# Patient Record
Sex: FEMALE | Race: WHITE | NOT HISPANIC OR LATINO
[De-identification: names, ages, dates, MRNs, and addresses within clinical notes are randomized per-mention and may not be internally consistent; named-entity substitution may affect disease eponyms.]

---

## 2017-01-12 ENCOUNTER — APPOINTMENT (OUTPATIENT)
Dept: ELECTROPHYSIOLOGY | Facility: CLINIC | Age: 77
End: 2017-01-12

## 2017-01-12 ENCOUNTER — APPOINTMENT (OUTPATIENT)
Dept: CARDIOLOGY | Facility: CLINIC | Age: 77
End: 2017-01-12

## 2017-01-12 ENCOUNTER — NON-APPOINTMENT (OUTPATIENT)
Age: 77
End: 2017-01-12

## 2017-01-12 VITALS
HEART RATE: 66 BPM | DIASTOLIC BLOOD PRESSURE: 84 MMHG | BODY MASS INDEX: 53.92 KG/M2 | WEIGHT: 293 LBS | SYSTOLIC BLOOD PRESSURE: 161 MMHG | HEIGHT: 62 IN

## 2017-05-04 ENCOUNTER — APPOINTMENT (OUTPATIENT)
Dept: ELECTROPHYSIOLOGY | Facility: CLINIC | Age: 77
End: 2017-05-04

## 2017-05-04 ENCOUNTER — NON-APPOINTMENT (OUTPATIENT)
Age: 77
End: 2017-05-04

## 2017-05-04 ENCOUNTER — APPOINTMENT (OUTPATIENT)
Dept: CARDIOLOGY | Facility: CLINIC | Age: 77
End: 2017-05-04

## 2017-05-04 VITALS
HEART RATE: 65 BPM | DIASTOLIC BLOOD PRESSURE: 76 MMHG | OXYGEN SATURATION: 96 % | WEIGHT: 293 LBS | HEIGHT: 62 IN | SYSTOLIC BLOOD PRESSURE: 136 MMHG | BODY MASS INDEX: 53.92 KG/M2

## 2017-05-11 ENCOUNTER — APPOINTMENT (OUTPATIENT)
Dept: PULMONOLOGY | Facility: CLINIC | Age: 77
End: 2017-05-11

## 2017-05-11 VITALS
TEMPERATURE: 98.5 F | BODY MASS INDEX: 53.73 KG/M2 | RESPIRATION RATE: 18 BRPM | SYSTOLIC BLOOD PRESSURE: 140 MMHG | WEIGHT: 292 LBS | OXYGEN SATURATION: 98 % | DIASTOLIC BLOOD PRESSURE: 70 MMHG | HEART RATE: 71 BPM | HEIGHT: 62 IN

## 2017-05-11 DIAGNOSIS — G47.33 OBSTRUCTIVE SLEEP APNEA (ADULT) (PEDIATRIC): ICD-10-CM

## 2017-09-06 ENCOUNTER — APPOINTMENT (OUTPATIENT)
Dept: ELECTROPHYSIOLOGY | Facility: CLINIC | Age: 77
End: 2017-09-06

## 2017-09-06 ENCOUNTER — APPOINTMENT (OUTPATIENT)
Dept: CARDIOLOGY | Facility: CLINIC | Age: 77
End: 2017-09-06

## 2017-09-27 ENCOUNTER — APPOINTMENT (OUTPATIENT)
Dept: CARDIOLOGY | Facility: CLINIC | Age: 77
End: 2017-09-27
Payer: MEDICARE

## 2017-09-27 ENCOUNTER — NON-APPOINTMENT (OUTPATIENT)
Age: 77
End: 2017-09-27

## 2017-09-27 VITALS
HEIGHT: 62 IN | BODY MASS INDEX: 51.53 KG/M2 | OXYGEN SATURATION: 96 % | RESPIRATION RATE: 18 BRPM | SYSTOLIC BLOOD PRESSURE: 142 MMHG | DIASTOLIC BLOOD PRESSURE: 70 MMHG | WEIGHT: 280 LBS | HEART RATE: 68 BPM

## 2017-09-27 PROCEDURE — 99214 OFFICE O/P EST MOD 30 MIN: CPT

## 2017-09-27 PROCEDURE — 93000 ELECTROCARDIOGRAM COMPLETE: CPT

## 2017-10-10 ENCOUNTER — OUTPATIENT (OUTPATIENT)
Dept: OUTPATIENT SERVICES | Facility: HOSPITAL | Age: 77
LOS: 1 days | End: 2017-10-10
Payer: MEDICARE

## 2017-10-10 VITALS
RESPIRATION RATE: 18 BRPM | DIASTOLIC BLOOD PRESSURE: 88 MMHG | HEART RATE: 64 BPM | SYSTOLIC BLOOD PRESSURE: 200 MMHG | WEIGHT: 279.99 LBS | TEMPERATURE: 97 F | HEIGHT: 60 IN | OXYGEN SATURATION: 95 %

## 2017-10-10 DIAGNOSIS — I50.20 UNSPECIFIED SYSTOLIC (CONGESTIVE) HEART FAILURE: ICD-10-CM

## 2017-10-10 DIAGNOSIS — Z98.89 OTHER SPECIFIED POSTPROCEDURAL STATES: Chronic | ICD-10-CM

## 2017-10-10 LAB
ALBUMIN SERPL ELPH-MCNC: 4.3 G/DL — SIGNIFICANT CHANGE UP (ref 3.3–5)
ALP SERPL-CCNC: 51 U/L — SIGNIFICANT CHANGE UP (ref 40–120)
ALT FLD-CCNC: 19 U/L RC — SIGNIFICANT CHANGE UP (ref 10–45)
ANION GAP SERPL CALC-SCNC: 10 MMOL/L — SIGNIFICANT CHANGE UP (ref 5–17)
AST SERPL-CCNC: 23 U/L — SIGNIFICANT CHANGE UP (ref 10–40)
BILIRUB SERPL-MCNC: 0.4 MG/DL — SIGNIFICANT CHANGE UP (ref 0.2–1.2)
BUN SERPL-MCNC: 24 MG/DL — HIGH (ref 7–23)
CALCIUM SERPL-MCNC: 9.5 MG/DL — SIGNIFICANT CHANGE UP (ref 8.4–10.5)
CHLORIDE SERPL-SCNC: 102 MMOL/L — SIGNIFICANT CHANGE UP (ref 96–108)
CO2 SERPL-SCNC: 28 MMOL/L — SIGNIFICANT CHANGE UP (ref 22–31)
CREAT SERPL-MCNC: 1.18 MG/DL — SIGNIFICANT CHANGE UP (ref 0.5–1.3)
GLUCOSE SERPL-MCNC: 169 MG/DL — HIGH (ref 70–99)
HCT VFR BLD CALC: 35.8 % — SIGNIFICANT CHANGE UP (ref 34.5–45)
HGB BLD-MCNC: 12.5 G/DL — SIGNIFICANT CHANGE UP (ref 11.5–15.5)
INR BLD: 1.06 RATIO — SIGNIFICANT CHANGE UP (ref 0.88–1.16)
MCHC RBC-ENTMCNC: 32 PG — SIGNIFICANT CHANGE UP (ref 27–34)
MCHC RBC-ENTMCNC: 35 GM/DL — SIGNIFICANT CHANGE UP (ref 32–36)
MCV RBC AUTO: 91.4 FL — SIGNIFICANT CHANGE UP (ref 80–100)
PLATELET # BLD AUTO: 245 K/UL — SIGNIFICANT CHANGE UP (ref 150–400)
POTASSIUM SERPL-MCNC: 4.2 MMOL/L — SIGNIFICANT CHANGE UP (ref 3.5–5.3)
POTASSIUM SERPL-SCNC: 4.2 MMOL/L — SIGNIFICANT CHANGE UP (ref 3.5–5.3)
PROT SERPL-MCNC: 7.2 G/DL — SIGNIFICANT CHANGE UP (ref 6–8.3)
PROTHROM AB SERPL-ACNC: 11.6 SEC — SIGNIFICANT CHANGE UP (ref 9.8–12.7)
RBC # BLD: 3.92 M/UL — SIGNIFICANT CHANGE UP (ref 3.8–5.2)
RBC # FLD: 12.5 % — SIGNIFICANT CHANGE UP (ref 10.3–14.5)
SODIUM SERPL-SCNC: 140 MMOL/L — SIGNIFICANT CHANGE UP (ref 135–145)
WBC # BLD: 7.1 K/UL — SIGNIFICANT CHANGE UP (ref 3.8–10.5)
WBC # FLD AUTO: 7.1 K/UL — SIGNIFICANT CHANGE UP (ref 3.8–10.5)

## 2017-10-10 PROCEDURE — 93010 ELECTROCARDIOGRAM REPORT: CPT

## 2017-10-10 PROCEDURE — 93005 ELECTROCARDIOGRAM TRACING: CPT

## 2017-10-10 PROCEDURE — C1769: CPT

## 2017-10-10 PROCEDURE — 80053 COMPREHEN METABOLIC PANEL: CPT

## 2017-10-10 PROCEDURE — 93451 RIGHT HEART CATH: CPT | Mod: 26

## 2017-10-10 PROCEDURE — 93451 RIGHT HEART CATH: CPT

## 2017-10-10 PROCEDURE — 85027 COMPLETE CBC AUTOMATED: CPT

## 2017-10-10 PROCEDURE — C1894: CPT

## 2017-10-10 PROCEDURE — 85610 PROTHROMBIN TIME: CPT

## 2017-10-10 RX ORDER — ALBUTEROL 90 UG/1
2.5 AEROSOL, METERED ORAL ONCE
Qty: 0 | Refills: 0 | Status: DISCONTINUED | OUTPATIENT
Start: 2017-10-10 | End: 2017-10-25

## 2017-10-10 RX ORDER — LABETALOL HCL 100 MG
20 TABLET ORAL ONCE
Qty: 0 | Refills: 0 | Status: DISCONTINUED | OUTPATIENT
Start: 2017-10-10 | End: 2017-10-25

## 2017-10-10 RX ORDER — SODIUM CHLORIDE 9 MG/ML
3 INJECTION INTRAMUSCULAR; INTRAVENOUS; SUBCUTANEOUS EVERY 8 HOURS
Qty: 0 | Refills: 0 | Status: DISCONTINUED | OUTPATIENT
Start: 2017-10-10 | End: 2017-10-25

## 2017-10-10 NOTE — H&P CARDIOLOGY - HISTORY OF PRESENT ILLNESS
76 yo Malagasy-speaking woman(requesting to translated her daughter Vaishali) with CAD, HTN, HLD, chronic diastolic HF, DM2, afib on Xarelto s/p PPM November 2013, intermittent asthma, hypothyroidism, venous insufficiency with LE edema, urinary incontinence, IBS presented for cardia cath. Pt reports she has been experiencing worsening CHAMBERLAIN, evaluated by Dr. Velazquez and referred for cath. 78 yo Macedonian-speaking woman(requesting to translated her daughter Vaishali) with CAD, HTN, HLD, morbid obesity, chronic diastolic HF, DM2, afib on Xarelto s/p PPM November 2013, intermittent asthma, hypothyroidism, venous insufficiency with LE edema, urinary incontinence, IBS presented for cardia cath. Pt reports she has been experiencing worsening CHAMBERLAIN, evaluated by Dr. Velazquez and referred for cath.

## 2017-10-10 NOTE — H&P CARDIOLOGY - PMH
Asthma    Atrial fibrillation    CAD (Coronary Atherosclerotic Disease)    CHF (congestive heart failure), NYHA class I    Diabetes    HLD (Hyperlipidemia)    HTN (Hypertension)    Hypothyroidism    IBS (irritable bowel syndrome)    Myocardial Infarction    Obesity    Pacemaker    Venous stasis

## 2018-01-01 ENCOUNTER — OUTPATIENT (OUTPATIENT)
Dept: OUTPATIENT SERVICES | Facility: HOSPITAL | Age: 78
LOS: 1 days | End: 2018-01-01
Payer: MEDICAID

## 2018-01-01 DIAGNOSIS — Z98.89 OTHER SPECIFIED POSTPROCEDURAL STATES: Chronic | ICD-10-CM

## 2018-01-01 PROCEDURE — G9001: CPT

## 2018-01-28 ENCOUNTER — INPATIENT (INPATIENT)
Facility: HOSPITAL | Age: 78
LOS: 1 days | Discharge: HOME | DRG: 190 | End: 2018-01-30
Attending: INTERNAL MEDICINE | Admitting: INTERNAL MEDICINE
Payer: MEDICARE

## 2018-01-28 VITALS
RESPIRATION RATE: 18 BRPM | WEIGHT: 279.99 LBS | TEMPERATURE: 99 F | DIASTOLIC BLOOD PRESSURE: 80 MMHG | OXYGEN SATURATION: 100 % | SYSTOLIC BLOOD PRESSURE: 200 MMHG | HEART RATE: 80 BPM

## 2018-01-28 DIAGNOSIS — E11.9 TYPE 2 DIABETES MELLITUS WITHOUT COMPLICATIONS: ICD-10-CM

## 2018-01-28 DIAGNOSIS — Z98.89 OTHER SPECIFIED POSTPROCEDURAL STATES: Chronic | ICD-10-CM

## 2018-01-28 DIAGNOSIS — J44.1 CHRONIC OBSTRUCTIVE PULMONARY DISEASE WITH (ACUTE) EXACERBATION: ICD-10-CM

## 2018-01-28 LAB
ALBUMIN SERPL ELPH-MCNC: 3.4 G/DL — LOW (ref 3.5–5)
ALP SERPL-CCNC: 58 U/L — SIGNIFICANT CHANGE UP (ref 40–120)
ALT FLD-CCNC: 38 U/L DA — SIGNIFICANT CHANGE UP (ref 10–60)
ANION GAP SERPL CALC-SCNC: 9 MMOL/L — SIGNIFICANT CHANGE UP (ref 5–17)
AST SERPL-CCNC: 30 U/L — SIGNIFICANT CHANGE UP (ref 10–40)
BASE EXCESS BLDA CALC-SCNC: 0.8 MMOL/L — SIGNIFICANT CHANGE UP (ref -2–2)
BASE EXCESS BLDV CALC-SCNC: -0.2 MMOL/L — SIGNIFICANT CHANGE UP (ref -2–2)
BASOPHILS # BLD AUTO: 0.1 K/UL — SIGNIFICANT CHANGE UP (ref 0–0.2)
BASOPHILS NFR BLD AUTO: 1.6 % — SIGNIFICANT CHANGE UP (ref 0–2)
BILIRUB SERPL-MCNC: 0.4 MG/DL — SIGNIFICANT CHANGE UP (ref 0.2–1.2)
BLOOD GAS COMMENTS ARTERIAL: SIGNIFICANT CHANGE UP
BLOOD GAS COMMENTS, VENOUS: SIGNIFICANT CHANGE UP
BUN SERPL-MCNC: 15 MG/DL — SIGNIFICANT CHANGE UP (ref 7–18)
CALCIUM SERPL-MCNC: 8.4 MG/DL — SIGNIFICANT CHANGE UP (ref 8.4–10.5)
CHLORIDE SERPL-SCNC: 102 MMOL/L — SIGNIFICANT CHANGE UP (ref 96–108)
CK MB BLD-MCNC: 1.1 % — SIGNIFICANT CHANGE UP (ref 0–3.5)
CK MB BLD-MCNC: 1.2 % — SIGNIFICANT CHANGE UP (ref 0–3.5)
CK MB CFR SERPL CALC: 1.5 NG/ML — SIGNIFICANT CHANGE UP (ref 0–3.6)
CK MB CFR SERPL CALC: 1.6 NG/ML — SIGNIFICANT CHANGE UP (ref 0–3.6)
CK SERPL-CCNC: 129 U/L — SIGNIFICANT CHANGE UP (ref 21–215)
CK SERPL-CCNC: 139 U/L — SIGNIFICANT CHANGE UP (ref 21–215)
CO2 SERPL-SCNC: 24 MMOL/L — SIGNIFICANT CHANGE UP (ref 22–31)
CREAT SERPL-MCNC: 1.03 MG/DL — SIGNIFICANT CHANGE UP (ref 0.5–1.3)
EOSINOPHIL # BLD AUTO: 0.1 K/UL — SIGNIFICANT CHANGE UP (ref 0–0.5)
EOSINOPHIL NFR BLD AUTO: 0.9 % — SIGNIFICANT CHANGE UP (ref 0–6)
GLUCOSE BLDC GLUCOMTR-MCNC: 205 MG/DL — HIGH (ref 70–99)
GLUCOSE SERPL-MCNC: 173 MG/DL — HIGH (ref 70–99)
HCO3 BLDA-SCNC: 25 MMOL/L — SIGNIFICANT CHANGE UP (ref 23–27)
HCO3 BLDV-SCNC: 26 MMOL/L — SIGNIFICANT CHANGE UP (ref 21–29)
HCT VFR BLD CALC: 35.5 % — SIGNIFICANT CHANGE UP (ref 34.5–45)
HGB BLD-MCNC: 11.5 G/DL — SIGNIFICANT CHANGE UP (ref 11.5–15.5)
HOROWITZ INDEX BLDA+IHG-RTO: 32 — SIGNIFICANT CHANGE UP
HOROWITZ INDEX BLDV+IHG-RTO: 21 — SIGNIFICANT CHANGE UP
LYMPHOCYTES # BLD AUTO: 1 K/UL — SIGNIFICANT CHANGE UP (ref 1–3.3)
LYMPHOCYTES # BLD AUTO: 17.1 % — SIGNIFICANT CHANGE UP (ref 13–44)
MCHC RBC-ENTMCNC: 29.4 PG — SIGNIFICANT CHANGE UP (ref 27–34)
MCHC RBC-ENTMCNC: 32.5 GM/DL — SIGNIFICANT CHANGE UP (ref 32–36)
MCV RBC AUTO: 90.6 FL — SIGNIFICANT CHANGE UP (ref 80–100)
MONOCYTES # BLD AUTO: 0.6 K/UL — SIGNIFICANT CHANGE UP (ref 0–0.9)
MONOCYTES NFR BLD AUTO: 10.6 % — SIGNIFICANT CHANGE UP (ref 2–14)
NEUTROPHILS # BLD AUTO: 4.1 K/UL — SIGNIFICANT CHANGE UP (ref 1.8–7.4)
NEUTROPHILS NFR BLD AUTO: 69.8 % — SIGNIFICANT CHANGE UP (ref 43–77)
NT-PROBNP SERPL-SCNC: 706 PG/ML — HIGH (ref 0–450)
PCO2 BLDA: 41 MMHG — SIGNIFICANT CHANGE UP (ref 32–46)
PCO2 BLDV: 49 MMHG — SIGNIFICANT CHANGE UP (ref 35–50)
PH BLDA: 7.4 — SIGNIFICANT CHANGE UP (ref 7.35–7.45)
PH BLDV: 7.34 — LOW (ref 7.35–7.45)
PLATELET # BLD AUTO: 195 K/UL — SIGNIFICANT CHANGE UP (ref 150–400)
PO2 BLDA: 111 MMHG — HIGH (ref 74–108)
PO2 BLDV: 162 MMHG — HIGH (ref 25–45)
POTASSIUM SERPL-MCNC: 4.4 MMOL/L — SIGNIFICANT CHANGE UP (ref 3.5–5.3)
POTASSIUM SERPL-SCNC: 4.4 MMOL/L — SIGNIFICANT CHANGE UP (ref 3.5–5.3)
PROT SERPL-MCNC: 7.1 G/DL — SIGNIFICANT CHANGE UP (ref 6–8.3)
RAPID RVP RESULT: SIGNIFICANT CHANGE UP
RBC # BLD: 3.92 M/UL — SIGNIFICANT CHANGE UP (ref 3.8–5.2)
RBC # FLD: 12.8 % — SIGNIFICANT CHANGE UP (ref 10.3–14.5)
SAO2 % BLDA: 98 % — HIGH (ref 92–96)
SAO2 % BLDV: 99 % — HIGH (ref 67–88)
SODIUM SERPL-SCNC: 135 MMOL/L — SIGNIFICANT CHANGE UP (ref 135–145)
TROPONIN I SERPL-MCNC: 0.02 NG/ML — SIGNIFICANT CHANGE UP (ref 0–0.04)
TROPONIN I SERPL-MCNC: 0.02 NG/ML — SIGNIFICANT CHANGE UP (ref 0–0.04)
TROPONIN I SERPL-MCNC: <0.015 NG/ML — SIGNIFICANT CHANGE UP (ref 0–0.04)
TSH SERPL-MCNC: 1.62 UU/ML — SIGNIFICANT CHANGE UP (ref 0.34–4.82)
WBC # BLD: 5.9 K/UL — SIGNIFICANT CHANGE UP (ref 3.8–10.5)
WBC # FLD AUTO: 5.9 K/UL — SIGNIFICANT CHANGE UP (ref 3.8–10.5)

## 2018-01-28 PROCEDURE — 99285 EMERGENCY DEPT VISIT HI MDM: CPT | Mod: 25

## 2018-01-28 PROCEDURE — 71045 X-RAY EXAM CHEST 1 VIEW: CPT | Mod: 26

## 2018-01-28 PROCEDURE — 93010 ELECTROCARDIOGRAM REPORT: CPT

## 2018-01-28 RX ORDER — FLUTICASONE PROPIONATE AND SALMETEROL 50; 250 UG/1; UG/1
0 POWDER ORAL; RESPIRATORY (INHALATION)
Qty: 60 | Refills: 0 | COMMUNITY

## 2018-01-28 RX ORDER — PANTOPRAZOLE SODIUM 20 MG/1
40 TABLET, DELAYED RELEASE ORAL
Qty: 0 | Refills: 0 | Status: DISCONTINUED | OUTPATIENT
Start: 2018-01-28 | End: 2018-01-30

## 2018-01-28 RX ORDER — LIPASE/PROTEASE/AMYLASE 16-48-48K
1 CAPSULE,DELAYED RELEASE (ENTERIC COATED) ORAL
Qty: 0 | Refills: 0 | Status: DISCONTINUED | OUTPATIENT
Start: 2018-01-28 | End: 2018-01-28

## 2018-01-28 RX ORDER — LOSARTAN POTASSIUM 100 MG/1
100 TABLET, FILM COATED ORAL DAILY
Qty: 0 | Refills: 0 | Status: DISCONTINUED | OUTPATIENT
Start: 2018-01-28 | End: 2018-01-30

## 2018-01-28 RX ORDER — AMPICILLIN SODIUM AND SULBACTAM SODIUM 250; 125 MG/ML; MG/ML
3 INJECTION, POWDER, FOR SUSPENSION INTRAMUSCULAR; INTRAVENOUS EVERY 8 HOURS
Qty: 0 | Refills: 0 | Status: DISCONTINUED | OUTPATIENT
Start: 2018-01-28 | End: 2018-01-30

## 2018-01-28 RX ORDER — ISOSORBIDE MONONITRATE 60 MG/1
30 TABLET, EXTENDED RELEASE ORAL DAILY
Qty: 0 | Refills: 0 | Status: DISCONTINUED | OUTPATIENT
Start: 2018-01-28 | End: 2018-01-30

## 2018-01-28 RX ORDER — FUROSEMIDE 40 MG
20 TABLET ORAL DAILY
Qty: 0 | Refills: 0 | Status: DISCONTINUED | OUTPATIENT
Start: 2018-01-28 | End: 2018-01-30

## 2018-01-28 RX ORDER — LABETALOL HCL 100 MG
100 TABLET ORAL
Qty: 0 | Refills: 0 | Status: DISCONTINUED | OUTPATIENT
Start: 2018-01-28 | End: 2018-01-29

## 2018-01-28 RX ORDER — FUROSEMIDE 40 MG
40 TABLET ORAL ONCE
Qty: 0 | Refills: 0 | Status: COMPLETED | OUTPATIENT
Start: 2018-01-28 | End: 2018-01-28

## 2018-01-28 RX ORDER — MOMETASONE FUROATE 50 UG/1
0 SPRAY NASAL
Qty: 17 | Refills: 0 | COMMUNITY

## 2018-01-28 RX ORDER — METFORMIN HYDROCHLORIDE 850 MG/1
500 TABLET ORAL
Qty: 0 | Refills: 0 | Status: DISCONTINUED | OUTPATIENT
Start: 2018-01-28 | End: 2018-01-30

## 2018-01-28 RX ORDER — AMPICILLIN SODIUM AND SULBACTAM SODIUM 250; 125 MG/ML; MG/ML
3 INJECTION, POWDER, FOR SUSPENSION INTRAMUSCULAR; INTRAVENOUS ONCE
Qty: 0 | Refills: 0 | Status: COMPLETED | OUTPATIENT
Start: 2018-01-28 | End: 2018-01-28

## 2018-01-28 RX ORDER — OXYBUTYNIN CHLORIDE 5 MG
5 TABLET ORAL
Qty: 0 | Refills: 0 | Status: DISCONTINUED | OUTPATIENT
Start: 2018-01-28 | End: 2018-01-30

## 2018-01-28 RX ORDER — FLUTICASONE PROPIONATE 50 MCG
1 SPRAY, SUSPENSION NASAL DAILY
Qty: 0 | Refills: 0 | Status: DISCONTINUED | OUTPATIENT
Start: 2018-01-28 | End: 2018-01-30

## 2018-01-28 RX ORDER — AMLODIPINE BESYLATE 2.5 MG/1
5 TABLET ORAL DAILY
Qty: 0 | Refills: 0 | Status: DISCONTINUED | OUTPATIENT
Start: 2018-01-28 | End: 2018-01-30

## 2018-01-28 RX ORDER — AMPICILLIN SODIUM AND SULBACTAM SODIUM 250; 125 MG/ML; MG/ML
INJECTION, POWDER, FOR SUSPENSION INTRAMUSCULAR; INTRAVENOUS
Qty: 0 | Refills: 0 | Status: DISCONTINUED | OUTPATIENT
Start: 2018-01-28 | End: 2018-01-30

## 2018-01-28 RX ORDER — MONTELUKAST 4 MG/1
10 TABLET, CHEWABLE ORAL DAILY
Qty: 0 | Refills: 0 | Status: DISCONTINUED | OUTPATIENT
Start: 2018-01-28 | End: 2018-01-30

## 2018-01-28 RX ORDER — DRONEDARONE 400 MG/1
400 TABLET, FILM COATED ORAL
Qty: 0 | Refills: 0 | Status: DISCONTINUED | OUTPATIENT
Start: 2018-01-28 | End: 2018-01-30

## 2018-01-28 RX ORDER — LABETALOL HCL 100 MG
0 TABLET ORAL
Qty: 60 | Refills: 0 | COMMUNITY

## 2018-01-28 RX ORDER — ACETAMINOPHEN 500 MG
650 TABLET ORAL EVERY 6 HOURS
Qty: 0 | Refills: 0 | Status: DISCONTINUED | OUTPATIENT
Start: 2018-01-28 | End: 2018-01-30

## 2018-01-28 RX ORDER — HYDROCHLOROTHIAZIDE 25 MG
25 TABLET ORAL DAILY
Qty: 0 | Refills: 0 | Status: DISCONTINUED | OUTPATIENT
Start: 2018-01-28 | End: 2018-01-28

## 2018-01-28 RX ORDER — RIVAROXABAN 15 MG-20MG
20 KIT ORAL EVERY 24 HOURS
Qty: 0 | Refills: 0 | Status: DISCONTINUED | OUTPATIENT
Start: 2018-01-28 | End: 2018-01-30

## 2018-01-28 RX ORDER — IPRATROPIUM/ALBUTEROL SULFATE 18-103MCG
3 AEROSOL WITH ADAPTER (GRAM) INHALATION
Qty: 0 | Refills: 0 | Status: COMPLETED | OUTPATIENT
Start: 2018-01-28 | End: 2018-01-28

## 2018-01-28 RX ORDER — INSULIN LISPRO 100/ML
VIAL (ML) SUBCUTANEOUS
Qty: 0 | Refills: 0 | Status: DISCONTINUED | OUTPATIENT
Start: 2018-01-28 | End: 2018-01-30

## 2018-01-28 RX ORDER — TIOTROPIUM BROMIDE 18 UG/1
1 CAPSULE ORAL; RESPIRATORY (INHALATION) DAILY
Qty: 0 | Refills: 0 | Status: DISCONTINUED | OUTPATIENT
Start: 2018-01-28 | End: 2018-01-30

## 2018-01-28 RX ORDER — IPRATROPIUM/ALBUTEROL SULFATE 18-103MCG
3 AEROSOL WITH ADAPTER (GRAM) INHALATION EVERY 6 HOURS
Qty: 0 | Refills: 0 | Status: DISCONTINUED | OUTPATIENT
Start: 2018-01-28 | End: 2018-01-30

## 2018-01-28 RX ORDER — FUROSEMIDE 40 MG
40 TABLET ORAL
Qty: 0 | Refills: 0 | Status: DISCONTINUED | OUTPATIENT
Start: 2018-01-28 | End: 2018-01-28

## 2018-01-28 RX ORDER — ATORVASTATIN CALCIUM 80 MG/1
40 TABLET, FILM COATED ORAL AT BEDTIME
Qty: 0 | Refills: 0 | Status: DISCONTINUED | OUTPATIENT
Start: 2018-01-28 | End: 2018-01-30

## 2018-01-28 RX ORDER — LIPASE/PROTEASE/AMYLASE 16-48-48K
2 CAPSULE,DELAYED RELEASE (ENTERIC COATED) ORAL
Qty: 0 | Refills: 0 | Status: DISCONTINUED | OUTPATIENT
Start: 2018-01-28 | End: 2018-01-30

## 2018-01-28 RX ORDER — LEVOTHYROXINE SODIUM 125 MCG
175 TABLET ORAL DAILY
Qty: 0 | Refills: 0 | Status: DISCONTINUED | OUTPATIENT
Start: 2018-01-28 | End: 2018-01-30

## 2018-01-28 RX ORDER — AZITHROMYCIN 500 MG/1
500 TABLET, FILM COATED ORAL DAILY
Qty: 0 | Refills: 0 | Status: DISCONTINUED | OUTPATIENT
Start: 2018-01-28 | End: 2018-01-28

## 2018-01-28 RX ADMIN — Medication 5 MILLIGRAM(S): at 17:33

## 2018-01-28 RX ADMIN — TIOTROPIUM BROMIDE 1 CAPSULE(S): 18 CAPSULE ORAL; RESPIRATORY (INHALATION) at 17:30

## 2018-01-28 RX ADMIN — Medication 3 MILLILITER(S): at 05:41

## 2018-01-28 RX ADMIN — Medication 40 MILLIGRAM(S): at 14:00

## 2018-01-28 RX ADMIN — Medication 2 CAPSULE(S): at 17:35

## 2018-01-28 RX ADMIN — METFORMIN HYDROCHLORIDE 500 MILLIGRAM(S): 850 TABLET ORAL at 17:33

## 2018-01-28 RX ADMIN — Medication 3 MILLILITER(S): at 06:19

## 2018-01-28 RX ADMIN — Medication 125 MILLIGRAM(S): at 05:41

## 2018-01-28 RX ADMIN — AMPICILLIN SODIUM AND SULBACTAM SODIUM 500 GRAM(S): 250; 125 INJECTION, POWDER, FOR SUSPENSION INTRAMUSCULAR; INTRAVENOUS at 21:15

## 2018-01-28 RX ADMIN — Medication 40 MILLIGRAM(S): at 21:14

## 2018-01-28 RX ADMIN — ATORVASTATIN CALCIUM 40 MILLIGRAM(S): 80 TABLET, FILM COATED ORAL at 21:14

## 2018-01-28 RX ADMIN — Medication 40 MILLIGRAM(S): at 06:12

## 2018-01-28 RX ADMIN — Medication 100 MILLIGRAM(S): at 17:34

## 2018-01-28 RX ADMIN — RIVAROXABAN 20 MILLIGRAM(S): KIT at 17:33

## 2018-01-28 RX ADMIN — Medication 3 MILLILITER(S): at 14:44

## 2018-01-28 RX ADMIN — Medication 4: at 17:31

## 2018-01-28 RX ADMIN — AMPICILLIN SODIUM AND SULBACTAM SODIUM 500 GRAM(S): 250; 125 INJECTION, POWDER, FOR SUSPENSION INTRAMUSCULAR; INTRAVENOUS at 12:52

## 2018-01-28 RX ADMIN — Medication 2 CAPSULE(S): at 12:52

## 2018-01-28 RX ADMIN — Medication 2: at 21:39

## 2018-01-28 RX ADMIN — Medication 3 MILLILITER(S): at 21:14

## 2018-01-28 RX ADMIN — Medication 75 MILLIGRAM(S): at 17:35

## 2018-01-28 RX ADMIN — Medication 3 MILLILITER(S): at 06:12

## 2018-01-28 RX ADMIN — DRONEDARONE 400 MILLIGRAM(S): 400 TABLET, FILM COATED ORAL at 17:34

## 2018-01-28 RX ADMIN — Medication 3 MILLILITER(S): at 09:05

## 2018-01-28 RX ADMIN — MONTELUKAST 10 MILLIGRAM(S): 4 TABLET, CHEWABLE ORAL at 12:54

## 2018-01-28 RX ADMIN — Medication 650 MILLIGRAM(S): at 12:54

## 2018-01-28 RX ADMIN — Medication 75 MILLIGRAM(S): at 12:52

## 2018-01-28 RX ADMIN — METFORMIN HYDROCHLORIDE 500 MILLIGRAM(S): 850 TABLET ORAL at 09:05

## 2018-01-28 RX ADMIN — ISOSORBIDE MONONITRATE 30 MILLIGRAM(S): 60 TABLET, EXTENDED RELEASE ORAL at 12:29

## 2018-01-28 RX ADMIN — Medication 5: at 12:28

## 2018-01-28 NOTE — ED PROVIDER NOTE - PHYSICAL EXAMINATION
Afebrile, hemodynamically stable, HTN  NAD, no increased WOB  Head NCAT  EOMI grossly  MMM  No JVD  RRR, nml S1/S2, no m/r/g  Diffuse wheezing without focality, no rales noted  Abd soft, NT, obese, nml BS, no rebound or guarding  AAO, CN's 3-12 grossly intact  EVANS spontaneously, b/l LE edema  Skin warm, dry, chronic venous stasis changes b/l

## 2018-01-28 NOTE — H&P ADULT - PROBLEM SELECTOR PLAN 1
-pro-, bilateral opacities on CXR, orthopnea, noncompliance to diuretics; suspecting diastolic HF  -Lasix IV 40mg once daily, strict I/O, daily weight, fluid restriction 1.2L daily.  -Trend cardiac enzymes, 1st troponin negative. Monitor telemetry  -Repeat TTE  -Check Hba1c and lipid profile. -pro-, bilateral opacities on CXR, orthopnea, noncompliance to diuretics; suspecting diastolic HF  -Lasix IV 40mg once daily, strict I/O, daily weight, fluid restriction 1.2L daily.  -Trend cardiac enzymes, 1st troponin negative. Monitor telemetry  -Repeat TTE  -Check Hba1c and lipid profile.  -Cardio Dr. Sandoval.

## 2018-01-28 NOTE — H&P ADULT - ATTENDING COMMENTS
Above  noted    77 yo lady with  CHF exacerbation  COPD  CAD s/p Angioplasty ,Chronic  atrial fibrillation  PPM  Chronic  cellulitis  lower extremities  ?PVD Venous insufficiency   DM with vascular complications ,Obesity Hypothyroidism    Admit to telemetry as per protocol  Cardiology, Vascular  evaluation ID  Continue  current  antibiotic coverage Steroids and  nebulizer  treatment  Resume  home  meds  Nutritional support

## 2018-01-28 NOTE — ED PROVIDER NOTE - OBJECTIVE STATEMENT
77 y/o female with PMHx of A-fib, hypothyroidism, IBS, venous stasis, CHF, pacemaker, HLD, HTN, DM, MI, CAD, asthma  presents to the ED c/o SOB x yesterday. Pt's daughter notes pt contacted daughter last night saying she was unable to breath during the night, had used nebulizer to no relief. Pt also had a fever of 38 Celsius yesterday. Pt notes sitting makes her Sx worse. Pt denies CP, nausea, vomiting, or any other complaints. Pt notes she has not been taking her water pills (last dose x 3-4 days ago); this is because she chooses not to take them when she goes to . Pt currently on Xarelto, Mucinex, Furosemide 40mg, Restasis, Montelukast Sodium, Dexilant, Proair HFA AER, Advair Diskus, Alcohol Pads, Atorvastatin, Labetalol, Mometasone Nasal Spray, Lancets, Levothyroxine, Creon DR, Tolterodine Tartrate, Spiriva HandiHaler, Albuterol Sulfate, Olmes/AMLOD/HCTZ tabs, One Touch Ultra Glenna, Janumet, Linzess, Multaq, Lidocaine, Isosorbide Mononitra. Pt currently feeling better in ED. NKDA. 77 y/o female with PMHx of CHF, asthma, A-fib, hypothyroidism, pacemaker, HLD, HTN, DM, MI, presents to the ED c/o SOB x yesterday. Pt's daughter notes pt contacted daughter last night saying she was unable to breath during the night, had used nebulizer to no relief. Pt also had a fever of 38 Celsius yesterday. Pt notes sitting makes her Sx worse. Pt denies CP, nausea, vomiting, or any other complaints. 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 Pt currently on Xarelto, Mucinex, Furosemide 40mg, Restasis, Montelukast Sodium, Dexilant, Proair HFA AER, Advair Diskus, Alcohol Pads, Atorvastatin, Labetalol, Mometasone Nasal Spray, Lancets, Levothyroxine, Creon DR, Tolterodine Tartrate, Spiriva HandiHaler, Albuterol Sulfate, Olmes/AMLOD/HCTZ tabs, One Touch Ultra Glenna, Janumet, Linzess, Multaq, Lidocaine, Isosorbide Mononitra. Pt currently feeling better in ED. NKDA.

## 2018-01-28 NOTE — H&P ADULT - PROBLEM SELECTOR PLAN 4
-Check Hba1c and monitor accucheck  -Consistent carb hydrate diet  -continue home med metformin, hold Januvia, and starting humalog s/s  -Blood glucose may increase with solumedrol, please monitor closely.

## 2018-01-28 NOTE — H&P ADULT - PROBLEM SELECTOR PLAN 3
-Initial /80, now improved to 's  -Continue home med amlodipine, HCTZ, losartan, and labetalol with parameters  -Lasix IV 40mg daily  -Monitor BP, avoid sudden drop

## 2018-01-28 NOTE — CONSULT NOTE ADULT - SUBJECTIVE AND OBJECTIVE BOX
CHIEF COMPLAINT:Patient is a 78y old  Female who presents with a chief complaint of SOB and fever of 38celcius yesterday .      HPI:  78 years old Nigerien-speaking female from home, lives alone, has HHA 5times/week, 7hours/day, with PMH of CAD(s/p ?stents as per daughter), HTN, HLD, chronic diastolic HF, DM2(on Janumet), Afib on xarelto s/p PPM in November 2013, asthma(using nebulizer, ?home O2 intermittently pt unsure if it is oxygen), hypothyroidism, chronic venous insufficiency, urinary incontinence, and IBS came to ED c/o SOB and fever of 38celcius yesterday at around 6-7PM. She also reports orthopnea, CHAMBERLAIN, and intermittent chills, but denies chest pain, nausea, vomiting, diarrhea, or any other symptoms. Denies sick contact or recent travel outside [place], however 1 week ago she took antibiotics from her PMD due to URI symptoms. Last night she tried nebulizer multiple times due to worsening SOB without relief, so decided to come to the hospital. Daughter notes that pt is supposed to take lasix at least twice a week but pt skipped doses during whole last week.    In ED, pt was initially hypertensive to 200/80 with sat 100% on room air. She was wheezing diffusely initially but symptoms resolved after receiving nebulizer, lasix 40mg IV, and solumedrol 125mg IV. ABG 7.4/41/111/25 on 3 liters, Pro-, normal cardiac enzymes. EKG NSR at 77BPM, RBBB/LAFB LVH not significantly changed from the last EKG. CXR; diffuse bilateral opacities. Scattered expiratory wheezing noted on bilateral lung field. Pt is admitted to the telemetry floor for acute CHF exacerbation and asthma exacerbation likely due to viral infection. (28 Jan 2018 08:32)      PAST MEDICAL & SURGICAL HISTORY:  Obesity  Pacemaker  Atrial fibrillation  Hypothyroidism  Venous stasis  IBS (irritable bowel syndrome)  CHF (congestive heart failure), NYHA class I  HLD (Hyperlipidemia)  HTN (Hypertension)  Diabetes  Myocardial Infarction  CAD (Coronary Atherosclerotic Disease)  Asthma        MEDICATIONS  (STANDING):  ALBUTerol/ipratropium for Nebulization 3 milliLiter(s) Nebulizer every 6 hours  amLODIPine   Tablet 5 milliGRAM(s) Oral daily  amylase/lipase/protease  (CREON  6,000 Units) 2 Capsule(s) Oral three times a day with meals  atorvastatin 40 milliGRAM(s) Oral at bedtime  azithromycin   Tablet 500 milliGRAM(s) Oral daily  dronedarone 400 milliGRAM(s) Oral two times a day  insulin lispro (HumaLOG) corrective regimen sliding scale   SubCutaneous Before meals and at bedtime  isosorbide   mononitrate ER Tablet (IMDUR) 30 milliGRAM(s) Oral daily  labetalol 100 milliGRAM(s) Oral two times a day  levothyroxine 175 MICROGram(s) Oral daily  losartan 100 milliGRAM(s) Oral daily  metFORMIN 500 milliGRAM(s) Oral two times a day with meals  methylPREDNISolone sodium succinate Injectable 40 milliGRAM(s) IV Push every 8 hours  montelukast 10 milliGRAM(s) Oral daily  oxybutynin 5 milliGRAM(s) Oral two times a day  pantoprazole    Tablet 40 milliGRAM(s) Oral before breakfast  rivaroxaban 20 milliGRAM(s) Oral every 24 hours  tiotropium 18 MICROgram(s) Capsule 1 Capsule(s) Inhalation daily    MEDICATIONS  (PRN):  fluticasone propionate 50 MICROgram(s)/spray Nasal Spray 1 Spray(s) Both Nostrils daily PRN nasal congestion      FAMILY HISTORY:  Family history of heart disease (Mother)      SOCIAL HISTORY:    [X ] Non-smoker    [X ] Alcohol-social    Allergies    No Known Allergies    Intolerances    	    REVIEW OF SYSTEMS:  CONSTITUTIONAL: + fever, No weight loss, or fatigue  EYES: No eye pain, visual disturbances, or discharge  ENT:  No difficulty hearing, tinnitus, vertigo; No sinus or throat pain  NECK: No pain or stiffness  RESPIRATORY: No cough, wheezing, chills or hemoptysis; + Shortness of Breath  CARDIOVASCULAR: No chest pain, palpitations, passing out, dizziness, or leg swelling  GASTROINTESTINAL: No abdominal or epigastric pain. No nausea, vomiting, or hematemesis; No diarrhea or constipation. No melena or hematochezia.  GENITOURINARY: No dysuria, frequency, hematuria, or incontinence  NEUROLOGICAL: No headaches, memory loss, loss of strength, numbness, or tremors  SKIN: No itching, burning, rashes, or lesions   LYMPH Nodes: No enlarged glands  ENDOCRINE: No heat or cold intolerance; No hair loss  MUSCULOSKELETAL: No joint pain or swelling; No muscle, back, or extremity pain  PSYCHIATRIC: No depression, anxiety, mood swings, or difficulty sleeping  HEME/LYMPH: No easy bruising, or bleeding gums  ALLERGY AND IMMUNOLOGIC: No hives or eczema	      PHYSICAL EXAM:  T(C): 36.9 (01-28-18 @ 11:46), Max: 37.4 (01-28-18 @ 03:28)  HR: 79 (01-28-18 @ 11:46) (76 - 84)  BP: 139/77 (01-28-18 @ 11:46) (139/77 - 200/80)  RR: 18 (01-28-18 @ 11:46) (18 - 18)  SpO2: 99% (01-28-18 @ 11:46) (98% - 100%)      Appearance: Normal	  HEENT:   Normal oral mucosa, PERRL, EOMI	  Lymphatic: No lymphadenopathy  Cardiovascular: Normal S1 S2, No JVD,   Respiratory: B/L wheezing  Psychiatry: A & O x 3, Mood & affect appropriate  Gastrointestinal:  Soft, Non-tender, + BS	  Skin: No rashes, No ecchymoses, No cyanosis	  Neurologic: Non-focal  Extremities: Normal range of motion, No clubbing, cyanosis,B/L redness  Vascular: Peripheral pulses palpable 2+ bilaterally        ECG:  	NSR,RBBB,LAFB  	  LABS:	 	    CARDIAC MARKERS:  CARDIAC MARKERS ( 28 Jan 2018 04:32 )  <0.015 ng/mL / x     / x     / x     / x                            11.5   5.9   )-----------( 195      ( 28 Jan 2018 04:32 )             35.5     01-28    135  |  102  |  15  ----------------------------<  173<H>  4.4   |  24  |  1.03    Ca    8.4      28 Jan 2018 04:32    TPro  7.1  /  Alb  3.4<L>  /  TBili  0.4  /  DBili  x   /  AST  30  /  ALT  38  /  AlkPhos  58  01-28    proBNP: Serum Pro-Brain Natriuretic Peptide: 706 pg/mL (01-28 @ 04:32)      Observations:  Mitral Valve: Mitral annular calcification, otherwise  normal mitral valve. Minimal mitral regurgitation.  Aortic Valve/Aorta: Calcified aortic valve. Peak  transaortic valve gradient equals 17 mm Hg, mean  transaortic valve gradient equals 10 mm Hg, consistent with  mild aortic stenosis. Mild left ventricular outflow tract  gradient: 9 mm Hg, without evidence of obstruction due to  hyperdynamic LV function.  Aortic Root: 2.8 cm.  Left Atrium: Mildly dilated left atrium.  LA volume index =  30 cc/m2.  Left Ventricle: Hyperdynamic left ventricle. Basal septal  hypertrophy.  Right Heart: Normal right atrium. Normal right ventricular  size and function. Normal tricuspid valve.  Mild tricuspid  regurgitation. Normal pulmonic valve.  Pericardium/Pleura: Normal pericardium with no pericardial  effusion.  Hemodynamic: Estimated right atrial pressure is 8 mm Hg.  Estimated right ventricular systolic pressure equals 40 mm  Hg, assuming right atrial pressure equals 8 mm Hg,  consistent with mild pulmonary hypertension.    INDICATIONS: Chronic diastolic congestive heart failure.  HISTORY: The patient has a history ofcoronary artery disease. There was a  prior diagnosis of congestive heart failure. The patient has hypertension  and morbid obesity.  PROCEDURE:  --  Right heart catheterization.  --  [facility] - Diagnostic.  TECHNIQUE: The risks and alternatives of the procedures and conscious  sedation were explained to the patient and informed consent was obtained.  Cardiac catheterization performed electively.  Local anesthetic given. Right internal jugular vein access. A 7FR SHEATH  PINNACLE was inserted in the vessel. Right heart catheterization. The  procedure was performed utilizing a 7FR SWAN ELIANA catheter under  fluoroscopic guidance. Measurements of pressures were obtained. [facility] -  Diagnostic. RADIATION EXPOSURE: 1.3 min.  MEDICATIONS GIVEN: Fentanyl, 25 mcg, IV.  COMPLICATIONS: There were no complications.  DIAGNOSTIC IMPRESSIONS: Mildly elevated PCWP and PA pressure  DIAGNOSTIC RECOMMENDATIONS: Further diuresis may help only marginally - CHAMBERLAIN  likely also pulmonary in etiology as well.

## 2018-01-28 NOTE — H&P ADULT - NSHPPHYSICALEXAM_GEN_ALL_CORE
PHYSICAL EXAM:  GENERAL: NAD, well-groomed, well-developed, morbidly obese  HEAD:  Atraumatic, Normocephalic  EYES: EOMI, PERRLA, conjunctiva and sclera clear  ENMT: No tonsillar erythema, exudates, or enlargement; Moist mucous membranes, Good dentition, No lesions  NECK: Supple, No JVD, Normal thyroid  NERVOUS SYSTEM:  Alert & Oriented X3, Good concentration; Motor Strength 5/5 B/L upper and lower extremities  CHEST/LUNG: No rales, rhonchi, (+)scattered expiratory wheezing on bilateral lungs, no rubs  HEART: Regular rate and rhythm; systolic murmurs, no rubs, or gallops  ABDOMEN: Soft, Nontender, Nondistended; Bowel sounds present  EXTREMITIES:  2+ Peripheral Pulses bilaterally, No clubbing, cyanosis, bilateral 2+ pitting edema on the legs  LYMPH: No lymphadenopathy noted  SKIN: bilateral leg venostasis    T(C): 37.2 (01-28-18 @ 08:30), Max: 37.4 (01-28-18 @ 03:28)  HR: 84 (01-28-18 @ 08:30) (76 - 84)  BP: 154/81 (01-28-18 @ 08:30) (154/81 - 200/80)  RR: 18 (01-28-18 @ 08:30) (18 - 18)  SpO2: 99% (01-28-18 @ 08:30) (99% - 100%)  Wt(kg): --  I&O's Summary

## 2018-01-28 NOTE — CONSULT NOTE ADULT - ASSESSMENT
78 years old Sammarinese-speaking female from home, lives alone, has HHA 5times/week, 7hours/day, with PMH of CAD(s/p ?stents as per daughter), HTN, HLD, chronic diastolic HF, DM2(on Janumet), Afib on xarelto s/p PPM in November 2013, asthma(using nebulizer, ?home O2 intermittently pt unsure if it is oxygen), hypothyroidism, chronic venous insufficiency, urinary incontinence, and IBS came to ED c/o SOB and fever of 38celcius yesterday at around 6-7PM,probable flu,b/l cellulitis.  1.RVP-p, start tamiflu.  2.ID eval called, start unasyn.  3.CHF-D/C hctz, lasix for gentle diuresis.  4.PAF-Xarelto.  5.DM-Insulin.  6.ASTHMA-Nebs and steroids.  7.Hypothyroidism-synthroid.  8.PPI.

## 2018-01-28 NOTE — H&P ADULT - ASSESSMENT
Patient is a 78y old  Female who presents with a chief complaint of SOB and fever of 38celcius yesterday (28 Jan 2018 08:32). Pt is admitted to the telemetry floor for acute CHF exacerbation and asthma exacerbation likely due to viral infection.

## 2018-01-28 NOTE — ED ADULT NURSE NOTE - OBJECTIVE STATEMENT
pt biba with complaint of shortness of breath,as per family had fever yesterday,been short of breath for 2 days,with o2 nasal cannula at home

## 2018-01-28 NOTE — ED PROVIDER NOTE - MEDICAL DECISION MAKING DETAILS
Other diagnoses: noncompliance with medication regimen SOB without orthopnea, h/o asthma, noted wheezes, c/w COPD exacerbation. Also not taking her Lasix. These are more consistent with these as opposed to PE. No evidence of PNA on CXR and afebrile here. Transitioned from EMS's NRB to 2L NC, well appearing. Given Duonebs, solu medrol, and her PO dose of Lasix via IV. Admitted to internal medicine for further monitoring, w/u, and care.    Other diagnoses: noncompliance with medication regimen

## 2018-01-28 NOTE — H&P ADULT - PROBLEM SELECTOR PLAN 2
-Scattered wheezing bilaterally, subjective fever and chills; concern for viral infection, RVP sent  -ABG shows no CO2 retention; c/w supplemental oxygen PRN  -Duoneb q 6hrs, solumedrol 40mg IV daily  -c/w spiriva and sinculair  -Zithromax for 5 days

## 2018-01-28 NOTE — H&P ADULT - NSHPLABSRESULTS_GEN_ALL_CORE
LABS:                        11.5   5.9   )-----------( 195      ( 28 Jan 2018 04:32 )             35.5     01-28    135  |  102  |  15  ----------------------------<  173<H>  4.4   |  24  |  1.03    Ca    8.4      28 Jan 2018 04:32    TPro  7.1  /  Alb  3.4<L>  /  TBili  0.4  /  DBili  x   /  AST  30  /  ALT  38  /  AlkPhos  58  01-28        CAPILLARY BLOOD GLUCOSE        LIVER FUNCTIONS - ( 28 Jan 2018 04:32 )  Alb: 3.4 g/dL / Pro: 7.1 g/dL / ALK PHOS: 58 U/L / ALT: 38 U/L DA / AST: 30 U/L / GGT: x           ABG - ( 28 Jan 2018 07:38 )  pH: 7.40  /  pCO2: 41    /  pO2: 111   / HCO3: 25    / Base Excess: 0.8   /  SaO2: 98                CARDIAC MARKERS ( 28 Jan 2018 04:32 )  <0.015 ng/mL / x     / x     / x     / x

## 2018-01-28 NOTE — H&P ADULT - HISTORY OF PRESENT ILLNESS
78 years old British-speaking female from home, lives alone, has HHA 5times/week, 7hours/day, with PMH of CAD(s/p ?stents as per daughter), HTN, HLD, chronic diastolic HF, DM2(on Janumet), Afib on xarelto s/p PPM November 2013, asthma(using nebulizer, ?home O2 intermittently pt unsure if it is oxygen), hypothyroidism, chronic venous insufficiency, urinary incontinence, and IBS came to ED c/o SOB and fever of 38celcius yesterday at around 6-7PM. She also reports orthopnea, CHAMBERLAIN, and intermittent chills, but denies chest pain, nausea, vomiting, diarrhea, or any other symptoms. Denies sick contact or recent travel outside [place], however 1 week ago she took antibiotics from her PMD due to URI symptoms. Last night she tried nebulizer multiple times due to worsening SOB without relief, so decided to come to the hospital. Daughter notes that pt is supposed to take lasix at least twice a week but pt skipped doses during whole last week.    In ED, pt was initially hypertensive to 200/80 with sat 100% on room air. She was wheezing diffusely initially but symptoms resolved after receiving nebulizer, lasix 40mg IV, and solumedrol 125mg IV. ABG 7.4/41/111/25 on 3 liters, Pro-, normal cardiac enzymes. EKG NSR at 77BPM, RBBB/LAFB LVH not significantly changed from the last EKG. CXR; diffuse bilateral opacities. Scattered expiratory wheezing noted on bilateral lung field. Pt is admitted to the telemetry floor for acute CHF exacerbation and asthma exacerbation likely due to viral infection. 78 years old Hong Konger-speaking female from home, lives alone, has HHA 5times/week, 7hours/day, with PMH of CAD(s/p ?stents as per daughter), HTN, HLD, chronic diastolic HF, DM2(on Janumet), Afib on xarelto s/p PPM in November 2013, asthma(using nebulizer, ?home O2 intermittently pt unsure if it is oxygen), hypothyroidism, chronic venous insufficiency, urinary incontinence, and IBS came to ED c/o SOB and fever of 38celcius yesterday at around 6-7PM. She also reports orthopnea, CHAMBERLAIN, and intermittent chills, but denies chest pain, nausea, vomiting, diarrhea, or any other symptoms. Denies sick contact or recent travel outside [place], however 1 week ago she took antibiotics from her PMD due to URI symptoms. Last night she tried nebulizer multiple times due to worsening SOB without relief, so decided to come to the hospital. Daughter notes that pt is supposed to take lasix at least twice a week but pt skipped doses during whole last week.    In ED, pt was initially hypertensive to 200/80 with sat 100% on room air. She was wheezing diffusely initially but symptoms resolved after receiving nebulizer, lasix 40mg IV, and solumedrol 125mg IV. ABG 7.4/41/111/25 on 3 liters, Pro-, normal cardiac enzymes. EKG NSR at 77BPM, RBBB/LAFB LVH not significantly changed from the last EKG. CXR; diffuse bilateral opacities. Scattered expiratory wheezing noted on bilateral lung field. Pt is admitted to the telemetry floor for acute CHF exacerbation and asthma exacerbation likely due to viral infection.

## 2018-01-28 NOTE — ED PROVIDER NOTE - CARE PLAN
Principal Discharge DX:	COPD exacerbation  Secondary Diagnosis:	CHF (congestive heart failure)  Secondary Diagnosis:	HTN (hypertension)

## 2018-01-29 ENCOUNTER — TRANSCRIPTION ENCOUNTER (OUTPATIENT)
Age: 78
End: 2018-01-29

## 2018-01-29 DIAGNOSIS — I87.2 VENOUS INSUFFICIENCY (CHRONIC) (PERIPHERAL): ICD-10-CM

## 2018-01-29 LAB
24R-OH-CALCIDIOL SERPL-MCNC: 26.1 NG/ML — LOW (ref 30–80)
ANION GAP SERPL CALC-SCNC: 10 MMOL/L — SIGNIFICANT CHANGE UP (ref 5–17)
APTT BLD: 39 SEC — HIGH (ref 27.5–37.4)
BASOPHILS # BLD AUTO: 0 K/UL — SIGNIFICANT CHANGE UP (ref 0–0.2)
BASOPHILS NFR BLD AUTO: 0.8 % — SIGNIFICANT CHANGE UP (ref 0–2)
BUN SERPL-MCNC: 23 MG/DL — HIGH (ref 7–18)
CALCIUM SERPL-MCNC: 8.5 MG/DL — SIGNIFICANT CHANGE UP (ref 8.4–10.5)
CHLORIDE SERPL-SCNC: 98 MMOL/L — SIGNIFICANT CHANGE UP (ref 96–108)
CHOLEST SERPL-MCNC: 133 MG/DL — SIGNIFICANT CHANGE UP (ref 10–199)
CO2 SERPL-SCNC: 26 MMOL/L — SIGNIFICANT CHANGE UP (ref 22–31)
CREAT SERPL-MCNC: 1.31 MG/DL — HIGH (ref 0.5–1.3)
EOSINOPHIL # BLD AUTO: 0 K/UL — SIGNIFICANT CHANGE UP (ref 0–0.5)
EOSINOPHIL NFR BLD AUTO: 0 % — SIGNIFICANT CHANGE UP (ref 0–6)
FOLATE SERPL-MCNC: 9.1 NG/ML — SIGNIFICANT CHANGE UP (ref 4.8–24.2)
GLUCOSE BLDC GLUCOMTR-MCNC: 250 MG/DL — HIGH (ref 70–99)
GLUCOSE BLDC GLUCOMTR-MCNC: 277 MG/DL — HIGH (ref 70–99)
GLUCOSE BLDC GLUCOMTR-MCNC: 303 MG/DL — HIGH (ref 70–99)
GLUCOSE BLDC GLUCOMTR-MCNC: 309 MG/DL — HIGH (ref 70–99)
GLUCOSE BLDC GLUCOMTR-MCNC: 452 MG/DL — CRITICAL HIGH (ref 70–99)
GLUCOSE SERPL-MCNC: 287 MG/DL — HIGH (ref 70–99)
HBA1C BLD-MCNC: 7 % — HIGH (ref 4–5.6)
HCT VFR BLD CALC: 37.9 % — SIGNIFICANT CHANGE UP (ref 34.5–45)
HDLC SERPL-MCNC: 77 MG/DL — SIGNIFICANT CHANGE UP (ref 40–125)
HGB BLD-MCNC: 11.9 G/DL — SIGNIFICANT CHANGE UP (ref 11.5–15.5)
INR BLD: 1.47 RATIO — HIGH (ref 0.88–1.16)
LIPID PNL WITH DIRECT LDL SERPL: 41 MG/DL — SIGNIFICANT CHANGE UP
LYMPHOCYTES # BLD AUTO: 1.1 K/UL — SIGNIFICANT CHANGE UP (ref 1–3.3)
LYMPHOCYTES # BLD AUTO: 21.3 % — SIGNIFICANT CHANGE UP (ref 13–44)
MAGNESIUM SERPL-MCNC: 2.1 MG/DL — SIGNIFICANT CHANGE UP (ref 1.6–2.6)
MCHC RBC-ENTMCNC: 27.8 PG — SIGNIFICANT CHANGE UP (ref 27–34)
MCHC RBC-ENTMCNC: 31.5 GM/DL — LOW (ref 32–36)
MCV RBC AUTO: 88.2 FL — SIGNIFICANT CHANGE UP (ref 80–100)
MONOCYTES # BLD AUTO: 0.3 K/UL — SIGNIFICANT CHANGE UP (ref 0–0.9)
MONOCYTES NFR BLD AUTO: 5.4 % — SIGNIFICANT CHANGE UP (ref 2–14)
NEUTROPHILS # BLD AUTO: 3.7 K/UL — SIGNIFICANT CHANGE UP (ref 1.8–7.4)
NEUTROPHILS NFR BLD AUTO: 72.6 % — SIGNIFICANT CHANGE UP (ref 43–77)
PHOSPHATE SERPL-MCNC: 4.8 MG/DL — HIGH (ref 2.5–4.5)
PLATELET # BLD AUTO: 243 K/UL — SIGNIFICANT CHANGE UP (ref 150–400)
POTASSIUM SERPL-MCNC: 4.4 MMOL/L — SIGNIFICANT CHANGE UP (ref 3.5–5.3)
POTASSIUM SERPL-SCNC: 4.4 MMOL/L — SIGNIFICANT CHANGE UP (ref 3.5–5.3)
PROTHROM AB SERPL-ACNC: 16.1 SEC — HIGH (ref 9.8–12.7)
RBC # BLD: 4.3 M/UL — SIGNIFICANT CHANGE UP (ref 3.8–5.2)
RBC # FLD: 12.6 % — SIGNIFICANT CHANGE UP (ref 10.3–14.5)
SODIUM SERPL-SCNC: 134 MMOL/L — LOW (ref 135–145)
TOTAL CHOLESTEROL/HDL RATIO MEASUREMENT: 1.7 RATIO — LOW (ref 3.3–7.1)
TRIGL SERPL-MCNC: 73 MG/DL — SIGNIFICANT CHANGE UP (ref 10–149)
VIT B12 SERPL-MCNC: 507 PG/ML — SIGNIFICANT CHANGE UP (ref 232–1245)
WBC # BLD: 5.1 K/UL — SIGNIFICANT CHANGE UP (ref 3.8–10.5)
WBC # FLD AUTO: 5.1 K/UL — SIGNIFICANT CHANGE UP (ref 3.8–10.5)

## 2018-01-29 PROCEDURE — 99221 1ST HOSP IP/OBS SF/LOW 40: CPT

## 2018-01-29 PROCEDURE — 93306 TTE W/DOPPLER COMPLETE: CPT | Mod: 26

## 2018-01-29 PROCEDURE — 99223 1ST HOSP IP/OBS HIGH 75: CPT

## 2018-01-29 RX ORDER — METOPROLOL TARTRATE 50 MG
25 TABLET ORAL THREE TIMES A DAY
Qty: 0 | Refills: 0 | Status: DISCONTINUED | OUTPATIENT
Start: 2018-01-29 | End: 2018-01-30

## 2018-01-29 RX ORDER — LIDOCAINE 4 G/100G
0 CREAM TOPICAL
Qty: 60 | Refills: 0 | COMMUNITY

## 2018-01-29 RX ADMIN — RIVAROXABAN 20 MILLIGRAM(S): KIT at 17:46

## 2018-01-29 RX ADMIN — Medication 4: at 21:48

## 2018-01-29 RX ADMIN — AMPICILLIN SODIUM AND SULBACTAM SODIUM 500 GRAM(S): 250; 125 INJECTION, POWDER, FOR SUSPENSION INTRAMUSCULAR; INTRAVENOUS at 05:50

## 2018-01-29 RX ADMIN — PANTOPRAZOLE SODIUM 40 MILLIGRAM(S): 20 TABLET, DELAYED RELEASE ORAL at 09:06

## 2018-01-29 RX ADMIN — Medication 40 MILLIGRAM(S): at 13:11

## 2018-01-29 RX ADMIN — Medication 40 MILLIGRAM(S): at 21:49

## 2018-01-29 RX ADMIN — ISOSORBIDE MONONITRATE 30 MILLIGRAM(S): 60 TABLET, EXTENDED RELEASE ORAL at 11:48

## 2018-01-29 RX ADMIN — Medication 2 CAPSULE(S): at 09:07

## 2018-01-29 RX ADMIN — Medication 5 MILLIGRAM(S): at 17:48

## 2018-01-29 RX ADMIN — METFORMIN HYDROCHLORIDE 500 MILLIGRAM(S): 850 TABLET ORAL at 17:47

## 2018-01-29 RX ADMIN — DRONEDARONE 400 MILLIGRAM(S): 400 TABLET, FILM COATED ORAL at 17:47

## 2018-01-29 RX ADMIN — AMPICILLIN SODIUM AND SULBACTAM SODIUM 500 GRAM(S): 250; 125 INJECTION, POWDER, FOR SUSPENSION INTRAMUSCULAR; INTRAVENOUS at 21:55

## 2018-01-29 RX ADMIN — Medication 100 MILLIGRAM(S): at 17:48

## 2018-01-29 RX ADMIN — DRONEDARONE 400 MILLIGRAM(S): 400 TABLET, FILM COATED ORAL at 05:47

## 2018-01-29 RX ADMIN — LOSARTAN POTASSIUM 100 MILLIGRAM(S): 100 TABLET, FILM COATED ORAL at 05:47

## 2018-01-29 RX ADMIN — AMPICILLIN SODIUM AND SULBACTAM SODIUM 500 GRAM(S): 250; 125 INJECTION, POWDER, FOR SUSPENSION INTRAMUSCULAR; INTRAVENOUS at 13:11

## 2018-01-29 RX ADMIN — AMLODIPINE BESYLATE 5 MILLIGRAM(S): 2.5 TABLET ORAL at 05:48

## 2018-01-29 RX ADMIN — MONTELUKAST 10 MILLIGRAM(S): 4 TABLET, CHEWABLE ORAL at 11:48

## 2018-01-29 RX ADMIN — Medication 6: at 11:48

## 2018-01-29 RX ADMIN — Medication 3 MILLILITER(S): at 20:38

## 2018-01-29 RX ADMIN — Medication 650 MILLIGRAM(S): at 18:44

## 2018-01-29 RX ADMIN — Medication 5 MILLIGRAM(S): at 05:47

## 2018-01-29 RX ADMIN — ATORVASTATIN CALCIUM 40 MILLIGRAM(S): 80 TABLET, FILM COATED ORAL at 21:48

## 2018-01-29 RX ADMIN — Medication 2 CAPSULE(S): at 11:48

## 2018-01-29 RX ADMIN — Medication 175 MICROGRAM(S): at 05:47

## 2018-01-29 RX ADMIN — Medication 3: at 18:02

## 2018-01-29 RX ADMIN — Medication 100 MILLIGRAM(S): at 05:48

## 2018-01-29 RX ADMIN — Medication 3 MILLILITER(S): at 02:40

## 2018-01-29 RX ADMIN — Medication 2 CAPSULE(S): at 17:47

## 2018-01-29 RX ADMIN — Medication 25 MILLIGRAM(S): at 21:48

## 2018-01-29 RX ADMIN — TIOTROPIUM BROMIDE 1 CAPSULE(S): 18 CAPSULE ORAL; RESPIRATORY (INHALATION) at 11:48

## 2018-01-29 RX ADMIN — Medication 40 MILLIGRAM(S): at 05:50

## 2018-01-29 RX ADMIN — Medication 650 MILLIGRAM(S): at 18:03

## 2018-01-29 RX ADMIN — METFORMIN HYDROCHLORIDE 500 MILLIGRAM(S): 850 TABLET ORAL at 09:06

## 2018-01-29 RX ADMIN — Medication 3 MILLILITER(S): at 16:00

## 2018-01-29 RX ADMIN — Medication 20 MILLIGRAM(S): at 05:49

## 2018-01-29 RX ADMIN — Medication 4: at 09:06

## 2018-01-29 RX ADMIN — Medication 3 MILLILITER(S): at 09:12

## 2018-01-29 NOTE — DISCHARGE NOTE ADULT - MEDICATION SUMMARY - MEDICATIONS TO TAKE
I will START or STAY ON the medications listed below when I get home from the hospital:    CREON        CAP [id number]  -- Indication: For GI care    DEXILANT 60 MG CPDR  -- Indication: For GERD    EASY COMFORT MIS 30G  -- Indication: For GI care    FUROSEMIDE 40 MG TABS  -- Indication: For CHF (congestive heart failure)    JANUMET      TAB 50-500MG  -- Indication: For Diabetes    LINZESS 145 MCG CAPS  -- Indication: For Gastrointestinal discomfort    MULTAQ 400 MG TABS  -- Indication: For Atrial fibrillation    PROAIR  MCG/ACT AERS  -- Indication: For COPD exacerbation    SPIRIVA HANDIHALER 18 MCG CAPS  -- Indication: For COPD exacerbation    ATORVASTATIN CALCIUM 40 MG TABS  -- Indication: For Hyperlipidemia    LABETALOL  MG TABS  -- 2 times a day  -- Indication: For Hypertension    predniSONE 10 mg oral tablet  -- Take 4 tabs x 2 days  Take 3 tabs x 3 days  Take 2 tabs x 3 days  Take 1 tab x 3 days  -- It is very important that you take or use this exactly as directed.  Do not skip doses or discontinue unless directed by your doctor.  Obtain medical advice before taking any non-prescription drugs as some may affect the action of this medication.  Take with food or milk.    -- Indication: For COPD exacerbation    losartan 100 mg oral tablet  -- 1 tab(s) by mouth once a day  -- Indication: For Hypertension    isosorbide mononitrate 30 mg oral tablet, extended release  -- orally once a day  -- Indication: For Hypertension    Multaq 400 mg oral tablet  -- 1 tab(s) by mouth 2 times a day  -- Indication: For A fib    Xarelto 20 mg oral tablet  -- 1 tab(s) by mouth once a day (in the evening)  -- Indication: For A fib    Janumet 50 mg-500 mg oral tablet  -- 1 tab(s) by mouth 2 times a day  -- Indication: For Diabetes    atorvastatin 40 mg oral tablet  -- 1 tab(s) by mouth once a day  -- Indication: For Hyperlipidemia    Tribenzor 5 mg-25 mg-40 mg oral tablet  -- 1 tab(s) by mouth once a day  -- Indication: For Hypertension    metoprolol tartrate 50 mg oral tablet  -- 1 tab(s) by mouth 2 times a day  -- Indication: For Hypertension    ProAir HFA 90 mcg/inh inhalation aerosol  -- 2 puff(s) inhaled 4 times a day, As Needed  -- Indication: For COPD exacerbation    Advair Diskus 500 mcg-50 mcg inhalation powder  -- 1 puff(s) inhaled 2 times a day  -- Indication: For COPD exacerbation    Spiriva 18 mcg inhalation capsule  -- 1 cap(s) inhaled once a day  -- Indication: For COPD exacerbation    amLODIPine 5 mg oral tablet  -- 1 tab(s) by mouth once a day  -- Indication: For Hypertension    pancrelipase 6000 units-19,000 units-30,000 units oral delayed release capsule  -- 2 cap(s) by mouth 3 times a day (with meals)  -- Indication: For GI care    Lasix 40 mg oral tablet  -- 1 tab(s) by mouth once a day, As Needed  -- Indication: For CHF (congestive heart failure)    Singulair 10 mg oral tablet  -- 1 tab(s) by mouth once a day  -- Indication: For COPD exacerbation    Nasonex 50 mcg/inh nasal spray  -- 2 spray(s) into nose once a day  -- Indication: For Allergies    Restasis 0.05% ophthalmic emulsion  -- 1 drop(s) to each affected eye every 12 hours, As Needed  -- Indication: For Dry eye    levothyroxine 175 mcg (0.175 mg) oral tablet  -- 1 tab(s) by mouth once a day  -- Indication: For Hypothyroidism    tolterodine 4 mg oral capsule, extended release  -- 1 cap(s) by mouth once a day  -- Indication: For Urinary incontinence I will START or STAY ON the medications listed below when I get home from the hospital:    CREON        CAP [id number]  -- Indication: For GI care    DEXILANT 60 MG CPDR  -- Indication: For GERD    EASY COMFORT MIS 30G  -- Indication: For GI care    FUROSEMIDE 40 MG TABS  -- Indication: For CHF (congestive heart failure)    JANUMET      TAB 50-500MG  -- Indication: For Diabetes    LINZESS 145 MCG CAPS  -- Indication: For Gastrointestinal discomfort    MULTAQ 400 MG TABS  -- Indication: For Atrial fibrillation    PROAIR  MCG/ACT AERS  -- Indication: For COPD exacerbation    SPIRIVA HANDIHALER 18 MCG CAPS  -- Indication: For COPD exacerbation    ATORVASTATIN CALCIUM 40 MG TABS  -- Indication: For Hyperlipidemia    LABETALOL  MG TABS  -- 2 times a day  -- Indication: For Hypertension    predniSONE 10 mg oral tablet  -- Take 4 tabs x 2 days  Take 3 tabs x 3 days  Take 2 tabs x 3 days  Take 1 tab x 3 days  -- It is very important that you take or use this exactly as directed.  Do not skip doses or discontinue unless directed by your doctor.  Obtain medical advice before taking any non-prescription drugs as some may affect the action of this medication.  Take with food or milk.    -- Indication: For COPD exacerbation    losartan 100 mg oral tablet  -- 1 tab(s) by mouth once a day  -- Indication: For Hypertension    isosorbide mononitrate 30 mg oral tablet, extended release  -- 1 tab(s) by mouth once a day   -- Indication: For Hypertension    Multaq 400 mg oral tablet  -- 1 tab(s) by mouth 2 times a day  -- Indication: For Atrial fibrillation    Xarelto 20 mg oral tablet  -- 1 tab(s) by mouth once a day (in the evening)  -- Indication: For Atrial fibrillation    Janumet 50 mg-500 mg oral tablet  -- 1 tab(s) by mouth 2 times a day  -- Indication: For Diabetes    atorvastatin 40 mg oral tablet  -- 1 tab(s) by mouth once a day  -- Indication: For Hyperlipidemia    Tribenzor 5 mg-25 mg-40 mg oral tablet  -- 1 tab(s) by mouth once a day  -- Indication: For Hypertension    metoprolol tartrate 50 mg oral tablet  -- 1 tab(s) by mouth 2 times a day  -- Indication: For Hypertension    Advair Diskus 500 mcg-50 mcg inhalation powder  -- 1 puff(s) inhaled 2 times a day  -- Indication: For COPD exacerbation    Spiriva 18 mcg inhalation capsule  -- 1 cap(s) inhaled once a day  -- Indication: For COPD exacerbation    ProAir HFA 90 mcg/inh inhalation aerosol  -- 2 puff(s) inhaled 4 times a day, As Needed  -- Indication: For COPD exacerbation    amLODIPine 5 mg oral tablet  -- 1 tab(s) by mouth once a day  -- Indication: For Hypertension    pancrelipase 6000 units-19,000 units-30,000 units oral delayed release capsule  -- 2 cap(s) by mouth 3 times a day (with meals)  -- Indication: For GI care    Lasix 40 mg oral tablet  -- 1 tab(s) by mouth once a day, As Needed  -- Indication: For CHF (congestive heart failure)    Singulair 10 mg oral tablet  -- 1 tab(s) by mouth once a day  -- Indication: For COPD exacerbation    Nasonex 50 mcg/inh nasal spray  -- 2 spray(s) into nose once a day  -- Indication: For Allergies    Restasis 0.05% ophthalmic emulsion  -- 1 drop(s) to each affected eye every 12 hours, As Needed  -- Indication: For Dry eye    levothyroxine 175 mcg (0.175 mg) oral tablet  -- 1 tab(s) by mouth once a day  -- Indication: For Hypothyroidism    tolterodine 4 mg oral capsule, extended release  -- 1 cap(s) by mouth once a day  -- Indication: For Urinary incontinence

## 2018-01-29 NOTE — PROGRESS NOTE ADULT - ATTENDING COMMENTS
Elderly lady with Exacerbation of  COPD  improving taper steroids  continue  nebulizers    CHF compensated  Cardiac arrhythmia  A.fib PPM stable  CPM   Chronic stasis dermatitis  supportive  care  vascular evaluation as  outpatient    Cellulitis  ruled out    DM continue  management  with Insulin  diet  weight control   Morbid  Obesity diet discussed  patient    Discharged  planning

## 2018-01-29 NOTE — DISCHARGE NOTE ADULT - PLAN OF CARE
Prevent future exacerbations You were admitted for an exacerbation of your COPD. We placed you on a regimen of intravenous steroids in addition to antibiotics, which improved your clinical condition.  Continue with your steroid taper as described in your discharge summary and follow up with your outpatient primary care provider within 1 week of discharge. You had an echocardiogram during your hospitalization which revealed (ECHO). Continue with your current medication regimen as described in your discharge summary and follow up with your outpatient primary care provider within 2 weeks of discharge for additional recommendations with regards to medications and consistent monitoring. Your blood pressure was well-controlled during your admission. Continue with your current regimen of anti-hypertensive medications as mentioned in your discharge summary and ensure that you follow up with your primary care provider within 1 month of discharge for further recommendations and monitoring Your Hemoglobin A1c was found to be [] during your admission. This means that [you are diabetic] [your diabetes is not well controlled] [your diabetes is well controlled]. Continue with your current medications as described in your discharge note and try to adhere to a diet without excessive intake of carbohydrates and sugar and perform exercise appropriate to your physical status. Follow up with your primary care provider within 1 month of discharge for further recommendation and monitoring. Consider repeating your Hemoglobin A1c within 3 months after discharge to monitor your average blood glucose control You were evaluated by our vascular surgeon during your admission. Please follow up with Dr. Bacon as an outpatient for further evaluation and management. You had an echocardiogram during your hospitalization which revealed  trace mitral regurgitation, mild aortic stenosis, mild LVOT, and Grade I Diastolic Dysfunction and Moderately-increased RV systolic pressures. Continue with your current medication regimen as described in your discharge summary and follow up with your outpatient primary care provider within 2 weeks of discharge for additional recommendations with regards to medications and consistent monitoring. Your Hemoglobin A1c was found to be [7.0] during your admission. This means that your diabetes is well controlled]. Continue with your current medications as described in your discharge note and try to adhere to a diet without excessive intake of carbohydrates and sugar and perform exercise appropriate to your physical status. Follow up with your primary care provider within 1 month of discharge for further recommendation and monitoring. Consider repeating your Hemoglobin A1c within 3 months after discharge to monitor your average blood glucose control

## 2018-01-29 NOTE — DISCHARGE NOTE ADULT - SECONDARY DIAGNOSIS.
CHF (congestive heart failure) HTN (hypertension) Diabetes Venous stasis dermatitis of both lower extremities

## 2018-01-29 NOTE — DISCHARGE NOTE ADULT - HOSPITAL COURSE
78F admitted for CHF and COPD exacerbation    PMHx CAD (s/p stents), HTN, HLD, CHFpEF, DM, A Fib (Xarelto, s/p PPM), Asthma, Hypothyroidism, Chronic venous insufficiency, Urinary incontinence    Background - came to ED c/o fever and dyspnea that began evening before admission. Patient's dyspnea was not responding to multiple nebulized treatment, so she came to hospital for further evaluation. She also admitted to recently receiving PO ABx prescribed by PMD    ED - vitals significant for /80  CBC/CMP within normal limits   CXR showed mild interstitial prominence;   EKG RBBB without ischemic changes; Troponin negative   S/p duoneb, 40mg IV lasix, 125mg IV solumedrol    On the floors, patient was started on a regimen of IV lasix and unasyn and evaluated by cardiology, vascular surgery, in addition to infectious disease.  Patient underwent echocardiographic testing which revealed (TTE)  Vascular surgery recommended for patient to follow up as an outpatient with Dr. Leiva for fitting of compression stockings.  Infectious disease recommended (ID)    Given patient's improved clinical status and current hemodynamic stability, decision was made to discharge.  Please refer to patient's complete medical chart with documents for a full hospital course, for this is only a brief summary. 78F admitted for CHF and COPD exacerbation    PMHx CAD (s/p stents), HTN, HLD, CHFpEF, DM, A Fib (Xarelto, s/p PPM), Asthma, Hypothyroidism, Chronic venous insufficiency, Urinary incontinence    Background - came to ED c/o fever and dyspnea that began evening before admission. Patient's dyspnea was not responding to multiple nebulized treatment, so she came to hospital for further evaluation. She also admitted to recently receiving PO ABx prescribed by PMD    ED - vitals significant for /80  CBC/CMP within normal limits   CXR showed mild interstitial prominence;   EKG RBBB without ischemic changes; Troponin negative   S/p duoneb, 40mg IV lasix, 125mg IV solumedrol    On the floors, patient was started on a regimen of IV lasix and unasyn and evaluated by cardiology, vascular surgery, in addition to infectious disease.  Patient underwent echocardiographic testing which revealed trace mitral regurgitation, mild aortic stenosis, mild LVOT, and Grade I Diastolic Dysfunction and Moderately-increased RV systolic pressures  Vascular surgery recommended for patient to follow up as an outpatient with Dr. Leiva for fitting of compression stockings.  Infectious disease recommended discontinuing patient's antibiotics because diagnosis of cellulitis was likely incorrect and more consistent with venous stasis.  Patient also underwent pacemaker interrogation during her hospitalization which revealed an episode of atrial fibrillation with rapid ventricular response; however, no other salient events. Patient will be discharged with change in her BP/CHF medications in addition to a prednisone taper with instructions to follow up with outpatient primary care provider within 1 week of discharge     Given patient's improved clinical status and current hemodynamic stability, decision was made to discharge.  Please refer to patient's complete medical chart with documents for a full hospital course, for this is only a brief summary.

## 2018-01-29 NOTE — DISCHARGE NOTE ADULT - CARE PLAN
Principal Discharge DX:	COPD exacerbation  Goal:	Prevent future exacerbations  Assessment and plan of treatment:	You were admitted for an exacerbation of your COPD. We placed you on a regimen of intravenous steroids in addition to antibiotics, which improved your clinical condition.  Continue with your steroid taper as described in your discharge summary and follow up with your outpatient primary care provider within 1 week of discharge.  Secondary Diagnosis:	CHF (congestive heart failure)  Assessment and plan of treatment:	You had an echocardiogram during your hospitalization which revealed (ECHO). Continue with your current medication regimen as described in your discharge summary and follow up with your outpatient primary care provider within 2 weeks of discharge for additional recommendations with regards to medications and consistent monitoring.  Secondary Diagnosis:	HTN (hypertension)  Assessment and plan of treatment:	Your blood pressure was well-controlled during your admission. Continue with your current regimen of anti-hypertensive medications as mentioned in your discharge summary and ensure that you follow up with your primary care provider within 1 month of discharge for further recommendations and monitoring  Secondary Diagnosis:	Diabetes  Assessment and plan of treatment:	Your Hemoglobin A1c was found to be [] during your admission. This means that [you are diabetic] [your diabetes is not well controlled] [your diabetes is well controlled]. Continue with your current medications as described in your discharge note and try to adhere to a diet without excessive intake of carbohydrates and sugar and perform exercise appropriate to your physical status. Follow up with your primary care provider within 1 month of discharge for further recommendation and monitoring. Consider repeating your Hemoglobin A1c within 3 months after discharge to monitor your average blood glucose control  Secondary Diagnosis:	Venous stasis dermatitis of both lower extremities  Assessment and plan of treatment:	You were evaluated by our vascular surgeon during your admission. Please follow up with Dr. Bacon as an outpatient for further evaluation and management. Principal Discharge DX:	COPD exacerbation  Goal:	Prevent future exacerbations  Assessment and plan of treatment:	You were admitted for an exacerbation of your COPD. We placed you on a regimen of intravenous steroids in addition to antibiotics, which improved your clinical condition.  Continue with your steroid taper as described in your discharge summary and follow up with your outpatient primary care provider within 1 week of discharge.  Secondary Diagnosis:	CHF (congestive heart failure)  Assessment and plan of treatment:	You had an echocardiogram during your hospitalization which revealed  trace mitral regurgitation, mild aortic stenosis, mild LVOT, and Grade I Diastolic Dysfunction and Moderately-increased RV systolic pressures. Continue with your current medication regimen as described in your discharge summary and follow up with your outpatient primary care provider within 2 weeks of discharge for additional recommendations with regards to medications and consistent monitoring.  Secondary Diagnosis:	HTN (hypertension)  Assessment and plan of treatment:	Your blood pressure was well-controlled during your admission. Continue with your current regimen of anti-hypertensive medications as mentioned in your discharge summary and ensure that you follow up with your primary care provider within 1 month of discharge for further recommendations and monitoring  Secondary Diagnosis:	Diabetes  Assessment and plan of treatment:	Your Hemoglobin A1c was found to be [7.0] during your admission. This means that your diabetes is well controlled]. Continue with your current medications as described in your discharge note and try to adhere to a diet without excessive intake of carbohydrates and sugar and perform exercise appropriate to your physical status. Follow up with your primary care provider within 1 month of discharge for further recommendation and monitoring. Consider repeating your Hemoglobin A1c within 3 months after discharge to monitor your average blood glucose control  Secondary Diagnosis:	Venous stasis dermatitis of both lower extremities  Assessment and plan of treatment:	You were evaluated by our vascular surgeon during your admission. Please follow up with Dr. Bacon as an outpatient for further evaluation and management.

## 2018-01-29 NOTE — CONSULT NOTE ADULT - SUBJECTIVE AND OBJECTIVE BOX
Patient is a 78y old  Female who presents with a chief complaint of SOB and fever of 38celcius yesterday (28 Jan 2018 08:32)      HPI  Called see and eval 78y.o. Female w/PMH significant for CHF, DM, venous stais for b/l LE cellulitis. Pt admitted yesterday for CHF exacerbation. As per granddaughter, pt states her condition of her legs are due to her banging her legs on objects while ambulating via can/walker. Denies bleeding, drainage, pruritus, numbness/tingling of LE b/l. Currently have mild pain at rest while feet are dangled off edge of bed.     PAST MEDICAL & SURGICAL HISTORY:  Obesity  Pacemaker  Atrial fibrillation  Hypothyroidism  Venous stasis  IBS (irritable bowel syndrome)  CHF (congestive heart failure), NYHA class I  HLD (Hyperlipidemia)  HTN (Hypertension)  Diabetes  Myocardial Infarction  CAD (Coronary Atherosclerotic Disease)  Asthma  S/P coronary angiogram      MEDICATIONS  (STANDING):  ALBUTerol/ipratropium for Nebulization 3 milliLiter(s) Nebulizer every 6 hours  amLODIPine   Tablet 5 milliGRAM(s) Oral daily  ampicillin/sulbactam  IVPB      ampicillin/sulbactam  IVPB 3 Gram(s) IV Intermittent every 8 hours  amylase/lipase/protease  (CREON  6,000 Units) 2 Capsule(s) Oral three times a day with meals  atorvastatin 40 milliGRAM(s) Oral at bedtime  dronedarone 400 milliGRAM(s) Oral two times a day  furosemide   Injectable 20 milliGRAM(s) IV Push daily  insulin lispro (HumaLOG) corrective regimen sliding scale   SubCutaneous Before meals and at bedtime  isosorbide   mononitrate ER Tablet (IMDUR) 30 milliGRAM(s) Oral daily  labetalol 100 milliGRAM(s) Oral two times a day  levothyroxine 175 MICROGram(s) Oral daily  losartan 100 milliGRAM(s) Oral daily  metFORMIN 500 milliGRAM(s) Oral two times a day with meals  methylPREDNISolone sodium succinate Injectable 40 milliGRAM(s) IV Push every 8 hours  montelukast 10 milliGRAM(s) Oral daily  oxybutynin 5 milliGRAM(s) Oral two times a day  pantoprazole    Tablet 40 milliGRAM(s) Oral before breakfast  rivaroxaban 20 milliGRAM(s) Oral every 24 hours  tiotropium 18 MICROgram(s) Capsule 1 Capsule(s) Inhalation daily    MEDICATIONS  (PRN):  acetaminophen   Tablet. 650 milliGRAM(s) Oral every 6 hours PRN Moderate Pain (4 - 6)  fluticasone propionate 50 MICROgram(s)/spray Nasal Spray 1 Spray(s) Both Nostrils daily PRN nasal congestion      Allergies    No Known Allergies    Intolerances        Vital Signs Last 24 Hrs  T(C): 36.7 (29 Jan 2018 14:49), Max: 37.1 (29 Jan 2018 08:21)  T(F): 98.1 (29 Jan 2018 14:49), Max: 98.8 (29 Jan 2018 08:21)  HR: 64 (29 Jan 2018 14:49) (64 - 72)  BP: 131/67 (29 Jan 2018 14:49) (126/56 - 147/67)  BP(mean): --  RR: 17 (29 Jan 2018 14:49) (17 - 18)  SpO2: 99% (29 Jan 2018 14:49) (96% - 99%)    Physical:  Gen: A&Ox3. NAD  LE: Warm, NVI. erythema from distal leg to mid leg circumferentially b/l, anterior > posterior. Underlying hyperpigmentation. No skin break b/l. Mildly increased warmth over erythematous areas. No active bleeding or drainge noted. Mildly tender. Calfs soft bl/. DP/PT pulses 2+ b/l.    LABS:                        11.9   5.1   )-----------( 243      ( 29 Jan 2018 06:43 )             37.9              01-29    134<L>  |  98  |  23<H>  ----------------------------<  287<H>  4.4   |  26  |  1.31<H>    Ca    8.5      29 Jan 2018 06:43  Phos  4.8     01-29  Mg     2.1     01-29    TPro  7.1  /  Alb  3.4<L>  /  TBili  0.4  /  DBili  x   /  AST  30  /  ALT  38  /  AlkPhos  58  01-28            PT/INR - ( 29 Jan 2018 06:43 )   PT: 16.1 sec;   INR: 1.47 ratio         PTT - ( 29 Jan 2018 06:43 )  PTT:39.0 sec

## 2018-01-29 NOTE — PROGRESS NOTE ADULT - SUBJECTIVE AND OBJECTIVE BOX
Patient is a 78y old  Female who presents with a chief complaint of SOB and fever of 38celcius yesterday (29 Jan 2018 19:26)      INTERVAL HPI/OVERNIGHT EVENTS: Patient  is  comfortable  stabilized respiratory status  no evidence  of  Cellulitis  lower extremities  Vascular  evaluation reviewed  no changes in regiment  Cardiology evaluation noted  Blood pressure is better  controlled    BS fluctuate  T(C): 36.5 (01-29-18 @ 20:56), Max: 37.1 (01-29-18 @ 08:21)  HR: 64 (01-29-18 @ 20:56) (64 - 72)  BP: 159/64 (01-29-18 @ 20:56) (126/56 - 159/64)  RR: 18 (01-29-18 @ 20:56) (17 - 18)  SpO2: 100% (01-29-18 @ 20:56) (96% - 100%)  Wt(kg): --Vital Signs Last 24 Hrs  T(C): 36.5 (29 Jan 2018 20:56), Max: 37.1 (29 Jan 2018 08:21)  T(F): 97.7 (29 Jan 2018 20:56), Max: 98.8 (29 Jan 2018 08:21)  HR: 64 (29 Jan 2018 20:56) (64 - 72)  BP: 159/64 (29 Jan 2018 20:56) (126/56 - 159/64)  BP(mean): --  RR: 18 (29 Jan 2018 20:56) (17 - 18)  SpO2: 100% (29 Jan 2018 20:56) (96% - 100%)  I&O's Summary      LABS:                        11.9   5.1   )-----------( 243      ( 29 Jan 2018 06:43 )             37.9     01-29    134<L>  |  98  |  23<H>  ----------------------------<  287<H>  4.4   |  26  |  1.31<H>    Ca    8.5      29 Jan 2018 06:43  Phos  4.8     01-29  Mg     2.1     01-29    TPro  7.1  /  Alb  3.4<L>  /  TBili  0.4  /  DBili  x   /  AST  30  /  ALT  38  /  AlkPhos  58  01-28    PT/INR - ( 29 Jan 2018 06:43 )   PT: 16.1 sec;   INR: 1.47 ratio         PTT - ( 29 Jan 2018 06:43 )  PTT:39.0 sec    CAPILLARY BLOOD GLUCOSE      POCT Blood Glucose.: 309 mg/dL (29 Jan 2018 21:16)  POCT Blood Glucose.: 277 mg/dL (29 Jan 2018 17:59)  POCT Blood Glucose.: 250 mg/dL (29 Jan 2018 16:24)  POCT Blood Glucose.: 452 mg/dL (29 Jan 2018 11:34)  POCT Blood Glucose.: 303 mg/dL (29 Jan 2018 08:44)      ABG - ( 28 Jan 2018 07:38 )  pH: 7.40  /  pCO2: 41    /  pO2: 111   / HCO3: 25    / Base Excess: 0.8   /  SaO2: 98        MEDICATIONS  (STANDING):  ALBUTerol/ipratropium for Nebulization 3 milliLiter(s) Nebulizer every 6 hours  amLODIPine   Tablet 5 milliGRAM(s) Oral daily  ampicillin/sulbactam  IVPB      ampicillin/sulbactam  IVPB 3 Gram(s) IV Intermittent every 8 hours  amylase/lipase/protease  (CREON  6,000 Units) 2 Capsule(s) Oral three times a day with meals  atorvastatin 40 milliGRAM(s) Oral at bedtime  dronedarone 400 milliGRAM(s) Oral two times a day  furosemide   Injectable 20 milliGRAM(s) IV Push daily  insulin lispro (HumaLOG) corrective regimen sliding scale   SubCutaneous Before meals and at bedtime  isosorbide   mononitrate ER Tablet (IMDUR) 30 milliGRAM(s) Oral daily  levothyroxine 175 MICROGram(s) Oral daily  losartan 100 milliGRAM(s) Oral daily  metFORMIN 500 milliGRAM(s) Oral two times a day with meals  methylPREDNISolone sodium succinate Injectable 40 milliGRAM(s) IV Push every 8 hours  metoprolol     tartrate 25 milliGRAM(s) Oral three times a day  montelukast 10 milliGRAM(s) Oral daily  oxybutynin 5 milliGRAM(s) Oral two times a day  pantoprazole    Tablet 40 milliGRAM(s) Oral before breakfast  rivaroxaban 20 milliGRAM(s) Oral every 24 hours  tiotropium 18 MICROgram(s) Capsule 1 Capsule(s) Inhalation daily    MEDICATIONS  (PRN):  acetaminophen   Tablet. 650 milliGRAM(s) Oral every 6 hours PRN Moderate Pain (4 - 6)  fluticasone propionate 50 MICROgram(s)/spray Nasal Spray 1 Spray(s) Both Nostrils daily PRN nasal congestion            REVIEW OF SYSTEMS:  CONSTITUTIONAL: + fever,  no weight loss, or fatigue  EYES: No eye pain, visual disturbances, or discharge  ENMT:  No difficulty hearing, tinnitus, vertigo; No sinus or throat pain  NECK: No pain or stiffness  BREASTS: No pain, masses, or nipple discharge  RESPIRATORY: + cough, wheezing, chills or hemoptysis; + shortness of breath  CARDIOVASCULAR: No chest pain, palpitations, dizziness, + leg swelling  GASTROINTESTINAL: No abdominal or epigastric pain. No nausea, vomiting, or hematemesis; No diarrhea or constipation. No melena or hematochezia.  GENITOURINARY: No dysuria, frequency, hematuria, or incontinence  NEUROLOGICAL: No headaches, memory loss, loss of strength, numbness, or tremors  SKIN: No itching, burning,+ rashes, or lesions   LYMPH NODES: No enlarged glands  ENDOCRINE: No heat or cold intolerance; No hair loss  MUSCULOSKELETAL: No joint pain or swelling; No muscle, back, or extremity pain  PSYCHIATRIC: No depression, anxiety, mood swings, or difficulty sleeping  HEME/LYMPH: No easy bruising, or bleeding gums  ALLERGY AND IMMUNOLOGIC: No hives or eczema    RADIOLOGY & ADDITIONAL TESTS:    Imaging Personally Reviewed:  [ ] YES  [ ] NO    Consultant(s) Notes Reviewed:  [x ] YES  [ ] NO    PHYSICAL EXAM:  GENERAL: NAD, well-groomed, well-developed  HEAD:  Atraumatic, Normocephalic  EYES: EOMI, PERRLA, conjunctiva and sclera clear  ENMT: No tonsillar erythema, exudates, or enlargement; Moist mucous membranes, Good dentition, No lesions  NECK: Supple, No JVD, Normal thyroid  NERVOUS SYSTEM:  Alert & Oriented X3, Good concentration; Motor Strength 5/5 B/L upper and lower extremities; DTRs 2+ intact and symmetric  CHEST/LUNG: Clear to percussion bilaterally; No rales, rhonchi, wheezing, or rubs  HEART: Regular rate and rhythm; No murmurs, rubs, or gallops PPM  ABDOMEN: Soft, Nontender, Obese  distended; Bowel sounds present  EXTREMITIES:  2+ Peripheral Pulses, No clubbing, cyanosis, + edema  LYMPH: No lymphadenopathy noted  SKIN: stasis changes  lower extremities      Care Discussed with Consultants/Other Providers [ x] YES  [ ] NO

## 2018-01-29 NOTE — CONSULT NOTE ADULT - PROBLEM SELECTOR RECOMMENDATION 9
LE elevation on rest  Ambulation encouraged  CHF management  DM control  Outpatient f/u with Dr. Bacon for compression therapy

## 2018-01-29 NOTE — DISCHARGE NOTE ADULT - PATIENT PORTAL LINK FT
“You can access the [employer] Patient Portal, offered by [facility], by registering with the following website: [email or web address]”

## 2018-01-29 NOTE — DISCHARGE NOTE ADULT - CARE PROVIDER_API CALL
Alfredo Bacon), Surgery; Vascular Surgery  [address]  [address]  [place] [address]  Phone: [phone]  Fax: [phone]    Kenny Rodriguez), Medicine  [address] [place] [address]  Phone: [phone]  Fax: [phone]

## 2018-01-29 NOTE — PROGRESS NOTE ADULT - SUBJECTIVE AND OBJECTIVE BOX
CARDIOLOGY     PROGRESS  NOTE   ________________________________________________    CHIEF COMPLAINT:Patient is a 78y old  Female who presents with a chief complaint of SOB and fever of 38celcius yesterday (28 Jan 2018 08:32)    	  REVIEW OF SYSTEMS:  CONSTITUTIONAL: No fever, weight loss, or fatigue  EYES: No eye pain, visual disturbances, or discharge  ENT:  No difficulty hearing, tinnitus, vertigo; No sinus or throat pain  NECK: No pain or stiffness  RESPIRATORY: No cough, wheezing, chills or hemoptysis; No Shortness of Breath  CARDIOVASCULAR: No chest pain, palpitations, passing out, dizziness, or leg swelling  GASTROINTESTINAL: No abdominal or epigastric pain. No nausea, vomiting, or hematemesis; No diarrhea or constipation. No melena or hematochezia.  GENITOURINARY: No dysuria, frequency, hematuria, or incontinence  NEUROLOGICAL: No headaches, memory loss, loss of strength, numbness, or tremors  SKIN: No itching, burning, rashes, or lesions   LYMPH Nodes: No enlarged glands  ENDOCRINE: No heat or cold intolerance; No hair loss  MUSCULOSKELETAL: No joint pain or swelling; No muscle, back, or extremity pain  PSYCHIATRIC: No depression, anxiety, mood swings, or difficulty sleeping  HEME/LYMPH: No easy bruising, or bleeding gums  ALLERGY AND IMMUNOLOGIC: No hives or eczema	    [ ] All others negative	  [ ] Unable to obtain    PHYSICAL EXAM:  T(C): 36.6 (01-29-18 @ 17:07), Max: 37.1 (01-29-18 @ 08:21)  HR: 67 (01-29-18 @ 17:07) (64 - 72)  BP: 151/69 (01-29-18 @ 17:07) (126/56 - 151/69)  RR: 17 (01-29-18 @ 17:07) (17 - 18)  SpO2: 97% (01-29-18 @ 17:07) (96% - 99%)  Wt(kg): --  I&O's Summary      Appearance: Normal	  HEENT:   Normal oral mucosa, PERRL, EOMI	  Lymphatic: No lymphadenopathy  Cardiovascular: Normal S1 S2, No JVD, + murmurs, No edema  Respiratory: Lungs clear to auscultation	  Psychiatry: A & O x 3, Mood & affect appropriate  Gastrointestinal:  Soft, Non-tender, + BS	  Skin: No rashes, No ecchymoses, No cyanosis	  Neurologic: Non-focal  Extremities: Normal range of motion, No clubbing, cyanosis or edema  Vascular: Peripheral pulses palpable 2+ bilaterally    MEDICATIONS  (STANDING):  ALBUTerol/ipratropium for Nebulization 3 milliLiter(s) Nebulizer every 6 hours  amLODIPine   Tablet 5 milliGRAM(s) Oral daily  ampicillin/sulbactam  IVPB      ampicillin/sulbactam  IVPB 3 Gram(s) IV Intermittent every 8 hours  amylase/lipase/protease  (CREON  6,000 Units) 2 Capsule(s) Oral three times a day with meals  atorvastatin 40 milliGRAM(s) Oral at bedtime  dronedarone 400 milliGRAM(s) Oral two times a day  furosemide   Injectable 20 milliGRAM(s) IV Push daily  insulin lispro (HumaLOG) corrective regimen sliding scale   SubCutaneous Before meals and at bedtime  isosorbide   mononitrate ER Tablet (IMDUR) 30 milliGRAM(s) Oral daily  labetalol 100 milliGRAM(s) Oral two times a day  levothyroxine 175 MICROGram(s) Oral daily  losartan 100 milliGRAM(s) Oral daily  metFORMIN 500 milliGRAM(s) Oral two times a day with meals  methylPREDNISolone sodium succinate Injectable 40 milliGRAM(s) IV Push every 8 hours  montelukast 10 milliGRAM(s) Oral daily  oxybutynin 5 milliGRAM(s) Oral two times a day  pantoprazole    Tablet 40 milliGRAM(s) Oral before breakfast  rivaroxaban 20 milliGRAM(s) Oral every 24 hours  tiotropium 18 MICROgram(s) Capsule 1 Capsule(s) Inhalation daily      TELEMETRY: nsr	    ECG:  	  RADIOLOGY:  OTHER: 	  	  LABS:	 	    CARDIAC MARKERS:  CARDIAC MARKERS ( 28 Jan 2018 19:13 )  0.016 ng/mL / x     / 139 U/L / x     / 1.5 ng/mL  CARDIAC MARKERS ( 28 Jan 2018 12:09 )  0.022 ng/mL / x     / 129 U/L / x     / 1.6 ng/mL  CARDIAC MARKERS ( 28 Jan 2018 04:32 )  <0.015 ng/mL / x     / x     / x     / x                                    11.9   5.1   )-----------( 243      ( 29 Jan 2018 06:43 )             37.9     01-29    134<L>  |  98  |  23<H>  ----------------------------<  287<H>  4.4   |  26  |  1.31<H>    Ca    8.5      29 Jan 2018 06:43  Phos  4.8     01-29  Mg     2.1     01-29    TPro  7.1  /  Alb  3.4<L>  /  TBili  0.4  /  DBili  x   /  AST  30  /  ALT  38  /  AlkPhos  58  01-28    proBNP: Serum Pro-Brain Natriuretic Peptide: 706 pg/mL (01-28 @ 04:32)    Lipid Profile: Cholesterol 133  LDL 41  HDL 77  TG 73    HgA1c: Hemoglobin A1C, Whole Blood: 7.0 % (01-29 @ 10:08)    TSH: Thyroid Stimulating Hormone, Serum: 1.62 uU/mL (01-28 @ 12:09)    PT/INR - ( 29 Jan 2018 06:43 )   PT: 16.1 sec;   INR: 1.47 ratio         PTT - ( 29 Jan 2018 06:43 )  PTT:39.0 sec    < from: Transthoracic Echocardiogram (10.15.15 @ 11:05) >  1. Peak transaortic valve gradient equals 17 mm Hg, mean  transaortic valve gradient equals 10 mm Hg, consistent with  mild aortic stenosis.  2. Mildly dilated left atrium.  LA volume index = 30 cc/m2.  3. Basal septal hypertrophy.  4. Hyperdynamic left ventricle. Mild left ventricular  outflow tract gradient: 9 mm Hg, without evidence of  obstruction due to hyperdynamic LV function.  --------------------------------    < end of copied text >    Assessment and plan  ---------------------------  78 years old Burundian-speaking female from home, lives alone, has HHA 5times/week, 7hours/day, with PMH of CAD(s/p ?stents as per daughter), HTN, HLD, chronic diastolic HF, DM2(on Janumet), Afib on xarelto s/p PPM in November 2013, asthma(using nebulizer, ?home O2 intermittently pt unsure if it is oxygen), hypothyroidism, chronic venous insufficiency, urinary incontinence, and IBS came to ED c/o SOB and fever of 38celcius yesterday at around 6-7PM,probable flu,b/l cellulitis.  1.RVP-p, start tamiflu.  2.ID eval called, start unasyn.  3.CHF-D/C hctz, lasix for gentle diuresis.  4.PAF-Xarelto.  5.DM-Insulin.  6.ASTHMA-Nebs and steroids.  7.Hypothyroidism-synthroid.  8.PPI.  9. repeat echo  10. increase beta blocker as tolerated

## 2018-01-30 VITALS
SYSTOLIC BLOOD PRESSURE: 103 MMHG | DIASTOLIC BLOOD PRESSURE: 42 MMHG | RESPIRATION RATE: 18 BRPM | OXYGEN SATURATION: 96 % | HEART RATE: 62 BPM | TEMPERATURE: 97 F

## 2018-01-30 DIAGNOSIS — E03.9 HYPOTHYROIDISM, UNSPECIFIED: ICD-10-CM

## 2018-01-30 DIAGNOSIS — Z29.9 ENCOUNTER FOR PROPHYLACTIC MEASURES, UNSPECIFIED: ICD-10-CM

## 2018-01-30 DIAGNOSIS — J44.1 CHRONIC OBSTRUCTIVE PULMONARY DISEASE WITH (ACUTE) EXACERBATION: ICD-10-CM

## 2018-01-30 DIAGNOSIS — R69 ILLNESS, UNSPECIFIED: ICD-10-CM

## 2018-01-30 DIAGNOSIS — I10 ESSENTIAL (PRIMARY) HYPERTENSION: ICD-10-CM

## 2018-01-30 DIAGNOSIS — I50.9 HEART FAILURE, UNSPECIFIED: ICD-10-CM

## 2018-01-30 DIAGNOSIS — I48.91 UNSPECIFIED ATRIAL FIBRILLATION: ICD-10-CM

## 2018-01-30 LAB
ANION GAP SERPL CALC-SCNC: 11 MMOL/L — SIGNIFICANT CHANGE UP (ref 5–17)
BUN SERPL-MCNC: 31 MG/DL — HIGH (ref 7–18)
CALCIUM SERPL-MCNC: 9.1 MG/DL — SIGNIFICANT CHANGE UP (ref 8.4–10.5)
CHLORIDE SERPL-SCNC: 94 MMOL/L — LOW (ref 96–108)
CO2 SERPL-SCNC: 27 MMOL/L — SIGNIFICANT CHANGE UP (ref 22–31)
CREAT SERPL-MCNC: 1.43 MG/DL — HIGH (ref 0.5–1.3)
GLUCOSE BLDC GLUCOMTR-MCNC: 297 MG/DL — HIGH (ref 70–99)
GLUCOSE BLDC GLUCOMTR-MCNC: 330 MG/DL — HIGH (ref 70–99)
GLUCOSE BLDC GLUCOMTR-MCNC: 339 MG/DL — HIGH (ref 70–99)
GLUCOSE BLDC GLUCOMTR-MCNC: 347 MG/DL — HIGH (ref 70–99)
GLUCOSE SERPL-MCNC: 350 MG/DL — HIGH (ref 70–99)
HCT VFR BLD CALC: 34.2 % — LOW (ref 34.5–45)
HGB BLD-MCNC: 11.2 G/DL — LOW (ref 11.5–15.5)
MCHC RBC-ENTMCNC: 29.2 PG — SIGNIFICANT CHANGE UP (ref 27–34)
MCHC RBC-ENTMCNC: 32.7 GM/DL — SIGNIFICANT CHANGE UP (ref 32–36)
MCV RBC AUTO: 89.3 FL — SIGNIFICANT CHANGE UP (ref 80–100)
PHOSPHATE SERPL-MCNC: 4.2 MG/DL — SIGNIFICANT CHANGE UP (ref 2.5–4.5)
PLATELET # BLD AUTO: 199 K/UL — SIGNIFICANT CHANGE UP (ref 150–400)
POTASSIUM SERPL-MCNC: 4.3 MMOL/L — SIGNIFICANT CHANGE UP (ref 3.5–5.3)
POTASSIUM SERPL-SCNC: 4.3 MMOL/L — SIGNIFICANT CHANGE UP (ref 3.5–5.3)
RBC # BLD: 3.84 M/UL — SIGNIFICANT CHANGE UP (ref 3.8–5.2)
RBC # FLD: 12.4 % — SIGNIFICANT CHANGE UP (ref 10.3–14.5)
SODIUM SERPL-SCNC: 132 MMOL/L — LOW (ref 135–145)
WBC # BLD: 3.8 K/UL — SIGNIFICANT CHANGE UP (ref 3.8–10.5)
WBC # FLD AUTO: 3.8 K/UL — SIGNIFICANT CHANGE UP (ref 3.8–10.5)

## 2018-01-30 PROCEDURE — 80048 BASIC METABOLIC PNL TOTAL CA: CPT

## 2018-01-30 PROCEDURE — 99285 EMERGENCY DEPT VISIT HI MDM: CPT | Mod: 25

## 2018-01-30 PROCEDURE — 87581 M.PNEUMON DNA AMP PROBE: CPT

## 2018-01-30 PROCEDURE — 87486 CHLMYD PNEUM DNA AMP PROBE: CPT

## 2018-01-30 PROCEDURE — 80061 LIPID PANEL: CPT

## 2018-01-30 PROCEDURE — 80053 COMPREHEN METABOLIC PANEL: CPT

## 2018-01-30 PROCEDURE — 93306 TTE W/DOPPLER COMPLETE: CPT

## 2018-01-30 PROCEDURE — 82746 ASSAY OF FOLIC ACID SERUM: CPT

## 2018-01-30 PROCEDURE — 84443 ASSAY THYROID STIM HORMONE: CPT

## 2018-01-30 PROCEDURE — 85027 COMPLETE CBC AUTOMATED: CPT

## 2018-01-30 PROCEDURE — 82803 BLOOD GASES ANY COMBINATION: CPT

## 2018-01-30 PROCEDURE — 82607 VITAMIN B-12: CPT

## 2018-01-30 PROCEDURE — 71045 X-RAY EXAM CHEST 1 VIEW: CPT

## 2018-01-30 PROCEDURE — 85610 PROTHROMBIN TIME: CPT

## 2018-01-30 PROCEDURE — 93005 ELECTROCARDIOGRAM TRACING: CPT

## 2018-01-30 PROCEDURE — 85730 THROMBOPLASTIN TIME PARTIAL: CPT

## 2018-01-30 PROCEDURE — 84100 ASSAY OF PHOSPHORUS: CPT

## 2018-01-30 PROCEDURE — 82306 VITAMIN D 25 HYDROXY: CPT

## 2018-01-30 PROCEDURE — 82962 GLUCOSE BLOOD TEST: CPT

## 2018-01-30 PROCEDURE — 82553 CREATINE MB FRACTION: CPT

## 2018-01-30 PROCEDURE — 87798 DETECT AGENT NOS DNA AMP: CPT

## 2018-01-30 PROCEDURE — 87633 RESP VIRUS 12-25 TARGETS: CPT

## 2018-01-30 PROCEDURE — 84484 ASSAY OF TROPONIN QUANT: CPT

## 2018-01-30 PROCEDURE — 83880 ASSAY OF NATRIURETIC PEPTIDE: CPT

## 2018-01-30 PROCEDURE — 94640 AIRWAY INHALATION TREATMENT: CPT

## 2018-01-30 PROCEDURE — 82550 ASSAY OF CK (CPK): CPT

## 2018-01-30 PROCEDURE — 83735 ASSAY OF MAGNESIUM: CPT

## 2018-01-30 PROCEDURE — 83036 HEMOGLOBIN GLYCOSYLATED A1C: CPT

## 2018-01-30 RX ORDER — FUROSEMIDE 40 MG
1 TABLET ORAL
Qty: 0 | Refills: 0 | DISCHARGE
Start: 2018-01-30

## 2018-01-30 RX ORDER — CYCLOSPORINE 0.5 MG/ML
1 EMULSION OPHTHALMIC
Qty: 0 | Refills: 0 | COMMUNITY

## 2018-01-30 RX ORDER — TIOTROPIUM BROMIDE 18 UG/1
1 CAPSULE ORAL; RESPIRATORY (INHALATION)
Qty: 0 | Refills: 0 | COMMUNITY

## 2018-01-30 RX ORDER — LIPASE/PROTEASE/AMYLASE 16-48-48K
2 CAPSULE,DELAYED RELEASE (ENTERIC COATED) ORAL
Qty: 42 | Refills: 0
Start: 2018-01-30 | End: 2018-02-05

## 2018-01-30 RX ORDER — SITAGLIPTIN AND METFORMIN HYDROCHLORIDE 500; 50 MG/1; MG/1
1 TABLET, FILM COATED ORAL
Qty: 20 | Refills: 0
Start: 2018-01-30

## 2018-01-30 RX ORDER — TOLTERODINE TARTRATE 1 MG/1
1 TABLET, FILM COATED ORAL
Qty: 10 | Refills: 0 | OUTPATIENT
Start: 2018-01-30 | End: 2018-02-08

## 2018-01-30 RX ORDER — METOPROLOL TARTRATE 50 MG
1 TABLET ORAL
Qty: 60 | Refills: 0 | OUTPATIENT
Start: 2018-01-30 | End: 2018-02-28

## 2018-01-30 RX ORDER — FUROSEMIDE 40 MG
1 TABLET ORAL
Qty: 10 | Refills: 0 | OUTPATIENT
Start: 2018-01-30 | End: 2018-02-08

## 2018-01-30 RX ORDER — LOSARTAN POTASSIUM 100 MG/1
1 TABLET, FILM COATED ORAL
Qty: 30 | Refills: 0 | OUTPATIENT
Start: 2018-01-30 | End: 2018-02-28

## 2018-01-30 RX ORDER — AMLODIPINE BESYLATE 2.5 MG/1
1 TABLET ORAL
Qty: 30 | Refills: 0 | OUTPATIENT
Start: 2018-01-30 | End: 2018-02-28

## 2018-01-30 RX ORDER — RIVAROXABAN 15 MG-20MG
1 KIT ORAL
Qty: 10 | Refills: 0
Start: 2018-01-30

## 2018-01-30 RX ORDER — INSULIN LISPRO 100/ML
VIAL (ML) SUBCUTANEOUS
Qty: 0 | Refills: 0 | Status: DISCONTINUED | OUTPATIENT
Start: 2018-01-30 | End: 2018-01-30

## 2018-01-30 RX ORDER — ISOSORBIDE MONONITRATE 60 MG/1
1 TABLET, EXTENDED RELEASE ORAL
Qty: 10 | Refills: 0 | OUTPATIENT
Start: 2018-01-30 | End: 2018-02-08

## 2018-01-30 RX ORDER — MONTELUKAST 4 MG/1
1 TABLET, CHEWABLE ORAL
Qty: 0 | Refills: 0 | COMMUNITY

## 2018-01-30 RX ORDER — TIOTROPIUM BROMIDE 18 UG/1
1 CAPSULE ORAL; RESPIRATORY (INHALATION)
Qty: 1 | Refills: 0
Start: 2018-01-30 | End: 2018-02-08

## 2018-01-30 RX ORDER — FLUTICASONE PROPIONATE AND SALMETEROL 50; 250 UG/1; UG/1
1 POWDER ORAL; RESPIRATORY (INHALATION)
Qty: 1 | Refills: 0
Start: 2018-01-30 | End: 2018-02-08

## 2018-01-30 RX ORDER — INSULIN LISPRO 100/ML
6 VIAL (ML) SUBCUTANEOUS ONCE
Qty: 0 | Refills: 0 | Status: COMPLETED | OUTPATIENT
Start: 2018-01-30 | End: 2018-01-30

## 2018-01-30 RX ORDER — FLUTICASONE PROPIONATE AND SALMETEROL 50; 250 UG/1; UG/1
1 POWDER ORAL; RESPIRATORY (INHALATION)
Qty: 0 | Refills: 0 | COMMUNITY

## 2018-01-30 RX ORDER — CYCLOSPORINE 0.5 MG/ML
1 EMULSION OPHTHALMIC
Qty: 1 | Refills: 0 | OUTPATIENT
Start: 2018-01-30 | End: 2018-02-08

## 2018-01-30 RX ORDER — METOPROLOL TARTRATE 50 MG
50 TABLET ORAL
Qty: 0 | Refills: 0 | Status: DISCONTINUED | OUTPATIENT
Start: 2018-01-30 | End: 2018-01-30

## 2018-01-30 RX ORDER — FUROSEMIDE 40 MG
1 TABLET ORAL
Qty: 0 | Refills: 0 | DISCHARGE
Start: 2018-01-30 | End: 2018-02-08

## 2018-01-30 RX ORDER — MONTELUKAST 4 MG/1
1 TABLET, CHEWABLE ORAL
Qty: 10 | Refills: 0
Start: 2018-01-30

## 2018-01-30 RX ORDER — TOLTERODINE TARTRATE 1 MG/1
1 TABLET, FILM COATED ORAL
Qty: 0 | Refills: 0 | COMMUNITY

## 2018-01-30 RX ORDER — SITAGLIPTIN AND METFORMIN HYDROCHLORIDE 500; 50 MG/1; MG/1
1 TABLET, FILM COATED ORAL
Qty: 0 | Refills: 0 | COMMUNITY

## 2018-01-30 RX ORDER — DRONEDARONE 400 MG/1
1 TABLET, FILM COATED ORAL
Qty: 20 | Refills: 0
Start: 2018-01-30

## 2018-01-30 RX ORDER — ATORVASTATIN CALCIUM 80 MG/1
1 TABLET, FILM COATED ORAL
Qty: 0 | Refills: 0 | COMMUNITY

## 2018-01-30 RX ORDER — ISOSORBIDE MONONITRATE 60 MG/1
0 TABLET, EXTENDED RELEASE ORAL
Qty: 0 | Refills: 0 | COMMUNITY

## 2018-01-30 RX ORDER — LOSARTAN POTASSIUM 100 MG/1
1 TABLET, FILM COATED ORAL
Qty: 10 | Refills: 0 | OUTPATIENT
Start: 2018-01-30 | End: 2018-02-08

## 2018-01-30 RX ORDER — OLMESARTAN MEDOXOMIL / AMLODIPINE BESYLATE / HYDROCHLOROTHIAZIDE 40; 10; 25 MG/1; MG/1; MG/1
1 TABLET, FILM COATED ORAL
Qty: 10 | Refills: 0 | OUTPATIENT
Start: 2018-01-30 | End: 2018-02-08

## 2018-01-30 RX ADMIN — DRONEDARONE 400 MILLIGRAM(S): 400 TABLET, FILM COATED ORAL at 17:48

## 2018-01-30 RX ADMIN — Medication 2 CAPSULE(S): at 12:44

## 2018-01-30 RX ADMIN — LOSARTAN POTASSIUM 100 MILLIGRAM(S): 100 TABLET, FILM COATED ORAL at 05:44

## 2018-01-30 RX ADMIN — METFORMIN HYDROCHLORIDE 500 MILLIGRAM(S): 850 TABLET ORAL at 17:49

## 2018-01-30 RX ADMIN — Medication 2 CAPSULE(S): at 08:45

## 2018-01-30 RX ADMIN — DRONEDARONE 400 MILLIGRAM(S): 400 TABLET, FILM COATED ORAL at 05:44

## 2018-01-30 RX ADMIN — Medication 2 CAPSULE(S): at 17:48

## 2018-01-30 RX ADMIN — RIVAROXABAN 20 MILLIGRAM(S): KIT at 17:49

## 2018-01-30 RX ADMIN — METFORMIN HYDROCHLORIDE 500 MILLIGRAM(S): 850 TABLET ORAL at 08:44

## 2018-01-30 RX ADMIN — Medication 6 UNIT(S): at 09:21

## 2018-01-30 RX ADMIN — Medication 5 MILLIGRAM(S): at 17:49

## 2018-01-30 RX ADMIN — TIOTROPIUM BROMIDE 1 CAPSULE(S): 18 CAPSULE ORAL; RESPIRATORY (INHALATION) at 12:44

## 2018-01-30 RX ADMIN — Medication 3 MILLILITER(S): at 14:21

## 2018-01-30 RX ADMIN — Medication 40 MILLIGRAM(S): at 05:44

## 2018-01-30 RX ADMIN — Medication 175 MICROGRAM(S): at 05:44

## 2018-01-30 RX ADMIN — AMPICILLIN SODIUM AND SULBACTAM SODIUM 500 GRAM(S): 250; 125 INJECTION, POWDER, FOR SUSPENSION INTRAMUSCULAR; INTRAVENOUS at 05:44

## 2018-01-30 RX ADMIN — Medication 5 MILLIGRAM(S): at 05:44

## 2018-01-30 RX ADMIN — AMLODIPINE BESYLATE 5 MILLIGRAM(S): 2.5 TABLET ORAL at 05:44

## 2018-01-30 RX ADMIN — PANTOPRAZOLE SODIUM 40 MILLIGRAM(S): 20 TABLET, DELAYED RELEASE ORAL at 05:44

## 2018-01-30 RX ADMIN — MONTELUKAST 10 MILLIGRAM(S): 4 TABLET, CHEWABLE ORAL at 12:45

## 2018-01-30 RX ADMIN — ISOSORBIDE MONONITRATE 30 MILLIGRAM(S): 60 TABLET, EXTENDED RELEASE ORAL at 12:44

## 2018-01-30 RX ADMIN — Medication 200 MILLIGRAM(S): at 09:21

## 2018-01-30 RX ADMIN — Medication 25 MILLIGRAM(S): at 05:44

## 2018-01-30 RX ADMIN — Medication 3 MILLILITER(S): at 08:22

## 2018-01-30 RX ADMIN — Medication 50 MILLIGRAM(S): at 17:49

## 2018-01-30 RX ADMIN — Medication 20 MILLIGRAM(S): at 05:44

## 2018-01-30 NOTE — DIETITIAN INITIAL EVALUATION ADULT. - OTHER INFO
Nutrition consult requested for BMI >40. Patient from home lives alone & has HHA 7hrs 5x/wk. Visited pt. alert but Mozambican speaking, spoke with daughter via pt. phone, stated pt. appetite fair 4 days with wheezing Nutrition consult requested for BMI >40. Patient from home lives alone & has HHA 7hrs 5x/wk. Visited pt. alert but Kyrgyz speaking, spoke with daughter via pt. phone, stated pt. appetite fair 4 days with wheezing, eating 2-3 kosher meals, denies nausea/vomiting or diarrhea. Per daughter pt. was always "Big" & never attempted wt. loss & does not believe in it & unsure of UBW since pt. gained fluids & skipping her diuretics/meds. In house pt. consuming 40% of meals & tolerating, declined die/nutrition education, leg edema 3+ noted, D/W RN/MD.

## 2018-01-30 NOTE — DIETITIAN INITIAL EVALUATION ADULT. - FACTORS AFF FOOD INTAKE
difficulty with food procurement/preparation/Sikh/ethnic/cultural/personal food preferences/other (specify)/pain

## 2018-01-30 NOTE — DIETITIAN INITIAL EVALUATION ADULT. - MD RECOMMEND
Consistency carbohydrate w/evening snack, DASH/TLC & MD to restrict fluids as needed., as medically feasible

## 2018-01-30 NOTE — CHART NOTE - NSCHARTNOTEFT_GEN_A_CORE
Upon Nutritional Assessment by the Registered Dietitian your patient was determined to meet criteria / has evidence of the following diagnosis/diagnoses:          [ ]  Mild Protein Calorie Malnutrition        [ ]  Moderate Protein Calorie Malnutrition        [ ] Severe Protein Calorie Malnutrition        [ ] Unspecified Protein Calorie Malnutrition        [ ] Underweight / BMI <19        [ X] Morbid Obesity / BMI > 40      Findings as based on:  •  Comprehensive nutrition assessment and consultation  •  Calorie counts (nutrient intake analysis)  •  Food acceptance and intake status from observations by staff  •  Follow up  •  Patient education  •  Intervention secondary to interdisciplinary rounds  •   concerns      Treatment:    The following diet has been recommended:      PROVIDER Section:     By signing this assessment you are acknowledging and agree with the diagnosis/diagnoses assigned by the Registered Dietitian    Comments:  See RD's comment, as appropriate.

## 2018-01-30 NOTE — DIETITIAN INITIAL EVALUATION ADULT. - NUTRITION INTERVENTION
Vitamin/Meals and Snack/Nutrition Education/Collaboration and Referral of Nutrition Care Collaboration and Referral of Nutrition Care/Meals and Snack

## 2018-01-30 NOTE — CONSULT NOTE ADULT - SUBJECTIVE AND OBJECTIVE BOX
HPI:  78 years old Azerbaijani-speaking female from home, lives alone, has HHA 5times/week, 7hours/day, with PMH of CAD(s/p ?stents as per daughter), HTN, HLD, chronic diastolic HF, DM2(on Janumet), Afib on xarelto s/p PPM in November 2013, asthma(using nebulizer, ?home O2 intermittently pt unsure if it is oxygen), hypothyroidism, chronic venous insufficiency, urinary incontinence, and IBS came to ED c/o SOB and fever of 38C yesterday at around 6-7PM. She also reports orthopnea, CHAMBERLAIN, and intermittent chills, but denies chest pain, nausea, vomiting, diarrhea, or any other symptoms. Denies sick contact or recent travel outside [place], however 1 week ago she took antibiotics from her PMD due to URI symptoms. Last night she tried nebulizer multiple times due to worsening SOB without relief, so decided to come to the hospital. Daughter notes that pt is supposed to take lasix at least twice a week but pt skipped doses during whole last week.    In ED, pt was initially hypertensive to 200/80 with sat 100% on room air. She was wheezing diffusely initially but symptoms resolved after receiving nebulizer, lasix 40mg IV, and solumedrol 125mg IV. ABG 7.4/41/111/25 on 3 liters, Pro-, normal cardiac enzymes. EKG NSR at 77BPM, RBBB/LAFB LVH not significantly changed from the last EKG. CXR; diffuse bilateral opacities. Scattered expiratory wheezing noted on bilateral lung field. Pt is admitted to the telemetry floor for acute CHF exacerbation and asthma exacerbation likely due to viral infection. (28 Jan 2018 08:32). ID called for eval of cellulitis of legs. as per pt hers have been this way for few months , no intense pain, but does feel it is a little redder than usual . no nausea, vomiting or diarrhea . no hx of trauma       PAST MEDICAL & SURGICAL HISTORY:  Obesity  Pacemaker  Atrial fibrillation  Hypothyroidism  Venous stasis  IBS (irritable bowel syndrome)  CHF (congestive heart failure), NYHA class I  HLD (Hyperlipidemia)  HTN (Hypertension)  Diabetes  Myocardial Infarction  CAD (Coronary Atherosclerotic Disease)  Asthma  S/P coronary angiogram    allergy   No Known Allergies    meds   acetaminophen   Tablet. 650 milliGRAM(s) Oral every 6 hours PRN  ALBUTerol/ipratropium for Nebulization 3 milliLiter(s) Nebulizer every 6 hours  amLODIPine   Tablet 5 milliGRAM(s) Oral daily  amylase/lipase/protease  (CREON  6,000 Units) 2 Capsule(s) Oral three times a day with meals  atorvastatin 40 milliGRAM(s) Oral at bedtime  dronedarone 400 milliGRAM(s) Oral two times a day  fluticasone propionate 50 MICROgram(s)/spray Nasal Spray 1 Spray(s) Both Nostrils daily PRN  furosemide   Injectable 20 milliGRAM(s) IV Push daily  guaiFENesin   Syrup  (Sugar-Free) 200 milliGRAM(s) Oral every 6 hours PRN  insulin lispro (HumaLOG) corrective regimen sliding scale   SubCutaneous Before meals and at bedtime  isosorbide   mononitrate ER Tablet (IMDUR) 30 milliGRAM(s) Oral daily  levothyroxine 175 MICROGram(s) Oral daily  losartan 100 milliGRAM(s) Oral daily  metFORMIN 500 milliGRAM(s) Oral two times a day with meals  metoprolol     tartrate 50 milliGRAM(s) Oral two times a day  montelukast 10 milliGRAM(s) Oral daily  oxybutynin 5 milliGRAM(s) Oral two times a day  pantoprazole    Tablet 40 milliGRAM(s) Oral before breakfast  predniSONE   Tablet 40 milliGRAM(s) Oral daily  rivaroxaban 20 milliGRAM(s) Oral every 24 hours  tiotropium 18 MICROgram(s) Capsule 1 Capsule(s) Inhalation daily      Social Hx: no smoking no etoh     FAMILY HISTORY:  Family history of heart disease (Mother)        ROS  [  ] UNABLE TO ELICIT    General:  [ x ] Fever   [ x ] Malaise    Skin: chronic redness of legs     HEENT:  [x  ] Sore Throat  [-  ] Photophobia	    Chest: [x  ] SOB  [x  ] Cough dry with no phlegm     Cardiovascular: [ - ] CP	no pnd but did have orthopnea     Gastrointestinal: [  ] Abd pain   [  ] N    [  ] V   [  ] Diarrhea	    Genitourinary: [x  ] Polyuria   [ - ] Urgency   [ - ] Dysuria    [  -]  Hematuria	    Musculoskeletal:  [-  ] Back Pain	    Neurological: [ - ]Dizziness  [ - ]Visual Disturbance  [ - ]Headaches      PHYSICAL EXAM:    Vital Signs Last 24 Hrs  T(C): 36.3 (30 Jan 2018 15:21), Max: 37 (30 Jan 2018 04:48)  T(F): 97.4 (30 Jan 2018 15:21), Max: 98.6 (30 Jan 2018 04:48)  HR: 62 (30 Jan 2018 15:21) (59 - 64)  BP: 103/42 (30 Jan 2018 15:21) (103/42 - 172/72)  BP(mean): --  RR: 18 (30 Jan 2018 15:21) (18 - 18)  SpO2: 96% (30 Jan 2018 15:21) (94% - 100%)    Constitutional: awake alert oriented times 3 , morbidly obese   JULIAN SCLERA anicteric EOMI   LUNGS bilateral scattered rhonchi   CVS s1 s2 aud no murmurs left ppm in place   ABDOMEN soft non tender obese   NEUROLOGY  no defecits   SKIN bilateral legs with patches of erythema with some warmth , mild tenderness    EXTREMITIES 1 +  edema no cyanosis         LABS/DIAGNOSTIC TESTS                          11.2   3.8   )-----------( 199      ( 30 Jan 2018 07:58 )             34.2     WBC Count: 3.8 K/uL (01-30 @ 07:58)  WBC Count: 5.1 K/uL (01-29 @ 06:43)  WBC Count: 5.9 K/uL (01-28 @ 04:32)      01-30    132<L>  |  94<L>  |  31<H>  ----------------------------<  350<H>  4.3   |  27  |  1.43<H>    Ca    9.1      30 Jan 2018 07:58  Phos  4.2     01-30  Mg     2.1     01-29                PT/INR - ( 29 Jan 2018 06:43 )   PT: 16.1 sec;   INR: 1.47 ratio         PTT - ( 29 Jan 2018 06:43 )  PTT:39.0 sec    LACTATE: Blood Gas Arterial, Lactate (05.18.14 @ 18:12)    Blood Gas Arterial, Lactate: 1.8      Rapid Respiratory Viral Panel (01.28.18 @ 08:16)    Rapid RVP Result: NotDetec: The [employer] RVP Rapid uses polymerase chain reaction (PCR) and melt  curve analysis to screen for adenovirus; coronavirus HKU1, NL63, 229E,  OC43; human metapneumovirus (hMPV); human enterovirus/rhinovirus  (Entero/RV); influenza A; influenza A/H1;influenza A/H3; influenza  A/H1-2009; influenza B; parainfluenza viruses 1, 2, 3, 4; respiratory  syncytial virus; Bordetella pertussis; Mycoplasma pneumoniae; and  Chlamydophila pneumoniae.              RADIOLOGY  < from: Xray Chest 1 View AP-PORTABLE IMMEDIATE (01.28.18 @ 06:28) >    EXAM:  XR CHEST PORTABLE IMMED 1V                            PROCEDURE DATE:  01/28/2018          INTERPRETATION:  PORTABLE CHEST X-RAY    HISTORY: SOB. COPD.    COMPARISON: None.    FINDINGS:  There is mild diffuse interstitial prominence.  No focal lung consolidation.  No pneumothorax or pleural effusion.   The cardiac silhouette is not accurately assessed by AP technique, but   appears enlarged. Left-sided dual-lead pacemaker is identified.    IMPRESSION:  Mild diffuse interstitial prominence.    < end of copied text >

## 2018-01-30 NOTE — DIETITIAN INITIAL EVALUATION ADULT. - PROBLEM SELECTOR PLAN 1
-pro-, bilateral opacities on CXR, orthopnea, noncompliance to diuretics; suspecting diastolic HF  -Lasix IV 40mg once daily, strict I/O, daily weight, fluid restriction 1.2L daily.  -Trend cardiac enzymes, 1st troponin negative. Monitor telemetry  -Repeat TTE  -Check Hba1c and lipid profile.  -Cardio Dr. Sandoval.

## 2018-01-30 NOTE — PROGRESS NOTE ADULT - PROBLEM SELECTOR PLAN 4
Continue with home dose of amlodipine, losartan, and labetalol within parameters  Continue to monitor BP

## 2018-01-30 NOTE — PROGRESS NOTE ADULT - ASSESSMENT
78F PMHx CAD (s/p stents), HTN, HLD, CHFpEF, DM, A Fib (Xarelto, s/p PPM), Asthma, Hypothyroidism, Chronic venous insufficiency, Urinary incontinence admitted for CHF and COPD exacerbation in addition to lower extremity cellulitis

## 2018-01-30 NOTE — PROGRESS NOTE ADULT - PROBLEM SELECTOR PLAN 1
Potentially secondary to non-compliance with diuretics  CXR showed mild interstitial prominence;   EKG without ischemic changes; Troponin negative  Continue with lasix 20mg daily  Strict I/Os with daily weights and fluid restrictions  F/U TTE  Cardiology Dr. Moreland following Potentially secondary to non-compliance with diuretics  CXR showed mild interstitial prominence;   EKG without ischemic changes; Troponin negative  Continue with lasix 20mg daily  Strict I/Os with daily weights and fluid restrictions  TTE showed trace mitral regurgitation, mild aortic stenosis, mild LVOT, and Grade I Diastolic Dysfunction and Moderately-increased RV systolic pressures; systolic function was not well appreciated  Cardiology Dr. Moreland following

## 2018-01-30 NOTE — PROGRESS NOTE ADULT - PROBLEM SELECTOR PLAN 3
Patient has bilateral erythema of lower extremities, likely secondary to bilateral venous stasis  Continue with unasyn  Infectious Disease Dr. Alonso following  Vascular Surgery Dr. Bacon following – patient will need outpatient follow up Patient has bilateral erythema of lower extremities, likely secondary to bilateral venous stasis  Will discontinue unasyn   Infectious Disease Dr. Alonso following  Vascular Surgery Dr. Bacon following – patient will need outpatient follow up

## 2018-01-30 NOTE — DIETITIAN INITIAL EVALUATION ADULT. - DIET TYPE
DASH/TLC (sodium and cholesterol restricted diet)/1200ml/Kosher/consistent carbohydrate (evening snack)

## 2018-01-30 NOTE — DIETITIAN INITIAL EVALUATION ADULT. - ETIOLOGY
COPD w/acute exacerbation with multiple comorbidities & advance age Caloric intake much greater than expenditure

## 2018-01-30 NOTE — PROGRESS NOTE ADULT - SUBJECTIVE AND OBJECTIVE BOX
PGY 1 Note discussed with supervising resident and primary attending    Patient is a 78y old  Female who presents with a chief complaint of SOB and fever of 38celcius yesterday (29 Jan 2018 19:26)      INTERVAL HPI/OVERNIGHT EVENTS: patient examined at bedside. He/She has no complaints and current symptoms are resolving    Overnight events on telemetry monitoring []    MEDICATIONS  (STANDING):  ALBUTerol/ipratropium for Nebulization 3 milliLiter(s) Nebulizer every 6 hours  amLODIPine   Tablet 5 milliGRAM(s) Oral daily  ampicillin/sulbactam  IVPB      ampicillin/sulbactam  IVPB 3 Gram(s) IV Intermittent every 8 hours  amylase/lipase/protease  (CREON  6,000 Units) 2 Capsule(s) Oral three times a day with meals  atorvastatin 40 milliGRAM(s) Oral at bedtime  dronedarone 400 milliGRAM(s) Oral two times a day  furosemide   Injectable 20 milliGRAM(s) IV Push daily  insulin lispro (HumaLOG) corrective regimen sliding scale   SubCutaneous Before meals and at bedtime  isosorbide   mononitrate ER Tablet (IMDUR) 30 milliGRAM(s) Oral daily  levothyroxine 175 MICROGram(s) Oral daily  losartan 100 milliGRAM(s) Oral daily  metFORMIN 500 milliGRAM(s) Oral two times a day with meals  methylPREDNISolone sodium succinate Injectable 40 milliGRAM(s) IV Push every 8 hours  metoprolol     tartrate 25 milliGRAM(s) Oral three times a day  montelukast 10 milliGRAM(s) Oral daily  oxybutynin 5 milliGRAM(s) Oral two times a day  pantoprazole    Tablet 40 milliGRAM(s) Oral before breakfast  rivaroxaban 20 milliGRAM(s) Oral every 24 hours  tiotropium 18 MICROgram(s) Capsule 1 Capsule(s) Inhalation daily    MEDICATIONS  (PRN):  acetaminophen   Tablet. 650 milliGRAM(s) Oral every 6 hours PRN Moderate Pain (4 - 6)  fluticasone propionate 50 MICROgram(s)/spray Nasal Spray 1 Spray(s) Both Nostrils daily PRN nasal congestion    _______________________________________________  REVIEW OF SYSTEMS:  CONSTITUTIONAL: No fever  NECK: No pain or stiffness  RESPIRATORY: No cough; No shortness of breath  CARDIOVASCULAR: No chest pain, no palpitations  GASTROINTESTINAL: No pain. No constipation, nausea or vomiting; No diarrhea   NEUROLOGICAL: No headache or numbness, no tremors  MUSCULOSKELETAL: No joint pain, no muscle pain  GENITOURINARY: no dysuria, no frequency, no hesitancy  PSYCHIATRY: no depression , no anxiety    Vital Signs Last 24 Hrs  T(C): 37 (30 Jan 2018 04:48), Max: 37.1 (29 Jan 2018 08:21)  T(F): 98.6 (30 Jan 2018 04:48), Max: 98.8 (29 Jan 2018 08:21)  HR: 64 (30 Jan 2018 04:48) (62 - 72)  BP: 157/53 (30 Jan 2018 04:48) (129/65 - 159/64)  BP(mean): --  RR: 18 (30 Jan 2018 04:48) (17 - 18)  SpO2: 98% (30 Jan 2018 04:48) (97% - 100%)  ________________________________________________  PHYSICAL EXAM:  GENERAL: NAD, lying in bed  HEENT: Normocephalic;  conjunctivae and sclerae clear; moist mucous membranes  NECK : supple, trachea midline, no JVD  CHEST/LUNG: Clear to auscultation bilaterally with good air entry   HEART: S1 S2,  regular rate and rhythm,; no murmurs  ABDOMEN: Soft, Nontender, Nondistended; Bowel sounds present  EXTREMITIES: no cyanosis; no edema; no calf tenderness  SKIN: no rash  NERVOUS SYSTEM:  AAOx3; no new focal deficits  _________________________________________________  LABS:                        11.9   5.1   )-----------( 243      ( 29 Jan 2018 06:43 )             37.9     01-29    134<L>  |  98  |  23<H>  ----------------------------<  287<H>  4.4   |  26  |  1.31<H>    Ca    8.5      29 Jan 2018 06:43  Phos  4.8     01-29  Mg     2.1     01-29      PT/INR - ( 29 Jan 2018 06:43 )   PT: 16.1 sec;   INR: 1.47 ratio         PTT - ( 29 Jan 2018 06:43 )  PTT:39.0 sec    CAPILLARY BLOOD GLUCOSE      POCT Blood Glucose.: 309 mg/dL (29 Jan 2018 21:16)  POCT Blood Glucose.: 277 mg/dL (29 Jan 2018 17:59)  POCT Blood Glucose.: 250 mg/dL (29 Jan 2018 16:24)  POCT Blood Glucose.: 452 mg/dL (29 Jan 2018 11:34)  POCT Blood Glucose.: 303 mg/dL (29 Jan 2018 08:44)    RADIOLOGY & ADDITIONAL TESTS:    Consultant(s) Notes Reviewed:   YES    Care Discussed with Consultants:   Infectious Disease [x] Endocrinology [] Neurology [] ENT [] Cardiology [x] Electrophysiology [] Pulmonology [] Gastroenterology [] Nephrology [] Urology [] Orthopaedics [] Vascular Surgery [] Thoracic Surgery [] Plastic Surgery [] General Surgery [] Podiatry [] Psychiatry [] Hematology/Oncology [] Pain [] Palliative Care []    Plan of care was discussed with patient and/or primary care giver; all questions and concerns were addressed and care was aligned with patient's wishes. PGY 1 Note discussed with supervising resident and primary attending    Patient is a 78y old  Female who presents with a chief complaint of SOB and fever of 38celcius yesterday (29 Jan 2018 19:26)      INTERVAL HPI/OVERNIGHT EVENTS: patient examined at bedside. Patient reports that she is still having cough, although nursing staff reports no significant issues overnight.    Overnight events on telemetry monitoring []    MEDICATIONS  (STANDING):  ALBUTerol/ipratropium for Nebulization 3 milliLiter(s) Nebulizer every 6 hours  amLODIPine   Tablet 5 milliGRAM(s) Oral daily  ampicillin/sulbactam  IVPB      ampicillin/sulbactam  IVPB 3 Gram(s) IV Intermittent every 8 hours  amylase/lipase/protease  (CREON  6,000 Units) 2 Capsule(s) Oral three times a day with meals  atorvastatin 40 milliGRAM(s) Oral at bedtime  dronedarone 400 milliGRAM(s) Oral two times a day  furosemide   Injectable 20 milliGRAM(s) IV Push daily  insulin lispro (HumaLOG) corrective regimen sliding scale   SubCutaneous Before meals and at bedtime  isosorbide   mononitrate ER Tablet (IMDUR) 30 milliGRAM(s) Oral daily  levothyroxine 175 MICROGram(s) Oral daily  losartan 100 milliGRAM(s) Oral daily  metFORMIN 500 milliGRAM(s) Oral two times a day with meals  methylPREDNISolone sodium succinate Injectable 40 milliGRAM(s) IV Push every 8 hours  metoprolol     tartrate 25 milliGRAM(s) Oral three times a day  montelukast 10 milliGRAM(s) Oral daily  oxybutynin 5 milliGRAM(s) Oral two times a day  pantoprazole    Tablet 40 milliGRAM(s) Oral before breakfast  rivaroxaban 20 milliGRAM(s) Oral every 24 hours  tiotropium 18 MICROgram(s) Capsule 1 Capsule(s) Inhalation daily    MEDICATIONS  (PRN):  acetaminophen   Tablet. 650 milliGRAM(s) Oral every 6 hours PRN Moderate Pain (4 - 6)  fluticasone propionate 50 MICROgram(s)/spray Nasal Spray 1 Spray(s) Both Nostrils daily PRN nasal congestion    _______________________________________________  REVIEW OF SYSTEMS:  CONSTITUTIONAL: No fever  RESPIRATORY: Reports cough; No shortness of breath  CARDIOVASCULAR: No chest pain, no palpitations  GASTROINTESTINAL: No pain. No constipation, nausea or vomiting; No diarrhea   NEUROLOGICAL: No headache or numbness, no tremors  MUSCULOSKELETAL: No joint pain, no muscle pain    Vital Signs Last 24 Hrs  T(C): 37 (30 Jan 2018 04:48), Max: 37.1 (29 Jan 2018 08:21)  T(F): 98.6 (30 Jan 2018 04:48), Max: 98.8 (29 Jan 2018 08:21)  HR: 64 (30 Jan 2018 04:48) (62 - 72)  BP: 157/53 (30 Jan 2018 04:48) (129/65 - 159/64)  BP(mean): --  RR: 18 (30 Jan 2018 04:48) (17 - 18)  SpO2: 98% (30 Jan 2018 04:48) (97% - 100%)  ________________________________________________  PHYSICAL EXAM:  GENERAL: NAD, lying in bed  CHEST/LUNG: Clear to auscultation bilaterally with good air entry   HEART: S1 S2,  regular rate and rhythm,; no murmurs  ABDOMEN: Soft, Nontender, Nondistended; Bowel sounds present  EXTREMITIES: bilateral erythema noted along calfs with non-pitting edema   NERVOUS SYSTEM:  AAOx3; no new focal deficits  _________________________________________________  LABS:                        11.9   5.1   )-----------( 243      ( 29 Jan 2018 06:43 )             37.9     01-29    134<L>  |  98  |  23<H>  ----------------------------<  287<H>  4.4   |  26  |  1.31<H>    Ca    8.5      29 Jan 2018 06:43  Phos  4.8     01-29  Mg     2.1     01-29      PT/INR - ( 29 Jan 2018 06:43 )   PT: 16.1 sec;   INR: 1.47 ratio         PTT - ( 29 Jan 2018 06:43 )  PTT:39.0 sec    CAPILLARY BLOOD GLUCOSE      POCT Blood Glucose.: 309 mg/dL (29 Jan 2018 21:16)  POCT Blood Glucose.: 277 mg/dL (29 Jan 2018 17:59)  POCT Blood Glucose.: 250 mg/dL (29 Jan 2018 16:24)  POCT Blood Glucose.: 452 mg/dL (29 Jan 2018 11:34)  POCT Blood Glucose.: 303 mg/dL (29 Jan 2018 08:44)    RADIOLOGY & ADDITIONAL TESTS:    Consultant(s) Notes Reviewed:   YES    Care Discussed with Consultants:   Infectious Disease [x] Endocrinology [] Neurology [] ENT [] Cardiology [x] Electrophysiology [] Pulmonology [] Gastroenterology [] Nephrology [] Urology [] Orthopaedics [] Vascular Surgery [] Thoracic Surgery [] Plastic Surgery [] General Surgery [] Podiatry [] Psychiatry [] Hematology/Oncology [] Pain [] Palliative Care []    Plan of care was discussed with patient and/or primary care giver; all questions and concerns were addressed and care was aligned with patient's wishes.

## 2018-01-30 NOTE — CONSULT NOTE ADULT - ASSESSMENT
78 years old Citizen of [place]-speaking female from home, lives alone, has HHA 5times/week, 7hours/day, with PMH of CAD(s/p ?stents as per daughter), HTN, HLD, chronic diastolic HF, DM2(on Janumet), Afib on xarelto s/p PPM in November 2013, asthma(using nebulizer, ?home O2 intermittently pt unsure if it is oxygen), hypothyroidism, chronic venous insufficiency, urinary incontinence, and IBS came to ED c/o SOB and fever of 38C yesterday at around 6-7PM. She also reports orthopnea, CHAMBERLAIN, and intermittent chills, but denies chest pain, nausea, vomiting, diarrhea, or any other symptoms. Denies sick contact or recent travel outside [place], however 1 week ago she took antibiotics from her PMD due to URI symptoms. Last night she tried nebulizer multiple times due to worsening SOB without relief, so decided to come to the hospital    # chf and asthma exacerbation sec to viral infection improving. rvp screen neg     # may have acute on chronic cellulitis of legs     plan  doxycycline 100mg po bid for 7-10 days   blood cx times 2 for temps more than 101       stable for d/c from id pt of view   thnx for the consult

## 2018-01-30 NOTE — PROGRESS NOTE ADULT - SUBJECTIVE AND OBJECTIVE BOX
CARDIOLOGY     PROGRESS  NOTE   ________________________________________________    CHIEF COMPLAINT:Patient is a 78y old  Female who presents with a chief complaint of SOB and fever of 38celcius yesterday (29 Jan 2018 19:26)  doing better, no complain.  	  REVIEW OF SYSTEMS:  CONSTITUTIONAL: No fever, weight loss, or fatigue  EYES: No eye pain, visual disturbances, or discharge  ENT:  No difficulty hearing, tinnitus, vertigo; No sinus or throat pain  NECK: No pain or stiffness  RESPIRATORY: No cough, wheezing, chills or hemoptysis; No Shortness of Breath  CARDIOVASCULAR: No chest pain, palpitations, passing out, dizziness, or leg swelling  GASTROINTESTINAL: No abdominal or epigastric pain. No nausea, vomiting, or hematemesis; No diarrhea or constipation. No melena or hematochezia.  GENITOURINARY: No dysuria, frequency, hematuria, or incontinence  NEUROLOGICAL: No headaches, memory loss, loss of strength, numbness, or tremors  SKIN: No itching, burning, rashes, or lesions   LYMPH Nodes: No enlarged glands  ENDOCRINE: No heat or cold intolerance; No hair loss  MUSCULOSKELETAL: No joint pain or swelling; No muscle, back, or extremity pain  PSYCHIATRIC: No depression, anxiety, mood swings, or difficulty sleeping  HEME/LYMPH: No easy bruising, or bleeding gums  ALLERGY AND IMMUNOLOGIC: No hives or eczema	    [ ] All others negative	  [ ] Unable to obtain    PHYSICAL EXAM:  T(C): 36.7 (01-30-18 @ 07:16), Max: 37 (01-30-18 @ 04:48)  HR: 62 (01-30-18 @ 07:16) (62 - 72)  BP: 163/51 (01-30-18 @ 07:16) (129/65 - 163/51)  RR: 18 (01-30-18 @ 07:16) (17 - 18)  SpO2: 98% (01-30-18 @ 07:16) (97% - 100%)  Wt(kg): --  I&O's Summary    29 Jan 2018 07:01  -  30 Jan 2018 07:00  --------------------------------------------------------  IN: 800 mL / OUT: 0 mL / NET: 800 mL        Appearance: Normal	  HEENT:   Normal oral mucosa, PERRL, EOMI	  Lymphatic: No lymphadenopathy  Cardiovascular: Normal S1 S2, No JVD, + murmurs, No edema  Respiratory: Lungs clear to auscultation	  Psychiatry: A & O x 3, Mood & affect appropriate  Gastrointestinal:  Soft, Non-tender, + BS	  Skin: No rashes, No ecchymoses, No cyanosis	  Neurologic: Non-focal  Extremities: Normal range of motion, No clubbing, cyanosis or edema  Vascular: Peripheral pulses palpable 2+ bilaterally    MEDICATIONS  (STANDING):  ALBUTerol/ipratropium for Nebulization 3 milliLiter(s) Nebulizer every 6 hours  amLODIPine   Tablet 5 milliGRAM(s) Oral daily  amylase/lipase/protease  (CREON  6,000 Units) 2 Capsule(s) Oral three times a day with meals  atorvastatin 40 milliGRAM(s) Oral at bedtime  dronedarone 400 milliGRAM(s) Oral two times a day  furosemide   Injectable 20 milliGRAM(s) IV Push daily  insulin lispro (HumaLOG) corrective regimen sliding scale   SubCutaneous Before meals and at bedtime  isosorbide   mononitrate ER Tablet (IMDUR) 30 milliGRAM(s) Oral daily  levothyroxine 175 MICROGram(s) Oral daily  losartan 100 milliGRAM(s) Oral daily  metFORMIN 500 milliGRAM(s) Oral two times a day with meals  metoprolol     tartrate 25 milliGRAM(s) Oral three times a day  montelukast 10 milliGRAM(s) Oral daily  oxybutynin 5 milliGRAM(s) Oral two times a day  pantoprazole    Tablet 40 milliGRAM(s) Oral before breakfast  predniSONE   Tablet 40 milliGRAM(s) Oral daily  rivaroxaban 20 milliGRAM(s) Oral every 24 hours  tiotropium 18 MICROgram(s) Capsule 1 Capsule(s) Inhalation daily      TELEMETRY: a paced	    ECG:  	  RADIOLOGY:  OTHER: 	  	  LABS:	 	    CARDIAC MARKERS:  CARDIAC MARKERS ( 28 Jan 2018 19:13 )  0.016 ng/mL / x     / 139 U/L / x     / 1.5 ng/mL  CARDIAC MARKERS ( 28 Jan 2018 12:09 )  0.022 ng/mL / x     / 129 U/L / x     / 1.6 ng/mL                                11.2   3.8   )-----------( 199      ( 30 Jan 2018 07:58 )             34.2     01-30    132<L>  |  94<L>  |  31<H>  ----------------------------<  350<H>  4.3   |  27  |  1.43<H>    Ca    9.1      30 Jan 2018 07:58  Phos  4.2     01-30  Mg     2.1     01-29      proBNP: Serum Pro-Brain Natriuretic Peptide: 706 pg/mL (01-28 @ 04:32)    Lipid Profile: Cholesterol 133  LDL 41  HDL 77  TG 73    HgA1c: Hemoglobin A1C, Whole Blood: 7.0 % (01-29 @ 10:08)    TSH: Thyroid Stimulating Hormone, Serum: 1.62 uU/mL (01-28 @ 12:09)    PT/INR - ( 29 Jan 2018 06:43 )   PT: 16.1 sec;   INR: 1.47 ratio         PTT - ( 29 Jan 2018 06:43 )  PTT:39.0 sec  < from: Transthoracic Echocardiogram (01.29.18 @ 07:38) >  1. Mild posterior mitral annular calcification. Trace  mitral regurgitation.  2. Aortic valve not well visualized. Mild aortic stenosis.  Trace aortic regurgitation.  3. Normal aortic root.  4. Mildly dilated left atrium.  5. Normal left ventricular internal dimensions and wall  thicknesses.  6. Endocardium not well visualized; grossly normal left  ventricular systolic function. Peak left ventricular  outflow tract gradient equals 18.3 mm Hg, consistent with  mild LVOT obstruction.  7. Grade I diastolic dysfunction (Impaired relaxation).  8. Normal right atrium.  9. Normal right ventricular size and function (TAPSE 2.15  cm). A device lead is visualized in the right heart.  10. RA Pressure is 8 mm Hg.  11. RV systolic pressure is moderately increased at  49 mm  Hg.  12. Normal tricuspid valve. Trace tricuspid regurgitation.  13. Pulmonic valve not well seen.  14. No pericardial effusion.    < end of copied text >      Assessment and plan  ---------------------------  78 years old [place]-speaking female from home, lives alone, has HHA 5times/week, 7hours/day, with PMH of CAD(s/p ?stents as per daughter), HTN, HLD, chronic diastolic HF, DM2(on Janumet), Afib on xarelto s/p PPM in November 2013, asthma(using nebulizer, ?home O2 intermittently pt unsure if it is oxygen), hypothyroidism, chronic venous insufficiency, urinary incontinence, and IBS came to ED c/o SOB and fever of 38celcius yesterday at around 6-7PM,probable flu,b/l cellulitis.  1.RVP-p, start tamiflu.  2.ID eval called, start unasyn.  3.CHF-D/C hctz, lasix for gentle diuresis.  4.PAF-Xarelto.  5.DM-Insulin.  6.ASTHMA-Nebs and steroids.  7.Hypothyroidism-synthroid.  8.PPI.  9. repeat echo  10. increase beta blocker as tolerated

## 2018-01-30 NOTE — PROGRESS NOTE ADULT - PROBLEM SELECTOR PLAN 2
Continue with prednisone followed by taper, in additition to unasyn  Spiriva and singulair  Oxygen supplementation PRN Continue with prednisone followed by taper  Spiriva and singulair  Oxygen supplementation PRN Continue with prednisone 40mg followed by taper  Spiriva and singulair  Oxygen supplementation PRN

## 2018-01-30 NOTE — PROGRESS NOTE ADULT - PROBLEM SELECTOR PLAN 6
S/p PPM  Continue with dronderone for rhythm control and xarelto for anti-coagulation S/p [place] PPM  Continue with dronderone for rhythm control and xarelto for anti-coagulation  Pacemaker interrogation revealed episode of Atrial fibrillation with Rapid ventricular response

## 2018-03-15 ENCOUNTER — EMERGENCY (EMERGENCY)
Facility: HOSPITAL | Age: 78
LOS: 1 days | Discharge: HOME | End: 2018-03-15
Attending: EMERGENCY MEDICINE
Payer: MEDICARE

## 2018-03-15 VITALS
WEIGHT: 279.99 LBS | TEMPERATURE: 98 F | HEART RATE: 72 BPM | HEIGHT: 62 IN | OXYGEN SATURATION: 98 % | RESPIRATION RATE: 17 BRPM | DIASTOLIC BLOOD PRESSURE: 77 MMHG | SYSTOLIC BLOOD PRESSURE: 146 MMHG

## 2018-03-15 DIAGNOSIS — Z98.89 OTHER SPECIFIED POSTPROCEDURAL STATES: Chronic | ICD-10-CM

## 2018-03-15 LAB
ACETONE SERPL-MCNC: NEGATIVE — SIGNIFICANT CHANGE UP
ALBUMIN SERPL ELPH-MCNC: 3.4 G/DL — LOW (ref 3.5–5)
ALP SERPL-CCNC: 60 U/L — SIGNIFICANT CHANGE UP (ref 40–120)
ALT FLD-CCNC: 20 U/L DA — SIGNIFICANT CHANGE UP (ref 10–60)
ANION GAP SERPL CALC-SCNC: 8 MMOL/L — SIGNIFICANT CHANGE UP (ref 5–17)
APPEARANCE UR: CLEAR — SIGNIFICANT CHANGE UP
AST SERPL-CCNC: 16 U/L — SIGNIFICANT CHANGE UP (ref 10–40)
BACTERIA # UR AUTO: ABNORMAL /HPF
BASOPHILS # BLD AUTO: 0.1 K/UL — SIGNIFICANT CHANGE UP (ref 0–0.2)
BASOPHILS NFR BLD AUTO: 1.1 % — SIGNIFICANT CHANGE UP (ref 0–2)
BILIRUB SERPL-MCNC: 0.3 MG/DL — SIGNIFICANT CHANGE UP (ref 0.2–1.2)
BILIRUB UR-MCNC: NEGATIVE — SIGNIFICANT CHANGE UP
BUN SERPL-MCNC: 16 MG/DL — SIGNIFICANT CHANGE UP (ref 7–18)
CALCIUM SERPL-MCNC: 8.9 MG/DL — SIGNIFICANT CHANGE UP (ref 8.4–10.5)
CHLORIDE SERPL-SCNC: 101 MMOL/L — SIGNIFICANT CHANGE UP (ref 96–108)
CK MB BLD-MCNC: <1.6 % — SIGNIFICANT CHANGE UP (ref 0–3.5)
CK MB CFR SERPL CALC: <1 NG/ML — SIGNIFICANT CHANGE UP (ref 0–3.6)
CK SERPL-CCNC: 62 U/L — SIGNIFICANT CHANGE UP (ref 21–215)
CO2 SERPL-SCNC: 28 MMOL/L — SIGNIFICANT CHANGE UP (ref 22–31)
COLOR SPEC: YELLOW — SIGNIFICANT CHANGE UP
COMMENT - URINE: SIGNIFICANT CHANGE UP
CREAT SERPL-MCNC: 1.25 MG/DL — SIGNIFICANT CHANGE UP (ref 0.5–1.3)
DIFF PNL FLD: NEGATIVE — SIGNIFICANT CHANGE UP
EOSINOPHIL # BLD AUTO: 0.1 K/UL — SIGNIFICANT CHANGE UP (ref 0–0.5)
EOSINOPHIL NFR BLD AUTO: 1 % — SIGNIFICANT CHANGE UP (ref 0–6)
EPI CELLS # UR: SIGNIFICANT CHANGE UP /HPF
GLUCOSE SERPL-MCNC: 198 MG/DL — HIGH (ref 70–99)
GLUCOSE UR QL: NEGATIVE — SIGNIFICANT CHANGE UP
HCT VFR BLD CALC: 37 % — SIGNIFICANT CHANGE UP (ref 34.5–45)
HGB BLD-MCNC: 11.9 G/DL — SIGNIFICANT CHANGE UP (ref 11.5–15.5)
KETONES UR-MCNC: NEGATIVE — SIGNIFICANT CHANGE UP
LACTATE SERPL-SCNC: 2.1 MMOL/L — HIGH (ref 0.7–2)
LEUKOCYTE ESTERASE UR-ACNC: ABNORMAL
LIDOCAIN IGE QN: 115 U/L — SIGNIFICANT CHANGE UP (ref 73–393)
LYMPHOCYTES # BLD AUTO: 2.9 K/UL — SIGNIFICANT CHANGE UP (ref 1–3.3)
LYMPHOCYTES # BLD AUTO: 34.7 % — SIGNIFICANT CHANGE UP (ref 13–44)
MAGNESIUM SERPL-MCNC: 1.9 MG/DL — SIGNIFICANT CHANGE UP (ref 1.6–2.6)
MCHC RBC-ENTMCNC: 28.4 PG — SIGNIFICANT CHANGE UP (ref 27–34)
MCHC RBC-ENTMCNC: 32.3 GM/DL — SIGNIFICANT CHANGE UP (ref 32–36)
MCV RBC AUTO: 87.9 FL — SIGNIFICANT CHANGE UP (ref 80–100)
MONOCYTES # BLD AUTO: 0.6 K/UL — SIGNIFICANT CHANGE UP (ref 0–0.9)
MONOCYTES NFR BLD AUTO: 7.3 % — SIGNIFICANT CHANGE UP (ref 2–14)
NEUTROPHILS # BLD AUTO: 4.6 K/UL — SIGNIFICANT CHANGE UP (ref 1.8–7.4)
NEUTROPHILS NFR BLD AUTO: 55.8 % — SIGNIFICANT CHANGE UP (ref 43–77)
NITRITE UR-MCNC: NEGATIVE — SIGNIFICANT CHANGE UP
PH UR: 5 — SIGNIFICANT CHANGE UP (ref 5–8)
PLATELET # BLD AUTO: 221 K/UL — SIGNIFICANT CHANGE UP (ref 150–400)
POTASSIUM SERPL-MCNC: 4.1 MMOL/L — SIGNIFICANT CHANGE UP (ref 3.5–5.3)
POTASSIUM SERPL-SCNC: 4.1 MMOL/L — SIGNIFICANT CHANGE UP (ref 3.5–5.3)
PROT SERPL-MCNC: 7.3 G/DL — SIGNIFICANT CHANGE UP (ref 6–8.3)
PROT UR-MCNC: NEGATIVE — SIGNIFICANT CHANGE UP
RBC # BLD: 4.2 M/UL — SIGNIFICANT CHANGE UP (ref 3.8–5.2)
RBC # FLD: 13.2 % — SIGNIFICANT CHANGE UP (ref 10.3–14.5)
RBC CASTS # UR COMP ASSIST: SIGNIFICANT CHANGE UP /HPF (ref 0–2)
SODIUM SERPL-SCNC: 137 MMOL/L — SIGNIFICANT CHANGE UP (ref 135–145)
SP GR SPEC: 1.01 — SIGNIFICANT CHANGE UP (ref 1.01–1.02)
TROPONIN I SERPL-MCNC: <0.015 NG/ML — SIGNIFICANT CHANGE UP (ref 0–0.04)
UROBILINOGEN FLD QL: NEGATIVE — SIGNIFICANT CHANGE UP
WBC # BLD: 8.3 K/UL — SIGNIFICANT CHANGE UP (ref 3.8–10.5)
WBC # FLD AUTO: 8.3 K/UL — SIGNIFICANT CHANGE UP (ref 3.8–10.5)
WBC UR QL: ABNORMAL /HPF (ref 0–5)

## 2018-03-15 PROCEDURE — 99285 EMERGENCY DEPT VISIT HI MDM: CPT

## 2018-03-15 PROCEDURE — 71045 X-RAY EXAM CHEST 1 VIEW: CPT | Mod: 26

## 2018-03-15 RX ORDER — SODIUM CHLORIDE 9 MG/ML
3 INJECTION INTRAMUSCULAR; INTRAVENOUS; SUBCUTANEOUS ONCE
Qty: 0 | Refills: 0 | Status: COMPLETED | OUTPATIENT
Start: 2018-03-15 | End: 2018-03-15

## 2018-03-15 RX ORDER — SODIUM CHLORIDE 9 MG/ML
1000 INJECTION INTRAMUSCULAR; INTRAVENOUS; SUBCUTANEOUS
Qty: 0 | Refills: 0 | Status: DISCONTINUED | OUTPATIENT
Start: 2018-03-15 | End: 2018-03-19

## 2018-03-15 RX ORDER — SODIUM CHLORIDE 9 MG/ML
1000 INJECTION INTRAMUSCULAR; INTRAVENOUS; SUBCUTANEOUS ONCE
Qty: 0 | Refills: 0 | Status: COMPLETED | OUTPATIENT
Start: 2018-03-15 | End: 2018-03-15

## 2018-03-15 RX ADMIN — SODIUM CHLORIDE 3 MILLILITER(S): 9 INJECTION INTRAMUSCULAR; INTRAVENOUS; SUBCUTANEOUS at 22:57

## 2018-03-15 RX ADMIN — SODIUM CHLORIDE 1000 MILLILITER(S): 9 INJECTION INTRAMUSCULAR; INTRAVENOUS; SUBCUTANEOUS at 22:57

## 2018-03-15 NOTE — ED PROVIDER NOTE - PROGRESS NOTE DETAILS
pt feeling much better, no abd pain for few hrs., will repeat lactate, last lactate 2.1, if better, will d/c home

## 2018-03-15 NOTE — ED PROVIDER NOTE - CONSTITUTIONAL, MLM
normal... Well appearing, obese, awake, alert, oriented to person, place, time/situation and in mild apparent distress.

## 2018-03-15 NOTE — ED PROVIDER NOTE - OBJECTIVE STATEMENT
77 y/o F w/ PMHx of CHF , HTN, HLD, NIDDM, MI presents c/o abd pain diffusely x today, 17:30. Pain is described as burning, constant,  lasted for 30 min since presentation to the ED, rated 9/10. Last bowel movement was today this morning. No hx of similar sx. Denies nausea, emesis, dysuria, dizziness and any other complaints. NKDA.

## 2018-03-15 NOTE — ED ADULT NURSE NOTE - CHPI ED SYMPTOMS NEG
no fever/no blood in stool/no diarrhea/no vomiting/no burning urination/no chills/no abdominal distension/no dysuria/no hematuria/no nausea

## 2018-03-15 NOTE — ED ADULT NURSE NOTE - OBJECTIVE STATEMENT
Pt AOx3, ambulatory, c/o abdominal pain, SOB, and weakness since this morning. Pt denies n/v/constipation/diarrhea. Pt denies chest pain, palpitations. No acute distress noted.

## 2018-03-16 VITALS
DIASTOLIC BLOOD PRESSURE: 59 MMHG | RESPIRATION RATE: 16 BRPM | HEART RATE: 61 BPM | SYSTOLIC BLOOD PRESSURE: 114 MMHG | OXYGEN SATURATION: 98 % | TEMPERATURE: 98 F

## 2018-03-16 LAB — LACTATE SERPL-SCNC: 1.7 MMOL/L — SIGNIFICANT CHANGE UP (ref 0.7–2)

## 2018-03-16 PROCEDURE — 81001 URINALYSIS AUTO W/SCOPE: CPT

## 2018-03-16 PROCEDURE — 83735 ASSAY OF MAGNESIUM: CPT

## 2018-03-16 PROCEDURE — 82009 KETONE BODYS QUAL: CPT

## 2018-03-16 PROCEDURE — 80053 COMPREHEN METABOLIC PANEL: CPT

## 2018-03-16 PROCEDURE — 71045 X-RAY EXAM CHEST 1 VIEW: CPT

## 2018-03-16 PROCEDURE — 83690 ASSAY OF LIPASE: CPT

## 2018-03-16 PROCEDURE — 82553 CREATINE MB FRACTION: CPT

## 2018-03-16 PROCEDURE — 85027 COMPLETE CBC AUTOMATED: CPT

## 2018-03-16 PROCEDURE — 82550 ASSAY OF CK (CPK): CPT

## 2018-03-16 PROCEDURE — 83605 ASSAY OF LACTIC ACID: CPT

## 2018-03-16 PROCEDURE — 99283 EMERGENCY DEPT VISIT LOW MDM: CPT | Mod: 25

## 2018-03-16 PROCEDURE — 84484 ASSAY OF TROPONIN QUANT: CPT

## 2018-05-01 ENCOUNTER — OUTPATIENT (OUTPATIENT)
Dept: OUTPATIENT SERVICES | Facility: HOSPITAL | Age: 78
LOS: 1 days | End: 2018-05-01
Payer: MEDICAID

## 2018-05-01 DIAGNOSIS — Z98.89 OTHER SPECIFIED POSTPROCEDURAL STATES: Chronic | ICD-10-CM

## 2018-05-01 PROCEDURE — G9001: CPT

## 2018-05-03 DIAGNOSIS — R69 ILLNESS, UNSPECIFIED: ICD-10-CM

## 2018-06-28 ENCOUNTER — APPOINTMENT (OUTPATIENT)
Dept: CARDIOLOGY | Facility: CLINIC | Age: 78
End: 2018-06-28
Payer: MEDICARE

## 2018-06-28 ENCOUNTER — APPOINTMENT (OUTPATIENT)
Dept: ELECTROPHYSIOLOGY | Facility: CLINIC | Age: 78
End: 2018-06-28
Payer: MEDICARE

## 2018-06-28 VITALS
BODY MASS INDEX: 51.53 KG/M2 | WEIGHT: 280 LBS | OXYGEN SATURATION: 96 % | HEIGHT: 62 IN | DIASTOLIC BLOOD PRESSURE: 70 MMHG | HEART RATE: 67 BPM | SYSTOLIC BLOOD PRESSURE: 140 MMHG

## 2018-06-28 PROCEDURE — 93280 PM DEVICE PROGR EVAL DUAL: CPT

## 2018-06-28 PROCEDURE — 99214 OFFICE O/P EST MOD 30 MIN: CPT

## 2018-09-21 ENCOUNTER — EMERGENCY (EMERGENCY)
Facility: HOSPITAL | Age: 78
LOS: 1 days | Discharge: HOME | End: 2018-09-21
Attending: EMERGENCY MEDICINE
Payer: MEDICARE

## 2018-09-21 VITALS
TEMPERATURE: 99 F | OXYGEN SATURATION: 99 % | WEIGHT: 279.99 LBS | HEART RATE: 69 BPM | SYSTOLIC BLOOD PRESSURE: 167 MMHG | HEIGHT: 61 IN | DIASTOLIC BLOOD PRESSURE: 73 MMHG | RESPIRATION RATE: 16 BRPM

## 2018-09-21 DIAGNOSIS — Z98.89 OTHER SPECIFIED POSTPROCEDURAL STATES: Chronic | ICD-10-CM

## 2018-09-21 LAB
ALBUMIN SERPL ELPH-MCNC: 3.6 G/DL — SIGNIFICANT CHANGE UP (ref 3.5–5)
ALP SERPL-CCNC: 65 U/L — SIGNIFICANT CHANGE UP (ref 40–120)
ALT FLD-CCNC: 26 U/L DA — SIGNIFICANT CHANGE UP (ref 10–60)
ANION GAP SERPL CALC-SCNC: 9 MMOL/L — SIGNIFICANT CHANGE UP (ref 5–17)
APTT BLD: 39.6 SEC — HIGH (ref 27.5–37.4)
AST SERPL-CCNC: 20 U/L — SIGNIFICANT CHANGE UP (ref 10–40)
BASOPHILS # BLD AUTO: 0.1 K/UL — SIGNIFICANT CHANGE UP (ref 0–0.2)
BASOPHILS NFR BLD AUTO: 1.5 % — SIGNIFICANT CHANGE UP (ref 0–2)
BILIRUB SERPL-MCNC: 0.3 MG/DL — SIGNIFICANT CHANGE UP (ref 0.2–1.2)
BUN SERPL-MCNC: 38 MG/DL — HIGH (ref 7–18)
CALCIUM SERPL-MCNC: 8.9 MG/DL — SIGNIFICANT CHANGE UP (ref 8.4–10.5)
CHLORIDE SERPL-SCNC: 99 MMOL/L — SIGNIFICANT CHANGE UP (ref 96–108)
CO2 SERPL-SCNC: 26 MMOL/L — SIGNIFICANT CHANGE UP (ref 22–31)
CREAT SERPL-MCNC: 2.11 MG/DL — HIGH (ref 0.5–1.3)
EOSINOPHIL # BLD AUTO: 0.1 K/UL — SIGNIFICANT CHANGE UP (ref 0–0.5)
EOSINOPHIL NFR BLD AUTO: 1.7 % — SIGNIFICANT CHANGE UP (ref 0–6)
GLUCOSE SERPL-MCNC: 151 MG/DL — HIGH (ref 70–99)
HCT VFR BLD CALC: 36.9 % — SIGNIFICANT CHANGE UP (ref 34.5–45)
HGB BLD-MCNC: 11.9 G/DL — SIGNIFICANT CHANGE UP (ref 11.5–15.5)
INR BLD: 1.69 RATIO — HIGH (ref 0.88–1.16)
LYMPHOCYTES # BLD AUTO: 2.7 K/UL — SIGNIFICANT CHANGE UP (ref 1–3.3)
LYMPHOCYTES # BLD AUTO: 34.5 % — SIGNIFICANT CHANGE UP (ref 13–44)
MCHC RBC-ENTMCNC: 28.8 PG — SIGNIFICANT CHANGE UP (ref 27–34)
MCHC RBC-ENTMCNC: 32.2 GM/DL — SIGNIFICANT CHANGE UP (ref 32–36)
MCV RBC AUTO: 89.2 FL — SIGNIFICANT CHANGE UP (ref 80–100)
MONOCYTES # BLD AUTO: 0.6 K/UL — SIGNIFICANT CHANGE UP (ref 0–0.9)
MONOCYTES NFR BLD AUTO: 7.8 % — SIGNIFICANT CHANGE UP (ref 2–14)
NEUTROPHILS # BLD AUTO: 4.3 K/UL — SIGNIFICANT CHANGE UP (ref 1.8–7.4)
NEUTROPHILS NFR BLD AUTO: 54.5 % — SIGNIFICANT CHANGE UP (ref 43–77)
NT-PROBNP SERPL-SCNC: 247 PG/ML — SIGNIFICANT CHANGE UP (ref 0–450)
PLATELET # BLD AUTO: 248 K/UL — SIGNIFICANT CHANGE UP (ref 150–400)
POTASSIUM SERPL-MCNC: 4.5 MMOL/L — SIGNIFICANT CHANGE UP (ref 3.5–5.3)
POTASSIUM SERPL-SCNC: 4.5 MMOL/L — SIGNIFICANT CHANGE UP (ref 3.5–5.3)
PROT SERPL-MCNC: 7.6 G/DL — SIGNIFICANT CHANGE UP (ref 6–8.3)
PROTHROM AB SERPL-ACNC: 18.6 SEC — HIGH (ref 9.8–12.7)
RBC # BLD: 4.14 M/UL — SIGNIFICANT CHANGE UP (ref 3.8–5.2)
RBC # FLD: 12.9 % — SIGNIFICANT CHANGE UP (ref 10.3–14.5)
SODIUM SERPL-SCNC: 134 MMOL/L — LOW (ref 135–145)
TROPONIN I SERPL-MCNC: <0.015 NG/ML — SIGNIFICANT CHANGE UP (ref 0–0.04)
WBC # BLD: 7.9 K/UL — SIGNIFICANT CHANGE UP (ref 3.8–10.5)
WBC # FLD AUTO: 7.9 K/UL — SIGNIFICANT CHANGE UP (ref 3.8–10.5)

## 2018-09-21 PROCEDURE — 99285 EMERGENCY DEPT VISIT HI MDM: CPT | Mod: 25

## 2018-09-21 RX ORDER — IPRATROPIUM/ALBUTEROL SULFATE 18-103MCG
3 AEROSOL WITH ADAPTER (GRAM) INHALATION ONCE
Qty: 0 | Refills: 0 | Status: COMPLETED | OUTPATIENT
Start: 2018-09-21 | End: 2018-09-21

## 2018-09-21 RX ORDER — FUROSEMIDE 40 MG
40 TABLET ORAL ONCE
Qty: 0 | Refills: 0 | Status: COMPLETED | OUTPATIENT
Start: 2018-09-21 | End: 2018-09-21

## 2018-09-21 RX ADMIN — Medication 3 MILLILITER(S): at 23:30

## 2018-09-21 RX ADMIN — Medication 3 MILLILITER(S): at 00:11

## 2018-09-21 NOTE — ED PROVIDER NOTE - NS ED ROS FT
CONSTITUTIONAL: no fever, no chills   EYES: no visual changes, no eye pain   ENMT: no nasal congestion, no throat pain  CARDIOVASCULAR: no chest pain, no edema, no palpitations   RESPIRATORY: +shortness of breath, no cough   GASTROINTESTINAL: no abdominal pain, no nausea, no vomiting, no diarrhea, no constipation   GENITOURINARY: no dysuria, no frequency  MUSCULOSKELETAL: no joint pains, no myalgias, no back pain   SKIN: no rashes  NEUROLOGICAL: +weakness, no headache, no dizziness, no slurred speech, no syncope   PSYCHIATRIC: no known mental health illness   HEME/LYMPH: no lymphadenopathy      All other ROS negative except as per HPI

## 2018-09-21 NOTE — ED PROVIDER NOTE - PROGRESS NOTE DETAILS
EKG - paced, LAFB, RBBB (seen on previous EKG)   labs - SRIDHAR on CKD  CXR - no vascular congestion, no consolidations, no effusions  Wheezing cleared after duonebs. Respiratory status improved. Pt ambulatory around ED without SOB or increased work of breathing. Will d/c with rx for prednisone and PCP f/u. Discussed indications for patient return to ED. Patient understood. EKG - paced, LAFB, RBBB (seen on previous EKG)   labs - SRIDHAR on CKD; probnp wnl   CXR - no vascular congestion, no consolidations, no effusions  Wheezing cleared after duonebs. Respiratory status improved. Pt ambulatory around ED without SOB or increased work of breathing. Symptoms like 2/2 to asthma; no evidence of CHF exacerbation. Will d/c with rx for prednisone and PCP f/u. Discussed indications for patient return to ED. Patient understood.

## 2018-09-21 NOTE — ED PROVIDER NOTE - OBJECTIVE STATEMENT
79 yo F pmh of CAD, CHF, afib (on xarelto), pacemaker, HTN, HLD, and DM presents from home with generalized weakness x 1 day. Associated with mild SOB while at rest. Denies fever, cough, chest pain, palpitations, abd pain, n/v, diaphoresis, diarrhea, other acute complaints. 79 yo F pmh of CAD, CHF, afib (on xarelto), pacemaker, asthma, HTN, HLD, and DM presents from home with generalized weakness x 1 day. Associated with mild SOB while at rest. Denies fever, cough, chest pain, palpitations, abd pain, n/v, diaphoresis, diarrhea, other acute complaints.

## 2018-09-21 NOTE — ED PROVIDER NOTE - PHYSICAL EXAMINATION
GENERAL: wells appearing, no acute distress  HEAD: atraumatic   EYES: EOMI, pink conjunctiva   ENT: moist oral mucosa   CARDIAC: RRR, bilateral LE edema, distal pulses present   RESPIRATORY: scant expiratory wheezing bilaterally, mild increased work of breathing   GASTROINTESTINAL: no abdominal tenderness, no rebound or guarding, bowel sounds presents  GENITOURINARY: no CVA tenderness   MUSCULOSKELETAL: no deformity   NEUROLOGICAL: AAOx3, spontaneous movement of extremities   SKIN: intact   PSYCHIATRIC: cooperative  HEME LYMPH: no lymphadenopathy GENERAL: wells appearing, no acute distress  HEAD: atraumatic   EYES: EOMI, pink conjunctiva   ENT: moist oral mucosa   CARDIAC: RRR, trace bilateral LE edema, distal pulses present   RESPIRATORY: scant expiratory wheezing bilaterally, mild increased work of breathing   GASTROINTESTINAL: no abdominal tenderness, no rebound or guarding, bowel sounds presents  GENITOURINARY: no CVA tenderness   MUSCULOSKELETAL: no deformity   NEUROLOGICAL: AAOx3, spontaneous movement of extremities   SKIN: intact   PSYCHIATRIC: cooperative  HEME LYMPH: no lymphadenopathy

## 2018-09-22 VITALS
HEART RATE: 65 BPM | TEMPERATURE: 98 F | RESPIRATION RATE: 18 BRPM | OXYGEN SATURATION: 99 % | SYSTOLIC BLOOD PRESSURE: 150 MMHG | DIASTOLIC BLOOD PRESSURE: 92 MMHG

## 2018-09-22 PROCEDURE — 86900 BLOOD TYPING SEROLOGIC ABO: CPT

## 2018-09-22 PROCEDURE — 71045 X-RAY EXAM CHEST 1 VIEW: CPT | Mod: 26

## 2018-09-22 PROCEDURE — 84484 ASSAY OF TROPONIN QUANT: CPT

## 2018-09-22 PROCEDURE — 85027 COMPLETE CBC AUTOMATED: CPT

## 2018-09-22 PROCEDURE — 93005 ELECTROCARDIOGRAM TRACING: CPT

## 2018-09-22 PROCEDURE — 94640 AIRWAY INHALATION TREATMENT: CPT

## 2018-09-22 PROCEDURE — 86850 RBC ANTIBODY SCREEN: CPT

## 2018-09-22 PROCEDURE — 96374 THER/PROPH/DIAG INJ IV PUSH: CPT

## 2018-09-22 PROCEDURE — 86901 BLOOD TYPING SEROLOGIC RH(D): CPT

## 2018-09-22 PROCEDURE — 96361 HYDRATE IV INFUSION ADD-ON: CPT

## 2018-09-22 PROCEDURE — 85730 THROMBOPLASTIN TIME PARTIAL: CPT

## 2018-09-22 PROCEDURE — 83880 ASSAY OF NATRIURETIC PEPTIDE: CPT

## 2018-09-22 PROCEDURE — 80053 COMPREHEN METABOLIC PANEL: CPT

## 2018-09-22 PROCEDURE — 85610 PROTHROMBIN TIME: CPT

## 2018-09-22 PROCEDURE — 99284 EMERGENCY DEPT VISIT MOD MDM: CPT | Mod: 25

## 2018-09-22 PROCEDURE — 71045 X-RAY EXAM CHEST 1 VIEW: CPT

## 2018-09-22 RX ORDER — SODIUM CHLORIDE 9 MG/ML
500 INJECTION INTRAMUSCULAR; INTRAVENOUS; SUBCUTANEOUS ONCE
Qty: 0 | Refills: 0 | Status: COMPLETED | OUTPATIENT
Start: 2018-09-22 | End: 2018-09-22

## 2018-09-22 RX ORDER — IPRATROPIUM/ALBUTEROL SULFATE 18-103MCG
3 AEROSOL WITH ADAPTER (GRAM) INHALATION ONCE
Qty: 0 | Refills: 0 | Status: COMPLETED | OUTPATIENT
Start: 2018-09-22 | End: 2018-09-21

## 2018-09-22 RX ADMIN — Medication 50 MILLIGRAM(S): at 01:10

## 2018-09-22 RX ADMIN — SODIUM CHLORIDE 500 MILLILITER(S): 9 INJECTION INTRAMUSCULAR; INTRAVENOUS; SUBCUTANEOUS at 02:40

## 2018-09-22 RX ADMIN — Medication 40 MILLIGRAM(S): at 01:11

## 2018-09-22 RX ADMIN — SODIUM CHLORIDE 500 MILLILITER(S): 9 INJECTION INTRAMUSCULAR; INTRAVENOUS; SUBCUTANEOUS at 01:21

## 2018-10-25 ENCOUNTER — APPOINTMENT (OUTPATIENT)
Dept: ELECTROPHYSIOLOGY | Facility: CLINIC | Age: 78
End: 2018-10-25
Payer: MEDICARE

## 2018-10-25 PROCEDURE — XXXXX: CPT

## 2018-11-21 ENCOUNTER — APPOINTMENT (OUTPATIENT)
Dept: ELECTROPHYSIOLOGY | Facility: CLINIC | Age: 78
End: 2018-11-21

## 2018-11-21 ENCOUNTER — APPOINTMENT (OUTPATIENT)
Dept: CARDIOLOGY | Facility: CLINIC | Age: 78
End: 2018-11-21

## 2018-12-22 ENCOUNTER — EMERGENCY (EMERGENCY)
Facility: HOSPITAL | Age: 78
LOS: 1 days | Discharge: HOME | End: 2018-12-22
Attending: STUDENT IN AN ORGANIZED HEALTH CARE EDUCATION/TRAINING PROGRAM
Payer: MEDICARE

## 2018-12-22 VITALS
RESPIRATION RATE: 17 BRPM | DIASTOLIC BLOOD PRESSURE: 98 MMHG | HEART RATE: 77 BPM | TEMPERATURE: 98 F | SYSTOLIC BLOOD PRESSURE: 160 MMHG | OXYGEN SATURATION: 100 %

## 2018-12-22 VITALS
TEMPERATURE: 98 F | RESPIRATION RATE: 18 BRPM | HEIGHT: 61 IN | SYSTOLIC BLOOD PRESSURE: 107 MMHG | OXYGEN SATURATION: 99 % | HEART RATE: 72 BPM | WEIGHT: 285.06 LBS | DIASTOLIC BLOOD PRESSURE: 70 MMHG

## 2018-12-22 DIAGNOSIS — Z98.89 OTHER SPECIFIED POSTPROCEDURAL STATES: Chronic | ICD-10-CM

## 2018-12-22 PROCEDURE — 73030 X-RAY EXAM OF SHOULDER: CPT | Mod: 26,RT,77

## 2018-12-22 PROCEDURE — 82962 GLUCOSE BLOOD TEST: CPT

## 2018-12-22 PROCEDURE — 99285 EMERGENCY DEPT VISIT HI MDM: CPT

## 2018-12-22 PROCEDURE — 73030 X-RAY EXAM OF SHOULDER: CPT

## 2018-12-22 PROCEDURE — 73030 X-RAY EXAM OF SHOULDER: CPT | Mod: 26,RT,76

## 2018-12-22 PROCEDURE — 73562 X-RAY EXAM OF KNEE 3: CPT | Mod: 26,RT

## 2018-12-22 PROCEDURE — 96374 THER/PROPH/DIAG INJ IV PUSH: CPT

## 2018-12-22 PROCEDURE — 96375 TX/PRO/DX INJ NEW DRUG ADDON: CPT

## 2018-12-22 PROCEDURE — 72170 X-RAY EXAM OF PELVIS: CPT

## 2018-12-22 PROCEDURE — 71045 X-RAY EXAM CHEST 1 VIEW: CPT

## 2018-12-22 PROCEDURE — 72170 X-RAY EXAM OF PELVIS: CPT | Mod: 26

## 2018-12-22 PROCEDURE — 73562 X-RAY EXAM OF KNEE 3: CPT

## 2018-12-22 PROCEDURE — 72125 CT NECK SPINE W/O DYE: CPT

## 2018-12-22 PROCEDURE — 72125 CT NECK SPINE W/O DYE: CPT | Mod: 26

## 2018-12-22 PROCEDURE — 71045 X-RAY EXAM CHEST 1 VIEW: CPT | Mod: 26

## 2018-12-22 PROCEDURE — 70450 CT HEAD/BRAIN W/O DYE: CPT

## 2018-12-22 PROCEDURE — 70450 CT HEAD/BRAIN W/O DYE: CPT | Mod: 26

## 2018-12-22 PROCEDURE — 99284 EMERGENCY DEPT VISIT MOD MDM: CPT | Mod: 25

## 2018-12-22 RX ORDER — MORPHINE SULFATE 50 MG/1
4 CAPSULE, EXTENDED RELEASE ORAL ONCE
Qty: 0 | Refills: 0 | Status: DISCONTINUED | OUTPATIENT
Start: 2018-12-22 | End: 2018-12-22

## 2018-12-22 RX ORDER — OXYCODONE AND ACETAMINOPHEN 5; 325 MG/1; MG/1
1 TABLET ORAL ONCE
Qty: 0 | Refills: 0 | Status: DISCONTINUED | OUTPATIENT
Start: 2018-12-22 | End: 2018-12-22

## 2018-12-22 RX ORDER — MORPHINE SULFATE 50 MG/1
6 CAPSULE, EXTENDED RELEASE ORAL ONCE
Qty: 0 | Refills: 0 | Status: DISCONTINUED | OUTPATIENT
Start: 2018-12-22 | End: 2018-12-22

## 2018-12-22 RX ORDER — KETOROLAC TROMETHAMINE 30 MG/ML
30 SYRINGE (ML) INJECTION ONCE
Qty: 0 | Refills: 0 | Status: DISCONTINUED | OUTPATIENT
Start: 2018-12-22 | End: 2018-12-22

## 2018-12-22 RX ADMIN — OXYCODONE AND ACETAMINOPHEN 1 TABLET(S): 5; 325 TABLET ORAL at 17:03

## 2018-12-22 RX ADMIN — OXYCODONE AND ACETAMINOPHEN 1 TABLET(S): 5; 325 TABLET ORAL at 19:20

## 2018-12-22 RX ADMIN — MORPHINE SULFATE 6 MILLIGRAM(S): 50 CAPSULE, EXTENDED RELEASE ORAL at 20:04

## 2018-12-22 RX ADMIN — Medication 30 MILLIGRAM(S): at 22:52

## 2018-12-22 RX ADMIN — MORPHINE SULFATE 6 MILLIGRAM(S): 50 CAPSULE, EXTENDED RELEASE ORAL at 21:41

## 2018-12-22 NOTE — ED PROVIDER NOTE - PHYSICAL EXAMINATION
right shoulder + defomirty  5/5 str distally  sensation intact in all finger and deltoid  radial pulse intact

## 2018-12-22 NOTE — ED PROVIDER NOTE - MEDICAL DECISION MAKING DETAILS
patient presenting s/p mechanical fall. concern for ich, dislocation, fracture. will obtain ct, imaging. dispo pending imaging

## 2018-12-22 NOTE — ED PROVIDER NOTE - OBJECTIVE STATEMENT
78 y.o presenting s/p mechanical fall. denies cp, sob, loc leading to fall. endorses she think she hit her head. denies loc, vomiting. endorses pain to right shoulder. difficulty ranging shoulder

## 2018-12-22 NOTE — ED ADULT NURSE NOTE - NSIMPLEMENTINTERV_GEN_ALL_ED
Implemented All Universal Safety Interventions:  [place] to call system. Call bell, personal items and telephone within reach. Instruct patient to call for assistance. Room bathroom lighting operational. Non-slip footwear when patient is off stretcher. Physically safe environment: no spills, clutter or unnecessary equipment. Stretcher in lowest position, wheels locked, appropriate side rails in place.

## 2018-12-23 NOTE — ED PROCEDURE NOTE - PROCEDURE ADDITIONAL DETAILS
Post reduction sensation intact distally, shoulder sensation intact. pain improved. 5/5  str and ok sign

## 2018-12-23 NOTE — ED PROCEDURE NOTE - CPROC ED POST PROC CARE GUIDE1
Impaired Functional Mobility    • Achieve highest/safest level of mobility/gait Progressing        PAIN - ADULT    • Verbalizes/displays adequate comfort level or patient's stated pain goal Progressing        Patient/Family Goals    • Patient/Family Long T Keep the cast/splint/dressing clean and dry./Verbal/written post procedure instructions were given to patient/caregiver./Instructed patient/caregiver to follow-up with primary care physician./Elevate the injured extremity as instructed.

## 2018-12-28 ENCOUNTER — APPOINTMENT (OUTPATIENT)
Dept: ORTHOPEDIC SURGERY | Facility: CLINIC | Age: 78
End: 2018-12-28
Payer: MEDICARE

## 2018-12-28 ENCOUNTER — EMERGENCY (EMERGENCY)
Facility: HOSPITAL | Age: 78
LOS: 1 days | Discharge: HOME | End: 2018-12-28
Attending: EMERGENCY MEDICINE
Payer: MEDICARE

## 2018-12-28 VITALS
DIASTOLIC BLOOD PRESSURE: 80 MMHG | RESPIRATION RATE: 16 BRPM | HEART RATE: 77 BPM | OXYGEN SATURATION: 100 % | WEIGHT: 285.06 LBS | SYSTOLIC BLOOD PRESSURE: 184 MMHG | TEMPERATURE: 98 F | HEIGHT: 61 IN

## 2018-12-28 VITALS — HEART RATE: 64 BPM | DIASTOLIC BLOOD PRESSURE: 76 MMHG | SYSTOLIC BLOOD PRESSURE: 135 MMHG

## 2018-12-28 VITALS
TEMPERATURE: 98 F | HEART RATE: 78 BPM | OXYGEN SATURATION: 98 % | RESPIRATION RATE: 18 BRPM | DIASTOLIC BLOOD PRESSURE: 78 MMHG | SYSTOLIC BLOOD PRESSURE: 169 MMHG

## 2018-12-28 VITALS — HEIGHT: 62 IN | WEIGHT: 285 LBS | BODY MASS INDEX: 52.44 KG/M2

## 2018-12-28 DIAGNOSIS — Z86.79 PERSONAL HISTORY OF OTHER DISEASES OF THE CIRCULATORY SYSTEM: ICD-10-CM

## 2018-12-28 DIAGNOSIS — M17.11 UNILATERAL PRIMARY OSTEOARTHRITIS, RIGHT KNEE: ICD-10-CM

## 2018-12-28 DIAGNOSIS — Z86.39 PERSONAL HISTORY OF OTHER ENDOCRINE, NUTRITIONAL AND METABOLIC DISEASE: ICD-10-CM

## 2018-12-28 DIAGNOSIS — Z98.89 OTHER SPECIFIED POSTPROCEDURAL STATES: Chronic | ICD-10-CM

## 2018-12-28 LAB
ALBUMIN SERPL ELPH-MCNC: 3.5 G/DL — SIGNIFICANT CHANGE UP (ref 3.5–5)
ALP SERPL-CCNC: 58 U/L — SIGNIFICANT CHANGE UP (ref 40–120)
ALT FLD-CCNC: 26 U/L DA — SIGNIFICANT CHANGE UP (ref 10–60)
ANION GAP SERPL CALC-SCNC: 9 MMOL/L — SIGNIFICANT CHANGE UP (ref 5–17)
APTT BLD: 40.8 SEC — HIGH (ref 27.5–36.3)
AST SERPL-CCNC: 23 U/L — SIGNIFICANT CHANGE UP (ref 10–40)
BASOPHILS # BLD AUTO: 0.1 K/UL — SIGNIFICANT CHANGE UP (ref 0–0.2)
BASOPHILS NFR BLD AUTO: 0.8 % — SIGNIFICANT CHANGE UP (ref 0–2)
BILIRUB SERPL-MCNC: 0.6 MG/DL — SIGNIFICANT CHANGE UP (ref 0.2–1.2)
BUN SERPL-MCNC: 42 MG/DL — HIGH (ref 7–18)
CALCIUM SERPL-MCNC: 8.7 MG/DL — SIGNIFICANT CHANGE UP (ref 8.4–10.5)
CHLORIDE SERPL-SCNC: 103 MMOL/L — SIGNIFICANT CHANGE UP (ref 96–108)
CO2 SERPL-SCNC: 24 MMOL/L — SIGNIFICANT CHANGE UP (ref 22–31)
CREAT SERPL-MCNC: 1.42 MG/DL — HIGH (ref 0.5–1.3)
EOSINOPHIL # BLD AUTO: 0.1 K/UL — SIGNIFICANT CHANGE UP (ref 0–0.5)
EOSINOPHIL NFR BLD AUTO: 1.3 % — SIGNIFICANT CHANGE UP (ref 0–6)
GLUCOSE SERPL-MCNC: 158 MG/DL — HIGH (ref 70–99)
HCT VFR BLD CALC: 29.5 % — LOW (ref 34.5–45)
HGB BLD-MCNC: 9.5 G/DL — LOW (ref 11.5–15.5)
INR BLD: 2.19 RATIO — HIGH (ref 0.88–1.16)
LYMPHOCYTES # BLD AUTO: 2.2 K/UL — SIGNIFICANT CHANGE UP (ref 1–3.3)
LYMPHOCYTES # BLD AUTO: 25.7 % — SIGNIFICANT CHANGE UP (ref 13–44)
MCHC RBC-ENTMCNC: 28.4 PG — SIGNIFICANT CHANGE UP (ref 27–34)
MCHC RBC-ENTMCNC: 32.3 GM/DL — SIGNIFICANT CHANGE UP (ref 32–36)
MCV RBC AUTO: 87.8 FL — SIGNIFICANT CHANGE UP (ref 80–100)
MONOCYTES # BLD AUTO: 0.7 K/UL — SIGNIFICANT CHANGE UP (ref 0–0.9)
MONOCYTES NFR BLD AUTO: 8.5 % — SIGNIFICANT CHANGE UP (ref 2–14)
NEUTROPHILS # BLD AUTO: 5.5 K/UL — SIGNIFICANT CHANGE UP (ref 1.8–7.4)
NEUTROPHILS NFR BLD AUTO: 63.7 % — SIGNIFICANT CHANGE UP (ref 43–77)
NT-PROBNP SERPL-SCNC: 220 PG/ML — SIGNIFICANT CHANGE UP (ref 0–450)
PLATELET # BLD AUTO: 294 K/UL — SIGNIFICANT CHANGE UP (ref 150–400)
POTASSIUM SERPL-MCNC: 4.6 MMOL/L — SIGNIFICANT CHANGE UP (ref 3.5–5.3)
POTASSIUM SERPL-SCNC: 4.6 MMOL/L — SIGNIFICANT CHANGE UP (ref 3.5–5.3)
PROT SERPL-MCNC: 7.1 G/DL — SIGNIFICANT CHANGE UP (ref 6–8.3)
PROTHROM AB SERPL-ACNC: 24.9 SEC — HIGH (ref 10–12.9)
RBC # BLD: 3.36 M/UL — LOW (ref 3.8–5.2)
RBC # FLD: 12.7 % — SIGNIFICANT CHANGE UP (ref 10.3–14.5)
SODIUM SERPL-SCNC: 136 MMOL/L — SIGNIFICANT CHANGE UP (ref 135–145)
TROPONIN I SERPL-MCNC: <0.015 NG/ML — SIGNIFICANT CHANGE UP (ref 0–0.04)
WBC # BLD: 8.6 K/UL — SIGNIFICANT CHANGE UP (ref 3.8–10.5)
WBC # FLD AUTO: 8.6 K/UL — SIGNIFICANT CHANGE UP (ref 3.8–10.5)

## 2018-12-28 PROCEDURE — 93005 ELECTROCARDIOGRAM TRACING: CPT

## 2018-12-28 PROCEDURE — 80053 COMPREHEN METABOLIC PANEL: CPT

## 2018-12-28 PROCEDURE — 85379 FIBRIN DEGRADATION QUANT: CPT

## 2018-12-28 PROCEDURE — 83880 ASSAY OF NATRIURETIC PEPTIDE: CPT

## 2018-12-28 PROCEDURE — 71045 X-RAY EXAM CHEST 1 VIEW: CPT

## 2018-12-28 PROCEDURE — 99203 OFFICE O/P NEW LOW 30 MIN: CPT

## 2018-12-28 PROCEDURE — 85610 PROTHROMBIN TIME: CPT

## 2018-12-28 PROCEDURE — 85730 THROMBOPLASTIN TIME PARTIAL: CPT

## 2018-12-28 PROCEDURE — 85027 COMPLETE CBC AUTOMATED: CPT

## 2018-12-28 PROCEDURE — 84484 ASSAY OF TROPONIN QUANT: CPT

## 2018-12-28 PROCEDURE — 99283 EMERGENCY DEPT VISIT LOW MDM: CPT | Mod: 25

## 2018-12-28 PROCEDURE — 71045 X-RAY EXAM CHEST 1 VIEW: CPT | Mod: 26

## 2018-12-28 PROCEDURE — 36415 COLL VENOUS BLD VENIPUNCTURE: CPT

## 2018-12-28 PROCEDURE — 73030 X-RAY EXAM OF SHOULDER: CPT | Mod: RT

## 2018-12-28 RX ORDER — CEPHALEXIN 500 MG
500 CAPSULE ORAL ONCE
Qty: 0 | Refills: 0 | Status: COMPLETED | OUTPATIENT
Start: 2018-12-28 | End: 2018-12-28

## 2018-12-28 RX ORDER — OXYCODONE AND ACETAMINOPHEN 5; 325 MG/1; MG/1
1 TABLET ORAL ONCE
Qty: 0 | Refills: 0 | Status: DISCONTINUED | OUTPATIENT
Start: 2018-12-28 | End: 2018-12-28

## 2018-12-28 RX ORDER — CEPHALEXIN 500 MG
1 CAPSULE ORAL
Qty: 40 | Refills: 0 | OUTPATIENT
Start: 2018-12-28 | End: 2019-01-06

## 2018-12-28 RX ORDER — AZTREONAM 2 G
1 VIAL (EA) INJECTION
Qty: 20 | Refills: 0 | OUTPATIENT
Start: 2018-12-28 | End: 2019-01-06

## 2018-12-28 RX ADMIN — OXYCODONE AND ACETAMINOPHEN 1 TABLET(S): 5; 325 TABLET ORAL at 22:58

## 2018-12-28 RX ADMIN — Medication 500 MILLIGRAM(S): at 21:16

## 2018-12-28 RX ADMIN — OXYCODONE AND ACETAMINOPHEN 1 TABLET(S): 5; 325 TABLET ORAL at 22:51

## 2018-12-28 RX ADMIN — Medication 1 TABLET(S): at 21:17

## 2018-12-28 RX ADMIN — OXYCODONE AND ACETAMINOPHEN 1 TABLET(S): 5; 325 TABLET ORAL at 21:16

## 2018-12-28 RX ADMIN — PIPERACILLIN AND TAZOBACTAM 200 GRAM(S): 4; .5 INJECTION, POWDER, LYOPHILIZED, FOR SOLUTION INTRAVENOUS at 17:14

## 2018-12-28 NOTE — ED PROVIDER NOTE - CONDUCTED A DETAILED DISCUSSION WITH PATIENT AND/OR GUARDIAN REGARDING, MDM
return to ED in AM for sono/return to ED if symptoms worsen, persist or questions arise/lab results/need for outpatient follow-up

## 2018-12-28 NOTE — ED PROVIDER NOTE - MUSCULOSKELETAL MINIMAL EXAM
large ecchymosis over right proximal arm with tenderness at biceps insertion, full ROM, no C, T, L, or S-spine tenderness. Bilateral lower extremities with localized edema over distal, right, lower extremity with area of approximately 12 inches by 6 inches of edema, erythema, and warmth; no purulence, no fluctuance.

## 2018-12-28 NOTE — ED ADULT NURSE NOTE - NSIMPLEMENTINTERV_GEN_ALL_ED
Implemented All Fall with Harm Risk Interventions:  [place] to call system. Call bell, personal items and telephone within reach. Instruct patient to call for assistance. Room bathroom lighting operational. Non-slip footwear when patient is off stretcher. Physically safe environment: no spills, clutter or unnecessary equipment. Stretcher in lowest position, wheels locked, appropriate side rails in place. Provide visual cue, wrist band, yellow gown, etc. Monitor gait and stability. Monitor for mental status changes and reorient to person, place, and time. Review medications for side effects contributing to fall risk. Reinforce activity limits and safety measures with patient and family. Provide visual clues: red socks.

## 2018-12-28 NOTE — ED PROVIDER NOTE - PROGRESS NOTE DETAILS
pt feels improved after percocet, concerned that she wouldn't be able to fill prescription tonight and hasn't been able to get any sleep 2/2 pain so ordered 2nd percocet to take prior to discharge. marked cellulitis with marker and told her to have it checked tomorrow in ED when she comes back for lower ext sono, pt ready for dc, gave pt and family extensive return precautions which family demonstrated understanding of.

## 2018-12-28 NOTE — ED ADULT TRIAGE NOTE - BSA (M2)
SUBJECTIVE:  Chief Complaint   Patient presents with     Trauma     Pt injured his left foot and ankle on 5/31/17 while training with the .     Urgent Care   .georgie presents with a chief complaint of left foot.  The injury occurred 1 day ago.   The injury happened while while walking.   How: trauma:  immediate pain  The patient complained of moderate pain and has had decreased ROM.    Pain exacerbated by weight-bearing and movement    He treated it initially with advil.   This is the first time this type of injury has occurred to this patient.     Past Medical History:   Diagnosis Date     Reduced vision      Scoliosis      No Known Allergies  Social History   Substance Use Topics     Smoking status: Never Smoker     Smokeless tobacco: Not on file     Alcohol use Yes       ROSINTEGUMENTARY/SKIN: NEGATIVE for open wound/bleeding    EXAM: /80  Pulse 79  Temp 98.2  F (36.8  C)  Wt 230 lb (104.3 kg)  SpO2 99%  BMI 28 kg/m2   Gen: healthy,alert,no distress  Extremity: foot has pain with palpation and rom.   There is not compromise to the distal circulation.  Pulses are +2 and CRT is brisk.GENERAL APPEARANCE: healthy, alert and no distress  EXTREMITIES: peripheral pulses normal  SKIN: no suspicious lesions or rashes  NEURO: Normal strength and tone, sensory exam grossly normal, mentation intact and speech normal    Xray without acute findings, read by Holden Chamorro D.O.      ICD-10-CM    1. Foot injury, left, initial encounter S99.922A XR Foot Left G/E 3 Views          RICE     2.2

## 2018-12-28 NOTE — ED PROVIDER NOTE - GASTROINTESTINAL, MLM
Patient c/o leakage from urinary catheter. Leakage noted down leg bag. No orellana securement device noted to distal orellana. Patient noted to have velcro leg bag securement in place with large safety pin attached to upper velcro band. Spouse stated that another nurse had provided safety pin and attached upper securement strap to patient's t-shirt. Distal end of orellana noted to be loosely attached to leg bag present on admission to ER. Pre-irrigation bladder scan revealed 14 ml fluid in urinary bladder. Existing leg bag removed. Irrigation performed using 1 liter NS. Approximately 20 small blood clots noted in return irrigant of 1 liter urinary drainage. Urine flowing clear with completion of irrigation. No drainage noted around urinary meatus. New orellana cath to leg bag placed. Patient and spouse educated on appropriate use of leg bag. Orellana secured to right upper leg with orellana securement attachment. Patient voiced comfort with procedure. Post-irrigation bladder scan revealed 24 ml fluid in urinary bladder. Provider made aware. Abdomen soft, non-tender, no guarding.

## 2018-12-28 NOTE — ED ADULT NURSE NOTE - OBJECTIVE STATEMENT
pt is here for swelling of lower extremities. pt stated that fell down on last Saturday, right shoulder and right knee, right leg swelling with redness, warm to touch, pmd sent for DVT, c/o pan 10/10, denied N/V/D or sob, denied headache, pt calm at this time with family member.

## 2018-12-28 NOTE — ED PROVIDER NOTE - MEDICAL DECISION MAKING DETAILS
Low suspicion for DVT given no PE Sx. Pt already on anticoagulation. Exam consistent with nonpurulent cellulitis without systemic Sx. Likely soft tissue/ tendon injury in arm. Neurovascularly intact. Given Pt preoccupied with possible DVT ordered D-dimer which was positive likely secondary to infection. Given no PE Sx, Pt appropriate for return tomorrow for lower extremity US. Will give Abx, pain control, and reassess. Will likely discharge with Abx and return precautions. Low suspicion for DVT and no PE Sx. Pt already on anticoagulation. Exam consistent with nonpurulent cellulitis without systemic Sx. Likely soft tissue/ tendon injury in arm. Neurovascularly intact. Given Pt preoccupied with possible DVT ordered D-dimer which was positive likely secondary to infection. Given no PE Sx, Pt appropriate for return tomorrow for lower extremity US. Will give Abx, pain control, and reassess. Will likely discharge with Abx and return precautions.

## 2018-12-28 NOTE — ED PROVIDER NOTE - CHPI ED SYMPTOMS NEG
no fever/no chest pain, no shortness of breath, no dizziness, no syncope, no fever, no nausea, no vomiting

## 2018-12-29 ENCOUNTER — INPATIENT (INPATIENT)
Facility: HOSPITAL | Age: 78
LOS: 10 days | Discharge: HOME | DRG: 603 | End: 2019-01-09
Attending: INTERNAL MEDICINE | Admitting: INTERNAL MEDICINE
Payer: MEDICARE

## 2018-12-29 VITALS — WEIGHT: 279.99 LBS | HEIGHT: 62 IN

## 2018-12-29 DIAGNOSIS — L03.115 CELLULITIS OF RIGHT LOWER LIMB: ICD-10-CM

## 2018-12-29 DIAGNOSIS — I50.9 HEART FAILURE, UNSPECIFIED: ICD-10-CM

## 2018-12-29 DIAGNOSIS — I10 ESSENTIAL (PRIMARY) HYPERTENSION: ICD-10-CM

## 2018-12-29 DIAGNOSIS — J45.909 UNSPECIFIED ASTHMA, UNCOMPLICATED: ICD-10-CM

## 2018-12-29 DIAGNOSIS — Z29.9 ENCOUNTER FOR PROPHYLACTIC MEASURES, UNSPECIFIED: ICD-10-CM

## 2018-12-29 DIAGNOSIS — Z98.89 OTHER SPECIFIED POSTPROCEDURAL STATES: Chronic | ICD-10-CM

## 2018-12-29 DIAGNOSIS — E03.9 HYPOTHYROIDISM, UNSPECIFIED: ICD-10-CM

## 2018-12-29 DIAGNOSIS — N17.9 ACUTE KIDNEY FAILURE, UNSPECIFIED: ICD-10-CM

## 2018-12-29 DIAGNOSIS — I48.91 UNSPECIFIED ATRIAL FIBRILLATION: ICD-10-CM

## 2018-12-29 LAB
ALBUMIN SERPL ELPH-MCNC: 3.6 G/DL — SIGNIFICANT CHANGE UP (ref 3.5–5)
ALP SERPL-CCNC: 57 U/L — SIGNIFICANT CHANGE UP (ref 40–120)
ALT FLD-CCNC: 28 U/L DA — SIGNIFICANT CHANGE UP (ref 10–60)
ANION GAP SERPL CALC-SCNC: 11 MMOL/L — SIGNIFICANT CHANGE UP (ref 5–17)
APPEARANCE UR: CLEAR — SIGNIFICANT CHANGE UP
AST SERPL-CCNC: 17 U/L — SIGNIFICANT CHANGE UP (ref 10–40)
BASOPHILS # BLD AUTO: 0.1 K/UL — SIGNIFICANT CHANGE UP (ref 0–0.2)
BASOPHILS NFR BLD AUTO: 1 % — SIGNIFICANT CHANGE UP (ref 0–2)
BILIRUB SERPL-MCNC: 0.5 MG/DL — SIGNIFICANT CHANGE UP (ref 0.2–1.2)
BILIRUB UR-MCNC: NEGATIVE — SIGNIFICANT CHANGE UP
BUN SERPL-MCNC: 46 MG/DL — HIGH (ref 7–18)
CALCIUM SERPL-MCNC: 8.5 MG/DL — SIGNIFICANT CHANGE UP (ref 8.4–10.5)
CHLORIDE SERPL-SCNC: 99 MMOL/L — SIGNIFICANT CHANGE UP (ref 96–108)
CK SERPL-CCNC: 117 U/L — SIGNIFICANT CHANGE UP (ref 21–215)
CO2 SERPL-SCNC: 24 MMOL/L — SIGNIFICANT CHANGE UP (ref 22–31)
COLOR SPEC: YELLOW — SIGNIFICANT CHANGE UP
CREAT ?TM UR-MCNC: 98 MG/DL — SIGNIFICANT CHANGE UP
CREAT SERPL-MCNC: 1.74 MG/DL — HIGH (ref 0.5–1.3)
DIFF PNL FLD: NEGATIVE — SIGNIFICANT CHANGE UP
EOSINOPHIL # BLD AUTO: 0.1 K/UL — SIGNIFICANT CHANGE UP (ref 0–0.5)
EOSINOPHIL NFR BLD AUTO: 1.7 % — SIGNIFICANT CHANGE UP (ref 0–6)
ERYTHROCYTE [SEDIMENTATION RATE] IN BLOOD: 69 MM/HR — HIGH (ref 0–20)
GLUCOSE BLDC GLUCOMTR-MCNC: 177 MG/DL — HIGH (ref 70–99)
GLUCOSE SERPL-MCNC: 214 MG/DL — HIGH (ref 70–99)
GLUCOSE UR QL: NEGATIVE — SIGNIFICANT CHANGE UP
HCT VFR BLD CALC: 29.7 % — LOW (ref 34.5–45)
HGB BLD-MCNC: 9.6 G/DL — LOW (ref 11.5–15.5)
KETONES UR-MCNC: NEGATIVE — SIGNIFICANT CHANGE UP
LACTATE SERPL-SCNC: 2.3 MMOL/L — HIGH (ref 0.7–2)
LEUKOCYTE ESTERASE UR-ACNC: NEGATIVE — SIGNIFICANT CHANGE UP
LYMPHOCYTES # BLD AUTO: 1.7 K/UL — SIGNIFICANT CHANGE UP (ref 1–3.3)
LYMPHOCYTES # BLD AUTO: 20 % — SIGNIFICANT CHANGE UP (ref 13–44)
MCHC RBC-ENTMCNC: 28 PG — SIGNIFICANT CHANGE UP (ref 27–34)
MCHC RBC-ENTMCNC: 32.2 GM/DL — SIGNIFICANT CHANGE UP (ref 32–36)
MCV RBC AUTO: 87 FL — SIGNIFICANT CHANGE UP (ref 80–100)
MONOCYTES # BLD AUTO: 0.6 K/UL — SIGNIFICANT CHANGE UP (ref 0–0.9)
MONOCYTES NFR BLD AUTO: 7.6 % — SIGNIFICANT CHANGE UP (ref 2–14)
NEUTROPHILS # BLD AUTO: 5.8 K/UL — SIGNIFICANT CHANGE UP (ref 1.8–7.4)
NEUTROPHILS NFR BLD AUTO: 69.7 % — SIGNIFICANT CHANGE UP (ref 43–77)
NITRITE UR-MCNC: NEGATIVE — SIGNIFICANT CHANGE UP
OSMOLALITY UR: 517 MOS/KG — SIGNIFICANT CHANGE UP (ref 50–1200)
PH UR: 5 — SIGNIFICANT CHANGE UP (ref 5–8)
PLATELET # BLD AUTO: 291 K/UL — SIGNIFICANT CHANGE UP (ref 150–400)
POTASSIUM SERPL-MCNC: 4.6 MMOL/L — SIGNIFICANT CHANGE UP (ref 3.5–5.3)
POTASSIUM SERPL-SCNC: 4.6 MMOL/L — SIGNIFICANT CHANGE UP (ref 3.5–5.3)
PROT SERPL-MCNC: 7.2 G/DL — SIGNIFICANT CHANGE UP (ref 6–8.3)
PROT UR-MCNC: NEGATIVE — SIGNIFICANT CHANGE UP
RBC # BLD: 3.42 M/UL — LOW (ref 3.8–5.2)
RBC # FLD: 12.9 % — SIGNIFICANT CHANGE UP (ref 10.3–14.5)
SODIUM SERPL-SCNC: 134 MMOL/L — LOW (ref 135–145)
SODIUM UR-SCNC: 32 MMOL/L — LOW (ref 40–220)
SP GR SPEC: 1.01 — SIGNIFICANT CHANGE UP (ref 1.01–1.02)
UROBILINOGEN FLD QL: NEGATIVE — SIGNIFICANT CHANGE UP
WBC # BLD: 8.3 K/UL — SIGNIFICANT CHANGE UP (ref 3.8–10.5)
WBC # FLD AUTO: 8.3 K/UL — SIGNIFICANT CHANGE UP (ref 3.8–10.5)

## 2018-12-29 PROCEDURE — 99285 EMERGENCY DEPT VISIT HI MDM: CPT

## 2018-12-29 PROCEDURE — 93971 EXTREMITY STUDY: CPT | Mod: 26,RT

## 2018-12-29 RX ORDER — INSULIN LISPRO 100/ML
VIAL (ML) SUBCUTANEOUS
Qty: 0 | Refills: 0 | Status: DISCONTINUED | OUTPATIENT
Start: 2018-12-29 | End: 2019-01-09

## 2018-12-29 RX ORDER — DRONEDARONE 400 MG/1
0 TABLET, FILM COATED ORAL
Qty: 60 | Refills: 0 | COMMUNITY

## 2018-12-29 RX ORDER — ATORVASTATIN CALCIUM 80 MG/1
40 TABLET, FILM COATED ORAL AT BEDTIME
Qty: 0 | Refills: 0 | Status: DISCONTINUED | OUTPATIENT
Start: 2018-12-29 | End: 2019-01-09

## 2018-12-29 RX ORDER — DRONEDARONE 400 MG/1
400 TABLET, FILM COATED ORAL
Qty: 0 | Refills: 0 | Status: DISCONTINUED | OUTPATIENT
Start: 2018-12-29 | End: 2019-01-09

## 2018-12-29 RX ORDER — LEVOTHYROXINE SODIUM 125 MCG
175 TABLET ORAL DAILY
Qty: 0 | Refills: 0 | Status: DISCONTINUED | OUTPATIENT
Start: 2018-12-29 | End: 2019-01-09

## 2018-12-29 RX ORDER — HEPARIN SODIUM 5000 [USP'U]/ML
5000 INJECTION INTRAVENOUS; SUBCUTANEOUS EVERY 8 HOURS
Qty: 0 | Refills: 0 | Status: DISCONTINUED | OUTPATIENT
Start: 2018-12-29 | End: 2018-12-29

## 2018-12-29 RX ORDER — BUDESONIDE AND FORMOTEROL FUMARATE DIHYDRATE 160; 4.5 UG/1; UG/1
2 AEROSOL RESPIRATORY (INHALATION)
Qty: 0 | Refills: 0 | Status: DISCONTINUED | OUTPATIENT
Start: 2018-12-29 | End: 2019-01-09

## 2018-12-29 RX ORDER — LIPASE/PROTEASE/AMYLASE 16-48-48K
2 CAPSULE,DELAYED RELEASE (ENTERIC COATED) ORAL
Qty: 0 | Refills: 0 | Status: DISCONTINUED | OUTPATIENT
Start: 2018-12-29 | End: 2019-01-09

## 2018-12-29 RX ORDER — ALBUTEROL 90 UG/1
0 AEROSOL, METERED ORAL
Qty: 85 | Refills: 0 | COMMUNITY

## 2018-12-29 RX ORDER — ACETAMINOPHEN 500 MG
650 TABLET ORAL EVERY 6 HOURS
Qty: 0 | Refills: 0 | Status: DISCONTINUED | OUTPATIENT
Start: 2018-12-29 | End: 2019-01-09

## 2018-12-29 RX ORDER — ATORVASTATIN CALCIUM 80 MG/1
0 TABLET, FILM COATED ORAL
Qty: 30 | Refills: 0 | COMMUNITY

## 2018-12-29 RX ORDER — AMPICILLIN SODIUM AND SULBACTAM SODIUM 250; 125 MG/ML; MG/ML
INJECTION, POWDER, FOR SUSPENSION INTRAMUSCULAR; INTRAVENOUS
Qty: 0 | Refills: 0 | Status: DISCONTINUED | OUTPATIENT
Start: 2018-12-29 | End: 2019-01-04

## 2018-12-29 RX ORDER — FUROSEMIDE 40 MG
0 TABLET ORAL
Qty: 30 | Refills: 0 | COMMUNITY

## 2018-12-29 RX ORDER — DEXLANSOPRAZOLE 30 MG/1
0 CAPSULE, DELAYED RELEASE ORAL
Qty: 30 | Refills: 0 | COMMUNITY

## 2018-12-29 RX ORDER — PIPERACILLIN AND TAZOBACTAM 4; .5 G/20ML; G/20ML
3.38 INJECTION, POWDER, LYOPHILIZED, FOR SOLUTION INTRAVENOUS ONCE
Qty: 0 | Refills: 0 | Status: COMPLETED | OUTPATIENT
Start: 2018-12-29 | End: 2018-12-28

## 2018-12-29 RX ORDER — SODIUM CHLORIDE 9 MG/ML
1000 INJECTION INTRAMUSCULAR; INTRAVENOUS; SUBCUTANEOUS
Qty: 0 | Refills: 0 | Status: DISCONTINUED | OUTPATIENT
Start: 2018-12-29 | End: 2019-01-05

## 2018-12-29 RX ORDER — AMPICILLIN SODIUM AND SULBACTAM SODIUM 250; 125 MG/ML; MG/ML
1.5 INJECTION, POWDER, FOR SUSPENSION INTRAMUSCULAR; INTRAVENOUS EVERY 6 HOURS
Qty: 0 | Refills: 0 | Status: DISCONTINUED | OUTPATIENT
Start: 2018-12-30 | End: 2019-01-04

## 2018-12-29 RX ORDER — ALBUTEROL 90 UG/1
2 AEROSOL, METERED ORAL EVERY 6 HOURS
Qty: 0 | Refills: 0 | Status: DISCONTINUED | OUTPATIENT
Start: 2018-12-29 | End: 2019-01-09

## 2018-12-29 RX ORDER — RIVAROXABAN 15 MG-20MG
15 KIT ORAL EVERY 24 HOURS
Qty: 0 | Refills: 0 | Status: DISCONTINUED | OUTPATIENT
Start: 2018-12-29 | End: 2018-12-31

## 2018-12-29 RX ORDER — TIOTROPIUM BROMIDE 18 UG/1
0 CAPSULE ORAL; RESPIRATORY (INHALATION)
Qty: 30 | Refills: 0 | COMMUNITY

## 2018-12-29 RX ORDER — MONTELUKAST 4 MG/1
10 TABLET, CHEWABLE ORAL DAILY
Qty: 0 | Refills: 0 | Status: DISCONTINUED | OUTPATIENT
Start: 2018-12-29 | End: 2019-01-09

## 2018-12-29 RX ORDER — LIPASE/PROTEASE/AMYLASE 16-48-48K
0 CAPSULE,DELAYED RELEASE (ENTERIC COATED) ORAL
Qty: 90 | Refills: 0 | COMMUNITY

## 2018-12-29 RX ORDER — TIOTROPIUM BROMIDE 18 UG/1
1 CAPSULE ORAL; RESPIRATORY (INHALATION) DAILY
Qty: 0 | Refills: 0 | Status: DISCONTINUED | OUTPATIENT
Start: 2018-12-29 | End: 2019-01-09

## 2018-12-29 RX ORDER — LINACLOTIDE 145 UG/1
0 CAPSULE, GELATIN COATED ORAL
Qty: 30 | Refills: 0 | COMMUNITY

## 2018-12-29 RX ORDER — LABETALOL HCL 100 MG
0 TABLET ORAL
Qty: 0 | Refills: 0 | COMMUNITY

## 2018-12-29 RX ORDER — AMPICILLIN SODIUM AND SULBACTAM SODIUM 250; 125 MG/ML; MG/ML
1.5 INJECTION, POWDER, FOR SUSPENSION INTRAMUSCULAR; INTRAVENOUS ONCE
Qty: 0 | Refills: 0 | Status: COMPLETED | OUTPATIENT
Start: 2018-12-29 | End: 2018-12-29

## 2018-12-29 RX ADMIN — SODIUM CHLORIDE 60 MILLILITER(S): 9 INJECTION INTRAMUSCULAR; INTRAVENOUS; SUBCUTANEOUS at 21:49

## 2018-12-29 RX ADMIN — MONTELUKAST 10 MILLIGRAM(S): 4 TABLET, CHEWABLE ORAL at 21:47

## 2018-12-29 RX ADMIN — AMPICILLIN SODIUM AND SULBACTAM SODIUM 100 GRAM(S): 250; 125 INJECTION, POWDER, FOR SUSPENSION INTRAMUSCULAR; INTRAVENOUS at 18:58

## 2018-12-29 RX ADMIN — ATORVASTATIN CALCIUM 40 MILLIGRAM(S): 80 TABLET, FILM COATED ORAL at 22:59

## 2018-12-29 RX ADMIN — BUDESONIDE AND FORMOTEROL FUMARATE DIHYDRATE 2 PUFF(S): 160; 4.5 AEROSOL RESPIRATORY (INHALATION) at 21:47

## 2018-12-29 RX ADMIN — RIVAROXABAN 15 MILLIGRAM(S): KIT at 18:58

## 2018-12-29 RX ADMIN — Medication 1: at 21:48

## 2018-12-29 RX ADMIN — Medication 2 CAPSULE(S): at 21:48

## 2018-12-29 RX ADMIN — TIOTROPIUM BROMIDE 1 CAPSULE(S): 18 CAPSULE ORAL; RESPIRATORY (INHALATION) at 21:50

## 2018-12-29 RX ADMIN — Medication 650 MILLIGRAM(S): at 22:18

## 2018-12-29 RX ADMIN — Medication 650 MILLIGRAM(S): at 21:48

## 2018-12-29 NOTE — H&P ADULT - PROBLEM SELECTOR PLAN 5
BP WNLs; holding renal acting agents ARB + HCTZ 2/2 SRIDHAR and Amlodipine 2/2 LE edema   - Monitor and consider Labetalol or Hydralazine/Imdur

## 2018-12-29 NOTE — H&P ADULT - NSHPPHYSICALEXAM_GEN_ALL_CORE
T(C): 36.2 (29 Dec 2018 14:16), Max: 36.7 (28 Dec 2018 23:00)  T(F): 97.2 (29 Dec 2018 14:16), Max: 98.1 (28 Dec 2018 23:00)  HR: 72 (29 Dec 2018 14:16) (72 - 78)  BP: 118/70 (29 Dec 2018 14:16) (118/70 - 169/78)  RR: 20 (29 Dec 2018 14:16) (18 - 20)  SpO2: 97% (29 Dec 2018 14:16) (97% - 98%)

## 2018-12-29 NOTE — H&P ADULT - ATTENDING COMMENTS
77 yo lady with morbid  obesity, ASHD Afib PPM DM HTN HLD Asthma  recent fall complicated with  right  shoulder  dislocation repositioning  admitted with swelling redness and  pain  right  lower extremity x 1 week  venous  doppler  is  negative    Difficulty walking dyspnea on minimal exertion  pain right  shoulder  decrease  ROM    Meds  reviewed All NKA   Morbidly obese  elderly lady NAD  Skin Bluish discoloration right  upper  arm and  shoulder  region  redness  right  lower extremity  No JVD Chest clear  cor  S1S2 muffled  Lungs clear  PPM  Abdomen soft  BS + non tender    Extremities  2+edema  R>L NS No acute focal deficit    Labs  CXR Doppler  report  reviewed    Impression  Cellulitis  right  lower extremity Elevated lactic  acid    HTN with CKD Noted with worsening  renal function    DM with  circulation complications    HLD   Br Asthma    Right  shoulder  pain h/o falls    Morbid  Obesity    Admit  to regular  floor  septic work up  Hold  [place]  and  Furosemide  for  now  Agree with Unasyn  FS monitoring contine  Xarelto

## 2018-12-29 NOTE — ED PROVIDER NOTE - OBJECTIVE STATEMENT
77 y/o F patient with a significant PMHx of A-fib, CAD (on Xarelto)  CHF, DM, HLD, HTN, hypothyroidism and pacemaker and significant PSHx of coronary angiogram presents to the ED with c/o R lower leg swelling. Patient was here yesterday at the ED for right lower extremity and right shoulder pain for a fall since 1 week ago.  Pt was seen at this ED at the time of the fall and had imaging done which showed no fractures. Pt was sent for ortho follow-up. Ortho obtained additional X-rays which revealed no fractures and gave Pt Tylenol. Pain was not relieved so Pt came to ED today. PCP wanted to rule out DVT and cellulitis of right lower extremity. Pt denies any chest pain, shortness of breath, dizziness, syncope, fever, nausea, vomiting, or other complaints. Patient is coming back because she needs to rule out DVT today. NKDA.

## 2018-12-29 NOTE — H&P ADULT - PROBLEM SELECTOR PLAN 4
Likely diastolic HF w/ bilateral pitting edema  - TTE 1/2018 - G1DD, grossly EF WNLs  - Holding Lasix 2/2 SRIDHAR vs CKD

## 2018-12-29 NOTE — H&P ADULT - PROBLEM SELECTOR PLAN 1
Community onset; no RFs for MRSA including hospitalizations or traumatic injury  - Afebrile, no WBC elevation but mild elevated ESR  - S/p Zosyn; c/w Unasyn x 12/29 and leg elevation  ***F/u PT consult

## 2018-12-29 NOTE — H&P ADULT - PROBLEM SELECTOR PLAN 2
Urinating freely; Likely 2/2 nephrotoxic agents + prerenal from Lasix  - Trial of IVFs x 24 hours  ***F/u BMP, UA, Ulytes, and US renal

## 2018-12-29 NOTE — H&P ADULT - HISTORY OF PRESENT ILLNESS
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 As per patient and family at bedside, patient brought in for concern of DVT and cellulitis of right lower extremity. Otherwise patient denied fever, chills, chest pain, shortness of breath, dizziness, syncope, fever, nausea, vomiting, or other any other complaints. 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 Patient seen in ED day prior - told to return for US the next day. Patient had previous ED visit 1 week prior for R sided pain s/p mechanical fall 2/2 mechanical trip on rug 1 week ago - imaging at the time negative except for dislocated shoulder reduced in the ED As per patient and family at bedside, patient brought in for concern of DVT and cellulitis of right lower extremity. Otherwise patient denied fever, chills, chest pain, shortness of breath, dizziness, syncope, fever, nausea, vomiting, or other any other complaints. Patient admits to chronic DOVE unchanged from baseline.

## 2018-12-29 NOTE — H&P ADULT - ASSESSMENT
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

## 2018-12-29 NOTE — H&P ADULT - EXTREMITIES COMMENTS
ROM of RUE limited; in sling, purpura noted, and pulses intact  RLE erythema and warmth - no changes

## 2018-12-29 NOTE — ED ADULT NURSE NOTE - NSIMPLEMENTINTERV_GEN_ALL_ED
Implemented All Universal Safety Interventions:  [place] to call system. Call bell, personal items and telephone within reach. Instruct patient to call for assistance. Room bathroom lighting operational. Non-slip footwear when patient is off stretcher. Physically safe environment: no spills, clutter or unnecessary equipment. Stretcher in lowest position, wheels locked, appropriate side rails in place.

## 2018-12-29 NOTE — ED PROVIDER NOTE - MEDICAL DECISION MAKING DETAILS
79 y/o F patient with right lower leg swelling. Likely cellulitis and not DVT with a chronic venous stasis. Will do labs, Sonos, antibiotics, and reassess.

## 2018-12-30 LAB
ANION GAP SERPL CALC-SCNC: 8 MMOL/L — SIGNIFICANT CHANGE UP (ref 5–17)
BASOPHILS # BLD AUTO: 0.1 K/UL — SIGNIFICANT CHANGE UP (ref 0–0.2)
BASOPHILS NFR BLD AUTO: 0.8 % — SIGNIFICANT CHANGE UP (ref 0–2)
BUN SERPL-MCNC: 37 MG/DL — HIGH (ref 7–18)
CALCIUM SERPL-MCNC: 9 MG/DL — SIGNIFICANT CHANGE UP (ref 8.4–10.5)
CHLORIDE SERPL-SCNC: 102 MMOL/L — SIGNIFICANT CHANGE UP (ref 96–108)
CHOLEST SERPL-MCNC: 93 MG/DL — SIGNIFICANT CHANGE UP (ref 10–199)
CO2 SERPL-SCNC: 25 MMOL/L — SIGNIFICANT CHANGE UP (ref 22–31)
CREAT SERPL-MCNC: 1.55 MG/DL — HIGH (ref 0.5–1.3)
EOSINOPHIL # BLD AUTO: 0.1 K/UL — SIGNIFICANT CHANGE UP (ref 0–0.5)
EOSINOPHIL NFR BLD AUTO: 1.9 % — SIGNIFICANT CHANGE UP (ref 0–6)
FERRITIN SERPL-MCNC: 139 NG/ML — SIGNIFICANT CHANGE UP (ref 15–150)
FOLATE SERPL-MCNC: 10.1 NG/ML — SIGNIFICANT CHANGE UP
GLUCOSE BLDC GLUCOMTR-MCNC: 154 MG/DL — HIGH (ref 70–99)
GLUCOSE BLDC GLUCOMTR-MCNC: 168 MG/DL — HIGH (ref 70–99)
GLUCOSE BLDC GLUCOMTR-MCNC: 211 MG/DL — HIGH (ref 70–99)
GLUCOSE BLDC GLUCOMTR-MCNC: 226 MG/DL — HIGH (ref 70–99)
GLUCOSE SERPL-MCNC: 147 MG/DL — HIGH (ref 70–99)
HBA1C BLD-MCNC: 7 % — HIGH (ref 4–5.6)
HCT VFR BLD CALC: 29.8 % — LOW (ref 34.5–45)
HDLC SERPL-MCNC: 56 MG/DL — SIGNIFICANT CHANGE UP
HGB BLD-MCNC: 9.7 G/DL — LOW (ref 11.5–15.5)
IRON SATN MFR SERPL: 16 % — SIGNIFICANT CHANGE UP (ref 15–50)
IRON SATN MFR SERPL: 64 UG/DL — SIGNIFICANT CHANGE UP (ref 40–150)
LACTATE SERPL-SCNC: 1.1 MMOL/L — SIGNIFICANT CHANGE UP (ref 0.7–2)
LIPID PNL WITH DIRECT LDL SERPL: 24 MG/DL — SIGNIFICANT CHANGE UP
LYMPHOCYTES # BLD AUTO: 1.8 K/UL — SIGNIFICANT CHANGE UP (ref 1–3.3)
LYMPHOCYTES # BLD AUTO: 24.3 % — SIGNIFICANT CHANGE UP (ref 13–44)
MAGNESIUM SERPL-MCNC: 2.1 MG/DL — SIGNIFICANT CHANGE UP (ref 1.6–2.6)
MCHC RBC-ENTMCNC: 28.7 PG — SIGNIFICANT CHANGE UP (ref 27–34)
MCHC RBC-ENTMCNC: 32.4 GM/DL — SIGNIFICANT CHANGE UP (ref 32–36)
MCV RBC AUTO: 88.7 FL — SIGNIFICANT CHANGE UP (ref 80–100)
MONOCYTES # BLD AUTO: 0.6 K/UL — SIGNIFICANT CHANGE UP (ref 0–0.9)
MONOCYTES NFR BLD AUTO: 8 % — SIGNIFICANT CHANGE UP (ref 2–14)
NEUTROPHILS # BLD AUTO: 5 K/UL — SIGNIFICANT CHANGE UP (ref 1.8–7.4)
NEUTROPHILS NFR BLD AUTO: 65.2 % — SIGNIFICANT CHANGE UP (ref 43–77)
PHOSPHATE SERPL-MCNC: 3.8 MG/DL — SIGNIFICANT CHANGE UP (ref 2.5–4.5)
PLATELET # BLD AUTO: 276 K/UL — SIGNIFICANT CHANGE UP (ref 150–400)
POTASSIUM SERPL-MCNC: 4.5 MMOL/L — SIGNIFICANT CHANGE UP (ref 3.5–5.3)
POTASSIUM SERPL-SCNC: 4.5 MMOL/L — SIGNIFICANT CHANGE UP (ref 3.5–5.3)
RBC # BLD: 3.37 M/UL — LOW (ref 3.8–5.2)
RBC # FLD: 12.4 % — SIGNIFICANT CHANGE UP (ref 10.3–14.5)
SODIUM SERPL-SCNC: 135 MMOL/L — SIGNIFICANT CHANGE UP (ref 135–145)
TIBC SERPL-MCNC: 400 UG/DL — SIGNIFICANT CHANGE UP (ref 250–450)
TOTAL CHOLESTEROL/HDL RATIO MEASUREMENT: 1.7 RATIO — LOW (ref 3.3–7.1)
TRIGL SERPL-MCNC: 66 MG/DL — SIGNIFICANT CHANGE UP (ref 10–149)
TSH SERPL-MCNC: 4.15 UU/ML — SIGNIFICANT CHANGE UP (ref 0.34–4.82)
UIBC SERPL-MCNC: 336 UG/DL — SIGNIFICANT CHANGE UP (ref 110–370)
UUN UR-MCNC: 875 MG/DL — SIGNIFICANT CHANGE UP
VIT B12 SERPL-MCNC: 383 PG/ML — SIGNIFICANT CHANGE UP (ref 232–1245)
WBC # BLD: 7.6 K/UL — SIGNIFICANT CHANGE UP (ref 3.8–10.5)
WBC # FLD AUTO: 7.6 K/UL — SIGNIFICANT CHANGE UP (ref 3.8–10.5)

## 2018-12-30 RX ORDER — MUPIROCIN 20 MG/G
1 OINTMENT TOPICAL
Qty: 0 | Refills: 0 | Status: DISCONTINUED | OUTPATIENT
Start: 2018-12-30 | End: 2018-12-31

## 2018-12-30 RX ORDER — OXYCODONE AND ACETAMINOPHEN 5; 325 MG/1; MG/1
1 TABLET ORAL ONCE
Qty: 0 | Refills: 0 | Status: DISCONTINUED | OUTPATIENT
Start: 2018-12-30 | End: 2018-12-30

## 2018-12-30 RX ADMIN — SODIUM CHLORIDE 60 MILLILITER(S): 9 INJECTION INTRAMUSCULAR; INTRAVENOUS; SUBCUTANEOUS at 11:09

## 2018-12-30 RX ADMIN — AMPICILLIN SODIUM AND SULBACTAM SODIUM 100 GRAM(S): 250; 125 INJECTION, POWDER, FOR SUSPENSION INTRAMUSCULAR; INTRAVENOUS at 18:17

## 2018-12-30 RX ADMIN — Medication 175 MICROGRAM(S): at 05:35

## 2018-12-30 RX ADMIN — MUPIROCIN 1 APPLICATION(S): 20 OINTMENT TOPICAL at 18:20

## 2018-12-30 RX ADMIN — Medication 2 CAPSULE(S): at 08:15

## 2018-12-30 RX ADMIN — AMPICILLIN SODIUM AND SULBACTAM SODIUM 100 GRAM(S): 250; 125 INJECTION, POWDER, FOR SUSPENSION INTRAMUSCULAR; INTRAVENOUS at 00:52

## 2018-12-30 RX ADMIN — Medication 2 CAPSULE(S): at 12:12

## 2018-12-30 RX ADMIN — BUDESONIDE AND FORMOTEROL FUMARATE DIHYDRATE 2 PUFF(S): 160; 4.5 AEROSOL RESPIRATORY (INHALATION) at 22:43

## 2018-12-30 RX ADMIN — OXYCODONE AND ACETAMINOPHEN 1 TABLET(S): 5; 325 TABLET ORAL at 23:42

## 2018-12-30 RX ADMIN — Medication 1: at 08:06

## 2018-12-30 RX ADMIN — Medication 1: at 12:11

## 2018-12-30 RX ADMIN — Medication 650 MILLIGRAM(S): at 23:45

## 2018-12-30 RX ADMIN — ATORVASTATIN CALCIUM 40 MILLIGRAM(S): 80 TABLET, FILM COATED ORAL at 21:42

## 2018-12-30 RX ADMIN — DRONEDARONE 400 MILLIGRAM(S): 400 TABLET, FILM COATED ORAL at 06:16

## 2018-12-30 RX ADMIN — Medication 650 MILLIGRAM(S): at 22:43

## 2018-12-30 RX ADMIN — Medication 2: at 21:43

## 2018-12-30 RX ADMIN — AMPICILLIN SODIUM AND SULBACTAM SODIUM 100 GRAM(S): 250; 125 INJECTION, POWDER, FOR SUSPENSION INTRAMUSCULAR; INTRAVENOUS at 23:42

## 2018-12-30 RX ADMIN — RIVAROXABAN 15 MILLIGRAM(S): KIT at 17:04

## 2018-12-30 RX ADMIN — DRONEDARONE 400 MILLIGRAM(S): 400 TABLET, FILM COATED ORAL at 17:04

## 2018-12-30 RX ADMIN — MONTELUKAST 10 MILLIGRAM(S): 4 TABLET, CHEWABLE ORAL at 11:09

## 2018-12-30 RX ADMIN — Medication 2: at 16:25

## 2018-12-30 RX ADMIN — Medication 2 CAPSULE(S): at 17:04

## 2018-12-30 RX ADMIN — BUDESONIDE AND FORMOTEROL FUMARATE DIHYDRATE 2 PUFF(S): 160; 4.5 AEROSOL RESPIRATORY (INHALATION) at 11:03

## 2018-12-30 RX ADMIN — AMPICILLIN SODIUM AND SULBACTAM SODIUM 100 GRAM(S): 250; 125 INJECTION, POWDER, FOR SUSPENSION INTRAMUSCULAR; INTRAVENOUS at 05:36

## 2018-12-30 RX ADMIN — TIOTROPIUM BROMIDE 1 CAPSULE(S): 18 CAPSULE ORAL; RESPIRATORY (INHALATION) at 11:04

## 2018-12-30 RX ADMIN — AMPICILLIN SODIUM AND SULBACTAM SODIUM 100 GRAM(S): 250; 125 INJECTION, POWDER, FOR SUSPENSION INTRAMUSCULAR; INTRAVENOUS at 12:10

## 2018-12-31 ENCOUNTER — TRANSCRIPTION ENCOUNTER (OUTPATIENT)
Age: 78
End: 2018-12-31

## 2018-12-31 LAB
GLUCOSE BLDC GLUCOMTR-MCNC: 181 MG/DL — HIGH (ref 70–99)
GLUCOSE BLDC GLUCOMTR-MCNC: 183 MG/DL — HIGH (ref 70–99)
GLUCOSE BLDC GLUCOMTR-MCNC: 255 MG/DL — HIGH (ref 70–99)
GLUCOSE BLDC GLUCOMTR-MCNC: 260 MG/DL — HIGH (ref 70–99)
GLUCOSE BLDC GLUCOMTR-MCNC: 271 MG/DL — HIGH (ref 70–99)

## 2018-12-31 PROCEDURE — 76775 US EXAM ABDO BACK WALL LIM: CPT | Mod: 26

## 2018-12-31 RX ORDER — VANCOMYCIN HCL 1 G
500 VIAL (EA) INTRAVENOUS ONCE
Qty: 0 | Refills: 0 | Status: COMPLETED | OUTPATIENT
Start: 2018-12-31 | End: 2018-12-31

## 2018-12-31 RX ORDER — MAGNESIUM OXIDE 400 MG ORAL TABLET 241.3 MG
400 TABLET ORAL
Qty: 0 | Refills: 0 | Status: DISCONTINUED | OUTPATIENT
Start: 2018-12-31 | End: 2019-01-09

## 2018-12-31 RX ORDER — OXYCODONE AND ACETAMINOPHEN 5; 325 MG/1; MG/1
1 TABLET ORAL EVERY 4 HOURS
Qty: 0 | Refills: 0 | Status: DISCONTINUED | OUTPATIENT
Start: 2018-12-31 | End: 2019-01-07

## 2018-12-31 RX ORDER — MUPIROCIN 20 MG/G
1 OINTMENT TOPICAL
Qty: 0 | Refills: 0 | Status: DISCONTINUED | OUTPATIENT
Start: 2018-12-31 | End: 2019-01-09

## 2018-12-31 RX ORDER — FUROSEMIDE 40 MG
40 TABLET ORAL ONCE
Qty: 0 | Refills: 0 | Status: COMPLETED | OUTPATIENT
Start: 2018-12-31 | End: 2018-12-31

## 2018-12-31 RX ORDER — VANCOMYCIN HCL 1 G
1500 VIAL (EA) INTRAVENOUS EVERY 24 HOURS
Qty: 0 | Refills: 0 | Status: DISCONTINUED | OUTPATIENT
Start: 2018-12-31 | End: 2019-01-02

## 2018-12-31 RX ORDER — RIVAROXABAN 15 MG-20MG
1 KIT ORAL
Qty: 30 | Refills: 0 | OUTPATIENT
Start: 2018-12-31

## 2018-12-31 RX ORDER — RIVAROXABAN 15 MG-20MG
20 KIT ORAL EVERY 24 HOURS
Qty: 0 | Refills: 0 | Status: DISCONTINUED | OUTPATIENT
Start: 2018-12-31 | End: 2019-01-09

## 2018-12-31 RX ORDER — VANCOMYCIN HCL 1 G
1000 VIAL (EA) INTRAVENOUS ONCE
Qty: 0 | Refills: 0 | Status: COMPLETED | OUTPATIENT
Start: 2018-12-31 | End: 2018-12-31

## 2018-12-31 RX ADMIN — MONTELUKAST 10 MILLIGRAM(S): 4 TABLET, CHEWABLE ORAL at 11:38

## 2018-12-31 RX ADMIN — DRONEDARONE 400 MILLIGRAM(S): 400 TABLET, FILM COATED ORAL at 05:19

## 2018-12-31 RX ADMIN — Medication 1: at 08:08

## 2018-12-31 RX ADMIN — RIVAROXABAN 20 MILLIGRAM(S): KIT at 17:00

## 2018-12-31 RX ADMIN — OXYCODONE AND ACETAMINOPHEN 1 TABLET(S): 5; 325 TABLET ORAL at 21:32

## 2018-12-31 RX ADMIN — BUDESONIDE AND FORMOTEROL FUMARATE DIHYDRATE 2 PUFF(S): 160; 4.5 AEROSOL RESPIRATORY (INHALATION) at 11:38

## 2018-12-31 RX ADMIN — OXYCODONE AND ACETAMINOPHEN 1 TABLET(S): 5; 325 TABLET ORAL at 11:45

## 2018-12-31 RX ADMIN — Medication 100 MILLIGRAM(S): at 15:40

## 2018-12-31 RX ADMIN — Medication 2 CAPSULE(S): at 08:10

## 2018-12-31 RX ADMIN — AMPICILLIN SODIUM AND SULBACTAM SODIUM 100 GRAM(S): 250; 125 INJECTION, POWDER, FOR SUSPENSION INTRAMUSCULAR; INTRAVENOUS at 05:19

## 2018-12-31 RX ADMIN — MUPIROCIN 1 APPLICATION(S): 20 OINTMENT TOPICAL at 17:01

## 2018-12-31 RX ADMIN — AMPICILLIN SODIUM AND SULBACTAM SODIUM 100 GRAM(S): 250; 125 INJECTION, POWDER, FOR SUSPENSION INTRAMUSCULAR; INTRAVENOUS at 18:01

## 2018-12-31 RX ADMIN — ATORVASTATIN CALCIUM 40 MILLIGRAM(S): 80 TABLET, FILM COATED ORAL at 21:12

## 2018-12-31 RX ADMIN — Medication 40 MILLIGRAM(S): at 16:49

## 2018-12-31 RX ADMIN — TIOTROPIUM BROMIDE 1 CAPSULE(S): 18 CAPSULE ORAL; RESPIRATORY (INHALATION) at 11:38

## 2018-12-31 RX ADMIN — OXYCODONE AND ACETAMINOPHEN 1 TABLET(S): 5; 325 TABLET ORAL at 00:12

## 2018-12-31 RX ADMIN — Medication 3: at 11:37

## 2018-12-31 RX ADMIN — DRONEDARONE 400 MILLIGRAM(S): 400 TABLET, FILM COATED ORAL at 17:01

## 2018-12-31 RX ADMIN — Medication 2 CAPSULE(S): at 17:00

## 2018-12-31 RX ADMIN — MAGNESIUM OXIDE 400 MG ORAL TABLET 400 MILLIGRAM(S): 241.3 TABLET ORAL at 21:11

## 2018-12-31 RX ADMIN — BUDESONIDE AND FORMOTEROL FUMARATE DIHYDRATE 2 PUFF(S): 160; 4.5 AEROSOL RESPIRATORY (INHALATION) at 22:20

## 2018-12-31 RX ADMIN — OXYCODONE AND ACETAMINOPHEN 1 TABLET(S): 5; 325 TABLET ORAL at 12:44

## 2018-12-31 RX ADMIN — MUPIROCIN 1 APPLICATION(S): 20 OINTMENT TOPICAL at 05:19

## 2018-12-31 RX ADMIN — Medication 175 MICROGRAM(S): at 05:19

## 2018-12-31 RX ADMIN — AMPICILLIN SODIUM AND SULBACTAM SODIUM 100 GRAM(S): 250; 125 INJECTION, POWDER, FOR SUSPENSION INTRAMUSCULAR; INTRAVENOUS at 12:00

## 2018-12-31 RX ADMIN — Medication 2 CAPSULE(S): at 11:40

## 2018-12-31 RX ADMIN — Medication 250 MILLIGRAM(S): at 11:45

## 2018-12-31 RX ADMIN — Medication 1: at 22:12

## 2018-12-31 RX ADMIN — OXYCODONE AND ACETAMINOPHEN 1 TABLET(S): 5; 325 TABLET ORAL at 22:10

## 2018-12-31 RX ADMIN — Medication 650 MILLIGRAM(S): at 09:15

## 2018-12-31 RX ADMIN — Medication 3: at 16:55

## 2018-12-31 RX ADMIN — Medication 650 MILLIGRAM(S): at 08:24

## 2018-12-31 NOTE — DISCHARGE NOTE ADULT - CARE PLAN
Principal Discharge DX:	Cellulitis of right lower extremity  Goal:	Complete antibiotics as prescribed and follow up with your primary care physician within a week from discharge  Assessment and plan of treatment:	You presented with cellulitis of right lower extremity requiring intravenous antibiotics - the infectious disease doctor was consulted and recommended additional work up showing ______.  Secondary Diagnosis:	HTN (hypertension)  Assessment and plan of treatment:	Your home medication Tribenzor acts on the kidney and work up showed you have mild chronic kidney disease - your blood pressure was monitored off these renally acting agents and was acceptable. If you notice any dizziness or lightheadedness upon standing, please follow up with your primary care provider. If you notice any sudden severe headaches, palpitations, or weakness, please proceed to your nearest emergency department or call 911.  Secondary Diagnosis:	Atrial fibrillation  Assessment and plan of treatment:	You have atrial fibrillation,  an irregular heart rhythm, which can increase your risk of stroke. Please continue to take your anticoagulant medication as prescribed and follow up with your primary care provider. If you notice any signs of major bleeding, including a change in mental status, asymmetric weakness, or slurred speech, please return to the emergency department  Secondary Diagnosis:	Asthma Principal Discharge DX:	Cellulitis of right lower extremity  Goal:	Complete antibiotics as prescribed and follow up with your primary care physician within a week from discharge - including your vascular surgeon.  Assessment and plan of treatment:	You presented with cellulitis of right lower extremity requiring intravenous antibiotics - the infectious disease doctor was consulted and recommended additional work up showing no deep venous thrombosis - please take Doxycycline 100 mg twice daily for 7 days.  Secondary Diagnosis:	HTN (hypertension)  Assessment and plan of treatment:	Your home medication Tribenzor acts on the kidney and work up showed you have mild chronic kidney disease - your blood pressure was monitored off these renally acting agents and was acceptable. If you notice any dizziness or lightheadedness upon standing, please follow up with your primary care provider. If you notice any sudden severe headaches, palpitations, or weakness, please proceed to your nearest emergency department or call 911.  Secondary Diagnosis:	Atrial fibrillation  Assessment and plan of treatment:	You have atrial fibrillation, an irregular heart rhythm, which can increase your risk of stroke. Please continue to take your anticoagulant medication as prescribed and follow up with your primary care provider. If you notice any signs of major bleeding, including a change in mental status, asymmetric weakness, or slurred speech, please return to the emergency department  Secondary Diagnosis:	Asthma  Assessment and plan of treatment:	You have a history of asthma, and should continue to take your medications as prescribed. Please follow up with your primary care provider to prevent exacerbations. Principal Discharge DX:	Cellulitis of right lower extremity  Goal:	Complete antibiotics as prescribed and follow up with your primary care physician within a week from discharge - including your vascular surgeon.  Assessment and plan of treatment:	You presented with cellulitis of right lower extremity requiring intravenous antibiotics - the infectious disease doctor was consulted and recommended additional work up showing no deep venous thrombosis - please take Augmentin 500 x 5 days.  Secondary Diagnosis:	HTN (hypertension)  Assessment and plan of treatment:	Your home medication Tribenzor acts on the kidney and work up showed you have mild chronic kidney disease - your blood pressure was monitored off these renally acting agents and was acceptable. If you notice any dizziness or lightheadedness upon standing, please follow up with your primary care provider. If you notice any sudden severe headaches, palpitations, or weakness, please proceed to your nearest emergency department or call 911.  Secondary Diagnosis:	Atrial fibrillation  Assessment and plan of treatment:	You have atrial fibrillation, an irregular heart rhythm, which can increase your risk of stroke. Please continue to take your anticoagulant medication as prescribed and follow up with your primary care provider. If you notice any signs of major bleeding, including a change in mental status, asymmetric weakness, or slurred speech, please return to the emergency department  Secondary Diagnosis:	Asthma  Assessment and plan of treatment:	You have a history of asthma, and should continue to take your medications as prescribed. Please follow up with your primary care provider to prevent exacerbations.

## 2018-12-31 NOTE — DISCHARGE NOTE ADULT - PATIENT PORTAL LINK FT
You can access the [facility] Patient Portal, offered by [facility], by registering with the following website: [email or web address]

## 2018-12-31 NOTE — PROGRESS NOTE ADULT - PROBLEM SELECTOR PLAN 1
Community onset; no RFs for MRSA including hospitalizations or traumatic injury  - Afebrile, no WBC elevation but mild elevated ESR  - S/p Zosyn; c/w Unasyn x 12/29 and leg elevation  ID Dr Lorenzana  ***F/u PT consult

## 2018-12-31 NOTE — PHYSICAL THERAPY INITIAL EVALUATION ADULT - ACTIVE RANGE OF MOTION EXAMINATION, REHAB EVAL
bilateral upper extremity Active ROM was WFL (within functional limits)/Left UE Active ROM was WFL (within functional limits)

## 2018-12-31 NOTE — DISCHARGE NOTE ADULT - PLAN OF CARE
Complete antibiotics as prescribed and follow up with your primary care physician within a week from discharge You presented with cellulitis of right lower extremity requiring intravenous antibiotics - the infectious disease doctor was consulted and recommended additional work up showing ______. Your home medication Tribenzor acts on the kidney and work up showed you have mild chronic kidney disease - your blood pressure was monitored off these renally acting agents and was acceptable. If you notice any dizziness or lightheadedness upon standing, please follow up with your primary care provider. If you notice any sudden severe headaches, palpitations, or weakness, please proceed to your nearest emergency department or call 911. You have atrial fibrillation,  an irregular heart rhythm, which can increase your risk of stroke. Please continue to take your anticoagulant medication as prescribed and follow up with your primary care provider. If you notice any signs of major bleeding, including a change in mental status, asymmetric weakness, or slurred speech, please return to the emergency department Complete antibiotics as prescribed and follow up with your primary care physician within a week from discharge - including your vascular surgeon. You presented with cellulitis of right lower extremity requiring intravenous antibiotics - the infectious disease doctor was consulted and recommended additional work up showing no deep venous thrombosis - please take Doxycycline 100 mg twice daily for 7 days. You have atrial fibrillation, an irregular heart rhythm, which can increase your risk of stroke. Please continue to take your anticoagulant medication as prescribed and follow up with your primary care provider. If you notice any signs of major bleeding, including a change in mental status, asymmetric weakness, or slurred speech, please return to the emergency department You have a history of asthma, and should continue to take your medications as prescribed. Please follow up with your primary care provider to prevent exacerbations. You presented with cellulitis of right lower extremity requiring intravenous antibiotics - the infectious disease doctor was consulted and recommended additional work up showing no deep venous thrombosis - please take Augmentin 500 x 5 days.

## 2018-12-31 NOTE — PROGRESS NOTE ADULT - PROBLEM SELECTOR PLAN 2
Urinating freely; Likely 2/2 nephrotoxic agents + prerenal from Lasix  - Trial of IVFs x 24 hours w/ mild improvement of Cr; FeUa 37.4% suggests intrinsic renal disease and US WNLs

## 2018-12-31 NOTE — CONSULT NOTE ADULT - ASSESSMENT
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 Patient seen in ED day prior - told to return for US the next day. Patient had previous ED visit 1 week prior for R sided pain s/p mechanical fall 2/2 mechanical trip on rug 1 week ago - imaging at the time negative except for dislocated shoulder reduced in the ED As per patient and family at bedside, patient brought in for concern of DVT and cellulitis of right lower extremity. Otherwise patient denied fever, chills, chest pain, shortness of breath, dizziness, syncope, fever, nausea, vomiting, or other any other complaints. Patient admits to chronic hyperpigmentation of legs  unchanged from baseline. (29 Dec 2018 17:29).    # s/p trauma with dislocation of right shoulder     # stasis dermatitis with cellulitis of legs right worse than left r/o underlying pvd     plan  blood cx times 2   vanco and unasyn for cellulitis   vanco levels and adjust   nasal swab for MRSA   arterial dopplers/pver/josie   ortho f/u of right shoulder     thnx will f/u   d/w resident

## 2018-12-31 NOTE — CONSULT NOTE ADULT - SUBJECTIVE AND OBJECTIVE BOX
HPI:  77 y/o [place] female from home, lives alone w/ HHA 5/week w/ PMH of CAD s/p stents, HTN, HLD, Diastolic CHF, T2DM, Afib on Xarelto s/p PPM 2013, Asthma, Hypothyroidism, Chronic Venous Insufficiency, Urinary Incontinence, and IBS p/w c/o R lower leg swelling x 1 week w/out improvement on PO ABx x 1 day. Patient seen in ED day prior - told to return for US the next day. Patient had previous ED visit 1 week prior for R sided pain s/p mechanical fall 2/2 mechanical trip on rug 1 week ago - imaging at the time negative except for dislocated shoulder reduced in the ED As per patient and family at bedside, patient brought in for concern of DVT and cellulitis of right lower extremity. Otherwise patient denied fever, chills, chest pain, shortness of breath, dizziness, syncope, fever, nausea, vomiting, or other any other complaints. Patient admits to chronic hyperpigmentation of legs  unchanged from baseline. (29 Dec 2018 17:29). ID called for eval of cellulitis of legs .       PAST MEDICAL & SURGICAL HISTORY:  Obesity  Pacemaker  Atrial fibrillation  Hypothyroidism  Venous stasis  IBS (irritable bowel syndrome)  CHF (congestive heart failure), NYHA class I  HLD (Hyperlipidemia)  HTN (Hypertension)  Diabetes  Myocardial Infarction  CAD (Coronary Atherosclerotic Disease)  Asthma  S/P coronary angiogram    allergy   No Known Allergies    meds   acetaminophen   Tablet .. 650 milliGRAM(s) Oral every 6 hours PRN  ALBUTerol    90 MICROgram(s) HFA Inhaler 2 Puff(s) Inhalation every 6 hours PRN  ampicillin/sulbactam  IVPB      ampicillin/sulbactam  IVPB 1.5 Gram(s) IV Intermittent every 6 hours  amylase/lipase/protease  (CREON  6,000 Units) 2 Capsule(s) Oral three times a day with meals  atorvastatin 40 milliGRAM(s) Oral at bedtime  buDESOnide 160 MICROgram(s)/formoterol 4.5 MICROgram(s) Inhaler 2 Puff(s) Inhalation two times a day  dronedarone 400 milliGRAM(s) Oral two times a day  insulin lispro (HumaLOG) corrective regimen sliding scale   SubCutaneous Before meals and at bedtime  levothyroxine 175 MICROGram(s) Oral daily  montelukast 10 milliGRAM(s) Oral daily  mupirocin 2% Ointment 1 Application(s) Topical two times a day  oxyCODONE    5 mG/acetaminophen 325 mG 1 Tablet(s) Oral every 4 hours PRN  rivaroxaban 15 milliGRAM(s) Oral every 24 hours  sodium chloride 0.9%. 1000 milliLiter(s) IV Continuous <Continuous>  tiotropium 18 MICROgram(s) Capsule 1 Capsule(s) Inhalation daily  vancomycin  IVPB 1500 milliGRAM(s) IV Intermittent every 24 hours      Social Hx: no smoking no etoh     FAMILY HISTORY:  Family history of heart disease        ROS  [  ] UNABLE TO ELICIT    General:  [ - ] Fever   [ x ] Malaise    Skin: redness of legs with pain s/p trauma 1 week ago as per daughter with dislocation of right shoulder     HEENT:  [ - ] Sore Throat  [-  ] Photophobia	    Chest: [ - ] SOB  [-  ] Cough     Cardiovascular: [ - ] CP	no   pnd  no orthopnea     Gastrointestinal: [ - ] Abd pain   [ - ] N    [ - ] V   [ - ] Diarrhea	    Genitourinary: [ - ] Polyuria   [ - ] Urgency   [ - ] Dysuria    [ - ]  Hematuria	    Musculoskeletal:  [-  ] Back Pain	    Neurological: [-  ]Dizziness  [ - ]Visual Disturbance  [ - ]Headaches      PHYSICAL EXAM:    Vital Signs Last 24 Hrs  T(C): 36.7 (31 Dec 2018 14:06), Max: 36.8 (30 Dec 2018 14:38)  T(F): 98 (31 Dec 2018 14:06), Max: 98.3 (30 Dec 2018 14:38)  HR: 73 (31 Dec 2018 14:06) (67 - 80)  BP: 113/77 (31 Dec 2018 14:06) (113/77 - 160/53)  BP(mean): --  RR: 17 (31 Dec 2018 14:06) (17 - 18)  SpO2: 96% (31 Dec 2018 14:06) (96% - 100%)    Constitutional: awake alert oriented times 3   JULIAN SCLERA anicteric EOMI   LUNGS clear   CVS s1 s2 aud systolic murmur 2/6 LSB /ppm in place   ABDOMEN soft non tender no HSM /obese   NEUROLOGY  no defecits   SKIN redness of legs right worse than left /right knee with bruising too /warmth/tender   EXTREMITIES 2-3 +  edema /no cyanosis         LABS/DIAGNOSTIC TESTS                          9.7    7.6   )-----------( 276      ( 30 Dec 2018 06:35 )             29.8     WBC Count: 7.6 K/uL ( @ 06:35)  WBC Count: 8.3 K/uL ( @ 16:30)  WBC Count: 8.6 K/uL ( @ 19:12)          135  |  102  |  37<H>  ----------------------------<  147<H>  4.5   |  25  |  1.55<H>    Ca    9.0      30 Dec 2018 06:35  Phos  3.8       Mg     2.1         TPro  7.2  /  Alb  3.6  /  TBili  0.5  /  DBili  x   /  AST  17  /  ALT  28  /  AlkPhos  57        Urinalysis Basic - ( 29 Dec 2018 19:25 )    Color: Yellow / Appearance: Clear / S.015 / pH: x  Gluc: x / Ketone: Negative  / Bili: Negative / Urobili: Negative   Blood: x / Protein: Negative / Nitrite: Negative   Leuk Esterase: Negative / RBC: x / WBC x   Sq Epi: x / Non Sq Epi: x / Bacteria: x        LIVER FUNCTIONS - ( 29 Dec 2018 16:30 )  Alb: 3.6 g/dL / Pro: 7.2 g/dL / ALK PHOS: 57 U/L / ALT: 28 U/L DA / AST: 17 U/L / GGT: x                 LACTATE:Lactate, Blood (18 @ 06:35)    Lactate, Blood: 1.1 mmol/L                RADIOLOGY  < from: US Renal (18 @ 09:47) >  EXAM:  US KIDNEY(S)                            PROCEDURE DATE:  2018          INTERPRETATION:  EXAM: US KIDNEY(S)   INDICATION: SRIDHAR.  COMPARISON: None.    TECHNIQUE: Grayscale ultrasound of the kidneys was performed.    FINDINGS:    Right kidney: Normal size, measures 11.7 x 4.3 x 4.6 cm. No   hydronephrosis, shadowing calculus, or perinephric fluid collection.    Left kidney: Normal size, measures 11.5 x 4.7 x 4.6 cm. No   hydronephrosis, shadowing calculus, or perinephric fluid collection.    Visualized proximal abdominal aorta and IVC are grossly unremarkable.    IMPRESSION:   No hydronephrosis.    < end of copied text >      < from: US Duplex Venous Lower Ext Ltd, Right (18 @ 15:55) >  EXAM:  US DPLX LWR EXT VEINS LTD RT                            PROCEDURE DATE:  2018          INTERPRETATION:  CLINICAL INFORMATION: Right leg pain.    COMPARISON: None available.    TECHNIQUE: Duplex sonography of the RIGHT LOWER extremity with color and   spectral Doppler, with and without compression.      FINDINGS:    There is normal compressibility of the right common femoral, femoral and   popliteal veins. No calf vein thrombosis is detected.    There is edema within the soft tissues of the right calf.    The contralateral common femoral vein is patent.    Doppler examination shows normal spontaneous and phasic flow.    IMPRESSION:     No evidence of right lower extremity deep venous thrombosis.      < end of copied text >      < from: Xray Chest 1 View-PORTABLE IMMEDIATE (18 @ 18:47) >    EXAM:  XR CHEST PORTABLE IMMED 1V                            PROCEDURE DATE:  2018          INTERPRETATION:  Short of breath.    AP chest. Prior 2018.    Left cardiac pacer reidentified in position. Heart not grossly enlarged.   Calcified aorta. No consolidation or effusion.    Impression: No active disease.                MATILDE HUGGINS M.D., ATTENDING RADIOLOGIST  This document has been electronically signed. Dec 28 2018  7:52PM    < end of copied text >        < from: Xray Shoulder 2 Views, Right (18 @ 22:06) >    EXAM:  SHOULDER RIGHT (MINIMUM 2V)                            PROCEDURE DATE:  2018          INTERPRETATION:  Clinical information: Right shoulder post reduction.    2 views of the right shoulder were obtained and compared to 2018 at   0830 hours.    There is normal anatomic alignment at the glenohumeral joint. No acute   fracture is seen. There is mild widening of the acromioclavicular joint   which appears unchanged.    Impression:    Findings as above.                YOMI CAMPOS M.D., ATTENDING RADIOLOGIST  This document has been electronically signed. Dec 23 2018  9:12AM        < end of copied text >

## 2018-12-31 NOTE — DISCHARGE NOTE ADULT - HOSPITAL COURSE
79 y/o Nepalese female from home, lives alone w/ HHA 5/week w/ PMH of CAD s/p stents, HTN, HLD, Diastolic CHF, T2DM, Afib on Xarelto s/p PPM 11/2013, Asthma, Hypothyroidism, Chronic Venous Insufficiency, Urinary Incontinence, and IBS p/w c/o R lower leg swelling x 1 week w/out improvement on PO ABx x 1 day - admitting for cellulitis likely community onset; no RFs for MRSA including hospitalizations or traumatic injury but covered for MRSA 2/2 illness severity. During hospital; course patient afebrile w/ no WBC elevation but mild elevated ESR. As per ID patient treated w/ IV Vancomycin and Unasyn. Noted elevated Cr w/ no prior baseline - patient urinating freely; differentials included likely 2/2 nephrotoxic agents + prerenal from Lasix. Patient given trial of IVFs x 24 hours w/ mild improvement of Cr; FeUa 37.4% suggests intrinsic renal disease. Otherwise Afib, CHF, HTN and other medical problems managed w/out complication - all renal acting agents were held 2/2 CKD. Patient seen and examined at bedside with no acute complaints - the ID consultant Dr Lorenzana recommended ______. The medical plan and results were discussed with the patient throughout the hospital course. Patient is medically clear for discharge and is advised to follow up with his primary care physician within a week for continued medical care. Please see above and the medical records for additional information. 77 y/o Indonesian female from home, lives alone w/ HHA 5/week w/ PMH of CAD s/p stents, HTN, HLD, Diastolic CHF, T2DM, Afib on Xarelto s/p PPM 11/2013, Asthma, Hypothyroidism, Chronic Venous Insufficiency, Urinary Incontinence, and IBS p/w c/o R lower leg swelling x 1 week w/out improvement on PO ABx x 1 day - admitting for cellulitis likely community onset; no RFs for MRSA including hospitalizations or traumatic injury but covered for MRSA 2/2 illness severity. During hospital; course patient afebrile w/ no WBC elevation but mild elevated ESR. As per ID patient treated w/ IV Vancomycin and Unasyn. Noted elevated Cr w/ no prior baseline - patient urinating freely; differentials included likely 2/2 nephrotoxic agents + prerenal from Lasix. Patient given trial of IVFs x 24 hours w/ mild improvement of Cr; FeUa 37.4% suggests intrinsic renal disease. Otherwise Afib, CHF, HTN and other medical problems managed w/out complication - all renal acting agents were held 2/2 CKD. Patient seen and examined at bedside with no acute complaints - the ID consultant Dr Lorenzana recommended 7 more days of PO ABx. The medical plan and results were discussed with the patient throughout the hospital course. Patient is medically clear for discharge and is advised to follow up with his primary care physician within a week for continued medical care. Please see above and the medical records for additional information. 79 y/o Kitt[place] female from home, lives alone w/ HHA 5/week w/ PMH of CAD s/p stents, HTN, HLD, Diastolic CHF, T2DM, Afib on Xarelto s/p PPM 11/2013, Asthma, Hypothyroidism, Chronic Venous Insufficiency, Urinary Incontinence, and IBS p/w c/o R lower leg swelling x 1 week w/out improvement on PO ABx x 1 day - admitting for cellulitis likely community onset; no RFs for MRSA including hospitalizations or traumatic injury but covered for MRSA 2/2 illness severity. During hospital; course patient afebrile w/ no WBC elevation but mild elevated ESR. As per ID patient treated w/ IV Vancomycin and Unasyn. Noted elevated Cr w/ no prior baseline - patient urinating freely; differentials included likely 2/2 nephrotoxic agents + prerenal from Lasix. Patient given trial of IVFs x 24 hours w/ mild improvement of Cr; FeUa 37.4% suggests intrinsic renal disease. Otherwise Afib, CHF, HTN and other medical problems managed w/out complication - all renal acting agents were held 2/2 CKD. Patient seen and examined at bedside with no acute complaints - the ID consultant Dr Lorenzana recommended 5 more days of PO ABx. The medical plan and results were discussed with the patient throughout the hospital course. Patient is medically clear for discharge and is advised to follow up with his primary care physician within a week for continued medical care. Please see above and the medical records for additional information.

## 2018-12-31 NOTE — DISCHARGE NOTE ADULT - MEDICATION SUMMARY - MEDICATIONS TO TAKE
I will START or STAY ON the medications listed below when I get home from the hospital:    Multaq 400 mg oral tablet  -- 1 tab(s) by mouth 2 times a day  -- Indication: For Atrial fibrillation    Xarelto 20 mg oral tablet  -- 1 tab(s) by mouth once a day (in the evening)  -- Indication: For Atrial fibrillation    Janumet 50 mg-500 mg oral tablet  -- 1 tab(s) by mouth 2 times a day  -- Indication: For Diabetes    atorvastatin 40 mg oral tablet  -- 1 tab(s) by mouth once a day  -- Indication: For HLD    doxycycline hyclate 100 mg oral capsule  -- 1 cap(s) by mouth 2 times a day   -- Avoid prolonged or excessive exposure to direct and/or artificial sunlight while taking this medication.  Do not take this drug if you are pregnant.  Finish all this medication unless otherwise directed by prescriber.  Medication should be taken with plenty of water.    -- Indication: For Cellulitis of right lower extremity    ProAir HFA 90 mcg/inh inhalation aerosol  -- 2 puff(s) inhaled 4 times a day, As Needed  -- Indication: For Asthma    Advair Diskus 500 mcg-50 mcg inhalation powder  -- 1 puff(s) inhaled 2 times a day  -- Indication: For Asthma    Spiriva 18 mcg inhalation capsule  -- 1 cap(s) inhaled once a day  -- Indication: For Asthma    pancrelipase 6000 units-19,000 units-30,000 units oral delayed release capsule  -- 2 cap(s) by mouth 3 times a day (with meals)  -- Indication: For Digestive issues    Lasix 40 mg oral tablet  -- 1 tab(s) by mouth 2 times a week, As Needed  -- Indication: For CHF (congestive heart failure), NYHA class I    Singulair 10 mg oral tablet  -- 1 tab(s) by mouth once a day  -- Indication: For Asthma    levothyroxine 175 mcg (0.175 mg) oral tablet  -- 1 tab(s) by mouth once a day  -- Indication: For Hypothyroidism I will START or STAY ON the medications listed below when I get home from the hospital:    Percocet 5/325 oral tablet  -- 1 tab(s) by mouth once a day (at bedtime), As Needed -for severe pain MDD:1  -- Caution federal law prohibits the transfer of this drug to any person other  than the person for whom it was prescribed.  May cause drowsiness.  Alcohol may intensify this effect.  Use care when operating dangerous machinery.  This prescription cannot be refilled.  This product contains acetaminophen.  Do not use  with any other product containing acetaminophen to prevent possible liver damage.  Using more of this medication than prescribed may cause serious breathing problems.    -- Indication: For Pain control    Multaq 400 mg oral tablet  -- 1 tab(s) by mouth 2 times a day  -- Indication: For Atrial fibrillation    Xarelto 20 mg oral tablet  -- 1 tab(s) by mouth once a day (in the evening)  -- Indication: For Atrial fibrillation    Janumet 50 mg-500 mg oral tablet  -- 1 tab(s) by mouth 2 times a day  -- Indication: For Diabetes    atorvastatin 40 mg oral tablet  -- 1 tab(s) by mouth once a day  -- Indication: For HLD    Advair Diskus 500 mcg-50 mcg inhalation powder  -- 1 puff(s) inhaled 2 times a day  -- Indication: For Asthma    Spiriva 18 mcg inhalation capsule  -- 1 cap(s) inhaled once a day  -- Indication: For Asthma    ProAir HFA 90 mcg/inh inhalation aerosol  -- 2 puff(s) inhaled 4 times a day, As Needed  -- Indication: For Asthma    pancrelipase 6000 units-19,000 units-30,000 units oral delayed release capsule  -- 2 cap(s) by mouth 3 times a day (with meals)  -- Indication: For Digestive issues    Lasix 40 mg oral tablet  -- 1 tab(s) by mouth 2 times a week, As Needed  -- Indication: For CHF (congestive heart failure), NYHA class I    Singulair 10 mg oral tablet  -- 1 tab(s) by mouth once a day  -- Indication: For Asthma    Augmentin 500 mg-125 mg oral tablet  -- 500 milligram(s) by mouth 3 times a day   -- Finish all this medication unless otherwise directed by prescriber.  Take with food or milk.    -- Indication: For Cellulitis of right lower extremity    levothyroxine 175 mcg (0.175 mg) oral tablet  -- 1 tab(s) by mouth once a day  -- Indication: For Hypothyroidism

## 2018-12-31 NOTE — DIETITIAN INITIAL EVALUATION ADULT. - PERTINENT LABORATORY DATA
12-30 Na135 mmol/L Glu 147 mg/dL<H> K+ 4.5 mmol/L Cr  1.55 mg/dL<H> BUN 37 mg/dL<H> 12-30 Phos 3.8 mg/dL 12-29 Alb 3.6 g/dL 12-30 [id number] 7.0 %<H> 12-30 Chol 93 mg/dL LDL 24 mg/dL HDL 56 mg/dL Trig 66 mg/dL

## 2018-12-31 NOTE — DIETITIAN INITIAL EVALUATION ADULT. - FACTORS AFF FOOD INTAKE
pain/other (specify)/weakness, cellulitis of rt. lower extremity, diabetes, CHF, asthma, Afib/difficulty with food procurement/preparation

## 2018-12-31 NOTE — DIETITIAN INITIAL EVALUATION ADULT. - OTHER INFO
Verbal request by Nursing Manager to see pt. for diabetic education/teaching. Pt. from home lives alone with HHA 5hr/wk. Visited pt. out of bed in chair, daughter at bedside, stated pt. eats well, tries to limit salt/sugar intake at home, denies nausea/vomiting or diarrhea PTA, stated unsure of wt. hx. & may have gained wt. due to fluid retention? While in hospital consuming 50% of meals & tolerating, observed lunch tray, accepted diet eduction on "Healthy [place] Meals-Tips for Healthy Meal Planning" in [place] to take home, reinforced information given. Also received diabetic information/handout from nursing noted. Discussed with RN.

## 2018-12-31 NOTE — PHYSICAL THERAPY INITIAL EVALUATION ADULT - IMPAIRMENTS FOUND, PT EVAL
aerobic capacity/endurance/gait, locomotion, and balance/muscle strength/ROM/fine motor/gross motor/joint integrity and mobility

## 2019-01-01 LAB
ANION GAP SERPL CALC-SCNC: 10 MMOL/L — SIGNIFICANT CHANGE UP (ref 5–17)
BASOPHILS # BLD AUTO: 0.1 K/UL — SIGNIFICANT CHANGE UP (ref 0–0.2)
BASOPHILS NFR BLD AUTO: 1 % — SIGNIFICANT CHANGE UP (ref 0–2)
BUN SERPL-MCNC: 21 MG/DL — HIGH (ref 7–18)
CALCIUM SERPL-MCNC: 8.3 MG/DL — LOW (ref 8.4–10.5)
CHLORIDE SERPL-SCNC: 99 MMOL/L — SIGNIFICANT CHANGE UP (ref 96–108)
CO2 SERPL-SCNC: 25 MMOL/L — SIGNIFICANT CHANGE UP (ref 22–31)
CREAT SERPL-MCNC: 1.23 MG/DL — SIGNIFICANT CHANGE UP (ref 0.5–1.3)
EOSINOPHIL # BLD AUTO: 0.2 K/UL — SIGNIFICANT CHANGE UP (ref 0–0.5)
EOSINOPHIL NFR BLD AUTO: 2.6 % — SIGNIFICANT CHANGE UP (ref 0–6)
GLUCOSE BLDC GLUCOMTR-MCNC: 189 MG/DL — HIGH (ref 70–99)
GLUCOSE BLDC GLUCOMTR-MCNC: 197 MG/DL — HIGH (ref 70–99)
GLUCOSE BLDC GLUCOMTR-MCNC: 212 MG/DL — HIGH (ref 70–99)
GLUCOSE BLDC GLUCOMTR-MCNC: 235 MG/DL — HIGH (ref 70–99)
GLUCOSE SERPL-MCNC: 170 MG/DL — HIGH (ref 70–99)
HCT VFR BLD CALC: 30.4 % — LOW (ref 34.5–45)
HGB BLD-MCNC: 9.8 G/DL — LOW (ref 11.5–15.5)
LYMPHOCYTES # BLD AUTO: 1.8 K/UL — SIGNIFICANT CHANGE UP (ref 1–3.3)
LYMPHOCYTES # BLD AUTO: 24.6 % — SIGNIFICANT CHANGE UP (ref 13–44)
MAGNESIUM SERPL-MCNC: 2.3 MG/DL — SIGNIFICANT CHANGE UP (ref 1.6–2.6)
MCHC RBC-ENTMCNC: 28.6 PG — SIGNIFICANT CHANGE UP (ref 27–34)
MCHC RBC-ENTMCNC: 32.3 GM/DL — SIGNIFICANT CHANGE UP (ref 32–36)
MCV RBC AUTO: 88.5 FL — SIGNIFICANT CHANGE UP (ref 80–100)
MONOCYTES # BLD AUTO: 0.7 K/UL — SIGNIFICANT CHANGE UP (ref 0–0.9)
MONOCYTES NFR BLD AUTO: 10 % — SIGNIFICANT CHANGE UP (ref 2–14)
MRSA PCR RESULT.: SIGNIFICANT CHANGE UP
NEUTROPHILS # BLD AUTO: 4.5 K/UL — SIGNIFICANT CHANGE UP (ref 1.8–7.4)
NEUTROPHILS NFR BLD AUTO: 61.8 % — SIGNIFICANT CHANGE UP (ref 43–77)
PHOSPHATE SERPL-MCNC: 3.1 MG/DL — SIGNIFICANT CHANGE UP (ref 2.5–4.5)
PLATELET # BLD AUTO: 297 K/UL — SIGNIFICANT CHANGE UP (ref 150–400)
POTASSIUM SERPL-MCNC: 4 MMOL/L — SIGNIFICANT CHANGE UP (ref 3.5–5.3)
POTASSIUM SERPL-SCNC: 4 MMOL/L — SIGNIFICANT CHANGE UP (ref 3.5–5.3)
RBC # BLD: 3.43 M/UL — LOW (ref 3.8–5.2)
RBC # FLD: 12.7 % — SIGNIFICANT CHANGE UP (ref 10.3–14.5)
S AUREUS DNA NOSE QL NAA+PROBE: SIGNIFICANT CHANGE UP
SODIUM SERPL-SCNC: 134 MMOL/L — LOW (ref 135–145)
WBC # BLD: 7.2 K/UL — SIGNIFICANT CHANGE UP (ref 3.8–10.5)
WBC # FLD AUTO: 7.2 K/UL — SIGNIFICANT CHANGE UP (ref 3.8–10.5)

## 2019-01-01 PROCEDURE — 93923 UPR/LXTR ART STDY 3+ LVLS: CPT | Mod: 26

## 2019-01-01 RX ORDER — LANOLIN ALCOHOL/MO/W.PET/CERES
5 CREAM (GRAM) TOPICAL AT BEDTIME
Qty: 0 | Refills: 0 | Status: DISCONTINUED | OUTPATIENT
Start: 2019-01-01 | End: 2019-01-09

## 2019-01-01 RX ORDER — FUROSEMIDE 40 MG
40 TABLET ORAL ONCE
Qty: 0 | Refills: 0 | Status: COMPLETED | OUTPATIENT
Start: 2019-01-01 | End: 2019-01-01

## 2019-01-01 RX ADMIN — BUDESONIDE AND FORMOTEROL FUMARATE DIHYDRATE 2 PUFF(S): 160; 4.5 AEROSOL RESPIRATORY (INHALATION) at 09:29

## 2019-01-01 RX ADMIN — AMPICILLIN SODIUM AND SULBACTAM SODIUM 100 GRAM(S): 250; 125 INJECTION, POWDER, FOR SUSPENSION INTRAMUSCULAR; INTRAVENOUS at 18:07

## 2019-01-01 RX ADMIN — AMPICILLIN SODIUM AND SULBACTAM SODIUM 100 GRAM(S): 250; 125 INJECTION, POWDER, FOR SUSPENSION INTRAMUSCULAR; INTRAVENOUS at 12:15

## 2019-01-01 RX ADMIN — Medication 2 CAPSULE(S): at 12:14

## 2019-01-01 RX ADMIN — MUPIROCIN 1 APPLICATION(S): 20 OINTMENT TOPICAL at 05:45

## 2019-01-01 RX ADMIN — Medication 2 CAPSULE(S): at 18:07

## 2019-01-01 RX ADMIN — AMPICILLIN SODIUM AND SULBACTAM SODIUM 100 GRAM(S): 250; 125 INJECTION, POWDER, FOR SUSPENSION INTRAMUSCULAR; INTRAVENOUS at 00:19

## 2019-01-01 RX ADMIN — Medication 1: at 08:12

## 2019-01-01 RX ADMIN — AMPICILLIN SODIUM AND SULBACTAM SODIUM 100 GRAM(S): 250; 125 INJECTION, POWDER, FOR SUSPENSION INTRAMUSCULAR; INTRAVENOUS at 05:44

## 2019-01-01 RX ADMIN — OXYCODONE AND ACETAMINOPHEN 1 TABLET(S): 5; 325 TABLET ORAL at 09:45

## 2019-01-01 RX ADMIN — Medication 2 CAPSULE(S): at 08:20

## 2019-01-01 RX ADMIN — Medication 40 MILLIGRAM(S): at 09:28

## 2019-01-01 RX ADMIN — MAGNESIUM OXIDE 400 MG ORAL TABLET 400 MILLIGRAM(S): 241.3 TABLET ORAL at 08:20

## 2019-01-01 RX ADMIN — Medication 2: at 12:15

## 2019-01-01 RX ADMIN — OXYCODONE AND ACETAMINOPHEN 1 TABLET(S): 5; 325 TABLET ORAL at 22:45

## 2019-01-01 RX ADMIN — Medication 2: at 21:27

## 2019-01-01 RX ADMIN — MAGNESIUM OXIDE 400 MG ORAL TABLET 400 MILLIGRAM(S): 241.3 TABLET ORAL at 18:07

## 2019-01-01 RX ADMIN — Medication 1: at 16:41

## 2019-01-01 RX ADMIN — BUDESONIDE AND FORMOTEROL FUMARATE DIHYDRATE 2 PUFF(S): 160; 4.5 AEROSOL RESPIRATORY (INHALATION) at 21:28

## 2019-01-01 RX ADMIN — TIOTROPIUM BROMIDE 1 CAPSULE(S): 18 CAPSULE ORAL; RESPIRATORY (INHALATION) at 12:14

## 2019-01-01 RX ADMIN — DRONEDARONE 400 MILLIGRAM(S): 400 TABLET, FILM COATED ORAL at 18:07

## 2019-01-01 RX ADMIN — MONTELUKAST 10 MILLIGRAM(S): 4 TABLET, CHEWABLE ORAL at 12:14

## 2019-01-01 RX ADMIN — Medication 175 MICROGRAM(S): at 05:45

## 2019-01-01 RX ADMIN — Medication 300 MILLIGRAM(S): at 14:43

## 2019-01-01 RX ADMIN — ATORVASTATIN CALCIUM 40 MILLIGRAM(S): 80 TABLET, FILM COATED ORAL at 21:27

## 2019-01-01 RX ADMIN — OXYCODONE AND ACETAMINOPHEN 1 TABLET(S): 5; 325 TABLET ORAL at 22:15

## 2019-01-01 RX ADMIN — RIVAROXABAN 20 MILLIGRAM(S): KIT at 18:07

## 2019-01-01 RX ADMIN — DRONEDARONE 400 MILLIGRAM(S): 400 TABLET, FILM COATED ORAL at 05:46

## 2019-01-01 RX ADMIN — MUPIROCIN 1 APPLICATION(S): 20 OINTMENT TOPICAL at 18:07

## 2019-01-01 RX ADMIN — OXYCODONE AND ACETAMINOPHEN 1 TABLET(S): 5; 325 TABLET ORAL at 09:28

## 2019-01-01 NOTE — PROGRESS NOTE ADULT - PROBLEM SELECTOR PLAN 1
Community onset; no RFs for MRSA including hospitalizations or traumatic injury  - Afebrile, no WBC elevation but mild elevated ESR  - S/p Zosyn; c/w Unasyn x 12/29 and leg elevation; began Vancomycin x 12/31  ID Dr Lorenzana  - PT consult OPT

## 2019-01-01 NOTE — PROGRESS NOTE ADULT - PROBLEM SELECTOR PLAN 4
Likely diastolic HF w/ bilateral pitting edema  - TTE 1/2018 - G1DD, grossly EF WNLs  ***Giving Lasix 40 IV x 1; monitor renal function

## 2019-01-02 LAB
ANION GAP SERPL CALC-SCNC: 12 MMOL/L — SIGNIFICANT CHANGE UP (ref 5–17)
BASOPHILS # BLD AUTO: 0.1 K/UL — SIGNIFICANT CHANGE UP (ref 0–0.2)
BASOPHILS NFR BLD AUTO: 1.1 % — SIGNIFICANT CHANGE UP (ref 0–2)
BUN SERPL-MCNC: 17 MG/DL — SIGNIFICANT CHANGE UP (ref 7–18)
CALCIUM SERPL-MCNC: 8.2 MG/DL — LOW (ref 8.4–10.5)
CHLORIDE SERPL-SCNC: 101 MMOL/L — SIGNIFICANT CHANGE UP (ref 96–108)
CO2 SERPL-SCNC: 23 MMOL/L — SIGNIFICANT CHANGE UP (ref 22–31)
CREAT SERPL-MCNC: 1.13 MG/DL — SIGNIFICANT CHANGE UP (ref 0.5–1.3)
EOSINOPHIL # BLD AUTO: 0.2 K/UL — SIGNIFICANT CHANGE UP (ref 0–0.5)
EOSINOPHIL NFR BLD AUTO: 2.5 % — SIGNIFICANT CHANGE UP (ref 0–6)
GLUCOSE BLDC GLUCOMTR-MCNC: 203 MG/DL — HIGH (ref 70–99)
GLUCOSE BLDC GLUCOMTR-MCNC: 272 MG/DL — HIGH (ref 70–99)
GLUCOSE BLDC GLUCOMTR-MCNC: 274 MG/DL — HIGH (ref 70–99)
GLUCOSE BLDC GLUCOMTR-MCNC: 327 MG/DL — HIGH (ref 70–99)
GLUCOSE SERPL-MCNC: 174 MG/DL — HIGH (ref 70–99)
HCT VFR BLD CALC: 28.3 % — LOW (ref 34.5–45)
HGB BLD-MCNC: 9.3 G/DL — LOW (ref 11.5–15.5)
LYMPHOCYTES # BLD AUTO: 1.8 K/UL — SIGNIFICANT CHANGE UP (ref 1–3.3)
LYMPHOCYTES # BLD AUTO: 24.8 % — SIGNIFICANT CHANGE UP (ref 13–44)
MAGNESIUM SERPL-MCNC: 2.3 MG/DL — SIGNIFICANT CHANGE UP (ref 1.6–2.6)
MCHC RBC-ENTMCNC: 28.9 PG — SIGNIFICANT CHANGE UP (ref 27–34)
MCHC RBC-ENTMCNC: 32.8 GM/DL — SIGNIFICANT CHANGE UP (ref 32–36)
MCV RBC AUTO: 88.2 FL — SIGNIFICANT CHANGE UP (ref 80–100)
MONOCYTES # BLD AUTO: 0.7 K/UL — SIGNIFICANT CHANGE UP (ref 0–0.9)
MONOCYTES NFR BLD AUTO: 9.7 % — SIGNIFICANT CHANGE UP (ref 2–14)
NEUTROPHILS # BLD AUTO: 4.5 K/UL — SIGNIFICANT CHANGE UP (ref 1.8–7.4)
NEUTROPHILS NFR BLD AUTO: 62 % — SIGNIFICANT CHANGE UP (ref 43–77)
PHOSPHATE SERPL-MCNC: 2.7 MG/DL — SIGNIFICANT CHANGE UP (ref 2.5–4.5)
PLATELET # BLD AUTO: 285 K/UL — SIGNIFICANT CHANGE UP (ref 150–400)
POTASSIUM SERPL-MCNC: 3.9 MMOL/L — SIGNIFICANT CHANGE UP (ref 3.5–5.3)
POTASSIUM SERPL-SCNC: 3.9 MMOL/L — SIGNIFICANT CHANGE UP (ref 3.5–5.3)
RBC # BLD: 3.21 M/UL — LOW (ref 3.8–5.2)
RBC # FLD: 12.9 % — SIGNIFICANT CHANGE UP (ref 10.3–14.5)
SODIUM SERPL-SCNC: 136 MMOL/L — SIGNIFICANT CHANGE UP (ref 135–145)
VANCOMYCIN TROUGH SERPL-MCNC: 11.9 UG/ML — SIGNIFICANT CHANGE UP (ref 10–20)
WBC # BLD: 7.3 K/UL — SIGNIFICANT CHANGE UP (ref 3.8–10.5)
WBC # FLD AUTO: 7.3 K/UL — SIGNIFICANT CHANGE UP (ref 3.8–10.5)

## 2019-01-02 RX ORDER — VANCOMYCIN HCL 1 G
2000 VIAL (EA) INTRAVENOUS EVERY 24 HOURS
Qty: 0 | Refills: 0 | Status: DISCONTINUED | OUTPATIENT
Start: 2019-01-02 | End: 2019-01-04

## 2019-01-02 RX ORDER — VANCOMYCIN HCL 1 G
500 VIAL (EA) INTRAVENOUS ONCE
Qty: 0 | Refills: 0 | Status: DISCONTINUED | OUTPATIENT
Start: 2019-01-02 | End: 2019-01-02

## 2019-01-02 RX ORDER — FUROSEMIDE 40 MG
40 TABLET ORAL
Qty: 0 | Refills: 0 | Status: DISCONTINUED | OUTPATIENT
Start: 2019-01-02 | End: 2019-01-05

## 2019-01-02 RX ADMIN — BUDESONIDE AND FORMOTEROL FUMARATE DIHYDRATE 2 PUFF(S): 160; 4.5 AEROSOL RESPIRATORY (INHALATION) at 21:47

## 2019-01-02 RX ADMIN — OXYCODONE AND ACETAMINOPHEN 1 TABLET(S): 5; 325 TABLET ORAL at 08:17

## 2019-01-02 RX ADMIN — OXYCODONE AND ACETAMINOPHEN 1 TABLET(S): 5; 325 TABLET ORAL at 19:02

## 2019-01-02 RX ADMIN — ATORVASTATIN CALCIUM 40 MILLIGRAM(S): 80 TABLET, FILM COATED ORAL at 21:47

## 2019-01-02 RX ADMIN — Medication 2 CAPSULE(S): at 18:14

## 2019-01-02 RX ADMIN — Medication 175 MICROGRAM(S): at 05:27

## 2019-01-02 RX ADMIN — MAGNESIUM OXIDE 400 MG ORAL TABLET 400 MILLIGRAM(S): 241.3 TABLET ORAL at 08:18

## 2019-01-02 RX ADMIN — Medication 40 MILLIGRAM(S): at 12:52

## 2019-01-02 RX ADMIN — MAGNESIUM OXIDE 400 MG ORAL TABLET 400 MILLIGRAM(S): 241.3 TABLET ORAL at 18:14

## 2019-01-02 RX ADMIN — Medication 3: at 21:45

## 2019-01-02 RX ADMIN — AMPICILLIN SODIUM AND SULBACTAM SODIUM 100 GRAM(S): 250; 125 INJECTION, POWDER, FOR SUSPENSION INTRAMUSCULAR; INTRAVENOUS at 05:27

## 2019-01-02 RX ADMIN — Medication 2 CAPSULE(S): at 13:00

## 2019-01-02 RX ADMIN — Medication 40 MILLIGRAM(S): at 18:21

## 2019-01-02 RX ADMIN — Medication 2: at 08:17

## 2019-01-02 RX ADMIN — DRONEDARONE 400 MILLIGRAM(S): 400 TABLET, FILM COATED ORAL at 05:28

## 2019-01-02 RX ADMIN — TIOTROPIUM BROMIDE 1 CAPSULE(S): 18 CAPSULE ORAL; RESPIRATORY (INHALATION) at 18:14

## 2019-01-02 RX ADMIN — Medication 2 CAPSULE(S): at 08:17

## 2019-01-02 RX ADMIN — DRONEDARONE 400 MILLIGRAM(S): 400 TABLET, FILM COATED ORAL at 19:02

## 2019-01-02 RX ADMIN — AMPICILLIN SODIUM AND SULBACTAM SODIUM 100 GRAM(S): 250; 125 INJECTION, POWDER, FOR SUSPENSION INTRAMUSCULAR; INTRAVENOUS at 18:13

## 2019-01-02 RX ADMIN — OXYCODONE AND ACETAMINOPHEN 1 TABLET(S): 5; 325 TABLET ORAL at 09:00

## 2019-01-02 RX ADMIN — Medication 650 MILLIGRAM(S): at 21:47

## 2019-01-02 RX ADMIN — OXYCODONE AND ACETAMINOPHEN 1 TABLET(S): 5; 325 TABLET ORAL at 19:33

## 2019-01-02 RX ADMIN — AMPICILLIN SODIUM AND SULBACTAM SODIUM 100 GRAM(S): 250; 125 INJECTION, POWDER, FOR SUSPENSION INTRAMUSCULAR; INTRAVENOUS at 00:26

## 2019-01-02 RX ADMIN — MONTELUKAST 10 MILLIGRAM(S): 4 TABLET, CHEWABLE ORAL at 13:00

## 2019-01-02 RX ADMIN — MUPIROCIN 1 APPLICATION(S): 20 OINTMENT TOPICAL at 18:15

## 2019-01-02 RX ADMIN — Medication 650 MILLIGRAM(S): at 22:17

## 2019-01-02 RX ADMIN — AMPICILLIN SODIUM AND SULBACTAM SODIUM 100 GRAM(S): 250; 125 INJECTION, POWDER, FOR SUSPENSION INTRAMUSCULAR; INTRAVENOUS at 12:54

## 2019-01-02 RX ADMIN — BUDESONIDE AND FORMOTEROL FUMARATE DIHYDRATE 2 PUFF(S): 160; 4.5 AEROSOL RESPIRATORY (INHALATION) at 13:00

## 2019-01-02 RX ADMIN — Medication 3: at 12:47

## 2019-01-02 RX ADMIN — Medication 4: at 16:54

## 2019-01-02 RX ADMIN — Medication 250 MILLIGRAM(S): at 15:12

## 2019-01-02 RX ADMIN — RIVAROXABAN 20 MILLIGRAM(S): KIT at 19:04

## 2019-01-02 RX ADMIN — MUPIROCIN 1 APPLICATION(S): 20 OINTMENT TOPICAL at 05:28

## 2019-01-02 NOTE — PROGRESS NOTE ADULT - PROBLEM SELECTOR PLAN 2
Urinating freely; Likely 2/2 nephrotoxic agents  - S/p trial of IVFs x 24 hours w/ mild improvement of Cr; FeUa 37.4% suggests intrinsic renal disease and US WNLs

## 2019-01-02 NOTE — PROGRESS NOTE ADULT - PROBLEM SELECTOR PLAN 5
BP WNLs; holding renal acting agents ARB + HCTZ 2/2 SRIDHRA and Amlodipine 2/2 LE edema   - Monitor and consider Labetalol or Hydralazine/Imdur

## 2019-01-02 NOTE — PROGRESS NOTE ADULT - PROBLEM SELECTOR PLAN 4
Likely diastolic HF w/ bilateral pitting edema  - TTE 1/2018 - G1DD, grossly EF WNLs  - C/w Lasix 40 IV BID; monitor renal function

## 2019-01-03 LAB
ANION GAP SERPL CALC-SCNC: 8 MMOL/L — SIGNIFICANT CHANGE UP (ref 5–17)
BASOPHILS # BLD AUTO: 0.1 K/UL — SIGNIFICANT CHANGE UP (ref 0–0.2)
BASOPHILS NFR BLD AUTO: 1.4 % — SIGNIFICANT CHANGE UP (ref 0–2)
BUN SERPL-MCNC: 18 MG/DL — SIGNIFICANT CHANGE UP (ref 7–18)
CALCIUM SERPL-MCNC: 8 MG/DL — LOW (ref 8.4–10.5)
CHLORIDE SERPL-SCNC: 100 MMOL/L — SIGNIFICANT CHANGE UP (ref 96–108)
CO2 SERPL-SCNC: 27 MMOL/L — SIGNIFICANT CHANGE UP (ref 22–31)
CREAT SERPL-MCNC: 1.26 MG/DL — SIGNIFICANT CHANGE UP (ref 0.5–1.3)
EOSINOPHIL # BLD AUTO: 0.2 K/UL — SIGNIFICANT CHANGE UP (ref 0–0.5)
EOSINOPHIL NFR BLD AUTO: 3.1 % — SIGNIFICANT CHANGE UP (ref 0–6)
GLUCOSE BLDC GLUCOMTR-MCNC: 224 MG/DL — HIGH (ref 70–99)
GLUCOSE BLDC GLUCOMTR-MCNC: 261 MG/DL — HIGH (ref 70–99)
GLUCOSE BLDC GLUCOMTR-MCNC: 275 MG/DL — HIGH (ref 70–99)
GLUCOSE BLDC GLUCOMTR-MCNC: 310 MG/DL — HIGH (ref 70–99)
GLUCOSE SERPL-MCNC: 199 MG/DL — HIGH (ref 70–99)
HCT VFR BLD CALC: 28.1 % — LOW (ref 34.5–45)
HGB BLD-MCNC: 9 G/DL — LOW (ref 11.5–15.5)
LYMPHOCYTES # BLD AUTO: 1.8 K/UL — SIGNIFICANT CHANGE UP (ref 1–3.3)
LYMPHOCYTES # BLD AUTO: 24.1 % — SIGNIFICANT CHANGE UP (ref 13–44)
MAGNESIUM SERPL-MCNC: 2.3 MG/DL — SIGNIFICANT CHANGE UP (ref 1.6–2.6)
MCHC RBC-ENTMCNC: 28.4 PG — SIGNIFICANT CHANGE UP (ref 27–34)
MCHC RBC-ENTMCNC: 32.1 GM/DL — SIGNIFICANT CHANGE UP (ref 32–36)
MCV RBC AUTO: 88.4 FL — SIGNIFICANT CHANGE UP (ref 80–100)
MONOCYTES # BLD AUTO: 0.7 K/UL — SIGNIFICANT CHANGE UP (ref 0–0.9)
MONOCYTES NFR BLD AUTO: 8.9 % — SIGNIFICANT CHANGE UP (ref 2–14)
NEUTROPHILS # BLD AUTO: 4.7 K/UL — SIGNIFICANT CHANGE UP (ref 1.8–7.4)
NEUTROPHILS NFR BLD AUTO: 62.4 % — SIGNIFICANT CHANGE UP (ref 43–77)
PLATELET # BLD AUTO: 288 K/UL — SIGNIFICANT CHANGE UP (ref 150–400)
POTASSIUM SERPL-MCNC: 3.8 MMOL/L — SIGNIFICANT CHANGE UP (ref 3.5–5.3)
POTASSIUM SERPL-SCNC: 3.8 MMOL/L — SIGNIFICANT CHANGE UP (ref 3.5–5.3)
RBC # BLD: 3.18 M/UL — LOW (ref 3.8–5.2)
RBC # FLD: 13.1 % — SIGNIFICANT CHANGE UP (ref 10.3–14.5)
SODIUM SERPL-SCNC: 135 MMOL/L — SIGNIFICANT CHANGE UP (ref 135–145)
WBC # BLD: 7.6 K/UL — SIGNIFICANT CHANGE UP (ref 3.8–10.5)
WBC # FLD AUTO: 7.6 K/UL — SIGNIFICANT CHANGE UP (ref 3.8–10.5)

## 2019-01-03 RX ADMIN — OXYCODONE AND ACETAMINOPHEN 1 TABLET(S): 5; 325 TABLET ORAL at 21:51

## 2019-01-03 RX ADMIN — OXYCODONE AND ACETAMINOPHEN 1 TABLET(S): 5; 325 TABLET ORAL at 02:35

## 2019-01-03 RX ADMIN — DRONEDARONE 400 MILLIGRAM(S): 400 TABLET, FILM COATED ORAL at 18:12

## 2019-01-03 RX ADMIN — Medication 2: at 08:38

## 2019-01-03 RX ADMIN — BUDESONIDE AND FORMOTEROL FUMARATE DIHYDRATE 2 PUFF(S): 160; 4.5 AEROSOL RESPIRATORY (INHALATION) at 21:52

## 2019-01-03 RX ADMIN — MONTELUKAST 10 MILLIGRAM(S): 4 TABLET, CHEWABLE ORAL at 12:37

## 2019-01-03 RX ADMIN — Medication 250 MILLIGRAM(S): at 18:13

## 2019-01-03 RX ADMIN — AMPICILLIN SODIUM AND SULBACTAM SODIUM 100 GRAM(S): 250; 125 INJECTION, POWDER, FOR SUSPENSION INTRAMUSCULAR; INTRAVENOUS at 12:41

## 2019-01-03 RX ADMIN — MUPIROCIN 1 APPLICATION(S): 20 OINTMENT TOPICAL at 05:32

## 2019-01-03 RX ADMIN — OXYCODONE AND ACETAMINOPHEN 1 TABLET(S): 5; 325 TABLET ORAL at 18:26

## 2019-01-03 RX ADMIN — MAGNESIUM OXIDE 400 MG ORAL TABLET 400 MILLIGRAM(S): 241.3 TABLET ORAL at 08:40

## 2019-01-03 RX ADMIN — OXYCODONE AND ACETAMINOPHEN 1 TABLET(S): 5; 325 TABLET ORAL at 09:18

## 2019-01-03 RX ADMIN — AMPICILLIN SODIUM AND SULBACTAM SODIUM 100 GRAM(S): 250; 125 INJECTION, POWDER, FOR SUSPENSION INTRAMUSCULAR; INTRAVENOUS at 05:32

## 2019-01-03 RX ADMIN — Medication 3: at 17:20

## 2019-01-03 RX ADMIN — MAGNESIUM OXIDE 400 MG ORAL TABLET 400 MILLIGRAM(S): 241.3 TABLET ORAL at 18:12

## 2019-01-03 RX ADMIN — Medication 650 MILLIGRAM(S): at 05:39

## 2019-01-03 RX ADMIN — Medication 5 MILLIGRAM(S): at 21:51

## 2019-01-03 RX ADMIN — Medication 175 MICROGRAM(S): at 05:32

## 2019-01-03 RX ADMIN — MUPIROCIN 1 APPLICATION(S): 20 OINTMENT TOPICAL at 18:14

## 2019-01-03 RX ADMIN — Medication 40 MILLIGRAM(S): at 05:32

## 2019-01-03 RX ADMIN — AMPICILLIN SODIUM AND SULBACTAM SODIUM 100 GRAM(S): 250; 125 INJECTION, POWDER, FOR SUSPENSION INTRAMUSCULAR; INTRAVENOUS at 18:12

## 2019-01-03 RX ADMIN — AMPICILLIN SODIUM AND SULBACTAM SODIUM 100 GRAM(S): 250; 125 INJECTION, POWDER, FOR SUSPENSION INTRAMUSCULAR; INTRAVENOUS at 00:36

## 2019-01-03 RX ADMIN — Medication 2 CAPSULE(S): at 08:40

## 2019-01-03 RX ADMIN — Medication 40 MILLIGRAM(S): at 18:16

## 2019-01-03 RX ADMIN — Medication 3: at 12:34

## 2019-01-03 RX ADMIN — Medication 2 CAPSULE(S): at 18:12

## 2019-01-03 RX ADMIN — ATORVASTATIN CALCIUM 40 MILLIGRAM(S): 80 TABLET, FILM COATED ORAL at 21:51

## 2019-01-03 RX ADMIN — Medication 650 MILLIGRAM(S): at 06:35

## 2019-01-03 RX ADMIN — DRONEDARONE 400 MILLIGRAM(S): 400 TABLET, FILM COATED ORAL at 05:32

## 2019-01-03 RX ADMIN — BUDESONIDE AND FORMOTEROL FUMARATE DIHYDRATE 2 PUFF(S): 160; 4.5 AEROSOL RESPIRATORY (INHALATION) at 12:12

## 2019-01-03 RX ADMIN — TIOTROPIUM BROMIDE 1 CAPSULE(S): 18 CAPSULE ORAL; RESPIRATORY (INHALATION) at 12:38

## 2019-01-03 RX ADMIN — Medication 2 CAPSULE(S): at 12:37

## 2019-01-03 RX ADMIN — OXYCODONE AND ACETAMINOPHEN 1 TABLET(S): 5; 325 TABLET ORAL at 22:21

## 2019-01-03 RX ADMIN — RIVAROXABAN 20 MILLIGRAM(S): KIT at 18:11

## 2019-01-03 RX ADMIN — Medication 4: at 22:15

## 2019-01-03 RX ADMIN — OXYCODONE AND ACETAMINOPHEN 1 TABLET(S): 5; 325 TABLET ORAL at 10:20

## 2019-01-03 NOTE — PROGRESS NOTE ADULT - PROBLEM SELECTOR PLAN 4
Likely diastolic HF w/ bilateral pitting edema  - TTE 1/2018 - G1DD, grossly EF WNLs  - C/w Lasix 40 IV BID + Metolazone; monitor renal function

## 2019-01-03 NOTE — PROGRESS NOTE ADULT - PROBLEM SELECTOR PLAN 1
Community onset; no RFs for MRSA including hospitalizations or traumatic injury  - Afebrile, no WBC elevation but mild elevated ESR  - S/p Zosyn in ED; Unasyn x 12/29 - 1/2; Vancomycin x 12/31 - 1/2  ID Dr Lorenzana; c/w Ertapenem 1/2 Community onset; no RFs for MRSA including hospitalizations or traumatic injury  - Afebrile, no WBC elevation but mild elevated ESR  - S/p Zosyn in ED; Unasyn x 12/29 ; Vancomycin x 12/31   ID Dr Lorenzana

## 2019-01-03 NOTE — CONSULT NOTE ADULT - SUBJECTIVE AND OBJECTIVE BOX
HPI:  79 y/o [place] female from home, lives alone w/ HHA 5/week w/ PMH of CAD s/p stents, HTN, HLD, Diastolic CHF, T2DM, Afib on Xarelto s/p PPM 11/2013, Asthma, Hypothyroidism, Chronic Venous Insufficiency, Urinary Incontinence, and IBS p/w c/o R lower leg swelling x 1 week w/out improvement on PO ABx x 1 day. Patient seen in ED day prior - told to return for US the next day. Patient had previous ED visit 1 week prior for R sided pain s/p mechanical fall 2/2 mechanical trip on rug 1 week ago - imaging at the time negative except for dislocated shoulder reduced in the ED As per patient and family at bedside, patient brought in for concern of DVT and cellulitis of right lower extremity. Otherwise patient denied fever, chills, chest pain, shortness of breath, dizziness, syncope, fever, nausea, vomiting, or other any other complaints. Patient admits to chronic DOVE unchanged from baseline. (29 Dec 2018 17:29)      PAST MEDICAL & SURGICAL HISTORY:  Obesity  Pacemaker  Atrial fibrillation  Hypothyroidism  Venous stasis  IBS (irritable bowel syndrome)  CHF (congestive heart failure), NYHA class I  HLD (Hyperlipidemia)  HTN (Hypertension)  Diabetes  Myocardial Infarction  CAD (Coronary Atherosclerotic Disease)  Asthma  S/P coronary angiogram      Vital Signs Last 24 Hrs  T(C): 37.3 (03 Jan 2019 20:08), Max: 37.3 (03 Jan 2019 20:08)  T(F): 99.1 (03 Jan 2019 20:08), Max: 99.1 (03 Jan 2019 20:08)  HR: 76 (03 Jan 2019 20:08) (63 - 76)  BP: 150/71 (03 Jan 2019 20:08) (133/53 - 150/71)  BP(mean): --  RR: 18 (03 Jan 2019 20:08) (17 - 18)  SpO2: 99% (03 Jan 2019 20:08) (96% - 99%)                          9.0    7.6   )-----------( 288      ( 03 Jan 2019 07:21 )             28.1     01-03    135  |  100  |  18  ----------------------------<  199<H>  3.8   |  27  |  1.26    Ca    8.0<L>      03 Jan 2019 07:21  Phos  2.7     01-02  Mg     2.3     01-03          PHYSICAL EXAM  ABIs reviewed with Dr Bacon  < from: [facility] Physiol Extremity Lower 3+ Level, BI (01.01.19 @ 16:17) >  Findings:  Right lower extremity: The ankle brachial index is 0.76. The pulse   waveforms are intact.    Left lower extremity: The ankle brachial index is 0.86. The pulse   waveforms are intact.    Impression: Right ROCÍO 0.76 and left ROCÍO of 0.86.    < end of copied text >      ASSESSMENT/ PLAN:  B/L LE cellulitis  cont lower extremity elevation  abx as per med/ID  pt seen with Dr Bacon

## 2019-01-03 NOTE — PROGRESS NOTE ADULT - PROBLEM SELECTOR PLAN 2
Urinating freely; Likely 2/2 nephrotoxic agents  - S/p trial of IVFs x 24 hours w/ mild improvement of Cr; FeUa 37.4% suggests intrinsic renal disease and US WNLs  - Giving IV Lasix 2/2 LE edema

## 2019-01-04 LAB
CK SERPL-CCNC: 90 U/L — SIGNIFICANT CHANGE UP (ref 21–215)
GLUCOSE BLDC GLUCOMTR-MCNC: 213 MG/DL — HIGH (ref 70–99)
GLUCOSE BLDC GLUCOMTR-MCNC: 258 MG/DL — HIGH (ref 70–99)
GLUCOSE BLDC GLUCOMTR-MCNC: 273 MG/DL — HIGH (ref 70–99)
GLUCOSE BLDC GLUCOMTR-MCNC: 333 MG/DL — HIGH (ref 70–99)

## 2019-01-04 RX ORDER — INSULIN GLARGINE 100 [IU]/ML
5 INJECTION, SOLUTION SUBCUTANEOUS AT BEDTIME
Qty: 0 | Refills: 0 | Status: DISCONTINUED | OUTPATIENT
Start: 2019-01-04 | End: 2019-01-09

## 2019-01-04 RX ORDER — AMPICILLIN SODIUM AND SULBACTAM SODIUM 250; 125 MG/ML; MG/ML
3 INJECTION, POWDER, FOR SUSPENSION INTRAMUSCULAR; INTRAVENOUS EVERY 6 HOURS
Qty: 0 | Refills: 0 | Status: DISCONTINUED | OUTPATIENT
Start: 2019-01-04 | End: 2019-01-09

## 2019-01-04 RX ADMIN — AMPICILLIN SODIUM AND SULBACTAM SODIUM 100 GRAM(S): 250; 125 INJECTION, POWDER, FOR SUSPENSION INTRAMUSCULAR; INTRAVENOUS at 00:06

## 2019-01-04 RX ADMIN — DRONEDARONE 400 MILLIGRAM(S): 400 TABLET, FILM COATED ORAL at 17:00

## 2019-01-04 RX ADMIN — MAGNESIUM OXIDE 400 MG ORAL TABLET 400 MILLIGRAM(S): 241.3 TABLET ORAL at 07:56

## 2019-01-04 RX ADMIN — AMPICILLIN SODIUM AND SULBACTAM SODIUM 100 GRAM(S): 250; 125 INJECTION, POWDER, FOR SUSPENSION INTRAMUSCULAR; INTRAVENOUS at 06:19

## 2019-01-04 RX ADMIN — MONTELUKAST 10 MILLIGRAM(S): 4 TABLET, CHEWABLE ORAL at 11:30

## 2019-01-04 RX ADMIN — Medication 4: at 21:54

## 2019-01-04 RX ADMIN — DRONEDARONE 400 MILLIGRAM(S): 400 TABLET, FILM COATED ORAL at 06:19

## 2019-01-04 RX ADMIN — Medication 2 CAPSULE(S): at 07:56

## 2019-01-04 RX ADMIN — Medication 2: at 07:56

## 2019-01-04 RX ADMIN — BUDESONIDE AND FORMOTEROL FUMARATE DIHYDRATE 2 PUFF(S): 160; 4.5 AEROSOL RESPIRATORY (INHALATION) at 21:55

## 2019-01-04 RX ADMIN — TIOTROPIUM BROMIDE 1 CAPSULE(S): 18 CAPSULE ORAL; RESPIRATORY (INHALATION) at 11:32

## 2019-01-04 RX ADMIN — BUDESONIDE AND FORMOTEROL FUMARATE DIHYDRATE 2 PUFF(S): 160; 4.5 AEROSOL RESPIRATORY (INHALATION) at 11:31

## 2019-01-04 RX ADMIN — Medication 5 MILLIGRAM(S): at 17:01

## 2019-01-04 RX ADMIN — MAGNESIUM OXIDE 400 MG ORAL TABLET 400 MILLIGRAM(S): 241.3 TABLET ORAL at 17:02

## 2019-01-04 RX ADMIN — OXYCODONE AND ACETAMINOPHEN 1 TABLET(S): 5; 325 TABLET ORAL at 06:53

## 2019-01-04 RX ADMIN — Medication 2 CAPSULE(S): at 12:09

## 2019-01-04 RX ADMIN — Medication 3: at 11:38

## 2019-01-04 RX ADMIN — INSULIN GLARGINE 5 UNIT(S): 100 INJECTION, SOLUTION SUBCUTANEOUS at 21:54

## 2019-01-04 RX ADMIN — Medication 250 MILLIGRAM(S): at 18:00

## 2019-01-04 RX ADMIN — OXYCODONE AND ACETAMINOPHEN 1 TABLET(S): 5; 325 TABLET ORAL at 06:23

## 2019-01-04 RX ADMIN — ATORVASTATIN CALCIUM 40 MILLIGRAM(S): 80 TABLET, FILM COATED ORAL at 21:54

## 2019-01-04 RX ADMIN — MUPIROCIN 1 APPLICATION(S): 20 OINTMENT TOPICAL at 06:22

## 2019-01-04 RX ADMIN — Medication 3: at 16:50

## 2019-01-04 RX ADMIN — OXYCODONE AND ACETAMINOPHEN 1 TABLET(S): 5; 325 TABLET ORAL at 01:27

## 2019-01-04 RX ADMIN — OXYCODONE AND ACETAMINOPHEN 1 TABLET(S): 5; 325 TABLET ORAL at 21:53

## 2019-01-04 RX ADMIN — Medication 650 MILLIGRAM(S): at 22:57

## 2019-01-04 RX ADMIN — Medication 650 MILLIGRAM(S): at 02:16

## 2019-01-04 RX ADMIN — Medication 40 MILLIGRAM(S): at 17:48

## 2019-01-04 RX ADMIN — Medication 40 MILLIGRAM(S): at 06:22

## 2019-01-04 RX ADMIN — Medication 650 MILLIGRAM(S): at 22:27

## 2019-01-04 RX ADMIN — AMPICILLIN SODIUM AND SULBACTAM SODIUM 100 GRAM(S): 250; 125 INJECTION, POWDER, FOR SUSPENSION INTRAMUSCULAR; INTRAVENOUS at 18:00

## 2019-01-04 RX ADMIN — Medication 2 CAPSULE(S): at 17:00

## 2019-01-04 RX ADMIN — RIVAROXABAN 20 MILLIGRAM(S): KIT at 17:00

## 2019-01-04 RX ADMIN — Medication 650 MILLIGRAM(S): at 01:46

## 2019-01-04 RX ADMIN — OXYCODONE AND ACETAMINOPHEN 1 TABLET(S): 5; 325 TABLET ORAL at 01:57

## 2019-01-04 RX ADMIN — Medication 175 MICROGRAM(S): at 06:19

## 2019-01-04 RX ADMIN — OXYCODONE AND ACETAMINOPHEN 1 TABLET(S): 5; 325 TABLET ORAL at 22:23

## 2019-01-04 RX ADMIN — MUPIROCIN 1 APPLICATION(S): 20 OINTMENT TOPICAL at 17:02

## 2019-01-04 RX ADMIN — AMPICILLIN SODIUM AND SULBACTAM SODIUM 100 GRAM(S): 250; 125 INJECTION, POWDER, FOR SUSPENSION INTRAMUSCULAR; INTRAVENOUS at 12:09

## 2019-01-04 NOTE — PROGRESS NOTE ADULT - PROBLEM SELECTOR PLAN 1
Community onset; no RFs for MRSA including hospitalizations or traumatic injury  - Afebrile, no WBC elevation but mild elevated ESR  - S/p Zosyn in ED; Unasyn x 12/29 ; Vancomycin x 12/31   ID Dr Lorenzana; if discharge, PO Doxy 100 BID x 7 days

## 2019-01-05 LAB
ANION GAP SERPL CALC-SCNC: 12 MMOL/L — SIGNIFICANT CHANGE UP (ref 5–17)
ANION GAP SERPL CALC-SCNC: 9 MMOL/L — SIGNIFICANT CHANGE UP (ref 5–17)
ANION GAP SERPL CALC-SCNC: 9 MMOL/L — SIGNIFICANT CHANGE UP (ref 5–17)
BASOPHILS # BLD AUTO: 0.2 K/UL — SIGNIFICANT CHANGE UP (ref 0–0.2)
BASOPHILS NFR BLD AUTO: 1.7 % — SIGNIFICANT CHANGE UP (ref 0–2)
BUN SERPL-MCNC: 22 MG/DL — HIGH (ref 7–18)
BUN SERPL-MCNC: 23 MG/DL — HIGH (ref 7–18)
BUN SERPL-MCNC: 24 MG/DL — HIGH (ref 7–18)
CALCIUM SERPL-MCNC: 8.6 MG/DL — SIGNIFICANT CHANGE UP (ref 8.4–10.5)
CALCIUM SERPL-MCNC: 8.7 MG/DL — SIGNIFICANT CHANGE UP (ref 8.4–10.5)
CALCIUM SERPL-MCNC: 8.9 MG/DL — SIGNIFICANT CHANGE UP (ref 8.4–10.5)
CHLORIDE SERPL-SCNC: 86 MMOL/L — LOW (ref 96–108)
CO2 SERPL-SCNC: 31 MMOL/L — SIGNIFICANT CHANGE UP (ref 22–31)
CO2 SERPL-SCNC: 32 MMOL/L — HIGH (ref 22–31)
CO2 SERPL-SCNC: 33 MMOL/L — HIGH (ref 22–31)
CREAT ?TM UR-MCNC: 60 MG/DL — SIGNIFICANT CHANGE UP
CREAT SERPL-MCNC: 1.42 MG/DL — HIGH (ref 0.5–1.3)
CREAT SERPL-MCNC: 1.49 MG/DL — HIGH (ref 0.5–1.3)
CREAT SERPL-MCNC: 1.61 MG/DL — HIGH (ref 0.5–1.3)
EOSINOPHIL # BLD AUTO: 0.2 K/UL — SIGNIFICANT CHANGE UP (ref 0–0.5)
EOSINOPHIL NFR BLD AUTO: 2.4 % — SIGNIFICANT CHANGE UP (ref 0–6)
GLUCOSE BLDC GLUCOMTR-MCNC: 213 MG/DL — HIGH (ref 70–99)
GLUCOSE BLDC GLUCOMTR-MCNC: 258 MG/DL — HIGH (ref 70–99)
GLUCOSE BLDC GLUCOMTR-MCNC: 269 MG/DL — HIGH (ref 70–99)
GLUCOSE BLDC GLUCOMTR-MCNC: 280 MG/DL — HIGH (ref 70–99)
GLUCOSE SERPL-MCNC: 190 MG/DL — HIGH (ref 70–99)
GLUCOSE SERPL-MCNC: 191 MG/DL — HIGH (ref 70–99)
GLUCOSE SERPL-MCNC: 254 MG/DL — HIGH (ref 70–99)
HCT VFR BLD CALC: 31.5 % — LOW (ref 34.5–45)
HGB BLD-MCNC: 10.3 G/DL — LOW (ref 11.5–15.5)
LYMPHOCYTES # BLD AUTO: 2.2 K/UL — SIGNIFICANT CHANGE UP (ref 1–3.3)
LYMPHOCYTES # BLD AUTO: 22.7 % — SIGNIFICANT CHANGE UP (ref 13–44)
MCHC RBC-ENTMCNC: 28.8 PG — SIGNIFICANT CHANGE UP (ref 27–34)
MCHC RBC-ENTMCNC: 32.8 GM/DL — SIGNIFICANT CHANGE UP (ref 32–36)
MCV RBC AUTO: 87.6 FL — SIGNIFICANT CHANGE UP (ref 80–100)
MONOCYTES # BLD AUTO: 0.7 K/UL — SIGNIFICANT CHANGE UP (ref 0–0.9)
MONOCYTES NFR BLD AUTO: 7.6 % — SIGNIFICANT CHANGE UP (ref 2–14)
NEUTROPHILS # BLD AUTO: 6.4 K/UL — SIGNIFICANT CHANGE UP (ref 1.8–7.4)
NEUTROPHILS NFR BLD AUTO: 65.6 % — SIGNIFICANT CHANGE UP (ref 43–77)
OSMOLALITY UR: 364 MOS/KG — SIGNIFICANT CHANGE UP (ref 50–1200)
PLATELET # BLD AUTO: 343 K/UL — SIGNIFICANT CHANGE UP (ref 150–400)
POTASSIUM SERPL-MCNC: 3 MMOL/L — LOW (ref 3.5–5.3)
POTASSIUM SERPL-MCNC: 3.1 MMOL/L — LOW (ref 3.5–5.3)
POTASSIUM SERPL-MCNC: 3.7 MMOL/L — SIGNIFICANT CHANGE UP (ref 3.5–5.3)
POTASSIUM SERPL-SCNC: 3 MMOL/L — LOW (ref 3.5–5.3)
POTASSIUM SERPL-SCNC: 3.1 MMOL/L — LOW (ref 3.5–5.3)
POTASSIUM SERPL-SCNC: 3.7 MMOL/L — SIGNIFICANT CHANGE UP (ref 3.5–5.3)
POTASSIUM UR-SCNC: 42 MMOL/L — SIGNIFICANT CHANGE UP (ref 25–125)
RBC # BLD: 3.59 M/UL — LOW (ref 3.8–5.2)
RBC # FLD: 12.9 % — SIGNIFICANT CHANGE UP (ref 10.3–14.5)
SODIUM SERPL-SCNC: 127 MMOL/L — LOW (ref 135–145)
SODIUM SERPL-SCNC: 128 MMOL/L — LOW (ref 135–145)
SODIUM SERPL-SCNC: 129 MMOL/L — LOW (ref 135–145)
SODIUM UR-SCNC: 56 MMOL/L — SIGNIFICANT CHANGE UP (ref 40–220)
VANCOMYCIN TROUGH SERPL-MCNC: 28.1 UG/ML — CRITICAL HIGH (ref 10–20)
WBC # BLD: 9.7 K/UL — SIGNIFICANT CHANGE UP (ref 3.8–10.5)
WBC # FLD AUTO: 9.7 K/UL — SIGNIFICANT CHANGE UP (ref 3.8–10.5)

## 2019-01-05 RX ORDER — LACTOBACILLUS ACIDOPHILUS 100MM CELL
1 CAPSULE ORAL DAILY
Qty: 0 | Refills: 0 | Status: DISCONTINUED | OUTPATIENT
Start: 2019-01-05 | End: 2019-01-09

## 2019-01-05 RX ORDER — SODIUM CHLORIDE 9 MG/ML
1 INJECTION INTRAMUSCULAR; INTRAVENOUS; SUBCUTANEOUS EVERY 12 HOURS
Qty: 0 | Refills: 0 | Status: DISCONTINUED | OUTPATIENT
Start: 2019-01-05 | End: 2019-01-05

## 2019-01-05 RX ORDER — NYSTATIN CREAM 100000 [USP'U]/G
1 CREAM TOPICAL
Qty: 0 | Refills: 0 | Status: DISCONTINUED | OUTPATIENT
Start: 2019-01-05 | End: 2019-01-09

## 2019-01-05 RX ORDER — SODIUM CHLORIDE 9 MG/ML
1000 INJECTION INTRAMUSCULAR; INTRAVENOUS; SUBCUTANEOUS
Qty: 0 | Refills: 0 | Status: DISCONTINUED | OUTPATIENT
Start: 2019-01-05 | End: 2019-01-05

## 2019-01-05 RX ORDER — POTASSIUM CHLORIDE 20 MEQ
40 PACKET (EA) ORAL EVERY 4 HOURS
Qty: 0 | Refills: 0 | Status: COMPLETED | OUTPATIENT
Start: 2019-01-05 | End: 2019-01-05

## 2019-01-05 RX ORDER — NYSTATIN CREAM 100000 [USP'U]/G
1 CREAM TOPICAL THREE TIMES A DAY
Qty: 0 | Refills: 0 | Status: DISCONTINUED | OUTPATIENT
Start: 2019-01-05 | End: 2019-01-05

## 2019-01-05 RX ADMIN — Medication 3: at 21:54

## 2019-01-05 RX ADMIN — NYSTATIN CREAM 1 APPLICATION(S): 100000 CREAM TOPICAL at 12:44

## 2019-01-05 RX ADMIN — Medication 175 MICROGRAM(S): at 05:33

## 2019-01-05 RX ADMIN — AMPICILLIN SODIUM AND SULBACTAM SODIUM 200 GRAM(S): 250; 125 INJECTION, POWDER, FOR SUSPENSION INTRAMUSCULAR; INTRAVENOUS at 00:18

## 2019-01-05 RX ADMIN — OXYCODONE AND ACETAMINOPHEN 1 TABLET(S): 5; 325 TABLET ORAL at 22:01

## 2019-01-05 RX ADMIN — RIVAROXABAN 20 MILLIGRAM(S): KIT at 17:21

## 2019-01-05 RX ADMIN — Medication 2: at 09:12

## 2019-01-05 RX ADMIN — Medication 2 CAPSULE(S): at 17:19

## 2019-01-05 RX ADMIN — Medication 650 MILLIGRAM(S): at 12:45

## 2019-01-05 RX ADMIN — INSULIN GLARGINE 5 UNIT(S): 100 INJECTION, SOLUTION SUBCUTANEOUS at 21:53

## 2019-01-05 RX ADMIN — Medication 1 TABLET(S): at 17:19

## 2019-01-05 RX ADMIN — DRONEDARONE 400 MILLIGRAM(S): 400 TABLET, FILM COATED ORAL at 05:33

## 2019-01-05 RX ADMIN — BUDESONIDE AND FORMOTEROL FUMARATE DIHYDRATE 2 PUFF(S): 160; 4.5 AEROSOL RESPIRATORY (INHALATION) at 12:00

## 2019-01-05 RX ADMIN — TIOTROPIUM BROMIDE 1 CAPSULE(S): 18 CAPSULE ORAL; RESPIRATORY (INHALATION) at 17:20

## 2019-01-05 RX ADMIN — OXYCODONE AND ACETAMINOPHEN 1 TABLET(S): 5; 325 TABLET ORAL at 22:35

## 2019-01-05 RX ADMIN — Medication 40 MILLIGRAM(S): at 05:34

## 2019-01-05 RX ADMIN — AMPICILLIN SODIUM AND SULBACTAM SODIUM 200 GRAM(S): 250; 125 INJECTION, POWDER, FOR SUSPENSION INTRAMUSCULAR; INTRAVENOUS at 23:28

## 2019-01-05 RX ADMIN — MAGNESIUM OXIDE 400 MG ORAL TABLET 400 MILLIGRAM(S): 241.3 TABLET ORAL at 17:19

## 2019-01-05 RX ADMIN — DRONEDARONE 400 MILLIGRAM(S): 400 TABLET, FILM COATED ORAL at 17:19

## 2019-01-05 RX ADMIN — Medication 40 MILLIEQUIVALENT(S): at 17:19

## 2019-01-05 RX ADMIN — OXYCODONE AND ACETAMINOPHEN 1 TABLET(S): 5; 325 TABLET ORAL at 04:05

## 2019-01-05 RX ADMIN — AMPICILLIN SODIUM AND SULBACTAM SODIUM 200 GRAM(S): 250; 125 INJECTION, POWDER, FOR SUSPENSION INTRAMUSCULAR; INTRAVENOUS at 05:33

## 2019-01-05 RX ADMIN — BUDESONIDE AND FORMOTEROL FUMARATE DIHYDRATE 2 PUFF(S): 160; 4.5 AEROSOL RESPIRATORY (INHALATION) at 21:53

## 2019-01-05 RX ADMIN — Medication 2 CAPSULE(S): at 12:43

## 2019-01-05 RX ADMIN — Medication 3: at 12:38

## 2019-01-05 RX ADMIN — MAGNESIUM OXIDE 400 MG ORAL TABLET 400 MILLIGRAM(S): 241.3 TABLET ORAL at 12:43

## 2019-01-05 RX ADMIN — MONTELUKAST 10 MILLIGRAM(S): 4 TABLET, CHEWABLE ORAL at 12:43

## 2019-01-05 RX ADMIN — Medication 650 MILLIGRAM(S): at 13:50

## 2019-01-05 RX ADMIN — Medication 3: at 17:18

## 2019-01-05 RX ADMIN — MUPIROCIN 1 APPLICATION(S): 20 OINTMENT TOPICAL at 05:33

## 2019-01-05 RX ADMIN — OXYCODONE AND ACETAMINOPHEN 1 TABLET(S): 5; 325 TABLET ORAL at 03:35

## 2019-01-05 RX ADMIN — Medication 5 MILLIGRAM(S): at 02:22

## 2019-01-05 RX ADMIN — AMPICILLIN SODIUM AND SULBACTAM SODIUM 200 GRAM(S): 250; 125 INJECTION, POWDER, FOR SUSPENSION INTRAMUSCULAR; INTRAVENOUS at 17:20

## 2019-01-05 RX ADMIN — MUPIROCIN 1 APPLICATION(S): 20 OINTMENT TOPICAL at 17:20

## 2019-01-05 RX ADMIN — Medication 40 MILLIEQUIVALENT(S): at 12:43

## 2019-01-05 RX ADMIN — ATORVASTATIN CALCIUM 40 MILLIGRAM(S): 80 TABLET, FILM COATED ORAL at 21:54

## 2019-01-05 RX ADMIN — NYSTATIN CREAM 1 APPLICATION(S): 100000 CREAM TOPICAL at 17:21

## 2019-01-05 RX ADMIN — AMPICILLIN SODIUM AND SULBACTAM SODIUM 200 GRAM(S): 250; 125 INJECTION, POWDER, FOR SUSPENSION INTRAMUSCULAR; INTRAVENOUS at 12:42

## 2019-01-05 NOTE — PROGRESS NOTE ADULT - PROBLEM SELECTOR PLAN 2
Urinating freely; Likely 2/2 nephrotoxic agents  - S/p trial of IVFs x 24 hours w/ mild improvement of Cr; FeUa 37.4% suggests intrinsic renal disease and US WNLs  - Giving IV Lasix 2/2 LE edema -Creat 1.49 today  - Urinating freely; Likely 2/2 nephrotoxic agents  - S/p trial of IVFs x 24 hours w/ mild improvement of Cr; FeUa 37.4% suggests intrinsic renal disease and US WNLs  - Hold Lasix and metolazone for worsening renal function and hyponatremia   - Will repeat BMP today- Hold fluids with concern for clinical hypervolemia   - f/u repeat [place]

## 2019-01-05 NOTE — PROGRESS NOTE ADULT - PROBLEM SELECTOR PLAN 1
Community onset; no RFs for MRSA including hospitalizations or traumatic injury  - Afebrile, no WBC elevation but mild elevated ESR  - S/p Zosyn in ED; Unasyn x 12/29 ; Vancomycin x 12/31   ID Dr Lorenzana; if discharge, PO Doxy 100 BID x 7 days Community onset; no RFs for MRSA including hospitalizations or traumatic injury  - Afebrile, no WBC elevation but mild elevated ESR  - S/p Zosyn in ED; Unasyn x 12/29 ; Vancomycin x 12/31   - Vanc d/c'd per ID recs  - f/u CT RLE for myositis   ID Dr Lorenzana; if discharge, PO Doxy 100 BID x 7 days

## 2019-01-05 NOTE — PROGRESS NOTE ADULT - PROBLEM SELECTOR PLAN 4
Likely diastolic HF w/ bilateral pitting edema  - TTE 1/2018 - G1DD, grossly EF WNLs  - C/w Lasix 40 IV BID + Metolazone; monitor renal function Likely diastolic HF w/ bilateral pitting edema  - TTE 1/2018 - G1DD, grossly EF WNLs  - Hold lasix and metolazone- start at lower dose if renal function improves

## 2019-01-06 LAB
ANION GAP SERPL CALC-SCNC: 10 MMOL/L — SIGNIFICANT CHANGE UP (ref 5–17)
BASOPHILS # BLD AUTO: 0.1 K/UL — SIGNIFICANT CHANGE UP (ref 0–0.2)
BASOPHILS NFR BLD AUTO: 1.1 % — SIGNIFICANT CHANGE UP (ref 0–2)
BUN SERPL-MCNC: 23 MG/DL — HIGH (ref 7–18)
CALCIUM SERPL-MCNC: 8.8 MG/DL — SIGNIFICANT CHANGE UP (ref 8.4–10.5)
CHLORIDE SERPL-SCNC: 89 MMOL/L — LOW (ref 96–108)
CK SERPL-CCNC: 73 U/L — SIGNIFICANT CHANGE UP (ref 21–215)
CO2 SERPL-SCNC: 30 MMOL/L — SIGNIFICANT CHANGE UP (ref 22–31)
CREAT SERPL-MCNC: 1.41 MG/DL — HIGH (ref 0.5–1.3)
EOSINOPHIL # BLD AUTO: 0.2 K/UL — SIGNIFICANT CHANGE UP (ref 0–0.5)
EOSINOPHIL NFR BLD AUTO: 2.6 % — SIGNIFICANT CHANGE UP (ref 0–6)
GLUCOSE BLDC GLUCOMTR-MCNC: 207 MG/DL — HIGH (ref 70–99)
GLUCOSE BLDC GLUCOMTR-MCNC: 248 MG/DL — HIGH (ref 70–99)
GLUCOSE BLDC GLUCOMTR-MCNC: 255 MG/DL — HIGH (ref 70–99)
GLUCOSE BLDC GLUCOMTR-MCNC: 316 MG/DL — HIGH (ref 70–99)
GLUCOSE SERPL-MCNC: 190 MG/DL — HIGH (ref 70–99)
HCT VFR BLD CALC: 30.7 % — LOW (ref 34.5–45)
HGB BLD-MCNC: 10 G/DL — LOW (ref 11.5–15.5)
LYMPHOCYTES # BLD AUTO: 2 K/UL — SIGNIFICANT CHANGE UP (ref 1–3.3)
LYMPHOCYTES # BLD AUTO: 22.3 % — SIGNIFICANT CHANGE UP (ref 13–44)
MCHC RBC-ENTMCNC: 28.4 PG — SIGNIFICANT CHANGE UP (ref 27–34)
MCHC RBC-ENTMCNC: 32.6 GM/DL — SIGNIFICANT CHANGE UP (ref 32–36)
MCV RBC AUTO: 87.2 FL — SIGNIFICANT CHANGE UP (ref 80–100)
MONOCYTES # BLD AUTO: 0.8 K/UL — SIGNIFICANT CHANGE UP (ref 0–0.9)
MONOCYTES NFR BLD AUTO: 9 % — SIGNIFICANT CHANGE UP (ref 2–14)
NEUTROPHILS # BLD AUTO: 5.8 K/UL — SIGNIFICANT CHANGE UP (ref 1.8–7.4)
NEUTROPHILS NFR BLD AUTO: 65.1 % — SIGNIFICANT CHANGE UP (ref 43–77)
PHOSPHATE 24H UR-MCNC: 24.1 MG/DL — SIGNIFICANT CHANGE UP
PLATELET # BLD AUTO: 331 K/UL — SIGNIFICANT CHANGE UP (ref 150–400)
POTASSIUM SERPL-MCNC: 3.5 MMOL/L — SIGNIFICANT CHANGE UP (ref 3.5–5.3)
POTASSIUM SERPL-SCNC: 3.5 MMOL/L — SIGNIFICANT CHANGE UP (ref 3.5–5.3)
RBC # BLD: 3.52 M/UL — LOW (ref 3.8–5.2)
RBC # FLD: 12.7 % — SIGNIFICANT CHANGE UP (ref 10.3–14.5)
SODIUM SERPL-SCNC: 129 MMOL/L — LOW (ref 135–145)
WBC # BLD: 8.9 K/UL — SIGNIFICANT CHANGE UP (ref 3.8–10.5)
WBC # FLD AUTO: 8.9 K/UL — SIGNIFICANT CHANGE UP (ref 3.8–10.5)

## 2019-01-06 PROCEDURE — 73700 CT LOWER EXTREMITY W/O DYE: CPT | Mod: 26,RT

## 2019-01-06 RX ORDER — FUROSEMIDE 40 MG
40 TABLET ORAL DAILY
Qty: 0 | Refills: 0 | Status: DISCONTINUED | OUTPATIENT
Start: 2019-01-06 | End: 2019-01-09

## 2019-01-06 RX ADMIN — OXYCODONE AND ACETAMINOPHEN 1 TABLET(S): 5; 325 TABLET ORAL at 04:58

## 2019-01-06 RX ADMIN — Medication 2 CAPSULE(S): at 17:58

## 2019-01-06 RX ADMIN — MAGNESIUM OXIDE 400 MG ORAL TABLET 400 MILLIGRAM(S): 241.3 TABLET ORAL at 08:47

## 2019-01-06 RX ADMIN — Medication 1 TABLET(S): at 13:14

## 2019-01-06 RX ADMIN — RIVAROXABAN 20 MILLIGRAM(S): KIT at 17:59

## 2019-01-06 RX ADMIN — ATORVASTATIN CALCIUM 40 MILLIGRAM(S): 80 TABLET, FILM COATED ORAL at 21:24

## 2019-01-06 RX ADMIN — Medication 3: at 21:26

## 2019-01-06 RX ADMIN — Medication 325 MILLIGRAM(S): at 13:26

## 2019-01-06 RX ADMIN — NYSTATIN CREAM 1 APPLICATION(S): 100000 CREAM TOPICAL at 08:46

## 2019-01-06 RX ADMIN — Medication 650 MILLIGRAM(S): at 14:25

## 2019-01-06 RX ADMIN — DRONEDARONE 400 MILLIGRAM(S): 400 TABLET, FILM COATED ORAL at 17:58

## 2019-01-06 RX ADMIN — Medication 2 CAPSULE(S): at 08:47

## 2019-01-06 RX ADMIN — AMPICILLIN SODIUM AND SULBACTAM SODIUM 200 GRAM(S): 250; 125 INJECTION, POWDER, FOR SUSPENSION INTRAMUSCULAR; INTRAVENOUS at 23:46

## 2019-01-06 RX ADMIN — DRONEDARONE 400 MILLIGRAM(S): 400 TABLET, FILM COATED ORAL at 06:24

## 2019-01-06 RX ADMIN — NYSTATIN CREAM 1 APPLICATION(S): 100000 CREAM TOPICAL at 17:59

## 2019-01-06 RX ADMIN — OXYCODONE AND ACETAMINOPHEN 1 TABLET(S): 5; 325 TABLET ORAL at 04:20

## 2019-01-06 RX ADMIN — MUPIROCIN 1 APPLICATION(S): 20 OINTMENT TOPICAL at 06:25

## 2019-01-06 RX ADMIN — Medication 4: at 17:26

## 2019-01-06 RX ADMIN — TIOTROPIUM BROMIDE 1 CAPSULE(S): 18 CAPSULE ORAL; RESPIRATORY (INHALATION) at 18:00

## 2019-01-06 RX ADMIN — Medication 2 CAPSULE(S): at 13:14

## 2019-01-06 RX ADMIN — Medication 2: at 08:47

## 2019-01-06 RX ADMIN — BUDESONIDE AND FORMOTEROL FUMARATE DIHYDRATE 2 PUFF(S): 160; 4.5 AEROSOL RESPIRATORY (INHALATION) at 21:24

## 2019-01-06 RX ADMIN — MAGNESIUM OXIDE 400 MG ORAL TABLET 400 MILLIGRAM(S): 241.3 TABLET ORAL at 17:58

## 2019-01-06 RX ADMIN — MUPIROCIN 1 APPLICATION(S): 20 OINTMENT TOPICAL at 17:59

## 2019-01-06 RX ADMIN — MONTELUKAST 10 MILLIGRAM(S): 4 TABLET, CHEWABLE ORAL at 13:14

## 2019-01-06 RX ADMIN — AMPICILLIN SODIUM AND SULBACTAM SODIUM 200 GRAM(S): 250; 125 INJECTION, POWDER, FOR SUSPENSION INTRAMUSCULAR; INTRAVENOUS at 17:59

## 2019-01-06 RX ADMIN — BUDESONIDE AND FORMOTEROL FUMARATE DIHYDRATE 2 PUFF(S): 160; 4.5 AEROSOL RESPIRATORY (INHALATION) at 13:15

## 2019-01-06 RX ADMIN — INSULIN GLARGINE 5 UNIT(S): 100 INJECTION, SOLUTION SUBCUTANEOUS at 21:26

## 2019-01-06 RX ADMIN — AMPICILLIN SODIUM AND SULBACTAM SODIUM 200 GRAM(S): 250; 125 INJECTION, POWDER, FOR SUSPENSION INTRAMUSCULAR; INTRAVENOUS at 13:14

## 2019-01-06 RX ADMIN — Medication 175 MICROGRAM(S): at 06:24

## 2019-01-06 RX ADMIN — OXYCODONE AND ACETAMINOPHEN 1 TABLET(S): 5; 325 TABLET ORAL at 21:24

## 2019-01-06 RX ADMIN — Medication 2: at 13:09

## 2019-01-06 RX ADMIN — Medication 40 MILLIGRAM(S): at 13:37

## 2019-01-06 RX ADMIN — OXYCODONE AND ACETAMINOPHEN 1 TABLET(S): 5; 325 TABLET ORAL at 21:58

## 2019-01-06 RX ADMIN — AMPICILLIN SODIUM AND SULBACTAM SODIUM 200 GRAM(S): 250; 125 INJECTION, POWDER, FOR SUSPENSION INTRAMUSCULAR; INTRAVENOUS at 06:24

## 2019-01-06 NOTE — CHART NOTE - NSCHARTNOTEFT_GEN_A_CORE
I got paged to check the patient as she was having B/L leg swelling  CT RLE result came back showing nondisplaced intra-articular fracture at the posterior, central proximal right tibia.  Pt is now for Bed rest   I informed Dr. Rodriguez who recommended to get Ortho consult with Dr. Griffith  I tried reaching Ortho for consult  To be followed by the primary team in the morning   Thank You

## 2019-01-07 LAB
ANION GAP SERPL CALC-SCNC: 7 MMOL/L — SIGNIFICANT CHANGE UP (ref 5–17)
BASOPHILS # BLD AUTO: 0.1 K/UL — SIGNIFICANT CHANGE UP (ref 0–0.2)
BASOPHILS NFR BLD AUTO: 1 % — SIGNIFICANT CHANGE UP (ref 0–2)
BUN SERPL-MCNC: 23 MG/DL — HIGH (ref 7–18)
CALCIUM SERPL-MCNC: 8.3 MG/DL — LOW (ref 8.4–10.5)
CHLORIDE SERPL-SCNC: 89 MMOL/L — LOW (ref 96–108)
CO2 SERPL-SCNC: 34 MMOL/L — HIGH (ref 22–31)
CREAT SERPL-MCNC: 1.34 MG/DL — HIGH (ref 0.5–1.3)
EOSINOPHIL # BLD AUTO: 0.2 K/UL — SIGNIFICANT CHANGE UP (ref 0–0.5)
EOSINOPHIL NFR BLD AUTO: 2 % — SIGNIFICANT CHANGE UP (ref 0–6)
GLUCOSE BLDC GLUCOMTR-MCNC: 228 MG/DL — HIGH (ref 70–99)
GLUCOSE BLDC GLUCOMTR-MCNC: 275 MG/DL — HIGH (ref 70–99)
GLUCOSE BLDC GLUCOMTR-MCNC: 299 MG/DL — HIGH (ref 70–99)
GLUCOSE BLDC GLUCOMTR-MCNC: 302 MG/DL — HIGH (ref 70–99)
GLUCOSE SERPL-MCNC: 199 MG/DL — HIGH (ref 70–99)
HCT VFR BLD CALC: 26.5 % — LOW (ref 34.5–45)
HCT VFR BLD CALC: 27 % — LOW (ref 34.5–45)
HGB BLD-MCNC: 8.8 G/DL — LOW (ref 11.5–15.5)
HGB BLD-MCNC: 8.9 G/DL — LOW (ref 11.5–15.5)
LYMPHOCYTES # BLD AUTO: 1.8 K/UL — SIGNIFICANT CHANGE UP (ref 1–3.3)
LYMPHOCYTES # BLD AUTO: 21.2 % — SIGNIFICANT CHANGE UP (ref 13–44)
MCHC RBC-ENTMCNC: 28.5 PG — SIGNIFICANT CHANGE UP (ref 27–34)
MCHC RBC-ENTMCNC: 28.6 PG — SIGNIFICANT CHANGE UP (ref 27–34)
MCHC RBC-ENTMCNC: 33 GM/DL — SIGNIFICANT CHANGE UP (ref 32–36)
MCHC RBC-ENTMCNC: 33.2 GM/DL — SIGNIFICANT CHANGE UP (ref 32–36)
MCV RBC AUTO: 85.9 FL — SIGNIFICANT CHANGE UP (ref 80–100)
MCV RBC AUTO: 86.5 FL — SIGNIFICANT CHANGE UP (ref 80–100)
MONOCYTES # BLD AUTO: 0.9 K/UL — SIGNIFICANT CHANGE UP (ref 0–0.9)
MONOCYTES NFR BLD AUTO: 10.6 % — SIGNIFICANT CHANGE UP (ref 2–14)
NEUTROPHILS # BLD AUTO: 5.6 K/UL — SIGNIFICANT CHANGE UP (ref 1.8–7.4)
NEUTROPHILS NFR BLD AUTO: 65.2 % — SIGNIFICANT CHANGE UP (ref 43–77)
PLATELET # BLD AUTO: 303 K/UL — SIGNIFICANT CHANGE UP (ref 150–400)
PLATELET # BLD AUTO: 324 K/UL — SIGNIFICANT CHANGE UP (ref 150–400)
POTASSIUM SERPL-MCNC: 3.3 MMOL/L — LOW (ref 3.5–5.3)
POTASSIUM SERPL-SCNC: 3.3 MMOL/L — LOW (ref 3.5–5.3)
RBC # BLD: 3.06 M/UL — LOW (ref 3.8–5.2)
RBC # BLD: 3.14 M/UL — LOW (ref 3.8–5.2)
RBC # FLD: 12.3 % — SIGNIFICANT CHANGE UP (ref 10.3–14.5)
RBC # FLD: 12.7 % — SIGNIFICANT CHANGE UP (ref 10.3–14.5)
SODIUM SERPL-SCNC: 130 MMOL/L — LOW (ref 135–145)
WBC # BLD: 7.9 K/UL — SIGNIFICANT CHANGE UP (ref 3.8–10.5)
WBC # BLD: 8.5 K/UL — SIGNIFICANT CHANGE UP (ref 3.8–10.5)
WBC # FLD AUTO: 7.9 K/UL — SIGNIFICANT CHANGE UP (ref 3.8–10.5)
WBC # FLD AUTO: 8.5 K/UL — SIGNIFICANT CHANGE UP (ref 3.8–10.5)

## 2019-01-07 RX ADMIN — AMPICILLIN SODIUM AND SULBACTAM SODIUM 200 GRAM(S): 250; 125 INJECTION, POWDER, FOR SUSPENSION INTRAMUSCULAR; INTRAVENOUS at 18:07

## 2019-01-07 RX ADMIN — AMPICILLIN SODIUM AND SULBACTAM SODIUM 200 GRAM(S): 250; 125 INJECTION, POWDER, FOR SUSPENSION INTRAMUSCULAR; INTRAVENOUS at 23:33

## 2019-01-07 RX ADMIN — DRONEDARONE 400 MILLIGRAM(S): 400 TABLET, FILM COATED ORAL at 18:05

## 2019-01-07 RX ADMIN — AMPICILLIN SODIUM AND SULBACTAM SODIUM 200 GRAM(S): 250; 125 INJECTION, POWDER, FOR SUSPENSION INTRAMUSCULAR; INTRAVENOUS at 06:14

## 2019-01-07 RX ADMIN — Medication 40 MILLIGRAM(S): at 06:13

## 2019-01-07 RX ADMIN — OXYCODONE AND ACETAMINOPHEN 1 TABLET(S): 5; 325 TABLET ORAL at 23:33

## 2019-01-07 RX ADMIN — MUPIROCIN 1 APPLICATION(S): 20 OINTMENT TOPICAL at 06:29

## 2019-01-07 RX ADMIN — Medication 2 CAPSULE(S): at 17:08

## 2019-01-07 RX ADMIN — ATORVASTATIN CALCIUM 40 MILLIGRAM(S): 80 TABLET, FILM COATED ORAL at 21:14

## 2019-01-07 RX ADMIN — MAGNESIUM OXIDE 400 MG ORAL TABLET 400 MILLIGRAM(S): 241.3 TABLET ORAL at 17:09

## 2019-01-07 RX ADMIN — OXYCODONE AND ACETAMINOPHEN 1 TABLET(S): 5; 325 TABLET ORAL at 04:24

## 2019-01-07 RX ADMIN — BUDESONIDE AND FORMOTEROL FUMARATE DIHYDRATE 2 PUFF(S): 160; 4.5 AEROSOL RESPIRATORY (INHALATION) at 12:16

## 2019-01-07 RX ADMIN — TIOTROPIUM BROMIDE 1 CAPSULE(S): 18 CAPSULE ORAL; RESPIRATORY (INHALATION) at 12:17

## 2019-01-07 RX ADMIN — MONTELUKAST 10 MILLIGRAM(S): 4 TABLET, CHEWABLE ORAL at 12:17

## 2019-01-07 RX ADMIN — NYSTATIN CREAM 1 APPLICATION(S): 100000 CREAM TOPICAL at 18:06

## 2019-01-07 RX ADMIN — AMPICILLIN SODIUM AND SULBACTAM SODIUM 200 GRAM(S): 250; 125 INJECTION, POWDER, FOR SUSPENSION INTRAMUSCULAR; INTRAVENOUS at 12:15

## 2019-01-07 RX ADMIN — NYSTATIN CREAM 1 APPLICATION(S): 100000 CREAM TOPICAL at 06:14

## 2019-01-07 RX ADMIN — Medication 2: at 08:10

## 2019-01-07 RX ADMIN — BUDESONIDE AND FORMOTEROL FUMARATE DIHYDRATE 2 PUFF(S): 160; 4.5 AEROSOL RESPIRATORY (INHALATION) at 21:14

## 2019-01-07 RX ADMIN — Medication 2 CAPSULE(S): at 12:16

## 2019-01-07 RX ADMIN — MUPIROCIN 1 APPLICATION(S): 20 OINTMENT TOPICAL at 17:10

## 2019-01-07 RX ADMIN — DRONEDARONE 400 MILLIGRAM(S): 400 TABLET, FILM COATED ORAL at 06:14

## 2019-01-07 RX ADMIN — RIVAROXABAN 20 MILLIGRAM(S): KIT at 17:09

## 2019-01-07 RX ADMIN — Medication 3: at 22:05

## 2019-01-07 RX ADMIN — Medication 5 MILLIGRAM(S): at 23:33

## 2019-01-07 RX ADMIN — Medication 1 TABLET(S): at 12:16

## 2019-01-07 RX ADMIN — MAGNESIUM OXIDE 400 MG ORAL TABLET 400 MILLIGRAM(S): 241.3 TABLET ORAL at 08:09

## 2019-01-07 RX ADMIN — Medication 2 CAPSULE(S): at 08:09

## 2019-01-07 RX ADMIN — Medication 4: at 17:06

## 2019-01-07 RX ADMIN — INSULIN GLARGINE 5 UNIT(S): 100 INJECTION, SOLUTION SUBCUTANEOUS at 22:05

## 2019-01-07 RX ADMIN — Medication 175 MICROGRAM(S): at 06:13

## 2019-01-07 RX ADMIN — Medication 3: at 12:14

## 2019-01-07 NOTE — CONSULT NOTE ADULT - ATTENDING COMMENTS
Patient seen and evaluated  Family at bedside  Right medial tibial plateau fracture with Right glenohumeral dislocation  Due to venous insufficiency and overlying cellulitis unable to immobilize right LE  Rec: NWB Right LE: wheelchair, oob to chair/commode transfer  repeat X-rays 7-10 days

## 2019-01-07 NOTE — PROGRESS NOTE ADULT - PROBLEM SELECTOR PLAN 1
Community onset; no RFs for MRSA including hospitalizations or traumatic injury  - Afebrile, no WBC elevation but mild elevated ESR  - S/p Zosyn in ED; Unasyn x 12/29 and Vancomycin given x 12/31-1/6  - CT RLE showed acute nondisplaced fracture and small hematoma versus abscess;  orthopedics consulted, recommended no acute intervention  ID Dr Lorenzana; if discharge, PO Doxy 100 BID x 7 days

## 2019-01-07 NOTE — CONSULT NOTE ADULT - SUBJECTIVE AND OBJECTIVE BOX
Pt Name: KWAME MOSQUERA  MRN: [id number]    ORTHOPEDIC CONSULT:    Orthopedic diagnosis:    79yFemaleHPI:  79 y/o [place] female from home, lives alone w/ HHA 5/week w/ PMH of CAD s/p stents, HTN, HLD, Diastolic CHF, T2DM, Afib on Xarelto s/p PPM 11/2013, Asthma, Hypothyroidism, Chronic Venous Insufficiency, Urinary Incontinence, and IBS p/w c/o R lower leg swelling x 1 week w/out improvement on PO ABx x 1 day. Patient seen in ED day prior - told to return for US the next day. Patient had previous ED visit 1 week prior for R sided pain s/p mechanical fall 2/2 mechanical trip on rug 1 week ago - imaging at the time negative except for dislocated shoulder reduced in the ED As per patient and family at bedside, patient brought in for concern of DVT and cellulitis of right lower extremity. Otherwise patient denied fever, chills, chest pain, shortness of breath, dizziness, syncope, fever, nausea, vomiting, or other any other complaints. Patient admits to chronic DOVE unchanged from baseline. (29 Dec 2018 17:29)    Ortho called to evaluate patient 79 y/o F with right knee pain s/p fall 2 weeks ago. Patient was seen in the ED on 12/22/18 in which she had a right shoulder dislocation that was reduced in ER and right knee x-rays were negative at that time. Patient admitted to hospital this visit for right lower extremity cellulitis. Patient states that since the fall 2 weeks ago she has been ambulating with the use of a cane. Pt denies Chest pain, SOB, dyspnea, paresthesias, N/V/D, abdominal pain, syncope, or pain anywhere else.      AMBULATION: Baseline Ambulation [ ] independent [ ] With Cane [ ] With Walker [ ]  Bedbound [ ] Pivot transfers to Wheelchair only    PAST MEDICAL & SURGICAL HISTORY:  Obesity  Pacemaker  Atrial fibrillation  Hypothyroidism  Venous stasis  IBS (irritable bowel syndrome)  CHF (congestive heart failure), NYHA class I  HLD (Hyperlipidemia)  HTN (Hypertension)  Diabetes  Myocardial Infarction  CAD (Coronary Atherosclerotic Disease)  Asthma  S/P coronary angiogram      ALLERGIES: No Known Allergies      MEDICATIONS: acetaminophen   Tablet .. 650 milliGRAM(s) Oral every 6 hours PRN  ALBUTerol    90 MICROgram(s) HFA Inhaler 2 Puff(s) Inhalation every 6 hours PRN  ampicillin/sulbactam  IVPB 3 Gram(s) IV Intermittent every 6 hours  amylase/lipase/protease  (CREON  6,000 Units) 2 Capsule(s) Oral three times a day with meals  atorvastatin 40 milliGRAM(s) Oral at bedtime  buDESOnide 160 MICROgram(s)/formoterol 4.5 MICROgram(s) Inhaler 2 Puff(s) Inhalation two times a day  dronedarone 400 milliGRAM(s) Oral two times a day  furosemide    Tablet 40 milliGRAM(s) Oral daily  insulin glargine Injectable (LANTUS) 5 Unit(s) SubCutaneous at bedtime  insulin lispro (HumaLOG) corrective regimen sliding scale   SubCutaneous Before meals and at bedtime  lactobacillus acidophilus 1 Tablet(s) Oral daily  levothyroxine 175 MICROGram(s) Oral daily  magnesium oxide 400 milliGRAM(s) Oral two times a day with meals  melatonin 5 milliGRAM(s) Oral at bedtime PRN  montelukast 10 milliGRAM(s) Oral daily  mupirocin 2% Ointment 1 Application(s) Topical two times a day  nystatin Cream 1 Application(s) Topical two times a day  oxyCODONE    5 mG/acetaminophen 325 mG 1 Tablet(s) Oral every 4 hours PRN  rivaroxaban 20 milliGRAM(s) Oral every 24 hours  tiotropium 18 MICROgram(s) Capsule 1 Capsule(s) Inhalation daily      PHYSICAL EXAM:    Vital Signs Last 24 Hrs  T(C): 36.7 (07 Jan 2019 04:21), Max: 36.8 (06 Jan 2019 14:40)  T(F): 98.1 (07 Jan 2019 04:21), Max: 98.2 (06 Jan 2019 14:40)  HR: 63 (07 Jan 2019 04:21) (63 - 78)  BP: 155/55 (07 Jan 2019 04:21) (155/55 - 170/53)  BP(mean): --  RR: 18 (07 Jan 2019 04:21) (18 - 18)  SpO2: 94% (07 Jan 2019 04:21) (94% - 99%)    Gen: well developed, well nourished, comfortable  Rectal: deferred  Extremities: no clubbing/cyanosis, no edema, no calf tenderness  Vascular:  DP/PT 2+ b/l  Neurological: no focal deficits  Skin: no rash on visible skin  Musculoskeletal:    RLE: Skin is warm and erythematous, blanchable extending from proximal tibia to distal tibia. TTP over proximal medial aspect of tibia. Compartments soft and compressible. NVI. SILT.    LABS:                        8.9    7.9   )-----------( 324      ( 07 Jan 2019 10:32 )             27.0     01-07    130<L>  |  89<L>  |  23<H>  ----------------------------<  199<H>  3.3<L>   |  34<H>  |  1.34<H>    Ca    8.3<L>      07 Jan 2019 06:30          RADIOLOGY:   < from: CT Lower Extremity No Cont, Right (01.06.19 @ 12:41) >    EXAM:  CT LWR EXT RT                            PROCEDURE DATE:  01/06/2019          INTERPRETATION:  CT LWR EXT RT dated 1/6/2019 12:41 PM     INDICATION: Leg swelling. History of hypertension and CHF. Recent history   of fall    COMPARISON: Lowerextremity Doppler dated 12/29/2018. Right knee   radiographs dated 12/22/2018    TECHNIQUE: CT imaging of the bilateral lower extremities from the distal   femurs through the forefoot was performed. The data was reformatted in   the axial, coronal, and sagittal planes. 3-D reformatted imaging of the   right lower extremity was created at a separate workstation    FINDINGS:    OSSEOUS STRUCTURES: There is a curvilinear lucency at the posterior,   central aspect of the right tibia at the tibial eminence  suspicious for   fracture. There is severe bilateral lateral patellofemoral joint space   narrowing with mild to moderate bilateral medial compartment joint space   narrowing worse on the left. Mild marginal productive changes are   appreciatedat the knees. There is an osteochondral defect at the left   medial talar dome measuring up to 0.4 cm. Small subchondral sclerosis at   the left lateral talar dome suspicious for small osteochondral defect.   The remaining joint spaces are preserved.  SYNOVIUM/ JOINT FLUID: There is small fluid within the bilateral knee   joint spaces. No additional joint effusion is noted.  TENDONS: The tendons are intact. No full-thickness tendon tear or   retraction is appreciated.  MUSCLES: There is mild atrophy of the calf musculature. No intramuscular   hematoma is noted..  NEUROVASCULAR STRUCTURES: Mild scattered vascular calcifications are   appreciated bilaterally.  SUBCUTANEOUS SOFT TISSUES: There is circumferential moderate soft tissue   swelling with skin thickening involving both calves extending to the   hindfoot, worse on the right. There is a oval-shaped complex fluid   collection anterior to the proximal right anterior compartment   musculature of the calf measuring approximately 3.5 x 1.5 x 4.5 cm.     3-d reformatted imaging confirms these findings.      IMPRESSION:    1.  Findings concerning for nondisplaced intra-articular fracture at the   posterior, central proximal right tibia.  2.  Moderate bilateral subcutaneous edema with associated skin   thickening. Findings are nonspecific and can be seen with cellulitis in   the appropriate clinical setting.  3.  Oval-shaped complex fluid collection overlying the right anterior   compartment musculature proximally. Nonspecific and differential includes   hematoma and infection. Favor hematoma in the setting of fall.  4.  No CT evidence for osteomyelitis.   5.  Moderate to severe degenerative changes of the knees bilaterally.  6.  Osteochondral defects at both ankles as described above.    Findings were discussed with Dr. Coe on 1/6/2019 1:03 PM  with read   back.    IMPRESSION: Pt is a  79y Female with right tibial plateau fracture and s/p right shoulder dislocation on 12/22/18    PLAN:  -  Recommendation: [ X ] Conservative treatment   -  Pain control  -  DVT prophylaxis  -  Daily PT - NWB to RLE with use of Hemiwalker. NWB to RUE in sling  -  Pt is to follow up with _after discharge at: #[phone]   -  Case d/w Dr. Griffith

## 2019-01-08 LAB
GLUCOSE BLDC GLUCOMTR-MCNC: 207 MG/DL — HIGH (ref 70–99)
GLUCOSE BLDC GLUCOMTR-MCNC: 247 MG/DL — HIGH (ref 70–99)
GLUCOSE BLDC GLUCOMTR-MCNC: 261 MG/DL — HIGH (ref 70–99)
GLUCOSE BLDC GLUCOMTR-MCNC: 288 MG/DL — HIGH (ref 70–99)

## 2019-01-08 RX ORDER — OXYCODONE AND ACETAMINOPHEN 5; 325 MG/1; MG/1
1 TABLET ORAL EVERY 4 HOURS
Qty: 0 | Refills: 0 | Status: DISCONTINUED | OUTPATIENT
Start: 2019-01-08 | End: 2019-01-09

## 2019-01-08 RX ORDER — OXYCODONE AND ACETAMINOPHEN 5; 325 MG/1; MG/1
1 TABLET ORAL ONCE
Qty: 0 | Refills: 0 | Status: DISCONTINUED | OUTPATIENT
Start: 2019-01-08 | End: 2019-01-08

## 2019-01-08 RX ADMIN — Medication 650 MILLIGRAM(S): at 01:04

## 2019-01-08 RX ADMIN — MAGNESIUM OXIDE 400 MG ORAL TABLET 400 MILLIGRAM(S): 241.3 TABLET ORAL at 07:57

## 2019-01-08 RX ADMIN — Medication 650 MILLIGRAM(S): at 01:34

## 2019-01-08 RX ADMIN — AMPICILLIN SODIUM AND SULBACTAM SODIUM 200 GRAM(S): 250; 125 INJECTION, POWDER, FOR SUSPENSION INTRAMUSCULAR; INTRAVENOUS at 18:02

## 2019-01-08 RX ADMIN — Medication 2 CAPSULE(S): at 07:57

## 2019-01-08 RX ADMIN — Medication 1 TABLET(S): at 11:45

## 2019-01-08 RX ADMIN — OXYCODONE AND ACETAMINOPHEN 1 TABLET(S): 5; 325 TABLET ORAL at 16:30

## 2019-01-08 RX ADMIN — DRONEDARONE 400 MILLIGRAM(S): 400 TABLET, FILM COATED ORAL at 18:02

## 2019-01-08 RX ADMIN — Medication 3: at 21:37

## 2019-01-08 RX ADMIN — INSULIN GLARGINE 5 UNIT(S): 100 INJECTION, SOLUTION SUBCUTANEOUS at 21:37

## 2019-01-08 RX ADMIN — ATORVASTATIN CALCIUM 40 MILLIGRAM(S): 80 TABLET, FILM COATED ORAL at 21:36

## 2019-01-08 RX ADMIN — Medication 2 CAPSULE(S): at 11:45

## 2019-01-08 RX ADMIN — NYSTATIN CREAM 1 APPLICATION(S): 100000 CREAM TOPICAL at 06:36

## 2019-01-08 RX ADMIN — Medication 650 MILLIGRAM(S): at 22:06

## 2019-01-08 RX ADMIN — MUPIROCIN 1 APPLICATION(S): 20 OINTMENT TOPICAL at 06:17

## 2019-01-08 RX ADMIN — OXYCODONE AND ACETAMINOPHEN 1 TABLET(S): 5; 325 TABLET ORAL at 17:44

## 2019-01-08 RX ADMIN — OXYCODONE AND ACETAMINOPHEN 1 TABLET(S): 5; 325 TABLET ORAL at 00:03

## 2019-01-08 RX ADMIN — Medication 2 CAPSULE(S): at 17:01

## 2019-01-08 RX ADMIN — Medication 2: at 11:47

## 2019-01-08 RX ADMIN — NYSTATIN CREAM 1 APPLICATION(S): 100000 CREAM TOPICAL at 17:02

## 2019-01-08 RX ADMIN — Medication 650 MILLIGRAM(S): at 06:21

## 2019-01-08 RX ADMIN — Medication 650 MILLIGRAM(S): at 06:51

## 2019-01-08 RX ADMIN — MUPIROCIN 1 APPLICATION(S): 20 OINTMENT TOPICAL at 17:02

## 2019-01-08 RX ADMIN — MONTELUKAST 10 MILLIGRAM(S): 4 TABLET, CHEWABLE ORAL at 11:45

## 2019-01-08 RX ADMIN — AMPICILLIN SODIUM AND SULBACTAM SODIUM 200 GRAM(S): 250; 125 INJECTION, POWDER, FOR SUSPENSION INTRAMUSCULAR; INTRAVENOUS at 06:18

## 2019-01-08 RX ADMIN — Medication 40 MILLIGRAM(S): at 06:20

## 2019-01-08 RX ADMIN — Medication 2: at 07:55

## 2019-01-08 RX ADMIN — Medication 175 MICROGRAM(S): at 06:16

## 2019-01-08 RX ADMIN — MAGNESIUM OXIDE 400 MG ORAL TABLET 400 MILLIGRAM(S): 241.3 TABLET ORAL at 17:01

## 2019-01-08 RX ADMIN — RIVAROXABAN 20 MILLIGRAM(S): KIT at 17:01

## 2019-01-08 RX ADMIN — Medication 650 MILLIGRAM(S): at 21:36

## 2019-01-08 RX ADMIN — TIOTROPIUM BROMIDE 1 CAPSULE(S): 18 CAPSULE ORAL; RESPIRATORY (INHALATION) at 11:44

## 2019-01-08 RX ADMIN — BUDESONIDE AND FORMOTEROL FUMARATE DIHYDRATE 2 PUFF(S): 160; 4.5 AEROSOL RESPIRATORY (INHALATION) at 11:43

## 2019-01-08 RX ADMIN — AMPICILLIN SODIUM AND SULBACTAM SODIUM 200 GRAM(S): 250; 125 INJECTION, POWDER, FOR SUSPENSION INTRAMUSCULAR; INTRAVENOUS at 12:22

## 2019-01-08 RX ADMIN — BUDESONIDE AND FORMOTEROL FUMARATE DIHYDRATE 2 PUFF(S): 160; 4.5 AEROSOL RESPIRATORY (INHALATION) at 21:38

## 2019-01-08 RX ADMIN — DRONEDARONE 400 MILLIGRAM(S): 400 TABLET, FILM COATED ORAL at 06:16

## 2019-01-08 RX ADMIN — Medication 3: at 16:35

## 2019-01-08 NOTE — PROGRESS NOTE ADULT - PROBLEM SELECTOR PROBLEM 2
SRIDHAR (acute kidney injury)

## 2019-01-08 NOTE — PROGRESS NOTE ADULT - SUBJECTIVE AND OBJECTIVE BOX
PGY 2 Note discussed with primary attending    Patient is a 78y old  Female who presents with a chief complaint of lower extremity cellulitis (01 Jan 2019 11:46)      INTERVAL HPI/OVERNIGHT EVENTS: Patient seen and examined at bedside with no new complaints    MEDICATIONS  (STANDING):  ampicillin/sulbactam  IVPB      ampicillin/sulbactam  IVPB 1.5 Gram(s) IV Intermittent every 6 hours  amylase/lipase/protease  (CREON  6,000 Units) 2 Capsule(s) Oral three times a day with meals  atorvastatin 40 milliGRAM(s) Oral at bedtime  buDESOnide 160 MICROgram(s)/formoterol 4.5 MICROgram(s) Inhaler 2 Puff(s) Inhalation two times a day  dronedarone 400 milliGRAM(s) Oral two times a day  furosemide   Injectable 40 milliGRAM(s) IV Push two times a day  insulin lispro (HumaLOG) corrective regimen sliding scale   SubCutaneous Before meals and at bedtime  levothyroxine 175 MICROGram(s) Oral daily  magnesium oxide 400 milliGRAM(s) Oral two times a day with meals  montelukast 10 milliGRAM(s) Oral daily  mupirocin 2% Ointment 1 Application(s) Topical two times a day  rivaroxaban 20 milliGRAM(s) Oral every 24 hours  sodium chloride 0.9%. 1000 milliLiter(s) (60 mL/Hr) IV Continuous <Continuous>  tiotropium 18 MICROgram(s) Capsule 1 Capsule(s) Inhalation daily  vancomycin  IVPB 1500 milliGRAM(s) IV Intermittent every 24 hours    MEDICATIONS  (PRN):  acetaminophen   Tablet .. 650 milliGRAM(s) Oral every 6 hours PRN Mild Pain (1 - 3), Moderate Pain (4 - 6)  ALBUTerol    90 MICROgram(s) HFA Inhaler 2 Puff(s) Inhalation every 6 hours PRN Shortness of Breath and/or Wheezing  melatonin 5 milliGRAM(s) Oral at bedtime PRN Insomnia  oxyCODONE    5 mG/acetaminophen 325 mG 1 Tablet(s) Oral every 4 hours PRN Moderate Pain (4 - 6)      __________________________________________________  REVIEW OF SYSTEMS:  RESPIRATORY: No cough; No shortness of breath  CARDIOVASCULAR: No chest pain, no palpitations  GASTROINTESTINAL: No pain. No nausea or vomiting; No diarrhea       Vital Signs Last 24 Hrs  T(C): 36.8 (02 Jan 2019 05:35), Max: 36.8 (01 Jan 2019 14:39)  T(F): 98.3 (02 Jan 2019 05:35), Max: 98.3 (01 Jan 2019 20:10)  HR: 84 (02 Jan 2019 05:35) (65 - 84)  BP: 139/68 (02 Jan 2019 05:35) (139/68 - 156/57)  BP(mean): --  RR: 18 (02 Jan 2019 05:35) (17 - 18)  SpO2: 96% (02 Jan 2019 05:35) (96% - 99%)    ________________________________________________  PHYSICAL EXAM:  GENERAL: NAD  HEENT: Normocephalic;  conjunctivae and sclerae clear; moist mucous membranes;   NECK : supple  CHEST/LUNG: Clear to auscultation bilaterally with good air entry   HEART: S1 S2  regular; no murmurs, gallops or rubs  ABDOMEN: Soft, Nontender, Nondistended; Bowel sounds present  EXTREMITIES: b/l LE pitting edema, R > L, erythema constant, warmth improved  NERVOUS SYSTEM:  Awake and alert; Oriented to place, person and time ; no new deficits    _________________________________________________  LABS:                        9.3    7.3   )-----------( 285      ( 02 Jan 2019 09:04 )             28.3     01-02    136  |  101  |  17  ----------------------------<  174<H>  3.9   |  23  |  1.13    Ca    8.2<L>      02 Jan 2019 09:04  Phos  2.7     01-02  Mg     2.3     01-02          CAPILLARY BLOOD GLUCOSE      POCT Blood Glucose.: 274 mg/dL (02 Jan 2019 11:54)  POCT Blood Glucose.: 203 mg/dL (02 Jan 2019 07:41)  POCT Blood Glucose.: 235 mg/dL (01 Jan 2019 20:54)  POCT Blood Glucose.: 189 mg/dL (01 Jan 2019 16:21)        RADIOLOGY & ADDITIONAL TESTS:    Imaging Personally Reviewed:  YES    Consultant(s) Notes Reviewed:   YES    Care Discussed with Consultants : YES    Plan of care was discussed with patient and /or primary care giver; all questions and concerns were addressed and care was aligned with patient's wishes.
PGY 2 Note discussed with primary attending    Patient is a 78y old  Female who presents with a chief complaint of lower extremity cellulitis (02 Jan 2019 15:18)      INTERVAL HPI/OVERNIGHT EVENTS: Patient seen and examined at bedside with no new complaints - cellulitis is improving, ROCÍO noted, c/w IV diuresis    MEDICATIONS  (STANDING):  ampicillin/sulbactam  IVPB      ampicillin/sulbactam  IVPB 1.5 Gram(s) IV Intermittent every 6 hours  amylase/lipase/protease  (CREON  6,000 Units) 2 Capsule(s) Oral three times a day with meals  atorvastatin 40 milliGRAM(s) Oral at bedtime  buDESOnide 160 MICROgram(s)/formoterol 4.5 MICROgram(s) Inhaler 2 Puff(s) Inhalation two times a day  dronedarone 400 milliGRAM(s) Oral two times a day  furosemide   Injectable 40 milliGRAM(s) IV Push two times a day  insulin lispro (HumaLOG) corrective regimen sliding scale   SubCutaneous Before meals and at bedtime  levothyroxine 175 MICROGram(s) Oral daily  magnesium oxide 400 milliGRAM(s) Oral two times a day with meals  metolazone 2.5 milliGRAM(s) Oral <User Schedule>  montelukast 10 milliGRAM(s) Oral daily  mupirocin 2% Ointment 1 Application(s) Topical two times a day  rivaroxaban 20 milliGRAM(s) Oral every 24 hours  sodium chloride 0.9%. 1000 milliLiter(s) (60 mL/Hr) IV Continuous <Continuous>  tiotropium 18 MICROgram(s) Capsule 1 Capsule(s) Inhalation daily  vancomycin  IVPB 2000 milliGRAM(s) IV Intermittent every 24 hours    MEDICATIONS  (PRN):  acetaminophen   Tablet .. 650 milliGRAM(s) Oral every 6 hours PRN Mild Pain (1 - 3), Moderate Pain (4 - 6)  ALBUTerol    90 MICROgram(s) HFA Inhaler 2 Puff(s) Inhalation every 6 hours PRN Shortness of Breath and/or Wheezing  melatonin 5 milliGRAM(s) Oral at bedtime PRN Insomnia  oxyCODONE    5 mG/acetaminophen 325 mG 1 Tablet(s) Oral every 4 hours PRN Moderate Pain (4 - 6)      __________________________________________________  REVIEW OF SYSTEMS:  RESPIRATORY: No cough; No shortness of breath  CARDIOVASCULAR: No chest pain, no palpitations  GASTROINTESTINAL: No pain. No nausea or vomiting; No diarrhea       Vital Signs Last 24 Hrs  T(C): 36.7 (03 Jan 2019 05:32), Max: 37.1 (02 Jan 2019 20:52)  T(F): 98 (03 Jan 2019 05:32), Max: 98.8 (02 Jan 2019 20:52)  HR: 72 (03 Jan 2019 05:32) (68 - 75)  BP: 149/57 (03 Jan 2019 05:32) (133/53 - 157/51)  BP(mean): --  RR: 18 (03 Jan 2019 05:32) (17 - 18)  SpO2: 96% (03 Jan 2019 05:32) (96% - 98%)    ________________________________________________  PHYSICAL EXAM:  GENERAL: NAD, obesity  HEENT: Normocephalic;  conjunctivae and sclerae clear; moist mucous membranes;   NECK : supple  CHEST/LUNG: Clear to auscultation bilaterally with good air entry   HEART: S1 S2  regular; no murmurs, gallops or rubs  ABDOMEN: Soft, Nontender, Nondistended; Bowel sounds present  EXTREMITIES: b/l pitting edema, R>L, redness and warmth improving  NERVOUS SYSTEM:  Awake and alert; Oriented to place, person and time ; no new deficits    _________________________________________________  LABS:                        9.0    7.6   )-----------( 288      ( 03 Jan 2019 07:21 )             28.1     01-03    135  |  100  |  18  ----------------------------<  199<H>  3.8   |  27  |  1.26    Ca    8.0<L>      03 Jan 2019 07:21  Phos  2.7     01-02  Mg     2.3     01-03          CAPILLARY BLOOD GLUCOSE      POCT Blood Glucose.: 275 mg/dL (03 Jan 2019 11:59)  POCT Blood Glucose.: 224 mg/dL (03 Jan 2019 07:42)  POCT Blood Glucose.: 272 mg/dL (02 Jan 2019 21:27)  POCT Blood Glucose.: 327 mg/dL (02 Jan 2019 16:35)        RADIOLOGY & ADDITIONAL TESTS:    Imaging Personally Reviewed:  YES    Consultant(s) Notes Reviewed:   YES    Care Discussed with Consultants : YES    Plan of care was discussed with patient and /or primary care giver; all questions and concerns were addressed and care was aligned with patient's wishes.
PGY 2 Note discussed with primary attending    Patient is a 78y old  Female who presents with a chief complaint of lower extremity cellulitis (31 Dec 2018 15:54)      INTERVAL HPI/OVERNIGHT EVENTS: Patient seen and examined at bedside with no new complaints    MEDICATIONS  (STANDING):  ampicillin/sulbactam  IVPB      ampicillin/sulbactam  IVPB 1.5 Gram(s) IV Intermittent every 6 hours  amylase/lipase/protease  (CREON  6,000 Units) 2 Capsule(s) Oral three times a day with meals  atorvastatin 40 milliGRAM(s) Oral at bedtime  buDESOnide 160 MICROgram(s)/formoterol 4.5 MICROgram(s) Inhaler 2 Puff(s) Inhalation two times a day  dronedarone 400 milliGRAM(s) Oral two times a day  insulin lispro (HumaLOG) corrective regimen sliding scale   SubCutaneous Before meals and at bedtime  levothyroxine 175 MICROGram(s) Oral daily  magnesium oxide 400 milliGRAM(s) Oral two times a day with meals  montelukast 10 milliGRAM(s) Oral daily  mupirocin 2% Ointment 1 Application(s) Topical two times a day  rivaroxaban 20 milliGRAM(s) Oral every 24 hours  sodium chloride 0.9%. 1000 milliLiter(s) (60 mL/Hr) IV Continuous <Continuous>  tiotropium 18 MICROgram(s) Capsule 1 Capsule(s) Inhalation daily  vancomycin  IVPB 1500 milliGRAM(s) IV Intermittent every 24 hours    MEDICATIONS  (PRN):  acetaminophen   Tablet .. 650 milliGRAM(s) Oral every 6 hours PRN Mild Pain (1 - 3), Moderate Pain (4 - 6)  ALBUTerol    90 MICROgram(s) HFA Inhaler 2 Puff(s) Inhalation every 6 hours PRN Shortness of Breath and/or Wheezing  oxyCODONE    5 mG/acetaminophen 325 mG 1 Tablet(s) Oral every 4 hours PRN Moderate Pain (4 - 6)      __________________________________________________  REVIEW OF SYSTEMS:  RESPIRATORY: No cough; No shortness of breath  CARDIOVASCULAR: No chest pain, no palpitations  GASTROINTESTINAL: No pain. No nausea or vomiting; No diarrhea       Vital Signs Last 24 Hrs  T(C): 37.1 (01 Jan 2019 05:15), Max: 37.1 (01 Jan 2019 05:15)  T(F): 98.8 (01 Jan 2019 05:15), Max: 98.8 (01 Jan 2019 05:15)  HR: 70 (01 Jan 2019 05:15) (66 - 73)  BP: 155/51 (01 Jan 2019 05:15) (113/77 - 155/51)  BP(mean): --  RR: 18 (01 Jan 2019 05:15) (17 - 18)  SpO2: 98% (01 Jan 2019 05:15) (96% - 100%)    ________________________________________________  PHYSICAL EXAM:  GENERAL: NAD  HEENT: Normocephalic;  conjunctivae and sclerae clear; moist mucous membranes;   NECK : supple  CHEST/LUNG: Clear to auscultation bilaterally with good air entry   HEART: S1 S2  regular; no murmurs, gallops or rubs  ABDOMEN: Soft, Nontender, Nondistended; Bowel sounds present  EXTREMITIES: No cyanosis; no edema; no calf tenderness  NERVOUS SYSTEM:  Awake and alert; Oriented to place, person and time ; no new deficits    _________________________________________________  LABS:                        9.8    7.2   )-----------( 297      ( 01 Jan 2019 07:13 )             30.4     01-01    134<L>  |  99  |  21<H>  ----------------------------<  170<H>  4.0   |  25  |  1.23    Ca    8.3<L>      01 Jan 2019 07:13  Phos  3.1     01-01  Mg     2.3     01-01          CAPILLARY BLOOD GLUCOSE      POCT Blood Glucose.: 197 mg/dL (01 Jan 2019 07:52)  POCT Blood Glucose.: 183 mg/dL (31 Dec 2018 21:30)  POCT Blood Glucose.: 271 mg/dL (31 Dec 2018 16:49)  POCT Blood Glucose.: 260 mg/dL (31 Dec 2018 16:28)        RADIOLOGY & ADDITIONAL TESTS:    Imaging Personally Reviewed:  YES    Consultant(s) Notes Reviewed:   YES    Care Discussed with Consultants : YES    Plan of care was discussed with patient and /or primary care giver; all questions and concerns were addressed and care was aligned with patient's wishes.
PGY 2 Note discussed with primary attending    Patient is a 79y old  Female who presents with a chief complaint of lower extremity cellulitis (03 Jan 2019 20:23)      INTERVAL HPI/OVERNIGHT EVENTS: Patient seen and examined at bedside with no new complaints - cellulitis improving at bedside, D/C planning as per primary attending, added Lantus 5 U bedtime     MEDICATIONS  (STANDING):  ampicillin/sulbactam  IVPB      ampicillin/sulbactam  IVPB 1.5 Gram(s) IV Intermittent every 6 hours  amylase/lipase/protease  (CREON  6,000 Units) 2 Capsule(s) Oral three times a day with meals  atorvastatin 40 milliGRAM(s) Oral at bedtime  buDESOnide 160 MICROgram(s)/formoterol 4.5 MICROgram(s) Inhaler 2 Puff(s) Inhalation two times a day  dronedarone 400 milliGRAM(s) Oral two times a day  furosemide   Injectable 40 milliGRAM(s) IV Push two times a day  insulin glargine Injectable (LANTUS) 5 Unit(s) SubCutaneous at bedtime  insulin lispro (HumaLOG) corrective regimen sliding scale   SubCutaneous Before meals and at bedtime  levothyroxine 175 MICROGram(s) Oral daily  magnesium oxide 400 milliGRAM(s) Oral two times a day with meals  metolazone 2.5 milliGRAM(s) Oral <User Schedule>  montelukast 10 milliGRAM(s) Oral daily  mupirocin 2% Ointment 1 Application(s) Topical two times a day  rivaroxaban 20 milliGRAM(s) Oral every 24 hours  sodium chloride 0.9%. 1000 milliLiter(s) (60 mL/Hr) IV Continuous <Continuous>  tiotropium 18 MICROgram(s) Capsule 1 Capsule(s) Inhalation daily  vancomycin  IVPB 2000 milliGRAM(s) IV Intermittent every 24 hours    MEDICATIONS  (PRN):  acetaminophen   Tablet .. 650 milliGRAM(s) Oral every 6 hours PRN Mild Pain (1 - 3), Moderate Pain (4 - 6)  ALBUTerol    90 MICROgram(s) HFA Inhaler 2 Puff(s) Inhalation every 6 hours PRN Shortness of Breath and/or Wheezing  melatonin 5 milliGRAM(s) Oral at bedtime PRN Insomnia  oxyCODONE    5 mG/acetaminophen 325 mG 1 Tablet(s) Oral every 4 hours PRN Moderate Pain (4 - 6)      __________________________________________________  REVIEW OF SYSTEMS:  RESPIRATORY: No cough; No shortness of breath  CARDIOVASCULAR: No chest pain, no palpitations  GASTROINTESTINAL: No pain. No nausea or vomiting; No diarrhea       Vital Signs Last 24 Hrs  T(C): 37.2 (04 Jan 2019 05:17), Max: 37.3 (03 Jan 2019 20:08)  T(F): 98.9 (04 Jan 2019 05:17), Max: 99.1 (03 Jan 2019 20:08)  HR: 80 (04 Jan 2019 11:30) (63 - 80)  BP: 154/66 (04 Jan 2019 11:30) (150/59 - 154/66)  BP(mean): --  RR: 18 (04 Jan 2019 05:17) (18 - 18)  SpO2: 96% (04 Jan 2019 11:30) (96% - 99%)    ________________________________________________  PHYSICAL EXAM:  GENERAL: NAD  HEENT: Normocephalic;  conjunctivae and sclerae clear; moist mucous membranes;   NECK : supple  CHEST/LUNG: Clear to auscultation bilaterally with good air entry   HEART: S1 S2  regular; no murmurs, gallops or rubs  ABDOMEN: Soft, Nontender, Nondistended; Bowel sounds present  EXTREMITIES: No cyanosis; no edema; no calf tenderness; b/l LE edema, R>L, erythema improved  NERVOUS SYSTEM:  Awake and alert; Oriented to place, person and time ; no new deficits    _________________________________________________  LABS:                        9.0    7.6   )-----------( 288      ( 03 Jan 2019 07:21 )             28.1     01-03    135  |  100  |  18  ----------------------------<  199<H>  3.8   |  27  |  1.26    Ca    8.0<L>      03 Jan 2019 07:21  Mg     2.3     01-03          CAPILLARY BLOOD GLUCOSE      POCT Blood Glucose.: 258 mg/dL (04 Jan 2019 11:35)  POCT Blood Glucose.: 213 mg/dL (04 Jan 2019 07:31)  POCT Blood Glucose.: 310 mg/dL (03 Jan 2019 22:10)  POCT Blood Glucose.: 261 mg/dL (03 Jan 2019 16:41)        RADIOLOGY & ADDITIONAL TESTS:    Imaging Personally Reviewed:  YES    Consultant(s) Notes Reviewed:   YES    Care Discussed with Consultants : YES    Plan of care was discussed with patient and /or primary care giver; all questions and concerns were addressed and care was aligned with patient's wishes.
PGY 2 Note discussed with primary attending    Patient is a 79y old  Female who presents with a chief complaint of lower extremity cellulitis (03 Jan 2019 20:23)      INTERVAL HPI/OVERNIGHT EVENTS: Patient seen and examined at bedside with no new complaints. - cellulitis improving.  Blood sugar better controlled this AM, D/C planning as per primary attending    MEDICATIONS  (STANDING):  ampicillin/sulbactam  IVPB 3 Gram(s) IV Intermittent every 6 hours  amylase/lipase/protease  (CREON  6,000 Units) 2 Capsule(s) Oral three times a day with meals  atorvastatin 40 milliGRAM(s) Oral at bedtime  buDESOnide 160 MICROgram(s)/formoterol 4.5 MICROgram(s) Inhaler 2 Puff(s) Inhalation two times a day  dronedarone 400 milliGRAM(s) Oral two times a day  furosemide   Injectable 40 milliGRAM(s) IV Push two times a day  insulin glargine Injectable (LANTUS) 5 Unit(s) SubCutaneous at bedtime  insulin lispro (HumaLOG) corrective regimen sliding scale   SubCutaneous Before meals and at bedtime  levothyroxine 175 MICROGram(s) Oral daily  magnesium oxide 400 milliGRAM(s) Oral two times a day with meals  metolazone 2.5 milliGRAM(s) Oral <User Schedule>  montelukast 10 milliGRAM(s) Oral daily  mupirocin 2% Ointment 1 Application(s) Topical two times a day  rivaroxaban 20 milliGRAM(s) Oral every 24 hours  sodium chloride 0.9%. 1000 milliLiter(s) (60 mL/Hr) IV Continuous <Continuous>  tiotropium 18 MICROgram(s) Capsule 1 Capsule(s) Inhalation daily    MEDICATIONS  (PRN):  acetaminophen   Tablet .. 650 milliGRAM(s) Oral every 6 hours PRN Mild Pain (1 - 3), Moderate Pain (4 - 6)  ALBUTerol    90 MICROgram(s) HFA Inhaler 2 Puff(s) Inhalation every 6 hours PRN Shortness of Breath and/or Wheezing  melatonin 5 milliGRAM(s) Oral at bedtime PRN Insomnia  oxyCODONE    5 mG/acetaminophen 325 mG 1 Tablet(s) Oral every 4 hours PRN Moderate Pain (4 - 6)        __________________________________________________  REVIEW OF SYSTEMS:  RESPIRATORY: No cough; No shortness of breath  CARDIOVASCULAR: No chest pain, no palpitations  GASTROINTESTINAL: No pain. No nausea or vomiting; No diarrhea     Vital Signs Last 24 Hrs  T(C): 36.3 (05 Jan 2019 05:12), Max: 37.5 (04 Jan 2019 20:40)  T(F): 97.3 (05 Jan 2019 05:12), Max: 99.5 (04 Jan 2019 20:40)  HR: 61 (05 Jan 2019 05:12) (61 - 80)  BP: 163/58 (05 Jan 2019 05:12) (141/47 - 163/58)  RR: 18 (05 Jan 2019 05:12) (18 - 18)  SpO2: 96% (05 Jan 2019 05:12) (96% - 98%)  ________________________________________________  PHYSICAL EXAM:  GENERAL: NAD  HEENT: Normocephalic;  conjunctivae and sclerae clear; moist mucous membranes;   NECK : supple  CHEST/LUNG: Clear to auscultation bilaterally with good air entry   HEART: S1 S2  regular; no murmurs, gallops or rubs  ABDOMEN: Soft, Nontender, Nondistended; Bowel sounds present  EXTREMITIES: No cyanosis; no edema; no calf tenderness; b/l LE edema, R>L, erythema improved  NERVOUS SYSTEM:  Awake and alert; Oriented to place, person and time ; no new deficits    _________________________________________________  LABS:                        10.3   9.7   )-----------( 343      ( 05 Jan 2019 07:01 )             31.5           CAPILLARY BLOOD GLUCOSE      POCT Blood Glucose.: 258 mg/dL (04 Jan 2019 11:35)  POCT Blood Glucose.: 213 mg/dL (04 Jan 2019 07:31)  POCT Blood Glucose.: 310 mg/dL (03 Jan 2019 22:10)  POCT Blood Glucose.: 261 mg/dL (03 Jan 2019 16:41)        RADIOLOGY & ADDITIONAL TESTS:    Imaging Personally Reviewed:  YES    Consultant(s) Notes Reviewed:   YES    Care Discussed with Consultants : YES    Plan of care was discussed with patient and /or primary care giver; all questions and concerns were addressed and care was aligned with patient's wishes.
PGY 2 Note discussed with primary attending    Patient is a 79y old  Female who presents with a chief complaint of lower extremity cellulitis (06 Jan 2019 22:50)      INTERVAL HPI/OVERNIGHT EVENTS: Patient seen and examined at bedside with no new complaints - orthopedics consult appreciated [facility], pending PT update but family not interested in [facility] - planning for discharge planning in AM    MEDICATIONS  (STANDING):  ampicillin/sulbactam  IVPB 3 Gram(s) IV Intermittent every 6 hours  amylase/lipase/protease  (CREON  6,000 Units) 2 Capsule(s) Oral three times a day with meals  atorvastatin 40 milliGRAM(s) Oral at bedtime  buDESOnide 160 MICROgram(s)/formoterol 4.5 MICROgram(s) Inhaler 2 Puff(s) Inhalation two times a day  dronedarone 400 milliGRAM(s) Oral two times a day  furosemide    Tablet 40 milliGRAM(s) Oral daily  insulin glargine Injectable (LANTUS) 5 Unit(s) SubCutaneous at bedtime  insulin lispro (HumaLOG) corrective regimen sliding scale   SubCutaneous Before meals and at bedtime  lactobacillus acidophilus 1 Tablet(s) Oral daily  levothyroxine 175 MICROGram(s) Oral daily  magnesium oxide 400 milliGRAM(s) Oral two times a day with meals  montelukast 10 milliGRAM(s) Oral daily  mupirocin 2% Ointment 1 Application(s) Topical two times a day  nystatin Cream 1 Application(s) Topical two times a day  rivaroxaban 20 milliGRAM(s) Oral every 24 hours  tiotropium 18 MICROgram(s) Capsule 1 Capsule(s) Inhalation daily    MEDICATIONS  (PRN):  acetaminophen   Tablet .. 650 milliGRAM(s) Oral every 6 hours PRN Mild Pain (1 - 3), Moderate Pain (4 - 6)  ALBUTerol    90 MICROgram(s) HFA Inhaler 2 Puff(s) Inhalation every 6 hours PRN Shortness of Breath and/or Wheezing  melatonin 5 milliGRAM(s) Oral at bedtime PRN Insomnia  oxyCODONE    5 mG/acetaminophen 325 mG 1 Tablet(s) Oral every 4 hours PRN Moderate Pain (4 - 6)      __________________________________________________  REVIEW OF SYSTEMS:  RESPIRATORY: No cough; No shortness of breath  CARDIOVASCULAR: No chest pain, no palpitations  GASTROINTESTINAL: No pain. No nausea or vomiting; No diarrhea       Vital Signs Last 24 Hrs  T(C): 36.7 (07 Jan 2019 04:21), Max: 36.8 (06 Jan 2019 14:40)  T(F): 98.1 (07 Jan 2019 04:21), Max: 98.2 (06 Jan 2019 14:40)  HR: 63 (07 Jan 2019 04:21) (63 - 78)  BP: 155/55 (07 Jan 2019 04:21) (155/55 - 170/53)  BP(mean): --  RR: 18 (07 Jan 2019 04:21) (18 - 18)  SpO2: 94% (07 Jan 2019 04:21) (94% - 99%)    ________________________________________________  PHYSICAL EXAM:  GENERAL: NAD  HEENT: Normocephalic;  conjunctivae and sclerae clear; moist mucous membranes;   NECK : supple  CHEST/LUNG: Clear to auscultation bilaterally with good air entry   HEART: S1 S2  regular; no murmurs, gallops or rubs  ABDOMEN: Soft, Nontender, Nondistended; Bowel sounds present  EXTREMITIES: No cyanosis; no edema; no calf tenderness; b/l LE edema, R>L, warmth improved  NERVOUS SYSTEM:  Awake and alert; Oriented to place, person and time ; no new deficits    _________________________________________________  LABS:                        8.9    7.9   )-----------( 324      ( 07 Jan 2019 10:32 )             27.0     01-07    130<L>  |  89<L>  |  23<H>  ----------------------------<  199<H>  3.3<L>   |  34<H>  |  1.34<H>    Ca    8.3<L>      07 Jan 2019 06:30          CAPILLARY BLOOD GLUCOSE      POCT Blood Glucose.: 275 mg/dL (07 Jan 2019 11:48)  POCT Blood Glucose.: 228 mg/dL (07 Jan 2019 08:08)  POCT Blood Glucose.: 255 mg/dL (06 Jan 2019 21:10)  POCT Blood Glucose.: 316 mg/dL (06 Jan 2019 16:35)  POCT Blood Glucose.: 248 mg/dL (06 Jan 2019 12:07)        RADIOLOGY & ADDITIONAL TESTS:    Imaging Personally Reviewed:  YES    Consultant(s) Notes Reviewed:   YES    Care Discussed with Consultants : YES    Plan of care was discussed with patient and /or primary care giver; all questions and concerns were addressed and care was aligned with patient's wishes.
PGY 2 Note discussed with primary attending    Patient is a 79y old  Female who presents with a chief complaint of lower extremity cellulitis (07 Jan 2019 13:26)      INTERVAL HPI/OVERNIGHT EVENTS: Patient seen and examined at bedside with no new complaints - pending hospital bed arrival    MEDICATIONS  (STANDING):  ampicillin/sulbactam  IVPB 3 Gram(s) IV Intermittent every 6 hours  amylase/lipase/protease  (CREON  6,000 Units) 2 Capsule(s) Oral three times a day with meals  atorvastatin 40 milliGRAM(s) Oral at bedtime  buDESOnide 160 MICROgram(s)/formoterol 4.5 MICROgram(s) Inhaler 2 Puff(s) Inhalation two times a day  dronedarone 400 milliGRAM(s) Oral two times a day  furosemide    Tablet 40 milliGRAM(s) Oral daily  insulin glargine Injectable (LANTUS) 5 Unit(s) SubCutaneous at bedtime  insulin lispro (HumaLOG) corrective regimen sliding scale   SubCutaneous Before meals and at bedtime  lactobacillus acidophilus 1 Tablet(s) Oral daily  levothyroxine 175 MICROGram(s) Oral daily  magnesium oxide 400 milliGRAM(s) Oral two times a day with meals  montelukast 10 milliGRAM(s) Oral daily  mupirocin 2% Ointment 1 Application(s) Topical two times a day  nystatin Cream 1 Application(s) Topical two times a day  rivaroxaban 20 milliGRAM(s) Oral every 24 hours  tiotropium 18 MICROgram(s) Capsule 1 Capsule(s) Inhalation daily    MEDICATIONS  (PRN):  acetaminophen   Tablet .. 650 milliGRAM(s) Oral every 6 hours PRN Mild Pain (1 - 3), Moderate Pain (4 - 6)  ALBUTerol    90 MICROgram(s) HFA Inhaler 2 Puff(s) Inhalation every 6 hours PRN Shortness of Breath and/or Wheezing  melatonin 5 milliGRAM(s) Oral at bedtime PRN Insomnia      __________________________________________________  REVIEW OF SYSTEMS:   RESPIRATORY: No cough; No shortness of breath  CARDIOVASCULAR: No chest pain, no palpitations  GASTROINTESTINAL: No pain. No nausea or vomiting; No diarrhea       Vital Signs Last 24 Hrs  T(C): 36.4 (08 Jan 2019 05:40), Max: 37.5 (07 Jan 2019 20:22)  T(F): 97.6 (08 Jan 2019 05:40), Max: 99.5 (07 Jan 2019 20:22)  HR: 71 (08 Jan 2019 05:40) (63 - 71)  BP: 154/56 (08 Jan 2019 05:40) (138/69 - 154/56)  BP(mean): --  RR: 18 (08 Jan 2019 05:40) (17 - 18)  SpO2: 96% (08 Jan 2019 05:40) (96% - 100%)    ________________________________________________  PHYSICAL EXAM:  GENERAL: NAD  HEENT: Normocephalic;  conjunctivae and sclerae clear; moist mucous membranes;   NECK : supple  CHEST/LUNG: Clear to auscultation bilaterally with good air entry   HEART: S1 S2  regular; no murmurs, gallops or rubs  ABDOMEN: Soft, Nontender, Nondistended; Bowel sounds present  EXTREMITIES: b/l LE edema, R>L, warmth improved  NERVOUS SYSTEM:  Awake and alert; Oriented to place, person and time ; no new deficits    _________________________________________________  LABS:                        8.9    7.9   )-----------( 324      ( 07 Jan 2019 10:32 )             27.0     01-07    130<L>  |  89<L>  |  23<H>  ----------------------------<  199<H>  3.3<L>   |  34<H>  |  1.34<H>    Ca    8.3<L>      07 Jan 2019 06:30          CAPILLARY BLOOD GLUCOSE      POCT Blood Glucose.: 247 mg/dL (08 Jan 2019 11:22)  POCT Blood Glucose.: 207 mg/dL (08 Jan 2019 07:45)  POCT Blood Glucose.: 299 mg/dL (07 Jan 2019 21:38)  POCT Blood Glucose.: 302 mg/dL (07 Jan 2019 16:35)        RADIOLOGY & ADDITIONAL TESTS:    Imaging Personally Reviewed:  YES    Consultant(s) Notes Reviewed:   YES    Care Discussed with Consultants : YES    Plan of care was discussed with patient and /or primary care giver; all questions and concerns were addressed and care was aligned with patient's wishes.
Patient is a 78y old  Female who presents with a chief complaint of lower extremity cellulitis (29 Dec 2018 17:29)      INTERVAL HPI/OVERNIGHT EVENTS:increase redness  pain and swelling  right5  vlower extremity no fever  difficulty walking    T(C): 36.8 (18 @ 14:38), Max: 37.3 (18 @ 05:15)  HR: 80 (18 @ 14:38) (59 - 80)  BP: 160/53 (18 @ 14:38) (121/74 - 160/53)  RR: 18 (18 @ 14:38) (18 - 18)  SpO2: 100% (18 @ 14:38) (98% - 100%)  Wt(kg): --Vital Signs Last 24 Hrs  T(C): 36.8 (30 Dec 2018 14:38), Max: 37.3 (30 Dec 2018 05:15)  T(F): 98.3 (30 Dec 2018 14:38), Max: 99.2 (30 Dec 2018 05:15)  HR: 80 (30 Dec 2018 14:38) (59 - 80)  BP: 160/53 (30 Dec 2018 14:38) (121/74 - 160/53)  BP(mean): --  RR: 18 (30 Dec 2018 14:38) (18 - 18)  SpO2: 100% (30 Dec 2018 14:38) (98% - 100%)  I&O's Summary      LABS:                        9.7    7.6   )-----------( 276      ( 30 Dec 2018 06:35 )             29.8     12    135  |  102  |  37<H>  ----------------------------<  147<H>  4.5   |  25  |  1.55<H>    Ca    9.0      30 Dec 2018 06:35  Phos  3.8     12-  Mg     2.1         TPro  7.2  /  Alb  3.6  /  TBili  0.5  /  DBili  x   /  AST  17  /  ALT  28  /  AlkPhos  57  12-    PT/INR - ( 28 Dec 2018 19:12 )   PT: 24.9 sec;   INR: 2.19 ratio         PTT - ( 28 Dec 2018 19:12 )  PTT:40.8 sec  Urinalysis Basic - ( 29 Dec 2018 19:25 )    Color: Yellow / Appearance: Clear / S.015 / pH: x  Gluc: x / Ketone: Negative  / Bili: Negative / Urobili: Negative   Blood: x / Protein: Negative / Nitrite: Negative   Leuk Esterase: Negative / RBC: x / WBC x   Sq Epi: x / Non Sq Epi: x / Bacteria: x      CAPILLARY BLOOD GLUCOSE      POCT Blood Glucose.: 226 mg/dL (30 Dec 2018 16:12)  POCT Blood Glucose.: 168 mg/dL (30 Dec 2018 11:22)  POCT Blood Glucose.: 154 mg/dL (30 Dec 2018 07:45)  POCT Blood Glucose.: 177 mg/dL (29 Dec 2018 21:31)        Urinalysis Basic - ( 29 Dec 2018 19:25 )    Color: Yellow / Appearance: Clear / S.015 / pH: x  Gluc: x / Ketone: Negative  / Bili: Negative / Urobili: Negative   Blood: x / Protein: Negative / Nitrite: Negative   Leuk Esterase: Negative / RBC: x / WBC x   Sq Epi: x / Non Sq Epi: x / Bacteria: x  MEDICATIONS  (STANDING):  ampicillin/sulbactam  IVPB      ampicillin/sulbactam  IVPB 1.5 Gram(s) IV Intermittent every 6 hours  amylase/lipase/protease  (CREON  6,000 Units) 2 Capsule(s) Oral three times a day with meals  atorvastatin 40 milliGRAM(s) Oral at bedtime  buDESOnide 160 MICROgram(s)/formoterol 4.5 MICROgram(s) Inhaler 2 Puff(s) Inhalation two times a day  dronedarone 400 milliGRAM(s) Oral two times a day  insulin lispro (HumaLOG) corrective regimen sliding scale   SubCutaneous Before meals and at bedtime  levothyroxine 175 MICROGram(s) Oral daily  montelukast 10 milliGRAM(s) Oral daily  mupirocin 2% Ointment 1 Application(s) Topical two times a day  rivaroxaban 15 milliGRAM(s) Oral every 24 hours  sodium chloride 0.9%. 1000 milliLiter(s) (60 mL/Hr) IV Continuous <Continuous>  tiotropium 18 MICROgram(s) Capsule 1 Capsule(s) Inhalation daily    MEDICATIONS  (PRN):  acetaminophen   Tablet .. 650 milliGRAM(s) Oral every 6 hours PRN Mild Pain (1 - 3), Moderate Pain (4 - 6)  ALBUTerol    90 MICROgram(s) HFA Inhaler 2 Puff(s) Inhalation every 6 hours PRN Shortness of Breath and/or Wheezing      REVIEW OF SYSTEMS:  CONSTITUTIONAL: No fever, weight loss, or fatigue  EYES: No eye pain, visual disturbances, or discharge  ENMT:  No difficulty hearing, tinnitus, vertigo; No sinus or throat pain  NECK: No pain or stiffness  BREASTS: No pain, masses, or nipple discharge  RESPIRATORY: No cough, wheezing, chills or hemoptysis; + shortness of breath  CARDIOVASCULAR: No chest pain, palpitations, dizziness, + leg swelling  GASTROINTESTINAL: No abdominal or epigastric pain. No nausea, vomiting, or hematemesis; No diarrhea or constipation. No melena or hematochezia.  GENITOURINARY: No dysuria, frequency, hematuria, or incontinence  NEUROLOGICAL: No headaches, memory loss, loss of strength, numbness, or tremors  SKIN: No itching, ++burning, rashes, or lesions   LYMPH NODES: No enlarged glands  ENDOCRINE: No heat or cold intolerance; No hair loss  MUSCULOSKELETAL: No joint pain or swelling; No muscle, back, or extremity pain  PSYCHIATRIC: No depression, anxiety, mood swings, or difficulty sleeping  HEME/LYMPH: No easy bruising, or bleeding gums  ALLERGY AND IMMUNOLOGIC: No hives or eczema    RADIOLOGY & ADDITIONAL TESTS:    Imaging Personally Reviewed:  [ ] YES  [ ] NO    Consultant(s) Notes Reviewed:  [x ] YES  [ ] NO    PHYSICAL EXAM:  GENERAL: NAD, morbidly obese    HEAD:  Atraumatic, Normocephalic  EYES: EOMI, PERRLA, conjunctiva and sclera clear  ENMT: No tonsillar erythema, exudates, or enlargement; Moist mucous membranes, Good dentition, No lesions  NECK: Supple, No JVD, Normal thyroid  NERVOUS SYSTEM:  Alert & Oriented X3, Good concentration; Motor Strength 5/5 B/L upper and lower extremities; DTRs 2+ intact and symmetric  CHEST/LUNG: Clear to percussion bilaterally; No rales, rhonchi, wheezing, or rubs  HEART: Regular rate and rhythm; No murmurs, rubs, or gallops  ABDOMEN: Soft, Nontender, obese  distended; Bowel sounds present  EXTREMITIES:  2+ Peripheral Pulses, No clubbing, cyanosis, + edema  LYMPH: No lymphadenopathy noted  SKIN: redness  sensitive  to touch      Care Discussed with Consultants/Other Providers [ x] YES  [ ] NO
Patient is a 79y old  Female who presents with a chief complaint of lower extremity cellulitis (05 Jan 2019 15:35)      INTERVAL HPI/OVERNIGHT EVENTS: patient with persistent pain swelling right  lower extremity and  redness  left lower CT scan report reviewed and  discussed with patient  and  medical team    T(C): 36.6 (01-06-19 @ 20:33), Max: 36.8 (01-06-19 @ 14:40)  HR: 65 (01-06-19 @ 20:36) (65 - 78)  BP: 157/45 (01-06-19 @ 20:36) (155/63 - 170/53)  RR: 18 (01-06-19 @ 20:33) (18 - 18)  SpO2: 99% (01-06-19 @ 20:33) (96% - 99%)  Wt(kg): --Vital Signs Last 24 Hrs  T(C): 36.6 (06 Jan 2019 20:33), Max: 36.8 (06 Jan 2019 14:40)  T(F): 97.9 (06 Jan 2019 20:33), Max: 98.2 (06 Jan 2019 14:40)  HR: 65 (06 Jan 2019 20:36) (65 - 78)  BP: 157/45 (06 Jan 2019 20:36) (155/63 - 170/53)  BP(mean): --  RR: 18 (06 Jan 2019 20:33) (18 - 18)  SpO2: 99% (06 Jan 2019 20:33) (96% - 99%)  I&O's Summary      LABS:                        10.0   8.9   )-----------( 331      ( 06 Jan 2019 07:08 )             30.7     01-06    129<L>  |  89<L>  |  23<H>  ----------------------------<  190<H>  3.5   |  30  |  1.41<H>    Ca    8.8      06 Jan 2019 07:08          CAPILLARY BLOOD GLUCOSE      POCT Blood Glucose.: 255 mg/dL (06 Jan 2019 21:10)  POCT Blood Glucose.: 316 mg/dL (06 Jan 2019 16:35)  POCT Blood Glucose.: 248 mg/dL (06 Jan 2019 12:07)  POCT Blood Glucose.: 207 mg/dL (06 Jan 2019 08:10)    MEDICATIONS  (STANDING):  ampicillin/sulbactam  IVPB 3 Gram(s) IV Intermittent every 6 hours  amylase/lipase/protease  (CREON  6,000 Units) 2 Capsule(s) Oral three times a day with meals  atorvastatin 40 milliGRAM(s) Oral at bedtime  buDESOnide 160 MICROgram(s)/formoterol 4.5 MICROgram(s) Inhaler 2 Puff(s) Inhalation two times a day  dronedarone 400 milliGRAM(s) Oral two times a day  furosemide    Tablet 40 milliGRAM(s) Oral daily  insulin glargine Injectable (LANTUS) 5 Unit(s) SubCutaneous at bedtime  insulin lispro (HumaLOG) corrective regimen sliding scale   SubCutaneous Before meals and at bedtime  lactobacillus acidophilus 1 Tablet(s) Oral daily  levothyroxine 175 MICROGram(s) Oral daily  magnesium oxide 400 milliGRAM(s) Oral two times a day with meals  montelukast 10 milliGRAM(s) Oral daily  mupirocin 2% Ointment 1 Application(s) Topical two times a day  nystatin Cream 1 Application(s) Topical two times a day  rivaroxaban 20 milliGRAM(s) Oral every 24 hours  tiotropium 18 MICROgram(s) Capsule 1 Capsule(s) Inhalation daily    MEDICATIONS  (PRN):  acetaminophen   Tablet .. 650 milliGRAM(s) Oral every 6 hours PRN Mild Pain (1 - 3), Moderate Pain (4 - 6)  ALBUTerol    90 MICROgram(s) HFA Inhaler 2 Puff(s) Inhalation every 6 hours PRN Shortness of Breath and/or Wheezing  melatonin 5 milliGRAM(s) Oral at bedtime PRN Insomnia  oxyCODONE    5 mG/acetaminophen 325 mG 1 Tablet(s) Oral every 4 hours PRN Moderate Pain (4 - 6)        REVIEW OF SYSTEMS:  CONSTITUTIONAL: No fever, weight loss, or fatigue  EYES: No eye pain, visual disturbances, or discharge  ENMT:  No difficulty hearing, tinnitus, vertigo; No sinus or throat pain  NECK: No pain or stiffness  BREASTS: No pain, masses, or nipple discharge  RESPIRATORY: No cough, wheezing, chills or hemoptysis; No shortness of breath  CARDIOVASCULAR: No chest pain, palpitations, dizziness, or leg swelling  GASTROINTESTINAL: No abdominal or epigastric pain. No nausea, vomiting, or hematemesis; No diarrhea or constipation. No melena or hematochezia.  GENITOURINARY: No dysuria, frequency, hematuria, or incontinence  NEUROLOGICAL: No headaches, memory loss, loss of strength, numbness, or tremors  SKIN: No itching, burning, rashes, or lesions   LYMPH NODES: No enlarged glands  ENDOCRINE: No heat or cold intolerance; No hair loss  MUSCULOSKELETAL: No joint pain or swelling; No muscle, back, o+extremity pain  PSYCHIATRIC: No depression, anxiety, mood swings, or difficulty sleeping  HEME/LYMPH: No easy bruising, or bleeding gums  ALLERGY AND IMMUNOLOGIC: No hives or eczema    RADIOLOGY & ADDITIONAL TESTS:    Imaging Personally Reviewed:  [ ] YES  [ ] NO    Consultant(s) Notes Reviewed:  [x ] YES  [ ] NO    PHYSICAL EXAM:  GENERAL: NAD, morbidly obese    HEAD:  Atraumatic, Normocephalic  EYES: EOMI, PERRLA, conjunctiva and sclera clear  ENMT: No tonsillar erythema, exudates, or enlargement; Moist mucous membranes, Good dentition, No lesions  NECK: Supple, No JVD, Normal thyroid  NERVOUS SYSTEM:  Alert & Oriented X3, Good concentration; Motor Strength 5/5 B/L upper and lower extremities; DTRs 2+ intact and symmetric  CHEST/LUNG: Clear to percussion bilaterally; No rales, rhonchi, wheezing, or rubs  HEART: Regular rate and rhythm; No murmurs, rubs, or gallops  ABDOMEN: Soft, Nontender, Nondistended; Bowel sounds present  EXTREMITIES:  2+ Peripheral Pulses, No clubbing, cyanosis, +2 edema  LYMPH: No lymphadenopathy noted  SKIN: redness  discoloration both lower extremities   Care Discussed with Consultants/Other Providers [ x] YES  [ ] NO
Subjective: ambulating very slowly. c/o pain over legs . no fevers       PHYSICAL EXAM:    Vital Signs Last 24 Hrs  T(C): 37 (02 Jan 2019 13:55), Max: 37 (02 Jan 2019 13:55)  T(F): 98.6 (02 Jan 2019 13:55), Max: 98.6 (02 Jan 2019 13:55)  HR: 68 (02 Jan 2019 13:55) (68 - 84)  BP: 157/51 (02 Jan 2019 13:55) (139/68 - 157/51)  BP(mean): --  RR: 18 (02 Jan 2019 13:55) (17 - 18)  SpO2: 98% (02 Jan 2019 13:55) (96% - 99%)    Constitutional: awake alert oriented times 3   JULIAN SCLERA anicteric EOMI   LUNGS clear   CVS s1 s2 aud systolic murmur 2/6 LSB /ppm in place   ABDOMEN soft non tender no HSM /obese   NEUROLOGY  no defecits   SKIN redness of legs right worse than left /right knee with bruising too /warmth/tender /right shoulder with splint   EXTREMITIES 2-3 +  edema /no cyanosis         LABS/DIAGNOSTIC TESTS                        9.3    7.3   )-----------( 285      ( 02 Jan 2019 09:04 )             28.3     01-02    136  |  101  |  17  ----------------------------<  174<H>  3.9   |  23  |  1.13    Ca    8.2<L>      02 Jan 2019 09:04  Phos  2.7     01-02  Mg     2.3     01-02            meds   acetaminophen   Tablet .. 650 milliGRAM(s) Oral every 6 hours PRN  ALBUTerol    90 MICROgram(s) HFA Inhaler 2 Puff(s) Inhalation every 6 hours PRN  ampicillin/sulbactam  IVPB      ampicillin/sulbactam  IVPB 1.5 Gram(s) IV Intermittent every 6 hours  amylase/lipase/protease  (CREON  6,000 Units) 2 Capsule(s) Oral three times a day with meals  atorvastatin 40 milliGRAM(s) Oral at bedtime  buDESOnide 160 MICROgram(s)/formoterol 4.5 MICROgram(s) Inhaler 2 Puff(s) Inhalation two times a day  dronedarone 400 milliGRAM(s) Oral two times a day  furosemide   Injectable 40 milliGRAM(s) IV Push two times a day  insulin lispro (HumaLOG) corrective regimen sliding scale   SubCutaneous Before meals and at bedtime  levothyroxine 175 MICROGram(s) Oral daily  magnesium oxide 400 milliGRAM(s) Oral two times a day with meals  melatonin 5 milliGRAM(s) Oral at bedtime PRN  montelukast 10 milliGRAM(s) Oral daily  mupirocin 2% Ointment 1 Application(s) Topical two times a day  oxyCODONE    5 mG/acetaminophen 325 mG 1 Tablet(s) Oral every 4 hours PRN  rivaroxaban 20 milliGRAM(s) Oral every 24 hours  sodium chloride 0.9%. 1000 milliLiter(s) IV Continuous <Continuous>  tiotropium 18 MICROgram(s) Capsule 1 Capsule(s) Inhalation daily  vancomycin  IVPB 2000 milliGRAM(s) IV Intermittent every 24 hours        CULTURES  MRSA/MSSA PCR (12.31.18 @ 22:02)    MRSA PCR Result.: [facility]: MRSA/MSSA PCR assay is a qualitative in vitro diagnostic test for the  direct detection and differentiation of methicillin-resistant  Staphylococcus aureus (MRSA) from Staphylococcus aureus (SA). The assay  detects DNA from nasal swabs in patients atrisk for nasal colonization.  It is not intended to diagnose MRSA or SA infections nor guide or monitor  treatment for MRSA/SA infections. A negative result does not preclude  nasal colonization. The assay is FDA-approved and its performance has  been established by [employer] [place], [place] and the [facility], [place], [place].    Staph Aureus PCR Result: [facility]          RADIOLOGY  < from: [facility] Physiol Extremity Lower 3+ Level, BI (01.01.19 @ 16:17) >  EXAM:  US PHYSIOL LWR EXT 3+ LEV BI                            PROCEDURE DATE:  01/01/2019          INTERPRETATION:  Clinical Information: Peripheral vascular disease    Technique: Bilateral lower extremity ABIs/PVR    Comparison: None    Findings:  Right lower extremity: The ankle brachial index is 0.76. The pulse   waveforms are intact.    Left lower extremity: The ankle brachial index is 0.86. The pulse   waveforms are intact.    Impression: Right ROCÍO 0.76 and left ROCÍO of 0.86.                DOTTIE JASON M.D., ATTENDING RADIOLOGIST  This document has been electronically signed. Jan 1 2019  4:18PM    < end of copied text >      < from:  Renal (12.31.18 @ 09:47) >  EXAM:  US KIDNEY(S)                            PROCEDURE DATE:  12/31/2018          INTERPRETATION:  EXAM: US KIDNEY(S)   INDICATION: SRIDHAR.  COMPARISON: None.    TECHNIQUE: Grayscale ultrasound of the kidneys was performed.    FINDINGS:    Right kidney: Normal size, measures 11.7 x 4.3 x 4.6 cm. No   hydronephrosis, shadowing calculus, or perinephric fluid collection.    Left kidney: Normal size, measures 11.5 x 4.7 x 4.6 cm. No   hydronephrosis, shadowing calculus, or perinephric fluid collection.    Visualized proximal abdominal aorta and IVC are grossly unremarkable.    IMPRESSION:   No hydronephrosis.                LYNETTE EUGENE M.D., ATTENDING RADIOLOGIST  This document has been electronically signed. Dec 31 2018  9:58AM    < end of copied text >
Subjective: c/o increased pain, redness and difficulty in ambulation . seen by vascular , no intervention       PHYSICAL EXAM:    Vital Signs Last 24 Hrs  T(C): 36.7 (04 Jan 2019 14:41), Max: 37.3 (03 Jan 2019 20:08)  T(F): 98 (04 Jan 2019 14:41), Max: 99.1 (03 Jan 2019 20:08)  HR: 71 (04 Jan 2019 17:44) (68 - 80)  BP: 155/55 (04 Jan 2019 17:44) (141/47 - 155/55)  BP(mean): --  RR: 18 (04 Jan 2019 14:41) (18 - 18)  SpO2: 97% (04 Jan 2019 14:41) (96% - 99%)      Constitutional: awake alert oriented times 3   JULIAN SCLERA anicteric EOMI   LUNGS clear   CVS s1 s2 aud systolic murmur 2/6 LSB /ppm in place   ABDOMEN soft non tender no HSM /obese   NEUROLOGY  no defecits   SKIN redness of legs right worse than left /right knee with bruising too /warmth/tender /right shoulder with splint   EXTREMITIES 2-3 +  edema /no cyanosis       LABS/DIAGNOSTIC TESTS                        9.0    7.6   )-----------( 288      ( 03 Jan 2019 07:21 )             28.1     01-03    135  |  100  |  18  ----------------------------<  199<H>  3.8   |  27  |  1.26    Ca    8.0<L>      03 Jan 2019 07:21  Mg     2.3     01-03            meds   acetaminophen   Tablet .. 650 milliGRAM(s) Oral every 6 hours PRN  ALBUTerol    90 MICROgram(s) HFA Inhaler 2 Puff(s) Inhalation every 6 hours PRN  ampicillin/sulbactam  IVPB 3 Gram(s) IV Intermittent every 6 hours  amylase/lipase/protease  (CREON  6,000 Units) 2 Capsule(s) Oral three times a day with meals  atorvastatin 40 milliGRAM(s) Oral at bedtime  buDESOnide 160 MICROgram(s)/formoterol 4.5 MICROgram(s) Inhaler 2 Puff(s) Inhalation two times a day  dronedarone 400 milliGRAM(s) Oral two times a day  furosemide   Injectable 40 milliGRAM(s) IV Push two times a day  insulin glargine Injectable (LANTUS) 5 Unit(s) SubCutaneous at bedtime  insulin lispro (HumaLOG) corrective regimen sliding scale   SubCutaneous Before meals and at bedtime  levothyroxine 175 MICROGram(s) Oral daily  magnesium oxide 400 milliGRAM(s) Oral two times a day with meals  melatonin 5 milliGRAM(s) Oral at bedtime PRN  metolazone 2.5 milliGRAM(s) Oral <User Schedule>  montelukast 10 milliGRAM(s) Oral daily  mupirocin 2% Ointment 1 Application(s) Topical two times a day  oxyCODONE    5 mG/acetaminophen 325 mG 1 Tablet(s) Oral every 4 hours PRN  rivaroxaban 20 milliGRAM(s) Oral every 24 hours  sodium chloride 0.9%. 1000 milliLiter(s) IV Continuous <Continuous>  tiotropium 18 MICROgram(s) Capsule 1 Capsule(s) Inhalation daily        CULTURES  no new cx         RADIOLOGY  no new xrays
Subjective: lying in bed nad. no fevers       PHYSICAL EXAM:    Vital Signs Last 24 Hrs  T(C): 36.3 (05 Jan 2019 05:12), Max: 37.5 (04 Jan 2019 20:40)  T(F): 97.3 (05 Jan 2019 05:12), Max: 99.5 (04 Jan 2019 20:40)  HR: 61 (05 Jan 2019 05:12) (61 - 71)  BP: 163/58 (05 Jan 2019 05:12) (148/61 - 163/58)  BP(mean): --  RR: 18 (05 Jan 2019 05:12) (18 - 18)  SpO2: 96% (05 Jan 2019 05:12) (96% - 98%)      Constitutional: awake alert oriented times 3   JULIAN SCLERA anicteric EOMI   LUNGS clear   CVS s1 s2 aud systolic murmur 2/6 LSB /ppm in place   ABDOMEN soft non tender no HSM /obese   NEUROLOGY  no defecits   SKIN redness of legs right worse than left /right knee with bruising too /warmth/tender /right shoulder with splint   EXTREMITIES 2-3 +  edema /no cyanosis         LABS/DIAGNOSTIC TESTS                        10.3   9.7   )-----------( 343      ( 05 Jan 2019 07:01 )             31.5     01-05    129<L>  |  86<L>  |  22<H>  ----------------------------<  191<H>  3.1<L>   |  31  |  1.42<H>    Ca    8.7      05 Jan 2019 09:21            meds   acetaminophen   Tablet .. 650 milliGRAM(s) Oral every 6 hours PRN  ALBUTerol    90 MICROgram(s) HFA Inhaler 2 Puff(s) Inhalation every 6 hours PRN  ampicillin/sulbactam  IVPB 3 Gram(s) IV Intermittent every 6 hours  amylase/lipase/protease  (CREON  6,000 Units) 2 Capsule(s) Oral three times a day with meals  atorvastatin 40 milliGRAM(s) Oral at bedtime  buDESOnide 160 MICROgram(s)/formoterol 4.5 MICROgram(s) Inhaler 2 Puff(s) Inhalation two times a day  dronedarone 400 milliGRAM(s) Oral two times a day  insulin glargine Injectable (LANTUS) 5 Unit(s) SubCutaneous at bedtime  insulin lispro (HumaLOG) corrective regimen sliding scale   SubCutaneous Before meals and at bedtime  lactobacillus acidophilus 1 Tablet(s) Oral daily  levothyroxine 175 MICROGram(s) Oral daily  magnesium oxide 400 milliGRAM(s) Oral two times a day with meals  melatonin 5 milliGRAM(s) Oral at bedtime PRN  montelukast 10 milliGRAM(s) Oral daily  mupirocin 2% Ointment 1 Application(s) Topical two times a day  nystatin Cream 1 Application(s) Topical two times a day  oxyCODONE    5 mG/acetaminophen 325 mG 1 Tablet(s) Oral every 4 hours PRN  potassium chloride    Tablet ER 40 milliEquivalent(s) Oral every 4 hours  rivaroxaban 20 milliGRAM(s) Oral every 24 hours  tiotropium 18 MICROgram(s) Capsule 1 Capsule(s) Inhalation daily        CULTURES  no new cx       RADIOLOGY  no new xrays
Subjective: feels better. decreased pain over legs . awaiting hospital bed at home prior to d/c . no fevers       PHYSICAL EXAM:    Vital Signs Last 24 Hrs  T(C): 36.7 (08 Jan 2019 14:21), Max: 37.5 (07 Jan 2019 20:22)  T(F): 98.1 (08 Jan 2019 14:21), Max: 99.5 (07 Jan 2019 20:22)  HR: 67 (08 Jan 2019 14:21) (63 - 71)  BP: 136/48 (08 Jan 2019 14:21) (136/48 - 154/56)  BP(mean): --  RR: 18 (08 Jan 2019 14:21) (18 - 18)  SpO2: 97% (08 Jan 2019 14:21) (96% - 100%)    Constitutional: awake alert oriented times 3   JULIAN SCLERA anicteric EOMI   LUNGS clear   CVS s1 s2 aud systolic murmur 2/6 LSB /ppm in place   ABDOMEN soft non tender no HSM /obese   NEUROLOGY  no defecits   SKIN erythema of legs fading  /right shoulder with splint   EXTREMITIES 2-3 +  edema /no cyanosis         LABS/DIAGNOSTIC TESTS                        8.9    7.9   )-----------( 324      ( 07 Jan 2019 10:32 )             27.0     01-07    130<L>  |  89<L>  |  23<H>  ----------------------------<  199<H>  3.3<L>   |  34<H>  |  1.34<H>    Ca    8.3<L>      07 Jan 2019 06:30            meds   acetaminophen   Tablet .. 650 milliGRAM(s) Oral every 6 hours PRN  ALBUTerol    90 MICROgram(s) HFA Inhaler 2 Puff(s) Inhalation every 6 hours PRN  ampicillin/sulbactam  IVPB 3 Gram(s) IV Intermittent every 6 hours  amylase/lipase/protease  (CREON  6,000 Units) 2 Capsule(s) Oral three times a day with meals  atorvastatin 40 milliGRAM(s) Oral at bedtime  buDESOnide 160 MICROgram(s)/formoterol 4.5 MICROgram(s) Inhaler 2 Puff(s) Inhalation two times a day  dronedarone 400 milliGRAM(s) Oral two times a day  furosemide    Tablet 40 milliGRAM(s) Oral daily  insulin glargine Injectable (LANTUS) 5 Unit(s) SubCutaneous at bedtime  insulin lispro (HumaLOG) corrective regimen sliding scale   SubCutaneous Before meals and at bedtime  lactobacillus acidophilus 1 Tablet(s) Oral daily  levothyroxine 175 MICROGram(s) Oral daily  magnesium oxide 400 milliGRAM(s) Oral two times a day with meals  melatonin 5 milliGRAM(s) Oral at bedtime PRN  montelukast 10 milliGRAM(s) Oral daily  mupirocin 2% Ointment 1 Application(s) Topical two times a day  nystatin Cream 1 Application(s) Topical two times a day  rivaroxaban 20 milliGRAM(s) Oral every 24 hours  tiotropium 18 MICROgram(s) Capsule 1 Capsule(s) Inhalation daily        CULTURES  MRSA/MSSA PCR (12.31.18 @ 22:02)    MRSA PCR Result.: NotDete: MRSA/MSSA PCR assay is a qualitative in vitro diagnostic test for the  direct detection and differentiation of methicillin-resistant  Staphylococcus aureus (MRSA) from Staphylococcus aureus (SA). The assay  detects DNA from nasal swabs in patients atrisk for nasal colonization.  It is not intended to diagnose MRSA or SA infections nor guide or monitor  treatment for MRSA/SA infections. A negative result does not preclude  nasal colonization. The assay is FDA-approved and its performance has  been established by [facility] [place] and the [facility], [place], [place].    Staph Aureus PCR Result: NotDetec          RADIOLOGY  < from: CT Lower Extremity No Cont, Right (01.06.19 @ 12:41) >  EXAM:  CT LWR EXT RT                            PROCEDURE DATE:  01/06/2019          INTERPRETATION:  CT LWR EXT RT dated 1/6/2019 12:41 PM     INDICATION: Leg swelling. History of hypertension and CHF. Recent history   of fall    COMPARISON: Lowerextremity Doppler dated 12/29/2018. Right knee   radiographs dated 12/22/2018    TECHNIQUE: CT imaging of the bilateral lower extremities from the distal   femurs through the forefoot was performed. The data was reformatted in   the axial, coronal, and sagittal planes. 3-D reformatted imaging of the   right lower extremity was created at a separate workstation    FINDINGS:    OSSEOUS STRUCTURES: There is a curvilinear lucency at the posterior,   central aspect of the right tibia at the tibial eminence  suspicious for   fracture. There is severe bilateral lateral patellofemoral joint space   narrowing with mild to moderate bilateral medial compartment joint space   narrowing worse on the left. Mild marginal productive changes are   appreciatedat the knees. There is an osteochondral defect at the left   medial talar dome measuring up to 0.4 cm. Small subchondral sclerosis at   the left lateral talar dome suspicious for small osteochondral defect.   The remaining joint spaces are preserved.  SYNOVIUM/ JOINT FLUID: There is small fluid within the bilateral knee   joint spaces. No additional joint effusion is noted.  TENDONS: The tendons are intact. No full-thickness tendon tear or   retraction is appreciated.  MUSCLES: There is mild atrophy of the calf musculature. No intramuscular   hematoma is noted..  NEUROVASCULAR STRUCTURES: Mild scattered vascular calcifications are   appreciated bilaterally.  SUBCUTANEOUS SOFT TISSUES: There is circumferential moderate soft tissue   swelling with skin thickening involving both calves extending to the   hindfoot, worse on the right. There is a oval-shaped complex fluid   collection anterior to the proximal right anterior compartment   musculature of the calf measuring approximately 3.5 x 1.5 x 4.5 cm.     3-d reformatted imaging confirms these findings.      IMPRESSION:    1.  Findings concerning for nondisplaced intra-articular fracture at the   posterior, central proximal right tibia.  2.  Moderate bilateral subcutaneous edema with associated skin   thickening. Findings are nonspecific and can be seen with cellulitis in   the appropriate clinical setting.  3.  Oval-shaped complex fluid collection overlying the right anterior   compartment musculature proximally. Nonspecific and differential includes   hematoma and infection. Favor hematoma in the setting of fall.  4.  No CT evidence for osteomyelitis.   5.  Moderate to severe degenerative changes of the knees bilaterally.  6.  Osteochondral defects at both ankles as described above.    Findings were discussed with Dr. Coe on 1/6/2019 1:03 PM  with read   back.                JUAN ANTONIO RECINOS M.D., ATTENDING RADIOLOGIST  This document has been electronically signed. Jan 6 2019  1:28PM    < end of copied text >
PGY 2 Note discussed with primary attending    Patient is a 78y old  Female who presents with a chief complaint of lower extremity cellulitis (30 Dec 2018 17:17)      INTERVAL HPI/OVERNIGHT EVENTS: Patient seen and examined at bedside with no new complaints - minimal improvement of cellulitis, giving dose of Vancomycin and consulted ID Dr Lorenzana for further recommendations     MEDICATIONS  (STANDING):  ampicillin/sulbactam  IVPB      ampicillin/sulbactam  IVPB 1.5 Gram(s) IV Intermittent every 6 hours  amylase/lipase/protease  (CREON  6,000 Units) 2 Capsule(s) Oral three times a day with meals  atorvastatin 40 milliGRAM(s) Oral at bedtime  buDESOnide 160 MICROgram(s)/formoterol 4.5 MICROgram(s) Inhaler 2 Puff(s) Inhalation two times a day  dronedarone 400 milliGRAM(s) Oral two times a day  insulin lispro (HumaLOG) corrective regimen sliding scale   SubCutaneous Before meals and at bedtime  levothyroxine 175 MICROGram(s) Oral daily  montelukast 10 milliGRAM(s) Oral daily  mupirocin 2% Ointment 1 Application(s) Topical two times a day  rivaroxaban 15 milliGRAM(s) Oral every 24 hours  sodium chloride 0.9%. 1000 milliLiter(s) (60 mL/Hr) IV Continuous <Continuous>  tiotropium 18 MICROgram(s) Capsule 1 Capsule(s) Inhalation daily    MEDICATIONS  (PRN):  acetaminophen   Tablet .. 650 milliGRAM(s) Oral every 6 hours PRN Mild Pain (1 - 3), Moderate Pain (4 - 6)  ALBUTerol    90 MICROgram(s) HFA Inhaler 2 Puff(s) Inhalation every 6 hours PRN Shortness of Breath and/or Wheezing  oxyCODONE    5 mG/acetaminophen 325 mG 1 Tablet(s) Oral every 4 hours PRN Moderate Pain (4 - 6)      __________________________________________________  REVIEW OF SYSTEMS:  RESPIRATORY: No cough; No shortness of breath  CARDIOVASCULAR: No chest pain, no palpitations  GASTROINTESTINAL: No pain. No nausea or vomiting; No diarrhea       Vital Signs Last 24 Hrs  T(C): 36.8 (31 Dec 2018 05:11), Max: 36.8 (30 Dec 2018 14:38)  T(F): 98.3 (31 Dec 2018 05:11), Max: 98.3 (30 Dec 2018 14:38)  HR: 67 (31 Dec 2018 05:11) (67 - 80)  BP: 126/49 (31 Dec 2018 05:11) (126/49 - 160/53)  BP(mean): --  RR: 17 (31 Dec 2018 05:11) (17 - 18)  SpO2: 98% (31 Dec 2018 05:11) (98% - 100%)    ________________________________________________  PHYSICAL EXAM:  GENERAL: NAD  HEENT: Normocephalic;  conjunctivae and sclerae clear; moist mucous membranes;   NECK : supple  CHEST/LUNG: Clear to auscultation bilaterally with good air entry   HEART: S1 S2  regular; no murmurs, gallops or rubs  ABDOMEN: Soft, Nontender, Nondistended; Bowel sounds present  EXTREMITIES: b/l LE edema pitting, RLE redness and warmth  NERVOUS SYSTEM:  Awake and alert; Oriented to place, person and time ; no new deficits    _________________________________________________  LABS:                        9.7    7.6   )-----------( 276      ( 30 Dec 2018 06:35 )             29.8     12    135  |  102  |  37<H>  ----------------------------<  147<H>  4.5   |  25  |  1.55<H>    Ca    9.0      30 Dec 2018 06:35  Phos  3.8     12  Mg     2.1         TPro  7.2  /  Alb  3.6  /  TBili  0.5  /  DBili  x   /  AST  17  /  ALT  28  /  AlkPhos  57        Urinalysis Basic - ( 29 Dec 2018 19:25 )    Color: Yellow / Appearance: Clear / S.015 / pH: x  Gluc: x / Ketone: Negative  / Bili: Negative / Urobili: Negative   Blood: x / Protein: Negative / Nitrite: Negative   Leuk Esterase: Negative / RBC: x / WBC x   Sq Epi: x / Non Sq Epi: x / Bacteria: x      CAPILLARY BLOOD GLUCOSE      POCT Blood Glucose.: 255 mg/dL (31 Dec 2018 11:26)  POCT Blood Glucose.: 181 mg/dL (31 Dec 2018 07:49)  POCT Blood Glucose.: 211 mg/dL (30 Dec 2018 21:20)  POCT Blood Glucose.: 226 mg/dL (30 Dec 2018 16:12)        RADIOLOGY & ADDITIONAL TESTS:    Imaging Personally Reviewed:  YES    Consultant(s) Notes Reviewed:   YES    Care Discussed with Consultants : YES    Plan of care was discussed with patient and /or primary care giver; all questions and concerns were addressed and care was aligned with patient's wishes.

## 2019-01-08 NOTE — CHART NOTE - NSCHARTNOTEFT_GEN_A_CORE
Patient is unable to lie flat secondary to history of CHF and obesity complicated with acute right knee plateau fracture.

## 2019-01-08 NOTE — PROGRESS NOTE ADULT - PROVIDER SPECIALTY LIST ADULT
Infectious Disease
Internal Medicine
Infectious Disease
Internal Medicine
Internal Medicine

## 2019-01-08 NOTE — PROGRESS NOTE ADULT - NSHPATTENDINGPLANDISCUSS_GEN_ALL_CORE
patient  daughter  and  medical team
patient  her son  and  medical team
patient  and  medical team
patient  daughter  and  medical team
patient  her son  and  medical team
patient  daughter  and  medical team
patient  her son  and  medical team

## 2019-01-08 NOTE — PROGRESS NOTE ADULT - PROBLEM SELECTOR PROBLEM 4
CHF (congestive heart failure), NYHA class I

## 2019-01-08 NOTE — PROGRESS NOTE ADULT - ATTENDING COMMENTS
77 yo lady with morbid  obesity, ASHD Afib PPM DM HTN HLD Asthma  recent fall complicated with  right  shoulder  dislocation repositioning  admitted with swelling redness and  pain  right  lower extremity x 1 week  venous  doppler  is  negative    Difficulty walking dyspnea on minimal exertion  pain right  shoulder  decrease  ROM    Meds  reviewed All NKA     Labs  CXR Doppler  report  reviewed    Impression  Cellulitis  right  lower extremity Elevated lactic  acid  continue  Unasyn  add bactroban ID evaluation  HTN with CKD Improving renal function  Renal consult   DM with  circulation complications    HLD   Br Asthma  oxygen dependent    Right  shoulder  pain h/o falls    Morbid  Obesity     FS monitoring cont ine  Xarelto
77 yo lady with morbid  obesity, ASHD Afib PPM DM HTN HLD Asthma  recent fall complicated with  right  shoulder  dislocation repositioning  admitted with swelling redness and  pain  right  lower extremity x 1 week  venous  doppler  is  negative  Seen by Vascular  no significant PVD   Patient  redness  right  lower extremity persist  now  noted with  increase  redness  left  lower extremities  Pain and  induration is  worse    Difficulty walking dyspnea on minimal exertion  pain right  shoulder  decrease  ROM    Meds  reviewed All NKA     Labs  CXR Doppler  report  reviewed  Arterial studies  reviewed    Impression  Cellulitis  right  lower extremity r/o abscess  check CT scan of  the  lower extremity  Right  patient  responding to higher dose  of  Unasyn    Edema  localized lower extremities  r/o Hematoma    Severe  DJD arthritis  knees  r/o Fracture  right tibia non displaced  Patient  denies  fall or  pain on walking Ortho evaluation called    PVD Vascular  surgeon evaluation   HTN with CKD Improving renal function    Hyponatremia and  Hypokalemia  diuretics  induced  stop diuretics  supplement  monitor  blood  tests  improving    DM with  circulation complications  \  Muscle cramps  improved  with Magnesium supplement    HLD   Br Asthma  oxygen dependent    Right  shoulder  pain h/o falls    Morbid  Obesity  diet weight  control discussed with patient     FS monitoring continue
77 yo lady with morbid  obesity, ASHD Afib PPM DM HTN HLD Asthma  recent fall complicated with  right  shoulder  dislocation repositioning  admitted with swelling redness and  pain  right  lower extremity x 1 week  venous  doppler  is  negative  Seen by Vascular  no significant PVD   Patient  redness  right  lower extremity persist  now  noted with  increase  redness  left  lower extremities  Pain and  induration is  worse    Difficulty walking dyspnea on minimal exertion  pain right  shoulder  decrease  ROM    Meds  reviewed All NKA     Labs  CXR Doppler  report  reviewed  Arterial studies  reviewed    Impression  Cellulitis  right  lower extremity r/o abscess  check CT scan of  the  lower extremity  Right    Edema  localized lower extremities  R>L continue  diuretics  add  metolazone  keep leg elevated    PVD Vascular  surgeon evaluation   HTN with CKD Improving renal function  Renal consult   DM with  circulation complications  \  Muscle cramps  improved  with Magnesium supplement    HLD   Br Asthma  oxygen dependent    Right  shoulder  pain h/o falls    Morbid  Obesity  diet weight  control discussed with patient     FS monitoring continue
77 yo lady with morbid  obesity, ASHD Afib PPM DM HTN HLD Asthma  recent fall complicated with  right  shoulder  dislocation repositioning  admitted with swelling redness and  pain  right  lower extremity x 1 week  venous  doppler  is  negative  Seen by Vascular  no significant PVD   Patient  redness  right  lower extremity persist  now  noted with  increase  redness  left  lower extremities  Pain and  induration is  worse    Difficulty walking dyspnea on minimal exertion  pain right  shoulder  decrease  ROM    Meds  reviewed All NKA     Labs  CXR Doppler  report  reviewed  Arterial studies  reviewed    Impression  Cellulitis  right  lower extremity r/o abscess  check CT scan of  the  lower extremity  Right  patient  responding to higher dose  of  Unasyn    Edema  localized lower extremities  r/o Hematoma    Severe  DJD arthritis  knees  Fracture  right tibia non displaced Ortho evaluation noted NWB right  LE    PVD Vascular  surgeon evaluation   HTN with CKD Improving renal function    Hyponatremia and  Hypokalemia  diuretics  induced  stop diuretics  supplement  monitor  blood  tests  improving    DM with  circulation complications  \  Muscle cramps  improved  with Magnesium supplement    HLD   Br Asthma  oxygen dependent    Right  shoulder  pain h/o falls    Morbid  Obesity  diet weight  control discussed with patient     FS monitoring continue  Discussed with patient  family  regarding short  term rehab  decline  discharge  planning need  Hospital bed  bedside  commode  and  a walker
79 yo lady with morbid  obesity, ASHD [employer]ib PPM DM HTN HLD Asthma  recent fall complicated with  right  shoulder  dislocation repositioning  admitted with swelling redness and  pain  right  lower extremity x 1 week  venous  doppler  is  negative    Difficulty walking dyspnea on minimal exertion  pain right  shoulder  decrease  ROM    Meds  reviewed All NKA     Labs  CXR Doppler  report  reviewed    Impression  Cellulitis  right  lower extremity Elevated lactic  acid  continue  Unasyn Vancomycin as  per ID  continue   bactroban ID evaluation  Edema  localized lower extremities  R>L continue  diuretics  add  metolazone  keep leg elevated    HTN with CKD Improving renal function  Renal consult   DM with  circulation complications  \  Muscle cramps  improved  with Magnesium supplement    HLD   Br Asthma  oxygen dependent    Right  shoulder  pain h/o falls    Morbid  Obesity  diet weight  control discussed with patient     FS monitoring continue  Xarelto
79 yo lady with morbid  obesity, ASHD [employer]ib PPM DM HTN HLD Asthma  recent fall complicated with  right  shoulder  dislocation repositioning  admitted with swelling redness and  pain  right  lower extremity x 1 week  venous  doppler  is  negative    Difficulty walking dyspnea on minimal exertion  pain right  shoulder  decrease  ROM    Meds  reviewed All NKA     Labs  CXR Doppler  report  reviewed    Impression  Cellulitis  right  lower extremity Elevated lactic  acid  continue  Unasyn Vancomycin as  per ID  continue   bactroban ID evaluation  HTN with CKD Improving renal function  Renal consult   DM with  circulation complications  \  Muscle cramps  improved  with Magnesium supplement    HLD   Br Asthma  oxygen dependent    Right  shoulder  pain h/o falls    Morbid  Obesity     FS monitoring cont ine  Xarelto
79 yo lady with morbid  obesity, ASHD [employer]ib PPM DM HTN HLD Asthma  recent fall complicated with  right  shoulder  dislocation repositioning  admitted with swelling redness and  pain  right  lower extremity x 1 week  venous  doppler  is  negative  Seen by Vascular  no significant PVD   Patient  redness  right  lower extremity persist  now  noted with  increase  redness  left  lower extremities  Pain and  induration is  worse    Difficulty walking dyspnea on minimal exertion  pain right  shoulder  decrease  ROM    Meds  reviewed All NKA     Labs  CXR Doppler  report  reviewed  Arterial studies  reviewed    Impression  Cellulitis  right  lower extremity    Edema  localized lower extremities  Hematoma    Severe  DJD arthritis  knees  Fracture  right tibia non displaced Ortho evaluation noted NWB right  LE    PVD Vascular  surgeon evaluation   HTN with CKD Improving renal function    Hyponatremia and  Hypokalemia  diuretics  induced  stop diuretics  supplement  monitor  blood  tests  improving    DM with  circulation complications  \  Muscle cramps  improved  with Magnesium supplement    HLD   Br Asthma  oxygen dependent    Right  shoulder  pain h/o falls    Morbid  Obesity  diet weight  control discussed with patient     FS monitoring continue  Discussed with patient  family  regarding short  term rehab  decline  discharge  planning need  Hospital bed  bedside  commode  and  a walker Discharge  planning for tomorrow
77 yo lady with morbid  obesity, ASHD Afib PPM DM HTN HLD Asthma  recent fall complicated with  right  shoulder  dislocation repositioning  admitted with swelling redness and  pain  right  lower extremity x 1 week  venous  doppler  is  negative    Difficulty walking dyspnea on minimal exertion  pain right  shoulder  decrease  ROM    Meds  reviewed All NKA     Labs  CXR Doppler  report  reviewed  Arterial studies  reviewed    Impression  Cellulitis  right  lower extremity Elevated lactic  acid  continue  Unasyn Vancomycin as  per ID  continue   bactroban ID evaluation  Edema  localized lower extremities  R>L continue  diuretics  add  metolazone  keep leg elevated    PVD Vascular  surgeon evaluation   HTN with CKD Improving renal function  Renal consult   DM with  circulation complications  \  Muscle cramps  improved  with Magnesium supplement    HLD   Br Asthma  oxygen dependent    Right  shoulder  pain h/o falls    Morbid  Obesity  diet weight  control discussed with patient     FS monitoring continue
77 yo lady with morbid  obesity, ASHD Afib PPM DM HTN HLD Asthma  recent fall complicated with  right  shoulder  dislocation repositioning  admitted with swelling redness and  pain  right  lower extremity x 1 week  venous  doppler  is  negative  Seen by Vascular  no significant PVD   Patient  redness  right  lower extremity persist  now  noted with  increase  redness  left  lower extremities  Pain and  induration is  worse    Difficulty walking dyspnea on minimal exertion  pain right  shoulder  decrease  ROM    Meds  reviewed All NKA     Labs  CXR Doppler  report  reviewed  Arterial studies  reviewed    Impression  Cellulitis  right  lower extremity r/o abscess  check CT scan of  the  lower extremity  Right  patient  responding to higher dose  of  Unasyn Hold  Vanco    Edema  localized lower extremities    PVD Vascular  surgeon evaluation   HTN with CKD Improving renal function    Hyponatremia and  Hypokalemia  diuretics  induced  stop diuretics  supplement  monitor  blood  tests    DM with  circulation complications  \  Muscle cramps  improved  with Magnesium supplement    HLD   Br Asthma  oxygen dependent    Right  shoulder  pain h/o falls    Morbid  Obesity  diet weight  control discussed with patient     FS monitoring continue

## 2019-01-08 NOTE — PROGRESS NOTE ADULT - PROBLEM SELECTOR PLAN 3
S/p PPM; resumed Multaq  ***Renally adjust Xarelto
S/p PPM; resumed Multaq  ***Renally adjusted Xarelto

## 2019-01-08 NOTE — PROGRESS NOTE ADULT - ASSESSMENT
79 y/o Macedonian female from home, lives alone w/ HHA 5/week w/ PMH of CAD s/p stents, HTN, HLD, Diastolic CHF, T2DM, Afib on Xarelto s/p PPM 11/2013, Asthma, Hypothyroidism, Chronic Venous Insufficiency, Urinary Incontinence, and IBS p/w c/o R lower leg swelling x 1 week w/out improvement on PO ABx x 1 day. Patient seen in ED day prior - told to return for US the next day. Patient had previous ED visit 1 week prior for R sided pain s/p mechanical fall 2/2 mechanical trip on rug 1 week ago - imaging at the time negative except for dislocated shoulder reduced in the ED As per patient and family at bedside, patient brought in for concern of DVT and cellulitis of right lower extremity. Otherwise patient denied fever, chills, chest pain, shortness of breath, dizziness, syncope, fever, nausea, vomiting, or other any other complaints. Patient admits to chronic hyperpigmentation of legs  unchanged from baseline. (29 Dec 2018 17:29).    # s/p trauma with dislocation of right shoulder s/p splint     # stasis dermatitis with cellulitis of legs right worse than left r/o underlying pvd . josie less than 1 , quintin has pvd . nasal smear neg for mrsa , clinically  improving     # non displaced fx of right tibia , no surgical  intervention as per ortho     plan  can change to oral augmentin 500mg po tid for 5 days more   walker/splint for right leg as per ortho     stable for d/c from id pt of view   d/w resident
79 y/o Omani female from home, lives alone w/ HHA 5/week w/ PMH of CAD s/p stents, HTN, HLD, Diastolic CHF, T2DM, Afib on Xarelto s/p PPM 11/2013, Asthma, Hypothyroidism, Chronic Venous Insufficiency, Urinary Incontinence, and IBS p/w c/o R lower leg swelling x 1 week w/out improvement on PO ABx x 1 day - admitting for cellulitis
79 y/o Filipino female from home, lives alone w/ HHA 5/week w/ PMH of CAD s/p stents, HTN, HLD, Diastolic CHF, T2DM, Afib on Xarelto s/p PPM 11/2013, Asthma, Hypothyroidism, Chronic Venous Insufficiency, Urinary Incontinence, and IBS p/w c/o R lower leg swelling x 1 week w/out improvement on PO ABx x 1 day - admitting for cellulitis
77 y/o Georgian female from home, lives alone w/ HHA 5/week w/ PMH of CAD s/p stents, HTN, HLD, Diastolic CHF, T2DM, Afib on Xarelto s/p PPM 11/2013, Asthma, Hypothyroidism, Chronic Venous Insufficiency, Urinary Incontinence, and IBS p/w c/o R lower leg swelling x 1 week w/out improvement on PO ABx x 1 day - admitting for cellulitis
77 y/o Tajik female from home, lives alone w/ HHA 5/week w/ PMH of CAD s/p stents, HTN, HLD, Diastolic CHF, T2DM, Afib on Xarelto s/p PPM 11/2013, Asthma, Hypothyroidism, Chronic Venous Insufficiency, Urinary Incontinence, and IBS p/w c/o R lower leg swelling x 1 week w/out improvement on PO ABx x 1 day - admitting for cellulitis  ***Pending hospital bed arrival for discharge in AM
77 y/o Zambian female from home, lives alone w/ HHA 5/week w/ PMH of CAD s/p stents, HTN, HLD, Diastolic CHF, T2DM, Afib on Xarelto s/p PPM 11/2013, Asthma, Hypothyroidism, Chronic Venous Insufficiency, Urinary Incontinence, and IBS p/w c/o R lower leg swelling x 1 week w/out improvement on PO ABx x 1 day - admitting for cellulitis
79 y/o Andorran female from home, lives alone w/ HHA 5/week w/ PMH of CAD s/p stents, HTN, HLD, Diastolic CHF, T2DM, Afib on Xarelto s/p PPM 11/2013, Asthma, Hypothyroidism, Chronic Venous Insufficiency, Urinary Incontinence, and IBS p/w c/o R lower leg swelling x 1 week w/out improvement on PO ABx x 1 day. Patient seen in ED day prior - told to return for US the next day. Patient had previous ED visit 1 week prior for R sided pain s/p mechanical fall 2/2 mechanical trip on rug 1 week ago - imaging at the time negative except for dislocated shoulder reduced in the ED As per patient and family at bedside, patient brought in for concern of DVT and cellulitis of right lower extremity. Otherwise patient denied fever, chills, chest pain, shortness of breath, dizziness, syncope, fever, nausea, vomiting, or other any other complaints. Patient admits to chronic hyperpigmentation of legs  unchanged from baseline. (29 Dec 2018 17:29).    # s/p trauma with dislocation of right shoulder s/p splint     # stasis dermatitis with cellulitis of legs right worse than left r/o underlying pvd . josie less than 1 , quintin has pvd     plan  blood cx times 2   cont vanco and unasyn for cellulitis   vanco levels and adjust    vascular eval of legs   ortho f/u of right shoulder     thnx will f/u   d/w resident
79 y/o Azerbaijani female from home, lives alone w/ HHA 5/week w/ PMH of CAD s/p stents, HTN, HLD, Diastolic CHF, T2DM, Afib on Xarelto s/p PPM 11/2013, Asthma, Hypothyroidism, Chronic Venous Insufficiency, Urinary Incontinence, and IBS p/w c/o R lower leg swelling x 1 week w/out improvement on PO ABx x 1 day - admitting for cellulitis
79 y/o [place] citizen female from home, lives alone w/ HHA 5/week w/ PMH of CAD s/p stents, HTN, HLD, Diastolic CHF, T2DM, Afib on Xarelto s/p PPM 11/2013, Asthma, Hypothyroidism, Chronic Venous Insufficiency, Urinary Incontinence, and IBS p/w c/o R lower leg swelling x 1 week w/out improvement on PO ABx x 1 day - admitting for cellulitis
79 y/o Malawian female from home, lives alone w/ HHA 5/week w/ PMH of CAD s/p stents, HTN, HLD, Diastolic CHF, T2DM, Afib on Xarelto s/p PPM 11/2013, Asthma, Hypothyroidism, Chronic Venous Insufficiency, Urinary Incontinence, and IBS p/w c/o R lower leg swelling x 1 week w/out improvement on PO ABx x 1 day. Patient seen in ED day prior - told to return for US the next day. Patient had previous ED visit 1 week prior for R sided pain s/p mechanical fall 2/2 mechanical trip on rug 1 week ago - imaging at the time negative except for dislocated shoulder reduced in the ED As per patient and family at bedside, patient brought in for concern of DVT and cellulitis of right lower extremity. Otherwise patient denied fever, chills, chest pain, shortness of breath, dizziness, syncope, fever, nausea, vomiting, or other any other complaints. Patient admits to chronic hyperpigmentation of legs  unchanged from baseline. (29 Dec 2018 17:29).    # s/p trauma with dislocation of right shoulder s/p splint     # stasis dermatitis with cellulitis of legs right worse than left r/o underlying pvd . josie less than 1 , quintin has pvd . nasal smear neg for mrsa , clinically not improving     plan  blood cx times 2   cont unasyn but increase dose to 3gm q 6    d/c vancomycin   ct of right leg to r/o myositis   cpk levels   oral ab most likely on monday thnx will f/u   d/w resident
79 y/o Nauruan female from home, lives alone w/ HHA 5/week w/ PMH of CAD s/p stents, HTN, HLD, Diastolic CHF, T2DM, Afib on Xarelto s/p PPM 11/2013, Asthma, Hypothyroidism, Chronic Venous Insufficiency, Urinary Incontinence, and IBS p/w c/o R lower leg swelling x 1 week w/out improvement on PO ABx x 1 day - admitting for cellulitis
79 y/o Swazi female from home, lives alone w/ HHA 5/week w/ PMH of CAD s/p stents, HTN, HLD, Diastolic CHF, T2DM, Afib on Xarelto s/p PPM 11/2013, Asthma, Hypothyroidism, Chronic Venous Insufficiency, Urinary Incontinence, and IBS p/w c/o R lower leg swelling x 1 week w/out improvement on PO ABx x 1 day. Patient seen in ED day prior - told to return for US the next day. Patient had previous ED visit 1 week prior for R sided pain s/p mechanical fall 2/2 mechanical trip on rug 1 week ago - imaging at the time negative except for dislocated shoulder reduced in the ED As per patient and family at bedside, patient brought in for concern of DVT and cellulitis of right lower extremity. Otherwise patient denied fever, chills, chest pain, shortness of breath, dizziness, syncope, fever, nausea, vomiting, or other any other complaints. Patient admits to chronic hyperpigmentation of legs  unchanged from baseline. (29 Dec 2018 17:29).    # s/p trauma with dislocation of right shoulder s/p splint     # stasis dermatitis with cellulitis of legs right worse than left r/o underlying pvd . josie less than 1 , quintin has pvd . nasal smear neg for mrsa , clinically  improving     plan  blood cx times 2   cont unasyn but increase dose to 3gm q 6    cpk levels   oral ab most likely on monday thnx will f/u   d/w resident

## 2019-01-08 NOTE — PROGRESS NOTE ADULT - PROBLEM SELECTOR PROBLEM 1
Cellulitis of right lower extremity

## 2019-01-08 NOTE — PROGRESS NOTE ADULT - REASON FOR ADMISSION
lower extremity cellulitis

## 2019-01-09 VITALS
TEMPERATURE: 98 F | DIASTOLIC BLOOD PRESSURE: 53 MMHG | RESPIRATION RATE: 18 BRPM | OXYGEN SATURATION: 99 % | HEART RATE: 64 BPM | SYSTOLIC BLOOD PRESSURE: 145 MMHG

## 2019-01-09 LAB — GLUCOSE BLDC GLUCOMTR-MCNC: 184 MG/DL — HIGH (ref 70–99)

## 2019-01-09 PROCEDURE — 97161 PT EVAL LOW COMPLEX 20 MIN: CPT

## 2019-01-09 PROCEDURE — 82962 GLUCOSE BLOOD TEST: CPT

## 2019-01-09 PROCEDURE — 84105 ASSAY OF URINE PHOSPHORUS: CPT

## 2019-01-09 PROCEDURE — 83550 IRON BINDING TEST: CPT

## 2019-01-09 PROCEDURE — 83735 ASSAY OF MAGNESIUM: CPT

## 2019-01-09 PROCEDURE — 85027 COMPLETE CBC AUTOMATED: CPT

## 2019-01-09 PROCEDURE — 84443 ASSAY THYROID STIM HORMONE: CPT

## 2019-01-09 PROCEDURE — 82728 ASSAY OF FERRITIN: CPT

## 2019-01-09 PROCEDURE — 80202 ASSAY OF VANCOMYCIN: CPT

## 2019-01-09 PROCEDURE — 93971 EXTREMITY STUDY: CPT

## 2019-01-09 PROCEDURE — 82550 ASSAY OF CK (CPK): CPT

## 2019-01-09 PROCEDURE — 36415 COLL VENOUS BLD VENIPUNCTURE: CPT

## 2019-01-09 PROCEDURE — 84100 ASSAY OF PHOSPHORUS: CPT

## 2019-01-09 PROCEDURE — 99285 EMERGENCY DEPT VISIT HI MDM: CPT | Mod: 25

## 2019-01-09 PROCEDURE — 83036 HEMOGLOBIN GLYCOSYLATED A1C: CPT

## 2019-01-09 PROCEDURE — 84540 ASSAY OF URINE/UREA-N: CPT

## 2019-01-09 PROCEDURE — 83540 ASSAY OF IRON: CPT

## 2019-01-09 PROCEDURE — 80053 COMPREHEN METABOLIC PANEL: CPT

## 2019-01-09 PROCEDURE — 81003 URINALYSIS AUTO W/O SCOPE: CPT

## 2019-01-09 PROCEDURE — 83935 ASSAY OF URINE OSMOLALITY: CPT

## 2019-01-09 PROCEDURE — 82570 ASSAY OF URINE CREATININE: CPT

## 2019-01-09 PROCEDURE — 97530 THERAPEUTIC ACTIVITIES: CPT

## 2019-01-09 PROCEDURE — 82746 ASSAY OF FOLIC ACID SERUM: CPT

## 2019-01-09 PROCEDURE — 87641 MR-STAPH DNA AMP PROBE: CPT

## 2019-01-09 PROCEDURE — 84133 ASSAY OF URINE POTASSIUM: CPT

## 2019-01-09 PROCEDURE — 73700 CT LOWER EXTREMITY W/O DYE: CPT

## 2019-01-09 PROCEDURE — 94640 AIRWAY INHALATION TREATMENT: CPT

## 2019-01-09 PROCEDURE — 97110 THERAPEUTIC EXERCISES: CPT

## 2019-01-09 PROCEDURE — 84300 ASSAY OF URINE SODIUM: CPT

## 2019-01-09 PROCEDURE — 80061 LIPID PANEL: CPT

## 2019-01-09 PROCEDURE — 80048 BASIC METABOLIC PNL TOTAL CA: CPT

## 2019-01-09 PROCEDURE — 82607 VITAMIN B-12: CPT

## 2019-01-09 PROCEDURE — 97116 GAIT TRAINING THERAPY: CPT

## 2019-01-09 PROCEDURE — 83605 ASSAY OF LACTIC ACID: CPT

## 2019-01-09 PROCEDURE — 93923 UPR/LXTR ART STDY 3+ LVLS: CPT

## 2019-01-09 PROCEDURE — 87640 STAPH A DNA AMP PROBE: CPT

## 2019-01-09 PROCEDURE — 85652 RBC SED RATE AUTOMATED: CPT

## 2019-01-09 PROCEDURE — 96374 THER/PROPH/DIAG INJ IV PUSH: CPT

## 2019-01-09 PROCEDURE — 76775 US EXAM ABDO BACK WALL LIM: CPT

## 2019-01-09 RX ORDER — MONTELUKAST 4 MG/1
10 TABLET, CHEWABLE ORAL AT BEDTIME
Qty: 0 | Refills: 0 | Status: DISCONTINUED | OUTPATIENT
Start: 2019-01-09 | End: 2019-01-09

## 2019-01-09 RX ORDER — OXYCODONE AND ACETAMINOPHEN 5; 325 MG/1; MG/1
1 TABLET ORAL ONCE
Qty: 0 | Refills: 0 | Status: DISCONTINUED | OUTPATIENT
Start: 2019-01-09 | End: 2019-01-09

## 2019-01-09 RX ADMIN — Medication 175 MICROGRAM(S): at 06:25

## 2019-01-09 RX ADMIN — OXYCODONE AND ACETAMINOPHEN 1 TABLET(S): 5; 325 TABLET ORAL at 08:05

## 2019-01-09 RX ADMIN — BUDESONIDE AND FORMOTEROL FUMARATE DIHYDRATE 2 PUFF(S): 160; 4.5 AEROSOL RESPIRATORY (INHALATION) at 12:07

## 2019-01-09 RX ADMIN — AMPICILLIN SODIUM AND SULBACTAM SODIUM 200 GRAM(S): 250; 125 INJECTION, POWDER, FOR SUSPENSION INTRAMUSCULAR; INTRAVENOUS at 00:11

## 2019-01-09 RX ADMIN — OXYCODONE AND ACETAMINOPHEN 1 TABLET(S): 5; 325 TABLET ORAL at 00:03

## 2019-01-09 RX ADMIN — DRONEDARONE 400 MILLIGRAM(S): 400 TABLET, FILM COATED ORAL at 17:23

## 2019-01-09 RX ADMIN — Medication 2 CAPSULE(S): at 12:08

## 2019-01-09 RX ADMIN — Medication 650 MILLIGRAM(S): at 06:57

## 2019-01-09 RX ADMIN — Medication 650 MILLIGRAM(S): at 06:26

## 2019-01-09 RX ADMIN — Medication 40 MILLIGRAM(S): at 06:25

## 2019-01-09 RX ADMIN — NYSTATIN CREAM 1 APPLICATION(S): 100000 CREAM TOPICAL at 06:26

## 2019-01-09 RX ADMIN — MAGNESIUM OXIDE 400 MG ORAL TABLET 400 MILLIGRAM(S): 241.3 TABLET ORAL at 17:23

## 2019-01-09 RX ADMIN — Medication 1: at 08:01

## 2019-01-09 RX ADMIN — OXYCODONE AND ACETAMINOPHEN 1 TABLET(S): 5; 325 TABLET ORAL at 00:33

## 2019-01-09 RX ADMIN — MAGNESIUM OXIDE 400 MG ORAL TABLET 400 MILLIGRAM(S): 241.3 TABLET ORAL at 08:02

## 2019-01-09 RX ADMIN — AMPICILLIN SODIUM AND SULBACTAM SODIUM 200 GRAM(S): 250; 125 INJECTION, POWDER, FOR SUSPENSION INTRAMUSCULAR; INTRAVENOUS at 12:08

## 2019-01-09 RX ADMIN — Medication 1 TABLET(S): at 12:08

## 2019-01-09 RX ADMIN — MUPIROCIN 1 APPLICATION(S): 20 OINTMENT TOPICAL at 06:27

## 2019-01-09 RX ADMIN — Medication 5 MILLIGRAM(S): at 00:03

## 2019-01-09 RX ADMIN — RIVAROXABAN 20 MILLIGRAM(S): KIT at 17:23

## 2019-01-09 RX ADMIN — DRONEDARONE 400 MILLIGRAM(S): 400 TABLET, FILM COATED ORAL at 06:25

## 2019-01-09 RX ADMIN — Medication 2 CAPSULE(S): at 08:02

## 2019-01-09 RX ADMIN — TIOTROPIUM BROMIDE 1 CAPSULE(S): 18 CAPSULE ORAL; RESPIRATORY (INHALATION) at 12:08

## 2019-01-09 RX ADMIN — OXYCODONE AND ACETAMINOPHEN 1 TABLET(S): 5; 325 TABLET ORAL at 09:00

## 2019-01-09 RX ADMIN — Medication 2 CAPSULE(S): at 17:23

## 2019-01-09 RX ADMIN — AMPICILLIN SODIUM AND SULBACTAM SODIUM 200 GRAM(S): 250; 125 INJECTION, POWDER, FOR SUSPENSION INTRAMUSCULAR; INTRAVENOUS at 06:26

## 2019-01-14 NOTE — ED PROVIDER NOTE - PROGRESS NOTE
[place], [place]                                ELECTROENCEPHALOGRAPH REPORT      NAME:             LESLIE ROBERTS AGE:          2                     Â       ACCT#:            [id number]          :          2016   MR#:              [id number]          ROOM:         Â    ADMIT DATE:       2018         DIS DATE:     2018   PT TYPE:          O                  DP:           2293   ATTENDING:        JANETTE JAIMES MD         DICTATING PHYSICIAN:  BJ VALENTIN MD      DATE OF STUDY:  2018            EEG REPORT      PHYSICIAN:  Janette Jaimes MD.      CONDITIONS:  An 18-channel EEG performed at [facility]   Outpatient EEG Lab with the patient in awake state only.  Sleep was not   obtained.       RELEVANT HISTORY:  A 2-year-old with an episode of semi-responsive staring.      RESULTS:  The posterior dominant rhythm in the wakeful portion of recording   consisted of 8-9 Hz per second bilaterally.  The remainder of the background   activity was otherwise unremarkable.  Intermittent muscle movement and   electrode artifact were recorded.       Drowsiness and sleep were not obtained.       Activation procedures were set to have been performed well and produced no   significant abnormalities.       IMPRESSION:  This was a normal EEG in the awake state only.  Sleep was not   obtained.       A followup EEG with prior sleep deprivation is recommended.            ______________________________   MD CHRISSY Mcleod/MedRUFUS   DP:  2293   DD:  12/10/2018 15:09:04   DT:  12/10/2018 19:05:37   Job #:  [id number]                Improved.

## 2019-01-17 ENCOUNTER — APPOINTMENT (OUTPATIENT)
Dept: CARDIOLOGY | Facility: CLINIC | Age: 79
End: 2019-01-17

## 2019-01-17 ENCOUNTER — APPOINTMENT (OUTPATIENT)
Dept: ELECTROPHYSIOLOGY | Facility: CLINIC | Age: 79
End: 2019-01-17

## 2019-01-24 NOTE — DISCHARGE NOTE ADULT - NS MD DC FALL RISK RISK
Add 89332 Cpt? (Important Note: In 2017 The Use Of 41514 Is Being Tracked By Cms To Determine Future Global Period Reimbursement For Global Periods): yes
Detail Level: Detailed
For information on Fall & Injury Prevention, visit [email or web address]

## 2019-03-04 ENCOUNTER — APPOINTMENT (OUTPATIENT)
Dept: ORTHOPEDIC SURGERY | Facility: CLINIC | Age: 79
End: 2019-03-04
Payer: MEDICARE

## 2019-03-04 DIAGNOSIS — M70.41 PREPATELLAR BURSITIS, RIGHT KNEE: ICD-10-CM

## 2019-03-04 DIAGNOSIS — G56.01 CARPAL TUNNEL SYNDROME, RIGHT UPPER LIMB: ICD-10-CM

## 2019-03-04 DIAGNOSIS — S89.91XA UNSPECIFIED INJURY OF RIGHT LOWER LEG, INITIAL ENCOUNTER: ICD-10-CM

## 2019-03-04 DIAGNOSIS — S43.004A UNSPECIFIED DISLOCATION OF RIGHT SHOULDER JOINT, INITIAL ENCOUNTER: ICD-10-CM

## 2019-03-04 DIAGNOSIS — M75.101 UNSPECIFIED ROTATOR CUFF TEAR OR RUPTURE OF RIGHT SHOULDER, NOT SPECIFIED AS TRAUMATIC: ICD-10-CM

## 2019-03-04 DIAGNOSIS — S80.01XA CONTUSION OF RIGHT KNEE, INITIAL ENCOUNTER: ICD-10-CM

## 2019-03-04 PROCEDURE — 20526 THER INJECTION CARP TUNNEL: CPT | Mod: RT

## 2019-03-04 PROCEDURE — 73564 X-RAY EXAM KNEE 4 OR MORE: CPT | Mod: RT

## 2019-03-04 PROCEDURE — 99214 OFFICE O/P EST MOD 30 MIN: CPT | Mod: 25

## 2019-03-04 PROCEDURE — 73030 X-RAY EXAM OF SHOULDER: CPT | Mod: RT

## 2019-03-04 NOTE — PHYSICAL EXAM
[de-identified] : Physical Examination\par General: well nourished, in no acute distress, alert and oriented to person, place and time\par Psychiatric: normal mood and affect, no abnormal movements or speech patterns\par Eyes: vision intact - glasses\par Throat: no thyromegaly\par Lymph: no enlarged nodes, no lymphedema in extremity\par Respiratory: no wheezing, no shortness of breath with ambulation\par Cardiac: no cardiac leg swelling, 2+ peripheral pulses\par Neurology: normal gross sensation in extremities to light touch\par Abdomen: soft, non-tender, tympanic, no masses\par \par Musculoskeletal Examination\par Cervical spine	Full painless range of motion and negative Spurling's test\par \par Shoulder			Right			Left\par Appearance\par      Skin/Swelling/Deformity	resolved ecchymosis along arm			normal\par      Scapular Winging		-			-\par Range of Motion\par      Forward Flexion		20 / 80		170 / 170\par      Abduction			20 / 60		170 / 170\par      External Rotation		10			45\par      Internal Rotation		LATERAL HIP			T10\par      Painful Arc			+			-\par      Crepitus			+			-\par Palpation\par      Clavicle			+			-\par      AC Joint			+			-\par      Posterior Acromion		+			-\par      Levator Scapula		+			-\par      Lateral Bursa			+			-\par      Impingement Area		+			-\par      Biceps Tendon		+			-\par      Anterior Capsule		+			-\par Strength Examination\par      Supraspinatous 		5+ / 0&			5+ / 0\par      Infraspinatous			5+ / 0&			5+ / 0\par      Subscapularis			5+ / 0&			5+ / 0\par      Belly Press			5+ / 0&			5+ / 0\par      Lift Off			-&			-\par      Drop-Arm			+			-\par \par \par \par Sensation\par      Axillary			normal			normal\par      LatAntCubBrach 		normal			normal\par      Median 			decreased			normal\par      Ulnar 			normal			normal\par      Radial 			normal			normal\par Motor\par      AIN 				normal			normal\par      Ulnar 			normal			normal\par      Radial 			normal			normal\par      PIN 				normal			normal\par Pulses\par      Radial			2+			2+\par \par \par Ambulation	+ antalgic gait, - assistive devices\par \par Knee			Right			Left\par General\par      Swelling/Deformity	normal			normal	\par      Skin			small fluid cyst, prepatellar bursa, cellulitis distal calf			cellulitis distal calf\par      Erythema		-			-\par      Standing Alignment	neutral			neutral\par      Effusion		none			none\par Range of Motion\par      Hip			full painless ROM		full painless ROM\par      Knee Flexion		100			100\par      Knee Extension	0			0\par Patella\par      J Sign		-			-\par      Quad Medial/Lateral	1/1 1/1\par      Apprehension		-			-\par      Josue's		+			-\par      Grind Sign		+			-\par      Crepitus		+			-\par Palpation\par      Medial Joint Line	+			-\par      Medial Fem Condyle	-			-\par      Lateral Joint Line	-			-\par      Quad Tendon		-			-\par      Patella Tendon	-			-\par      Medial Patella		-			-\par      Lateral Patella 	-			-\par      Posterior Knee	-			-\par Ligamentous\par      Varus @ 0° / 30°	-/-			-/-\par      Valgus @ 0° / 30°	-/-			-/-\par      Lachman		-			-\par      Pivot Shift		-			-\par      Anterior Drawer	-			-\par      Posterior Drawer	pain			-\par Meniscus\par      Camila		-			-\par      Flexion Pinch		-			-\par Strength Examination/Atrophy\par      Hip Flexors 		5+			5+\par      Quadriceps		5+			5+\par      Hamstring		5+			5+\par      Tibialis Anterior	5+			5+\par      Achilles/Soleus	5+			5+\par Sensation\par      Deep Peroneal	normal			normal\par      Superficial Peroneal 	normal			normal\par      Sural  		normal			normal\par      Posterior Tibial 	normal			normal\par      Saphneous 		normal			normal\par Pulses\par      DP			2+			2+\par \par  [de-identified] : 4 views of the affected right shoulder (AP, Glenoid, Y-View, Axillary)\par demonstrate:\par normal bony calcification without dislocation and no fracture, pseudosubluxation\par 	Arch	2B\par 	AC Joint	no Arthrosis\par 	GH Joint	no Arthrosis\par 	Calcifications	none\par \par 3 views of the affected right knee (nonweightbearing AP lateral and oblique)\par From the emergency room on 12-22-18\par demonstrate:\par There is mild medial narrowing\par Small osteophytic lipping\par Trace suprapatellar effusion\par Normal soft tissue density\par Otherwise normal osseous bone structure without fracture or dislocation\par \par \par Impression:\par FINDINGS: There is no evidence for acute fracture or dislocation. Narrowing \par of the patellofemoral joint space as well as medial compartment of the right \par knee joint space identified. Degenerative tibial spurring is noted. No \par suprapatellar joint effusion is noted. No retained radiodense foreign bodies \par identified. \par \par IMPRESSION: No evidence for acute fracture or dislocation. See full \par discussion. \par \par \par 4 views of the affected Right shoulder (AP, Glenoid, Y-View, Axillary)\par were ordered, obtained and evaluated by myself today and\par demonstrate:\par normal bony calcification without dislocation and no fracture\par 	Arch	2B\par 	AC Joint	no Arthrosis\par 	GH Joint	no Arthrosis with elevation of the humerus on the glenoid suggestive of rotator cuff insufficiency\par 	Calcifications	none\par \par 5 views of the affected Right knee (standing AP, flexing standing AP, 30degree flexed lateral, 0degree lateral, sunrise view)\par were ordered, obtained and evaluated by myself today and\par demonstrate:\par There is mild medial weightbearing asymmetric narrowing\par Trace osteophytic lipping\par No suprapatellar effusion\par Severe patellofemoral joint space loss without evidence of tilt [or] subluxation on sunrise view\par Normal soft tissue density\par Otherwise normal osseous bone structure without fracture or dislocation\par Evidence of bony abnormality possible suggestive of a PCL bony avulsion off the posterior tibia\par \par \par EXAM: CT LWR EXT RT \par \par \par PROCEDURE DATE: 01/06/2019 \par \par \par \par INTERPRETATION: CT LWR EXT RT dated 1/6/2019 12:41 PM \par \par INDICATION: Leg swelling. History of hypertension and CHF. Recent history of \par fall \par \par COMPARISON: Lower extremity Doppler dated 12/29/2018. Right knee radiographs \par dated 12/22/2018 \par \par TECHNIQUE: CT imaging of the bilateral lower extremities from the distal \par femurs through the forefoot was performed. The data was reformatted in the \par axial, coronal, and sagittal planes. 3-D reformatted imaging of the right \par lower extremity was created at a separate workstation \par \par FINDINGS: \par \par OSSEOUS STRUCTURES: There is a curvilinear lucency at the posterior, central \par aspect of the right tibia at the tibial eminence suspicious for fracture. \par There is severe bilateral lateral patellofemoral joint space narrowing with \par mild to moderate bilateral medial compartment joint space narrowing worse on \par the left. Mild marginal productive changes are appreciated at the knees. \par There is an osteochondral defect at the left medial talar dome measuring up \par to 0.4 cm. Small subchondral sclerosis at the left lateral talar dome \par suspicious for small osteochondral defect. The remaining joint spaces are \par preserved. \par SYNOVIUM/ JOINT FLUID: There is small fluid within the bilateral knee joint \par spaces. No additional joint effusion is noted. \par TENDONS: The tendons are intact. No full-thickness tendon tear or retraction \par is appreciated. \par MUSCLES: There is mild atrophy of the calf musculature. No intramuscular \par hematoma is noted.. \par NEUROVASCULAR STRUCTURES: Mild scattered vascular calcifications are \par appreciated bilaterally. \par SUBCUTANEOUS SOFT TISSUES: There is circumferential moderate soft tissue \par swelling with skin thickening involving both calves extending to the \par hindfoot, worse on the right. There is a oval-shaped complex fluid \par collection anterior to the proximal right anterior compartment musculature \par of the calf measuring approximately 3.5 x 1.5 x 4.5 cm. \par \par 3-d reformatted imaging confirms these findings. \par \par \par IMPRESSION: \par \par 1. Findings concerning for nondisplaced intra-articular fracture at the \par posterior, central proximal right tibia. \par 2. Moderate bilateral subcutaneous edema with associated skin thickening. \par Findings are nonspecific and can be seen with cellulitis in the appropriate \par clinical setting. \par 3. Oval-shaped complex fluid collection overlying the right anterior \par compartment musculature proximally. Nonspecific and differential includes \par hematoma and infection. Favor hematoma in the setting of fall. \par 4. No CT evidence for osteomyelitis. \par 5. Moderate to severe degenerative changes of the knees bilaterally. \par 6. Osteochondral defects at both ankles as described above. \par

## 2019-03-04 NOTE — DISCUSSION/SUMMARY
[de-identified] : Right shoulder dislocation status post reduction\par Right shoulder rotator cuff syndrome\par \par Right knee contusion\par Right knee osteoarthritis\par Right knee PCL bony avulsion\par Right knee prepatellar bursa\par \par Right CTS\par \par \par Discussed my findings and history exam and radiology\par \par Given the above recommend nonoperative management the shoulder\par \par Physical therapy Rotator cuff strengthening\par \par No anti-inflammatories due to the patient's age and anticoagulation\par \par Physical therapy for the knee\par do not recommend aspiration of prepatellar bursa of knee - given proximity to active cellulitis do not recommend aspriation\par \par Recommended treatment for the cellulitis by medicine\par \par carpal tunnel syndrome:\par recommend night splint, prescription sent\par Procedure Note:\par \par Risks and benefits of a corticosteroid injection of the RIGHT carpal tunnel were discussed with the patient. Potential adverse effects were discussed including but not limited to bleeding, skin/ joint infection, local skin reactions including bleaching, bruising, stiffness, soreness, vasovagal episodes, transient hyperglycemia, avascular necrosis, pseudo-septic type reactions, post injection joint pain, allergic reaction to product or anesthetic and other rare but potential adverse effects along with benefits including decreased pain and improved stability prior to obtaining verbal informed consent. It was also discussed that for some patients the treatment is ineffective and there are no guarantees that the patient will experience improvement as the result of the injection. In rare occasions the injection can cause worsening of pain.\par \par After verbal consent, the volar skin over the wrist joint was palpated and identified in line with the 4th digit and cardinals line. The injection site was marked and prepped with a ChloraPrep swab and anesthetized with ethylchloride skin anesthesia. Under sterile technique a 25g 1/2 in needle with 1.5 cc total of 0.5cc 1% lidocaine without epinephrine, 0.5cc 0.25% Marcaine without epinephrine and 05.cc of Kenalog 40mg/cc was passed through the injection site towards the carpal angling proximally. The medication was injected without resistance. The injection site was sterilely dressed, there was minimal blood loss. The patient tolerated this procedure without any complications done by myself.\par \par The patient has been advised that if they notice any worsening of symptoms or any problems to contact me and seek care from a qualified medical professional. The patient was instructed to ice the thumb and take NSAID medication on an as needed basis if the patient feels discomfort.\par \par discussed op vs non-op management of various patient problems. given medical comorbidities, BMI 52, cellulits, and anti-coagulation patient expectations for recovery and operative management need to be considered and was thoroughly discussed. to have full motion of shoulder and no pain it will be necessary for PT to fail and consider surgery which is risky given medical issues, bmi, cellulitis and chronic anti-coagulation. The expectation is also possible but unlikely given the above. patient and daughter counseled on expectations and possibilities of complications perioperatively given the above medical problems. will proceed with non-op for the time.\par \par greater than 40 minutes were spent on counseling, problem discussion, options both op and non-op discussion at length and discussion of medical comorbidities and possibilities of complications.\par \par Followup after completion of physical therapy

## 2019-03-04 NOTE — HISTORY OF PRESENT ILLNESS
[de-identified] : CC right shoulder and right knee\par \par HPI 77 yo female right HD presents FU acute onset of one week of consult pain in the entire right shoulder and right knee after a fall. The right shoulder was dislocated and took 3 times to relocate. The pain is worse, and rated a 10 out of 10, described as pain, without radiation. Nothing makes the pain better and everything and anything makes the pain worse. The patient reports associated symptoms of swelling instability numbness weakness ecchymosis. The patient + pain at night affecting sleep, - neck pain, and - similar pain previously.\par \par The patient has tried the following treatments:\par Activity modification	+\par Ice			+\par Nsaids    		- unable due to age and anti-coagulation\par Physical Therapy  	+ less than 1 month no better\par Cortisone Injection	-\par Arthroscopy/Surgery	-\par \par Patient is very unhappy about her inability to move her shoulder she walks perfect shoulder motion and strength and zero pain\par new right hand numbness thumb index and middle fingers\par \par Review of Systems is positive for the above musculoskeletal symptoms and is otherwise non-contributory for general, constitutional, psychiatric, neurologic, HEENT, cardiac, respiratory, gastrointestinal, reproductive, lymphatic, and dermatologic complaints.\par \par Consult by

## 2019-03-07 ENCOUNTER — NON-APPOINTMENT (OUTPATIENT)
Age: 79
End: 2019-03-07

## 2019-03-07 ENCOUNTER — APPOINTMENT (OUTPATIENT)
Dept: CARDIOLOGY | Facility: CLINIC | Age: 79
End: 2019-03-07
Payer: MEDICARE

## 2019-03-07 VITALS
HEART RATE: 62 BPM | HEIGHT: 62 IN | DIASTOLIC BLOOD PRESSURE: 84 MMHG | OXYGEN SATURATION: 97 % | SYSTOLIC BLOOD PRESSURE: 167 MMHG

## 2019-03-07 PROCEDURE — 99214 OFFICE O/P EST MOD 30 MIN: CPT

## 2019-03-07 PROCEDURE — 93000 ELECTROCARDIOGRAM COMPLETE: CPT

## 2019-03-07 NOTE — REASON FOR VISIT
[Follow-Up - Clinic] : a clinic follow-up of [Atrial Fibrillation] : atrial fibrillation [Dyspnea] : dyspnea [Heart Failure] : congestive heart failure [Hypertension] : hypertension [Medication Management] : Medication management

## 2019-03-07 NOTE — PHYSICAL EXAM
[General Appearance - Well Developed] : well developed [Normal Appearance] : normal appearance [Well Groomed] : well groomed [General Appearance - Well Nourished] : well nourished [No Deformities] : no deformities [General Appearance - In No Acute Distress] : no acute distress [Normal Conjunctiva] : the conjunctiva exhibited no abnormalities [Eyelids - No Xanthelasma] : the eyelids demonstrated no xanthelasmas [Normal Oral Mucosa] : normal oral mucosa [No Oral Pallor] : no oral pallor [No Oral Cyanosis] : no oral cyanosis [Normal Jugular Venous A Waves Present] : normal jugular venous A waves present [Normal Jugular Venous V Waves Present] : normal jugular venous V waves present [No Jugular Venous Clancy A Waves] : no jugular venous clancy A waves [Respiration, Rhythm And Depth] : normal respiratory rhythm and effort [Exaggerated Use Of Accessory Muscles For Inspiration] : no accessory muscle use [Auscultation Breath Sounds / Voice Sounds] : lungs were clear to auscultation bilaterally [Heart Rate And Rhythm] : heart rate and rhythm were normal [Heart Sounds] : normal S1 and S2 [Murmurs] : no murmurs present [Abdomen Soft] : soft [Abdomen Tenderness] : non-tender [Abdomen Mass (___ Cm)] : no abdominal mass palpated [Abnormal Walk] : normal gait [Gait - Sufficient For Exercise Testing] : the gait was sufficient for exercise testing [Nail Clubbing] : no clubbing of the fingernails [Cyanosis, Localized] : no localized cyanosis [Petechial Hemorrhages (___cm)] : no petechial hemorrhages [Skin Color & Pigmentation] : normal skin color and pigmentation [] : no rash [No Venous Stasis] : no venous stasis [Skin Lesions] : no skin lesions [No Skin Ulcers] : no skin ulcer [No Xanthoma] : no  xanthoma was observed [Oriented To Time, Place, And Person] : oriented to person, place, and time [Affect] : the affect was normal [Mood] : the mood was normal [No Anxiety] : not feeling anxious

## 2019-03-24 NOTE — REVIEW OF SYSTEMS
[Feeling Fatigued] : feeling fatigued [Dyspnea on exertion] : dyspnea during exertion [Negative] : Heme/Lymph [Shortness Of Breath] : no shortness of breath

## 2019-03-24 NOTE — DISCUSSION/SUMMARY
[FreeTextEntry1] : D/C HCTZ and change to lasix daily\par Multaq for PAF suppression\par Xarelto for PAF\par BP high - additional diuretic should help\par \par \par RTO 3-4 mo\par \par \par

## 2019-03-24 NOTE — HISTORY OF PRESENT ILLNESS
[FreeTextEntry1] : Following up.\par \par Recent admission for Fall\par No CP\par No palpitations\par Meds reviewed\par \par \par

## 2019-09-04 ENCOUNTER — NON-APPOINTMENT (OUTPATIENT)
Age: 79
End: 2019-09-04

## 2019-09-04 ENCOUNTER — APPOINTMENT (OUTPATIENT)
Dept: CARDIOLOGY | Facility: CLINIC | Age: 79
End: 2019-09-04
Payer: MEDICARE

## 2019-09-04 VITALS
HEIGHT: 62 IN | OXYGEN SATURATION: 97 % | DIASTOLIC BLOOD PRESSURE: 80 MMHG | HEART RATE: 61 BPM | SYSTOLIC BLOOD PRESSURE: 155 MMHG

## 2019-09-04 PROCEDURE — 99214 OFFICE O/P EST MOD 30 MIN: CPT

## 2019-09-04 PROCEDURE — 93000 ELECTROCARDIOGRAM COMPLETE: CPT

## 2019-09-04 NOTE — REASON FOR VISIT
[Atrial Fibrillation] : atrial fibrillation [Follow-Up - Clinic] : a clinic follow-up of [Dyspnea] : dyspnea [Hypertension] : hypertension [Heart Failure] : congestive heart failure [Medication Management] : Medication management

## 2019-09-04 NOTE — PHYSICAL EXAM
[General Appearance - Well Developed] : well developed [Normal Appearance] : normal appearance [Well Groomed] : well groomed [No Deformities] : no deformities [General Appearance - Well Nourished] : well nourished [General Appearance - In No Acute Distress] : no acute distress [Normal Conjunctiva] : the conjunctiva exhibited no abnormalities [Eyelids - No Xanthelasma] : the eyelids demonstrated no xanthelasmas [Normal Oral Mucosa] : normal oral mucosa [No Oral Cyanosis] : no oral cyanosis [No Oral Pallor] : no oral pallor [Normal Jugular Venous A Waves Present] : normal jugular venous A waves present [Normal Jugular Venous V Waves Present] : normal jugular venous V waves present [No Jugular Venous Clancy A Waves] : no jugular venous clancy A waves [Respiration, Rhythm And Depth] : normal respiratory rhythm and effort [Exaggerated Use Of Accessory Muscles For Inspiration] : no accessory muscle use [Heart Rate And Rhythm] : heart rate and rhythm were normal [Auscultation Breath Sounds / Voice Sounds] : lungs were clear to auscultation bilaterally [Heart Sounds] : normal S1 and S2 [Murmurs] : no murmurs present [Abdomen Soft] : soft [Abdomen Tenderness] : non-tender [Abdomen Mass (___ Cm)] : no abdominal mass palpated [Abnormal Walk] : normal gait [Gait - Sufficient For Exercise Testing] : the gait was sufficient for exercise testing [Nail Clubbing] : no clubbing of the fingernails [Cyanosis, Localized] : no localized cyanosis [Petechial Hemorrhages (___cm)] : no petechial hemorrhages [] : no rash [Skin Color & Pigmentation] : normal skin color and pigmentation [No Venous Stasis] : no venous stasis [Skin Lesions] : no skin lesions [No Skin Ulcers] : no skin ulcer [No Xanthoma] : no  xanthoma was observed [Affect] : the affect was normal [Oriented To Time, Place, And Person] : oriented to person, place, and time [Mood] : the mood was normal [No Anxiety] : not feeling anxious

## 2019-09-16 NOTE — DISCUSSION/SUMMARY
[FreeTextEntry1] : Xarelto for PAF \par If plans on arm surgery would need cardiac cath (daughter/grand daughter's questions all answered)\par \par RTO 3-4 mo\par \par \par

## 2019-09-16 NOTE — HISTORY OF PRESENT ILLNESS
[FreeTextEntry1] : Following up.\par recent fall and bicep injury\par No CP\par No palpitations\par Meds reviewed\par \par \par

## 2019-11-01 ENCOUNTER — OUTPATIENT (OUTPATIENT)
Dept: OUTPATIENT SERVICES | Facility: HOSPITAL | Age: 79
LOS: 1 days | End: 2019-11-01
Payer: MEDICARE

## 2019-11-01 DIAGNOSIS — Z98.89 OTHER SPECIFIED POSTPROCEDURAL STATES: Chronic | ICD-10-CM

## 2019-11-01 PROCEDURE — G9001: CPT

## 2019-11-06 ENCOUNTER — EMERGENCY (EMERGENCY)
Facility: HOSPITAL | Age: 79
LOS: 1 days | Discharge: HOME | End: 2019-11-06
Attending: EMERGENCY MEDICINE
Payer: MEDICARE

## 2019-11-06 VITALS
SYSTOLIC BLOOD PRESSURE: 183 MMHG | RESPIRATION RATE: 22 BRPM | OXYGEN SATURATION: 98 % | TEMPERATURE: 98 F | DIASTOLIC BLOOD PRESSURE: 74 MMHG | HEART RATE: 67 BPM | WEIGHT: 279.99 LBS | HEIGHT: 62 IN

## 2019-11-06 VITALS
OXYGEN SATURATION: 97 % | RESPIRATION RATE: 16 BRPM | SYSTOLIC BLOOD PRESSURE: 148 MMHG | HEART RATE: 65 BPM | TEMPERATURE: 98 F | DIASTOLIC BLOOD PRESSURE: 63 MMHG

## 2019-11-06 DIAGNOSIS — Z98.89 OTHER SPECIFIED POSTPROCEDURAL STATES: Chronic | ICD-10-CM

## 2019-11-06 LAB
ALBUMIN SERPL ELPH-MCNC: 3.7 G/DL — SIGNIFICANT CHANGE UP (ref 3.3–5)
ALP SERPL-CCNC: 52 U/L — SIGNIFICANT CHANGE UP (ref 40–120)
ALT FLD-CCNC: 22 U/L — SIGNIFICANT CHANGE UP (ref 10–45)
ANION GAP SERPL CALC-SCNC: 12 MMOL/L — SIGNIFICANT CHANGE UP (ref 5–17)
ANION GAP SERPL CALC-SCNC: 12 MMOL/L — SIGNIFICANT CHANGE UP (ref 5–17)
APPEARANCE UR: CLEAR — SIGNIFICANT CHANGE UP
APTT BLD: 40.4 SEC — HIGH (ref 27.5–36.3)
AST SERPL-CCNC: 35 U/L — SIGNIFICANT CHANGE UP (ref 10–40)
BACTERIA # UR AUTO: NEGATIVE — SIGNIFICANT CHANGE UP
BASE EXCESS BLDV CALC-SCNC: -2.9 MMOL/L — LOW (ref -2–2)
BASOPHILS # BLD AUTO: 0.03 K/UL — SIGNIFICANT CHANGE UP (ref 0–0.2)
BASOPHILS NFR BLD AUTO: 0.5 % — SIGNIFICANT CHANGE UP (ref 0–2)
BILIRUB SERPL-MCNC: 0.2 MG/DL — SIGNIFICANT CHANGE UP (ref 0.2–1.2)
BILIRUB UR-MCNC: NEGATIVE — SIGNIFICANT CHANGE UP
BUN SERPL-MCNC: 28 MG/DL — HIGH (ref 7–23)
BUN SERPL-MCNC: 29 MG/DL — HIGH (ref 7–23)
CA-I SERPL-SCNC: 1.12 MMOL/L — SIGNIFICANT CHANGE UP (ref 1.12–1.3)
CALCIUM SERPL-MCNC: 8.5 MG/DL — SIGNIFICANT CHANGE UP (ref 8.4–10.5)
CALCIUM SERPL-MCNC: 8.6 MG/DL — SIGNIFICANT CHANGE UP (ref 8.4–10.5)
CHLORIDE BLDV-SCNC: 105 MMOL/L — SIGNIFICANT CHANGE UP (ref 96–108)
CHLORIDE SERPL-SCNC: 100 MMOL/L — SIGNIFICANT CHANGE UP (ref 96–108)
CHLORIDE SERPL-SCNC: 101 MMOL/L — SIGNIFICANT CHANGE UP (ref 96–108)
CO2 BLDV-SCNC: 24 MMOL/L — SIGNIFICANT CHANGE UP (ref 22–30)
CO2 SERPL-SCNC: 19 MMOL/L — LOW (ref 22–31)
CO2 SERPL-SCNC: 19 MMOL/L — LOW (ref 22–31)
COLOR SPEC: YELLOW — SIGNIFICANT CHANGE UP
CREAT SERPL-MCNC: 1.28 MG/DL — SIGNIFICANT CHANGE UP (ref 0.5–1.3)
CREAT SERPL-MCNC: 1.32 MG/DL — HIGH (ref 0.5–1.3)
DIFF PNL FLD: ABNORMAL
EOSINOPHIL # BLD AUTO: 0.25 K/UL — SIGNIFICANT CHANGE UP (ref 0–0.5)
EOSINOPHIL NFR BLD AUTO: 4.5 % — SIGNIFICANT CHANGE UP (ref 0–6)
EPI CELLS # UR: 9 /HPF — HIGH
GAS PNL BLDV: 128 MMOL/L — LOW (ref 135–145)
GAS PNL BLDV: SIGNIFICANT CHANGE UP
GAS PNL BLDV: SIGNIFICANT CHANGE UP
GLUCOSE BLDV-MCNC: 189 MG/DL — HIGH (ref 70–99)
GLUCOSE SERPL-MCNC: 177 MG/DL — HIGH (ref 70–99)
GLUCOSE SERPL-MCNC: 209 MG/DL — HIGH (ref 70–99)
GLUCOSE UR QL: NEGATIVE — SIGNIFICANT CHANGE UP
HCO3 BLDV-SCNC: 22 MMOL/L — SIGNIFICANT CHANGE UP (ref 21–29)
HCT VFR BLD CALC: 31.3 % — LOW (ref 34.5–45)
HCT VFR BLDA CALC: 32 % — LOW (ref 39–50)
HGB BLD CALC-MCNC: 10.5 G/DL — LOW (ref 11.5–15.5)
HGB BLD-MCNC: 10 G/DL — LOW (ref 11.5–15.5)
HYALINE CASTS # UR AUTO: 4 /LPF — HIGH (ref 0–2)
IMM GRANULOCYTES NFR BLD AUTO: 0.4 % — SIGNIFICANT CHANGE UP (ref 0–1.5)
INR BLD: 2.26 RATIO — HIGH (ref 0.88–1.16)
KETONES UR-MCNC: NEGATIVE — SIGNIFICANT CHANGE UP
LACTATE BLDV-MCNC: 1.8 MMOL/L — SIGNIFICANT CHANGE UP (ref 0.7–2)
LEUKOCYTE ESTERASE UR-ACNC: ABNORMAL
LIDOCAIN IGE QN: 22 U/L — SIGNIFICANT CHANGE UP (ref 7–60)
LYMPHOCYTES # BLD AUTO: 1.45 K/UL — SIGNIFICANT CHANGE UP (ref 1–3.3)
LYMPHOCYTES # BLD AUTO: 25.9 % — SIGNIFICANT CHANGE UP (ref 13–44)
MCHC RBC-ENTMCNC: 26.7 PG — LOW (ref 27–34)
MCHC RBC-ENTMCNC: 31.9 GM/DL — LOW (ref 32–36)
MCV RBC AUTO: 83.7 FL — SIGNIFICANT CHANGE UP (ref 80–100)
MONOCYTES # BLD AUTO: 0.47 K/UL — SIGNIFICANT CHANGE UP (ref 0–0.9)
MONOCYTES NFR BLD AUTO: 8.4 % — SIGNIFICANT CHANGE UP (ref 2–14)
NEUTROPHILS # BLD AUTO: 3.37 K/UL — SIGNIFICANT CHANGE UP (ref 1.8–7.4)
NEUTROPHILS NFR BLD AUTO: 60.3 % — SIGNIFICANT CHANGE UP (ref 43–77)
NITRITE UR-MCNC: NEGATIVE — SIGNIFICANT CHANGE UP
NRBC # BLD: 0 /100 WBCS — SIGNIFICANT CHANGE UP (ref 0–0)
OTHER CELLS CSF MANUAL: 8 ML/DL — LOW (ref 18–22)
PCO2 BLDV: 44 MMHG — SIGNIFICANT CHANGE UP (ref 35–50)
PH BLDV: 7.32 — LOW (ref 7.35–7.45)
PH UR: 5.5 — SIGNIFICANT CHANGE UP (ref 5–8)
PLATELET # BLD AUTO: 285 K/UL — SIGNIFICANT CHANGE UP (ref 150–400)
PO2 BLDV: 32 MMHG — SIGNIFICANT CHANGE UP (ref 25–45)
POTASSIUM BLDV-SCNC: 5 MMOL/L — SIGNIFICANT CHANGE UP (ref 3.5–5.3)
POTASSIUM SERPL-MCNC: 5.3 MMOL/L — SIGNIFICANT CHANGE UP (ref 3.5–5.3)
POTASSIUM SERPL-MCNC: 6.2 MMOL/L — CRITICAL HIGH (ref 3.5–5.3)
POTASSIUM SERPL-SCNC: 5.3 MMOL/L — SIGNIFICANT CHANGE UP (ref 3.5–5.3)
POTASSIUM SERPL-SCNC: 6.2 MMOL/L — CRITICAL HIGH (ref 3.5–5.3)
PROT SERPL-MCNC: 6.9 G/DL — SIGNIFICANT CHANGE UP (ref 6–8.3)
PROT UR-MCNC: ABNORMAL
PROTHROM AB SERPL-ACNC: 26.6 SEC — HIGH (ref 10–12.9)
RBC # BLD: 3.74 M/UL — LOW (ref 3.8–5.2)
RBC # FLD: 14.7 % — HIGH (ref 10.3–14.5)
RBC CASTS # UR COMP ASSIST: 3 /HPF — SIGNIFICANT CHANGE UP (ref 0–4)
SAO2 % BLDV: 58 % — LOW (ref 67–88)
SODIUM SERPL-SCNC: 131 MMOL/L — LOW (ref 135–145)
SODIUM SERPL-SCNC: 132 MMOL/L — LOW (ref 135–145)
SP GR SPEC: 1.02 — SIGNIFICANT CHANGE UP (ref 1.01–1.02)
UROBILINOGEN FLD QL: NEGATIVE — SIGNIFICANT CHANGE UP
WBC # BLD: 5.59 K/UL — SIGNIFICANT CHANGE UP (ref 3.8–10.5)
WBC # FLD AUTO: 5.59 K/UL — SIGNIFICANT CHANGE UP (ref 3.8–10.5)
WBC UR QL: 4 /HPF — SIGNIFICANT CHANGE UP (ref 0–5)

## 2019-11-06 PROCEDURE — 84295 ASSAY OF SERUM SODIUM: CPT

## 2019-11-06 PROCEDURE — 99284 EMERGENCY DEPT VISIT MOD MDM: CPT | Mod: 25

## 2019-11-06 PROCEDURE — 82947 ASSAY GLUCOSE BLOOD QUANT: CPT

## 2019-11-06 PROCEDURE — 82803 BLOOD GASES ANY COMBINATION: CPT

## 2019-11-06 PROCEDURE — 87040 BLOOD CULTURE FOR BACTERIA: CPT

## 2019-11-06 PROCEDURE — 71045 X-RAY EXAM CHEST 1 VIEW: CPT | Mod: 26

## 2019-11-06 PROCEDURE — 85014 HEMATOCRIT: CPT

## 2019-11-06 PROCEDURE — 87086 URINE CULTURE/COLONY COUNT: CPT

## 2019-11-06 PROCEDURE — 80053 COMPREHEN METABOLIC PANEL: CPT

## 2019-11-06 PROCEDURE — 82435 ASSAY OF BLOOD CHLORIDE: CPT

## 2019-11-06 PROCEDURE — 99284 EMERGENCY DEPT VISIT MOD MDM: CPT

## 2019-11-06 PROCEDURE — 83690 ASSAY OF LIPASE: CPT

## 2019-11-06 PROCEDURE — 81001 URINALYSIS AUTO W/SCOPE: CPT

## 2019-11-06 PROCEDURE — 85610 PROTHROMBIN TIME: CPT

## 2019-11-06 PROCEDURE — 76705 ECHO EXAM OF ABDOMEN: CPT | Mod: 26,RT

## 2019-11-06 PROCEDURE — 85027 COMPLETE CBC AUTOMATED: CPT

## 2019-11-06 PROCEDURE — 80048 BASIC METABOLIC PNL TOTAL CA: CPT

## 2019-11-06 PROCEDURE — 84132 ASSAY OF SERUM POTASSIUM: CPT

## 2019-11-06 PROCEDURE — 80076 HEPATIC FUNCTION PANEL: CPT

## 2019-11-06 PROCEDURE — 71045 X-RAY EXAM CHEST 1 VIEW: CPT

## 2019-11-06 PROCEDURE — 87798 DETECT AGENT NOS DNA AMP: CPT

## 2019-11-06 PROCEDURE — 85730 THROMBOPLASTIN TIME PARTIAL: CPT

## 2019-11-06 PROCEDURE — 93005 ELECTROCARDIOGRAM TRACING: CPT

## 2019-11-06 PROCEDURE — 76705 ECHO EXAM OF ABDOMEN: CPT

## 2019-11-06 PROCEDURE — 82330 ASSAY OF CALCIUM: CPT

## 2019-11-06 PROCEDURE — 83605 ASSAY OF LACTIC ACID: CPT

## 2019-11-06 PROCEDURE — 93010 ELECTROCARDIOGRAM REPORT: CPT

## 2019-11-06 RX ORDER — CEPHALEXIN 500 MG
500 CAPSULE ORAL EVERY 12 HOURS
Refills: 0 | Status: DISCONTINUED | OUTPATIENT
Start: 2019-11-06 | End: 2019-11-16

## 2019-11-06 RX ORDER — CEPHALEXIN 500 MG
1 CAPSULE ORAL
Qty: 14 | Refills: 0
Start: 2019-11-06 | End: 2019-11-12

## 2019-11-06 RX ADMIN — Medication 500 MILLIGRAM(S): at 20:51

## 2019-11-06 NOTE — ED ADULT NURSE REASSESSMENT NOTE - NS ED NURSE REASSESS COMMENT FT1
Received report from Mercedez CUEVA. Pt resting comfortably with family at bedside. VSS. Will continue to monitor.

## 2019-11-06 NOTE — ED PROVIDER NOTE - NSFOLLOWUPCLINICS_GEN_ALL_ED_FT
[facility] General Internal Medicine  General Internal Medicine  [address] [id number]  Phone: [phone]  Fax:   Follow Up Time:

## 2019-11-06 NOTE — ED PROVIDER NOTE - NSFOLLOWUPINSTRUCTIONS_ED_ALL_ED_FT
1- Keflex 500 mg every 12 hours for 7 days  2- Continue all medications as prescribed  3- Follow up with your vascular surgeon   4- We sent a pertussis test. this will likely take a couple days to result, you can call [phone] for the result (11a-4p)

## 2019-11-06 NOTE — ED PROVIDER NOTE - OBJECTIVE STATEMENT
80 y/o female w/ PMHx of COPD, vascular insuffiency in her b/l LE's presents to ED c/o not feeling well for the past month.  Having intermittent low grade fevers roughly every other day for that time.  Completed a course of Keflex for cellulitis on her b/l LE's about a week ago but was not taking it as prescribed and only took it when she thought she needed it.  Also with increased coughing w/ no mucous and post tussive emesis.  Also has noticed some pain in RUQ for the past 2-3 weeks (unrelated to eating).  Family concerned that her legs are getting more red.  Nothing makes her complaints better or worse.

## 2019-11-06 NOTE — ED ADULT NURSE NOTE - OBJECTIVE STATEMENT
79 y.o. Female presents to the ED accompanied by family for fever. Hx obesity, diabetes, CAD, pacemaker, HTN, HLD, vascular disease. As per daughter at bedside, pt was having a fever of "37.3" x1 month. Vomiting x3 days with RUQ pain. Tender on RUQ. A&Ox3. Recently finished Keflex for cellulitis of lower extremities b/l. Daughter states pt has chronic cough - worsening over the past few weeks, not productive. Diminished breath sounds noted b/l. Pt is in no current distress. Comfort and safety provided. Will continue to monitor. Awaiting US and xray.

## 2019-11-06 NOTE — ED PROVIDER NOTE - ATTENDING CONTRIBUTION TO CARE
78 y/o female w/ PMHx of COPD, vascular insuffiency in her b/l LE's presents to ED c/o not feeling well for the past month with malaise, treated for cellulitis with no signs at this time, ruq tend minimal on exam, qdoc ordered us labs, no gi complaints, also with cough no sob possible viral, lungs cta b/l, vss, cxr reassess.

## 2019-11-06 NOTE — ED PROVIDER NOTE - PATIENT PORTAL LINK FT
You can access the FollowMyHealth Patient Portal offered by [facility] by registering at the following website: [email or web address]. By joining [facility]’s FollowMyHealth portal, you will also be able to view your health information using other applications (apps) compatible with our system.

## 2019-11-06 NOTE — ED PROVIDER NOTE - CONSTITUTIONAL, MLM
normal... Well appearing, morbidly obese well nourished, awake, alert, oriented to person, place, time/situation and in no apparent distress.

## 2019-11-06 NOTE — ED PROVIDER NOTE - RAPID ASSESSMENT
78 y/o female w/ PMHx of COPD presents to ED c/o fever 3x a week for the past month, increased coughing w/ no mucous, vomiting episodes for the past few days and belly pain in RUQ for the past 2-3 weeks. Pt fell in december causing a shoulder dislocation and knee pain. Daughter states hot skin and increased redness of L lower calf.    **Pt seen in waiting room by CARRI Spear, documentation completed by Rina David. Pt to be sent to main ED for further evaluation - all orders placed to be followed by MD in the main ED** 80 y/o female w/ PMHx of COPD presents to ED c/o fever 3x a week for the past month, increased coughing w/ no mucous, vomiting episodes for the past few days and belly pain in RUQ for the past 2-3 weeks (unrelated to eating). chronic vascular issues, started on abx last month for cellulitis, noncompliant with meds, left shin now getting more erythematous and hot x 4 days  **Pt seen in waiting room by CARRI Spear, documentation completed by Rina David. Pt to be sent to main ED for further evaluation - all orders placed to be followed by MD in the main ED**

## 2019-11-06 NOTE — ED ADULT NURSE NOTE - NSIMPLEMENTINTERV_GEN_ALL_ED
Implemented All Fall Risk Interventions:  [place] to call system. Call bell, personal items and telephone within reach. Instruct patient to call for assistance. Room bathroom lighting operational. Non-slip footwear when patient is off stretcher. Physically safe environment: no spills, clutter or unnecessary equipment. Stretcher in lowest position, wheels locked, appropriate side rails in place. Provide visual cue, wrist band, yellow gown, etc. Monitor gait and stability. Monitor for mental status changes and reorient to person, place, and time. Review medications for side effects contributing to fall risk. Reinforce activity limits and safety measures with patient and family.

## 2019-11-07 LAB
B PERT DNA SPEC QL NAA+PROBE: SIGNIFICANT CHANGE UP
B PERT+PARAPERT DNA PNL NPH: SIGNIFICANT CHANGE UP

## 2019-11-08 ENCOUNTER — INPATIENT (INPATIENT)
Facility: HOSPITAL | Age: 79
LOS: 3 days | Discharge: HOME | DRG: 391 | End: 2019-11-12
Attending: INTERNAL MEDICINE | Admitting: INTERNAL MEDICINE
Payer: MEDICARE

## 2019-11-08 VITALS
WEIGHT: 285.06 LBS | TEMPERATURE: 98 F | HEART RATE: 71 BPM | SYSTOLIC BLOOD PRESSURE: 164 MMHG | RESPIRATION RATE: 18 BRPM | DIASTOLIC BLOOD PRESSURE: 75 MMHG | HEIGHT: 62 IN | OXYGEN SATURATION: 99 %

## 2019-11-08 DIAGNOSIS — Z98.89 OTHER SPECIFIED POSTPROCEDURAL STATES: Chronic | ICD-10-CM

## 2019-11-08 DIAGNOSIS — R11.2 NAUSEA WITH VOMITING, UNSPECIFIED: ICD-10-CM

## 2019-11-08 LAB
ALBUMIN SERPL ELPH-MCNC: 3.9 G/DL — SIGNIFICANT CHANGE UP (ref 3.3–5)
ALP SERPL-CCNC: 55 U/L — SIGNIFICANT CHANGE UP (ref 40–120)
ALT FLD-CCNC: 20 U/L — SIGNIFICANT CHANGE UP (ref 10–45)
ANION GAP SERPL CALC-SCNC: 13 MMOL/L — SIGNIFICANT CHANGE UP (ref 5–17)
APPEARANCE UR: CLEAR — SIGNIFICANT CHANGE UP
AST SERPL-CCNC: 24 U/L — SIGNIFICANT CHANGE UP (ref 10–40)
BACTERIA # UR AUTO: NEGATIVE — SIGNIFICANT CHANGE UP
BASOPHILS # BLD AUTO: 0.04 K/UL — SIGNIFICANT CHANGE UP (ref 0–0.2)
BASOPHILS NFR BLD AUTO: 0.6 % — SIGNIFICANT CHANGE UP (ref 0–2)
BILIRUB SERPL-MCNC: 0.4 MG/DL — SIGNIFICANT CHANGE UP (ref 0.2–1.2)
BILIRUB UR-MCNC: NEGATIVE — SIGNIFICANT CHANGE UP
BUN SERPL-MCNC: 17 MG/DL — SIGNIFICANT CHANGE UP (ref 7–23)
CALCIUM SERPL-MCNC: 9.4 MG/DL — SIGNIFICANT CHANGE UP (ref 8.4–10.5)
CHLORIDE SERPL-SCNC: 99 MMOL/L — SIGNIFICANT CHANGE UP (ref 96–108)
CO2 SERPL-SCNC: 21 MMOL/L — LOW (ref 22–31)
COLOR SPEC: COLORLESS — SIGNIFICANT CHANGE UP
CREAT SERPL-MCNC: 1.02 MG/DL — SIGNIFICANT CHANGE UP (ref 0.5–1.3)
CULTURE RESULTS: SIGNIFICANT CHANGE UP
DIFF PNL FLD: NEGATIVE — SIGNIFICANT CHANGE UP
EOSINOPHIL # BLD AUTO: 0.18 K/UL — SIGNIFICANT CHANGE UP (ref 0–0.5)
EOSINOPHIL NFR BLD AUTO: 2.5 % — SIGNIFICANT CHANGE UP (ref 0–6)
EPI CELLS # UR: 0 /HPF — SIGNIFICANT CHANGE UP
GAS PNL BLDV: SIGNIFICANT CHANGE UP
GLUCOSE SERPL-MCNC: 157 MG/DL — HIGH (ref 70–99)
GLUCOSE UR QL: NEGATIVE — SIGNIFICANT CHANGE UP
HCT VFR BLD CALC: 30.4 % — LOW (ref 34.5–45)
HGB BLD-MCNC: 10.1 G/DL — LOW (ref 11.5–15.5)
HYALINE CASTS # UR AUTO: 0 /LPF — SIGNIFICANT CHANGE UP (ref 0–2)
IMM GRANULOCYTES NFR BLD AUTO: 0.6 % — SIGNIFICANT CHANGE UP (ref 0–1.5)
KETONES UR-MCNC: NEGATIVE — SIGNIFICANT CHANGE UP
LEUKOCYTE ESTERASE UR-ACNC: NEGATIVE — SIGNIFICANT CHANGE UP
LIDOCAIN IGE QN: 18 U/L — SIGNIFICANT CHANGE UP (ref 7–60)
LYMPHOCYTES # BLD AUTO: 1.59 K/UL — SIGNIFICANT CHANGE UP (ref 1–3.3)
LYMPHOCYTES # BLD AUTO: 22.2 % — SIGNIFICANT CHANGE UP (ref 13–44)
MCHC RBC-ENTMCNC: 27.9 PG — SIGNIFICANT CHANGE UP (ref 27–34)
MCHC RBC-ENTMCNC: 33.2 GM/DL — SIGNIFICANT CHANGE UP (ref 32–36)
MCV RBC AUTO: 84 FL — SIGNIFICANT CHANGE UP (ref 80–100)
MONOCYTES # BLD AUTO: 0.68 K/UL — SIGNIFICANT CHANGE UP (ref 0–0.9)
MONOCYTES NFR BLD AUTO: 9.5 % — SIGNIFICANT CHANGE UP (ref 2–14)
NEUTROPHILS # BLD AUTO: 4.63 K/UL — SIGNIFICANT CHANGE UP (ref 1.8–7.4)
NEUTROPHILS NFR BLD AUTO: 64.6 % — SIGNIFICANT CHANGE UP (ref 43–77)
NITRITE UR-MCNC: NEGATIVE — SIGNIFICANT CHANGE UP
NRBC # BLD: 0 /100 WBCS — SIGNIFICANT CHANGE UP (ref 0–0)
PH UR: 6 — SIGNIFICANT CHANGE UP (ref 5–8)
PLATELET # BLD AUTO: 252 K/UL — SIGNIFICANT CHANGE UP (ref 150–400)
POTASSIUM SERPL-MCNC: 5.3 MMOL/L — SIGNIFICANT CHANGE UP (ref 3.5–5.3)
POTASSIUM SERPL-SCNC: 5.3 MMOL/L — SIGNIFICANT CHANGE UP (ref 3.5–5.3)
PROT SERPL-MCNC: 7.2 G/DL — SIGNIFICANT CHANGE UP (ref 6–8.3)
PROT UR-MCNC: NEGATIVE — SIGNIFICANT CHANGE UP
RAPID RVP RESULT: SIGNIFICANT CHANGE UP
RBC # BLD: 3.62 M/UL — LOW (ref 3.8–5.2)
RBC # FLD: 14.8 % — HIGH (ref 10.3–14.5)
RBC CASTS # UR COMP ASSIST: 1 /HPF — SIGNIFICANT CHANGE UP (ref 0–4)
SODIUM SERPL-SCNC: 133 MMOL/L — LOW (ref 135–145)
SP GR SPEC: 1.01 — LOW (ref 1.01–1.02)
SPECIMEN SOURCE: SIGNIFICANT CHANGE UP
TROPONIN T, HIGH SENSITIVITY RESULT: 19 NG/L — SIGNIFICANT CHANGE UP (ref 0–51)
UROBILINOGEN FLD QL: NEGATIVE — SIGNIFICANT CHANGE UP
WBC # BLD: 7.16 K/UL — SIGNIFICANT CHANGE UP (ref 3.8–10.5)
WBC # FLD AUTO: 7.16 K/UL — SIGNIFICANT CHANGE UP (ref 3.8–10.5)
WBC UR QL: 0 /HPF — SIGNIFICANT CHANGE UP (ref 0–5)

## 2019-11-08 PROCEDURE — 74018 RADEX ABDOMEN 1 VIEW: CPT | Mod: 26

## 2019-11-08 PROCEDURE — 74176 CT ABD & PELVIS W/O CONTRAST: CPT | Mod: 26

## 2019-11-08 PROCEDURE — 93010 ELECTROCARDIOGRAM REPORT: CPT

## 2019-11-08 PROCEDURE — 71250 CT THORAX DX C-: CPT | Mod: 26

## 2019-11-08 PROCEDURE — 71046 X-RAY EXAM CHEST 2 VIEWS: CPT | Mod: 26

## 2019-11-08 PROCEDURE — 99285 EMERGENCY DEPT VISIT HI MDM: CPT

## 2019-11-08 RX ORDER — TIOTROPIUM BROMIDE 18 UG/1
1 CAPSULE ORAL; RESPIRATORY (INHALATION) DAILY
Refills: 0 | Status: DISCONTINUED | OUTPATIENT
Start: 2019-11-08 | End: 2019-11-12

## 2019-11-08 RX ORDER — MONTELUKAST 4 MG/1
10 TABLET, CHEWABLE ORAL DAILY
Refills: 0 | Status: DISCONTINUED | OUTPATIENT
Start: 2019-11-08 | End: 2019-11-12

## 2019-11-08 RX ORDER — DEXTROSE 50 % IN WATER 50 %
25 SYRINGE (ML) INTRAVENOUS ONCE
Refills: 0 | Status: DISCONTINUED | OUTPATIENT
Start: 2019-11-08 | End: 2019-11-12

## 2019-11-08 RX ORDER — INSULIN LISPRO 100/ML
VIAL (ML) SUBCUTANEOUS
Refills: 0 | Status: DISCONTINUED | OUTPATIENT
Start: 2019-11-08 | End: 2019-11-12

## 2019-11-08 RX ORDER — CEFTRIAXONE 500 MG/1
1000 INJECTION, POWDER, FOR SOLUTION INTRAMUSCULAR; INTRAVENOUS EVERY 24 HOURS
Refills: 0 | Status: DISCONTINUED | OUTPATIENT
Start: 2019-11-08 | End: 2019-11-09

## 2019-11-08 RX ORDER — ATORVASTATIN CALCIUM 80 MG/1
40 TABLET, FILM COATED ORAL AT BEDTIME
Refills: 0 | Status: DISCONTINUED | OUTPATIENT
Start: 2019-11-08 | End: 2019-11-12

## 2019-11-08 RX ORDER — GLUCAGON INJECTION, SOLUTION 0.5 MG/.1ML
1 INJECTION, SOLUTION SUBCUTANEOUS ONCE
Refills: 0 | Status: DISCONTINUED | OUTPATIENT
Start: 2019-11-08 | End: 2019-11-12

## 2019-11-08 RX ORDER — ONDANSETRON 8 MG/1
4 TABLET, FILM COATED ORAL ONCE
Refills: 0 | Status: COMPLETED | OUTPATIENT
Start: 2019-11-08 | End: 2019-11-08

## 2019-11-08 RX ORDER — DEXTROSE 50 % IN WATER 50 %
15 SYRINGE (ML) INTRAVENOUS ONCE
Refills: 0 | Status: DISCONTINUED | OUTPATIENT
Start: 2019-11-08 | End: 2019-11-12

## 2019-11-08 RX ORDER — ACETAMINOPHEN 500 MG
650 TABLET ORAL ONCE
Refills: 0 | Status: COMPLETED | OUTPATIENT
Start: 2019-11-08 | End: 2019-11-08

## 2019-11-08 RX ORDER — DEXTROSE 50 % IN WATER 50 %
12.5 SYRINGE (ML) INTRAVENOUS ONCE
Refills: 0 | Status: DISCONTINUED | OUTPATIENT
Start: 2019-11-08 | End: 2019-11-12

## 2019-11-08 RX ORDER — RIVAROXABAN 15 MG-20MG
20 KIT ORAL
Refills: 0 | Status: DISCONTINUED | OUTPATIENT
Start: 2019-11-08 | End: 2019-11-12

## 2019-11-08 RX ORDER — LEVOTHYROXINE SODIUM 125 MCG
175 TABLET ORAL DAILY
Refills: 0 | Status: DISCONTINUED | OUTPATIENT
Start: 2019-11-08 | End: 2019-11-12

## 2019-11-08 RX ORDER — SODIUM CHLORIDE 9 MG/ML
1000 INJECTION, SOLUTION INTRAVENOUS
Refills: 0 | Status: DISCONTINUED | OUTPATIENT
Start: 2019-11-08 | End: 2019-11-12

## 2019-11-08 RX ORDER — ALBUTEROL 90 UG/1
2 AEROSOL, METERED ORAL
Qty: 0 | Refills: 0 | DISCHARGE

## 2019-11-08 RX ORDER — LIPASE/PROTEASE/AMYLASE 16-48-48K
2 CAPSULE,DELAYED RELEASE (ENTERIC COATED) ORAL
Refills: 0 | Status: DISCONTINUED | OUTPATIENT
Start: 2019-11-08 | End: 2019-11-12

## 2019-11-08 RX ORDER — ALBUTEROL 90 UG/1
2 AEROSOL, METERED ORAL EVERY 8 HOURS
Refills: 0 | Status: DISCONTINUED | OUTPATIENT
Start: 2019-11-08 | End: 2019-11-12

## 2019-11-08 RX ORDER — FUROSEMIDE 40 MG
40 TABLET ORAL DAILY
Refills: 0 | Status: DISCONTINUED | OUTPATIENT
Start: 2019-11-08 | End: 2019-11-10

## 2019-11-08 RX ORDER — DRONEDARONE 400 MG/1
400 TABLET, FILM COATED ORAL
Refills: 0 | Status: DISCONTINUED | OUTPATIENT
Start: 2019-11-08 | End: 2019-11-12

## 2019-11-08 RX ORDER — PANTOPRAZOLE SODIUM 20 MG/1
80 TABLET, DELAYED RELEASE ORAL ONCE
Refills: 0 | Status: COMPLETED | OUTPATIENT
Start: 2019-11-08 | End: 2019-11-08

## 2019-11-08 RX ADMIN — PANTOPRAZOLE SODIUM 80 MILLIGRAM(S): 20 TABLET, DELAYED RELEASE ORAL at 19:08

## 2019-11-08 RX ADMIN — CEFTRIAXONE 100 MILLIGRAM(S): 500 INJECTION, POWDER, FOR SOLUTION INTRAMUSCULAR; INTRAVENOUS at 20:25

## 2019-11-08 RX ADMIN — ONDANSETRON 4 MILLIGRAM(S): 8 TABLET, FILM COATED ORAL at 13:32

## 2019-11-08 RX ADMIN — Medication 650 MILLIGRAM(S): at 19:36

## 2019-11-08 NOTE — ED PROVIDER NOTE - CLINICAL SUMMARY MEDICAL DECISION MAKING FREE TEXT BOX
80yo woman presents with bilious vomiting with unclear source of pain as well as cough, lower leg edema and nontender abdomen on exam. Will check h/h, electrolytes, lipase, vbg with 80yo woman presents with bilious vomiting with unclear source of pain as well as cough, lower leg edema and nontender abdomen on exam. Will check h/h, electrolytes, lipase, vbg with lactate, cxr, kub, trop, ecg, and CT a/p. Pt denies focalized pain but will give zofran and continue to monitor and reassess. 80yo woman presents with bilious vomiting with unclear source of pain as well as cough, lower leg edema and nontender abdomen on exam. Will check h/h, electrolytes, lipase, vbg with lactate, cxr, kub, trop, ecg, and CT a/p. Pt denies focalized pain but will give zofran and continue to monitor and reassess.     Attn - pt with 10 days vomiting bilious fluid and nausea with +/- abdo pain - pt here recently for same - never improved - CT abdo/pelvis, ua, labs, antiemetics.

## 2019-11-08 NOTE — ED ADULT NURSE NOTE - OBJECTIVE STATEMENT
Received alert and awake. Pt stated she has nausea and vomiting. Pt claimed via  that she has pain but cannot pinpoint where it is coming from. Lower extremities with edema and discoloration. Pt's son stated that the swelling is chronic. Pt anxious. Emotional support offered.

## 2019-11-08 NOTE — ED ADULT NURSE REASSESSMENT NOTE - NS ED NURSE REASSESS COMMENT FT1
Patient sitting up at bedside in chair with son, denies dizziness, pain at this time. Rectal temperature taken and medication given, tolerated well. Awaiting dispo.

## 2019-11-08 NOTE — ED PROVIDER NOTE - OBJECTIVE STATEMENT
80yo woman PMH CHF s/p PPM, HTN, DM, vascular insufficiency of bilateral LE presents with n/v x 10 days. Pt states that she has been have bilious vomiting (yellow in color) with coughing. She had a normal, nonbloody BM yesterday and has been passing gas. She states that she is in pain but is unsure where but knows that she feels uncomfortable. She denies abdominal pain, chest pain, SOB, lightheadedness. She also complains of itching in her legs at night that make it difficult for her to sleep.  She was seen in the ED 2 days ago and was discharged after work up which revealed negative pertussis test and blood culture but a contaminated urine culture. 80yo woman PMH CHF s/p PPM, HTN, DM, vascular insufficiency of bilateral LE presents with n/v x 10 days. Pt states that she has been have bilious vomiting (yellow in color) with coughing. She had a normal, nonbloody BM yesterday and has been passing gas. She states that she is in pain but is unsure where but knows that she feels uncomfortable. She denies abdominal pain, chest pain, SOB, lightheadedness. She also complains of itching in her legs at night that make it difficult for her to sleep.  She was seen in the ED 2 days ago and was discharged after work up which revealed negative pertussis test and blood culture but a contaminated urine culture.       Attn - pt seen in [facility] - agree with above - pt c/o n/v x 10 days and discomfort - poss in abdo - pt "can't tell". + cough and chronic bilat leg edema.  No fever, diarrhea, back pain.  +bilious vomiting.  poor appetite.  no hx abdo surgery, no ill contacts.

## 2019-11-08 NOTE — H&P ADULT - HISTORY OF PRESENT ILLNESS
80yo woman   PMH,      CHF s/p PPM, HTN, DM, vascular insufficiency of bilateral LE     presents with n/v x 10 days. Pt states that she has been have bilious vomiting (yellow in color) with coughing. She had a normal, nonbloody BM yesterday and has been passing gas.    She states that she is in pain but is unsure where but knows that she feels uncomfortable . She denies   chest pain, SOB, lightheadedness.  She was seen in the ED 2 days ago and was discharged after work up which revealed negative pertussis test and blood culture but a contaminated urine culture.      .	  son at bedside. does  not  know  her meds

## 2019-11-08 NOTE — ED ADULT NURSE REASSESSMENT NOTE - NS ED NURSE REASSESS COMMENT FT1
MD Lopez confirmed pt is admitted to medicine and not tele. Per MD Lopez, pt is to get CT chest and can then be placed in line for bed upstairs. RVP and blood cultures drawn and sent. Pt in bed with son at bedside. Denies chest pain, SOB, n/v/d currently.

## 2019-11-08 NOTE — H&P ADULT - NSHPLABSRESULTS_GEN_ALL_CORE
LABS:                        10.1   7.16  )-----------( 252      ( 2019 13:39 )             30.4         133<L>  |  99  |  17  ----------------------------<  157<H>  5.3   |  21<L>  |  1.02    Ca    9.4      2019 13:39    TPro  7.2  /  Alb  3.9  /  TBili  0.4  /  DBili  x   /  AST  24  /  ALT  20  /  AlkPhos  55  11-08      CARDIAC MARKERS ( 2019 13:39 )  x     / x     / x     / x     / 1.9 ng/mL      Urinalysis Basic - ( 2019 13:39 )    Color: Colorless / Appearance: Clear / S.007 / pH: x  Gluc: x / Ketone: Negative  / Bili: Negative / Urobili: Negative   Blood: x / Protein: Negative / Nitrite: Negative   Leuk Esterase: Negative / RBC: 1 /hpf / WBC 0 /HPF   Sq Epi: x / Non Sq Epi: 0 /hpf / Bacteria: Negative           @ 13:39  9.1  49

## 2019-11-08 NOTE — ED PROVIDER NOTE - NS ED ROS FT
General: no fevers  Head: no headaches  Eyes: no vision change  ENT: no nasal discharge/congestion  CV: no chest pain  Resp: no SOB, +cough  GI: +n/v, no diarrhea, no blood in stool, no abdominal pain  : no dysuria  MSK: no joint pain  Skin: +lower leg hyperpigmentation  Neuro: no focal weakness, no change in sensation

## 2019-11-08 NOTE — H&P ADULT - NSHPPHYSICALEXAM_GEN_ALL_CORE
PHYSICAL EXAMINATION:  Vital Signs Last 24 Hrs  T(C): 38.5 (08 Nov 2019 19:07), Max: 38.5 (08 Nov 2019 19:07)  T(F): 101.3 (08 Nov 2019 19:07), Max: 101.3 (08 Nov 2019 19:07)  HR: 83 (08 Nov 2019 19:43) (71 - 86)  BP: 172/55 (08 Nov 2019 19:43) (160/75 - 175/76)  BP(mean): --  RR: 20 (08 Nov 2019 19:43) (18 - 20)  SpO2: 98% (08 Nov 2019 19:43) (96% - 99%)  CAPILLARY BLOOD GLUCOSE            GENERAL: NAD, well-groomed,  HEAD:  atraumatic, normocephalic  EYES: sclera anicteric  ENMT: mucous membranes moist  NECK: supple, No JVD  CHEST/LUNG: clear to auscultation bilaterally;    no      rales   ,   no rhonchi,   HEART: normal S1, S2  ABDOMEN: BS+, soft, ND, NT /  obese/  no tenderness  EXTREMITIES:     b/l    edema    b/l LEs. with c/c mild redness  NEURO: awake, ,     moves all extremities  SKIN: no     rash

## 2019-11-08 NOTE — ED POST DISCHARGE NOTE - ADDITIONAL DOCUMENTATION
Patient is currently back in the ER waiting to be seen.  Will make ED attending aware of contaminated urine culture.  -Nick Carrasco PA-C

## 2019-11-08 NOTE — H&P ADULT - NSICDXPASTMEDICALHX_GEN_ALL_CORE_FT
PAST MEDICAL HISTORY:  Asthma     Atrial fibrillation     CAD (Coronary Atherosclerotic Disease)     CHF (congestive heart failure), NYHA class I     Diabetes     HLD (Hyperlipidemia)     HTN (Hypertension)     Hypothyroidism     IBS (irritable bowel syndrome)     Myocardial Infarction     Obesity     Pacemaker     Venous stasis

## 2019-11-08 NOTE — ED PROVIDER NOTE - PROGRESS NOTE DETAILS
Janna Lopez, resident MD: no acute findings on CT, no acute findings on blood work. pt was able to tolerate minimal PO but is febrile with no source. Will admit for further work up.

## 2019-11-08 NOTE — H&P ADULT - ASSESSMENT
80 y/o Israeli female from home,     h/o    of CAD s/p stents,  HTN , HLD , Diastolic CHF,  T2DM      , Afib on Xarelto s/p PPM 11/2013,        Asthma, Hypothyroidism, Chronic Venous Insufficiency, Urinary Incontinence, and IBS     admitted  with  abd  pian/  no  tenderness on exam   ct   abdomen in er, no  pathology found  pt is  febrile in er   normal  wbc/  normal  cxr  RVP, pending  follow  blood  c/s/ appears non toxic   empiric   Rocephin  for  now  ct chest, pending    h/o chronic   b/l leg  swelling/  venous stasis   mild  sob on exertion/  from  acute on chronic  diastolic chf/  morbid  obesity  on iv lasix  FIB. on xarelto   echo  DM 2,  follow  fs/  not on insulin at home    spoke to son at bedside/   meds  unable  to  be confirmed, as  neither pt / family  has  the list a nd   both pharmacies  that pt uses,  are close d now   meds to be  checked  in am       < from: Transthoracic Echocardiogram (01.29.18 @ 07:38) >  CONCLUSIONS:  1. Mild posterior mitral annular calcification. Trace  mitral regurgitation.  2. Aortic valve not well visualized. Mild aortic stenosis.  Trace aortic regurgitation.  3. Normal aortic root.  4. Mildly dilated left atrium.  5. Normal left ventricular internal dimensions and wall  thicknesses.  6. Endocardium not well visualized; grossly normal left  ventricular systolic function. Peak left ventricular  outflow tract gradient equals 18.3 mm Hg, consistent with  mild LVOT obstruction.  7. Grade I diastolic dysfunction (Impaired relaxation).  8. Normal right atrium.  9. Normal right ventricular size and function (TAPSE 2.15  cm). A device lead is visualized in the right heart.  10. RA Pressure is 8 mm Hg.  11. RV systolic pressure is moderately increased at  49 mm  Hg.  12. Normal tricuspid valve. Trace tricuspid regurgitation.  13. Pulmonic valve not well seen.  14. No pericardial effusion.    *** No previous Echo exam.    < end of copied text > 80 y/o [place]oc[place] female from home,     h/o    of CAD s/p stents,  HTN , HLD , Diastolic CHF,  T2DM      , Afib on Xarelto s/p PPM 11/2013,        Asthma, Hypothyroidism, Chronic Venous Insufficiency, Urinary Incontinence, and IBS     admitted  with  abd  pian/  no  tenderness on exam   ct   abdomen in er, no  pathology found  pt is  febrile in er   normal  wbc/  normal  cxr  RVP, pending  follow  blood  c/s/ appears non toxic   empiric   Rocephin  for  now  ct chest, pending    h/o chronic   b/l leg  swelling/  venous stasis   mild  sob on exertion/  from  acute on chronic  diastolic chf/  morbid  obesity  on iv lasix  FIB. on xarelto   echo  DM 2,  follow  fs/  not on insulin at home    spoke to son at bedside/   meds  unable  to  be confirmed, as  neither pt / family  has  the list a nd   both pharmacies  that pt uses,  are close d now   meds to be  checked  in am     < from: CT Abdomen and Pelvis w/ Oral Cont (11.08.19 @ 16:09) >  IMPRESSION:   No acute pathology.  < end of copied text >     < from: Transthoracic Echocardiogram (01.29.18 @ 07:38) >  CONCLUSIONS:  1. Mild posterior mitral annular calcification. Trace  mitral regurgitation.  2. Aortic valve not well visualized. Mild aortic stenosis.  Trace aortic regurgitation.  3. Normal aortic root.  4. Mildly dilated left atrium.  5. Normal left ventricular internal dimensions and wall  thicknesses.  6. Endocardium not well visualized; grossly normal left  ventricular systolic function. Peak left ventricular  outflow tract gradient equals 18.3 mm Hg, consistent with  mild LVOT obstruction.  7. Grade I diastolic dysfunction (Impaired relaxation).  8. Normal right atrium.  9. Normal right ventricular size and function (TAPSE 2.15  cm). A device lead is visualized in the right heart.  10. RA Pressure is 8 mm Hg.  11. RV systolic pressure is moderately increased at  49 mm  Hg.  12. Normal tricuspid valve. Trace tricuspid regurgitation.  13. Pulmonic valve not well seen.  14. No pericardial effusion.    *** No previous Echo exam.    < end of copied text > 80 y/o Haitian female from home,     h/o    of CAD s/p stents,  HTN , HLD , Diastolic CHF,  T2DM  /  chronic anemia    , Afib on Xarelto s/p PPM 11/2013,        Asthma, Hypothyroidism, Chronic Venous Insufficiency, Urinary Incontinence, morbid  obesity     admitted  with  abd  pian/  no  tenderness on exam   ct   abdomen in er, no  pathology found  pt is  febrile in er   normal  wbc/  normal  cxr/ ct chest, pending  RVP, pending  follow  blood  c/s/ appears non toxic   empiric   Rocephin  for  now/ ?  small  component of  leg cellulitis. suspect its  mostly chronic      h/o chronic   b/l leg  swelling/  venous stasis   mild  sob on exertion/  from  acute on chronic  diastolic chf/  morbid  obesity  on iv lasix    AFIB, . on xarelto/ multaq   echo  DM 2,  follow  fs/  not on insulin at home    spoke to son at bedside/   meds  unable  to  be confirmed, as  neither pt / family  has  the list a nd   both pharmacies  that pt uses,  are closed   now       < from: CT Abdomen and Pelvis w/ Oral Cont (11.08.19 @ 16:09) >  IMPRESSION:   No acute pathology.  < end of copied text >     < from: Transthoracic Echocardiogram (01.29.18 @ 07:38) >  CONCLUSIONS:  1. Mild posterior mitral annular calcification. Trace  mitral regurgitation.  2. Aortic valve not well visualized. Mild aortic stenosis.  Trace aortic regurgitation.  3. Normal aortic root.  4. Mildly dilated left atrium.  5. Normal left ventricular internal dimensions and wall  thicknesses.  6. Endocardium not well visualized; grossly normal left  ventricular systolic function. Peak left ventricular  outflow tract gradient equals 18.3 mm Hg, consistent with  mild LVOT obstruction.  7. Grade I diastolic dysfunction (Impaired relaxation).  8. Normal right atrium.  9. Normal right ventricular size and function (TAPSE 2.15  cm). A device lead is visualized in the right heart.  10. RA Pressure is 8 mm Hg.  11. RV systolic pressure is moderately increased at  49 mm  Hg.  12. Normal tricuspid valve. Trace tricuspid regurgitation.  13. Pulmonic valve not well seen.  14. No pericardial effusion.    *** No previous Echo exam.    < end of copied text >

## 2019-11-08 NOTE — ED ADULT NURSE REASSESSMENT NOTE - NS ED NURSE REASSESS COMMENT FT1
Report received from HAMMAD Wolfe. Patient alert and oriented, breathing spontaneous and unlabored, audible wheeze heard on inspiration, abdomen soft and nondistended, nontender to palpation, moving all extremities, right forearm wrapped in cling from home, no bruising or trauma noted under it. Denies pain, nausea, recent vomiting, vitals stable at this time. Family at bedside. Bed locked and in lowest position with side rails up for safety.

## 2019-11-08 NOTE — ED PROVIDER NOTE - PHYSICAL EXAMINATION
General: chronically ill appearing elderly woman in no acute distress  Head: normocephalic, atraumatic  Eyes: clear eyes with no discharge  Mouth: mildly dry mucous membranes  Neck: supple neck  CV: normal rate and rhythm, bilateral LE pitting edema with overlying hyperpigmented skin changes  Respiratory: clear to auscultation bilaterally, no crackles  Abdomen: soft, nontender, nondistended  : no suprapubic tenderness, no CVAT  Neuro: alert, speech clear, moving all extremities spontaneously  Skin: bilateral lower extremity chronic skin color changes, mild erythema, no warmth to touch  Extremities: right arm wrapped in ace wrap General: chronically ill appearing elderly woman in no acute distress  Head: normocephalic, atraumatic  Eyes: clear eyes with no discharge  Mouth: mildly dry mucous membranes  Neck: supple neck  CV: normal rate and rhythm, bilateral LE pitting edema with overlying hyperpigmented skin changes  Respiratory: clear to auscultation bilaterally, no crackles  Abdomen: soft, nontender, nondistended  : no suprapubic tenderness, no CVAT  Neuro: alert, speech clear, moving all extremities spontaneously  Skin: bilateral lower extremity chronic skin color changes, mild erythema, no warmth to touch  Extremities: right arm wrapped in ace wrap     Attn - pt is alert and NAD, no pallor or jaundice, dry mm, lungs-, cor-, abdo - obese, soft, NT, ND, no CVAT, extrem- + edema with chronic venous stasis changes.

## 2019-11-09 DIAGNOSIS — E03.9 HYPOTHYROIDISM, UNSPECIFIED: ICD-10-CM

## 2019-11-09 DIAGNOSIS — I48.91 UNSPECIFIED ATRIAL FIBRILLATION: ICD-10-CM

## 2019-11-09 DIAGNOSIS — R50.9 FEVER, UNSPECIFIED: ICD-10-CM

## 2019-11-09 DIAGNOSIS — E11.9 TYPE 2 DIABETES MELLITUS WITHOUT COMPLICATIONS: ICD-10-CM

## 2019-11-09 DIAGNOSIS — R11.2 NAUSEA WITH VOMITING, UNSPECIFIED: ICD-10-CM

## 2019-11-09 DIAGNOSIS — I10 ESSENTIAL (PRIMARY) HYPERTENSION: ICD-10-CM

## 2019-11-09 DIAGNOSIS — R10.9 UNSPECIFIED ABDOMINAL PAIN: ICD-10-CM

## 2019-11-09 LAB
ANION GAP SERPL CALC-SCNC: 13 MMOL/L — SIGNIFICANT CHANGE UP (ref 5–17)
BUN SERPL-MCNC: 16 MG/DL — SIGNIFICANT CHANGE UP (ref 7–23)
CALCIUM SERPL-MCNC: 9.3 MG/DL — SIGNIFICANT CHANGE UP (ref 8.4–10.5)
CHLORIDE SERPL-SCNC: 96 MMOL/L — SIGNIFICANT CHANGE UP (ref 96–108)
CO2 SERPL-SCNC: 21 MMOL/L — LOW (ref 22–31)
CREAT SERPL-MCNC: 1 MG/DL — SIGNIFICANT CHANGE UP (ref 0.5–1.3)
CULTURE RESULTS: SIGNIFICANT CHANGE UP
GLUCOSE BLDC GLUCOMTR-MCNC: 161 MG/DL — HIGH (ref 70–99)
GLUCOSE BLDC GLUCOMTR-MCNC: 171 MG/DL — HIGH (ref 70–99)
GLUCOSE BLDC GLUCOMTR-MCNC: 177 MG/DL — HIGH (ref 70–99)
GLUCOSE BLDC GLUCOMTR-MCNC: 209 MG/DL — HIGH (ref 70–99)
GLUCOSE BLDC GLUCOMTR-MCNC: 234 MG/DL — HIGH (ref 70–99)
GLUCOSE SERPL-MCNC: 182 MG/DL — HIGH (ref 70–99)
HBA1C BLD-MCNC: 6.6 % — HIGH (ref 4–5.6)
HCT VFR BLD CALC: 29.9 % — LOW (ref 34.5–45)
HGB BLD-MCNC: 9.6 G/DL — LOW (ref 11.5–15.5)
MCHC RBC-ENTMCNC: 27.1 PG — SIGNIFICANT CHANGE UP (ref 27–34)
MCHC RBC-ENTMCNC: 32.1 GM/DL — SIGNIFICANT CHANGE UP (ref 32–36)
MCV RBC AUTO: 84.5 FL — SIGNIFICANT CHANGE UP (ref 80–100)
PLATELET # BLD AUTO: 276 K/UL — SIGNIFICANT CHANGE UP (ref 150–400)
POTASSIUM SERPL-MCNC: 4.9 MMOL/L — SIGNIFICANT CHANGE UP (ref 3.5–5.3)
POTASSIUM SERPL-SCNC: 4.9 MMOL/L — SIGNIFICANT CHANGE UP (ref 3.5–5.3)
RBC # BLD: 3.54 M/UL — LOW (ref 3.8–5.2)
RBC # FLD: 14.7 % — HIGH (ref 10.3–14.5)
SODIUM SERPL-SCNC: 130 MMOL/L — LOW (ref 135–145)
SPECIMEN SOURCE: SIGNIFICANT CHANGE UP
WBC # BLD: 8.46 K/UL — SIGNIFICANT CHANGE UP (ref 3.8–10.5)
WBC # FLD AUTO: 8.46 K/UL — SIGNIFICANT CHANGE UP (ref 3.8–10.5)

## 2019-11-09 RX ORDER — FLUCONAZOLE 150 MG/1
150 TABLET ORAL ONCE
Refills: 0 | Status: COMPLETED | OUTPATIENT
Start: 2019-11-09 | End: 2019-11-09

## 2019-11-09 RX ORDER — PANTOPRAZOLE SODIUM 20 MG/1
40 TABLET, DELAYED RELEASE ORAL
Refills: 0 | Status: DISCONTINUED | OUTPATIENT
Start: 2019-11-09 | End: 2019-11-12

## 2019-11-09 RX ORDER — SIMETHICONE 80 MG/1
80 TABLET, CHEWABLE ORAL
Refills: 0 | Status: DISCONTINUED | OUTPATIENT
Start: 2019-11-09 | End: 2019-11-12

## 2019-11-09 RX ADMIN — ALBUTEROL 2 PUFF(S): 90 AEROSOL, METERED ORAL at 05:22

## 2019-11-09 RX ADMIN — Medication 2: at 17:49

## 2019-11-09 RX ADMIN — Medication 1: at 12:51

## 2019-11-09 RX ADMIN — MONTELUKAST 10 MILLIGRAM(S): 4 TABLET, CHEWABLE ORAL at 12:51

## 2019-11-09 RX ADMIN — DRONEDARONE 400 MILLIGRAM(S): 400 TABLET, FILM COATED ORAL at 05:21

## 2019-11-09 RX ADMIN — ATORVASTATIN CALCIUM 40 MILLIGRAM(S): 80 TABLET, FILM COATED ORAL at 21:43

## 2019-11-09 RX ADMIN — Medication 2 CAPSULE(S): at 12:51

## 2019-11-09 RX ADMIN — Medication 40 MILLIGRAM(S): at 05:21

## 2019-11-09 RX ADMIN — Medication 175 MICROGRAM(S): at 05:21

## 2019-11-09 RX ADMIN — DRONEDARONE 400 MILLIGRAM(S): 400 TABLET, FILM COATED ORAL at 17:49

## 2019-11-09 RX ADMIN — Medication 1: at 08:38

## 2019-11-09 RX ADMIN — FLUCONAZOLE 150 MILLIGRAM(S): 150 TABLET ORAL at 18:22

## 2019-11-09 RX ADMIN — Medication 2 CAPSULE(S): at 17:49

## 2019-11-09 RX ADMIN — TIOTROPIUM BROMIDE 1 CAPSULE(S): 18 CAPSULE ORAL; RESPIRATORY (INHALATION) at 17:55

## 2019-11-09 RX ADMIN — ATORVASTATIN CALCIUM 40 MILLIGRAM(S): 80 TABLET, FILM COATED ORAL at 00:18

## 2019-11-09 RX ADMIN — Medication 2 CAPSULE(S): at 08:38

## 2019-11-09 RX ADMIN — ALBUTEROL 2 PUFF(S): 90 AEROSOL, METERED ORAL at 17:55

## 2019-11-09 RX ADMIN — ALBUTEROL 2 PUFF(S): 90 AEROSOL, METERED ORAL at 21:43

## 2019-11-09 RX ADMIN — RIVAROXABAN 20 MILLIGRAM(S): KIT at 17:49

## 2019-11-09 NOTE — PROGRESS NOTE ADULT - PROBLEM SELECTOR PLAN 1
unclear etiology  CT chest negative   RVP negative  legs are chronically erythematous and likely secondary to venous stassis  UA clean  urine culture and blood cultures pending  will discontinue ceftriaxone IV and await blood cultures  consider ID evaluation if re-spikes temp

## 2019-11-09 NOTE — PROGRESS NOTE ADULT - ASSESSMENT
80yo woman PMH CHF s/p PPM, HTN, DM, vascular insufficiency of bilateral LE presents with nausea, vomiting x 10 days, initial improved with IV pantoprazole but noted to have fever and admitted for further work up

## 2019-11-09 NOTE — PROGRESS NOTE ADULT - PROBLEM SELECTOR PLAN 3
appears superficial secondary likely to coughing which patient states she's been last few days  Tylenol PRN  CT abdominal negative

## 2019-11-09 NOTE — PROGRESS NOTE ADULT - SUBJECTIVE AND OBJECTIVE BOX
Patient is a 79y old  Female who presents with a chief complaint of abd pain (2019 19:45)  patient not known to me, assigned this AM to assume care  chart reviewed and events thus far noted      SUBJECTIVE / OVERNIGHT EVENTS: overnight events noted  c/o left abdominal superficial pain    ROS:  Resp: No cough no sputum production  CVS: No chest pain no palpitations no orthopnea  GI: no N/V/D  : no dysuria, no hematuria  Neuro: no weakness no paresthesias  Heme: No petechiae no easy bruising  Msk: No joint pain no swelling  Skin: No rash no itching        MEDICATIONS  (STANDING):  ALBUTerol    90 MICROgram(s) HFA Inhaler 2 Puff(s) Inhalation every 8 hours  atorvastatin 40 milliGRAM(s) Oral at bedtime  cefTRIAXone   IVPB 1000 milliGRAM(s) IV Intermittent every 24 hours  dextrose 5%. 1000 milliLiter(s) (50 mL/Hr) IV Continuous <Continuous>  dextrose 50% Injectable 12.5 Gram(s) IV Push once  dextrose 50% Injectable 25 Gram(s) IV Push once  dextrose 50% Injectable 25 Gram(s) IV Push once  dronedarone 400 milliGRAM(s) Oral two times a day  furosemide   Injectable 40 milliGRAM(s) IV Push daily  insulin lispro (HumaLOG) corrective regimen sliding scale   SubCutaneous three times a day before meals  levothyroxine 175 MICROGram(s) Oral daily  montelukast 10 milliGRAM(s) Oral daily  pancrelipase  (CREON  6,000 Lipase Units) 2 Capsule(s) Oral three times a day with meals  rivaroxaban 20 milliGRAM(s) Oral with dinner  tiotropium 18 MICROgram(s) Capsule 1 Capsule(s) Inhalation daily    MEDICATIONS  (PRN):  dextrose 40% Gel 15 Gram(s) Oral once PRN Blood Glucose LESS THAN 70 milliGRAM(s)/deciliter  glucagon  Injectable 1 milliGRAM(s) IntraMuscular once PRN Glucose LESS THAN 70 milligrams/deciliter        CAPILLARY BLOOD GLUCOSE      POCT Blood Glucose.: 161 mg/dL (2019 08:28)  POCT Blood Glucose.: 177 mg/dL (2019 00:33)    I&O's Summary    2019 07:01  -  2019 07:00  --------------------------------------------------------  IN: 240 mL / OUT: 0 mL / NET: 240 mL    2019 07:01  -  2019 11:38  --------------------------------------------------------  IN: 240 mL / OUT: 0 mL / NET: 240 mL        Vital Signs Last 24 Hrs  T(C): 36.4 (2019 04:11), Max: 38.5 (2019 19:07)  T(F): 97.6 (2019 04:11), Max: 101.3 (2019 19:07)  HR: 82 (2019 04:11) (71 - 86)  BP: 149/73 (2019 04:11) (141/52 - 175/76)  BP(mean): --  RR: 18 (2019 04:11) (18 - 20)  SpO2: 97% (2019 04:11) (96% - 99%)    PHYSICAL EXAM:  GENERAL: in no apparent distress  morbidly obese  HEAD:  Atraumatic, Normocephalic  EYES: EOMI, PERRLA, conjunctiva and sclera clear  NECK: Supple, No JVD  CHEST/LUNG: no wheeze, clear to auscultation bilaterally  HEART: S1 S2; soft ejection systolic murmur best heard at left sternal border no rub no gallop   ABDOMEN: Soft, Nontender, Nondistended; Bowel sounds present positive for superficial abdominal wall tenderness left side  EXTREMITIES:  No clubbing or cyanosis, + Peripheral Pulses,  chronic LE edema and erythema left leg slightly warmer than right  PSYCH: AO x 3 appropriate affect  NEUROLOGY: non-focal, motor and sensory systems intact  SKIN: No rashes or lesions    LABS:                        9.6    8.46  )-----------( 276      ( 2019 10:48 )             29.9     11-09    130<L>  |  96  |  16  ----------------------------<  182<H>  4.9   |  21<L>  |  1.00    Ca    9.3      2019 07:15    TPro  7.2  /  Alb  3.9  /  TBili  0.4  /  DBili  x   /  AST  24  /  ALT  20  /  AlkPhos  55  11-08      CARDIAC MARKERS ( 2019 13:39 )  x     / x     / x     / x     / 1.9 ng/mL      Urinalysis Basic - ( 2019 13:39 )    Color: Colorless / Appearance: Clear / S.007 / pH: x  Gluc: x / Ketone: Negative  / Bili: Negative / Urobili: Negative   Blood: x / Protein: Negative / Nitrite: Negative   Leuk Esterase: Negative / RBC: 1 /hpf / WBC 0 /HPF   Sq Epi: x / Non Sq Epi: 0 /hpf / Bacteria: Negative          All consultant(s) notes reviewed and care discussed with other providers        Contact Number, Dr Yates [phone]

## 2019-11-10 DIAGNOSIS — R60.0 LOCALIZED EDEMA: ICD-10-CM

## 2019-11-10 DIAGNOSIS — E87.1 HYPO-OSMOLALITY AND HYPONATREMIA: ICD-10-CM

## 2019-11-10 LAB
ANION GAP SERPL CALC-SCNC: 10 MMOL/L — SIGNIFICANT CHANGE UP (ref 5–17)
APPEARANCE UR: CLEAR — SIGNIFICANT CHANGE UP
BILIRUB UR-MCNC: NEGATIVE — SIGNIFICANT CHANGE UP
BUN SERPL-MCNC: 17 MG/DL — SIGNIFICANT CHANGE UP (ref 7–23)
CALCIUM SERPL-MCNC: 8.6 MG/DL — SIGNIFICANT CHANGE UP (ref 8.4–10.5)
CHLORIDE SERPL-SCNC: 96 MMOL/L — SIGNIFICANT CHANGE UP (ref 96–108)
CO2 SERPL-SCNC: 24 MMOL/L — SIGNIFICANT CHANGE UP (ref 22–31)
COLOR SPEC: SIGNIFICANT CHANGE UP
CREAT SERPL-MCNC: 1.18 MG/DL — SIGNIFICANT CHANGE UP (ref 0.5–1.3)
DIFF PNL FLD: NEGATIVE — SIGNIFICANT CHANGE UP
GLUCOSE BLDC GLUCOMTR-MCNC: 158 MG/DL — HIGH (ref 70–99)
GLUCOSE BLDC GLUCOMTR-MCNC: 172 MG/DL — HIGH (ref 70–99)
GLUCOSE BLDC GLUCOMTR-MCNC: 176 MG/DL — HIGH (ref 70–99)
GLUCOSE SERPL-MCNC: 166 MG/DL — HIGH (ref 70–99)
GLUCOSE UR QL: NEGATIVE — SIGNIFICANT CHANGE UP
HCT VFR BLD CALC: 27.7 % — LOW (ref 34.5–45)
HGB BLD-MCNC: 8.8 G/DL — LOW (ref 11.5–15.5)
KETONES UR-MCNC: NEGATIVE — SIGNIFICANT CHANGE UP
LEUKOCYTE ESTERASE UR-ACNC: NEGATIVE — SIGNIFICANT CHANGE UP
MCHC RBC-ENTMCNC: 27.1 PG — SIGNIFICANT CHANGE UP (ref 27–34)
MCHC RBC-ENTMCNC: 31.8 GM/DL — LOW (ref 32–36)
MCV RBC AUTO: 85.2 FL — SIGNIFICANT CHANGE UP (ref 80–100)
NITRITE UR-MCNC: NEGATIVE — SIGNIFICANT CHANGE UP
PH UR: 5.5 — SIGNIFICANT CHANGE UP (ref 5–8)
PLATELET # BLD AUTO: 253 K/UL — SIGNIFICANT CHANGE UP (ref 150–400)
POTASSIUM SERPL-MCNC: 4.6 MMOL/L — SIGNIFICANT CHANGE UP (ref 3.5–5.3)
POTASSIUM SERPL-SCNC: 4.6 MMOL/L — SIGNIFICANT CHANGE UP (ref 3.5–5.3)
PROT UR-MCNC: SIGNIFICANT CHANGE UP
RBC # BLD: 3.25 M/UL — LOW (ref 3.8–5.2)
RBC # FLD: 14.5 % — SIGNIFICANT CHANGE UP (ref 10.3–14.5)
SODIUM SERPL-SCNC: 130 MMOL/L — LOW (ref 135–145)
SODIUM UR-SCNC: 36 MMOL/L — SIGNIFICANT CHANGE UP
SP GR SPEC: 1.01 — SIGNIFICANT CHANGE UP (ref 1.01–1.02)
TSH SERPL-MCNC: 5.06 UIU/ML — HIGH (ref 0.27–4.2)
UROBILINOGEN FLD QL: SIGNIFICANT CHANGE UP
WBC # BLD: 6.91 K/UL — SIGNIFICANT CHANGE UP (ref 3.8–10.5)
WBC # FLD AUTO: 6.91 K/UL — SIGNIFICANT CHANGE UP (ref 3.8–10.5)

## 2019-11-10 RX ORDER — NYSTATIN CREAM 100000 [USP'U]/G
1 CREAM TOPICAL THREE TIMES A DAY
Refills: 0 | Status: DISCONTINUED | OUTPATIENT
Start: 2019-11-10 | End: 2019-11-12

## 2019-11-10 RX ORDER — LIPASE/PROTEASE/AMYLASE 16-48-48K
0 CAPSULE,DELAYED RELEASE (ENTERIC COATED) ORAL
Qty: 0 | Refills: 0 | DISCHARGE

## 2019-11-10 RX ORDER — FUROSEMIDE 40 MG
40 TABLET ORAL DAILY
Refills: 0 | Status: DISCONTINUED | OUTPATIENT
Start: 2019-11-11 | End: 2019-11-12

## 2019-11-10 RX ADMIN — TIOTROPIUM BROMIDE 1 CAPSULE(S): 18 CAPSULE ORAL; RESPIRATORY (INHALATION) at 11:54

## 2019-11-10 RX ADMIN — Medication 2 CAPSULE(S): at 08:33

## 2019-11-10 RX ADMIN — MONTELUKAST 10 MILLIGRAM(S): 4 TABLET, CHEWABLE ORAL at 11:54

## 2019-11-10 RX ADMIN — Medication 1: at 13:10

## 2019-11-10 RX ADMIN — DRONEDARONE 400 MILLIGRAM(S): 400 TABLET, FILM COATED ORAL at 17:14

## 2019-11-10 RX ADMIN — Medication 2: at 17:30

## 2019-11-10 RX ADMIN — Medication 2 CAPSULE(S): at 17:14

## 2019-11-10 RX ADMIN — ALBUTEROL 2 PUFF(S): 90 AEROSOL, METERED ORAL at 05:50

## 2019-11-10 RX ADMIN — Medication 2 CAPSULE(S): at 11:54

## 2019-11-10 RX ADMIN — Medication 175 MICROGRAM(S): at 05:49

## 2019-11-10 RX ADMIN — DRONEDARONE 400 MILLIGRAM(S): 400 TABLET, FILM COATED ORAL at 05:49

## 2019-11-10 RX ADMIN — ATORVASTATIN CALCIUM 40 MILLIGRAM(S): 80 TABLET, FILM COATED ORAL at 22:15

## 2019-11-10 RX ADMIN — NYSTATIN CREAM 1 APPLICATION(S): 100000 CREAM TOPICAL at 05:49

## 2019-11-10 RX ADMIN — ALBUTEROL 2 PUFF(S): 90 AEROSOL, METERED ORAL at 22:15

## 2019-11-10 RX ADMIN — ALBUTEROL 2 PUFF(S): 90 AEROSOL, METERED ORAL at 13:10

## 2019-11-10 RX ADMIN — Medication 1: at 08:33

## 2019-11-10 RX ADMIN — PANTOPRAZOLE SODIUM 40 MILLIGRAM(S): 20 TABLET, DELAYED RELEASE ORAL at 05:50

## 2019-11-10 RX ADMIN — Medication 40 MILLIGRAM(S): at 05:50

## 2019-11-10 RX ADMIN — NYSTATIN CREAM 1 APPLICATION(S): 100000 CREAM TOPICAL at 13:10

## 2019-11-10 RX ADMIN — NYSTATIN CREAM 1 APPLICATION(S): 100000 CREAM TOPICAL at 22:14

## 2019-11-10 RX ADMIN — RIVAROXABAN 20 MILLIGRAM(S): KIT at 17:14

## 2019-11-10 NOTE — PROGRESS NOTE ADULT - PROBLEM SELECTOR PLAN 1
unclear etiology  resolved   CT chest negative   RVP negative  legs are chronically erythematous and likely secondary to venous stasis  UA clean  urine culture and blood cultures negative   will discontinue ceftriaxone IV and await blood cultures  ID evaluation in AM to assess for ? antibiotics requested by daughter (RN)

## 2019-11-10 NOTE — PROGRESS NOTE ADULT - PROBLEM SELECTOR PLAN 5
will continue to monitor   TSH mild elevation   no intervention required at this time  outpatient follow up with PCP

## 2019-11-10 NOTE — CONSULT NOTE ADULT - SUBJECTIVE AND OBJECTIVE BOX
Norman Yates MD (Nephrology)  [address], [facility],  [address],  [id number]  TEl: [phone]  Cell: [phone]    Patient is a 79y old  Female who presents with a chief complaint of abd pain     HPI:  78yo woman   PMH,      CHF s/p PPM, HTN, DM, vascular insufficiency of bilateral LE     presents with n/v x 10 days. Pt states that she has been have bilious vomiting (yellow in color) with coughing. She had a normal, nonbloody BM yesterday and has been passing gas.  She states that she is in pain but is unsure where but knows that she feels uncomfortable . She denies   chest pain, SOB, lightheadedness.  She was seen in the ED 2 days ago and was discharged after work up which revealed negative pertussis test and blood culture but a contaminated urine culture.      .	  son at bedside. does  not  know  her meds    Limited history obtained from daughter at bedside. Patient's speaks [place] language.  Patient complains of abdominal discomfort, nausea and vomiting, fever for 4 to 5 days prior to hospitalization. Patient also complains of bilateral chronic leg edema but denies any chest pain, SOB, palpitations, dizziness, syncope, headache or loss of consciousness.  Patient states that she takes lasix twice/week at home.    PAST MEDICAL & SURGICAL HISTORY:  Obesity  Pacemaker  Atrial fibrillation  Hypothyroidism  Venous stasis  IBS (irritable bowel syndrome)  CHF (congestive heart failure), NYHA class I  HLD (Hyperlipidemia)  HTN (Hypertension)  Diabetes  Myocardial Infarction  CAD (Coronary Atherosclerotic Disease)  Asthma  S/P coronary angiogram        Allergies:  No Known Allergies      Home Medications: Reviewed    Hospital Medications:   MEDICATIONS  (STANDING):  ALBUTerol    90 MICROgram(s) HFA Inhaler 2 Puff(s) Inhalation every 8 hours  atorvastatin 40 milliGRAM(s) Oral at bedtime  dextrose 5%. 1000 milliLiter(s) (50 mL/Hr) IV Continuous <Continuous>  dextrose 50% Injectable 12.5 Gram(s) IV Push once  dextrose 50% Injectable 25 Gram(s) IV Push once  dextrose 50% Injectable 25 Gram(s) IV Push once  dronedarone 400 milliGRAM(s) Oral two times a day  insulin lispro (HumaLOG) corrective regimen sliding scale   SubCutaneous three times a day before meals  levothyroxine 175 MICROGram(s) Oral daily  montelukast 10 milliGRAM(s) Oral daily  nystatin Powder 1 Application(s) Topical three times a day  pancrelipase  (CREON  6,000 Lipase Units) 2 Capsule(s) Oral three times a day with meals  pantoprazole    Tablet 40 milliGRAM(s) Oral before breakfast  rivaroxaban 20 milliGRAM(s) Oral with dinner  tiotropium 18 MICROgram(s) Capsule 1 Capsule(s) Inhalation daily      SOCIAL HISTORY:  Denies ETOh, Smoking,     Family History:  FAMILY HISTORY:  Family history of heart disease      ROS:  CONSTITUTIONAL: No fever or chills.  HEENT: No visual blurring, no tinnitus, no ringing or ears.  RESPIRATORY: No shortness of breath, cough, hemoptysis  CARDIOVASCULAR: No Chest pain, shortness of breath, palpitations, dizziness, syncope, orthopnea, PND or leg swelling.  GASTROINTESTINAL: No abdominal, nausea or vomiting, diarrhea, hematemesis or blood per rectum.  GENITOURINARY: No urinary frequency, urgency, gross hematuria, dysuria, foamy urine, flank or supra pubic pain, no prior history of kidney stones.  NEUROLOGICAL: No headache, focal limb weakness, tingling or numbness or seizure like activity  SKIN: No rash or skin lesion  MUSCULOSKELETAL: No leg pain, calf pain or edema  All other review of systems is negative unless indicated above.    VITALS:  T(F): 98.5 (11-10-19 @ 12:25), Max: 99.1 (19 @ 20:52)  HR: 62 (11-10-19 @ 12:25)  BP: 131/62 (11-10-19 @ 12:25)  RR: 18 (11-10-19 @ 12:25)  SpO2: 95% (11-10-19 @ 12:25)  Wt(kg): --     @ 07:01  -  11-10 @ 07:00  --------------------------------------------------------  IN: 1380 mL / OUT: 0 mL / NET: 1380 mL    11-10 @ 07:01  -  11-10 @ 16:59  --------------------------------------------------------  IN: 480 mL / OUT: 0 mL / NET: 480 mL        CAPILLARY BLOOD GLUCOSE      POCT Blood Glucose.: 176 mg/dL (10 Nov 2019 12:20)  POCT Blood Glucose.: 158 mg/dL (10 Nov 2019 08:23)  POCT Blood Glucose.: 209 mg/dL (2019 22:21)  POCT Blood Glucose.: 234 mg/dL (2019 17:23)        PHYSICAL EXAM:  GENERAL: Well-developed, well nourished, alert, awake, oriented x3 in no acute distress   HEENT: CECELIA, EOMI, neck supple, no JVP, no carotid bruit, no palpable thyromegaly or lymphadenopathy, no carotid bruits, Oral mucosa mildly dry and pale  CHEST/LUNG: Bilateral clear breath sounds, no rales, no crepitations, no rhonchi, no wheezing  HEART: Regular rate and rhythm. VINH II/VI at LPSB, no gallops, no rub   ABDOMEN: Soft, obese, nontender, non distended, bowel sounds present, no palpable organomegaly.   : No flank or supra pubic tenderness.  EXTREMITIES: Bilateral chronic venous stasis dermatitis with leg edema++nt  Neurology: AAOx3, no focal neurological deficit, Motor and sensory systems are intact. Cranial nerves II to XII grossly intact.  SKIN: No rashes or skin lesion    LABS:  11-10    130<L>  |  96  |  17  ----------------------------<  166<H>  4.6   |  24  |  1.18    Ca    8.6      10 Nov 2019 06:54      Creatinine Trend: 1.18 <--, 1.00 <--, 1.02 <--, 1.32 <--, 1.28 <--                        8.8    6.91  )-----------( 253      ( 10 Nov 2019 09:20 )             27.7     Urine Studies:  Urinalysis Basic - ( 2019 13:39 )    Color: Colorless / Appearance: Clear / S.007 / pH:   Gluc:  / Ketone: Negative  / Bili: Negative / Urobili: Negative   Blood:  / Protein: Negative / Nitrite: Negative   Leuk Esterase: Negative / RBC: 1 /hpf / WBC 0 /HPF   Sq Epi:  / Non Sq Epi: 0 /hpf / Bacteria: Negative      Sodium, Random Urine: 36 mmol/L (11-10 @ 14:53)      RADIOLOGY & ADDITIONAL STUDIES:  < from: CT Chest No Cont (19 @ 21:43) >    EXAM:  CT CHEST                            PROCEDURE DATE:  2019            INTERPRETATION:  CLINICAL INFORMATION: Cough and fever    COMPARISON: None.    PROCEDURE:   CT of the Chest was performed without intravenous contrast.  Sagittal and coronal reformats were performed.  MIP reformats generated    FINDINGS:    LUNGS AND AIRWAYS: Patent central airways.  Lungs are clear.    PLEURA: No pleural effusion.    MEDIASTINUM AND TASHA: No lymphadenopathy.    VESSELS: Within normal limits.    HEART: Heart size is normal. No pericardial effusion.    CHEST WALL AND LOWER NECK: Left chest wall cardiac device.    VISUALIZED UPPER ABDOMEN: Within normal limits.    BONES: Within normal limits.    IMPRESSION:     No acute pathology. Clear lungs                    SHARRI WAGGONER M.D., RADIOLOGY RESIDENT  This document has been electronically signed.  DM BURR M.D., ATTENDING RADIOLOGIST  This document has been electronically signed. 2019 11:05PM                < end of copied text >      EKG: Reviewed.        Imaging Personally Reviewed:  [x] YES  [ ] NO    Consultant(s) and primary physician Notes Reviewed:  [x] YES  [ ] NO    Care Discussed with Primary team/ Consultants/Other Providers [x] YES  [ ] NO

## 2019-11-10 NOTE — CONSULT NOTE ADULT - PROBLEM SELECTOR RECOMMENDATION 2
TSH 5.07  - Continue levothyroxine 175 mcg po daily  -Monitor TFTs Asymptomatic hyponatremia with Na level 130 likely due to ADH response due to abdominal pain and/or intravascular volume depletion with GI losses vs hypothyroidism  - Na level 130  - Urinalysis with dilute urine with Urine specific gravity 1.007  - CHeck urine Na, urine osmolality  - Free water restriction to 1 to 1.2 L/day  -Check serum osmolality, serum cortisol level, serum uric acid level, TSH  - Monitor BMP every 12 hrly  - Optimal pain control.  - Continue lasix 40 mg po daily for free water and salt excretion for now.

## 2019-11-10 NOTE — CONSULT NOTE ADULT - ASSESSMENT
78yo woman   PMH,      CHF s/p PPM, HTN, DM, vascular insufficiency of bilateral LE     presents with n/v x 10 days. Nephrology consult called for hyponatremia with Na level 130.

## 2019-11-10 NOTE — CONSULT NOTE ADULT - PROBLEM SELECTOR RECOMMENDATION 9
Asymptomatic hyponatremia with Na level 130 likely due to ADH response due to abdominal pain and/or intravascular volume depletion with GI losses vs hypothyroidism  - Na level 130  - Urinalysis with dilute urine with Urine specific gravity 1.007  - CHeck urine Na, urine osmolality  - Free water restriction to 1 to 1.2 L/day  -Check serum osmolality, serum cortisol level, serum uric acid level, TSH  - Monitor BMP every 12 hrly  - Optimal pain control.  - Continue lasix 40 mg po daily for free water and salt excretion for now. TSH 5.07  - Continue levothyroxine 175 mcg po daily  -Monitor TFTs

## 2019-11-10 NOTE — PROGRESS NOTE ADULT - SUBJECTIVE AND OBJECTIVE BOX
Patient is a 79y old  Female who presents with a chief complaint of abd pain (2019 11:37)      SUBJECTIVE / OVERNIGHT EVENTS: overnight events noted  feels better    ROS:  Resp: No cough no sputum production  CVS: No chest pain no palpitations no orthopnea  GI: no N/V/D  : no dysuria, no hematuria  Neuro: no weakness no paresthesias  Heme: No petechiae no easy bruising  Msk: No joint pain no swelling  Skin: No rash no itching        MEDICATIONS  (STANDING):  ALBUTerol    90 MICROgram(s) HFA Inhaler 2 Puff(s) Inhalation every 8 hours  atorvastatin 40 milliGRAM(s) Oral at bedtime  dextrose 5%. 1000 milliLiter(s) (50 mL/Hr) IV Continuous <Continuous>  dextrose 50% Injectable 12.5 Gram(s) IV Push once  dextrose 50% Injectable 25 Gram(s) IV Push once  dextrose 50% Injectable 25 Gram(s) IV Push once  dronedarone 400 milliGRAM(s) Oral two times a day  insulin lispro (HumaLOG) corrective regimen sliding scale   SubCutaneous three times a day before meals  levothyroxine 175 MICROGram(s) Oral daily  montelukast 10 milliGRAM(s) Oral daily  nystatin Powder 1 Application(s) Topical three times a day  pancrelipase  (CREON  6,000 Lipase Units) 2 Capsule(s) Oral three times a day with meals  pantoprazole    Tablet 40 milliGRAM(s) Oral before breakfast  rivaroxaban 20 milliGRAM(s) Oral with dinner  tiotropium 18 MICROgram(s) Capsule 1 Capsule(s) Inhalation daily    MEDICATIONS  (PRN):  dextrose 40% Gel 15 Gram(s) Oral once PRN Blood Glucose LESS THAN 70 milliGRAM(s)/deciliter  glucagon  Injectable 1 milliGRAM(s) IntraMuscular once PRN Glucose LESS THAN 70 milligrams/deciliter  simethicone 80 milliGRAM(s) Chew four times a day PRN Gas        CAPILLARY BLOOD GLUCOSE      POCT Blood Glucose.: 158 mg/dL (10 Nov 2019 08:23)  POCT Blood Glucose.: 209 mg/dL (2019 22:21)  POCT Blood Glucose.: 234 mg/dL (2019 17:23)  POCT Blood Glucose.: 171 mg/dL (2019 12:49)    I&O's Summary    2019 07:01  -  10 Nov 2019 07:00  --------------------------------------------------------  IN: 1380 mL / OUT: 0 mL / NET: 1380 mL    10 Nov 2019 07:  -  10 Nov 2019 12:00  --------------------------------------------------------  IN: 240 mL / OUT: 0 mL / NET: 240 mL        Vital Signs Last 24 Hrs  T(C): 36.9 (10 Nov 2019 04:22), Max: 37.3 (2019 20:52)  T(F): 98.5 (10 Nov 2019 04:22), Max: 99.1 (2019 20:52)  HR: 72 (10 Nov 2019 04:22) (72 - 76)  BP: 145/70 (10 Nov 2019 04:22) (132/71 - 156/76)  BP(mean): --  RR: 18 (10 Nov 2019 04:22) (18 - 20)  SpO2: 94% (10 Nov 2019 04:22) (93% - 95%)      PHYSICAL EXAM:  GENERAL: in no apparent distress  morbidly obese  HEAD:  Atraumatic, Normocephalic  EYES: EOMI, PERRLA, conjunctiva and sclera clear  NECK: Supple, No JVD  CHEST/LUNG: no wheeze, clear to auscultation bilaterally  HEART: S1 S2; soft ejection systolic murmur best heard at left sternal border no rub no gallop   ABDOMEN: Soft, Nontender, Nondistended; Bowel sounds present positive for superficial abdominal wall tenderness left side  EXTREMITIES:  No clubbing or cyanosis, + Peripheral Pulses,  chronic LE edema and erythema left leg slightly warmer than right  no appreciated change  PSYCH: AO x 3 appropriate affect  NEUROLOGY: non-focal, motor and sensory systems intact  SKIN: No rashes or lesions    LABS:                        8.8    6.91  )-----------( 253      ( 10 Nov 2019 09:20 )             27.7     11-10    130<L>  |  96  |  17  ----------------------------<  166<H>  4.6   |  24  |  1.18    Ca    8.6      10 Nov 2019 06:54    TPro  7.2  /  Alb  3.9  /  TBili  0.4  /  DBili  x   /  AST  24  /  ALT  20  /  AlkPhos  55  11-08      CARDIAC MARKERS ( 2019 13:39 )  x     / x     / x     / x     / 1.9 ng/mL      Urinalysis Basic - ( 2019 13:39 )    Color: Colorless / Appearance: Clear / S.007 / pH: x  Gluc: x / Ketone: Negative  / Bili: Negative / Urobili: Negative   Blood: x / Protein: Negative / Nitrite: Negative   Leuk Esterase: Negative / RBC: 1 /hpf / WBC 0 /HPF   Sq Epi: x / Non Sq Epi: 0 /hpf / Bacteria: Negative          All consultant(s) notes reviewed and care discussed with other providers        Contact Number, Dr Yates [phone]

## 2019-11-11 DIAGNOSIS — G47.33 OBSTRUCTIVE SLEEP APNEA (ADULT) (PEDIATRIC): ICD-10-CM

## 2019-11-11 DIAGNOSIS — R06.02 SHORTNESS OF BREATH: ICD-10-CM

## 2019-11-11 DIAGNOSIS — Z71.89 OTHER SPECIFIED COUNSELING: ICD-10-CM

## 2019-11-11 LAB
ANION GAP SERPL CALC-SCNC: 12 MMOL/L — SIGNIFICANT CHANGE UP (ref 5–17)
BUN SERPL-MCNC: 17 MG/DL — SIGNIFICANT CHANGE UP (ref 7–23)
CALCIUM SERPL-MCNC: 9 MG/DL — SIGNIFICANT CHANGE UP (ref 8.4–10.5)
CHLORIDE SERPL-SCNC: 96 MMOL/L — SIGNIFICANT CHANGE UP (ref 96–108)
CO2 SERPL-SCNC: 22 MMOL/L — SIGNIFICANT CHANGE UP (ref 22–31)
CREAT SERPL-MCNC: 1.08 MG/DL — SIGNIFICANT CHANGE UP (ref 0.5–1.3)
CULTURE RESULTS: SIGNIFICANT CHANGE UP
CULTURE RESULTS: SIGNIFICANT CHANGE UP
GLUCOSE BLDC GLUCOMTR-MCNC: 177 MG/DL — HIGH (ref 70–99)
GLUCOSE BLDC GLUCOMTR-MCNC: 210 MG/DL — HIGH (ref 70–99)
GLUCOSE BLDC GLUCOMTR-MCNC: 226 MG/DL — HIGH (ref 70–99)
GLUCOSE BLDC GLUCOMTR-MCNC: 240 MG/DL — HIGH (ref 70–99)
GLUCOSE BLDC GLUCOMTR-MCNC: 245 MG/DL — HIGH (ref 70–99)
GLUCOSE SERPL-MCNC: 178 MG/DL — HIGH (ref 70–99)
HCT VFR BLD CALC: 30.3 % — LOW (ref 34.5–45)
HGB BLD-MCNC: 9.7 G/DL — LOW (ref 11.5–15.5)
MCHC RBC-ENTMCNC: 26.8 PG — LOW (ref 27–34)
MCHC RBC-ENTMCNC: 32 GM/DL — SIGNIFICANT CHANGE UP (ref 32–36)
MCV RBC AUTO: 83.7 FL — SIGNIFICANT CHANGE UP (ref 80–100)
OSMOLALITY SERPL: 279 MOSMOL/KG — LOW (ref 280–301)
OSMOLALITY UR: 264 MOSM/KG — SIGNIFICANT CHANGE UP (ref 50–1200)
OSMOLALITY UR: 349 MOSM/KG — SIGNIFICANT CHANGE UP (ref 50–1200)
PLATELET # BLD AUTO: 309 K/UL — SIGNIFICANT CHANGE UP (ref 150–400)
POTASSIUM SERPL-MCNC: 4.8 MMOL/L — SIGNIFICANT CHANGE UP (ref 3.5–5.3)
POTASSIUM SERPL-SCNC: 4.8 MMOL/L — SIGNIFICANT CHANGE UP (ref 3.5–5.3)
RBC # BLD: 3.62 M/UL — LOW (ref 3.8–5.2)
RBC # FLD: 14.6 % — HIGH (ref 10.3–14.5)
SODIUM SERPL-SCNC: 130 MMOL/L — LOW (ref 135–145)
SODIUM UR-SCNC: 102 MMOL/L — SIGNIFICANT CHANGE UP
SPECIMEN SOURCE: SIGNIFICANT CHANGE UP
SPECIMEN SOURCE: SIGNIFICANT CHANGE UP
WBC # BLD: 8.11 K/UL — SIGNIFICANT CHANGE UP (ref 3.8–10.5)
WBC # FLD AUTO: 8.11 K/UL — SIGNIFICANT CHANGE UP (ref 3.8–10.5)

## 2019-11-11 PROCEDURE — 99222 1ST HOSP IP/OBS MODERATE 55: CPT

## 2019-11-11 RX ORDER — BUDESONIDE AND FORMOTEROL FUMARATE DIHYDRATE 160; 4.5 UG/1; UG/1
2 AEROSOL RESPIRATORY (INHALATION)
Refills: 0 | Status: DISCONTINUED | OUTPATIENT
Start: 2019-11-11 | End: 2019-11-12

## 2019-11-11 RX ORDER — LANOLIN ALCOHOL/MO/W.PET/CERES
1 CREAM (GRAM) TOPICAL ONCE
Refills: 0 | Status: COMPLETED | OUTPATIENT
Start: 2019-11-11 | End: 2019-11-11

## 2019-11-11 RX ORDER — LANOLIN ALCOHOL/MO/W.PET/CERES
1 CREAM (GRAM) TOPICAL AT BEDTIME
Refills: 0 | Status: DISCONTINUED | OUTPATIENT
Start: 2019-11-11 | End: 2019-11-12

## 2019-11-11 RX ADMIN — Medication 1 MILLIGRAM(S): at 00:53

## 2019-11-11 RX ADMIN — Medication 1 MILLIGRAM(S): at 23:20

## 2019-11-11 RX ADMIN — Medication 2: at 13:01

## 2019-11-11 RX ADMIN — NYSTATIN CREAM 1 APPLICATION(S): 100000 CREAM TOPICAL at 05:47

## 2019-11-11 RX ADMIN — TIOTROPIUM BROMIDE 1 CAPSULE(S): 18 CAPSULE ORAL; RESPIRATORY (INHALATION) at 12:11

## 2019-11-11 RX ADMIN — NYSTATIN CREAM 1 APPLICATION(S): 100000 CREAM TOPICAL at 22:21

## 2019-11-11 RX ADMIN — PANTOPRAZOLE SODIUM 40 MILLIGRAM(S): 20 TABLET, DELAYED RELEASE ORAL at 05:47

## 2019-11-11 RX ADMIN — Medication 175 MICROGRAM(S): at 05:46

## 2019-11-11 RX ADMIN — Medication 2: at 08:46

## 2019-11-11 RX ADMIN — Medication 1: at 17:48

## 2019-11-11 RX ADMIN — ALBUTEROL 2 PUFF(S): 90 AEROSOL, METERED ORAL at 22:22

## 2019-11-11 RX ADMIN — ALBUTEROL 2 PUFF(S): 90 AEROSOL, METERED ORAL at 13:43

## 2019-11-11 RX ADMIN — Medication 2 CAPSULE(S): at 12:13

## 2019-11-11 RX ADMIN — Medication 2 CAPSULE(S): at 08:01

## 2019-11-11 RX ADMIN — Medication 40 MILLIGRAM(S): at 05:46

## 2019-11-11 RX ADMIN — NYSTATIN CREAM 1 APPLICATION(S): 100000 CREAM TOPICAL at 13:43

## 2019-11-11 RX ADMIN — DRONEDARONE 400 MILLIGRAM(S): 400 TABLET, FILM COATED ORAL at 05:46

## 2019-11-11 RX ADMIN — BUDESONIDE AND FORMOTEROL FUMARATE DIHYDRATE 2 PUFF(S): 160; 4.5 AEROSOL RESPIRATORY (INHALATION) at 17:47

## 2019-11-11 RX ADMIN — DRONEDARONE 400 MILLIGRAM(S): 400 TABLET, FILM COATED ORAL at 17:47

## 2019-11-11 RX ADMIN — ATORVASTATIN CALCIUM 40 MILLIGRAM(S): 80 TABLET, FILM COATED ORAL at 22:22

## 2019-11-11 RX ADMIN — Medication 2 CAPSULE(S): at 17:48

## 2019-11-11 RX ADMIN — RIVAROXABAN 20 MILLIGRAM(S): KIT at 17:47

## 2019-11-11 RX ADMIN — ALBUTEROL 2 PUFF(S): 90 AEROSOL, METERED ORAL at 05:46

## 2019-11-11 RX ADMIN — MONTELUKAST 10 MILLIGRAM(S): 4 TABLET, CHEWABLE ORAL at 12:11

## 2019-11-11 NOTE — CONSULT NOTE ADULT - PROBLEM SELECTOR RECOMMENDATION 5
controlled
Bilateral chronic leg edema likely vascular insufficiency and amlodipine use  - Compression stocking  - Continue lasix 40mg po daily  - ROCÍO/PVR, vascular surgery follow up as out patient.  - Leg elevation  - Consider change of amlodipine to other BP medications if possible.    Will continue to follow.

## 2019-11-11 NOTE — PROGRESS NOTE ADULT - PROBLEM SELECTOR PLAN 3
Bilateral chronic leg edema with venous statis dermatitis  - leg elevation  - Compression stocking  - ROCÍO/PVR and vascular surgery evaluation as out patient  - Continue lasix 40mg po daily with strict I/o monitoring if no robust response can increase lasix to 40 mg po bid for optimization.  -

## 2019-11-11 NOTE — CONSULT NOTE ADULT - ASSESSMENT
80yo woman   PMH,      CHF s/p PPM, HTN, DM, vascular insufficiency of bilateral LE     presents with n/v x 10 days.   Per  daughter also intermittent low grade temps over last month  Had a higher fever (38.5) PTA but nothing since   Stable   Workup for source as above is so far negative  Leg exam c/w stasis dermatitis-not s/o cellulitis  Agree with observing off antimicrobials  Will tailor plan for ID issues  per course,results.Will defer to primary team on management of other issues.  Case d/w Dr canchola and patients daughter, Med NP  Will Follow.  Beeper [id number]-usig/afterhours/No response-[phone]

## 2019-11-11 NOTE — PROGRESS NOTE ADULT - ASSESSMENT
78yo woman   PMH,      CHF s/p PPM, HTN, DM, vascular insufficiency of bilateral LE   presents with n/v x 10 days. Nephrology consult called for hyponatremia with Na level 130.  Last 24 hours Serum Na 130   Urine Na 36  Urine osmolality pending  Patient complains of constipation, urinary incontinence and urine frequency, Bilateral leg edema

## 2019-11-11 NOTE — PROGRESS NOTE ADULT - PROBLEM SELECTOR PLAN 2
BP ranging from 130 to 150's  - Can resume BP medication with holding parameters  - Monitor vital signs with goal BP <140/90 mm Hg

## 2019-11-11 NOTE — CONSULT NOTE ADULT - PROBLEM SELECTOR RECOMMENDATION 9
She has asthma and is on dulera as well as spiriva at home: will start Symbicort: she is not wheezing at this time: She is already on full time AC: hence PE is  unlikely: cont current meds: no further investigative workup from pulm side : CT chest is normal : rvp isnegative

## 2019-11-11 NOTE — CONSULT NOTE ADULT - ASSESSMENT
78 y/o [place] female from home,     h/o    of CAD s/p stents,  HTN , HLD , Diastolic CHF,  T2DM  /  chronic anemia    , Afib on Xarelto s/p PPM 11/2013,        Asthma, Hypothyroidism, Chronic Venous Insufficiency, Urinary Incontinence, morbid  obesity     admitted  with  abd  pian/  no  tenderness on exam   ct   abdomen in er, no  pathology found  pt is  febrile in er   normal  wbc/  normal  cxr/ ct chest, pending  RVP, pending  follow  blood  c/s/ appears non toxic   empiric   Rocephin  for  now/ ?  small  component of  leg cellulitis. suspect its  mostly chronic      h/o chronic   b/l leg  swelling/  venous stasis   mild  sob on exertion/  from  acute on chronic  diastolic chf/  morbid  obesity  on iv lasix    AFIB, . on xarelto/ multaq   echo  DM 2,  follow  fs/  not on insulin at home    spoke to son at bedside/   meds  unable  to  be confirmed, as  neither pt / family  has  the list a nd   both pharmacies  that pt uses,  are closed   now       < from: CT Abdomen and Pelvis w/ Oral Cont (11.08.19 @ 16:09) >  IMPRESSION:   No acute pathology.  < end of copied text >     < from: Transthoracic Echocardiogram (01.29.18 @ 07:38) >  CONCLUSIONS:  1. Mild posterior mitral annular calcification. Trace  mitral regurgitation.  2. Aortic valve not well visualized. Mild aortic stenosis.  Trace aortic regurgitation.  3. Normal aortic root.  4. Mildly dilated left atrium.  5. Normal left ventricular internal dimensions and wall  thicknesses.  6. Endocardium not well visualized; grossly normal left  ventricular systolic function. Peak left ventricular  outflow tract gradient equals 18.3 mm Hg, consistent with  mild LVOT obstruction.  7. Grade I diastolic dysfunction (Impaired relaxation).  8. Normal right atrium.  9. Normal right ventricular size and function (TAPSE 2.15  cm). A device lead is visualized in the right heart.  10. RA Pressure is 8 mm Hg.  11. RV systolic pressure is moderately increased at  49 mm  Hg.  12. Normal tricuspid valve. Trace tricuspid regurgitation.  13. Pulmonic valve not well seen.  14. No pericardial effusion.    *** No previous Echo exam.    < end of copied text >

## 2019-11-11 NOTE — PROGRESS NOTE ADULT - PROBLEM SELECTOR PLAN 1
Asymptomatic hypervolemic hyponatremia with Na level 130 likely ADH stimulus due to abdominal pain/constipation   - Urinalysis with urine specific gravity 1.013  - Volume status still hypervolemic  - Started on Lasix 40 mg po daily for free water and salt clearance.  - Free water restriction to <1L/day  - Urine Na 36, Urine osmolality pending  - TSH 5.06. Continue synthyroid 175 mcg po daily  - Monitor BMP every 8 to 12 hrly  - IF Na remains low will consider add sodium tablets along with Lasix for .  - Monitor strict urine output   - Goal Na not to exceed 136 to 138 by 8 AM on 11/12/2019.

## 2019-11-11 NOTE — CONSULT NOTE ADULT - SUBJECTIVE AND OBJECTIVE BOX
Patient is a 79y old  Female who presents with a chief complaint of abd pain (11 Nov 2019 13:40)  History though daughter who patients wants to translate     From HPI" HPI:  80yo woman   PMH,      CHF s/p PPM, HTN, DM, vascular insufficiency of bilateral LE     presents with n/v x 10 days. Pt states that she has been have bilious vomiting (yellow in color) with coughing. She had a normal, nonbloody BM yesterday and has been passing gas.    She states that she is in pain but is unsure where but knows that she feels uncomfortable . She denies   chest pain, SOB, lightheadedness.  She was seen in the ED 2 days ago and was discharged after work up which revealed negative pertussis test and blood culture but a contaminated urine culture.      .	  son at bedside. does  not  know  her meds (08 Nov 2019 19:45)  "    Above verified-agree with above unless noted below.  Patient post admission started on empiric ceftriaxone  workup sent  see below for details     ID consulted     PAST MEDICAL & SURGICAL HISTORY:  Obesity  Pacemaker  Atrial fibrillation  Hypothyroidism  Venous stasis  IBS (irritable bowel syndrome)  CHF (congestive heart failure), NYHA class I  HLD (Hyperlipidemia)  HTN (Hypertension)  Diabetes  Myocardial Infarction  CAD (Coronary Atherosclerotic Disease)  Asthma  S/P coronary angiogram      Social history:    denies  Smoking,    ETOH,      IVDU       FAMILY HISTORY:  Family history of heart disease  - Family history of medical problems in all first degree relatives reviewed.None of these were found to be related to patients current illness.    REVIEW OF SYSTEMS  General:+ some malaise no  fatigue or chills. Fevers absent    Skin:chronic erythema both LE -no recent change   	  Ophthalmologic:Denies any visual complaints,discharge redness or photophobia  	  ENMT:No nasal discharge,headache,sinus congestion or throat pain.No dental complaints    Respiratory and Thorax:+ cough,no recent increase minimal sputum no  chest pain.Denies shortness of breath  	  Cardiovascular:	No chest pain,palpitaions or dizziness    Gastrointestinal:	NO nausea,abdominal pain or diarrhea.    Genitourinary:	No dysuria,frequency. No flank pain    Musculoskeletal:	No joint swelling or pain.No weakness    Neurological:No confusion,diziness.No extremity weakness.No bladder or bowel incontinence	    Psychiatric:No delusions or hallucinations	    Hematology/Lymphatics:	No LN swelling.No gum bleeding     Endocrine:	No recent weight gain or loss.No abnormal heat/cold intolerance    Allergic/Immunologic:	No hives or rash   Allergies    No Known Allergies    Intolerances        Antimicrobials:        Prior Antimicrobials:  MEDICATIONS  (STANDING):  cefTRIAXone   IVPB   100 mL/Hr IV Intermittent (11-08-19 @ 20:25)    fluconAZOLE   Tablet   150 milliGRAM(s) Oral (11-09-19 @ 18:22)      Other medications reviewed      Vital Signs Last 24 Hrs  T(C): 36.7 (11 Nov 2019 13:22), Max: 37.1 (10 Nov 2019 20:59)  T(F): 98.1 (11 Nov 2019 13:22), Max: 98.7 (10 Nov 2019 20:59)  HR: 68 (11 Nov 2019 13:22) (62 - 74)  BP: 143/58 (11 Nov 2019 13:22) (143/58 - 152/69)  BP(mean): --  RR: 18 (11 Nov 2019 13:22) (17 - 18)  SpO2: 97% (11 Nov 2019 13:22) (95% - 97%)    PHYSICAL EXAM:Pleasant patient in no acute distress.      Constitutional:Comfortable.Awake and alert  No cachexia     Eyes:PERRL EOMI.NO discharge or conjunctival injection    ENMT:No sinus tenderness.No thrush.No pharyngeal exudate or erythema.Fair dental hygiene    Neck:Supple,No LN,no JVD      Respiratory:Good air entry bilaterally,bibasilar bronchial coarse BS    Cardiovascular:S1 S2 wnl, No murmurs,rub or gallops    Gastrointestinal:  obese Soft BS(+) no tenderness no masses ,No rebound or guarding    Genitourinary:No CVA tendereness         Extremities:bilateral chronic appearing stasis with some dermatitis changes  No elevated temp-no discharge  Mild erythema c/w stasis dermatitis     Vascular:peripheral pulses felt    Neurological:AAO X 3,No grossly focal deficits        Lymph Nodes:No palpable LNs    Musculoskeletal:No joint swelling or LOM    Psychiatric:Affect normal.          Labs:                            9.7    8.11  )-----------( 309      ( 11 Nov 2019 09:26 )             30.3     WBC Count: 8.11 (11-11-19 @ 09:26)  WBC Count: 6.91 (11-10-19 @ 09:20)  WBC Count: 8.46 (11-09-19 @ 10:48)  WBC Count: 7.16 (11-08-19 @ 13:39)  WBC Count: 5.59 (11-06-19 @ 17:28)      11-11    130<L>  |  96  |  17  ----------------------------<  178<H>  4.8   |  22  |  1.08    Ca    9.0      11 Nov 2019 07:05              Culture - Blood (collected 08 Nov 2019 22:59)  Source: .Blood Blood-Venous  Preliminary Report (09 Nov 2019 23:01):    No growth to date.    Culture - Blood (collected 08 Nov 2019 22:59)  Source: .Blood Blood-Peripheral  Preliminary Report (09 Nov 2019 23:01):    No growth to date.    Culture - Urine (collected 08 Nov 2019 17:11)  Source: .Urine Clean Catch (Midstream)  Final Report (09 Nov 2019 18:10):    <10,000 CFU/mL Normal Urogenital Ema    Culture - Urine (collected 06 Nov 2019 23:08)  Source: .Urine Clean Catch (Midstream)  Final Report (08 Nov 2019 04:46):    >=3 organisms. Probable collection contamination.    Culture - Blood (collected 06 Nov 2019 22:09)  Source: .Blood Blood  Preliminary Report (07 Nov 2019 23:01):    No growth to date.    Culture - Blood (collected 06 Nov 2019 22:09)  Source: .Blood Blood  Preliminary Report (07 Nov 2019 23:01):    No growth to date.    < from: CT Chest No Cont (11.08.19 @ 21:43) >  IMPRESSION:     No acute pathology. Clear lungs            < end of copied text >        < from: CT Abdomen and Pelvis w/ Oral Cont (11.08.19 @ 16:09) >  IMPRESSION:     No acute pathology.          < end of copied text >      < from: US Abdomen Upper Quadrant Right (11.06.19 @ 18:50) >  IMPRESSION:     No sonographic evidence of acute cholecystitis.            < end of copied text >

## 2019-11-11 NOTE — PROGRESS NOTE ADULT - SUBJECTIVE AND OBJECTIVE BOX
Patient is a 79y old  Female who presents with a chief complaint of abd pain (2019 10:09)      SUBJECTIVE / OVERNIGHT EVENTS: overnight events noted  feels about the same    ROS:  Resp: No cough no sputum production  CVS: No chest pain no palpitations no orthopnea  GI: no N/V/D  : no dysuria, no hematuria  Neuro: no weakness no paresthesias  Heme: No petechiae no easy bruising  Msk: No joint pain no swelling  Skin: No rash no itching        MEDICATIONS  (STANDING):  ALBUTerol    90 MICROgram(s) HFA Inhaler 2 Puff(s) Inhalation every 8 hours  atorvastatin 40 milliGRAM(s) Oral at bedtime  dextrose 5%. 1000 milliLiter(s) (50 mL/Hr) IV Continuous <Continuous>  dextrose 50% Injectable 12.5 Gram(s) IV Push once  dextrose 50% Injectable 25 Gram(s) IV Push once  dextrose 50% Injectable 25 Gram(s) IV Push once  dronedarone 400 milliGRAM(s) Oral two times a day  furosemide    Tablet 40 milliGRAM(s) Oral daily  insulin lispro (HumaLOG) corrective regimen sliding scale   SubCutaneous three times a day before meals  levothyroxine 175 MICROGram(s) Oral daily  montelukast 10 milliGRAM(s) Oral daily  nystatin Powder 1 Application(s) Topical three times a day  pancrelipase  (CREON  6,000 Lipase Units) 2 Capsule(s) Oral three times a day with meals  pantoprazole    Tablet 40 milliGRAM(s) Oral before breakfast  rivaroxaban 20 milliGRAM(s) Oral with dinner  tiotropium 18 MICROgram(s) Capsule 1 Capsule(s) Inhalation daily    MEDICATIONS  (PRN):  dextrose 40% Gel 15 Gram(s) Oral once PRN Blood Glucose LESS THAN 70 milliGRAM(s)/deciliter  glucagon  Injectable 1 milliGRAM(s) IntraMuscular once PRN Glucose LESS THAN 70 milligrams/deciliter  simethicone 80 milliGRAM(s) Chew four times a day PRN Gas        CAPILLARY BLOOD GLUCOSE      POCT Blood Glucose.: 245 mg/dL (2019 08:30)  POCT Blood Glucose.: 172 mg/dL (10 Nov 2019 22:13)  POCT Blood Glucose.: 240 mg/dL (10 Nov 2019 17:14)  POCT Blood Glucose.: 176 mg/dL (10 Nov 2019 12:20)    I&O's Summary    10 Nov 2019 07:  -  2019 07:00  --------------------------------------------------------  IN: 960 mL / OUT: 0 mL / NET: 960 mL    2019 07:01  -  2019 11:54  --------------------------------------------------------  IN: 240 mL / OUT: 0 mL / NET: 240 mL        Vital Signs Last 24 Hrs  T(C): 37 (2019 04:23), Max: 37.1 (10 Nov 2019 20:59)  T(F): 98.6 (2019 04:23), Max: 98.7 (10 Nov 2019 20:59)  HR: 74 (2019 04:23) (62 - 74)  BP: 148/76 (2019 04:23) (131/62 - 152/69)  BP(mean): --  RR: 17 (2019 04:23) (17 - 18)  SpO2: 95% (2019 04:23) (95% - 96%)    PHYSICAL EXAM:  GENERAL: in no apparent distress  morbidly obese  HEAD:  Atraumatic, Normocephalic  EYES: EOMI, PERRLA, conjunctiva and sclera clear  NECK: Supple, No JVD  CHEST/LUNG: no wheeze, clear to auscultation bilaterally  HEART: S1 S2; soft ejection systolic murmur best heard at left sternal border no rub no gallop   ABDOMEN: Soft, Nontender, Nondistended; Bowel sounds present positive for superficial abdominal wall tenderness left side  EXTREMITIES:  No clubbing or cyanosis, + Peripheral Pulses,  chronic LE edema and erythema left leg slightly warmer than right  no appreciable change  PSYCH: AO x 3 appropriate affect  NEUROLOGY: non-focal, motor and sensory systems intact  SKIN: No rashes or lesions    LABS:                        9.7    8.11  )-----------( 309      ( 2019 09:26 )             30.3     11-11    130<L>  |  96  |  17  ----------------------------<  178<H>  4.8   |  22  |  1.08    Ca    9.0      2019 07:05            Urinalysis Basic - ( 10 Nov 2019 21:43 )    Color: Light Yellow / Appearance: Clear / S.013 / pH: x  Gluc: x / Ketone: Negative  / Bili: Negative / Urobili: <2 mg/dL   Blood: x / Protein: Trace / Nitrite: Negative   Leuk Esterase: Negative / RBC: x / WBC x   Sq Epi: x / Non Sq Epi: x / Bacteria: x          All consultant(s) notes reviewed and care discussed with other providers        PHYSICAL EXAM:  GENERAL: in no apparent distress  morbidly obese  HEAD:  Atraumatic, Normocephalic  EYES: EOMI, PERRLA, conjunctiva and sclera clear  NECK: Supple, No JVD  CHEST/LUNG: no wheeze, clear to auscultation bilaterally  HEART: S1 S2; soft ejection systolic murmur best heard at left sternal border no rub no gallop   ABDOMEN: Soft, Nontender, Nondistended; Bowel sounds present positive for superficial abdominal wall tenderness left side  EXTREMITIES:  No clubbing or cyanosis, + Peripheral Pulses,  chronic LE edema and erythema left leg slightly warmer than right  no appreciated change  PSYCH: AO x 3 appropriate affect  NEUROLOGY: non-focal, motor and sensory systems intact  SKIN: No rashes or lesionsContact Number, Dr Yates [phone]

## 2019-11-11 NOTE — PHYSICAL THERAPY INITIAL EVALUATION ADULT - ACTIVE RANGE OF MOTION EXAMINATION, REHAB EVAL
no Active ROM deficits were identified/except RUE limited secondary to h/o of fall resulting in dislocation

## 2019-11-11 NOTE — CONSULT NOTE ADULT - PROBLEM SELECTOR RECOMMENDATION 3
Nausea/vomiting likely gastritis/GERD vs viral gastroenteritis  - Continue PPI  - Reglan prn  - CT abdomen and pelvis reviewed without significant findings
HR controlled: on AC

## 2019-11-11 NOTE — PHYSICAL THERAPY INITIAL EVALUATION ADULT - ADDITIONAL COMMENTS
As per pt, pt lives in an apartment w/ her son and no steps to enter (elevator access) PTA pt was required assist w/ some ADLs and rollator for all mobility. Pt has HHA 10hrs a day. Pt was going to outpatient physical therapy PTA.

## 2019-11-11 NOTE — CONSULT NOTE ADULT - SUBJECTIVE AND OBJECTIVE BOX
Patient is a 79y old  Female who presents with a chief complaint of abd pain (2019 11:53)      HPI:  80yo woman   PMH,      CHF s/p PPM, HTN, DM, vascular insufficiency of bilateral LE     presents with n/v x 10 days. Pt states that she has been have bilious vomiting (yellow in color) with coughing. She had a normal, nonbloody BM yesterday and has been passing gas.    She states that she is in pain but is unsure where but knows that she feels uncomfortable . She denies   chest pain, SOB, lightheadedness.  She was seen in the ED 2 days ago and was discharged after work up which revealed negative pertussis test and blood culture but a contaminated urine culture.      .	  son at bedside. does  not  know  her meds (2019 19:45)    she seems a little SOB /;I spoke to her daughter who translated for me: she says she is always SOB at home and has been taking inhalers at home. Sheis obese and is on cpap at he for jose danile: she has not been wheezing       ?FOLLOWING PRESENT  [x ] Hx of PE/DVT, [x ] Hx COPD, [ ?] Hx of Asthma, [ x] Hx of Hospitalization, [x ]  Hx of BiPAP/CPAP use, [x] Hx of JOSE DANIEL    Allergies    No Known Allergies    Intolerances        PAST MEDICAL & SURGICAL HISTORY:  Obesity  Pacemaker  Atrial fibrillation  Hypothyroidism  Venous stasis  IBS (irritable bowel syndrome)  CHF (congestive heart failure), NYHA class I  HLD (Hyperlipidemia)  HTN (Hypertension)  Diabetes  Myocardial Infarction  CAD (Coronary Atherosclerotic Disease)  Asthma  S/P coronary angiogram      FAMILY HISTORY:  Family history of heart disease      Social History: [ x ] TOBACCO                  [ x ] ETOH                                 [ x ] IVDA/DRUGS    REVIEW OF SYSTEMS      General:	x  x  Skin/Breast:  	  Ophthalmologic:x  	x  ENMT:	    Respiratory and Thorax: chr sob , cough   	  Cardiovascular:	x    Gastrointestinal:	vomiting    Genitourinary:	x    Musculoskeletal:	 leg sweling    Neurological:	x    Psychiatric:	x    Hematology/Lymphatics:	x    Endocrine:	x    Allergic/Immunologic:	x    MEDICATIONS  (STANDING):  ALBUTerol    90 MICROgram(s) HFA Inhaler 2 Puff(s) Inhalation every 8 hours  atorvastatin 40 milliGRAM(s) Oral at bedtime  dextrose 5%. 1000 milliLiter(s) (50 mL/Hr) IV Continuous <Continuous>  dextrose 50% Injectable 12.5 Gram(s) IV Push once  dextrose 50% Injectable 25 Gram(s) IV Push once  dextrose 50% Injectable 25 Gram(s) IV Push once  dronedarone 400 milliGRAM(s) Oral two times a day  furosemide    Tablet 40 milliGRAM(s) Oral daily  insulin lispro (HumaLOG) corrective regimen sliding scale   SubCutaneous three times a day before meals  levothyroxine 175 MICROGram(s) Oral daily  montelukast 10 milliGRAM(s) Oral daily  nystatin Powder 1 Application(s) Topical three times a day  pancrelipase  (CREON  6,000 Lipase Units) 2 Capsule(s) Oral three times a day with meals  pantoprazole    Tablet 40 milliGRAM(s) Oral before breakfast  rivaroxaban 20 milliGRAM(s) Oral with dinner  tiotropium 18 MICROgram(s) Capsule 1 Capsule(s) Inhalation daily    MEDICATIONS  (PRN):  dextrose 40% Gel 15 Gram(s) Oral once PRN Blood Glucose LESS THAN 70 milliGRAM(s)/deciliter  glucagon  Injectable 1 milliGRAM(s) IntraMuscular once PRN Glucose LESS THAN 70 milligrams/deciliter  simethicone 80 milliGRAM(s) Chew four times a day PRN Gas       Vital Signs Last 24 Hrs  T(C): 36.7 (2019 13:22), Max: 37.1 (10 Nov 2019 20:59)  T(F): 98.1 (2019 13:22), Max: 98.7 (10 Nov 2019 20:59)  HR: 68 (2019 13:22) (62 - 74)  BP: 143/58 (2019 13:22) (143/58 - 152/69)  BP(mean): --  RR: 18 (2019 13:22) (17 - 18)  SpO2: 97% (2019 13:22) (95% - 97%)        I&O's Summary    10 Nov 2019 07:  -  2019 07:00  --------------------------------------------------------  IN: 960 mL / OUT: 0 mL / NET: 960 mL    2019 07:01  -  2019 13:40  --------------------------------------------------------  IN: 240 mL / OUT: 200 mL / NET: 40 mL        Physical Exam:   GENERAL: Obese  HEENT: CECELIA/   Atraumatic, Normocephalic  ENMT: No tonsillar erythema, exudates, or enlargement; Moist mucous membranes, Good dentition, No lesions  NECK: Supple, No JVD, Normal thyroid  CHEST/LUNG: Clear to auscultation bilaterally  CVS: Regular rate and rhythm; No murmurs, rubs, or gallops  GI: : Soft, Nontender, Nondistended; Bowel sounds present  NERVOUS SYSTEM:  Alert & Oriented X3  EXTREMITIES:  2+edema  LYMPH: No lymphadenopathy noted  SKIN: No rashes or lesions  ENDOCRINOLOGY: No Thyromegaly  PSYCH: Appropriate    Labs:  -3.0<40<4>>49<<7.365>>-3.0<<3><<4><<5<<499>>, Venous<44<4>>32<<7.325>>Venous<<3><<4><<5<<329>>                            9.7    8.11  )-----------( 309      ( 2019 09:26 )             30.3                         8.8    6.91  )-----------( 253      ( 10 Nov 2019 09:20 )             27.7                         9.6    8.46  )-----------( 276      ( 2019 10:48 )             29.9                         10.1   7.16  )-----------( 252      ( 2019 13:39 )             30.4     11-11    130<L>  |  96  |  17  ----------------------------<  178<H>  4.8   |  22  |  1.08  11-10    130<L>  |  96  |  17  ----------------------------<  166<H>  4.6   |  24  |  1.18  11-09    130<L>  |  96  |  16  ----------------------------<  182<H>  4.9   |  21<L>  |  1.00  1108    133<L>  |  99  |  17  ----------------------------<  157<H>  5.3   |  21<L>  |  1.02    Ca    9.0      2019 07:05  Ca    8.6      10 Nov 2019 06:54    TPro  7.2  /  Alb  3.9  /  TBili  0.4  /  DBili  x   /  AST  24  /  ALT  20  /  AlkPhos  55      CAPILLARY BLOOD GLUCOSE      POCT Blood Glucose.: 226 mg/dL (2019 12:45)  POCT Blood Glucose.: 245 mg/dL (2019 08:30)  POCT Blood Glucose.: 172 mg/dL (10 Nov 2019 22:13)  POCT Blood Glucose.: 240 mg/dL (10 Nov 2019 17:14)        Urinalysis Basic - ( 10 Nov 2019 21:43 )    Color: Light Yellow / Appearance: Clear / S.013 / pH: x  Gluc: x / Ketone: Negative  / Bili: Negative / Urobili: <2 mg/dL   Blood: x / Protein: Trace / Nitrite: Negative   Leuk Esterase: Negative / RBC: x / WBC x   Sq Epi: x / Non Sq Epi: x / Bacteria: x      D DImer      Studies  Chest X-RAY  CT SCAN Chest   CT Abdomen  Venous Dopplers: LE:   Others  < from: CT Chest No Cont (19 @ 21:43) >    FINDINGS:    LUNGS AND AIRWAYS: Patent central airways.  Lungs are clear.    PLEURA: No pleural effusion.    MEDIASTINUM AND TASHA: No lymphadenopathy.    VESSELS: Within normal limits.    HEART: Heart size is normal. No pericardial effusion.    CHEST WALL AND LOWER NECK: Left chest wall cardiac device.    VISUALIZED UPPER ABDOMEN: Within normal limits.    BONES: Within normal limits.    IMPRESSION:     No acute pathology. Clear lungs        < from: Transthoracic Echocardiogram (18 @ 07:38) >  CONCLUSIONS:  1. Mild posterior mitral annular calcification. Trace  mitral regurgitation.  2. Aortic valve not well visualized. Mild aortic stenosis.  Trace aortic regurgitation.  3. Normal aortic root.  4. Mildly dilated left atrium.  5. Normal left ventricular internal dimensions and wall  thicknesses.  6. Endocardium not well visualized; grossly normal left  ventricular systolic function. Peak left ventricular  outflow tract gradient equals 18.3 mm Hg, consistent with  mild LVOT obstruction.  7. Grade I diastolic dysfunction (Impaired relaxation).  8. Normal right atrium.  9. Normal right ventricular size and function (TAPSE 2.15  cm). A device lead is visualized in the right heart.  10. RA Pressure is 8 mm Hg.  11. RV systolic pressure is moderately increased at  49 mm  Hg.  12. Normal tricuspid valve. Trace tricuspid regurgitation.  13. Pulmonic valve not well seen.  14. No pericardial effusion.    *** No previous Echo exam.  ------------------------------------------------------------------------  Confirmed on  2018 - 09:56:11 by Davidson Estrada MD  ------------------------------------------------------------------------    < end of copied text >              SHARRI WAGGONER M.D., RADIOLOGY RESIDENT  This document has been electronically signed.  DM BURR M.D., ATTENDING RADIOLOGIST  This document has been electronically signed. 2019 11:05PM        < end of copied text >

## 2019-11-11 NOTE — CONSULT NOTE ADULT - PROBLEM SELECTOR RECOMMENDATION 4
BP controlled ranging from 130 to 150's at present  - Monitor vital signs per unit protocol  - Can resume BP medications with holding parameters
per pmd: Her last echo is reviewed: only diastolic dysfucntion

## 2019-11-11 NOTE — PHYSICAL THERAPY INITIAL EVALUATION ADULT - PERTINENT HX OF CURRENT PROBLEM, REHAB EVAL
Pt is a 78 yo F PMH CHF s/p PPM, HTN, DM, vascular insufficiency of bilateral LE presents with nausea, vomiting x 10 days, noted to have fever and admitted for further work up. CT CHest/Abdomen (-).

## 2019-11-11 NOTE — PROGRESS NOTE ADULT - PROBLEM SELECTOR PLAN 1
unclear etiology  resolved   CT chest negative   RVP negative  legs are chronically erythematous and likely secondary to venous stasis  UA clean  urine culture and blood cultures negative   will discontinue ceftriaxone IV and await blood cultures  ID evaluation called  to assess for ? antibiotics requested by daughter (RN)

## 2019-11-12 ENCOUNTER — TRANSCRIPTION ENCOUNTER (OUTPATIENT)
Age: 79
End: 2019-11-12

## 2019-11-12 VITALS
TEMPERATURE: 97 F | SYSTOLIC BLOOD PRESSURE: 153 MMHG | RESPIRATION RATE: 18 BRPM | OXYGEN SATURATION: 96 % | HEART RATE: 73 BPM | DIASTOLIC BLOOD PRESSURE: 78 MMHG

## 2019-11-12 LAB
ANION GAP SERPL CALC-SCNC: 17 MMOL/L — SIGNIFICANT CHANGE UP (ref 5–17)
BUN SERPL-MCNC: 18 MG/DL — SIGNIFICANT CHANGE UP (ref 7–23)
CALCIUM SERPL-MCNC: 9.1 MG/DL — SIGNIFICANT CHANGE UP (ref 8.4–10.5)
CHLORIDE SERPL-SCNC: 93 MMOL/L — LOW (ref 96–108)
CO2 SERPL-SCNC: 23 MMOL/L — SIGNIFICANT CHANGE UP (ref 22–31)
CREAT SERPL-MCNC: 1.11 MG/DL — SIGNIFICANT CHANGE UP (ref 0.5–1.3)
GLUCOSE BLDC GLUCOMTR-MCNC: 173 MG/DL — HIGH (ref 70–99)
GLUCOSE BLDC GLUCOMTR-MCNC: 205 MG/DL — HIGH (ref 70–99)
GLUCOSE SERPL-MCNC: 187 MG/DL — HIGH (ref 70–99)
HCT VFR BLD CALC: 30.4 % — LOW (ref 34.5–45)
HGB BLD-MCNC: 9.8 G/DL — LOW (ref 11.5–15.5)
MCHC RBC-ENTMCNC: 27 PG — SIGNIFICANT CHANGE UP (ref 27–34)
MCHC RBC-ENTMCNC: 32.2 GM/DL — SIGNIFICANT CHANGE UP (ref 32–36)
MCV RBC AUTO: 83.7 FL — SIGNIFICANT CHANGE UP (ref 80–100)
PLATELET # BLD AUTO: 331 K/UL — SIGNIFICANT CHANGE UP (ref 150–400)
POTASSIUM SERPL-MCNC: 4.3 MMOL/L — SIGNIFICANT CHANGE UP (ref 3.5–5.3)
POTASSIUM SERPL-SCNC: 4.3 MMOL/L — SIGNIFICANT CHANGE UP (ref 3.5–5.3)
RBC # BLD: 3.63 M/UL — LOW (ref 3.8–5.2)
RBC # FLD: 14.6 % — HIGH (ref 10.3–14.5)
SODIUM SERPL-SCNC: 133 MMOL/L — LOW (ref 135–145)
WBC # BLD: 7.06 K/UL — SIGNIFICANT CHANGE UP (ref 3.8–10.5)
WBC # FLD AUTO: 7.06 K/UL — SIGNIFICANT CHANGE UP (ref 3.8–10.5)

## 2019-11-12 PROCEDURE — 85014 HEMATOCRIT: CPT

## 2019-11-12 PROCEDURE — 96375 TX/PRO/DX INJ NEW DRUG ADDON: CPT | Mod: XU

## 2019-11-12 PROCEDURE — 84132 ASSAY OF SERUM POTASSIUM: CPT

## 2019-11-12 PROCEDURE — 84300 ASSAY OF URINE SODIUM: CPT

## 2019-11-12 PROCEDURE — 74018 RADEX ABDOMEN 1 VIEW: CPT

## 2019-11-12 PROCEDURE — 82803 BLOOD GASES ANY COMBINATION: CPT

## 2019-11-12 PROCEDURE — 96374 THER/PROPH/DIAG INJ IV PUSH: CPT | Mod: XU

## 2019-11-12 PROCEDURE — 82962 GLUCOSE BLOOD TEST: CPT

## 2019-11-12 PROCEDURE — 81003 URINALYSIS AUTO W/O SCOPE: CPT

## 2019-11-12 PROCEDURE — 80048 BASIC METABOLIC PNL TOTAL CA: CPT

## 2019-11-12 PROCEDURE — 71046 X-RAY EXAM CHEST 2 VIEWS: CPT

## 2019-11-12 PROCEDURE — 82553 CREATINE MB FRACTION: CPT

## 2019-11-12 PROCEDURE — 87486 CHLMYD PNEUM DNA AMP PROBE: CPT

## 2019-11-12 PROCEDURE — 84443 ASSAY THYROID STIM HORMONE: CPT

## 2019-11-12 PROCEDURE — 74176 CT ABD & PELVIS W/O CONTRAST: CPT

## 2019-11-12 PROCEDURE — 83690 ASSAY OF LIPASE: CPT

## 2019-11-12 PROCEDURE — 83036 HEMOGLOBIN GLYCOSYLATED A1C: CPT

## 2019-11-12 PROCEDURE — 71250 CT THORAX DX C-: CPT

## 2019-11-12 PROCEDURE — 82435 ASSAY OF BLOOD CHLORIDE: CPT

## 2019-11-12 PROCEDURE — 87798 DETECT AGENT NOS DNA AMP: CPT

## 2019-11-12 PROCEDURE — 85027 COMPLETE CBC AUTOMATED: CPT

## 2019-11-12 PROCEDURE — 99285 EMERGENCY DEPT VISIT HI MDM: CPT | Mod: 25

## 2019-11-12 PROCEDURE — 87581 M.PNEUMON DNA AMP PROBE: CPT

## 2019-11-12 PROCEDURE — 84484 ASSAY OF TROPONIN QUANT: CPT

## 2019-11-12 PROCEDURE — 81001 URINALYSIS AUTO W/SCOPE: CPT

## 2019-11-12 PROCEDURE — 87040 BLOOD CULTURE FOR BACTERIA: CPT

## 2019-11-12 PROCEDURE — 82947 ASSAY GLUCOSE BLOOD QUANT: CPT

## 2019-11-12 PROCEDURE — 93005 ELECTROCARDIOGRAM TRACING: CPT

## 2019-11-12 PROCEDURE — 80053 COMPREHEN METABOLIC PANEL: CPT

## 2019-11-12 PROCEDURE — 87633 RESP VIRUS 12-25 TARGETS: CPT

## 2019-11-12 PROCEDURE — 83880 ASSAY OF NATRIURETIC PEPTIDE: CPT

## 2019-11-12 PROCEDURE — 97161 PT EVAL LOW COMPLEX 20 MIN: CPT

## 2019-11-12 PROCEDURE — 84295 ASSAY OF SERUM SODIUM: CPT

## 2019-11-12 PROCEDURE — 87086 URINE CULTURE/COLONY COUNT: CPT

## 2019-11-12 PROCEDURE — 83605 ASSAY OF LACTIC ACID: CPT

## 2019-11-12 PROCEDURE — 82330 ASSAY OF CALCIUM: CPT

## 2019-11-12 PROCEDURE — 99232 SBSQ HOSP IP/OBS MODERATE 35: CPT

## 2019-11-12 PROCEDURE — 94640 AIRWAY INHALATION TREATMENT: CPT

## 2019-11-12 PROCEDURE — 83935 ASSAY OF URINE OSMOLALITY: CPT

## 2019-11-12 PROCEDURE — 83930 ASSAY OF BLOOD OSMOLALITY: CPT

## 2019-11-12 RX ORDER — ATORVASTATIN CALCIUM 80 MG/1
1 TABLET, FILM COATED ORAL
Qty: 0 | Refills: 0 | DISCHARGE
Start: 2019-11-12

## 2019-11-12 RX ORDER — LIPASE/PROTEASE/AMYLASE 16-48-48K
1 CAPSULE,DELAYED RELEASE (ENTERIC COATED) ORAL
Refills: 0 | Status: DISCONTINUED | OUTPATIENT
Start: 2019-11-12 | End: 2019-11-12

## 2019-11-12 RX ORDER — LEVOTHYROXINE SODIUM 125 MCG
1 TABLET ORAL
Qty: 10 | Refills: 0

## 2019-11-12 RX ORDER — POTASSIUM CHLORIDE 20 MEQ
1 PACKET (EA) ORAL
Qty: 0 | Refills: 0 | DISCHARGE

## 2019-11-12 RX ORDER — AMLODIPINE BESYLATE 2.5 MG/1
1 TABLET ORAL
Qty: 0 | Refills: 0 | DISCHARGE

## 2019-11-12 RX ORDER — MONTELUKAST 4 MG/1
1 TABLET, CHEWABLE ORAL
Qty: 0 | Refills: 0 | DISCHARGE
Start: 2019-11-12

## 2019-11-12 RX ORDER — RIVAROXABAN 15 MG-20MG
1 KIT ORAL
Qty: 0 | Refills: 0 | DISCHARGE
Start: 2019-11-12

## 2019-11-12 RX ORDER — ATORVASTATIN CALCIUM 80 MG/1
1 TABLET, FILM COATED ORAL
Qty: 10 | Refills: 0

## 2019-11-12 RX ORDER — NYSTATIN CREAM 100000 [USP'U]/G
1 CREAM TOPICAL
Qty: 0 | Refills: 0 | DISCHARGE
Start: 2019-11-12

## 2019-11-12 RX ORDER — DRONEDARONE 400 MG/1
1 TABLET, FILM COATED ORAL
Qty: 0 | Refills: 0 | DISCHARGE
Start: 2019-11-12

## 2019-11-12 RX ORDER — LEVOTHYROXINE SODIUM 125 MCG
1 TABLET ORAL
Qty: 0 | Refills: 0 | DISCHARGE
Start: 2019-11-12

## 2019-11-12 RX ORDER — MIRABEGRON 50 MG/1
1 TABLET, EXTENDED RELEASE ORAL
Qty: 0 | Refills: 0 | DISCHARGE

## 2019-11-12 RX ORDER — NYSTATIN CREAM 100000 [USP'U]/G
1 CREAM TOPICAL
Qty: 1 | Refills: 0
Start: 2019-11-12

## 2019-11-12 RX ADMIN — Medication 40 MILLIGRAM(S): at 05:37

## 2019-11-12 RX ADMIN — Medication 20 MILLIGRAM(S): at 15:40

## 2019-11-12 RX ADMIN — NYSTATIN CREAM 1 APPLICATION(S): 100000 CREAM TOPICAL at 05:37

## 2019-11-12 RX ADMIN — DRONEDARONE 400 MILLIGRAM(S): 400 TABLET, FILM COATED ORAL at 05:37

## 2019-11-12 RX ADMIN — DRONEDARONE 400 MILLIGRAM(S): 400 TABLET, FILM COATED ORAL at 17:20

## 2019-11-12 RX ADMIN — ALBUTEROL 2 PUFF(S): 90 AEROSOL, METERED ORAL at 05:36

## 2019-11-12 RX ADMIN — ALBUTEROL 2 PUFF(S): 90 AEROSOL, METERED ORAL at 13:56

## 2019-11-12 RX ADMIN — RIVAROXABAN 20 MILLIGRAM(S): KIT at 17:19

## 2019-11-12 RX ADMIN — BUDESONIDE AND FORMOTEROL FUMARATE DIHYDRATE 2 PUFF(S): 160; 4.5 AEROSOL RESPIRATORY (INHALATION) at 17:19

## 2019-11-12 RX ADMIN — MONTELUKAST 10 MILLIGRAM(S): 4 TABLET, CHEWABLE ORAL at 11:55

## 2019-11-12 RX ADMIN — TIOTROPIUM BROMIDE 1 CAPSULE(S): 18 CAPSULE ORAL; RESPIRATORY (INHALATION) at 11:54

## 2019-11-12 RX ADMIN — Medication 175 MICROGRAM(S): at 05:37

## 2019-11-12 RX ADMIN — Medication 2: at 08:14

## 2019-11-12 RX ADMIN — Medication 1 CAPSULE(S): at 13:56

## 2019-11-12 RX ADMIN — NYSTATIN CREAM 1 APPLICATION(S): 100000 CREAM TOPICAL at 13:57

## 2019-11-12 RX ADMIN — BUDESONIDE AND FORMOTEROL FUMARATE DIHYDRATE 2 PUFF(S): 160; 4.5 AEROSOL RESPIRATORY (INHALATION) at 05:36

## 2019-11-12 RX ADMIN — Medication 1 CAPSULE(S): at 17:19

## 2019-11-12 RX ADMIN — Medication 2 CAPSULE(S): at 09:54

## 2019-11-12 RX ADMIN — Medication 1: at 12:00

## 2019-11-12 RX ADMIN — PANTOPRAZOLE SODIUM 40 MILLIGRAM(S): 20 TABLET, DELAYED RELEASE ORAL at 05:36

## 2019-11-12 NOTE — PROGRESS NOTE ADULT - SUBJECTIVE AND OBJECTIVE BOX
Patient is a 79y old  Female who presents with a chief complaint of abd pain (11 Nov 2019 14:01)    Being followed by ID for fever    Interval history:some cough  no sputum   No leg pain   No acute events      ROS:  No SOB,CP  No N/V/D./abd pain  No other complaints      Antimicrobials:      Other medications reviewed    Vital Signs Last 24 Hrs  T(C): 36.8 (11-12-19 @ 05:34), Max: 36.8 (11-11-19 @ 21:08)  T(F): 98.3 (11-12-19 @ 05:34), Max: 98.3 (11-12-19 @ 05:34)  HR: 78 (11-12-19 @ 05:34) (62 - 78)  BP: 150/70 (11-12-19 @ 05:34) (143/58 - 150/70)  BP(mean): --  RR: 18 (11-12-19 @ 05:34) (18 - 18)  SpO2: 96% (11-12-19 @ 05:34) (94% - 97%)    Physical Exam:        HEENT PERRLA EOMI    No oral exudate or erythema    Chest Good AE,minimal basal rhonchi    CVS RRR S1 S2 WNl No murmur or rub or gallop    Abd obese soft BS normal No tenderness no masses    IV site no erythema tenderness or discharge    Ext Bilateral venous stasis withd ermatitis no tenderness or discharge     CNS AAO X 3 no focal    Lab Data:                          9.8    7.06  )-----------( 331      ( 12 Nov 2019 09:02 )             30.4       11-12    133<L>  |  93<L>  |  18  ----------------------------<  187<H>  4.3   |  23  |  1.11    Ca    9.1      12 Nov 2019 06:51          Culture - Blood (collected 08 Nov 2019 22:59)  Source: .Blood Blood-Venous  Preliminary Report (09 Nov 2019 23:01):    No growth to date.    Culture - Blood (collected 08 Nov 2019 22:59)  Source: .Blood Blood-Peripheral  Preliminary Report (09 Nov 2019 23:01):    No growth to date.    Culture - Urine (collected 08 Nov 2019 17:11)  Source: .Urine Clean Catch (Midstream)  Final Report (09 Nov 2019 18:10):    <10,000 CFU/mL Normal Urogenital Ema          < from: CT Chest No Cont (11.08.19 @ 21:43) >  IMPRESSION:     No acute pathology. Clear lungs            < end of copied text >

## 2019-11-12 NOTE — PROGRESS NOTE ADULT - ATTENDING COMMENTS
spoke to her daughter
discussed with daughter at bedside in detail yesterday
discussed with patient in detail, expresses understanding of treatment plans.
discussed with patient in detail, expresses understanding of treatment plans.  discussed with daughter over the phone in detail
discussed with patient in detail, expresses understanding of treatment plans.  left message with daughter Vaishali on her VM

## 2019-11-12 NOTE — PROGRESS NOTE ADULT - SUBJECTIVE AND OBJECTIVE BOX
Patient is a 79y old  Female who presents with a chief complaint of abd pain (2019 11:19)      SUBJECTIVE / OVERNIGHT EVENTS: overnight events noted    ROS:  Resp: No cough no sputum production  CVS: No chest pain no palpitations no orthopnea  GI: no N/V/D  : no dysuria, no hematuria  Neuro: no weakness no paresthesias  Heme: No petechiae no easy bruising  Msk: No joint pain no swelling  Skin: No rash no itching        MEDICATIONS  (STANDING):  ALBUTerol    90 MICROgram(s) HFA Inhaler 2 Puff(s) Inhalation every 8 hours  atorvastatin 40 milliGRAM(s) Oral at bedtime  budesonide 160 MICROgram(s)/formoterol 4.5 MICROgram(s) Inhaler 2 Puff(s) Inhalation two times a day  dextrose 5%. 1000 milliLiter(s) (50 mL/Hr) IV Continuous <Continuous>  dextrose 50% Injectable 12.5 Gram(s) IV Push once  dextrose 50% Injectable 25 Gram(s) IV Push once  dextrose 50% Injectable 25 Gram(s) IV Push once  dronedarone 400 milliGRAM(s) Oral two times a day  insulin lispro (HumaLOG) corrective regimen sliding scale   SubCutaneous three times a day before meals  levothyroxine 175 MICROGram(s) Oral daily  melatonin 1 milliGRAM(s) Oral at bedtime  montelukast 10 milliGRAM(s) Oral daily  nystatin Powder 1 Application(s) Topical three times a day  pancrelipase  (CREON 12,000 Lipase Units) 1 Capsule(s) Oral three times a day with meals  pantoprazole    Tablet 40 milliGRAM(s) Oral before breakfast  rivaroxaban 20 milliGRAM(s) Oral with dinner  tiotropium 18 MICROgram(s) Capsule 1 Capsule(s) Inhalation daily  torsemide 20 milliGRAM(s) Oral daily    MEDICATIONS  (PRN):  dextrose 40% Gel 15 Gram(s) Oral once PRN Blood Glucose LESS THAN 70 milliGRAM(s)/deciliter  glucagon  Injectable 1 milliGRAM(s) IntraMuscular once PRN Glucose LESS THAN 70 milligrams/deciliter  simethicone 80 milliGRAM(s) Chew four times a day PRN Gas        CAPILLARY BLOOD GLUCOSE      POCT Blood Glucose.: 173 mg/dL (2019 11:51)  POCT Blood Glucose.: 205 mg/dL (2019 07:53)  POCT Blood Glucose.: 210 mg/dL (2019 21:59)  POCT Blood Glucose.: 177 mg/dL (2019 17:44)  POCT Blood Glucose.: 226 mg/dL (2019 12:45)    I&O's Summary    2019 07:01  -  2019 07:00  --------------------------------------------------------  IN: 1420 mL / OUT: 200 mL / NET: 1220 mL    2019 07:01  -  2019 12:25  --------------------------------------------------------  IN: 240 mL / OUT: 350 mL / NET: -110 mL        Vital Signs Last 24 Hrs  T(C): 36.9 (2019 11:26), Max: 36.9 (2019 11:26)  T(F): 98.5 (:), Max: 98.5 (2019 11:)  HR: 71 (:) (62 - 78)  BP: 149/67 (:) (143/58 - 150/70)  BP(mean): --  RR: 18 (:) (18 - 18)  SpO2: 97% (2019 11:26) (94% - 97%)    PHYSICAL EXAM:  GENERAL: in no apparent distress  morbidly obese  HEAD:  Atraumatic, Normocephalic  EYES: EOMI, PERRLA, conjunctiva and sclera clear  NECK: Supple, No JVD  CHEST/LUNG: no wheeze, clear to auscultation bilaterally  HEART: S1 S2; soft ejection systolic murmur best heard at left sternal border no rub no gallop   ABDOMEN: Soft, Nontender, Nondistended; Bowel sounds present   EXTREMITIES:  No clubbing or cyanosis, + Peripheral Pulses,  chronic LE edema   no appreciable change  PSYCH: AO x 3 appropriate affect    LABS:                        9.8    7.06  )-----------( 331      ( 2019 09:02 )             30.4     11-12    133<L>  |  93<L>  |  18  ----------------------------<  187<H>  4.3   |  23  |  1.11    Ca    9.1      2019 06:51            Urinalysis Basic - ( 10 Nov 2019 21:43 )    Color: Light Yellow / Appearance: Clear / S.013 / pH: x  Gluc: x / Ketone: Negative  / Bili: Negative / Urobili: <2 mg/dL   Blood: x / Protein: Trace / Nitrite: Negative   Leuk Esterase: Negative / RBC: x / WBC x   Sq Epi: x / Non Sq Epi: x / Bacteria: x          All consultant(s) notes reviewed and care discussed with other providers        Contact Number, Dr Yates [phone]

## 2019-11-12 NOTE — DISCHARGE NOTE PROVIDER - NSDCFUSCHEDAPPT_GEN_ALL_CORE_FT
KWAME MOSQUERA ; 02/06/2020 ; [facility]. KWAME Yeung ; 02/06/2020 ; [facility]

## 2019-11-12 NOTE — DISCHARGE NOTE NURSING/CASE MANAGEMENT/SOCIAL WORK - NSDCPEXARELTO_GEN_ALL_CORE
Rivaroxaban/Xarelto - Compliance/Rivaroxaban/Xarelto - Follow up monitoring/Rivaroxaban/Xarelto - Dietary Advice/Rivaroxaban/Xarelto - Potential for adverse drug reactions and interactions

## 2019-11-12 NOTE — PROGRESS NOTE ADULT - SUBJECTIVE AND OBJECTIVE BOX
Norman Yates MD (Nephrology progress note)  [address], [facility],  [id number],  [facility]  TEl: [phone]  Cell: [phone]    Patient is a 79y Female seen and evaluated at bedside. Patient seen and evaluated at bedside. Still complains of mild dyspnea and leg edema. Na level improving to 133 today morning. Increased urinary frequency. Patient remains afebrile with stable vital signs. Denies any abdominal pain, nausea/vomiting/diarrhea. Able to ambulate to bathroom. Patient still remains with volume excess.     Allergies    No Known Allergies    Intolerances        ROS:  CONSTITUTIONAL: No fever or chills.  HEENT: No visual blurring, no tinnitus, no ringing of ears.  RESPIRATORY: Mild shortness of breath but denies cough, hemoptysis or wheezing  CARDIOVASCULAR: Bilateral leg edema and dyspnea but denies Chest pain, palpitations, dizziness, syncope  GASTROINTESTINAL: No abdominal pain, nausea, vomiting, diarrhea, hematemesis or blood per rectum.  GENITOURINARY: Increased urinary frequency and urgency but denies gross hematuria, dysuria, foamy urine, flank or supra pubic pain.  NEUROLOGICAL: No headache, focal limb weakness, tingling or numbness or seizure like activity  SKIN: No skin rash or lesion  MUSCULOSKELETAL: Bilateral leg edema      VITALS:    T(C): 36.8 (19 @ 05:34), Max: 36.8 (19 @ 21:08)  HR: 78 (19 @ 05:34) (62 - 78)  BP: 150/70 (19 @ 05:34) (143/58 - 150/70)  RR: 18 (19 @ 05:34) (18 - 18)  SpO2: 96% (19 @ 05:34) (94% - 97%)  CAPILLARY BLOOD GLUCOSE      POCT Blood Glucose.: 205 mg/dL (2019 07:53)  POCT Blood Glucose.: 210 mg/dL (2019 21:59)  POCT Blood Glucose.: 177 mg/dL (2019 17:44)  POCT Blood Glucose.: 226 mg/dL (2019 12:45)      19 @ 07:01  -  19 @ 07:00  --------------------------------------------------------  IN: 1420 mL / OUT: 200 mL / NET: 1220 mL    19 @ 07:01  -  19 @ 11:07  --------------------------------------------------------  IN: 240 mL / OUT: 350 mL / NET: -110 mL      MEDICATIONS  (STANDING):  ALBUTerol    90 MICROgram(s) HFA Inhaler 2 Puff(s) Inhalation every 8 hours  atorvastatin 40 milliGRAM(s) Oral at bedtime  budesonide 160 MICROgram(s)/formoterol 4.5 MICROgram(s) Inhaler 2 Puff(s) Inhalation two times a day  dextrose 5%. 1000 milliLiter(s) (50 mL/Hr) IV Continuous <Continuous>  dextrose 50% Injectable 12.5 Gram(s) IV Push once  dextrose 50% Injectable 25 Gram(s) IV Push once  dextrose 50% Injectable 25 Gram(s) IV Push once  dronedarone 400 milliGRAM(s) Oral two times a day  furosemide    Tablet 40 milliGRAM(s) Oral daily  insulin lispro (HumaLOG) corrective regimen sliding scale   SubCutaneous three times a day before meals  levothyroxine 175 MICROGram(s) Oral daily  melatonin 1 milliGRAM(s) Oral at bedtime  montelukast 10 milliGRAM(s) Oral daily  nystatin Powder 1 Application(s) Topical three times a day  pancrelipase  (CREON 12,000 Lipase Units) 1 Capsule(s) Oral three times a day with meals  pantoprazole    Tablet 40 milliGRAM(s) Oral before breakfast  rivaroxaban 20 milliGRAM(s) Oral with dinner  tiotropium 18 MICROgram(s) Capsule 1 Capsule(s) Inhalation daily    MEDICATIONS  (PRN):  dextrose 40% Gel 15 Gram(s) Oral once PRN Blood Glucose LESS THAN 70 milliGRAM(s)/deciliter  glucagon  Injectable 1 milliGRAM(s) IntraMuscular once PRN Glucose LESS THAN 70 milligrams/deciliter  simethicone 80 milliGRAM(s) Chew four times a day PRN Gas      PHYSICAL EXAM:  GENERAL: Well-developed, well nourished, alert, awake, oriented x3  HEENT: CECELIA, EOMI, neck supple, no JVP, no carotid bruit, no palpable thyromegaly or lymphadenopathy, no carotid bruits  CHEST/LUNG: Bilateral decreased breath sounds at bases, no rales, no crepitations, no rhonchi, no wheezing  HEART: Regular rate and rhythm. VINH II/VI at LPSB, no gallops, no rub   ABDOMEN: Soft, nontender, non distended, bowel sounds present, no palpable organomegaly, no guarding, no rigidity, no rebound  : No flank or supra pubic tenderness.  EXTREMITIES: Bilateral pitting pedal edema with chronic venous stasis dermatitis.  Neurology: AAOx3, no focal neurological deficit  SKIN: No rash or skin lesion      LABS:                        9.8    7.06  )-----------( 331      ( 2019 09:02 )             30.4         133<L>  |  93<L>  |  18  ----------------------------<  187<H>  4.3   |  23  |  1.11    Ca    9.1      2019 06:51          Urinalysis Basic - ( 10 Nov 2019 21:43 )    Color: Light Yellow / Appearance: Clear / S.013 / pH: x  Gluc: x / Ketone: Negative  / Bili: Negative / Urobili: <2 mg/dL   Blood: x / Protein: Trace / Nitrite: Negative   Leuk Esterase: Negative / RBC: x / WBC x   Sq Epi: x / Non Sq Epi: x / Bacteria: x      Osmolality, Random Urine: 264 mosm/Kg ( @ 13:42)  Sodium, Random Urine: 102 mmol/L ( @ 10:43)  Osmolality, Random Urine: 349 mosm/Kg (11-10 @ 21:43)  Sodium, Random Urine: 36 mmol/L (11-10 @ 14:53)        RADIOLOGY & ADDITIONAL STUDIES:  None      Imaging Personally Reviewed:  [x] YES  [ ] NO    Consultant(s) Notes Reviewed:  [x] YES  [ ] NO    Care Discussed with Primary team/ Other Providers [x] YES  [ ] NO

## 2019-11-12 NOTE — PROGRESS NOTE ADULT - ASSESSMENT
78yo woman   PMH,      CHF s/p PPM, HTN, DM, vascular insufficiency of bilateral LE     presents with n/v x 10 days.   Per  daughter also intermittent low grade temps over last month  Had a higher fever (38.5) PTA but nothing since   Stable   Workup for source as above is so far negative  Leg exam c/w stasis dermatitis-not s/o cellulitis  stable off antimicrobials  Continue care per primary team   Case d/w Med NP  Will Follow as needed -pls call if issues   Beeper [id number]-kizz/afterhours/No response-[phone]

## 2019-11-12 NOTE — PROGRESS NOTE ADULT - PROBLEM SELECTOR PLAN 3
Bilateral chronic leg edema with venous statis dermatitis and volume overload  - leg elevation  - Compression stocking  - ROCÍO/PVR and vascular surgery evaluation as out patient  - -Switch Lasix to Torsemide 20 mg po daily with goal fluid balance to be net negative

## 2019-11-12 NOTE — PROGRESS NOTE ADULT - ASSESSMENT
80 y/o [place] female from home,     h/o    of CAD s/p stents,  HTN , HLD , Diastolic CHF,  T2DM  /  chronic anemia    , Afib on Xarelto s/p PPM 11/2013,        Asthma, Hypothyroidism, Chronic Venous Insufficiency, Urinary Incontinence, morbid  obesity     admitted  with  abd  pian/  no  tenderness on exam   ct   abdomen in er, no  pathology found  pt is  febrile in er   normal  wbc/  normal  cxr/ ct chest, pending  RVP, pending  follow  blood  c/s/ appears non toxic   empiric   Rocephin  for  now/ ?  small  component of  leg cellulitis. suspect its  mostly chronic      h/o chronic   b/l leg  swelling/  venous stasis   mild  sob on exertion/  from  acute on chronic  diastolic chf/  morbid  obesity  on iv lasix    AFIB, . on xarelto/ multaq   echo  DM 2,  follow  fs/  not on insulin at home    spoke to son at bedside/   meds  unable  to  be confirmed, as  neither pt / family  has  the list a nd   both pharmacies  that pt uses,  are closed   now       < from: CT Abdomen and Pelvis w/ Oral Cont (11.08.19 @ 16:09) >  IMPRESSION:   No acute pathology.  < end of copied text >     < from: Transthoracic Echocardiogram (01.29.18 @ 07:38) >  CONCLUSIONS:  1. Mild posterior mitral annular calcification. Trace  mitral regurgitation.  2. Aortic valve not well visualized. Mild aortic stenosis.  Trace aortic regurgitation.  3. Normal aortic root.  4. Mildly dilated left atrium.  5. Normal left ventricular internal dimensions and wall  thicknesses.  6. Endocardium not well visualized; grossly normal left  ventricular systolic function. Peak left ventricular  outflow tract gradient equals 18.3 mm Hg, consistent with  mild LVOT obstruction.  7. Grade I diastolic dysfunction (Impaired relaxation).  8. Normal right atrium.  9. Normal right ventricular size and function (TAPSE 2.15  cm). A device lead is visualized in the right heart.  10. RA Pressure is 8 mm Hg.  11. RV systolic pressure is moderately increased at  49 mm  Hg.  12. Normal tricuspid valve. Trace tricuspid regurgitation.  13. Pulmonic valve not well seen.  14. No pericardial effusion.    *** No previous Echo exam.    < end of copied text >

## 2019-11-12 NOTE — DISCHARGE NOTE PROVIDER - HOSPITAL COURSE
80yo woman      PMH,      CHF s/p PPM, HTN, DM, vascular insufficiency of bilateral LE        presents with n/v x 10 days. Pt states that she has been have bilious vomiting (yellow in color) with coughing. She had a normal, nonbloody BM yesterday and has been passing gas.       She states that she is in pain but is unsure where but knows that she feels uncomfortable    . She denies   chest pain, SOB, lightheadedness.    	She was seen in the ED 2 days ago and was discharged after work up which revealed negative pertussis test and blood culture but a contaminated urine culture.            INCOMPLETE 78 yo F with PMH CHF, HTN, DM, PPM, vascular insufficiency of bilateral LE. Presented to ED with n/v x 10 days. Pt states that she has been have bilious vomiting (yellow in color) with coughing. She had a normal, nonbloody BM yesterday and has been passing gas. She states that she is in pain but is unsure where but knows that she feels uncomfortable. She denies chest pain, SOB, lightheadedness. She was in ED 2 days ago and was discharged after work-up which revealed negative pertussis test & blood culture but contaminated urine culture.            Admit with dx of abdominal pain & fever. CT C/A/P was negative for acute pathology. In general, infectious work-up was negative. Pulmonary consulted 2/2 SOB which was mild. No pulmonary intervention required. Nephrology consulted for hyponatremia; treated with fluid restriction and c/w diuretic which was changed form Lasix to torsemide as patient with hypervolemia on Lasix. Hyponatremia attributed to hypervolemic state per Nephrology. ID also consulted due to patient dtr reporting on/off fever(as high as 38.5C) at home PTA, and concern for possible LE cellulitis 2/2 swelling. ID attribute swelling to stasis dermatitis; no indication for antibiotic. Patient had received 1 dose IV ceftriaxone in ED but blood & urine cx were negative. Medically cleared for discharge home by ID and Dr Yates; HHA reinstated and script for out-patient physical therapy given

## 2019-11-12 NOTE — PROGRESS NOTE ADULT - SUBJECTIVE AND OBJECTIVE BOX
Patient is a 79y old  Female who presents with a chief complaint of abd pain (2019 12:23)      Any change in ROS: seems K : notmuch of NANCY calixto today too     MEDICATIONS  (STANDING):  ALBUTerol    90 MICROgram(s) HFA Inhaler 2 Puff(s) Inhalation every 8 hours  atorvastatin 40 milliGRAM(s) Oral at bedtime  budesonide 160 MICROgram(s)/formoterol 4.5 MICROgram(s) Inhaler 2 Puff(s) Inhalation two times a day  dextrose 5%. 1000 milliLiter(s) (50 mL/Hr) IV Continuous <Continuous>  dextrose 50% Injectable 12.5 Gram(s) IV Push once  dextrose 50% Injectable 25 Gram(s) IV Push once  dextrose 50% Injectable 25 Gram(s) IV Push once  dronedarone 400 milliGRAM(s) Oral two times a day  insulin lispro (HumaLOG) corrective regimen sliding scale   SubCutaneous three times a day before meals  levothyroxine 175 MICROGram(s) Oral daily  melatonin 1 milliGRAM(s) Oral at bedtime  montelukast 10 milliGRAM(s) Oral daily  nystatin Powder 1 Application(s) Topical three times a day  pancrelipase  (CREON 12,000 Lipase Units) 1 Capsule(s) Oral three times a day with meals  pantoprazole    Tablet 40 milliGRAM(s) Oral before breakfast  rivaroxaban 20 milliGRAM(s) Oral with dinner  tiotropium 18 MICROgram(s) Capsule 1 Capsule(s) Inhalation daily  torsemide 20 milliGRAM(s) Oral daily    MEDICATIONS  (PRN):  dextrose 40% Gel 15 Gram(s) Oral once PRN Blood Glucose LESS THAN 70 milliGRAM(s)/deciliter  glucagon  Injectable 1 milliGRAM(s) IntraMuscular once PRN Glucose LESS THAN 70 milligrams/deciliter  simethicone 80 milliGRAM(s) Chew four times a day PRN Gas    Vital Signs Last 24 Hrs  T(C): 36.9 (2019 11:26), Max: 36.9 (2019 11:26)  T(F): 98.5 (2019 11:26), Max: 98.5 (2019 11:26)  HR: 71 (2019 11:26) (69 - 78)  BP: 149/67 (2019 11:26) (148/80 - 150/70)  BP(mean): --  RR: 18 (2019 11:26) (18 - 18)  SpO2: 97% (2019 11:) (94% - 97%)    I&O's Summary    2019 07:  -  2019 07:00  --------------------------------------------------------  IN: 1420 mL / OUT: 200 mL / NET: 1220 mL    2019 07:  -  2019 14:01  --------------------------------------------------------  IN: 240 mL / OUT: 350 mL / NET: -110 mL          Physical Exam:   GENERAL:Obese  HEENT: CECELIA/   Atraumatic, Normocephalic  ENMT: No tonsillar erythema, exudates, or enlargement; Moist mucous membranes, Good dentition, No lesions  NECK: Supple, No JVD, Normal thyroid  CHEST/LUNG: Clear to auscultaion  CVS: Regular rate and rhythm; No murmurs, rubs, or gallops  GI: : Soft, Nontender, Nondistended; Bowel sounds present  NERVOUS SYSTEM:  Alert & Oriented X3  EXTREMITIES:  + edema  LYMPH: No lymphadenopathy noted  SKIN: No rashes or lesions  ENDOCRINOLOGY: No Thyromegaly  PSYCH: Appropriate    Labs:  22, 22                            9.8    7.06  )-----------( 331      ( 2019 09:02 )             30.4                         9.7    8.11  )-----------( 309      ( 2019 09:26 )             30.3                         8.8    6.91  )-----------( 253      ( 10 Nov 2019 09:20 )             27.7                         9.6    8.46  )-----------( 276      ( 2019 10:48 )             29.9         133<L>  |  93<L>  |  18  ----------------------------<  187<H>  4.3   |  23  |  1.11  11-11    130<L>  |  96  |  17  ----------------------------<  178<H>  4.8   |  22  |  1.08  11-10    130<L>  |  96  |  17  ----------------------------<  166<H>  4.6   |  24  |  1.18  11-    130<L>  |  96  |  16  ----------------------------<  182<H>  4.9   |  21<L>  |  1.00    Ca    9.1      2019 06:51  Ca    9.0      2019 07:05      CAPILLARY BLOOD GLUCOSE      POCT Blood Glucose.: 173 mg/dL (2019 11:51)  POCT Blood Glucose.: 205 mg/dL (2019 07:53)  POCT Blood Glucose.: 210 mg/dL (2019 21:59)  POCT Blood Glucose.: 177 mg/dL (2019 17:44)          Urinalysis Basic - ( 10 Nov 2019 21:43 )    Color: Light Yellow / Appearance: Clear / S.013 / pH: x  Gluc: x / Ketone: Negative  / Bili: Negative / Urobili: <2 mg/dL   Blood: x / Protein: Trace / Nitrite: Negative   Leuk Esterase: Negative / RBC: x / WBC x   Sq Epi: x / Non Sq Epi: x / Bacteria: x            RECENT CULTURES:   @ 22:59 .Blood Blood-Peripheral       < from: CT Chest No Cont (19 @ 21:43) >  PROCEDURE:   CT of the Chest was performed without intravenous contrast.  Sagittal and coronal reformats were performed.  MIP reformats generated    FINDINGS:    LUNGS AND AIRWAYS: Patent central airways.  Lungs are clear.    PLEURA: No pleural effusion.    MEDIASTINUM AND TASHA: No lymphadenopathy.    VESSELS: Within normal limits.    HEART: Heart size is normal. No pericardial effusion.    CHEST WALL AND LOWER NECK: Left chest wall cardiac device.    VISUALIZED UPPER ABDOMEN: Within normal limits.    BONES: Within normal limits.    IMPRESSION:     No acute pathology. Clear lungs                    SHARRI WAGGONER M.D., RADIOLOGY RESIDENT  This document has been electronically signed.  DM BURR M.D., ATTENDING RADIOLOGIST  This document has been electronically signed. 2019 11:05PM        < end of copied text >           No growth to date.     @ 17:11 .Urine Clean Catch (Midstream)                <10,000 CFU/mL Normal Urogenital Ema     @ 23:08 .Urine Clean Catch (Midstream)                >=3 organisms. Probable collection contamination.     @ 22:09 .Blood Blood                No growth at 5 days.          RESPIRATORY CULTURES:          Studies  Chest X-RAY  CT SCAN Chest   Venous Dopplers: LE:   CT Abdomen  Others

## 2019-11-12 NOTE — DISCHARGE NOTE NURSING/CASE MANAGEMENT/SOCIAL WORK - PATIENT PORTAL LINK FT
You can access the FollowMyHealth Patient Portal offered by [facility] by registering at the following website: [email or web address]. By joining [facility]’s FollowMyHealth portal, you will also be able to view your health information using other applications (apps) compatible with our system.

## 2019-11-12 NOTE — PROGRESS NOTE ADULT - PROBLEM SELECTOR PLAN 4
Abdominal discomfort now improved likely gastritis/constipation  - Stool softener   - Optimal pain control  - No signs of colitis/diverticulitis on CT scan.  - Plan per primary team.

## 2019-11-12 NOTE — PROGRESS NOTE ADULT - PROBLEM SELECTOR PLAN 1
Asymptomatic hypervolemic hyponatremia with Na level 133   - - Volume status still hypervolemic  - Last 24 hours I/o with net positive fluid balance.  -  Switch Lasix to torsemide 20mg po daily with goal fluid balance to be net negative  - Fluid restriction to <1L/day  - urine lytes reviewed.  - TSH 5.06. Continue synthroid 175 mcg po daily  - Monitor BMP daily  - Monitor strict urine output

## 2019-11-12 NOTE — PROGRESS NOTE ADULT - PROBLEM SELECTOR PLAN 2
BP ranging from 130 to 150's  - Can resume BP medication with holding parameters  - Monitor vital signs with goal BP <140/90 mm Hg  - Switch Lasix to Torsemide 20 mg po daily

## 2019-11-12 NOTE — PROGRESS NOTE ADULT - ASSESSMENT
78yo woman PMH CHF s/p PPM, HTN, DM, vascular insufficiency of bilateral LE presents with nausea, vomiting x 10 days, initial improved with IV pantoprazole but noted to have fever and admitted for further work up

## 2019-11-12 NOTE — PROGRESS NOTE ADULT - ASSESSMENT
80yo woman   PMH,      CHF s/p PPM, HTN, DM, vascular insufficiency of bilateral LE   presents with n/v x 10 days. Nephrology consult called for hyponatremia with Na level 130.  and volume excess. Na level improving to 133 today morning. Patient's volume status still remains hypervolemic.

## 2019-11-12 NOTE — DISCHARGE NOTE NURSING/CASE MANAGEMENT/SOCIAL WORK - FLU SEASON?
Yes...
Implemented All Universal Safety Interventions:  [place] to call system. Call bell, personal items and telephone within reach. Instruct patient to call for assistance. Room bathroom lighting operational. Non-slip footwear when patient is off stretcher. Physically safe environment: no spills, clutter or unnecessary equipment. Stretcher in lowest position, wheels locked, appropriate side rails in place.

## 2019-11-12 NOTE — DISCHARGE NOTE PROVIDER - PROVIDER TOKENS
FREE:[LAST:[Dr Fuentes],PHONE:[(   )    -],FAX:[(   )    -]],PROVIDER:[TOKEN:[[id number]]]

## 2019-11-12 NOTE — DISCHARGE NOTE PROVIDER - NSDCCPCAREPLAN_GEN_ALL_CORE_FT
PRINCIPAL DISCHARGE DIAGNOSIS  Diagnosis: Nausea & vomiting  Assessment and Plan of Treatment:       SECONDARY DISCHARGE DIAGNOSES  Diagnosis: Fever  Assessment and Plan of Treatment:     Diagnosis: Hyponatremia  Assessment and Plan of Treatment: Hyponatremia    Diagnosis: HTN (Hypertension)  Assessment and Plan of Treatment: HTN (Hypertension)    Diagnosis: Diabetes  Assessment and Plan of Treatment: Diabetes    Diagnosis: Atrial fibrillation  Assessment and Plan of Treatment: Atrial fibrillation    Diagnosis: Hypothyroidism  Assessment and Plan of Treatment: Hypothyroidism    Diagnosis: Abdominal pain  Assessment and Plan of Treatment: Abdominal pain    Diagnosis: Leg edema  Assessment and Plan of Treatment: Leg edema PRINCIPAL DISCHARGE DIAGNOSIS  Diagnosis: Nausea & vomiting  Assessment and Plan of Treatment: Condition improved.  Follow up with your primary healthcare provider in 1-2 weeks.  Call for appointment.      SECONDARY DISCHARGE DIAGNOSES  Diagnosis: Fever  Assessment and Plan of Treatment: Condition resolved.    Diagnosis: Hyponatremia  Assessment and Plan of Treatment: Condition persists.  Limit water intake too 500ML daily. You can have other fluids such as tea, juice, etc. but no in excess of 500ML daily/  Follow up with your primary healthcare provider in 1 week for repeat blood work.    Diagnosis: HTN (Hypertension)  Assessment and Plan of Treatment: Low salt diet  Activity as tolerated.  Take all medication as prescribed.  Follow up with your medical doctor for routine blood pressure monitoring at your next visit.  Notify your doctor if you have any of the following symptoms:   Dizziness, Lightheadedness, Blurry vision, Headache, Chest pain, Shortness of breath    Diagnosis: Diabetes  Assessment and Plan of Treatment: HgA1C this admission 6.6  Make sure you get your HgA1c checked every three months.  If you take oral diabetes medications, check your blood glucose two times a day.  If you take insulin, check your blood glucose before meals and at bedtime.  It's important not to skip any meals.  Keep a log of your blood glucose results and always take it with you to your doctor appointments.  Keep a list of your current medications including injectables and over the counter medications and bring this medication list with you to all your doctor appointments.  If you have not seen your ophthalmologist this year call for appointment.  Check your feet daily for redness, sores, or openings. Do not self treat. If no improvement in two days call your primary care physician for an appointment.    Diagnosis: Atrial fibrillation  Assessment and Plan of Treatment: Continue with Xarelto as prescribed.  Follow up with your primary healthcare provider in 1-2 weeks.    Diagnosis: Hypothyroidism  Assessment and Plan of Treatment: Continue with medications as prescribed.    Diagnosis: Abdominal pain  Assessment and Plan of Treatment: Condition resolved.    Diagnosis: Leg edema  Assessment and Plan of Treatment: Condition persists; your diuretic (water pill) was changed to torsemide.  Plese discuss this change with your primary healthcare provider.  Keep your feet elevated above the level of your heart as much as possible to help reduce leg brunilda (swelling).

## 2019-11-12 NOTE — DISCHARGE NOTE PROVIDER - NSDCMRMEDTOKEN_GEN_ALL_CORE_FT
Advair Diskus 250 mcg-50 mcg inhalation powder: 1 puff(s) inhaled 2 times a day  Ambien 5 mg oral tablet: 1 tab(s) orally once a day (at bedtime)  amLODIPine 5 mg oral tablet: 1 tab(s) orally once a day  atorvastatin 40 mg oral tablet: 1 tab(s) orally once a day  Benicar 40 mg oral tablet: 1 tab(s) orally once a day  Creon 24,000 units oral delayed release capsule: 1 cap(s) orally 3 times a day  Dexilant 60 mg oral delayed release capsule: 1 cap(s) orally once a day  Dulera 200 mcg-5 mcg/inh inhalation aerosol: 2 puff(s) inhaled 2 times a day  Flonase 50 mcg/inh nasal spray: 1 spray(s) nasal once a day  gabapentin 300 mg oral capsule: 1 cap(s) orally once a day  glyburide-metformin 5 mg-500 mg oral tablet: 1 tab(s) orally 2 times a day  Janumet 50 mg-500 mg oral tablet: 1 tab(s) orally 2 times a day  labetalol 100 mg oral tablet: 1 tab(s) orally 2 times a day  Lasix 40 mg oral tablet: 1 tab(s) orally once a day  levothyroxine 175 mcg (0.175 mg) oral tablet: 1 tab(s) orally once a day  Linzess 145 mcg oral capsule: 1 cap(s) orally once a day  meloxicam 7.5 mg oral tablet: 1 tab(s) orally once a day  Multaq 400 mg oral tablet: 1 tab(s) orally 2 times a day  Myrbetriq 50 mg oral tablet, extended release: 1 tab(s) orally once a day  potassium chloride 20 mEq oral tablet, extended release: 1 tab(s) orally once a day  Singulair 10 mg oral tablet: 1 tab(s) orally once a day  Spiriva Respimat 60 ACT 2.5 mcg/inh inhalation aerosol: 2 puff(s) inhaled once a day  tolterodine 4 mg oral capsule, extended release: 1 cap(s) orally once a day  Tribenzor 40 mg-5 mg-25 mg oral tablet: 1 tab(s) orally once a day  Ventolin HFA 90 mcg/inh inhalation aerosol: 2 puff(s) inhaled 4 times a day, As Needed  Xarelto 20 mg oral tablet: 1 tab(s) orally once a day (in the evening) Advair Diskus 250 mcg-50 mcg inhalation powder: 1 puff(s) inhaled 2 times a day  Ambien 5 mg oral tablet: 1 tab(s) orally once a day (at bedtime)  atorvastatin 40 mg oral tablet: 1 tab(s) orally once a day (at bedtime)  Benicar 40 mg oral tablet: 1 tab(s) orally once a day  Creon 24,000 units oral delayed release capsule: 1 cap(s) orally 3 times a day  Dexilant 60 mg oral delayed release capsule: 1 cap(s) orally once a day  dronedarone 400 mg oral tablet: 1 tab(s) orally 2 times a day  Dulera 200 mcg-5 mcg/inh inhalation aerosol: 2 puff(s) inhaled 2 times a day  Flonase 50 mcg/inh nasal spray: 1 spray(s) nasal once a day  gabapentin 300 mg oral capsule: 1 cap(s) orally once a day  glyburide-metformin 5 mg-500 mg oral tablet: 1 tab(s) orally 2 times a day  Janumet 50 mg-500 mg oral tablet: 1 tab(s) orally 2 times a day  labetalol 100 mg oral tablet: 1 tab(s) orally 2 times a day  levothyroxine 175 mcg (0.175 mg) oral tablet: 1 tab(s) orally once a day  Linzess 145 mcg oral capsule: 1 cap(s) orally once a day  meloxicam 7.5 mg oral tablet: 1 tab(s) orally once a day  montelukast 10 mg oral tablet: 1 tab(s) orally once a day  Myrbetriq 50 mg oral tablet, extended release: 1 tab(s) orally once a day  rivaroxaban 20 mg oral tablet: 1 tab(s) orally once a day (before a meal)  Spiriva Respimat 60 ACT 2.5 mcg/inh inhalation aerosol: 2 puff(s) inhaled once a day  tolterodine 4 mg oral capsule, extended release: 1 cap(s) orally once a day  Tribenzor 40 mg-5 mg-25 mg oral tablet: 1 tab(s) orally once a day  Ventolin HFA 90 mcg/inh inhalation aerosol: 2 puff(s) inhaled 4 times a day, As Needed Advair Diskus 250 mcg-50 mcg inhalation powder: 1 puff(s) inhaled 2 times a day  Ambien 5 mg oral tablet: 1 tab(s) orally once a day (at bedtime)  atorvastatin 40 mg oral tablet: 1 tab(s) orally once a day (at bedtime)  Benicar 40 mg oral tablet: 1 tab(s) orally once a day  Creon 24,000 units oral delayed release capsule: 1 cap(s) orally 3 times a day  Dexilant 60 mg oral delayed release capsule: 1 cap(s) orally once a day  dronedarone 400 mg oral tablet: 1 tab(s) orally 2 times a day  Dulera 200 mcg-5 mcg/inh inhalation aerosol: 2 puff(s) inhaled 2 times a day  Flonase 50 mcg/inh nasal spray: 1 spray(s) nasal once a day  gabapentin 300 mg oral capsule: 1 cap(s) orally once a day  glyburide-metformin 5 mg-500 mg oral tablet: 1 tab(s) orally 2 times a day  Janumet 50 mg-500 mg oral tablet: 1 tab(s) orally 2 times a day  labetalol 100 mg oral tablet: 1 tab(s) orally 2 times a day  levothyroxine 175 mcg (0.175 mg) oral tablet: 1 tab(s) orally once a day  Linzess 145 mcg oral capsule: 1 cap(s) orally once a day  meloxicam 7.5 mg oral tablet: 1 tab(s) orally once a day  montelukast 10 mg oral tablet: 1 tab(s) orally once a day  nystatin 100,000 units/g topical powder: 1 application topically 3 times a day  rivaroxaban 20 mg oral tablet: 1 tab(s) orally once a day (before a meal)  Spiriva Respimat 60 ACT 2.5 mcg/inh inhalation aerosol: 2 puff(s) inhaled once a day  tolterodine 4 mg oral capsule, extended release: 1 cap(s) orally once a day  torsemide 20 mg oral tablet: 1 tab(s) orally once a day  Tribenzor 40 mg-5 mg-25 mg oral tablet: 1 tab(s) orally once a day  Ventolin HFA 90 mcg/inh inhalation aerosol: 2 puff(s) inhaled 4 times a day, As Needed

## 2019-11-13 LAB
CULTURE RESULTS: SIGNIFICANT CHANGE UP
CULTURE RESULTS: SIGNIFICANT CHANGE UP
SPECIMEN SOURCE: SIGNIFICANT CHANGE UP
SPECIMEN SOURCE: SIGNIFICANT CHANGE UP

## 2019-11-25 ENCOUNTER — APPOINTMENT (OUTPATIENT)
Dept: ELECTROPHYSIOLOGY | Facility: CLINIC | Age: 79
End: 2019-11-25

## 2019-11-25 ENCOUNTER — INPATIENT (INPATIENT)
Facility: HOSPITAL | Age: 79
LOS: 7 days | Discharge: HOME | DRG: 871 | End: 2019-12-03
Attending: INTERNAL MEDICINE | Admitting: INTERNAL MEDICINE
Payer: MEDICARE

## 2019-11-25 VITALS
WEIGHT: 279.99 LBS | TEMPERATURE: 100 F | RESPIRATION RATE: 18 BRPM | HEIGHT: 63 IN | SYSTOLIC BLOOD PRESSURE: 150 MMHG | HEART RATE: 88 BPM | DIASTOLIC BLOOD PRESSURE: 80 MMHG | OXYGEN SATURATION: 96 %

## 2019-11-25 DIAGNOSIS — R50.9 FEVER, UNSPECIFIED: ICD-10-CM

## 2019-11-25 DIAGNOSIS — Z29.9 ENCOUNTER FOR PROPHYLACTIC MEASURES, UNSPECIFIED: ICD-10-CM

## 2019-11-25 DIAGNOSIS — E11.9 TYPE 2 DIABETES MELLITUS WITHOUT COMPLICATIONS: ICD-10-CM

## 2019-11-25 DIAGNOSIS — I48.91 UNSPECIFIED ATRIAL FIBRILLATION: ICD-10-CM

## 2019-11-25 DIAGNOSIS — N17.9 ACUTE KIDNEY FAILURE, UNSPECIFIED: ICD-10-CM

## 2019-11-25 DIAGNOSIS — I50.32 CHRONIC DIASTOLIC (CONGESTIVE) HEART FAILURE: ICD-10-CM

## 2019-11-25 DIAGNOSIS — I10 ESSENTIAL (PRIMARY) HYPERTENSION: ICD-10-CM

## 2019-11-25 DIAGNOSIS — L03.119 CELLULITIS OF UNSPECIFIED PART OF LIMB: ICD-10-CM

## 2019-11-25 DIAGNOSIS — I25.10 ATHEROSCLEROTIC HEART DISEASE OF NATIVE CORONARY ARTERY WITHOUT ANGINA PECTORIS: ICD-10-CM

## 2019-11-25 DIAGNOSIS — Z98.89 OTHER SPECIFIED POSTPROCEDURAL STATES: Chronic | ICD-10-CM

## 2019-11-25 LAB
ALBUMIN SERPL ELPH-MCNC: 3.1 G/DL — LOW (ref 3.5–5)
ALP SERPL-CCNC: 54 U/L — SIGNIFICANT CHANGE UP (ref 40–120)
ALT FLD-CCNC: 33 U/L DA — SIGNIFICANT CHANGE UP (ref 10–60)
ANION GAP SERPL CALC-SCNC: 7 MMOL/L — SIGNIFICANT CHANGE UP (ref 5–17)
APPEARANCE UR: CLEAR — SIGNIFICANT CHANGE UP
AST SERPL-CCNC: 36 U/L — SIGNIFICANT CHANGE UP (ref 10–40)
BASOPHILS # BLD AUTO: 0.03 K/UL — SIGNIFICANT CHANGE UP (ref 0–0.2)
BASOPHILS NFR BLD AUTO: 0.3 % — SIGNIFICANT CHANGE UP (ref 0–2)
BILIRUB SERPL-MCNC: 0.6 MG/DL — SIGNIFICANT CHANGE UP (ref 0.2–1.2)
BILIRUB UR-MCNC: NEGATIVE — SIGNIFICANT CHANGE UP
BUN SERPL-MCNC: 23 MG/DL — HIGH (ref 7–18)
CALCIUM SERPL-MCNC: 8.5 MG/DL — SIGNIFICANT CHANGE UP (ref 8.4–10.5)
CHLORIDE SERPL-SCNC: 97 MMOL/L — SIGNIFICANT CHANGE UP (ref 96–108)
CK MB BLD-MCNC: <1 % — SIGNIFICANT CHANGE UP (ref 0–3.5)
CK MB CFR SERPL CALC: <1 NG/ML — SIGNIFICANT CHANGE UP (ref 0–3.6)
CK SERPL-CCNC: 100 U/L — SIGNIFICANT CHANGE UP (ref 21–215)
CO2 SERPL-SCNC: 25 MMOL/L — SIGNIFICANT CHANGE UP (ref 22–31)
COLOR SPEC: YELLOW — SIGNIFICANT CHANGE UP
CREAT SERPL-MCNC: 1.32 MG/DL — HIGH (ref 0.5–1.3)
DIFF PNL FLD: NEGATIVE — SIGNIFICANT CHANGE UP
EOSINOPHIL # BLD AUTO: 0.26 K/UL — SIGNIFICANT CHANGE UP (ref 0–0.5)
EOSINOPHIL NFR BLD AUTO: 2.3 % — SIGNIFICANT CHANGE UP (ref 0–6)
GLUCOSE BLDC GLUCOMTR-MCNC: 143 MG/DL — HIGH (ref 70–99)
GLUCOSE SERPL-MCNC: 81 MG/DL — SIGNIFICANT CHANGE UP (ref 70–99)
GLUCOSE UR QL: NEGATIVE — SIGNIFICANT CHANGE UP
HCT VFR BLD CALC: 31.2 % — LOW (ref 34.5–45)
HGB BLD-MCNC: 9.8 G/DL — LOW (ref 11.5–15.5)
IMM GRANULOCYTES NFR BLD AUTO: 0.6 % — SIGNIFICANT CHANGE UP (ref 0–1.5)
KETONES UR-MCNC: NEGATIVE — SIGNIFICANT CHANGE UP
LACTATE SERPL-SCNC: 1.2 MMOL/L — SIGNIFICANT CHANGE UP (ref 0.7–2)
LEUKOCYTE ESTERASE UR-ACNC: NEGATIVE — SIGNIFICANT CHANGE UP
LYMPHOCYTES # BLD AUTO: 1.48 K/UL — SIGNIFICANT CHANGE UP (ref 1–3.3)
LYMPHOCYTES # BLD AUTO: 13.2 % — SIGNIFICANT CHANGE UP (ref 13–44)
MCHC RBC-ENTMCNC: 26.4 PG — LOW (ref 27–34)
MCHC RBC-ENTMCNC: 31.4 GM/DL — LOW (ref 32–36)
MCV RBC AUTO: 84.1 FL — SIGNIFICANT CHANGE UP (ref 80–100)
MONOCYTES # BLD AUTO: 1.09 K/UL — HIGH (ref 0–0.9)
MONOCYTES NFR BLD AUTO: 9.7 % — SIGNIFICANT CHANGE UP (ref 2–14)
NEUTROPHILS # BLD AUTO: 8.27 K/UL — HIGH (ref 1.8–7.4)
NEUTROPHILS NFR BLD AUTO: 73.9 % — SIGNIFICANT CHANGE UP (ref 43–77)
NITRITE UR-MCNC: NEGATIVE — SIGNIFICANT CHANGE UP
NRBC # BLD: 0 /100 WBCS — SIGNIFICANT CHANGE UP (ref 0–0)
PH UR: 5 — SIGNIFICANT CHANGE UP (ref 5–8)
PLATELET # BLD AUTO: 279 K/UL — SIGNIFICANT CHANGE UP (ref 150–400)
POTASSIUM SERPL-MCNC: 4.2 MMOL/L — SIGNIFICANT CHANGE UP (ref 3.5–5.3)
POTASSIUM SERPL-SCNC: 4.2 MMOL/L — SIGNIFICANT CHANGE UP (ref 3.5–5.3)
PROT SERPL-MCNC: 8.1 G/DL — SIGNIFICANT CHANGE UP (ref 6–8.3)
PROT UR-MCNC: 30 MG/DL
RBC # BLD: 3.71 M/UL — LOW (ref 3.8–5.2)
RBC # FLD: 14.3 % — SIGNIFICANT CHANGE UP (ref 10.3–14.5)
SODIUM SERPL-SCNC: 129 MMOL/L — LOW (ref 135–145)
SP GR SPEC: 1.01 — SIGNIFICANT CHANGE UP (ref 1.01–1.02)
TROPONIN I SERPL-MCNC: 0.08 NG/ML — HIGH (ref 0–0.04)
TROPONIN I SERPL-MCNC: 0.1 NG/ML — HIGH (ref 0–0.04)
UROBILINOGEN FLD QL: 1
WBC # BLD: 11.2 K/UL — HIGH (ref 3.8–10.5)
WBC # FLD AUTO: 11.2 K/UL — HIGH (ref 3.8–10.5)

## 2019-11-25 PROCEDURE — 99285 EMERGENCY DEPT VISIT HI MDM: CPT

## 2019-11-25 PROCEDURE — 71045 X-RAY EXAM CHEST 1 VIEW: CPT | Mod: 26

## 2019-11-25 RX ORDER — RIVAROXABAN 15 MG-20MG
15 KIT ORAL
Refills: 0 | Status: DISCONTINUED | OUTPATIENT
Start: 2019-11-25 | End: 2019-12-03

## 2019-11-25 RX ORDER — MONTELUKAST 4 MG/1
10 TABLET, CHEWABLE ORAL AT BEDTIME
Refills: 0 | Status: DISCONTINUED | OUTPATIENT
Start: 2019-11-25 | End: 2019-12-03

## 2019-11-25 RX ORDER — DRONEDARONE 400 MG/1
400 TABLET, FILM COATED ORAL
Refills: 0 | Status: DISCONTINUED | OUTPATIENT
Start: 2019-11-25 | End: 2019-11-28

## 2019-11-25 RX ORDER — SODIUM CHLORIDE 9 MG/ML
1000 INJECTION INTRAMUSCULAR; INTRAVENOUS; SUBCUTANEOUS
Refills: 0 | Status: DISCONTINUED | OUTPATIENT
Start: 2019-11-25 | End: 2019-11-26

## 2019-11-25 RX ORDER — ZOLPIDEM TARTRATE 10 MG/1
5 TABLET ORAL AT BEDTIME
Refills: 0 | Status: DISCONTINUED | OUTPATIENT
Start: 2019-11-25 | End: 2019-11-26

## 2019-11-25 RX ORDER — GABAPENTIN 400 MG/1
300 CAPSULE ORAL DAILY
Refills: 0 | Status: DISCONTINUED | OUTPATIENT
Start: 2019-11-25 | End: 2019-12-03

## 2019-11-25 RX ORDER — ACETAMINOPHEN 500 MG
650 TABLET ORAL ONCE
Refills: 0 | Status: COMPLETED | OUTPATIENT
Start: 2019-11-25 | End: 2019-11-25

## 2019-11-25 RX ORDER — SODIUM CHLORIDE 9 MG/ML
1000 INJECTION INTRAMUSCULAR; INTRAVENOUS; SUBCUTANEOUS ONCE
Refills: 0 | Status: COMPLETED | OUTPATIENT
Start: 2019-11-25 | End: 2019-11-25

## 2019-11-25 RX ORDER — ACETAMINOPHEN 500 MG
650 TABLET ORAL EVERY 6 HOURS
Refills: 0 | Status: DISCONTINUED | OUTPATIENT
Start: 2019-11-25 | End: 2019-12-03

## 2019-11-25 RX ORDER — INSULIN LISPRO 100/ML
VIAL (ML) SUBCUTANEOUS
Refills: 0 | Status: DISCONTINUED | OUTPATIENT
Start: 2019-11-25 | End: 2019-11-27

## 2019-11-25 RX ORDER — OXYBUTYNIN CHLORIDE 5 MG
10 TABLET ORAL
Refills: 0 | Status: DISCONTINUED | OUTPATIENT
Start: 2019-11-25 | End: 2019-11-26

## 2019-11-25 RX ORDER — ATORVASTATIN CALCIUM 80 MG/1
40 TABLET, FILM COATED ORAL AT BEDTIME
Refills: 0 | Status: DISCONTINUED | OUTPATIENT
Start: 2019-11-25 | End: 2019-12-03

## 2019-11-25 RX ORDER — PIPERACILLIN AND TAZOBACTAM 4; .5 G/20ML; G/20ML
3.38 INJECTION, POWDER, LYOPHILIZED, FOR SOLUTION INTRAVENOUS EVERY 8 HOURS
Refills: 0 | Status: DISCONTINUED | OUTPATIENT
Start: 2019-11-25 | End: 2019-11-26

## 2019-11-25 RX ORDER — IPRATROPIUM/ALBUTEROL SULFATE 18-103MCG
3 AEROSOL WITH ADAPTER (GRAM) INHALATION EVERY 6 HOURS
Refills: 0 | Status: DISCONTINUED | OUTPATIENT
Start: 2019-11-25 | End: 2019-12-03

## 2019-11-25 RX ORDER — LABETALOL HCL 100 MG
100 TABLET ORAL
Refills: 0 | Status: DISCONTINUED | OUTPATIENT
Start: 2019-11-25 | End: 2019-11-26

## 2019-11-25 RX ORDER — LIPASE/PROTEASE/AMYLASE 16-48-48K
2 CAPSULE,DELAYED RELEASE (ENTERIC COATED) ORAL
Refills: 0 | Status: DISCONTINUED | OUTPATIENT
Start: 2019-11-25 | End: 2019-11-25

## 2019-11-25 RX ORDER — LIPASE/PROTEASE/AMYLASE 16-48-48K
4 CAPSULE,DELAYED RELEASE (ENTERIC COATED) ORAL
Refills: 0 | Status: DISCONTINUED | OUTPATIENT
Start: 2019-11-25 | End: 2019-11-26

## 2019-11-25 RX ORDER — PIPERACILLIN AND TAZOBACTAM 4; .5 G/20ML; G/20ML
3.38 INJECTION, POWDER, LYOPHILIZED, FOR SOLUTION INTRAVENOUS ONCE
Refills: 0 | Status: COMPLETED | OUTPATIENT
Start: 2019-11-25 | End: 2019-11-25

## 2019-11-25 RX ORDER — LOSARTAN POTASSIUM 100 MG/1
100 TABLET, FILM COATED ORAL DAILY
Refills: 0 | Status: DISCONTINUED | OUTPATIENT
Start: 2019-11-25 | End: 2019-11-26

## 2019-11-25 RX ORDER — VANCOMYCIN HCL 1 G
1000 VIAL (EA) INTRAVENOUS EVERY 12 HOURS
Refills: 0 | Status: DISCONTINUED | OUTPATIENT
Start: 2019-11-25 | End: 2019-11-26

## 2019-11-25 RX ORDER — PANTOPRAZOLE SODIUM 20 MG/1
40 TABLET, DELAYED RELEASE ORAL
Refills: 0 | Status: DISCONTINUED | OUTPATIENT
Start: 2019-11-25 | End: 2019-12-03

## 2019-11-25 RX ORDER — BUDESONIDE AND FORMOTEROL FUMARATE DIHYDRATE 160; 4.5 UG/1; UG/1
2 AEROSOL RESPIRATORY (INHALATION)
Refills: 0 | Status: DISCONTINUED | OUTPATIENT
Start: 2019-11-25 | End: 2019-12-03

## 2019-11-25 RX ORDER — LEVOTHYROXINE SODIUM 125 MCG
175 TABLET ORAL DAILY
Refills: 0 | Status: DISCONTINUED | OUTPATIENT
Start: 2019-11-25 | End: 2019-12-03

## 2019-11-25 RX ADMIN — Medication 650 MILLIGRAM(S): at 20:29

## 2019-11-25 RX ADMIN — PIPERACILLIN AND TAZOBACTAM 25 GRAM(S): 4; .5 INJECTION, POWDER, LYOPHILIZED, FOR SOLUTION INTRAVENOUS at 22:50

## 2019-11-25 RX ADMIN — SODIUM CHLORIDE 1000 MILLILITER(S): 9 INJECTION INTRAMUSCULAR; INTRAVENOUS; SUBCUTANEOUS at 14:21

## 2019-11-25 RX ADMIN — DRONEDARONE 400 MILLIGRAM(S): 400 TABLET, FILM COATED ORAL at 17:28

## 2019-11-25 RX ADMIN — Medication 0.5 MILLIGRAM(S): at 22:50

## 2019-11-25 RX ADMIN — PIPERACILLIN AND TAZOBACTAM 200 GRAM(S): 4; .5 INJECTION, POWDER, LYOPHILIZED, FOR SOLUTION INTRAVENOUS at 16:28

## 2019-11-25 RX ADMIN — Medication 250 MILLIGRAM(S): at 17:25

## 2019-11-25 RX ADMIN — SODIUM CHLORIDE 60 MILLILITER(S): 9 INJECTION INTRAMUSCULAR; INTRAVENOUS; SUBCUTANEOUS at 17:25

## 2019-11-25 RX ADMIN — Medication 650 MILLIGRAM(S): at 14:22

## 2019-11-25 RX ADMIN — ATORVASTATIN CALCIUM 40 MILLIGRAM(S): 80 TABLET, FILM COATED ORAL at 22:13

## 2019-11-25 RX ADMIN — Medication 4 CAPSULE(S): at 17:14

## 2019-11-25 RX ADMIN — MONTELUKAST 10 MILLIGRAM(S): 4 TABLET, CHEWABLE ORAL at 22:13

## 2019-11-25 RX ADMIN — LOSARTAN POTASSIUM 100 MILLIGRAM(S): 100 TABLET, FILM COATED ORAL at 23:02

## 2019-11-25 RX ADMIN — Medication 650 MILLIGRAM(S): at 15:20

## 2019-11-25 NOTE — ED ADULT NURSE NOTE - PRO INTERPRETER NEED 2
I have reviewed and confirmed nurses' notes for patient's medications, allergies, medical history, and surgical history. [place]

## 2019-11-25 NOTE — H&P ADULT - PROBLEM SELECTOR PLAN 8
IMPROVE VTE Individual Risk Assessment          RISK                                                          Points  [  ] Previous VTE                                                3  [  ] Thrombophilia                                             2  [  ] Lower limb paralysis                                   2        (unable to hold up >15 seconds)    [  ] Current Cancer                                             2         (within 6 months)  [ X ] Immobilization > 24 hrs                              1  [  ] ICU/CCU stay > 24 hours                             1  [X  ] Age > 60                                                         1    IMPROVE VTE Score: 2    on xarelto starting hss  monitor fs

## 2019-11-25 NOTE — H&P ADULT - PROBLEM SELECTOR PLAN 9
IMPROVE VTE Individual Risk Assessment          RISK                                                          Points  [  ] Previous VTE                                                3  [  ] Thrombophilia                                             2  [  ] Lower limb paralysis                                   2        (unable to hold up >15 seconds)    [  ] Current Cancer                                             2         (within 6 months)  [ X ] Immobilization > 24 hrs                              1  [  ] ICU/CCU stay > 24 hours                             1  [X  ] Age > 60                                                         1    IMPROVE VTE Score: 2    on xarelto

## 2019-11-25 NOTE — H&P ADULT - NSHPPHYSICALEXAM_GEN_ALL_CORE
Vital Signs Last 24 Hrs  T(C): 39.2 (25 Nov 2019 14:00), Max: 39.2 (25 Nov 2019 14:00)  T(F): 102.5 (25 Nov 2019 14:00), Max: 102.5 (25 Nov 2019 14:00)  HR: 88 (25 Nov 2019 11:39) (88 - 88)  BP: 150/80 (25 Nov 2019 11:39) (150/80 - 150/80)  BP(mean): --  RR: 18 (25 Nov 2019 11:39) (18 - 18)  SpO2: 96% (25 Nov 2019 11:39) (96% - 96%)

## 2019-11-25 NOTE — ED ADULT NURSE NOTE - NSIMPLEMENTINTERV_GEN_ALL_ED
Implemented All Universal Safety Interventions:  [place] to call system. Call bell, personal items and telephone within reach. Instruct patient to call for assistance. Room bathroom lighting operational. Non-slip footwear when patient is off stretcher. Physically safe environment: no spills, clutter or unnecessary equipment. Stretcher in lowest position, wheels locked, appropriate side rails in place.

## 2019-11-25 NOTE — H&P ADULT - PROBLEM SELECTOR PLAN 5
resume home antihypertensives  monitor vitals TTE in 2018 shows pEF with G1DD and LVOT  t1 on admission 0.083  f/u repeat TTE  c/w home dose of diuretics rate controlled  c/w xarelto  c/w bblocker  c/w multaq  as per daughter, PPM will need interrogation

## 2019-11-25 NOTE — H&P ADULT - PROBLEM SELECTOR PLAN 7
IMPROVE VTE Individual Risk Assessment          RISK                                                          Points  [  ] Previous VTE                                                3  [  ] Thrombophilia                                             2  [  ] Lower limb paralysis                                   2        (unable to hold up >15 seconds)    [  ] Current Cancer                                             2         (within 6 months)  [ X ] Immobilization > 24 hrs                              1  [  ] ICU/CCU stay > 24 hours                             1  [X  ] Age > 60                                                         1    IMPROVE VTE Score: 2    on xarelto starting hss  monitor fs resume home antihypertensives  monitor vitals

## 2019-11-25 NOTE — H&P ADULT - PROBLEM SELECTOR PLAN 4
TTE in 2018 shows pEF with G1DD and LVOT  t1 on admission 0.083  f/u repeat TTE  c/w home dose of diuretics rate controlled  c/w xarelto  c/w bblocker  c/w multaq  as per daughter, PPM will need interrogation s/p stents  t1 on admission 0.083  EKG unchanged from prior  f/u T2 and t3  tele monitoring  f/u TTE

## 2019-11-25 NOTE — H&P ADULT - PROBLEM SELECTOR PLAN 1
p/w b/l LE edema, pain and erythema x2 days  WBC 11.2, T 102.5F, lactate 1.2  s/p zosyn x1 in ED and 1L NS bolus  starting vancomycin  c/w zosyn  f/u blood cx  CXR negative, UA negative, f/u urine cx  tylenol prn for fevers

## 2019-11-25 NOTE — H&P ADULT - HISTORY OF PRESENT ILLNESS
78 y/o Egyptian-speaking female from home, ambulates with walker, lives with son, with PMHx of HFpEF, CAD (s/p stents), chronic venous stasis, DM, HTN and Afib (on Xarelto, s/p PPM) presents to the ED with chief complaint of fever and chills x 2 days. Daughter at bedside provides translation. Patient also reports 1 episode of vomiting today. She has had multiple episodes of cellulitis in the past and says her current symptoms are similar. She denies any sob, nausea, vomiting, chest pain or any other symptoms at present. Of note, patient was recently admitted to [facility] for nausea and vomiting and was conservatively managed.    GOC: Full code.

## 2019-11-25 NOTE — ED PROVIDER NOTE - OBJECTIVE STATEMENT
80 y/o with a significant PMHX of a-fib, DM, HTN, CAD, CHF, pacemaker, bilateral cellulitis with recent admission presenting to the ED for x1 day of fever, weakness and bilateral leg redness. Patient denies any nausea, vomiting, chest pain, abdominal pain, discharge from wound, cough, body aches or any other complaints. NKDA.

## 2019-11-25 NOTE — H&P ADULT - PROBLEM SELECTOR PLAN 3
rate controlled  c/w xarelto  c/w bblocker  c/w multaq  as per daughter, PPM will need interrogation s/p stents  t1 on admission 0.083  EKG unchanged from prior  f/u T2 and t3  tele monitoring  f/u TTE Na 129 on admission - baseline shows pt is typically hyponatremic  c/w nikolay IVF  f/u bmp daily

## 2019-11-25 NOTE — ED ADULT NURSE NOTE - OBJECTIVE STATEMENT
Pt AOx3, ambulatory, c/o fever x 3 days and BL LE swelling, redness, and cellulitis x 1 week. Pt endorses n/v today. Pt denies CP, SOB.

## 2019-11-25 NOTE — H&P ADULT - PROBLEM SELECTOR PLAN 6
starting hss  monitor fs resume home antihypertensives  monitor vitals TTE in 2018 shows pEF with G1DD and LVOT  t1 on admission 0.083  f/u repeat TTE  c/w home dose of diuretics

## 2019-11-25 NOTE — ED PROVIDER NOTE - CLINICAL SUMMARY MEDICAL DECISION MAKING FREE TEXT BOX
80 y/o patient with multiple medical problems c/o of fever, weakness, and bilateral leg redness. Will do SEPSIS workup, give IV fluids , fever control, and likely admit for IV antibiotics.

## 2019-11-25 NOTE — H&P ADULT - ATTENDING COMMENTS
Above  noted  80 y/o Monegasque-speaking female from home, ambulates with walker, lives with son, with PMHx of HFpEF, CAD (s/p stents), chronic venous stasis, DM, HTN and Afib (on Xarelto, s/p PPM) presents to the ED with chief complaint of fever and chills x 2 days. Daughter at bedside provides translation. Patient also reports 1 episode of vomiting today. She has had multiple episodes of cellulitis in the past and says her current symptoms are similar. She denies any sob, nausea, vomiting, chest pain or any other symptoms at present. Of note, patient was recently admitted to [facility] for nausea and vomiting and was conservatively managed.  Blood  tests Radiology reviewed    Impression  cellulitis  lower extremities  elevated WBC Fever    CAD Afib PPM  elevated  troponin   Abnormal renal; function tests    Chronic  leg  edema  with  Lymphostasis    Morbid  Obesity JOSE DANIEL    DM type  2    Admit  to telemetry  as  per  protocol  Cardiology ID evaluation agreed with  current  medical treatment  GI DVT prophylaxis  monitor  FS    Difficulty walking

## 2019-11-26 DIAGNOSIS — A41.9 SEPSIS, UNSPECIFIED ORGANISM: ICD-10-CM

## 2019-11-26 DIAGNOSIS — E11.9 TYPE 2 DIABETES MELLITUS WITHOUT COMPLICATIONS: ICD-10-CM

## 2019-11-26 DIAGNOSIS — E87.1 HYPO-OSMOLALITY AND HYPONATREMIA: ICD-10-CM

## 2019-11-26 DIAGNOSIS — I48.20 CHRONIC ATRIAL FIBRILLATION, UNSPECIFIED: ICD-10-CM

## 2019-11-26 DIAGNOSIS — J96.01 ACUTE RESPIRATORY FAILURE WITH HYPOXIA: ICD-10-CM

## 2019-11-26 LAB
ALBUMIN SERPL ELPH-MCNC: 3 G/DL — LOW (ref 3.5–5)
ALP SERPL-CCNC: 54 U/L — SIGNIFICANT CHANGE UP (ref 40–120)
ALT FLD-CCNC: 44 U/L DA — SIGNIFICANT CHANGE UP (ref 10–60)
ANION GAP SERPL CALC-SCNC: 8 MMOL/L — SIGNIFICANT CHANGE UP (ref 5–17)
ANISOCYTOSIS BLD QL: SLIGHT — SIGNIFICANT CHANGE UP
APTT BLD: 38 SEC — HIGH (ref 27.5–36.3)
AST SERPL-CCNC: 47 U/L — HIGH (ref 10–40)
BASE EXCESS BLDA CALC-SCNC: -2.7 MMOL/L — LOW (ref -2–2)
BASOPHILS # BLD AUTO: 0 K/UL — SIGNIFICANT CHANGE UP (ref 0–0.2)
BASOPHILS NFR BLD AUTO: 0 % — SIGNIFICANT CHANGE UP (ref 0–2)
BILIRUB SERPL-MCNC: 0.6 MG/DL — SIGNIFICANT CHANGE UP (ref 0.2–1.2)
BUN SERPL-MCNC: 20 MG/DL — HIGH (ref 7–18)
CALCIUM SERPL-MCNC: 8.1 MG/DL — LOW (ref 8.4–10.5)
CHLORIDE SERPL-SCNC: 96 MMOL/L — SIGNIFICANT CHANGE UP (ref 96–108)
CHOLEST SERPL-MCNC: 112 MG/DL — SIGNIFICANT CHANGE UP (ref 10–199)
CK MB BLD-MCNC: 1.2 % — SIGNIFICANT CHANGE UP (ref 0–3.5)
CK MB CFR SERPL CALC: 1.4 NG/ML — SIGNIFICANT CHANGE UP (ref 0–3.6)
CK SERPL-CCNC: 119 U/L — SIGNIFICANT CHANGE UP (ref 21–215)
CO2 SERPL-SCNC: 24 MMOL/L — SIGNIFICANT CHANGE UP (ref 22–31)
CREAT ?TM UR-MCNC: 14 MG/DL — SIGNIFICANT CHANGE UP
CREAT SERPL-MCNC: 1.36 MG/DL — HIGH (ref 0.5–1.3)
EOSINOPHIL # BLD AUTO: 0 K/UL — SIGNIFICANT CHANGE UP (ref 0–0.5)
EOSINOPHIL NFR BLD AUTO: 0 % — SIGNIFICANT CHANGE UP (ref 0–6)
GLUCOSE BLDC GLUCOMTR-MCNC: 142 MG/DL — HIGH (ref 70–99)
GLUCOSE BLDC GLUCOMTR-MCNC: 168 MG/DL — HIGH (ref 70–99)
GLUCOSE BLDC GLUCOMTR-MCNC: 243 MG/DL — HIGH (ref 70–99)
GLUCOSE BLDC GLUCOMTR-MCNC: 290 MG/DL — HIGH (ref 70–99)
GLUCOSE BLDC GLUCOMTR-MCNC: 383 MG/DL — HIGH (ref 70–99)
GLUCOSE SERPL-MCNC: 140 MG/DL — HIGH (ref 70–99)
HCO3 BLDA-SCNC: 22 MMOL/L — LOW (ref 23–27)
HCT VFR BLD CALC: 31 % — LOW (ref 34.5–45)
HDLC SERPL-MCNC: 53 MG/DL — SIGNIFICANT CHANGE UP
HGB BLD-MCNC: 9.8 G/DL — LOW (ref 11.5–15.5)
HOROWITZ INDEX BLDA+IHG-RTO: 50 — SIGNIFICANT CHANGE UP
HYPOCHROMIA BLD QL: SLIGHT — SIGNIFICANT CHANGE UP
INR BLD: 2.09 RATIO — HIGH (ref 0.88–1.16)
LIPID PNL WITH DIRECT LDL SERPL: 43 MG/DL — SIGNIFICANT CHANGE UP
LYMPHOCYTES # BLD AUTO: 1.43 K/UL — SIGNIFICANT CHANGE UP (ref 1–3.3)
LYMPHOCYTES # BLD AUTO: 12 % — LOW (ref 13–44)
MACROCYTES BLD QL: SLIGHT — SIGNIFICANT CHANGE UP
MAGNESIUM SERPL-MCNC: 2.2 MG/DL — SIGNIFICANT CHANGE UP (ref 1.6–2.6)
MANUAL SMEAR VERIFICATION: SIGNIFICANT CHANGE UP
MCHC RBC-ENTMCNC: 26.6 PG — LOW (ref 27–34)
MCHC RBC-ENTMCNC: 31.6 GM/DL — LOW (ref 32–36)
MCV RBC AUTO: 84 FL — SIGNIFICANT CHANGE UP (ref 80–100)
MICROCYTES BLD QL: SLIGHT — SIGNIFICANT CHANGE UP
MONOCYTES # BLD AUTO: 0.6 K/UL — SIGNIFICANT CHANGE UP (ref 0–0.9)
MONOCYTES NFR BLD AUTO: 5 % — SIGNIFICANT CHANGE UP (ref 2–14)
NEUTROPHILS # BLD AUTO: 9.04 K/UL — HIGH (ref 1.8–7.4)
NEUTROPHILS NFR BLD AUTO: 76 % — SIGNIFICANT CHANGE UP (ref 43–77)
NRBC # BLD: 0 /100 — SIGNIFICANT CHANGE UP (ref 0–0)
OSMOLALITY UR: 295 MOS/KG — SIGNIFICANT CHANGE UP (ref 50–1200)
OVALOCYTES BLD QL SMEAR: SLIGHT — SIGNIFICANT CHANGE UP
PCO2 BLDA: 41 MMHG — SIGNIFICANT CHANGE UP (ref 32–46)
PH BLDA: 7.35 — SIGNIFICANT CHANGE UP (ref 7.35–7.45)
PHOSPHATE SERPL-MCNC: 3.4 MG/DL — SIGNIFICANT CHANGE UP (ref 2.5–4.5)
PLAT MORPH BLD: NORMAL — SIGNIFICANT CHANGE UP
PLATELET # BLD AUTO: 251 K/UL — SIGNIFICANT CHANGE UP (ref 150–400)
PO2 BLDA: 118 MMHG — HIGH (ref 74–108)
POIKILOCYTOSIS BLD QL AUTO: SLIGHT — SIGNIFICANT CHANGE UP
POTASSIUM SERPL-MCNC: 4.6 MMOL/L — SIGNIFICANT CHANGE UP (ref 3.5–5.3)
POTASSIUM SERPL-SCNC: 4.6 MMOL/L — SIGNIFICANT CHANGE UP (ref 3.5–5.3)
PROT ?TM UR-MCNC: 9 MG/DL — SIGNIFICANT CHANGE UP (ref 0–12)
PROT SERPL-MCNC: 8.3 G/DL — SIGNIFICANT CHANGE UP (ref 6–8.3)
PROTHROM AB SERPL-ACNC: 23.7 SEC — HIGH (ref 10–12.9)
RBC # BLD: 3.69 M/UL — LOW (ref 3.8–5.2)
RBC # FLD: 14.6 % — HIGH (ref 10.3–14.5)
RBC BLD AUTO: ABNORMAL
SAO2 % BLDA: 98 % — HIGH (ref 92–96)
SCHISTOCYTES BLD QL AUTO: SLIGHT — SIGNIFICANT CHANGE UP
SODIUM SERPL-SCNC: 128 MMOL/L — LOW (ref 135–145)
SODIUM UR-SCNC: 95 MMOL/L — SIGNIFICANT CHANGE UP (ref 40–220)
TOTAL CHOLESTEROL/HDL RATIO MEASUREMENT: 2.1 RATIO — LOW (ref 3.3–7.1)
TRIGL SERPL-MCNC: 82 MG/DL — SIGNIFICANT CHANGE UP (ref 10–149)
TROPONIN I SERPL-MCNC: 0.04 NG/ML — SIGNIFICANT CHANGE UP (ref 0–0.04)
VARIANT LYMPHS # BLD: 7 % — HIGH (ref 0–6)
WBC # BLD: 11.9 K/UL — HIGH (ref 3.8–10.5)
WBC # FLD AUTO: 11.9 K/UL — HIGH (ref 3.8–10.5)

## 2019-11-26 PROCEDURE — 99223 1ST HOSP IP/OBS HIGH 75: CPT

## 2019-11-26 PROCEDURE — 99291 CRITICAL CARE FIRST HOUR: CPT

## 2019-11-26 PROCEDURE — 71045 X-RAY EXAM CHEST 1 VIEW: CPT | Mod: 26

## 2019-11-26 RX ORDER — IPRATROPIUM/ALBUTEROL SULFATE 18-103MCG
3 AEROSOL WITH ADAPTER (GRAM) INHALATION ONCE
Refills: 0 | Status: COMPLETED | OUTPATIENT
Start: 2019-11-26 | End: 2019-11-26

## 2019-11-26 RX ORDER — CEFEPIME 1 G/1
1000 INJECTION, POWDER, FOR SOLUTION INTRAMUSCULAR; INTRAVENOUS EVERY 8 HOURS
Refills: 0 | Status: DISCONTINUED | OUTPATIENT
Start: 2019-11-26 | End: 2019-11-29

## 2019-11-26 RX ORDER — DEXMEDETOMIDINE HYDROCHLORIDE IN 0.9% SODIUM CHLORIDE 4 UG/ML
0.2 INJECTION INTRAVENOUS
Qty: 200 | Refills: 0 | Status: DISCONTINUED | OUTPATIENT
Start: 2019-11-26 | End: 2019-11-27

## 2019-11-26 RX ORDER — ACETAMINOPHEN 500 MG
1000 TABLET ORAL ONCE
Refills: 0 | Status: COMPLETED | OUTPATIENT
Start: 2019-11-26 | End: 2019-11-27

## 2019-11-26 RX ORDER — CHLORHEXIDINE GLUCONATE 213 G/1000ML
1 SOLUTION TOPICAL DAILY
Refills: 0 | Status: DISCONTINUED | OUTPATIENT
Start: 2019-11-26 | End: 2019-11-27

## 2019-11-26 RX ORDER — ONDANSETRON 8 MG/1
4 TABLET, FILM COATED ORAL EVERY 6 HOURS
Refills: 0 | Status: DISCONTINUED | OUTPATIENT
Start: 2019-11-26 | End: 2019-12-03

## 2019-11-26 RX ORDER — AZITHROMYCIN 500 MG/1
500 TABLET, FILM COATED ORAL EVERY 24 HOURS
Refills: 0 | Status: DISCONTINUED | OUTPATIENT
Start: 2019-11-26 | End: 2019-11-27

## 2019-11-26 RX ORDER — FUROSEMIDE 40 MG
80 TABLET ORAL EVERY 8 HOURS
Refills: 0 | Status: DISCONTINUED | OUTPATIENT
Start: 2019-11-26 | End: 2019-11-26

## 2019-11-26 RX ORDER — NITROGLYCERIN 6.5 MG
1 CAPSULE, EXTENDED RELEASE ORAL DAILY
Refills: 0 | Status: DISCONTINUED | OUTPATIENT
Start: 2019-11-26 | End: 2019-11-27

## 2019-11-26 RX ORDER — FUROSEMIDE 40 MG
60 TABLET ORAL
Refills: 0 | Status: DISCONTINUED | OUTPATIENT
Start: 2019-11-26 | End: 2019-11-26

## 2019-11-26 RX ADMIN — DEXMEDETOMIDINE HYDROCHLORIDE IN 0.9% SODIUM CHLORIDE 6.35 MICROGRAM(S)/KG/HR: 4 INJECTION INTRAVENOUS at 08:00

## 2019-11-26 RX ADMIN — RIVAROXABAN 15 MILLIGRAM(S): KIT at 17:28

## 2019-11-26 RX ADMIN — Medication 1 INCH(S): at 18:44

## 2019-11-26 RX ADMIN — DEXMEDETOMIDINE HYDROCHLORIDE IN 0.9% SODIUM CHLORIDE 6.35 MICROGRAM(S)/KG/HR: 4 INJECTION INTRAVENOUS at 06:40

## 2019-11-26 RX ADMIN — Medication 3: at 11:49

## 2019-11-26 RX ADMIN — Medication 80 MILLIGRAM(S): at 07:58

## 2019-11-26 RX ADMIN — Medication 40 MILLIGRAM(S): at 06:25

## 2019-11-26 RX ADMIN — GABAPENTIN 300 MILLIGRAM(S): 400 CAPSULE ORAL at 15:25

## 2019-11-26 RX ADMIN — BUDESONIDE AND FORMOTEROL FUMARATE DIHYDRATE 2 PUFF(S): 160; 4.5 AEROSOL RESPIRATORY (INHALATION) at 22:07

## 2019-11-26 RX ADMIN — Medication 1: at 07:57

## 2019-11-26 RX ADMIN — Medication 250 MILLIGRAM(S): at 07:10

## 2019-11-26 RX ADMIN — CHLORHEXIDINE GLUCONATE 1 APPLICATION(S): 213 SOLUTION TOPICAL at 11:50

## 2019-11-26 RX ADMIN — BUDESONIDE AND FORMOTEROL FUMARATE DIHYDRATE 2 PUFF(S): 160; 4.5 AEROSOL RESPIRATORY (INHALATION) at 00:45

## 2019-11-26 RX ADMIN — CEFEPIME 100 MILLIGRAM(S): 1 INJECTION, POWDER, FOR SOLUTION INTRAMUSCULAR; INTRAVENOUS at 22:06

## 2019-11-26 RX ADMIN — MONTELUKAST 10 MILLIGRAM(S): 4 TABLET, CHEWABLE ORAL at 22:05

## 2019-11-26 RX ADMIN — AZITHROMYCIN 255 MILLIGRAM(S): 500 TABLET, FILM COATED ORAL at 15:26

## 2019-11-26 RX ADMIN — Medication 3 MILLILITER(S): at 05:48

## 2019-11-26 RX ADMIN — Medication 5: at 17:28

## 2019-11-26 RX ADMIN — Medication 1 INCH(S): at 07:09

## 2019-11-26 RX ADMIN — DRONEDARONE 400 MILLIGRAM(S): 400 TABLET, FILM COATED ORAL at 17:28

## 2019-11-26 RX ADMIN — ATORVASTATIN CALCIUM 40 MILLIGRAM(S): 80 TABLET, FILM COATED ORAL at 22:05

## 2019-11-26 RX ADMIN — PIPERACILLIN AND TAZOBACTAM 25 GRAM(S): 4; .5 INJECTION, POWDER, LYOPHILIZED, FOR SOLUTION INTRAVENOUS at 07:10

## 2019-11-26 NOTE — CONSULT NOTE ADULT - ASSESSMENT
78 y/o white female with PMHx of HFpEF, CAD (s/p stents), chronic venous stasis, PPM, DM, sleep apnea on bipap, HTN and Afib (on Xarelto, s/p PPM) presented to the ED with complaints of fever as high as 38 (C), chills, cough without sputum production, and 3 episodes of vomiting (non-bloody) 2 days PTA. She was treated with broad spectrum antibiotic coverage for bilateral LE cellulitis. Patient subsequently developed respiratory failure secondary to heart failure, and was transferred to the ICU where she was placed on BiPAP.  PE at the time of my exam significant for R lower lung rales; CXR with new R lower lung infiltrate. Patient's LE appearance c/w venous stasis changes, not cellulitis. Based on PE with R lower lung rales, CXR with new infiltrate R lower lung, and recent hospitalization at [facility], concerned about hospital-acquired pneumonia (ie, gram negative pneumonia vs pneumonia secondary to atypical organisms such as Legionella).     Recommend:  --D/C vanco and pip/tazo (increased risk with this combination for renal dysfn and increased fluid load)  --Give cefepime 1gm q8h  --Start azithromycin p.o.  --Legionella urinary Ag  --F/U BCs

## 2019-11-26 NOTE — CONSULT NOTE ADULT - PROBLEM SELECTOR RECOMMENDATION 2
TTE 2018: G1DD w/ PEF and LVOT  Holding off from BB for now  c/w Lasix IV TID  Follow up TTE  plan as above

## 2019-11-26 NOTE — CONSULT NOTE ADULT - PROBLEM SELECTOR RECOMMENDATION 5
Hypotonic hypervolemic hyponatremia likely 2/2 fluid overload in setting of CHF  Current Na: 128- c/w fluid restriction + Lasix  Follow up urine lytes  BMP QD

## 2019-11-26 NOTE — CONSULT NOTE ADULT - SUBJECTIVE AND OBJECTIVE BOX
CHIEF COMPLAINT:Patient is a 79y old  Female who presents with a chief complaint of b/l LE cellulitis      HPI:  78 y/o Pakistani-speaking female from home, ambulates with walker, lives with son, with PMHx of HFpEF, CAD (s/p stents), chronic venous stasis, DM, HTN and Afib (on Xarelto, s/p PPM) presents to the ED with chief complaint of fever and chills x 2 days. Daughter at bedside provides translation. Patient also reports 1 episode of vomiting today. She has had multiple episodes of cellulitis in the past and says her current symptoms are similar. She denies any sob, nausea, vomiting, chest pain or any other symptoms at present. Of note, patient was recently admitted to [facility] for nausea and vomiting and was conservatively managed.Pt now in ICU on Bipap.          PAST MEDICAL & SURGICAL HISTORY:  Obesity  Pacemaker-[employer]  Atrial fibrillation  Hypothyroidism  Venous stasis  IBS (irritable bowel syndrome)  CHF (congestive heart failure), NYHA class I  HLD (Hyperlipidemia)  HTN (Hypertension)  Diabetes  Myocardial Infarction  CAD (Coronary Atherosclerotic Disease)  Asthma        MEDICATIONS  (STANDING):  atorvastatin 40 milliGRAM(s) Oral at bedtime  budesonide 160 MICROgram(s)/formoterol 4.5 MICROgram(s) Inhaler 2 Puff(s) Inhalation two times a day  chlorhexidine 4% Liquid 1 Application(s) Topical daily  dexMEDEtomidine Infusion 0.2 MICROgram(s)/kG/Hr (6.35 mL/Hr) IV Continuous <Continuous>  dronedarone 400 milliGRAM(s) Oral two times a day  furosemide   Injectable 80 milliGRAM(s) IV Push every 8 hours  gabapentin 300 milliGRAM(s) Oral daily  insulin lispro (HumaLOG) corrective regimen sliding scale   SubCutaneous three times a day before meals  levothyroxine 175 MICROGram(s) Oral daily  montelukast 10 milliGRAM(s) Oral at bedtime  nitroglycerin    2% Ointment 1 Inch(s) Transdermal daily  pantoprazole    Tablet 40 milliGRAM(s) Oral before breakfast  piperacillin/tazobactam IVPB.. 3.375 Gram(s) IV Intermittent every 8 hours  rivaroxaban 15 milliGRAM(s) Oral with dinner  vancomycin  IVPB 1000 milliGRAM(s) IV Intermittent every 12 hours    MEDICATIONS  (PRN):  acetaminophen   Tablet .. 650 milliGRAM(s) Oral every 6 hours PRN Temp greater or equal to 38C (100.4F), Mild Pain (1 - 3)  acetaminophen  IVPB .. 1000 milliGRAM(s) IV Intermittent once PRN Temp greater or equal to 38C (100.4F)  albuterol/ipratropium for Nebulization 3 milliLiter(s) Nebulizer every 6 hours PRN Shortness of Breath and/or Wheezing  ondansetron Injectable 4 milliGRAM(s) IV Push every 6 hours PRN Nausea and/or Vomiting      FAMILY HISTORY:  Family history of heart disease      SOCIAL HISTORY:    [x ] Non-smoker    [x ] Alcohol    Allergies    No Known Allergies    Intolerances    	    REVIEW OF SYSTEMS:  [x ] Unable to obtain    PHYSICAL EXAM:  T(C): 37.4 (11-26-19 @ 09:47), Max: 39.6 (11-26-19 @ 07:00)  HR: 61 (11-26-19 @ 12:12) (61 - 113)  BP: 126/48 (11-26-19 @ 11:00) (109/64 - 162/63)  RR: 18 (11-26-19 @ 11:00) (18 - 35)  SpO2: 100% (11-26-19 @ 12:12) (98% - 100%)  Wt(kg): --  I&O's Summary    26 Nov 2019 07:01  -  26 Nov 2019 12:20  --------------------------------------------------------  IN: 284.8 mL / OUT: 1100 mL / NET: -815.2 mL        Appearance: Normal	  HEENT:   Normal oral mucosa, PERRL, EOMI	  Lymphatic: No lymphadenopathy  Cardiovascular: Normal S1 S2, No JVD, No murmurs, No edema  Respiratory: Dec BS at bases  Psychiatry: A & O x 3, Mood & affect appropriate  Gastrointestinal:  Soft, Non-tender, + BS	  Skin: No rashes, No ecchymoses, No cyanosis	  Neurologic: Non-focal  Extremities: Normal range of motion, No clubbing, cyanosis or edema  Vascular: Peripheral pulses palpable 2+ bilaterally    	    ECG: NSR,RBBB,LAFB    	  LABS:	 	      CARDIAC MARKERS ( 26 Nov 2019 05:44 )  0.039 ng/mL / x     / 119 U/L / x     / 1.4 ng/mL  CARDIAC MARKERS ( 25 Nov 2019 22:30 )  0.095 ng/mL / x     / 100 U/L / x     / <1.0 ng/mL  CARDIAC MARKERS ( 25 Nov 2019 13:35 )  0.083 ng/mL / x     / x     / x     / x                             9.8    11.90 )-----------( 251      ( 26 Nov 2019 05:40 )             31.0     11-26    128<L>  |  96  |  20<H>  ----------------------------<  140<H>  4.6   |  24  |  1.36<H>    Ca    8.1<L>      26 Nov 2019 05:44  Phos  3.4     11-26  Mg     2.2     11-26    TPro  8.3  /  Alb  3.0<L>  /  TBili  0.6  /  DBili  x   /  AST  47<H>  /  ALT  44  /  AlkPhos  54  11-26      Lipid Profile: Cholesterol 112  LDL 43  HDL 53  TG 82      EXAM:  XR CHEST PORTABLE IMMED 1V                            PROCEDURE DATE:  11/26/2019          INTERPRETATION:  EXAM:XR CHEST IMMEDIATE.     CLINICAL INDICATION: shortness of breath .     TECHNIQUE: Portable AP view.     PRIOR EXAM: 11/25/2019.     FINDINGS:      There is a new patchy opacity involving the right lung base medially   suggestive of an infiltrate. A small right pleural effusion is also   suspected. Visualized left lung remains clear. Magnified cardiac and   mediastinal silhouette is stable. A dual-lead pacemaker is again noted.      IMPRESSION:     New patchy right basilar opacity and a small right pleural effusion.      OBSERVATIONS:  Mitral Valve: Mild posterior mitral annular calcification.  Trace mitral regurgitation.  Aortic Root: Normal aortic root.  Aortic Valve: Aortic valve not well visualized. Peak  transaortic valve gradient equals 36.7 mm Hg, mean  transaortic valve gradient equals 17 mm Hg, dimensionless  index 0.7, consistent with mild aortic stenosis. Trace  aortic regurgitation.  Peak left ventricular outflow tract  gradient equals 18.3 mm Hg, consistent with mild LVOT  obstruction.  Left Atrium: Mildly dilated left atrium.  LA volume index =  35 cc/m2.  Left Ventricle: Endocardium not well visualized; grossly  normal left ventricular systolic function. Normal left  ventricular internal dimensions and wall thicknesses. Grade  I diastolic dysfunction (Impaired relaxation).  Right Heart: Normal right atrium. Normal right ventricular  size and function (TAPSE 2.15 cm). A device lead is  visualized in the right heart. Normal tricuspid valve.  Trace tricuspid regurgitation. Pulmonic valve not well  seen.  Pericardium/PleuraNo pericardial effusion.  Hemodynamic: RA Pressure is 8 mm Hg. RV systolic pressure  is moderately increased at  49 mm Hg.

## 2019-11-26 NOTE — PROGRESS NOTE ADULT - ASSESSMENT
79F from home, ambulates with walker, lives with son, with PMHx of HFpEF, CAD (s/p stents), chronic venous stasis, DM, HTN and Afib (on Xarelto, s/p [employer] PPM) presents to the ED with chief complaint of fever and chills x 2 days with one episode of vomiting. She was admitted to medicine on 11/25 for LE cellulitis treated with Zosyn and Vancomycin. Also found to have hyponatremia for which she was receiving IVF. RRT called on 11/26 AM for respiratory distress with abdominal breathing, saturating 100% on NRB. Vitals significant for tachypnea w/ RR: 41. Upon lung auscultation, bilateral expiratory/inspiratory wheezing appreciated. ABG w/o hypoxia/hypercapnia w/ pH WNL's. ECG: NSR VR@86bpm. Patient respiratory status did not improve despite treatment with Lasix 60 mg IV + DUONEB x1. Bladder scan revealed 0 mL, which was inaccurate probably due morbid obesity. Ashton was placed with ~500 mL of urine. Patient was placed on BiPAP for work of breathing and transferred to ICU.    Neuro: 79F from home, ambulates with walker, lives with son, with PMHx of HFpEF, CAD (s/p stents), chronic venous stasis, DM, HTN and Afib (on Xarelto, s/p [employer] PPM) presents to the ED with chief complaint of fever and chills x 2 days with one episode of vomiting. She was admitted to medicine on 11/25 for LE cellulitis treated with Zosyn and Vancomycin. Also found to have hyponatremia for which she was receiving IVF. RRT called on 11/26 AM for respiratory distress with abdominal breathing, saturating 100% on NRB. Vitals significant for tachypnea w/ RR: 41. Upon lung auscultation, bilateral expiratory/inspiratory wheezing appreciated. ABG w/o hypoxia/hypercapnia w/ pH WNL's. ECG: NSR VR@86bpm. Patient respiratory status did not improve despite treatment with Lasix 60 mg IV + DUONEB x1. Bladder scan revealed 0 mL, which was inaccurate probably due morbid obesity. Ashton was placed with ~500 mL of urine. Patient was placed on BiPAP for work of breathing and transferred to ICU.    Neuro:  on precedex for anxiety    Cardio:  -HFpEF  ECHO on Jun 2018 showed normal EF, GIDD, mild AS, mod. Pul, HTN  ECG: NSR VR 83bpm, no ischemic changes. CE: T1: 0.085, T2: 0.095, T3: -ve  c/w Nitro-BID QD   s/p lasix 80mg in AM  will hold lasix for now given SRIDHAR  Strict IO's + daily weight + 1L fluid restriction  Follow up TTE    -CAD (s/p stents)    -Afib (on Xarelto, s/p PPM)    Resp.:  -Asthma  on nebs    GI:  NPO for now due to BIPAP    Nephro:  -SRIDHAR  Cr 1.36 today 79F from home, ambulates with walker, lives with son, with PMHx of HFpEF, CAD (s/p stents), chronic venous stasis, DM, HTN and Afib (on Xarelto, s/p [employer] PPM) presents to the ED with chief complaint of fever and chills x 2 days with one episode of vomiting. She was admitted to medicine on 11/25 for LE cellulitis treated with Zosyn and Vancomycin. Also found to have hyponatremia for which she was receiving IVF. RRT called on 11/26 AM for respiratory distress with abdominal breathing, saturating 100% on NRB. Vitals significant for tachypnea w/ RR: 41. Upon lung auscultation, bilateral expiratory/inspiratory wheezing appreciated. ABG w/o hypoxia/hypercapnia w/ pH WNL's. ECG: NSR VR@86bpm. Patient respiratory status did not improve despite treatment with Lasix 60 mg IV + DUONEB x1. Bladder scan revealed 0 mL, which was inaccurate probably due morbid obesity. Ashton was placed with ~500 mL of urine. Patient was placed on BiPAP for work of breathing and transferred to ICU.    Neuro:  on precedex for anxiety    Cardio:  -HFpEF  ECHO on Jun 2018 showed normal EF, GIDD, mild AS, mod. Pul, HTN  ECG: NSR VR 83bpm, no ischemic changes. CE: T1: 0.085, T2: 0.095, T3: -ve  c/w Nitro-BID QD   s/p lasix 80mg in AM  will hold lasix for now given SRIDHAR  Strict IO's + daily weight + 1L fluid restriction  Follow up TTE    -CAD (s/p stents)    -Afib (on Xarelto, s/p PPM)  PPM interrogated  on dronaderone    Resp.:  - Health care ass. Pneumonia  CXR shows New patchy right basilar opacity and a small right pleural effusion.  on cefepime and azithro 11/26  ID Dr. Spence  -Asthma  on nebs    GI:  NPO for now due to BIPAP    Nephro:  -SRIDHAR  Cr 1.36 today  DC vanco and zosyn, lasix    ID:  - Health care ass. Pneumonia  CXR shows New patchy right basilar opacity and a small right pleural effusion.  on cefepime and azithro 11/26  ID Dr. Spence    Endo:  DM: on HSS    GOC: FULL CODE

## 2019-11-26 NOTE — CONSULT NOTE ADULT - ASSESSMENT
consult to follow up A:   venous stasis dermatitis/ cellulitis/  lipodermatosclerosis     obesity   Venous insufficiency       P:   pt seen and evaluated   etiology of the problem and treatment options discussed with family and patient   recommended venous doppler ( if was not done , as per family she had it done ?  )  continue antibiotics as per id   recommended light compression if clear by cardiologist   topical alternate topical antibiotic and steroid as ordered   will follow up while in house   thank you for this referral   please do not hesitate to contact me with any patient related questions @ [phone]

## 2019-11-26 NOTE — CONSULT NOTE ADULT - SUBJECTIVE AND OBJECTIVE BOX
HPI/Course in Hospital:  80 y/o [place]ayan-speaking female from home, ambulates with walker, lives with son, with PMHx of HFpEF, CAD (s/p stents), chronic venous stasis, DM, HTN and Afib (on Xarelto, s/p PPM) presents to the ED with chief complaint of fever and chills 2 days PTA.  Patient also reports 1 episode of vomiting on day of admission. She has had multiple episodes of cellulitis in the past and says her current symptoms are similar. She denies any sob, nausea, vomiting, chest pain or any other symptoms at present. Of note, patient was recently admitted to [facility] for nausea and vomiting and was conservatively managed. GOC: Full code. (2019 15:45)    RRT called for respiratory distress . Patient seen and examined at bedside, in severe respiratory distress with abdominal breathing, saturating 100% on NRB. Vitals significant for tachypnea w/ RR: 41. Upon lung auscultation, bilateral expiratory/inspiratory wheezing appreciated. ABG w/o hypoxia/hypercapnia w/ pH WNL's. ECG: NSR VR@86bpm.     Patient respiratory status did not improve despite treatment with Lasix 60 mg IV + DUONEB x1. Bladder scan revealed 0 mL, which was inaccurate probably due morbid obesity. [place] was placed with ~500 mL of urine. Patient was placed on BiPAP for work of breathing and transferred to ICU. Patient Dx with acute hypoxic respiratory failure 2/2 pulmonary edema.       PAST MEDICAL & SURGICAL HISTORY:  Obesity  Pacemaker  Atrial fibrillation  Hypothyroidism  Venous stasis  IBS (irritable bowel syndrome)  CHF (congestive heart failure), NYHA class I  HLD (Hyperlipidemia)  HTN (Hypertension)  Diabetes  Myocardial Infarction  CAD (Coronary Atherosclerotic Disease)  Asthma  S/P coronary angiogram; stents      ALL: No Known Allergies      Meds:  acetaminophen   Tablet .. 650 milliGRAM(s) Oral every 6 hours PRN  acetaminophen  IVPB .. 1000 milliGRAM(s) IV Intermittent once PRN  albuterol/ipratropium for Nebulization 3 milliLiter(s) Nebulizer every 6 hours PRN  atorvastatin 40 milliGRAM(s) Oral at bedtime  budesonide 160 MICROgram(s)/formoterol 4.5 MICROgram(s) Inhaler 2 Puff(s) Inhalation two times a day  chlorhexidine 4% Liquid 1 Application(s) Topical daily  dexMEDEtomidine Infusion 0.2 MICROgram(s)/kG/Hr IV Continuous <Continuous>  dronedarone 400 milliGRAM(s) Oral two times a day  gabapentin 300 milliGRAM(s) Oral daily  insulin lispro (HumaLOG) corrective regimen sliding scale   SubCutaneous three times a day before meals  levothyroxine 175 MICROGram(s) Oral daily  montelukast 10 milliGRAM(s) Oral at bedtime  nitroglycerin    2% Ointment 1 Inch(s) Transdermal daily  ondansetron Injectable 4 milliGRAM(s) IV Push every 6 hours PRN  pantoprazole    Tablet 40 milliGRAM(s) Oral before breakfast  piperacillin/tazobactam IVPB.. 3.375 Gram(s) IV Intermittent every 8 hours  rivaroxaban 15 milliGRAM(s) Oral with dinner  vancomycin  IVPB 1000 milliGRAM(s) IV Intermittent every 12 hours      SOCIAL HISTORY: lives with son  Denies H/O smoking, ETOH or illicit drug use    FAMILY HISTORY:  Family history of heart disease      REVIEW OF SYSTEMS      General:	    Skin/Breast:  	  Ophthalmologic:  	  ENMT:	    Respiratory and Thorax:  	  Cardiovascular:	    Gastrointestinal:	    Genitourinary:	    Musculoskeletal:	    Neurological:	    Psychiatric:	    Hematology/Lymphatics:	    Endocrine:	    Allergic/Immunologic:	    VITALS:  Vital Signs Last 24 Hrs  T(C): 37.4 (2019 09:47), Max: 39.6 (2019 07:00)  T(F): 99.4 (2019 09:47), Max: 103.2 (2019 07:00)  HR: 61 (2019 12:12) (61 - 113)  BP: 131/51 (2019 12:00) (109/64 - 162/63)  BP(mean): 72 (2019 12:00) (55 - 127)  RR: 16 (2019 12:00) (16 - 35)  SpO2: 100% (2019 12:12) (98% - 100%)    PHYSICAL EXAM:      GEN:    HEENT:    Neck:    Breasts:    Back:    Respiratory:    Cardiovascular:    Gastrointestinal:    Genitourinary:    Rectal:    Extremities:    Vascular:    Neurological:    Skin:    Lymph Nodes:    Musculoskeletal:    Psychiatric:        LABS/DIAGNOSTIC TESTS:                          9.8    11.90 )-----------( 251      ( 2019 05:40 )             31.0     WBC Count: 11.90 K/uL ( @ 05:40)  WBC Count: 11.20 K/uL ( @ 13:35)          128<L>  |  96  |  20<H>  ----------------------------<  140<H>  4.6   |  24  |  1.36<H>    Ca    8.1<L>      2019 05:44  Phos  3.4       Mg     2.2         TPro  8.3  /  Alb  3.0<L>  /  TBili  0.6  /  DBili  x   /  AST  47<H>  /  ALT  44  /  AlkPhos  54        Urinalysis Basic - ( 2019 14:46 )    Color: Yellow / Appearance: Clear / S.010 / pH: x  Gluc: x / Ketone: Negative  / Bili: Negative / Urobili: 1   Blood: x / Protein: 30 mg/dL / Nitrite: Negative   Leuk Esterase: Negative / RBC: Negative /HPF / WBC 0-2 /HPF   Sq Epi: x / Non Sq Epi: Occasional /HPF / Bacteria: Negative /HPF        LIVER FUNCTIONS - ( 2019 05:44 )  Alb: 3.0 g/dL / Pro: 8.3 g/dL / ALK PHOS: 54 U/L / ALT: 44 U/L DA / AST: 47 U/L / GGT: x             PT/INR - ( 2019 07:00 )   PT: 23.7 sec;   INR: 2.09 ratio         PTT - ( 2019 07:00 )  PTT:38.0 sec    LACTATE:Lactate, Blood: 1.2 mmol/L ( @ 13:34)      ABG - ABG - ( 2019 05:34 )  pH, Arterial: 7.35  pH, Blood: x     /  pCO2: 41    /  pO2: 118   / HCO3: 22    / Base Excess: -2.7  /  SaO2: 98          CULTURES:     Culture - Blood (collected 19 @ 22:59)  Source: .Blood Blood-Venous  Final Report (19 @ 23:00):    No growth at 5 days.    Culture - Blood (collected 19 @ 22:59)  Source: .Blood Blood-Peripheral  Final Report (19 @ 23:00):    No growth at 5 days.    Culture - Urine (collected 19 @ 17:11)  Source: .Urine Clean Catch (Midstream)  Final Report (19 @ 18:10):    <10,000 CFU/mL Normal Urogenital Ema    Culture - Urine (collected 19 @ 23:08)  Source: .Urine Clean Catch (Midstream)  Final Report (19 @ 04:46):    >=3 organisms. Probable collection contamination.    Culture - Blood (collected 19 @ 22:09)  Source: .Blood Blood  Final Report (19 @ 23:01):    No growth at 5 days.    Culture - Blood (collected 19 @ 22:09)  Source: .Blood Blood  Final Report (19 @ 23:01):    No growth at 5 days.        RADIOLOGY HPI/Course in Hospital (Hx obtained in the presence of Dr. Mendoza, patient's daughter, and patient's grandson--all Emirati speaking):  78 y/o white female from home, ambulates with walker, lives with son, with PMHx of HFpEF, CAD (s/p stents), chronic venous stasis, PPM, DM, sleep apnea on bipap, HTN and Afib (on Xarelto, s/p PPM) presents to the ED with chief complaint of fever as high as 38 (C), chills, cough without sputum production, and 3 episodes of vomiting (non-bloody) 2 days PTA. She has had multiple episodes of "cellulitis" in the past and says her current symptoms are similar. Further Hx reveals that patient has had several years of RLE red discoloration/swelling with a similar pattern on the LLE beginning last Dec. At the time of this admission, patient denied any sob, nausea, chest pain, palpitations, abdominal pain, diarrhea, dysuria. Of note, patient was recently a patient in [facility] (~ wks ago) for nausea and vomiting and conservatively managed. GOC: Full code. (2019 15:45).    RRT called for respiratory distress  after admission. Patient seen and examined at bedside; in severe respiratory distress with abdominal breathing, saturating 100% on NRB. Vitals significant for tachypnea w/ RR: 41. Upon lung auscultation, bilateral expiratory/inspiratory wheezing appreciated. ABG w/o hypoxia/hypercapnia w/ pH WNL's. ECG: NSR VR@86bpm. Patient's respiratory status did not improve despite treatment with Lasix 60 mg IV + DUONEB x1. Bladder scan revealed 0 mL, which was inaccurate probably due morbid obesity. Hansen was placed with ~500 mL of urine. Patient was placed on BiPAP for work of breathing and transferred to ICU. Patient Dx with acute hypoxic respiratory failure 2/2 pulmonary edema. Of note is presence of R lower lung infiltrate.    At the time of my evaluation, patient says her breathing is much improved; nasal cannula in place.      PAST MEDICAL & SURGICAL HISTORY:  Obesity  Pacemaker  Atrial fibrillation  Hypothyroidism  Venous stasis  IBS (irritable bowel syndrome)  CHF (congestive heart failure), NYHA class I  HLD (Hyperlipidemia)  HTN (Hypertension)  Diabetes  Myocardial Infarction  CAD (Coronary Atherosclerotic Disease)  Asthma  S/P coronary angiogram; stents      ALL: No Known Allergies    Meds:  acetaminophen   Tablet .. 650 milliGRAM(s) Oral every 6 hours PRN  acetaminophen  IVPB .. 1000 milliGRAM(s) IV Intermittent once PRN  albuterol/ipratropium for Nebulization 3 milliLiter(s) Nebulizer every 6 hours PRN  atorvastatin 40 milliGRAM(s) Oral at bedtime  budesonide 160 MICROgram(s)/formoterol 4.5 MICROgram(s) Inhaler 2 Puff(s) Inhalation two times a day  chlorhexidine 4% Liquid 1 Application(s) Topical daily  dexMEDEtomidine Infusion 0.2 MICROgram(s)/kG/Hr IV Continuous <Continuous>  dronedarone 400 milliGRAM(s) Oral two times a day  gabapentin 300 milliGRAM(s) Oral daily  insulin lispro (HumaLOG) corrective regimen sliding scale   SubCutaneous three times a day before meals  levothyroxine 175 MICROGram(s) Oral daily  montelukast 10 milliGRAM(s) Oral at bedtime  nitroglycerin    2% Ointment 1 Inch(s) Transdermal daily  ondansetron Injectable 4 milliGRAM(s) IV Push every 6 hours PRN  pantoprazole    Tablet 40 milliGRAM(s) Oral before breakfast  piperacillin/tazobactam IVPB.. 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 then; denies H/O smoking, ETOH or illicit drug use    FAMILY HISTORY:  Family history of heart disease      REVIEW OF SYSTEMS    General: see HPI	    ENMT: no complaints	    Respiratory and Thorax: see HPI  	  Cardiovascular:	CAD, s/p stents, PPM, a fib    Gastrointestinal:	+ vomiting, no nausea, no diarrhea    Genitourinary:	incontinent; no dysuria     Musculoskeletal:	 dislocation R shoulder in past with chronic RUE pain    Neurological: no complaints	      VITALS:  Vital Signs Last 24 Hrs  T(C): 37.4 (2019 09:47), Max: 39.6 (2019 07:00)  T(F): 99.4 (2019 09:47), Max: 103.2 (2019 07:00)  HR: 61 (2019 12:12) (61 - 113)  BP: 131/51 (2019 12:00) (109/64 - 162/63)  BP(mean): 72 (2019 12:00) (55 - 127)  RR: 16 (2019 12:00) (16 - 35)  SpO2: 100% (2019 12:12) (98% - 100%)    PHYSICAL EXAM:    GEN: WD obese w female in NAD; nasal cannula present    HEENT: NC/AT; conj clear; EOMI    Neck: no palpable LNs    Respiratory: rles in R lower lung more intense vs L lower lung    Cardiovascular: L upper chest scar for PPM with no erythema or swelling noted; S1 S2 regular; II-III/VI VINH heard throughout precordium    Gastrointestinal: obese; striae present; BS active; non-tender to palpation; erythema present under panniculus    Genitourinary: hansen in place    Rectal: no sue-rectal lesions or breakdown    Extremities: bilat leg stasis changes extending from ~ 6cm below patellae to above ankles with associated erythema, edema, and dried ulcers; not warm to touch and no drainage noted; no tenderness to palpation of areas; 2+ DP pulses bilat; no onychomycosis of toes    Neurological: A+O x3    Lymph Nodes: cd not palpate any enlarged LNs    Musculoskeletal: no erythema or warmth of any joints      LABS/DIAGNOSTIC TESTS:                        9.8    11.90 )-----------( 251      ( 2019 05:40 )             31.0     WBC Count: 11.90 K/uL ( @ 05:40)  WBC Count: 11.20 K/uL ( @ 13:35)          128<L>  |  96  |  20<H>  ----------------------------<  140<H>  4.6   |  24  |  1.36<H>    Ca    8.1<L>      2019 05:44  Phos  3.4       Mg     2.2         TPro  8.3  /  Alb  3.0<L>  /  TBili  0.6  /  DBili  x   /  AST  47<H>  /  ALT  44  /  AlkPhos  54        Urinalysis Basic - ( 2019 14:46 )    Color: Yellow / Appearance: Clear / S.010 / pH: x  Gluc: x / Ketone: Negative  / Bili: Negative / Urobili: 1   Blood: x / Protein: 30 mg/dL / Nitrite: Negative   Leuk Esterase: Negative / RBC: Negative /HPF / WBC 0-2 /HPF   Sq Epi: x / Non Sq Epi: Occasional /HPF / Bacteria: Negative /HPF      LIVER FUNCTIONS - ( 2019 05:44 )  Alb: 3.0 g/dL / Pro: 8.3 g/dL / ALK PHOS: 54 U/L / ALT: 44 U/L DA / AST: 47 U/L / GGT: x             PT/INR - ( 2019 07:00 )   PT: 23.7 sec;   INR: 2.09 ratio         PTT - ( 2019 07:00 )  PTT:38.0 sec    LACTATE:Lactate, Blood: 1.2 mmol/L ( @ 13:34)      ABG - ABG - ( 2019 05:34 )  pH, Arterial: 7.35  pH, Blood: x     /  pCO2: 41    /  pO2: 118   / HCO3: 22    / Base Excess: -2.7  /  SaO2: 98          CULTURES:     Culture - Blood (collected 19 @ 22:59)  Source: .Blood Blood-Venous  Final Report (19 @ 23:00):    No growth at 5 days.    Culture - Blood (collected 19 @ 22:59)  Source: .Blood Blood-Peripheral  Final Report (19 @ 23:00):    No growth at 5 days.    Culture - Urine (collected 19 @ 17:11)  Source: .Urine Clean Catch (Midstream)  Final Report (19 @ 18:10):    <10,000 CFU/mL Normal Urogenital Ema    Culture - Urine (collected 19 @ 23:08)  Source: .Urine Clean Catch (Midstream)  Final Report (19 @ 04:46):    >=3 organisms. Probable collection contamination.    Culture - Blood (collected 19 @ 22:09)  Source: .Blood Blood  Final Report (19 @ 23:01):    No growth at 5 days.    Culture - Blood (collected 19 @ 22:09)  Source: .Blood Blood  Final Report (19 @ 23:01):    No growth at 5 days.        RADIOLOGY HPI/Course in Hospital (Hx obtained in the presence of Dr. Mendoza, patient's daughter, and patient's grandson--all Slovenian speaking):    80 y/o white female from home, ambulates with walker, lives with son, with PMHx of HFpEF, CAD (s/p stents), chronic venous stasis, PPM, DM, sleep apnea on bipap, HTN and Afib (on Xarelto) presents to the ED with chief complaints of fever as high as 38 (C), chills, cough without sputum production, and 3 episodes of vomiting (non-bloody) 2 days PTA. She has had multiple episodes of "cellulitis" in the past and says her current symptoms are similar. Further Hx reveals that patient has had several years of RLE red discoloration/swelling with a similar pattern on the LLE beginning last Dec. At the time of this admission, patient denied any sob, nausea, chest pain, palpitations, abdominal pain, diarrhea, dysuria. Of note, patient was recently an inpatient at [facility] (~2 wks ago) for nausea and vomiting and conservatively managed. GOC: Full code. (2019 15:45).    RRT called for respiratory distress  after admission. Patient seen and examined at bedside; in severe respiratory distress with abdominal breathing, saturating 100% on NRB. Vitals significant for tachypnea w/ RR: 41. Upon lung auscultation, bilateral expiratory/inspiratory wheezing appreciated. ABG w/o hypoxia/hypercapnia w/ pH WNL's. ECG: NSR VR@86bpm. Patient's respiratory status did not improve despite treatment with Lasix 60 mg IV + DUONEB x1. Bladder scan revealed 0 mL, which was inaccurate probably due morbid obesity. Hansen was placed with ~500 mL of urine. Patient was placed on BiPAP for work of breathing and transferred to ICU. Patient Dx with acute hypoxic respiratory failure 2/2 pulmonary edema. Of note is presence of new R lower lung infiltrate.    At the time of my evaluation, patient says her breathing is much improved; nasal cannula in place.      PAST MEDICAL & SURGICAL HISTORY:  Obesity  Pacemaker  Atrial fibrillation  Hypothyroidism  Venous stasis  IBS (irritable bowel syndrome)  CHF (congestive heart failure), NYHA class I  HLD (Hyperlipidemia)  HTN (Hypertension)  Diabetes  Myocardial Infarction  CAD (Coronary Atherosclerotic Disease)  Asthma  S/P coronary angiogram; stents      ALL: No Known Allergies    Meds:  acetaminophen   Tablet .. 650 milliGRAM(s) Oral every 6 hours PRN  acetaminophen  IVPB .. 1000 milliGRAM(s) IV Intermittent once PRN  albuterol/ipratropium for Nebulization 3 milliLiter(s) Nebulizer every 6 hours PRN  atorvastatin 40 milliGRAM(s) Oral at bedtime  budesonide 160 MICROgram(s)/formoterol 4.5 MICROgram(s) Inhaler 2 Puff(s) Inhalation two times a day  chlorhexidine 4% Liquid 1 Application(s) Topical daily  dexMEDEtomidine Infusion 0.2 MICROgram(s)/kG/Hr IV Continuous <Continuous>  dronedarone 400 milliGRAM(s) Oral two times a day  gabapentin 300 milliGRAM(s) Oral daily  insulin lispro (HumaLOG) corrective regimen sliding scale   SubCutaneous three times a day before meals  levothyroxine 175 MICROGram(s) Oral daily  montelukast 10 milliGRAM(s) Oral at bedtime  nitroglycerin    2% Ointment 1 Inch(s) Transdermal daily  ondansetron Injectable 4 milliGRAM(s) IV Push every 6 hours PRN  pantoprazole    Tablet 40 milliGRAM(s) Oral before breakfast  piperacillin/tazobactam IVPB.. 3.375 Gram(s) IV Intermittent every 8 hours  rivaroxaban 15 milliGRAM(s) Oral with dinner  vancomycin  IVPB 1000 milliGRAM(s) IV Intermittent every 12 hours      SOCIAL HISTORY: lives with son; ambulates with walker; emigrated from [facility] in ; worked in [employer] prior to emigration but has not worked outside home since then; denies H/O smoking, ETOH or illicit drug use    FAMILY HISTORY:  Family history of heart disease      REVIEW OF SYSTEMS    General: see HPI	    ENMT: no complaints	    Respiratory and Thorax: see HPI  	  Cardiovascular:	CAD, s/p stents, PPM, a fib    Gastrointestinal:	+ vomiting, no nausea, no diarrhea    Genitourinary:	incontinent; no dysuria     Musculoskeletal:	 dislocation R shoulder in past with chronic RUE pain    Neurological: no complaints	      VITALS:  Vital Signs Last 24 Hrs  T(C): 37.4 (2019 09:47), Max: 39.6 (2019 07:00)  T(F): 99.4 (2019 09:47), Max: 103.2 (2019 07:00)  HR: 61 (2019 12:12) (61 - 113)  BP: 131/51 (2019 12:00) (109/64 - 162/63)  BP(mean): 72 (2019 12:00) (55 - 127)  RR: 16 (2019 12:00) (16 - 35)  SpO2: 100% (2019 12:12) (98% - 100%)    PHYSICAL EXAM:    GEN: WD obese w female in NAD; nasal cannula present    HEENT: NC/AT; conj clear; EOMI    Neck: no palpable LNs    Respiratory: rales in R lower lung area    Cardiovascular: L upper chest scar for PPM with no erythema or swelling noted; S1 S2 regular; II-III/VI VINH heard throughout precordium    Gastrointestinal: obese; striae present; BS active; non-tender to palpation; erythema present under panniculus    Genitourinary: hansen in place    Rectal: no sue-rectal lesions or breakdown    Extremities: bilat leg stasis changes extending from ~ 6cm below patellae to above ankles with associated erythema, edema, and dried ulcers; not warm to touch and no drainage noted; no tenderness to palpation of areas; 2+ DP pulses bilat; no onychomycosis of toes    Neurological: A+O x3    Lymph Nodes: cd not palpate any enlarged LNs    Musculoskeletal: no erythema or warmth of any joints      LABS/DIAGNOSTIC TESTS:                        9.8    11.90 )-----------( 251      ( 2019 05:40 )             31.0     WBC Count: 11.90 K/uL ( @ 05:40)  WBC Count: 11.20 K/uL ( @ 13:35)          128<L>  |  96  |  20<H>  ----------------------------<  140<H>  4.6   |  24  |  1.36<H>    Ca    8.1<L>      2019 05:44  Phos  3.4       Mg     2.2         TPro  8.3  /  Alb  3.0<L>  /  TBili  0.6  /  DBili  x   /  AST  47<H>  /  ALT  44  /  AlkPhos  54        Urinalysis Basic - ( 2019 14:46 )    Color: Yellow / Appearance: Clear / S.010 / pH: x  Gluc: x / Ketone: Negative  / Bili: Negative / Urobili: 1   Blood: x / Protein: 30 mg/dL / Nitrite: Negative   Leuk Esterase: Negative / RBC: Negative /HPF / WBC 0-2 /HPF   Sq Epi: x / Non Sq Epi: Occasional /HPF / Bacteria: Negative /HPF      LIVER FUNCTIONS - ( 2019 05:44 )  Alb: 3.0 g/dL / Pro: 8.3 g/dL / ALK PHOS: 54 U/L / ALT: 44 U/L DA / AST: 47 U/L / GGT: x             PT/INR - ( 2019 07:00 )   PT: 23.7 sec;   INR: 2.09 ratio         PTT - ( 2019 07:00 )  PTT:38.0 sec    LACTATE:Lactate, Blood: 1.2 mmol/L ( @ 13:34)      ABG - ABG - ( 2019 05:34 )  pH, Arterial: 7.35  pH, Blood: x     /  pCO2: 41    /  pO2: 118   / HCO3: 22    / Base Excess: -2.7  /  SaO2: 98        Urinalysis (19 @ 14:46)    Glucose Qualitative, Urine: Negative    Blood, Urine: Negative    pH Urine: 5.0    Color: Yellow    Urine Appearance: Clear    Bilirubin: Negative    Ketone - Urine: Negative    Specific Gravity: 1.010    Protein, Urine: 30 mg/dL    Urobilinogen: 1    Nitrite: Negative    Leukocyte Esterase Concentration: Negative      CULTURES:     Culture - Blood (collected 19 @ 22:59)  Source: .Blood Blood-Venous  Final Report (19 @ 23:00):    No growth at 5 days.    Culture - Blood (collected 19 @ 22:59)  Source: .Blood Blood-Peripheral  Final Report (19 @ 23:00):    No growth at 5 days.    Culture - Urine (collected 19 @ 17:11)  Source: .Urine Clean Catch (Midstream)  Final Report (19 @ 18:10):    <10,000 CFU/mL Normal Urogenital Ema    Culture - Urine (collected 19 @ 23:08)  Source: .Urine Clean Catch (Midstream)  Final Report (19 @ 04:46):    >=3 organisms. Probable collection contamination.    Culture - Blood (collected 19 @ 22:09)  Source: .Blood Blood  Final Report (19 @ 23:01):    No growth at 5 days.    Culture - Blood (collected 19 @ 22:09)  Source: .Blood Blood  Final Report (19 @ 23:01):    No growth at 5 days.    RADIOLOGY  < from: Xray Chest 1 View-PORTABLE IMMEDIATE (19 @ 06:02) >  PROCEDURE DATE:  2019       INTERPRETATION:  EXAM:XR CHEST IMMEDIATE.     CLINICAL INDICATION: shortness of breath .     TECHNIQUE: Portable AP view.     PRIOR EXAM: 2019.     FINDINGS:      There is a new patchy opacity involving the right lung base medially   suggestive of an infiltrate. A small right pleural effusion is also   suspected. Visualized left lung remains clear. Magnified cardiac and   mediastinal silhouette is stable. A dual-lead pacemaker is again noted.      IMPRESSION:     New patchy right basilar opacity and a small right pleural effusion. HPI/Course in Hospital (Hx obtained in the presence of Dr. Mendoza, patient's daughter, and patient's grandson--all Qatari speaking):    78 y/o white female from home, ambulates with walker, lives with son, with PMHx of HFpEF, CAD (s/p stents), chronic venous stasis, PPM, DM, sleep apnea on bipap, HTN and Afib (on Xarelto) presents to the ED with chief complaints of fever as high as 38 (C), chills, cough without sputum production, and 3 episodes of vomiting (non-bloody) 2 days PTA. She has had multiple episodes of "cellulitis" in the past and says her current symptoms are similar. Further Hx reveals that patient has had several years of RLE red discoloration/swelling with a similar pattern on the LLE beginning last Dec. At the time of this admission, patient denied any sob, nausea, chest pain, palpitations, abdominal pain, diarrhea, dysuria. Of note, patient was recently an inpatient at [facility] (~2 wks ago) for nausea and vomiting and conservatively managed. GOC: Full code. (2019 15:45).    RRT called for respiratory distress  after admission. Patient seen and examined at bedside; in severe respiratory distress with abdominal breathing, saturating 100% on NRB. Vitals significant for tachypnea w/ RR: 41. Upon lung auscultation, bilateral expiratory/inspiratory wheezing appreciated. ABG w/o hypoxia/hypercapnia w/ pH WNL's. ECG: NSR VR@86bpm. Patient's respiratory status did not improve despite treatment with Lasix 60 mg IV + DUONEB x1. Bladder scan revealed 0 mL, which was inaccurate probably due morbid obesity. Hansen was placed with ~500 mL of urine. Patient was placed on BiPAP for work of breathing and transferred to ICU. Patient Dx with acute hypoxic respiratory failure thought to be 2/2 pulmonary edema. Of note is presence of new R lower lung infiltrate.    At the time of my evaluation, patient says her breathing is much improved; nasal cannula in place.      PAST MEDICAL & SURGICAL HISTORY:  Obesity  Pacemaker  Atrial fibrillation  Hypothyroidism  Venous stasis  IBS (irritable bowel syndrome)  CHF (congestive heart failure), NYHA class I  HLD (Hyperlipidemia)  HTN (Hypertension)  Diabetes  Myocardial Infarction  CAD (Coronary Atherosclerotic Disease)  Asthma  S/P coronary angiogram; stents      ALL: No Known Allergies    Meds:  acetaminophen   Tablet .. 650 milliGRAM(s) Oral every 6 hours PRN  acetaminophen  IVPB .. 1000 milliGRAM(s) IV Intermittent once PRN  albuterol/ipratropium for Nebulization 3 milliLiter(s) Nebulizer every 6 hours PRN  atorvastatin 40 milliGRAM(s) Oral at bedtime  budesonide 160 MICROgram(s)/formoterol 4.5 MICROgram(s) Inhaler 2 Puff(s) Inhalation two times a day  chlorhexidine 4% Liquid 1 Application(s) Topical daily  dexMEDEtomidine Infusion 0.2 MICROgram(s)/kG/Hr IV Continuous <Continuous>  dronedarone 400 milliGRAM(s) Oral two times a day  gabapentin 300 milliGRAM(s) Oral daily  insulin lispro (HumaLOG) corrective regimen sliding scale   SubCutaneous three times a day before meals  levothyroxine 175 MICROGram(s) Oral daily  montelukast 10 milliGRAM(s) Oral at bedtime  nitroglycerin    2% Ointment 1 Inch(s) Transdermal daily  ondansetron Injectable 4 milliGRAM(s) IV Push every 6 hours PRN  pantoprazole    Tablet 40 milliGRAM(s) Oral before breakfast  piperacillin/tazobactam IVPB.. 3.375 Gram(s) IV Intermittent every 8 hours  rivaroxaban 15 milliGRAM(s) Oral with dinner  vancomycin  IVPB 1000 milliGRAM(s) IV Intermittent every 12 hours      SOCIAL HISTORY: lives with son; ambulates with walker; emigrated from [facility] in ; worked in sales prior to emigration but has not worked outside home since then; denies H/O smoking, ETOH or illicit drug use    FAMILY HISTORY:  Family history of heart disease      REVIEW OF SYSTEMS    General: see HPI	    ENMT: no complaints	    Respiratory and Thorax: see HPI  	  Cardiovascular:	CAD, s/p stents, PPM, a fib    Gastrointestinal:	+ vomiting, no nausea, no diarrhea    Genitourinary:	incontinent; no dysuria     Musculoskeletal:	 dislocation R shoulder in past with chronic RUE pain    Neurological: no complaints	      VITALS:  Vital Signs Last 24 Hrs  T(C): 37.4 (2019 09:47), Max: 39.6 (2019 07:00)  T(F): 99.4 (2019 09:47), Max: 103.2 (2019 07:00)  HR: 61 (2019 12:12) (61 - 113)  BP: 131/51 (2019 12:00) (109/64 - 162/63)  BP(mean): 72 (2019 12:00) (55 - 127)  RR: 16 (2019 12:00) (16 - 35)  SpO2: 100% (2019 12:12) (98% - 100%)    PHYSICAL EXAM:    GEN: WD obese w female in NAD; nasal cannula present    HEENT: NC/AT; conj clear; EOMI    Neck: no palpable LNs    Respiratory: rales in R lower lung area    Cardiovascular: L upper chest scar for PPM with no erythema or swelling noted; S1 S2 regular; II-III/VI VINH heard throughout precordium    Gastrointestinal: obese; striae present; BS active; non-tender to palpation; erythema present under panniculus    Genitourinary: hansen in place    Rectal: no sue-rectal lesions or breakdown    Extremities: bilat leg stasis changes extending from ~ 6cm below patellae to above ankles with associated erythema, edema, and dried ulcers; not warm to touch and no drainage noted; no tenderness to palpation of areas; 2+ DP pulses bilat; no onychomycosis of toes    Neurological: A+O x3    Lymph Nodes: cd not palpate any enlarged LNs    Musculoskeletal: no erythema or warmth of any joints      LABS/DIAGNOSTIC TESTS:                        9.8    11.90 )-----------( 251      ( 2019 05:40 )             31.0     WBC Count: 11.90 K/uL ( @ 05:40)  WBC Count: 11.20 K/uL ( @ 13:35)          128<L>  |  96  |  20<H>  ----------------------------<  140<H>  4.6   |  24  |  1.36<H>    Ca    8.1<L>      2019 05:44  Phos  3.4       Mg     2.2         TPro  8.3  /  Alb  3.0<L>  /  TBili  0.6  /  DBili  x   /  AST  47<H>  /  ALT  44  /  AlkPhos  54        Urinalysis Basic - ( 2019 14:46 )    Color: Yellow / Appearance: Clear / S.010 / pH: x  Gluc: x / Ketone: Negative  / Bili: Negative / Urobili: 1   Blood: x / Protein: 30 mg/dL / Nitrite: Negative   Leuk Esterase: Negative / RBC: Negative /HPF / WBC 0-2 /HPF   Sq Epi: x / Non Sq Epi: Occasional /HPF / Bacteria: Negative /HPF      LIVER FUNCTIONS - ( 2019 05:44 )  Alb: 3.0 g/dL / Pro: 8.3 g/dL / ALK PHOS: 54 U/L / ALT: 44 U/L DA / AST: 47 U/L / GGT: x             PT/INR - ( 2019 07:00 )   PT: 23.7 sec;   INR: 2.09 ratio         PTT - ( 2019 07:00 )  PTT:38.0 sec    LACTATE:Lactate, Blood: 1.2 mmol/L ( @ 13:34)      ABG - ABG - ( 2019 05:34 )  pH, Arterial: 7.35  pH, Blood: x     /  pCO2: 41    /  pO2: 118   / HCO3: 22    / Base Excess: -2.7  /  SaO2: 98        Urinalysis (19 @ 14:46)    Glucose Qualitative, Urine: Negative    Blood, Urine: Negative    pH Urine: 5.0    Color: Yellow    Urine Appearance: Clear    Bilirubin: Negative    Ketone - Urine: Negative    Specific Gravity: 1.010    Protein, Urine: 30 mg/dL    Urobilinogen: 1    Nitrite: Negative    Leukocyte Esterase Concentration: Negative      CULTURES:     Culture - Blood (collected 19 @ 22:59)  Source: .Blood Blood-Venous  Final Report (19 @ 23:00):    No growth at 5 days.    Culture - Blood (collected 19 @ 22:59)  Source: .Blood Blood-Peripheral  Final Report (19 @ 23:00):    No growth at 5 days.    Culture - Urine (collected 19 @ 17:11)  Source: .Urine Clean Catch (Midstream)  Final Report (19 @ 18:10):    <10,000 CFU/mL Normal Urogenital Ema    Culture - Urine (collected 19 @ 23:08)  Source: .Urine Clean Catch (Midstream)  Final Report (19 @ 04:46):    >=3 organisms. Probable collection contamination.    Culture - Blood (collected 19 @ 22:09)  Source: .Blood Blood  Final Report (19 @ 23:01):    No growth at 5 days.    Culture - Blood (collected 19 @ 22:09)  Source: .Blood Blood  Final Report (19 @ 23:01):    No growth at 5 days.    RADIOLOGY  < from: Xray Chest 1 View-PORTABLE IMMEDIATE (19 @ 06:02) >  PROCEDURE DATE:  2019       INTERPRETATION:  EXAM:XR CHEST IMMEDIATE.     CLINICAL INDICATION: shortness of breath .     TECHNIQUE: Portable AP view.     PRIOR EXAM: 2019.     FINDINGS:      There is a new patchy opacity involving the right lung base medially   suggestive of an infiltrate. A small right pleural effusion is also   suspected. Visualized left lung remains clear. Magnified cardiac and   mediastinal silhouette is stable. A dual-lead pacemaker is again noted.      IMPRESSION:     New patchy right basilar opacity and a small right pleural effusion.

## 2019-11-26 NOTE — CONSULT NOTE ADULT - PROBLEM SELECTOR RECOMMENDATION 9
2/2 pulmonary edema   ECG: NSR VR 83bpm, no ischemic changes. CE: T1: 0.085, T2: 0.095, T3: -ve  c/w BiPAP + Precedex- low threshold for intubation  c/w aggressive diuresis w/ Lasix  c/w Nitro-BID QD  Strict IO's + daily weight + 1L fluid restriction  Follow up TTE  Cardio consultation 2/2 pulmonary edema   ECG: NSR VR 83bpm, no ischemic changes. CE: T1: 0.085, T2: 0.095, T3: -ve  c/w BiPAP + Precedex- low threshold for intubation  c/w aggressive diuresis w/ Lasix  c/w Nitro-BID QD  Strict IO's + daily weight + 1L fluid restriction  Follow up TTE  Less likely to be PE as patient on Xarelto, however DOAC failure could be a possibility. CTA chest to be performed once patient stabilizes.  Cardio consultation

## 2019-11-26 NOTE — PROGRESS NOTE ADULT - ATTENDING COMMENTS
Respiratory failure  Hypercapnic  Hypoxemic  JOSE DANIEL   Pneumonia  Hospital acquired    Chronic lymphedema  with  stasis dermatitis    Morbid  obesity  ASHD Chronic  A.fib PPM   DM type 2    Difficulty walking   Continue  ICU monitoring  Management  as  prescribed

## 2019-11-26 NOTE — PROGRESS NOTE ADULT - SUBJECTIVE AND OBJECTIVE BOX
INTERVAL HPI/OVERNIGHT EVENTS: RRT     PRESSORS: [ ] YES [ ] NO  WHICH:    ANTIBIOTICS:                  DATE STARTED:  ANTIBIOTICS:                  DATE STARTED:  ANTIBIOTICS:                  DATE STARTED:    Antimicrobial:  piperacillin/tazobactam IVPB.. 3.375 Gram(s) IV Intermittent every 8 hours  vancomycin  IVPB 1000 milliGRAM(s) IV Intermittent every 12 hours    Cardiovascular:  dronedarone 400 milliGRAM(s) Oral two times a day  furosemide   Injectable 80 milliGRAM(s) IV Push every 8 hours  nitroglycerin    2% Ointment 1 Inch(s) Transdermal daily    Pulmonary:  albuterol/ipratropium for Nebulization 3 milliLiter(s) Nebulizer every 6 hours PRN  budesonide 160 MICROgram(s)/formoterol 4.5 MICROgram(s) Inhaler 2 Puff(s) Inhalation two times a day  montelukast 10 milliGRAM(s) Oral at bedtime    Hematalogic:  rivaroxaban 15 milliGRAM(s) Oral with dinner    Other:  acetaminophen   Tablet .. 650 milliGRAM(s) Oral every 6 hours PRN  acetaminophen  IVPB .. 1000 milliGRAM(s) IV Intermittent once PRN  atorvastatin 40 milliGRAM(s) Oral at bedtime  chlorhexidine 4% Liquid 1 Application(s) Topical daily  dexMEDEtomidine Infusion 0.2 MICROgram(s)/kG/Hr IV Continuous <Continuous>  gabapentin 300 milliGRAM(s) Oral daily  insulin lispro (HumaLOG) corrective regimen sliding scale   SubCutaneous three times a day before meals  levothyroxine 175 MICROGram(s) Oral daily  ondansetron Injectable 4 milliGRAM(s) IV Push every 6 hours PRN  pantoprazole    Tablet 40 milliGRAM(s) Oral before breakfast    acetaminophen   Tablet .. 650 milliGRAM(s) Oral every 6 hours PRN  acetaminophen  IVPB .. 1000 milliGRAM(s) IV Intermittent once PRN  albuterol/ipratropium for Nebulization 3 milliLiter(s) Nebulizer every 6 hours PRN  atorvastatin 40 milliGRAM(s) Oral at bedtime  budesonide 160 MICROgram(s)/formoterol 4.5 MICROgram(s) Inhaler 2 Puff(s) Inhalation two times a day  chlorhexidine 4% Liquid 1 Application(s) Topical daily  dexMEDEtomidine Infusion 0.2 MICROgram(s)/kG/Hr IV Continuous <Continuous>  dronedarone 400 milliGRAM(s) Oral two times a day  furosemide   Injectable 80 milliGRAM(s) IV Push every 8 hours  gabapentin 300 milliGRAM(s) Oral daily  insulin lispro (HumaLOG) corrective regimen sliding scale   SubCutaneous three times a day before meals  levothyroxine 175 MICROGram(s) Oral daily  montelukast 10 milliGRAM(s) Oral at bedtime  nitroglycerin    2% Ointment 1 Inch(s) Transdermal daily  ondansetron Injectable 4 milliGRAM(s) IV Push every 6 hours PRN  pantoprazole    Tablet 40 milliGRAM(s) Oral before breakfast  piperacillin/tazobactam IVPB.. 3.375 Gram(s) IV Intermittent every 8 hours  rivaroxaban 15 milliGRAM(s) Oral with dinner  vancomycin  IVPB 1000 milliGRAM(s) IV Intermittent every 12 hours    Drug Dosing Weight  Height (cm): 160 (2019 07:00)  Weight (kg): 130.1 (2019 07:00)  BMI (kg/m2): 50.8 (2019 07:00)  BSA (m2): 2.25 (2019 07:00)    CENTRAL LINE: [ ] YES [ ] NO  LOCATION:         TORRES: [ ] YES [ ] NO          A-LINE:  [ ] YES [ ] NO  LOCATION:             ICU Vital Signs Last 24 Hrs  T(C): 37.4 (2019 09:47), Max: 39.6 (2019 07:00)  T(F): 99.4 (2019 09:47), Max: 103.2 (2019 07:00)  HR: 67 (2019 11:00) (67 - 113)  BP: 126/48 (2019 11:00) (109/64 - 162/63)  BP(mean): 67 (2019 11:00) (55 - 127)  ABP: --  ABP(mean): --  RR: 18 (2019 11:00) (18 - 35)  SpO2: 99% (2019 11:00) (98% - 100%)      ABG - ( 2019 05:34 )  pH, Arterial: 7.35  pH, Blood: x     /  pCO2: 41    /  pO2: 118   / HCO3: 22    / Base Excess: -2.7  /  SaO2: 98                            REVIEW OF SYSTEMS:    CONSTITUTIONAL: No weakness, fevers or chills  NECK: No pain or stiffness  RESPIRATORY: No cough, wheezing, hemoptysis; No shortness of breath  CARDIOVASCULAR: No chest pain or palpitations  GASTROINTESTINAL: No abdominal or epigastric pain. No nausea, vomiting, No diarrhea or constipation. No melena or hematochezia.  GENITOURINARY: No dysuria, frequency or hematuria  NEUROLOGICAL: No numbness or weakness  All other review of systems is negative unless indicated above      PHYSICAL EXAM:    GENERAL: NAD, well-groomed, well-developed  EYES: EOMI, PERRLA,   NECK: Supple, No JVD; Normal thyroid; Trachea midline  NERVOUS SYSTEM:  Alert & Oriented X3,  Motor Strength 5/5 B/L upper and lower extremities; DTRs 2+ intact and symmetric  CHEST/LUNG: No rales, rhonchi, wheezing   HEART: Regular rate and rhythm; No murmurs,   ABDOMEN: Soft, Nontender, Nondistended; Bowel sounds present  EXTREMITIES:  2+ Peripheral Pulses, No clubbing, cyanosis, or edema        LABS:  CBC Full  -  ( 2019 05:40 )  WBC Count : 11.90 K/uL  RBC Count : 3.69 M/uL  Hemoglobin : 9.8 g/dL  Hematocrit : 31.0 %  Platelet Count - Automated : 251 K/uL  Mean Cell Volume : 84.0 fl  Mean Cell Hemoglobin : 26.6 pg  Mean Cell Hemoglobin Concentration : 31.6 gm/dL  Auto Neutrophil # : 9.04 K/uL  Auto Lymphocyte # : 1.43 K/uL  Auto Monocyte # : 0.60 K/uL  Auto Eosinophil # : 0.00 K/uL  Auto Basophil # : 0.00 K/uL  Auto Neutrophil % : 76.0 %  Auto Lymphocyte % : 12.0 %  Auto Monocyte % : 5.0 %  Auto Eosinophil % : 0.0 %  Auto Basophil % : 0.0 %        128<L>  |  96  |  20<H>  ----------------------------<  140<H>  4.6   |  24  |  1.36<H>    Ca    8.1<L>      2019 05:44  Phos  3.4       Mg     2.2         TPro  8.3  /  Alb  3.0<L>  /  TBili  0.6  /  DBili  x   /  AST  47<H>  /  ALT  44  /  AlkPhos  54  11-26    PT/INR - ( 2019 07:00 )   PT: 23.7 sec;   INR: 2.09 ratio         PTT - ( 2019 07:00 )  PTT:38.0 sec  Urinalysis Basic - ( 2019 14:46 )    Color: Yellow / Appearance: Clear / S.010 / pH: x  Gluc: x / Ketone: Negative  / Bili: Negative / Urobili: 1   Blood: x / Protein: 30 mg/dL / Nitrite: Negative   Leuk Esterase: Negative / RBC: Negative /HPF / WBC 0-2 /HPF   Sq Epi: x / Non Sq Epi: Occasional /HPF / Bacteria: Negative /HPF          RADIOLOGY & ADDITIONAL STUDIES REVIEWED:  ***    [ ]GOALS OF CARE DISCUSSION WITH PATIENT/FAMILY/PROXY:    CRITICAL CARE TIME SPENT: 35 minutes INTERVAL HPI/ OVERNIGHT EVENTS: Pt. was transferred from floor after RRT due to respiratory distress. On BIPAP support     PRESSORS: [ ] YES [x ] NO  WHICH:      Antimicrobial:  piperacillin/tazobactam IVPB.. 3.375 Gram(s) IV Intermittent every 8 hours   vancomycin  IVPB 1000 milliGRAM(s) IV Intermittent every 12 hours     Cardiovascular:  dronedarone 400 milliGRAM(s) Oral two times a day  furosemide   Injectable 80 milliGRAM(s) IV Push every 8 hours  nitroglycerin    2% Ointment 1 Inch(s) Transdermal daily    Pulmonary:  albuterol/ipratropium for Nebulization 3 milliLiter(s) Nebulizer every 6 hours PRN  budesonide 160 MICROgram(s)/formoterol 4.5 MICROgram(s) Inhaler 2 Puff(s) Inhalation two times a day  montelukast 10 milliGRAM(s) Oral at bedtime    Hematalogic:  rivaroxaban 15 milliGRAM(s) Oral with dinner    Other:  acetaminophen   Tablet .. 650 milliGRAM(s) Oral every 6 hours PRN  acetaminophen  IVPB .. 1000 milliGRAM(s) IV Intermittent once PRN  atorvastatin 40 milliGRAM(s) Oral at bedtime  chlorhexidine 4% Liquid 1 Application(s) Topical daily  dexMEDEtomidine Infusion 0.2 MICROgram(s)/kG/Hr IV Continuous <Continuous>  gabapentin 300 milliGRAM(s) Oral daily  insulin lispro (HumaLOG) corrective regimen sliding scale   SubCutaneous three times a day before meals  levothyroxine 175 MICROGram(s) Oral daily  ondansetron Injectable 4 milliGRAM(s) IV Push every 6 hours PRN  pantoprazole    Tablet 40 milliGRAM(s) Oral before breakfast    acetaminophen   Tablet .. 650 milliGRAM(s) Oral every 6 hours PRN  acetaminophen  IVPB .. 1000 milliGRAM(s) IV Intermittent once PRN  albuterol/ipratropium for Nebulization 3 milliLiter(s) Nebulizer every 6 hours PRN  atorvastatin 40 milliGRAM(s) Oral at bedtime  budesonide 160 MICROgram(s)/formoterol 4.5 MICROgram(s) Inhaler 2 Puff(s) Inhalation two times a day  chlorhexidine 4% Liquid 1 Application(s) Topical daily  dexMEDEtomidine Infusion 0.2 MICROgram(s)/kG/Hr IV Continuous <Continuous>  dronedarone 400 milliGRAM(s) Oral two times a day  furosemide   Injectable 80 milliGRAM(s) IV Push every 8 hours  gabapentin 300 milliGRAM(s) Oral daily  insulin lispro (HumaLOG) corrective regimen sliding scale   SubCutaneous three times a day before meals  levothyroxine 175 MICROGram(s) Oral daily  montelukast 10 milliGRAM(s) Oral at bedtime  nitroglycerin    2% Ointment 1 Inch(s) Transdermal daily  ondansetron Injectable 4 milliGRAM(s) IV Push every 6 hours PRN  pantoprazole    Tablet 40 milliGRAM(s) Oral before breakfast  piperacillin/tazobactam IVPB.. 3.375 Gram(s) IV Intermittent every 8 hours  rivaroxaban 15 milliGRAM(s) Oral with dinner  vancomycin  IVPB 1000 milliGRAM(s) IV Intermittent every 12 hours    Drug Dosing Weight  Height (cm): 160 (2019 07:00)  Weight (kg): 130.1 (2019 07:00)  BMI (kg/m2): 50.8 (2019 07:00)  BSA (m2): 2.25 (2019 07:00)    CENTRAL LINE: [ ] YES [x ] NO  LOCATION:         TORRES: [x ] YES [ ] NO          A-LINE:  [ ] YES [x ] NO  LOCATION:             ICU Vital Signs Last 24 Hrs  T(C): 37.4 (2019 09:47), Max: 39.6 (2019 07:00)  T(F): 99.4 (2019 09:47), Max: 103.2 (2019 07:00)  HR: 67 (2019 11:00) (67 - 113)  BP: 126/48 (2019 11:00) (109/64 - 162/63)  BP(mean): 67 (2019 11:00) (55 - 127)  ABP: --  ABP(mean): --  RR: 18 (2019 11:00) (18 - 35)  SpO2: 99% (2019 11:00) (98% - 100%)      ABG - ( 2019 05:34 )  pH, Arterial: 7.35  pH, Blood: x     /  pCO2: 41    /  pO2: 118   / HCO3: 22    / Base Excess: -2.7  /  SaO2: 98            PHYSICAL EXAM:    GENERAL: NAD, well-groomed, well-developed, obese  EYES: EOMI, PERRLA,   NECK: Supple, No JVD; Normal thyroid; Trachea midline  NERVOUS SYSTEM:  Alert & Oriented X3,  Motor Strength 5/5 B/L upper and lower extremities; DTRs 2+ intact and symmetric  CHEST/LUNG: decreased breath sound B/L  HEART: Regular rate and rhythm; No murmurs,   ABDOMEN: Soft, Nontender, Nondistended; Bowel sounds present  EXTREMITIES: +erythematous lower leg B/L, 2+ Peripheral Pulses, No clubbing, cyanosis, or edema        LABS:  CBC Full  -  ( 2019 05:40 )  WBC Count : 11.90 K/uL  RBC Count : 3.69 M/uL  Hemoglobin : 9.8 g/dL  Hematocrit : 31.0 %  Platelet Count - Automated : 251 K/uL  Mean Cell Volume : 84.0 fl  Mean Cell Hemoglobin : 26.6 pg  Mean Cell Hemoglobin Concentration : 31.6 gm/dL  Auto Neutrophil # : 9.04 K/uL  Auto Lymphocyte # : 1.43 K/uL  Auto Monocyte # : 0.60 K/uL  Auto Eosinophil # : 0.00 K/uL  Auto Basophil # : 0.00 K/uL  Auto Neutrophil % : 76.0 %  Auto Lymphocyte % : 12.0 %  Auto Monocyte % : 5.0 %  Auto Eosinophil % : 0.0 %  Auto Basophil % : 0.0 %        128<L>  |  96  |  20<H>  ----------------------------<  140<H>  4.6   |  24  |  1.36<H>    Ca    8.1<L>      2019 05:44  Phos  3.4       Mg     2.2         TPro  8.3  /  Alb  3.0<L>  /  TBili  0.6  /  DBili  x   /  AST  47<H>  /  ALT  44  /  AlkPhos  54      PT/INR - ( 2019 07:00 )   PT: 23.7 sec;   INR: 2.09 ratio         PTT - ( 2019 07:00 )  PTT:38.0 sec  Urinalysis Basic - ( 2019 14:46 )    Color: Yellow / Appearance: Clear / S.010 / pH: x  Gluc: x / Ketone: Negative  / Bili: Negative / Urobili: 1   Blood: x / Protein: 30 mg/dL / Nitrite: Negative   Leuk Esterase: Negative / RBC: Negative /HPF / WBC 0-2 /HPF   Sq Epi: x / Non Sq Epi: Occasional /HPF / Bacteria: Negative /HPF          RADIOLOGY & ADDITIONAL STUDIES REVIEWED: YES    [x ]GOALS OF CARE DISCUSSION WITH PATIENT/FAMILY/PROXY: FULL CARE    CRITICAL CARE TIME SPENT: 35 minutes INTERVAL HPI/ OVERNIGHT EVENTS: Pt. was transferred from floor after RRT due to respiratory distress requiring BIPAP.    PRESSORS: [ ] YES [x ] NO  WHICH:      Antimicrobial:  piperacillin/tazobactam IVPB.. 3.375 Gram(s) IV Intermittent every 8 hours   vancomycin  IVPB 1000 milliGRAM(s) IV Intermittent every 12 hours     Cardiovascular:  dronedarone 400 milliGRAM(s) Oral two times a day  furosemide   Injectable 80 milliGRAM(s) IV Push every 8 hours  nitroglycerin    2% Ointment 1 Inch(s) Transdermal daily    Pulmonary:  albuterol/ipratropium for Nebulization 3 milliLiter(s) Nebulizer every 6 hours PRN  budesonide 160 MICROgram(s)/formoterol 4.5 MICROgram(s) Inhaler 2 Puff(s) Inhalation two times a day  montelukast 10 milliGRAM(s) Oral at bedtime    Hematalogic:  rivaroxaban 15 milliGRAM(s) Oral with dinner    Other:  acetaminophen   Tablet .. 650 milliGRAM(s) Oral every 6 hours PRN  acetaminophen  IVPB .. 1000 milliGRAM(s) IV Intermittent once PRN  atorvastatin 40 milliGRAM(s) Oral at bedtime  chlorhexidine 4% Liquid 1 Application(s) Topical daily  dexMEDEtomidine Infusion 0.2 MICROgram(s)/kG/Hr IV Continuous <Continuous>  gabapentin 300 milliGRAM(s) Oral daily  insulin lispro (HumaLOG) corrective regimen sliding scale   SubCutaneous three times a day before meals  levothyroxine 175 MICROGram(s) Oral daily  ondansetron Injectable 4 milliGRAM(s) IV Push every 6 hours PRN  pantoprazole    Tablet 40 milliGRAM(s) Oral before breakfast    acetaminophen   Tablet .. 650 milliGRAM(s) Oral every 6 hours PRN  acetaminophen  IVPB .. 1000 milliGRAM(s) IV Intermittent once PRN  albuterol/ipratropium for Nebulization 3 milliLiter(s) Nebulizer every 6 hours PRN  atorvastatin 40 milliGRAM(s) Oral at bedtime  budesonide 160 MICROgram(s)/formoterol 4.5 MICROgram(s) Inhaler 2 Puff(s) Inhalation two times a day  chlorhexidine 4% Liquid 1 Application(s) Topical daily  dexMEDEtomidine Infusion 0.2 MICROgram(s)/kG/Hr IV Continuous <Continuous>  dronedarone 400 milliGRAM(s) Oral two times a day  furosemide   Injectable 80 milliGRAM(s) IV Push every 8 hours  gabapentin 300 milliGRAM(s) Oral daily  insulin lispro (HumaLOG) corrective regimen sliding scale   SubCutaneous three times a day before meals  levothyroxine 175 MICROGram(s) Oral daily  montelukast 10 milliGRAM(s) Oral at bedtime  nitroglycerin    2% Ointment 1 Inch(s) Transdermal daily  ondansetron Injectable 4 milliGRAM(s) IV Push every 6 hours PRN  pantoprazole    Tablet 40 milliGRAM(s) Oral before breakfast  piperacillin/tazobactam IVPB.. 3.375 Gram(s) IV Intermittent every 8 hours  rivaroxaban 15 milliGRAM(s) Oral with dinner  vancomycin  IVPB 1000 milliGRAM(s) IV Intermittent every 12 hours    Drug Dosing Weight  Height (cm): 160 (2019 07:00)  Weight (kg): 130.1 (2019 07:00)  BMI (kg/m2): 50.8 (2019 07:00)  BSA (m2): 2.25 (2019 07:00)    CENTRAL LINE: [ ] YES [x ] NO  LOCATION:         TORRES: [x ] YES [ ] NO          A-LINE:  [ ] YES [x ] NO  LOCATION:             ICU Vital Signs Last 24 Hrs  T(C): 37.4 (2019 09:47), Max: 39.6 (2019 07:00)  T(F): 99.4 (2019 09:47), Max: 103.2 (2019 07:00)  HR: 67 (2019 11:00) (67 - 113)  BP: 126/48 (2019 11:00) (109/64 - 162/63)  BP(mean): 67 (2019 11:00) (55 - 127)  ABP: --  ABP(mean): --  RR: 18 (2019 11:00) (18 - 35)  SpO2: 99% (2019 11:00) (98% - 100%)      ABG - ( 2019 05:34 )  pH, Arterial: 7.35  pH, Blood: x     /  pCO2: 41    /  pO2: 118   / HCO3: 22    / Base Excess: -2.7  /  SaO2: 98            PHYSICAL EXAM:    GENERAL: NAD, well-groomed, well-developed, obese  EYES: EOMI, PERRLA,   NECK: Supple, No JVD; Normal thyroid; Trachea midline  NERVOUS SYSTEM:  Alert, awake, follows commands  CHEST/LUNG: decreased breath sound B/L  HEART: Regular rate and rhythm; No murmurs,   ABDOMEN: Soft, Nontender, Nondistended; Bowel sounds present  EXTREMITIES: +erythematous lower leg B/L, 2+ Peripheral Pulses, No clubbing, cyanosis, or edema        LABS:  CBC Full  -  ( 2019 05:40 )  WBC Count : 11.90 K/uL  RBC Count : 3.69 M/uL  Hemoglobin : 9.8 g/dL  Hematocrit : 31.0 %  Platelet Count - Automated : 251 K/uL  Mean Cell Volume : 84.0 fl  Mean Cell Hemoglobin : 26.6 pg  Mean Cell Hemoglobin Concentration : 31.6 gm/dL  Auto Neutrophil # : 9.04 K/uL  Auto Lymphocyte # : 1.43 K/uL  Auto Monocyte # : 0.60 K/uL  Auto Eosinophil # : 0.00 K/uL  Auto Basophil # : 0.00 K/uL  Auto Neutrophil % : 76.0 %  Auto Lymphocyte % : 12.0 %  Auto Monocyte % : 5.0 %  Auto Eosinophil % : 0.0 %  Auto Basophil % : 0.0 %        128<L>  |  96  |  20<H>  ----------------------------<  140<H>  4.6   |  24  |  1.36<H>    Ca    8.1<L>      2019 05:44  Phos  3.4       Mg     2.2         TPro  8.3  /  Alb  3.0<L>  /  TBili  0.6  /  DBili  x   /  AST  47<H>  /  ALT  44  /  AlkPhos  54      PT/INR - ( 2019 07:00 )   PT: 23.7 sec;   INR: 2.09 ratio         PTT - ( 2019 07:00 )  PTT:38.0 sec  Urinalysis Basic - ( 2019 14:46 )    Color: Yellow / Appearance: Clear / S.010 / pH: x  Gluc: x / Ketone: Negative  / Bili: Negative / Urobili: 1   Blood: x / Protein: 30 mg/dL / Nitrite: Negative   Leuk Esterase: Negative / RBC: Negative /HPF / WBC 0-2 /HPF   Sq Epi: x / Non Sq Epi: Occasional /HPF / Bacteria: Negative /HPF          RADIOLOGY & ADDITIONAL STUDIES REVIEWED: YES    [x ]GOALS OF CARE DISCUSSION WITH PATIENT/FAMILY/PROXY: FULL CARE    CRITICAL CARE TIME SPENT: 35 minutes

## 2019-11-26 NOTE — CONSULT NOTE ADULT - PROBLEM SELECTOR RECOMMENDATION 8
IMPROVE VTE Individual Risk Assessment          RISK                                                          Points  [  ] Previous VTE                                                3  [  ] Thrombophilia                                             2  [  ] Lower limb paralysis                                   2        (unable to hold up >15 seconds)    [  ] Current Cancer                                             2         (within 6 months)  [ x ] Immobilization > 24 hrs                              1  [x  ] ICU/CCU stay > 24 hours                             1  [x  ] Age > 60                                                         1    IMPROVE VTE Score: 3  c/w Xarelto

## 2019-11-26 NOTE — CONSULT NOTE ADULT - ASSESSMENT
79F w/ Hx chronic afib (on Xarelto), diastolic heart failure admitted to ICU for acute hypoxic respiratory failure 2/2 pulmonary edema- requiring NIV 79F w/ Hx chronic afib (on Xarelto), diastolic heart failure, CAD, asthma,  admitted to ICU for acute hypoxic respiratory failure 2/2 pulmonary edema- requiring NIV

## 2019-11-26 NOTE — CONSULT NOTE ADULT - ASSESSMENT
HPI:  80 y/o Greenlandic-speaking female from home, ambulates with walker, lives with son, with PMHx of HFpEF, CAD (s/p stents), chronic venous stasis, DM, HTN and Afib (on Xarelto, s/p PPM) presents to the ED with chief complaint of fever and chills x 2 days, pneumonia.  1.Bipap as per ICU.  2.Pneumonia-ABX.  3.PPM-[facility],please interrogate.  4.PAF-xarelto,multaq,lopressor.  5.Hypothyroidism-synthroid.  6.D/C lasix for now, high risk for SRIDHAR since on vanco and zosyn.  7.DM-Insulin.  8.Echocardiogram.  9.Asthma-nebs,steroids.  10.PPI.

## 2019-11-26 NOTE — CONSULT NOTE ADULT - SUBJECTIVE AND OBJECTIVE BOX
ICU Vital Signs Last 24 Hrs  T(C): 37.2 (26 Nov 2019 16:31), Max: 39.6 (26 Nov 2019 07:00)  T(F): 99 (26 Nov 2019 16:31), Max: 103.2 (26 Nov 2019 07:00)  HR: 65 (26 Nov 2019 20:39) (60 - 113)  BP: 151/60 (26 Nov 2019 20:00) (109/64 - 153/118)  BP(mean): 82 (26 Nov 2019 20:00) (55 - 127)  ABP: --  ABP(mean): --  RR: 21 (26 Nov 2019 20:00) (16 - 35)  SpO2: 99% (26 Nov 2019 20:39) (94% - 100%)                        9.8    11.90 )-----------( 251      ( 26 Nov 2019 05:40 )             31.0   11-26    128<L>  |  96  |  20<H>  ----------------------------<  140<H>  4.6   |  24  |  1.36<H>    Ca    8.1<L>      26 Nov 2019 05:44  Phos  3.4     11-26  Mg     2.2     11-26    TPro  8.3  /  Alb  3.0<L>  /  TBili  0.6  /  DBili  x   /  AST  47<H>  /  ALT  44  /  AlkPhos  54  11-26  PAST MEDICAL & SURGICAL HISTORY:  Obesity  Pacemaker  Atrial fibrillation  Hypothyroidism  Venous stasis  IBS (irritable bowel syndrome)  CHF (congestive heart failure), NYHA class I  HLD (Hyperlipidemia)  HTN (Hypertension)  Diabetes  Myocardial Infarction  CAD (Coronary Atherosclerotic Disease)  Asthma  S/P coronary angiogram  Allergies    No Known Allergies    Intolerances    MEDICATIONS  (STANDING):  atorvastatin 40 milliGRAM(s) Oral at bedtime  azithromycin  IVPB 500 milliGRAM(s) IV Intermittent every 24 hours  budesonide 160 MICROgram(s)/formoterol 4.5 MICROgram(s) Inhaler 2 Puff(s) Inhalation two times a day  cefepime   IVPB 1000 milliGRAM(s) IV Intermittent every 8 hours  chlorhexidine 4% Liquid 1 Application(s) Topical daily  dexMEDEtomidine Infusion 0.2 MICROgram(s)/kG/Hr (6.35 mL/Hr) IV Continuous <Continuous>  dronedarone 400 milliGRAM(s) Oral two times a day  gabapentin 300 milliGRAM(s) Oral daily  insulin lispro (HumaLOG) corrective regimen sliding scale   SubCutaneous three times a day before meals  levothyroxine 175 MICROGram(s) Oral daily  montelukast 10 milliGRAM(s) Oral at bedtime  nitroglycerin    2% Ointment 1 Inch(s) Transdermal daily  pantoprazole    Tablet 40 milliGRAM(s) Oral before breakfast  rivaroxaban 15 milliGRAM(s) Oral with dinner    MEDICATIONS  (PRN):  acetaminophen   Tablet .. 650 milliGRAM(s) Oral every 6 hours PRN Temp greater or equal to 38C (100.4F), Mild Pain (1 - 3)  acetaminophen  IVPB .. 1000 milliGRAM(s) IV Intermittent once PRN Temp greater or equal to 38C (100.4F)  albuterol/ipratropium for Nebulization 3 milliLiter(s) Nebulizer every 6 hours PRN Shortness of Breath and/or Wheezing  ondansetron Injectable 4 milliGRAM(s) IV Push every 6 hours PRN Nausea and/or Vomiting 78 y/o white female from home, ambulates with walker, lives with son, with PMHx of HFpEF, CAD (s/p stents), chronic venous stasis, PPM, DM, sleep apnea on bipap, HTN and Afib (on Xarelto) presents to the ED with chief complaints of fever as high as 38 (C), chills, cough without sputum production, and 3 episodes of vomiting (non-bloody) 2 days PTA. She has had multiple episodes of "cellulitis" in the past and says her current symptoms are similar. Further Hx reveals that patient has had several years of RLE red discoloration/swelling with a similar pattern on the LLE beginning last Dec. At the time of this admission, patient denied any sob, nausea, chest pain, palpitations, abdominal pain, diarrhea, dysuria. Of note, patient was recently an inpatient at [facility] (~2 wks ago) for nausea and vomiting and conservatively managed. GOC: Full code. (25 Nov 2019 15:45).    RRT called for respiratory distress 11/25 after admission. Patient seen and examined at bedside; in severe respiratory distress with abdominal breathing, saturating 100% on NRB. Vitals significant for tachypnea w/ RR: 41. Upon lung auscultation, bilateral expiratory/inspiratory wheezing appreciated. ABG w/o hypoxia/hypercapnia w/ pH WNL's. ECG: NSR VR@86bpm. Patient's respiratory status did not improve despite treatment with Lasix 60 mg IV + DUONEB x1. Bladder scan revealed 0 mL, which was inaccurate probably due morbid obesity. Ashton was placed with ~500 mL of urine. Patient was placed on BiPAP for work of breathing and transferred to ICU. Patient Dx with acute hypoxic respiratory failure thought to be 2/2 pulmonary edema. Of note is presence of new R lower lung infiltrate.ICU Vital Signs Last 24 Hrs    T(C): 37.2 (26 Nov 2019 16:31), Max: 39.6 (26 Nov 2019 07:00)  T(F): 99 (26 Nov 2019 16:31), Max: 103.2 (26 Nov 2019 07:00)  HR: 65 (26 Nov 2019 20:39) (60 - 113)  BP: 151/60 (26 Nov 2019 20:00) (109/64 - 153/118)  BP(mean): 82 (26 Nov 2019 20:00) (55 - 127)  ABP: --  ABP(mean): --  RR: 21 (26 Nov 2019 20:00) (16 - 35)  SpO2: 99% (26 Nov 2019 20:39) (94% - 100%)                        9.8    11.90 )-----------( 251      ( 26 Nov 2019 05:40 )             31.0   11-26    128<L>  |  96  |  20<H>  ----------------------------<  140<H>  4.6   |  24  |  1.36<H>    Ca    8.1<L>      26 Nov 2019 05:44  Phos  3.4     11-26  Mg     2.2     11-26    TPro  8.3  /  Alb  3.0<L>  /  TBili  0.6  /  DBili  x   /  AST  47<H>  /  ALT  44  /  AlkPhos  54  11-26  PAST MEDICAL & SURGICAL HISTORY:  Obesity  Pacemaker  Atrial fibrillation  Hypothyroidism  Venous stasis  IBS (irritable bowel syndrome)  CHF (congestive heart failure), NYHA class I  HLD (Hyperlipidemia)  HTN (Hypertension)  Diabetes  Myocardial Infarction  CAD (Coronary Atherosclerotic Disease)  Asthma  S/P coronary angiogram  Allergies    No Known Allergies    Intolerances    MEDICATIONS  (STANDING):  atorvastatin 40 milliGRAM(s) Oral at bedtime  azithromycin  IVPB 500 milliGRAM(s) IV Intermittent every 24 hours  budesonide 160 MICROgram(s)/formoterol 4.5 MICROgram(s) Inhaler 2 Puff(s) Inhalation two times a day  cefepime   IVPB 1000 milliGRAM(s) IV Intermittent every 8 hours  chlorhexidine 4% Liquid 1 Application(s) Topical daily  dexMEDEtomidine Infusion 0.2 MICROgram(s)/kG/Hr (6.35 mL/Hr) IV Continuous <Continuous>  dronedarone 400 milliGRAM(s) Oral two times a day  gabapentin 300 milliGRAM(s) Oral daily  insulin lispro (HumaLOG) corrective regimen sliding scale   SubCutaneous three times a day before meals  levothyroxine 175 MICROGram(s) Oral daily  montelukast 10 milliGRAM(s) Oral at bedtime  nitroglycerin    2% Ointment 1 Inch(s) Transdermal daily  pantoprazole    Tablet 40 milliGRAM(s) Oral before breakfast  rivaroxaban 15 milliGRAM(s) Oral with dinner    MEDICATIONS  (PRN):  acetaminophen   Tablet .. 650 milliGRAM(s) Oral every 6 hours PRN Temp greater or equal to 38C (100.4F), Mild Pain (1 - 3)  acetaminophen  IVPB .. 1000 milliGRAM(s) IV Intermittent once PRN Temp greater or equal to 38C (100.4F)  albuterol/ipratropium for Nebulization 3 milliLiter(s) Nebulizer every 6 hours PRN Shortness of Breath and/or Wheezing  ondansetron Injectable 4 milliGRAM(s) IV Push every 6 hours PRN Nausea and/or Vomiting  pt seen and evaluated at bedside , family members are at bedside   pt is AAO x3   exam focused lower extremities :    left and right lower extremities erythema , with skin excoriations , no open skin lesions , no drainage,   there is a darker skin discoloration on the right leg more proximal to erythema -as per family the pt developed it few years ago   DP-palpable left and right foot   no open skin lesions

## 2019-11-26 NOTE — CONSULT NOTE ADULT - SUBJECTIVE AND OBJECTIVE BOX
Patient is a 79y old  Female who presents with a chief complaint of b/l LE cellulitis (2019 15:45)      HPI:  78 y/o [place]ian-speaking female from home, ambulates with walker, lives with son, with PMHx of HFpEF, CAD (s/p stents), chronic venous stasis, DM, HTN and Afib (on Xarelto, s/p PPM) presents to the ED with chief complaint of fever and chills x 2 days. Daughter at bedside provides translation. Patient also reports 1 episode of vomiting today. She has had multiple episodes of cellulitis in the past and says her current symptoms are similar. She denies any sob, nausea, vomiting, chest pain or any other symptoms at present. Of note, patient was recently admitted to [facility] for nausea and vomiting and was conservatively managed. Admitted to medicine for LE cellulitis treated with Zosyn and Vanco. Also found to have hyponatremia for which she was receiving IVF.    GOC: Full code. (2019 15:45)    Interval HPI: RRT called for respiratory distress. Patient seen and examined at bedside, in severe respiratory distress with abdominal breathing, saturating 100% on NRB. Vitals significant for tachypnea w/ RR: 41. Upon lung auscultation, bilateral expiratory/inspiratory wheezing appreciated. ABG w/o hypoxia/hypercapnia w/ pH WNL's. ECG: NSR VR@86bpm.     Patient respiratory status did not improve despite treatment with Lasix 60 mg IV + DUONEB x1. Bladder scan revealed 0 mL, which was inaccurate probably due morbid obesity. Ashton was placed with ~500 mL of urine. Patient was placed on BiPAP for work of breathing and transferred to ICU.    Allergies: NKDA        MEDICATIONS  (STANDING):  atorvastatin 40 milliGRAM(s) Oral at bedtime  budesonide 160 MICROgram(s)/formoterol 4.5 MICROgram(s) Inhaler 2 Puff(s) Inhalation two times a day  dronedarone 400 milliGRAM(s) Oral two times a day  gabapentin 300 milliGRAM(s) Oral daily  insulin lispro (HumaLOG) corrective regimen sliding scale   SubCutaneous three times a day before meals  labetalol 100 milliGRAM(s) Oral two times a day  levothyroxine 175 MICROGram(s) Oral daily  losartan 100 milliGRAM(s) Oral daily  methylPREDNISolone sodium succinate Injectable 40 milliGRAM(s) IV Push once  montelukast 10 milliGRAM(s) Oral at bedtime  oxybutynin 10 milliGRAM(s) Oral two times a day  pancrelipase  (CREON  6,000 Lipase Units) 4 Capsule(s) Oral three times a day with meals  pantoprazole    Tablet 40 milliGRAM(s) Oral before breakfast  piperacillin/tazobactam IVPB.. 3.375 Gram(s) IV Intermittent every 8 hours  rivaroxaban 15 milliGRAM(s) Oral with dinner  torsemide 20 milliGRAM(s) Oral daily  vancomycin  IVPB 1000 milliGRAM(s) IV Intermittent every 12 hours    MEDICATIONS  (PRN):  acetaminophen   Tablet .. 650 milliGRAM(s) Oral every 6 hours PRN Temp greater or equal to 38C (100.4F), Mild Pain (1 - 3)  acetaminophen  IVPB .. 1000 milliGRAM(s) IV Intermittent once PRN Temp greater or equal to 38C (100.4F)  albuterol/ipratropium for Nebulization 3 milliLiter(s) Nebulizer every 6 hours PRN Shortness of Breath and/or Wheezing  LORazepam     Tablet 0.5 milliGRAM(s) Oral two times a day PRN Anxiety  zolpidem 5 milliGRAM(s) Oral at bedtime PRN Insomnia      Daily Height in cm: 160.02 (2019 11:39)    Daily Weight in k.2 (2019 04:40)    Drug Dosing Weight  Height (cm): 160.02 (2019 11:39)  Weight (kg): 127 (2019 11:39)  BMI (kg/m2): 49.6 (2019 11:39)  BSA (m2): 2.23 (2019 11:39)    PAST MEDICAL & SURGICAL HISTORY:  Obesity  Pacemaker  Atrial fibrillation  Hypothyroidism  Venous stasis  IBS (irritable bowel syndrome)  CHF (congestive heart failure), NYHA class I  HLD (Hyperlipidemia)  HTN (Hypertension)  Diabetes  Myocardial Infarction  CAD (Coronary Atherosclerotic Disease)  Asthma  S/P coronary angiogram      FAMILY HISTORY:  Family history of heart disease      SOCIAL HISTORY: no etoh, no tobacco smoking    ADVANCE DIRECTIVES: full code    REVIEW OF SYSTEMS: unable to assess due to respiratory distress          ICU Vital Signs Last 24 Hrs  T(C): 36.5 (2019 04:40), Max: 39.2 (2019 14:00)  T(F): 97.7 (2019 04:40), Max: 102.5 (2019 14:00)  HR: 70 (2019 04:40) (70 - 88)  BP: 149/78 (2019 04:40) (132/68 - 162/63)  BP(mean): --  ABP: --  ABP(mean): --  RR: 18 (2019 04:40) (18 - 20)  SpO2: 100% (2019 04:40) (96% - 100%)      ABG - ( 2019 05:34 )  pH, Arterial: 7.35  pH, Blood: x     /  pCO2: 41    /  pO2: 118   / HCO3: 22    / Base Excess: -2.7  /  SaO2: 98                  I&O's Detail      PHYSICAL EXAM:    GENERAL: NAD, well-groomed, well-developed  HEAD:  Atraumatic, Normocephalic  EYES: EOMI, PERRLA, conjunctiva and sclera clear  ENMT: No tonsillar erythema, exudates, or enlargement; Moist mucous membranes, Good dentition, No lesions  NECK: Supple, No JVD, Normal thyroid  NERVOUS SYSTEM:  Alert & Oriented X3, Good concentration; Motor Strength 5/5 B/L upper and lower extremities; DTRs 2+ intact and symmetric  CHEST/LUNG: Clear to percussion bilaterally; No rales, rhonchi, wheezing, or rubs  HEART: Regular rate and rhythm; No murmurs, rubs, or gallops  ABDOMEN: Soft, Nontender, Nondistended; Bowel sounds present  EXTREMITIES:  2+ Peripheral Pulses, No clubbing, cyanosis, or edema  LYMPH: No lymphadenopathy noted  SKIN: No rashes or lesions    LABS:  CBC Full  -  ( 2019 05:40 )  WBC Count : 11.90 K/uL  RBC Count : 3.69 M/uL  Hemoglobin : 9.8 g/dL  Hematocrit : 31.0 %  Platelet Count - Automated : 251 K/uL  Mean Cell Volume : 84.0 fl  Mean Cell Hemoglobin : 26.6 pg  Mean Cell Hemoglobin Concentration : 31.6 gm/dL  Auto Neutrophil # : x  Auto Lymphocyte # : x  Auto Monocyte # : x  Auto Eosinophil # : x  Auto Basophil # : x  Auto Neutrophil % : x  Auto Lymphocyte % : x  Auto Monocyte % : x  Auto Eosinophil % : x  Auto Basophil % : x        129<L>  |  97  |  23<H>  ----------------------------<  81  4.2   |  25  |  1.32<H>    Ca    8.5      2019 13:35    TPro  8.1  /  Alb  3.1<L>  /  TBili  0.6  /  DBili  x   /  AST  36  /  ALT  33  /  AlkPhos  54      CAPILLARY BLOOD GLUCOSE      POCT Blood Glucose.: 142 mg/dL (2019 05:16)      Urinalysis Basic - ( 2019 14:46 )    Color: Yellow / Appearance: Clear / S.010 / pH: x  Gluc: x / Ketone: Negative  / Bili: Negative / Urobili: 1   Blood: x / Protein: 30 mg/dL / Nitrite: Negative   Leuk Esterase: Negative / RBC: Negative /HPF / WBC 0-2 /HPF   Sq Epi: x / Non Sq Epi: Occasional /HPF / Bacteria: Negative /HPF      CARDIAC MARKERS ( 2019 22:30 )  0.095 ng/mL / x     / 100 U/L / x     / <1.0 ng/mL  CARDIAC MARKERS ( 2019 13:35 )  0.083 ng/mL / x     / x     / x     / x              EKG:    ECHO, US:    RADIOLOGY:    CRITICAL CARE TIME SPENT: Patient is a 79y old  Female who presents with a chief complaint of b/l LE cellulitis (2019 15:45)      HPI:  78 y/o Burkinan-speaking female from home, ambulates with walker, lives with son, with PMHx of HFpEF, CAD (s/p stents), chronic venous stasis, DM, HTN and Afib (on Xarelto, s/p PPM) presents to the ED with chief complaint of fever and chills x 2 days. Daughter at bedside provides translation. Patient also reports 1 episode of vomiting today. She has had multiple episodes of cellulitis in the past and says her current symptoms are similar. She denies any sob, nausea, vomiting, chest pain or any other symptoms at present. Of note, patient was recently admitted to [facility] for nausea and vomiting and was conservatively managed.     Admitted to medicine for LE cellulitis treated with Zosyn and Vancomycin. Also found to have hyponatremia for which she was receiving IVF.    GOC: Full code. (2019 15:45)    Interval HPI: RRT called for respiratory distress. Patient seen and examined at bedside, in severe respiratory distress with abdominal breathing, saturating 100% on NRB. Vitals significant for tachypnea w/ RR: 41. Upon lung auscultation, bilateral expiratory/inspiratory wheezing appreciated. ABG w/o hypoxia/hypercapnia w/ pH WNL's. ECG: NSR VR@86bpm.     Patient respiratory status did not improve despite treatment with Lasix 60 mg IV + DUONEB x1. Bladder scan revealed 0 mL, which was inaccurate probably due morbid obesity. Ashton was placed with ~500 mL of urine. Patient was placed on BiPAP for work of breathing and transferred to ICU.    Allergies: NKDA        MEDICATIONS  (STANDING):  atorvastatin 40 milliGRAM(s) Oral at bedtime  budesonide 160 MICROgram(s)/formoterol 4.5 MICROgram(s) Inhaler 2 Puff(s) Inhalation two times a day  dronedarone 400 milliGRAM(s) Oral two times a day  gabapentin 300 milliGRAM(s) Oral daily  insulin lispro (HumaLOG) corrective regimen sliding scale   SubCutaneous three times a day before meals  labetalol 100 milliGRAM(s) Oral two times a day  levothyroxine 175 MICROGram(s) Oral daily  losartan 100 milliGRAM(s) Oral daily  methylPREDNISolone sodium succinate Injectable 40 milliGRAM(s) IV Push once  montelukast 10 milliGRAM(s) Oral at bedtime  oxybutynin 10 milliGRAM(s) Oral two times a day  pancrelipase  (CREON  6,000 Lipase Units) 4 Capsule(s) Oral three times a day with meals  pantoprazole    Tablet 40 milliGRAM(s) Oral before breakfast  piperacillin/tazobactam IVPB.. 3.375 Gram(s) IV Intermittent every 8 hours  rivaroxaban 15 milliGRAM(s) Oral with dinner  torsemide 20 milliGRAM(s) Oral daily  vancomycin  IVPB 1000 milliGRAM(s) IV Intermittent every 12 hours    MEDICATIONS  (PRN):  acetaminophen   Tablet .. 650 milliGRAM(s) Oral every 6 hours PRN Temp greater or equal to 38C (100.4F), Mild Pain (1 - 3)  acetaminophen  IVPB .. 1000 milliGRAM(s) IV Intermittent once PRN Temp greater or equal to 38C (100.4F)  albuterol/ipratropium for Nebulization 3 milliLiter(s) Nebulizer every 6 hours PRN Shortness of Breath and/or Wheezing  LORazepam     Tablet 0.5 milliGRAM(s) Oral two times a day PRN Anxiety  zolpidem 5 milliGRAM(s) Oral at bedtime PRN Insomnia      Daily Height in cm: 160.02 (2019 11:39)    Daily Weight in k.2 (2019 04:40)    Drug Dosing Weight  Height (cm): 160.02 (2019 11:39)  Weight (kg): 127 (2019 11:39)  BMI (kg/m2): 49.6 (2019 11:39)  BSA (m2): 2.23 (2019 11:39)    PAST MEDICAL & SURGICAL HISTORY:  Obesity  Pacemaker  Atrial fibrillation  Hypothyroidism  Venous stasis  IBS (irritable bowel syndrome)  CHF (congestive heart failure), NYHA class I  HLD (Hyperlipidemia)  HTN (Hypertension)  Diabetes  Myocardial Infarction  CAD (Coronary Atherosclerotic Disease)  Asthma  S/P coronary angiogram      FAMILY HISTORY:  Family history of heart disease      SOCIAL HISTORY: no ETOH, no tobacco smoking    ADVANCE DIRECTIVES: full code    REVIEW OF SYSTEMS: unable to assess due to respiratory distress          ICU Vital Signs Last 24 Hrs  T(C): 36.5 (2019 04:40), Max: 39.2 (2019 14:00)  T(F): 97.7 (2019 04:40), Max: 102.5 (2019 14:00)  HR: 70 (2019 04:40) (70 - 88)  BP: 149/78 (2019 04:40) (132/68 - 162/63)  BP(mean): --  ABP: --  ABP(mean): --  RR: 18 (2019 04:40) (18 - 20)  SpO2: 100% (2019 04:40) (96% - 100%)      ABG - ( 2019 05:34 )  pH, Arterial: 7.35  pH, Blood: x     /  pCO2: 41    /  pO2: 118   / HCO3: 22    / Base Excess: -2.7  /  SaO2: 98                  I&O's Detail      PHYSICAL EXAM:    GENERAL: morbidly obese female in severe respiratory distress  HEAD:  AT/NC  EYES: PERRLA, conjunctiva and sclera clear  NECK: Supple, No JVD, Normal thyroid  NERVOUS SYSTEM:  Alert, awake, follows commands  CHEST/LUNG: B/L expiratory/inspiratory wheezing  HEART: irregular rate and rhythm  ABDOMEN: Soft, NT, BS+  EXTREMITIES:  2+ Peripheral Pulses, pitting edema +2 LE B/L, venous stasis dermatitis B/L, erythema and warmth LE B/L      LABS:  CBC Full  -  ( 2019 05:40 )  WBC Count : 11.90 K/uL  RBC Count : 3.69 M/uL  Hemoglobin : 9.8 g/dL  Hematocrit : 31.0 %  Platelet Count - Automated : 251 K/uL  Mean Cell Volume : 84.0 fl  Mean Cell Hemoglobin : 26.6 pg  Mean Cell Hemoglobin Concentration : 31.6 gm/dL  Auto Neutrophil # : x  Auto Lymphocyte # : x  Auto Monocyte # : x  Auto Eosinophil # : x  Auto Basophil # : x  Auto Neutrophil % : x  Auto Lymphocyte % : x  Auto Monocyte % : x  Auto Eosinophil % : x  Auto Basophil % : x    11-25    129<L>  |  97  |  23<H>  ----------------------------<  81  4.2   |  25  |  1.32<H>    Ca    8.5      2019 13:35    TPro  8.1  /  Alb  3.1<L>  /  TBili  0.6  /  DBili  x   /  AST  36  /  ALT  33  /  AlkPhos  54  11-25    CAPILLARY BLOOD GLUCOSE      POCT Blood Glucose.: 142 mg/dL (2019 05:16)      Urinalysis Basic - ( 2019 14:46 )    Color: Yellow / Appearance: Clear / S.010 / pH: x  Gluc: x / Ketone: Negative  / Bili: Negative / Urobili: 1   Blood: x / Protein: 30 mg/dL / Nitrite: Negative   Leuk Esterase: Negative / RBC: Negative /HPF / WBC 0-2 /HPF   Sq Epi: x / Non Sq Epi: Occasional /HPF / Bacteria: Negative /HPF      CARDIAC MARKERS ( 2019 22:30 )  0.095 ng/mL / x     / 100 U/L / x     / <1.0 ng/mL  CARDIAC MARKERS ( 2019 13:35 )  0.083 ng/mL / x     / x     / x     / x                CRITICAL CARE TIME SPENT: 50 mins

## 2019-11-26 NOTE — PROGRESS NOTE ADULT - SUBJECTIVE AND OBJECTIVE BOX
Patient is a 79y old  Female who presents with a chief complaint of b/l LE cellulitis (2019 14:23)      INTERVAL HPI/OVERNIGHT EVENTS: patient  transferred  to ICU impending respiratory failure  started on BiPAP therapy  ID Cardiology evaluation appreciated  aptient  with  fever  chills  new  development  on CXR      T(C): 37.4 (19 @ 09:47), Max: 39.6 (19 @ 07:00)  HR: 61 (19 @ 12:12) (61 - 113)  BP: 131/51 (19 @ 12:00) (109/64 - 162/63)  RR: 16 (19 @ 12:00) (16 - 35)  SpO2: 100% (19 @ 12:12) (98% - 100%)  Wt(kg): --Vital Signs Last 24 Hrs  T(C): 37.4 (2019 09:47), Max: 39.6 (2019 07:00)  T(F): 99.4 (2019 09:47), Max: 103.2 (2019 07:00)  HR: 61 (2019 12:12) (61 - 113)  BP: 131/51 (2019 12:00) (109/64 - 162/63)  BP(mean): 72 (2019 12:00) (55 - 127)  RR: 16 (2019 12:00) (16 - 35)  SpO2: 100% (2019 12:12) (98% - 100%)  I&O's Summary    2019 07:01  -  2019 15:09  --------------------------------------------------------  IN: 284.8 mL / OUT: 2100 mL / NET: -1815.2 mL        LABS:                        9.8    11.90 )-----------( 251      ( 2019 05:40 )             31.0         128<L>  |  96  |  20<H>  ----------------------------<  140<H>  4.6   |  24  |  1.36<H>    Ca    8.1<L>      2019 05:44  Phos  3.4       Mg     2.2         TPro  8.3  /  Alb  3.0<L>  /  TBili  0.6  /  DBili  x   /  AST  47<H>  /  ALT  44  /  AlkPhos  54      PT/INR - ( 2019 07:00 )   PT: 23.7 sec;   INR: 2.09 ratio         PTT - ( 2019 07:00 )  PTT:38.0 sec  Urinalysis Basic - ( 2019 14:46 )    Color: Yellow / Appearance: Clear / S.010 / pH: x  Gluc: x / Ketone: Negative  / Bili: Negative / Urobili: 1   Blood: x / Protein: 30 mg/dL / Nitrite: Negative   Leuk Esterase: Negative / RBC: Negative /HPF / WBC 0-2 /HPF   Sq Epi: x / Non Sq Epi: Occasional /HPF / Bacteria: Negative /HPF      CAPILLARY BLOOD GLUCOSE      POCT Blood Glucose.: 290 mg/dL (2019 11:47)  POCT Blood Glucose.: 168 mg/dL (2019 07:41)  POCT Blood Glucose.: 142 mg/dL (2019 05:16)  POCT Blood Glucose.: 143 mg/dL (2019 16:43)      ABG - ( 2019 05:34 )  pH, Arterial: 7.35  pH, Blood: x     /  pCO2: 41    /  pO2: 118   / HCO3: 22    / Base Excess: -2.7  /  SaO2: 98        MEDICATIONS  (STANDING):  atorvastatin 40 milliGRAM(s) Oral at bedtime  azithromycin  IVPB 500 milliGRAM(s) IV Intermittent every 24 hours  budesonide 160 MICROgram(s)/formoterol 4.5 MICROgram(s) Inhaler 2 Puff(s) Inhalation two times a day  cefepime   IVPB 1000 milliGRAM(s) IV Intermittent every 8 hours  chlorhexidine 4% Liquid 1 Application(s) Topical daily  dexMEDEtomidine Infusion 0.2 MICROgram(s)/kG/Hr (6.35 mL/Hr) IV Continuous <Continuous>  dronedarone 400 milliGRAM(s) Oral two times a day  gabapentin 300 milliGRAM(s) Oral daily  insulin lispro (HumaLOG) corrective regimen sliding scale   SubCutaneous three times a day before meals  levothyroxine 175 MICROGram(s) Oral daily  montelukast 10 milliGRAM(s) Oral at bedtime  nitroglycerin    2% Ointment 1 Inch(s) Transdermal daily  pantoprazole    Tablet 40 milliGRAM(s) Oral before breakfast  rivaroxaban 15 milliGRAM(s) Oral with dinner    MEDICATIONS  (PRN):  acetaminophen   Tablet .. 650 milliGRAM(s) Oral every 6 hours PRN Temp greater or equal to 38C (100.4F), Mild Pain (1 - 3)  acetaminophen  IVPB .. 1000 milliGRAM(s) IV Intermittent once PRN Temp greater or equal to 38C (100.4F)  albuterol/ipratropium for Nebulization 3 milliLiter(s) Nebulizer every 6 hours PRN Shortness of Breath and/or Wheezing  ondansetron Injectable 4 milliGRAM(s) IV Push every 6 hours PRN Nausea and/or Vomiting  	        Urinalysis Basic - ( 2019 14:46 )    Color: Yellow / Appearance: Clear / S.010 / pH: x  Gluc: x / Ketone: Negative  / Bili: Negative / Urobili: 1   Blood: x / Protein: 30 mg/dL / Nitrite: Negative   Leuk Esterase: Negative / RBC: Negative /HPF / WBC 0-2 /HPF   Sq Epi: x / Non Sq Epi: Occasional /HPF / Bacteria: Negative /HPF      REVIEW OF SYSTEMS:  CONSTITUTIONAL: + fever, weight loss, + fatigue  EYES: No eye pain, visual disturbances, or discharge  ENMT:  No difficulty hearing, tinnitus, vertigo; No sinus or throat pain  NECK: No pain or stiffness  BREASTS: No pain, masses, or nipple discharge  RESPIRATORY: + cough, wheezing, chills or hemoptysis; + shortness of breath  CARDIOVASCULAR: + chest pain,+ palpitations, dizziness, ++ leg swelling  GASTROINTESTINAL: No abdominal or epigastric pain. No nausea, vomiting, or hematemesis; No diarrhea or constipation. No melena or hematochezia.  GENITOURINARY: No dysuria, frequency, hematuria, or incontinence  NEUROLOGICAL: No headaches, memory loss, loss of strength, numbness, or tremors  SKIN: No itching, burning, rashes, or lesions   LYMPH NODES: No enlarged glands  ENDOCRINE: No heat or cold intolerance; No hair loss  MUSCULOSKELETAL: No joint pain or swelling; No muscle, back, or extremity pain  PSYCHIATRIC: No depression, anxiety, mood swings, or difficulty sleeping  HEME/LYMPH: No easy bruising, or bleeding gums  ALLERGY AND IMMUNOLOGIC: No hives or eczema    RADIOLOGY & ADDITIONAL TESTS:    Imaging Personally Reviewed:  [ ] YES  [ ] NO    Consultant(s) Notes Reviewed:  [x ] YES  [ ] NO    PHYSICAL EXAM:  GENERAL: NAD, well-groomed, well-developed morbidly obese    HEAD:  Atraumatic, Normocephalic  EYES: EOMI, PERRLA, conjunctiva and sclera clear  ENMT: No tonsillar erythema, exudates, or enlargement; Moist mucous membranes, Good dentition, No lesions  NECK: Supple, No JVD, Normal thyroid  NERVOUS SYSTEM:  Alert & Oriented X3, Good concentration; Motor Strength 5/5 B/L upper and lower extremities; DTRs 2+ intact and symmetric  CHEST/LUNG: decrease  air  entry  audible  rales  base   PPM   HEART: Regular rate and rhythm; No murmurs, rubs, or gallops  ABDOMEN: Soft, Nontender, Nondistended; Bowel sounds present  EXTREMITIES:  2+ Peripheral Pulses, No clubbing, cyanosis, +2 +3 edema  lower extremities   edema  LYMPH: No lymphadenopathy noted  SKIN: Chronic stasis  changes    Care Discussed with Consultants/Other Providers [ x] YES  [ ] NO

## 2019-11-26 NOTE — PROGRESS NOTE ADULT - SUBJECTIVE AND OBJECTIVE BOX
INTERVAL HPI/OVERNIGHT EVENTS:       Antimicrobial:  piperacillin/tazobactam IVPB.. 3.375 Gram(s) IV Intermittent every 8 hours  vancomycin  IVPB 1000 milliGRAM(s) IV Intermittent every 12 hours    Cardiovascular:  dronedarone 400 milliGRAM(s) Oral two times a day  nitroglycerin    2% Ointment 1 Inch(s) Transdermal daily    Pulmonary:  albuterol/ipratropium for Nebulization 3 milliLiter(s) Nebulizer every 6 hours PRN  budesonide 160 MICROgram(s)/formoterol 4.5 MICROgram(s) Inhaler 2 Puff(s) Inhalation two times a day  montelukast 10 milliGRAM(s) Oral at bedtime    Hematalogic:  rivaroxaban 15 milliGRAM(s) Oral with dinner    Other:  acetaminophen   Tablet .. 650 milliGRAM(s) Oral every 6 hours PRN  acetaminophen  IVPB .. 1000 milliGRAM(s) IV Intermittent once PRN  atorvastatin 40 milliGRAM(s) Oral at bedtime  chlorhexidine 4% Liquid 1 Application(s) Topical daily  dexMEDEtomidine Infusion 0.2 MICROgram(s)/kG/Hr IV Continuous <Continuous>  gabapentin 300 milliGRAM(s) Oral daily  insulin lispro (HumaLOG) corrective regimen sliding scale   SubCutaneous three times a day before meals  levothyroxine 175 MICROGram(s) Oral daily  ondansetron Injectable 4 milliGRAM(s) IV Push every 6 hours PRN  pantoprazole    Tablet 40 milliGRAM(s) Oral before breakfast      Drug Dosing Weight  Height (cm): 160 (2019 07:00)  Weight (kg): 130.1 (2019 07:00)  BMI (kg/m2): 50.8 (2019 07:00)  BSA (m2): 2.25 (2019 07:00)    CENTRAL LINE: [ ] YES [x ] NO  LOCATION:   DATE INSERTED:    ASHT[place]: [ x] YES [ ] NO    DATE INSERTED:    A-LINE:  [ ] YES [ x] NO  LOCATION:   DATE INSERTED:    PMH/Social Hx/Fam Hx -reviewed admission note, no change since admission  PAST MEDICAL & SURGICAL HISTORY:  Obesity  Pacemaker  Atrial fibrillation  Hypothyroidism  Venous stasis  IBS (irritable bowel syndrome)  CHF (congestive heart failure), NYHA class I  HLD (Hyperlipidemia)  HTN (Hypertension)  Diabetes  Myocardial Infarction  CAD (Coronary Atherosclerotic Disease)  Asthma  S/P coronary angiogram      T(C): 37.4 (19 @ 09:47), Max: 39.6 (19 @ 07:00)  HR: 61 (19 @ 12:12)  BP: 131/51 (19 @ 12:00)  BP(mean): 72 (19 @ 12:00)  ABP: --  ABP(mean): --  RR: 16 (19 @ 12:00)  SpO2: 100% (19 @ 12:12)  Wt(kg): --    ABG - ( 2019 05:34 )  pH, Arterial: 7.35  pH, Blood: x     /  pCO2: 41    /  pO2: 118   / HCO3: 22    / Base Excess: -2.7  /  SaO2: 98                            PHYSICAL EXAM:    GENERAL: No signs of distress, comfortable  HEAD: Atraumatic, Normocephalic  EYES: EOMI, PERRLA  ENMT: No erythema, exudates, or enlargement, Moist mucous membranes  NECK: Supple, normal appearance, No JVD; [  ] central line (if applicable)  CHEST/LUNG: No chest deformity, fair bilateral air entry; No rales, rhonchi, wheezing; crackles  HEART: Regular rate and rhythm; No murmurs, rubs, or gallops;   ABDOMEN: Soft, Nontender, Nondistended; Bowel sounds present  EXTREMITIES:  + Peripheral Pulses, + b/l chronic skin changes of b/l le. 1 + edeman  NERVOUS SYSTEM: awake and alert   LYMPH: No lymphadenopathy noted  SKIN: No rashes or lesions; good turgor, warm, dry      LABS:  CBC Full  -  ( 2019 05:40 )  WBC Count : 11.90 K/uL  RBC Count : 3.69 M/uL  Hemoglobin : 9.8 g/dL  Hematocrit : 31.0 %  Platelet Count - Automated : 251 K/uL  Mean Cell Volume : 84.0 fl  Mean Cell Hemoglobin : 26.6 pg  Mean Cell Hemoglobin Concentration : 31.6 gm/dL  Auto Neutrophil # : 9.04 K/uL  Auto Lymphocyte # : 1.43 K/uL  Auto Monocyte # : 0.60 K/uL  Auto Eosinophil # : 0.00 K/uL  Auto Basophil # : 0.00 K/uL  Auto Neutrophil % : 76.0 %  Auto Lymphocyte % : 12.0 %  Auto Monocyte % : 5.0 %  Auto Eosinophil % : 0.0 %  Auto Basophil % : 0.0 %        128<L>  |  96  |  20<H>  ----------------------------<  140<H>  4.6   |  24  |  1.36<H>    Ca    8.1<L>      2019 05:44  Phos  3.4       Mg     2.2         TPro  8.3  /  Alb  3.0<L>  /  TBili  0.6  /  DBili  x   /  AST  47<H>  /  ALT  44  /  AlkPhos  54      PT/INR - ( 2019 07:00 )   PT: 23.7 sec;   INR: 2.09 ratio         PTT - ( 2019 07:00 )  PTT:38.0 sec  Urinalysis Basic - ( 2019 14:46 )    Color: Yellow / Appearance: Clear / S.010 / pH: x  Gluc: x / Ketone: Negative  / Bili: Negative / Urobili: 1   Blood: x / Protein: 30 mg/dL / Nitrite: Negative   Leuk Esterase: Negative / RBC: Negative /HPF / WBC 0-2 /HPF   Sq Epi: x / Non Sq Epi: Occasional /HPF / Bacteria: Negative /HPF          RADIOLOGY & ADDITIONAL STUDIES REVIEWED     CXR:< from: Xray Chest 1 View-PORTABLE IMMEDIATE (19 @ 06:02) >    EXAM:  XR CHEST PORTABLE IMMED 1V                            PROCEDURE DATE:  2019          INTERPRETATION:  EXAM:XR CHEST IMMEDIATE.     CLINICAL INDICATION: shortness of breath .     TECHNIQUE: Portable AP view.     PRIOR EXAM: 2019.     FINDINGS:      There is a new patchy opacity involving the right lung base medially   suggestive of an infiltrate. A small right pleural effusion is also   suspected. Visualized left lung remains clear. Magnified cardiac and   mediastinal silhouette is stable. A dual-lead pacemaker is again noted.      IMPRESSION:     New patchy right basilar opacity and a small right pleural effusion.                  BETTINA SALES M.D., ATTENDING RADIOLOGIST  This document has been electronically signed. 2019  9:02AM                < end of copied text >      IMPRESSION:  PAST MEDICAL & SURGICAL HISTORY:  Obesity  Pacemaker  Atrial fibrillation  Hypothyroidism  Venous stasis  IBS (irritable bowel syndrome)  CHF (congestive heart failure), NYHA class I  HLD (Hyperlipidemia)  HTN (Hypertension)  Diabetes  Myocardial Infarction  CAD (Coronary Atherosclerotic Disease)  Asthma  S/P coronary angiogram   p/w       IMP: This is an 79 yr old woman from home , ambulates with walker, lives with son, with PMHx of HFpEF, CAD (s/p stents), chronic venous stasis, DM, HTN and Afib (on Xarelto, s/p [facility] [facility] PPM) presents to the ED with chief complaint of fever and chills x 2 days with one episode of vomiting. She was admitted to medicine on  for LE cellulitis treated with Zosyn and Vancomycin. Also found to have hyponatremia for which she was receiving IVF. RRT called on  AM for respiratory distress with abdominal breathing, saturating 100% on NRB. Vitals significant for tachypnea w/ RR: 41. Upon lung auscultation, bilateral expiratory/inspiratory wheezing appreciated. ABG w/o hypoxia/hypercapnia w/ pH WNL's. ECG: NSR VR@86bpm. Patient respiratory status did not improve despite treatment with Lasix 60 mg IV + DUONEB x1. Bladder scan revealed 0 mL, which was inaccurate probably due morbid obesity. [place] was placed with ~500 mL of urine. Patient was placed on BiPAP for work of breathing and transferred to ICU for acute hypoxic resp failure due to pul edema requiring NIPPV support.  + HCAP.. pat was hospitiazipat was hospitalized recently.  Cardiac enzymes neg. Cards eval noted. ID eval pending but discussed care with Dr Jordy GODINEZ in icu      PLAN:  Neuro:  on precedex for anxiety    Cardio:  -HFpEF  ECHO on 2018 showed normal EF, GIDD, mild AS, mod. Pul, HTN  ECG: NSR VR 83bpm, no ischemic changes. CE: T1: 0.085, T2: 0.095, T3: -ve  c/w Nitro-BID QD   s/p lasix 80mg in AM  will hold lasix for now given SRIDHAR  Strict IO's + daily weight + 1L fluid restriction  Follow up TTE    -CAD (s/p stents)    -Afib (on Xarelto, s/p PPM)    Resp.:  -pna.. HCAP, pat was recently hospitalized   -iv antibx as per ID  -continue bipap  -Asthma  on nebs    GI:  NPO for now due to BIPAP    Nephro:  -SRIDHAR  Cr 1.36 today  -diuresis  -monitor I/O    SKIN: + b/l chronic le skin changes.. vascular and not infectious    GOALS OF CARE DISCUSSION WITH PATIENT/FAMILY/PROXY    CRITICAL CARE TIME SPENT: 35 minutes

## 2019-11-27 LAB
ALBUMIN SERPL ELPH-MCNC: 2.7 G/DL — LOW (ref 3.5–5)
ALP SERPL-CCNC: 45 U/L — SIGNIFICANT CHANGE UP (ref 40–120)
ALT FLD-CCNC: 36 U/L DA — SIGNIFICANT CHANGE UP (ref 10–60)
ANION GAP SERPL CALC-SCNC: 7 MMOL/L — SIGNIFICANT CHANGE UP (ref 5–17)
AST SERPL-CCNC: 25 U/L — SIGNIFICANT CHANGE UP (ref 10–40)
BASOPHILS # BLD AUTO: 0.02 K/UL — SIGNIFICANT CHANGE UP (ref 0–0.2)
BASOPHILS NFR BLD AUTO: 0.2 % — SIGNIFICANT CHANGE UP (ref 0–2)
BILIRUB SERPL-MCNC: 0.4 MG/DL — SIGNIFICANT CHANGE UP (ref 0.2–1.2)
BUN SERPL-MCNC: 28 MG/DL — HIGH (ref 7–18)
CALCIUM SERPL-MCNC: 8.3 MG/DL — LOW (ref 8.4–10.5)
CHLORIDE SERPL-SCNC: 98 MMOL/L — SIGNIFICANT CHANGE UP (ref 96–108)
CO2 SERPL-SCNC: 29 MMOL/L — SIGNIFICANT CHANGE UP (ref 22–31)
CREAT SERPL-MCNC: 1.3 MG/DL — SIGNIFICANT CHANGE UP (ref 0.5–1.3)
EOSINOPHIL # BLD AUTO: 0.12 K/UL — SIGNIFICANT CHANGE UP (ref 0–0.5)
EOSINOPHIL NFR BLD AUTO: 1.3 % — SIGNIFICANT CHANGE UP (ref 0–6)
GLUCOSE BLDC GLUCOMTR-MCNC: 175 MG/DL — HIGH (ref 70–99)
GLUCOSE BLDC GLUCOMTR-MCNC: 197 MG/DL — HIGH (ref 70–99)
GLUCOSE BLDC GLUCOMTR-MCNC: 258 MG/DL — HIGH (ref 70–99)
GLUCOSE BLDC GLUCOMTR-MCNC: 348 MG/DL — HIGH (ref 70–99)
GLUCOSE SERPL-MCNC: 169 MG/DL — HIGH (ref 70–99)
HCT VFR BLD CALC: 27.9 % — LOW (ref 34.5–45)
HGB BLD-MCNC: 8.9 G/DL — LOW (ref 11.5–15.5)
IMM GRANULOCYTES NFR BLD AUTO: 0.4 % — SIGNIFICANT CHANGE UP (ref 0–1.5)
LEGIONELLA AG UR QL: NEGATIVE — SIGNIFICANT CHANGE UP
LYMPHOCYTES # BLD AUTO: 1.57 K/UL — SIGNIFICANT CHANGE UP (ref 1–3.3)
LYMPHOCYTES # BLD AUTO: 16.6 % — SIGNIFICANT CHANGE UP (ref 13–44)
MAGNESIUM SERPL-MCNC: 2.1 MG/DL — SIGNIFICANT CHANGE UP (ref 1.6–2.6)
MCHC RBC-ENTMCNC: 26.1 PG — LOW (ref 27–34)
MCHC RBC-ENTMCNC: 31.9 GM/DL — LOW (ref 32–36)
MCV RBC AUTO: 81.8 FL — SIGNIFICANT CHANGE UP (ref 80–100)
MONOCYTES # BLD AUTO: 1.21 K/UL — HIGH (ref 0–0.9)
MONOCYTES NFR BLD AUTO: 12.8 % — SIGNIFICANT CHANGE UP (ref 2–14)
NEUTROPHILS # BLD AUTO: 6.48 K/UL — SIGNIFICANT CHANGE UP (ref 1.8–7.4)
NEUTROPHILS NFR BLD AUTO: 68.7 % — SIGNIFICANT CHANGE UP (ref 43–77)
NRBC # BLD: 0 /100 WBCS — SIGNIFICANT CHANGE UP (ref 0–0)
PHOSPHATE SERPL-MCNC: 3.7 MG/DL — SIGNIFICANT CHANGE UP (ref 2.5–4.5)
PLATELET # BLD AUTO: 211 K/UL — SIGNIFICANT CHANGE UP (ref 150–400)
POTASSIUM SERPL-MCNC: 3.7 MMOL/L — SIGNIFICANT CHANGE UP (ref 3.5–5.3)
POTASSIUM SERPL-SCNC: 3.7 MMOL/L — SIGNIFICANT CHANGE UP (ref 3.5–5.3)
PROT SERPL-MCNC: 7.4 G/DL — SIGNIFICANT CHANGE UP (ref 6–8.3)
RBC # BLD: 3.41 M/UL — LOW (ref 3.8–5.2)
RBC # FLD: 14.3 % — SIGNIFICANT CHANGE UP (ref 10.3–14.5)
SODIUM SERPL-SCNC: 134 MMOL/L — LOW (ref 135–145)
WBC # BLD: 9.44 K/UL — SIGNIFICANT CHANGE UP (ref 3.8–10.5)
WBC # FLD AUTO: 9.44 K/UL — SIGNIFICANT CHANGE UP (ref 3.8–10.5)

## 2019-11-27 PROCEDURE — 71045 X-RAY EXAM CHEST 1 VIEW: CPT | Mod: 26

## 2019-11-27 PROCEDURE — 99233 SBSQ HOSP IP/OBS HIGH 50: CPT | Mod: GC

## 2019-11-27 PROCEDURE — 99233 SBSQ HOSP IP/OBS HIGH 50: CPT

## 2019-11-27 RX ORDER — ZOLPIDEM TARTRATE 10 MG/1
5 TABLET ORAL AT BEDTIME
Refills: 0 | Status: DISCONTINUED | OUTPATIENT
Start: 2019-11-27 | End: 2019-12-03

## 2019-11-27 RX ORDER — INSULIN LISPRO 100/ML
VIAL (ML) SUBCUTANEOUS
Refills: 0 | Status: DISCONTINUED | OUTPATIENT
Start: 2019-11-27 | End: 2019-12-03

## 2019-11-27 RX ORDER — BACITRACIN ZINC NEOMYCIN SULFATE POLYMYXIN B SULFATE 400; 3.5; 5 [IU]/G; MG/G; [IU]/G
1 OINTMENT TOPICAL EVERY 12 HOURS
Refills: 0 | Status: DISCONTINUED | OUTPATIENT
Start: 2019-11-27 | End: 2019-12-03

## 2019-11-27 RX ORDER — AZITHROMYCIN 500 MG/1
500 TABLET, FILM COATED ORAL DAILY
Refills: 0 | Status: COMPLETED | OUTPATIENT
Start: 2019-11-27 | End: 2019-11-29

## 2019-11-27 RX ORDER — LIPASE/PROTEASE/AMYLASE 16-48-48K
4 CAPSULE,DELAYED RELEASE (ENTERIC COATED) ORAL
Refills: 0 | Status: DISCONTINUED | OUTPATIENT
Start: 2019-11-27 | End: 2019-12-03

## 2019-11-27 RX ORDER — LABETALOL HCL 100 MG
100 TABLET ORAL
Refills: 0 | Status: DISCONTINUED | OUTPATIENT
Start: 2019-11-27 | End: 2019-11-28

## 2019-11-27 RX ADMIN — AZITHROMYCIN 500 MILLIGRAM(S): 500 TABLET, FILM COATED ORAL at 11:47

## 2019-11-27 RX ADMIN — GABAPENTIN 300 MILLIGRAM(S): 400 CAPSULE ORAL at 11:47

## 2019-11-27 RX ADMIN — CHLORHEXIDINE GLUCONATE 1 APPLICATION(S): 213 SOLUTION TOPICAL at 11:47

## 2019-11-27 RX ADMIN — PANTOPRAZOLE SODIUM 40 MILLIGRAM(S): 20 TABLET, DELAYED RELEASE ORAL at 05:38

## 2019-11-27 RX ADMIN — CEFEPIME 100 MILLIGRAM(S): 1 INJECTION, POWDER, FOR SOLUTION INTRAMUSCULAR; INTRAVENOUS at 05:37

## 2019-11-27 RX ADMIN — MONTELUKAST 10 MILLIGRAM(S): 4 TABLET, CHEWABLE ORAL at 21:19

## 2019-11-27 RX ADMIN — CEFEPIME 100 MILLIGRAM(S): 1 INJECTION, POWDER, FOR SOLUTION INTRAMUSCULAR; INTRAVENOUS at 14:07

## 2019-11-27 RX ADMIN — RIVAROXABAN 15 MILLIGRAM(S): KIT at 18:00

## 2019-11-27 RX ADMIN — Medication 400 MILLIGRAM(S): at 07:13

## 2019-11-27 RX ADMIN — DRONEDARONE 400 MILLIGRAM(S): 400 TABLET, FILM COATED ORAL at 05:38

## 2019-11-27 RX ADMIN — Medication 20 MILLIGRAM(S): at 09:43

## 2019-11-27 RX ADMIN — Medication 1 APPLICATION(S): at 21:19

## 2019-11-27 RX ADMIN — Medication 3: at 11:46

## 2019-11-27 RX ADMIN — Medication 4: at 17:56

## 2019-11-27 RX ADMIN — Medication 4 CAPSULE(S): at 18:00

## 2019-11-27 RX ADMIN — Medication 1: at 06:02

## 2019-11-27 RX ADMIN — CEFEPIME 100 MILLIGRAM(S): 1 INJECTION, POWDER, FOR SOLUTION INTRAMUSCULAR; INTRAVENOUS at 21:18

## 2019-11-27 RX ADMIN — BUDESONIDE AND FORMOTEROL FUMARATE DIHYDRATE 2 PUFF(S): 160; 4.5 AEROSOL RESPIRATORY (INHALATION) at 09:46

## 2019-11-27 RX ADMIN — DRONEDARONE 400 MILLIGRAM(S): 400 TABLET, FILM COATED ORAL at 18:00

## 2019-11-27 RX ADMIN — Medication 1000 MILLIGRAM(S): at 08:35

## 2019-11-27 RX ADMIN — Medication 175 MICROGRAM(S): at 05:38

## 2019-11-27 RX ADMIN — Medication 4 CAPSULE(S): at 14:07

## 2019-11-27 RX ADMIN — Medication 24000 CAPSULE(S): at 10:30

## 2019-11-27 RX ADMIN — ATORVASTATIN CALCIUM 40 MILLIGRAM(S): 80 TABLET, FILM COATED ORAL at 21:19

## 2019-11-27 RX ADMIN — BUDESONIDE AND FORMOTEROL FUMARATE DIHYDRATE 2 PUFF(S): 160; 4.5 AEROSOL RESPIRATORY (INHALATION) at 22:21

## 2019-11-27 NOTE — PROGRESS NOTE ADULT - SUBJECTIVE AND OBJECTIVE BOX
CHIEF COMPLAINT:Patient is a 79y old  Female who presents with a chief complaint of fever,sob. Pt off Bipap.    	  REVIEW OF SYSTEMS:  CONSTITUTIONAL: No fever, weight loss, or fatigue  EYES: No eye pain, visual disturbances, or discharge  ENT:  No difficulty hearing, tinnitus, vertigo; No sinus or throat pain  NECK: No pain or stiffness  RESPIRATORY: No cough, wheezing, chills or hemoptysis; No Shortness of Breath  CARDIOVASCULAR: No chest pain, palpitations, passing out, dizziness, or leg swelling  GASTROINTESTINAL: No abdominal or epigastric pain. No nausea, vomiting, or hematemesis; No diarrhea or constipation. No melena or hematochezia.  GENITOURINARY: No dysuria, frequency, hematuria, or incontinence  NEUROLOGICAL: No headaches, memory loss, loss of strength, numbness, or tremors  SKIN: No itching, burning, rashes, or lesions   LYMPH Nodes: No enlarged glands  ENDOCRINE: No heat or cold intolerance; No hair loss  MUSCULOSKELETAL: No joint pain or swelling; No muscle, back, or extremity pain  PSYCHIATRIC: No depression, anxiety, mood swings, or difficulty sleeping  HEME/LYMPH: No easy bruising, or bleeding gums  ALLERGY AND IMMUNOLOGIC: No hives or eczema	    PHYSICAL EXAM:  T(C): 36.7 (11-27-19 @ 12:00), Max: 38.5 (11-27-19 @ 07:00)  HR: 73 (11-27-19 @ 12:00) (60 - 79)  BP: 131/44 (11-27-19 @ 12:00) (103/49 - 153/63)  RR: 25 (11-27-19 @ 12:00) (17 - 25)  SpO2: 99% (11-27-19 @ 12:00) (94% - 100%)  Wt(kg): --  I&O's Summary    26 Nov 2019 07:01  -  27 Nov 2019 07:00  --------------------------------------------------------  IN: 604.4 mL / OUT: 6500 mL / NET: -5895.6 mL    27 Nov 2019 07:01  -  27 Nov 2019 12:30  --------------------------------------------------------  IN: 0 mL / OUT: 175 mL / NET: -175 mL        Appearance: Normal	  HEENT:   Normal oral mucosa, PERRL, EOMI	  Lymphatic: No lymphadenopathy  Cardiovascular: Normal S1 S2, No JVD, No murmurs, +1 edema  Respiratory: Dec BS at bases  Psychiatry: A & O x 3, Mood & affect appropriate  Gastrointestinal:  Soft, Non-tender, + BS	  Skin: No rashes, No ecchymoses, No cyanosis	  Neurologic: Non-focal  Extremities: Normal range of motion, No clubbing, cyanosis +1 edema  Vascular: Peripheral pulses palpable 2+ bilaterally    MEDICATIONS  (STANDING):  atorvastatin 40 milliGRAM(s) Oral at bedtime  azithromycin   Tablet 500 milliGRAM(s) Oral daily  budesonide 160 MICROgram(s)/formoterol 4.5 MICROgram(s) Inhaler 2 Puff(s) Inhalation two times a day  cefepime   IVPB 1000 milliGRAM(s) IV Intermittent every 8 hours  chlorhexidine 4% Liquid 1 Application(s) Topical daily  dronedarone 400 milliGRAM(s) Oral two times a day  gabapentin 300 milliGRAM(s) Oral daily  insulin lispro (HumaLOG) corrective regimen sliding scale   SubCutaneous three times a day before meals  labetalol 100 milliGRAM(s) Oral two times a day  levothyroxine 175 MICROGram(s) Oral daily  montelukast 10 milliGRAM(s) Oral at bedtime  neomycin/bacitracin/polymyxin Topical Ointment 1 Application(s) Topical every 12 hours  pancrelipase  (CREON  6,000 Lipase Units) 4 Capsule(s) Oral three times a day with meals  pantoprazole    Tablet 40 milliGRAM(s) Oral before breakfast  rivaroxaban 15 milliGRAM(s) Oral with dinner  torsemide 20 milliGRAM(s) Oral daily  triamcinolone 0.1% Ointment 1 Application(s) Topical every 12 hours      TELEMETRY: nsr,intermittent paced	      	  LABS:	 	      CARDIAC MARKERS ( 26 Nov 2019 05:44 )  0.039 ng/mL / x     / 119 U/L / x     / 1.4 ng/mL  CARDIAC MARKERS ( 25 Nov 2019 22:30 )  0.095 ng/mL / x     / 100 U/L / x     / <1.0 ng/mL  CARDIAC MARKERS ( 25 Nov 2019 13:35 )  0.083 ng/mL / x     / x     / x     / x                              8.9    9.44  )-----------( 211      ( 27 Nov 2019 04:52 )             27.9     11-27    134<L>  |  98  |  28<H>  ----------------------------<  169<H>  3.7   |  29  |  1.30    Ca    8.3<L>      27 Nov 2019 04:52  Phos  3.7     11-27  Mg     2.1     11-27    TPro  7.4  /  Alb  2.7<L>  /  TBili  0.4  /  DBili  x   /  AST  25  /  ALT  36  /  AlkPhos  45  11-27    proBNP: Serum Pro-Brain Natriuretic Peptide: 1180 pg/mL (11-08 @ 13:39)    Lipid Profile: Cholesterol 112  LDL 43  HDL 53  TG 82    HgA1c: Hemoglobin A1C, Whole Blood: 6.6 % (11-09 @ 10:48)    TSH: Thyroid Stimulating Hormone, Serum: 5.06 uIU/mL (11-10 @ 09:35)    OBSERVATIONS:  Mitral Valve: Mitral annular calcification, and calcified  mitral leaflets with reducedl diastolic opening. Mild  mitral regurgitation.  Peak mitral valve gradient equals 13  mm Hg, mean transmitral valve gradient equals 5 mm Hg,  estimated mitral valve area equals 2.1 sqcm (by pressure  half time equation), consistent with mild mitral stenosis.  Aortic Root: Aortic Root: 3.0 cm.    Aortic Valve: Calcified trileaflet aortic valve with  decreased opening. Peak transaortic valve gradient equals  27 mm Hg, estimated aortic valve area equals 1.6 sqcm (by  continuity equation), consistent with mild aortic stenosis.  Left Atrium: Mildly dilated left atrium.  LA volume index =  41 cc/m2.  Left Ventricle: Normal Left Ventricular Systolic Function,  (EF = 55%) Normal left ventricular internal dimensions and  wall thicknesses. Grade I diastolic dysfunction (Impaired  relaxation).  Right Heart: Normal right atrium. Normalright ventricular  size and function (TAPSE 2.3cm). A device lead is  visualized in the right heart. There is mild tricuspid  regurgitation. Normal pulmonic valve.  Pericardium/PleuraNormal pericardium with no pericardial  effusion.  Hemodynamic: RA Pressure is 8 mm Hg. RV systolic pressure  is mildly increased at  39 mm Hg.      	EXAM:  XR CHEST PORTABLE ROUTINE 1V                            PROCEDURE DATE:  11/27/2019          INTERPRETATION:  Chest one view    HISTORY: CHF    COMPARISON STUDY: 11/26/2019    Frontal expiratory view of the chest shows the heart to be similar in   size. The lungs show mild central congestion and there is no evidence of   pneumothorax nor pleural effusion. Left pacemaker is again noted.    IMPRESSION:  Mild pulmonary congestion.    Thank you for the courtesy of this referral.            PPM-nl fx, battery>6 yrs.

## 2019-11-27 NOTE — PHYSICAL THERAPY INITIAL EVALUATION ADULT - IMPAIRMENTS FOUND, PT EVAL
ROM/gait, locomotion, and balance/muscle strength/posture/aerobic capacity/endurance muscle strength/ROM/Supplemental O2: 2L/min NC/posture/ventilation and respiration/gas exchange/gait, locomotion, and balance/aerobic capacity/endurance

## 2019-11-27 NOTE — PHYSICAL THERAPY INITIAL EVALUATION ADULT - MANUAL MUSCLE TESTING RESULTS, REHAB EVAL
MMT SH flex/abd: 3-/5 (due to pain) RUE (besides SH flex/abd) MMT grossly: 3/5. LUE MMT grossly: 3+/5. B hip flexion: 3/5. Rest of BLE MMT grossly: 3+/5/grossly assessed due to

## 2019-11-27 NOTE — PHYSICAL THERAPY INITIAL EVALUATION ADULT - FUNCTIONAL LIMITATIONS, PT EVAL
self-care/home management/community/leisure/Pt wants to be able to walk a short distance to park that requires 4 steps to navigate.

## 2019-11-27 NOTE — PHYSICAL THERAPY INITIAL EVALUATION ADULT - GENERAL OBSERVATIONS, REHAB EVAL
Pt was received with PCA going to the bathroom. When Pt returned to seated in bed she was NAD, A&Ox4, and placed on supplemental O2: 2L/min on nasal cannula. Pt was received with PCA going to the bathroom. When Pt returned to seated in bed she was NAD, A&Ox4, +Telemonitor, and placed on supplemental O2: 2L/min on nasal cannula.

## 2019-11-27 NOTE — PHYSICAL THERAPY INITIAL EVALUATION ADULT - ACTIVE RANGE OF MOTION EXAMINATION, REHAB EVAL
except R shoulder flexion AROM/Abduction limited to 0-70 degrees./no Active ROM deficits were identified

## 2019-11-27 NOTE — PHYSICAL THERAPY INITIAL EVALUATION ADULT - PLANNED THERAPY INTERVENTIONS, PT EVAL
balance training/postural re-education/transfer training/bed mobility training/ROM/gait training/strengthening

## 2019-11-27 NOTE — PROGRESS NOTE ADULT - SUBJECTIVE AND OBJECTIVE BOX
Subjective: Patient lying comfortably in bed with nasal cannula in place (daughter at the bedside)    Objective:    azithromycin  IVPB 500 milliGRAM(s) IV Intermittent every 24 hours (D2)  cefepime   IVPB 1000 milliGRAM(s) IV Intermittent every 8 hours (D2)      PHYSICAL EXAM:    Vital Signs Last 24 Hrs  T(C): 37.4 (2019 07:45), Max: 38.5 (2019 07:00)  T(F): 99.4 (2019 07:45), Max: 101.3 (2019 07:00)  HR: 75 (2019 09:00) (60 - 78)  BP: 106/44 (2019 09:00) (106/44 - 153/63)  BP(mean): 58 (2019 09:) (58 - 83)  RR: 23 (2019 09:00) (16 - 24)  SpO2: 98% (2019 09:) (94% - 100%)    GEN: WD obese w female in NAD    CHEST/Respiratory: sparse expiratory wheezes bilaterally; rales R lower lung; L upper chest PPM site without erythema, edema or drainage    Cardiovascular: S1S1 reg; II/VI VINH heard throughout precordium    Gastrointestinal: obese; soft and non-tender with active BS; area of erythema below panniculus    Genitourinary: hansen in place    Extremities: bilat leg stasis changes extending from ~ 6cm below patellae to above ankles with associated erythema, edema, and dried ulcers; not warm to touch and no drainage noted; no tenderness to palpation of areas     Neurological: A+O      LABS/DIAGNOSTIC TESTS                        8.9    9.44  )-----------( 211      ( 2019 04:52 )             27.9       WBC Count: 9.44 K/uL ( @ 04:52)  WBC Count: 11.90 K/uL ( @ 05:40)  WBC Count: 11.20 K/uL ( @ 13:35)          134<L>  |  98  |  28<H>  ----------------------------<  169<H>  3.7   |  29  |  1.30    Ca    8.3<L>      2019 04:52  Phos  3.7       Mg     2.1         TPro  7.4  /  Alb  2.7<L>  /  TBili  0.4  /  DBili  x   /  AST  25  /  ALT  36  /  AlkPhos  45        Urinalysis Basic - ( 2019 14:46 )    Color: Yellow / Appearance: Clear / S.010 / pH: x  Gluc: x / Ketone: Negative  / Bili: Negative / Urobili: 1   Blood: x / Protein: 30 mg/dL / Nitrite: Negative   Leuk Esterase: Negative / RBC: Negative /HPF / WBC 0-2 /HPF   Sq Epi: x / Non Sq Epi: Occasional /HPF / Bacteria: Negative /HPF        PT/INR - ( 2019 07:00 )   PT: 23.7 sec;   INR: 2.09 ratio         PTT - ( 2019 07:00 )  PTT:38.0 sec      CULTURES    Culture - Urine (collected 19 @ 01:36)  Source: .Urine  Preliminary Report (19 @ 06:59):    10,000 - 49,000 CFU/mL Gram Negative Rods    Culture - Blood (collected 19 @ 17:57)  Source: .Blood  Preliminary Report (19 @ 18:01):    No growth to date.    Culture - Blood (collected 19 @ 17:57)  Source: .Blood  Preliminary Report (19 @ 18:01):    No growth to date.    Culture - Blood (collected 19 @ 22:59)  Source: .Blood Blood-Venous  Final Report (19 @ 23:00):    No growth at 5 days.    Culture - Blood (collected 19 @ 22:59)  Source: .Blood Blood-Peripheral  Final Report (19 @ 23:00):    No growth at 5 days.    Culture - Urine (collected 19 @ 17:11)  Source: .Urine Clean Catch (Midstream)  Final Report (19 @ 18:10):    <10,000 CFU/mL Normal Urogenital Ema    Culture - Urine (collected 19 @ 23:08)  Source: .Urine Clean Catch (Midstream)  Final Report (19 @ 04:46):    >=3 organisms. Probable collection contamination.    Culture - Blood (collected 19 @ 22:09)  Source: .Blood Blood  Final Report (19 @ 23:01):    No growth at 5 days.    Culture - Blood (collected 19 @ 22:09)  Source: .Blood Blood  Final Report (19 @ 23:01):    No growth at 5 days.          < from: Transthoracic Echocardiogram (19 @ 16:13) >  CONCLUSIONS:  1. Mitral annular calcification, and calcified mitral  leaflets with reducedl diastolic opening. Mild mitral  regurgitation.  2. Calcified trileaflet aortic valve with decreased  opening. Peak transaortic valve gradient equals 27 mm Hg,  estimated aortic valve area equals 1.6 sqcm (by continuity  equation), consistent with mild aorticstenosis.  3. Aortic Root: 3.0 cm.  4. Mildly dilated left atrium.  LA volume index = 41 cc/m2.  5. Normal left ventricular internal dimensions and wall  thicknesses.  6. Normal Left Ventricular Systolic Function,  (EF = 55%)  7. Grade I diastolic dysfunction (Impaired relaxation).  8. Normal right atrium.  9. Normal right ventricular size and function (TAPSE  2.3cm). A device lead is visualized in the right heart.  10. RA Pressure is 8 mm Hg.  11. RV systolic pressure is mildly increased at  39 mm Hg.  12. There is mild tricuspid regurgitation.  13. Normal pulmonic valve.  14. Normal pericardium with no pericardial effusion.

## 2019-11-27 NOTE — PROGRESS NOTE ADULT - ASSESSMENT
79F from home, ambulates with walker, lives with son, with PMHx of HFpEF, CAD (s/p stents), chronic venous stasis, DM, HTN and Afib (on Xarelto, s/p [facility] PPM) presents to the ED with chief complaint of fever and chills x 2 days with one episode of vomiting. She was admitted to medicine on 11/25 for LE cellulitis treated with Zosyn and Vancomycin. Also found to have hyponatremia for which she was receiving IVF. RRT called on 11/26 AM for respiratory distress with abdominal breathing, saturating 100% on NRB. Vitals significant for tachypnea w/ RR: 41. Upon lung auscultation, bilateral expiratory/inspiratory wheezing appreciated. ABG w/o hypoxia/hypercapnia w/ pH WNL's. ECG: NSR VR@86bpm. Patient respiratory status did not improve despite treatment with Lasix 60 mg IV + DUONEB x1. Bladder scan revealed 0 mL, which was inaccurate probably due morbid obesity. Ashton was placed with ~500 mL of urine. Patient was placed on BiPAP for work of breathing and transferred to ICU.    Neuro:  mAAO X3    Cardio:  -HFpEF  ECHO on Jun 2018 showed normal EF, GIDD, mild AS, mod. Pul, HTN  ECG: NSR VR 83bpm, no ischemic changes. CE: T1: 0.085, T2: 0.095, T3: -ve  s/p lasix 80mg once  will hold lasix for now as diuresing well  started torsemide home dose  maintaining negative balance  Strict IO's + daily weight + 1L fluid restriction  Follow up TTE    -CAD (s/p stents)    -Afib (on Xarelto, s/p PPM)  PPM interrogated  on dronaderone and labetalol home dose  c/w xarelto    Resp.:  - Health care ass. Pneumonia  CXR shows New patchy right basilar opacity and a small right pleural effusion.  on cefepime and azithro 11/26  BCx 11/25 negative  ID Dr. Spence  -Asthma  on nebs    GI:  on soft diet    Nephro:  -SRIDHAR  Cr trended down today  DC vanco and zosyn, lasix    ID:  - Health care ass. Pneumonia  CXR shows New patchy right basilar opacity and a small right pleural effusion.  on cefepime and azithro 11/26  ID Dr. Spence    Endo:  DM: on HSS    GOC: FULL CODE 79F from home, ambulates with walker, lives with son, with PMHx of HFpEF, CAD (s/p stents), chronic venous stasis, DM, HTN and Afib (on Xarelto, s/p [facility] PPM) presents to the ED with chief complaint of fever and chills x 2 days with one episode of vomiting. She was admitted to medicine on 11/25 for LE cellulitis treated with Zosyn and Vancomycin. Also found to have hyponatremia for which she was receiving IVF. RRT called on 11/26 AM for respiratory distress with abdominal breathing, saturating 100% on NRB. Vitals significant for tachypnea w/ RR: 41. Upon lung auscultation, bilateral expiratory/inspiratory wheezing appreciated. ABG w/o hypoxia/hypercapnia w/ pH WNL's. ECG: NSR VR@86bpm. Patient respiratory status did not improve despite treatment with Lasix 60 mg IV + DUONEB x1. Bladder scan revealed 0 mL, which was inaccurate probably due morbid obesity. Ashton was placed with ~500 mL of urine. Patient was placed on BiPAP for work of breathing and transferred to ICU.    Neuro:  mAAO X3    Cardio:  -HFpEF  ECHO on Jun 2018 showed normal EF, GIDD, mild AS, mod. Pul, HTN  ECG: NSR VR 83bpm, no ischemic changes. CE: T1: 0.085, T2: 0.095, T3: -ve  s/p lasix 80mg once  will hold lasix for now as diuresing well  started torsemide home dose  maintaining negative balance  Strict IO's + daily weight + 1L fluid restriction  Follow up TTE    -CAD (s/p stents)    -Afib (on Xarelto, s/p PPM)  PPM interrogated  on dronaderone and labetalol home dose  c/w xarelto    Resp.:  - Health care ass. Pneumonia  CXR shows New patchy right basilar opacity and a small right pleural effusion.  on cefepime and azithro 11/26  BCx 11/25 negative  ID Dr. Spence  -Asthma  on nebs    GI:  on soft diet    Nephro:  -SRIDHAR  Cr trended down today  DC vanco and zosyn, lasix    ID:  - Health care ass. Pneumonia  CXR shows New patchy right basilar opacity and a small right pleural effusion.  on cefepime and azithro 11/26  ID Dr. Spence    Endo:  DM: on HSS  Hypothyroidism: synthroid    GOC: FULL CODE

## 2019-11-27 NOTE — PROGRESS NOTE ADULT - SUBJECTIVE AND OBJECTIVE BOX
Patient is a 79y old  Female who presents with a chief complaint of b/l LE cellulitis (27 Nov 2019 12:30)      INTERVAL HPI/OVERNIGHT EVENTS: patient  transferred  from ICU comfortable  dyspnea  on exertion persist    T(C): 37.4 (11-27-19 @ 14:13), Max: 38.5 (11-27-19 @ 07:00)  HR: 76 (11-27-19 @ 14:13) (60 - 97)  BP: 120/72 (11-27-19 @ 14:13) (103/49 - 164/74)  RR: 20 (11-27-19 @ 14:13) (17 - 25)  SpO2: 96% (11-27-19 @ 14:13) (94% - 100%)  Wt(kg): --Vital Signs Last 24 Hrs  T(C): 37.4 (27 Nov 2019 14:13), Max: 38.5 (27 Nov 2019 07:00)  T(F): 99.3 (27 Nov 2019 14:13), Max: 101.3 (27 Nov 2019 07:00)  HR: 76 (27 Nov 2019 14:13) (60 - 97)  BP: 120/72 (27 Nov 2019 14:13) (103/49 - 164/74)  BP(mean): 66 (27 Nov 2019 12:00) (58 - 83)  RR: 20 (27 Nov 2019 14:13) (17 - 25)  SpO2: 96% (27 Nov 2019 14:13) (94% - 100%)  I&O's Summary    26 Nov 2019 07:01  -  27 Nov 2019 07:00  --------------------------------------------------------  IN: 604.4 mL / OUT: 6500 mL / NET: -5895.6 mL    27 Nov 2019 07:01  -  27 Nov 2019 15:27  --------------------------------------------------------  IN: 0 mL / OUT: 650 mL / NET: -650 mL        LABS:                        8.9    9.44  )-----------( 211      ( 27 Nov 2019 04:52 )             27.9     11-27    134<L>  |  98  |  28<H>  ----------------------------<  169<H>  3.7   |  29  |  1.30    Ca    8.3<L>      27 Nov 2019 04:52  Phos  3.7     11-27  Mg     2.1     11-27    TPro  7.4  /  Alb  2.7<L>  /  TBili  0.4  /  DBili  x   /  AST  25  /  ALT  36  /  AlkPhos  45  11-27    PT/INR - ( 26 Nov 2019 07:00 )   PT: 23.7 sec;   INR: 2.09 ratio         PTT - ( 26 Nov 2019 07:00 )  PTT:38.0 sec    CAPILLARY BLOOD GLUCOSE      POCT Blood Glucose.: 258 mg/dL (27 Nov 2019 11:00)  POCT Blood Glucose.: 175 mg/dL (27 Nov 2019 05:51)  POCT Blood Glucose.: 243 mg/dL (26 Nov 2019 22:17)  POCT Blood Glucose.: 383 mg/dL (26 Nov 2019 16:51)      ABG - ( 26 Nov 2019 05:34 )  pH, Arterial: 7.35  pH, Blood: x     /  pCO2: 41    /  pO2: 118   / HCO3: 22    / Base Excess: -2.7  /  SaO2: 98        MEDICATIONS  (STANDING):  atorvastatin 40 milliGRAM(s) Oral at bedtime  azithromycin   Tablet 500 milliGRAM(s) Oral daily  budesonide 160 MICROgram(s)/formoterol 4.5 MICROgram(s) Inhaler 2 Puff(s) Inhalation two times a day  cefepime   IVPB 1000 milliGRAM(s) IV Intermittent every 8 hours  dronedarone 400 milliGRAM(s) Oral two times a day  gabapentin 300 milliGRAM(s) Oral daily  insulin lispro (HumaLOG) corrective regimen sliding scale   SubCutaneous three times a day before meals  labetalol 100 milliGRAM(s) Oral two times a day  levothyroxine 175 MICROGram(s) Oral daily  montelukast 10 milliGRAM(s) Oral at bedtime  neomycin/bacitracin/polymyxin Topical Ointment 1 Application(s) Topical every 12 hours  pancrelipase  (CREON  6,000 Lipase Units) 4 Capsule(s) Oral three times a day with meals  pantoprazole    Tablet 40 milliGRAM(s) Oral before breakfast  rivaroxaban 15 milliGRAM(s) Oral with dinner  torsemide 20 milliGRAM(s) Oral daily  triamcinolone 0.1% Ointment 1 Application(s) Topical every 12 hours    MEDICATIONS  (PRN):  acetaminophen   Tablet .. 650 milliGRAM(s) Oral every 6 hours PRN Temp greater or equal to 38C (100.4F), Mild Pain (1 - 3)  albuterol/ipratropium for Nebulization 3 milliLiter(s) Nebulizer every 6 hours PRN Shortness of Breath and/or Wheezing  ondansetron Injectable 4 milliGRAM(s) IV Push every 6 hours PRN Nausea and/or Vomiting  zolpidem 5 milliGRAM(s) Oral at bedtime PRN Insomnia            REVIEW OF SYSTEMS:+ fatigue  EYES: No eye pain, visual disturbances, or discharge  ENMT:  No difficulty hearing, tinnitus, vertigo; No sinus or throat pain  NECK: No pain or stiffness  BREASTS: No pain, masses, or nipple discharge  RESPIRATORY: + cough, wheezing, chills or hemoptysis; ++ shortness of breath  CARDIOVASCULAR: No chest pain, palpitations, ++dizziness, + leg swelling  GASTROINTESTINAL: No abdominal or epigastric pain. No nausea, vomiting, or hematemesis; No diarrhea or constipation. No melena or hematochezia.  GENITOURINARY: No dysuria, frequency, hematuria, or incontinence  NEUROLOGICAL: No headaches, memory loss, loss of strength, numbness, or tremors  SKIN: No itching, burning, rashes, or lesions   LYMPH NODES: No enlarged glands  ENDOCRINE: No heat or cold intolerance; No hair loss  MUSCULOSKELETAL: No joint pain or swelling; No muscle, back, or extremity pain  PSYCHIATRIC: No depression, anxiety, mood swings, or difficulty sleeping  HEME/LYMPH: No easy bruising, or bleeding gums  ALLERGY AND IMMUNOLOGIC: No hives or eczema    RADIOLOGY & ADDITIONAL TESTS:    Imaging Personally Reviewed:  [ ] YES  [ ] NO    Consultant(s) Notes Reviewed:  [x ] YES  [ ] NO    PHYSICAL EXAM:  GENERAL: NAD, well-groomed, well-developed morbidly obese    HEAD:  Atraumatic, Normocephalic  EYES: EOMI, PERRLA, conjunctiva and sclera clear  ENMT: No tonsillar erythema, exudates, or enlargement; Moist mucous membranes, Good dentition, No lesions  NECK: Supple, No JVD, Normal thyroid  NERVOUS SYSTEM:  Alert & Oriented X3, Good concentration; Motor Strength 5/5 B/L upper and lower extremities; DTRs 2+ intact and symmetric  CHEST/LUNG: Clear to percussion bilaterally; No rales, rhonchi, wheezing, or rubs  HEART: Regular rate and rhythm; No murmurs, rubs, or gallops  ABDOMEN: Soft, Nontender, Nondistended; Bowel sounds present  EXTREMITIES:  2+ Peripheral Pulses, No clubbing, cyanosis, ++ edema  LYMPH: No lymphadenopathy noted  SKIN: No rashes or lesions    Care Discussed with Consultants/Other Providers [ x] YES  [ ] NO

## 2019-11-27 NOTE — PROGRESS NOTE ADULT - ATTENDING COMMENTS
Assessment:  Acute hypoxic respiratory failure secondary to pulmonary edema , cannot rule out superimposed pneumonia  Pulmonary edema, hx of hypertrophic cardiomyopathy w/ LVOT  Morbid obesity  Asthma  Anxiety    CAD  bilateral LE cellulitis  atrial fibrillation on anticoagulation     Plan:  Taper off O2 and BiPAP  Afebrile  Diuresed 6 Liters with stable BUN/Cr and improved sodium  Restart home diuretics  DC hansen  OOB to chair  Continue abx per ID

## 2019-11-27 NOTE — PROGRESS NOTE ADULT - ASSESSMENT
80 y/o white female with PMHx of HFpEF, CAD (s/p stents), chronic venous stasis, PPM, DM, sleep apnea on bipap, HTN and Afib (on Xarelto, s/p PPM) presented to the ED with complaints of fever as high as 38 (C), chills, cough without sputum production, and 3 episodes of vomiting (non-bloody) 2 days PTA. She was treated with broad spectrum antibiotic coverage for bilateral LE cellulitis. Patient subsequently developed respiratory failure (TTE with normal LV fn and grade 1 diastolic dysfn) and was transferred to the ICU where she was placed on BiPAP.  Patient's LE appearance c/w venous stasis changes, not cellulitis. Based on PE with R lower lung rales, CXR 11/25 with new infiltrate R lower lung, and recent hospitalization at [facility], concerned about hospital-acquired pneumonia (ie, gram negative pneumonia vs pneumonia secondary to atypical organisms such as Legionella). Elevated temp. spike noted. BCs no growth to date; WBC continues to decrease. Patient currently on cefepime and azithromycin.     Urine Cx with Gm neg rods, U/A negative and patient without urinary tract complaints (other than incontinence)-- no evidence for UTI.    Recommend:    --Continue cefepime 1gm q8h IV  --change IV azithromycin to p.o.   --Legionella urinary Ag  --remove hansen catheter

## 2019-11-27 NOTE — PHYSICAL THERAPY INITIAL EVALUATION ADULT - PATIENT PROFILE REVIEW, REHAB EVAL
EMR chart, lab results, diagnostic imaging reviewed, Lena RN checked in with prior to PT Eval. Medical status appreciated./yes

## 2019-11-27 NOTE — PROGRESS NOTE ADULT - ATTENDING COMMENTS
Respiratory failure  Hypercapnic  Hypoxemic  JOSE DANIEL  continue  BiPAP   Pneumonia  Hospital acquired    Chronic lymphedema  with  stasis dermatitis    Morbid  obesity  ASHD Chronic  A.fib PPM   DM type 2    Difficulty walking   Continue  Telemetry  monitoring  Management  as  prescribed OOB as  tolerated  PT evaluation    Dr Dale will be  covering

## 2019-11-27 NOTE — PHYSICAL THERAPY INITIAL EVALUATION ADULT - GAIT DEVIATIONS NOTED, PT EVAL
decreased velocity of limb motion/decreased step length/decreased della/increased time in double stance

## 2019-11-27 NOTE — PROGRESS NOTE ADULT - SUBJECTIVE AND OBJECTIVE BOX
Time of Visit:  Patient seen and examined.     MEDICATIONS  (STANDING):  atorvastatin 40 milliGRAM(s) Oral at bedtime  azithromycin   Tablet 500 milliGRAM(s) Oral daily  budesonide 160 MICROgram(s)/formoterol 4.5 MICROgram(s) Inhaler 2 Puff(s) Inhalation two times a day  cefepime   IVPB 1000 milliGRAM(s) IV Intermittent every 8 hours  dronedarone 400 milliGRAM(s) Oral two times a day  gabapentin 300 milliGRAM(s) Oral daily  insulin lispro (HumaLOG) corrective regimen sliding scale   SubCutaneous three times a day before meals  labetalol 100 milliGRAM(s) Oral two times a day  levothyroxine 175 MICROGram(s) Oral daily  montelukast 10 milliGRAM(s) Oral at bedtime  neomycin/bacitracin/polymyxin Topical Ointment 1 Application(s) Topical every 12 hours  pancrelipase  (CREON  6,000 Lipase Units) 4 Capsule(s) Oral three times a day with meals  pantoprazole    Tablet 40 milliGRAM(s) Oral before breakfast  rivaroxaban 15 milliGRAM(s) Oral with dinner  torsemide 20 milliGRAM(s) Oral daily  triamcinolone 0.1% Ointment 1 Application(s) Topical every 12 hours      MEDICATIONS  (PRN):  acetaminophen   Tablet .. 650 milliGRAM(s) Oral every 6 hours PRN Temp greater or equal to 38C (100.4F), Mild Pain (1 - 3)  albuterol/ipratropium for Nebulization 3 milliLiter(s) Nebulizer every 6 hours PRN Shortness of Breath and/or Wheezing  ondansetron Injectable 4 milliGRAM(s) IV Push every 6 hours PRN Nausea and/or Vomiting  zolpidem 5 milliGRAM(s) Oral at bedtime PRN Insomnia       Medications up to date at time of exam.      PHYSICAL EXAMINATION:  Patient has no new complaints.  GENERAL: The patient is a well-developed, well-nourished, in no apparent distress.     Vital Signs Last 24 Hrs  T(C): 36.9 (27 Nov 2019 15:20), Max: 38.5 (27 Nov 2019 07:00)  T(F): 98.5 (27 Nov 2019 15:20), Max: 101.3 (27 Nov 2019 07:00)  HR: 77 (27 Nov 2019 15:20) (60 - 97)  BP: 130/71 (27 Nov 2019 15:20) (103/49 - 164/74)  BP(mean): 66 (27 Nov 2019 12:00) (58 - 82)  RR: 18 (27 Nov 2019 15:20) (17 - 25)  SpO2: 95% (27 Nov 2019 15:20) (94% - 100%)   (if applicable)    Chest Tube (if applicable)    HEENT: Head is normocephalic and atraumatic. Extraocular muscles are intact. Mucous membranes are moist.     NECK: Supple, no palpable adenopathy.    LUNGS: Clear to auscultation, no wheezing, rales, or rhonchi.    HEART: Regular rate and rhythm without murmur.    ABDOMEN: Soft, nontender, and nondistended.  No hepatosplenomegaly is noted.    : No painful voiding, no pelvic pain    EXTREMITIES: + b/l lower ext chronic skin changes    NEUROLOGIC: Awake, alert, oriented, grossly intact    SKIN: Warm, dry, good turgor.      LABS:                        8.9    9.44  )-----------( 211      ( 27 Nov 2019 04:52 )             27.9     11-27    134<L>  |  98  |  28<H>  ----------------------------<  169<H>  3.7   |  29  |  1.30    Ca    8.3<L>      27 Nov 2019 04:52  Phos  3.7     11-27  Mg     2.1     11-27    TPro  7.4  /  Alb  2.7<L>  /  TBili  0.4  /  DBili  x   /  AST  25  /  ALT  36  /  AlkPhos  45  11-27    PT/INR - ( 26 Nov 2019 07:00 )   PT: 23.7 sec;   INR: 2.09 ratio         PTT - ( 26 Nov 2019 07:00 )  PTT:38.0 sec    ABG - ( 26 Nov 2019 05:34 )  pH, Arterial: 7.35  pH, Blood: x     /  pCO2: 41    /  pO2: 118   / HCO3: 22    / Base Excess: -2.7  /  SaO2: 98                CARDIAC MARKERS ( 26 Nov 2019 05:44 )  0.039 ng/mL / x     / 119 U/L / x     / 1.4 ng/mL  CARDIAC MARKERS ( 25 Nov 2019 22:30 )  0.095 ng/mL / x     / 100 U/L / x     / <1.0 ng/mL                MICROBIOLOGY: (if applicable)    RADIOLOGY & ADDITIONAL STUDIES:  EKG:   CXR:  ECHO:    IMPRESSION: 79y Female PAST MEDICAL & SURGICAL HISTORY:  Obesity  Pacemaker  Atrial fibrillation  Hypothyroidism  Venous stasis  IBS (irritable bowel syndrome)  CHF (congestive heart failure), NYHA class I  HLD (Hyperlipidemia)  HTN (Hypertension)  Diabetes  Myocardial Infarction  CAD (Coronary Atherosclerotic Disease)  Asthma  S/P coronary angiogram   p/w           IMP: This is an 79 yr old woman from home , ambulates with walker, lives with son, with PMHx of HFpEF, CAD (s/p stents), chronic venous stasis, DM, HTN and Afib (on Xarelto, s/p [facility]. Judes PPM) presents to the ED with chief complaint of fever and chills x 2 days with one episode of vomiting. She was admitted to medicine on 11/25 for LE cellulitis treated with Zosyn and Vancomycin. Also found to have hyponatremia for which she was receiving IVF. RRT called on 11/26 AM for respiratory distress with abdominal breathing, saturating 100% on NRB. Vitals significant for tachypnea w/ RR: 41. Upon lung auscultation, bilateral expiratory/inspiratory wheezing appreciated. ABG w/o hypoxia/hypercapnia w/ pH WNL's. ECG: NSR VR@86bpm. Patient respiratory status did not improve despite treatment with Lasix 60 mg IV + DUONEB x1. Bladder scan revealed 0 mL, which was inaccurate probably due morbid obesity. Ashton was placed with ~500 mL of urine. Patient was placed on BiPAP for work of breathing and transferred to ICU for acute hypoxic resp failure due to pul edema requiring NIPPV support.  + HCAP.. pat was hospitiazed as was hospitalized recently.  Cardiac enzymes neg. Cards eval noted. ID eval pending but discussed care with Dr Jordy GODINEZ in icu  Pat stated that she has CPAP at home as per miranda Jackson     PLAN:  -continue bipap at night   -continue CPAP at home after discharge at current setting  -continue antibx  -dvt/gi prop    case discussed with grand daughter Teri and dr hanson at bedside

## 2019-11-27 NOTE — PROGRESS NOTE ADULT - ASSESSMENT
80 y/o [place]ic-speaking female from home, ambulates with walker, lives with son, with PMHx of HFpEF, CAD (s/p stents), chronic venous stasis, DM, HTN and Afib (on Xarelto, s/p PPM) presents to the ED with chief complaint of fever and chills x 2 days, pneumonia, acute diastolic HF.  1.Sleep study as outpatient-R/O JOSE DANIEL.  2.Pneumonia-ABX.  3.Anemia profile,occult.  4.PAF-xarelto,multaq,lopressor.  5.Hypothyroidism-synthroid.  6.Acute diastolic HF-gentle diuresis.  7.DM-Insulin.  8.Asthma-nebs.  9.PPI.

## 2019-11-27 NOTE — CHART NOTE - NSCHARTNOTEFT_GEN_A_CORE
79F from home, ambulates with walker, lives with son, with PMHx of HFpEF, CAD (s/p stents), chronic venous stasis, DM, HTN and Afib (on Xarelto, s/p [facility] PPM) presents to the ED with chief complaint of fever and chills x 2 days with one episode of vomiting. She was admitted to medicine on 11/25 for LE cellulitis treated with Zosyn and Vancomycin. Also found to have hyponatremia for which she was receiving IVF. RRT called on 11/26 AM for respiratory distress with abdominal breathing, saturating 100% on NRB. Vitals significant for tachypnea w/ RR: 41. Upon lung auscultation, bilateral expiratory/inspiratory wheezing appreciated. ABG w/o hypoxia/hypercapnia w/ pH WNL's. ECG: NSR VR@86bpm. Patient respiratory status did not improve despite treatment with Lasix 60 mg IV + DUONEB x1. Bladder scan revealed 0 mL, which was inaccurate probably due morbid obesity. Ashtno was placed with ~500 mL of urine. Patient was placed on BiPAP for work of breathing and transferred to ICU.    Neuro:  mAAO X3    Cardio:  -HFpEF  ECHO on Jun 2018 showed normal EF, GIDD, mild AS, mod. Pul, HTN  ECG: NSR VR 83bpm, no ischemic changes. CE: T1: 0.085, T2: 0.095, T3: -ve  s/p lasix 80mg once  will hold lasix for now as diuresing well  started torsemide home dose  maintaining negative balance  Strict IO's + daily weight + 1L fluid restriction  Follow up TTE    -CAD (s/p stents)    -Afib (on Xarelto, s/p PPM)  PPM interrogated  on dronaderone and labetalol home dose  c/w xarelto    Resp.:  - Health care ass. Pneumonia  CXR shows New patchy right basilar opacity and a small right pleural effusion.  on cefepime and azithro 11/26  BCx 11/25 negative  ID Dr. Spence  -Asthma  on nebs    GI:  on soft diet    Nephro:  -SRIDHAR  Cr trended down today  DC vanco and zosyn, lasix    ID:  - Health care ass. Pneumonia  CXR shows New patchy right basilar opacity and a small right pleural effusion.  on cefepime and azithro 11/26  ID Dr. Spence    Endo:  DM: on HSS  Hypothyroidism: synthroid    GOC: FULL CODE 79F from home, ambulates with walker, lives with son, with PMHx of HFpEF, CAD (s/p stents), chronic venous stasis, DM, HTN and Afib (on Xarelto, s/p [facility] PPM) presented to the ED with chief complaint of fever and chills x 2 days with one episode of vomiting. She was admitted to medicine on 11/25 for LE cellulitis treated with Zosyn and Vancomycin. Also found to have hyponatremia for which she was receiving IVF. RRT called on 11/26 AM for respiratory distress with abdominal breathing, saturating 100% on NRB. Vitals significant for tachypnea w/ RR: 41. Upon lung auscultation, bilateral expiratory/inspiratory wheezing appreciated. ABG w/o hypoxia/hypercapnia w/ pH WNL's. ECG: NSR VR@86bpm. Patient respiratory status did not improve despite treatment with Lasix 60 mg IV + DUONEB x1. Bladder scan revealed 0 mL, which was inaccurate probably due morbid obesity. Ashton was placed with ~500 mL of urine. Patient was placed on BiPAP for work of breathing and transferred to ICU. She is a being treated for pneumonia and CHF exacerbation.     Neuro:  AAO X3    Cardio:  -HFpEF  ECHO on Jun 2018 showed normal EF, GIDD, mild AS, mod. Pulm. HTN  ECG: NSR VR 83bpm, no ischemic changes. CE: T1: 0.085, T2: 0.095, T3: -ve  s/p lasix 80mg once  will hold lasix for now as diuresing well  started torsemide home dose  maintaining negative balance  Strict IO's + daily weight + 1L fluid restriction  Follow up TTE    -CAD (s/p stents)    -Afib (on Xarelto, s/p PPM)  PPM interrogated  on dronaderone and labetalol home dose  c/w xarelto    Resp.:  - Health care ass. Pneumonia  CXR shows New patchy right basilar opacity and a small right pleural effusion.  on cefepime and azithro 11/26  BCx 11/25 negative  ID Dr. Spence  -Asthma  on nebs    GI:  on soft diet    Nephro:  -SRIDHAR  Cr trended down today  DC vanco and zosyn, lasix    ID:  - Health care ass. Pneumonia  CXR shows New patchy right basilar opacity and a small right pleural effusion.  on cefepime and azithro 11/26  ID Dr. Spence    Endo:  DM: on HSS  Hypothyroidism: synthroid    GOC: FULL CODE 79F from home, ambulates with walker, lives with son, with PMHx of HFpEF, CAD (s/p stents), chronic venous stasis, DM, HTN and Afib (on Xarelto, s/p [facility] PPM) presented to the ED with chief complaint of fever and chills x 2 days with one episode of vomiting. She was admitted to medicine on 11/25 for LE cellulitis treated with Zosyn and Vancomycin. Also found to have hyponatremia for which she was receiving IVF. RRT called on 11/26 AM for respiratory distress with abdominal breathing, saturating 100% on NRB. Vitals significant for tachypnea w/ RR: 41. Upon lung auscultation, bilateral expiratory/inspiratory wheezing appreciated. ABG w/o hypoxia/hypercapnia w/ pH WNL's. ECG: NSR VR@86bpm. Patient respiratory status did not improve despite treatment with Lasix 60 mg IV + DUONEB x1. Bladder scan revealed 0 mL, which was inaccurate probably due morbid obesity. Ashton was placed with ~500 mL of urine. Patient was placed on BiPAP for work of breathing and transferred to ICU. She was being treated for pneumonia and CHF exacerbation in ICU.     Neuro:  AAO X3    Cardio:  -HFpEF  ECHO on Jun 2018 showed normal EF, GIDD, mild AS, mod. Pulm. HTN  ECG: NSR VR 83bpm, no ischemic changes. CE: T1: 0.085, T2: 0.095, T3: -ve  s/p lasix 80mg once  will hold lasix for now as diuresing well  started torsemide home dose  maintaining negative balance  Strict IO's + daily weight + 1L fluid restriction  Follow up TTE    -CAD (s/p stents)    -Afib (on Xarelto, s/p PPM)  PPM interrogated- no acute events  on dronaderone and labetalol home dose  c/w xarelto    Resp.:  - Health care ass. Pneumonia  CXR shows New patchy right basilar opacity and a small right pleural effusion.  on cefepime and azithro 11/26  BCx 11/25 negative  ID Dr. Spence    -Asthma  on nebs    GI:  on soft diet    Nephro:  -SRIDHAR  Cr trended down today    ID:  - Health care ass. Pneumonia  CXR shows New patchy right basilar opacity and a small right pleural effusion.  on cefepime and azithro 11/26  ID Dr. Spence    Endo:  DM: on HSS  Hypothyroidism: synthroid    GOC: FULL CODE    Things to follow:  - BIPAP overnight    Discussed with PGY-1 and Attending Dr. Rodriguez. Patient will be transferred to floor. Discussed with ICU attending. Patient clinically stable for downgrade. 79F from home, ambulates with walker, lives with son, with PMHx of HFpEF, CAD (s/p stents), chronic venous stasis, DM, HTN and Afib (on Xarelto, s/p [facility] PPM) presented to the ED with chief complaint of fever and chills x 2 days with one episode of vomiting. She was admitted to medicine on 11/25 for LE cellulitis treated with Zosyn and Vancomycin. Also found to have hyponatremia for which she was receiving IVF. RRT called on 11/26 AM for respiratory distress with abdominal breathing, saturating 100% on NRB. Vitals significant for tachypnea w/ RR: 41. Upon lung auscultation, bilateral expiratory/inspiratory wheezing appreciated. ABG w/o hypoxia/hypercapnia w/ pH WNL's. ECG: NSR VR@86bpm. Patient respiratory status did not improve despite treatment with Lasix 60 mg IV + DUONEB x1. Bladder scan revealed 0 mL, which was inaccurate probably due morbid obesity. Ashton was placed with ~500 mL of urine. Patient was placed on BiPAP for work of breathing and transferred to ICU. She was being treated for pneumonia and CHF exacerbation in ICU.     Neuro:  AAO X3    Cardio:  -HFpEF  ECHO on Jun 2018 showed normal EF, GIDD, mild AS, mod. Pulm. HTN  ECG: NSR VR 83bpm, no ischemic changes. CE: T1: 0.085, T2: 0.095, T3: -ve  s/p lasix 80mg once  will hold lasix for now as diuresing well  started torsemide home dose  maintaining negative balance  Strict IO's + daily weight + 1L fluid restriction  Follow up TTE    -CAD (s/p stents)    -Afib (on Xarelto, s/p PPM)  PPM interrogated- no acute events  on dronaderone and labetalol home dose  c/w xarelto    Resp.:  - Health care ass. Pneumonia  CXR shows New patchy right basilar opacity and a small right pleural effusion.  on cefepime and azithro 11/26  BCx 11/25 negative  ID Dr. Spence    -Asthma  on nebs    GI:  on soft diet    Nephro:  -SRIDHAR  Cr trended down today    ID:  - Health care ass. Pneumonia  CXR shows New patchy right basilar opacity and a small right pleural effusion.  on cefepime and azithro 11/26  ID Dr. Spence    Endo:  DM: on HSS  Hypothyroidism: synthroid    GOC: FULL CODE    Things to follow:  - BIPAP overnight    Discussed with PGY-1 Dr. Moreno and Attending Dr. Rodriguez. Patient will be transferred to floor. Discussed with ICU attending. Patient clinically stable for downgrade.

## 2019-11-27 NOTE — PHYSICAL THERAPY INITIAL EVALUATION ADULT - PERTINENT HX OF CURRENT PROBLEM, REHAB EVAL
Pt presented with fever and chills and was admitted to medicine for bilateral LE cellulitis. Pt PMH includes HFpEF, CAD (s/p stents), chronic venous stasis, DM, HTN, and Afib. Pt has a pacemaker.

## 2019-11-27 NOTE — PHYSICAL THERAPY INITIAL EVALUATION ADULT - MODALITIES TREATMENT COMMENTS
Lung Auscultation: Anterior R lateral lobe crackles/wheeze. Decreased to LLL. Posterior: Breath sounds throughout with small wheeze noted on R side on expiration.

## 2019-11-27 NOTE — PROGRESS NOTE ADULT - SUBJECTIVE AND OBJECTIVE BOX
INTERVAL HPI/ OVERNIGHT EVENTS: no acute overnight events, pt. seen and examined at bedside, states she has chills due to fever, T 101 in AM, pt. refused BIPAP overnight, saturating well on 2lt. NC        Antimicrobial:  azithromycin  IVPB 500 milliGRAM(s) IV Intermittent every 24 hours   cefepime   IVPB 1000 milliGRAM(s) IV Intermittent every 8 hours     Cardiovascular:  dronedarone 400 milliGRAM(s) Oral two times a day  labetalol 100 milliGRAM(s) Oral two times a day  torsemide 20 milliGRAM(s) Oral daily    Pulmonary:  albuterol/ipratropium for Nebulization 3 milliLiter(s) Nebulizer every 6 hours PRN  budesonide 160 MICROgram(s)/formoterol 4.5 MICROgram(s) Inhaler 2 Puff(s) Inhalation two times a day  montelukast 10 milliGRAM(s) Oral at bedtime    Hematalogic:  rivaroxaban 15 milliGRAM(s) Oral with dinner    Other:  acetaminophen   Tablet .. 650 milliGRAM(s) Oral every 6 hours PRN  atorvastatin 40 milliGRAM(s) Oral at bedtime  chlorhexidine 4% Liquid 1 Application(s) Topical daily  gabapentin 300 milliGRAM(s) Oral daily  insulin lispro (HumaLOG) corrective regimen sliding scale   SubCutaneous three times a day before meals  levothyroxine 175 MICROGram(s) Oral daily  ondansetron Injectable 4 milliGRAM(s) IV Push every 6 hours PRN  pancrelipase  (CREON  6,000 Lipase Units) 62715 Capsule(s) Oral three times a day with meals  pantoprazole    Tablet 40 milliGRAM(s) Oral before breakfast  zolpidem 5 milliGRAM(s) Oral at bedtime PRN    acetaminophen   Tablet .. 650 milliGRAM(s) Oral every 6 hours PRN  albuterol/ipratropium for Nebulization 3 milliLiter(s) Nebulizer every 6 hours PRN  atorvastatin 40 milliGRAM(s) Oral at bedtime  azithromycin  IVPB 500 milliGRAM(s) IV Intermittent every 24 hours  budesonide 160 MICROgram(s)/formoterol 4.5 MICROgram(s) Inhaler 2 Puff(s) Inhalation two times a day  cefepime   IVPB 1000 milliGRAM(s) IV Intermittent every 8 hours  chlorhexidine 4% Liquid 1 Application(s) Topical daily  dronedarone 400 milliGRAM(s) Oral two times a day  gabapentin 300 milliGRAM(s) Oral daily  insulin lispro (HumaLOG) corrective regimen sliding scale   SubCutaneous three times a day before meals  labetalol 100 milliGRAM(s) Oral two times a day  levothyroxine 175 MICROGram(s) Oral daily  montelukast 10 milliGRAM(s) Oral at bedtime  ondansetron Injectable 4 milliGRAM(s) IV Push every 6 hours PRN  pancrelipase  (CREON  6,000 Lipase Units) 41698 Capsule(s) Oral three times a day with meals  pantoprazole    Tablet 40 milliGRAM(s) Oral before breakfast  rivaroxaban 15 milliGRAM(s) Oral with dinner  torsemide 20 milliGRAM(s) Oral daily  zolpidem 5 milliGRAM(s) Oral at bedtime PRN    Drug Dosing Weight  Height (cm): 160 (2019 07:00)  Weight (kg): 130.1 (2019 07:00)  BMI (kg/m2): 50.8 (2019 07:00)  BSA (m2): 2.25 (2019 07:00)    CENTRAL LINE: [ ] YES [x ] NO  LOCATION:         TORRES: [x ] YES [ ] NO          A-LINE:  [ ] YES [x ] NO  LOCATION:             ICU Vital Signs Last 24 Hrs  T(C): 37.4 (2019 07:45), Max: 37.4 (2019 09:47)  T(F): 99.4 (2019 07:45), Max: 99.4 (2019 09:47)  HR: 73 (2019 08:00) (60 - 78)  BP: 125/48 (2019 08:00) (115/46 - 153/63)  BP(mean): 65 (2019 08:00) (63 - 83)  ABP: --  ABP(mean): --  RR: 22 (2019 08:00) (16 - 25)  SpO2: 97% (2019 08:00) (94% - 100%)      ABG - ( 2019 05:34 )  pH, Arterial: 7.35  pH, Blood: x     /  pCO2: 41    /  pO2: 118   / HCO3: 22    / Base Excess: -2.7  /  SaO2: 98                     @ 07:01  -   @ 07:00  --------------------------------------------------------  IN: 604.4 mL / OUT: 6500 mL / NET: -5895.6 mL            REVIEW OF SYSTEMS:    CONSTITUTIONAL: No weakness, + fevers/ chills  NECK: No pain or stiffness  RESPIRATORY: No cough, wheezing, hemoptysis; No shortness of breath  CARDIOVASCULAR: No chest pain or palpitations  GASTROINTESTINAL: No abdominal or epigastric pain. No nausea, vomiting, No diarrhea or constipation. No melena or hematochezia.  GENITOURINARY: No dysuria, frequency or hematuria  NEUROLOGICAL: No numbness or weakness  All other review of systems is negative unless indicated above      PHYSICAL EXAM:    GENERAL: NAD, well-groomed, well-developed, obese  EYES: EOMI, PERRLA,   NECK: Supple, No JVD; Normal thyroid; Trachea midline  NERVOUS SYSTEM:  Alert & Oriented X3,  Motor Strength 5/5 B/L upper and lower extremities; DTRs 2+ intact and symmetric  CHEST/LUNG: + b/l basilar rales  HEART: Regular rate and rhythm; No murmurs,   ABDOMEN: Soft, Nontender, Nondistended; Bowel sounds present  EXTREMITIES: chronic skin changes, 2+ Peripheral Pulses, No clubbing, cyanosis, or edema        LABS:  CBC Full  -  ( 2019 04:52 )  WBC Count : 9.44 K/uL  RBC Count : 3.41 M/uL  Hemoglobin : 8.9 g/dL  Hematocrit : 27.9 %  Platelet Count - Automated : 211 K/uL  Mean Cell Volume : 81.8 fl  Mean Cell Hemoglobin : 26.1 pg  Mean Cell Hemoglobin Concentration : 31.9 gm/dL  Auto Neutrophil # : 6.48 K/uL  Auto Lymphocyte # : 1.57 K/uL  Auto Monocyte # : 1.21 K/uL  Auto Eosinophil # : 0.12 K/uL  Auto Basophil # : 0.02 K/uL  Auto Neutrophil % : 68.7 %  Auto Lymphocyte % : 16.6 %  Auto Monocyte % : 12.8 %  Auto Eosinophil % : 1.3 %  Auto Basophil % : 0.2 %    11-27    134<L>  |  98  |  28<H>  ----------------------------<  169<H>  3.7   |  29  |  1.30    Ca    8.3<L>      2019 04:52  Phos  3.7       Mg     2.1         TPro  7.4  /  Alb  2.7<L>  /  TBili  0.4  /  DBili  x   /  AST  25  /  ALT  36  /  AlkPhos  45      PT/INR - ( 2019 07:00 )   PT: 23.7 sec;   INR: 2.09 ratio         PTT - ( 2019 07:00 )  PTT:38.0 sec  Urinalysis Basic - ( 2019 14:46 )    Color: Yellow / Appearance: Clear / S.010 / pH: x  Gluc: x / Ketone: Negative  / Bili: Negative / Urobili: 1   Blood: x / Protein: 30 mg/dL / Nitrite: Negative   Leuk Esterase: Negative / RBC: Negative /HPF / WBC 0-2 /HPF   Sq Epi: x / Non Sq Epi: Occasional /HPF / Bacteria: Negative /HPF      Culture Results:   10,000 - 49,000 CFU/mL Gram Negative Rods ( @ 01:36)  Culture Results:   No growth to date. ( @ 17:57)  Culture Results:   No growth to date. ( @ 17:57)      RADIOLOGY & ADDITIONAL STUDIES REVIEWED: YES    [x ]GOALS OF CARE DISCUSSION WITH PATIENT/FAMILY/PROXY: FULL CODE    CRITICAL CARE TIME SPENT: 35 minutes INTERVAL HPI/ OVERNIGHT EVENTS: no acute overnight events, pt. seen and examined at bedside, states she has chills due to fever, T 101 in AM, pt. refused BIPAP overnight, saturating well on 2lt. NC        Antimicrobial:  azithromycin  IVPB 500 milliGRAM(s) IV Intermittent every 24 hours   cefepime   IVPB 1000 milliGRAM(s) IV Intermittent every 8 hours     Cardiovascular:  dronedarone 400 milliGRAM(s) Oral two times a day  labetalol 100 milliGRAM(s) Oral two times a day  torsemide 20 milliGRAM(s) Oral daily    Pulmonary:  albuterol/ipratropium for Nebulization 3 milliLiter(s) Nebulizer every 6 hours PRN  budesonide 160 MICROgram(s)/formoterol 4.5 MICROgram(s) Inhaler 2 Puff(s) Inhalation two times a day  montelukast 10 milliGRAM(s) Oral at bedtime    Hematalogic:  rivaroxaban 15 milliGRAM(s) Oral with dinner    Other:  acetaminophen   Tablet .. 650 milliGRAM(s) Oral every 6 hours PRN  atorvastatin 40 milliGRAM(s) Oral at bedtime  chlorhexidine 4% Liquid 1 Application(s) Topical daily  gabapentin 300 milliGRAM(s) Oral daily  insulin lispro (HumaLOG) corrective regimen sliding scale   SubCutaneous three times a day before meals  levothyroxine 175 MICROGram(s) Oral daily  ondansetron Injectable 4 milliGRAM(s) IV Push every 6 hours PRN  pancrelipase  (CREON  6,000 Lipase Units) 93051 Capsule(s) Oral three times a day with meals  pantoprazole    Tablet 40 milliGRAM(s) Oral before breakfast  zolpidem 5 milliGRAM(s) Oral at bedtime PRN    acetaminophen   Tablet .. 650 milliGRAM(s) Oral every 6 hours PRN  albuterol/ipratropium for Nebulization 3 milliLiter(s) Nebulizer every 6 hours PRN  atorvastatin 40 milliGRAM(s) Oral at bedtime  azithromycin  IVPB 500 milliGRAM(s) IV Intermittent every 24 hours  budesonide 160 MICROgram(s)/formoterol 4.5 MICROgram(s) Inhaler 2 Puff(s) Inhalation two times a day  cefepime   IVPB 1000 milliGRAM(s) IV Intermittent every 8 hours  chlorhexidine 4% Liquid 1 Application(s) Topical daily  dronedarone 400 milliGRAM(s) Oral two times a day  gabapentin 300 milliGRAM(s) Oral daily  insulin lispro (HumaLOG) corrective regimen sliding scale   SubCutaneous three times a day before meals  labetalol 100 milliGRAM(s) Oral two times a day  levothyroxine 175 MICROGram(s) Oral daily  montelukast 10 milliGRAM(s) Oral at bedtime  ondansetron Injectable 4 milliGRAM(s) IV Push every 6 hours PRN  pancrelipase  (CREON  6,000 Lipase Units) 43788 Capsule(s) Oral three times a day with meals  pantoprazole    Tablet 40 milliGRAM(s) Oral before breakfast  rivaroxaban 15 milliGRAM(s) Oral with dinner  torsemide 20 milliGRAM(s) Oral daily  zolpidem 5 milliGRAM(s) Oral at bedtime PRN    Drug Dosing Weight  Height (cm): 160 (2019 07:00)  Weight (kg): 130.1 (2019 07:00)  BMI (kg/m2): 50.8 (2019 07:00)  BSA (m2): 2.25 (2019 07:00)    CENTRAL LINE: [ ] YES [x ] NO  LOCATION:       TORRES: [x ] YES [ ] NO        A-LINE:  [ ] YES [x ] NO  LOCATION:       ICU Vital Signs Last 24 Hrs  T(C): 37.4 (2019 07:45), Max: 37.4 (2019 09:47)  T(F): 99.4 (2019 07:45), Max: 99.4 (2019 09:47)  HR: 73 (2019 08:00) (60 - 78)  BP: 125/48 (2019 08:00) (115/46 - 153/63)  BP(mean): 65 (2019 08:00) (63 - 83)  RR: 22 (2019 08:00) (16 - 25)  SpO2: 97% (2019 08:00) (94% - 100%)    ABG - ( 2019 05:34 )  pH, Arterial: 7.35  pH, Blood: x     /  pCO2: 41    /  pO2: 118   / HCO3: 22    / Base Excess: -2.7  /  SaO2: 98         @ 07:01  -   @ 07:00  --------------------------------------------------------  IN: 604.4 mL / OUT: 6500 mL / NET: -5895.6 mL    REVIEW OF SYSTEMS:    CONSTITUTIONAL: No weakness, + fevers/ chills  NECK: No pain or stiffness  RESPIRATORY: No cough, wheezing, hemoptysis; No shortness of breath  CARDIOVASCULAR: No chest pain or palpitations  GASTROINTESTINAL: No abdominal or epigastric pain. No nausea, vomiting, No diarrhea or constipation. No melena or hematochezia.  GENITOURINARY: No dysuria, frequency or hematuria  NEUROLOGICAL: No numbness or weakness  All other review of systems is negative unless indicated above      PHYSICAL EXAM:    GENERAL: NAD, well-groomed, well-developed, obese  EYES: EOMI, PERRLA,   NECK: Supple, No JVD; Normal thyroid; Trachea midline  NERVOUS SYSTEM:  Alert & Oriented X3,  Motor Strength 5/5 B/L upper and lower extremities; DTRs 2+ intact and symmetric  CHEST/LUNG: + b/l basilar rales  HEART: Regular rate and rhythm; No murmurs,   ABDOMEN: Soft, Nontender, Nondistended; Bowel sounds present  EXTREMITIES: chronic skin changes, 2+ Peripheral Pulses, No clubbing, cyanosis, or edema        LABS:  CBC Full  -  ( 2019 04:52 )  WBC Count : 9.44 K/uL  RBC Count : 3.41 M/uL  Hemoglobin : 8.9 g/dL  Hematocrit : 27.9 %  Platelet Count - Automated : 211 K/uL  Mean Cell Volume : 81.8 fl  Mean Cell Hemoglobin : 26.1 pg  Mean Cell Hemoglobin Concentration : 31.9 gm/dL  Auto Neutrophil # : 6.48 K/uL  Auto Lymphocyte # : 1.57 K/uL  Auto Monocyte # : 1.21 K/uL  Auto Eosinophil # : 0.12 K/uL  Auto Basophil # : 0.02 K/uL  Auto Neutrophil % : 68.7 %  Auto Lymphocyte % : 16.6 %  Auto Monocyte % : 12.8 %  Auto Eosinophil % : 1.3 %  Auto Basophil % : 0.2 %        134<L>  |  98  |  28<H>  ----------------------------<  169<H>  3.7   |  29  |  1.30    Ca    8.3<L>      2019 04:52  Phos  3.7       Mg     2.1         TPro  7.4  /  Alb  2.7<L>  /  TBili  0.4  /  DBili  x   /  AST  25  /  ALT  36  /  AlkPhos  45      PT/INR - ( 2019 07:00 )   PT: 23.7 sec;   INR: 2.09 ratio         PTT - ( 2019 07:00 )  PTT:38.0 sec  Urinalysis Basic - ( 2019 14:46 )    Color: Yellow / Appearance: Clear / S.010 / pH: x  Gluc: x / Ketone: Negative  / Bili: Negative / Urobili: 1   Blood: x / Protein: 30 mg/dL / Nitrite: Negative   Leuk Esterase: Negative / RBC: Negative /HPF / WBC 0-2 /HPF   Sq Epi: x / Non Sq Epi: Occasional /HPF / Bacteria: Negative /HPF      Culture Results:   10,000 - 49,000 CFU/mL Gram Negative Rods ( @ 01:36)  Culture Results:   No growth to date. ( @ 17:57)  Culture Results:   No growth to date. ( @ 17:57)      RADIOLOGY & ADDITIONAL STUDIES REVIEWED: YES    [x ]GOALS OF CARE DISCUSSION WITH PATIENT/FAMILY/PROXY: FULL CODE    CRITICAL CARE TIME SPENT: 35 minutes

## 2019-11-27 NOTE — PHYSICAL THERAPY INITIAL EVALUATION ADULT - DIAGNOSIS, PT EVAL
Pt is an 78 y/o female who presents with fever and b/l LE cellulitis. Pt has a pacemaker in place. Pt demonstrates decreased ROM, strength, aerobic capacity, and edema, which limits her ability to perform bed mobility, transfer, ambulate and ascend stairs.

## 2019-11-28 DIAGNOSIS — J15.9 UNSPECIFIED BACTERIAL PNEUMONIA: ICD-10-CM

## 2019-11-28 LAB
-  AMIKACIN: SIGNIFICANT CHANGE UP
-  AMPICILLIN/SULBACTAM: SIGNIFICANT CHANGE UP
-  AMPICILLIN: SIGNIFICANT CHANGE UP
-  AZTREONAM: SIGNIFICANT CHANGE UP
-  CEFAZOLIN: SIGNIFICANT CHANGE UP
-  CEFEPIME: SIGNIFICANT CHANGE UP
-  CEFOXITIN: SIGNIFICANT CHANGE UP
-  CEFTRIAXONE: SIGNIFICANT CHANGE UP
-  CIPROFLOXACIN: SIGNIFICANT CHANGE UP
-  GENTAMICIN: SIGNIFICANT CHANGE UP
-  LEVOFLOXACIN: SIGNIFICANT CHANGE UP
-  MEROPENEM: SIGNIFICANT CHANGE UP
-  NITROFURANTOIN: SIGNIFICANT CHANGE UP
-  PIPERACILLIN/TAZOBACTAM: SIGNIFICANT CHANGE UP
-  TOBRAMYCIN: SIGNIFICANT CHANGE UP
-  TRIMETHOPRIM/SULFAMETHOXAZOLE: SIGNIFICANT CHANGE UP
24R-OH-CALCIDIOL SERPL-MCNC: 22.9 NG/ML — LOW (ref 30–80)
ALBUMIN SERPL ELPH-MCNC: 2.7 G/DL — LOW (ref 3.5–5)
ALP SERPL-CCNC: 48 U/L — SIGNIFICANT CHANGE UP (ref 40–120)
ALT FLD-CCNC: 32 U/L DA — SIGNIFICANT CHANGE UP (ref 10–60)
ANION GAP SERPL CALC-SCNC: 9 MMOL/L — SIGNIFICANT CHANGE UP (ref 5–17)
AST SERPL-CCNC: 23 U/L — SIGNIFICANT CHANGE UP (ref 10–40)
BASOPHILS # BLD AUTO: 0.04 K/UL — SIGNIFICANT CHANGE UP (ref 0–0.2)
BASOPHILS NFR BLD AUTO: 0.5 % — SIGNIFICANT CHANGE UP (ref 0–2)
BILIRUB SERPL-MCNC: 0.7 MG/DL — SIGNIFICANT CHANGE UP (ref 0.2–1.2)
BUN SERPL-MCNC: 31 MG/DL — HIGH (ref 7–18)
CALCIUM SERPL-MCNC: 8.1 MG/DL — LOW (ref 8.4–10.5)
CHLORIDE SERPL-SCNC: 98 MMOL/L — SIGNIFICANT CHANGE UP (ref 96–108)
CO2 SERPL-SCNC: 27 MMOL/L — SIGNIFICANT CHANGE UP (ref 22–31)
CREAT SERPL-MCNC: 1.36 MG/DL — HIGH (ref 0.5–1.3)
CULTURE RESULTS: SIGNIFICANT CHANGE UP
EOSINOPHIL # BLD AUTO: 0.05 K/UL — SIGNIFICANT CHANGE UP (ref 0–0.5)
EOSINOPHIL NFR BLD AUTO: 0.7 % — SIGNIFICANT CHANGE UP (ref 0–6)
FERRITIN SERPL-MCNC: 228 NG/ML — HIGH (ref 15–150)
GLUCOSE BLDC GLUCOMTR-MCNC: 166 MG/DL — HIGH (ref 70–99)
GLUCOSE BLDC GLUCOMTR-MCNC: 210 MG/DL — HIGH (ref 70–99)
GLUCOSE BLDC GLUCOMTR-MCNC: 250 MG/DL — HIGH (ref 70–99)
GLUCOSE BLDC GLUCOMTR-MCNC: 262 MG/DL — HIGH (ref 70–99)
GLUCOSE BLDC GLUCOMTR-MCNC: 289 MG/DL — HIGH (ref 70–99)
GLUCOSE SERPL-MCNC: 149 MG/DL — HIGH (ref 70–99)
HCT VFR BLD CALC: 29.4 % — LOW (ref 34.5–45)
HGB BLD-MCNC: 9.3 G/DL — LOW (ref 11.5–15.5)
IMM GRANULOCYTES NFR BLD AUTO: 0.5 % — SIGNIFICANT CHANGE UP (ref 0–1.5)
IRON SATN MFR SERPL: 19 UG/DL — LOW (ref 40–150)
IRON SATN MFR SERPL: 6 % — LOW (ref 15–50)
LYMPHOCYTES # BLD AUTO: 2.04 K/UL — SIGNIFICANT CHANGE UP (ref 1–3.3)
LYMPHOCYTES # BLD AUTO: 26.9 % — SIGNIFICANT CHANGE UP (ref 13–44)
MAGNESIUM SERPL-MCNC: 2 MG/DL — SIGNIFICANT CHANGE UP (ref 1.6–2.6)
MCHC RBC-ENTMCNC: 26.2 PG — LOW (ref 27–34)
MCHC RBC-ENTMCNC: 31.6 GM/DL — LOW (ref 32–36)
MCV RBC AUTO: 82.8 FL — SIGNIFICANT CHANGE UP (ref 80–100)
METHOD TYPE: SIGNIFICANT CHANGE UP
MONOCYTES # BLD AUTO: 0.96 K/UL — HIGH (ref 0–0.9)
MONOCYTES NFR BLD AUTO: 12.7 % — SIGNIFICANT CHANGE UP (ref 2–14)
NEUTROPHILS # BLD AUTO: 4.44 K/UL — SIGNIFICANT CHANGE UP (ref 1.8–7.4)
NEUTROPHILS NFR BLD AUTO: 58.7 % — SIGNIFICANT CHANGE UP (ref 43–77)
NRBC # BLD: 0 /100 WBCS — SIGNIFICANT CHANGE UP (ref 0–0)
ORGANISM # SPEC MICROSCOPIC CNT: SIGNIFICANT CHANGE UP
ORGANISM # SPEC MICROSCOPIC CNT: SIGNIFICANT CHANGE UP
PHOSPHATE SERPL-MCNC: 3.5 MG/DL — SIGNIFICANT CHANGE UP (ref 2.5–4.5)
PLATELET # BLD AUTO: 271 K/UL — SIGNIFICANT CHANGE UP (ref 150–400)
POTASSIUM SERPL-MCNC: 3.6 MMOL/L — SIGNIFICANT CHANGE UP (ref 3.5–5.3)
POTASSIUM SERPL-SCNC: 3.6 MMOL/L — SIGNIFICANT CHANGE UP (ref 3.5–5.3)
PROT SERPL-MCNC: 7.8 G/DL — SIGNIFICANT CHANGE UP (ref 6–8.3)
RBC # BLD: 3.55 M/UL — LOW (ref 3.8–5.2)
RBC # FLD: 14.5 % — SIGNIFICANT CHANGE UP (ref 10.3–14.5)
SODIUM SERPL-SCNC: 134 MMOL/L — LOW (ref 135–145)
SPECIMEN SOURCE: SIGNIFICANT CHANGE UP
TIBC SERPL-MCNC: 338 UG/DL — SIGNIFICANT CHANGE UP (ref 250–450)
UIBC SERPL-MCNC: 319 UG/DL — SIGNIFICANT CHANGE UP (ref 110–370)
VIT B12 SERPL-MCNC: 414 PG/ML — SIGNIFICANT CHANGE UP (ref 232–1245)
WBC # BLD: 7.57 K/UL — SIGNIFICANT CHANGE UP (ref 3.8–10.5)
WBC # FLD AUTO: 7.57 K/UL — SIGNIFICANT CHANGE UP (ref 3.8–10.5)

## 2019-11-28 RX ORDER — DIGOXIN 250 MCG
0.5 TABLET ORAL ONCE
Refills: 0 | Status: COMPLETED | OUTPATIENT
Start: 2019-11-28 | End: 2019-11-28

## 2019-11-28 RX ORDER — CARVEDILOL PHOSPHATE 80 MG/1
3.12 CAPSULE, EXTENDED RELEASE ORAL EVERY 12 HOURS
Refills: 0 | Status: DISCONTINUED | OUTPATIENT
Start: 2019-11-28 | End: 2019-11-29

## 2019-11-28 RX ORDER — IRON SUCROSE 20 MG/ML
200 INJECTION, SOLUTION INTRAVENOUS ONCE
Refills: 0 | Status: COMPLETED | OUTPATIENT
Start: 2019-11-28 | End: 2019-11-28

## 2019-11-28 RX ORDER — LACTULOSE 10 G/15ML
15 SOLUTION ORAL DAILY
Refills: 0 | Status: COMPLETED | OUTPATIENT
Start: 2019-11-28 | End: 2019-11-30

## 2019-11-28 RX ORDER — SENNA PLUS 8.6 MG/1
2 TABLET ORAL AT BEDTIME
Refills: 0 | Status: DISCONTINUED | OUTPATIENT
Start: 2019-11-28 | End: 2019-12-03

## 2019-11-28 RX ADMIN — Medication 2: at 21:36

## 2019-11-28 RX ADMIN — CEFEPIME 100 MILLIGRAM(S): 1 INJECTION, POWDER, FOR SOLUTION INTRAMUSCULAR; INTRAVENOUS at 21:36

## 2019-11-28 RX ADMIN — RIVAROXABAN 15 MILLIGRAM(S): KIT at 17:11

## 2019-11-28 RX ADMIN — Medication 3 MILLILITER(S): at 00:30

## 2019-11-28 RX ADMIN — BACITRACIN ZINC NEOMYCIN SULFATE POLYMYXIN B SULFATE 1 APPLICATION(S): 400; 3.5; 5 OINTMENT TOPICAL at 17:14

## 2019-11-28 RX ADMIN — AZITHROMYCIN 500 MILLIGRAM(S): 500 TABLET, FILM COATED ORAL at 11:26

## 2019-11-28 RX ADMIN — IRON SUCROSE 110 MILLIGRAM(S): 20 INJECTION, SOLUTION INTRAVENOUS at 11:28

## 2019-11-28 RX ADMIN — DRONEDARONE 400 MILLIGRAM(S): 400 TABLET, FILM COATED ORAL at 05:30

## 2019-11-28 RX ADMIN — PANTOPRAZOLE SODIUM 40 MILLIGRAM(S): 20 TABLET, DELAYED RELEASE ORAL at 05:30

## 2019-11-28 RX ADMIN — BUDESONIDE AND FORMOTEROL FUMARATE DIHYDRATE 2 PUFF(S): 160; 4.5 AEROSOL RESPIRATORY (INHALATION) at 21:36

## 2019-11-28 RX ADMIN — Medication 1: at 08:09

## 2019-11-28 RX ADMIN — CEFEPIME 100 MILLIGRAM(S): 1 INJECTION, POWDER, FOR SOLUTION INTRAMUSCULAR; INTRAVENOUS at 07:27

## 2019-11-28 RX ADMIN — CARVEDILOL PHOSPHATE 3.12 MILLIGRAM(S): 80 CAPSULE, EXTENDED RELEASE ORAL at 17:11

## 2019-11-28 RX ADMIN — Medication 175 MICROGRAM(S): at 05:30

## 2019-11-28 RX ADMIN — Medication 0.5 MILLIGRAM(S): at 11:26

## 2019-11-28 RX ADMIN — Medication 20 MILLIGRAM(S): at 05:30

## 2019-11-28 RX ADMIN — BUDESONIDE AND FORMOTEROL FUMARATE DIHYDRATE 2 PUFF(S): 160; 4.5 AEROSOL RESPIRATORY (INHALATION) at 09:00

## 2019-11-28 RX ADMIN — ZOLPIDEM TARTRATE 5 MILLIGRAM(S): 10 TABLET ORAL at 00:11

## 2019-11-28 RX ADMIN — Medication 3: at 11:54

## 2019-11-28 RX ADMIN — Medication 100 MILLIGRAM(S): at 05:30

## 2019-11-28 RX ADMIN — Medication 4 CAPSULE(S): at 08:40

## 2019-11-28 RX ADMIN — Medication 4 CAPSULE(S): at 11:27

## 2019-11-28 RX ADMIN — GABAPENTIN 300 MILLIGRAM(S): 400 CAPSULE ORAL at 11:26

## 2019-11-28 RX ADMIN — Medication 4 CAPSULE(S): at 17:12

## 2019-11-28 RX ADMIN — Medication 2: at 17:11

## 2019-11-28 RX ADMIN — CEFEPIME 100 MILLIGRAM(S): 1 INJECTION, POWDER, FOR SOLUTION INTRAMUSCULAR; INTRAVENOUS at 14:26

## 2019-11-28 RX ADMIN — Medication 1: at 00:11

## 2019-11-28 RX ADMIN — Medication 1 APPLICATION(S): at 17:13

## 2019-11-28 RX ADMIN — ATORVASTATIN CALCIUM 40 MILLIGRAM(S): 80 TABLET, FILM COATED ORAL at 21:35

## 2019-11-28 RX ADMIN — MONTELUKAST 10 MILLIGRAM(S): 4 TABLET, CHEWABLE ORAL at 21:35

## 2019-11-28 RX ADMIN — Medication 1 APPLICATION(S): at 05:31

## 2019-11-28 NOTE — PROGRESS NOTE ADULT - SUBJECTIVE AND OBJECTIVE BOX
PGY 1 Note discussed with supervising resident and primary attending    Patient is a 79y old  Female who presents with a chief complaint of b/l LE cellulitis (28 Nov 2019 11:32)      INTERVAL HPI/OVERNIGHT EVENTS: offers no new complaints; current symptoms resolving. Patient feels generally unwell today, no BM in 3 days. complain of mild SOB, fatigue, trouble sleeping.     MEDICATIONS  (STANDING):  atorvastatin 40 milliGRAM(s) Oral at bedtime  azithromycin   Tablet 500 milliGRAM(s) Oral daily  budesonide 160 MICROgram(s)/formoterol 4.5 MICROgram(s) Inhaler 2 Puff(s) Inhalation two times a day  carvedilol 3.125 milliGRAM(s) Oral every 12 hours  cefepime   IVPB 1000 milliGRAM(s) IV Intermittent every 8 hours  gabapentin 300 milliGRAM(s) Oral daily  insulin lispro (HumaLOG) corrective regimen sliding scale   SubCutaneous Before meals and at bedtime  levothyroxine 175 MICROGram(s) Oral daily  montelukast 10 milliGRAM(s) Oral at bedtime  neomycin/bacitracin/polymyxin Topical Ointment 1 Application(s) Topical every 12 hours  pancrelipase  (CREON  6,000 Lipase Units) 4 Capsule(s) Oral three times a day with meals  pantoprazole    Tablet 40 milliGRAM(s) Oral before breakfast  rivaroxaban 15 milliGRAM(s) Oral with dinner  triamcinolone 0.1% Ointment 1 Application(s) Topical every 12 hours    MEDICATIONS  (PRN):  acetaminophen   Tablet .. 650 milliGRAM(s) Oral every 6 hours PRN Temp greater or equal to 38C (100.4F), Mild Pain (1 - 3)  albuterol/ipratropium for Nebulization 3 milliLiter(s) Nebulizer every 6 hours PRN Shortness of Breath and/or Wheezing  ondansetron Injectable 4 milliGRAM(s) IV Push every 6 hours PRN Nausea and/or Vomiting  zolpidem 5 milliGRAM(s) Oral at bedtime PRN Insomnia      __________________________________________________  REVIEW OF SYSTEMS:    All negative except as per HPI      Vital Signs Last 24 Hrs  T(C): 37.1 (28 Nov 2019 11:01), Max: 37.6 (27 Nov 2019 19:45)  T(F): 98.7 (28 Nov 2019 11:01), Max: 99.7 (27 Nov 2019 19:45)  HR: 71 (28 Nov 2019 11:01) (67 - 97)  BP: 108/41 (28 Nov 2019 11:01) (105/44 - 164/74)  BP(mean): --  RR: 18 (28 Nov 2019 11:01) (18 - 20)  SpO2: 98% (28 Nov 2019 11:01) (95% - 100%)    ________________________________________________  PHYSICAL EXAM:  GENERAL: NAD, pleasant, morbidly obese  HEENT: Normocephalic;  conjunctivae and sclerae clear; moist mucous membranes;   CHEST/LUNG: Clear to auscultation bilaterally with good air entry   HEART: S1 S2  regular; no murmurs, gallops or rubs  ABDOMEN: Soft, Nontender, Nondistended; Bowel sounds present  EXTREMITIES: no cyanosis; no edema; no calf tenderness  SKIN: warm and dry; no rash, b/l lower extremity chronic venous stasis changes  NERVOUS SYSTEM:  Awake and alert; Oriented  to place, person and time ; no new deficits    _________________________________________________  LABS:                        9.3    7.57  )-----------( 271      ( 28 Nov 2019 06:12 )             29.4     11-28    134<L>  |  98  |  31<H>  ----------------------------<  149<H>  3.6   |  27  |  1.36<H>    Ca    8.1<L>      28 Nov 2019 06:12  Phos  3.5     11-28  Mg     2.0     11-28    TPro  7.8  /  Alb  2.7<L>  /  TBili  0.7  /  DBili  x   /  AST  23  /  ALT  32  /  AlkPhos  48  11-28        CAPILLARY BLOOD GLUCOSE      POCT Blood Glucose.: 289 mg/dL (28 Nov 2019 11:44)  POCT Blood Glucose.: 262 mg/dL (28 Nov 2019 09:28)  POCT Blood Glucose.: 166 mg/dL (28 Nov 2019 07:33)  POCT Blood Glucose.: 175 mg/dL (27 Nov 2019 23:19)  POCT Blood Glucose.: 197 mg/dL (27 Nov 2019 21:23)  POCT Blood Glucose.: 348 mg/dL (27 Nov 2019 16:42)        RADIOLOGY & ADDITIONAL TESTS:    Imaging Personally Reviewed:  YES/NO    Consultant(s) Notes Reviewed:   YES/ No    Care Discussed with Consultants :     Plan of care was discussed with patient and /or primary care giver; all questions and concerns were addressed and care was aligned with patient's wishes.

## 2019-11-28 NOTE — PROGRESS NOTE ADULT - PROBLEM SELECTOR PLAN 5
rate controlled  c/w xarelto  c/w bblocker, carvedilol  PPM interrogated  Cardio, Dr Jaime Medley dced, digoxin(?)

## 2019-11-28 NOTE — PROGRESS NOTE ADULT - PROBLEM SELECTOR PLAN 8
starting hss  monitor fs starting hss  monitor fs  A1c ordered  Sugar moderately controlled on sliding scale starting hss  monitor fs  A1c 6.6 early november  Sugar moderately controlled on sliding scale

## 2019-11-28 NOTE — PROGRESS NOTE ADULT - PROBLEM SELECTOR PLAN 7
resume home antihypertensives  monitor vitals Not on antiHTN as patient's BP low today x2  monitor vitals

## 2019-11-28 NOTE — PROGRESS NOTE ADULT - PROBLEM SELECTOR PLAN 6
TTE in 2018 shows pEF with G1DD and LVOT  f/u repeat TTE- normal EF, G1DD  c/w home dose of diuretics TTE in 2018 shows pEF with G1DD and LVOT  f/u repeat TTE- normal EF, G1DD  Not on any diuretics due to hypotension

## 2019-11-28 NOTE — PROGRESS NOTE ADULT - SUBJECTIVE AND OBJECTIVE BOX
Time of Visit:  Patient seen and examined.     MEDICATIONS  (STANDING):  atorvastatin 40 milliGRAM(s) Oral at bedtime  azithromycin   Tablet 500 milliGRAM(s) Oral daily  budesonide 160 MICROgram(s)/formoterol 4.5 MICROgram(s) Inhaler 2 Puff(s) Inhalation two times a day  carvedilol 3.125 milliGRAM(s) Oral every 12 hours  cefepime   IVPB 1000 milliGRAM(s) IV Intermittent every 8 hours  gabapentin 300 milliGRAM(s) Oral daily  insulin lispro (HumaLOG) corrective regimen sliding scale   SubCutaneous Before meals and at bedtime  lactulose Syrup 15 Gram(s) Oral daily  levothyroxine 175 MICROGram(s) Oral daily  montelukast 10 milliGRAM(s) Oral at bedtime  neomycin/bacitracin/polymyxin Topical Ointment 1 Application(s) Topical every 12 hours  pancrelipase  (CREON  6,000 Lipase Units) 4 Capsule(s) Oral three times a day with meals  pantoprazole    Tablet 40 milliGRAM(s) Oral before breakfast  rivaroxaban 15 milliGRAM(s) Oral with dinner  senna 2 Tablet(s) Oral at bedtime  triamcinolone 0.1% Ointment 1 Application(s) Topical every 12 hours      MEDICATIONS  (PRN):  acetaminophen   Tablet .. 650 milliGRAM(s) Oral every 6 hours PRN Temp greater or equal to 38C (100.4F), Mild Pain (1 - 3)  albuterol/ipratropium for Nebulization 3 milliLiter(s) Nebulizer every 6 hours PRN Shortness of Breath and/or Wheezing  ondansetron Injectable 4 milliGRAM(s) IV Push every 6 hours PRN Nausea and/or Vomiting  zolpidem 5 milliGRAM(s) Oral at bedtime PRN Insomnia       Medications up to date at time of exam.      PHYSICAL EXAMINATION:  Patient has no new complaints.  GENERAL: The patient is a well-developed, well-nourished, in no apparent distress.     Vital Signs Last 24 Hrs  T(C): 37.1 (28 Nov 2019 11:01), Max: 37.6 (27 Nov 2019 19:45)  T(F): 98.7 (28 Nov 2019 11:01), Max: 99.7 (27 Nov 2019 19:45)  HR: 71 (28 Nov 2019 11:01) (67 - 90)  BP: 108/41 (28 Nov 2019 11:01) (105/44 - 156/59)  BP(mean): --  RR: 18 (28 Nov 2019 11:01) (18 - 20)  SpO2: 98% (28 Nov 2019 11:01) (95% - 100%)   (if applicable)    Chest Tube (if applicable)    HEENT: Head is normocephalic and atraumatic. Extraocular muscles are intact. Mucous membranes are moist.     NECK: Supple, no palpable adenopathy.    LUNGS: Clear to auscultation, no wheezing, rales, or rhonchi.    HEART: Regular rate and rhythm without murmur.    ABDOMEN: Soft, nontender, and nondistended.  No hepatosplenomegaly is noted.    : No painful voiding, no pelvic pain    EXTREMITIES: Without any cyanosis, clubbing, rash, lesions or edema.    NEUROLOGIC: Awake, alert, oriented, grossly intact    SKIN: Warm, dry, good turgor.      LABS:                        9.3    7.57  )-----------( 271      ( 28 Nov 2019 06:12 )             29.4     11-28    134<L>  |  98  |  31<H>  ----------------------------<  149<H>  3.6   |  27  |  1.36<H>    Ca    8.1<L>      28 Nov 2019 06:12  Phos  3.5     11-28  Mg     2.0     11-28    TPro  7.8  /  Alb  2.7<L>  /  TBili  0.7  /  DBili  x   /  AST  23  /  ALT  32  /  AlkPhos  48  11-28                        MICROBIOLOGY: (if applicable)    RADIOLOGY & ADDITIONAL STUDIES:  EKG:   CXR:  ECHO:    IMPRESSION: 79y Female PAST MEDICAL & SURGICAL HISTORY:  Obesity  Pacemaker  Atrial fibrillation  Hypothyroidism  Venous stasis  IBS (irritable bowel syndrome)  CHF (congestive heart failure), NYHA class I  HLD (Hyperlipidemia)  HTN (Hypertension)  Diabetes  Myocardial Infarction  CAD (Coronary Atherosclerotic Disease)  Asthma  S/P coronary angiogram   p/w           RECOMMENDATIONS: Time of Visit:  Patient seen and examined.     MEDICATIONS  (STANDING):  atorvastatin 40 milliGRAM(s) Oral at bedtime  azithromycin   Tablet 500 milliGRAM(s) Oral daily  budesonide 160 MICROgram(s)/formoterol 4.5 MICROgram(s) Inhaler 2 Puff(s) Inhalation two times a day  carvedilol 3.125 milliGRAM(s) Oral every 12 hours  cefepime   IVPB 1000 milliGRAM(s) IV Intermittent every 8 hours  gabapentin 300 milliGRAM(s) Oral daily  insulin lispro (HumaLOG) corrective regimen sliding scale   SubCutaneous Before meals and at bedtime  lactulose Syrup 15 Gram(s) Oral daily  levothyroxine 175 MICROGram(s) Oral daily  montelukast 10 milliGRAM(s) Oral at bedtime  neomycin/bacitracin/polymyxin Topical Ointment 1 Application(s) Topical every 12 hours  pancrelipase  (CREON  6,000 Lipase Units) 4 Capsule(s) Oral three times a day with meals  pantoprazole    Tablet 40 milliGRAM(s) Oral before breakfast  rivaroxaban 15 milliGRAM(s) Oral with dinner  senna 2 Tablet(s) Oral at bedtime  triamcinolone 0.1% Ointment 1 Application(s) Topical every 12 hours      MEDICATIONS  (PRN):  acetaminophen   Tablet .. 650 milliGRAM(s) Oral every 6 hours PRN Temp greater or equal to 38C (100.4F), Mild Pain (1 - 3)  albuterol/ipratropium for Nebulization 3 milliLiter(s) Nebulizer every 6 hours PRN Shortness of Breath and/or Wheezing  ondansetron Injectable 4 milliGRAM(s) IV Push every 6 hours PRN Nausea and/or Vomiting  zolpidem 5 milliGRAM(s) Oral at bedtime PRN Insomnia       Medications up to date at time of exam.      PHYSICAL EXAMINATION:  Patient has no new complaints.  GENERAL: The patient is a well-developed, well-nourished, in no apparent distress.     Vital Signs Last 24 Hrs  T(C): 37.1 (28 Nov 2019 11:01), Max: 37.6 (27 Nov 2019 19:45)  T(F): 98.7 (28 Nov 2019 11:01), Max: 99.7 (27 Nov 2019 19:45)  HR: 71 (28 Nov 2019 11:01) (67 - 90)  BP: 108/41 (28 Nov 2019 11:01) (105/44 - 156/59)  BP(mean): --  RR: 18 (28 Nov 2019 11:01) (18 - 20)  SpO2: 98% (28 Nov 2019 11:01) (95% - 100%)   (if applicable)    Chest Tube (if applicable)    HEENT: Head is normocephalic and atraumatic. Extraocular muscles are intact. Mucous membranes are moist.     NECK: Supple, no palpable adenopathy.    LUNGS: Clear to auscultation, no wheezing, rales, or rhonchi.    HEART: Regular rate and rhythm without murmur.    ABDOMEN: Soft, nontender, and nondistended.  No hepatosplenomegaly is noted.    : No painful voiding, no pelvic pain    EXTREMITIES: +2 b.l lower ext with chrnic skin changes    NEUROLOGIC: Awake, alert, oriented, grossly intact    SKIN: Warm, dry, good turgor.      LABS:                        9.3    7.57  )-----------( 271      ( 28 Nov 2019 06:12 )             29.4     11-28    134<L>  |  98  |  31<H>  ----------------------------<  149<H>  3.6   |  27  |  1.36<H>    Ca    8.1<L>      28 Nov 2019 06:12  Phos  3.5     11-28  Mg     2.0     11-28    TPro  7.8  /  Alb  2.7<L>  /  TBili  0.7  /  DBili  x   /  AST  23  /  ALT  32  /  AlkPhos  48  11-28                        MICROBIOLOGY: (if applicable)    RADIOLOGY & ADDITIONAL STUDIES:  EKG:   CXR:  ECHO:    IMPRESSION: 79y Female PAST MEDICAL & SURGICAL HISTORY:  Obesity  Pacemaker  Atrial fibrillation  Hypothyroidism  Venous stasis  IBS (irritable bowel syndrome)  CHF (congestive heart failure), NYHA class I  HLD (Hyperlipidemia)  HTN (Hypertension)  Diabetes  Myocardial Infarction  CAD (Coronary Atherosclerotic Disease)  Asthma  S/P coronary angiogram   p/w         IMP: This is an 79 yr old woman from home , ambulates with walker, lives with son, with PMHx of HFpEF, CAD (s/p stents), chronic venous stasis, DM, HTN and Afib (on Xarelto, s/p [facility] PPM) presents to the ED with chief complaint of fever and chills x 2 days with one episode of vomiting. She was admitted to medicine on 11/25 for LE cellulitis treated with Zosyn and Vancomycin. Also found to have hyponatremia for which she was receiving IVF. RRT called on 11/26 AM for respiratory distress with abdominal breathing, saturating 100% on NRB. Vitals significant for tachypnea w/ RR: 41. Upon lung auscultation, bilateral expiratory/inspiratory wheezing appreciated. ABG w/o hypoxia/hypercapnia w/ pH WNL's. ECG: NSR VR@86bpm. Patient respiratory status did not improve despite treatment with Lasix 60 mg IV + DUONEB x1. Bladder scan revealed 0 mL, which was inaccurate probably due morbid obesity. [place] was placed with ~500 mL of urine. Patient was placed on BiPAP for work of breathing and transferred to ICU for acute hypoxic resp failure due to pul edema requiring NIPPV support.  + HCAP.. pat was hospitiazed as was hospitalized recently.  Cardiac enzymes neg. Cards eval noted. ID eval pending but discussed care with Dr Jordy GODINEZ in icu  Pat stated that she has CPAP at home as per daughter Magurite     PLAN:  -continue bipap at night   -continue CPAP at home after discharge at current setting  -continue antibx  -dvt/gi prop    spoke with daughter at bedside

## 2019-11-28 NOTE — PROGRESS NOTE ADULT - PROBLEM SELECTOR PLAN 3
Na 129 on admission - baseline shows pt is typically hyponatremic  Sodium now 134  Continue to follow BMP

## 2019-11-28 NOTE — PROGRESS NOTE ADULT - PROBLEM SELECTOR PLAN 4
s/p stents  t1 on admission 0.083, trended down  EKG unchanged from prior  f/u T2 and t3  tele monitoring  Echo- Normal EF, G1DD s/p stents  t1 on admission 0.083, trended down  EKG unchanged from prior  tele monitoring  Echo- Normal EF, G1DD

## 2019-11-28 NOTE — PROGRESS NOTE ADULT - SUBJECTIVE AND OBJECTIVE BOX
CHIEF COMPLAINT:Patient is a 79y old  Female who presents with a chief complaint of fever,sob.    	  REVIEW OF SYSTEMS:  CONSTITUTIONAL: No fever, weight loss, or fatigue  EYES: No eye pain, visual disturbances, or discharge  ENT:  No difficulty hearing, tinnitus, vertigo; No sinus or throat pain  NECK: No pain or stiffness  RESPIRATORY: No cough, wheezing, chills or hemoptysis; No Shortness of Breath  CARDIOVASCULAR: No chest pain, palpitations, passing out, dizziness, or leg swelling  GASTROINTESTINAL: No abdominal or epigastric pain. No nausea, vomiting, or hematemesis; No diarrhea or constipation. No melena or hematochezia.  GENITOURINARY: No dysuria, frequency, hematuria, or incontinence  NEUROLOGICAL: No headaches, memory loss, loss of strength, numbness, or tremors  SKIN: No itching, burning, rashes, or lesions   LYMPH Nodes: No enlarged glands  ENDOCRINE: No heat or cold intolerance; No hair loss  MUSCULOSKELETAL: No joint pain or swelling; No muscle, back, or extremity pain  PSYCHIATRIC: No depression, anxiety, mood swings, or difficulty sleeping  HEME/LYMPH: No easy bruising, or bleeding gums  ALLERGY AND IMMUNOLOGIC: No hives or eczema	      PHYSICAL EXAM:  T(C): 36.7 (11-28-19 @ 07:21), Max: 37.6 (11-27-19 @ 19:45)  HR: 67 (11-28-19 @ 07:21) (67 - 97)  BP: 105/44 (11-28-19 @ 07:21) (103/49 - 164/74)  RR: 18 (11-28-19 @ 07:21) (18 - 25)  SpO2: 98% (11-28-19 @ 07:21) (95% - 100%)  Wt(kg): --  I&O's Summary    27 Nov 2019 07:01  -  28 Nov 2019 07:00  --------------------------------------------------------  IN: 168 mL / OUT: 851 mL / NET: -683 mL    28 Nov 2019 07:01  -  28 Nov 2019 10:37  --------------------------------------------------------  IN: 200 mL / OUT: 0 mL / NET: 200 mL        Appearance: Normal	  HEENT:   Normal oral mucosa, PERRL, EOMI	  Lymphatic: No lymphadenopathy  Cardiovascular: Normal S1 S2, No JVD, No murmurs, No edema  Respiratory: Dec BS at bases  Psychiatry: A & O x 3, Mood & affect appropriate  Gastrointestinal:  Soft, Non-tender, + BS	  Skin: No rashes, No ecchymoses, No cyanosis	  Neurologic: Non-focal  Extremities: Normal range of motion, No clubbing, cyanosis or edema  Vascular: Peripheral pulses palpable 2+ bilaterally    MEDICATIONS  (STANDING):  atorvastatin 40 milliGRAM(s) Oral at bedtime  azithromycin   Tablet 500 milliGRAM(s) Oral daily  budesonide 160 MICROgram(s)/formoterol 4.5 MICROgram(s) Inhaler 2 Puff(s) Inhalation two times a day  carvedilol 3.125 milliGRAM(s) Oral every 12 hours  cefepime   IVPB 1000 milliGRAM(s) IV Intermittent every 8 hours  dronedarone 400 milliGRAM(s) Oral two times a day  gabapentin 300 milliGRAM(s) Oral daily  insulin lispro (HumaLOG) corrective regimen sliding scale   SubCutaneous Before meals and at bedtime  iron sucrose IVPB 200 milliGRAM(s) IV Intermittent once  levothyroxine 175 MICROGram(s) Oral daily  montelukast 10 milliGRAM(s) Oral at bedtime  neomycin/bacitracin/polymyxin Topical Ointment 1 Application(s) Topical every 12 hours  pancrelipase  (CREON  6,000 Lipase Units) 4 Capsule(s) Oral three times a day with meals  pantoprazole    Tablet 40 milliGRAM(s) Oral before breakfast  rivaroxaban 15 milliGRAM(s) Oral with dinner  triamcinolone 0.1% Ointment 1 Application(s) Topical every 12 hours      TELEMETRY: 	  afib    	  LABS:	 	                         9.3    7.57  )-----------( 271      ( 28 Nov 2019 06:12 )             29.4     11-28    134<L>  |  98  |  31<H>  ----------------------------<  149<H>  3.6   |  27  |  1.36<H>    Ca    8.1<L>      28 Nov 2019 06:12  Phos  3.5     11-28  Mg     2.0     11-28    TPro  7.8  /  Alb  2.7<L>  /  TBili  0.7  /  DBili  x   /  AST  23  /  ALT  32  /  AlkPhos  48  11-28    proBNP: Serum Pro-Brain Natriuretic Peptide: 1180 pg/mL (11-08 @ 13:39)    Lipid Profile: Cholesterol 112  LDL 43  HDL 53  TG 82    HgA1c: Hemoglobin A1C, Whole Blood: 6.6 % (11-09 @ 10:48)    TSH: Thyroid Stimulating Hormone, Serum: 5.06 uIU/mL (11-10 @ 09:35)      	    Iron with Total Binding Capacity in AM (11.28.19 @ 06:12)    Iron - Total Binding Capacity.: 338 ug/dL    % Saturation, Iron: 6 %    Iron Total, Serum: 19 ug/dL    Unsaturated Iron Binding Capacity: 319 ug/dL

## 2019-11-28 NOTE — PROGRESS NOTE ADULT - ASSESSMENT
80 y/o [place]mese-speaking female from home, ambulates with walker, lives with son, with PMHx of HFpEF, CAD (s/p stents), chronic venous stasis, DM, HTN and Afib (on Xarelto, s/p PPM) presents to the ED with chief complaint of fever and chills x 2 days, pneumonia, acute diastolic HF.  1.Sleep study as outpatient-R/O JOSE DANIEL.  2.Pneumonia-ABX.  3.Fe def anemia-IV iron,check ,occult.  4.PAF-xarelto,d/c multaq, coreg and dig.  5.Hypothyroidism-synthroid.  6.Acute diastolic HF-s/p diuresis,hold demadex.  7.DM-Insulin.  8.Asthma-nebs.  9.PPI.

## 2019-11-28 NOTE — PROGRESS NOTE ADULT - PROBLEM SELECTOR PLAN 2
creatine 1.3 on admission - likely related to active infection  Appears to be at baseline  f/u BMP daily  avoid nephrotoxins  f/u urine lytes creatine 1.3 on admission - likely related to active infection  Appears to be at baseline  f/u BMP daily  avoid nephrotoxins

## 2019-11-28 NOTE — PROGRESS NOTE ADULT - PROBLEM SELECTOR PLAN 1
CXR shows New patchy right basilar opacity and a small right pleural effusion.  on cefepime and azithro 11/26  Azithro changed to oral  No leukocytosis, no fevers  BCx 11/25 negative  ID Dr. Spence  -Asthma  on [facility]

## 2019-11-28 NOTE — PROGRESS NOTE ADULT - SUBJECTIVE AND OBJECTIVE BOX
INTERVAL HPI/OVERNIGHT EVENTS:  Patient seen at bedside,events noticed ,stable hemodinamicly    VITAL SIGNS:  T(F): 98.7 (11-28-19 @ 11:01)  HR: 71 (11-28-19 @ 11:01)  BP: 108/41 (11-28-19 @ 11:01)  RR: 18 (11-28-19 @ 11:01)  SpO2: 98% (11-28-19 @ 11:01)  Wt(kg): --    PHYSICAL EXAM:  awake  Constitutional:  Eyes:  ENMT:perrla  Neck:  Respiratory:few rales  Cardiovascular:s1 s2,m-none  Gastrointestinal:soft,bs pos  Extremities:  Vascular:  Neurological:no focal deficit  Musculoskeletal:    MEDICATIONS  (STANDING):  atorvastatin 40 milliGRAM(s) Oral at bedtime  azithromycin   Tablet 500 milliGRAM(s) Oral daily  budesonide 160 MICROgram(s)/formoterol 4.5 MICROgram(s) Inhaler 2 Puff(s) Inhalation two times a day  carvedilol 3.125 milliGRAM(s) Oral every 12 hours  cefepime   IVPB 1000 milliGRAM(s) IV Intermittent every 8 hours  gabapentin 300 milliGRAM(s) Oral daily  insulin lispro (HumaLOG) corrective regimen sliding scale   SubCutaneous Before meals and at bedtime  iron sucrose IVPB 200 milliGRAM(s) IV Intermittent once  levothyroxine 175 MICROGram(s) Oral daily  montelukast 10 milliGRAM(s) Oral at bedtime  neomycin/bacitracin/polymyxin Topical Ointment 1 Application(s) Topical every 12 hours  pancrelipase  (CREON  6,000 Lipase Units) 4 Capsule(s) Oral three times a day with meals  pantoprazole    Tablet 40 milliGRAM(s) Oral before breakfast  rivaroxaban 15 milliGRAM(s) Oral with dinner  triamcinolone 0.1% Ointment 1 Application(s) Topical every 12 hours    MEDICATIONS  (PRN):  acetaminophen   Tablet .. 650 milliGRAM(s) Oral every 6 hours PRN Temp greater or equal to 38C (100.4F), Mild Pain (1 - 3)  albuterol/ipratropium for Nebulization 3 milliLiter(s) Nebulizer every 6 hours PRN Shortness of Breath and/or Wheezing  ondansetron Injectable 4 milliGRAM(s) IV Push every 6 hours PRN Nausea and/or Vomiting  zolpidem 5 milliGRAM(s) Oral at bedtime PRN Insomnia      Allergies    No Known Allergies    Intolerances        LABS:                        9.3    7.57  )-----------( 271      ( 28 Nov 2019 06:12 )             29.4     11-28    134<L>  |  98  |  31<H>  ----------------------------<  149<H>  3.6   |  27  |  1.36<H>    Ca    8.1<L>      28 Nov 2019 06:12  Phos  3.5     11-28  Mg     2.0     11-28    TPro  7.8  /  Alb  2.7<L>  /  TBili  0.7  /  DBili  x   /  AST  23  /  ALT  32  /  AlkPhos  48  11-28        Attending Attestation:   Respiratory failure  Hypercapnic  Hypoxemic  JOSE DANIEL  continue  BiPAP -stable clinically  Chronic lymphedema  with  stasis dermatitis  /L/E cellulitis-cont iv as per id,improving  Morbid  obesity  ASHD Chronic  A.fib PPM -rate controlled on current meds  f/up card appret  DM type 2  -chronic,monitor fs bs  Difficulty walking -pt f/up appret for d/c plan home with home pt once stable  Continue  Telemetry  monitoring  Management  as  prescribed OOB as  tolerated  PT evaluation    covering for dr chow

## 2019-11-28 NOTE — PROGRESS NOTE ADULT - ASSESSMENT
79F from home, ambulates with walker, lives with son, with PMHx of HFpEF, CAD (s/p stents), chronic venous stasis, DM, HTN and Afib (on Xarelto, s/p [facility] PPM) presents to the ED with chief complaint of fever and chills x 2 days with one episode of vomiting. She was admitted to medicine on 11/25 for LE cellulitis treated with Zosyn and Vancomycin. Also found to have hyponatremia for which she was receiving IVF. RRT called on 11/26 AM for respiratory distress with abdominal breathing, saturating 100% on NRB. Vitals significant for tachypnea w/ RR: 41. Upon lung auscultation, bilateral expiratory/inspiratory wheezing appreciated. ABG w/o hypoxia/hypercapnia w/ pH WNL's. ECG: NSR VR@86bpm. Patient respiratory status did not improve despite treatment with Lasix 60 mg IV + DUONEB x1. Bladder scan revealed 0 mL, which was inaccurate probably due morbid obesity. Ashton was placed with ~500 mL of urine. Patient was placed on BiPAP for work of breathing and transferred to ICU.        Cardio:  -HFpEF  ECHO on Jun 2018 showed normal EF, GIDD, mild AS, mod. Pul, HTN  ECG: NSR VR 83bpm, no ischemic changes. CE: T1: 0.085, T2: 0.095, T3: -ve  s/p lasix 80mg once  will hold lasix for now as diuresing well  started torsemide home dose  maintaining negative balance  Strict IO's + daily weight + 1L fluid restriction  Follow up TTE- normal EF, G1DD    -CAD (s/p stents)    -Afib (on Xarelto, s/p PPM)  PPM interrogated  on dronaderone and labetalol home dose  c/w xarelto    Resp.:  - Health care ass. Pneumonia  CXR shows New patchy right basilar opacity and a small right pleural effusion.  on cefepime and azithro 11/26  BCx 11/25 negative  ID Dr. Spence  -Asthma  on nebs    GI:  on soft diet    Nephro:  -SRIDHAR  Cr trended down today  DC vanco and zosyn, lasix    ID:  - HAP   CXR shows New patchy right basilar opacity and a small right pleural effusion.  on cefepime and azithro 11/26, Azithromycin changed to oral  ID Dr. Spence    Endo:  DM: on HSS  Hypothyroidism: synthroid    GOC: FULL CODE

## 2019-11-29 DIAGNOSIS — D50.9 IRON DEFICIENCY ANEMIA, UNSPECIFIED: ICD-10-CM

## 2019-11-29 LAB
ANION GAP SERPL CALC-SCNC: 5 MMOL/L — SIGNIFICANT CHANGE UP (ref 5–17)
BUN SERPL-MCNC: 43 MG/DL — HIGH (ref 7–18)
CALCIUM SERPL-MCNC: 8.5 MG/DL — SIGNIFICANT CHANGE UP (ref 8.4–10.5)
CHLORIDE SERPL-SCNC: 101 MMOL/L — SIGNIFICANT CHANGE UP (ref 96–108)
CO2 SERPL-SCNC: 29 MMOL/L — SIGNIFICANT CHANGE UP (ref 22–31)
CREAT SERPL-MCNC: 1.53 MG/DL — HIGH (ref 0.5–1.3)
GLUCOSE BLDC GLUCOMTR-MCNC: 179 MG/DL — HIGH (ref 70–99)
GLUCOSE BLDC GLUCOMTR-MCNC: 216 MG/DL — HIGH (ref 70–99)
GLUCOSE BLDC GLUCOMTR-MCNC: 261 MG/DL — HIGH (ref 70–99)
GLUCOSE BLDC GLUCOMTR-MCNC: 266 MG/DL — HIGH (ref 70–99)
GLUCOSE BLDC GLUCOMTR-MCNC: 274 MG/DL — HIGH (ref 70–99)
GLUCOSE SERPL-MCNC: 184 MG/DL — HIGH (ref 70–99)
HCT VFR BLD CALC: 27.5 % — LOW (ref 34.5–45)
HGB BLD-MCNC: 8.6 G/DL — LOW (ref 11.5–15.5)
MCHC RBC-ENTMCNC: 26.3 PG — LOW (ref 27–34)
MCHC RBC-ENTMCNC: 31.3 GM/DL — LOW (ref 32–36)
MCV RBC AUTO: 84.1 FL — SIGNIFICANT CHANGE UP (ref 80–100)
NRBC # BLD: 0 /100 WBCS — SIGNIFICANT CHANGE UP (ref 0–0)
OB PNL STL: NEGATIVE — SIGNIFICANT CHANGE UP
PLATELET # BLD AUTO: 281 K/UL — SIGNIFICANT CHANGE UP (ref 150–400)
POTASSIUM SERPL-MCNC: 3.7 MMOL/L — SIGNIFICANT CHANGE UP (ref 3.5–5.3)
POTASSIUM SERPL-SCNC: 3.7 MMOL/L — SIGNIFICANT CHANGE UP (ref 3.5–5.3)
RBC # BLD: 3.27 M/UL — LOW (ref 3.8–5.2)
RBC # FLD: 14.6 % — HIGH (ref 10.3–14.5)
SODIUM SERPL-SCNC: 135 MMOL/L — SIGNIFICANT CHANGE UP (ref 135–145)
WBC # BLD: 5.12 K/UL — SIGNIFICANT CHANGE UP (ref 3.8–10.5)
WBC # FLD AUTO: 5.12 K/UL — SIGNIFICANT CHANGE UP (ref 3.8–10.5)

## 2019-11-29 PROCEDURE — 74176 CT ABD & PELVIS W/O CONTRAST: CPT | Mod: 26

## 2019-11-29 PROCEDURE — 99232 SBSQ HOSP IP/OBS MODERATE 35: CPT

## 2019-11-29 RX ORDER — IOHEXOL 300 MG/ML
30 INJECTION, SOLUTION INTRAVENOUS ONCE
Refills: 0 | Status: COMPLETED | OUTPATIENT
Start: 2019-11-29 | End: 2019-11-29

## 2019-11-29 RX ORDER — INSULIN GLARGINE 100 [IU]/ML
4 INJECTION, SOLUTION SUBCUTANEOUS EVERY MORNING
Refills: 0 | Status: DISCONTINUED | OUTPATIENT
Start: 2019-11-29 | End: 2019-12-03

## 2019-11-29 RX ORDER — AZITHROMYCIN 500 MG/1
500 TABLET, FILM COATED ORAL DAILY
Refills: 0 | Status: COMPLETED | OUTPATIENT
Start: 2019-11-29 | End: 2019-12-03

## 2019-11-29 RX ORDER — IRON SUCROSE 20 MG/ML
200 INJECTION, SOLUTION INTRAVENOUS ONCE
Refills: 0 | Status: COMPLETED | OUTPATIENT
Start: 2019-11-29 | End: 2019-11-29

## 2019-11-29 RX ORDER — CEFPODOXIME PROXETIL 100 MG
200 TABLET ORAL EVERY 12 HOURS
Refills: 0 | Status: DISCONTINUED | OUTPATIENT
Start: 2019-11-29 | End: 2019-12-03

## 2019-11-29 RX ORDER — CHOLECALCIFEROL (VITAMIN D3) 125 MCG
2000 CAPSULE ORAL DAILY
Refills: 0 | Status: DISCONTINUED | OUTPATIENT
Start: 2019-11-29 | End: 2019-12-03

## 2019-11-29 RX ORDER — CARVEDILOL PHOSPHATE 80 MG/1
6.25 CAPSULE, EXTENDED RELEASE ORAL EVERY 12 HOURS
Refills: 0 | Status: DISCONTINUED | OUTPATIENT
Start: 2019-11-29 | End: 2019-11-30

## 2019-11-29 RX ADMIN — Medication 3: at 23:48

## 2019-11-29 RX ADMIN — CARVEDILOL PHOSPHATE 6.25 MILLIGRAM(S): 80 CAPSULE, EXTENDED RELEASE ORAL at 17:46

## 2019-11-29 RX ADMIN — CARVEDILOL PHOSPHATE 3.12 MILLIGRAM(S): 80 CAPSULE, EXTENDED RELEASE ORAL at 05:30

## 2019-11-29 RX ADMIN — MONTELUKAST 10 MILLIGRAM(S): 4 TABLET, CHEWABLE ORAL at 23:22

## 2019-11-29 RX ADMIN — BUDESONIDE AND FORMOTEROL FUMARATE DIHYDRATE 2 PUFF(S): 160; 4.5 AEROSOL RESPIRATORY (INHALATION) at 12:04

## 2019-11-29 RX ADMIN — Medication 1: at 08:09

## 2019-11-29 RX ADMIN — AZITHROMYCIN 500 MILLIGRAM(S): 500 TABLET, FILM COATED ORAL at 12:05

## 2019-11-29 RX ADMIN — BACITRACIN ZINC NEOMYCIN SULFATE POLYMYXIN B SULFATE 1 APPLICATION(S): 400; 3.5; 5 OINTMENT TOPICAL at 05:30

## 2019-11-29 RX ADMIN — ZOLPIDEM TARTRATE 5 MILLIGRAM(S): 10 TABLET ORAL at 23:27

## 2019-11-29 RX ADMIN — IRON SUCROSE 110 MILLIGRAM(S): 20 INJECTION, SOLUTION INTRAVENOUS at 11:46

## 2019-11-29 RX ADMIN — Medication 2000 UNIT(S): at 11:46

## 2019-11-29 RX ADMIN — Medication 1 APPLICATION(S): at 17:49

## 2019-11-29 RX ADMIN — Medication 3: at 12:03

## 2019-11-29 RX ADMIN — Medication 1 APPLICATION(S): at 05:30

## 2019-11-29 RX ADMIN — INSULIN GLARGINE 4 UNIT(S): 100 INJECTION, SOLUTION SUBCUTANEOUS at 08:41

## 2019-11-29 RX ADMIN — PANTOPRAZOLE SODIUM 40 MILLIGRAM(S): 20 TABLET, DELAYED RELEASE ORAL at 05:29

## 2019-11-29 RX ADMIN — IOHEXOL 30 MILLILITER(S): 300 INJECTION, SOLUTION INTRAVENOUS at 09:50

## 2019-11-29 RX ADMIN — Medication 4 CAPSULE(S): at 17:46

## 2019-11-29 RX ADMIN — Medication 4 CAPSULE(S): at 08:08

## 2019-11-29 RX ADMIN — SENNA PLUS 2 TABLET(S): 8.6 TABLET ORAL at 23:22

## 2019-11-29 RX ADMIN — Medication 2: at 16:46

## 2019-11-29 RX ADMIN — Medication 3 MILLILITER(S): at 03:58

## 2019-11-29 RX ADMIN — RIVAROXABAN 15 MILLIGRAM(S): KIT at 17:46

## 2019-11-29 RX ADMIN — BACITRACIN ZINC NEOMYCIN SULFATE POLYMYXIN B SULFATE 1 APPLICATION(S): 400; 3.5; 5 OINTMENT TOPICAL at 17:49

## 2019-11-29 RX ADMIN — ATORVASTATIN CALCIUM 40 MILLIGRAM(S): 80 TABLET, FILM COATED ORAL at 23:22

## 2019-11-29 RX ADMIN — ZOLPIDEM TARTRATE 5 MILLIGRAM(S): 10 TABLET ORAL at 00:17

## 2019-11-29 RX ADMIN — Medication 175 MICROGRAM(S): at 05:30

## 2019-11-29 RX ADMIN — BUDESONIDE AND FORMOTEROL FUMARATE DIHYDRATE 2 PUFF(S): 160; 4.5 AEROSOL RESPIRATORY (INHALATION) at 23:27

## 2019-11-29 RX ADMIN — AZITHROMYCIN 500 MILLIGRAM(S): 500 TABLET, FILM COATED ORAL at 11:46

## 2019-11-29 RX ADMIN — GABAPENTIN 300 MILLIGRAM(S): 400 CAPSULE ORAL at 11:46

## 2019-11-29 RX ADMIN — Medication 200 MILLIGRAM(S): at 17:46

## 2019-11-29 RX ADMIN — LACTULOSE 15 GRAM(S): 10 SOLUTION ORAL at 11:46

## 2019-11-29 RX ADMIN — Medication 4 CAPSULE(S): at 11:46

## 2019-11-29 RX ADMIN — CEFEPIME 100 MILLIGRAM(S): 1 INJECTION, POWDER, FOR SOLUTION INTRAMUSCULAR; INTRAVENOUS at 05:29

## 2019-11-29 NOTE — PROGRESS NOTE ADULT - ASSESSMENT
80 y/o [place]ian-speaking female from home, ambulates with walker, lives with son, with PMHx of HFpEF, CAD (s/p stents), chronic venous stasis, DM, HTN and Afib (on Xarelto, s/p PPM) presents to the ED with chief complaint of fever and chills x 2 days, pneumonia, acute diastolic HF.  1.Sleep study as outpatient-R/O JOSE DANIEL.  2.Pneumonia-ABX.  3.Fe def anemia-IV iron,ct abd and pelvisoccult.  4.PAF-xarelto,inc coreg 6.25mg bid.  5.Hypothyroidism-synthroid.  6.Acute diastolic HF-s/p diuresis,hold demadex.  7.DM-Insulin.  8.Asthma-nebs.  9.PPI.

## 2019-11-29 NOTE — PROGRESS NOTE ADULT - PROBLEM SELECTOR PLAN 1
CXR showed New patchy right basilar opacity and a small right pleural effusion.  On azithromycin + cefpodoxime for 5 more days  No leukocytosis, no fevers  BCx 11/25 negative  ID Dr. Spence  -Asthma  on [facility]

## 2019-11-29 NOTE — PROGRESS NOTE ADULT - ASSESSMENT
79F from home, ambulates with walker, lives with son, with PMHx of HFpEF, CAD (s/p stents), chronic venous stasis, DM, HTN and Afib (on Xarelto, s/p [facility] PPM) presents to the ED with chief complaint of fever and chills x 2 days with one episode of vomiting. She was admitted to medicine on 11/25 for LE cellulitis treated with Zosyn and Vancomycin. Also found to have hyponatremia for which she was receiving IVF. RRT called on 11/26 AM for respiratory distress with abdominal breathing, saturating 100% on NRB. Vitals significant for tachypnea w/ RR: 41. Upon lung auscultation, bilateral expiratory/inspiratory wheezing appreciated. ABG w/o hypoxia/hypercapnia w/ pH WNL's. ECG: NSR VR@86bpm. Patient respiratory status did not improve despite treatment with Lasix 60 mg IV + DUONEB x1. Bladder scan revealed 0 mL, which was inaccurate probably due morbid obesity. Ashton was placed with ~500 mL of urine. Patient was placed on BiPAP for work of breathing and transferred to ICU. Patient then downgraded when stable. Due to iron def. Anemia will obtain FOBT.

## 2019-11-29 NOTE — PROGRESS NOTE ADULT - SUBJECTIVE AND OBJECTIVE BOX
PGY 1 Note discussed with supervising resident and primary attending    Patient is a 79y old  Female who presents with a chief complaint of b/l LE cellulitis (29 Nov 2019 14:40)      INTERVAL HPI/OVERNIGHT EVENTS: offers no new complaints; current symptoms resolving. Patient continues to improve. Denies any SOB, chest pain, nausea, vomting, fever, chills. Patient endorses lower extremity tenderness and redness which appears to be 2/2 venous insufficiency.     MEDICATIONS  (STANDING):  atorvastatin 40 milliGRAM(s) Oral at bedtime  azithromycin   Tablet 500 milliGRAM(s) Oral daily  budesonide 160 MICROgram(s)/formoterol 4.5 MICROgram(s) Inhaler 2 Puff(s) Inhalation two times a day  carvedilol 6.25 milliGRAM(s) Oral every 12 hours  cefpodoxime 200 milliGRAM(s) Oral every 12 hours  cholecalciferol 2000 Unit(s) Oral daily  gabapentin 300 milliGRAM(s) Oral daily  insulin glargine Injectable (LANTUS) 4 Unit(s) SubCutaneous every morning  insulin lispro (HumaLOG) corrective regimen sliding scale   SubCutaneous Before meals and at bedtime  lactulose Syrup 15 Gram(s) Oral daily  levothyroxine 175 MICROGram(s) Oral daily  montelukast 10 milliGRAM(s) Oral at bedtime  neomycin/bacitracin/polymyxin Topical Ointment 1 Application(s) Topical every 12 hours  pancrelipase  (CREON  6,000 Lipase Units) 4 Capsule(s) Oral three times a day with meals  pantoprazole    Tablet 40 milliGRAM(s) Oral before breakfast  rivaroxaban 15 milliGRAM(s) Oral with dinner  senna 2 Tablet(s) Oral at bedtime  triamcinolone 0.1% Ointment 1 Application(s) Topical every 12 hours    MEDICATIONS  (PRN):  acetaminophen   Tablet .. 650 milliGRAM(s) Oral every 6 hours PRN Temp greater or equal to 38C (100.4F), Mild Pain (1 - 3)  albuterol/ipratropium for Nebulization 3 milliLiter(s) Nebulizer every 6 hours PRN Shortness of Breath and/or Wheezing  ondansetron Injectable 4 milliGRAM(s) IV Push every 6 hours PRN Nausea and/or Vomiting  zolpidem 5 milliGRAM(s) Oral at bedtime PRN Insomnia      __________________________________________________  REVIEW OF SYSTEMS:    CONSTITUTIONAL: No fever,   RESPIRATORY: No cough; No shortness of breath  CARDIOVASCULAR: No chest pain, no palpitations  GASTROINTESTINAL: No pain. No nausea or vomiting; No diarrhea   NEUROLOGICAL: No headache or numbness, no tremors  MUSCULOSKELETAL: No joint pain, no muscle pain  GENITOURINARY: no dysuria, no frequency, no hesitancy  ALL OTHER  ROS negative        Vital Signs Last 24 Hrs  T(C): 36.4 (29 Nov 2019 15:35), Max: 36.8 (29 Nov 2019 04:48)  T(F): 97.6 (29 Nov 2019 15:35), Max: 98.2 (29 Nov 2019 04:48)  HR: 67 (29 Nov 2019 15:35) (60 - 67)  BP: 144/58 (29 Nov 2019 15:35) (144/58 - 165/80)  BP(mean): --  RR: 18 (29 Nov 2019 15:35) (18 - 18)  SpO2: 98% (29 Nov 2019 15:35) (96% - 100%)    ________________________________________________  PHYSICAL EXAM:  GENERAL: NAD, pleasant, morbidly obese  HEENT: Normocephalic;  conjunctivae and sclerae clear; moist mucous membranes;   CHEST/LUNG: Clear to auscultation bilaterally with good air entry   HEART: S1 S2  regular  ABDOMEN: Soft, Nontender, Nondistended; Bowel sounds present  EXTREMITIES: b/l extremity redness, pitting edema.  SKIN: warm and dry; no rash, b/l lower extremity chronic venous stasis changes  NERVOUS SYSTEM:  Awake and alert; Oriented  to place, person and time ; no new deficits    _________________________________________________  LABS:                        8.6    5.12  )-----------( 281      ( 29 Nov 2019 07:59 )             27.5     11-29    135  |  101  |  43<H>  ----------------------------<  184<H>  3.7   |  29  |  1.53<H>    Ca    8.5      29 Nov 2019 07:59  Phos  3.5     11-28  Mg     2.0     11-28    TPro  7.8  /  Alb  2.7<L>  /  TBili  0.7  /  DBili  x   /  AST  23  /  ALT  32  /  AlkPhos  48  11-28        CAPILLARY BLOOD GLUCOSE      POCT Blood Glucose.: 261 mg/dL (29 Nov 2019 11:54)  POCT Blood Glucose.: 179 mg/dL (29 Nov 2019 07:33)  POCT Blood Glucose.: 250 mg/dL (28 Nov 2019 21:05)  POCT Blood Glucose.: 210 mg/dL (28 Nov 2019 16:37)        RADIOLOGY & ADDITIONAL TESTS:    Imaging Personally Reviewed:  YES/NO    Consultant(s) Notes Reviewed:   YES/ No    Care Discussed with Consultants :     Plan of care was discussed with patient and /or primary care giver; all questions and concerns were addressed and care was aligned with patient's wishes. PGY 1 Note discussed with supervising resident and primary attending    Patient is a 79y old  Female who presents with a chief complaint of b/l LE cellulitis (29 Nov 2019 14:40)      INTERVAL HPI/OVERNIGHT EVENTS: offers no new complaints; current symptoms resolving. Patient continues to improve. Denies any SOB, chest pain, nausea, vomting, fever, chills. Patient endorses lower extremity tenderness and redness which appears to be 2/2 venous insufficiency.     MEDICATIONS  (STANDING):  atorvastatin 40 milliGRAM(s) Oral at bedtime  azithromycin   Tablet 500 milliGRAM(s) Oral daily  budesonide 160 MICROgram(s)/formoterol 4.5 MICROgram(s) Inhaler 2 Puff(s) Inhalation two times a day  carvedilol 6.25 milliGRAM(s) Oral every 12 hours  cefpodoxime 200 milliGRAM(s) Oral every 12 hours  cholecalciferol 2000 Unit(s) Oral daily  gabapentin 300 milliGRAM(s) Oral daily  insulin glargine Injectable (LANTUS) 4 Unit(s) SubCutaneous every morning  insulin lispro (HumaLOG) corrective regimen sliding scale   SubCutaneous Before meals and at bedtime  lactulose Syrup 15 Gram(s) Oral daily  levothyroxine 175 MICROGram(s) Oral daily  montelukast 10 milliGRAM(s) Oral at bedtime  neomycin/bacitracin/polymyxin Topical Ointment 1 Application(s) Topical every 12 hours  pancrelipase  (CREON  6,000 Lipase Units) 4 Capsule(s) Oral three times a day with meals  pantoprazole    Tablet 40 milliGRAM(s) Oral before breakfast  rivaroxaban 15 milliGRAM(s) Oral with dinner  senna 2 Tablet(s) Oral at bedtime  triamcinolone 0.1% Ointment 1 Application(s) Topical every 12 hours    MEDICATIONS  (PRN):  acetaminophen   Tablet .. 650 milliGRAM(s) Oral every 6 hours PRN Temp greater or equal to 38C (100.4F), Mild Pain (1 - 3)  albuterol/ipratropium for Nebulization 3 milliLiter(s) Nebulizer every 6 hours PRN Shortness of Breath and/or Wheezing  ondansetron Injectable 4 milliGRAM(s) IV Push every 6 hours PRN Nausea and/or Vomiting  zolpidem 5 milliGRAM(s) Oral at bedtime PRN Insomnia        Vital Signs Last 24 Hrs  T(C): 36.4 (29 Nov 2019 15:35), Max: 36.8 (29 Nov 2019 04:48)  T(F): 97.6 (29 Nov 2019 15:35), Max: 98.2 (29 Nov 2019 04:48)  HR: 67 (29 Nov 2019 15:35) (60 - 67)  BP: 144/58 (29 Nov 2019 15:35) (144/58 - 165/80)  BP(mean): --  RR: 18 (29 Nov 2019 15:35) (18 - 18)  SpO2: 98% (29 Nov 2019 15:35) (96% - 100%)    ________________________________________________  PHYSICAL EXAM:  GENERAL: NAD, pleasant, morbidly obese  HEENT: Normocephalic;  conjunctivae and sclerae clear; moist mucous membranes;   CHEST/LUNG: Clear to auscultation bilaterally with good air entry   HEART: S1 S2  regular  ABDOMEN: Soft, Nontender, Nondistended; Bowel sounds present  EXTREMITIES: b/l extremity redness, pitting edema.  SKIN: warm and dry; no rash, b/l lower extremity chronic venous stasis changes  NERVOUS SYSTEM:  Awake and alert; Oriented  to place, person and time ; no new deficits    _________________________________________________  LABS:                        8.6    5.12  )-----------( 281      ( 29 Nov 2019 07:59 )             27.5     11-29    135  |  101  |  43<H>  ----------------------------<  184<H>  3.7   |  29  |  1.53<H>    Ca    8.5      29 Nov 2019 07:59  Phos  3.5     11-28  Mg     2.0     11-28    TPro  7.8  /  Alb  2.7<L>  /  TBili  0.7  /  DBili  x   /  AST  23  /  ALT  32  /  AlkPhos  48  11-28        CAPILLARY BLOOD GLUCOSE      POCT Blood Glucose.: 261 mg/dL (29 Nov 2019 11:54)  POCT Blood Glucose.: 179 mg/dL (29 Nov 2019 07:33)  POCT Blood Glucose.: 250 mg/dL (28 Nov 2019 21:05)  POCT Blood Glucose.: 210 mg/dL (28 Nov 2019 16:37)        RADIOLOGY & ADDITIONAL TESTS:    Imaging Personally Reviewed:  YES/NO    Consultant(s) Notes Reviewed:   YES/ No    Care Discussed with Consultants :     Plan of care was discussed with patient and /or primary care giver; all questions and concerns were addressed and care was aligned with patient's wishes.

## 2019-11-29 NOTE — PROGRESS NOTE ADULT - PROBLEM SELECTOR PLAN 2
creatine 1.3 on admission - likely related to active infection  labile, elevated this AM  f/u BMP daily  avoid nephrotoxins patient iron studies sig. for iron deficient  Awaiting occult blood to screen for CRC  hemoglobin stable  transfuse if symptomatic <8, or if hb <7

## 2019-11-29 NOTE — PROGRESS NOTE ADULT - SUBJECTIVE AND OBJECTIVE BOX
Subjective:     Objective:    azithromycin   Tablet 500 milliGRAM(s) Oral daily (D4)  cefepime   IVPB 1000 milliGRAM(s) IV Intermittent every 8 hours (D4)        PHYSICAL EXAM:    Vital Signs Last 24 Hrs  T(C): 36.6 (29 Nov 2019 07:42), Max: 37.1 (28 Nov 2019 11:01)  T(F): 97.9 (29 Nov 2019 07:42), Max: 98.7 (28 Nov 2019 11:01)  HR: 64 (29 Nov 2019 09:36) (60 - 71)  BP: 150/57 (29 Nov 2019 07:42) (108/41 - 150/57)  BP(mean): --  RR: 18 (29 Nov 2019 07:42) (18 - 18)  SpO2: 97% (29 Nov 2019 09:36) (96% - 100%)    GEN:    CHEST/Respiratory:    Cardiovascular:    Gastrointestinal:    Genitourinary:    Extremities:     Neurological:    Skin:    Lymph Nodes:        LABS/DIAGNOSTIC TESTS                            8.6    5.12  )-----------( 281      ( 29 Nov 2019 07:59 )             27.5       WBC Count: 5.12 K/uL (11-29 @ 07:59)  WBC Count: 7.57 K/uL (11-28 @ 06:12)  WBC Count: 9.44 K/uL (11-27 @ 04:52)      11-29    135  |  101  |  43<H>  ----------------------------<  184<H>  3.7   |  29  |  1.53<H>    Ca    8.5      29 Nov 2019 07:59  Phos  3.5     11-28  Mg     2.0     11-28    TPro  7.8  /  Alb  2.7<L>  /  TBili  0.7  /  DBili  x   /  AST  23  /  ALT  32  /  AlkPhos  48  11-28              LACTATE:      CULTURES      Culture - Urine (collected 11-26-19 @ 01:36)  Source: .Urine  Final Report (11-28-19 @ 06:40):    10,000 - 49,000 CFU/mL Proteus mirabilis  Organism: Proteus mirabilis (11-28-19 @ 06:40)  Organism: Proteus mirabilis (11-28-19 @ 06:40)      -  Amikacin: S <=16      -  Ampicillin: S <=8 These ampicillin results predict results for amoxicillin      -  Ampicillin/Sulbactam: S <=8/4 Enterobacter, Citrobacter, and Serratia may develop resistance during prolonged therapy (3-4 days)      -  Aztreonam: S <=4      -  Cefazolin: S <=8 (MIC_CL_COM_ENTERIC_CEFAZU) For uncomplicated UTI with K. pneumoniae, E. coli, or P. mirablis: ALEXIS <=16 is sensitive and ALEXIS >=32 is resistant. This also predicts results for oral agents cefaclor, cefdinir, cefpodoxime, cefprozil, cefuroxime axetil, cephalexin and locarbef for uncomplicated UTI. Note that some isolates may be susceptible to these agents while testing resistant to cefazolin.      -  Cefepime: S <=4      -  Cefoxitin: S <=8      -  Ceftriaxone: S <=1 Enterobacter, Citrobacter, and Serratia may develop resistance during prolonged therapy      -  Ciprofloxacin: S <=1      -  Gentamicin: S <=4      -  Levofloxacin: S <=2      -  Meropenem: S <=1      -  Nitrofurantoin: R >64 Should not be used to treat pyelonephritis      -  Piperacillin/Tazobactam: S <=16      -  Tobramycin: S <=4      -  Trimethoprim/Sulfamethoxazole: S <=2/38      Method Type: ALEXIS    Culture - Blood (collected 11-25-19 @ 17:57)  Source: .Blood  Preliminary Report (11-26-19 @ 18:01):    No growth to date.    Culture - Blood (collected 11-25-19 @ 17:57)  Source: .Blood  Preliminary Report (11-26-19 @ 18:01):    No growth to date.    Culture - Blood (collected 11-08-19 @ 22:59)  Source: .Blood Blood-Venous  Final Report (11-13-19 @ 23:00):    No growth at 5 days.    Culture - Blood (collected 11-08-19 @ 22:59)  Source: .Blood Blood-Peripheral  Final Report (11-13-19 @ 23:00):    No growth at 5 days.    Culture - Urine (collected 11-08-19 @ 17:11)  Source: .Urine Clean Catch (Midstream)  Final Report (11-09-19 @ 18:10):    <10,000 CFU/mL Normal Urogenital Ema    Culture - Urine (collected 11-06-19 @ 23:08)  Source: .Urine Clean Catch (Midstream)  Final Report (11-08-19 @ 04:46):    >=3 organisms. Probable collection contamination.    Culture - Blood (collected 11-06-19 @ 22:09)  Source: .Blood Blood  Final Report (11-11-19 @ 23:01):    No growth at 5 days.    Culture - Blood (collected 11-06-19 @ 22:09)  Source: .Blood Blood  Final Report (11-11-19 @ 23:01):    No growth at 5 days.          RADIOLOGY    CXR:    CT HEAD:    CT CHEST:    CT ABDOMEN:    MRI:    OTHER: Subjective: Patient feeling better (Guyanese  # [id number])    Objective:    azithromycin   Tablet 500 milliGRAM(s) Oral daily (D4)  cefepime   IVPB 1000 milliGRAM(s) IV Intermittent every 8 hours (D4)        PHYSICAL EXAM:    Vital Signs Last 24 Hrs  T(C): 36.6 (29 Nov 2019 07:42), Max: 37.1 (28 Nov 2019 11:01)  T(F): 97.9 (29 Nov 2019 07:42), Max: 98.7 (28 Nov 2019 11:01)  HR: 64 (29 Nov 2019 09:36) (60 - 71)  BP: 150/57 (29 Nov 2019 07:42) (108/41 - 150/57)  BP(mean): --  RR: 18 (29 Nov 2019 07:42) (18 - 18)  SpO2: 97% (29 Nov 2019 09:36) (96% - 100%)    GEN: WD obese w female in NAD    CHEST/Respiratory: sparse expiratory wheezes bilaterally; cd not hear rales; chest PPM site without erythema, edema or drainage    Cardiovascular: S1S1 reg; II/VI VINH heard throughout precordium    Gastrointestinal: obese; soft and non-tender with active BS; area of erythema below panniculus    Genitourinary: hansne in place    Extremities: bilat leg stasis changes extending from ~ 6cm below patellae to above ankles with associated erythema, edema, and dried ulcers; not warm to touch and no drainage noted; no tenderness to palpation of areas; LLE with bruise over L upper thigh    Neurological: A+O          LABS/DIAGNOSTIC TESTS                        8.6    5.12  )-----------( 281      ( 29 Nov 2019 07:59 )             27.5       WBC Count: 5.12 K/uL (11-29 @ 07:59)  WBC Count: 7.57 K/uL (11-28 @ 06:12)  WBC Count: 9.44 K/uL (11-27 @ 04:52)      11-29    135  |  101  |  43<H>  ----------------------------<  184<H>  3.7   |  29  |  1.53<H>    Ca    8.5      29 Nov 2019 07:59  Phos  3.5     11-28  Mg     2.0     11-28    TPro  7.8  /  Alb  2.7<L>  /  TBili  0.7  /  DBili  x   /  AST  23  /  ALT  32  /  AlkPhos  48  11-28        CULTURES    Culture - Urine (collected 11-26-19 @ 01:36)  Source: .Urine  Final Report (11-28-19 @ 06:40):    10,000 - 49,000 CFU/mL Proteus mirabilis  Organism: Proteus mirabilis (11-28-19 @ 06:40)  Organism: Proteus mirabilis (11-28-19 @ 06:40)      -  Amikacin: S <=16      -  Ampicillin: S <=8 These ampicillin results predict results for amoxicillin      -  Ampicillin/Sulbactam: S <=8/4 Enterobacter, Citrobacter, and Serratia may develop resistance during prolonged therapy (3-4 days)      -  Aztreonam: S <=4      -  Cefazolin: S <=8 (MIC_CL_COM_ENTERIC_CEFAZU) For uncomplicated UTI with K. pneumoniae, E. coli, or P. mirablis: ALEXIS <=16 is sensitive and ALEXIS >=32 is resistant. This also predicts results for oral agents cefaclor, cefdinir, cefpodoxime, cefprozil, cefuroxime axetil, cephalexin and locarbef for uncomplicated UTI. Note that some isolates may be susceptible to these agents while testing resistant to cefazolin.      -  Cefepime: S <=4      -  Cefoxitin: S <=8      -  Ceftriaxone: S <=1 Enterobacter, Citrobacter, and Serratia may develop resistance during prolonged therapy      -  Ciprofloxacin: S <=1      -  Gentamicin: S <=4      -  Levofloxacin: S <=2      -  Meropenem: S <=1      -  Nitrofurantoin: R >64 Should not be used to treat pyelonephritis      -  Piperacillin/Tazobactam: S <=16      -  Tobramycin: S <=4      -  Trimethoprim/Sulfamethoxazole: S <=2/38      Method Type: ALEXIS    Culture - Blood (collected 11-25-19 @ 17:57)  Source: .Blood  Preliminary Report (11-26-19 @ 18:01):    No growth to date.    Culture - Blood (collected 11-25-19 @ 17:57)  Source: .Blood  Preliminary Report (11-26-19 @ 18:01):    No growth to date.    Culture - Blood (collected 11-08-19 @ 22:59)  Source: .Blood Blood-Venous  Final Report (11-13-19 @ 23:00):    No growth at 5 days.    Culture - Blood (collected 11-08-19 @ 22:59)  Source: .Blood Blood-Peripheral  Final Report (11-13-19 @ 23:00):    No growth at 5 days.    Culture - Urine (collected 11-08-19 @ 17:11)  Source: .Urine Clean Catch (Midstream)  Final Report (11-09-19 @ 18:10):    <10,000 CFU/mL Normal Urogenital Ema    Culture - Urine (collected 11-06-19 @ 23:08)  Source: .Urine Clean Catch (Midstream)  Final Report (11-08-19 @ 04:46):    >=3 organisms. Probable collection contamination.    Culture - Blood (collected 11-06-19 @ 22:09)  Source: .Blood Blood  Final Report (11-11-19 @ 23:01):    No growth at 5 days.    Culture - Blood (collected 11-06-19 @ 22:09)  Source: .Blood Blood  Final Report (11-11-19 @ 23:01):    No growth at 5 days.

## 2019-11-29 NOTE — PROGRESS NOTE ADULT - SUBJECTIVE AND OBJECTIVE BOX
Time of Visit:  Patient seen and examined.     MEDICATIONS  (STANDING):  atorvastatin 40 milliGRAM(s) Oral at bedtime  azithromycin   Tablet 500 milliGRAM(s) Oral daily  budesonide 160 MICROgram(s)/formoterol 4.5 MICROgram(s) Inhaler 2 Puff(s) Inhalation two times a day  carvedilol 6.25 milliGRAM(s) Oral every 12 hours  cefpodoxime 200 milliGRAM(s) Oral every 12 hours  cholecalciferol 2000 Unit(s) Oral daily  gabapentin 300 milliGRAM(s) Oral daily  insulin glargine Injectable (LANTUS) 4 Unit(s) SubCutaneous every morning  insulin lispro (HumaLOG) corrective regimen sliding scale   SubCutaneous Before meals and at bedtime  lactulose Syrup 15 Gram(s) Oral daily  levothyroxine 175 MICROGram(s) Oral daily  montelukast 10 milliGRAM(s) Oral at bedtime  neomycin/bacitracin/polymyxin Topical Ointment 1 Application(s) Topical every 12 hours  pancrelipase  (CREON  6,000 Lipase Units) 4 Capsule(s) Oral three times a day with meals  pantoprazole    Tablet 40 milliGRAM(s) Oral before breakfast  rivaroxaban 15 milliGRAM(s) Oral with dinner  senna 2 Tablet(s) Oral at bedtime  triamcinolone 0.1% Ointment 1 Application(s) Topical every 12 hours      MEDICATIONS  (PRN):  acetaminophen   Tablet .. 650 milliGRAM(s) Oral every 6 hours PRN Temp greater or equal to 38C (100.4F), Mild Pain (1 - 3)  albuterol/ipratropium for Nebulization 3 milliLiter(s) Nebulizer every 6 hours PRN Shortness of Breath and/or Wheezing  ondansetron Injectable 4 milliGRAM(s) IV Push every 6 hours PRN Nausea and/or Vomiting  zolpidem 5 milliGRAM(s) Oral at bedtime PRN Insomnia       Medications up to date at time of exam.      PHYSICAL EXAMINATION:  Patient has no new complaints.  GENERAL: The patient is a well-developed, well-nourished, in no apparent distress.     Vital Signs Last 24 Hrs  T(C): 36.3 (29 Nov 2019 11:47), Max: 36.8 (29 Nov 2019 04:48)  T(F): 97.4 (29 Nov 2019 11:47), Max: 98.2 (29 Nov 2019 04:48)  HR: 67 (29 Nov 2019 11:47) (60 - 67)  BP: 165/80 (29 Nov 2019 11:47) (136/56 - 165/80)  BP(mean): --  RR: 18 (29 Nov 2019 11:47) (18 - 18)  SpO2: 97% (29 Nov 2019 11:47) (96% - 100%)   (if applicable)    Chest Tube (if applicable)    HEENT: Head is normocephalic and atraumatic. Extraocular muscles are intact. Mucous membranes are moist.     NECK: Supple, no palpable adenopathy.    LUNGS: Clear to auscultation, no wheezing, rales, or rhonchi.    HEART: Regular rate and rhythm without murmur.    ABDOMEN: Soft, nontender, and nondistended.  No hepatosplenomegaly is noted.    : No painful voiding, no pelvic pain    EXTREMITIES: 2+ pitting  edema. b/l lower ext    NEUROLOGIC: Awake, alert, oriented, grossly intact    SKIN: Warm, dry, good turgor.      LABS:                        8.6    5.12  )-----------( 281      ( 29 Nov 2019 07:59 )             27.5     11-29    135  |  101  |  43<H>  ----------------------------<  184<H>  3.7   |  29  |  1.53<H>    Ca    8.5      29 Nov 2019 07:59  Phos  3.5     11-28  Mg     2.0     11-28    TPro  7.8  /  Alb  2.7<L>  /  TBili  0.7  /  DBili  x   /  AST  23  /  ALT  32  /  AlkPhos  48  11-28                        MICROBIOLOGY: (if applicable)    RADIOLOGY & ADDITIONAL STUDIES:  EKG:   CXR:  ECHO:    IMPRESSION: 79y Female PAST MEDICAL & SURGICAL HISTORY:  Obesity  Pacemaker  Atrial fibrillation  Hypothyroidism  Venous stasis  IBS (irritable bowel syndrome)  CHF (congestive heart failure), NYHA class I  HLD (Hyperlipidemia)  HTN (Hypertension)  Diabetes  Myocardial Infarction  CAD (Coronary Atherosclerotic Disease)  Asthma  S/P coronary angiogram   p/w           IMP: This is an 79 yr old woman from home , ambulates with walker, lives with son, with PMHx of HFpEF, CAD (s/p stents), chronic venous stasis, DM, HTN and Afib (on Xarelto, s/p [facility] PPM) presents to the ED with chief complaint of fever and chills x 2 days with one episode of vomiting. She was admitted to medicine on 11/25 for LE cellulitis treated with Zosyn and Vancomycin. Also found to have hyponatremia for which she was receiving IVF. RRT called on 11/26 AM for respiratory distress with abdominal breathing, saturating 100% on NRB. Vitals significant for tachypnea w/ RR: 41. Upon lung auscultation, bilateral expiratory/inspiratory wheezing appreciated. ABG w/o hypoxia/hypercapnia w/ pH WNL's. ECG: NSR VR@86bpm. Patient respiratory status did not improve despite treatment with Lasix 60 mg IV + DUONEB x1. Bladder scan revealed 0 mL, which was inaccurate probably due morbid obesity. [place] was placed with ~500 mL of urine. Patient was placed on BiPAP for work of breathing and transferred to ICU for acute hypoxic resp failure due to pul edema requiring NIPPV support.  + HCAP.. pat was hospitiazed as was hospitalized recently.  Cardiac enzymes neg. Cards eval noted. ID eval pending but discussed care with Dr Jordy GODINEZ in icu  Pat stated that she has CPAP at home as per daughter Manuel     PLAN:  -continue bipap at night   -continue CPAP at home after discharge at current setting  -continue antibx  -dvt/gi prop

## 2019-11-29 NOTE — PROGRESS NOTE ADULT - ASSESSMENT
80 y/o white female with PMHx of HFpEF, CAD (s/p stents), chronic venous stasis, PPM, DM, sleep apnea on bipap, HTN and Afib (on Xarelto, s/p PPM) presented to the ED with complaints of fever as high as 38 (C), chills, cough without sputum production, and 3 episodes of vomiting (non-bloody) 2 days PTA. She was treated with broad spectrum antibiotic coverage for bilateral LE cellulitis. Patient subsequently developed respiratory failure (TTE with normal LV fn and grade 1 diastolic dysfn) and was transferred to the ICU where she was placed on BiPAP.  Patient's LE appearance c/w venous stasis changes, not cellulitis. Based on PE with R lower lung rales, CXR 11/25 with new infiltrate R lower lung, and recent hospitalization at [facility], concerned about hospital-acquired pneumonia (ie, gram negative pneumonia vs pneumonia secondary to atypical organisms such as Legionella). Elevated temp. spike noted. BCs no growth to date; WBC continues to decrease. Patient currently on cefepime and azithromycin.     Urine Cx with Proteum, U/A negative and patient without urinary tract complaints (other than incontinence)-- no evidence for UTI.    Recommend:    --Continue cefepime 1gm q8h IV  --change IV azithromycin to p.o.   --Legionella urinary Ag  --remove hansen catheter 78 y/o white female with PMHx of HFpEF, CAD (s/p stents), chronic venous stasis, PPM, DM, sleep apnea on bipap, HTN and Afib (on Xarelto, s/p PPM) presented to the ED with complaints of fever as high as 38 (C), chills, cough without sputum production, and 3 episodes of vomiting (non-bloody) 2 days PTA. She was treated with broad spectrum antibiotic coverage for bilateral LE cellulitis. Patient subsequently developed respiratory failure (TTE with normal LV fn and grade 1 diastolic dysfn) and was transferred to the ICU where she was placed on BiPAP.  Patient's LE appearance c/w venous stasis changes, not cellulitis. Patient dx with HAP. WBC normal and no further temp spikes. BCs no growth. Patient currently on cefepime and azithromycin. Much improved clinically.    Urine Cx with Proteum, U/A negative and patient without urinary tract complaints (other than incontinence)-- no evidence for UTI.    Patient denies a fall to account for bruise over L upper thigh.     Recommend:    --D/C cefepime  --Give patient cefpodoxime 200mg p.o q12h to finish 5 days more of antibiotics  --Continue azithromycin p.o. for 5d more    Please call again if needed.

## 2019-11-29 NOTE — PROGRESS NOTE ADULT - PROBLEM SELECTOR PLAN 3
Na 129 on admission - baseline shows pt is typically hyponatremic  Sodium now 135  Continue to follow BMP creatine 1.3 on admission - likely related to active infection  labile, elevated this AM  f/u BMP daily  avoid nephrotoxins

## 2019-11-29 NOTE — PROGRESS NOTE ADULT - PROBLEM SELECTOR PLAN 7
Pressures elevated from yesterday, patient's dose of Coreg increased TTE in 2018 shows pEF with G1DD and LVOT  f/u repeat TTE- normal EF, G1DD  Not on any diuretics torsemide held as per Card, Dr. Sandoval

## 2019-11-29 NOTE — PROGRESS NOTE ADULT - SUBJECTIVE AND OBJECTIVE BOX
CHIEF COMPLAINT:Patient is a 79y old  Female who presents with a chief complaint of b/l LE cellulitis. Pt appears comfortable.    	  REVIEW OF SYSTEMS:  CONSTITUTIONAL: No fever, weight loss, or fatigue  EYES: No eye pain, visual disturbances, or discharge  ENT:  No difficulty hearing, tinnitus, vertigo; No sinus or throat pain  NECK: No pain or stiffness  RESPIRATORY: No cough, wheezing, chills or hemoptysis; No Shortness of Breath  CARDIOVASCULAR: No chest pain, palpitations, passing out, dizziness, or leg swelling  GASTROINTESTINAL: No abdominal or epigastric pain. No nausea, vomiting, or hematemesis; No diarrhea or constipation. No melena or hematochezia.  GENITOURINARY: No dysuria, frequency, hematuria, or incontinence  NEUROLOGICAL: No headaches, memory loss, loss of strength, numbness, or tremors  SKIN: No itching, burning, rashes, or lesions   LYMPH Nodes: No enlarged glands  ENDOCRINE: No heat or cold intolerance; No hair loss  MUSCULOSKELETAL: No joint pain or swelling; No muscle, back, or extremity pain  PSYCHIATRIC: No depression, anxiety, mood swings, or difficulty sleeping  HEME/LYMPH: No easy bruising, or bleeding gums  ALLERGY AND IMMUNOLOGIC: No hives or eczema	      PHYSICAL EXAM:  T(C): 36.6 (11-29-19 @ 07:42), Max: 37.1 (11-28-19 @ 11:01)  HR: 64 (11-29-19 @ 09:36) (60 - 71)  BP: 150/57 (11-29-19 @ 07:42) (108/41 - 150/57)  RR: 18 (11-29-19 @ 07:42) (18 - 18)  SpO2: 97% (11-29-19 @ 09:36) (96% - 100%)    I&O's Summary    28 Nov 2019 07:01  -  29 Nov 2019 07:00  --------------------------------------------------------  IN: 430 mL / OUT: 0 mL / NET: 430 mL        Appearance: Normal	  HEENT:   Normal oral mucosa, PERRL, EOMI	  Lymphatic: No lymphadenopathy  Cardiovascular: Normal S1 S2, No JVD, No murmurs, No edema  Respiratory: Lungs clear to auscultation	  Psychiatry: A & O x 3, Mood & affect appropriate  Gastrointestinal:  Soft, Non-tender, + BS	  Skin: No rashes, No ecchymoses, No cyanosis	  Neurologic: Non-focal  Extremities: Normal range of motion, No clubbing, cyanosis or edema      MEDICATIONS  (STANDING):  atorvastatin 40 milliGRAM(s) Oral at bedtime  azithromycin   Tablet 500 milliGRAM(s) Oral daily  budesonide 160 MICROgram(s)/formoterol 4.5 MICROgram(s) Inhaler 2 Puff(s) Inhalation two times a day  carvedilol 6.25 milliGRAM(s) Oral every 12 hours  cefepime   IVPB 1000 milliGRAM(s) IV Intermittent every 8 hours  cholecalciferol 2000 Unit(s) Oral daily  gabapentin 300 milliGRAM(s) Oral daily  insulin glargine Injectable (LANTUS) 4 Unit(s) SubCutaneous every morning  insulin lispro (HumaLOG) corrective regimen sliding scale   SubCutaneous Before meals and at bedtime  iohexol 300 mG (iodine)/mL Oral Solution 30 milliLiter(s) Oral once  iron sucrose IVPB 200 milliGRAM(s) IV Intermittent once  lactulose Syrup 15 Gram(s) Oral daily  levothyroxine 175 MICROGram(s) Oral daily  montelukast 10 milliGRAM(s) Oral at bedtime  neomycin/bacitracin/polymyxin Topical Ointment 1 Application(s) Topical every 12 hours  pancrelipase  (CREON  6,000 Lipase Units) 4 Capsule(s) Oral three times a day with meals  pantoprazole    Tablet 40 milliGRAM(s) Oral before breakfast  rivaroxaban 15 milliGRAM(s) Oral with dinner  senna 2 Tablet(s) Oral at bedtime  triamcinolone 0.1% Ointment 1 Application(s) Topical every 12 hours      TELEMETRY: paced	      	  LABS:	 	                        8.6    5.12  )-----------( 281      ( 29 Nov 2019 07:59 )             27.5     11-29    135  |  101  |  43<H>  ----------------------------<  184<H>  3.7   |  29  |  1.53<H>    Ca    8.5      29 Nov 2019 07:59  Phos  3.5     11-28  Mg     2.0     11-28    TPro  7.8  /  Alb  2.7<L>  /  TBili  0.7  /  DBili  x   /  AST  23  /  ALT  32  /  AlkPhos  48  11-28    proBNP: Serum Pro-Brain Natriuretic Peptide: 1180 pg/mL (11-08 @ 13:39)    Lipid Profile: Cholesterol 112  LDL 43  HDL 53  TG 82    HgA1c: Hemoglobin A1C, Whole Blood: 6.6 % (11-09 @ 10:48)    TSH: Thyroid Stimulating Hormone, Serum: 5.06 uIU/mL (11-10 @ 09:35)

## 2019-11-29 NOTE — PROGRESS NOTE ADULT - PROBLEM SELECTOR PLAN 6
TTE in 2018 shows pEF with G1DD and LVOT  f/u repeat TTE- normal EF, G1DD  Not on any diuretics torsemide held as per Card, Dr. Sandoval rate controlled  c/w xarelto  c/w bblocker, carvedilol  PPM interrogated  Cardio, Dr Jaime Medley dced, digoxin(?) 02-May-2018 04:30

## 2019-11-29 NOTE — PROGRESS NOTE ADULT - PROBLEM SELECTOR PLAN 8
starting hss  monitor fs  A1c 6.6 early november  Sugar moderately controlled on sliding scale Pressures elevated from yesterday, patient's dose of Coreg increased

## 2019-11-29 NOTE — PROGRESS NOTE ADULT - PROBLEM SELECTOR PLAN 9
IMPROVE VTE Individual Risk Assessment          RISK                                                          Points  [  ] Previous VTE                                                3  [  ] Thrombophilia                                             2  [  ] Lower limb paralysis                                   2        (unable to hold up >15 seconds)    [  ] Current Cancer                                             2         (within 6 months)  [ X ] Immobilization > 24 hrs                              1  [  ] ICU/CCU stay > 24 hours                             1  [X  ] Age > 60                                                         1    IMPROVE VTE Score: 2    on xarelto starting hss  monitor fs  A1c 6.6 early november  Sugar moderately controlled on sliding scale

## 2019-11-29 NOTE — PROGRESS NOTE ADULT - PROBLEM SELECTOR PLAN 5
rate controlled  c/w xarelto  c/w bblocker, carvedilol  PPM interrogated  Cardio, Dr Jaime Medley dced, digoxin(?) s/p stents  t1 on admission 0.083, trended down  EKG unchanged from prior  tele monitoring  Echo- Normal EF, G1DD

## 2019-11-29 NOTE — PROGRESS NOTE ADULT - PROBLEM SELECTOR PLAN 4
s/p stents  t1 on admission 0.083, trended down  EKG unchanged from prior  tele monitoring  Echo- Normal EF, G1DD Na 129 on admission - baseline shows pt is typically hyponatremic  Sodium now 135  Continue to follow BMP

## 2019-11-30 LAB
ANION GAP SERPL CALC-SCNC: 6 MMOL/L — SIGNIFICANT CHANGE UP (ref 5–17)
BUN SERPL-MCNC: 32 MG/DL — HIGH (ref 7–18)
CALCIUM SERPL-MCNC: 8.4 MG/DL — SIGNIFICANT CHANGE UP (ref 8.4–10.5)
CHLORIDE SERPL-SCNC: 102 MMOL/L — SIGNIFICANT CHANGE UP (ref 96–108)
CO2 SERPL-SCNC: 27 MMOL/L — SIGNIFICANT CHANGE UP (ref 22–31)
CREAT SERPL-MCNC: 1.16 MG/DL — SIGNIFICANT CHANGE UP (ref 0.5–1.3)
CULTURE RESULTS: SIGNIFICANT CHANGE UP
CULTURE RESULTS: SIGNIFICANT CHANGE UP
GLUCOSE BLDC GLUCOMTR-MCNC: 171 MG/DL — HIGH (ref 70–99)
GLUCOSE BLDC GLUCOMTR-MCNC: 214 MG/DL — HIGH (ref 70–99)
GLUCOSE BLDC GLUCOMTR-MCNC: 249 MG/DL — HIGH (ref 70–99)
GLUCOSE BLDC GLUCOMTR-MCNC: 262 MG/DL — HIGH (ref 70–99)
GLUCOSE SERPL-MCNC: 164 MG/DL — HIGH (ref 70–99)
HCT VFR BLD CALC: 29.6 % — LOW (ref 34.5–45)
HGB BLD-MCNC: 9.1 G/DL — LOW (ref 11.5–15.5)
MCHC RBC-ENTMCNC: 25.9 PG — LOW (ref 27–34)
MCHC RBC-ENTMCNC: 30.7 GM/DL — LOW (ref 32–36)
MCV RBC AUTO: 84.3 FL — SIGNIFICANT CHANGE UP (ref 80–100)
NRBC # BLD: 0 /100 WBCS — SIGNIFICANT CHANGE UP (ref 0–0)
PLATELET # BLD AUTO: 342 K/UL — SIGNIFICANT CHANGE UP (ref 150–400)
POTASSIUM SERPL-MCNC: 4.2 MMOL/L — SIGNIFICANT CHANGE UP (ref 3.5–5.3)
POTASSIUM SERPL-SCNC: 4.2 MMOL/L — SIGNIFICANT CHANGE UP (ref 3.5–5.3)
RBC # BLD: 3.51 M/UL — LOW (ref 3.8–5.2)
RBC # FLD: 14.7 % — HIGH (ref 10.3–14.5)
SODIUM SERPL-SCNC: 135 MMOL/L — SIGNIFICANT CHANGE UP (ref 135–145)
SPECIMEN SOURCE: SIGNIFICANT CHANGE UP
SPECIMEN SOURCE: SIGNIFICANT CHANGE UP
WBC # BLD: 6.04 K/UL — SIGNIFICANT CHANGE UP (ref 3.8–10.5)
WBC # FLD AUTO: 6.04 K/UL — SIGNIFICANT CHANGE UP (ref 3.8–10.5)

## 2019-11-30 RX ORDER — IRON SUCROSE 20 MG/ML
200 INJECTION, SOLUTION INTRAVENOUS ONCE
Refills: 0 | Status: COMPLETED | OUTPATIENT
Start: 2019-11-30 | End: 2019-11-30

## 2019-11-30 RX ORDER — CARVEDILOL PHOSPHATE 80 MG/1
12.5 CAPSULE, EXTENDED RELEASE ORAL EVERY 12 HOURS
Refills: 0 | Status: DISCONTINUED | OUTPATIENT
Start: 2019-11-30 | End: 2019-12-03

## 2019-11-30 RX ADMIN — AZITHROMYCIN 500 MILLIGRAM(S): 500 TABLET, FILM COATED ORAL at 11:38

## 2019-11-30 RX ADMIN — GABAPENTIN 300 MILLIGRAM(S): 400 CAPSULE ORAL at 11:38

## 2019-11-30 RX ADMIN — BACITRACIN ZINC NEOMYCIN SULFATE POLYMYXIN B SULFATE 1 APPLICATION(S): 400; 3.5; 5 OINTMENT TOPICAL at 17:10

## 2019-11-30 RX ADMIN — MONTELUKAST 10 MILLIGRAM(S): 4 TABLET, CHEWABLE ORAL at 22:12

## 2019-11-30 RX ADMIN — Medication 3: at 17:06

## 2019-11-30 RX ADMIN — Medication 1: at 07:58

## 2019-11-30 RX ADMIN — IRON SUCROSE 110 MILLIGRAM(S): 20 INJECTION, SOLUTION INTRAVENOUS at 12:23

## 2019-11-30 RX ADMIN — Medication 4 CAPSULE(S): at 07:58

## 2019-11-30 RX ADMIN — Medication 2: at 11:38

## 2019-11-30 RX ADMIN — CARVEDILOL PHOSPHATE 6.25 MILLIGRAM(S): 80 CAPSULE, EXTENDED RELEASE ORAL at 06:11

## 2019-11-30 RX ADMIN — SENNA PLUS 2 TABLET(S): 8.6 TABLET ORAL at 22:12

## 2019-11-30 RX ADMIN — Medication 2: at 22:12

## 2019-11-30 RX ADMIN — Medication 200 MILLIGRAM(S): at 17:05

## 2019-11-30 RX ADMIN — Medication 200 MILLIGRAM(S): at 06:11

## 2019-11-30 RX ADMIN — BACITRACIN ZINC NEOMYCIN SULFATE POLYMYXIN B SULFATE 1 APPLICATION(S): 400; 3.5; 5 OINTMENT TOPICAL at 06:12

## 2019-11-30 RX ADMIN — CARVEDILOL PHOSPHATE 12.5 MILLIGRAM(S): 80 CAPSULE, EXTENDED RELEASE ORAL at 17:05

## 2019-11-30 RX ADMIN — Medication 4 CAPSULE(S): at 11:39

## 2019-11-30 RX ADMIN — ZOLPIDEM TARTRATE 5 MILLIGRAM(S): 10 TABLET ORAL at 22:20

## 2019-11-30 RX ADMIN — ATORVASTATIN CALCIUM 40 MILLIGRAM(S): 80 TABLET, FILM COATED ORAL at 22:12

## 2019-11-30 RX ADMIN — PANTOPRAZOLE SODIUM 40 MILLIGRAM(S): 20 TABLET, DELAYED RELEASE ORAL at 06:11

## 2019-11-30 RX ADMIN — Medication 1 APPLICATION(S): at 06:11

## 2019-11-30 RX ADMIN — Medication 2000 UNIT(S): at 11:38

## 2019-11-30 RX ADMIN — Medication 4 CAPSULE(S): at 17:05

## 2019-11-30 RX ADMIN — BUDESONIDE AND FORMOTEROL FUMARATE DIHYDRATE 2 PUFF(S): 160; 4.5 AEROSOL RESPIRATORY (INHALATION) at 22:12

## 2019-11-30 RX ADMIN — INSULIN GLARGINE 4 UNIT(S): 100 INJECTION, SOLUTION SUBCUTANEOUS at 07:59

## 2019-11-30 RX ADMIN — Medication 1 APPLICATION(S): at 17:07

## 2019-11-30 RX ADMIN — Medication 10 MILLIGRAM(S): at 12:23

## 2019-11-30 RX ADMIN — RIVAROXABAN 15 MILLIGRAM(S): KIT at 17:06

## 2019-11-30 RX ADMIN — Medication 175 MICROGRAM(S): at 06:11

## 2019-11-30 RX ADMIN — BUDESONIDE AND FORMOTEROL FUMARATE DIHYDRATE 2 PUFF(S): 160; 4.5 AEROSOL RESPIRATORY (INHALATION) at 11:38

## 2019-11-30 NOTE — PROGRESS NOTE ADULT - SUBJECTIVE AND OBJECTIVE BOX
pt seen and evaluated at bedside for follow up on left and right lower extremities lesions   swelling   family is at the bedside   Vital Signs Last 24 Hrs  T(C): 37.4 (30 Nov 2019 15:24), Max: 37.4 (30 Nov 2019 15:24)  T(F): 99.3 (30 Nov 2019 15:24), Max: 99.3 (30 Nov 2019 15:24)  HR: 63 (30 Nov 2019 15:24) (62 - 69)  BP: 158/56 (30 Nov 2019 15:24) (151/70 - 185/55)  BP(mean): --  RR: 18 (30 Nov 2019 15:24) (18 - 20)  SpO2: 97% (30 Nov 2019 15:24) (96% - 100%)                        9.1    6.04  )-----------( 342      ( 30 Nov 2019 07:32 )             29.6   11-30    135  |  102  |  32<H>  ----------------------------<  164<H>  4.2   |  27  |  1.16    Ca    8.4      30 Nov 2019 07:32    PAST MEDICAL & SURGICAL HISTORY:  Obesity  Pacemaker  Atrial fibrillation  Hypothyroidism  Venous stasis  IBS (irritable bowel syndrome)  CHF (congestive heart failure), NYHA class I  HLD (Hyperlipidemia)  HTN (Hypertension)  Diabetes  Myocardial Infarction  CAD (Coronary Atherosclerotic Disease)  Asthma  S/P coronary angiogram   Allergies    No Known Allergies    Intolerances    MEDICATIONS  (STANDING):  atorvastatin 40 milliGRAM(s) Oral at bedtime  azithromycin   Tablet 500 milliGRAM(s) Oral daily  budesonide 160 MICROgram(s)/formoterol 4.5 MICROgram(s) Inhaler 2 Puff(s) Inhalation two times a day  carvedilol 12.5 milliGRAM(s) Oral every 12 hours  cefpodoxime 200 milliGRAM(s) Oral every 12 hours  cholecalciferol 2000 Unit(s) Oral daily  gabapentin 300 milliGRAM(s) Oral daily  insulin glargine Injectable (LANTUS) 4 Unit(s) SubCutaneous every morning  insulin lispro (HumaLOG) corrective regimen sliding scale   SubCutaneous Before meals and at bedtime  levothyroxine 175 MICROGram(s) Oral daily  montelukast 10 milliGRAM(s) Oral at bedtime  neomycin/bacitracin/polymyxin Topical Ointment 1 Application(s) Topical every 12 hours  pancrelipase  (CREON  6,000 Lipase Units) 4 Capsule(s) Oral three times a day with meals  pantoprazole    Tablet 40 milliGRAM(s) Oral before breakfast  rivaroxaban 15 milliGRAM(s) Oral with dinner  senna 2 Tablet(s) Oral at bedtime  torsemide 10 milliGRAM(s) Oral daily  triamcinolone 0.1% Ointment 1 Application(s) Topical every 12 hours    MEDICATIONS  (PRN):  acetaminophen   Tablet .. 650 milliGRAM(s) Oral every 6 hours PRN Temp greater or equal to 38C (100.4F), Mild Pain (1 - 3)  albuterol/ipratropium for Nebulization 3 milliLiter(s) Nebulizer every 6 hours PRN Shortness of Breath and/or Wheezing  ondansetron Injectable 4 milliGRAM(s) IV Push every 6 hours PRN Nausea and/or Vomiting  zolpidem 5 milliGRAM(s) Oral at bedtime PRN Insomnia  pt is AAOx3 , no acute distress  reports very long deformed painful nails   reports shortness of breath   exam:   exam focused lower extremities :    left and right lower extremities erythema some improvement  , with skin excoriations -some improvement  no open skin lesions , no drainage,   there is a darker skin discoloration on the right leg more proximal to erythema -as per family the pt developed it few years ago   DP-palpable left and right foot   no open skin lesions   light touch present   MS-slightly diminished   elongated deformed nails x10 tender to touch

## 2019-11-30 NOTE — PROGRESS NOTE ADULT - SUBJECTIVE AND OBJECTIVE BOX
CHIEF COMPLAINT:Patient is a 79y old  Female who presents with a chief complaint of b/l LE cellulitis .Pt appears comfortable.    	  REVIEW OF SYSTEMS:  CONSTITUTIONAL: No fever, weight loss, or fatigue  EYES: No eye pain, visual disturbances, or discharge  ENT:  No difficulty hearing, tinnitus, vertigo; No sinus or throat pain  NECK: No pain or stiffness  RESPIRATORY: No cough, wheezing, chills or hemoptysis; No Shortness of Breath  CARDIOVASCULAR: No chest pain, palpitations, passing out, dizziness, or leg swelling  GASTROINTESTINAL: No abdominal or epigastric pain. No nausea, vomiting, or hematemesis; No diarrhea or constipation. No melena or hematochezia.  GENITOURINARY: No dysuria, frequency, hematuria, or incontinence  NEUROLOGICAL: No headaches, memory loss, loss of strength, numbness, or tremors  SKIN: No itching, burning, rashes, or lesions   LYMPH Nodes: No enlarged glands  ENDOCRINE: No heat or cold intolerance; No hair loss  MUSCULOSKELETAL: No joint pain or swelling; No muscle, back, or extremity pain  PSYCHIATRIC: No depression, anxiety, mood swings, or difficulty sleeping  HEME/LYMPH: No easy bruising, or bleeding gums  ALLERGY AND IMMUNOLOGIC: No hives or eczema	      PHYSICAL EXAM:  T(C): 36.7 (11-30-19 @ 11:08), Max: 37.1 (11-30-19 @ 07:21)  HR: 62 (11-30-19 @ 11:08) (62 - 69)  BP: 165/58 (11-30-19 @ 11:08) (144/58 - 185/55)  RR: 18 (11-30-19 @ 11:08) (18 - 20)  SpO2: 100% (11-30-19 @ 11:08) (96% - 100%)  Wt(kg): --  I&O's Summary    29 Nov 2019 07:01  -  30 Nov 2019 07:00  --------------------------------------------------------  IN: 236 mL / OUT: 0 mL / NET: 236 mL    30 Nov 2019 07:01  -  30 Nov 2019 11:21  --------------------------------------------------------  IN: 200 mL / OUT: 0 mL / NET: 200 mL        Appearance: Normal	  HEENT:   Normal oral mucosa, PERRL, EOMI	  Lymphatic: No lymphadenopathy  Cardiovascular: Normal S1 S2, No JVD, No murmurs, No edema  Respiratory: Lungs clear to auscultation	  Psychiatry: A & O x 3, Mood & affect appropriate  Gastrointestinal:  Soft, Non-tender, + BS	  Skin: No rashes, No ecchymoses, No cyanosis	  Neurologic: Non-focal  Extremities: Normal range of motion, No clubbing, cyanosis or edema  Vascular: Peripheral pulses palpable 2+ bilaterally    MEDICATIONS  (STANDING):  atorvastatin 40 milliGRAM(s) Oral at bedtime  azithromycin   Tablet 500 milliGRAM(s) Oral daily  budesonide 160 MICROgram(s)/formoterol 4.5 MICROgram(s) Inhaler 2 Puff(s) Inhalation two times a day  carvedilol 6.25 milliGRAM(s) Oral every 12 hours  cefpodoxime 200 milliGRAM(s) Oral every 12 hours  cholecalciferol 2000 Unit(s) Oral daily  gabapentin 300 milliGRAM(s) Oral daily  insulin glargine Injectable (LANTUS) 4 Unit(s) SubCutaneous every morning  insulin lispro (HumaLOG) corrective regimen sliding scale   SubCutaneous Before meals and at bedtime  lactulose Syrup 15 Gram(s) Oral daily  levothyroxine 175 MICROGram(s) Oral daily  montelukast 10 milliGRAM(s) Oral at bedtime  neomycin/bacitracin/polymyxin Topical Ointment 1 Application(s) Topical every 12 hours  pancrelipase  (CREON  6,000 Lipase Units) 4 Capsule(s) Oral three times a day with meals  pantoprazole    Tablet 40 milliGRAM(s) Oral before breakfast  rivaroxaban 15 milliGRAM(s) Oral with dinner  senna 2 Tablet(s) Oral at bedtime  torsemide 10 milliGRAM(s) Oral daily  triamcinolone 0.1% Ointment 1 Application(s) Topical every 12 hours      	  LABS:	 	                        9.1    6.04  )-----------( 342      ( 30 Nov 2019 07:32 )             29.6     11-30    135  |  102  |  32<H>  ----------------------------<  164<H>  4.2   |  27  |  1.16    Ca    8.4      30 Nov 2019 07:32      proBNP: Serum Pro-Brain Natriuretic Peptide: 1180 pg/mL (11-08 @ 13:39)    Lipid Profile: Cholesterol 112  LDL 43  HDL 53  TG 82    HgA1c: Hemoglobin A1C, Whole Blood: 6.6 % (11-09 @ 10:48)    TSH: Thyroid Stimulating Hormone, Serum: 5.06 uIU/mL (11-10 @ 09:35)      	  occult-.  EXAM:  CT ABDOMEN AND PELVIS OC                            PROCEDURE DATE:  11/29/2019          INTERPRETATION:  CLINICAL INFORMATION: Iron deficiency anemia.    COMPARISON: CT scan abdomen pelvis 11/8/2019.    PROCEDURE:   CT of the Abdomen and Pelvis was performed without intravenous contrast.   Intravenous contrast: None.  Oral contrast: positive contrast was administered.  Sagittal and coronal reformats were performed.    FINDINGS:    LOWER CHEST: Partially imaged cardiac pacemaker leads. Dense mitral   calcification.  Patchy groundglass opacities visualized lung bases.    The evaluation of the solid organ parenchyma is limited without   intravenous contrast.    LIVER: Within normal limits.  BILE DUCTS: Normal caliber.  GALLBLADDER: Within normal limits.  SPLEEN: Within normal limits.  PANCREAS: Within normal limits.  ADRENALS: Within normal limits.  KIDNEYS/URETERS: Small cortical cyst right upper pole, stable in   appearance.  No hydroureteronephrosis or obstructing ureteral calculus.    BLADDER: Within normal limits.  REPRODUCTIVE ORGANS: The uterus appears unremarkable.  Small right adnexal calcification stable in appearance.    BOWEL: Colonic diverticulosis, without CT evidence of diverticulitis.  No bowel obstruction.   Appendix normal.  PERITONEUM: No ascites.  VESSELS: Atherosclerotic calcification of the abdominal aorta, which is   normal in caliber.  RETROPERITONEUM/LYMPH NODES: No lymphadenopathy.    ABDOMINAL WALL: Within normal limits.  BONES: No acute osseous abnormality.    IMPRESSION:     No acute intra-abdominal pathology noted.

## 2019-11-30 NOTE — PROGRESS NOTE ADULT - SUBJECTIVE AND OBJECTIVE BOX
INTERVAL HPI/OVERNIGHT EVENTS:  Events noticed,no dystress  VITAL SIGNS:  T(F): 98.7 (11-30-19 @ 07:21)  HR: 67 (11-30-19 @ 07:21)  BP: 171/65 (11-30-19 @ 07:21)  RR: 18 (11-30-19 @ 07:21)  SpO2: 99% (11-30-19 @ 07:21)  Wt(kg): --    PHYSICAL EXAM:  awake  Constitutional:  Eyes:  ENMT:perrla  Neck:  Respiratory:clear  Cardiovascular:s1 s2,m-none  Gastrointestinal:soft,bs pos  Extremities:  Vascular:  Neurological:no focal deficit  Musculoskeletal:    MEDICATIONS  (STANDING):  atorvastatin 40 milliGRAM(s) Oral at bedtime  azithromycin   Tablet 500 milliGRAM(s) Oral daily  budesonide 160 MICROgram(s)/formoterol 4.5 MICROgram(s) Inhaler 2 Puff(s) Inhalation two times a day  carvedilol 6.25 milliGRAM(s) Oral every 12 hours  cefpodoxime 200 milliGRAM(s) Oral every 12 hours  cholecalciferol 2000 Unit(s) Oral daily  gabapentin 300 milliGRAM(s) Oral daily  insulin glargine Injectable (LANTUS) 4 Unit(s) SubCutaneous every morning  insulin lispro (HumaLOG) corrective regimen sliding scale   SubCutaneous Before meals and at bedtime  lactulose Syrup 15 Gram(s) Oral daily  levothyroxine 175 MICROGram(s) Oral daily  montelukast 10 milliGRAM(s) Oral at bedtime  neomycin/bacitracin/polymyxin Topical Ointment 1 Application(s) Topical every 12 hours  pancrelipase  (CREON  6,000 Lipase Units) 4 Capsule(s) Oral three times a day with meals  pantoprazole    Tablet 40 milliGRAM(s) Oral before breakfast  rivaroxaban 15 milliGRAM(s) Oral with dinner  senna 2 Tablet(s) Oral at bedtime  triamcinolone 0.1% Ointment 1 Application(s) Topical every 12 hours    MEDICATIONS  (PRN):  acetaminophen   Tablet .. 650 milliGRAM(s) Oral every 6 hours PRN Temp greater or equal to 38C (100.4F), Mild Pain (1 - 3)  albuterol/ipratropium for Nebulization 3 milliLiter(s) Nebulizer every 6 hours PRN Shortness of Breath and/or Wheezing  ondansetron Injectable 4 milliGRAM(s) IV Push every 6 hours PRN Nausea and/or Vomiting  zolpidem 5 milliGRAM(s) Oral at bedtime PRN Insomnia      Allergies    No Known Allergies    Intolerances        LABS:                        9.1    6.04  )-----------( 342      ( 30 Nov 2019 07:32 )             29.6     11-30    135  |  102  |  32<H>  ----------------------------<  164<H>  4.2   |  27  |  1.16    Ca    8.4      30 Nov 2019 07:32        Assessment and Plan:   · Assessment		  79F from home, ambulates with walker, lives with son, with PMHx of HFpEF, CAD (s/p stents), chronic venous stasis, DM, HTN and Afib (on Xarelto, s/p [facility] PPM) presents to the ED with chief complaint of fever and chills x 2 days with one episode of vomiting. She was admitted to medicine on 11/25 for LE cellulitis treated with Zosyn and Vancomycin. Also found to have hyponatremia for which she was receiving IVF. RRT called on 11/26 AM for respiratory distress with abdominal breathing, saturating 100% on NRB. Vitals significant for tachypnea w/ RR: 41. Upon lung auscultation, bilateral expiratory/inspiratory wheezing appreciated. ABG w/o hypoxia/hypercapnia w/ pH WNL's. ECG: NSR VR@86bpm. Patient respiratory status did not improve despite treatment with Lasix 60 mg IV + DUONEB x1. Bladder scan revealed 0 mL, which was inaccurate probably due morbid obesity. Ashton was placed with ~500 mL of urine. Patient was placed on BiPAP for work of breathing and transferred to ICU. Patient then downgraded when stable. Due to iron def. Anemia will obtain FOBT.              Problem/Plan - 1:  ·  Problem: Healthcare associated bacterial pneumonia.  Plan: CXR showed New patchy right basilar opacity and a small right pleural effusion.  On azithromycin + cefpodoxime for 5 more days  No leukocytosis, no fevers  BCx 11/25 negative  ID Dr. Spence  -Asthma  on nebs.      Problem/Plan - 2:  ·  Problem: Iron deficiency anemia.  Plan: patient iron studies sig. for iron deficient  Awaiting occult blood to screen for CRC  hemoglobin stable  transfuse if symptomatic <8, or if hb <7.      Problem/Plan - 3:  ·  Problem: SRIDHAR (acute kidney injury).  Plan: creatine 1.3 on admission - likely related to active infection  labile, elevated this AM  f/u BMP daily  avoid nephrotoxins.      Problem/Plan - 4:  ·  Problem: Hyponatremia-stable ,resolved  Continue to follow BMP.      Problem/Plan - 5:  ·  Problem: CAD (coronary artery disease).  Plan: s/p stents  t1 on admission 0.083, trended down  EKG unchanged from prior  tele monitoring  Echo- Normal EF, G1DD.      Problem/Plan - 6:  Problem: Atrial fibrillation. Plan: rate controlled  c/w xarelto  c/w bblocker, carvedilol  PPM interrogated  Cardio, Dr Jaime Medley dced, digoxin(?).     Problem/Plan - 7:  ·  Problem: Chronic diastolic (congestive) heart failure.  Plan: TTE in 2018 shows pEF with G1DD and LVOT  f/u repeat TTE- normal EF, G1DD  Not on any diuretics torsemide held as per Card, Dr. Sandoval.      Problem/Plan - 8:  ·  Problem: HTN (Hypertension).  Plan: Pressures elevated from yesterday, patient's dose of Coreg increased.      Problem/Plan - 9:  ·  Problem: DM (diabetes mellitus).  Plan: starting hss  monitor fs  A1c 6.6 early november  Sugar moderately controlled on sliding scale.      Problem/Plan - 10:  Problem: Need for prophylactic measure. Plan; IMPROVE VTE Individual Risk Assessment          RISK                                                          Points  [  ] Previous VTE                                                3  [  ] Thrombophilia                                             2  [  ] Lower limb paralysis                                   2        (unable to hold up >15 seconds)    [  ] Current Cancer                                             2         (within 6 months)  [ X ] Immobilization > 24 hrs                              1  [  ] ICU/CCU stay > 24 hours                             1  [X  ] Age > 60                                                         1    IMPROVE VTE Score: 2    on xarelto.

## 2019-11-30 NOTE — PROGRESS NOTE ADULT - ASSESSMENT
:   venous stasis dermatitis/ cellulitis/  lipodermatosclerosis     obesity   Venous insufficiency   onychogryphosis   DM    P:   pt seen and evaluated   etiology of the problem and treatment options discussed with family and patient   recommended venous doppler ( if was not done , as per family she had it done ?  )  continue antibiotics as per id     recommended light compression if clear by cardiologist   recommended elevation left and  right foot whenever sitting down   topical alternate topical antibiotic and steroid as ordered   will follow up while in house   please do not hesitate to contact me with any patient related questions @ [phone]

## 2019-11-30 NOTE — PROGRESS NOTE ADULT - PROBLEM SELECTOR PLAN 1
CXR showed New patchy right basilar opacity and a small right pleural effusion.  On azithromycin + cefpodoxime for 5 more days  No leukocytosis, no fevers  BCx 11/25 negative  ID Dr. Spence  -Asthma  on [facility] CXR showed New patchy right basilar opacity and a small right pleural effusion.  On azithromycin + cefpodoxime for 5 more days  No leukocytosis, no fevers  BCx 11/25 negative  ID Dr. Spence  -Asthma  on nebs  Due to persistent SOB, Patient will need BiPAP at home as machine is broken.

## 2019-11-30 NOTE — PROGRESS NOTE ADULT - PROBLEM SELECTOR PLAN 8
Pressures elevated from yesterday, patient's dose of Coreg increased Pressures elevated from yesterday, patient's dose of Coreg increased to 12.5  Cardio- Dr. roque

## 2019-11-30 NOTE — PROGRESS NOTE ADULT - SUBJECTIVE AND OBJECTIVE BOX
Time of Visit:  Patient seen and examined.     MEDICATIONS  (STANDING):  atorvastatin 40 milliGRAM(s) Oral at bedtime  azithromycin   Tablet 500 milliGRAM(s) Oral daily  budesonide 160 MICROgram(s)/formoterol 4.5 MICROgram(s) Inhaler 2 Puff(s) Inhalation two times a day  carvedilol 12.5 milliGRAM(s) Oral every 12 hours  cefpodoxime 200 milliGRAM(s) Oral every 12 hours  cholecalciferol 2000 Unit(s) Oral daily  gabapentin 300 milliGRAM(s) Oral daily  insulin glargine Injectable (LANTUS) 4 Unit(s) SubCutaneous every morning  insulin lispro (HumaLOG) corrective regimen sliding scale   SubCutaneous Before meals and at bedtime  levothyroxine 175 MICROGram(s) Oral daily  montelukast 10 milliGRAM(s) Oral at bedtime  neomycin/bacitracin/polymyxin Topical Ointment 1 Application(s) Topical every 12 hours  pancrelipase  (CREON  6,000 Lipase Units) 4 Capsule(s) Oral three times a day with meals  pantoprazole    Tablet 40 milliGRAM(s) Oral before breakfast  rivaroxaban 15 milliGRAM(s) Oral with dinner  senna 2 Tablet(s) Oral at bedtime  torsemide 10 milliGRAM(s) Oral daily  triamcinolone 0.1% Ointment 1 Application(s) Topical every 12 hours      MEDICATIONS  (PRN):  acetaminophen   Tablet .. 650 milliGRAM(s) Oral every 6 hours PRN Temp greater or equal to 38C (100.4F), Mild Pain (1 - 3)  albuterol/ipratropium for Nebulization 3 milliLiter(s) Nebulizer every 6 hours PRN Shortness of Breath and/or Wheezing  ondansetron Injectable 4 milliGRAM(s) IV Push every 6 hours PRN Nausea and/or Vomiting  zolpidem 5 milliGRAM(s) Oral at bedtime PRN Insomnia       Medications up to date at time of exam.      PHYSICAL EXAMINATION:  Patient has no new complaints.  GENERAL: The patient is a well-developed, well-nourished, in no apparent distress.     Vital Signs Last 24 Hrs  T(C): 37.4 (30 Nov 2019 15:24), Max: 37.4 (30 Nov 2019 15:24)  T(F): 99.3 (30 Nov 2019 15:24), Max: 99.3 (30 Nov 2019 15:24)  HR: 63 (30 Nov 2019 15:24) (62 - 69)  BP: 158/56 (30 Nov 2019 15:24) (151/70 - 185/55)  BP(mean): --  RR: 18 (30 Nov 2019 15:24) (18 - 20)  SpO2: 97% (30 Nov 2019 15:24) (96% - 100%)   (if applicable)    Chest Tube (if applicable)    HEENT: Head is normocephalic and atraumatic. Extraocular muscles are intact. Mucous membranes are moist.     NECK: Supple, no palpable adenopathy.    LUNGS: Clear to auscultation, no wheezing, rales, or rhonchi.    HEART: Regular rate and rhythm without murmur.    ABDOMEN: Soft, nontender, and nondistended.  No hepatosplenomegaly is noted.    : No painful voiding, no pelvic pain    EXTREMITIES: 2 + b/l  edema with chronic skin changes    NEUROLOGIC: Awake, alert,     SKIN: Warm, dry, good turgor.      LABS:                        9.1    6.04  )-----------( 342      ( 30 Nov 2019 07:32 )             29.6     11-30    135  |  102  |  32<H>  ----------------------------<  164<H>  4.2   |  27  |  1.16    Ca    8.4      30 Nov 2019 07:32                          MICROBIOLOGY: (if applicable)    RADIOLOGY & ADDITIONAL STUDIES:  EKG:   CXR:  ECHO:    IMPRESSION: 79y Female PAST MEDICAL & SURGICAL HISTORY:  Obesity  Pacemaker  Atrial fibrillation  Hypothyroidism  Venous stasis  IBS (irritable bowel syndrome)  CHF (congestive heart failure), NYHA class I  HLD (Hyperlipidemia)  HTN (Hypertension)  Diabetes  Myocardial Infarction  CAD (Coronary Atherosclerotic Disease)  Asthma  S/P coronary angiogram   p/w           IMP: This is an 79 yr old woman from home , ambulates with walker, lives with son, with PMHx of HFpEF, CAD (s/p stents), chronic venous stasis, DM, HTN and Afib (on Xarelto, s/p [facility] PPM) presents to the ED with chief complaint of fever and chills x 2 days with one episode of vomiting. She was admitted to medicine on 11/25 for LE cellulitis treated with Zosyn and Vancomycin. Also found to have hyponatremia for which she was receiving IVF. RRT called on 11/26 AM for respiratory distress with abdominal breathing, saturating 100% on NRB. Vitals significant for tachypnea w/ RR: 41. Upon lung auscultation, bilateral expiratory/inspiratory wheezing appreciated. ABG w/o hypoxia/hypercapnia w/ pH WNL's. ECG: NSR VR@86bpm. Patient respiratory status did not improve despite treatment with Lasix 60 mg IV + DUONEB x1. Bladder scan revealed 0 mL, which was inaccurate probably due morbid obesity. Ashton was placed with ~500 mL of urine. Patient was placed on BiPAP for work of breathing and transferred to ICU for acute hypoxic resp failure due to pul edema requiring NIPPV support.  + HCAP.. pat was hospitiazed as was hospitalized recently.  Cardiac enzymes neg. Cards eval noted. ID eval pending but discussed care with Dr Spence ID in icu  Pat stated that she has CPAP at home as per daughter Manuel     PLAN:  -continue bipap at night   -continue CPAP at home after discharge at current setting ( she has PaP devise at home)  -continue antibx  -dvt/gi prop

## 2019-11-30 NOTE — PROGRESS NOTE ADULT - SUBJECTIVE AND OBJECTIVE BOX
PGY 1 Note discussed with supervising resident and primary attending    Patient is a 79y old  Female who presents with a chief complaint of b/l LE cellulitis (30 Nov 2019 11:20)      INTERVAL HPI/OVERNIGHT EVENTS: offers no new complaints; current symptoms resolving. Patient short of breath this AM which is at baseline. Otherwise, feels generally better. Lower extremity still swollen, red. No chest pain, fever, chills abdominal pain    MEDICATIONS  (STANDING):  atorvastatin 40 milliGRAM(s) Oral at bedtime  azithromycin   Tablet 500 milliGRAM(s) Oral daily  budesonide 160 MICROgram(s)/formoterol 4.5 MICROgram(s) Inhaler 2 Puff(s) Inhalation two times a day  carvedilol 12.5 milliGRAM(s) Oral every 12 hours  cefpodoxime 200 milliGRAM(s) Oral every 12 hours  cholecalciferol 2000 Unit(s) Oral daily  gabapentin 300 milliGRAM(s) Oral daily  insulin glargine Injectable (LANTUS) 4 Unit(s) SubCutaneous every morning  insulin lispro (HumaLOG) corrective regimen sliding scale   SubCutaneous Before meals and at bedtime  levothyroxine 175 MICROGram(s) Oral daily  montelukast 10 milliGRAM(s) Oral at bedtime  neomycin/bacitracin/polymyxin Topical Ointment 1 Application(s) Topical every 12 hours  pancrelipase  (CREON  6,000 Lipase Units) 4 Capsule(s) Oral three times a day with meals  pantoprazole    Tablet 40 milliGRAM(s) Oral before breakfast  rivaroxaban 15 milliGRAM(s) Oral with dinner  senna 2 Tablet(s) Oral at bedtime  torsemide 10 milliGRAM(s) Oral daily  triamcinolone 0.1% Ointment 1 Application(s) Topical every 12 hours    MEDICATIONS  (PRN):  acetaminophen   Tablet .. 650 milliGRAM(s) Oral every 6 hours PRN Temp greater or equal to 38C (100.4F), Mild Pain (1 - 3)  albuterol/ipratropium for Nebulization 3 milliLiter(s) Nebulizer every 6 hours PRN Shortness of Breath and/or Wheezing  ondansetron Injectable 4 milliGRAM(s) IV Push every 6 hours PRN Nausea and/or Vomiting  zolpidem 5 milliGRAM(s) Oral at bedtime PRN Insomnia      __________________________________________________  REVIEW OF SYSTEMS:    Negative except as per HPI      Vital Signs Last 24 Hrs  T(C): 36.7 (30 Nov 2019 11:08), Max: 37.1 (30 Nov 2019 07:21)  T(F): 98.1 (30 Nov 2019 11:08), Max: 98.7 (30 Nov 2019 07:21)  HR: 62 (30 Nov 2019 11:08) (62 - 69)  BP: 165/58 (30 Nov 2019 11:08) (144/58 - 185/55)  BP(mean): --  RR: 18 (30 Nov 2019 11:08) (18 - 20)  SpO2: 100% (30 Nov 2019 11:08) (96% - 100%)    ________________________________________________  PHYSICAL EXAM:  GENERAL: NAD, pleasant, morbidly obese  HEENT: Normocephalic;  conjunctivae and sclerae clear; moist mucous membranes;   CHEST/LUNG: Clear to auscultation bilaterally with good air entry   HEART: S1 S2  regular  ABDOMEN: Soft, Nontender, Nondistended; Bowel sounds present  EXTREMITIES: b/l extremity redness, pitting edema.  SKIN: warm and dry; no rash, b/l lower extremity chronic venous stasis changes, pitting edema  NERVOUS SYSTEM:  Awake and alert; Oriented  to place, person and time ; no new deficits    _________________________________________________  LABS:                        9.1    6.04  )-----------( 342      ( 30 Nov 2019 07:32 )             29.6     11-30    135  |  102  |  32<H>  ----------------------------<  164<H>  4.2   |  27  |  1.16    Ca    8.4      30 Nov 2019 07:32          CAPILLARY BLOOD GLUCOSE      POCT Blood Glucose.: 214 mg/dL (30 Nov 2019 11:32)  POCT Blood Glucose.: 171 mg/dL (30 Nov 2019 07:36)  POCT Blood Glucose.: 274 mg/dL (29 Nov 2019 23:34)  POCT Blood Glucose.: 266 mg/dL (29 Nov 2019 21:42)  POCT Blood Glucose.: 216 mg/dL (29 Nov 2019 16:35)        RADIOLOGY & ADDITIONAL TESTS:    Imaging Personally Reviewed:  YES/NO    Consultant(s) Notes Reviewed:   YES/ No    Care Discussed with Consultants :     Plan of care was discussed with patient and /or primary care giver; all questions and concerns were addressed and care was aligned with patient's wishes.

## 2019-11-30 NOTE — PROGRESS NOTE ADULT - ASSESSMENT
80 y/o Albanian-speaking female from home, ambulates with walker, lives with son, with PMHx of HFpEF, CAD (s/p stents), chronic venous stasis, DM, HTN and Afib (on Xarelto, s/p PPM) presents to the ED with chief complaint of fever and chills x 2 days, pneumonia, acute diastolic HF.  1.Sleep study as outpatient-R/O JOSE DANIEL.  2.Pneumonia-ABX.  3.Fe def anemia-IV iron.  4.PAF-xarelto,inc coreg 12.5mg bid.  5.Hypothyroidism-synthroid.  6.Acute diastolic HF-resume demadex.  7.DM-Insulin.  8.Asthma-nebs.  9.PPI.

## 2019-11-30 NOTE — PROGRESS NOTE ADULT - PROBLEM SELECTOR PLAN 3
creatine 1.3 on admission - likely related to active infection  labile, elevated this AM  f/u BMP daily  avoid nephrotoxins creatine 1.3 on admission - likely related to active infection  labile, back to baseline this AM  f/u BMP daily  avoid nephrotoxins

## 2019-11-30 NOTE — PROGRESS NOTE ADULT - PROBLEM SELECTOR PLAN 5
s/p stents  t1 on admission 0.083, trended down  EKG unchanged from prior  tele monitoring  Echo- Normal EF, G1DD

## 2019-11-30 NOTE — PROGRESS NOTE ADULT - PROBLEM SELECTOR PLAN 7
TTE in 2018 shows pEF with G1DD and LVOT  f/u repeat TTE- normal EF, G1DD  Not on any diuretics torsemide held as per Card, Dr. Sandoval

## 2019-11-30 NOTE — PROGRESS NOTE ADULT - PROBLEM SELECTOR PLAN 4
Na 129 on admission - baseline shows pt is typically hyponatremic  Sodium now 135  Continue to follow BMP

## 2019-11-30 NOTE — PROGRESS NOTE ADULT - ASSESSMENT
79F from home, ambulates with walker, lives with son, with PMHx of HFpEF, CAD (s/p stents), chronic venous stasis, DM, HTN and Afib (on Xarelto, s/p [facility] PPM) presents to the ED with chief complaint of fever and chills x 2 days with one episode of vomiting. She was admitted to medicine on 11/25 for LE cellulitis treated with Zosyn and Vancomycin. Also found to have hyponatremia for which she was receiving IVF. RRT called on 11/26 AM for respiratory distress with abdominal breathing, saturating 100% on NRB. Vitals significant for tachypnea w/ RR: 41. Upon lung auscultation, bilateral expiratory/inspiratory wheezing appreciated. ABG w/o hypoxia/hypercapnia w/ pH WNL's. ECG: NSR VR@86bpm. Patient respiratory status did not improve despite treatment with Lasix 60 mg IV + DUONEB x1. Bladder scan revealed 0 mL, which was inaccurate probably due morbid obesity. Ashton was placed with ~500 mL of urine. Patient was placed on BiPAP for work of breathing and transferred to ICU. Patient then downgraded when stable. Due to iron def. Anemia will obtain FOBT. 79F from home, ambulates with walker, lives with son, with PMHx of HFpEF, CAD (s/p stents), chronic venous stasis, DM, HTN and Afib (on Xarelto, s/p [facility] PPM) presents to the ED with chief complaint of fever and chills x 2 days with one episode of vomiting. She was admitted to medicine on 11/25 for LE cellulitis treated with Zosyn and Vancomycin. Also found to have hyponatremia for which she was receiving IVF. RRT called on 11/26 AM for respiratory distress with abdominal breathing, saturating 100% on NRB. Vitals significant for tachypnea w/ RR: 41. Upon lung auscultation, bilateral expiratory/inspiratory wheezing appreciated. ABG w/o hypoxia/hypercapnia w/ pH WNL's. ECG: NSR VR@86bpm. Patient respiratory status did not improve despite treatment with Lasix 60 mg IV + DUONEB x1. Bladder scan revealed 0 mL, which was inaccurate probably due morbid obesity. Ashton was placed with ~500 mL of urine. Patient was placed on BiPAP for work of breathing and transferred to ICU. Patient then downgraded when stable. Due to iron def. Anemia will obtain FOBT. Negative.  Patient requires BiPAP at home as patient's home machine is broken.

## 2019-11-30 NOTE — PROGRESS NOTE ADULT - PROBLEM SELECTOR PLAN 2
patient iron studies sig. for iron deficient  Awaiting occult blood to screen for CRC  hemoglobin stable  transfuse if symptomatic <8, or if hb <7 patient iron studies sig. for iron deficient  Awaiting occult blood which is negative  hemoglobin stable  -IV iron  transfuse if symptomatic <8, or if hb <7

## 2019-12-01 ENCOUNTER — TRANSCRIPTION ENCOUNTER (OUTPATIENT)
Age: 79
End: 2019-12-01

## 2019-12-01 LAB
ANION GAP SERPL CALC-SCNC: 5 MMOL/L — SIGNIFICANT CHANGE UP (ref 5–17)
BUN SERPL-MCNC: 24 MG/DL — HIGH (ref 7–18)
CALCIUM SERPL-MCNC: 8.9 MG/DL — SIGNIFICANT CHANGE UP (ref 8.4–10.5)
CHLORIDE SERPL-SCNC: 102 MMOL/L — SIGNIFICANT CHANGE UP (ref 96–108)
CO2 SERPL-SCNC: 30 MMOL/L — SIGNIFICANT CHANGE UP (ref 22–31)
CREAT SERPL-MCNC: 1.19 MG/DL — SIGNIFICANT CHANGE UP (ref 0.5–1.3)
GLUCOSE BLDC GLUCOMTR-MCNC: 182 MG/DL — HIGH (ref 70–99)
GLUCOSE BLDC GLUCOMTR-MCNC: 206 MG/DL — HIGH (ref 70–99)
GLUCOSE BLDC GLUCOMTR-MCNC: 236 MG/DL — HIGH (ref 70–99)
GLUCOSE BLDC GLUCOMTR-MCNC: 261 MG/DL — HIGH (ref 70–99)
GLUCOSE SERPL-MCNC: 191 MG/DL — HIGH (ref 70–99)
HCT VFR BLD CALC: 31.4 % — LOW (ref 34.5–45)
HGB BLD-MCNC: 9.7 G/DL — LOW (ref 11.5–15.5)
MCHC RBC-ENTMCNC: 26.1 PG — LOW (ref 27–34)
MCHC RBC-ENTMCNC: 30.9 GM/DL — LOW (ref 32–36)
MCV RBC AUTO: 84.4 FL — SIGNIFICANT CHANGE UP (ref 80–100)
NRBC # BLD: 0 /100 WBCS — SIGNIFICANT CHANGE UP (ref 0–0)
PLATELET # BLD AUTO: 377 K/UL — SIGNIFICANT CHANGE UP (ref 150–400)
POTASSIUM SERPL-MCNC: 4.2 MMOL/L — SIGNIFICANT CHANGE UP (ref 3.5–5.3)
POTASSIUM SERPL-SCNC: 4.2 MMOL/L — SIGNIFICANT CHANGE UP (ref 3.5–5.3)
RBC # BLD: 3.72 M/UL — LOW (ref 3.8–5.2)
RBC # FLD: 14.6 % — HIGH (ref 10.3–14.5)
SODIUM SERPL-SCNC: 137 MMOL/L — SIGNIFICANT CHANGE UP (ref 135–145)
WBC # BLD: 6.76 K/UL — SIGNIFICANT CHANGE UP (ref 3.8–10.5)
WBC # FLD AUTO: 6.76 K/UL — SIGNIFICANT CHANGE UP (ref 3.8–10.5)

## 2019-12-01 RX ORDER — IRON SUCROSE 20 MG/ML
200 INJECTION, SOLUTION INTRAVENOUS ONCE
Refills: 0 | Status: COMPLETED | OUTPATIENT
Start: 2019-12-01 | End: 2019-12-01

## 2019-12-01 RX ADMIN — BUDESONIDE AND FORMOTEROL FUMARATE DIHYDRATE 2 PUFF(S): 160; 4.5 AEROSOL RESPIRATORY (INHALATION) at 22:09

## 2019-12-01 RX ADMIN — Medication 2: at 17:26

## 2019-12-01 RX ADMIN — Medication 1: at 08:37

## 2019-12-01 RX ADMIN — Medication 3: at 22:10

## 2019-12-01 RX ADMIN — Medication 175 MICROGRAM(S): at 06:07

## 2019-12-01 RX ADMIN — CARVEDILOL PHOSPHATE 12.5 MILLIGRAM(S): 80 CAPSULE, EXTENDED RELEASE ORAL at 17:26

## 2019-12-01 RX ADMIN — BACITRACIN ZINC NEOMYCIN SULFATE POLYMYXIN B SULFATE 1 APPLICATION(S): 400; 3.5; 5 OINTMENT TOPICAL at 17:27

## 2019-12-01 RX ADMIN — Medication 200 MILLIGRAM(S): at 06:07

## 2019-12-01 RX ADMIN — Medication 4 CAPSULE(S): at 08:38

## 2019-12-01 RX ADMIN — PANTOPRAZOLE SODIUM 40 MILLIGRAM(S): 20 TABLET, DELAYED RELEASE ORAL at 06:07

## 2019-12-01 RX ADMIN — AZITHROMYCIN 500 MILLIGRAM(S): 500 TABLET, FILM COATED ORAL at 12:24

## 2019-12-01 RX ADMIN — GABAPENTIN 300 MILLIGRAM(S): 400 CAPSULE ORAL at 12:24

## 2019-12-01 RX ADMIN — IRON SUCROSE 110 MILLIGRAM(S): 20 INJECTION, SOLUTION INTRAVENOUS at 12:25

## 2019-12-01 RX ADMIN — MONTELUKAST 10 MILLIGRAM(S): 4 TABLET, CHEWABLE ORAL at 22:09

## 2019-12-01 RX ADMIN — Medication 10 MILLIGRAM(S): at 06:07

## 2019-12-01 RX ADMIN — SENNA PLUS 2 TABLET(S): 8.6 TABLET ORAL at 22:09

## 2019-12-01 RX ADMIN — INSULIN GLARGINE 4 UNIT(S): 100 INJECTION, SOLUTION SUBCUTANEOUS at 08:38

## 2019-12-01 RX ADMIN — Medication 2: at 12:23

## 2019-12-01 RX ADMIN — Medication 1 APPLICATION(S): at 06:07

## 2019-12-01 RX ADMIN — Medication 3 MILLILITER(S): at 06:18

## 2019-12-01 RX ADMIN — BUDESONIDE AND FORMOTEROL FUMARATE DIHYDRATE 2 PUFF(S): 160; 4.5 AEROSOL RESPIRATORY (INHALATION) at 12:25

## 2019-12-01 RX ADMIN — CARVEDILOL PHOSPHATE 12.5 MILLIGRAM(S): 80 CAPSULE, EXTENDED RELEASE ORAL at 06:07

## 2019-12-01 RX ADMIN — Medication 4 CAPSULE(S): at 17:26

## 2019-12-01 RX ADMIN — Medication 200 MILLIGRAM(S): at 17:26

## 2019-12-01 RX ADMIN — BACITRACIN ZINC NEOMYCIN SULFATE POLYMYXIN B SULFATE 1 APPLICATION(S): 400; 3.5; 5 OINTMENT TOPICAL at 06:08

## 2019-12-01 RX ADMIN — ZOLPIDEM TARTRATE 5 MILLIGRAM(S): 10 TABLET ORAL at 22:16

## 2019-12-01 RX ADMIN — Medication 1 APPLICATION(S): at 17:52

## 2019-12-01 RX ADMIN — ATORVASTATIN CALCIUM 40 MILLIGRAM(S): 80 TABLET, FILM COATED ORAL at 22:09

## 2019-12-01 RX ADMIN — Medication 2000 UNIT(S): at 12:24

## 2019-12-01 RX ADMIN — RIVAROXABAN 15 MILLIGRAM(S): KIT at 17:27

## 2019-12-01 RX ADMIN — Medication 4 CAPSULE(S): at 12:25

## 2019-12-01 NOTE — DISCHARGE NOTE PROVIDER - NSFOLLOWUPCLINICS_GEN_ALL_ED_FT
[facility]  [facility]  [address] [place] [address]  Phone: [phone]  Fax: [phone]  Follow Up Time: 1 week

## 2019-12-01 NOTE — PROGRESS NOTE ADULT - ASSESSMENT
78 y/o Albanian-speaking female from home, ambulates with walker, lives with son, with PMHx of HFpEF, CAD (s/p stents), chronic venous stasis, DM, HTN and Afib (on Xarelto, s/p PPM) presents to the ED with chief complaint of fever and chills x 2 days, pneumonia, acute diastolic HF.  1.Sleep study as outpatient-R/O JOSE DANIEL.  2.Pneumonia-ABX.  3.Fe def anemia-IV iron.  4.PAF-xarelto,inc coreg 12.5mg bid.  5.Hypothyroidism-synthroid.  6.Acute diastolic HF-resume demadex.  7.DM-Insulin.  8.Asthma-nebs.  9.PPI.

## 2019-12-01 NOTE — PROGRESS NOTE ADULT - SUBJECTIVE AND OBJECTIVE BOX
INTERVAL HPI/OVERNIGHT EVENTS:Patient seen at bedside,no acute issues    VITAL SIGNS:  T(F): 97.9 (12-01-19 @ 07:14)  HR: 60 (12-01-19 @ 07:14)  BP: 159/61 (12-01-19 @ 07:14)  RR: 18 (12-01-19 @ 07:14)  SpO2: 99% (12-01-19 @ 07:14)  Wt(kg): --    PHYSICAL EXAM:  awake,alert  Constitutional:  Eyes:  ENMT:perrla  Neck:  Respiratory:clear  Cardiovascular:s1 s2,m-none  Gastrointestinal:soft,bs pos  Extremities:mild b/l l/extrem edema,skin redness  Vascular:  Neurological:no focal deficit  Musculoskeletal:    MEDICATIONS  (STANDING):  atorvastatin 40 milliGRAM(s) Oral at bedtime  azithromycin   Tablet 500 milliGRAM(s) Oral daily  budesonide 160 MICROgram(s)/formoterol 4.5 MICROgram(s) Inhaler 2 Puff(s) Inhalation two times a day  carvedilol 12.5 milliGRAM(s) Oral every 12 hours  cefpodoxime 200 milliGRAM(s) Oral every 12 hours  cholecalciferol 2000 Unit(s) Oral daily  gabapentin 300 milliGRAM(s) Oral daily  insulin glargine Injectable (LANTUS) 4 Unit(s) SubCutaneous every morning  insulin lispro (HumaLOG) corrective regimen sliding scale   SubCutaneous Before meals and at bedtime  levothyroxine 175 MICROGram(s) Oral daily  montelukast 10 milliGRAM(s) Oral at bedtime  neomycin/bacitracin/polymyxin Topical Ointment 1 Application(s) Topical every 12 hours  pancrelipase  (CREON  6,000 Lipase Units) 4 Capsule(s) Oral three times a day with meals  pantoprazole    Tablet 40 milliGRAM(s) Oral before breakfast  rivaroxaban 15 milliGRAM(s) Oral with dinner  senna 2 Tablet(s) Oral at bedtime  torsemide 10 milliGRAM(s) Oral daily  triamcinolone 0.1% Ointment 1 Application(s) Topical every 12 hours    MEDICATIONS  (PRN):  acetaminophen   Tablet .. 650 milliGRAM(s) Oral every 6 hours PRN Temp greater or equal to 38C (100.4F), Mild Pain (1 - 3)  albuterol/ipratropium for Nebulization 3 milliLiter(s) Nebulizer every 6 hours PRN Shortness of Breath and/or Wheezing  ondansetron Injectable 4 milliGRAM(s) IV Push every 6 hours PRN Nausea and/or Vomiting  zolpidem 5 milliGRAM(s) Oral at bedtime PRN Insomnia      Allergies    No Known Allergies    Intolerances        LABS:                        9.7    6.76  )-----------( 377      ( 01 Dec 2019 07:35 )             31.4     12-01    137  |  102  |  24<H>  ----------------------------<  191<H>  4.2   |  30  |  1.19    Ca    8.9      01 Dec 2019 07:35      Assessment and Plan:   · Assessment		  79F from home, ambulates with walker, lives with son, with PMHx of HFpEF, CAD (s/p stents), chronic venous stasis, DM, HTN and Afib (on Xarelto, s/p [facility] PPM) presents to the ED with chief complaint of fever and chills x 2 days with one episode of vomiting. She was admitted to medicine on 11/25 for LE cellulitis treated with Zosyn and Vancomycin. Also found to have hyponatremia for which she was receiving IVF. RRT called on 11/26 AM for respiratory distress with abdominal breathing, saturating 100% on NRB. Vitals significant for tachypnea w/ RR: 41. Upon lung auscultation, bilateral expiratory/inspiratory wheezing appreciated. ABG w/o hypoxia/hypercapnia w/ pH WNL's. ECG: NSR VR@86bpm. Patient respiratory status did not improve despite treatment with Lasix 60 mg IV + DUONEB x1. Bladder scan revealed 0 mL, which was inaccurate probably due morbid obesity. Ashton was placed with ~500 mL of urine. Patient was placed on BiPAP for work of breathing and transferred to ICU. Patient then downgraded when stable. Due to iron def. Anemia will obtain FOBT.              Problem/Plan - 1:  ·  Problem: Healthcare associated bacterial pneumonia.  On azithromycin + cefpodoxime   BCx 11/25 negative  ID Dr. Spence  -Asthma  on nebs.   d/c planning     Problem/Plan - 2:  ·  Problem: Iron deficiency anemia.  Plan: patient iron studies sig. for iron deficient  Awaiting occult blood to screen for CRC  hemoglobin stable  transfuse if symptomatic <8, or if hb <7.      Problem/Plan - 3:  ·  Problem: SRIDHAR (acute kidney injury)-resolved  f/u BMP daily  avoid nephrotoxins.      Problem/Plan - 4:  ·  Problem: Hyponatremia-stable ,resolved  Continue to follow BMP.      Problem/Plan - 5:  ·  Problem: CAD (coronary artery disease).  Plan: s/p stents  t1 on admission 0.083, trended down  EKG unchanged from prior  tele monitoring  Echo- Normal EF, G1DD.      Problem/Plan - 6:  Problem: Atrial fibrillation. Plan: rate controlled  c/w xarelto  c/w bblocker, carvedilol  PPM interrogated  Cardio, Dr Jaime Medley dced, digoxin(?).     Problem/Plan - 7:  ·  Problem: Chronic diastolic (congestive) heart failure.  Plan: TTE in 2018 shows pEF with G1DD and LVOT  f/u repeat TTE- normal EF, G1DD  Not on any diuretics torsemide held as per Card, Dr. Sandoval.      Problem/Plan - 8:  ·  Problem: HTN (Hypertension).  Plan: Pressures elevated from yesterday, patient's dose of Coreg increased.      Problem/Plan - 9:  ·  Problem: DM (diabetes mellitus).  Plan: starting hss  monitor fs  A1c 6.6 early november  Sugar moderately controlled on sliding scale.      Problem/Plan - 10:  Problem: Need for prophylactic measure. Plan; IMPROVE VTE Individual Risk Assessment          RISK                                                          Points  [  ] Previous VTE                                                3  [  ] Thrombophilia                                             2  [  ] Lower limb paralysis                                   2        (unable to hold up >15 seconds)    [  ] Current Cancer                                             2         (within 6 months)  [ X ] Immobilization > 24 hrs                              1  [  ] ICU/CCU stay > 24 hours                             1  [X  ] Age > 60                                                         1    IMPROVE VTE Score: 2    on xarelto.  covering for dr chow

## 2019-12-01 NOTE — DISCHARGE NOTE PROVIDER - HOSPITAL COURSE
78 y/o [place]inean-speaking female from home, ambulates with walker, lives with son, with PMHx of HFpEF, CAD (s/p stents), chronic venous stasis, DM, HTN and Afib (on Xarelto, s/p PPM) presents to the ED with chief complaint of fever and chills x 2 days. Daughter at bedside provides translation. Patient also reports 1 episode of vomiting today. She has had multiple episodes of cellulitis in the past and says her current symptoms are similar. She denies any sob, nausea, vomiting, chest pain or any other symptoms at present. Of note, patient was recently admitted to [facility] for nausea and vomiting and was conservatively managed.    Patient was originally admitted for cellulitits. Was admitted to ICU for acute respiratory failure requiring BIPAP. Patient was on CPAP at home. It was noted that patient had right lower lobe infiltrates. Patient was then initiated on treatment for healthcare associated pna  with azithromycin and cephalosporin. Was eventually placed on oral azithromycin and cefpodoxime. ID on case was Dr. Spence.    It was also noted that patient had iron deficient anemia. Occult stool tested was negative.    Cardio consulted, patient's PPM interrogated, was negative    Pulmonology, Dr. Cruz on board.    Patient's stay complicated due to knowledge that patient's home machine was broken.  and social work on board. 78 y/o Singaporean-speaking female from home, ambulates with walker, lives with son, with PMHx of HFpEF, CAD (s/p stents), chronic venous stasis, DM, HTN and Afib (on Xarelto, s/p PPM) presents to the ED with chief complaint of fever and chills x 2 days. Daughter at bedside provides translation. Patient also reports 1 episode of vomiting today. She has had multiple episodes of cellulitis in the past and says her current symptoms are similar. She denies any sob, nausea, vomiting, chest pain or any other symptoms at present. Of note, patient was recently admitted to [facility] for nausea and vomiting and was conservatively managed.    Patient was originally admitted for cellulitits. Was admitted to ICU for acute respiratory failure requiring BIPAP. Patient was on CPAP at home. It was noted that patient had right lower lobe infiltrates. Patient was then initiated on treatment for healthcare associated pna  with azithromycin and cephalosporin. Was eventually placed on oral azithromycin and cefpodoxime. ID on case was Dr. Spence.    It was also noted that patient had iron deficient anemia. Occult stool tested was negative.    Cardio consulted, patient's PPM interrogated, was negative    Pulmonology, Dr. Cruz on board. recommended CPAP use at home with the setting of FiO2: 40,  IPAP:15, CPAP: 6.    Patient's stay complicated due to knowledge that patient's home machine was broken.  and social work on board.    However, pt needs to go to outpatient sleep study to get a new C-PAP.     Healthcare associated bacterial pneumonia. Initially treated w/  azithromycin (11/26- )and cefepime (11/26-29), which was switched to cefpodoxime(11/26- ).    BCx 11/25 negative Asthma was treated with duo-nebs. Iron deficiency anemia was treated with IV iron.    The patient is medically stable and ready for discharge. 80 y/o Venezuelan-speaking female from home, ambulates with walker, lives with son, with PMHx of HFpEF, CAD (s/p stents), chronic venous stasis, DM, HTN and Afib (on Xarelto, s/p PPM) presents to the ED with chief complaint of fever and chills x 2 days. Daughter at bedside provides translation. Patient also reports 1 episode of vomiting today. She has had multiple episodes of cellulitis in the past and says her current symptoms are similar. She denies any sob, nausea, vomiting, chest pain or any other symptoms at present. Of note, patient was recently admitted to [facility] for nausea and vomiting and was conservatively managed.    Patient was originally admitted for cellulitits. Was admitted to ICU for acute respiratory failure requiring BIPAP. Patient was on CPAP at home. It was noted that patient had right lower lobe infiltrates. Patient was then initiated on treatment for healthcare associated pna  with azithromycin and cephalosporin. Was eventually placed on oral azithromycin and cefpodoxime. ID on case was Dr. Spence.    It was also noted that patient had iron deficient anemia. Occult stool tested was negative.    Cardio consulted, patient's PPM interrogated, was negative    Pulmonology, Dr. Cruz on board. recommended CPAP use at home with the setting of FiO2: 40,  IPAP:15, CPAP: 6.    Patient's stay complicated due to knowledge that patient's home machine was broken.  and social work on board.    However, pt needs to go to outpatient sleep study to get a new C-PAP.     Healthcare associated bacterial pneumonia. Initially treated w/  azithromycin (11/26- )and cefepime (11/26-29), which was switched to cefpodoxime(11/26- ).    BCx 11/25 negative Asthma was treated with duo-nebs. Iron deficiency anemia was treated with IV iron.    Pt had blood spotting and UA was negative for blood, likely from vagina. OBGYN was consulted and waiting for recommendation.    The patient is medically stable and ready for discharge.

## 2019-12-01 NOTE — DISCHARGE NOTE PROVIDER - NSDCCPCAREPLAN_GEN_ALL_CORE_FT
PRINCIPAL DISCHARGE DIAGNOSIS  Diagnosis: Healthcare associated bacterial pneumonia  Assessment and Plan of Treatment: After your recent hospitalization, you presented with new pneumonia. You were treated with IV antibiotics that were switched to oral. You are to finish your course of oral antibiotics upon discharge. Please follow up with your primary care physician in one week to inform them of your recent hospitalization.      SECONDARY DISCHARGE DIAGNOSES  Diagnosis: Acute respiratory failure with hypoxia  Assessment and Plan of Treatment: Your hospital stay was complicated with acute respiratory failure requiring BiPAP. Your breathing gradually improved with treatment of your underlying infection. Upon discharge, please continue with CPAP at home. Please follow up with your primary care physician in one week to inform them of your recent hospitalization.    Diagnosis: Chronic atrial fibrillation  Assessment and Plan of Treatment: Please continue with your anticoagulation medication as instructed in the medication reconciliation and your primary care physician.  Please continue with you rate control medication carvedilolas instructed in the medication reconciliation and your primary care physician.      Diagnosis: Diabetes  Assessment and Plan of Treatment: Continue with your blood sugar medication. HbAIC was found on previouse admission to be 6.6.  You must maintain a healthy diet that consist of low sugar, low fat, low sodium diet. Exercise frequently if possible. Consider repeating your Hemoglobin A1c within 3 months after discharge to monitor your average blood glucose control. Follow up with primary care physician in one week after discharge.    Diagnosis: Iron deficiency anemia  Assessment and Plan of Treatment: Iron deficiency anemia PRINCIPAL DISCHARGE DIAGNOSIS  Diagnosis: Healthcare associated bacterial pneumonia  Assessment and Plan of Treatment: After your recent hospitalization, you presented with new pneumonia. You were treated with IV antibiotics that were switched to oral. You are to finish your course of oral antibiotics upon discharge. Please follow up with your primary care physician in one week to inform them of your recent hospitalization. Please follow up with pulmonologist and get sleep study. If you qualified C-PAP, please use every night.      SECONDARY DISCHARGE DIAGNOSES  Diagnosis: Chronic atrial fibrillation  Assessment and Plan of Treatment: Please continue with your anticoagulation medication as instructed in the medication reconciliation and your primary care physician.  Please continue with you rate control medication carvedilolas instructed in the medication reconciliation and your primary care physician.      Diagnosis: Acute respiratory failure with hypoxia  Assessment and Plan of Treatment: Your hospital stay was complicated with acute respiratory failure requiring BiPAP. Your breathing gradually improved with treatment of your underlying infection. Upon discharge, please continue with CPAP at home. Please follow up with your primary care physician in one week to inform them of your recent hospitalization.    Diagnosis: Diabetes  Assessment and Plan of Treatment: Continue with your blood sugar medication. HbAIC was found on previouse admission to be 6.6.  You must maintain a healthy diet that consist of low sugar, low fat, low sodium diet. Exercise frequently if possible. Consider repeating your Hemoglobin A1c within 3 months after discharge to monitor your average blood glucose control. Follow up with primary care physician in one week after discharge.    Diagnosis: Iron deficiency anemia  Assessment and Plan of Treatment: We gave you IV iron in the hospital. Please continue oral iron and follow up with your primary care doctor. PRINCIPAL DISCHARGE DIAGNOSIS  Diagnosis: Acute respiratory failure with hypoxia  Assessment and Plan of Treatment: Your hospital stay was complicated with acute respiratory failure requiring BiPAP. Your breathing gradually improved with treatment of your underlying infection. Upon discharge, please continue with CPAP at home. Please follow up with your primary care physician in one week to inform them of your recent hospitalization.      SECONDARY DISCHARGE DIAGNOSES  Diagnosis: Healthcare associated bacterial pneumonia  Assessment and Plan of Treatment: After your recent hospitalization, you presented with new pneumonia. You were treated with IV antibiotics that were switched to oral. You are to finish your course of oral antibiotics upon discharge. Please follow up with your primary care physician in one week to inform them of your recent hospitalization. Please follow up with pulmonologist and get sleep study. If you qualified C-PAP, please use every night.    Diagnosis: Chronic atrial fibrillation  Assessment and Plan of Treatment: Please continue with your anticoagulation medication as instructed in the medication reconciliation and your primary care physician.  Please continue with you rate control medication carvedilolas instructed in the medication reconciliation and your primary care physician.      Diagnosis: Diabetes  Assessment and Plan of Treatment: Continue with your blood sugar medication. HbAIC was found on previouse admission to be 6.6.  You must maintain a healthy diet that consist of low sugar, low fat, low sodium diet. Exercise frequently if possible. Consider repeating your Hemoglobin A1c within 3 months after discharge to monitor your average blood glucose control. Follow up with primary care physician in one week after discharge.    Diagnosis: Vaginal bleeding  Assessment and Plan of Treatment: You stated that you had blood spoting x 3times. We checked urinanalysis and there is no blood, it's likely from vagina. We consulted OBGYN. Please follow their instruction and follow up with OBGYN office.    Diagnosis: Iron deficiency anemia  Assessment and Plan of Treatment: We gave you IV iron in the hospital. Please continue oral iron and follow up with your primary care doctor.

## 2019-12-01 NOTE — DISCHARGE NOTE PROVIDER - NSDCMRMEDTOKEN_GEN_ALL_CORE_FT
Advair Diskus 250 mcg-50 mcg inhalation powder: 1 puff(s) inhaled 2 times a day  Ambien 5 mg oral tablet: 1 tab(s) orally once a day (at bedtime)  atorvastatin 40 mg oral tablet: 1 tab(s) orally once a day (at bedtime)  Benicar 40 mg oral tablet: 1 tab(s) orally once a day  Creon 24,000 units oral delayed release capsule: 1 cap(s) orally 3 times a day  Dexilant 60 mg oral delayed release capsule: 1 cap(s) orally once a day  dronedarone 400 mg oral tablet: 1 tab(s) orally 2 times a day  Dulera 200 mcg-5 mcg/inh inhalation aerosol: 2 puff(s) inhaled 2 times a day  Flonase 50 mcg/inh nasal spray: 1 spray(s) nasal once a day  gabapentin 300 mg oral capsule: 1 cap(s) orally once a day  glyburide-metformin 5 mg-500 mg oral tablet: 1 tab(s) orally 2 times a day  Janumet 50 mg-500 mg oral tablet: 1 tab(s) orally 2 times a day  labetalol 100 mg oral tablet: 1 tab(s) orally 2 times a day  levothyroxine 175 mcg (0.175 mg) oral tablet: 1 tab(s) orally once a day  Linzess 145 mcg oral capsule: 1 cap(s) orally once a day  LORazepam 0.5 mg oral tablet: 1 tab(s) orally 2 times a day, As Needed  meloxicam 7.5 mg oral tablet: 1 tab(s) orally once a day  montelukast 10 mg oral tablet: 1 tab(s) orally once a day  nystatin 100,000 units/g topical powder: 1 application topically 3 times a day  rivaroxaban 20 mg oral tablet: 1 tab(s) orally once a day (before a meal)  Spiriva Respimat 60 ACT 2.5 mcg/inh inhalation aerosol: 2 puff(s) inhaled once a day  tolterodine 4 mg oral capsule, extended release: 1 cap(s) orally once a day  torsemide 20 mg oral tablet: 1 tab(s) orally once a day  Tribenzor 40 mg-5 mg-25 mg oral tablet: 1 tab(s) orally once a day  Ventolin HFA 90 mcg/inh inhalation aerosol: 2 puff(s) inhaled 4 times a day, As Needed Advair Diskus 250 mcg-50 mcg inhalation powder: 1 puff(s) inhaled 2 times a day  Ambien 5 mg oral tablet: 1 tab(s) orally once a day (at bedtime)  atorvastatin 40 mg oral tablet: 1 tab(s) orally once a day (at bedtime)  Benicar 40 mg oral tablet: 1 tab(s) orally once a day  Creon 24,000 units oral delayed release capsule: 1 cap(s) orally 3 times a day  Dexilant 60 mg oral delayed release capsule: 1 cap(s) orally once a day  dronedarone 400 mg oral tablet: 1 tab(s) orally 2 times a day  Dulera 200 mcg-5 mcg/inh inhalation aerosol: 2 puff(s) inhaled 2 times a day  Flonase 50 mcg/inh nasal spray: 1 spray(s) nasal once a day  gabapentin 300 mg oral capsule: 1 cap(s) orally once a day  glyburide-metformin 5 mg-500 mg oral tablet: 1 tab(s) orally 2 times a day  Janumet 50 mg-500 mg oral tablet: 1 tab(s) orally 2 times a day  labetalol 100 mg oral tablet: 1 tab(s) orally 2 times a day  levothyroxine 175 mcg (0.175 mg) oral tablet: 1 tab(s) orally once a day  Linzess 145 mcg oral capsule: 1 cap(s) orally once a day  LORazepam 0.5 mg oral tablet: 1 tab(s) orally 2 times a day, As Needed  meloxicam 7.5 mg oral tablet: 1 tab(s) orally once a day  montelukast 10 mg oral tablet: 1 tab(s) orally once a day  nystatin 100,000 units/g topical powder: 1 application topically 3 times a day  rivaroxaban 15 mg oral tablet: 1 tab(s) orally once a day (before a meal)  Spiriva Respimat 60 ACT 2.5 mcg/inh inhalation aerosol: 2 puff(s) inhaled once a day  tolterodine 4 mg oral capsule, extended release: 1 cap(s) orally once a day  torsemide 20 mg oral tablet: 1 tab(s) orally once a day  Tribenzor 40 mg-5 mg-25 mg oral tablet: 1 tab(s) orally once a day  Ventolin HFA 90 mcg/inh inhalation aerosol: 2 puff(s) inhaled 4 times a day, As Needed Advair Diskus 250 mcg-50 mcg inhalation powder: 1 puff(s) inhaled 2 times a day  Ambien 5 mg oral tablet: 1 tab(s) orally once a day (at bedtime)  atorvastatin 40 mg oral tablet: 1 tab(s) orally once a day (at bedtime)  carvedilol 12.5 mg oral tablet: 1 tab(s) orally every 12 hours  cholecalciferol oral tablet: 2000 unit(s) orally once a day  Creon 24,000 units oral delayed release capsule: 1 cap(s) orally 3 times a day  Dexilant 60 mg oral delayed release capsule: 1 cap(s) orally once a day  Dulera 200 mcg-5 mcg/inh inhalation aerosol: 2 puff(s) inhaled 2 times a day  Flonase 50 mcg/inh nasal spray: 1 spray(s) nasal once a day  gabapentin 300 mg oral capsule: 1 cap(s) orally once a day  glyburide-metformin 5 mg-500 mg oral tablet: 1 tab(s) orally 2 times a day  Janumet 50 mg-500 mg oral tablet: 1 tab(s) orally 2 times a day  levothyroxine 175 mcg (0.175 mg) oral tablet: 1 tab(s) orally once a day  Linzess 145 mcg oral capsule: 1 cap(s) orally once a day  montelukast 10 mg oral tablet: 1 tab(s) orally once a day  nystatin 100,000 units/g topical powder: 1 application topically 3 times a day  rivaroxaban 15 mg oral tablet: 1 tab(s) orally once a day (before a meal)  Spiriva Respimat 60 ACT 2.5 mcg/inh inhalation aerosol: 2 puff(s) inhaled once a day  torsemide 10 mg oral tablet: 1 tab(s) orally once a day  Ventolin HFA 90 mcg/inh inhalation aerosol: 2 puff(s) inhaled 4 times a day, As Needed

## 2019-12-01 NOTE — PROGRESS NOTE ADULT - SUBJECTIVE AND OBJECTIVE BOX
Time of Visit:  Patient seen and examined.     MEDICATIONS  (STANDING):  atorvastatin 40 milliGRAM(s) Oral at bedtime  azithromycin   Tablet 500 milliGRAM(s) Oral daily  budesonide 160 MICROgram(s)/formoterol 4.5 MICROgram(s) Inhaler 2 Puff(s) Inhalation two times a day  carvedilol 12.5 milliGRAM(s) Oral every 12 hours  cefpodoxime 200 milliGRAM(s) Oral every 12 hours  cholecalciferol 2000 Unit(s) Oral daily  gabapentin 300 milliGRAM(s) Oral daily  insulin glargine Injectable (LANTUS) 4 Unit(s) SubCutaneous every morning  insulin lispro (HumaLOG) corrective regimen sliding scale   SubCutaneous Before meals and at bedtime  levothyroxine 175 MICROGram(s) Oral daily  montelukast 10 milliGRAM(s) Oral at bedtime  neomycin/bacitracin/polymyxin Topical Ointment 1 Application(s) Topical every 12 hours  pancrelipase  (CREON  6,000 Lipase Units) 4 Capsule(s) Oral three times a day with meals  pantoprazole    Tablet 40 milliGRAM(s) Oral before breakfast  rivaroxaban 15 milliGRAM(s) Oral with dinner  senna 2 Tablet(s) Oral at bedtime  torsemide 10 milliGRAM(s) Oral daily  triamcinolone 0.1% Ointment 1 Application(s) Topical every 12 hours      MEDICATIONS  (PRN):  acetaminophen   Tablet .. 650 milliGRAM(s) Oral every 6 hours PRN Temp greater or equal to 38C (100.4F), Mild Pain (1 - 3)  albuterol/ipratropium for Nebulization 3 milliLiter(s) Nebulizer every 6 hours PRN Shortness of Breath and/or Wheezing  ondansetron Injectable 4 milliGRAM(s) IV Push every 6 hours PRN Nausea and/or Vomiting  zolpidem 5 milliGRAM(s) Oral at bedtime PRN Insomnia       Medications up to date at time of exam.      PHYSICAL EXAMINATION:  Patient has no new complaints.  GENERAL: The patient is a well-developed, well-nourished, in no apparent distress.     Vital Signs Last 24 Hrs  T(C): 36.7 (01 Dec 2019 15:25), Max: 36.7 (01 Dec 2019 15:25)  T(F): 98 (01 Dec 2019 15:25), Max: 98 (01 Dec 2019 15:25)  HR: 59 (01 Dec 2019 15:25) (59 - 74)  BP: 161/50 (01 Dec 2019 15:25) (118/46 - 169/57)  BP(mean): --  RR: 18 (01 Dec 2019 15:25) (18 - 20)  SpO2: 100% (01 Dec 2019 15:25) (95% - 100%)   (if applicable)    Chest Tube (if applicable)    HEENT: Head is normocephalic and atraumatic. Extraocular muscles are intact. Mucous membranes are moist.     NECK: Supple, no palpable adenopathy.    LUNGS: Clear to auscultation, no wheezing, rales, or rhonchi.    HEART: Regular rate and rhythm without murmur.    ABDOMEN: Soft, nontender, and nondistended.  No hepatosplenomegaly is noted.    : No painful voiding, no pelvic pain    EXTREMITIES: Without any cyanosis, clubbing, rash, lesions or edema.    NEUROLOGIC: Awake, alert, oriented, grossly intact    SKIN: Warm, dry, good turgor.      LABS:                        9.7    6.76  )-----------( 377      ( 01 Dec 2019 07:35 )             31.4     12-01    137  |  102  |  24<H>  ----------------------------<  191<H>  4.2   |  30  |  1.19    Ca    8.9      01 Dec 2019 07:35                          MICROBIOLOGY: (if applicable)    RADIOLOGY & ADDITIONAL STUDIES:  EKG:   CXR:  ECHO:    IMPRESSION: 79y Female PAST MEDICAL & SURGICAL HISTORY:  Obesity  Pacemaker  Atrial fibrillation  Hypothyroidism  Venous stasis  IBS (irritable bowel syndrome)  CHF (congestive heart failure), NYHA class I  HLD (Hyperlipidemia)  HTN (Hypertension)  Diabetes  Myocardial Infarction  CAD (Coronary Atherosclerotic Disease)  Asthma  S/P coronary angiogram   p/w           RECOMMENDATIONS: Time of Visit:  Patient seen and examined.     MEDICATIONS  (STANDING):  atorvastatin 40 milliGRAM(s) Oral at bedtime  azithromycin   Tablet 500 milliGRAM(s) Oral daily  budesonide 160 MICROgram(s)/formoterol 4.5 MICROgram(s) Inhaler 2 Puff(s) Inhalation two times a day  carvedilol 12.5 milliGRAM(s) Oral every 12 hours  cefpodoxime 200 milliGRAM(s) Oral every 12 hours  cholecalciferol 2000 Unit(s) Oral daily  gabapentin 300 milliGRAM(s) Oral daily  insulin glargine Injectable (LANTUS) 4 Unit(s) SubCutaneous every morning  insulin lispro (HumaLOG) corrective regimen sliding scale   SubCutaneous Before meals and at bedtime  levothyroxine 175 MICROGram(s) Oral daily  montelukast 10 milliGRAM(s) Oral at bedtime  neomycin/bacitracin/polymyxin Topical Ointment 1 Application(s) Topical every 12 hours  pancrelipase  (CREON  6,000 Lipase Units) 4 Capsule(s) Oral three times a day with meals  pantoprazole    Tablet 40 milliGRAM(s) Oral before breakfast  rivaroxaban 15 milliGRAM(s) Oral with dinner  senna 2 Tablet(s) Oral at bedtime  torsemide 10 milliGRAM(s) Oral daily  triamcinolone 0.1% Ointment 1 Application(s) Topical every 12 hours      MEDICATIONS  (PRN):  acetaminophen   Tablet .. 650 milliGRAM(s) Oral every 6 hours PRN Temp greater or equal to 38C (100.4F), Mild Pain (1 - 3)  albuterol/ipratropium for Nebulization 3 milliLiter(s) Nebulizer every 6 hours PRN Shortness of Breath and/or Wheezing  ondansetron Injectable 4 milliGRAM(s) IV Push every 6 hours PRN Nausea and/or Vomiting  zolpidem 5 milliGRAM(s) Oral at bedtime PRN Insomnia       Medications up to date at time of exam.      PHYSICAL EXAMINATION:  Patient has no new complaints.  GENERAL: The patient is a well-developed, well-nourished, in no apparent distress.     Vital Signs Last 24 Hrs  T(C): 36.7 (01 Dec 2019 15:25), Max: 36.7 (01 Dec 2019 15:25)  T(F): 98 (01 Dec 2019 15:25), Max: 98 (01 Dec 2019 15:25)  HR: 59 (01 Dec 2019 15:25) (59 - 74)  BP: 161/50 (01 Dec 2019 15:25) (118/46 - 169/57)  BP(mean): --  RR: 18 (01 Dec 2019 15:25) (18 - 20)  SpO2: 100% (01 Dec 2019 15:25) (95% - 100%)   (if applicable)    Chest Tube (if applicable)    HEENT: Head is normocephalic and atraumatic. Extraocular muscles are intact. Mucous membranes are moist.     NECK: Supple, no palpable adenopathy.    LUNGS: Clear to auscultation, no wheezing, rales, or rhonchi.    HEART: Regular rate and rhythm without murmur.    ABDOMEN: Soft, nontender, and nondistended.  No hepatosplenomegaly is noted.    : No painful voiding, no pelvic pain    EXTREMITIES: 2 +  edema. b/l LE    NEUROLOGIC: Awake, alert, oriented, grossly intact    SKIN: Warm, dry, good turgor.      LABS:                        9.7    6.76  )-----------( 377      ( 01 Dec 2019 07:35 )             31.4     12-01    137  |  102  |  24<H>  ----------------------------<  191<H>  4.2   |  30  |  1.19    Ca    8.9      01 Dec 2019 07:35                          MICROBIOLOGY: (if applicable)    RADIOLOGY & ADDITIONAL STUDIES:  EKG:   CXR:  ECHO:    IMPRESSION: 79y Female PAST MEDICAL & SURGICAL HISTORY:  Obesity  Pacemaker  Atrial fibrillation  Hypothyroidism  Venous stasis  IBS (irritable bowel syndrome)  CHF (congestive heart failure), NYHA class I  HLD (Hyperlipidemia)  HTN (Hypertension)  Diabetes  Myocardial Infarction  CAD (Coronary Atherosclerotic Disease)  Asthma  S/P coronary angiogram   p/w           IMP: This is an 79 yr old woman from home , ambulates with walker, lives with son, with PMHx of HFpEF, CAD (s/p stents), chronic venous stasis, DM, HTN and Afib (on Xarelto, s/p [facility] PPM) presents to the ED with chief complaint of fever and chills x 2 days with one episode of vomiting. She was admitted to medicine on 11/25 for LE cellulitis treated with Zosyn and Vancomycin. Also found to have hyponatremia for which she was receiving IVF. RRT called on 11/26 AM for respiratory distress with abdominal breathing, saturating 100% on NRB. Vitals significant for tachypnea w/ RR: 41. Upon lung auscultation, bilateral expiratory/inspiratory wheezing appreciated. ABG w/o hypoxia/hypercapnia w/ pH WNL's. ECG: NSR VR@86bpm. Patient respiratory status did not improve despite treatment with Lasix 60 mg IV + DUONEB x1. Bladder scan revealed 0 mL, which was inaccurate probably due morbid obesity. Ashton was placed with ~500 mL of urine. Patient was placed on BiPAP for work of breathing and transferred to ICU for acute hypoxic resp failure due to pul edema requiring NIPPV support.  + HCAP.. pat was hospitiazed as was hospitalized recently.  Cardiac enzymes neg. Cards eval noted. ID eval pending but discussed care with Dr Jordy GODINEZ in icu  Pat stated that she has CPAP at home as per daughter Magurite     PLAN:  -continue bipap at night   -continue CPAP at home after discharge at current setting ( she has PaP devise at home)  -continue antibx  -dvt/gi prop

## 2019-12-01 NOTE — PROGRESS NOTE ADULT - SUBJECTIVE AND OBJECTIVE BOX
CHIEF COMPLAINT:Patient is a 79y old  Female who presents with a chief complaint of b/l LE cellulitis.Pt appears comfortable.    	  REVIEW OF SYSTEMS:  CONSTITUTIONAL: No fever, weight loss, or fatigue  EYES: No eye pain, visual disturbances, or discharge  ENT:  No difficulty hearing, tinnitus, vertigo; No sinus or throat pain  NECK: No pain or stiffness  RESPIRATORY: No cough, wheezing, chills or hemoptysis; No Shortness of Breath  CARDIOVASCULAR: No chest pain, palpitations, passing out, dizziness, or leg swelling  GASTROINTESTINAL: No abdominal or epigastric pain. No nausea, vomiting, or hematemesis; No diarrhea or constipation. No melena or hematochezia.  GENITOURINARY: No dysuria, frequency, hematuria, or incontinence  NEUROLOGICAL: No headaches, memory loss, loss of strength, numbness, or tremors  SKIN: No itching, burning, rashes, or lesions   LYMPH Nodes: No enlarged glands  ENDOCRINE: No heat or cold intolerance; No hair loss  MUSCULOSKELETAL: No joint pain or swelling; No muscle, back, or extremity pain  PSYCHIATRIC: No depression, anxiety, mood swings, or difficulty sleeping  HEME/LYMPH: No easy bruising, or bleeding gums  ALLERGY AND IMMUNOLOGIC: No hives or eczema	      PHYSICAL EXAM:  T(C): 36.6 (12-01-19 @ 07:14), Max: 37.4 (11-30-19 @ 15:24)  HR: 60 (12-01-19 @ 07:14) (60 - 74)  BP: 159/61 (12-01-19 @ 07:14) (118/46 - 169/57)  RR: 18 (12-01-19 @ 07:14) (18 - 20)  SpO2: 99% (12-01-19 @ 07:14) (95% - 100%)  Wt(kg): --  I&O's Summary    30 Nov 2019 07:01  -  01 Dec 2019 07:00  --------------------------------------------------------  IN: 480 mL / OUT: 300 mL / NET: 180 mL        Appearance: Normal	  HEENT:   Normal oral mucosa, PERRL, EOMI	  Lymphatic: No lymphadenopathy  Cardiovascular: Normal S1 S2, No JVD, No murmurs, No edema  Respiratory: Lungs clear to auscultation	  Psychiatry: A & O x 3, Mood & affect appropriate  Gastrointestinal:  Soft, Non-tender, + BS	  Skin: No rashes, No ecchymoses, No cyanosis	  Neurologic: Non-focal  Extremities: Normal range of motion, No clubbing, cyanosis or edema  Vascular: Peripheral pulses palpable 2+ bilaterally    MEDICATIONS  (STANDING):  atorvastatin 40 milliGRAM(s) Oral at bedtime  azithromycin   Tablet 500 milliGRAM(s) Oral daily  budesonide 160 MICROgram(s)/formoterol 4.5 MICROgram(s) Inhaler 2 Puff(s) Inhalation two times a day  carvedilol 12.5 milliGRAM(s) Oral every 12 hours  cefpodoxime 200 milliGRAM(s) Oral every 12 hours  cholecalciferol 2000 Unit(s) Oral daily  gabapentin 300 milliGRAM(s) Oral daily  insulin glargine Injectable (LANTUS) 4 Unit(s) SubCutaneous every morning  insulin lispro (HumaLOG) corrective regimen sliding scale   SubCutaneous Before meals and at bedtime  iron sucrose IVPB 200 milliGRAM(s) IV Intermittent once  levothyroxine 175 MICROGram(s) Oral daily  montelukast 10 milliGRAM(s) Oral at bedtime  neomycin/bacitracin/polymyxin Topical Ointment 1 Application(s) Topical every 12 hours  pancrelipase  (CREON  6,000 Lipase Units) 4 Capsule(s) Oral three times a day with meals  pantoprazole    Tablet 40 milliGRAM(s) Oral before breakfast  rivaroxaban 15 milliGRAM(s) Oral with dinner  senna 2 Tablet(s) Oral at bedtime  torsemide 10 milliGRAM(s) Oral daily  triamcinolone 0.1% Ointment 1 Application(s) Topical every 12 hours      TELEMETRY: nsr,intermittent paced	      	  LABS:	 	                       9.7    6.76  )-----------( 377      ( 01 Dec 2019 07:35 )             31.4     12-01    137  |  102  |  24<H>  ----------------------------<  191<H>  4.2   |  30  |  1.19    Ca    8.9      01 Dec 2019 07:35      proBNP: Serum Pro-Brain Natriuretic Peptide: 1180 pg/mL (11-08 @ 13:39)    Lipid Profile: Cholesterol 112  LDL 43  HDL 53  TG 82    HgA1c: Hemoglobin A1C, Whole Blood: 6.6 % (11-09 @ 10:48)    TSH: Thyroid Stimulating Hormone, Serum: 5.06 uIU/mL (11-10 @ 09:35)

## 2019-12-02 LAB
ANION GAP SERPL CALC-SCNC: 5 MMOL/L — SIGNIFICANT CHANGE UP (ref 5–17)
BUN SERPL-MCNC: 26 MG/DL — HIGH (ref 7–18)
CALCIUM SERPL-MCNC: 9.2 MG/DL — SIGNIFICANT CHANGE UP (ref 8.4–10.5)
CHLORIDE SERPL-SCNC: 99 MMOL/L — SIGNIFICANT CHANGE UP (ref 96–108)
CO2 SERPL-SCNC: 31 MMOL/L — SIGNIFICANT CHANGE UP (ref 22–31)
CREAT SERPL-MCNC: 1.1 MG/DL — SIGNIFICANT CHANGE UP (ref 0.5–1.3)
GLUCOSE BLDC GLUCOMTR-MCNC: 167 MG/DL — HIGH (ref 70–99)
GLUCOSE BLDC GLUCOMTR-MCNC: 174 MG/DL — HIGH (ref 70–99)
GLUCOSE BLDC GLUCOMTR-MCNC: 221 MG/DL — HIGH (ref 70–99)
GLUCOSE BLDC GLUCOMTR-MCNC: 222 MG/DL — HIGH (ref 70–99)
GLUCOSE BLDC GLUCOMTR-MCNC: 288 MG/DL — HIGH (ref 70–99)
GLUCOSE SERPL-MCNC: 193 MG/DL — HIGH (ref 70–99)
HCT VFR BLD CALC: 31.7 % — LOW (ref 34.5–45)
HGB BLD-MCNC: 10 G/DL — LOW (ref 11.5–15.5)
MCHC RBC-ENTMCNC: 26.1 PG — LOW (ref 27–34)
MCHC RBC-ENTMCNC: 31.5 GM/DL — LOW (ref 32–36)
MCV RBC AUTO: 82.8 FL — SIGNIFICANT CHANGE UP (ref 80–100)
NRBC # BLD: 0 /100 WBCS — SIGNIFICANT CHANGE UP (ref 0–0)
PLATELET # BLD AUTO: 384 K/UL — SIGNIFICANT CHANGE UP (ref 150–400)
POTASSIUM SERPL-MCNC: 4.3 MMOL/L — SIGNIFICANT CHANGE UP (ref 3.5–5.3)
POTASSIUM SERPL-SCNC: 4.3 MMOL/L — SIGNIFICANT CHANGE UP (ref 3.5–5.3)
RBC # BLD: 3.83 M/UL — SIGNIFICANT CHANGE UP (ref 3.8–5.2)
RBC # FLD: 14.8 % — HIGH (ref 10.3–14.5)
SODIUM SERPL-SCNC: 135 MMOL/L — SIGNIFICANT CHANGE UP (ref 135–145)
TROPONIN I SERPL-MCNC: <0.015 NG/ML — SIGNIFICANT CHANGE UP (ref 0–0.04)
WBC # BLD: 8.22 K/UL — SIGNIFICANT CHANGE UP (ref 3.8–10.5)
WBC # FLD AUTO: 8.22 K/UL — SIGNIFICANT CHANGE UP (ref 3.8–10.5)

## 2019-12-02 RX ORDER — IRON SUCROSE 20 MG/ML
200 INJECTION, SOLUTION INTRAVENOUS ONCE
Refills: 0 | Status: COMPLETED | OUTPATIENT
Start: 2019-12-02 | End: 2019-12-02

## 2019-12-02 RX ADMIN — CARVEDILOL PHOSPHATE 12.5 MILLIGRAM(S): 80 CAPSULE, EXTENDED RELEASE ORAL at 17:16

## 2019-12-02 RX ADMIN — Medication 1 APPLICATION(S): at 17:15

## 2019-12-02 RX ADMIN — BUDESONIDE AND FORMOTEROL FUMARATE DIHYDRATE 2 PUFF(S): 160; 4.5 AEROSOL RESPIRATORY (INHALATION) at 11:52

## 2019-12-02 RX ADMIN — Medication 3: at 11:52

## 2019-12-02 RX ADMIN — Medication 2: at 21:42

## 2019-12-02 RX ADMIN — INSULIN GLARGINE 4 UNIT(S): 100 INJECTION, SOLUTION SUBCUTANEOUS at 08:20

## 2019-12-02 RX ADMIN — Medication 4 CAPSULE(S): at 17:15

## 2019-12-02 RX ADMIN — Medication 175 MICROGRAM(S): at 06:09

## 2019-12-02 RX ADMIN — Medication 200 MILLIGRAM(S): at 06:09

## 2019-12-02 RX ADMIN — Medication 1 APPLICATION(S): at 06:09

## 2019-12-02 RX ADMIN — Medication 10 MILLIGRAM(S): at 06:09

## 2019-12-02 RX ADMIN — Medication 1: at 08:20

## 2019-12-02 RX ADMIN — Medication 2: at 17:16

## 2019-12-02 RX ADMIN — IRON SUCROSE 110 MILLIGRAM(S): 20 INJECTION, SOLUTION INTRAVENOUS at 11:53

## 2019-12-02 RX ADMIN — MONTELUKAST 10 MILLIGRAM(S): 4 TABLET, CHEWABLE ORAL at 21:42

## 2019-12-02 RX ADMIN — Medication 4 CAPSULE(S): at 08:20

## 2019-12-02 RX ADMIN — SENNA PLUS 2 TABLET(S): 8.6 TABLET ORAL at 21:42

## 2019-12-02 RX ADMIN — Medication 4 CAPSULE(S): at 11:53

## 2019-12-02 RX ADMIN — RIVAROXABAN 15 MILLIGRAM(S): KIT at 17:16

## 2019-12-02 RX ADMIN — Medication 200 MILLIGRAM(S): at 17:16

## 2019-12-02 RX ADMIN — BACITRACIN ZINC NEOMYCIN SULFATE POLYMYXIN B SULFATE 1 APPLICATION(S): 400; 3.5; 5 OINTMENT TOPICAL at 17:17

## 2019-12-02 RX ADMIN — PANTOPRAZOLE SODIUM 40 MILLIGRAM(S): 20 TABLET, DELAYED RELEASE ORAL at 06:09

## 2019-12-02 RX ADMIN — ATORVASTATIN CALCIUM 40 MILLIGRAM(S): 80 TABLET, FILM COATED ORAL at 21:42

## 2019-12-02 RX ADMIN — BACITRACIN ZINC NEOMYCIN SULFATE POLYMYXIN B SULFATE 1 APPLICATION(S): 400; 3.5; 5 OINTMENT TOPICAL at 06:10

## 2019-12-02 RX ADMIN — BUDESONIDE AND FORMOTEROL FUMARATE DIHYDRATE 2 PUFF(S): 160; 4.5 AEROSOL RESPIRATORY (INHALATION) at 21:42

## 2019-12-02 RX ADMIN — GABAPENTIN 300 MILLIGRAM(S): 400 CAPSULE ORAL at 11:53

## 2019-12-02 RX ADMIN — AZITHROMYCIN 500 MILLIGRAM(S): 500 TABLET, FILM COATED ORAL at 11:53

## 2019-12-02 RX ADMIN — CARVEDILOL PHOSPHATE 12.5 MILLIGRAM(S): 80 CAPSULE, EXTENDED RELEASE ORAL at 06:09

## 2019-12-02 RX ADMIN — Medication 2000 UNIT(S): at 11:53

## 2019-12-02 RX ADMIN — ZOLPIDEM TARTRATE 5 MILLIGRAM(S): 10 TABLET ORAL at 21:46

## 2019-12-02 NOTE — PROGRESS NOTE ADULT - PROBLEM SELECTOR PROBLEM 1
Healthcare associated bacterial pneumonia

## 2019-12-02 NOTE — DIETITIAN INITIAL EVALUATION ADULT. - NS FNS WEIGHT USED FOR CALC
ideal/Ht=5' 3"    QPP=410 lb + 10%=126.5 lb   Admission oq=443 lb   BMI=50.8     Current qc=261.1 lb 12/2/19   Wt data in-house downward trend, may due to fluid/scale variance, diuretic Rx ideal/Ht=5' 3"    VWE=812 lb + 10%=126.5 lb   Admission lz=491 lb   BMI=50.8     Current ai=690.1 lb 12/2/19   Wt data in-house downward trend, may due to fluid/scale variance

## 2019-12-02 NOTE — PROGRESS NOTE ADULT - SUBJECTIVE AND OBJECTIVE BOX
PGY1 Note discussed with supervising resident and primary attending.    Patient is a 79y old  Female who presents with a chief complaint of b/l LE cellulitis (02 Dec 2019 10:08)      INTERVAL HPI/OVERNIGHT EVENTS:  - Pt was on O2 overnight.    MEDICATIONS  (STANDING):  atorvastatin 40 milliGRAM(s) Oral at bedtime  azithromycin   Tablet 500 milliGRAM(s) Oral daily  budesonide 160 MICROgram(s)/formoterol 4.5 MICROgram(s) Inhaler 2 Puff(s) Inhalation two times a day  carvedilol 12.5 milliGRAM(s) Oral every 12 hours  cefpodoxime 200 milliGRAM(s) Oral every 12 hours  cholecalciferol 2000 Unit(s) Oral daily  gabapentin 300 milliGRAM(s) Oral daily  insulin glargine Injectable (LANTUS) 4 Unit(s) SubCutaneous every morning  insulin lispro (HumaLOG) corrective regimen sliding scale   SubCutaneous Before meals and at bedtime  levothyroxine 175 MICROGram(s) Oral daily  montelukast 10 milliGRAM(s) Oral at bedtime  neomycin/bacitracin/polymyxin Topical Ointment 1 Application(s) Topical every 12 hours  pancrelipase  (CREON  6,000 Lipase Units) 4 Capsule(s) Oral three times a day with meals  pantoprazole    Tablet 40 milliGRAM(s) Oral before breakfast  rivaroxaban 15 milliGRAM(s) Oral with dinner  senna 2 Tablet(s) Oral at bedtime  torsemide 10 milliGRAM(s) Oral daily  triamcinolone 0.1% Ointment 1 Application(s) Topical every 12 hours    MEDICATIONS  (PRN):  acetaminophen   Tablet .. 650 milliGRAM(s) Oral every 6 hours PRN Temp greater or equal to 38C (100.4F), Mild Pain (1 - 3)  albuterol/ipratropium for Nebulization 3 milliLiter(s) Nebulizer every 6 hours PRN Shortness of Breath and/or Wheezing  ondansetron Injectable 4 milliGRAM(s) IV Push every 6 hours PRN Nausea and/or Vomiting  zolpidem 5 milliGRAM(s) Oral at bedtime PRN Insomnia      Allergies    No Known Allergies    Intolerances        REVIEW OF SYSTEMS:  CONSTITUTIONAL: No fever, weight loss, or fatigue  RESPIRATORY: No cough, wheezing, chills or hemoptysis; (+)shortness of breath on NC   CARDIOVASCULAR: No chest pain, palpitations, dizziness, or leg swelling  GASTROINTESTINAL: No abdominal or epigastric pain. No nausea, vomiting, or hematemesis; No diarrhea or constipation. No melena or hematochezia.  NEUROLOGICAL: No headaches, memory loss, loss of strength, numbness, or tremors  SKIN: No itching, burning, rashes, or lesions     Vital Signs Last 24 Hrs  T(C): 36.9 (02 Dec 2019 11:47), Max: 36.9 (02 Dec 2019 11:47)  T(F): 98.5 (02 Dec 2019 11:47), Max: 98.5 (02 Dec 2019 11:47)  HR: 63 (02 Dec 2019 11:47) (59 - 66)  BP: 136/58 (02 Dec 2019 11:47) (133/53 - 161/50)  BP(mean): --  RR: 18 (02 Dec 2019 11:47) (18 - 18)  SpO2: 99% (02 Dec 2019 11:47) (98% - 100%)    PHYSICAL EXAM:  GENERAL: NAD, well-groomed, well-developed  HEAD:  Atraumatic, Normocephalic  EYES: EOMI, PERRLA, conjunctiva and sclera clear  NECK: Supple, No JVD, Normal thyroid  CHEST/LUNG: Clear to percussion bilaterally; No rales, rhonchi, wheezing, or rubs  HEART: Regular rate and rhythm; No murmurs, rubs, or gallops  ABDOMEN: Soft, Nontender, Nondistended; Bowel sounds present  NERVOUS SYSTEM:  Alert & Oriented X3, Good concentration; Motor Strength 5/5 B/L   EXTREMITIES:  2+ Peripheral Pulses, No clubbing, cyanosis, or edema  SKIN;    LABS:                        10.0   8.22  )-----------( 384      ( 02 Dec 2019 07:06 )             31.7     12-02    135  |  99  |  26<H>  ----------------------------<  193<H>  4.3   |  31  |  1.10    Ca    9.2      02 Dec 2019 07:06          CAPILLARY BLOOD GLUCOSE      POCT Blood Glucose.: 288 mg/dL (02 Dec 2019 11:40)  POCT Blood Glucose.: 174 mg/dL (02 Dec 2019 07:34)  POCT Blood Glucose.: 261 mg/dL (01 Dec 2019 21:17)  POCT Blood Glucose.: 206 mg/dL (01 Dec 2019 16:49)      RADIOLOGY & ADDITIONAL TESTS:    Imaging Personally Reviewed:  [ ] YES  [ ] NO    Consultant(s) Notes Reviewed:  [ ] YES  [ ] NO PGY1 Note discussed with supervising resident and primary attending.    Patient is a 79y old  Female who presents with a chief complaint of b/l LE cellulitis (02 Dec 2019 10:08)      INTERVAL HPI/OVERNIGHT EVENTS:  - Pt was on O2 overnight.    MEDICATIONS  (STANDING):  atorvastatin 40 milliGRAM(s) Oral at bedtime  azithromycin   Tablet 500 milliGRAM(s) Oral daily  budesonide 160 MICROgram(s)/formoterol 4.5 MICROgram(s) Inhaler 2 Puff(s) Inhalation two times a day  carvedilol 12.5 milliGRAM(s) Oral every 12 hours  cefpodoxime 200 milliGRAM(s) Oral every 12 hours  cholecalciferol 2000 Unit(s) Oral daily  gabapentin 300 milliGRAM(s) Oral daily  insulin glargine Injectable (LANTUS) 4 Unit(s) SubCutaneous every morning  insulin lispro (HumaLOG) corrective regimen sliding scale   SubCutaneous Before meals and at bedtime  levothyroxine 175 MICROGram(s) Oral daily  montelukast 10 milliGRAM(s) Oral at bedtime  neomycin/bacitracin/polymyxin Topical Ointment 1 Application(s) Topical every 12 hours  pancrelipase  (CREON  6,000 Lipase Units) 4 Capsule(s) Oral three times a day with meals  pantoprazole    Tablet 40 milliGRAM(s) Oral before breakfast  rivaroxaban 15 milliGRAM(s) Oral with dinner  senna 2 Tablet(s) Oral at bedtime  torsemide 10 milliGRAM(s) Oral daily  triamcinolone 0.1% Ointment 1 Application(s) Topical every 12 hours    MEDICATIONS  (PRN):  acetaminophen   Tablet .. 650 milliGRAM(s) Oral every 6 hours PRN Temp greater or equal to 38C (100.4F), Mild Pain (1 - 3)  albuterol/ipratropium for Nebulization 3 milliLiter(s) Nebulizer every 6 hours PRN Shortness of Breath and/or Wheezing  ondansetron Injectable 4 milliGRAM(s) IV Push every 6 hours PRN Nausea and/or Vomiting  zolpidem 5 milliGRAM(s) Oral at bedtime PRN Insomnia      Allergies    No Known Allergies    Intolerances        REVIEW OF SYSTEMS:  CONSTITUTIONAL: No fever, weight loss, or fatigue  RESPIRATORY: No cough, wheezing, chills or hemoptysis; (+)shortness of breath on NC 3L  CARDIOVASCULAR: No chest pain, palpitations, dizziness, or (+) b/l leg swelling  GASTROINTESTINAL: No abdominal or epigastric pain. No nausea, vomiting, or hematemesis; No diarrhea or constipation. No melena or hematochezia.  NEUROLOGICAL: No headaches, memory loss, loss of strength, numbness, or tremors  SKIN: No itching, burning, (+) erythema    Vital Signs Last 24 Hrs  T(C): 36.9 (02 Dec 2019 11:47), Max: 36.9 (02 Dec 2019 11:47)  T(F): 98.5 (02 Dec 2019 11:47), Max: 98.5 (02 Dec 2019 11:47)  HR: 63 (02 Dec 2019 11:47) (59 - 66)  BP: 136/58 (02 Dec 2019 11:47) (133/53 - 161/50)  BP(mean): --  RR: 18 (02 Dec 2019 11:47) (18 - 18)  SpO2: 99% (02 Dec 2019 11:47) (98% - 100%)    PHYSICAL EXAM:  GENERAL: NAD, well-groomed, well-developed  HEAD:  Atraumatic, Normocephalic  EYES: EOMI, PERRLA, conjunctiva and sclera clear  NECK: Supple, No JVD, Normal thyroid  CHEST/LUNG: Clear to percussion bilaterally; No rales, rhonchi, wheezing, or rubs  HEART: Regular rate and rhythm; No murmurs, rubs, or gallops  ABDOMEN: Soft, Nontender, Nondistended; Bowel sounds present  NERVOUS SYSTEM:  Alert & Oriented X3, Good concentration; Motor Strength 5/5 B/L   EXTREMITIES:  2+ Peripheral Pulses, No clubbing, cyanosis, (+) b/l LE edema  SKIN; Erythema on b/l LE    LABS:                        10.0   8.22  )-----------( 384      ( 02 Dec 2019 07:06 )             31.7     12-02    135  |  99  |  26<H>  ----------------------------<  193<H>  4.3   |  31  |  1.10    Ca    9.2      02 Dec 2019 07:06          CAPILLARY BLOOD GLUCOSE      POCT Blood Glucose.: 288 mg/dL (02 Dec 2019 11:40)  POCT Blood Glucose.: 174 mg/dL (02 Dec 2019 07:34)  POCT Blood Glucose.: 261 mg/dL (01 Dec 2019 21:17)  POCT Blood Glucose.: 206 mg/dL (01 Dec 2019 16:49)      RADIOLOGY & ADDITIONAL TESTS:    Imaging Personally Reviewed:  [ ] YES  [ ] NO    Consultant(s) Notes Reviewed:  [ ] YES  [ ] NO

## 2019-12-02 NOTE — PROGRESS NOTE ADULT - PROBLEM SELECTOR PLAN 1
CXR showed New patchy right basilar opacity and a small right pleural effusion.  On azithromycin + cefpodoxime until 12/4  No leukocytosis, no fevers  BCx 11/25 negative  -Asthma  on nebs  Due to persistent SOB, Patient will need C-PAP at home as machine is broken.  ID Dr. Jordy Cruz

## 2019-12-02 NOTE — PROGRESS NOTE ADULT - SUBJECTIVE AND OBJECTIVE BOX
Time of Visit:  Patient seen and examined.     MEDICATIONS  (STANDING):  atorvastatin 40 milliGRAM(s) Oral at bedtime  azithromycin   Tablet 500 milliGRAM(s) Oral daily  budesonide 160 MICROgram(s)/formoterol 4.5 MICROgram(s) Inhaler 2 Puff(s) Inhalation two times a day  carvedilol 12.5 milliGRAM(s) Oral every 12 hours  cefpodoxime 200 milliGRAM(s) Oral every 12 hours  cholecalciferol 2000 Unit(s) Oral daily  gabapentin 300 milliGRAM(s) Oral daily  insulin glargine Injectable (LANTUS) 4 Unit(s) SubCutaneous every morning  insulin lispro (HumaLOG) corrective regimen sliding scale   SubCutaneous Before meals and at bedtime  levothyroxine 175 MICROGram(s) Oral daily  montelukast 10 milliGRAM(s) Oral at bedtime  neomycin/bacitracin/polymyxin Topical Ointment 1 Application(s) Topical every 12 hours  pancrelipase  (CREON  6,000 Lipase Units) 4 Capsule(s) Oral three times a day with meals  pantoprazole    Tablet 40 milliGRAM(s) Oral before breakfast  rivaroxaban 15 milliGRAM(s) Oral with dinner  senna 2 Tablet(s) Oral at bedtime  torsemide 10 milliGRAM(s) Oral daily  triamcinolone 0.1% Ointment 1 Application(s) Topical every 12 hours      MEDICATIONS  (PRN):  acetaminophen   Tablet .. 650 milliGRAM(s) Oral every 6 hours PRN Temp greater or equal to 38C (100.4F), Mild Pain (1 - 3)  albuterol/ipratropium for Nebulization 3 milliLiter(s) Nebulizer every 6 hours PRN Shortness of Breath and/or Wheezing  ondansetron Injectable 4 milliGRAM(s) IV Push every 6 hours PRN Nausea and/or Vomiting  zolpidem 5 milliGRAM(s) Oral at bedtime PRN Insomnia       Medications up to date at time of exam.      PHYSICAL EXAMINATION:  Patient has no new complaints.  GENERAL: The patient is a well-developed, well-nourished, in no apparent distress.     Vital Signs Last 24 Hrs  T(C): 36.7 (02 Dec 2019 15:14), Max: 36.9 (02 Dec 2019 11:47)  T(F): 98 (02 Dec 2019 15:14), Max: 98.5 (02 Dec 2019 11:47)  HR: 59 (02 Dec 2019 15:14) (59 - 66)  BP: 176/64 (02 Dec 2019 15:14) (133/53 - 176/64)  BP(mean): --  RR: 18 (02 Dec 2019 15:14) (18 - 18)  SpO2: 100% (02 Dec 2019 15:14) (98% - 100%)   (if applicable)    Chest Tube (if applicable)    HEENT: Head is normocephalic and atraumatic. Extraocular muscles are intact. Mucous membranes are moist.     NECK: Supple, no palpable adenopathy.    LUNGS: Clear to auscultation, no wheezing, rales, or rhonchi.    HEART: Regular rate and rhythm without murmur.    ABDOMEN: Soft, nontender, and nondistended.  No hepatosplenomegaly is noted.    : No painful voiding, no pelvic pain    EXTREMITIES: 2 +  edema with chronic skin changes    NEUROLOGIC: Awake, alert, oriented, grossly intact    SKIN: Warm, dry, good turgor.      LABS:                        10.0   8.22  )-----------( 384      ( 02 Dec 2019 07:06 )             31.7     12-02    135  |  99  |  26<H>  ----------------------------<  193<H>  4.3   |  31  |  1.10    Ca    9.2      02 Dec 2019 07:06            CARDIAC MARKERS ( 02 Dec 2019 18:32 )  <0.015 ng/mL / x     / x     / x     / x                    MICROBIOLOGY: (if applicable)    RADIOLOGY & ADDITIONAL STUDIES:  EKG:   CXR:  ECHO:    IMPRESSION: 79y Female PAST MEDICAL & SURGICAL HISTORY:  Obesity  Pacemaker  Atrial fibrillation  Hypothyroidism  Venous stasis  IBS (irritable bowel syndrome)  CHF (congestive heart failure), NYHA class I  HLD (Hyperlipidemia)  HTN (Hypertension)  Diabetes  Myocardial Infarction  CAD (Coronary Atherosclerotic Disease)  Asthma  S/P coronary angiogram   p/w           IMP: This is an 79 yr old woman from home , ambulates with walker, lives with son, with PMHx of HFpEF, CAD (s/p stents), chronic venous stasis, DM, HTN and Afib (on Xarelto, s/p [facility] PPM) presents to the ED with chief complaint of fever and chills x 2 days with one episode of vomiting. She was admitted to medicine on 11/25 for LE cellulitis treated with Zosyn and Vancomycin. Also found to have hyponatremia for which she was receiving IVF. RRT called on 11/26 AM for respiratory distress with abdominal breathing, saturating 100% on NRB. Vitals significant for tachypnea w/ RR: 41. Upon lung auscultation, bilateral expiratory/inspiratory wheezing appreciated. ABG w/o hypoxia/hypercapnia w/ pH WNL's. ECG: NSR VR@86bpm. Patient respiratory status did not improve despite treatment with Lasix 60 mg IV + DUONEB x1. Bladder scan revealed 0 mL, which was inaccurate probably due morbid obesity. Ashton was placed with ~500 mL of urine. Patient was placed on BiPAP for work of breathing and transferred to ICU for acute hypoxic resp failure due to pul edema requiring NIPPV support.  + HCAP.. pat was hospitiazed as was hospitalized recently.  Cardiac enzymes neg. Cards eval noted. ID eval pending but discussed care with Dr Jordy GODINEZ in icu  Pat stated that she has CPAP at home as per daughter Manuel . Now medical team was informed that home PAP device is broken, Pat will repeat sleep study as out pat before a new PAP devise is prescribe.    PLAN:  -continue bipap at night   -out pat sleep study  -continue antibx  -dvt/gi prop    c/d with house staff

## 2019-12-02 NOTE — CHART NOTE - NSCHARTNOTEFT_GEN_A_CORE
Upon Nutritional Assessment by the Registered Dietitian your patient was determined to meet criteria / has evidence of the following diagnosis/diagnoses:          [ ]  Mild Protein Calorie Malnutrition        [ ]  Moderate Protein Calorie Malnutrition        [ ] Severe Protein Calorie Malnutrition        [ ] Unspecified Protein Calorie Malnutrition        [ ] Underweight / BMI <19        [ X ] Morbid Obesity / BMI > 40      Findings as based on:  •  Comprehensive nutrition assessment and consultation  •  Calorie counts (nutrient intake analysis)  •  Food acceptance and intake status from observations by staff  •  Follow up  •  Patient education  •  Intervention secondary to interdisciplinary rounds  •   concerns      Treatment:    The following diet has been recommended: continue diet Rx as ordered       PROVIDER Section:     By signing this assessment you are acknowledging and agree with the diagnosis/diagnoses assigned by the Registered Dietitian    Comments:

## 2019-12-02 NOTE — PROGRESS NOTE ADULT - ASSESSMENT
78 y/o [place]-speaking female from home, ambulates with walker, lives with son, with PMHx of HFpEF, CAD (s/p stents), chronic venous stasis, DM, HTN and Afib (on Xarelto, s/p PPM) presents to the ED with chief complaint of fever and chills x 2 days, pneumonia, acute diastolic HF.  1.Sleep study as outpatient-R/O JOSE DANIEL.  2.Pneumonia-ABX.  3.Fe def anemia-IV iron, GI eval as outpatient for fe def anemia-?virtual colonoscopy..  4.PAF-xarelto,inc coreg 12.5mg bid.  5.Hypothyroidism-synthroid.  6.Acute diastolic HF-demadex.  7.DM-Insulin.  8.Asthma-nebs.  9.PPI.

## 2019-12-02 NOTE — DIETITIAN INITIAL EVALUATION ADULT. - PERTINENT LABORATORY DATA
12-02 Na135 mmol/L Glu 193 mg/dL<H> K+ 4.3 mmol/L Cr  1.10 mg/dL BUN 26 mg/dL<H>   11-28 Phos 3.5 mg/dL   11-28 Alb 2.7 g/dL<L>     11-09 [id number] 6.6 %<H>   11-26 Chol 112 mg/dL LDL 43 mg/dL HDL 53 mg/dL Trig 82 mg/dL

## 2019-12-02 NOTE — DIETITIAN INITIAL EVALUATION ADULT. - PERTINENT MEDS FT
MEDICATIONS  (STANDING):  atorvastatin 40 milliGRAM(s) Oral at bedtime  azithromycin   Tablet 500 milliGRAM(s) Oral daily  budesonide 160 MICROgram(s)/formoterol 4.5 MICROgram(s) Inhaler 2 Puff(s) Inhalation two times a day  carvedilol 12.5 milliGRAM(s) Oral every 12 hours  cefpodoxime 200 milliGRAM(s) Oral every 12 hours  cholecalciferol 2000 Unit(s) Oral daily  gabapentin 300 milliGRAM(s) Oral daily  insulin glargine Injectable (LANTUS) 4 Unit(s) SubCutaneous every morning  insulin lispro (HumaLOG) corrective regimen sliding scale   SubCutaneous Before meals and at bedtime  levothyroxine 175 MICROGram(s) Oral daily  montelukast 10 milliGRAM(s) Oral at bedtime  neomycin/bacitracin/polymyxin Topical Ointment 1 Application(s) Topical every 12 hours  pancrelipase  (CREON  6,000 Lipase Units) 4 Capsule(s) Oral three times a day with meals  pantoprazole    Tablet 40 milliGRAM(s) Oral before breakfast  rivaroxaban 15 milliGRAM(s) Oral with dinner  senna 2 Tablet(s) Oral at bedtime  torsemide 10 milliGRAM(s) Oral daily  triamcinolone 0.1% Ointment 1 Application(s) Topical every 12 hours

## 2019-12-02 NOTE — PROGRESS NOTE ADULT - PROBLEM SELECTOR PLAN 2
patient iron studies sig. for iron deficient  occult blood  negative  hemoglobin stable  -c/w IV iron  transfuse if symptomatic <8, or if hb <7

## 2019-12-02 NOTE — PROGRESS NOTE ADULT - PROBLEM SELECTOR PLAN 3
creatine 1.3 on admission - likely related to active infection  labile, back to baseline this AM  f/u BMP daily  avoid nephrotoxins

## 2019-12-02 NOTE — DIETITIAN INITIAL EVALUATION ADULT. - OTHER INFO
Pt alert, oriented, speaks [place], prefers family (daughter) at bedside to contact RD/ for pt , lives home with family PTA; appetite fair, varied intake depending on food served, 50 to 90% intake per flow sheet, unsure recent wt changes, denied GI distress, chewing or swallowing problem at present, food choices obtained and forwarded to Dietary, family brining food from home often, not interested in diet education/nutrition information at present

## 2019-12-02 NOTE — PROGRESS NOTE ADULT - ATTENDING COMMENTS
Respiratory failure  Hypercapnic  Hypoxemic  JOSE DANIEL resolved    Pneumonia  Hospital acquired  complete antibiotics  x 10 days    Chronic lymphedema  with  stasis dermatitis    Morbid  obesity  ASHD Chronic  A.fib PPM   DM type 2    Difficulty walking   Continue  tele discharge  planning

## 2019-12-02 NOTE — PROGRESS NOTE ADULT - SUBJECTIVE AND OBJECTIVE BOX
CHIEF COMPLAINT:Patient is a 79y old  Female who presents with a chief complaint of b/l LE cellulitis.Pt appears comfortable.    	  REVIEW OF SYSTEMS:  CONSTITUTIONAL: No fever, weight loss, or fatigue  EYES: No eye pain, visual disturbances, or discharge  ENT:  No difficulty hearing, tinnitus, vertigo; No sinus or throat pain  NECK: No pain or stiffness  RESPIRATORY: No cough, wheezing, chills or hemoptysis; No Shortness of Breath  CARDIOVASCULAR: No chest pain, palpitations, passing out, dizziness, or leg swelling  GASTROINTESTINAL: No abdominal or epigastric pain. No nausea, vomiting, or hematemesis; No diarrhea or constipation. No melena or hematochezia.  GENITOURINARY: No dysuria, frequency, hematuria, or incontinence  NEUROLOGICAL: No headaches, memory loss, loss of strength, numbness, or tremors  SKIN: No itching, burning, rashes, or lesions   LYMPH Nodes: No enlarged glands  ENDOCRINE: No heat or cold intolerance; No hair loss  MUSCULOSKELETAL: No joint pain or swelling; No muscle, back, or extremity pain  PSYCHIATRIC: No depression, anxiety, mood swings, or difficulty sleeping  HEME/LYMPH: No easy bruising, or bleeding gums  ALLERGY AND IMMUNOLOGIC: No hives or eczema	      PHYSICAL EXAM:  T(C): 36.8 (12-02-19 @ 07:42), Max: 36.8 (12-01-19 @ 20:25)  HR: 62 (12-02-19 @ 07:42) (59 - 66)  BP: 145/61 (12-02-19 @ 07:42) (133/53 - 161/50)  RR: 18 (12-02-19 @ 07:42) (18 - 18)  SpO2: 100% (12-02-19 @ 07:42) (98% - 100%)      Appearance: Normal	  HEENT:   Normal oral mucosa, PERRL, EOMI	  Lymphatic: No lymphadenopathy  Cardiovascular: Normal S1 S2, No JVD, No murmurs, No edema  Respiratory: Lungs clear to auscultation	  Psychiatry: A & O x 3, Mood & affect appropriate  Gastrointestinal:  Soft, Non-tender, + BS	  Skin: No rashes, No ecchymoses, No cyanosis	  Neurologic: Non-focal  Extremities: Normal range of motion, No clubbing, cyanosis or edema  Vascular: Peripheral pulses palpable 2+ bilaterally    MEDICATIONS  (STANDING):  atorvastatin 40 milliGRAM(s) Oral at bedtime  azithromycin   Tablet 500 milliGRAM(s) Oral daily  budesonide 160 MICROgram(s)/formoterol 4.5 MICROgram(s) Inhaler 2 Puff(s) Inhalation two times a day  carvedilol 12.5 milliGRAM(s) Oral every 12 hours  cefpodoxime 200 milliGRAM(s) Oral every 12 hours  cholecalciferol 2000 Unit(s) Oral daily  gabapentin 300 milliGRAM(s) Oral daily  insulin glargine Injectable (LANTUS) 4 Unit(s) SubCutaneous every morning  insulin lispro (HumaLOG) corrective regimen sliding scale   SubCutaneous Before meals and at bedtime  iron sucrose IVPB 200 milliGRAM(s) IV Intermittent once  levothyroxine 175 MICROGram(s) Oral daily  montelukast 10 milliGRAM(s) Oral at bedtime  neomycin/bacitracin/polymyxin Topical Ointment 1 Application(s) Topical every 12 hours  pancrelipase  (CREON  6,000 Lipase Units) 4 Capsule(s) Oral three times a day with meals  pantoprazole    Tablet 40 milliGRAM(s) Oral before breakfast  rivaroxaban 15 milliGRAM(s) Oral with dinner  senna 2 Tablet(s) Oral at bedtime  torsemide 10 milliGRAM(s) Oral daily  triamcinolone 0.1% Ointment 1 Application(s) Topical every 12 hours        	  LABS:	 	                       10.0   8.22  )-----------( 384      ( 02 Dec 2019 07:06 )             31.7     12-02    135  |  99  |  26<H>  ----------------------------<  193<H>  4.3   |  31  |  1.10    Ca    9.2      02 Dec 2019 07:06      proBNP: Serum Pro-Brain Natriuretic Peptide: 1180 pg/mL (11-08 @ 13:39)    Lipid Profile: Cholesterol 112  LDL 43  HDL 53  TG 82    HgA1c: Hemoglobin A1C, Whole Blood: 6.6 % (11-09 @ 10:48)    TSH: Thyroid Stimulating Hormone, Serum: 5.06 uIU/mL (11-10 @ 09:35)

## 2019-12-02 NOTE — PROGRESS NOTE ADULT - ASSESSMENT
79F from home, ambulates with walker, lives with son, with PMHx of HFpEF, CAD (s/p stents), chronic venous stasis, DM, HTN and Afib (on Xarelto, s/p [facility] [facility] PPM) presents to the ED with chief complaint of fever and chills x 2 days with one episode of vomiting. She was admitted to medicine on 11/25 for LE cellulitis treated with Zosyn and Vancomycin. Also found to have hyponatremia for which she was receiving IVF. RRT called on 11/26 AM for respiratory distress with abdominal breathing, saturating 100% on NRB. Vitals significant for tachypnea w/ RR: 41. Upon lung auscultation, bilateral expiratory/inspiratory wheezing appreciated. ABG w/o hypoxia/hypercapnia w/ pH WNL's. ECG: NSR VR@86bpm. Patient respiratory status did not improve despite treatment with Lasix 60 mg IV + DUONEB x1. Bladder scan revealed 0 mL, which was inaccurate probably due morbid obesity. Ashton was placed with ~500 mL of urine. Patient was placed on BiPAP for work of breathing and transferred to ICU. Patient then downgraded when stable. Due to iron def. Anemia will obtain FOBT. Negative.    Patient requires C-PAP at home as patient's home machine is broken. However, pt needs to go to outpatient sleep study to get a new C-PAP.  Plan for D/C tomorrow.

## 2019-12-03 ENCOUNTER — TRANSCRIPTION ENCOUNTER (OUTPATIENT)
Age: 79
End: 2019-12-03

## 2019-12-03 VITALS
HEART RATE: 71 BPM | TEMPERATURE: 98 F | DIASTOLIC BLOOD PRESSURE: 77 MMHG | OXYGEN SATURATION: 98 % | SYSTOLIC BLOOD PRESSURE: 169 MMHG | RESPIRATION RATE: 18 BRPM

## 2019-12-03 DIAGNOSIS — N93.9 ABNORMAL UTERINE AND VAGINAL BLEEDING, UNSPECIFIED: ICD-10-CM

## 2019-12-03 LAB
ANION GAP SERPL CALC-SCNC: 7 MMOL/L — SIGNIFICANT CHANGE UP (ref 5–17)
APPEARANCE UR: CLEAR — SIGNIFICANT CHANGE UP
BACTERIA # UR AUTO: NEGATIVE /HPF — SIGNIFICANT CHANGE UP
BASOPHILS # BLD AUTO: 0.04 K/UL — SIGNIFICANT CHANGE UP (ref 0–0.2)
BASOPHILS # BLD AUTO: 0.04 K/UL — SIGNIFICANT CHANGE UP (ref 0–0.2)
BASOPHILS NFR BLD AUTO: 0.5 % — SIGNIFICANT CHANGE UP (ref 0–2)
BASOPHILS NFR BLD AUTO: 0.5 % — SIGNIFICANT CHANGE UP (ref 0–2)
BILIRUB UR-MCNC: NEGATIVE — SIGNIFICANT CHANGE UP
BUN SERPL-MCNC: 28 MG/DL — HIGH (ref 7–18)
CALCIUM SERPL-MCNC: 9.3 MG/DL — SIGNIFICANT CHANGE UP (ref 8.4–10.5)
CHLORIDE SERPL-SCNC: 97 MMOL/L — SIGNIFICANT CHANGE UP (ref 96–108)
CO2 SERPL-SCNC: 31 MMOL/L — SIGNIFICANT CHANGE UP (ref 22–31)
COLOR SPEC: YELLOW — SIGNIFICANT CHANGE UP
COMMENT - URINE: SIGNIFICANT CHANGE UP
CREAT SERPL-MCNC: 1.07 MG/DL — SIGNIFICANT CHANGE UP (ref 0.5–1.3)
DIFF PNL FLD: NEGATIVE — SIGNIFICANT CHANGE UP
EOSINOPHIL # BLD AUTO: 0.23 K/UL — SIGNIFICANT CHANGE UP (ref 0–0.5)
EOSINOPHIL # BLD AUTO: 0.27 K/UL — SIGNIFICANT CHANGE UP (ref 0–0.5)
EOSINOPHIL NFR BLD AUTO: 2.9 % — SIGNIFICANT CHANGE UP (ref 0–6)
EOSINOPHIL NFR BLD AUTO: 3.4 % — SIGNIFICANT CHANGE UP (ref 0–6)
EPI CELLS # UR: SIGNIFICANT CHANGE UP /HPF
GLUCOSE BLDC GLUCOMTR-MCNC: 201 MG/DL — HIGH (ref 70–99)
GLUCOSE BLDC GLUCOMTR-MCNC: 218 MG/DL — HIGH (ref 70–99)
GLUCOSE SERPL-MCNC: 204 MG/DL — HIGH (ref 70–99)
GLUCOSE UR QL: NEGATIVE — SIGNIFICANT CHANGE UP
HCT VFR BLD CALC: 30.6 % — LOW (ref 34.5–45)
HCT VFR BLD CALC: 30.8 % — LOW (ref 34.5–45)
HGB BLD-MCNC: 9.4 G/DL — LOW (ref 11.5–15.5)
HGB BLD-MCNC: 9.6 G/DL — LOW (ref 11.5–15.5)
IMM GRANULOCYTES NFR BLD AUTO: 2.4 % — HIGH (ref 0–1.5)
IMM GRANULOCYTES NFR BLD AUTO: 2.5 % — HIGH (ref 0–1.5)
KETONES UR-MCNC: NEGATIVE — SIGNIFICANT CHANGE UP
LEUKOCYTE ESTERASE UR-ACNC: ABNORMAL
LYMPHOCYTES # BLD AUTO: 2.09 K/UL — SIGNIFICANT CHANGE UP (ref 1–3.3)
LYMPHOCYTES # BLD AUTO: 2.16 K/UL — SIGNIFICANT CHANGE UP (ref 1–3.3)
LYMPHOCYTES # BLD AUTO: 26.1 % — SIGNIFICANT CHANGE UP (ref 13–44)
LYMPHOCYTES # BLD AUTO: 26.9 % — SIGNIFICANT CHANGE UP (ref 13–44)
MCHC RBC-ENTMCNC: 26.2 PG — LOW (ref 27–34)
MCHC RBC-ENTMCNC: 26.5 PG — LOW (ref 27–34)
MCHC RBC-ENTMCNC: 30.7 GM/DL — LOW (ref 32–36)
MCHC RBC-ENTMCNC: 31.2 GM/DL — LOW (ref 32–36)
MCV RBC AUTO: 85.1 FL — SIGNIFICANT CHANGE UP (ref 80–100)
MCV RBC AUTO: 85.2 FL — SIGNIFICANT CHANGE UP (ref 80–100)
MONOCYTES # BLD AUTO: 0.77 K/UL — SIGNIFICANT CHANGE UP (ref 0–0.9)
MONOCYTES # BLD AUTO: 0.81 K/UL — SIGNIFICANT CHANGE UP (ref 0–0.9)
MONOCYTES NFR BLD AUTO: 10.1 % — SIGNIFICANT CHANGE UP (ref 2–14)
MONOCYTES NFR BLD AUTO: 9.6 % — SIGNIFICANT CHANGE UP (ref 2–14)
NEUTROPHILS # BLD AUTO: 4.57 K/UL — SIGNIFICANT CHANGE UP (ref 1.8–7.4)
NEUTROPHILS # BLD AUTO: 4.68 K/UL — SIGNIFICANT CHANGE UP (ref 1.8–7.4)
NEUTROPHILS NFR BLD AUTO: 56.7 % — SIGNIFICANT CHANGE UP (ref 43–77)
NEUTROPHILS NFR BLD AUTO: 58.4 % — SIGNIFICANT CHANGE UP (ref 43–77)
NITRITE UR-MCNC: NEGATIVE — SIGNIFICANT CHANGE UP
NRBC # BLD: 0 /100 WBCS — SIGNIFICANT CHANGE UP (ref 0–0)
NRBC # BLD: 0 /100 WBCS — SIGNIFICANT CHANGE UP (ref 0–0)
PH UR: 6 — SIGNIFICANT CHANGE UP (ref 5–8)
PLATELET # BLD AUTO: 348 K/UL — SIGNIFICANT CHANGE UP (ref 150–400)
PLATELET # BLD AUTO: 355 K/UL — SIGNIFICANT CHANGE UP (ref 150–400)
POTASSIUM SERPL-MCNC: 4.2 MMOL/L — SIGNIFICANT CHANGE UP (ref 3.5–5.3)
POTASSIUM SERPL-SCNC: 4.2 MMOL/L — SIGNIFICANT CHANGE UP (ref 3.5–5.3)
PROT UR-MCNC: NEGATIVE — SIGNIFICANT CHANGE UP
RBC # BLD: 3.59 M/UL — LOW (ref 3.8–5.2)
RBC # BLD: 3.62 M/UL — LOW (ref 3.8–5.2)
RBC # FLD: 14.8 % — HIGH (ref 10.3–14.5)
RBC # FLD: 14.9 % — HIGH (ref 10.3–14.5)
RBC CASTS # UR COMP ASSIST: NEGATIVE /HPF — SIGNIFICANT CHANGE UP (ref 0–2)
SODIUM SERPL-SCNC: 135 MMOL/L — SIGNIFICANT CHANGE UP (ref 135–145)
SP GR SPEC: 1.01 — SIGNIFICANT CHANGE UP (ref 1.01–1.02)
UROBILINOGEN FLD QL: NEGATIVE — SIGNIFICANT CHANGE UP
WBC # BLD: 8.01 K/UL — SIGNIFICANT CHANGE UP (ref 3.8–10.5)
WBC # BLD: 8.04 K/UL — SIGNIFICANT CHANGE UP (ref 3.8–10.5)
WBC # FLD AUTO: 8.01 K/UL — SIGNIFICANT CHANGE UP (ref 3.8–10.5)
WBC # FLD AUTO: 8.04 K/UL — SIGNIFICANT CHANGE UP (ref 3.8–10.5)
WBC UR QL: SIGNIFICANT CHANGE UP /HPF (ref 0–5)

## 2019-12-03 PROCEDURE — 83540 ASSAY OF IRON: CPT

## 2019-12-03 PROCEDURE — 82803 BLOOD GASES ANY COMBINATION: CPT

## 2019-12-03 PROCEDURE — 93306 TTE W/DOPPLER COMPLETE: CPT

## 2019-12-03 PROCEDURE — 83605 ASSAY OF LACTIC ACID: CPT

## 2019-12-03 PROCEDURE — 83935 ASSAY OF URINE OSMOLALITY: CPT

## 2019-12-03 PROCEDURE — 80053 COMPREHEN METABOLIC PANEL: CPT

## 2019-12-03 PROCEDURE — 87186 SC STD MICRODIL/AGAR DIL: CPT

## 2019-12-03 PROCEDURE — 87040 BLOOD CULTURE FOR BACTERIA: CPT

## 2019-12-03 PROCEDURE — 84156 ASSAY OF PROTEIN URINE: CPT

## 2019-12-03 PROCEDURE — 94640 AIRWAY INHALATION TREATMENT: CPT

## 2019-12-03 PROCEDURE — 83550 IRON BINDING TEST: CPT

## 2019-12-03 PROCEDURE — 82570 ASSAY OF URINE CREATININE: CPT

## 2019-12-03 PROCEDURE — 82306 VITAMIN D 25 HYDROXY: CPT

## 2019-12-03 PROCEDURE — 85610 PROTHROMBIN TIME: CPT

## 2019-12-03 PROCEDURE — 93005 ELECTROCARDIOGRAM TRACING: CPT

## 2019-12-03 PROCEDURE — 99285 EMERGENCY DEPT VISIT HI MDM: CPT | Mod: 25

## 2019-12-03 PROCEDURE — 84300 ASSAY OF URINE SODIUM: CPT

## 2019-12-03 PROCEDURE — 36415 COLL VENOUS BLD VENIPUNCTURE: CPT

## 2019-12-03 PROCEDURE — 84484 ASSAY OF TROPONIN QUANT: CPT

## 2019-12-03 PROCEDURE — 87086 URINE CULTURE/COLONY COUNT: CPT

## 2019-12-03 PROCEDURE — 82728 ASSAY OF FERRITIN: CPT

## 2019-12-03 PROCEDURE — 87449 NOS EACH ORGANISM AG IA: CPT

## 2019-12-03 PROCEDURE — 94660 CPAP INITIATION&MGMT: CPT

## 2019-12-03 PROCEDURE — 82550 ASSAY OF CK (CPK): CPT

## 2019-12-03 PROCEDURE — 80048 BASIC METABOLIC PNL TOTAL CA: CPT

## 2019-12-03 PROCEDURE — 81001 URINALYSIS AUTO W/SCOPE: CPT

## 2019-12-03 PROCEDURE — 74176 CT ABD & PELVIS W/O CONTRAST: CPT

## 2019-12-03 PROCEDURE — 82553 CREATINE MB FRACTION: CPT

## 2019-12-03 PROCEDURE — 82607 VITAMIN B-12: CPT

## 2019-12-03 PROCEDURE — 82272 OCCULT BLD FECES 1-3 TESTS: CPT

## 2019-12-03 PROCEDURE — 84100 ASSAY OF PHOSPHORUS: CPT

## 2019-12-03 PROCEDURE — 85027 COMPLETE CBC AUTOMATED: CPT

## 2019-12-03 PROCEDURE — 80061 LIPID PANEL: CPT

## 2019-12-03 PROCEDURE — 85730 THROMBOPLASTIN TIME PARTIAL: CPT

## 2019-12-03 PROCEDURE — 71045 X-RAY EXAM CHEST 1 VIEW: CPT

## 2019-12-03 PROCEDURE — 83735 ASSAY OF MAGNESIUM: CPT

## 2019-12-03 PROCEDURE — 82962 GLUCOSE BLOOD TEST: CPT

## 2019-12-03 RX ORDER — OLMESARTAN MEDOXOMIL 5 MG/1
1 TABLET, FILM COATED ORAL
Qty: 0 | Refills: 0 | DISCHARGE

## 2019-12-03 RX ORDER — CARVEDILOL PHOSPHATE 80 MG/1
1 CAPSULE, EXTENDED RELEASE ORAL
Qty: 60 | Refills: 0
Start: 2019-12-03 | End: 2020-01-01

## 2019-12-03 RX ORDER — OLMESARTAN MEDOXOMIL / AMLODIPINE BESYLATE / HYDROCHLOROTHIAZIDE 40; 10; 25 MG/1; MG/1; MG/1
1 TABLET, FILM COATED ORAL
Qty: 0 | Refills: 0 | DISCHARGE

## 2019-12-03 RX ORDER — TOLTERODINE TARTRATE 1 MG/1
1 TABLET, FILM COATED ORAL
Qty: 0 | Refills: 0 | DISCHARGE

## 2019-12-03 RX ORDER — MELOXICAM 15 MG/1
1 TABLET ORAL
Qty: 0 | Refills: 0 | DISCHARGE

## 2019-12-03 RX ORDER — LABETALOL HCL 100 MG
1 TABLET ORAL
Qty: 0 | Refills: 0 | DISCHARGE

## 2019-12-03 RX ORDER — CHOLECALCIFEROL (VITAMIN D3) 125 MCG
2000 CAPSULE ORAL
Qty: 14 | Refills: 0
Start: 2019-12-03 | End: 2019-12-16

## 2019-12-03 RX ORDER — RIVAROXABAN 15 MG-20MG
1 KIT ORAL
Qty: 0 | Refills: 0 | DISCHARGE
Start: 2019-12-03

## 2019-12-03 RX ADMIN — CARVEDILOL PHOSPHATE 12.5 MILLIGRAM(S): 80 CAPSULE, EXTENDED RELEASE ORAL at 06:46

## 2019-12-03 RX ADMIN — CARVEDILOL PHOSPHATE 12.5 MILLIGRAM(S): 80 CAPSULE, EXTENDED RELEASE ORAL at 17:17

## 2019-12-03 RX ADMIN — RIVAROXABAN 15 MILLIGRAM(S): KIT at 17:16

## 2019-12-03 RX ADMIN — Medication 2000 UNIT(S): at 12:24

## 2019-12-03 RX ADMIN — Medication 200 MILLIGRAM(S): at 06:46

## 2019-12-03 RX ADMIN — Medication 175 MICROGRAM(S): at 06:46

## 2019-12-03 RX ADMIN — Medication 2: at 08:54

## 2019-12-03 RX ADMIN — GABAPENTIN 300 MILLIGRAM(S): 400 CAPSULE ORAL at 12:24

## 2019-12-03 RX ADMIN — Medication 4 CAPSULE(S): at 08:55

## 2019-12-03 RX ADMIN — Medication 2: at 12:24

## 2019-12-03 RX ADMIN — BACITRACIN ZINC NEOMYCIN SULFATE POLYMYXIN B SULFATE 1 APPLICATION(S): 400; 3.5; 5 OINTMENT TOPICAL at 06:46

## 2019-12-03 RX ADMIN — PANTOPRAZOLE SODIUM 40 MILLIGRAM(S): 20 TABLET, DELAYED RELEASE ORAL at 06:46

## 2019-12-03 RX ADMIN — Medication 10 MILLIGRAM(S): at 06:46

## 2019-12-03 RX ADMIN — Medication 1 APPLICATION(S): at 06:46

## 2019-12-03 RX ADMIN — Medication 4 CAPSULE(S): at 17:16

## 2019-12-03 RX ADMIN — INSULIN GLARGINE 4 UNIT(S): 100 INJECTION, SOLUTION SUBCUTANEOUS at 08:54

## 2019-12-03 RX ADMIN — AZITHROMYCIN 500 MILLIGRAM(S): 500 TABLET, FILM COATED ORAL at 12:24

## 2019-12-03 RX ADMIN — BUDESONIDE AND FORMOTEROL FUMARATE DIHYDRATE 2 PUFF(S): 160; 4.5 AEROSOL RESPIRATORY (INHALATION) at 10:24

## 2019-12-03 RX ADMIN — Medication 4 CAPSULE(S): at 12:24

## 2019-12-03 RX ADMIN — BACITRACIN ZINC NEOMYCIN SULFATE POLYMYXIN B SULFATE 1 APPLICATION(S): 400; 3.5; 5 OINTMENT TOPICAL at 17:19

## 2019-12-03 RX ADMIN — Medication 200 MILLIGRAM(S): at 17:17

## 2019-12-03 NOTE — PROGRESS NOTE ADULT - ASSESSMENT
venous stasis dermatitis/ cellulitis/  lipodermatosclerosis     obesity   Venous insufficiency   onychogryphosis   DM    P:   pt seen and evaluated   etiology of the problem and treatment options discussed with family and patient   recommended venous doppler ( if was not done , as per family she had it done ?  )  continue antibiotics as per id     recommended light compression if clear by cardiologist   recommended elevation left and  right foot whenever sitting down   topical alternate topical antibiotic and steroid as ordered   will follow up while in house   folllow up after dc with podiatry in 5-10 days   please do not hesitate to contact me with any patient related questions @ [phone]

## 2019-12-03 NOTE — PROGRESS NOTE ADULT - ASSESSMENT
78 y/o [place]-speaking female from home, ambulates with walker, lives with son, with PMHx of HFpEF, CAD (s/p stents), chronic venous stasis, DM, HTN and Afib (on Xarelto, s/p PPM) presents to the ED with chief complaint of fever and chills x 2 days, pneumonia, acute diastolic HF.  1.Home O2.  2.Pneumonia-ABX.  3.Fe def anemia-IV iron, GI eval as outpatient for fe def anemia-?virtual colonoscopy..  4.PAF-xarelto,coreg 12.5mg bid.  5.Hypothyroidism-synthroid.  6.Acute diastolic HF-demadex.  7.DM-Insulin.  8.Asthma-nebs.  9.PPI.  10.Check UA.

## 2019-12-03 NOTE — PROGRESS NOTE ADULT - SUBJECTIVE AND OBJECTIVE BOX
Vital Signs Last 24 Hrs  T(C): 36.8 (03 Dec 2019 11:16), Max: 36.8 (03 Dec 2019 07:38)  T(F): 98.3 (03 Dec 2019 11:16), Max: 98.3 (03 Dec 2019 11:16)  HR: 63 (03 Dec 2019 11:16) (59 - 78)  BP: 152/75 (03 Dec 2019 11:16) (122/62 - 176/64)  BP(mean): --  RR: 20 (03 Dec 2019 11:16) (18 - 24)  SpO2: 99% (03 Dec 2019 11:16) (87% - 100%)                        9.6    8.01  )-----------( 348      ( 03 Dec 2019 12:33 )             30.8   12-03    135  |  97  |  28<H>  ----------------------------<  204<H>  4.2   |  31  |  1.07    Ca    9.3      03 Dec 2019 08:40    PAST MEDICAL & SURGICAL HISTORY:  Obesity  Pacemaker  Atrial fibrillation  Hypothyroidism  Venous stasis  IBS (irritable bowel syndrome)  CHF (congestive heart failure), NYHA class I  HLD (Hyperlipidemia)  HTN (Hypertension)  Diabetes  Myocardial Infarction  CAD (Coronary Atherosclerotic Disease)  Asthma  S/P coronary angiogram  Allergies    No Known Allergies    Intolerances      MEDICATIONS  (STANDING):  atorvastatin 40 milliGRAM(s) Oral at bedtime  budesonide 160 MICROgram(s)/formoterol 4.5 MICROgram(s) Inhaler 2 Puff(s) Inhalation two times a day  carvedilol 12.5 milliGRAM(s) Oral every 12 hours  cefpodoxime 200 milliGRAM(s) Oral every 12 hours  cholecalciferol 2000 Unit(s) Oral daily  gabapentin 300 milliGRAM(s) Oral daily  insulin glargine Injectable (LANTUS) 4 Unit(s) SubCutaneous every morning  insulin lispro (HumaLOG) corrective regimen sliding scale   SubCutaneous Before meals and at bedtime  levothyroxine 175 MICROGram(s) Oral daily  montelukast 10 milliGRAM(s) Oral at bedtime  neomycin/bacitracin/polymyxin Topical Ointment 1 Application(s) Topical every 12 hours  pancrelipase  (CREON  6,000 Lipase Units) 4 Capsule(s) Oral three times a day with meals  pantoprazole    Tablet 40 milliGRAM(s) Oral before breakfast  rivaroxaban 15 milliGRAM(s) Oral with dinner  senna 2 Tablet(s) Oral at bedtime  torsemide 10 milliGRAM(s) Oral daily  triamcinolone 0.1% Ointment 1 Application(s) Topical every 12 hours    MEDICATIONS  (PRN):  acetaminophen   Tablet .. 650 milliGRAM(s) Oral every 6 hours PRN Temp greater or equal to 38C (100.4F), Mild Pain (1 - 3)  albuterol/ipratropium for Nebulization 3 milliLiter(s) Nebulizer every 6 hours PRN Shortness of Breath and/or Wheezing  ondansetron Injectable 4 milliGRAM(s) IV Push every 6 hours PRN Nausea and/or Vomiting  zolpidem 5 milliGRAM(s) Oral at bedtime PRN Insomnia  exam focused lower extremities :    left and right lower extremities erythema some improvement  , with skin excoriations -some improvement  no open skin lesions , no drainage,   there is a darker skin discoloration on the right leg more proximal to erythema -as per family the pt developed it few years ago   DP-palpable left and right foot   no open skin lesions   light touch present   MS-slightly diminished

## 2019-12-03 NOTE — PROGRESS NOTE ADULT - SUBJECTIVE AND OBJECTIVE BOX
Time of Visit:  Patient seen and examined.     MEDICATIONS  (STANDING):  atorvastatin 40 milliGRAM(s) Oral at bedtime  budesonide 160 MICROgram(s)/formoterol 4.5 MICROgram(s) Inhaler 2 Puff(s) Inhalation two times a day  carvedilol 12.5 milliGRAM(s) Oral every 12 hours  cefpodoxime 200 milliGRAM(s) Oral every 12 hours  cholecalciferol 2000 Unit(s) Oral daily  gabapentin 300 milliGRAM(s) Oral daily  insulin glargine Injectable (LANTUS) 4 Unit(s) SubCutaneous every morning  insulin lispro (HumaLOG) corrective regimen sliding scale   SubCutaneous Before meals and at bedtime  levothyroxine 175 MICROGram(s) Oral daily  montelukast 10 milliGRAM(s) Oral at bedtime  neomycin/bacitracin/polymyxin Topical Ointment 1 Application(s) Topical every 12 hours  pancrelipase  (CREON  6,000 Lipase Units) 4 Capsule(s) Oral three times a day with meals  pantoprazole    Tablet 40 milliGRAM(s) Oral before breakfast  rivaroxaban 15 milliGRAM(s) Oral with dinner  senna 2 Tablet(s) Oral at bedtime  torsemide 10 milliGRAM(s) Oral daily  triamcinolone 0.1% Ointment 1 Application(s) Topical every 12 hours      MEDICATIONS  (PRN):  acetaminophen   Tablet .. 650 milliGRAM(s) Oral every 6 hours PRN Temp greater or equal to 38C (100.4F), Mild Pain (1 - 3)  albuterol/ipratropium for Nebulization 3 milliLiter(s) Nebulizer every 6 hours PRN Shortness of Breath and/or Wheezing  ondansetron Injectable 4 milliGRAM(s) IV Push every 6 hours PRN Nausea and/or Vomiting  zolpidem 5 milliGRAM(s) Oral at bedtime PRN Insomnia       Medications up to date at time of exam.      PHYSICAL EXAMINATION:  Patient has no new complaints.  GENERAL: The patient is a well-developed, well-nourished, in no apparent distress.     Vital Signs Last 24 Hrs  T(C): 36.8 (03 Dec 2019 11:16), Max: 36.8 (03 Dec 2019 07:38)  T(F): 98.3 (03 Dec 2019 11:16), Max: 98.3 (03 Dec 2019 11:16)  HR: 63 (03 Dec 2019 11:16) (61 - 78)  BP: 152/75 (03 Dec 2019 11:16) (122/62 - 152/75)  BP(mean): --  RR: 20 (03 Dec 2019 11:16) (19 - 24)  SpO2: 99% (03 Dec 2019 11:16) (87% - 100%)   (if applicable)    Chest Tube (if applicable)    HEENT: Head is normocephalic and atraumatic. Extraocular muscles are intact. Mucous membranes are moist.     NECK: Supple, no palpable adenopathy.    LUNGS: Clear to auscultation, no wheezing, rales, or rhonchi.    HEART: Regular rate and rhythm without murmur.    ABDOMEN: Soft, nontender, and nondistended.  No hepatosplenomegaly is noted.    : No painful voiding, no pelvic pain    EXTREMITIES: Without any cyanosis, + 2 edema.    NEUROLOGIC: Awake, alert, oriented, grossly intact    SKIN: Warm, dry, good turgor.      LABS:                        9.6    8.01  )-----------( 348      ( 03 Dec 2019 12:33 )             30.8     12-03    135  |  97  |  28<H>  ----------------------------<  204<H>  4.2   |  31  |  1.07    Ca    9.3      03 Dec 2019 08:40        Urinalysis Basic - ( 03 Dec 2019 10:29 )    Color: Yellow / Appearance: Clear / S.010 / pH: x  Gluc: x / Ketone: Negative  / Bili: Negative / Urobili: Negative   Blood: x / Protein: Negative / Nitrite: Negative   Leuk Esterase: Trace / RBC: Negative /HPF / WBC 3-5 /HPF   Sq Epi: x / Non Sq Epi: Few /HPF / Bacteria: Negative /HPF        CARDIAC MARKERS ( 02 Dec 2019 18:32 )  <0.015 ng/mL / x     / x     / x     / x                    MICROBIOLOGY: (if applicable)    RADIOLOGY & ADDITIONAL STUDIES:  EKG:   CXR:  ECHO:    IMPRESSION: 79y Female PAST MEDICAL & SURGICAL HISTORY:  Obesity  Pacemaker  Atrial fibrillation  Hypothyroidism  Venous stasis  IBS (irritable bowel syndrome)  CHF (congestive heart failure), NYHA class I  HLD (Hyperlipidemia)  HTN (Hypertension)  Diabetes  Myocardial Infarction  CAD (Coronary Atherosclerotic Disease)  Asthma  S/P coronary angiogram   p/w       IMP: This is an 79 yr old woman from home , ambulates with walker, lives with son, with PMHx of HFpEF, CAD (s/p stents), chronic venous stasis, DM, HTN and Afib (on Xarelto, s/p [facility] PPM) presents to the ED with chief complaint of fever and chills x 2 days with one episode of vomiting. She was admitted to medicine on  for LE cellulitis treated with Zosyn and Vancomycin. Also found to have hyponatremia for which she was receiving IVF. RRT called on  AM for respiratory distress with abdominal breathing, saturating 100% on NRB. Vitals significant for tachypnea w/ RR: 41. Upon lung auscultation, bilateral expiratory/inspiratory wheezing appreciated. ABG w/o hypoxia/hypercapnia w/ pH WNL's. ECG: NSR VR@86bpm. Patient respiratory status did not improve despite treatment with Lasix 60 mg IV + DUONEB x1. Bladder scan revealed 0 mL, which was inaccurate probably due morbid obesity. Ashton was placed with ~500 mL of urine. Patient was placed on BiPAP for work of breathing and transferred to ICU for acute hypoxic resp failure due to pul edema requiring NIPPV support.  + HCAP.. pat was hospitiazed as was hospitalized recently.  Cardiac enzymes neg. Cards eval noted. ID eval pending but discussed care with Dr Spence ID in icu  Pat stated that she has CPAP at home as per daughter Manuel . Now medical team was informed that home PAP device is broken, Pat will repeat sleep study as out pat before a new PAP devise is prescribe.    PLAN:  -continue bipap at night   -out pat sleep study  -continue antibx  -dvt/gi prop    c/d with house staff

## 2019-12-03 NOTE — PROGRESS NOTE ADULT - REASON FOR ADMISSION
b/l LE cellulitis
Pneumonia
b/l LE cellulitis
Sepsis 2/2 Pneumonia
b/l LE cellulitis
b/l LE cellulitis

## 2019-12-03 NOTE — DISCHARGE NOTE NURSING/CASE MANAGEMENT/SOCIAL WORK - PATIENT PORTAL LINK FT
You can access the FollowMyHealth Patient Portal offered by [facility] by registering at the following website: [email or web address]. By joining [employer]’s FollowMyHealth portal, you will also be able to view your health information using other applications (apps) compatible with our system.

## 2019-12-03 NOTE — PROGRESS NOTE ADULT - ATTENDING COMMENTS
Pneumonia  with  hypoxemia    JOSE DANIEL   Chronic  A fib HTN  Chronic  Lymphedema  lower extremities with  stasis  dermatitis    Morbid  obesity  with  difficulty waslking    Continue  present  management  discharge  planning for  today

## 2019-12-03 NOTE — CONSULT NOTE ADULT - ASSESSMENT
a/p 80 yo F  with multiple comorbidities, c/o vaginal spotting this am, not currently spotting, hemoglobin 9.6, stable from previous  - patient refused gyn exam  - d/w Dr Tracey, [place] attending- recommends outpatient gyn exam, pelvic sono to evaluate endometrial strip and endometrial sampling to r/o pathology  - Plan discussed with patient's daughter. Patient may f/u with DR Tracey in the office  [address], [facility] [id number] [phone]  Patient can also may f[facility] with [facility] [address] [facility] [phone], if unable to f/u with Dr Tracey  Plan d/w Dr Martinez, resident

## 2019-12-03 NOTE — PROGRESS NOTE ADULT - SUBJECTIVE AND OBJECTIVE BOX
Patient is a 79y old  Female who presents with a chief complaint of b/l LE cellulitis (03 Dec 2019 13:57)      INTERVAL HPI/OVERNIGHT EVENTS: patient  is  comfortable  less  sob no chest  pain  schedule  for  discharge  today    T(C): 36.8 (19 @ 11:16), Max: 36.8 (19 @ 07:38)  HR: 63 (19 @ 11:16) (61 - 78)  BP: 152/75 (19 @ 11:16) (122/62 - 152/75)  RR: 20 (19 @ 11:16) ( - 24)  SpO2: 99% (19 @ 11:16) (87% - 100%)  Wt(kg): --Vital Signs Last 24 Hrs  T(C): 36.8 (03 Dec 2019 11:16), Max: 36.8 (03 Dec 2019 07:38)  T(F): 98.3 (03 Dec 2019 11:16), Max: 98.3 (03 Dec 2019 11:16)  HR: 63 (03 Dec 2019 11:16) (61 - 78)  BP: 152/75 (03 Dec 2019 11:16) (122/62 - 152/75)  BP(mean): --  RR: 20 (03 Dec 2019 11:16) ( - )  SpO2: 99% (03 Dec 2019 11:16) (87% - 100%)  I&O's Summary      LABS:                        9.6    8.01  )-----------( 348      ( 03 Dec 2019 12:33 )             30.8         135  |  97  |  28<H>  ----------------------------<  204<H>  4.2   |  31  |  1.07    Ca    9.3      03 Dec 2019 08:40        Urinalysis Basic - ( 03 Dec 2019 10:29 )    Color: Yellow / Appearance: Clear / S.010 / pH: x  Gluc: x / Ketone: Negative  / Bili: Negative / Urobili: Negative   Blood: x / Protein: Negative / Nitrite: Negative   Leuk Esterase: Trace / RBC: Negative /HPF / WBC 3-5 /HPF   Sq Epi: x / Non Sq Epi: Few /HPF / Bacteria: Negative /HPF      CAPILLARY BLOOD GLUCOSE      POCT Blood Glucose.: 218 mg/dL (03 Dec 2019 12:02)  POCT Blood Glucose.: 201 mg/dL (03 Dec 2019 08:01)  POCT Blood Glucose.: 221 mg/dL (02 Dec 2019 21:21)  POCT Blood Glucose.: 222 mg/dL (02 Dec 2019 16:48)        Urinalysis Basic - ( 03 Dec 2019 10:29 )    Color: Yellow / Appearance: Clear / S.010 / pH: x  Gluc: x / Ketone: Negative  / Bili: Negative / Urobili: Negative   Blood: x / Protein: Negative / Nitrite: Negative   Leuk Esterase: Trace / RBC: Negative /HPF / WBC 3-5 /HPF   Sq Epi: x / Non Sq Epi: Few /HPF / Bacteria: Negative /HPF      REVIEW OF SYSTEMS:  CONSTITUTIONAL: No fever, weight loss, + fatigue  EYES: No eye pain, visual disturbances, or discharge  ENMT:  No difficulty hearing, tinnitus, vertigo; No sinus or throat pain  NECK: No pain or stiffness  BREASTS: No pain, masses, or nipple discharge  RESPIRATORY: No cough, wheezing, chills or hemoptysis; +Shortness of breath  CARDIOVASCULAR: No chest pain, palpitations, dizziness, or leg swelling  GASTROINTESTINAL: No abdominal or epigastric pain. No nausea, vomiting, or hematemesis; No diarrhea or constipation. No melena or hematochezia.  GENITOURINARY: No dysuria, frequency, hematuria, or incontinence  NEUROLOGICAL: No headaches, memory loss, loss of strength, numbness, or tremors  SKIN: No itching, burning, rashes, or lesions   LYMPH NODES: No enlarged glands  ENDOCRINE: No heat or cold intolerance; No hair loss  MUSCULOSKELETAL: No joint pain o+swelling; No muscle, back, or extremity pain  PSYCHIATRIC: No depression, anxiety, mood swings, or difficulty sleeping  HEME/LYMPH: No easy bruising, or bleeding gums  ALLERGY AND IMMUNOLOGIC: No hives or eczema    RADIOLOGY & ADDITIONAL TESTS:    Imaging Personally Reviewed:  [ ] YES  [ ] NO    Consultant(s) Notes Reviewed:  [x ] YES  [ ] NO    PHYSICAL EXAM:  GENERAL: NAD, well-groomed, well-developed  HEAD:  Atraumatic, Normocephalic  EYES: EOMI, PERRLA, conjunctiva and sclera clear  ENMT: No tonsillar erythema, exudates, or enlargement; Moist mucous membranes, Good dentition, No lesions  NECK: Supple, No JVD, Normal thyroid  NERVOUS SYSTEM:  Alert & Oriented X3, Good concentration; Motor Strength 5/5 B/L upper and lower extremities; DTRs 2+ intact and symmetric  CHEST/LUNG: Clear to percussion bilaterally; No rales, rhonchi, wheezing, or rubs  HEART: Regular rate and rhythm; No murmurs, rubs, or gallops  ABDOMEN: Soft, Nontender, Nondistended; Bowel sounds present  EXTREMITIES:  2+ Peripheral Pulses, No clubbing, cyanosis, or edema  LYMPH: No lymphadenopathy noted  SKIN: No rashes or lesions    Care Discussed with Consultants/Other Providers [ x] YES  [ ] NO

## 2019-12-03 NOTE — PROGRESS NOTE ADULT - SUBJECTIVE AND OBJECTIVE BOX
CHIEF COMPLAINT:Patient is a 79y old  Female who presents with a chief complaint of b/l LE cellulitis.Pt appears comfortable,+ blood in urine?    	  REVIEW OF SYSTEMS:  CONSTITUTIONAL: No fever, weight loss, or fatigue  EYES: No eye pain, visual disturbances, or discharge  ENT:  No difficulty hearing, tinnitus, vertigo; No sinus or throat pain  NECK: No pain or stiffness  RESPIRATORY: No cough, wheezing, chills or hemoptysis; No Shortness of Breath  CARDIOVASCULAR: No chest pain, palpitations, passing out, dizziness, or leg swelling  GASTROINTESTINAL: No abdominal or epigastric pain. No nausea, vomiting, or hematemesis; No diarrhea or constipation. No melena or hematochezia.  GENITOURINARY: No dysuria, frequency, hematuria, or incontinence  NEUROLOGICAL: No headaches, memory loss, loss of strength, numbness, or tremors  SKIN: No itching, burning, rashes, or lesions   LYMPH Nodes: No enlarged glands  ENDOCRINE: No heat or cold intolerance; No hair loss  MUSCULOSKELETAL: No joint pain or swelling; No muscle, back, or extremity pain  PSYCHIATRIC: No depression, anxiety, mood swings, or difficulty sleeping  HEME/LYMPH: No easy bruising, or bleeding gums  ALLERGY AND IMMUNOLOGIC: No hives or eczema	      PHYSICAL EXAM:  T(C): 36.8 (12-03-19 @ 07:38), Max: 36.9 (12-02-19 @ 11:47)  HR: 64 (12-03-19 @ 07:38) (59 - 78)  BP: 122/62 (12-03-19 @ 07:38) (122/62 - 176/64)  RR: 24 (12-03-19 @ 08:50) (18 - 24)  SpO2: 98% (12-03-19 @ 09:45) (87% - 100%)  Wt(kg): --  I&O's Summary      Appearance: Normal	  HEENT:   Normal oral mucosa, PERRL, EOMI	  Lymphatic: No lymphadenopathy  Cardiovascular: Normal S1 S2, No JVD, No murmurs, No edema  Respiratory: Lungs clear to auscultation	  Psychiatry: A & O x 3, Mood & affect appropriate  Gastrointestinal:  Soft, Non-tender, + BS	  Skin: No rashes, No ecchymoses, No cyanosis	  Neurologic: Non-focal  Extremities: Normal range of motion, No clubbing, cyanosis or edema  Vascular: Peripheral pulses palpable 2+ bilaterally    MEDICATIONS  (STANDING):  atorvastatin 40 milliGRAM(s) Oral at bedtime  azithromycin   Tablet 500 milliGRAM(s) Oral daily  budesonide 160 MICROgram(s)/formoterol 4.5 MICROgram(s) Inhaler 2 Puff(s) Inhalation two times a day  carvedilol 12.5 milliGRAM(s) Oral every 12 hours  cefpodoxime 200 milliGRAM(s) Oral every 12 hours  cholecalciferol 2000 Unit(s) Oral daily  gabapentin 300 milliGRAM(s) Oral daily  insulin glargine Injectable (LANTUS) 4 Unit(s) SubCutaneous every morning  insulin lispro (HumaLOG) corrective regimen sliding scale   SubCutaneous Before meals and at bedtime  levothyroxine 175 MICROGram(s) Oral daily  montelukast 10 milliGRAM(s) Oral at bedtime  neomycin/bacitracin/polymyxin Topical Ointment 1 Application(s) Topical every 12 hours  pancrelipase  (CREON  6,000 Lipase Units) 4 Capsule(s) Oral three times a day with meals  pantoprazole    Tablet 40 milliGRAM(s) Oral before breakfast  rivaroxaban 15 milliGRAM(s) Oral with dinner  senna 2 Tablet(s) Oral at bedtime  torsemide 10 milliGRAM(s) Oral daily  triamcinolone 0.1% Ointment 1 Application(s) Topical every 12 hours        	  LABS:	 	    CARDIAC MARKERS ( 02 Dec 2019 18:32 )  <0.015 ng/mL / x     / x     / x     / x                            9.4    8.04  )-----------( 355      ( 03 Dec 2019 08:40 )             30.6     12-03    135  |  97  |  28<H>  ----------------------------<  204<H>  4.2   |  31  |  1.07    Ca    9.3      03 Dec 2019 08:40      proBNP: Serum Pro-Brain Natriuretic Peptide: 1180 pg/mL (11-08 @ 13:39)    Lipid Profile: Cholesterol 112  LDL 43  HDL 53  TG 82    HgA1c: Hemoglobin A1C, Whole Blood: 6.6 % (11-09 @ 10:48)    TSH: Thyroid Stimulating Hormone, Serum: 5.06 uIU/mL (11-10 @ 09:35)

## 2019-12-03 NOTE — PROGRESS NOTE ADULT - NSHPATTENDINGPLANDISCUSS_GEN_ALL_CORE
ICU team
discuss with  patient  medical tea,m
patient  family medical team
patient  family medical team
family, patient
medical team
patient  family medical team
patient and staff

## 2019-12-03 NOTE — CONSULT NOTE ADULT - SUBJECTIVE AND OBJECTIVE BOX
Gyn consult called for patient c/o bright red vaginal spotting on peripad this morning  Patient is a 79y F Para 3 Thai speaking LMP at age 48  admitted for cellulitis s/p RRT  resulting to ICU admission on  AM for respiratory distress with abdominal breathing.  History obtained via telephone from patients daughter Rhona. Patient states the spotting just happened this morining after using the, vaginal discharge; pelvic pain, abd pain, cp, sob, dizziness, palpitations, n/v/d/c, fever; chills     pob/gynhx: G P ; followed by [facility].   menarche, regular. Denies stds,  abn pap smears,  fibroids or   pmhx:  pshx:  all:  meds:   sochx: no etoh/drug/tobacco use    REVIEW OF SYSTEMS: see HPI	    PE:  Vital Signs Last 24 Hrs  T(C): 36.2 (03 Dec 2019 16:06), Max: 36.8 (03 Dec 2019 07:38)  T(F): 97.2 (03 Dec 2019 16:06), Max: 98.3 (03 Dec 2019 11:16)  HR: 65 (03 Dec 2019 16:06) (61 - 78)  BP: 162/73 (03 Dec 2019 16:06) (122/62 - 162/73)  BP(mean): --  RR: 18 (03 Dec 2019 16:06) (18 - 24)  SpO2: 97% (03 Dec 2019 16:06) (87% - 100%)  abd: +bs; soft, nt, nd, no rebound or guarding; no cvat b/l  pelvis: no cmt; no vaginal bleeding or abnormal discharge; no odor, closed/long, no uterine tenderness; uterus approx 8wks size regular in contour, mobile. No adnexal tenderness or masses appreciated b/l     LABS:                        9.6    8.01  )-----------( 348      ( 03 Dec 2019 12:33 )             30.8     12    135  |  97  |  28<H>  ----------------------------<  204<H>  4.2   |  31  |  1.07    Ca    9.3      03 Dec 2019 08:40        Urinalysis Basic - ( 03 Dec 2019 10:29 )    Color: Yellow / Appearance: Clear / S.010 / pH: x  Gluc: x / Ketone: Negative  / Bili: Negative / Urobili: Negative   Blood: x / Protein: Negative / Nitrite: Negative   Leuk Esterase: Trace / RBC: Negative /HPF / WBC 3-5 /HPF   Sq Epi: x / Non Sq Epi: Few /HPF / Bacteria: Negative /HPF        RADIOLOGY & ADDITIONAL STUDIES:  sono  ct scan    a/p    -d/w  Gyn consult called for patient c/o bright red vaginal spotting on peripad this morning  Patient is a 79y F Para 3 LMP at age 48  admitted for cellulitis s/p RRT  resulting to ICU admission on  AM for respiratory distress with abdominal breathing.  History obtained via telephone from patient's daughter Rhona. Patient is Uzbek speaking, speaks little English. Patient states the spotting just happened this morning after using the bathroom.  Per patients daugther, spotting was bright red. Patient denies any vaginal bleeding currently.  Denies pelvic pain, abd pain, cp, sob, dizziness, palpitations, n/v/d/c, fever, chills     pob/gynhx:  P3   x 3, Per patient daughter, patient had " nurmerous" abortions, unsure of number.  Has not seen GYN in over 20 years.  Denies abn pap, never had a mammogram.   pmhx: multiple, see H+P  pshx: multiple, see H+P  all: denies  meds:  see medication reconciliation   sochx: no etoh/drug/tobacco use    REVIEW OF SYSTEMS: see HPI	    PE:  Vital Signs Last 24 Hrs  T(C): 36.2 (03 Dec 2019 16:06), Max: 36.8 (03 Dec 2019 07:38)  T(F): 97.2 (03 Dec 2019 16:06), Max: 98.3 (03 Dec 2019 11:16)  HR: 65 (03 Dec 2019 16:06) (61 - 78)  BP: 162/73 (03 Dec 2019 16:06) (122/62 - 162/73)  BP(mean): --  RR: 18 (03 Dec 2019 16:06) (18 - 24)  SpO2: 97% (03 Dec 2019 16:06) (87% - 100%)    gen: AA+O x3,  labor breathing, currently on nasal cannula  abd: obese  gyn: patient refused x 3      LABS:                        9.6    8.01  )-----------( 348      ( 03 Dec 2019 12:33 )             30.8     12-    135  |  97  |  28<H>  ----------------------------<  204<H>  4.2   |  31  |  1.07    Ca    9.3      03 Dec 2019 08:40        Urinalysis Basic - ( 03 Dec 2019 10:29 )    Color: Yellow / Appearance: Clear / S.010 / pH: x  Gluc: x / Ketone: Negative  / Bili: Negative / Urobili: Negative   Blood: x / Protein: Negative / Nitrite: Negative   Leuk Esterase: Trace / RBC: Negative /HPF / WBC 3-5 /HPF   Sq Epi: x / Non Sq Epi: Few /HPF / Bacteria: Negative /HPF          a/p 80 yo F with multiple comorbidities,  c/o vaginal spotting this am, not currently spotting, hemoglobin 9.6, stable from previous  - patient refused gyn exam  - d/w Dr Tracey, house attending- recommends outpatient gyn exam, pelvic sono to evaluate endometrial strip and endometrial sampling to r/o pathology  - Plan discussed with patient's daughter. Patient may f/u with DR Tracey in the office  [address], [facility] [id number] [phone]  Patient can also may f/u with [facility] [address] [facility] [phone], if unable to f/u with Dr Tracey  Plan d/w Dr Martinez, resident

## 2019-12-03 NOTE — CHART NOTE - NSCHARTNOTEFT_GEN_A_CORE
Pt room air spO2 at sitting 93%, ambulation w/o O2 87%, ambulation w/ 2L NC 98%, Pt requires home O2 for CHF.

## 2020-01-22 NOTE — H&P ADULT - PROBLEM SELECTOR PROBLEM 7
Pt's , Louie Zimmerman, states zyprexa RX needs to be sent to [facility] d/t insurance. RX escribed to pharmacy requested. The other medications sent yesterday were fine to be filled at [facility].
Asthma

## 2020-02-10 ENCOUNTER — APPOINTMENT (OUTPATIENT)
Dept: GASTROENTEROLOGY | Facility: CLINIC | Age: 80
End: 2020-02-10
Payer: MEDICARE

## 2020-02-10 VITALS
WEIGHT: 262.25 LBS | DIASTOLIC BLOOD PRESSURE: 80 MMHG | SYSTOLIC BLOOD PRESSURE: 130 MMHG | BODY MASS INDEX: 48.26 KG/M2 | HEART RATE: 72 BPM | TEMPERATURE: 97.9 F | HEIGHT: 62 IN | OXYGEN SATURATION: 96 %

## 2020-02-10 DIAGNOSIS — D64.9 ANEMIA, UNSPECIFIED: ICD-10-CM

## 2020-02-10 DIAGNOSIS — N39.46 MIXED INCONTINENCE: ICD-10-CM

## 2020-02-10 DIAGNOSIS — K59.09 OTHER CONSTIPATION: ICD-10-CM

## 2020-02-10 PROCEDURE — 99203 OFFICE O/P NEW LOW 30 MIN: CPT

## 2020-02-10 NOTE — REVIEW OF SYSTEMS
[Recent Weight Loss (___ Lbs)] : recent [unfilled] ~Ulb weight loss [Constipation] : constipation [Diarrhea] : diarrhea [Heartburn] : heartburn [As Noted in HPI] : as noted in HPI [Incontinence] : incontinence [Negative] : Heme/Lymph

## 2020-02-10 NOTE — PHYSICAL EXAM
[General Appearance - Alert] : alert [General Appearance - In No Acute Distress] : in no acute distress [Sclera] : the sclera and conjunctiva were normal [Outer Ear] : the ears and nose were normal in appearance [Neck Appearance] : the appearance of the neck was normal [Auscultation Breath Sounds / Voice Sounds] : lungs were clear to auscultation bilaterally [Heart Rate And Rhythm] : heart rate was normal and rhythm regular [Bowel Sounds] : normal bowel sounds [Abdomen Soft] : soft [Abdomen Tenderness] : non-tender [Abdomen Mass (___ Cm)] : no abdominal mass palpated [Involuntary Movements] : no involuntary movements were seen [] : no rash [Skin Lesions] : no skin lesions [No Focal Deficits] : no focal deficits [Oriented To Time, Place, And Person] : oriented to person, place, and time [Impaired Insight] : insight and judgment were intact [Affect] : the affect was normal [External Hemorrhoid] : no external hemorrhoids [FreeTextEntry1] : somewhat decreased tone, but adequate squeeze and expulsion, minimal yellow stool in vault

## 2020-02-10 NOTE — ASSESSMENT
[FreeTextEntry1] : 80F with afib on Xarelto, CHF, HTN here for "lazy colon" and anemia. \par \par #Anemia: Labs reviewed from recent [facility] hospitalization. Suspect likely component of both AoCD and JUAN (ferritin high, but low iron sat). No overt bleeding. No prior endoscopic evaluation. Unfortunately not able to pursue colonoscopy/endoscopy at this time given cardiopulmonary status; daughter is in agreement and would like to avoid any invasive procedures or anesthesia at this time. She has already had CTAP (without contrast) which was unrevealing.\par --Discussed that can attempt CT colonography for diagnostic purposes in place of colonoscopy; explained that this is diagnostic rather than therapeutic and may be less sensitive than direct visualization with colonoscopy. \par \par #Altered bowel habits: Concern for possible constipation with overflow. \par --A high fiber diet was discussed with the patient. The recommended amount of dietary fiber is 20-35g/day. Fiber supplementation should be increased slowly to prevent gas and cramping, and she should ensure adequate hydration.\par --Can trial Miralax\par --Deferring colonoscopy, as above, but will assess colon with CTC, as above\par --Kegel exercises, as tolerated\par --Can consider ARM pending CTC results\par \par Vaishali (daughter) [phone] \par \par RTO in 2-3 months

## 2020-02-10 NOTE — HISTORY OF PRESENT ILLNESS
[FreeTextEntry1] : 80F with afib on Xarelto, CHF, HTN here for "lazy colon" and anemia. \par \par Patient here with her daughter, Vaishali, who is assisting with history and translation. Per patient/daughter, she has had at least one year of altered bowel habits. Notes sometimes diarrhea with overflow and sometimes constipation. Denies any overt bleeding, no melena or hematochezia. Reports taking senna with mild improvement in symptoms, but still only having 3 BM/week. Usually BM with senna are "normal" consistency, but followed by loose stool. Also sometimes has urinary incontinence. \par \par Patient also sometimes has indigestion and heartburn. She was recently started on daily Dexilant with improvement (has only been taking for 3 days). Patient is morbidly obese, but has lost some weight and appetite has not been as good as it was in the past. \par \par No prior colonoscopy. She never wanted one in past and, more recently, was told that she was too sick to have one 2/2 anesthesia risk. Daughter is requesting a CT colonography for further evaluation. \par \par Patient admitted 11/2019 at [facility] with abdominal pain, improved with PPI, and thought to be 2/2 coughing at the time. \par \par 12/3/2019: 8.01 > 9.6 < 348\par INR 2.09\par Ferritin 228, Iron 19, TIBC 338, Iron Sat 6\par \par CTAP without IV contrast 11/2019 for JUAN evaluation while inpatient:\par IMPRESSION: \par No acute intra-abdominal pathology noted.

## 2020-03-04 ENCOUNTER — OUTPATIENT (OUTPATIENT)
Dept: OUTPATIENT SERVICES | Facility: HOSPITAL | Age: 80
LOS: 1 days | End: 2020-03-04
Payer: MEDICARE

## 2020-03-04 ENCOUNTER — APPOINTMENT (OUTPATIENT)
Dept: CT IMAGING | Facility: CLINIC | Age: 80
End: 2020-03-04
Payer: MEDICARE

## 2020-03-04 DIAGNOSIS — D64.9 ANEMIA, UNSPECIFIED: ICD-10-CM

## 2020-03-04 DIAGNOSIS — Z98.89 OTHER SPECIFIED POSTPROCEDURAL STATES: Chronic | ICD-10-CM

## 2020-03-04 PROCEDURE — 74261 CT COLONOGRAPHY DX: CPT

## 2020-03-04 PROCEDURE — 74261 CT COLONOGRAPHY DX: CPT | Mod: 26

## 2020-03-16 NOTE — DISCHARGE NOTE ADULT - MEDICATION SUMMARY - MEDICATIONS TO STOP TAKING
REASON FOR CONSULTATION/CC:    Chief Complaint   Patient presents with    Follow-up     Bronchiectasis        Consult at request of   Eddie Bradford MD for COPD  PCP: Eddie Bradford Hieu Luther DPM at [place] [address] I will STOP taking the medications listed below when I get home from the hospital:    Tribenzor 5 mg-25 mg-40 mg oral tablet  -- 1 tab(s) by mouth once a day    atenolol 25 mg oral tablet  -- 1 tab(s) by mouth once a day    LABETALOL  MG TABS    LIDOCAINE 5 % PTCH    LABETALOL  MG TABS  -- 2 times a day

## 2020-03-17 ENCOUNTER — INPATIENT (INPATIENT)
Facility: HOSPITAL | Age: 80
LOS: 2 days | Discharge: HOME | DRG: 291 | End: 2020-03-20
Attending: HOSPITALIST | Admitting: HOSPITALIST
Payer: MEDICARE

## 2020-03-17 VITALS
OXYGEN SATURATION: 97 % | WEIGHT: 293 LBS | SYSTOLIC BLOOD PRESSURE: 200 MMHG | TEMPERATURE: 99 F | HEIGHT: 63 IN | HEART RATE: 76 BPM | RESPIRATION RATE: 22 BRPM | DIASTOLIC BLOOD PRESSURE: 80 MMHG

## 2020-03-17 DIAGNOSIS — I10 ESSENTIAL (PRIMARY) HYPERTENSION: ICD-10-CM

## 2020-03-17 DIAGNOSIS — I50.31 ACUTE DIASTOLIC (CONGESTIVE) HEART FAILURE: ICD-10-CM

## 2020-03-17 DIAGNOSIS — Z29.9 ENCOUNTER FOR PROPHYLACTIC MEASURES, UNSPECIFIED: ICD-10-CM

## 2020-03-17 DIAGNOSIS — I48.91 UNSPECIFIED ATRIAL FIBRILLATION: ICD-10-CM

## 2020-03-17 DIAGNOSIS — I25.10 ATHEROSCLEROTIC HEART DISEASE OF NATIVE CORONARY ARTERY WITHOUT ANGINA PECTORIS: ICD-10-CM

## 2020-03-17 DIAGNOSIS — I50.9 HEART FAILURE, UNSPECIFIED: ICD-10-CM

## 2020-03-17 DIAGNOSIS — Z98.89 OTHER SPECIFIED POSTPROCEDURAL STATES: Chronic | ICD-10-CM

## 2020-03-17 DIAGNOSIS — E03.9 HYPOTHYROIDISM, UNSPECIFIED: ICD-10-CM

## 2020-03-17 DIAGNOSIS — E11.9 TYPE 2 DIABETES MELLITUS WITHOUT COMPLICATIONS: ICD-10-CM

## 2020-03-17 DIAGNOSIS — J45.909 UNSPECIFIED ASTHMA, UNCOMPLICATED: ICD-10-CM

## 2020-03-17 DIAGNOSIS — E78.5 HYPERLIPIDEMIA, UNSPECIFIED: ICD-10-CM

## 2020-03-17 LAB
ALBUMIN SERPL ELPH-MCNC: 3.4 G/DL — LOW (ref 3.5–5)
ALP SERPL-CCNC: 64 U/L — SIGNIFICANT CHANGE UP (ref 40–120)
ALT FLD-CCNC: 21 U/L DA — SIGNIFICANT CHANGE UP (ref 10–60)
ANION GAP SERPL CALC-SCNC: 7 MMOL/L — SIGNIFICANT CHANGE UP (ref 5–17)
APTT BLD: 44.6 SEC — HIGH (ref 27.5–36.3)
AST SERPL-CCNC: 18 U/L — SIGNIFICANT CHANGE UP (ref 10–40)
BASOPHILS # BLD AUTO: 0.14 K/UL — SIGNIFICANT CHANGE UP (ref 0–0.2)
BASOPHILS NFR BLD AUTO: 3 % — HIGH (ref 0–2)
BILIRUB SERPL-MCNC: 0.3 MG/DL — SIGNIFICANT CHANGE UP (ref 0.2–1.2)
BUN SERPL-MCNC: 20 MG/DL — HIGH (ref 7–18)
CALCIUM SERPL-MCNC: 8.7 MG/DL — SIGNIFICANT CHANGE UP (ref 8.4–10.5)
CHLORIDE SERPL-SCNC: 100 MMOL/L — SIGNIFICANT CHANGE UP (ref 96–108)
CO2 SERPL-SCNC: 25 MMOL/L — SIGNIFICANT CHANGE UP (ref 22–31)
CREAT SERPL-MCNC: 1.14 MG/DL — SIGNIFICANT CHANGE UP (ref 0.5–1.3)
EOSINOPHIL # BLD AUTO: 0.05 K/UL — SIGNIFICANT CHANGE UP (ref 0–0.5)
EOSINOPHIL NFR BLD AUTO: 1 % — SIGNIFICANT CHANGE UP (ref 0–6)
FLU A RESULT: SIGNIFICANT CHANGE UP
FLU A RESULT: SIGNIFICANT CHANGE UP
FLUAV AG NPH QL: SIGNIFICANT CHANGE UP
FLUBV AG NPH QL: SIGNIFICANT CHANGE UP
GLUCOSE SERPL-MCNC: 135 MG/DL — HIGH (ref 70–99)
HCT VFR BLD CALC: 35.1 % — SIGNIFICANT CHANGE UP (ref 34.5–45)
HGB BLD-MCNC: 11.6 G/DL — SIGNIFICANT CHANGE UP (ref 11.5–15.5)
INR BLD: 2.12 RATIO — HIGH (ref 0.88–1.16)
LYMPHOCYTES # BLD AUTO: 0.29 K/UL — LOW (ref 1–3.3)
LYMPHOCYTES # BLD AUTO: 6 % — LOW (ref 13–44)
MCHC RBC-ENTMCNC: 28 PG — SIGNIFICANT CHANGE UP (ref 27–34)
MCHC RBC-ENTMCNC: 33 GM/DL — SIGNIFICANT CHANGE UP (ref 32–36)
MCV RBC AUTO: 84.8 FL — SIGNIFICANT CHANGE UP (ref 80–100)
MONOCYTES # BLD AUTO: 0.53 K/UL — SIGNIFICANT CHANGE UP (ref 0–0.9)
MONOCYTES NFR BLD AUTO: 11 % — SIGNIFICANT CHANGE UP (ref 2–14)
NEUTROPHILS # BLD AUTO: 2.61 K/UL — SIGNIFICANT CHANGE UP (ref 1.8–7.4)
NEUTROPHILS NFR BLD AUTO: 54 % — SIGNIFICANT CHANGE UP (ref 43–77)
NT-PROBNP SERPL-SCNC: 1300 PG/ML — HIGH (ref 0–450)
PLATELET # BLD AUTO: 222 K/UL — SIGNIFICANT CHANGE UP (ref 150–400)
POTASSIUM SERPL-MCNC: 4.4 MMOL/L — SIGNIFICANT CHANGE UP (ref 3.5–5.3)
POTASSIUM SERPL-SCNC: 4.4 MMOL/L — SIGNIFICANT CHANGE UP (ref 3.5–5.3)
PROT SERPL-MCNC: 8.4 G/DL — HIGH (ref 6–8.3)
PROTHROM AB SERPL-ACNC: 24.5 SEC — HIGH (ref 10–12.9)
RBC # BLD: 4.14 M/UL — SIGNIFICANT CHANGE UP (ref 3.8–5.2)
RBC # FLD: 13.4 % — SIGNIFICANT CHANGE UP (ref 10.3–14.5)
RSV RESULT: SIGNIFICANT CHANGE UP
RSV RNA RESP QL NAA+PROBE: SIGNIFICANT CHANGE UP
SODIUM SERPL-SCNC: 132 MMOL/L — LOW (ref 135–145)
TROPONIN I SERPL-MCNC: <0.015 NG/ML — SIGNIFICANT CHANGE UP (ref 0–0.04)
WBC # BLD: 4.83 K/UL — SIGNIFICANT CHANGE UP (ref 3.8–10.5)
WBC # FLD AUTO: 4.83 K/UL — SIGNIFICANT CHANGE UP (ref 3.8–10.5)

## 2020-03-17 PROCEDURE — 99285 EMERGENCY DEPT VISIT HI MDM: CPT

## 2020-03-17 PROCEDURE — 71045 X-RAY EXAM CHEST 1 VIEW: CPT | Mod: 26

## 2020-03-17 PROCEDURE — 99223 1ST HOSP IP/OBS HIGH 75: CPT

## 2020-03-17 RX ORDER — FUROSEMIDE 40 MG
40 TABLET ORAL DAILY
Refills: 0 | Status: DISCONTINUED | OUTPATIENT
Start: 2020-03-18 | End: 2020-03-18

## 2020-03-17 RX ORDER — ALBUTEROL 90 UG/1
2 AEROSOL, METERED ORAL EVERY 6 HOURS
Refills: 0 | Status: DISCONTINUED | OUTPATIENT
Start: 2020-03-17 | End: 2020-03-20

## 2020-03-17 RX ORDER — GABAPENTIN 400 MG/1
300 CAPSULE ORAL DAILY
Refills: 0 | Status: DISCONTINUED | OUTPATIENT
Start: 2020-03-17 | End: 2020-03-20

## 2020-03-17 RX ORDER — CARVEDILOL PHOSPHATE 80 MG/1
12.5 CAPSULE, EXTENDED RELEASE ORAL EVERY 12 HOURS
Refills: 0 | Status: DISCONTINUED | OUTPATIENT
Start: 2020-03-17 | End: 2020-03-19

## 2020-03-17 RX ORDER — MOMETASONE FUROATE AND FORMOTEROL FUMARATE DIHYDRATE 200; 5 UG/1; UG/1
2 AEROSOL RESPIRATORY (INHALATION)
Qty: 0 | Refills: 0 | DISCHARGE

## 2020-03-17 RX ORDER — FUROSEMIDE 40 MG
40 TABLET ORAL ONCE
Refills: 0 | Status: COMPLETED | OUTPATIENT
Start: 2020-03-17 | End: 2020-03-17

## 2020-03-17 RX ORDER — ASPIRIN/CALCIUM CARB/MAGNESIUM 324 MG
325 TABLET ORAL ONCE
Refills: 0 | Status: COMPLETED | OUTPATIENT
Start: 2020-03-17 | End: 2020-03-17

## 2020-03-17 RX ORDER — PANTOPRAZOLE SODIUM 20 MG/1
40 TABLET, DELAYED RELEASE ORAL
Refills: 0 | Status: DISCONTINUED | OUTPATIENT
Start: 2020-03-17 | End: 2020-03-20

## 2020-03-17 RX ORDER — RIVAROXABAN 15 MG-20MG
15 KIT ORAL
Refills: 0 | Status: DISCONTINUED | OUTPATIENT
Start: 2020-03-18 | End: 2020-03-20

## 2020-03-17 RX ORDER — INSULIN LISPRO 100/ML
VIAL (ML) SUBCUTANEOUS
Refills: 0 | Status: DISCONTINUED | OUTPATIENT
Start: 2020-03-17 | End: 2020-03-20

## 2020-03-17 RX ORDER — LIPASE/PROTEASE/AMYLASE 16-48-48K
1 CAPSULE,DELAYED RELEASE (ENTERIC COATED) ORAL
Qty: 0 | Refills: 0 | DISCHARGE

## 2020-03-17 RX ORDER — IPRATROPIUM/ALBUTEROL SULFATE 18-103MCG
3 AEROSOL WITH ADAPTER (GRAM) INHALATION EVERY 6 HOURS
Refills: 0 | Status: DISCONTINUED | OUTPATIENT
Start: 2020-03-17 | End: 2020-03-18

## 2020-03-17 RX ORDER — MONTELUKAST 4 MG/1
10 TABLET, CHEWABLE ORAL DAILY
Refills: 0 | Status: DISCONTINUED | OUTPATIENT
Start: 2020-03-17 | End: 2020-03-20

## 2020-03-17 RX ORDER — ATORVASTATIN CALCIUM 80 MG/1
40 TABLET, FILM COATED ORAL AT BEDTIME
Refills: 0 | Status: DISCONTINUED | OUTPATIENT
Start: 2020-03-17 | End: 2020-03-20

## 2020-03-17 RX ORDER — ZOLPIDEM TARTRATE 10 MG/1
5 TABLET ORAL AT BEDTIME
Refills: 0 | Status: DISCONTINUED | OUTPATIENT
Start: 2020-03-17 | End: 2020-03-20

## 2020-03-17 RX ORDER — LINACLOTIDE 145 UG/1
1 CAPSULE, GELATIN COATED ORAL
Qty: 0 | Refills: 0 | DISCHARGE

## 2020-03-17 RX ORDER — LEVOTHYROXINE SODIUM 125 MCG
175 TABLET ORAL DAILY
Refills: 0 | Status: DISCONTINUED | OUTPATIENT
Start: 2020-03-17 | End: 2020-03-20

## 2020-03-17 RX ADMIN — Medication 325 MILLIGRAM(S): at 20:44

## 2020-03-17 NOTE — H&P ADULT - PROBLEM SELECTOR PLAN 1
Patient is being admitted for acute CHF exacerbation with dyspnea and Chest xray concerning for fluid overload.   will get IV diuresis with IV lasix.   s/p 1 dose of IV lasix in ED.  patient takes torsemide 10 mg at home.  will continue IV lasix 40 mg once daily.  will resume carvidilol home dose  strict input and output  daily weights.  cardiolgy consult dr roque  echo 3 months ago showed normal EF with grade 1 diastolic dysfunction

## 2020-03-17 NOTE — ED PROVIDER NOTE - MUSCULOSKELETAL, MLM
Spine appears normal, range of motion is not limited, no muscle or joint tenderness, pitting edema b/l 1+, patient is able to ambulate.

## 2020-03-17 NOTE — ED ADULT TRIAGE NOTE - CHIEF COMPLAINT QUOTE
chest pain,shortness of breath with flu like symptoms chest pain,shortness of breath ,fever ,flu like symptoms

## 2020-03-17 NOTE — H&P ADULT - PROBLEM SELECTOR PLAN 3
Patient has history of hypothyroidism.  will resume home dose,  f/u tsh

## 2020-03-17 NOTE — H&P ADULT - HISTORY OF PRESENT ILLNESS
79 years old [place] Korean-speaking female from home, ambulates with walker, lives with son, with PMHx of HFpEF, CAD (s/p stents), chronic venous stasis, DM, HTN and Afib (on Xarelto, s/p PPM) presents to the ED with chief complaint of fever and chills x 1 days 79 years old [place]-speaking female from home, ambulates with walker, lives with son, with PMHx of HFpEF, CAD (s/p stents), chronic venous stasis, DM, HTN and Afib (on Xarelto, s/p PPM) presents to the ED with chief complaint of right sided chest pain associated with shortness of breath x1 day. Describe pain as constant, aching and sharp. Patient reports she has fever earlier today which cleared on its own. Patient did not measure the fever wheather it was high grade or low grade. Patient endorses b/l lower extremity swelling x6 months. Did not take any medication prior to arrival. Patient denies cough, nasal congestion, rhinorrhea or any other symptoms.  Patient is being admitted for acute CHF exacerbation with dyspnea and Chest xray concerning for fluid overload. will get IV diuresis with IV lasix.

## 2020-03-17 NOTE — H&P ADULT - ASSESSMENT
79 years old [place]an-speaking female from home, ambulates with walker, lives with son, with PMHx of HFpEF, CAD (s/p stents), chronic venous stasis, DM, HTN and Afib (on Xarelto, s/p PPM) presents to the ED with chief complaint of right sided chest pain associated with shortness of breath x1 day. Describe pain as constant, aching and sharp. Patient reports she has fever earlier today which cleared on its own. Patient did not measure the fever wheather it was high grade or low grade. Patient endorses b/l lower extremity swelling x6 months. Did not take any medication prior to arrival. Patient denies cough, nasal congestion, rhinorrhea or any other symptoms.  Patient is being admitted for acute CHF exacerbation with dyspnea and Chest xray concerning for fluid overload. will get IV diuresis with IV lasix.

## 2020-03-17 NOTE — H&P ADULT - PROBLEM SELECTOR PLAN 9
IMPROVE VTE Individual Risk Assessment  RISK                                                          Points  [] Previous VTE                                           3  [] Thrombophilia                                        2  [] Lower limb paralysis                              2   [] Current Cancer                                       2   [] Immobilization > 24 hrs                        1  [] ICU/CCU stay > 24 hours                       1  [] Age > 60                                                   1  IMPROVE VTE Score = 2  pt on xerlato

## 2020-03-17 NOTE — H&P ADULT - ATTENDING COMMENTS
case discussed with MAR  79 year old patient of Dr Rodriguez who presents to the ED on account of clinical features suggestive of CHF exacerbation.  PMH include CAD, CHF with preserved EF with PPM, obesity, DM2, HTN and Afib on Xarelto. Pt states compliance with meds but that with fluid restriction and chf diet unsure.    Vital Signs Last 24 Hrs  T(C): 36.6 (18 Mar 2020 05:14), Max: 37.3 (18 Mar 2020 00:54)  T(F): 97.8 (18 Mar 2020 05:14), Max: 99.2 (18 Mar 2020 00:54)  HR: 72 (18 Mar 2020 05:14) (72 - 95)  BP: 152/67 (18 Mar 2020 05:14) (148/81 - 200/80)  RR: 18 (18 Mar 2020 05:14) (18 - 22)  SpO2: 97% (18 Mar 2020 05:14) (97% - 98%)    Exams  Elderly woman , obese, NAD at rest  No JVD, no neck swelling  Lung exams limited by body habitus - decreased b/sounds globally  RRR s1S2 only  Full soft NT ND BS+  +2 pitting pedal edema    Meds reviewed     Labs                        12.0   4.01  )-----------( 249      ( 18 Mar 2020 08:13 )             35.6     03-18    135  |  99  |  21<H>  ----------------------------<  72  3.7   |  27  |  x     Ca    9.3      18 Mar 2020 08:13  Mg     1.8     03-18    TPro  x   /  Alb  3.5  /  TBili  x   /  DBili  x   /  AST  x   /  ALT  x   /  AlkPhos  x   03-18    Pro BNP - 1300  trop -ve X 1    Flu -ve    INR - 2.12  EKG    CXR  B/L pulmonary interstitial infiltrates c/w pulmonary congestion    ECHO (11/2019)  1. Mitral annular calcification, and calcified mitral  leaflets with reduced diastolic opening. Mild mitral  regurgitation.  2. Calcified trileaflet aortic valve with decreased  opening. Peak transaortic valve gradient equals 27 mm Hg,  estimated aortic valve area equals 1.6 sqcm (by continuity  equation), consistent with mild aortic stenosis.  3. Aortic Root: 3.0 cm.  4. Mildly dilated left atrium.  LA volume index = 41 cc/m2.  5. Normal left ventricular internal dimensions and wall  thicknesses.  6. Normal Left Ventricular Systolic Function,  (EF = 55%)  7. Grade I diastolic dysfunction (Impaired relaxation).  8. Normal right atrium.  9. Normal right ventricular size and function (TAPSE  2.3cm). A device lead is visualized in the right heart.  10. RA Pressure is 8 mm Hg.  11. RV systolic pressure is mildly increased at  39 mm Hg.  12. There is mild tricuspid regurgitation.  13. Normal pulmonic valve.  14. Normal pericardium with no pericardial effusion.      Impression  - acute hypoxic respiratory failure  1) CHF exacerbation   Admit to medical floor  Continue diuresis  CHF diet . daily weight , strict fluid intake, i/o monitor  Cardiology follow up  Continue home meds  Weight loss needed

## 2020-03-17 NOTE — ED PROVIDER NOTE - OBJECTIVE STATEMENT
81 y/o F pt with an extensive PMHx including HTN, DM, presents to the ED with complaints of right sided chest pain associated with shortness of breath x1 day. Describe pain as constant, aching and sharp. Patient reports she has fever earlier today which cleared on its own. Patient endorses b/l lower extremity swelling x6 months. Did not take any medication prior to arrival. Patient denies cough, nasal congestion, rhinorrhea or any other symptoms. NKDA.

## 2020-03-17 NOTE — H&P ADULT - NSHPPHYSICALEXAM_GEN_ALL_CORE
Vital Signs Last 24 Hrs  T(C): 37.2 (17 Mar 2020 19:23), Max: 37.2 (17 Mar 2020 19:23)  T(F): 98.9 (17 Mar 2020 19:23), Max: 98.9 (17 Mar 2020 19:23)  HR: 95 (17 Mar 2020 22:55) (76 - 95)  BP: 175/71 (17 Mar 2020 22:55) (175/71 - 200/80)  BP(mean): --  RR: 20 (17 Mar 2020 22:55) (20 - 22)  SpO2: 97% (17 Mar 2020 22:55) (97% - 97%)  · CONSTITUTIONAL: Well appearing, awake, alert, oriented to person, place, time/situation and in no apparent distress.  · ENMT: Airway patent, Nasal mucosa clear. Mouth with normal mucosa. Throat has no vesicles, no oropharyngeal exudates and uvula is midline.  · EYES: Clear bilaterally, pupils equal, round and reactive to light.  · CARDIAC: Normal rate, regular rhythm.  Heart sounds S1, S2.  No murmurs, rubs or gallops.  · RESPIRATORY: - - -  · Breath Sounds: DIMINISHED  · Diminished Breath Sounds: DIFFUSE  · GASTROINTESTINAL: Abdomen soft, non-tender, no guarding.  · MUSCULOSKELETAL: Spine appears normal, range of motion is not limited, no muscle or joint tenderness, pitting edema b/l 1+, patient is able to ambulate.  · NEUROLOGICAL: Alert and oriented, no focal deficits, no motor or sensory deficits.  · SKIN: Skin normal color for race, warm, dry and intact. No evidence of rash.

## 2020-03-18 DIAGNOSIS — Z71.89 OTHER SPECIFIED COUNSELING: ICD-10-CM

## 2020-03-18 LAB
ALBUMIN SERPL ELPH-MCNC: 3.5 G/DL — SIGNIFICANT CHANGE UP (ref 3.5–5)
ALP SERPL-CCNC: 62 U/L — SIGNIFICANT CHANGE UP (ref 40–120)
ALT FLD-CCNC: 21 U/L DA — SIGNIFICANT CHANGE UP (ref 10–60)
ANION GAP SERPL CALC-SCNC: 9 MMOL/L — SIGNIFICANT CHANGE UP (ref 5–17)
AST SERPL-CCNC: 22 U/L — SIGNIFICANT CHANGE UP (ref 10–40)
BILIRUB DIRECT SERPL-MCNC: 0.1 MG/DL — SIGNIFICANT CHANGE UP (ref 0–0.2)
BILIRUB INDIRECT FLD-MCNC: 0.2 MG/DL — SIGNIFICANT CHANGE UP (ref 0.2–1)
BILIRUB SERPL-MCNC: 0.3 MG/DL — SIGNIFICANT CHANGE UP (ref 0.2–1.2)
BUN SERPL-MCNC: 21 MG/DL — HIGH (ref 7–18)
CALCIUM SERPL-MCNC: 9.3 MG/DL — SIGNIFICANT CHANGE UP (ref 8.4–10.5)
CHLORIDE SERPL-SCNC: 99 MMOL/L — SIGNIFICANT CHANGE UP (ref 96–108)
CHOLEST SERPL-MCNC: 150 MG/DL — SIGNIFICANT CHANGE UP (ref 10–199)
CO2 SERPL-SCNC: 27 MMOL/L — SIGNIFICANT CHANGE UP (ref 22–31)
CREAT SERPL-MCNC: 1.09 MG/DL — SIGNIFICANT CHANGE UP (ref 0.5–1.3)
GLUCOSE BLDC GLUCOMTR-MCNC: 109 MG/DL — HIGH (ref 70–99)
GLUCOSE BLDC GLUCOMTR-MCNC: 110 MG/DL — HIGH (ref 70–99)
GLUCOSE BLDC GLUCOMTR-MCNC: 148 MG/DL — HIGH (ref 70–99)
GLUCOSE BLDC GLUCOMTR-MCNC: 181 MG/DL — HIGH (ref 70–99)
GLUCOSE BLDC GLUCOMTR-MCNC: 76 MG/DL — SIGNIFICANT CHANGE UP (ref 70–99)
GLUCOSE SERPL-MCNC: 72 MG/DL — SIGNIFICANT CHANGE UP (ref 70–99)
HBA1C BLD-MCNC: 7.2 % — HIGH (ref 4–5.6)
HCT VFR BLD CALC: 35.6 % — SIGNIFICANT CHANGE UP (ref 34.5–45)
HDLC SERPL-MCNC: 56 MG/DL — SIGNIFICANT CHANGE UP
HGB BLD-MCNC: 12 G/DL — SIGNIFICANT CHANGE UP (ref 11.5–15.5)
LIPID PNL WITH DIRECT LDL SERPL: 78 MG/DL — SIGNIFICANT CHANGE UP
MAGNESIUM SERPL-MCNC: 1.8 MG/DL — SIGNIFICANT CHANGE UP (ref 1.6–2.6)
MCHC RBC-ENTMCNC: 27.9 PG — SIGNIFICANT CHANGE UP (ref 27–34)
MCHC RBC-ENTMCNC: 33.7 GM/DL — SIGNIFICANT CHANGE UP (ref 32–36)
MCV RBC AUTO: 82.8 FL — SIGNIFICANT CHANGE UP (ref 80–100)
NRBC # BLD: 0 /100 WBCS — SIGNIFICANT CHANGE UP (ref 0–0)
PHOSPHATE SERPL-MCNC: 3.8 MG/DL — SIGNIFICANT CHANGE UP (ref 2.5–4.5)
PLATELET # BLD AUTO: 249 K/UL — SIGNIFICANT CHANGE UP (ref 150–400)
POTASSIUM SERPL-MCNC: 3.7 MMOL/L — SIGNIFICANT CHANGE UP (ref 3.5–5.3)
POTASSIUM SERPL-SCNC: 3.7 MMOL/L — SIGNIFICANT CHANGE UP (ref 3.5–5.3)
PROT SERPL-MCNC: 8.3 G/DL — SIGNIFICANT CHANGE UP (ref 6–8.3)
RBC # BLD: 4.3 M/UL — SIGNIFICANT CHANGE UP (ref 3.8–5.2)
RBC # FLD: 13.3 % — SIGNIFICANT CHANGE UP (ref 10.3–14.5)
SODIUM SERPL-SCNC: 135 MMOL/L — SIGNIFICANT CHANGE UP (ref 135–145)
TOTAL CHOLESTEROL/HDL RATIO MEASUREMENT: 2.7 RATIO — LOW (ref 3.3–7.1)
TRIGL SERPL-MCNC: 81 MG/DL — SIGNIFICANT CHANGE UP (ref 10–149)
TSH SERPL-MCNC: 0.54 UU/ML — SIGNIFICANT CHANGE UP (ref 0.34–4.82)
VIT B12 SERPL-MCNC: >2000 PG/ML — HIGH (ref 232–1245)
WBC # BLD: 4.01 K/UL — SIGNIFICANT CHANGE UP (ref 3.8–10.5)
WBC # FLD AUTO: 4.01 K/UL — SIGNIFICANT CHANGE UP (ref 3.8–10.5)

## 2020-03-18 PROCEDURE — 99233 SBSQ HOSP IP/OBS HIGH 50: CPT | Mod: GC

## 2020-03-18 RX ORDER — FUROSEMIDE 40 MG
40 TABLET ORAL DAILY
Refills: 0 | Status: DISCONTINUED | OUTPATIENT
Start: 2020-03-18 | End: 2020-03-19

## 2020-03-18 RX ORDER — ACETAMINOPHEN 500 MG
650 TABLET ORAL EVERY 4 HOURS
Refills: 0 | Status: DISCONTINUED | OUTPATIENT
Start: 2020-03-18 | End: 2020-03-20

## 2020-03-18 RX ORDER — AZITHROMYCIN 500 MG/1
500 TABLET, FILM COATED ORAL EVERY 24 HOURS
Refills: 0 | Status: DISCONTINUED | OUTPATIENT
Start: 2020-03-19 | End: 2020-03-19

## 2020-03-18 RX ORDER — AZITHROMYCIN 500 MG/1
500 TABLET, FILM COATED ORAL ONCE
Refills: 0 | Status: COMPLETED | OUTPATIENT
Start: 2020-03-18 | End: 2020-03-18

## 2020-03-18 RX ORDER — AZITHROMYCIN 500 MG/1
TABLET, FILM COATED ORAL
Refills: 0 | Status: DISCONTINUED | OUTPATIENT
Start: 2020-03-18 | End: 2020-03-19

## 2020-03-18 RX ORDER — ASPIRIN/CALCIUM CARB/MAGNESIUM 324 MG
81 TABLET ORAL DAILY
Refills: 0 | Status: DISCONTINUED | OUTPATIENT
Start: 2020-03-18 | End: 2020-03-20

## 2020-03-18 RX ADMIN — Medication 175 MICROGRAM(S): at 07:03

## 2020-03-18 RX ADMIN — PANTOPRAZOLE SODIUM 40 MILLIGRAM(S): 20 TABLET, DELAYED RELEASE ORAL at 07:03

## 2020-03-18 RX ADMIN — Medication 40 MILLIGRAM(S): at 21:55

## 2020-03-18 RX ADMIN — AZITHROMYCIN 255 MILLIGRAM(S): 500 TABLET, FILM COATED ORAL at 21:55

## 2020-03-18 RX ADMIN — CARVEDILOL PHOSPHATE 12.5 MILLIGRAM(S): 80 CAPSULE, EXTENDED RELEASE ORAL at 01:20

## 2020-03-18 RX ADMIN — Medication 40 MILLIGRAM(S): at 00:00

## 2020-03-18 RX ADMIN — Medication 40 MILLIGRAM(S): at 07:04

## 2020-03-18 RX ADMIN — ATORVASTATIN CALCIUM 40 MILLIGRAM(S): 80 TABLET, FILM COATED ORAL at 21:56

## 2020-03-18 RX ADMIN — Medication 3 MILLILITER(S): at 09:15

## 2020-03-18 RX ADMIN — Medication 3 MILLILITER(S): at 16:01

## 2020-03-18 RX ADMIN — RIVAROXABAN 15 MILLIGRAM(S): KIT at 19:04

## 2020-03-18 RX ADMIN — GABAPENTIN 300 MILLIGRAM(S): 400 CAPSULE ORAL at 15:04

## 2020-03-18 RX ADMIN — MONTELUKAST 10 MILLIGRAM(S): 4 TABLET, CHEWABLE ORAL at 15:04

## 2020-03-18 RX ADMIN — Medication 81 MILLIGRAM(S): at 15:04

## 2020-03-18 RX ADMIN — Medication 650 MILLIGRAM(S): at 17:15

## 2020-03-18 RX ADMIN — CARVEDILOL PHOSPHATE 12.5 MILLIGRAM(S): 80 CAPSULE, EXTENDED RELEASE ORAL at 07:03

## 2020-03-18 RX ADMIN — Medication 650 MILLIGRAM(S): at 16:16

## 2020-03-18 NOTE — CONSULT NOTE ADULT - ASSESSMENT
79 years old Hungarian-speaking female from home, ambulates with walker, lives with son, with PMHx of HFpEF, CAD (s/p stents), chronic venous stasis, DM, HTN and Afib (on Xarelto, s/p PPM) presents to the ED with chief complaint of right sided chest pain associated with shortness of breath x1 day,acute on chronic diastolic HF.  1.Acute on chronic diastolic HF-IV lasix.  2.Afib-xarelto.  3.CAD-asa,b blocker,statin.  4.DM-Insulin.  5.Hypothyroidism-synthroid.  6.PPI.  7.Sleep study as outpatient-R/O JOSE DANIEL.

## 2020-03-18 NOTE — PHYSICAL THERAPY INITIAL EVALUATION ADULT - PASSIVE RANGE OF MOTION EXAMINATION, REHAB EVAL
right shoulder flexion and abduction limited to 90 degrees/no Passive ROM deficits were identified/deficits as listed below

## 2020-03-18 NOTE — PROGRESS NOTE ADULT - ATTENDING COMMENTS
Patient was seen and examined by myself. Case was discussed with Medicine NP staff in details. I have reviewed and agree with the plan as outlined above with edits where appropriate.    Vital Signs Last 24 Hrs  T(C): 36.9 (18 Mar 2020 18:36), Max: 37.9 (18 Mar 2020 13:50)  T(F): 98.5 (18 Mar 2020 18:36), Max: 100.2 (18 Mar 2020 13:50)  HR: 62 (18 Mar 2020 18:36) (62 - 95)  BP: 106/42 (18 Mar 2020 18:36) (106/42 - 175/71)  BP(mean): 102 (18 Mar 2020 14:51) (102 - 102)  RR: 20 (18 Mar 2020 14:51) (18 - 20)  SpO2: 99% (18 Mar 2020 14:51) (97% - 100%)                          12.0   4.01  )-----------( 249      ( 18 Mar 2020 08:13 )             35.6     03-18    135  |  99  |  21<H>  ----------------------------<  72  3.7   |  27  |  1.09    Ca    9.3      18 Mar 2020 08:13  Phos  3.8     03-18  Mg     1.8     03-18    TPro  8.3  /  Alb  3.5  /  TBili  0.3  /  DBili  0.1  /  AST  22  /  ALT  21  /  AlkPhos  62  03-18    A/p: 80 y/o F with   1. Acute on chronic diastolic heart failure  2. Acute bronchitis  3. Obesity  4. AFIB  5. Essential HTN  6. T2DM    plan as outlined above  Continue diuresis with Lasix  Azithromycin due to fever likely from acute bronchitis  Other plana s above.  Plan discussed with patient and family (daughter over the phone at ) in details and all their questions / concerns were addressed

## 2020-03-18 NOTE — PROGRESS NOTE ADULT - PROBLEM SELECTOR PLAN 6
Controlled  Patient takes glyburide-metformin at home  A1c pending  Continue with sliding scale insulin coverage  Continue with glucose monitoring  Continue with diabetic diet

## 2020-03-18 NOTE — PHYSICAL THERAPY INITIAL EVALUATION ADULT - PATIENT/FAMILY/SIGNIFICANT OTHER GOALS STATEMENT, PT EVAL
return home; ambulate, transfer, and perform ADLs independently without incidence of shortness of breath or difficulty

## 2020-03-18 NOTE — CONSULT NOTE ADULT - SUBJECTIVE AND OBJECTIVE BOX
CHIEF COMPLAINT:Patient is a 80y old  Female who presents with a chief complaint of shortness of breath and fever.      HPI:  79 years old Citizen of the [place]-speaking female from home, ambulates with walker, lives with son, with PMHx of HFpEF, Hypothyroidism, CAD (s/p stents), chronic venous stasis, DM, HTN and Afib (on Xarelto, s/p PPM) presents to the ED with chief complaint of right sided chest pain associated with shortness of breath x1 day. Describe pain as constant, aching and sharp. Patient reports she has fever earlier today which cleared on its own. Patient did not measure the fever wheather it was high grade or low grade. Patient endorses b/l lower extremity swelling x6 months. Did not take any medication prior to arrival. Patient denies cough, nasal congestion, rhinorrhea or any other symptoms.Patient is being admitted for acute CHF exacerbation with dyspnea and Chest xray concerning for fluid overload. will get IV diuresis with IV lasix. (17 Mar 2020 22:43)      PAST MEDICAL & SURGICAL HISTORY:  Obesity  Pacemaker  Atrial fibrillation  Hypothyroidism  Venous stasis  IBS (irritable bowel syndrome)  CHF (congestive heart failure), NYHA class I  HLD (Hyperlipidemia)  HTN (Hypertension)  Diabetes  Myocardial Infarction  CAD (Coronary Atherosclerotic Disease)  Asthma  S/P coronary angiogram      MEDICATIONS  (STANDING):  albuterol/ipratropium for Nebulization. 3 milliLiter(s) Nebulizer every 6 hours  atorvastatin 40 milliGRAM(s) Oral at bedtime  carvedilol 12.5 milliGRAM(s) Oral every 12 hours  furosemide   Injectable 40 milliGRAM(s) IV Push daily  gabapentin 300 milliGRAM(s) Oral daily  insulin lispro (HumaLOG) corrective regimen sliding scale   SubCutaneous Before meals and at bedtime  levothyroxine 175 MICROGram(s) Oral daily  montelukast 10 milliGRAM(s) Oral daily  pantoprazole    Tablet 40 milliGRAM(s) Oral before breakfast  rivaroxaban 15 milliGRAM(s) Oral with dinner    MEDICATIONS  (PRN):  ALBUTerol    90 MICROgram(s) HFA Inhaler 2 Puff(s) Inhalation every 6 hours PRN Shortness of Breath and/or Wheezing  zolpidem 5 milliGRAM(s) Oral at bedtime PRN Insomnia      FAMILY HISTORY:  Family history of heart disease      SOCIAL HISTORY:    [x ] Non-smoker    [x ] Alcohol-denies    Allergies    No Known Allergies    Intolerances    	    REVIEW OF SYSTEMS:  CONSTITUTIONAL: No fever, weight loss, or fatigue  EYES: No eye pain, visual disturbances, or discharge  ENT:  No difficulty hearing, tinnitus, vertigo; No sinus or throat pain  NECK: No pain or stiffness  RESPIRATORY: No cough, wheezing, chills or hemoptysis; + Shortness of Breath  CARDIOVASCULAR: No chest pain, palpitations, passing out, dizziness, + leg swelling  GASTROINTESTINAL: No abdominal or epigastric pain. No nausea, vomiting, or hematemesis; No diarrhea or constipation. No melena or hematochezia.  GENITOURINARY: No dysuria, frequency, hematuria, or incontinence  NEUROLOGICAL: No headaches, memory loss, loss of strength, numbness, or tremors  SKIN: No itching, burning, rashes, or lesions   LYMPH Nodes: No enlarged glands  ENDOCRINE: No heat or cold intolerance; No hair loss  MUSCULOSKELETAL: No joint pain or swelling; No muscle, back, or extremity pain  PSYCHIATRIC: No depression, anxiety, mood swings, or difficulty sleeping  HEME/LYMPH: No easy bruising, or bleeding gums  ALLERGY AND IMMUNOLOGIC: No hives or eczema	        PHYSICAL EXAM:  T(C): 36.6 (03-18-20 @ 05:14), Max: 37.3 (03-18-20 @ 00:54)  HR: 72 (03-18-20 @ 05:14) (72 - 95)  BP: 152/67 (03-18-20 @ 05:14) (148/81 - 200/80)  RR: 18 (03-18-20 @ 05:14) (18 - 22)  SpO2: 97% (03-18-20 @ 05:14) (97% - 98%)  Wt(kg): --  I&O's Summary    17 Mar 2020 07:01  -  18 Mar 2020 07:00  --------------------------------------------------------  IN: 50 mL / OUT: 450 mL / NET: -400 mL        Appearance: Normal	  HEENT:   Normal oral mucosa, PERRL, EOMI	  Lymphatic: No lymphadenopathy  Cardiovascular: Normal S1 S2, No JVD, No murmurs, +2 edema  Respiratory: Lungs clear to auscultation	  Psychiatry: A & O x 3, Mood & affect appropriate  Gastrointestinal:  Soft, Non-tender, + BS	  Skin: No rashes, No ecchymoses, No cyanosis	  Neurologic: Non-focal  Extremities: Normal range of motion, No clubbing, cyanosis +2 edema  Vascular: Peripheral pulses palpable 2+ bilaterally    	    ECG:  	nsr,rbbb,lafb  	  	  LABS:	 	      CARDIAC MARKERS ( 17 Mar 2020 20:00 )  <0.015 ng/mL / x     / x     / x     / x                              12.0   4.01  )-----------( 249      ( 18 Mar 2020 08:13 )             35.6     03-18    135  |  99  |  21<H>  ----------------------------<  72  3.7   |  27  |  1.09    Ca    9.3      18 Mar 2020 08:13  Phos  3.8     03-18  Mg     1.8     03-18    TPro  8.3  /  Alb  3.5  /  TBili  0.3  /  DBili  0.1  /  AST  22  /  ALT  21  /  AlkPhos  62  03-18    proBNP: Serum Pro-Brain Natriuretic Peptide: 1300 pg/mL (03-17 @ 20:00)    Lipid Profile: Cholesterol 150  LDL 78  HDL 56  TG 81      TSH: Thyroid Stimulating Hormone, Serum: 0.54 uU/mL (03-18 @ 08:13)        EXAM:  XR CHEST PORTABLE URGENT 1V                            PROCEDURE DATE:  03/17/2020          INTERPRETATION:  AP semierect chest on March 17, 2020 at 8:35 PM. Patient has chest pain.    The heart is enlarged.    Left-sided pacemaker again noted.    On November 27, 2019 there is a mild central congestive picture. Presently lungs are clear.    IMPRESSION: Clear lungs at this time.    Echocardiography:    CONCLUSIONS:  1. Mitral annular calcification, and calcified mitral  leaflets with reducedl diastolic opening. Mild mitral  regurgitation.  2. Calcified trileaflet aortic valve with decreased  opening. Peak transaortic valve gradient equals 27 mm Hg,  estimated aortic valve area equals 1.6 sqcm (by continuity  equation), consistent with mild aorticstenosis.  3. Aortic Root: 3.0 cm.  4. Mildly dilated left atrium.  LA volume index = 41 cc/m2.  5. Normal left ventricular internal dimensions and wall  thicknesses.  6. Normal Left Ventricular Systolic Function,  (EF = 55%)  7. Grade I diastolic dysfunction (Impaired relaxation).  8. Normal right atrium.  9. Normal right ventricular size and function (TAPSE  2.3cm). A device lead is visualized in the right heart.  10. RA Pressure is 8 mm Hg.  11. RV systolic pressure is mildly increased at  39 mm Hg.  12. There is mild tricuspid regurgitation.  13. Normal pulmonic valve.  14. Normal pericardium with no pericardial effusion.    ------------------------------------------------------------------------  Confirmed on  11/27/2019 - 08:11:23 by Kristi Sandoval MD

## 2020-03-18 NOTE — PROGRESS NOTE ADULT - PROBLEM SELECTOR PLAN 1
Pt last Echo was 11/2019 indicates Grade 1 diastolic dysfunction with EF 55%  CXR noted above  Continue with IV Lasix  Continue with O2 via n/c  Continue with I&O  Continue with daily weights  Dr. Sandoval (Cardiology) following

## 2020-03-18 NOTE — PROGRESS NOTE ADULT - PROBLEM SELECTOR PLAN 10
Patient to remain FULL CODE    -----    Discharge Planning:  Patient admitted from home and is expected to return home when medically stable for discharge

## 2020-03-18 NOTE — PROGRESS NOTE ADULT - SUBJECTIVE AND OBJECTIVE BOX
HPI:  79 years old [place]ian-speaking female from home, ambulates with walker, lives with son, with PMHx of HFpEF, CAD (s/p stents), chronic venous stasis, DM, HTN and Afib (on Xarelto, s/p PPM) presents to the ED with chief complaint of right sided chest pain associated with shortness of breath.  Patient responding well to IV Lasix  Patient examined at bedside, resting comfortably, denies pain or N&V.  SOB improving.  Afebrile, VSS, NAD, O2 via n/c.    OVERNIGHT EVENTS:  No new overnight events     REVIEW OF SYSTEMS:      CONSTITUTIONAL: No fever,   EYES: no acute visual disturbances  NECK: No pain or stiffness  RESPIRATORY: No cough; + shortness of breath  CARDIOVASCULAR: No chest pain, no palpitations  GASTROINTESTINAL: No pain. No nausea, vomiting or diarrhea   NEUROLOGICAL: No headache or numbness, no tremors  MUSCULOSKELETAL: No joint pain, no muscle pain  GENITOURINARY: no dysuria, no frequency, no hesitancy  PSYCHIATRY: no depression , no anxiety  ALL OTHER  ROS negative        Vital Signs Last 24 Hrs  T(C): 36.6 (18 Mar 2020 05:14), Max: 37.3 (18 Mar 2020 00:54)  T(F): 97.8 (18 Mar 2020 05:14), Max: 99.2 (18 Mar 2020 00:54)  HR: 72 (18 Mar 2020 05:14) (72 - 95)  BP: 152/67 (18 Mar 2020 05:14) (148/81 - 200/80)  BP(mean): --  RR: 18 (18 Mar 2020 05:14) (18 - 22)  SpO2: 97% (18 Mar 2020 05:14) (97% - 98%)    ________________________________________________  PHYSICAL EXAM:    GENERAL: NAD  HEENT: Normocephalic; conjunctivae and sclerae clear; moist mucous membranes;   NECK : supple, no JVD  CHEST/LUNG: Diminished breath sounds bilaterally   HEART: S1 S2  regular  ABDOMEN: Soft, Nontender, Nondistended; Bowel sounds present  EXTREMITIES: no cyanosis; no LE edema; no calf tenderness  SKIN: warm and dry; no rash  NERVOUS SYSTEM:  A&O x 3; no new deficits    _________________________________________________  CURRENT MEDICATIONS:    MEDICATIONS  (STANDING):  albuterol/ipratropium for Nebulization. 3 milliLiter(s) Nebulizer every 6 hours  aspirin enteric coated 81 milliGRAM(s) Oral daily  atorvastatin 40 milliGRAM(s) Oral at bedtime  carvedilol 12.5 milliGRAM(s) Oral every 12 hours  furosemide   Injectable 40 milliGRAM(s) IV Push daily  gabapentin 300 milliGRAM(s) Oral daily  insulin lispro (HumaLOG) corrective regimen sliding scale   SubCutaneous Before meals and at bedtime  levothyroxine 175 MICROGram(s) Oral daily  montelukast 10 milliGRAM(s) Oral daily  pantoprazole    Tablet 40 milliGRAM(s) Oral before breakfast  rivaroxaban 15 milliGRAM(s) Oral with dinner    MEDICATIONS  (PRN):  ALBUTerol    90 MICROgram(s) HFA Inhaler 2 Puff(s) Inhalation every 6 hours PRN Shortness of Breath and/or Wheezing  zolpidem 5 milliGRAM(s) Oral at bedtime PRN Insomnia      __________________________________________________  LABS:                          12.0   4.01  )-----------( 249      ( 18 Mar 2020 08:13 )             35.6     03-18    135  |  99  |  21<H>  ----------------------------<  72  3.7   |  27  |  1.09    Ca    9.3      18 Mar 2020 08:13  Phos  3.8     03-18  Mg     1.8     03-18    TPro  8.3  /  Alb  3.5  /  TBili  0.3  /  DBili  0.1  /  AST  22  /  ALT  21  /  AlkPhos  62  03-18    PT/INR - ( 17 Mar 2020 20:00 )   PT: 24.5 sec;   INR: 2.12 ratio         PTT - ( 17 Mar 2020 20:00 )  PTT:44.6 sec    CAPILLARY BLOOD GLUCOSE      POCT Blood Glucose.: 76 mg/dL (18 Mar 2020 08:24)  POCT Blood Glucose.: 181 mg/dL (18 Mar 2020 01:17)      __________________________________________________  RADIOLOGY & ADDITIONAL TESTS:    Imaging Personally Reviewed:  YES    < from: Xray Chest 1 View- PORTABLE-Urgent (03.17.20 @ 21:14) >  IMPRESSION: Clear lungs at this time.    < end of copied text >    Consultant(s) Notes Reviewed:   YES     Plan of care was discussed with patient and /or primary care giver; all questions and concerns were addressed and care was aligned with patient's wishes.    Plan discussed with attending and consulting physicians.

## 2020-03-18 NOTE — PHYSICAL THERAPY INITIAL EVALUATION ADULT - MODALITIES TREATMENT COMMENTS
audible wheezing noted; (+) weak, productive cough; shortness of breath with basic tasks; On O2@2L/min via NC, SpO2 99%; unable to verbalize and understand modified Domitila RPE but verbalized moderate difficulty in performing simple basic tasks

## 2020-03-18 NOTE — PROGRESS NOTE ADULT - PROBLEM SELECTOR PLAN 8
Not in acute distress  Pt takes Advair, Singulair, Spiriva and Ventolin at home  Continue with home regimen of Singulair and Albuterol  Continue with PRN Duoneb

## 2020-03-18 NOTE — PHYSICAL THERAPY INITIAL EVALUATION ADULT - GENERAL OBSERVATIONS, REHAB EVAL
awake, alert, NAD; currently on O2@2L/min via NC, SpO2@99%; +peripheral IV access on left upper arm; moderate swelling on both lower extremities

## 2020-03-18 NOTE — PHYSICAL THERAPY INITIAL EVALUATION ADULT - DIAGNOSIS, PT EVAL
impaired cardiopulmonary status; weak, productive cough; audible wheeze; weakness and difficulty with basic mobility tasks; unsteady gait and transfers

## 2020-03-18 NOTE — PHYSICAL THERAPY INITIAL EVALUATION ADULT - ACTIVE RANGE OF MOTION EXAMINATION, REHAB EVAL
right shoulder flexion and abduction limited to 80 degrees/deficits as listed below/no Active ROM deficits were identified

## 2020-03-19 ENCOUNTER — TRANSCRIPTION ENCOUNTER (OUTPATIENT)
Age: 80
End: 2020-03-19

## 2020-03-19 DIAGNOSIS — J40 BRONCHITIS, NOT SPECIFIED AS ACUTE OR CHRONIC: ICD-10-CM

## 2020-03-19 LAB
ALBUMIN SERPL ELPH-MCNC: 3.2 G/DL — LOW (ref 3.5–5)
ALP SERPL-CCNC: 54 U/L — SIGNIFICANT CHANGE UP (ref 40–120)
ALT FLD-CCNC: 25 U/L DA — SIGNIFICANT CHANGE UP (ref 10–60)
ANION GAP SERPL CALC-SCNC: 8 MMOL/L — SIGNIFICANT CHANGE UP (ref 5–17)
AST SERPL-CCNC: 29 U/L — SIGNIFICANT CHANGE UP (ref 10–40)
BILIRUB SERPL-MCNC: 0.3 MG/DL — SIGNIFICANT CHANGE UP (ref 0.2–1.2)
BUN SERPL-MCNC: 28 MG/DL — HIGH (ref 7–18)
CALCIUM SERPL-MCNC: 8.6 MG/DL — SIGNIFICANT CHANGE UP (ref 8.4–10.5)
CHLORIDE SERPL-SCNC: 96 MMOL/L — SIGNIFICANT CHANGE UP (ref 96–108)
CK SERPL-CCNC: 55 U/L — SIGNIFICANT CHANGE UP (ref 21–215)
CO2 SERPL-SCNC: 30 MMOL/L — SIGNIFICANT CHANGE UP (ref 22–31)
CREAT SERPL-MCNC: 1.34 MG/DL — HIGH (ref 0.5–1.3)
GLUCOSE BLDC GLUCOMTR-MCNC: 120 MG/DL — HIGH (ref 70–99)
GLUCOSE BLDC GLUCOMTR-MCNC: 157 MG/DL — HIGH (ref 70–99)
GLUCOSE BLDC GLUCOMTR-MCNC: 160 MG/DL — HIGH (ref 70–99)
GLUCOSE BLDC GLUCOMTR-MCNC: 201 MG/DL — HIGH (ref 70–99)
GLUCOSE SERPL-MCNC: 98 MG/DL — SIGNIFICANT CHANGE UP (ref 70–99)
HCT VFR BLD CALC: 34.5 % — SIGNIFICANT CHANGE UP (ref 34.5–45)
HGB BLD-MCNC: 11.4 G/DL — LOW (ref 11.5–15.5)
MCHC RBC-ENTMCNC: 27.7 PG — SIGNIFICANT CHANGE UP (ref 27–34)
MCHC RBC-ENTMCNC: 33 GM/DL — SIGNIFICANT CHANGE UP (ref 32–36)
MCV RBC AUTO: 83.9 FL — SIGNIFICANT CHANGE UP (ref 80–100)
MRSA PCR RESULT.: SIGNIFICANT CHANGE UP
NRBC # BLD: 0 /100 WBCS — SIGNIFICANT CHANGE UP (ref 0–0)
PLATELET # BLD AUTO: 221 K/UL — SIGNIFICANT CHANGE UP (ref 150–400)
POTASSIUM SERPL-MCNC: 3.8 MMOL/L — SIGNIFICANT CHANGE UP (ref 3.5–5.3)
POTASSIUM SERPL-SCNC: 3.8 MMOL/L — SIGNIFICANT CHANGE UP (ref 3.5–5.3)
PROT SERPL-MCNC: 7.4 G/DL — SIGNIFICANT CHANGE UP (ref 6–8.3)
RBC # BLD: 4.11 M/UL — SIGNIFICANT CHANGE UP (ref 3.8–5.2)
RBC # FLD: 13.5 % — SIGNIFICANT CHANGE UP (ref 10.3–14.5)
S AUREUS DNA NOSE QL NAA+PROBE: SIGNIFICANT CHANGE UP
SODIUM SERPL-SCNC: 134 MMOL/L — LOW (ref 135–145)
TROPONIN I SERPL-MCNC: <0.015 NG/ML — SIGNIFICANT CHANGE UP (ref 0–0.04)
WBC # BLD: 4.97 K/UL — SIGNIFICANT CHANGE UP (ref 3.8–10.5)
WBC # FLD AUTO: 4.97 K/UL — SIGNIFICANT CHANGE UP (ref 3.8–10.5)

## 2020-03-19 PROCEDURE — 99232 SBSQ HOSP IP/OBS MODERATE 35: CPT | Mod: GC

## 2020-03-19 RX ORDER — AZITHROMYCIN 500 MG/1
500 TABLET, FILM COATED ORAL ONCE
Refills: 0 | Status: COMPLETED | OUTPATIENT
Start: 2020-03-19 | End: 2020-03-19

## 2020-03-19 RX ORDER — ASPIRIN/CALCIUM CARB/MAGNESIUM 324 MG
1 TABLET ORAL
Qty: 0 | Refills: 0 | DISCHARGE
Start: 2020-03-19

## 2020-03-19 RX ORDER — ACETAMINOPHEN 500 MG
2 TABLET ORAL
Qty: 0 | Refills: 0 | DISCHARGE
Start: 2020-03-19

## 2020-03-19 RX ORDER — AZITHROMYCIN 500 MG/1
1 TABLET, FILM COATED ORAL
Qty: 4 | Refills: 0
Start: 2020-03-19 | End: 2020-03-22

## 2020-03-19 RX ORDER — CARVEDILOL PHOSPHATE 80 MG/1
6.25 CAPSULE, EXTENDED RELEASE ORAL EVERY 12 HOURS
Refills: 0 | Status: DISCONTINUED | OUTPATIENT
Start: 2020-03-19 | End: 2020-03-20

## 2020-03-19 RX ORDER — FUROSEMIDE 40 MG
1 TABLET ORAL
Qty: 0 | Refills: 0 | DISCHARGE
Start: 2020-03-19

## 2020-03-19 RX ADMIN — Medication 175 MICROGRAM(S): at 05:40

## 2020-03-19 RX ADMIN — Medication 1: at 12:29

## 2020-03-19 RX ADMIN — RIVAROXABAN 15 MILLIGRAM(S): KIT at 17:47

## 2020-03-19 RX ADMIN — Medication 81 MILLIGRAM(S): at 12:29

## 2020-03-19 RX ADMIN — MONTELUKAST 10 MILLIGRAM(S): 4 TABLET, CHEWABLE ORAL at 12:29

## 2020-03-19 RX ADMIN — Medication 650 MILLIGRAM(S): at 23:30

## 2020-03-19 RX ADMIN — Medication 650 MILLIGRAM(S): at 22:27

## 2020-03-19 RX ADMIN — PANTOPRAZOLE SODIUM 40 MILLIGRAM(S): 20 TABLET, DELAYED RELEASE ORAL at 06:32

## 2020-03-19 RX ADMIN — AZITHROMYCIN 500 MILLIGRAM(S): 500 TABLET, FILM COATED ORAL at 17:47

## 2020-03-19 RX ADMIN — Medication 2: at 22:01

## 2020-03-19 RX ADMIN — Medication 650 MILLIGRAM(S): at 10:50

## 2020-03-19 RX ADMIN — Medication 40 MILLIGRAM(S): at 05:40

## 2020-03-19 RX ADMIN — CARVEDILOL PHOSPHATE 6.25 MILLIGRAM(S): 80 CAPSULE, EXTENDED RELEASE ORAL at 22:08

## 2020-03-19 RX ADMIN — GABAPENTIN 300 MILLIGRAM(S): 400 CAPSULE ORAL at 12:29

## 2020-03-19 RX ADMIN — Medication 650 MILLIGRAM(S): at 09:51

## 2020-03-19 RX ADMIN — CARVEDILOL PHOSPHATE 12.5 MILLIGRAM(S): 80 CAPSULE, EXTENDED RELEASE ORAL at 05:40

## 2020-03-19 RX ADMIN — ATORVASTATIN CALCIUM 40 MILLIGRAM(S): 80 TABLET, FILM COATED ORAL at 22:09

## 2020-03-19 RX ADMIN — Medication 1: at 18:03

## 2020-03-19 NOTE — DISCHARGE NOTE PROVIDER - HOSPITAL COURSE
Patient Summary: Patient was admitted for CHF exacerbation and acute bronchitis.  She was treated with IV/PO diuretics and antibiotics.  She will be going home on all her home medications as well as 3 days of azithromycin 500mg daily, to complete her 5 day course.         HPI     79 years old [place]ian-speaking female from home, ambulates with walker, lives with son, with PMHx of HFpEF, CAD (s/p stents), chronic venous stasis, DM, HTN and Afib (on Xarelto, s/p PPM) presented to the ED with chief complaint of right sided chest pain associated with shortness of breath x1 day who was admitted for acute CHF exacerbation with dyspnea. Chest xray done concerning for fluid overload. Last echo 11/2019 with HFpEF OF 55%. Patient started on IV lasix with improvement of symptoms. Patient transitioned from IV to oral diuretics and stable for discharge home.             IMAGING:  XR CHEST PORTABLE                       PROCEDURE DATE:  03/17/2020      Retrieved from Xray Report    INTERPRETATION:  AP semierect chest on March 17, 2020 at 8:35 PM. Patient has chest pain.    The heart is enlarged.    Left-sided pacemaker again noted.    On November 27, 2019 there is a mild central congestive picture. Presently lungs are clear.    IMPRESSION: Clear lungs at this time.    GUERRREO GASCA M.D., ATTENDING RADIOLOGIST    This document has been electronically signed. Mar 18 2020  9:13AM     _________________________________________________________________________________    Plan discussed with attending. Patient medically stable for discharge. Plan reviewed with patient, who verbalized understanding (with RNRachel, in Georgian). Patient Summary: Patient was admitted for CHF exacerbation and acute bronchitis.  She was treated with IV/PO diuretics and antibiotics.  She will be going home on all her home medications as well as 3 days of azithromycin 500mg daily, to complete her 5 day course.         HPI     79 years old [place]ian-speaking female from home, ambulates with walker, lives with son, with PMHx of HFpEF, CAD (s/p stents), chronic venous stasis, DM, HTN and Afib (on Xarelto, s/p PPM) presented to the ED with chief complaint of right sided chest pain associated with shortness of breath x1 day who was admitted for acute CHF exacerbation with dyspnea. Chest xray done concerning for fluid overload. Last echo 11/2019 with HFpEF OF 55%. Patient started on IV lasix with improvement of symptoms. Patient transitioned from IV to oral diuretics and stable for discharge home.             IMAGING:  XR CHEST PORTABLE                       PROCEDURE DATE:  03/17/2020      Retrieved from Xray Report    INTERPRETATION:  AP semierect chest on March 17, 2020 at 8:35 PM. Patient has chest pain.    The heart is enlarged.    Left-sided pacemaker again noted.    On November 27, 2019 there is a mild central congestive picture. Presently lungs are clear.    IMPRESSION: Clear lungs at this time.    GUERRERO AGSCA M.D., ATTENDING RADIOLOGIST    This document has been electronically signed. Mar 18 2020  9:13AM     _________________________________________________________________________________    Plan discussed with attending. Patient medically stable for discharge. Plan reviewed with patient, who verbalized understanding.

## 2020-03-19 NOTE — DISCHARGE NOTE PROVIDER - NSDCFUSCHEDAPPT_GEN_ALL_CORE_FT
KWAME MOSQUERA ; 04/01/2020 ; [facility] KWAME Yeung ; 04/01/2020 ; [facility].

## 2020-03-19 NOTE — PROGRESS NOTE ADULT - PROBLEM SELECTOR PLAN 10
IMPROVE Score 2  Continue with Xarelto    Goals of Care:  Patient to remain FULL CODE    -----    Discharge Planning:  Patient admitted from home and is expected to return home when medically stable for discharge

## 2020-03-19 NOTE — DISCHARGE NOTE PROVIDER - CARE PROVIDER_API CALL
Mihai Madrid (MD)  Medicine  [address] [facility], [address]  [place] [address]  Phone: [phone]  Fax: [phone]  Established Patient  Follow Up Time:

## 2020-03-19 NOTE — PROGRESS NOTE ADULT - SUBJECTIVE AND OBJECTIVE BOX
HPI: 79 years old [place]ian-speaking female from home, ambulates with walker, lives with son, with PMHx of HFpEF, CAD (s/p stents), chronic venous stasis, DM, HTN and Afib (on Xarelto, s/p PPM) presented to the ED with chief complaint of right sided chest pain associated with shortness of breath x1 day admitted for acute CHF exacerbation with dyspnea and Chest xray concerning for fluid overload.       OVERNIGHT EVENTS:  Patient examined by me in NAD/VSS but pressures on the lower side this morning; Denies CP/SOB/Cough; No acute events overnight. Patient diuresed with IV lasix yesterday; Started on Azithromycin and PO lasix today.    REVIEW OF SYSTEMS:      CONSTITUTIONAL: No fever,   EYES: no acute visual disturbances  NECK: No pain or stiffness  RESPIRATORY: No cough; mild shortness of breath  CARDIOVASCULAR: No chest pain, no palpitations  GASTROINTESTINAL: No pain. No nausea, vomiting or diarrhea   NEUROLOGICAL: No headache or numbness, no tremors  MUSCULOSKELETAL: No joint pain, no muscle pain  GENITOURINARY: no dysuria, no frequency, no hesitancy  PSYCHIATRY: no depression , no anxiety  ALL OTHER  ROS negative        Vital Signs Last 24 Hrs  T(C): 37.6 (19 Mar 2020 06:34), Max: 37.9 (18 Mar 2020 13:50)  T(F): 99.7 (19 Mar 2020 06:34), Max: 100.2 (18 Mar 2020 13:50)  HR: 72 (19 Mar 2020 09:49) (62 - 72)  BP: 95/53 (19 Mar 2020 09:49) (95/53 - 156/86)  BP(mean): 102 (18 Mar 2020 14:51) (102 - 102)  RR: 16 (19 Mar 2020 06:34) (16 - 20)  SpO2: 100% (19 Mar 2020 06:34) (99% - 100%)    ________________________________________________  PHYSICAL EXAM:    GENERAL: NAD  HEENT: Normocephalic; conjunctivae and sclerae clear; moist mucous membranes;   NECK : supple, no JVD  CHEST/LUNG: Clear to auscultation bilaterally upper bases; Diminished lung sounds    HEART: S1 S2  regular  ABDOMEN: Soft, Nontender, Nondistended; Bowel sounds present  EXTREMITIES: no cyanosis; no LE edema; no calf tenderness  SKIN: warm and dry; no rash  NERVOUS SYSTEM:  Alert & Oriented x3; no new deficits    _________________________________________________  CURRENT MEDICATIONS:    MEDICATIONS  (STANDING):  aspirin enteric coated 81 milliGRAM(s) Oral daily  atorvastatin 40 milliGRAM(s) Oral at bedtime  azithromycin  IVPB 500 milliGRAM(s) IV Intermittent every 24 hours  azithromycin  IVPB      carvedilol 12.5 milliGRAM(s) Oral every 12 hours  furosemide    Tablet 40 milliGRAM(s) Oral daily  gabapentin 300 milliGRAM(s) Oral daily  insulin lispro (HumaLOG) corrective regimen sliding scale   SubCutaneous Before meals and at bedtime  levothyroxine 175 MICROGram(s) Oral daily  montelukast 10 milliGRAM(s) Oral daily  pantoprazole    Tablet 40 milliGRAM(s) Oral before breakfast  rivaroxaban 15 milliGRAM(s) Oral with dinner    MEDICATIONS  (PRN):  acetaminophen   Tablet .. 650 milliGRAM(s) Oral every 4 hours PRN Mild Pain (1 - 3)  ALBUTerol    90 MICROgram(s) HFA Inhaler 2 Puff(s) Inhalation every 6 hours PRN Shortness of Breath and/or Wheezing  zolpidem 5 milliGRAM(s) Oral at bedtime PRN Insomnia      __________________________________________________  LABS:                          11.4   4.97  )-----------( 221      ( 19 Mar 2020 06:12 )             34.5     03-19    134<L>  |  96  |  28<H>  ----------------------------<  98  3.8   |  30  |  1.34<H>    Ca    8.6      19 Mar 2020 06:12  Phos  3.8     03-18  Mg     1.8     03-18    TPro  7.4  /  Alb  3.2<L>  /  TBili  0.3  /  DBili  x   /  AST  29  /  ALT  25  /  AlkPhos  54  03-19    PT/INR - ( 17 Mar 2020 20:00 )   PT: 24.5 sec;   INR: 2.12 ratio         PTT - ( 17 Mar 2020 20:00 )  PTT:44.6 sec    CAPILLARY BLOOD GLUCOSE      POCT Blood Glucose.: 120 mg/dL (19 Mar 2020 07:59)  POCT Blood Glucose.: 109 mg/dL (18 Mar 2020 21:39)  POCT Blood Glucose.: 110 mg/dL (18 Mar 2020 16:52)  POCT Blood Glucose.: 148 mg/dL (18 Mar 2020 11:44)      __________________________________________________  RADIOLOGY & ADDITIONAL TESTS:    Imaging Personally Reviewed:  YES   < from: Xray Chest 1 View- PORTABLE-Urgent (03.17.20 @ 21:14) >    EXAM:  XR CHEST PORTABLE URGENT 1V                            PROCEDURE DATE:  03/17/2020          INTERPRETATION:  AP semierect chest on March 17, 2020 at 8:35 PM. Patient has chest pain.    The heart is enlarged.    Left-sided pacemaker again noted.    On November 27, 2019 there is a mild central congestive picture. Presently lungs are clear.    IMPRESSION: Clear lungs at this time.          < end of copied text >      Consultant(s) Notes Reviewed:   YES     Plan of care was discussed with patient and /or primary care giver; all questions and concerns were addressed and care was aligned with patient's wishes.    Plan discussed with attending and consulting physicians.

## 2020-03-19 NOTE — DISCHARGE NOTE PROVIDER - NSDCCPCAREPLAN_GEN_ALL_CORE_FT
PRINCIPAL DISCHARGE DIAGNOSIS  Diagnosis: Congestive heart failure, unspecified HF chronicity, unspecified heart failure type  Assessment and Plan of Treatment: Weigh yourself daily.  If you gain 3lbs in 3 days, or 5lbs in a week call your Health Care Provider.  Do not eat or drink foods containing more than 2000mg of salt (sodium) in your diet every day.  Call your Health Care Provider if you have any swelling or increased swelling in your feet, ankles, and/or stomach.  Take all of your medication as directed.  If you become dizzy call your Health Care Provider.      SECONDARY DISCHARGE DIAGNOSES  Diagnosis: Bronchitis  Assessment and Plan of Treatment: During your hospital stay, your lungs had inflammation likely caused by the exacerbation from your heart failure diagnosis. You were prescribed azithromycin for this condition in the hospital. You have been prescribed 3 more days of azithromycin, 500mg daily. Please continue to take this medication. PRINCIPAL DISCHARGE DIAGNOSIS  Diagnosis: Acute diastolic (congestive) heart failure  Assessment and Plan of Treatment: completed course of intravenous lasix treatment  continue to take toersemide 10mg once daily at home   Weigh yourself daily.  If you gain 3lbs in 3 days, or 5lbs in a week call your Health Care Provider.  Do not eat or drink foods containing more than 2000mg of salt (sodium) in your diet every day.  Call your Health Care Provider if you have any swelling or increased swelling in your feet, ankles, and/or stomach.  Take all of your medication as directed.  If you become dizzy call your Health Care Provider.      SECONDARY DISCHARGE DIAGNOSES  Diagnosis: SRIDHAR (acute kidney injury)  Assessment and Plan of Treatment: Avoid taking (NSAIDs) - (ex: Ibuprofen, Advil, Celebrex, Naprosyn)  Avoid taking any nephrotoxic agents (can harm kidneys) - Intravenous contrast for diagnostic testing, combination cold medications.  Have all medications adjusted for your renal function by your Health Care Provider.  Blood pressure control is important.  Take all medication as prescribed.  Akash sanchez MD will viist your home for evaluation and follow up blood work. Team will reach out to you prior to visit.       Diagnosis: Bronchitis  Assessment and Plan of Treatment: During your hospital stay, your lungs had inflammation likely caused by the exacerbation from your heart failure diagnosis. You were prescribed azithromycin for this condition in the hospital. You have been prescribed 3 more days of azithromycin, 500mg daily. Please continue to take this medication.  and also continue cough medication as prescribed for cough.

## 2020-03-19 NOTE — PROGRESS NOTE ADULT - SUBJECTIVE AND OBJECTIVE BOX
CHIEF COMPLAINT:Patient is a 80y old  Female who presents with a chief complaint of shortness of breath and fever. Pt reporting Rt sided chest pain.    	  REVIEW OF SYSTEMS:  CONSTITUTIONAL: No fever, weight loss, or fatigue  EYES: No eye pain, visual disturbances, or discharge  ENT:  No difficulty hearing, tinnitus, vertigo; No sinus or throat pain  NECK: No pain or stiffness  RESPIRATORY: No cough, wheezing, chills or hemoptysis; No Shortness of Breath  CARDIOVASCULAR: + chest pain, No palpitations, passing out, dizziness, or leg swelling  GASTROINTESTINAL: No abdominal or epigastric pain. No nausea, vomiting, or hematemesis; No diarrhea or constipation. No melena or hematochezia.  GENITOURINARY: No dysuria, frequency, hematuria, or incontinence  NEUROLOGICAL: No headaches, memory loss, loss of strength, numbness, or tremors  SKIN: No itching, burning, rashes, or lesions   LYMPH Nodes: No enlarged glands  ENDOCRINE: No heat or cold intolerance; No hair loss  MUSCULOSKELETAL: No joint pain or swelling; No muscle, back, or extremity pain  PSYCHIATRIC: No depression, anxiety, mood swings, or difficulty sleeping  HEME/LYMPH: No easy bruising, or bleeding gums  ALLERGY AND IMMUNOLOGIC: No hives or eczema	        PHYSICAL EXAM:  T(C): 37.6 (03-19-20 @ 06:34), Max: 37.9 (03-18-20 @ 13:50)  HR: 72 (03-19-20 @ 09:49) (62 - 72)  BP: 95/53 (03-19-20 @ 09:49) (95/53 - 156/86)  RR: 16 (03-19-20 @ 06:34) (16 - 20)  SpO2: 100% (03-19-20 @ 06:34) (99% - 100%)        Appearance: Normal	  HEENT:   Normal oral mucosa, PERRL, EOMI	  Lymphatic: No lymphadenopathy  Cardiovascular: Normal S1 S2, No JVD, No murmurs, No edema  Respiratory: B/L ronchi  Psychiatry: A & O x 3, Mood & affect appropriate  Gastrointestinal:  Soft, Non-tender, + BS	  Skin: No rashes, No ecchymoses, No cyanosis	  Neurologic: Non-focal  Extremities: Normal range of motion, No clubbing, cyanosis or edema  Vascular: Peripheral pulses palpable 2+ bilaterally    MEDICATIONS  (STANDING):  aspirin enteric coated 81 milliGRAM(s) Oral daily  atorvastatin 40 milliGRAM(s) Oral at bedtime  azithromycin  IVPB 500 milliGRAM(s) IV Intermittent every 24 hours  azithromycin  IVPB      carvedilol 12.5 milliGRAM(s) Oral every 12 hours  furosemide    Tablet 40 milliGRAM(s) Oral daily  gabapentin 300 milliGRAM(s) Oral daily  insulin lispro (HumaLOG) corrective regimen sliding scale   SubCutaneous Before meals and at bedtime  levothyroxine 175 MICROGram(s) Oral daily  montelukast 10 milliGRAM(s) Oral daily  pantoprazole    Tablet 40 milliGRAM(s) Oral before breakfast  rivaroxaban 15 milliGRAM(s) Oral with dinner      	  	  LABS:	 	      CARDIAC MARKERS ( 17 Mar 2020 20:00 )  <0.015 ng/mL / x     / x     / x     / x                             11.4   4.97  )-----------( 221      ( 19 Mar 2020 06:12 )             34.5     03-19    134<L>  |  96  |  28<H>  ----------------------------<  98  3.8   |  30  |  1.34<H>    Ca    8.6      19 Mar 2020 06:12  Phos  3.8     03-18  Mg     1.8     03-18    TPro  7.4  /  Alb  3.2<L>  /  TBili  0.3  /  DBili  x   /  AST  29  /  ALT  25  /  AlkPhos  54  03-19    proBNP: Serum Pro-Brain Natriuretic Peptide: 1300 pg/mL (03-17 @ 20:00)    Lipid Profile: Cholesterol 150  LDL 78  HDL 56  TG 81    HgA1c: Hemoglobin A1C, Whole Blood: 7.2 % (03-18 @ 14:06)    TSH: Thyroid Stimulating Hormone, Serum: 0.54 uU/mL (03-18 @ 08:13)      EXAM:  XR CHEST PORTABLE URGENT 1V                            PROCEDURE DATE:  03/17/2020          INTERPRETATION:  AP semierect chest on March 17, 2020 at 8:35 PM. Patient has chest pain.    The heart is enlarged.    Left-sided pacemaker again noted.    On November 27, 2019 there is a mild central congestive picture. Presently lungs are clear.    IMPRESSION: Clear lungs at this time.  	      Case Physician(s):  Demetrice Velazquez M.D.  Referring Physician:  COLLEEN Carty M.D.  INDICATIONS: Chronic diastolic congestive heart failure.  HISTORY: The patient has a history ofcoronary artery disease. There was a  prior diagnosis of congestive heart failure. The patient has hypertension  and morbid obesity.  PROCEDURE:  --  Right heart catheterization.  --  Sonosite - Diagnostic.  TECHNIQUE: The risks and alternatives of the procedures and conscious  sedation were explained to the patient and informed consent was obtained.  Cardiac catheterization performed electively.  Local anesthetic given. Right internal jugular vein access. A 7FR SHEATH  PINNACLE was inserted in the vessel. Right heart catheterization. The  procedure was performed utilizing a 7FR SWAN ELIANA catheter under  fluoroscopic guidance. Measurements of pressures were obtained. Sonosite -  Diagnostic. RADIATION EXPOSURE: 1.3 min.  MEDICATIONS GIVEN: Fentanyl, 25 mcg, IV.  COMPLICATIONS: There were no complications.  DIAGNOSTIC IMPRESSIONS: Mildly elevated PCWP and PA pressure  DIAGNOSTIC RECOMMENDATIONS: Further diuresis may help only marginally - CHAMBERLAIN  likely also pulmonary in etiology as well.  Prepared and signed by  Demetrice Velazquez M.D.  Signed 10/10/2017 17:38:08

## 2020-03-19 NOTE — DISCHARGE NOTE PROVIDER - ATTENDING COMMENTS
MEDICAL ATTENDING DISCHARGE NOTE :        Patient is a 80y old  Female who presents with a chief complaint of shortness of breath and fever (20 Mar 2020 10:43)            INTERVAL HPI / OVERNIGHT EVENTS: patient with uneventful night and offers no new complaints        ----------------------------------------------------------------------------------    REVIEW OF SYSTEMS: no fever; no SOB            Vital Signs Last 24 Hrs    T(C): 36.6 (20 Mar 2020 12:10), Max: 36.6 (20 Mar 2020 05:01)    T(F): 97.8 (20 Mar 2020 12:10), Max: 97.8 (20 Mar 2020 05:01)    HR: 70 (20 Mar 2020 12:10) (66 - 70)    BP: 150/71 (20 Mar 2020 12:10) (100/39 - 150/71)    BP(mean): --    RR: 20 (20 Mar 2020 12:10) (20 - 20)    SpO2: 98% (20 Mar 2020 12:10) (98% - 99%)        _________________    PHYSICAL EXAM:    ---------------------------     NAD; Normocephalic    LUNGS - no wheezing    HEART: S1 S2+     ABDOMEN: Soft, Nontender, non distended    EXTREMITIES: no cyanosis; no edema            _________________________________________________    LABS:                            11.4     5.80  )-----------( 214      ( 20 Mar 2020 06:48 )               34.3         03-20        135  |  97  |  43<H>    ----------------------------<  127<H>    3.7   |  28  |  1.78<H>        Ca    8.2<L>      20 Mar 2020 06:48    Phos  5.0     03-20    Mg     2.0     03-20        TPro  7.4  /  Alb  3.2<L>  /  TBili  0.3  /  DBili  x   /  AST  29  /  ALT  25  /  AlkPhos  54  03-19        A/P: 80 y/o F with     1. Acute on chronic diastolic heart failure    2. Acute bronchitis    3. Obesity    4. AFIB    5. Essential HTN    6. T2DM    7. Acute renal failure on CKD 3          Discharged in stable medical condition.      Due to mildly elevated creatinine on day of discharge as a result of lizbet diuresis, will hold Torsemide x 2-3 days       and plan for repeat labs out patient in 2-3 minutes.                        Plan of care, test results and findings were  discussed with patient. ; all questions and concerns were addressed and care was aligned with patient's wishes.    PMD follow up after discharge from the hospital for continued care and outpatient monitoring was advised.    Discharge plans has been  discussed with care team including the medicine NP staff, nursing staff  and discharge planners.

## 2020-03-19 NOTE — PROGRESS NOTE ADULT - ATTENDING COMMENTS
Patient was seen and examined by myself. Case was discussed with medicine NP staff in details. I have reviewed and agree with the plan as outlined above with edits where appropriate. Patient was seen and examined by myself. Case was discussed with medicine NP staff in details. I have reviewed and agree with the plan as outlined above with edits where appropriate.    Vital Signs Last 24 Hrs  T(C): 36.8 (19 Mar 2020 20:45), Max: 37.6 (19 Mar 2020 06:34)  T(F): 98.3 (19 Mar 2020 20:45), Max: 99.7 (19 Mar 2020 06:34)  HR: 81 (19 Mar 2020 20:45) (64 - 81)  BP: 147/57 (19 Mar 2020 20:45) (95/53 - 147/57)  BP(mean): 79 (19 Mar 2020 20:45) (79 - 79)  RR: 18 (19 Mar 2020 20:45) (16 - 18)  SpO2: 98% (19 Mar 2020 20:45) (98% - 100%)                          11.4   4.97  )-----------( 221      ( 19 Mar 2020 06:12 )             34.5       03-19    134<L>  |  96  |  28<H>  ----------------------------<  98  3.8   |  30  |  1.34<H>    Ca    8.6      19 Mar 2020 06:12  Phos  3.8     03-18  Mg     1.8     03-18    TPro  7.4  /  Alb  3.2<L>  /  TBili  0.3  /  DBili  x   /  AST  29  /  ALT  25  /  AlkPhos  54  03-19    A/p: 80 y/o F with   1. Acute on chronic diastolic heart failure  2. Acute bronchitis  3. Obesity with BMI >45  4. AFIB  5. Essential HTN  6. T2DM    Noted with borderline hypotension- dose of carvedilol decreased.  Monitor BP  Off Lasix. Resume home dose of Torsemide upon discharge.  Continue all maintenance meds  OOB to chair.  Discharge planning.  Plan discussed with patient ( with her daughter over the phone) in details at bedside and all their questions / concerns were addressed

## 2020-03-19 NOTE — DISCHARGE NOTE PROVIDER - NSDCMRMEDTOKEN_GEN_ALL_CORE_FT
acetaminophen 325 mg oral tablet: 2 tab(s) orally every 4 hours, As needed, Mild Pain (1 - 3)  Advair Diskus 250 mcg-50 mcg inhalation powder: 1 puff(s) inhaled 2 times a day  Ambien 5 mg oral tablet: 1 tab(s) orally once a day (at bedtime)  aspirin 81 mg oral delayed release tablet: 1 tab(s) orally once a day  atorvastatin 40 mg oral tablet: 1 tab(s) orally once a day (at bedtime)  azithromycin 500 mg oral tablet: 1 tab(s) orally once a day   carvedilol 12.5 mg oral tablet: 1 tab(s) orally every 12 hours  Dexilant 60 mg oral delayed release capsule: 1 cap(s) orally once a day  Flonase 50 mcg/inh nasal spray: 1 spray(s) nasal once a day  furosemide 40 mg oral tablet: 1 tab(s) orally once a day  gabapentin 300 mg oral capsule: 1 cap(s) orally once a day  glyburide-metformin 5 mg-500 mg oral tablet: 1 tab(s) orally 2 times a day  Janumet 50 mg-500 mg oral tablet: 1 tab(s) orally 2 times a day  levothyroxine 175 mcg (0.175 mg) oral tablet: 1 tab(s) orally once a day  montelukast 10 mg oral tablet: 1 tab(s) orally once a day  rivaroxaban 15 mg oral tablet: 1 tab(s) orally once a day (before a meal)  Spiriva Respimat 60 ACT 2.5 mcg/inh inhalation aerosol: 2 puff(s) inhaled once a day  torsemide 10 mg oral tablet: 1 tab(s) orally once a day  Ventolin HFA 90 mcg/inh inhalation aerosol: 2 puff(s) inhaled 4 times a day, As Needed acetaminophen 325 mg oral tablet: 2 tab(s) orally every 4 hours, As needed, Mild Pain (1 - 3)  Advair Diskus 250 mcg-50 mcg inhalation powder: 1 puff(s) inhaled 2 times a day  Ambien 5 mg oral tablet: 1 tab(s) orally once a day (at bedtime)  aspirin 81 mg oral delayed release tablet: 1 tab(s) orally once a day  atorvastatin 40 mg oral tablet: 1 tab(s) orally once a day (at bedtime)  azithromycin 500 mg oral tablet: 1 tab(s) orally once a day   carvedilol 12.5 mg oral tablet: 1 tab(s) orally every 12 hours  Dexilant 60 mg oral delayed release capsule: 1 cap(s) orally once a day  Flonase 50 mcg/inh nasal spray: 1 spray(s) nasal once a day  gabapentin 300 mg oral capsule: 1 cap(s) orally once a day  glyburide-metformin 5 mg-500 mg oral tablet: 1 tab(s) orally 2 times a day  Janumet 50 mg-500 mg oral tablet: 1 tab(s) orally 2 times a day  levothyroxine 175 mcg (0.175 mg) oral tablet: 1 tab(s) orally once a day  montelukast 10 mg oral tablet: 1 tab(s) orally once a day  rivaroxaban 15 mg oral tablet: 1 tab(s) orally once a day (before a meal)  Spiriva Respimat 60 ACT 2.5 mcg/inh inhalation aerosol: 2 puff(s) inhaled once a day  torsemide 10 mg oral tablet: 1 tab(s) orally once a day  Ventolin HFA 90 mcg/inh inhalation aerosol: 2 puff(s) inhaled 4 times a day, As Needed acetaminophen 325 mg oral tablet: 2 tab(s) orally every 4 hours, As needed, Mild Pain (1 - 3)  Advair Diskus 250 mcg-50 mcg inhalation powder: 1 puff(s) inhaled 2 times a day  Ambien 5 mg oral tablet: 1 tab(s) orally once a day (at bedtime)  aspirin 81 mg oral delayed release tablet: 1 tab(s) orally once a day  atorvastatin 40 mg oral tablet: 1 tab(s) orally once a day (at bedtime)  azithromycin 500 mg oral tablet: 1 tab(s) orally once a day   benzonatate 100 mg oral capsule: 1 cap(s) orally every 8 hours  Coreg 6.25 mg oral tablet: 1 tab(s) orally every 12 hours  Dexilant 60 mg oral delayed release capsule: 1 cap(s) orally once a day  Flonase 50 mcg/inh nasal spray: 1 spray(s) nasal once a day  gabapentin 300 mg oral capsule: 1 cap(s) orally once a day  glyburide-metformin 5 mg-500 mg oral tablet: 1 tab(s) orally 2 times a day  Janumet 50 mg-500 mg oral tablet: 1 tab(s) orally 2 times a day  levothyroxine 175 mcg (0.175 mg) oral tablet: 1 tab(s) orally once a day  montelukast 10 mg oral tablet: 1 tab(s) orally once a day  rivaroxaban 15 mg oral tablet: 1 tab(s) orally once a day (before a meal)  Spiriva Respimat 60 ACT 2.5 mcg/inh inhalation aerosol: 2 puff(s) inhaled once a day  torsemide 10 mg oral tablet: 1 tab(s) orally once a day  Ventolin HFA 90 mcg/inh inhalation aerosol: 2 puff(s) inhaled 4 times a day, As Needed

## 2020-03-19 NOTE — DISCHARGE NOTE PROVIDER - NSDCFUADDINST_GEN_ALL_CORE_FT
Please bring all medications and discharge paperwork, including medication list to your next MD visit.

## 2020-03-19 NOTE — PROGRESS NOTE ADULT - PROBLEM SELECTOR PLAN 1
Pt last Echo was 11/2019 / Grade 1 diastolic dysfunction with EF 55%  CXR noted above  DIscontinued IV Lasix; Started on PO Lasix  Continue with O2 via n/c as needed  Continue with strict I&O  Continue with daily weights  Dr. Sandoval (Cardiology) following;   Trend cardiac markers as ordered by cardiology 3/19 Pt last Echo was 11/2019 / Grade 1 diastolic dysfunction with EF 55%  CXR noted above  Discontinued IV Lasix; Started on PO Lasix  Continue with O2 via n/c as needed  Check daily weights  Dr. Sandoval (Cardiology) following;   Trend cardiac markers as ordered by cardiology 3/19

## 2020-03-20 VITALS
OXYGEN SATURATION: 98 % | DIASTOLIC BLOOD PRESSURE: 71 MMHG | HEART RATE: 70 BPM | SYSTOLIC BLOOD PRESSURE: 150 MMHG | TEMPERATURE: 98 F | RESPIRATION RATE: 20 BRPM

## 2020-03-20 LAB
ANION GAP SERPL CALC-SCNC: 10 MMOL/L — SIGNIFICANT CHANGE UP (ref 5–17)
BUN SERPL-MCNC: 43 MG/DL — HIGH (ref 7–18)
CALCIUM SERPL-MCNC: 8.2 MG/DL — LOW (ref 8.4–10.5)
CHLORIDE SERPL-SCNC: 97 MMOL/L — SIGNIFICANT CHANGE UP (ref 96–108)
CO2 SERPL-SCNC: 28 MMOL/L — SIGNIFICANT CHANGE UP (ref 22–31)
CREAT SERPL-MCNC: 1.78 MG/DL — HIGH (ref 0.5–1.3)
GLUCOSE BLDC GLUCOMTR-MCNC: 148 MG/DL — HIGH (ref 70–99)
GLUCOSE BLDC GLUCOMTR-MCNC: 154 MG/DL — HIGH (ref 70–99)
GLUCOSE SERPL-MCNC: 127 MG/DL — HIGH (ref 70–99)
HCT VFR BLD CALC: 34.3 % — LOW (ref 34.5–45)
HGB BLD-MCNC: 11.4 G/DL — LOW (ref 11.5–15.5)
MAGNESIUM SERPL-MCNC: 2 MG/DL — SIGNIFICANT CHANGE UP (ref 1.6–2.6)
MCHC RBC-ENTMCNC: 28.1 PG — SIGNIFICANT CHANGE UP (ref 27–34)
MCHC RBC-ENTMCNC: 33.2 GM/DL — SIGNIFICANT CHANGE UP (ref 32–36)
MCV RBC AUTO: 84.7 FL — SIGNIFICANT CHANGE UP (ref 80–100)
NRBC # BLD: 0 /100 WBCS — SIGNIFICANT CHANGE UP (ref 0–0)
PHOSPHATE SERPL-MCNC: 5 MG/DL — HIGH (ref 2.5–4.5)
PLATELET # BLD AUTO: 214 K/UL — SIGNIFICANT CHANGE UP (ref 150–400)
POTASSIUM SERPL-MCNC: 3.7 MMOL/L — SIGNIFICANT CHANGE UP (ref 3.5–5.3)
POTASSIUM SERPL-SCNC: 3.7 MMOL/L — SIGNIFICANT CHANGE UP (ref 3.5–5.3)
RBC # BLD: 4.05 M/UL — SIGNIFICANT CHANGE UP (ref 3.8–5.2)
RBC # FLD: 13.5 % — SIGNIFICANT CHANGE UP (ref 10.3–14.5)
SODIUM SERPL-SCNC: 135 MMOL/L — SIGNIFICANT CHANGE UP (ref 135–145)
WBC # BLD: 5.8 K/UL — SIGNIFICANT CHANGE UP (ref 3.8–10.5)
WBC # FLD AUTO: 5.8 K/UL — SIGNIFICANT CHANGE UP (ref 3.8–10.5)

## 2020-03-20 PROCEDURE — 84100 ASSAY OF PHOSPHORUS: CPT

## 2020-03-20 PROCEDURE — 71045 X-RAY EXAM CHEST 1 VIEW: CPT

## 2020-03-20 PROCEDURE — 99239 HOSP IP/OBS DSCHRG MGMT >30: CPT | Mod: GC

## 2020-03-20 PROCEDURE — 82607 VITAMIN B-12: CPT

## 2020-03-20 PROCEDURE — 83036 HEMOGLOBIN GLYCOSYLATED A1C: CPT

## 2020-03-20 PROCEDURE — 80076 HEPATIC FUNCTION PANEL: CPT

## 2020-03-20 PROCEDURE — 36415 COLL VENOUS BLD VENIPUNCTURE: CPT

## 2020-03-20 PROCEDURE — 99285 EMERGENCY DEPT VISIT HI MDM: CPT | Mod: 25

## 2020-03-20 PROCEDURE — 87631 RESP VIRUS 3-5 TARGETS: CPT

## 2020-03-20 PROCEDURE — 85027 COMPLETE CBC AUTOMATED: CPT

## 2020-03-20 PROCEDURE — 83880 ASSAY OF NATRIURETIC PEPTIDE: CPT

## 2020-03-20 PROCEDURE — 83735 ASSAY OF MAGNESIUM: CPT

## 2020-03-20 PROCEDURE — 99053 MED SERV 10PM-8AM 24 HR FAC: CPT

## 2020-03-20 PROCEDURE — 93005 ELECTROCARDIOGRAM TRACING: CPT

## 2020-03-20 PROCEDURE — 84443 ASSAY THYROID STIM HORMONE: CPT

## 2020-03-20 PROCEDURE — 82962 GLUCOSE BLOOD TEST: CPT

## 2020-03-20 PROCEDURE — 84484 ASSAY OF TROPONIN QUANT: CPT

## 2020-03-20 PROCEDURE — 82550 ASSAY OF CK (CPK): CPT

## 2020-03-20 PROCEDURE — 87640 STAPH A DNA AMP PROBE: CPT

## 2020-03-20 PROCEDURE — 87641 MR-STAPH DNA AMP PROBE: CPT

## 2020-03-20 PROCEDURE — 80048 BASIC METABOLIC PNL TOTAL CA: CPT

## 2020-03-20 PROCEDURE — 85730 THROMBOPLASTIN TIME PARTIAL: CPT

## 2020-03-20 PROCEDURE — 94640 AIRWAY INHALATION TREATMENT: CPT

## 2020-03-20 PROCEDURE — 80053 COMPREHEN METABOLIC PANEL: CPT

## 2020-03-20 PROCEDURE — 80061 LIPID PANEL: CPT

## 2020-03-20 PROCEDURE — 85610 PROTHROMBIN TIME: CPT

## 2020-03-20 RX ORDER — LIDOCAINE 4 G/100G
1 CREAM TOPICAL ONCE
Refills: 0 | Status: COMPLETED | OUTPATIENT
Start: 2020-03-20 | End: 2020-03-20

## 2020-03-20 RX ORDER — AZITHROMYCIN 500 MG/1
1 TABLET, FILM COATED ORAL
Qty: 3 | Refills: 0
Start: 2020-03-20 | End: 2020-03-22

## 2020-03-20 RX ORDER — ASPIRIN/CALCIUM CARB/MAGNESIUM 324 MG
1 TABLET ORAL
Qty: 30 | Refills: 0
Start: 2020-03-20 | End: 2020-04-18

## 2020-03-20 RX ORDER — CARVEDILOL PHOSPHATE 80 MG/1
1 CAPSULE, EXTENDED RELEASE ORAL
Qty: 60 | Refills: 0
Start: 2020-03-20 | End: 2020-04-18

## 2020-03-20 RX ADMIN — LIDOCAINE 1 PATCH: 4 CREAM TOPICAL at 07:44

## 2020-03-20 RX ADMIN — Medication 100 MILLIGRAM(S): at 13:05

## 2020-03-20 RX ADMIN — MONTELUKAST 10 MILLIGRAM(S): 4 TABLET, CHEWABLE ORAL at 12:41

## 2020-03-20 RX ADMIN — Medication 1: at 12:33

## 2020-03-20 RX ADMIN — Medication 650 MILLIGRAM(S): at 09:26

## 2020-03-20 RX ADMIN — PANTOPRAZOLE SODIUM 40 MILLIGRAM(S): 20 TABLET, DELAYED RELEASE ORAL at 06:00

## 2020-03-20 RX ADMIN — GABAPENTIN 300 MILLIGRAM(S): 400 CAPSULE ORAL at 12:41

## 2020-03-20 RX ADMIN — Medication 81 MILLIGRAM(S): at 12:41

## 2020-03-20 RX ADMIN — Medication 175 MICROGRAM(S): at 05:59

## 2020-03-20 RX ADMIN — Medication 650 MILLIGRAM(S): at 10:26

## 2020-03-20 NOTE — PROGRESS NOTE ADULT - ASSESSMENT
79 years old Citizen of the [place]-speaking female from home, ambulates with walker, lives with son, with PMHx of HFpEF, CAD (s/p stents), chronic venous stasis, DM, HTN and Afib (on Xarelto, s/p PPM) presents to the ED with chief complaint of right sided chest pain associated with shortness of breath x1 day,acute on chronic diastolic HF with chest pain.  1.Acute on chronic diastolic HF- lasix.  2.Afib-xarelto.  3.CAD-asa,b blocker,statin.  4.DM-Insulin.  5.Hypothyroidism-synthroid.  6.Chest pain-EKG and CK with troponin ordered. D/W Pt's daughter, they want no further cardiac work-up at this time.  7.PPI.  8.Sleep study as outpatient-R/O JOSE DANIEL.
79 years old [place]-speaking female from home, ambulates with walker, lives with son, with PMHx of HFpEF, CAD (s/p stents), chronic venous stasis, DM, HTN and Afib (on Xarelto, s/p PPM) presents to the ED with chief complaint of right sided chest pain associated with shortness of breath x1 day,acute on chronic diastolic HF with chest pain,SRIDHAR  1.Acute on chronic diastolic HF- hold lasix due to SRIDHAR.  2.Afib-xarelto.  3.CAD-asa,b blocker,statin.  4.DM-Insulin.  5.Hypothyroidism-synthroid.  6.D/W Pt's daughter, they want no further cardiac work-up at this time.  7.PPI.  8.Sleep study as outpatient-R/O JOSE DANIEL.
HPI:  79 years old [place]-speaking female from home, ambulates with walker, lives with son, with PMHx of HFpEF, CAD (s/p stents), chronic venous stasis, DM, HTN and Afib (on Xarelto, s/p PPM) presents to the ED with chief complaint of right sided chest pain associated with shortness of breath x1 day. Describe pain as constant, aching and sharp. Patient reports she has fever earlier today which cleared on its own. Patient did not measure the fever wheather it was high grade or low grade. Patient endorses b/l lower extremity swelling x6 months. Did not take any medication prior to arrival. Patient denies cough, nasal congestion, rhinorrhea or any other symptoms.  Patient is being admitted for acute CHF exacerbation with dyspnea and Chest xray concerning for fluid overload. will get IV diuresis with IV lasix. (17 Mar 2020 22:43)

## 2020-03-20 NOTE — PROGRESS NOTE ADULT - REASON FOR ADMISSION
shortness of breath and fever

## 2020-03-20 NOTE — PROGRESS NOTE ADULT - SUBJECTIVE AND OBJECTIVE BOX
CHIEF COMPLAINT:Patient is a 80y old  Female who presents with a chief complaint of shortness of breath and fever.Pt reports non-productive cough.    	  REVIEW OF SYSTEMS:  CONSTITUTIONAL: No fever, weight loss, or fatigue  EYES: No eye pain, visual disturbances, or discharge  ENT:  No difficulty hearing, tinnitus, vertigo; No sinus or throat pain  NECK: No pain or stiffness  RESPIRATORY: + cough, No wheezing, chills or hemoptysis; No Shortness of Breath  CARDIOVASCULAR: No chest pain, palpitations, passing out, dizziness, or leg swelling  GASTROINTESTINAL: No abdominal or epigastric pain. No nausea, vomiting, or hematemesis; No diarrhea or constipation. No melena or hematochezia.  GENITOURINARY: No dysuria, frequency, hematuria, or incontinence  NEUROLOGICAL: No headaches, memory loss, loss of strength, numbness, or tremors  SKIN: No itching, burning, rashes, or lesions   LYMPH Nodes: No enlarged glands  ENDOCRINE: No heat or cold intolerance; No hair loss  MUSCULOSKELETAL: No joint pain or swelling; No muscle, back, or extremity pain  PSYCHIATRIC: No depression, anxiety, mood swings, or difficulty sleeping  HEME/LYMPH: No easy bruising, or bleeding gums  ALLERGY AND IMMUNOLOGIC: No hives or eczema	    	      PHYSICAL EXAM:  T(C): 36.6 (03-20-20 @ 05:01), Max: 36.9 (03-19-20 @ 14:16)  HR: 66 (03-20-20 @ 05:01) (64 - 81)  BP: 100/39 (03-20-20 @ 05:01) (99/45 - 147/57)  RR: 20 (03-20-20 @ 05:01) (16 - 20)  SpO2: 99% (03-20-20 @ 05:01) (98% - 100%)  Wt(kg): --  I&O's Summary      Appearance: Normal	  HEENT:   Normal oral mucosa, PERRL, EOMI	  Lymphatic: No lymphadenopathy  Cardiovascular: Normal S1 S2, No JVD, No murmurs, No edema  Respiratory: Lungs clear to auscultation	  Psychiatry: A & O x 3, Mood & affect appropriate  Gastrointestinal:  Soft, Non-tender, + BS	  Skin: No rashes, No ecchymoses, No cyanosis	  Neurologic: Non-focal  Extremities: Normal range of motion, No clubbing, cyanosis or edema  Vascular: Peripheral pulses palpable 2+ bilaterally    MEDICATIONS  (STANDING):  aspirin enteric coated 81 milliGRAM(s) Oral daily  atorvastatin 40 milliGRAM(s) Oral at bedtime  benzonatate 100 milliGRAM(s) Oral every 8 hours  carvedilol 6.25 milliGRAM(s) Oral every 12 hours  gabapentin 300 milliGRAM(s) Oral daily  insulin lispro (HumaLOG) corrective regimen sliding scale   SubCutaneous Before meals and at bedtime  levothyroxine 175 MICROGram(s) Oral daily  montelukast 10 milliGRAM(s) Oral daily  pantoprazole    Tablet 40 milliGRAM(s) Oral before breakfast  rivaroxaban 15 milliGRAM(s) Oral with dinner        	  LABS:	 	      CARDIAC MARKERS ( 19 Mar 2020 12:41 )  <0.015 ng/mL / x     / 55 U/L / x     / x                                    11.4   5.80  )-----------( 214      ( 20 Mar 2020 06:48 )             34.3     03-20    135  |  97  |  43<H>  ----------------------------<  127<H>  3.7   |  28  |  1.78<H>    Ca    8.2<L>      20 Mar 2020 06:48  Phos  5.0     03-20  Mg     2.0     03-20    TPro  7.4  /  Alb  3.2<L>  /  TBili  0.3  /  DBili  x   /  AST  29  /  ALT  25  /  AlkPhos  54  03-19    proBNP: Serum Pro-Brain Natriuretic Peptide: 1300 pg/mL (03-17 @ 20:00)    Lipid Profile: Cholesterol 150  LDL 78  HDL 56  TG 81    HgA1c: Hemoglobin A1C, Whole Blood: 7.2 % (03-18 @ 14:06)    TSH: Thyroid Stimulating Hormone, Serum: 0.54 uU/mL (03-18 @ 08:13)

## 2020-07-04 NOTE — ED ADULT NURSE NOTE - PAIN: PRESENCE, MLM
[place] EMERGENCY DEPARTMENT ([facility])  7/03/20    History     Chief Complaint   Patient presents with     Knee Pain     History was provided by the patient and medical records.        Rodolfo Bennett is a 39 year old male with a history of anxiety and depressive disorder who presents for evaluation of right knee pain.  Patient states he was kneeling for approximately 5 minutes last night while cleaning his car. Later on he developed a sudden onset of severe pain on the top of his right knee.  The patient's pain was present today while sitting and bending his knee.  He states the pain is not present while he moves his knee.  It is improved with walking but worsens when seated with knee bent. Has has a pain in this area before but never this severe. Denies leg swelling. No numbness/weakness. No h/o joint problems. He notes it is a little more difficult to bend his knee. No pain with weight bearing.    Past Medical History  Past Medical History:   Diagnosis Date     Allergic state      Anxiety      History reviewed. No pertinent surgical history.  citalopram (CELEXA) 10 MG tablet      Allergies   Allergen Reactions     Dust Mites      Past medical history, past surgical history, medications, and allergies were reviewed with the patient. Additional pertinent items: None    Family History  Family History   Problem Relation Age of Onset     Diabetes Mother      Unknown/Adopted Father      Family history was reviewed with the patient. Additional pertinent items: None    Social History  Social History     Tobacco Use     Smoking status: Never Smoker     Smokeless tobacco: Never Used   Substance Use Topics     Alcohol use: No     Drug use: No      Social history was reviewed with the patient. Additional pertinent items: None    Review of Systems   Musculoskeletal:        Positive for right knee pain   All other systems reviewed and are negative.    A complete review of systems was performed with pertinent  "positives and negatives noted in the HPI, and all other systems negative.    Physical Exam   BP: 103/69  Pulse: 80  Temp: 98  F (36.7  C)  Resp: 16  Height: 172.7 cm (5' 8\")  Weight: 86.2 kg (190 lb)  SpO2: 99 %  Physical Exam  Vitals signs and nursing note reviewed.   Constitutional:       General: He is not in acute distress.     Appearance: Normal appearance. He is well-developed. He is not ill-appearing or diaphoretic.   HENT:      Head: Normocephalic and atraumatic.      Nose: Nose normal.   Eyes:      General: No scleral icterus.     Conjunctiva/sclera: Conjunctivae normal.   Neck:      Musculoskeletal: Normal range of motion and neck supple. No neck rigidity.   Cardiovascular:      Rate and Rhythm: Normal rate.   Pulmonary:      Effort: Pulmonary effort is normal. No respiratory distress.      Breath sounds: No stridor.   Abdominal:      General: There is no distension.   Musculoskeletal: Normal range of motion.         General: No deformity.      Right knee: He exhibits no swelling, no effusion, no ecchymosis, no deformity, no laceration, normal alignment and no bony tenderness. Tenderness found. No medial joint line, no lateral joint line, no MCL, no LCL and no patellar tendon tenderness noted.        Legs:       Comments: Pain localized to soft tissue area just superior to patella along midline right knee. Nontender to palpation. Mild erythema (patient states he has been rubbing it). No edema, warmth. Normal ROM.    Skin:     General: Skin is warm and dry.      Coloration: Skin is not jaundiced or pale.      Findings: No rash.   Neurological:      General: No focal deficit present.      Mental Status: He is alert and oriented to person, place, and time.   Psychiatric:         Mood and Affect: Mood normal.         Behavior: Behavior normal.         Thought Content: Thought content normal.         ED Course       10:23 PM  The patient was seen and examined by Dr. Catarina Lynn in Room ED 01.     Procedures     "                     Results for orders placed or performed during the hospital encounter of 07/03/20   XR Knee Right 1/2 Views     Status: None    Narrative    EXAM: XR KNEE RT 1 /2 VW  LOCATION: [facility]  DATE/TIME: 7/3/2020 9:15 PM    INDICATION: Right knee pain.  COMPARISON: None.      Impression    IMPRESSION: No fracture or joint effusion. Joint spaces are maintained. Mild distal quadriceps tendon enthesopathy.     Medications - No data to display     Assessments & Plan (with Medical Decision Making)   Rodolfo Bennett is a 39 year old male with a history of anxiety and depressive disorder who presents for evaluation of right knee pain.    Ddx: Prepatellar bursitis, contusion, knee sprain, inflammatory arthritis, patellar tendinitis    Patient without tenderness on exam.  No evidence of infection.  Normal range of motion at the knee with some provoked knee pain during flexion.  No pain with weightbearing.  X-ray negative.  Onset of pain was following prolonged kneeling.  Likely a traumatic bursitis.  Advised treatment with ibuprofen, rest, and elevation.  Patient reassured by our discussion and the normal x-ray.  Recommend follow-up with primary care if symptoms have not resolved in a week.  Return for signs of septic joint or knee effusion. Return precautions provided.       I have reviewed the nursing notes. I have reviewed the findings, diagnosis, plan and need for follow up with the patient.    New Prescriptions    No medications on file       Final diagnoses:   Acute pain of right knee     IMervin, am serving as a trained medical scribe to document services personally performed by Catarina Lynn MD, based on the provider's statements to me.     Catarina THURSTON MD, was physically present and have reviewed and verified the accuracy of this note documented by Mervin Fierro.    --  Catarina Lynn MD   Emergency Medicine   [facility], EMERGENCY DEPARTMENT  7/3/2020     Catarina Lynn,  MD  07/03/20 1607     complains of pain/discomfort

## 2020-07-29 ENCOUNTER — APPOINTMENT (OUTPATIENT)
Dept: ELECTROPHYSIOLOGY | Facility: CLINIC | Age: 80
End: 2020-07-29

## 2020-07-29 ENCOUNTER — APPOINTMENT (OUTPATIENT)
Dept: CARDIOLOGY | Facility: CLINIC | Age: 80
End: 2020-07-29

## 2020-10-15 NOTE — ED ADULT TRIAGE NOTE - WEIGHT METHOD
Briana Mendes was read the following message We want to confirm that, for purposes of billing, this is a virtual visit with your provider for which we will submit a claim for reimbursement with your insurance company. You will be responsible for any copays, coinsurance amounts or other amounts not covered by your insurance company. If you do not accept this, unfortunately we will not be able to schedule a virtual visit with the provider. Do you accept?  David Mckeon responded YES stated

## 2020-11-25 ENCOUNTER — APPOINTMENT (OUTPATIENT)
Dept: CARDIOLOGY | Facility: CLINIC | Age: 80
End: 2020-11-25
Payer: MEDICARE

## 2020-11-25 ENCOUNTER — APPOINTMENT (OUTPATIENT)
Dept: ELECTROPHYSIOLOGY | Facility: CLINIC | Age: 80
End: 2020-11-25
Payer: MEDICARE

## 2020-11-25 VITALS
BODY MASS INDEX: 47.84 KG/M2 | OXYGEN SATURATION: 96 % | WEIGHT: 260 LBS | HEART RATE: 64 BPM | HEIGHT: 62 IN | SYSTOLIC BLOOD PRESSURE: 170 MMHG | DIASTOLIC BLOOD PRESSURE: 82 MMHG

## 2020-11-25 PROCEDURE — 99213 OFFICE O/P EST LOW 20 MIN: CPT

## 2020-11-25 PROCEDURE — 93280 PM DEVICE PROGR EVAL DUAL: CPT

## 2020-11-25 PROCEDURE — 93000 ELECTROCARDIOGRAM COMPLETE: CPT | Mod: 59

## 2020-11-25 PROCEDURE — 99072 ADDL SUPL MATRL&STAF TM PHE: CPT

## 2020-12-01 ENCOUNTER — NON-APPOINTMENT (OUTPATIENT)
Age: 80
End: 2020-12-01

## 2021-02-09 NOTE — ED PROVIDER NOTE - LATERALITY
right Hydroxyzine Counseling: Patient advised that the medication is sedating and not to drive a car after taking this medication.  Patient informed of potential adverse effects including but not limited to dry mouth, urinary retention, and blurry vision.  The patient verbalized understanding of the proper use and possible adverse effects of hydroxyzine.  All of the patient's questions and concerns were addressed.

## 2021-02-23 LAB
ALBUMIN SERPL ELPH-MCNC: 4.4 G/DL
ALP BLD-CCNC: 62 U/L
ALT SERPL-CCNC: 15 U/L
ANION GAP SERPL CALC-SCNC: 15 MMOL/L
AST SERPL-CCNC: 19 U/L
BILIRUB SERPL-MCNC: <0.2 MG/DL
BUN SERPL-MCNC: 27 MG/DL
CALCIUM SERPL-MCNC: 9.7 MG/DL
CHLORIDE SERPL-SCNC: 101 MMOL/L
CO2 SERPL-SCNC: 24 MMOL/L
CREAT SERPL-MCNC: 1.4 MG/DL
GLUCOSE SERPL-MCNC: 97 MG/DL
POTASSIUM SERPL-SCNC: 4.8 MMOL/L
PROT SERPL-MCNC: 7.3 G/DL
SODIUM SERPL-SCNC: 139 MMOL/L

## 2021-03-20 ENCOUNTER — INPATIENT (INPATIENT)
Facility: HOSPITAL | Age: 81
LOS: 16 days | Discharge: HOME | DRG: 330 | End: 2021-04-06
Attending: SPECIALIST | Admitting: SPECIALIST
Payer: MEDICARE

## 2021-03-20 VITALS
RESPIRATION RATE: 16 BRPM | OXYGEN SATURATION: 100 % | WEIGHT: 257.94 LBS | HEART RATE: 70 BPM | HEIGHT: 63 IN | DIASTOLIC BLOOD PRESSURE: 77 MMHG | SYSTOLIC BLOOD PRESSURE: 169 MMHG | TEMPERATURE: 98 F

## 2021-03-20 DIAGNOSIS — I48.91 UNSPECIFIED ATRIAL FIBRILLATION: ICD-10-CM

## 2021-03-20 DIAGNOSIS — E03.9 HYPOTHYROIDISM, UNSPECIFIED: ICD-10-CM

## 2021-03-20 DIAGNOSIS — G47.33 OBSTRUCTIVE SLEEP APNEA (ADULT) (PEDIATRIC): ICD-10-CM

## 2021-03-20 DIAGNOSIS — E11.9 TYPE 2 DIABETES MELLITUS WITHOUT COMPLICATIONS: ICD-10-CM

## 2021-03-20 DIAGNOSIS — D64.9 ANEMIA, UNSPECIFIED: ICD-10-CM

## 2021-03-20 DIAGNOSIS — Z98.89 OTHER SPECIFIED POSTPROCEDURAL STATES: Chronic | ICD-10-CM

## 2021-03-20 DIAGNOSIS — J45.909 UNSPECIFIED ASTHMA, UNCOMPLICATED: ICD-10-CM

## 2021-03-20 DIAGNOSIS — I50.9 HEART FAILURE, UNSPECIFIED: ICD-10-CM

## 2021-03-20 DIAGNOSIS — I10 ESSENTIAL (PRIMARY) HYPERTENSION: ICD-10-CM

## 2021-03-20 DIAGNOSIS — Z29.9 ENCOUNTER FOR PROPHYLACTIC MEASURES, UNSPECIFIED: ICD-10-CM

## 2021-03-20 LAB
ALBUMIN SERPL ELPH-MCNC: 3.3 G/DL — LOW (ref 3.5–5)
ALP SERPL-CCNC: 70 U/L — SIGNIFICANT CHANGE UP (ref 40–120)
ALT FLD-CCNC: 21 U/L DA — SIGNIFICANT CHANGE UP (ref 10–60)
ANION GAP SERPL CALC-SCNC: 7 MMOL/L — SIGNIFICANT CHANGE UP (ref 5–17)
APPEARANCE UR: CLEAR — SIGNIFICANT CHANGE UP
APTT BLD: 36.5 SEC — HIGH (ref 27.5–35.5)
AST SERPL-CCNC: 13 U/L — SIGNIFICANT CHANGE UP (ref 10–40)
BASOPHILS # BLD AUTO: 0.03 K/UL — SIGNIFICANT CHANGE UP (ref 0–0.2)
BASOPHILS NFR BLD AUTO: 0.4 % — SIGNIFICANT CHANGE UP (ref 0–2)
BILIRUB SERPL-MCNC: 0.2 MG/DL — SIGNIFICANT CHANGE UP (ref 0.2–1.2)
BILIRUB UR-MCNC: NEGATIVE — SIGNIFICANT CHANGE UP
BLD GP AB SCN SERPL QL: SIGNIFICANT CHANGE UP
BUN SERPL-MCNC: 27 MG/DL — HIGH (ref 7–18)
CALCIUM SERPL-MCNC: 8.9 MG/DL — SIGNIFICANT CHANGE UP (ref 8.4–10.5)
CHLORIDE SERPL-SCNC: 103 MMOL/L — SIGNIFICANT CHANGE UP (ref 96–108)
CO2 SERPL-SCNC: 25 MMOL/L — SIGNIFICANT CHANGE UP (ref 22–31)
COLOR SPEC: YELLOW — SIGNIFICANT CHANGE UP
CREAT SERPL-MCNC: 1.15 MG/DL — SIGNIFICANT CHANGE UP (ref 0.5–1.3)
DIFF PNL FLD: NEGATIVE — SIGNIFICANT CHANGE UP
EOSINOPHIL # BLD AUTO: 0.11 K/UL — SIGNIFICANT CHANGE UP (ref 0–0.5)
EOSINOPHIL NFR BLD AUTO: 1.4 % — SIGNIFICANT CHANGE UP (ref 0–6)
GLUCOSE SERPL-MCNC: 224 MG/DL — HIGH (ref 70–99)
GLUCOSE UR QL: NEGATIVE — SIGNIFICANT CHANGE UP
HCT VFR BLD CALC: 24 % — LOW (ref 34.5–45)
HCT VFR BLD CALC: 26.1 % — LOW (ref 34.5–45)
HGB BLD-MCNC: 7.7 G/DL — LOW (ref 11.5–15.5)
HGB BLD-MCNC: 8.4 G/DL — LOW (ref 11.5–15.5)
IMM GRANULOCYTES NFR BLD AUTO: 0.3 % — SIGNIFICANT CHANGE UP (ref 0–1.5)
INR BLD: 1.13 RATIO — SIGNIFICANT CHANGE UP (ref 0.88–1.16)
IRON SATN MFR SERPL: 15 UG/DL — LOW (ref 40–150)
IRON SATN MFR SERPL: 3 % — LOW (ref 15–50)
KETONES UR-MCNC: NEGATIVE — SIGNIFICANT CHANGE UP
LACTATE SERPL-SCNC: 1.2 MMOL/L — SIGNIFICANT CHANGE UP (ref 0.7–2)
LEUKOCYTE ESTERASE UR-ACNC: NEGATIVE — SIGNIFICANT CHANGE UP
LYMPHOCYTES # BLD AUTO: 2.61 K/UL — SIGNIFICANT CHANGE UP (ref 1–3.3)
LYMPHOCYTES # BLD AUTO: 33.5 % — SIGNIFICANT CHANGE UP (ref 13–44)
MCHC RBC-ENTMCNC: 25.6 PG — LOW (ref 27–34)
MCHC RBC-ENTMCNC: 25.8 PG — LOW (ref 27–34)
MCHC RBC-ENTMCNC: 32.1 GM/DL — SIGNIFICANT CHANGE UP (ref 32–36)
MCHC RBC-ENTMCNC: 32.2 GM/DL — SIGNIFICANT CHANGE UP (ref 32–36)
MCV RBC AUTO: 79.7 FL — LOW (ref 80–100)
MCV RBC AUTO: 80.1 FL — SIGNIFICANT CHANGE UP (ref 80–100)
MONOCYTES # BLD AUTO: 0.61 K/UL — SIGNIFICANT CHANGE UP (ref 0–0.9)
MONOCYTES NFR BLD AUTO: 7.8 % — SIGNIFICANT CHANGE UP (ref 2–14)
NEUTROPHILS # BLD AUTO: 4.4 K/UL — SIGNIFICANT CHANGE UP (ref 1.8–7.4)
NEUTROPHILS NFR BLD AUTO: 56.6 % — SIGNIFICANT CHANGE UP (ref 43–77)
NITRITE UR-MCNC: NEGATIVE — SIGNIFICANT CHANGE UP
NRBC # BLD: 0 /100 WBCS — SIGNIFICANT CHANGE UP (ref 0–0)
NRBC # BLD: 0 /100 WBCS — SIGNIFICANT CHANGE UP (ref 0–0)
PH UR: 5 — SIGNIFICANT CHANGE UP (ref 5–8)
PLATELET # BLD AUTO: 260 K/UL — SIGNIFICANT CHANGE UP (ref 150–400)
PLATELET # BLD AUTO: 299 K/UL — SIGNIFICANT CHANGE UP (ref 150–400)
POTASSIUM SERPL-MCNC: 4.6 MMOL/L — SIGNIFICANT CHANGE UP (ref 3.5–5.3)
POTASSIUM SERPL-SCNC: 4.6 MMOL/L — SIGNIFICANT CHANGE UP (ref 3.5–5.3)
PROT SERPL-MCNC: 7.2 G/DL — SIGNIFICANT CHANGE UP (ref 6–8.3)
PROT UR-MCNC: NEGATIVE — SIGNIFICANT CHANGE UP
PROTHROM AB SERPL-ACNC: 13.4 SEC — SIGNIFICANT CHANGE UP (ref 10.6–13.6)
RBC # BLD: 3.01 M/UL — LOW (ref 3.8–5.2)
RBC # BLD: 3.26 M/UL — LOW (ref 3.8–5.2)
RBC # FLD: 13.2 % — SIGNIFICANT CHANGE UP (ref 10.3–14.5)
RBC # FLD: 13.3 % — SIGNIFICANT CHANGE UP (ref 10.3–14.5)
SARS-COV-2 RNA SPEC QL NAA+PROBE: SIGNIFICANT CHANGE UP
SODIUM SERPL-SCNC: 135 MMOL/L — SIGNIFICANT CHANGE UP (ref 135–145)
SP GR SPEC: 1.01 — SIGNIFICANT CHANGE UP (ref 1.01–1.02)
TIBC SERPL-MCNC: 472 UG/DL — HIGH (ref 250–450)
TROPONIN I SERPL-MCNC: <0.015 NG/ML — SIGNIFICANT CHANGE UP (ref 0–0.04)
UIBC SERPL-MCNC: 457 UG/DL — HIGH (ref 110–370)
UROBILINOGEN FLD QL: NEGATIVE — SIGNIFICANT CHANGE UP
WBC # BLD: 7.78 K/UL — SIGNIFICANT CHANGE UP (ref 3.8–10.5)
WBC # BLD: 8.21 K/UL — SIGNIFICANT CHANGE UP (ref 3.8–10.5)
WBC # FLD AUTO: 7.78 K/UL — SIGNIFICANT CHANGE UP (ref 3.8–10.5)
WBC # FLD AUTO: 8.21 K/UL — SIGNIFICANT CHANGE UP (ref 3.8–10.5)

## 2021-03-20 PROCEDURE — 93010 ELECTROCARDIOGRAM REPORT: CPT

## 2021-03-20 PROCEDURE — 71045 X-RAY EXAM CHEST 1 VIEW: CPT | Mod: 26

## 2021-03-20 PROCEDURE — 74174 CTA ABD&PLVS W/CONTRAST: CPT | Mod: 26

## 2021-03-20 PROCEDURE — 99285 EMERGENCY DEPT VISIT HI MDM: CPT

## 2021-03-20 RX ORDER — GABAPENTIN 400 MG/1
300 CAPSULE ORAL DAILY
Refills: 0 | Status: DISCONTINUED | OUTPATIENT
Start: 2021-03-20 | End: 2021-04-06

## 2021-03-20 RX ORDER — CARVEDILOL PHOSPHATE 80 MG/1
6.25 CAPSULE, EXTENDED RELEASE ORAL EVERY 12 HOURS
Refills: 0 | Status: DISCONTINUED | OUTPATIENT
Start: 2021-03-20 | End: 2021-03-20

## 2021-03-20 RX ORDER — SODIUM CHLORIDE 9 MG/ML
1000 INJECTION INTRAMUSCULAR; INTRAVENOUS; SUBCUTANEOUS ONCE
Refills: 0 | Status: COMPLETED | OUTPATIENT
Start: 2021-03-20 | End: 2021-03-20

## 2021-03-20 RX ORDER — FUROSEMIDE 40 MG
40 TABLET ORAL EVERY 12 HOURS
Refills: 0 | Status: COMPLETED | OUTPATIENT
Start: 2021-03-20 | End: 2021-03-22

## 2021-03-20 RX ORDER — IRON SUCROSE 20 MG/ML
200 INJECTION, SOLUTION INTRAVENOUS EVERY 24 HOURS
Refills: 0 | Status: COMPLETED | OUTPATIENT
Start: 2021-03-20 | End: 2021-03-23

## 2021-03-20 RX ORDER — ALBUTEROL 90 UG/1
2 AEROSOL, METERED ORAL EVERY 6 HOURS
Refills: 0 | Status: COMPLETED | OUTPATIENT
Start: 2021-03-20 | End: 2021-03-25

## 2021-03-20 RX ORDER — BUDESONIDE AND FORMOTEROL FUMARATE DIHYDRATE 160; 4.5 UG/1; UG/1
2 AEROSOL RESPIRATORY (INHALATION)
Refills: 0 | Status: DISCONTINUED | OUTPATIENT
Start: 2021-03-20 | End: 2021-03-25

## 2021-03-20 RX ORDER — MONTELUKAST 4 MG/1
10 TABLET, CHEWABLE ORAL DAILY
Refills: 0 | Status: DISCONTINUED | OUTPATIENT
Start: 2021-03-20 | End: 2021-04-06

## 2021-03-20 RX ORDER — FUROSEMIDE 40 MG
40 TABLET ORAL ONCE
Refills: 0 | Status: COMPLETED | OUTPATIENT
Start: 2021-03-20 | End: 2021-03-20

## 2021-03-20 RX ORDER — OXYBUTYNIN CHLORIDE 5 MG
5 TABLET ORAL
Refills: 0 | Status: DISCONTINUED | OUTPATIENT
Start: 2021-03-20 | End: 2021-04-06

## 2021-03-20 RX ORDER — ATORVASTATIN CALCIUM 80 MG/1
40 TABLET, FILM COATED ORAL AT BEDTIME
Refills: 0 | Status: DISCONTINUED | OUTPATIENT
Start: 2021-03-20 | End: 2021-03-25

## 2021-03-20 RX ORDER — IPRATROPIUM/ALBUTEROL SULFATE 18-103MCG
3 AEROSOL WITH ADAPTER (GRAM) INHALATION ONCE
Refills: 0 | Status: COMPLETED | OUTPATIENT
Start: 2021-03-20 | End: 2021-03-20

## 2021-03-20 RX ORDER — LEVOTHYROXINE SODIUM 125 MCG
175 TABLET ORAL DAILY
Refills: 0 | Status: DISCONTINUED | OUTPATIENT
Start: 2021-03-20 | End: 2021-03-31

## 2021-03-20 RX ORDER — CARVEDILOL PHOSPHATE 80 MG/1
6.25 CAPSULE, EXTENDED RELEASE ORAL EVERY 12 HOURS
Refills: 0 | Status: DISCONTINUED | OUTPATIENT
Start: 2021-03-20 | End: 2021-04-03

## 2021-03-20 RX ORDER — FLUTICASONE PROPIONATE 50 MCG
1 SPRAY, SUSPENSION NASAL EVERY 12 HOURS
Refills: 0 | Status: DISCONTINUED | OUTPATIENT
Start: 2021-03-20 | End: 2021-03-25

## 2021-03-20 RX ADMIN — Medication 0.5 MILLIGRAM(S): at 21:55

## 2021-03-20 RX ADMIN — CARVEDILOL PHOSPHATE 6.25 MILLIGRAM(S): 80 CAPSULE, EXTENDED RELEASE ORAL at 23:32

## 2021-03-20 RX ADMIN — Medication 40 MILLIGRAM(S): at 21:55

## 2021-03-20 RX ADMIN — Medication 3 MILLILITER(S): at 22:10

## 2021-03-20 RX ADMIN — ATORVASTATIN CALCIUM 40 MILLIGRAM(S): 80 TABLET, FILM COATED ORAL at 23:32

## 2021-03-20 RX ADMIN — IRON SUCROSE 110 MILLIGRAM(S): 20 INJECTION, SOLUTION INTRAVENOUS at 23:32

## 2021-03-20 NOTE — H&P ADULT - PROBLEM SELECTOR PLAN 6
On inhalers at home, primary team to confirm with pharmacy   On oxygen PRN at home    will start on Symbicort and Albuterol    O2 supplemtation as needed

## 2021-03-20 NOTE — H&P ADULT - PROBLEM SELECTOR PLAN 5
on coreg 6.25 BId at home , will resume with parameters    on torsemide 10 mg qd at home, will start on Lasix 40 mg ID X4 doses for now   Monitor BP

## 2021-03-20 NOTE — H&P ADULT - PROBLEM SELECTOR PLAN 3
on torsemide 10mg at home  CXR showed central congestion    ECG and troponin unremarkable    f/u Pro -BNp   wuill start on lasix 40mg BID for 4 doses, resume home med when clinically uvolemic   f/u  echo   strict I&Os    Cardio Dr Sandoval

## 2021-03-20 NOTE — ED PROVIDER NOTE - OBJECTIVE STATEMENT
81 y.o female with a PMHx of HTN, CAD, CHF, DM, and MI, reports to the ED c.o L sided chest pain , generalized weakness since last night. Patient endorse fatigue and shortness of breath as well. Patient denies any nausea, vomiting, headache , syncopy, urinary symptoms, or any other acute complaints. NKDA 81 y.o female with a PMHx of HTN, CAD, CHF, DM, and MI, reports to the ED c.o L sided chest pain, fatigue for almost 2 weeks. Per daughter and granddaughter whom I spoke with, patient has been complaining of increasing fatigue and sob for last few weeks. Patient also endorses decreased appetite.   Patient denies any f/c, syncope, dizziness, ha, palpitations, cough, abd pain, n/v/d/c, black stool, urinary sxs otherwise.

## 2021-03-20 NOTE — ED ADULT TRIAGE NOTE - DOMESTIC TRAVEL HIGH RISK QUESTION
[facility], [place], Emergency Department  [address]  [facility] [address]  Phone:  [phone]  Fax:  [phone]                                    Allie Del Rosario   MRN: [id number]    Department:  [facility], Emergency Department   Date of Visit:  3/19/2020           After Visit Summary Signature Page    I have received my discharge instructions, and my questions have been answered. I have discussed any challenges I see with this plan with the nurse or doctor.    ..........................................................................................................................................  Patient/Patient Representative Signature      ..........................................................................................................................................  Patient Representative Print Name and Relationship to Patient    ..................................................               ................................................  Date                                   Time    ..........................................................................................................................................  Reviewed by Signature/Title    ...................................................              ..............................................  Date                                               Time          22EPIC Rev 08/18        No

## 2021-03-20 NOTE — H&P ADULT - PROBLEM SELECTOR PLAN 4
patient takes metformin-glipizide and Janumet at home  will change to insulin HSS  Monitor Fs, titrate insulin as needed    will cehck A1c

## 2021-03-20 NOTE — ED PROVIDER NOTE - CARE PLAN
Principal Discharge DX:	Symptomatic anemia  Secondary Diagnosis:	CAD (coronary atherosclerotic disease)  Secondary Diagnosis:	Asthma

## 2021-03-20 NOTE — H&P ADULT - ASSESSMENT
79 years old [place]inean-speaking female from home, ambulates with walker, lives with son, with PMHx of HFpEF, CAD (s/p stents), chronic venous stasis, DM, HTN and Afib (on Xarelto, s/p PPM), chronic bronchitis with asthmatic component ( on Oxygen PRN at home ), JOSE DANIEL ( supposed to be on nocturnal CPAP , but non compliant) presents to the ED with chief complaint of hypotension ,  SOB and  fatigue for almost 2 weeks. admitted for symptomatic anemia.         Patient/ Family cannot remember medications  Primary team to call pharmacy to confirm home dose  Family will bring medications in the AM

## 2021-03-20 NOTE — H&P ADULT - HISTORY OF PRESENT ILLNESS
79 years old Palauan-speaking female from home, ambulates with walker, lives with son, with PMHx of HFpEF, CAD (s/p stents), chronic venous stasis, DM, HTN and Afib (on Xarelto, s/p PPM) presents to the ED with chief complaint of L sided chest pain, fatigue for almost 2 weeks      Per daughter and granddaughter whom I spoke with, patient has been complaining of increasing fatigue and sob for last few weeks. Patient also endorses decreased appetite.   Patient denies any f/c, syncope, dizziness, ha, palpitations, cough, abd pain, n/v/d/c, black stool, urinary sxs otherwis 79 years old Hungarian-speaking female from home, ambulates with walker, lives with son, with PMHx of HFpEF, CAD (s/p stents), chronic venous stasis, DM, HTN and Afib (on Xarelto, s/p PPM), chronic bronchitis with asthmatic component ( on Oxygen PRN at home ), JOSE DANIEL ( supposed to be on nocturnal CPAP , but non compliant) presents to the ED with chief complaint of hypotension ,  SOB and  fatigue for almost 2 weeks. Per daughter and granddaughter patient has been complaining of increasing fatigue and sob for last few weeks, seemed to be pale , however  denies any stool  discoloration or hematuria. patient had hemorrhoids, but never checks her stool.   pt had virtual  colonoscopy 1 year ago and the result was questionable. She never followed up. Per daughter pt refused all kinds of meat including chicken, meat , however denies any dysphasia. Patient  also endorses decreased appetite.  patient has chronic diarrhea. Patient Denies nausea, vomiting, abdominal pain, SOB, chest pain, CHAMBERLAIN, palpitations, dizziness, headache, cough, wheezing, joint pain or swelling, fever, chills.      GOC: Discussed with daughter,  DNI   79 years old Iraqi-speaking female from home, ambulates with walker, lives with son, with PMHx of HFpEF, CAD (s/p stents), chronic venous stasis, DM, HTN and Afib (on Xarelto, s/p PPM), chronic bronchitis with asthmatic component ( on Oxygen PRN at home ), JOSE DANIEL ( supposed to be on nocturnal CPAP , but non compliant) presents to the ED with chief complaint of hypotension ,  SOB and  fatigue for almost 2 weeks. Per daughter and granddaughter patient has been complaining of increasing fatigue and sob for last few weeks, seemed to be pale , patient endorses dark loose bowel movement for quite a while.  pt had virtual  colonoscopy 1 year ago and the result was questionable. She never followed up. Per daughter pt refused all kinds of meat including chicken, meat , however denies any dysphasia. Patient  also endorses decreased appetite.  patient has chronic diarrhea. Patient Denies nausea, vomiting, abdominal pain, SOB, chest pain, CHAMBERLAIN, palpitations, dizziness, headache, cough, wheezing, joint pain or swelling, fever, chills.      GOC: Discussed with daughter,  mentioned she want stobe DNI , on discussion with patient, patient was so distressed about urge incontinency and was complaining of pain and IV infiltrations, so GOC discussion deferred to primary team.

## 2021-03-20 NOTE — ED ADULT TRIAGE NOTE - CHIEF COMPLAINT QUOTE
biba from home with c/o generalized weakness since morning, h/o c/c bronchitis on home oxygen @ 3 l/m

## 2021-03-20 NOTE — ED PROVIDER NOTE - PROGRESS NOTE DETAILS
Hgb 7.7, compared to 11.4 from last year. Rectal exam showing dark brown stool, no blood. Remainder of labs reviewed. Patient's sxs likely 2/2 symptomatic anemia. COVID negative, CXR no infiltrates/effusions. D/w Dr. Rodriguez who stated to admit patient. 1U PRBC ordered. Spoke w patient's granddaughter who is ER nurse at [employer] [facility] [place] and patient's daughter, Vaishali, who states she understands and agrees with plan. Hgb 7.7, compared to 11.4 from last year. Remainder of labs reviewed. Patient's sxs likely 2/2 symptomatic anemia. COVID negative, CXR no infiltrates/effusions. D/w Dr. Rodriguez who stated to admit patient. 1U PRBC ordered. Spoke w patient's granddaughter who is ER nurse at [employer] [facility] [place] and patient's daughter, Vaishali, who states she understands and agrees with plan.

## 2021-03-20 NOTE — ED PROVIDER NOTE - CLINICAL SUMMARY MEDICAL DECISION MAKING FREE TEXT BOX
81 y.o f with generalized weakness, fatigue, chest pain , will get labs, imaging, cultures , hydration and reassess

## 2021-03-20 NOTE — H&P ADULT - PROBLEM SELECTOR PLAN 2
On Xarelto and Coreg at home   will hold off Ac   ECG unremarkable   will resume coreg given high blood pressure   Monitor rate

## 2021-03-20 NOTE — H&P ADULT - NSHPPHYSICALEXAM_GEN_ALL_CORE
CONSTITUTIONAL:   ENMT: Airway patent, Nasal mucosa clear. Mouth with normal mucosa. Throat has no vesicles, no oropharyngeal exudates and uvula is midline.  EYES: Clear bilaterally, pupils equal, round and reactive to light. EOMI.  CARDIAC: Normal rate, regular rhythm.  Heart sounds S1, S2.  No murmurs, rubs or gallops   RESPIRATORY: Breath sounds clear and equal bilaterally. No wheezes, rhales or rhonchi  MUSCULOSKELETAL: Spine appears normal, range of motion is not limited, no muscle or joint tenderness  EXTREMITIES: No edema, cyanosis or deformity   NEUROLOGICAL: Alert and oriented, no focal deficits, no motor or sensory deficits.  SKIN: No rash, skin turgor   ABDOMEN : CONSTITUTIONAL: Obese , NAd , pale   ENMT: Airway patent, Nasal mucosa clear. Mouth with normal mucosa. Throat has no vesicles, no oropharyngeal exudates and uvula is midline.  EYES: Clear bilaterally, pupils equal, round and reactive to light. EOMI.  CARDIAC: Normal rate, regular rhythm.  Heart sounds S1, S2.  No murmurs, rubs or gallops   RESPIRATORY: CTAB   MUSCULOSKELETAL: Spine appears normal, range of motion is not limited, no muscle or joint tenderness  EXTREMITIES:  b/l LE edema L>R   NEUROLOGICAL: Alert and oriented, no focal deficits, no motor or sensory deficits.  SKIN: No rash, skin turgor   ABDOMEN :Nt , ND , fatty

## 2021-03-20 NOTE — H&P ADULT - PROBLEM SELECTOR PLAN 1
presented with fatigue , SOB, HB of 7.7, baseline Hb 11.4  denies any active bleeding   h/o polyp in transverse colon   pt never had EGD or Colonoscopy in the past  s/p 1PRBc in ED   f/u FOBT  cbc q6 hours  hold of Xarelto for now , given questionable  GIB given h/opolyp presented with fatigue , SOB, HB of 7.7, baseline Hb 11.4  denies any active bleeding, anemia panel before transfusion c/w JUAN  h/o polyp in transverse colon   pt never had EGD or Colonoscopy in the past  s/p 1PRBc in ED   f/u FOBT  cbc q6 hours for now   hold of Xarelto for now , given questionable  GIB given h/o transverse colon polyp   c/w Protonix 40mg BID   NPO for now    will avoid IVF given pulmonary congestion for now    transfuse as needed , keep Hg above 8 (h/o CAD)   Venofer 200mg qd  X4 days  started   JS Moreland consulted   F/u CTA abdomen and Pelvis

## 2021-03-21 LAB
24R-OH-CALCIDIOL SERPL-MCNC: 33.9 NG/ML — SIGNIFICANT CHANGE UP (ref 30–80)
A1C WITH ESTIMATED AVERAGE GLUCOSE RESULT: 6.6 % — HIGH (ref 4–5.6)
ALBUMIN SERPL ELPH-MCNC: 3.2 G/DL — LOW (ref 3.5–5)
ALP SERPL-CCNC: 66 U/L — SIGNIFICANT CHANGE UP (ref 40–120)
ALT FLD-CCNC: 18 U/L DA — SIGNIFICANT CHANGE UP (ref 10–60)
ANION GAP SERPL CALC-SCNC: 8 MMOL/L — SIGNIFICANT CHANGE UP (ref 5–17)
AST SERPL-CCNC: 13 U/L — SIGNIFICANT CHANGE UP (ref 10–40)
BILIRUB SERPL-MCNC: 0.5 MG/DL — SIGNIFICANT CHANGE UP (ref 0.2–1.2)
BUN SERPL-MCNC: 24 MG/DL — HIGH (ref 7–18)
CALCIUM SERPL-MCNC: 8.9 MG/DL — SIGNIFICANT CHANGE UP (ref 8.4–10.5)
CHLORIDE SERPL-SCNC: 101 MMOL/L — SIGNIFICANT CHANGE UP (ref 96–108)
CHOLEST SERPL-MCNC: 145 MG/DL — SIGNIFICANT CHANGE UP
CO2 SERPL-SCNC: 27 MMOL/L — SIGNIFICANT CHANGE UP (ref 22–31)
COVID-19 SPIKE DOMAIN AB INTERP: POSITIVE
COVID-19 SPIKE DOMAIN ANTIBODY RESULT: >250 U/ML — HIGH
CREAT SERPL-MCNC: 1.05 MG/DL — SIGNIFICANT CHANGE UP (ref 0.5–1.3)
ESTIMATED AVERAGE GLUCOSE: 143 MG/DL — HIGH (ref 68–114)
FERRITIN SERPL-MCNC: 36 NG/ML — SIGNIFICANT CHANGE UP (ref 15–150)
FOLATE SERPL-MCNC: 10 NG/ML — SIGNIFICANT CHANGE UP
GLUCOSE SERPL-MCNC: 140 MG/DL — HIGH (ref 70–99)
HCT VFR BLD CALC: 24.7 % — LOW (ref 34.5–45)
HDLC SERPL-MCNC: 55 MG/DL — SIGNIFICANT CHANGE UP
HGB BLD-MCNC: 8.1 G/DL — LOW (ref 11.5–15.5)
IRON SATN MFR SERPL: 459 UG/DL — HIGH (ref 40–150)
IRON SATN MFR SERPL: 91 % — HIGH (ref 15–50)
LIPID PNL WITH DIRECT LDL SERPL: 73 MG/DL — SIGNIFICANT CHANGE UP
MAGNESIUM SERPL-MCNC: 2.1 MG/DL — SIGNIFICANT CHANGE UP (ref 1.6–2.6)
MCHC RBC-ENTMCNC: 26 PG — LOW (ref 27–34)
MCHC RBC-ENTMCNC: 32.8 GM/DL — SIGNIFICANT CHANGE UP (ref 32–36)
MCV RBC AUTO: 79.2 FL — LOW (ref 80–100)
NON HDL CHOLESTEROL: 90 MG/DL — SIGNIFICANT CHANGE UP
NRBC # BLD: 0 /100 WBCS — SIGNIFICANT CHANGE UP (ref 0–0)
NT-PROBNP SERPL-SCNC: 1155 PG/ML — HIGH (ref 0–450)
PHOSPHATE SERPL-MCNC: 3.9 MG/DL — SIGNIFICANT CHANGE UP (ref 2.5–4.5)
PLATELET # BLD AUTO: 277 K/UL — SIGNIFICANT CHANGE UP (ref 150–400)
POTASSIUM SERPL-MCNC: 3.8 MMOL/L — SIGNIFICANT CHANGE UP (ref 3.5–5.3)
POTASSIUM SERPL-SCNC: 3.8 MMOL/L — SIGNIFICANT CHANGE UP (ref 3.5–5.3)
PROT SERPL-MCNC: 6.9 G/DL — SIGNIFICANT CHANGE UP (ref 6–8.3)
RBC # BLD: 3.12 M/UL — LOW (ref 3.8–5.2)
RBC # FLD: 13.2 % — SIGNIFICANT CHANGE UP (ref 10.3–14.5)
SARS-COV-2 IGG+IGM SERPL QL IA: >250 U/ML — HIGH
SARS-COV-2 IGG+IGM SERPL QL IA: POSITIVE
SODIUM SERPL-SCNC: 136 MMOL/L — SIGNIFICANT CHANGE UP (ref 135–145)
TIBC SERPL-MCNC: 504 UG/DL — HIGH (ref 250–450)
TRIGL SERPL-MCNC: 86 MG/DL — SIGNIFICANT CHANGE UP
TROPONIN I SERPL-MCNC: <0.015 NG/ML — SIGNIFICANT CHANGE UP (ref 0–0.04)
TSH SERPL-MCNC: 3.61 UU/ML — SIGNIFICANT CHANGE UP (ref 0.34–4.82)
UIBC SERPL-MCNC: 45 UG/DL — LOW (ref 110–370)
VIT B12 SERPL-MCNC: 461 PG/ML — SIGNIFICANT CHANGE UP (ref 232–1245)
WBC # BLD: 7.04 K/UL — SIGNIFICANT CHANGE UP (ref 3.8–10.5)
WBC # FLD AUTO: 7.04 K/UL — SIGNIFICANT CHANGE UP (ref 3.8–10.5)

## 2021-03-21 PROCEDURE — 93970 EXTREMITY STUDY: CPT | Mod: 26

## 2021-03-21 RX ORDER — DEXTROSE 50 % IN WATER 50 %
25 SYRINGE (ML) INTRAVENOUS ONCE
Refills: 0 | Status: DISCONTINUED | OUTPATIENT
Start: 2021-03-21 | End: 2021-03-22

## 2021-03-21 RX ORDER — DEXTROSE 50 % IN WATER 50 %
15 SYRINGE (ML) INTRAVENOUS ONCE
Refills: 0 | Status: DISCONTINUED | OUTPATIENT
Start: 2021-03-21 | End: 2021-03-22

## 2021-03-21 RX ORDER — INFLUENZA VIRUS VACCINE 15; 15; 15; 15 UG/.5ML; UG/.5ML; UG/.5ML; UG/.5ML
0.5 SUSPENSION INTRAMUSCULAR ONCE
Refills: 0 | Status: DISCONTINUED | OUTPATIENT
Start: 2021-03-21 | End: 2021-04-06

## 2021-03-21 RX ORDER — DRONEDARONE 400 MG/1
400 TABLET, FILM COATED ORAL
Refills: 0 | Status: DISCONTINUED | OUTPATIENT
Start: 2021-03-21 | End: 2021-03-25

## 2021-03-21 RX ORDER — INSULIN LISPRO 100/ML
VIAL (ML) SUBCUTANEOUS
Refills: 0 | Status: DISCONTINUED | OUTPATIENT
Start: 2021-03-21 | End: 2021-03-25

## 2021-03-21 RX ADMIN — Medication 5 MILLIGRAM(S): at 17:12

## 2021-03-21 RX ADMIN — Medication 40 MILLIGRAM(S): at 17:13

## 2021-03-21 RX ADMIN — Medication 175 MICROGRAM(S): at 06:10

## 2021-03-21 RX ADMIN — Medication 5 MILLIGRAM(S): at 06:11

## 2021-03-21 RX ADMIN — CARVEDILOL PHOSPHATE 6.25 MILLIGRAM(S): 80 CAPSULE, EXTENDED RELEASE ORAL at 06:10

## 2021-03-21 RX ADMIN — ALBUTEROL 2 PUFF(S): 90 AEROSOL, METERED ORAL at 16:10

## 2021-03-21 RX ADMIN — Medication 1 SPRAY(S): at 17:11

## 2021-03-21 RX ADMIN — MONTELUKAST 10 MILLIGRAM(S): 4 TABLET, CHEWABLE ORAL at 12:28

## 2021-03-21 RX ADMIN — BUDESONIDE AND FORMOTEROL FUMARATE DIHYDRATE 2 PUFF(S): 160; 4.5 AEROSOL RESPIRATORY (INHALATION) at 22:04

## 2021-03-21 RX ADMIN — ALBUTEROL 2 PUFF(S): 90 AEROSOL, METERED ORAL at 20:41

## 2021-03-21 RX ADMIN — GABAPENTIN 300 MILLIGRAM(S): 400 CAPSULE ORAL at 12:30

## 2021-03-21 RX ADMIN — ATORVASTATIN CALCIUM 40 MILLIGRAM(S): 80 TABLET, FILM COATED ORAL at 22:04

## 2021-03-21 RX ADMIN — Medication 40 MILLIGRAM(S): at 06:11

## 2021-03-21 RX ADMIN — ALBUTEROL 2 PUFF(S): 90 AEROSOL, METERED ORAL at 03:00

## 2021-03-21 RX ADMIN — Medication 1 SPRAY(S): at 06:11

## 2021-03-21 RX ADMIN — DRONEDARONE 400 MILLIGRAM(S): 400 TABLET, FILM COATED ORAL at 17:10

## 2021-03-21 RX ADMIN — Medication 3: at 17:14

## 2021-03-21 RX ADMIN — CARVEDILOL PHOSPHATE 6.25 MILLIGRAM(S): 80 CAPSULE, EXTENDED RELEASE ORAL at 17:11

## 2021-03-21 NOTE — CONSULT NOTE ADULT - NEGATIVE MUSCULOSKELETAL SYMPTOMS
no myalgia/no muscle cramps/no stiffness/no neck pain/no arm pain L/no arm pain R/no back pain/no leg pain L/no leg pain R

## 2021-03-21 NOTE — CONSULT NOTE ADULT - ASSESSMENT
The etiology for anemia and black stool in this patient can be due to:  1. Peptic ulcer disease  2. Colorectal neoplasm  3. AVM's  4. IBD    Suggestions:    1. Monitor H/H  2. Transfuse PRBC as needed  3. Check stool for occult blood X 3  4. Check stool for culture and c. Diff toxin if diarrhrea  5. Avoid NSAID  6. Protonix 40mg daily  7. EGD and colonoscopy  8. DVT prophylaxis The etiology for anemia and black stool in this patient can be due to:  1. Peptic ulcer disease  2. Colorectal neoplasm  3. AVM's  4. IBD    Suggestions:    1. Monitor H/H  2. Transfuse PRBC as needed  3. Check stool for occult blood X 3  4. Check stool for culture and c. Diff toxin if diarrhea  5. Avoid NSAID  6. Protonix 40mg daily  7. EGD and colonoscopy  8. DVT prophylaxis

## 2021-03-21 NOTE — PROGRESS NOTE ADULT - PROBLEM SELECTOR PLAN 1
presented with fatigue , SOB, HB of 7.7, baseline Hb 11.4  denies any active bleeding, anemia panel before transfusion c/w JUAN  h/o polyp in transverse colon   pt never had EGD or Colonoscopy in the past  s/p 1PRBc in ED   f/u FOBT  cbc q6 hours for now   hold of Xarelto for now , given questionable  GIB given h/o transverse colon polyp   c/w Protonix 40mg BID   NPO for now    will avoid IVF given pulmonary congestion for now    transfuse as needed , keep Hg above 8 (h/o CAD)   Venofer 200mg qd  X4 days  started   JS Moreland consulted   F/u CTA abdomen and Pelvis

## 2021-03-21 NOTE — CONSULT NOTE ADULT - ASSESSMENT
79 years old Swedish-speaking female from home, ambulates with walker, lives with son, with PMHx of HFpEF, CAD (s/p stents), chronic venous stasis, DM, HTN and Afib (on Xarelto, s/p PPM), chronic bronchitis with asthmatic component ( on Oxygen PRN at home ), JOSE DANIEL ( supposed to be on nocturnal CPAP , but non compliant) presents to the ED with chief complaint of hypotension ,  SOB and  fatigue for almost 2 weeks.,symptomatic Fe def anemia.  1.GI eval noted, will need clearance for AC.  2.PAF-add multaq,cont coreg,if ok with GI, add asa.  3.HTN-cont bp medication.  4.CAD-asa-if ok with GI,coreg,statin.  5.DM-insulin.  6.Anemia-IV iron.  7.Asthma-MDI.  8.Interrogate PPM.  9.Echocardiogram.  10.GI and DVT prophylaxis.

## 2021-03-21 NOTE — PATIENT PROFILE ADULT - NSPRONUTRITIONRISK_GEN_A_NUR
Called patient to schedule appointment in Coumadin clinic for 8/1/2017 as requested by Dr Amaral's office. Appointment scheduled.   No indicators present

## 2021-03-21 NOTE — PROGRESS NOTE ADULT - SUBJECTIVE AND OBJECTIVE BOX
KWAME MOSQUERA  MR# [id number]  81yFemale        Patient is a 81y old  Female who presents with a chief complaint of symptomatic anemia (21 Mar 2021 15:29)      INTERVAL HPI/OVERNIGHT EVENTS:  Patient seen and examined at bedside. No notations of chest pain, palpitation, SOB, orthopnea, nausea, vomiting or abdominal pain.    ALLERGIES  No Known Allergies      MEDICATIONS  ALBUTerol    90 MICROgram(s) HFA Inhaler 2 Puff(s) Inhalation every 6 hours  atorvastatin 40 milliGRAM(s) Oral at bedtime  budesonide 160 MICROgram(s)/formoterol 4.5 MICROgram(s) Inhaler 2 Puff(s) Inhalation two times a day  carvedilol 6.25 milliGRAM(s) Oral every 12 hours  dextrose 40% Gel 15 Gram(s) Oral once  dextrose 50% Injectable 25 Gram(s) IV Push once  dronedarone 400 milliGRAM(s) Oral two times a day  fluticasone propionate 50 MICROgram(s)/spray Nasal Spray 1 Spray(s) Both Nostrils every 12 hours  furosemide   Injectable 40 milliGRAM(s) IV Push every 12 hours  gabapentin 300 milliGRAM(s) Oral daily  influenza   Vaccine 0.5 milliLiter(s) IntraMuscular once  insulin lispro (ADMELOG) corrective regimen sliding scale   SubCutaneous three times a day before meals  iron sucrose IVPB 200 milliGRAM(s) IV Intermittent every 24 hours  levothyroxine 175 MICROGram(s) Oral daily  montelukast 10 milliGRAM(s) Oral daily  oxybutynin 5 milliGRAM(s) Oral two times a day              REVIEW OF SYSTEMS:  CONSTITUTIONAL: No fever, weight loss, or fatigue  EYES: No eye pain, visual disturbances, or discharge  ENT:  No difficulty hearing, tinnitus, vertigo; No sinus or throat pain  NECK: No pain or stiffness  RESPIRATORY: No cough, wheezing, chills or hemoptysis; No Shortness of Breath  CARDIOVASCULAR: No chest pain, palpitations, passing out, dizziness, or leg swelling  GASTROINTESTINAL: No abdominal or epigastric pain. No nausea, vomiting, or hematemesis; No diarrhea or constipation. No melena or hematochezia.  GENITOURINARY: No dysuria, frequency, hematuria, or incontinence  NEUROLOGICAL: No headaches, memory loss, loss of strength, numbness, or tremors  SKIN: No itching, burning, rashes, or lesions   LYMPH Nodes: No enlarged glands  ENDOCRINE: No heat or cold intolerance; No hair loss  MUSCULOSKELETAL: No joint pain or swelling; No muscle, back, or extremity pain  PSYCHIATRIC: No depression, anxiety, mood swings, or difficulty sleeping  HEME/LYMPH: No easy bruising, or bleeding gums  ALLERGY AND IMMUNOLOGIC: No hives or eczema	    [ ] All others negative	  [ ] Unable to obtain      T(C): 36.7 (21 @ 19:25), Max: 36.8 (21 @ 21:59)  T(F): 98.1 (21 @ 19:25), Max: 98.2 (21 @ 21:59)  HR: 60 (21 @ 19:25) (60 - 74)  BP: 115/48 (21 @ 19:25) (115/48 - 170/81)  RR: 20 (21 @ 19:25) (20 - 22)  SpO2: 96% (21 @ 19:25) (96% - 100%)  Wt(kg): --    Weight (kg): 119.2 ( @ 04:11)  I&O's Summary        PHYSICAL EXAM:  A X O x  HEAD:  Atraumatic, Normocephalic  EYES: EOMI, PERRLA, conjunctiva and sclera clear  NECK: Supple, No JVD, Normal thyroid  Resp: CTAB, No crackles, wheezing,   CVS: Regular rate and rhythm; No discernable murmurs, rubs, or gallops  ABD: Soft, Nontender, Nondistended; Bowel sounds present  EXTREMITIES:  2+ Peripheral Pulses, No edema  LYMPH: No dicernable lymphadenopathy noted  GENERAL: NAD, well-groomed, well-developed      LABS:                        8.1    7.04  )-----------( 277      ( 21 Mar 2021 07:15 )             24.7         136  |  101  |  24<H>  ----------------------------<  140<H>  3.8   |  27  |  1.05    Ca    8.9      21 Mar 2021 07:15  Phos  3.9       Mg     2.1         TPro  6.9  /  Alb  3.2<L>  /  TBili  0.5  /  DBili  x   /  AST  13  /  ALT  18  /  AlkPhos  66  03-21    PT/INR - ( 20 Mar 2021 16:31 )   PT: 13.4 sec;   INR: 1.13 ratio         PTT - ( 20 Mar 2021 16:31 )  PTT:36.5 sec  Urinalysis Basic - ( 20 Mar 2021 20:54 )    Color: Yellow / Appearance: Clear / S.015 / pH: x  Gluc: x / Ketone: Negative  / Bili: Negative / Urobili: Negative   Blood: x / Protein: Negative / Nitrite: Negative   Leuk Esterase: Negative / RBC: x / WBC x   Sq Epi: x / Non Sq Epi: x / Bacteria: x      CAPILLARY BLOOD GLUCOSE          Troponins:  ProBNP:  Lipid Profile:   HgA1c:  TSH:           RADIOLOGY & ADDITIONAL TESTS:    Imaging Personally Reviewed:  [ ] YES  [ ] NO      Consultant(s) Notes Reviewed:  [x ] YES  [ ] NO    Care Discussed with Consultants/Other Providers [ x] YES  [ ] NO          PAST MEDICAL & SURGICAL HISTORY:  Obesity    Pacemaker    Atrial fibrillation    Hypothyroidism    Venous stasis    IBS (irritable bowel syndrome)    CHF (congestive heart failure), NYHA class I    HLD (Hyperlipidemia)    HTN (Hypertension)    Diabetes    Myocardial Infarction    CAD (Coronary Atherosclerotic Disease)    Asthma    S/P coronary angiogram          Anemia    H/o or current diagnosis of HF- Contraindication to ACEI/ARBs    H/o or current diagnosis of HF- ACEI/ARB contraindication unknown    H/o or current diagnosis of HF- Contraindication to ACEI/ARBs    H/o or current diagnosis of HF- ACEI/ARB contraindication unknown    H/o or current diagnosis of HF- Contraindication to ACEI/ARBs    H/o or current diagnosis of HF- ACEI/ARB contraindication unknown    Family history of heart disease    Handoff    MEWS Score    Obesity    Pacemaker    Atrial fibrillation    Hypothyroidism    Venous stasis    IBS (irritable bowel syndrome)    CHF (congestive heart failure), NYHA class I    HLD (Hyperlipidemia)    HTN (Hypertension)    Diabetes    Myocardial Infarction    CAD (Coronary Atherosclerotic Disease)    Asthma    Symptomatic anemia    Hypothyroidism    Prophylactic measure    JOSE DANIEL (obstructive sleep apnea)    Asthma    HTN (Hypertension)    Diabetes    CHF (congestive heart failure), NYHA class I    Atrial fibrillation    Symptomatic anemia    S/P coronary angiogram    No significant past surgical history    A) WEAKNESS    90+    Asthma    CAD (coronary atherosclerotic disease)    [email or web address]

## 2021-03-21 NOTE — CONSULT NOTE ADULT - NEGATIVE ENMT SYMPTOMS
no hearing difficulty/no ear pain/no tinnitus/no vertigo/no sinus symptoms/no nasal congestion/no nasal discharge/no gum bleeding/no dry mouth/no throat pain/no dysphagia

## 2021-03-21 NOTE — CONSULT NOTE ADULT - SUBJECTIVE AND OBJECTIVE BOX
[  ] STAT REQUEST              [ X ] ROUTINE REQUEST    Patient is a 81 year old femalewith Gastrointestinal bleeding and symptomatic anemia. GI consulted to evaluate.       HPI:  79 years old female from home, ambulates with walker, lives with son, with past medical history significant for CAD (s/p stents), CHF, chronic venous stasis, DM, HTN, Afib (on Xarelto, s/p PPM), chronic bronchitis with asthmatic component ( on Oxygen PRN at home ), and osteoarthritis presented to the emergency room with 2 weeks history of worsening SOB, Fatigue, associated with a few episodes of black stool.    pt had virtual  colonoscopy 1 year ago and the result was questionable. She never followed up.  Patient also c/o poor appetite with chronic diarrhea but denies abdominal pain, nausea, vomiting, hematemesis, hematochezia, melena, fever, chills, chest pain, SOB, cough, hematuria, dysuria or diarrhea.         PAIN MANAGEMENT:  Pain Scale:                 0/10  Pain Location:      Prior Colonoscopy:  No prior colonoscopy    PAST MEDICAL HISTORY  Obesity  Atrial fibrillation  Hypothyroidism  Venous stasis  Irritable bowel syndrome   CHF    Hyperlipidemia   Hypertension  DM  Myocardial Infarction  CAD    Asthma        PAST SURGICAL HISTORY  Coronary angiogram  Coronary stent placement  Pacemaker placement           Allergies    No Known Allergies    Intolerances  None         MEDICATIONS  (STANDING):  ALBUTerol    90 MICROgram(s) HFA Inhaler 2 Puff(s) Inhalation every 6 hours  atorvastatin 40 milliGRAM(s) Oral at bedtime  budesonide 160 MICROgram(s)/formoterol 4.5 MICROgram(s) Inhaler 2 Puff(s) Inhalation two times a day  carvedilol 6.25 milliGRAM(s) Oral every 12 hours  fluticasone propionate 50 MICROgram(s)/spray Nasal Spray 1 Spray(s) Both Nostrils every 12 hours  furosemide   Injectable 40 milliGRAM(s) IV Push every 12 hours  gabapentin 300 milliGRAM(s) Oral daily  influenza   Vaccine 0.5 milliLiter(s) IntraMuscular once  iron sucrose IVPB 200 milliGRAM(s) IV Intermittent every 24 hours  levothyroxine 175 MICROGram(s) Oral daily  montelukast 10 milliGRAM(s) Oral daily  oxybutynin 5 milliGRAM(s) Oral two times a day         SOCIAL HISTORY  Advanced Directives:       [ X ] Full Code       [  ] DNR  Marital Status:         [  ] M      [ X ] S      [  ] D       [  ] W  Children:       [ X ] Yes      [  ] No  Occupation:        [  ] Employed       [ X ] Unemployed       [  ] Retired  Diet:       [ X Regular       [  ] PEG feeding          [  ] NG tube feeding  Drug Use:           [ X ] Patient denied          [  ] Yes  Alcohol:           [X] No             [  ] Yes (socially)         [  ] Yes (chronic)  Tobacco:           [  ] Yes           [ X ] No      FAMILY HISTORY  [  ] Heart Disease            [  ] Diabetes             [  ] HTN             [  ] Colon Cancer             [  ] Stomach Cancer              [  ] Pancreatic Cancer      VITAL SIGNS   Vital Signs Last 24 Hrs  T(C): 36.3 (21 Mar 2021 05:38), Max: 37.2 (20 Mar 2021 19:52)  T(F): 97.4 (21 Mar 2021 05:38), Max: 98.9 (20 Mar 2021 19:52)  HR: 67 (21 Mar 2021 05:38) (67 - 98)  BP: 140/50 (21 Mar 2021 05:38) (140/50 - 174/50)   RR: 20 (21 Mar 2021 05:38) (16 - 20)  SpO2: 97% (21 Mar 2021 05:38) (97% - 100%)  Daily Height in cm: 160.02 (20 Mar 2021 15:46)              CBC Full  -  ( 21 Mar 2021 07:15 )  WBC Count : 7.04 K/uL  RBC Count : 3.12 M/uL  Hemoglobin : 8.1 g/dL  Hematocrit : 24.7 %  Platelet Count - Automated : 277 K/uL  Mean Cell Volume : 79.2 fl  Mean Cell Hemoglobin : 26.0 pg  Mean Cell Hemoglobin Concentration : 32.8 gm/dL  Auto Neutrophil # : x  Auto Lymphocyte # : x  Auto Monocyte # : x  Auto Eosinophil # : x  Auto Basophil # : x  Auto Neutrophil % : x  Auto Lymphocyte % : x  Auto Monocyte % : x  Auto Eosinophil % : x  Auto Basophil % : x      03-21    136  |  101  |  24<H>  ----------------------------<  140<H>  3.8   |  27  |  1.05    Ca    8.9      21 Mar 2021 07:15  Phos  3.9     03-21  Mg     2.1     03-21    TPro  6.9  /  Alb  3.2<L>  /  TBili  0.5  /  DBili  x   /  AST  13  /  ALT  18  /  AlkPhos  66  03-21     PT/INR - ( 20 Mar 2021 16:31 )   PT: 13.4 sec;   INR: 1.13 ratio       PTT - ( 20 Mar 2021 16:31 )  PTT:36.5 sec    Occult Blood, Feces (11.29.19 @ 17:45)   Occult Blood, Feces: Negative     Iron with Total Binding Capacity (03.21.21 @ 07:15)   Iron - Total Binding Capacity.: 504 ug/dL   % Saturation, Iron: 91 %   Iron Total, Serum: 459 ug/dL   Unsaturated Iron Binding Capacity: 45 ug/dL     Urinalysis (03.20.21 @ 20:54)   Blood, Urine: Negative   Glucose Qualitative, Urine: Negative   pH Urine: 5.0   Color: Yellow   Urine Appearance: Clear   Bilirubin: Negative   Ketone - Urine: Negative   Specific Gravity: 1.015   Protein, Urine: Negative   Urobilinogen: Negative   Nitrite: Negative   Leukocyte Esterase Concentration: Negative       ECG    Ventricular Rate 68 BPM    Atrial Rate 68 BPM    P-R Interval 176 ms    QRS Duration 124 ms    Q-T Interval 422 ms    QTC Calculation(Bezet) 448 ms    P Axis 64 degrees    R Axis -76 degrees    T Axis 43 degrees    Diagnosis Line *** Suspect unspecified pacemaker failure  Normal sinus rhythm  Right bundle branch block  Left anterior fascicular block  *** Bifascicular block ***         RADIOLOGY/IMAGING     EXAM:  CT ANGIO ABD PELV (W)AW IC                            PROCEDURE DATE:  03/20/2021          INTERPRETATION:  CLINICAL INFORMATION: GI bleed. History of colonic polyp with symptomatic anemia.    COMPARISON: None.    CONTRAST/COMPLICATIONS:  IVContrast: Omnipaque 350  90 cc administered   10 cc discarded  Oral Contrast: NONE  Complications: None reported at time of study completion    PROCEDURE:  CT of the Abdomen and Pelvis was performed.  Precontrast, Arterial and Delayed phases were performed.  Sagittal and coronal reformats were performed.    FINDINGS:  LOWER CHEST: Partially imaged pacemaker leads. Mitral annular calcification.    LIVER: Within normal limits.  BILE DUCTS: Normal caliber.  GALLBLADDER: Within normal limits.  SPLEEN: Within normal limits.  PANCREAS: Within normal limits.  ADRENALS: Within normal limits.  KIDNEYS/URETERS: Multiple bilateral low-attenuation lesions too small to accurately characterize. No hydronephrosis. Symmetric parenchymal enhancement.    BLADDER: Within normal limits.  REPRODUCTIVE ORGANS: Uterus and adnexa within normal limits.    BOWEL: No bowel obstruction. No evidence of active gastrointestinal hemorrhage. Few scattered colonic diverticula. Appendix is normal.  PERITONEUM: No ascites.  VESSELS: Atherosclerosis of the abdominal aorta. Patent celiac, superior mesenteric, bilateral renal, and inferior mesenteric arteries. Retroaortic left renal vein, normal variant.  RETROPERITONEUM/LYMPH NODES: No lymphadenopathy.  ABDOMINAL WALL: Within normal limits.  BONES: Multilevel degenerative changes of the spine. Mild anterolisthesis of L4 and L5 secondary to facet joint arthrosis.    IMPRESSION:  No CT evidence of acute gastrointestinal hemorrhage.    Few scattered colonic diverticula.                        [  ] STAT REQUEST              [ X ] ROUTINE REQUEST    Patient is a 81 year old femalewith Gastrointestinal bleeding and symptomatic anemia. GI consulted to evaluate.       HPI:  81 years old female from home, ambulates with walker, lives with son, with past medical history significant for CAD (s/p stents), CHF, chronic venous stasis, DM, HTN, Afib (on Xarelto, s/p PPM), chronic bronchitis with asthmatic component ( on Oxygen PRN at home ), and osteoarthritis presented to the emergency room with 2 weeks history of worsening SOB, Fatigue, associated with a few episodes of black stool.    pt had virtual  colonoscopy 1 year ago and the result was questionable. She never followed up.  Patient also c/o poor appetite with chronic diarrhea but denies abdominal pain, nausea, vomiting, hematemesis, hematochezia, melena, fever, chills, chest pain, SOB, cough, hematuria, dysuria or diarrhea.         PAIN MANAGEMENT:  Pain Scale:                 0/10  Pain Location:      Prior Colonoscopy:  No prior colonoscopy    PAST MEDICAL HISTORY  Obesity  Atrial fibrillation  Hypothyroidism  Venous stasis  Irritable bowel syndrome   CHF    Hyperlipidemia   Hypertension  DM  Myocardial Infarction  CAD    Asthma        PAST SURGICAL HISTORY  Coronary angiogram  Coronary stent placement  Pacemaker placement           Allergies    No Known Allergies    Intolerances  None         MEDICATIONS  (STANDING):  ALBUTerol    90 MICROgram(s) HFA Inhaler 2 Puff(s) Inhalation every 6 hours  atorvastatin 40 milliGRAM(s) Oral at bedtime  budesonide 160 MICROgram(s)/formoterol 4.5 MICROgram(s) Inhaler 2 Puff(s) Inhalation two times a day  carvedilol 6.25 milliGRAM(s) Oral every 12 hours  fluticasone propionate 50 MICROgram(s)/spray Nasal Spray 1 Spray(s) Both Nostrils every 12 hours  furosemide   Injectable 40 milliGRAM(s) IV Push every 12 hours  gabapentin 300 milliGRAM(s) Oral daily  influenza   Vaccine 0.5 milliLiter(s) IntraMuscular once  iron sucrose IVPB 200 milliGRAM(s) IV Intermittent every 24 hours  levothyroxine 175 MICROGram(s) Oral daily  montelukast 10 milliGRAM(s) Oral daily  oxybutynin 5 milliGRAM(s) Oral two times a day         SOCIAL HISTORY  Advanced Directives:       [ X ] Full Code       [  ] DNR  Marital Status:         [  ] M      [ X ] S      [  ] D       [  ] W  Children:       [ X ] Yes      [  ] No  Occupation:        [  ] Employed       [ X ] Unemployed       [  ] Retired  Diet:       [ X Regular       [  ] PEG feeding          [  ] NG tube feeding  Drug Use:           [ X ] Patient denied          [  ] Yes  Alcohol:           [X] No             [  ] Yes (socially)         [  ] Yes (chronic)  Tobacco:           [  ] Yes           [ X ] No      FAMILY HISTORY  [  ] Heart Disease            [  ] Diabetes             [  ] HTN             [  ] Colon Cancer             [  ] Stomach Cancer              [  ] Pancreatic Cancer      VITAL SIGNS   Vital Signs Last 24 Hrs  T(C): 36.3 (21 Mar 2021 05:38), Max: 37.2 (20 Mar 2021 19:52)  T(F): 97.4 (21 Mar 2021 05:38), Max: 98.9 (20 Mar 2021 19:52)  HR: 67 (21 Mar 2021 05:38) (67 - 98)  BP: 140/50 (21 Mar 2021 05:38) (140/50 - 174/50)   RR: 20 (21 Mar 2021 05:38) (16 - 20)  SpO2: 97% (21 Mar 2021 05:38) (97% - 100%)  Daily Height in cm: 160.02 (20 Mar 2021 15:46)              CBC Full  -  ( 21 Mar 2021 07:15 )  WBC Count : 7.04 K/uL  RBC Count : 3.12 M/uL  Hemoglobin : 8.1 g/dL  Hematocrit : 24.7 %  Platelet Count - Automated : 277 K/uL  Mean Cell Volume : 79.2 fl  Mean Cell Hemoglobin : 26.0 pg  Mean Cell Hemoglobin Concentration : 32.8 gm/dL  Auto Neutrophil # : x  Auto Lymphocyte # : x  Auto Monocyte # : x  Auto Eosinophil # : x  Auto Basophil # : x  Auto Neutrophil % : x  Auto Lymphocyte % : x  Auto Monocyte % : x  Auto Eosinophil % : x  Auto Basophil % : x      03-21    136  |  101  |  24<H>  ----------------------------<  140<H>  3.8   |  27  |  1.05    Ca    8.9      21 Mar 2021 07:15  Phos  3.9     03-21  Mg     2.1     03-21    TPro  6.9  /  Alb  3.2<L>  /  TBili  0.5  /  DBili  x   /  AST  13  /  ALT  18  /  AlkPhos  66  03-21     PT/INR - ( 20 Mar 2021 16:31 )   PT: 13.4 sec;   INR: 1.13 ratio       PTT - ( 20 Mar 2021 16:31 )  PTT:36.5 sec    Occult Blood, Feces (11.29.19 @ 17:45)   Occult Blood, Feces: Negative     Iron with Total Binding Capacity (03.21.21 @ 07:15)   Iron - Total Binding Capacity.: 504 ug/dL   % Saturation, Iron: 91 %   Iron Total, Serum: 459 ug/dL   Unsaturated Iron Binding Capacity: 45 ug/dL     Urinalysis (03.20.21 @ 20:54)   Blood, Urine: Negative   Glucose Qualitative, Urine: Negative   pH Urine: 5.0   Color: Yellow   Urine Appearance: Clear   Bilirubin: Negative   Ketone - Urine: Negative   Specific Gravity: 1.015   Protein, Urine: Negative   Urobilinogen: Negative   Nitrite: Negative   Leukocyte Esterase Concentration: Negative       ECG    Ventricular Rate 68 BPM    Atrial Rate 68 BPM    P-R Interval 176 ms    QRS Duration 124 ms    Q-T Interval 422 ms    QTC Calculation(Bezet) 448 ms    P Axis 64 degrees    R Axis -76 degrees    T Axis 43 degrees    Diagnosis Line *** Suspect unspecified pacemaker failure  Normal sinus rhythm  Right bundle branch block  Left anterior fascicular block  *** Bifascicular block ***         RADIOLOGY/IMAGING     EXAM:  CT ANGIO ABD PELV (W)AW IC                            PROCEDURE DATE:  03/20/2021          INTERPRETATION:  CLINICAL INFORMATION: GI bleed. History of colonic polyp with symptomatic anemia.    COMPARISON: None.    CONTRAST/COMPLICATIONS:  IVContrast: Omnipaque 350  90 cc administered   10 cc discarded  Oral Contrast: NONE  Complications: None reported at time of study completion    PROCEDURE:  CT of the Abdomen and Pelvis was performed.  Precontrast, Arterial and Delayed phases were performed.  Sagittal and coronal reformats were performed.    FINDINGS:  LOWER CHEST: Partially imaged pacemaker leads. Mitral annular calcification.    LIVER: Within normal limits.  BILE DUCTS: Normal caliber.  GALLBLADDER: Within normal limits.  SPLEEN: Within normal limits.  PANCREAS: Within normal limits.  ADRENALS: Within normal limits.  KIDNEYS/URETERS: Multiple bilateral low-attenuation lesions too small to accurately characterize. No hydronephrosis. Symmetric parenchymal enhancement.    BLADDER: Within normal limits.  REPRODUCTIVE ORGANS: Uterus and adnexa within normal limits.    BOWEL: No bowel obstruction. No evidence of active gastrointestinal hemorrhage. Few scattered colonic diverticula. Appendix is normal.  PERITONEUM: No ascites.  VESSELS: Atherosclerosis of the abdominal aorta. Patent celiac, superior mesenteric, bilateral renal, and inferior mesenteric arteries. Retroaortic left renal vein, normal variant.  RETROPERITONEUM/LYMPH NODES: No lymphadenopathy.  ABDOMINAL WALL: Within normal limits.  BONES: Multilevel degenerative changes of the spine. Mild anterolisthesis of L4 and L5 secondary to facet joint arthrosis.    IMPRESSION:  No CT evidence of acute gastrointestinal hemorrhage.    Few scattered colonic diverticula.

## 2021-03-21 NOTE — PROGRESS NOTE ADULT - ASSESSMENT
79 years old [place]ian-speaking female from home, ambulates with walker, lives with son, with PMHx of HFpEF, CAD (s/p stents), chronic venous stasis, DM, HTN and Afib (on Xarelto, s/p PPM), chronic bronchitis with asthmatic component ( on Oxygen PRN at home ), JOSE DANIEL ( supposed to be on nocturnal CPAP , but non compliant) presents to the ED with chief complaint of hypotension ,  SOB and  fatigue for almost 2 weeks. admitted for symptomatic anemia.         Patient/ Family cannot remember medications  Primary team to call pharmacy to confirm home dose  Family will bring medications in the AM

## 2021-03-21 NOTE — CONSULT NOTE ADULT - SUBJECTIVE AND OBJECTIVE BOX
CHIEF COMPLAINT:Patient is a 81y old  Female who presents with a chief complaint of symptomatic anemia (21 Mar 2021 11:01)      HPI:  79 years old Nepalese-speaking female from home, ambulates with walker, lives with son, with PMHx of HFpEF, CAD (s/p stents), chronic venous stasis, DM, HTN and Afib (on Xarelto, s/p PPM), chronic bronchitis with asthmatic component ( on Oxygen PRN at home ), JOSE DANIEL ( supposed to be on nocturnal CPAP , but non compliant) presents to the ED with chief complaint of hypotension ,  SOB and  fatigue for almost 2 weeks. Per daughter and granddaughter patient has been complaining of increasing fatigue and sob for last few weeks, seemed to be pale , patient endorses dark loose bowel movement for quite a while.  pt had virtual  colonoscopy 1 year ago and the result was questionable. She never followed up. Per daughter pt refused all kinds of meat including chicken, meat , however denies any dysphasia. Patient  also endorses decreased appetite.  patient has chronic diarrhea. Patient Denies nausea, vomiting, abdominal pain, SOB, chest pain, CHAMBERLAIN, palpitations, dizziness, headache, cough, wheezing, joint pain or swelling, fever, chills.      GOC: Discussed with daughter,  mentioned she want stobe DNI , on discussion with patient, patient was so distressed about urge incontinency and was complaining of pain and IV infiltrations, so GOC discussion deferred to primary team.    (20 Mar 2021 19:53)      PAST MEDICAL & SURGICAL HISTORY:  Obesity    Pacemaker    Parox Atrial fibrillation    Hypothyroidism    Venous stasis    IBS (irritable bowel syndrome)    CHF (congestive heart failure), NYHA class I    HLD (Hyperlipidemia)    HTN (Hypertension)    Diabetes    Myocardial Infarction    CAD (Coronary Atherosclerotic Disease)    Asthma          MEDICATIONS  (STANDING):  ALBUTerol    90 MICROgram(s) HFA Inhaler 2 Puff(s) Inhalation every 6 hours  atorvastatin 40 milliGRAM(s) Oral at bedtime  budesonide 160 MICROgram(s)/formoterol 4.5 MICROgram(s) Inhaler 2 Puff(s) Inhalation two times a day  carvedilol 6.25 milliGRAM(s) Oral every 12 hours  fluticasone propionate 50 MICROgram(s)/spray Nasal Spray 1 Spray(s) Both Nostrils every 12 hours  furosemide   Injectable 40 milliGRAM(s) IV Push every 12 hours  gabapentin 300 milliGRAM(s) Oral daily  influenza   Vaccine 0.5 milliLiter(s) IntraMuscular once  iron sucrose IVPB 200 milliGRAM(s) IV Intermittent every 24 hours  levothyroxine 175 MICROGram(s) Oral daily  montelukast 10 milliGRAM(s) Oral daily  oxybutynin 5 milliGRAM(s) Oral two times a day    MEDICATIONS  (PRN):      FAMILY HISTORY:  Family history of heart disease        SOCIAL HISTORY:    [x ] Non-smoker    [x ] Alcohol-denies    Allergies    No Known Allergies    Intolerances    	    REVIEW OF SYSTEMS:  CONSTITUTIONAL: No fever, weight loss, or fatigue  EYES: No eye pain, visual disturbances, or discharge  ENT:  No difficulty hearing, tinnitus, vertigo; No sinus or throat pain  NECK: No pain or stiffness  RESPIRATORY: No cough, wheezing, chills or hemoptysis; No Shortness of Breath  CARDIOVASCULAR: No chest pain, palpitations, passing out, dizziness, or leg swelling  GASTROINTESTINAL: No abdominal or epigastric pain. No nausea, vomiting, or hematemesis; No diarrhea or constipation. No melena or hematochezia.  GENITOURINARY: No dysuria, frequency, hematuria, or incontinence  NEUROLOGICAL: No headaches, memory loss, loss of strength, numbness, or tremors  SKIN: No itching, burning, rashes, or lesions   LYMPH Nodes: No enlarged glands  ENDOCRINE: No heat or cold intolerance; No hair loss  MUSCULOSKELETAL: No joint pain or swelling; No muscle, back, or extremity pain  PSYCHIATRIC: No depression, anxiety, mood swings, or difficulty sleeping  HEME/LYMPH: No easy bruising, or bleeding gums  ALLERGY AND IMMUNOLOGIC: No hives or eczema	        PHYSICAL EXAM:  T(C): 36.3 (03-21-21 @ 05:38), Max: 37.2 (03-20-21 @ 19:52)  HR: 67 (03-21-21 @ 05:38) (67 - 98)  BP: 140/50 (03-21-21 @ 05:38) (140/50 - 174/50)  RR: 20 (03-21-21 @ 05:38) (16 - 20)  SpO2: 97% (03-21-21 @ 05:38) (97% - 100%)  Wt(kg): --  I&O's Summary      Appearance: Normal	  HEENT:   Normal oral mucosa, PERRL, EOMI	  Lymphatic: No lymphadenopathy  Cardiovascular: Normal S1 S2, No JVD, No murmurs, No edema  Respiratory: Lungs clear to auscultation	  Psychiatry: A & O x 3, Mood & affect appropriate  Gastrointestinal:  Soft, Non-tender, + BS	  Skin: No rashes, No ecchymoses, No cyanosis	  Neurologic: Non-focal  Extremities: Normal range of motion, No clubbing, cyanosis or edema  Vascular: Peripheral pulses palpable 2+ bilaterally        ECG:NSR,RBBB,LAFB,intermittent a paced  	   	  	  LABS:	 	    CARDIAC MARKERS ( 21 Mar 2021 07:15 )  <0.015 ng/mL / x     / x     / x     / x      CARDIAC MARKERS ( 20 Mar 2021 16:31 )  <0.015 ng/mL / x     / x     / x     / x                                  8.1    7.04  )-----------( 277      ( 21 Mar 2021 07:15 )             24.7     03-21    136  |  101  |  24<H>  ----------------------------<  140<H>  3.8   |  27  |  1.05    Ca    8.9      21 Mar 2021 07:15  Phos  3.9     03-21  Mg     2.1     03-21    TPro  6.9  /  Alb  3.2<L>  /  TBili  0.5  /  DBili  x   /  AST  13  /  ALT  18  /  AlkPhos  66  03-21    proBNP: Serum Pro-Brain Natriuretic Peptide: 1155 pg/mL (03-21 @ 07:15)    Lipid Profile: Cholesterol 145  LDL --  HDL 55  TG 86      TSH: Thyroid Stimulating Hormone, Serum: 3.61 uU/mL (03-21 @ 07:15)      gyA1C with Estimated Average Glucose Result: 6.6: Method: Immunoassay < from: CT Angio Abdomen and Pelvis w/ IV Cont (03.20.21 @ 22:55) >  EXAM:  CT ANGIO ABD PELV (W)AW IC                            PROCEDURE DATE:  03/20/2021          INTERPRETATION:  CLINICAL INFORMATION: GI bleed. History of colonic polyp with symptomatic anemia.    COMPARISON: None.    CONTRAST/COMPLICATIONS:  IVContrast: Omnipaque 350  90 cc administered   10 cc discarded  Oral Contrast: NONE  Complications: None reported at time of study completion    PROCEDURE:  CT of the Abdomen and Pelvis was performed.  Precontrast, Arterial and Delayed phases were performed.  Sagittal and coronal reformats were performed.    FINDINGS:  LOWER CHEST: Partially imaged pacemaker leads. Mitral annular calcification.    LIVER: Within normal limits.  BILE DUCTS: Normal caliber.  GALLBLADDER: Within normal limits.  SPLEEN: Within normal limits.  PANCREAS: Within normal limits.  ADRENALS: Within normal limits.  KIDNEYS/URETERS: Multiple bilateral low-attenuation lesions too small to accurately characterize. No hydronephrosis. Symmetric parenchymal enhancement.    BLADDER: Within normal limits.  REPRODUCTIVE ORGANS: Uterus and adnexa within normal limits.    BOWEL: No bowel obstruction. No evidence of active gastrointestinal hemorrhage. Few scattered colonic diverticula. Appendix is normal.  PERITONEUM: No ascites.  VESSELS: Atherosclerosis of the abdominal aorta. Patent celiac, superior mesenteric, bilateral renal, and inferior mesenteric arteries. Retroaortic left renal vein, normal variant.  RETROPERITONEUM/LYMPH NODES: No lymphadenopathy.  ABDOMINAL WALL: Within normal limits.  BONES: Multilevel degenerative changes of the spine. Mild anterolisthesis of L4 and L5 secondary to facet joint arthrosis.    IMPRESSION:  No CT evidence of acute gastrointestinal hemorrhage.    Few scattered colonic diverticula.              GUERRERO CUMMINGS MD; Attending Radiologist    < end of copied text >

## 2021-03-21 NOTE — CONSULT NOTE ADULT - SUBJECTIVE AND OBJECTIVE BOX
Time of visit:    CHIEF COMPLAINT: Patient is a 81y old  Female who presents with a chief complaint of symptomatic anemia (21 Mar 2021 12:18)      HPI:  79 years old [place] citizen-speaking female from home, ambulates with walker, lives with son, with PMHx of HFpEF, CAD (s/p stents), chronic venous stasis, DM, HTN and Afib (on Xarelto, s/p PPM), chronic bronchitis with asthmatic component ( on Oxygen PRN at home ), JOSE DANIEL ( supposed to be on nocturnal CPAP , but non compliant) presents to the ED with chief complaint of hypotension ,  SOB and  fatigue for almost 2 weeks. Per daughter and granddaughter patient has been complaining of increasing fatigue and sob for last few weeks, seemed to be pale , patient endorses dark loose bowel movement for quite a while.  pt had virtual  colonoscopy 1 year ago and the result was questionable. She never followed up. Per daughter pt refused all kinds of meat including chicken, meat , however denies any dysphasia. Patient  also endorses decreased appetite.  patient has chronic diarrhea. Patient Denies nausea, vomiting, abdominal pain, SOB, chest pain, CHAMBERLAIN, palpitations, dizziness, headache, cough, wheezing, joint pain or swelling, fever, chills.      GOC: Discussed with daughter,  mentioned she want stobe DNI , on discussion with patient, patient was so distressed about urge incontinency and was complaining of pain and IV infiltrations, so GOC discussion deferred to primary team.    (20 Mar 2021 19:53)   Patient seen and examined.     PAST MEDICAL & SURGICAL HISTORY:  Obesity    Pacemaker    Atrial fibrillation    Hypothyroidism    Venous stasis    IBS (irritable bowel syndrome)    CHF (congestive heart failure), NYHA class I    HLD (Hyperlipidemia)    HTN (Hypertension)    Diabetes    Myocardial Infarction    CAD (Coronary Atherosclerotic Disease)    Asthma    S/P coronary angiogram        Allergies    No Known Allergies    Intolerances        MEDICATIONS  (STANDING):  ALBUTerol    90 MICROgram(s) HFA Inhaler 2 Puff(s) Inhalation every 6 hours  atorvastatin 40 milliGRAM(s) Oral at bedtime  budesonide 160 MICROgram(s)/formoterol 4.5 MICROgram(s) Inhaler 2 Puff(s) Inhalation two times a day  carvedilol 6.25 milliGRAM(s) Oral every 12 hours  dextrose 40% Gel 15 Gram(s) Oral once  dextrose 50% Injectable 25 Gram(s) IV Push once  dronedarone 400 milliGRAM(s) Oral two times a day  fluticasone propionate 50 MICROgram(s)/spray Nasal Spray 1 Spray(s) Both Nostrils every 12 hours  furosemide   Injectable 40 milliGRAM(s) IV Push every 12 hours  gabapentin 300 milliGRAM(s) Oral daily  influenza   Vaccine 0.5 milliLiter(s) IntraMuscular once  insulin lispro (ADMELOG) corrective regimen sliding scale   SubCutaneous three times a day before meals  iron sucrose IVPB 200 milliGRAM(s) IV Intermittent every 24 hours  levothyroxine 175 MICROGram(s) Oral daily  montelukast 10 milliGRAM(s) Oral daily  oxybutynin 5 milliGRAM(s) Oral two times a day      MEDICATIONS  (PRN):   Medications up to date at time of exam.    Medications up to date at time of exam.    FAMILY HISTORY:  Family history of heart disease        SOCIAL HISTORY  Smoking History: [   ] smoking/smoke exposure, [   ] former smoker  Living Condition: [   ] apartment, [   ] private house  Work History:   Travel History: denies recent travel  Illicit Substance Use: denies  Alcohol Use: denies    REVIEW OF SYSTEMS:    CONSTITUTIONAL:  denies fevers, chills, sweats, weight loss    HEENT:  denies diplopia or blurred vision, sore throat or runny nose.    CARDIOVASCULAR:  denies pressure, squeezing, tightness, or heaviness about the chest; no palpitations.    RESPIRATORY:  denies SOB, cough, CHAMBERLAIN, wheezing.    GASTROINTESTINAL:  denies abdominal pain, nausea, vomiting or diarrhea.    GENITOURINARY: denies dysuria, frequency or urgency.    NEUROLOGIC:  denies numbness, tingling, seizures or weakness.    PSYCHIATRIC:  denies disorder of thought or mood.    MSK: denies swelling, redness      PHYSICAL EXAMINATION:    GENERAL: The patient is a well-developed, well-nourished, in no apparent distress.     Vital Signs Last 24 Hrs  T(C): 36.7 (21 Mar 2021 14:26), Max: 37.2 (20 Mar 2021 19:52)  T(F): 98 (21 Mar 2021 14:26), Max: 98.9 (20 Mar 2021 19:52)  HR: 61 (21 Mar 2021 14:26) (61 - 98)  BP: 155/53 (21 Mar 2021 14:26) (140/50 - 174/50)  BP(mean): --  RR: 22 (21 Mar 2021 14:26) (16 - 22)  SpO2: 99% (21 Mar 2021 14:26) (97% - 100%)   (if applicable)    Chest Tube (if applicable)    HEENT: Head is normocephalic and atraumatic. Extraocular muscles are intact. Mucous membranes are moist.     NECK: Supple, no palpable adenopathy.    LUNGS: Clear to auscultation, no wheezing, rales, or rhonchi.    HEART: Regular rate and rhythm without murmur.    ABDOMEN: Soft, nontender, and nondistended.  No hepatosplenomegaly is noted.    RENAL: No difficulty voiding, no pelvic pain    EXTREMITIES: Without any cyanosis, +2  edema with chronic skin changes b/l lower ext    NEUROLOGIC: Awake, alert, oriented, grossly intact    SKIN: Warm, dry, good turgor.      LABS:                        8.1    7.04  )-----------( 277      ( 21 Mar 2021 07:15 )             24.7     03-21    136  |  101  |  24<H>  ----------------------------<  140<H>  3.8   |  27  |  1.05    Ca    8.9      21 Mar 2021 07:15  Phos  3.9     03-21  Mg     2.1     03-21    TPro  6.9  /  Alb  3.2<L>  /  TBili  0.5  /  DBili  x   /  AST  13  /  ALT  18  /  AlkPhos  66  03-21    PT/INR - ( 20 Mar 2021 16:31 )   PT: 13.4 sec;   INR: 1.13 ratio         PTT - ( 20 Mar 2021 16:31 )  PTT:36.5 sec  Urinalysis Basic - ( 20 Mar 2021 20:54 )    Color: Yellow / Appearance: Clear / S.015 / pH: x  Gluc: x / Ketone: Negative  / Bili: Negative / Urobili: Negative   Blood: x / Protein: Negative / Nitrite: Negative   Leuk Esterase: Negative / RBC: x / WBC x   Sq Epi: x / Non Sq Epi: x / Bacteria: x        CARDIAC MARKERS ( 21 Mar 2021 07:15 )  <0.015 ng/mL / x     / x     / x     / x      CARDIAC MARKERS ( 20 Mar 2021 16:31 )  <0.015 ng/mL / x     / x     / x     / x            Serum Pro-Brain Natriuretic Peptide: 1155 pg/mL (21 @ 07:15)    Lactate, Blood: 1.2 mmol/L (21 @ 16:31)        MICROBIOLOGY: (if applicable)    RADIOLOGY & ADDITIONAL STUDIES:  EKG:   CT angio pelvis/ abd:< from: CT Angio Abdomen and Pelvis w/ IV Cont (21 @ 22:55) >    EXAM:  CT ANGIO ABD PELV (W)AW IC                            PROCEDURE DATE:  2021          INTERPRETATION:  CLINICAL INFORMATION: GI bleed. History of colonic polyp with symptomatic anemia.    COMPARISON: None.    CONTRAST/COMPLICATIONS:  IVContrast: Omnipaque 350  90 cc administered   10 cc discarded  Oral Contrast: NONE  Complications: None reported at time of study completion    PROCEDURE:  CT of the Abdomen and Pelvis was performed.  Precontrast, Arterial and Delayed phases were performed.  Sagittal and coronal reformats were performed.    FINDINGS:  LOWER CHEST: Partially imaged pacemaker leads. Mitral annular calcification.    LIVER: Within normal limits.  BILE DUCTS: Normal caliber.  GALLBLADDER: Within normal limits.  SPLEEN: Within normal limits.  PANCREAS: Within normal limits.  ADRENALS: Within normal limits.  KIDNEYS/URETERS: Multiple bilateral low-attenuation lesions too small to accurately characterize. No hydronephrosis. Symmetric parenchymal enhancement.    BLADDER: Within normal limits.  REPRODUCTIVE ORGANS: Uterus and adnexa within normal limits.    BOWEL: No bowel obstruction. No evidence of active gastrointestinal hemorrhage. Few scattered colonic diverticula. Appendix is normal.  PERITONEUM: No ascites.  VESSELS: Atherosclerosis of the abdominal aorta. Patent celiac, superior mesenteric, bilateral renal, and inferior mesenteric arteries. Retroaortic left renal vein, normal variant.  RETROPERITONEUM/LYMPH NODES: No lymphadenopathy.  ABDOMINAL WALL: Within normal limits.  BONES: Multilevel degenerative changes of the spine. Mild anterolisthesis of L4 and L5 secondary to facet joint arthrosis.    IMPRESSION:  No CT evidence of acute gastrointestinal hemorrhage.    Few scattered colonic diverticula.              GUERRERO CUMMINGS MD; Attending Radiologist  This document has been electronically signed. Mar 21 2021  1:36AM    < end of copied text >  CXR:< from: Xray Chest 1 View- PORTABLE-Urgent (21 @ 16:38) >    EXAM:  XR CHEST PORTABLE URGENT 1V                            PROCEDURE DATE:  2021          INTERPRETATION:  AP chest.    Clinical indications: Chest pain.    IMPRESSION: There is left-sided defibrillator. The heart is normal in size. The lungs are clear.            STEWART MORFIN MD; Attending Radiologist  This document has been electronically signed. Mar 21 2021  9:49AM    < end of copied text >    Doppler;< from: US Duplex Venous Lower Ext Complete, Bilateral (21 @ 13:12) >    EXAM:  US DPLX LWR EXT VEINS COMPL BI                            PROCEDURE DATE:  2021          INTERPRETATION:  CLINICAL INFORMATION: Leg swelling    COMPARISON: None available.    TECHNIQUE: Duplex sonography of the BILATERAL LOWER extremity veins with color and spectral Doppler, with and without compression.    FINDINGS:    RIGHT:  Normal compressibility of the RIGHT common femoral, femoral and popliteal veins.  Doppler examination shows normal spontaneous and phasic flow.  No RIGHT calf vein thrombosis is detected.    LEFT:  Normal compressibility of the LEFT common femoral, femoral and popliteal veins.  Doppler examination shows normal spontaneous and phasic flow.  No LEFT calf vein thrombosis is detected.    IMPRESSION:  No evidence of deep venous thrombosis in either lower extremity.                FOX MEREDITH MD; Attending Radiologist  This document has been electronically signed. Mar 21 2021  1:19PM    < end of copied text >      IMPRESSION: 81y Female PAST MEDICAL & SURGICAL HISTORY:  Obesity    Pacemaker    Atrial fibrillation    Hypothyroidism    Venous stasis    IBS (irritable bowel syndrome)    CHF (congestive heart failure), NYHA class I    HLD (Hyperlipidemia)    HTN (Hypertension)    Diabetes    Myocardial Infarction    CAD (Coronary Atherosclerotic Disease)    Asthma    S/P coronary angiogram     p/w           IMP: This  is a 79 years old morbid obese  [place] citizen-speaking woman  from home, ambulates with walker, lives with son, with  HFpEF, CAD (s/p stents), chronic venous stasis, DM, HTN and Afib (on Xarelto, s/p PPM), chronic bronchitis with asthmatic component ( on Oxygen PRN at home ), JOSE DANIEL ( supposed to be on nocturnal CPAP , but non compliant) presents to the ED with chief complaint of hypotension ,  SOB and  fatigue for almost 2 weeks due to a combination of symptomatic anemia , morbid obesity , and CAD.     Sugg;  -monitor cbc daily  -GI eval   -hema eval  -O2 supp as needed   -hold PAP since pat was non compliant with devise  -Wt loss   -encourage compliance   -symbicort 160/4.5 q12h  -albuterol inhaler 2 puff q6h prn   -PT eval   -OOB to chair   -out pat pulmo f/u    d/c with attend

## 2021-03-22 ENCOUNTER — RESULT REVIEW (OUTPATIENT)
Age: 81
End: 2021-03-22

## 2021-03-22 ENCOUNTER — TRANSCRIPTION ENCOUNTER (OUTPATIENT)
Age: 81
End: 2021-03-22

## 2021-03-22 LAB
ALBUMIN SERPL ELPH-MCNC: 3.1 G/DL — LOW (ref 3.5–5)
ALP SERPL-CCNC: 70 U/L — SIGNIFICANT CHANGE UP (ref 40–120)
ALT FLD-CCNC: 20 U/L DA — SIGNIFICANT CHANGE UP (ref 10–60)
ANION GAP SERPL CALC-SCNC: 9 MMOL/L — SIGNIFICANT CHANGE UP (ref 5–17)
AST SERPL-CCNC: 14 U/L — SIGNIFICANT CHANGE UP (ref 10–40)
BILIRUB SERPL-MCNC: 0.3 MG/DL — SIGNIFICANT CHANGE UP (ref 0.2–1.2)
BUN SERPL-MCNC: 22 MG/DL — HIGH (ref 7–18)
CALCIUM SERPL-MCNC: 9.3 MG/DL — SIGNIFICANT CHANGE UP (ref 8.4–10.5)
CHLORIDE SERPL-SCNC: 97 MMOL/L — SIGNIFICANT CHANGE UP (ref 96–108)
CHOLEST SERPL-MCNC: 134 MG/DL — SIGNIFICANT CHANGE UP
CO2 SERPL-SCNC: 29 MMOL/L — SIGNIFICANT CHANGE UP (ref 22–31)
CREAT SERPL-MCNC: 1.16 MG/DL — SIGNIFICANT CHANGE UP (ref 0.5–1.3)
GLUCOSE BLDC GLUCOMTR-MCNC: 147 MG/DL — HIGH (ref 70–99)
GLUCOSE BLDC GLUCOMTR-MCNC: 150 MG/DL — HIGH (ref 70–99)
GLUCOSE BLDC GLUCOMTR-MCNC: 178 MG/DL — HIGH (ref 70–99)
GLUCOSE BLDC GLUCOMTR-MCNC: 183 MG/DL — HIGH (ref 70–99)
GLUCOSE BLDC GLUCOMTR-MCNC: 196 MG/DL — HIGH (ref 70–99)
GLUCOSE BLDC GLUCOMTR-MCNC: 196 MG/DL — HIGH (ref 70–99)
GLUCOSE BLDC GLUCOMTR-MCNC: 262 MG/DL — HIGH (ref 70–99)
GLUCOSE SERPL-MCNC: 131 MG/DL — HIGH (ref 70–99)
HCT VFR BLD CALC: 26 % — LOW (ref 34.5–45)
HDLC SERPL-MCNC: 54 MG/DL — SIGNIFICANT CHANGE UP
HGB BLD-MCNC: 8.6 G/DL — LOW (ref 11.5–15.5)
LIPID PNL WITH DIRECT LDL SERPL: 64 MG/DL — SIGNIFICANT CHANGE UP
MAGNESIUM SERPL-MCNC: 2.3 MG/DL — SIGNIFICANT CHANGE UP (ref 1.6–2.6)
MCHC RBC-ENTMCNC: 26.6 PG — LOW (ref 27–34)
MCHC RBC-ENTMCNC: 33.1 GM/DL — SIGNIFICANT CHANGE UP (ref 32–36)
MCV RBC AUTO: 80.5 FL — SIGNIFICANT CHANGE UP (ref 80–100)
NON HDL CHOLESTEROL: 80 MG/DL — SIGNIFICANT CHANGE UP
NRBC # BLD: 0 /100 WBCS — SIGNIFICANT CHANGE UP (ref 0–0)
PHOSPHATE SERPL-MCNC: 5 MG/DL — HIGH (ref 2.5–4.5)
PLATELET # BLD AUTO: 290 K/UL — SIGNIFICANT CHANGE UP (ref 150–400)
POTASSIUM SERPL-MCNC: 3.8 MMOL/L — SIGNIFICANT CHANGE UP (ref 3.5–5.3)
POTASSIUM SERPL-SCNC: 3.8 MMOL/L — SIGNIFICANT CHANGE UP (ref 3.5–5.3)
PROT SERPL-MCNC: 6.9 G/DL — SIGNIFICANT CHANGE UP (ref 6–8.3)
RBC # BLD: 3.23 M/UL — LOW (ref 3.8–5.2)
RBC # FLD: 13.5 % — SIGNIFICANT CHANGE UP (ref 10.3–14.5)
SODIUM SERPL-SCNC: 135 MMOL/L — SIGNIFICANT CHANGE UP (ref 135–145)
TRIGL SERPL-MCNC: 82 MG/DL — SIGNIFICANT CHANGE UP
TSH SERPL-MCNC: 2.67 UU/ML — SIGNIFICANT CHANGE UP (ref 0.34–4.82)
WBC # BLD: 6.29 K/UL — SIGNIFICANT CHANGE UP (ref 3.8–10.5)
WBC # FLD AUTO: 6.29 K/UL — SIGNIFICANT CHANGE UP (ref 3.8–10.5)

## 2021-03-22 PROCEDURE — 88305 TISSUE EXAM BY PATHOLOGIST: CPT | Mod: 26

## 2021-03-22 PROCEDURE — 88312 SPECIAL STAINS GROUP 1: CPT | Mod: 26

## 2021-03-22 RX ORDER — PANTOPRAZOLE SODIUM 20 MG/1
40 TABLET, DELAYED RELEASE ORAL
Refills: 0 | Status: DISCONTINUED | OUTPATIENT
Start: 2021-03-22 | End: 2021-03-31

## 2021-03-22 RX ORDER — PANTOPRAZOLE SODIUM 20 MG/1
40 TABLET, DELAYED RELEASE ORAL
Refills: 0 | Status: DISCONTINUED | OUTPATIENT
Start: 2021-03-22 | End: 2021-03-22

## 2021-03-22 RX ORDER — CHOLECALCIFEROL (VITAMIN D3) 125 MCG
1000 CAPSULE ORAL DAILY
Refills: 0 | Status: DISCONTINUED | OUTPATIENT
Start: 2021-03-22 | End: 2021-04-06

## 2021-03-22 RX ORDER — ZINC SULFATE TAB 220 MG (50 MG ZINC EQUIVALENT) 220 (50 ZN) MG
220 TAB ORAL DAILY
Refills: 0 | Status: DISCONTINUED | OUTPATIENT
Start: 2021-03-22 | End: 2021-04-06

## 2021-03-22 RX ORDER — ASCORBIC ACID 60 MG
2000 TABLET,CHEWABLE ORAL EVERY 8 HOURS
Refills: 0 | Status: DISCONTINUED | OUTPATIENT
Start: 2021-03-22 | End: 2021-04-06

## 2021-03-22 RX ADMIN — CARVEDILOL PHOSPHATE 6.25 MILLIGRAM(S): 80 CAPSULE, EXTENDED RELEASE ORAL at 06:12

## 2021-03-22 RX ADMIN — CARVEDILOL PHOSPHATE 6.25 MILLIGRAM(S): 80 CAPSULE, EXTENDED RELEASE ORAL at 17:04

## 2021-03-22 RX ADMIN — ALBUTEROL 2 PUFF(S): 90 AEROSOL, METERED ORAL at 03:08

## 2021-03-22 RX ADMIN — GABAPENTIN 300 MILLIGRAM(S): 400 CAPSULE ORAL at 13:40

## 2021-03-22 RX ADMIN — Medication 40 MILLIGRAM(S): at 06:12

## 2021-03-22 RX ADMIN — Medication 1: at 08:18

## 2021-03-22 RX ADMIN — DRONEDARONE 400 MILLIGRAM(S): 400 TABLET, FILM COATED ORAL at 17:04

## 2021-03-22 RX ADMIN — IRON SUCROSE 110 MILLIGRAM(S): 20 INJECTION, SOLUTION INTRAVENOUS at 22:49

## 2021-03-22 RX ADMIN — Medication 2000 MILLIGRAM(S): at 22:14

## 2021-03-22 RX ADMIN — MONTELUKAST 10 MILLIGRAM(S): 4 TABLET, CHEWABLE ORAL at 13:38

## 2021-03-22 RX ADMIN — BUDESONIDE AND FORMOTEROL FUMARATE DIHYDRATE 2 PUFF(S): 160; 4.5 AEROSOL RESPIRATORY (INHALATION) at 22:14

## 2021-03-22 RX ADMIN — ALBUTEROL 2 PUFF(S): 90 AEROSOL, METERED ORAL at 22:13

## 2021-03-22 RX ADMIN — ATORVASTATIN CALCIUM 40 MILLIGRAM(S): 80 TABLET, FILM COATED ORAL at 22:13

## 2021-03-22 RX ADMIN — Medication 1 SPRAY(S): at 17:04

## 2021-03-22 RX ADMIN — Medication 5 MILLIGRAM(S): at 06:12

## 2021-03-22 RX ADMIN — BUDESONIDE AND FORMOTEROL FUMARATE DIHYDRATE 2 PUFF(S): 160; 4.5 AEROSOL RESPIRATORY (INHALATION) at 09:20

## 2021-03-22 RX ADMIN — ZINC SULFATE TAB 220 MG (50 MG ZINC EQUIVALENT) 220 MILLIGRAM(S): 220 (50 ZN) TAB at 22:13

## 2021-03-22 RX ADMIN — Medication 5 MILLIGRAM(S): at 17:04

## 2021-03-22 RX ADMIN — Medication 1000 UNIT(S): at 22:14

## 2021-03-22 RX ADMIN — IRON SUCROSE 110 MILLIGRAM(S): 20 INJECTION, SOLUTION INTRAVENOUS at 00:12

## 2021-03-22 RX ADMIN — PANTOPRAZOLE SODIUM 40 MILLIGRAM(S): 20 TABLET, DELAYED RELEASE ORAL at 17:04

## 2021-03-22 RX ADMIN — Medication 175 MICROGRAM(S): at 06:12

## 2021-03-22 RX ADMIN — DRONEDARONE 400 MILLIGRAM(S): 400 TABLET, FILM COATED ORAL at 06:12

## 2021-03-22 RX ADMIN — ALBUTEROL 2 PUFF(S): 90 AEROSOL, METERED ORAL at 08:03

## 2021-03-22 RX ADMIN — ALBUTEROL 2 PUFF(S): 90 AEROSOL, METERED ORAL at 14:36

## 2021-03-22 RX ADMIN — Medication 1 SPRAY(S): at 06:12

## 2021-03-22 RX ADMIN — Medication 3: at 17:04

## 2021-03-22 NOTE — DISCHARGE NOTE PROVIDER - NSDCFUSCHEDAPPT_GEN_ALL_CORE_FT
KWAME MOSQUERA ; 07/07/2021 ; [facility] [address] KWAME Yeung ; 08/16/2021 ; [facility] [place] [address] [facility]

## 2021-03-22 NOTE — PROGRESS NOTE ADULT - SUBJECTIVE AND OBJECTIVE BOX
Time of Visit:  Patient seen and examined.     MEDICATIONS  (STANDING):  ALBUTerol    90 MICROgram(s) HFA Inhaler 2 Puff(s) Inhalation every 6 hours  atorvastatin 40 milliGRAM(s) Oral at bedtime  budesonide 160 MICROgram(s)/formoterol 4.5 MICROgram(s) Inhaler 2 Puff(s) Inhalation two times a day  carvedilol 6.25 milliGRAM(s) Oral every 12 hours  dronedarone 400 milliGRAM(s) Oral two times a day  fluticasone propionate 50 MICROgram(s)/spray Nasal Spray 1 Spray(s) Both Nostrils every 12 hours  gabapentin 300 milliGRAM(s) Oral daily  influenza   Vaccine 0.5 milliLiter(s) IntraMuscular once  insulin lispro (ADMELOG) corrective regimen sliding scale   SubCutaneous three times a day before meals  iron sucrose IVPB 200 milliGRAM(s) IV Intermittent every 24 hours  levothyroxine 175 MICROGram(s) Oral daily  montelukast 10 milliGRAM(s) Oral daily  oxybutynin 5 milliGRAM(s) Oral two times a day  pantoprazole    Tablet 40 milliGRAM(s) Oral two times a day      MEDICATIONS  (PRN):       Medications up to date at time of exam.    ROS; No fever, chills, cough, congestion on exam.   PHYSICAL EXAMINATION:    Vital Signs Last 24 Hrs  T(C): 36.8 (22 Mar 2021 14:40), Max: 36.8 (22 Mar 2021 14:40)  T(F): 98.2 (22 Mar 2021 14:40), Max: 98.2 (22 Mar 2021 14:40)  HR: 71 (22 Mar 2021 14:40) (58 - 71)  BP: 114/49 (22 Mar 2021 14:40) (114/49 - 131/58)  BP(mean): --  RR: 17 (22 Mar 2021 14:40) (16 - 20)  SpO2: 98% (22 Mar 2021 14:40) (96% - 98%)   (if applicable)    General : Alert and oriented. Able to answer question with no SOB. Morbid Obese. No acute distress.     HEENT: Head is normocephalic and atraumatic. Extraocular muscles are intact. Mucous membranes are moist.     NECK: Supple, no palpable adenopathy.    LUNGS: Clear to auscultation bilaterally with  no wheezing, rales, or rhonchi. No use of accessory muscle.     HEART: S1 S2 Regular rate and no click/ rub.     ABDOMEN: Soft, nontender, and nondistended.  No hepatosplenomegaly is noted. Active bowel sounds.     EXTREMITIES: Without any cyanosis, clubbing, rash, lesions or edema.    NEUROLOGIC: Awake, alert, oriented.     SKIN: Warm and moist. Non diaphoretic.       LABS:                        8.6    6.29  )-----------( 290      ( 22 Mar 2021 07:34 )             26.0     03-22    135  |  97  |  22<H>  ----------------------------<  131<H>  3.8   |  29  |  1.16    Ca    9.3      22 Mar 2021 07:34  Phos  5.0       Mg     2.3     -    TPro  6.9  /  Alb  3.1<L>  /  TBili  0.3  /  DBili  x   /  AST  14  /  ALT  20  /  AlkPhos  70  03-22    PT/INR - ( 20 Mar 2021 16:31 )   PT: 13.4 sec;   INR: 1.13 ratio         PTT - ( 20 Mar 2021 16:31 )  PTT:36.5 sec  Urinalysis Basic - ( 20 Mar 2021 20:54 )    Color: Yellow / Appearance: Clear / S.015 / pH: x  Gluc: x / Ketone: Negative  / Bili: Negative / Urobili: Negative   Blood: x / Protein: Negative / Nitrite: Negative   Leuk Esterase: Negative / RBC: x / WBC x   Sq Epi: x / Non Sq Epi: x / Bacteria: x        CARDIAC MARKERS ( 21 Mar 2021 07:15 )  <0.015 ng/mL / x     / x     / x     / x      CARDIAC MARKERS ( 20 Mar 2021 16:31 )  <0.015 ng/mL / x     / x     / x     / x            Serum Pro-Brain Natriuretic Peptide: 1155 pg/mL (21 @ 07:15)          MICROBIOLOGY: (if applicable)    RADIOLOGY & ADDITIONAL STUDIES:  EKG:   CXR:  ECHO:    IMPRESSION: 81y Female PAST MEDICAL & SURGICAL HISTORY:  Obesity    Pacemaker    Atrial fibrillation    Hypothyroidism    Venous stasis    IBS (irritable bowel syndrome)    CHF (congestive heart failure), NYHA class I    HLD (Hyperlipidemia)    HTN (Hypertension)    Diabetes    Myocardial Infarction    CAD (Coronary Atherosclerotic Disease)    Asthma    S/P coronary angiogram     Impression; 80 Y/O Female presented in ED with Hypotension, SOB and fatigue x 2 weeks due to a combination of Symptomatic Anemia, Morbid Obesity. Has Asthma  but no exacerbation on exam. Has JOSE DANIEL but on PAP due to Non complaint. Blood Cx x 2 Negative. Covid 19 PCR Negative .     Suggestion;   On Oxygen supplementation 2L- 3L NC to keep O2 saturation >90%.  Oral hygiene care.   Continue Albuterol 2 puffs Q 6 hours.  Continue Budesonide 2 puffs Twice Daily. Singulair 10 mg daily.  DVT/ GI prophylactic .

## 2021-03-22 NOTE — PROGRESS NOTE ADULT - SUBJECTIVE AND OBJECTIVE BOX
PGY-1 Progress Note discussed with attending    PAGER #: [[phone]] TILL 5:00 PM  PLEASE CONTACT ON CALL TEAM:  - On Call Team (Please refer to Ana) FROM 5:00 PM - 8:30PM  - Nightfloat Team FROM 8:30 -7:30 AM    CHIEF COMPLAINT & BRIEF HOSPITAL COURSE: 79F [place]-speaking from home with son, ambulates with walker, PMH HFpEF, CAD (s/p stents), chronic venous stasis, DM, HTN and Afib (Xarelto, s/p PPM), chronic bronchitis with asthmatic component (home O2 prn), JOSE DANIEL (noncompliant with nocturnal CPAP) presented 3/20 with x2 weeks increasing fatigue, SOB, and paleness, with prolonged history of dark bowel movements s/p possible transverse polyp colonoscopy 2020, no further fu. In ED, VS notable for /77 and SpO2 100% 4L NC. Labs notable for Hb 7.7 (baseline ~11), Fe 15, TIBC 457, 3% saturation. Trop, COVID, UA negative. EKG with BFB. CXR unremarkable.  CT a/p unremarkable for signs of bleeding. S/p x1 unit pRBC in ED. Admitted for workup and management of symptomatic anemia.    INTERVAL HPI/OVERNIGHT EVENTS: NAEON. SBPs variable 110-150s this am, tolerating 4L NC. Reports feeling hungry and complains of chronic b/l knee soreness this am, but otherwise feeling well. Interview conducted with assistance of Argentine-fluent PCA. NPO from [place] in anticipation of EGD/colonoscopy today. 3/21 B/l LE doppler negative for DVT, TTE with G1DD and mildly high RVP. Home AC held given GIB, will discuss asa with GI after procedure per cards. Continuing dronedarone (new this adm), home statin, coreg, COPD meds. IV iron 3/20-23.     MEDICATIONS  (STANDING):  ALBUTerol    90 MICROgram(s) HFA Inhaler 2 Puff(s) Inhalation every 6 hours  atorvastatin 40 milliGRAM(s) Oral at bedtime  budesonide 160 MICROgram(s)/formoterol 4.5 MICROgram(s) Inhaler 2 Puff(s) Inhalation two times a day  carvedilol 6.25 milliGRAM(s) Oral every 12 hours  dronedarone 400 milliGRAM(s) Oral two times a day  fluticasone propionate 50 MICROgram(s)/spray Nasal Spray 1 Spray(s) Both Nostrils every 12 hours  gabapentin 300 milliGRAM(s) Oral daily  influenza   Vaccine 0.5 milliLiter(s) IntraMuscular once  insulin lispro (ADMELOG) corrective regimen sliding scale   SubCutaneous three times a day before meals  iron sucrose IVPB 200 milliGRAM(s) IV Intermittent every 24 hours  levothyroxine 175 MICROGram(s) Oral daily  montelukast 10 milliGRAM(s) Oral daily  oxybutynin 5 milliGRAM(s) Oral two times a day    MEDICATIONS  (PRN):    REVIEW OF SYSTEMS:  CONSTITUTIONAL: No fever or fatigue, feels well  RESPIRATORY: No cough; No shortness of breath  CARDIOVASCULAR: No chest pain, no dizziness  GASTROINTESTINAL: No abdominal pain. No nausea, vomiting, diarrhea, or constipation.   GENITOURINARY: No dysuria or hematuria  NEUROLOGICAL: No headache  SKIN: No itching or rashes  EXT: b/l knee pain "from the inside"  all other systems reviewed and are negative      Vital Signs Last 24 Hrs  T(C): 36.6 (22 Mar 2021 05:42), Max: 36.7 (21 Mar 2021 14:26)  T(F): 97.8 (22 Mar 2021 05:42), Max: 98.1 (21 Mar 2021 19:25)  HR: 58 (22 Mar 2021 05:42) (58 - 61)  BP: 131/58 (22 Mar 2021 05:42) (115/48 - 155/53)  BP(mean): --  RR: 16 (22 Mar 2021 05:42) (16 - 22)  SpO2: 98% (22 Mar 2021 05:42) (96% - 99%)    PHYSICAL EXAMINATION:  GENERAL: NAD, obese, elderly woman  HEAD:  Atraumatic, Normocephalic  EYES: periorbital skin pink, eomi, perrl  NECK: Supple  CHEST/LUNG: no increased WOB, NC, no cough noted  HEART: RRR  ABDOMEN: Soft, Nontender, obese, Bowel sounds present  NERVOUS SYSTEM: alert and oriented x3  EXTREMITIES:  2+ Peripheral Pulses, No edema, +hematoma right knee, NTTP  SKIN: warm dry                          8.6    6.29  )-----------( 290      ( 22 Mar 2021 07:34 )             26.0     03-22    135  |  97  |  22<H>  ----------------------------<  131<H>  3.8   |  29  |  1.16    Ca    9.3      22 Mar 2021 07:34  Phos  5.0     03-22  Mg     2.3     03-22    TPro  6.9  /  Alb  3.1<L>  /  TBili  0.3  /  DBili  x   /  AST  14  /  ALT  20  /  AlkPhos  70  03-22    LIVER FUNCTIONS - ( 22 Mar 2021 07:34 )  Alb: 3.1 g/dL / Pro: 6.9 g/dL / ALK PHOS: 70 U/L / ALT: 20 U/L DA / AST: 14 U/L / GGT: x           CARDIAC MARKERS ( 21 Mar 2021 07:15 )  <0.015 ng/mL / x     / x     / x     / x      CARDIAC MARKERS ( 20 Mar 2021 16:31 )  <0.015 ng/mL / x     / x     / x     / x          PT/INR - ( 20 Mar 2021 16:31 )   PT: 13.4 sec;   INR: 1.13 ratio         PTT - ( 20 Mar 2021 16:31 )  PTT:36.5 sec    CAPILLARY BLOOD GLUCOSE      RADIOLOGY & ADDITIONAL TESTS:

## 2021-03-22 NOTE — DISCHARGE NOTE PROVIDER - NPI NUMBER (FOR SYSADMIN USE ONLY) :
[[id number]] [[id number]],[[id number]],[[id number]],[[id number]] [[id number]],[[id number]],[[id number]]

## 2021-03-22 NOTE — CONSULT NOTE ADULT - SUBJECTIVE AND OBJECTIVE BOX
Patient is a 81y old  Female who presents with a chief complaint of symptomatic anemia (22 Mar 2021 14:52)        REVIEW OF SYSTEMS: Total of twelve systems have been reviewed with patient and found to be negative unless mentioned in HPI        PAST MEDICAL & SURGICAL HISTORY:  Obesity  Atrial fibrillation, Pacemaker  Hypothyroidism  Venous stasis  IBS (irritable bowel syndrome)  CHF (congestive heart failure), NYHA class I  HLD (Hyperlipidemia)  HTN (Hypertension)  Diabetes  Myocardial Infarction  CAD (Coronary Atherosclerotic Disease)  Asthma  S/P coronary angiogram        SOCIAL HISTORY  Alcohol: Does not drink  Tobacco: Does not smoke  Illicit substance use: None      FAMILY HISTORY: Non contributory to the present illness        ALLERGIES: No Known Allergies        Vital Signs Last 24 Hrs  T(C): 36.8 (22 Mar 2021 14:40), Max: 36.8 (22 Mar 2021 14:40)  T(F): 98.2 (22 Mar 2021 14:40), Max: 98.2 (22 Mar 2021 14:40)  HR: 61 (22 Mar 2021 16:56) (58 - 71)  BP: 130/51 (22 Mar 2021 16:56) (114/49 - 131/58)  BP(mean): --  RR: 17 (22 Mar 2021 14:40) (16 - 17)  SpO2: 98% (22 Mar 2021 14:40) (98% - 98%)        PHYSICAL EXAM:  GENERAL: Not in distress   CHEST/LUNG:  Aire ntry bilaterally  HEART: s1 and s2 present  ABDOMEN:  Nontender and  Nondistended  EXTREMITIES: No pedal  edema  CNS: Awake and Alert      LABS:                        8.6    6.29  )-----------( 290      ( 22 Mar 2021 07:34 )             26.0         03-22    135  |  97  |  22<H>  ----------------------------<  131<H>  3.8   |  29  |  1.16    Ca    9.3      22 Mar 2021 07:34  Phos  5.0     03-22  Mg     2.3     -22    TPro  6.9  /  Alb  3.1<L>  /  TBili  0.3  /  DBili  x   /  AST  14  /  ALT  20  /  AlkPhos  70  03-22        CAPILLARY BLOOD GLUCOSE  POCT Blood Glucose.: 262 mg/dL (22 Mar 2021 16:34)  POCT Blood Glucose.: 150 mg/dL (22 Mar 2021 12:45)  POCT Blood Glucose.: 147 mg/dL (22 Mar 2021 10:56)  POCT Blood Glucose.: 196 mg/dL (22 Mar 2021 07:40        Urinalysis Basic - ( 20 Mar 2021 20:54 )  Color: Yellow / Appearance: Clear / S.015 / pH: x  Gluc: x / Ketone: Negative  / Bili: Negative / Urobili: Negative   Blood: x / Protein: Negative / Nitrite: Negative   Leuk Esterase: Negative / RBC: x / WBC x   Sq Epi: x / Non Sq Epi: x / Bacteria: x        MEDICATIONS  (STANDING):  ALBUTerol    90 MICROgram(s) HFA Inhaler 2 Puff(s) Inhalation every 6 hours  atorvastatin 40 milliGRAM(s) Oral at bedtime  budesonide 160 MICROgram(s)/formoterol 4.5 MICROgram(s) Inhaler 2 Puff(s) Inhalation two times a day  carvedilol 6.25 milliGRAM(s) Oral every 12 hours  dronedarone 400 milliGRAM(s) Oral two times a day  fluticasone propionate 50 MICROgram(s)/spray Nasal Spray 1 Spray(s) Both Nostrils every 12 hours  gabapentin 300 milliGRAM(s) Oral daily  influenza   Vaccine 0.5 milliLiter(s) IntraMuscular once  insulin lispro (ADMELOG) corrective regimen sliding scale   SubCutaneous three times a day before meals  iron sucrose IVPB 200 milliGRAM(s) IV Intermittent every 24 hours  levothyroxine 175 MICROGram(s) Oral daily  montelukast 10 milliGRAM(s) Oral daily  oxybutynin 5 milliGRAM(s) Oral two times a day  pantoprazole    Tablet 40 milliGRAM(s) Oral two times a day    MEDICATIONS  (PRN):        RADIOLOGY & ADDITIONAL TESTS:      < from: CT Angio Abdomen and Pelvis w/ IV Cont (21 @ 22:55) >  No CT evidence of acute gastrointestinal hemorrhage.    Few scattered colonic diverticula.    < end of copied text >  < from: CT Angio Abdomen and Pelvis w/ IV Cont (21 @ 22:55) >  LOWER CHEST: Partially imaged pacemaker leads. Mitral annular calcification.    < end of copied text >  < from: Xray Chest 1 View- PORTABLE-Urgent (21 @ 16:38) >  IMPRESSION: There is left-sided defibrillator. The heart is normal in size. The lungs are clear.      MICROBIOLOGY DATA:      COVID-19 Drew Domain Antibody (21 @ 11:07)   COVID-19 Drew Domain Antibody Result: >250.00:    Culture - Blood (21 @ 21:50)   Specimen Source: .Blood Blood-Venous   Culture Results:   No growth to date.     Culture - Blood (21 @ 21:50)   Specimen Source: .Blood Blood-Venous   Culture Results:   No growth to date.     COVID-19 PCR . (21 @ 16:32)   COVID-19 PCR: NotDetec:   MRSA/MSSA PCR (20 @ 14:34)   MRSA PCR Result.: NotDetec:   FLU A B RSV Detection by PCR (20 @ 20:00)   Flu A Result: NotDetec         Patient is a 81y old  Female from home, ambulates with walker, lives with son, with PMHx of HFpEF, CAD (s/p stents), chronic venous stasis, DM, HTN and Afib (on Xarelto, s/p PPM), chronic bronchitis with asthmatic component ( on Oxygen PRN at home ), JOSE DANIEL ( supposed to be on nocturnal CPAP , but non compliant), now presents to the ER for evaluation of hypotension, SOB and  fatigue for almost 2 weeks. Per daughter and granddaughter patient has been complaining of increasing fatigue and sob for last few weeks, seemed to be pale , patient endorses dark loose bowel movement. Patient has chronic diarrhea, but no abdominal pain. She has evaluated by GI team and now the ID consult requested to assist with further evaluation of chronic diarrhea.       REVIEW OF SYSTEMS: Total of twelve systems have been reviewed with patient and found to be negative unless mentioned in HPI      PAST MEDICAL & SURGICAL HISTORY:  Obesity  Atrial fibrillation, Pacemaker  Hypothyroidism  Venous stasis  IBS (irritable bowel syndrome)  CHF (congestive heart failure), NYHA class I  HLD (Hyperlipidemia)  HTN (Hypertension)  Diabetes  Myocardial Infarction  CAD (Coronary Atherosclerotic Disease)  Asthma  S/P coronary angiogram      SOCIAL HISTORY  Alcohol: Does not drink  Tobacco: Does not smoke  Illicit substance use: None      FAMILY HISTORY: Non contributory to the present illness      ALLERGIES: No Known Allergies      Vital Signs Last 24 Hrs  T(C): 36.8 (22 Mar 2021 14:40), Max: 36.8 (22 Mar 2021 14:40)  T(F): 98.2 (22 Mar 2021 14:40), Max: 98.2 (22 Mar 2021 14:40)  HR: 61 (22 Mar 2021 16:56) (58 - 71)  BP: 130/51 (22 Mar 2021 16:56) (114/49 - 131/58)  BP(mean): --  RR: 17 (22 Mar 2021 14:40) (16 - 17)  SpO2: 98% (22 Mar 2021 14:40) (98% - 98%)      PHYSICAL EXAM:  GENERAL: Not in distress   CHEST/LUNG: Not using accessory muscles   HEART: s1 and s2 present  ABDOMEN:  Nontender and  Nondistended  EXTREMITIES: No pedal  edema  CNS: Awake and Alert      LABS:                        8.6    6.29  )-----------( 290      ( 22 Mar 2021 07:34 )             26.0       03-22    135  |  97  |  22<H>  ----------------------------<  131<H>  3.8   |  29  |  1.16    Ca    9.3      22 Mar 2021 07:34  Phos  5.0       Mg     2.3         TPro  6.9  /  Alb  3.1<L>  /  TBili  0.3  /  DBili  x   /  AST  14  /  ALT  20  /  AlkPhos  70        CAPILLARY BLOOD GLUCOSE  POCT Blood Glucose.: 262 mg/dL (22 Mar 2021 16:34)  POCT Blood Glucose.: 150 mg/dL (22 Mar 2021 12:45)  POCT Blood Glucose.: 147 mg/dL (22 Mar 2021 10:56)  POCT Blood Glucose.: 196 mg/dL (22 Mar 2021 07:40      Urinalysis Basic - ( 20 Mar 2021 20:54 )  Color: Yellow / Appearance: Clear / S.015 / pH: x  Gluc: x / Ketone: Negative  / Bili: Negative / Urobili: Negative   Blood: x / Protein: Negative / Nitrite: Negative   Leuk Esterase: Negative / RBC: x / WBC x   Sq Epi: x / Non Sq Epi: x / Bacteria: x        MEDICATIONS  (STANDING):  ALBUTerol    90 MICROgram(s) HFA Inhaler 2 Puff(s) Inhalation every 6 hours  atorvastatin 40 milliGRAM(s) Oral at bedtime  budesonide 160 MICROgram(s)/formoterol 4.5 MICROgram(s) Inhaler 2 Puff(s) Inhalation two times a day  carvedilol 6.25 milliGRAM(s) Oral every 12 hours  dronedarone 400 milliGRAM(s) Oral two times a day  fluticasone propionate 50 MICROgram(s)/spray Nasal Spray 1 Spray(s) Both Nostrils every 12 hours  gabapentin 300 milliGRAM(s) Oral daily  influenza   Vaccine 0.5 milliLiter(s) IntraMuscular once  insulin lispro (ADMELOG) corrective regimen sliding scale   SubCutaneous three times a day before meals  iron sucrose IVPB 200 milliGRAM(s) IV Intermittent every 24 hours  levothyroxine 175 MICROGram(s) Oral daily  montelukast 10 milliGRAM(s) Oral daily  oxybutynin 5 milliGRAM(s) Oral two times a day  pantoprazole    Tablet 40 milliGRAM(s) Oral two times a day    MEDICATIONS  (PRN):        RADIOLOGY & ADDITIONAL TESTS:    3/20/21 : CT Angio Abdomen and Pelvis w/ IV Cont (21 @ 22:55) No CT evidence of acute gastrointestinal hemorrhage.  Few scattered colonic diverticula.    3/20/21 : CT Angio Abdomen and Pelvis w/ IV Cont (21 @ 22:55) LOWER CHEST: Partially imaged pacemaker leads. Mitral annular calcification.    3/20/21 : Xray Chest 1 View- PORTABLE-Urgent (21 @ 16:38): There is left-sided defibrillator. The heart is normal in size. The lungs are clear.        MICROBIOLOGY DATA:    COVID-19 Drew Domain Antibody (21 @ 11:07)   COVID-19 Drew Domain Antibody Result: >250.00:    Culture - Blood (21 @ 21:50)   Specimen Source: .Blood Blood-Venous   Culture Results: No growth to date.     Culture - Blood (21 @ 21:50)   Specimen Source: .Blood Blood-Venous   Culture Results: No growth to date.     COVID-19 PCR . (21 @ 16:32)   COVID-19 PCR: NotDetec:     MRSA/MSSA PCR (20 @ 14:34)   MRSA PCR Result.: NotDetec:     FLU A B RSV Detection by PCR (20 @ 20:00)   Flu A Result: NotDetec

## 2021-03-22 NOTE — PROGRESS NOTE ADULT - ASSESSMENT
79 years old Cymro-speaking female from home, ambulates with walker, lives with son, with PMHx of HFpEF, CAD (s/p stents), chronic venous stasis, DM, HTN and Afib (on Xarelto, s/p PPM), chronic bronchitis with asthmatic component ( on Oxygen PRN at home ), JOSE DANIEL ( supposed to be on nocturnal CPAP , but non compliant) presents to the ED with chief complaint of hypotension ,  SOB and  fatigue for almost 2 weeks. admitted for symptomatic anemia.

## 2021-03-22 NOTE — CONSULT NOTE ADULT - ASSESSMENT
Full consult to follow Patient is a 81y old  Female from home, ambulates with walker, lives with son, with PMHx of HFpEF, CAD (s/p stents), chronic venous stasis, DM, HTN and Afib (on Xarelto, s/p PPM), chronic bronchitis with asthmatic component ( on Oxygen PRN at home ), JOSE DANIEL ( supposed to be on nocturnal CPAP , but non compliant), now presents to the ER for evaluation of hypotension, SOB and  fatigue for almost 2 weeks. Per daughter and granddaughter patient has been complaining of increasing fatigue and sob for last few weeks, seemed to be pale , patient endorses dark loose bowel movement. Patient has chronic diarrhea, but no abdominal pain. She has evaluated by GI team and now the ID consult requested to assist with further evaluation of chronic diarrhea.     # Chronic diarrhea- C.diff vs Malignancy   # Symptomatic Anemia   # COVID negative 3/20/21    Would recommend:    1. Obtain stool for C.diff Toxin PCR and GI pathogen PCR  2. Monitor electrolytes and supplement as needed in the setting of diarrhea   3. Monitor H/H and transfuse as needed  4. Agree with EGD and Colonoscopy  5. Continue PPI    will follow the patient with you and make further recommendation based on the clinical course and Lab results  Thank you for the opportunity to participate in Ms. MOSQUERA's care      Attending Attestation:    Spent more than 65 minutes on total encounter, more than 50 % of the visit was spent counseling and/or coordinating care by the Attending physician.

## 2021-03-22 NOTE — PROGRESS NOTE ADULT - PROBLEM SELECTOR PLAN 2
-hold home Xarelto given GIB  -discuss asa with GI after EGD/col 3/22  -continue home coreg, dronedarone added  -will interrogate [facility] PPM today  -EKG with BFB  -b/l LE doppler 3/21 negative for DVT  -cards Dr. Sandoval following

## 2021-03-22 NOTE — DISCHARGE NOTE PROVIDER - NSDCCPTREATMENT_GEN_ALL_CORE_FT
PRINCIPAL PROCEDURE  Procedure: Laparoscopy assisted right hemicolectomy  Findings and Treatment:

## 2021-03-22 NOTE — PROGRESS NOTE ADULT - SUBJECTIVE AND OBJECTIVE BOX
Esophagogastroduodenoscopy Report  Indication: Melena with anemia  Referring MD:   Rivera Moreland MD  Instrument:  # [id number]  Anesthesia: MAC  Consent:  Informed consent was obtained from the patient after providing any opportunity for questions  Procedure: The gastroscope was gently passed through the incisoral orifice into the oral cavity and under direct visualization the esophagus was intubated. The endoscope was passed down the esophagus, through the stomach and into proximal jejunum. Color, texture, mucosa and anatomy of the esophagus, stomach, and duodenum were carefully examined with the scope. The patient tolerated the procedure well. After completion of the examination, the patient was transferred to the recovery room.   Preparation: NPO   Findings:   Oropharynx	Normal  Esophagus	Mild distal mucosal erythema (Bxed)  EG-junction	Hiatal hernia  Cardia	Normal.  Body	Normal   Antrum	Patchy mucosal erythema (Bxed)  Pylorus	Normal.  Duodenal Bulb	Normal   2nd portion	Normal  3rd portion	Normal.     EBL:0    Impression:   1. Gastritis  2. Esophagitis  3. Hiatal hernia     Plan:  1. Follow up path  2. Protonix 40mg daily  3. Avoid NSAID  4. Anti reflux measure  5. Treat for H. Pylori if positive  6. Colonoscopy      Procedure Start Time: 11:32  Procedure End Time:  11:35          Attending:     Rivera Moreland M.D.

## 2021-03-22 NOTE — DISCHARGE NOTE PROVIDER - CARE PROVIDER_API CALL
[facility]  [address], [address]  [place] [address]  Phone: [phone]  Fax: [phone]  Established Patient  Follow Up Time: 1 week   Mihai Madrid S  MEDICINE  [address] [facility], [address]  [place] [address]  Phone: [phone]  Fax: [phone]  Established Patient  Follow Up Time: 1 week    Rivera Moreland)  Medicine  [address] [facility] [address] [place] [address]  Phone: [phone]  Fax: [phone]  Established Patient  Follow Up Time: 1 week    Kristi Sandoval  INTERNAL MEDICINE  [address] [place] [address]  Phone: [phone]  Fax: [phone]  Established Patient  Follow Up Time: 2 weeks    Saul Zhang)  Internal Medicine; Pulmonary Disease  [address] [place] [address]  Phone: [phone]  Fax: [phone]  Established Patient  Follow Up Time: 2 weeks   Mihai Madrid  MEDICINE  [address] [facility]  [place] [id number]  Phone: [phone]  Fax: [phone]  Established Patient  Follow Up Time: 1 week    Rivera Moreland)  Medicine  [address] [facility], [address] [place] [address]  Phone: [phone]  Fax: [phone]  Established Patient  Follow Up Time: 1 week    Kristi Sandoval  INTERNAL MEDICINE  [address] [place] [address]  Phone: [phone]  Fax: [phone]  Established Patient  Follow Up Time: 2 weeks    Saul Zhang)  Internal Medicine; Pulmonary Disease  [address] [place] [address]  Phone: [phone]  Fax: [phone]  Established Patient  Follow Up Time: 2 weeks    Will Hu)  Surgery  [address] [facility]  [place] [id number]  Phone: [phone]  Fax: [phone]  Follow Up Time: 2 weeks

## 2021-03-22 NOTE — DISCHARGE NOTE PROVIDER - HOSPITAL COURSE
79F Guyanese-speaking from home with son, ambulates with walker, PMH HFpEF, CAD (s/p stents), chronic venous stasis, DM, HTN and Afib (Xarelto, s/p PPM), chronic bronchitis with asthmatic component (home O2 prn), JOSE DANIEL (noncompliant with nocturnal CPAP) presented 3/20 with x2 weeks increasing fatigue, SOB, and paleness, with prolonged history of dark bowel movements s/p possible transverse polyp colonoscopy 2020, no further fu. In ED, VS notable for /77 and SpO2 100% 4L NC. Labs notable for Hb 7.7 (baseline ~11), Fe 15, TIBC 457, 3% saturation. Trop, COVID, UA negative. EKG with BFB. CXR unremarkable.  CT a/p unremarkable for signs of bleeding. S/p x1 unit pRBC in ED. Admitted for workup and management of symptomatic anemia.    EGD/colonoscopy 3/22_______. 3/21 B/l LE doppler negative for DVT, TTE with G1DD and mildly high RVP. Home AC held given GIB, will discuss asa with GI after procedure per cards. Continuing dronedarone (new this adm), home statin, coreg, COPD meds. IV iron 3/20-23. 79F Angolan-speaking from home with son, ambulates with walker, PMH HFpEF, CAD (s/p stents), chronic venous stasis, DM, HTN and Afib (Xarelto, s/p PPM), chronic bronchitis with asthmatic component (home O2 prn), JOSE DANIEL (noncompliant with nocturnal CPAP) presented 3/20 with x2 weeks increasing fatigue, SOB, and paleness, with prolonged history of dark bowel movements s/p possible transverse polyp colonoscopy 2020, no further fu. In ED, VS notable for /77 and SpO2 100% 4L NC. Labs notable for Hb 7.7 (baseline ~11), Fe 15, TIBC 457, 3% saturation. Trop, COVID, UA negative. EKG with BFB. CXR unremarkable.  CT a/p unremarkable for signs of bleeding. S/p x1 unit pRBC in ED. Admitted for workup and management of symptomatic anemia.    EGD/colonoscopy 3/22_______. 3/21 B/l LE doppler negative for DVT, TTE with G1DD and mildly high RVP. Home AC held given GIB, will discuss asa with GI after procedure per cards. Continuing dronedarone (new this adm), home statin, coreg, COPD meds. IV iron 3/20-23.     -EGD/col 3/22 with GI Dr. Moreland, gastritis, esophagitis, no GIB, path + H pylori  -treat with amoxicillin 1g q12 x7 days followed by 500mg Q 12hours x7 days along with clarithromycin 500mg q12, Flagyl 500 mg q12, and PPI x14 days  -ID Dr. Butcher following   -colonoscopy 3/24 with polyps  -abdominal XR negative for free air 3/24   -repeat colonoscopy 3/25 notable for x2 polyps (removed), diverticulosis, and internal hemorrhoids  -CLD resumed  -continuing PPI. 79F Ukrainian-speaking from home with son, ambulates with walker, PMH HFpEF, CAD (s/p stents), chronic venous stasis, DM, HTN and Afib (Xarelto, s/p PPM), chronic bronchitis with asthmatic component (home O2 prn), JOSE DANIEL (noncompliant with nocturnal CPAP) presented 3/20 with x2 weeks increasing fatigue, SOB, and paleness, with prolonged history of dark bowel movements s/p possible transverse polyp colonoscopy 2020, no further fu. In ED, VS notable for /77 and SpO2 100% 4L NC. Labs notable for Hb 7.7 (baseline ~11), Fe 15, TIBC 457, 3% saturation. Trop, COVID, UA negative. EKG with BFB. CXR unremarkable.  CT a/p unremarkable for signs of bleeding. S/p x1 unit pRBC in ED. Admitted for workup and management of symptomatic anemia.    3/21 B/l LE doppler negative for DVT, TTE with G1DD and mildly high RVP. Home AC held given GIB, restarted along with home asa per GI Dr. Moreland after colonoscopy 3/26. Found to have pAF, started dronedarone (new this adm), continued home statin, coreg, COPD meds (see complete details below). S/p IV iron 3/20-23.     EGD/col 3/22 with GI Dr. Moreland, gastritis, esophagitis, no GIB, path + H pylori. Will treat with amoxicillin 1g q12 x7 days (started 3/24pm) followed by 500mg Q 12hours x7 days along with clarithromycin 500mg q12 (started 3/25pm), Flagyl 500 mg q12 (started 3/24pm), and BID PPI (started 3/23am) x14 days. While on clarithromycin, patient will be instructed to hold dronedarone, statin, Flonase, and Symbicort, with resumption of these four medications after clarithromycin course completed.   Colonoscopy 3/24 with removeal of x2 polyps, abdominal XR negative for free air 3/24, repeat colonoscopy 3/25 notable for diverticulosis and internal hemorrhoids. Dr. Moreland expressed concern for possible cancer 2/2 presence of polyps, pathologies sent, will recommend outpatient follow up with Dr. Moreland in 1-2 weeks.    79F Chadian-speaking from home with son, ambulates with walker, PMH HFpEF, CAD (s/p stents), chronic venous stasis, DM, HTN and Afib (Xarelto, s/p PPM), chronic bronchitis with asthmatic component (home O2 prn), JOSE DANIEL (noncompliant with nocturnal CPAP) presented 3/20 with x2 weeks increasing fatigue, SOB, and paleness, with prolonged history of dark bowel movements s/p possible transverse polyp colonoscopy 2020, no further fu. In ED, VS notable for /77 and SpO2 100% 4L NC. Labs notable for Hb 7.7 (baseline ~11), Fe 15, TIBC 457, 3% saturation. Trop, COVID, UA negative. EKG with BFB. CXR unremarkable.  CT a/p unremarkable for signs of bleeding. S/p x1 unit pRBC in ED. Admitted for workup and management of symptomatic anemia.    3/21 B/l LE doppler negative for DVT, TTE with G1DD and mildly high RVP. Home AC held given GIB, restarted along with home asa per GI Dr. Moreland after colonoscopy 3/26. Found to have pAF, started dronedarone (new this adm), continued home statin, coreg, COPD meds (see complete details below). S/p IV iron 3/20-23.     EGD/col 3/22 with GI Dr. Moreland, gastritis, esophagitis, no GIB, path + H pylori. Will treat with amoxicillin 1g q12 x7 days (started 3/24pm) followed by 500mg Q 12hours x7 days along with clarithromycin 500mg q12 (started 3/25pm), Flagyl 500 mg q12 (started 3/24pm), and BID PPI (started 3/23am) x14 days. While on clarithromycin, patient will be instructed to hold dronedarone, statin, Flonase, and Symbicort, with resumption of these four medications after clarithromycin course completed.   Colonoscopy 3/24 with removeal of x2 polyps, abdominal XR negative for free air 3/24, repeat colonoscopy 3/25 notable for diverticulosis and internal hemorrhoids. Dr. Moreland expressed concern for possible cancer 2/2 presence of polyps, pathologies sent, will recommend outpatient follow up with Dr. Moreland in 1-2 weeks.    79F Kittitian-speaking from home with son, ambulates with walker, PMH HFpEF, CAD (s/p stents), chronic venous stasis, DM, HTN and Afib (Xarelto, s/p PPM), chronic bronchitis with asthmatic component (home O2 prn), JOSE DANIEL (noncompliant with nocturnal CPAP) presented 3/20 with x2 weeks increasing fatigue, SOB, and paleness, with prolonged history of dark bowel movements s/p possible transverse polyp colonoscopy 2020, no further fu. In ED, VS notable for /77 and SpO2 100% 4L NC. Labs notable for Hb 7.7 (baseline ~11), Fe 15, TIBC 457, 3% saturation. Trop, COVID, UA negative. EKG with BFB. CXR unremarkable.  CT a/p unremarkable for signs of bleeding. S/p x1 unit pRBC in ED. Admitted for workup and management of symptomatic anemia.    3/21 B/l LE doppler negative for DVT, TTE with G1DD and mildly high RVP. Home AC held given GIB, restarted along with home asa per GI Dr. Moreland after colonoscopy 3/26. Found to have pAF, started dronedarone (new this adm), continued home statin, coreg, COPD meds (see complete details below). S/p IV iron 3/20-23.     EGD/col 3/22 with GI Dr. Moreland, gastritis, esophagitis, no GIB, path + H pylori. Will treat with amoxicillin 1g q12 x7 days (started 3/24pm) followed by 500mg Q 12hours x7 days along with clarithromycin 500mg q12 (started 3/25pm), Flagyl 500 mg q12 (started 3/24pm), and BID PPI (started 3/23am) x14 days. While on clarithromycin, patient will be instructed to hold dronedarone, statin, Flonase, and Symbicort, with resumption of these four medications after clarithromycin course completed.   Colonoscopy 3/24 with removal of x2 polyps, abdominal XR negative for free air 3/24, repeat colonoscopy 3/25 notable for diverticulosis and internal hemorrhoids. 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 Surgery Dr. Murrell and heme/onc Dr. Arango consulted. 79F Ecuadorean-speaking from home with son, ambulates with walker, PMH HFpEF, CAD (s/p stents), chronic venous stasis, DM, HTN and Afib (Xarelto, s/p PPM), chronic bronchitis with asthmatic component (home O2 prn), JOSE DANIEL (noncompliant with nocturnal CPAP) presented 3/20 with x2 weeks increasing fatigue, SOB, and paleness, with prolonged history of dark bowel movements s/p possible transverse polyp colonoscopy 2020, no further fu. In ED, VS notable for /77 and SpO2 100% 4L NC. Labs notable for Hb 7.7 (baseline ~11), Fe 15, TIBC 457, 3% saturation. Trop, COVID, UA negative. EKG with BFB. CXR unremarkable.  CT a/p unremarkable for signs of bleeding. S/p x1 unit pRBC in ED. Admitted for workup and management of symptomatic anemia.    3/21 B/l LE doppler negative for DVT, TTE with G1DD and mildly high RVP. Home AC held given GIB, restarted along with home asa per GI Dr. Moreland after colonoscopy 3/26. Found to have pAF, started dronedarone (new this adm), continued home statin, coreg, COPD meds (see complete details below). S/p IV iron 3/20-23.     EGD/col 3/22 with GI Dr. Moreland, gastritis, esophagitis, no GIB, path + H pylori. Will treat with amoxicillin 1g q12 x7 days (started 3/24pm) followed by 500mg Q 12hours x7 days along with clarithromycin 500mg q12 (started 3/25pm), Flagyl 500 mg q12 (started 3/24pm), and BID PPI (started 3/23am) x14 days. While on clarithromycin, patient will be instructed to hold dronedarone, statin, Flonase, and Symbicort, with resumption of these four medications after clarithromycin course completed.   Colonoscopy 3/24 with removal of x2 polyps, abdominal XR negative for free air 3/24, repeat colonoscopy 3/25 notable for diverticulosis and internal hemorrhoids. 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 Surgery Dr. Murrell and heme/onc Dr. Arango consulted.     Pt had laparoscopic assisted Right hemicolectomy on 3/31. Postoperative course was uncomplicated. Pt had return of bowel function. She is stable for discharge home with pain medications.      81F Omani-speaking from home with son, ambulates with walker, PMH HFpEF, CAD (s/p stents), chronic venous stasis, DM, HTN and Afib (Xarelto, s/p PPM), chronic bronchitis with asthmatic component (home O2 prn), JOSE DANIEL (noncompliant with nocturnal CPAP) presented 3/20 with x2 weeks increasing fatigue, SOB, and paleness, with prolonged history of dark bowel movements s/p possible transverse polyp colonoscopy 2020, no further fu. In ED, VS notable for /77 and SpO2 100% 4L NC. Labs notable for Hb 7.7 (baseline ~11), Fe 15, TIBC 457, 3% saturation. Trop, COVID, UA negative. EKG with BFB. CXR unremarkable.  CT a/p unremarkable for signs of bleeding. S/p x1 unit pRBC in ED. Admitted for workup and management of symptomatic anemia.    3/21 B/l LE doppler negative for DVT, TTE with G1DD and mildly high RVP. Home AC held given GIB, restarted along with home asa per GI Dr. Moreland after colonoscopy 3/26. Found to have pAF, started dronedarone (new this adm), continued home statin, coreg, COPD meds (see complete details below). S/p IV iron 3/20-23.     EGD/col 3/22 with GI Dr. Moreland, gastritis, esophagitis, no GIB, path + H pylori. Will treat with amoxicillin 1g q12 x7 days (started 3/24pm) followed by 500mg Q 12hours x7 days along with clarithromycin 500mg q12 (started 3/25pm), Flagyl 500 mg q12 (started 3/24pm), and BID PPI (started 3/23am) x14 days. While on clarithromycin, patient will be instructed to hold dronedarone, statin, Flonase, and Symbicort, with resumption of these four medications after clarithromycin course completed.   Colonoscopy 3/24 with removal of x2 polyps, abdominal XR negative for free air 3/24, repeat colonoscopy 3/25 notable for diverticulosis and internal hemorrhoids. 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 Surgery Dr. Murrell and heme/onc Dr. Arango consulted.     Pt had laparoscopic assisted Right hemicolectomy on 3/31. Postoperative course was uncomplicated. Pt had return of bowel function. She is stable for discharge home with pain medications.

## 2021-03-22 NOTE — DISCHARGE NOTE PROVIDER - DETAILS OF MALNUTRITION DIAGNOSIS/DIAGNOSES
This patient has been assessed with a concern for Malnutrition and was treated during this hospitalization for the following Nutrition diagnosis/diagnoses:     -  03/27/2021: Morbid obesity (BMI > 40)

## 2021-03-22 NOTE — PROGRESS NOTE ADULT - PROBLEM SELECTOR PLAN 3
adm CXR concerning for central congestion, s/p x4 doses Lasix 40mg IV BID  -BNP1K  -TTE 3/21 with G1DD and mildly high RVP  -resume home torsemide 3/23  -pulm Dr. Cruz following

## 2021-03-22 NOTE — DISCHARGE NOTE PROVIDER - NSDCCPCAREPLAN_GEN_ALL_CORE_FT
PRINCIPAL DISCHARGE DIAGNOSIS  Diagnosis: Symptomatic anemia  Assessment and Plan of Treatment: You came to the hospital with a prolonged history of bloody bowel movements and 2 weeks of tiredness, trouble breathing, and paleness, which can be signs of significant blood loss. You were found to have low levels of hemoglobin, a measure of red blood cells, and underwent a blood transfusion. Endoscopy and colonoscopy showed ________.      SECONDARY DISCHARGE DIAGNOSES  Diagnosis: Asthma  Assessment and Plan of Treatment:     Diagnosis: CAD (coronary atherosclerotic disease)  Assessment and Plan of Treatment:      PRINCIPAL DISCHARGE DIAGNOSIS  Diagnosis: Helicobacter pylori gastritis  Assessment and Plan of Treatment: You came to the hospital with a prolonged history of bloody bowel movements and 2 weeks of tiredness, trouble breathing, and paleness, which can be signs of significant blood loss. You were found to have low levels of hemoglobin, a measure of red blood cells, and underwent a blood transfusion. Endoscopy showed irritation in the stomach (gastritis) and esophagus due to an infection with H pylori. This infection requires 2 weeks of antibiotic and anti-acid treatment, detailed below.   Take AMOXICILLIN 1000mg TWICE A DAY for the FIRST 5 DAYS (from the evening of 3/26 through the morning of 3/31) followed by AMOXICILLIN 500mg TWICE A DAY for the SECOND 7 DAYS (from the evening of 3/31 through the morning of 4/7).   Take CLARITHROMYCIN 500mg TWICE A DAY for THIRTEEN DAYS after discharge, starting with an evening dose 3/26 through the morning dose 4/8. Do NOT take your home DRONEDARONE, ATORVASTATIN, FLONASE, or SYMBICORT while you are taking this medication. RESTART THESE FOUR MEDICATIONS 4/9.  Take FLAGYL (metronidazole) 500mg EVERY 8 HOURS (three times a day) for TWELVE DAYS after discharge, from the evening of 3/26 through the morning of 4/7. Note that this medication can cause poor appetite and stomach upset. Discuss these side effects with your primary care doctor or gastroenterologist if you experience them.  Take PANTOPRAZOLE 40mg TWICE A DAY for 11 DAYS after discharge, from the evening of 3/26 through the morning of 4/6. You may take this medication ONCE A DAY starting 4/7. Dicuss long-term use of this medication with your gastroenterologist.      SECONDARY DISCHARGE DIAGNOSES  Diagnosis: Paroxysmal atrial fibrillation  Assessment and Plan of Treatment: You were found to have intermittent arrhythmia known as atrial fibrillation this admission. This rhythm can cause the heart to beat dangerously fast, causing damage.   Continue to take your home CARVEDILOL 6.25mg TWICE A DAY to help keep the rate normal.   Begin taking DRONEDARONE 400mg TWICE A DAY 4/9 AFTER you have completed CLARITHROMYCIN. This medication will help your heart rhythm to be more regular.   Atrial fibrilliation can also increase the risk of blood clots, which can cause heart attack, stroke, and other issues. Take XARELTO 20mg ONCE A DAY to help prevent blood clots.    Diagnosis: Asthma  Assessment and Plan of Treatment:     Diagnosis: CAD (coronary atherosclerotic disease)  Assessment and Plan of Treatment: You have a history of blockages in your heart that increase the risk of heart attack. You have undergone previous procedures to clear blockages and prop the blood vessels open (stenting), but the risk of heart attack remains. Continue to take your home ASPIRIN 81mg ONCE A DAY. AFTER you have completed your course of CLARITHROMYCIN on 4/8, you may resume your home ATORVASTATIN 40mg 4/9.     PRINCIPAL DISCHARGE DIAGNOSIS  Diagnosis: Helicobacter pylori gastritis  Assessment and Plan of Treatment: You came to the hospital with a prolonged history of bloody bowel movements and 2 weeks of tiredness, trouble breathing, and paleness, which can be signs of significant blood loss. You were found to have low levels of hemoglobin, a measure of red blood cells, and underwent a blood transfusion. Endoscopy showed irritation in the stomach (gastritis) and esophagus due to an infection with H pylori. This infection requires 2 weeks of antibiotic and anti-acid treatment, detailed below.   Take AMOXICILLIN 1000mg TWICE A DAY for the FIRST 5 DAYS (from the evening of 3/26 through the morning of 3/31) followed by AMOXICILLIN 500mg TWICE A DAY for the SECOND 7 DAYS (from the evening of 3/31 through the morning of 4/7).   Take CLARITHROMYCIN 500mg TWICE A DAY for THIRTEEN DAYS after discharge, starting with an evening dose 3/26 through the morning dose 4/8. Do NOT take your home DRONEDARONE, ATORVASTATIN, FLONASE, or SYMBICORT while you are taking this medication. RESTART THESE FOUR MEDICATIONS 4/9.  Take FLAGYL (metronidazole) 500mg EVERY 8 HOURS (three times a day) for TWELVE DAYS after discharge, from the evening of 3/26 through the morning of 4/7. Note that this medication can cause poor appetite and stomach upset. Discuss these side effects with your primary care doctor or gastroenterologist if you experience them.  Take PANTOPRAZOLE 40mg TWICE A DAY for 11 DAYS after discharge, from the evening of 3/26 through the morning of 4/6. You may take this medication ONCE A DAY starting 4/7. Dicuss long-term use of this medication with your gastroenterologist.      SECONDARY DISCHARGE DIAGNOSES  Diagnosis: Paroxysmal atrial fibrillation  Assessment and Plan of Treatment: You were found to have intermittent arrhythmia known as atrial fibrillation this admission. This rhythm can cause the heart to beat dangerously fast, causing damage.   Continue to take your home CARVEDILOL 6.25mg TWICE A DAY to help keep the rate normal.   Begin taking DRONEDARONE 400mg TWICE A DAY 4/9 AFTER you have completed CLARITHROMYCIN. This medication will help your heart rhythm to be more regular.   Atrial fibrilliation can also increase the risk of blood clots, which can cause heart attack, stroke, and other issues. Take XARELTO 20mg ONCE A DAY to help prevent blood clots.      Diagnosis: Asthma  Assessment and Plan of Treatment:     Diagnosis: CAD (coronary atherosclerotic disease)  Assessment and Plan of Treatment: You have a history of blockages in your heart that increase the risk of heart attack. You have undergone previous procedures to clear blockages and prop the blood vessels open (stenting), but the risk of heart attack remains. Continue to take your home ASPIRIN 81mg ONCE A DAY. AFTER you have completed your course of CLARITHROMYCIN on 4/8, you may resume your home ATORVASTATIN 40mg 4/9.     PRINCIPAL DISCHARGE DIAGNOSIS  Diagnosis: Helicobacter pylori gastritis  Assessment and Plan of Treatment: You came to the hospital with a prolonged history of bloody bowel movements and 2 weeks of tiredness, trouble breathing, and paleness, which can be signs of significant blood loss. You were found to have low levels of hemoglobin, a measure of red blood cells, and underwent a blood transfusion. Endoscopy showed irritation in the stomach (gastritis) and esophagus due to an infection with H pylori. This infection requires 2 weeks of antibiotic and anti-acid treatment, detailed below.   Take AMOXICILLIN 1000mg TWICE A DAY for the FIRST 5 DAYS (from the evening of 3/26 through the morning of 3/31) followed by AMOXICILLIN 500mg TWICE A DAY for the SECOND 7 DAYS (from the evening of 3/31 through the morning of 4/7).   Take CLARITHROMYCIN 500mg TWICE A DAY for THIRTEEN DAYS after discharge, starting with an evening dose 3/26 through the morning dose 4/8. Do NOT take your home DRONEDARONE, ATORVASTATIN, FLONASE, or SYMBICORT while you are taking this medication. RESTART THESE FOUR MEDICATIONS 4/9.  Take FLAGYL (metronidazole) 500mg EVERY 8 HOURS (three times a day) for TWELVE DAYS after discharge, from the evening of 3/26 through the morning of 4/7. Note that this medication can cause poor appetite and stomach upset. Discuss these side effects with your primary care doctor or gastroenterologist if you experience them.  Take PANTOPRAZOLE 40mg TWICE A DAY for 11 DAYS after discharge, from the evening of 3/26 through the morning of 4/6. You may take this medication ONCE A DAY starting 4/7. Dicuss long-term use of this medication with your gastroenterologist.  Follow up with your primary care doctor or home gastroenterologist in 1-2 weeks of discharge to test for resolution of H. pylori and assess for recovery.      SECONDARY DISCHARGE DIAGNOSES  Diagnosis: Encounter for colonoscopy due to history of adenomatous colonic polyps  Assessment and Plan of Treatment: You came to the hospital with a history of bloody bowel movements and underwent two colonoscopies. You were found to have two polyps, which were removed and sent to a lab to test for possible cancer. Moreira were also found to have hemorrhoids, which can cause bleeding, although yours were not found to be currently bleeding. Follow up with gastroenterologist Dr. Moreland in 1-2 weeks (as discussed above) to review your pathology results and discuss further management. Seek medical attention promptly if you begin to experience worsening black or bloody bowel movements, light-headedness, or weakness.    Diagnosis: Paroxysmal atrial fibrillation  Assessment and Plan of Treatment: You were found to have intermittent arrhythmia known as atrial fibrillation this admission. This rhythm can cause the heart to beat dangerously fast, causing damage.   Continue to take your home CARVEDILOL 6.25mg TWICE A DAY to help keep the rate normal.   Begin taking DRONEDARONE 400mg TWICE A DAY 4/9 AFTER you have completed CLARITHROMYCIN. This medication will help your heart rhythm to be more regular.   Atrial fibrilliation can also increase the risk of blood clots, which can cause heart attack, stroke, and other issues. Take XARELTO 20mg ONCE A DAY to help prevent blood clots.  Xarelto can increase the risk of bleeding once injury occurs. Seek medical attention immediately if you notice bright red or black stools, or if you have blood in your urine or sputum. Injuries to head and joints can also more easily result in significant bleeding on this medication. Have a low threshold to seek medical attention if you have such an injury, and monitor yourself closely for bruising, dizziness, nausea, or vomiting in the case of any head injury, as these may be signs of bleeding.      Diagnosis: Asthma  Assessment and Plan of Treatment: You may continue your home Advair Diskus 1 puff TWICE A DAY, SPIRIVA ONCE A DAY, and VENTOLIN 3mL THREE TIMES A DAY.  DO NOT RESUME your home FLONASE or SYMBICORT while you are taking CLARITHROMYCIN. Resume these medications on 4/9, once you have finished clarithromycin.   Follow up with your primary care doctor or home pulmonologist per routine.    Diagnosis: Hypothyroidism  Assessment and Plan of Treatment: Continue to take your Synthroid as previously. Follow up with your primary care doctor or home endocrinologist per routine to monitor your thyroid levels and adjust medication as needed.    Diagnosis: CHF (congestive heart failure), NYHA class I  Assessment and Plan of Treatment: You have a history of poor heart function known as heart failure, which can cause blood to back up from your heart into your lungs, causing difficulties with breathing. Continue to take your home torsemide 10mg ONCE A DAY to help remove excess fluid. Follow up with your primary care doctor or home cardiologist per routine for long-term management of this condition.    Diagnosis: JOSE DANIEL (obstructive sleep apnea)  Assessment and Plan of Treatment: You have a condition called obstructive sleep apnea, in which your airway is blocked and breathing stops for short periods of time throughout the night, causing poor sleep and high blood pressure.  We recommend that you continue to use your home CPAP machine, as this can prevent poor air flow during sleep, which in turn can improve high blood pressure, daytime sleepiness, and other issues. You have previously taken Ambien for sleep; however, we recommend that you discuss this medication in the context of your sleep apnea with your primary care doctor during routine follow up before resuming this medication.    Diagnosis: CAD (coronary atherosclerotic disease)  Assessment and Plan of Treatment: You have a history of blockages in your heart that increase the risk of heart attack. You have undergone previous procedures to clear blockages and prop the blood vessels open (stenting), but the risk of heart attack remains.   Continue to take your home ASPIRIN 81mg ONCE A DAY. AFTER you have completed your course of CLARITHROMYCIN on 4/8, you may resume your home ATORVASTATIN 40mg 4/9.     PRINCIPAL DISCHARGE DIAGNOSIS  Diagnosis: Helicobacter pylori gastritis  Assessment and Plan of Treatment: You came to the hospital with a prolonged history of bloody bowel movements and 2 weeks of tiredness, trouble breathing, and paleness, which can be signs of significant blood loss. You were found to have low levels of hemoglobin, a measure of red blood cells, and underwent a blood transfusion. Endoscopy showed irritation in the stomach (gastritis) and esophagus due to an infection with H pylori. This infection requires 2 weeks of antibiotic and anti-acid treatment, detailed below.   Take AMOXICILLIN 1000mg TWICE A DAY for the FIRST 5 DAYS (from the evening of 3/26 through the morning of 3/31) followed by AMOXICILLIN 500mg TWICE A DAY for the SECOND 7 DAYS (from the evening of 3/31 through the morning of 4/7).   Take CLARITHROMYCIN 500mg TWICE A DAY for THIRTEEN DAYS after discharge, starting with an evening dose 3/26 through the morning dose 4/8. Do NOT take your home DRONEDARONE, ATORVASTATIN, FLONASE, or SYMBICORT while you are taking this medication. RESTART THESE FOUR MEDICATIONS 4/9.  Take FLAGYL (metronidazole) 500mg EVERY 8 HOURS (three times a day) for TWELVE DAYS after discharge, from the evening of 3/26 through the morning of 4/7. Note that this medication can cause poor appetite and stomach upset. Discuss these side effects with your primary care doctor or gastroenterologist if you experience them.  Take PANTOPRAZOLE 40mg TWICE A DAY for 11 DAYS after discharge, from the evening of 3/26 through the morning of 4/6. You may take this medication ONCE A DAY starting 4/7. Dicuss long-term use of this medication with your gastroenterologist.  Follow up with your primary care doctor or home gastroenterologist in 1-2 weeks of discharge to test for resolution of H. pylori and assess for recovery.      SECONDARY DISCHARGE DIAGNOSES  Diagnosis: Encounter for colonoscopy due to history of adenomatous colonic polyps  Assessment and Plan of Treatment: You came to the hospital with a history of bloody bowel movements and underwent two colonoscopies. You were found to have two polyps, which were removed and sent to a lab to test for possible cancer. Moreira were also found to have hemorrhoids, which can cause bleeding, although yours were not found to be currently bleeding. Follow up with gastroenterologist Dr. Moreland in 1-2 weeks (as discussed above) to review your pathology results and discuss further management. Seek medical attention promptly if you begin to experience worsening black or bloody bowel movements, light-headedness, or weakness.    Diagnosis: Paroxysmal atrial fibrillation  Assessment and Plan of Treatment: You were found to have intermittent arrhythmia known as atrial fibrillation this admission. This rhythm can cause the heart to beat dangerously fast, causing damage.   Continue to take your home CARVEDILOL 6.25mg TWICE A DAY to help keep the rate normal.   Begin taking DRONEDARONE 400mg TWICE A DAY 4/9 AFTER you have completed CLARITHROMYCIN. This medication will help your heart rhythm to be more regular.   Follow up with cardiologist DR. HUFFMAN in 2 WEEKS to review your heart rhythm and discuss these new medicaitons.  Atrial fibrilliation can also increase the risk of blood clots, which can cause heart attack, stroke, and other issues. Take XARELTO 20mg ONCE A DAY to help prevent blood clots.  Xarelto can increase the risk of bleeding once injury occurs. Seek medical attention immediately if you notice bright red or black stools, or if you have blood in your urine or sputum. Injuries to head and joints can also more easily result in significant bleeding on this medication. Have a low threshold to seek medical attention if you have such an injury, and monitor yourself closely for bruising, dizziness, nausea, or vomiting in the case of any head injury, as these may be signs of bleeding.      Diagnosis: Asthma  Assessment and Plan of Treatment: You may continue your home Advair Diskus 1 puff TWICE A DAY, SPIRIVA ONCE A DAY, and VENTOLIN 3mL THREE TIMES A DAY.  DO NOT RESUME your home FLONASE or SYMBICORT while you are taking CLARITHROMYCIN. Resume these medications on 4/9, once you have finished clarithromycin.   Follow up with your primary care doctor or home pulmonologist per routine.  Follow up with pulmonologist Dr. Zhang outpatient within 1-2 weeks to review your medications and establish long-term monitoring of your asthma and obstructive sleep apnea, discussed below.    Diagnosis: Hypothyroidism  Assessment and Plan of Treatment: Continue to take your Synthroid as previously. Follow up with your primary care doctor or home endocrinologist per routine to monitor your thyroid levels and adjust medication as needed.    Diagnosis: CHF (congestive heart failure), NYHA class I  Assessment and Plan of Treatment: You have a history of poor heart function known as heart failure, which can cause blood to back up from your heart into your lungs, causing difficulties with breathing. Continue to take your home torsemide 10mg ONCE A DAY to help remove excess fluid. Follow up with your primary care doctor or home cardiologist per routine for long-term management of this condition.    Diagnosis: Diabetes  Assessment and Plan of Treatment: Continue to take your home Janumet 50mg-500mg TWICE A DAY as previously for regulation of blood sugar. Continue to take your home GABAPENTIN 300mg ONCE A DAY to prevent diabetes-related nerve pain and TOLTERODINE 1mg TWICE A DAY to help prevent bladder issues.  Follow up with your primary care doctor or home endocrinologist per routine to regularly monitor your blood sugar. We recommend that you examine your feet daily for cuts, as diabetes causes decreased sensation and poor healing, which can lead to serious infections and even gangrene. See an ophthalmologist once a year for a thorough eye exam, as diabetes also leads to vision loss and eye disease. Eat a diet low in processed sugar and high in fiber.  You may also continue to take your home ascorbic acid (vitamin C) and cholecalciferol (vitamin D) to help promote general health.    Diagnosis: JOSE DANIEL (obstructive sleep apnea)  Assessment and Plan of Treatment: You have a condition called obstructive sleep apnea, in which your airway is blocked and breathing stops for short periods of time throughout the night, causing poor sleep and high blood pressure.  We recommend that you continue to use your home CPAP machine, as this can prevent poor air flow during sleep, which in turn can improve high blood pressure, daytime sleepiness, and other issues. You have previously taken Ambien for sleep; however, we recommend that you discuss this medication in the context of your sleep apnea with your primary care doctor during routine follow up before resuming this medication.    Diagnosis: CAD (coronary atherosclerotic disease)  Assessment and Plan of Treatment: You have a history of blockages in your heart that increase the risk of heart attack. You have undergone previous procedures to clear blockages and prop the blood vessels open (stenting), but the risk of heart attack remains.   Continue to take your home ASPIRIN 81mg ONCE A DAY. AFTER you have completed your course of CLARITHROMYCIN on 4/8, you may resume your home ATORVASTATIN 40mg 4/9.    Diagnosis: HTN (Hypertension)  Assessment and Plan of Treatment: You have high blood pressure, which increases the risk of heart attack, strokes, and many other issues. Continue your home carvedilol as dicussed above.     PRINCIPAL DISCHARGE DIAGNOSIS  Diagnosis: Helicobacter pylori gastritis  Assessment and Plan of Treatment: You came to the hospital with a prolonged history of bloody bowel movements and 2 weeks of tiredness, trouble breathing, and paleness, which can be signs of significant blood loss. You were found to have low levels of hemoglobin, a measure of red blood cells, and underwent a blood transfusion. Endoscopy showed irritation in the stomach (gastritis) and esophagus due to an infection with H pylori. This infection requires 2 weeks of antibiotic and anti-acid treatment, detailed below.   Take AMOXICILLIN 1000mg TWICE A DAY for the FIRST 5 DAYS (from the evening of 3/26 through the morning of 3/31) followed by AMOXICILLIN 500mg TWICE A DAY for the SECOND 7 DAYS (from the evening of 3/31 through the morning of 4/7).   Take CLARITHROMYCIN 500mg TWICE A DAY for THIRTEEN DAYS after discharge, starting with an evening dose 3/26 through the morning dose 4/8. Do NOT take your home DRONEDARONE, ATORVASTATIN, FLONASE, or SYMBICORT while you are taking this medication. RESTART THESE FOUR MEDICATIONS 4/9.  Take FLAGYL (metronidazole) 500mg EVERY 8 HOURS (three times a day) for TWELVE DAYS after discharge, from the evening of 3/26 through the morning of 4/7. Note that this medication can cause poor appetite and stomach upset. Discuss these side effects with your primary care doctor or gastroenterologist if you experience them.  Take PANTOPRAZOLE 40mg TWICE A DAY for 11 DAYS after discharge, from the evening of 3/26 through the morning of 4/6. You may take this medication ONCE A DAY starting 4/7. Dicuss long-term use of this medication with your gastroenterologist.  Follow up with your primary care doctor or home gastroenterologist in 1-2 weeks of discharge to test for resolution of H. pylori and assess for recovery.      SECONDARY DISCHARGE DIAGNOSES  Diagnosis: Colon cancer  Assessment and Plan of Treatment: You came to the hospital with a history of bloody bowel movements and underwent two colonoscopies. You were found to have two polyps, which were removed. Lab tests of the removed polyps were positive for invasive colon cancer. Surgery and heme/onc were consulted and recommended ______.  You were also found to have hemorrhoids, which can cause bleeding, although yours were not found to be currently bleeding. Follow up with _______.    Diagnosis: Paroxysmal atrial fibrillation  Assessment and Plan of Treatment: You were found to have intermittent arrhythmia known as atrial fibrillation this admission. This rhythm can cause the heart to beat dangerously fast, causing damage.   Continue to take your home CARVEDILOL 6.25mg TWICE A DAY to help keep the rate normal.   Begin taking DRONEDARONE 400mg TWICE A DAY 4/9 AFTER you have completed CLARITHROMYCIN. This medication will help your heart rhythm to be more regular.   Follow up with cardiologist DR. HUFFMAN in 2 WEEKS to review your heart rhythm and discuss these new medicaitons.  Atrial fibrilliation can also increase the risk of blood clots, which can cause heart attack, stroke, and other issues. Take XARELTO 20mg ONCE A DAY to help prevent blood clots.  Xarelto can increase the risk of bleeding once injury occurs. Seek medical attention immediately if you notice bright red or black stools, or if you have blood in your urine or sputum. Injuries to head and joints can also more easily result in significant bleeding on this medication. Have a low threshold to seek medical attention if you have such an injury, and monitor yourself closely for bruising, dizziness, nausea, or vomiting in the case of any head injury, as these may be signs of bleeding.      Diagnosis: Asthma  Assessment and Plan of Treatment: You may continue your home Advair Diskus 1 puff TWICE A DAY, SPIRIVA ONCE A DAY, and VENTOLIN 3mL THREE TIMES A DAY.  DO NOT RESUME your home FLONASE or SYMBICORT while you are taking CLARITHROMYCIN. Resume these medications on 4/9, once you have finished clarithromycin.   Follow up with your primary care doctor or home pulmonologist per routine.  Follow up with pulmonologist Dr. Zhang outpatient within 1-2 weeks to review your medications and establish long-term monitoring of your asthma and obstructive sleep apnea, discussed below.    Diagnosis: Hypothyroidism  Assessment and Plan of Treatment: Continue to take your Synthroid as previously. Follow up with your primary care doctor or home endocrinologist per routine to monitor your thyroid levels and adjust medication as needed.    Diagnosis: CHF (congestive heart failure), NYHA class I  Assessment and Plan of Treatment: You have a history of poor heart function known as heart failure, which can cause blood to back up from your heart into your lungs, causing difficulties with breathing. Continue to take your home torsemide 10mg ONCE A DAY to help remove excess fluid. Follow up with your primary care doctor or home cardiologist per routine for long-term management of this condition.    Diagnosis: Diabetes  Assessment and Plan of Treatment: Continue to take your home Janumet 50mg-500mg TWICE A DAY as previously for regulation of blood sugar. Continue to take your home GABAPENTIN 300mg ONCE A DAY to prevent diabetes-related nerve pain and TOLTERODINE 1mg TWICE A DAY to help prevent bladder issues.  Follow up with your primary care doctor or home endocrinologist per routine to regularly monitor your blood sugar. We recommend that you examine your feet daily for cuts, as diabetes causes decreased sensation and poor healing, which can lead to serious infections and even gangrene. See an ophthalmologist once a year for a thorough eye exam, as diabetes also leads to vision loss and eye disease. Eat a diet low in processed sugar and high in fiber.  You may also continue to take your home ascorbic acid (vitamin C) and cholecalciferol (vitamin D) to help promote general health.    Diagnosis: JOSE DANIEL (obstructive sleep apnea)  Assessment and Plan of Treatment: You have a condition called obstructive sleep apnea, in which your airway is blocked and breathing stops for short periods of time throughout the night, causing poor sleep and high blood pressure.  We recommend that you continue to use your home CPAP machine, as this can prevent poor air flow during sleep, which in turn can improve high blood pressure, daytime sleepiness, and other issues. You have previously taken Ambien for sleep; however, we recommend that you discuss this medication in the context of your sleep apnea with your primary care doctor during routine follow up before resuming this medication.    Diagnosis: CAD (coronary atherosclerotic disease)  Assessment and Plan of Treatment: You have a history of blockages in your heart that increase the risk of heart attack. You have undergone previous procedures to clear blockages and prop the blood vessels open (stenting), but the risk of heart attack remains.   Continue to take your home ASPIRIN 81mg ONCE A DAY. AFTER you have completed your course of CLARITHROMYCIN on 4/8, you may resume your home ATORVASTATIN 40mg 4/9.    Diagnosis: HTN (Hypertension)  Assessment and Plan of Treatment: You have high blood pressure, which increases the risk of heart attack, strokes, and many other issues. Continue your home carvedilol as dicussed above.     PRINCIPAL DISCHARGE DIAGNOSIS  Diagnosis: Helicobacter pylori gastritis  Assessment and Plan of Treatment: You came to the hospital with a prolonged history of bloody bowel movements and 2 weeks of tiredness, trouble breathing, and paleness, which can be signs of significant blood loss. You were found to have low levels of hemoglobin, a measure of red blood cells, and underwent a blood transfusion. Endoscopy showed irritation in the stomach (gastritis) and esophagus due to an infection with H pylori. This infection requires 2 weeks of antibiotic and anti-acid treatment, detailed below.   Take AMOXICILLIN 1000mg TWICE A DAY for the FIRST 5 DAYS (from the evening of 3/26 through the morning of 3/31) followed by AMOXICILLIN 500mg TWICE A DAY for the SECOND 7 DAYS (from the evening of 3/31 through the morning of 4/7).   Take CLARITHROMYCIN 500mg TWICE A DAY for THIRTEEN DAYS after discharge, starting with an evening dose 3/26 through the morning dose 4/8. Do NOT take your home DRONEDARONE, ATORVASTATIN, FLONASE, or SYMBICORT while you are taking this medication. RESTART THESE FOUR MEDICATIONS 4/9.  Take FLAGYL (metronidazole) 500mg EVERY 8 HOURS (three times a day) for TWELVE DAYS after discharge, from the evening of 3/26 through the morning of 4/7. Note that this medication can cause poor appetite and stomach upset. Discuss these side effects with your primary care doctor or gastroenterologist if you experience them.  Take PANTOPRAZOLE 40mg TWICE A DAY for 11 DAYS after discharge, from the evening of 3/26 through the morning of 4/6. You may take this medication ONCE A DAY starting 4/7. Dicuss long-term use of this medication with your gastroenterologist.  Follow up with your primary care doctor or home gastroenterologist in 1-2 weeks of discharge to test for resolution of H. pylori and assess for recovery.      SECONDARY DISCHARGE DIAGNOSES  Diagnosis: CAD (coronary atherosclerotic disease)  Assessment and Plan of Treatment: You have a history of blockages in your heart that increase the risk of heart attack. You have undergone previous procedures to clear blockages and prop the blood vessels open (stenting), but the risk of heart attack remains.   Continue to take your home ASPIRIN 81mg ONCE A DAY. AFTER you have completed your course of CLARITHROMYCIN on 4/8, you may resume your home ATORVASTATIN 40mg 4/9.    Diagnosis: Asthma  Assessment and Plan of Treatment: You may continue your home Advair Diskus 1 puff TWICE A DAY, SPIRIVA ONCE A DAY, and VENTOLIN 3mL THREE TIMES A DAY.  DO NOT RESUME your home FLONASE or SYMBICORT while you are taking CLARITHROMYCIN. Resume these medications on 4/9, once you have finished clarithromycin.   Follow up with your primary care doctor or home pulmonologist per routine.  Follow up with pulmonologist Dr. Zhang outpatient within 1-2 weeks to review your medications and establish long-term monitoring of your asthma and obstructive sleep apnea, discussed below.    Diagnosis: Paroxysmal atrial fibrillation  Assessment and Plan of Treatment: You were found to have intermittent arrhythmia known as atrial fibrillation this admission. This rhythm can cause the heart to beat dangerously fast, causing damage.   Continue to take your home CARVEDILOL 6.25mg TWICE A DAY to help keep the rate normal.   Begin taking DRONEDARONE 400mg TWICE A DAY 4/9 AFTER you have completed CLARITHROMYCIN. This medication will help your heart rhythm to be more regular.   Follow up with cardiologist DR. HUFFMAN in 2 WEEKS to review your heart rhythm and discuss these new medicaitons.  Atrial fibrilliation can also increase the risk of blood clots, which can cause heart attack, stroke, and other issues. Take XARELTO 20mg ONCE A DAY to help prevent blood clots.  Xarelto can increase the risk of bleeding once injury occurs. Seek medical attention immediately if you notice bright red or black stools, or if you have blood in your urine or sputum. Injuries to head and joints can also more easily result in significant bleeding on this medication. Have a low threshold to seek medical attention if you have such an injury, and monitor yourself closely for bruising, dizziness, nausea, or vomiting in the case of any head injury, as these may be signs of bleeding.      Diagnosis: Hypothyroidism  Assessment and Plan of Treatment: Continue to take your Synthroid as previously. Follow up with your primary care doctor or home endocrinologist per routine to monitor your thyroid levels and adjust medication as needed.    Diagnosis: CHF (congestive heart failure), NYHA class I  Assessment and Plan of Treatment: You have a history of poor heart function known as heart failure, which can cause blood to back up from your heart into your lungs, causing difficulties with breathing. Continue to take your home torsemide 10mg ONCE A DAY to help remove excess fluid. Follow up with your primary care doctor or home cardiologist per routine for long-term management of this condition.    Diagnosis: JOSE DANIEL (obstructive sleep apnea)  Assessment and Plan of Treatment: You have a condition called obstructive sleep apnea, in which your airway is blocked and breathing stops for short periods of time throughout the night, causing poor sleep and high blood pressure.  We recommend that you continue to use your home CPAP machine, as this can prevent poor air flow during sleep, which in turn can improve high blood pressure, daytime sleepiness, and other issues. You have previously taken Ambien for sleep; however, we recommend that you discuss this medication in the context of your sleep apnea with your primary care doctor during routine follow up before resuming this medication.    Diagnosis: Diabetes  Assessment and Plan of Treatment: Continue to take your home Janumet 50mg-500mg TWICE A DAY as previously for regulation of blood sugar. Continue to take your home GABAPENTIN 300mg ONCE A DAY to prevent diabetes-related nerve pain and TOLTERODINE 1mg TWICE A DAY to help prevent bladder issues.  Follow up with your primary care doctor or home endocrinologist per routine to regularly monitor your blood sugar. We recommend that you examine your feet daily for cuts, as diabetes causes decreased sensation and poor healing, which can lead to serious infections and even gangrene. See an ophthalmologist once a year for a thorough eye exam, as diabetes also leads to vision loss and eye disease. Eat a diet low in processed sugar and high in fiber.  You may also continue to take your home ascorbic acid (vitamin C) and cholecalciferol (vitamin D) to help promote general health.    Diagnosis: HTN (Hypertension)  Assessment and Plan of Treatment: You have high blood pressure, which increases the risk of heart attack, strokes, and many other issues. Continue your home carvedilol as dicussed above.    Diagnosis: Colon cancer  Assessment and Plan of Treatment: You came to the hospital with a history of bloody bowel movements and underwent two colonoscopies. You were found to have two polyps, which were removed. Lab tests of the removed polyps were positive for invasive colon cancer. Surgery and heme/onc were consulted and recommended ______.  You were also found to have hemorrhoids, which can cause bleeding, although yours were not found to be currently bleeding. Follow up with _______.

## 2021-03-22 NOTE — DISCHARGE NOTE PROVIDER - PROVIDER TOKENS
PROVIDER:[[id number]],FOLLOWUP:[1 week],ESTABLISHEDPATIENT:[T]] PROVIDER:[[id number]],FOLLOWUP:[1 week],ESTABLISHEDPATIENT:[T]],PROVIDER:[[id number]],FOLLOWUP:[1 week],ESTABLISHEDPATIENT:[T]],PROVIDER:[[id number]],FOLLOWUP:[2 weeks],ESTABLISHEDPATIENT:[T]],PROVIDER:[[id number]],FOLLOWUP:[2 weeks],ESTABLISHEDPATIENT:[T]] PROVIDER:[[id number]],FOLLOWUP:[1 week],ESTABLISHEDPATIENT:[T]],PROVIDER:[[id number]],FOLLOWUP:[1 week],ESTABLISHEDPATIENT:[T]],PROVIDER:[[id number]],FOLLOWUP:[2 weeks],ESTABLISHEDPATIENT:[T]],PROVIDER:[[id number]],FOLLOWUP:[2 weeks],ESTABLISHEDPATIENT:[T]],PROVIDER:[[id number]],FOLLOWUP:[2 weeks]]

## 2021-03-22 NOTE — PROGRESS NOTE ADULT - PROBLEM SELECTOR PLAN 1
presented 3/20 with x2 weeks increasing fatigue, SOB, and paleness, with prolonged history of dark bowel movements s/p possible transverse polyp colonoscopy 2020, no further fu  -in ED, VS notable for /77 and SpO2 100% 4L NC  -trop, COVID, UA negative  -pre-RBC adm Hb 7.7 (baseline ~11), Fe 15, TIBC 457, 3% saturation  -CT a/p unremarkable for signs of bleeding  -s/p x1 unit pRBC in ED  -fu FOBT  -IV iron 3/20-23, PPI BID  -Fe and %sat improved s/p transfusion and Venofer, Hb stable, transfuse if <8  -NPO from [place] 3/22 for EGD/col 3/22 with GI Dr. Moreland, fu results

## 2021-03-22 NOTE — DISCHARGE NOTE PROVIDER - CARE PROVIDERS DIRECT ADDRESSES
,DirectAddress_Unknown ,DirectAddress_Unknown,DirectAddress_Unknown,DirectAddress_Unknown,DirectAddress_Unknown ,DirectAddress_Unknown,DirectAddress_Unknown,DirectAddress_Unknown,DirectAddress_Unknown,[email or web address]

## 2021-03-22 NOTE — DISCHARGE NOTE PROVIDER - NSDCMRMEDTOKEN_GEN_ALL_CORE_FT
acetaminophen 325 mg oral tablet: 2 tab(s) orally every 4 hours, As needed, Mild Pain (1 - 3)  Advair Diskus 250 mcg-50 mcg inhalation powder: 1 puff(s) inhaled 2 times a day  Ambien 5 mg oral tablet: 1 tab(s) orally once a day (at bedtime)  atorvastatin 40 mg oral tablet: 1 tab(s) orally once a day (at bedtime)  benzonatate 100 mg oral capsule: 1 cap(s) orally every 8 hours  Coreg 6.25 mg oral tablet: 1 tab(s) orally every 12 hours  Dexilant 60 mg oral delayed release capsule: 1 cap(s) orally once a day  Flonase 50 mcg/inh nasal spray: 1 spray(s) nasal once a day  gabapentin 300 mg oral capsule: 1 cap(s) orally once a day  glyburide-metformin 5 mg-500 mg oral tablet: 1 tab(s) orally 2 times a day  Janumet 50 mg-500 mg oral tablet: 1 tab(s) orally 2 times a day  levothyroxine 175 mcg (0.175 mg) oral tablet: 1 tab(s) orally once a day  montelukast 10 mg oral tablet: 1 tab(s) orally once a day  rivaroxaban 15 mg oral tablet: 1 tab(s) orally once a day (before a meal)  Spiriva Respimat 60 ACT 2.5 mcg/inh inhalation aerosol: 2 puff(s) inhaled once a day  tolterodine 1 mg oral tablet: tab(s) orally 2 times a day  torsemide 10 mg oral tablet: 1 tab(s) orally once a day  Ventolin HFA 90 mcg/inh inhalation aerosol: 2 puff(s) inhaled 4 times a day, As Needed   Advair Diskus 250 mcg-50 mcg inhalation powder: 1 puff(s) inhaled 2 times a day  Ambien 5 mg oral tablet: 1 tab(s) orally once a day (at bedtime)  aspirin 81 mg oral tablet, chewable: 1 tab(s) orally once a day  atorvastatin 40 mg oral tablet: 1 tab(s) orally once a day (at bedtime)  do not take until you have completed clarithromycin prescription  resume 14 DAYS AFTER DISCHARGE  clarithromycin 500 mg oral tablet: 1 tab(s) orally 2 times a day  while you are taking this medication, do NOT take Multaq, statin, Symbicort, or flonase. Resume these medications when your course of clarithromycin has been completed.  Coreg 6.25 mg oral tablet: 1 tab(s) orally every 12 hours  Dexilant 60 mg oral delayed release capsule: 1 cap(s) orally once a day  Flonase 50 mcg/inh nasal spray: 1 spray(s) nasal once a day  gabapentin 300 mg oral capsule: 1 cap(s) orally once a day  Janumet 50 mg-500 mg oral tablet: 1 tab(s) orally 2 times a day  levothyroxine 175 mcg (0.175 mg) oral tablet: 1 tab(s) orally once a day  montelukast 10 mg oral tablet: 1 tab(s) orally once a day  do not take until you have completed clarithromycin prescription  resume 14 DAYS AFTER DISCHARGE  rivaroxaban 20 mg oral tablet: 20 milligram(s) orally once a day (with food)  Spiriva HandiHaler 18 mcg inhalation capsule: 1 cap(s) inhaled once a day  tolterodine 1 mg oral tablet: tab(s) orally 2 times a day  torsemide 10 mg oral tablet: 1 tab(s) orally once a day  Ventolin Nebules 2.5 mg/3 mL (0.083%) inhalation solution: 3 milliliter(s) inhaled 3 times a day   Advair Diskus 250 mcg-50 mcg inhalation powder: 1 puff(s) inhaled 2 times a day  Ambien 5 mg oral tablet: 1 tab(s) orally once a day (at bedtime)  amoxicillin 500 mg oral capsule: 2 cap(s) orally every 12 hours x 5 days through 3/31am  1 cap orally every 12hours x7 days 3/31pm-4/7am  ascorbic acid 1000 mg oral tablet: 2 tab(s) orally every 8 hours  aspirin 81 mg oral tablet, chewable: 1 tab(s) orally once a day  atorvastatin 40 mg oral tablet: 1 tab(s) orally once a day (at bedtime)  do not take until you have completed clarithromycin prescription  resume 14 DAYS AFTER DISCHARGE  cholecalciferol 1000 intl units (25 mcg) oral tablet: 1 tab(s) orally once a day   clarithromycin 500 mg oral tablet: 1 tab(s) orally 2 times a day through 4/8am  while you are taking this medication, do NOT take Multaq, statin, Symbicort, or flonase. Resume these medications when your course of clarithromycin has been completed.  Coreg 6.25 mg oral tablet: 1 tab(s) orally every 12 hours  dronedarone 400 mg oral tablet: 1 tab(s) orally 2 times a day   Flonase 50 mcg/inh nasal spray: 1 spray(s) nasal once a day  do NOT take while you are taking clarithromycin  resume 14 DAYS after discharge once clarithromycin is completed  gabapentin 300 mg oral capsule: 1 cap(s) orally once a day  Janumet 50 mg-500 mg oral tablet: 1 tab(s) orally 2 times a day  levothyroxine 175 mcg (0.175 mg) oral tablet: 1 tab(s) orally once a day  metroNIDAZOLE 500 mg oral tablet: 1 tab(s) orally every 8 hours through 4/7am  montelukast 10 mg oral tablet: 1 tab(s) orally once a day  do not take until you have completed clarithromycin prescription  resume 14 DAYS AFTER DISCHARGE  pantoprazole 40 mg oral delayed release tablet: 1 tab(s) orally 2 times a day through 4/7am, then resume once daily PPI unless instructed otherwise by your GI physician Dr. Moreland  rivaroxaban 20 mg oral tablet: 20 milligram(s) orally once a day (with food)  Spiriva HandiHaler 18 mcg inhalation capsule: 1 cap(s) inhaled once a day  tolterodine 1 mg oral tablet: tab(s) orally 2 times a day  torsemide 10 mg oral tablet: 1 tab(s) orally once a day  Ventolin Nebules 2.5 mg/3 mL (0.083%) inhalation solution: 3 milliliter(s) inhaled 3 times a day   acetaminophen 500 mg oral tablet: 2 tab(s) orally every 8 hours  Advair Diskus 250 mcg-50 mcg inhalation powder: 1 puff(s) inhaled 2 times a day  Ambien 5 mg oral tablet: 1 tab(s) orally once a day (at bedtime)  amoxicillin 500 mg oral capsule: 2 cap(s) orally every 12 hours x 5 days through 3/31am  1 cap orally every 12hours x7 days 3/31pm-4/7am  ascorbic acid 1000 mg oral tablet: 2 tab(s) orally every 8 hours  aspirin 81 mg oral tablet, chewable: 1 tab(s) orally once a day  atorvastatin 40 mg oral tablet: 1 tab(s) orally once a day (at bedtime)  do not take until you have completed clarithromycin prescription  resume 14 DAYS AFTER DISCHARGE  cholecalciferol 1000 intl units (25 mcg) oral tablet: 1 tab(s) orally once a day   clarithromycin 500 mg oral tablet: 1 tab(s) orally 2 times a day through 4/8am  while you are taking this medication, do NOT take Multaq, statin, Symbicort, or flonase. Resume these medications when your course of clarithromycin has been completed.  Coreg 6.25 mg oral tablet: 1 tab(s) orally every 12 hours  dronedarone 400 mg oral tablet: 1 tab(s) orally 2 times a day   Flonase 50 mcg/inh nasal spray: 1 spray(s) nasal once a day  do NOT take while you are taking clarithromycin  resume 14 DAYS after discharge once clarithromycin is completed  gabapentin 300 mg oral capsule: 1 cap(s) orally once a day  Janumet 50 mg-500 mg oral tablet: 1 tab(s) orally 2 times a day  levothyroxine 175 mcg (0.175 mg) oral tablet: 1 tab(s) orally once a day  metroNIDAZOLE 500 mg oral tablet: 1 tab(s) orally every 8 hours through 4/7am  montelukast 10 mg oral tablet: 1 tab(s) orally once a day  do not take until you have completed clarithromycin prescription  resume 14 DAYS AFTER DISCHARGE  pantoprazole 40 mg oral delayed release tablet: 1 tab(s) orally 2 times a day through 4/7am, then resume once daily PPI unless instructed otherwise by your GI physician Dr. Moreland  rivaroxaban 20 mg oral tablet: 20 milligram(s) orally once a day (with food)  Spiriva HandiHaler 18 mcg inhalation capsule: 1 cap(s) inhaled once a day  tolterodine 1 mg oral tablet: tab(s) orally 2 times a day  torsemide 10 mg oral tablet: 1 tab(s) orally once a day  Ventolin Nebules 2.5 mg/3 mL (0.083%) inhalation solution: 3 milliliter(s) inhaled 3 times a day   acetaminophen 500 mg oral tablet: 2 tab(s) orally every 8 hours  Advair Diskus 250 mcg-50 mcg inhalation powder: 1 puff(s) inhaled 2 times a day  Ambien 5 mg oral tablet: 1 tab(s) orally once a day (at bedtime)  amoxicillin 500 mg oral capsule: 1 cap(s) orally every 12 hours x 10 days   ascorbic acid 1000 mg oral tablet: 2 tab(s) orally every 8 hours  aspirin 81 mg oral tablet, chewable: 1 tab(s) orally once a day  atorvastatin 40 mg oral tablet: 1 tab(s) orally once a day (at bedtime)  do not take until you have completed clarithromycin prescription  resume 14 DAYS AFTER DISCHARGE  cholecalciferol 1000 intl units (25 mcg) oral tablet: 1 tab(s) orally once a day   Coreg 6.25 mg oral tablet: 1 tab(s) orally every 12 hours  dronedarone 400 mg oral tablet: 1 tab(s) orally 2 times a day   Flonase 50 mcg/inh nasal spray: 1 spray(s) nasal once a day  do NOT take while you are taking clarithromycin  resume 14 DAYS after discharge once clarithromycin is completed  gabapentin 300 mg oral capsule: 1 cap(s) orally once a day  Janumet 50 mg-500 mg oral tablet: 1 tab(s) orally 2 times a day  levothyroxine 175 mcg (0.175 mg) oral tablet: 1 tab(s) orally once a day  metroNIDAZOLE 500 mg oral tablet: 1 tab(s) orally every 8 hours  metroNIDAZOLE 500 mg oral tablet: 1 tab(s) orally every 8 hours through 4/7am  montelukast 10 mg oral tablet: 1 tab(s) orally once a day  do not take until you have completed clarithromycin prescription  resume 14 DAYS AFTER DISCHARGE  pantoprazole 40 mg oral delayed release tablet: 1 tab(s) orally 2 times a day   rivaroxaban 20 mg oral tablet: 20 milligram(s) orally once a day (with food)  Spiriva HandiHaler 18 mcg inhalation capsule: 1 cap(s) inhaled once a day  tolterodine 1 mg oral tablet: tab(s) orally 2 times a day  torsemide 10 mg oral tablet: 1 tab(s) orally once a day  Ventolin Nebules 2.5 mg/3 mL (0.083%) inhalation solution: 3 milliliter(s) inhaled 3 times a day

## 2021-03-22 NOTE — PROGRESS NOTE ADULT - ASSESSMENT
79 years old [place]-speaking female from home, ambulates with walker, lives with son, with PMHx of HFpEF, CAD (s/p stents), chronic venous stasis, DM, HTN and Afib (on Xarelto, s/p PPM), chronic bronchitis with asthmatic component ( on Oxygen PRN at home ), JOSE DANIEL ( supposed to be on nocturnal CPAP , but non compliant) presents to the ED with chief complaint of hypotension ,  SOB and  fatigue for almost 2 weeks.,symptomatic Fe def anemia.  1.GI f/u, will need clearance for AC.  2.PAF-multaq, coreg,if ok with GI, add asa.  3.HTN-cont bp medication.  4.CAD-asa-if ok with GI,coreg,statin.  5.DM-insulin.  6.Anemia-IV iron.  7.Asthma-MDI.  8.Interrogate PPM.  9.GI and DVT prophylaxis.

## 2021-03-22 NOTE — PROGRESS NOTE ADULT - SUBJECTIVE AND OBJECTIVE BOX
CHIEF COMPLAINT:Patient is a 81y old  Female who presents with a chief complaint of symptomatic anemia.Pt appears comfortable.    	  REVIEW OF SYSTEMS:  CONSTITUTIONAL: No fever, weight loss, or fatigue  EYES: No eye pain, visual disturbances, or discharge  ENT:  No difficulty hearing, tinnitus, vertigo; No sinus or throat pain  NECK: No pain or stiffness  RESPIRATORY: No cough, wheezing, chills or hemoptysis; No Shortness of Breath  CARDIOVASCULAR: No chest pain, palpitations, passing out, dizziness, or leg swelling  GASTROINTESTINAL: No abdominal or epigastric pain. No nausea, vomiting, or hematemesis; No diarrhea or constipation. No melena or hematochezia.  GENITOURINARY: No dysuria, frequency, hematuria, or incontinence  NEUROLOGICAL: No headaches, memory loss, loss of strength, numbness, or tremors  SKIN: No itching, burning, rashes, or lesions   LYMPH Nodes: No enlarged glands  ENDOCRINE: No heat or cold intolerance; No hair loss  MUSCULOSKELETAL: No joint pain or swelling; No muscle, back, or extremity pain  PSYCHIATRIC: No depression, anxiety, mood swings, or difficulty sleeping  HEME/LYMPH: No easy bruising, or bleeding gums  ALLERGY AND IMMUNOLOGIC: No hives or eczema	      PHYSICAL EXAM:  T(C): 36.6 (03-22-21 @ 05:42), Max: 36.7 (03-21-21 @ 14:26)  HR: 58 (03-22-21 @ 05:42) (58 - 61)  BP: 131/58 (03-22-21 @ 05:42) (115/48 - 155/53)  RR: 16 (03-22-21 @ 05:42) (16 - 22)  SpO2: 98% (03-22-21 @ 05:42) (96% - 99%)        Appearance: Normal	  HEENT:   Normal oral mucosa, PERRL, EOMI	  Lymphatic: No lymphadenopathy  Cardiovascular: Normal S1 S2, No JVD, No murmurs, No edema  Respiratory: Lungs clear to auscultation	  Psychiatry: A & O x 3, Mood & affect appropriate  Gastrointestinal:  Soft, Non-tender, + BS	  Skin: No rashes, No ecchymoses, No cyanosis	  Neurologic: Non-focal  Extremities: Normal range of motion, No clubbing, cyanosis or edema  Vascular: Peripheral pulses palpable 2+ bilaterally    MEDICATIONS  (STANDING):  ALBUTerol    90 MICROgram(s) HFA Inhaler 2 Puff(s) Inhalation every 6 hours  atorvastatin 40 milliGRAM(s) Oral at bedtime  budesonide 160 MICROgram(s)/formoterol 4.5 MICROgram(s) Inhaler 2 Puff(s) Inhalation two times a day  carvedilol 6.25 milliGRAM(s) Oral every 12 hours  dronedarone 400 milliGRAM(s) Oral two times a day  fluticasone propionate 50 MICROgram(s)/spray Nasal Spray 1 Spray(s) Both Nostrils every 12 hours  gabapentin 300 milliGRAM(s) Oral daily  influenza   Vaccine 0.5 milliLiter(s) IntraMuscular once  insulin lispro (ADMELOG) corrective regimen sliding scale   SubCutaneous three times a day before meals  iron sucrose IVPB 200 milliGRAM(s) IV Intermittent every 24 hours  levothyroxine 175 MICROGram(s) Oral daily  montelukast 10 milliGRAM(s) Oral daily  oxybutynin 5 milliGRAM(s) Oral two times a day    	  	  LABS:	 	    CARDIAC MARKERS ( 21 Mar 2021 07:15 )  <0.015 ng/mL / x     / x     / x     / x      CARDIAC MARKERS ( 20 Mar 2021 16:31 )  <0.015 ng/mL / x     / x     / x     / x                             8.6    6.29  )-----------( 290      ( 22 Mar 2021 07:34 )             26.0     03-22    135  |  97  |  22<H>  ----------------------------<  131<H>  3.8   |  29  |  1.16    Ca    9.3      22 Mar 2021 07:34  Phos  5.0     03-22  Mg     2.3     03-22    TPro  6.9  /  Alb  3.1<L>  /  TBili  0.3  /  DBili  x   /  AST  14  /  ALT  20  /  AlkPhos  70  03-22    proBNP: Serum Pro-Brain Natriuretic Peptide: 1155 pg/mL (03-21 @ 07:15)    Lipid Profile: Cholesterol 134  HDL 54  TG 82  Cholesterol 145  HDL 55  TG 86      TSH: Thyroid Stimulating Hormone, Serum: 2.67 uU/mL (03-22 @ 07:34)  Thyroid Stimulating Hormone, Serum: 3.61 uU/mL (03-21 @ 07:15)      eh< from: Transthoracic Echocardiogram (03.21.21 @ 07:01) >  OBSERVATIONS:  Mitral Valve: Moderate mitral annular calcification. Trace  mitral regurgitation. Mean transmitral valve gradient  equals 4 mm Hg, estimated mitral valve area equals 1.7 sqcm  (by pressure half time equation), consistent with mild  mitral stenosis.  Aortic Root: Normal aortic root.  Aortic Valve: Calcified, probably trileaflet aortic valve  with decreased opening. Peak transaortic valve gradient  equals 39.6 mm Hg, mean transaortic valve gradient equals  23 mm Hg, dimensionless index 0.49, consistent with mild to  moderate aortic stenosis. No aortic valve regurgitation  seen.  Left Atrium: Normal left atrium.  LA volume index = 27  cc/m2.  Left Ventricle: Normal Left Ventricular Systolic Function,  (EF = 66% by biplane) Not all LV wall segments were seen.  Mild left ventricular enlargement. Grade I diastolic  dysfunction (Impaired relaxation). Diastolic function may  not be accurately assessed in the setting of mitral  stenosis.  Right Heart: Normal right atrium. Normal right ventricular  size and systolic function (TAPSE 2.8 cm). A device lead is  visualized in the right heart. Tricuspid valve not well  seen. Mild tricuspid regurgitation. Pulmonic valve not well  seen. No pulmonic insufficiency is noted.  Pericardium/PleuraNo pericardial effusion. A prominent  epicardial fat pad is noted.  Hemodynamic: RA Pressure is 8 mm Hg. RV systolic pressure  is mildly increased at  37 mm Hg.

## 2021-03-23 LAB
ALBUMIN SERPL ELPH-MCNC: 3.2 G/DL — LOW (ref 3.5–5)
ALP SERPL-CCNC: 68 U/L — SIGNIFICANT CHANGE UP (ref 40–120)
ALT FLD-CCNC: 19 U/L DA — SIGNIFICANT CHANGE UP (ref 10–60)
ANION GAP SERPL CALC-SCNC: 9 MMOL/L — SIGNIFICANT CHANGE UP (ref 5–17)
AST SERPL-CCNC: 14 U/L — SIGNIFICANT CHANGE UP (ref 10–40)
BILIRUB SERPL-MCNC: 0.3 MG/DL — SIGNIFICANT CHANGE UP (ref 0.2–1.2)
BUN SERPL-MCNC: 32 MG/DL — HIGH (ref 7–18)
CALCIUM SERPL-MCNC: 8.8 MG/DL — SIGNIFICANT CHANGE UP (ref 8.4–10.5)
CHLORIDE SERPL-SCNC: 97 MMOL/L — SIGNIFICANT CHANGE UP (ref 96–108)
CO2 SERPL-SCNC: 28 MMOL/L — SIGNIFICANT CHANGE UP (ref 22–31)
CREAT SERPL-MCNC: 1.53 MG/DL — HIGH (ref 0.5–1.3)
GLUCOSE BLDC GLUCOMTR-MCNC: 128 MG/DL — HIGH (ref 70–99)
GLUCOSE BLDC GLUCOMTR-MCNC: 187 MG/DL — HIGH (ref 70–99)
GLUCOSE BLDC GLUCOMTR-MCNC: 219 MG/DL — HIGH (ref 70–99)
GLUCOSE BLDC GLUCOMTR-MCNC: 284 MG/DL — HIGH (ref 70–99)
GLUCOSE SERPL-MCNC: 165 MG/DL — HIGH (ref 70–99)
HCT VFR BLD CALC: 25.5 % — LOW (ref 34.5–45)
HGB BLD-MCNC: 8.2 G/DL — LOW (ref 11.5–15.5)
MAGNESIUM SERPL-MCNC: 2.3 MG/DL — SIGNIFICANT CHANGE UP (ref 1.6–2.6)
MCHC RBC-ENTMCNC: 25.5 PG — LOW (ref 27–34)
MCHC RBC-ENTMCNC: 32.2 GM/DL — SIGNIFICANT CHANGE UP (ref 32–36)
MCV RBC AUTO: 79.4 FL — LOW (ref 80–100)
NRBC # BLD: 0 /100 WBCS — SIGNIFICANT CHANGE UP (ref 0–0)
PHOSPHATE SERPL-MCNC: 4.9 MG/DL — HIGH (ref 2.5–4.5)
PLATELET # BLD AUTO: 268 K/UL — SIGNIFICANT CHANGE UP (ref 150–400)
POTASSIUM SERPL-MCNC: 3.9 MMOL/L — SIGNIFICANT CHANGE UP (ref 3.5–5.3)
POTASSIUM SERPL-SCNC: 3.9 MMOL/L — SIGNIFICANT CHANGE UP (ref 3.5–5.3)
PROT SERPL-MCNC: 6.5 G/DL — SIGNIFICANT CHANGE UP (ref 6–8.3)
RBC # BLD: 3.21 M/UL — LOW (ref 3.8–5.2)
RBC # FLD: 13.6 % — SIGNIFICANT CHANGE UP (ref 10.3–14.5)
SARS-COV-2 RNA SPEC QL NAA+PROBE: SIGNIFICANT CHANGE UP
SODIUM SERPL-SCNC: 134 MMOL/L — LOW (ref 135–145)
WBC # BLD: 6.54 K/UL — SIGNIFICANT CHANGE UP (ref 3.8–10.5)
WBC # FLD AUTO: 6.54 K/UL — SIGNIFICANT CHANGE UP (ref 3.8–10.5)

## 2021-03-23 RX ORDER — SODIUM CHLORIDE 9 MG/ML
1000 INJECTION INTRAMUSCULAR; INTRAVENOUS; SUBCUTANEOUS
Refills: 0 | Status: DISCONTINUED | OUTPATIENT
Start: 2021-03-23 | End: 2021-03-26

## 2021-03-23 RX ORDER — GLYBURIDE-METFORMIN HYDROCHLORIDE 1.25; 25 MG/1; MG/1
1 TABLET ORAL
Qty: 0 | Refills: 0 | DISCHARGE

## 2021-03-23 RX ORDER — SOD SULF/SODIUM/NAHCO3/KCL/PEG
2 SOLUTION, RECONSTITUTED, ORAL ORAL ONCE
Refills: 0 | Status: COMPLETED | OUTPATIENT
Start: 2021-03-23 | End: 2021-03-23

## 2021-03-23 RX ORDER — ASPIRIN/CALCIUM CARB/MAGNESIUM 324 MG
81 TABLET ORAL DAILY
Refills: 0 | Status: DISCONTINUED | OUTPATIENT
Start: 2021-03-23 | End: 2021-03-31

## 2021-03-23 RX ORDER — ALBUTEROL 90 UG/1
2 AEROSOL, METERED ORAL
Qty: 0 | Refills: 0 | DISCHARGE

## 2021-03-23 RX ORDER — SODIUM CHLORIDE 9 MG/ML
250 INJECTION INTRAMUSCULAR; INTRAVENOUS; SUBCUTANEOUS ONCE
Refills: 0 | Status: COMPLETED | OUTPATIENT
Start: 2021-03-23 | End: 2021-03-23

## 2021-03-23 RX ORDER — TIOTROPIUM BROMIDE 18 UG/1
2 CAPSULE ORAL; RESPIRATORY (INHALATION)
Qty: 0 | Refills: 0 | DISCHARGE

## 2021-03-23 RX ADMIN — Medication 1000 UNIT(S): at 11:01

## 2021-03-23 RX ADMIN — Medication 1: at 08:02

## 2021-03-23 RX ADMIN — CARVEDILOL PHOSPHATE 6.25 MILLIGRAM(S): 80 CAPSULE, EXTENDED RELEASE ORAL at 17:06

## 2021-03-23 RX ADMIN — IRON SUCROSE 110 MILLIGRAM(S): 20 INJECTION, SOLUTION INTRAVENOUS at 23:53

## 2021-03-23 RX ADMIN — BUDESONIDE AND FORMOTEROL FUMARATE DIHYDRATE 2 PUFF(S): 160; 4.5 AEROSOL RESPIRATORY (INHALATION) at 11:01

## 2021-03-23 RX ADMIN — Medication 2: at 17:07

## 2021-03-23 RX ADMIN — Medication 3: at 11:21

## 2021-03-23 RX ADMIN — PANTOPRAZOLE SODIUM 40 MILLIGRAM(S): 20 TABLET, DELAYED RELEASE ORAL at 17:06

## 2021-03-23 RX ADMIN — PANTOPRAZOLE SODIUM 40 MILLIGRAM(S): 20 TABLET, DELAYED RELEASE ORAL at 06:18

## 2021-03-23 RX ADMIN — Medication 1 SPRAY(S): at 17:07

## 2021-03-23 RX ADMIN — Medication 5 MILLIGRAM(S): at 06:17

## 2021-03-23 RX ADMIN — DRONEDARONE 400 MILLIGRAM(S): 400 TABLET, FILM COATED ORAL at 06:17

## 2021-03-23 RX ADMIN — ALBUTEROL 2 PUFF(S): 90 AEROSOL, METERED ORAL at 21:29

## 2021-03-23 RX ADMIN — SODIUM CHLORIDE 500 MILLILITER(S): 9 INJECTION INTRAMUSCULAR; INTRAVENOUS; SUBCUTANEOUS at 16:13

## 2021-03-23 RX ADMIN — Medication 2000 MILLIGRAM(S): at 21:34

## 2021-03-23 RX ADMIN — GABAPENTIN 300 MILLIGRAM(S): 400 CAPSULE ORAL at 11:01

## 2021-03-23 RX ADMIN — MONTELUKAST 10 MILLIGRAM(S): 4 TABLET, CHEWABLE ORAL at 11:01

## 2021-03-23 RX ADMIN — Medication 2000 MILLIGRAM(S): at 13:01

## 2021-03-23 RX ADMIN — Medication 2 LITER(S): at 14:21

## 2021-03-23 RX ADMIN — ZINC SULFATE TAB 220 MG (50 MG ZINC EQUIVALENT) 220 MILLIGRAM(S): 220 (50 ZN) TAB at 11:01

## 2021-03-23 RX ADMIN — ATORVASTATIN CALCIUM 40 MILLIGRAM(S): 80 TABLET, FILM COATED ORAL at 21:29

## 2021-03-23 RX ADMIN — BUDESONIDE AND FORMOTEROL FUMARATE DIHYDRATE 2 PUFF(S): 160; 4.5 AEROSOL RESPIRATORY (INHALATION) at 21:29

## 2021-03-23 RX ADMIN — Medication 81 MILLIGRAM(S): at 12:26

## 2021-03-23 RX ADMIN — Medication 1 SPRAY(S): at 06:19

## 2021-03-23 RX ADMIN — Medication 175 MICROGRAM(S): at 06:18

## 2021-03-23 RX ADMIN — DRONEDARONE 400 MILLIGRAM(S): 400 TABLET, FILM COATED ORAL at 17:06

## 2021-03-23 RX ADMIN — ALBUTEROL 2 PUFF(S): 90 AEROSOL, METERED ORAL at 08:02

## 2021-03-23 RX ADMIN — CARVEDILOL PHOSPHATE 6.25 MILLIGRAM(S): 80 CAPSULE, EXTENDED RELEASE ORAL at 06:18

## 2021-03-23 RX ADMIN — Medication 5 MILLIGRAM(S): at 17:06

## 2021-03-23 NOTE — PROGRESS NOTE ADULT - SUBJECTIVE AND OBJECTIVE BOX
Patient is seen and examined at the bed side, is afebrile.  The events noted,       REVIEW OF SYSTEMS: All other review systems are negative      ALLERGIES: No Known Allergies      Vital Signs Last 24 Hrs  T(C): 36.3 (23 Mar 2021 14:08), Max: 36.7 (22 Mar 2021 21:13)  T(F): 97.4 (23 Mar 2021 14:08), Max: 98.1 (22 Mar 2021 21:13)  HR: 61 (23 Mar 2021 16:52) (59 - 62)  BP: 124/49 (23 Mar 2021 16:52) (95/73 - 124/50)  BP(mean): --  RR: 20 (23 Mar 2021 14:08) (17 - 20)  SpO2: 100% (23 Mar 2021 14:08) (97% - 100%)      PHYSICAL EXAM:  GENERAL: Not in distress   CHEST/LUNG: Not using accessory muscles   HEART: s1 and s2 present  ABDOMEN:  Nontender and  Nondistended  EXTREMITIES: No pedal  edema  CNS: Awake and Alert      LABS:                                   8.2    6.54  )-----------( 268      ( 23 Mar 2021 08:27 )             25.5                8.6    6.29  )-----------( 290      ( 22 Mar 2021 07:34 )             26.0       03-23    134<L>  |  97  |  32<H>  ----------------------------<  165<H>  3.9   |  28  |  1.53<H>    Ca    8.8      23 Mar 2021 08:27  Phos  4.9     03-23  Mg     2.3     03-23    TPro  6.5  /  Alb  3.2<L>  /  TBili  0.3  /  DBili  x   /  AST  14  /  ALT  19  /  AlkPhos  68  03-23    03-22    135  |  97  |  22<H>  ----------------------------<  131<H>  3.8   |  29  |  1.16    Ca    9.3      22 Mar 2021 07:34  Phos  5.0     03-22  Mg     2.3     22    TPro  6.9  /  Alb  3.1<L>  /  TBili  0.3  /  DBili  x   /  AST  14  /  ALT  20  /  AlkPhos  70  -      CAPILLARY BLOOD GLUCOSE  POCT Blood Glucose.: 262 mg/dL (22 Mar 2021 16:34)  POCT Blood Glucose.: 150 mg/dL (22 Mar 2021 12:45)  POCT Blood Glucose.: 147 mg/dL (22 Mar 2021 10:56)  POCT Blood Glucose.: 196 mg/dL (22 Mar 2021 07:40      Urinalysis Basic - ( 20 Mar 2021 20:54 )  Color: Yellow / Appearance: Clear / S.015 / pH: x  Gluc: x / Ketone: Negative  / Bili: Negative / Urobili: Negative   Blood: x / Protein: Negative / Nitrite: Negative   Leuk Esterase: Negative / RBC: x / WBC x   Sq Epi: x / Non Sq Epi: x / Bacteria: x        MEDICATIONS  (STANDING):    ALBUTerol    90 MICROgram(s) HFA Inhaler 2 Puff(s) Inhalation every 6 hours  ascorbic acid 2000 milliGRAM(s) Oral every 8 hours  aspirin  chewable 81 milliGRAM(s) Oral daily  atorvastatin 40 milliGRAM(s) Oral at bedtime  budesonide 160 MICROgram(s)/formoterol 4.5 MICROgram(s) Inhaler 2 Puff(s) Inhalation two times a day  carvedilol 6.25 milliGRAM(s) Oral every 12 hours  cholecalciferol 1000 Unit(s) Oral daily  dronedarone 400 milliGRAM(s) Oral two times a day  fluticasone propionate 50 MICROgram(s)/spray Nasal Spray 1 Spray(s) Both Nostrils every 12 hours  gabapentin 300 milliGRAM(s) Oral daily  influenza   Vaccine 0.5 milliLiter(s) IntraMuscular once  insulin lispro (ADMELOG) corrective regimen sliding scale   SubCutaneous three times a day before meals  iron sucrose IVPB 200 milliGRAM(s) IV Intermittent every 24 hours  levothyroxine 175 MICROGram(s) Oral daily  montelukast 10 milliGRAM(s) Oral daily  oxybutynin 5 milliGRAM(s) Oral two times a day  pantoprazole    Tablet 40 milliGRAM(s) Oral two times a day  sodium chloride 0.9%. 1000 milliLiter(s) (50 mL/Hr) IV Continuous <Continuous>  zinc sulfate 220 milliGRAM(s) Oral daily        RADIOLOGY & ADDITIONAL TESTS:    3/20/21 : CT Angio Abdomen and Pelvis w/ IV Cont (21 @ 22:55) No CT evidence of acute gastrointestinal hemorrhage.  Few scattered colonic diverticula.    3/20/21 : CT Angio Abdomen and Pelvis w/ IV Cont (21 @ 22:55) LOWER CHEST: Partially imaged pacemaker leads. Mitral annular calcification.    3/20/21 : Xray Chest 1 View- PORTABLE-Urgent (21 @ 16:38): There is left-sided defibrillator. The heart is normal in size. The lungs are clear.        MICROBIOLOGY DATA:    COVID-19 Drew Domain Antibody (21 @ 11:07)   COVID-19 Drew Domain Antibody Result: >250.00:    Culture - Blood (21 @ 21:50)   Specimen Source: .Blood Blood-Venous   Culture Results: No growth to date.     Culture - Blood (21 @ 21:50)   Specimen Source: .Blood Blood-Venous   Culture Results: No growth to date.     COVID-19 PCR . (21 @ 16:32)   COVID-19 PCR: NotDetec:     MRSA/MSSA PCR (20 @ 14:34)   MRSA PCR Result.: NotDetec:     FLU A B RSV Detection by PCR (20 @ 20:00)   Flu A Result: NotDetec           Patient is seen and examined at the bed side, is afebrile.  The events noted,, the diarrhea resolved. The Blood cultures have no growth to date.       REVIEW OF SYSTEMS: All other review systems are negative      ALLERGIES: No Known Allergies      Vital Signs Last 24 Hrs  T(C): 36.3 (23 Mar 2021 14:08), Max: 36.7 (22 Mar 2021 21:13)  T(F): 97.4 (23 Mar 2021 14:08), Max: 98.1 (22 Mar 2021 21:13)  HR: 61 (23 Mar 2021 16:52) (59 - 62)  BP: 124/49 (23 Mar 2021 16:52) (95/73 - 124/50)  BP(mean): --  RR: 20 (23 Mar 2021 14:08) (17 - 20)  SpO2: 100% (23 Mar 2021 14:08) (97% - 100%)      PHYSICAL EXAM:  GENERAL: Not in distress   CHEST/LUNG: Not using accessory muscles   HEART: s1 and s2 present  ABDOMEN:  Nontender and  Nondistended  EXTREMITIES: No pedal  edema  CNS: Awake and Alert      LABS:                                   8.2    6.54  )-----------( 268      ( 23 Mar 2021 08:27 )             25.5                8.6    6.29  )-----------( 290      ( 22 Mar 2021 07:34 )             26.0       03-23    134<L>  |  97  |  32<H>  ----------------------------<  165<H>  3.9   |  28  |  1.53<H>    Ca    8.8      23 Mar 2021 08:27  Phos  4.9     03-23  Mg     2.3     03-23    TPro  6.5  /  Alb  3.2<L>  /  TBili  0.3  /  DBili  x   /  AST  14  /  ALT  19  /  AlkPhos  68  03-23    03-22    135  |  97  |  22<H>  ----------------------------<  131<H>  3.8   |  29  |  1.16    Ca    9.3      22 Mar 2021 07:34  Phos  5.0     03-22  Mg     2.3     03-22    TPro  6.9  /  Alb  3.1<L>  /  TBili  0.3  /  DBili  x   /  AST  14  /  ALT  20  /  AlkPhos  70        CAPILLARY BLOOD GLUCOSE  POCT Blood Glucose.: 262 mg/dL (22 Mar 2021 16:34)  POCT Blood Glucose.: 150 mg/dL (22 Mar 2021 12:45)  POCT Blood Glucose.: 147 mg/dL (22 Mar 2021 10:56)  POCT Blood Glucose.: 196 mg/dL (22 Mar 2021 07:40      Urinalysis Basic - ( 20 Mar 2021 20:54 )  Color: Yellow / Appearance: Clear / S.015 / pH: x  Gluc: x / Ketone: Negative  / Bili: Negative / Urobili: Negative   Blood: x / Protein: Negative / Nitrite: Negative   Leuk Esterase: Negative / RBC: x / WBC x   Sq Epi: x / Non Sq Epi: x / Bacteria: x        MEDICATIONS  (STANDING):    ALBUTerol    90 MICROgram(s) HFA Inhaler 2 Puff(s) Inhalation every 6 hours  ascorbic acid 2000 milliGRAM(s) Oral every 8 hours  aspirin  chewable 81 milliGRAM(s) Oral daily  atorvastatin 40 milliGRAM(s) Oral at bedtime  budesonide 160 MICROgram(s)/formoterol 4.5 MICROgram(s) Inhaler 2 Puff(s) Inhalation two times a day  carvedilol 6.25 milliGRAM(s) Oral every 12 hours  cholecalciferol 1000 Unit(s) Oral daily  dronedarone 400 milliGRAM(s) Oral two times a day  fluticasone propionate 50 MICROgram(s)/spray Nasal Spray 1 Spray(s) Both Nostrils every 12 hours  gabapentin 300 milliGRAM(s) Oral daily  influenza   Vaccine 0.5 milliLiter(s) IntraMuscular once  insulin lispro (ADMELOG) corrective regimen sliding scale   SubCutaneous three times a day before meals  iron sucrose IVPB 200 milliGRAM(s) IV Intermittent every 24 hours  levothyroxine 175 MICROGram(s) Oral daily  montelukast 10 milliGRAM(s) Oral daily  oxybutynin 5 milliGRAM(s) Oral two times a day  pantoprazole    Tablet 40 milliGRAM(s) Oral two times a day  sodium chloride 0.9%. 1000 milliLiter(s) (50 mL/Hr) IV Continuous <Continuous>  zinc sulfate 220 milliGRAM(s) Oral daily        RADIOLOGY & ADDITIONAL TESTS:    3/20/21 : CT Angio Abdomen and Pelvis w/ IV Cont (21 @ 22:55) No CT evidence of acute gastrointestinal hemorrhage.  Few scattered colonic diverticula.    3/20/21 : CT Angio Abdomen and Pelvis w/ IV Cont (21 @ 22:55) LOWER CHEST: Partially imaged pacemaker leads. Mitral annular calcification.    3/20/21 : Xray Chest 1 View- PORTABLE-Urgent (21 @ 16:38): There is left-sided defibrillator. The heart is normal in size. The lungs are clear.        MICROBIOLOGY DATA:    COVID-19 Drew Domain Antibody (21 @ 11:07)   COVID-19 Drew Domain Antibody Result: >250.00:    Culture - Blood (21 @ 21:50)   Specimen Source: .Blood Blood-Venous   Culture Results: No growth to date.     Culture - Blood (21 @ 21:50)   Specimen Source: .Blood Blood-Venous   Culture Results: No growth to date.     COVID-19 PCR . (21 @ 16:32)   COVID-19 PCR: NotDetec:     MRSA/MSSA PCR (20 @ 14:34)   MRSA PCR Result.: NotDetec:     FLU A B RSV Detection by PCR (20 @ 20:00)   Flu A Result: NotDetec

## 2021-03-23 NOTE — PROGRESS NOTE ADULT - ASSESSMENT
79 years old [place]auruan-speaking female from home, ambulates with walker, lives with son, with PMHx of HFpEF, CAD (s/p stents), chronic venous stasis, DM, HTN and Afib (on Xarelto, s/p PPM), chronic bronchitis with asthmatic component ( on Oxygen PRN at home ), JOSE DANIEL ( supposed to be on nocturnal CPAP , but non compliant) presents to the ED with chief complaint of hypotension ,  SOB and  fatigue for almost 2 weeks. admitted for symptomatic anemia.

## 2021-03-23 NOTE — PROGRESS NOTE ADULT - ASSESSMENT
Patient is a 81y old  Female from home, ambulates with walker, lives with son, with PMHx of HFpEF, CAD (s/p stents), chronic venous stasis, DM, HTN and Afib (on Xarelto, s/p PPM), chronic bronchitis with asthmatic component ( on Oxygen PRN at home ), JOSE DANIEL ( supposed to be on nocturnal CPAP , but non compliant), now presents to the ER for evaluation of hypotension, SOB and  fatigue for almost 2 weeks. Per daughter and granddaughter patient has been complaining of increasing fatigue and sob for last few weeks, seemed to be pale , patient endorses dark loose bowel movement. Patient has chronic diarrhea, but no abdominal pain. She has evaluated by GI team and now the ID consult requested to assist with further evaluation of chronic diarrhea.     # Chronic diarrhea- C.diff vs Malignancy   # Symptomatic Anemia   # COVID negative 3/20/21    Would recommend:    1. Obtain stool for C.diff Toxin PCR and GI pathogen PCR  2. Monitor electrolytes and supplement as needed in the setting of diarrhea   3. Monitor H/H and transfuse as needed  4. Follow up pathology result  5. Continue PPI    Attending Attestation:    Spent more than 45 minutes on total encounter, more than 50 % of the visit was spent counseling and/or coordinating care by the Attending physician.   Patient is a 81y old  Female from home, ambulates with walker, lives with son, with PMHx of HFpEF, CAD (s/p stents), chronic venous stasis, DM, HTN and Afib (on Xarelto, s/p PPM), chronic bronchitis with asthmatic component ( on Oxygen PRN at home ), JOSE DANIEL ( supposed to be on nocturnal CPAP , but non compliant), now presents to the ER for evaluation of hypotension, SOB and  fatigue for almost 2 weeks. Per daughter and granddaughter patient has been complaining of increasing fatigue and sob for last few weeks, seemed to be pale , patient endorses dark loose bowel movement. Patient has chronic diarrhea, but no abdominal pain. She has evaluated by GI team and now the ID consult requested to assist with further evaluation of chronic diarrhea.     # Chronic diarrhea- C.diff vs Malignancy   # Symptomatic Anemia  - s/p EGD   # COVID negative 3/20/21    Would recommend:    1. NPO after for colonoscopy   2. Obtain stool for C.diff Toxin PCR and GI pathogen PCR if hs diarrhea   3. Monitor H/H and transfuse as needed  4. Follow up pathology result  5. Continue PPI    Attending Attestation:    Spent more than 45 minutes on total encounter, more than 50 % of the visit was spent counseling and/or coordinating care by the Attending physician.

## 2021-03-23 NOTE — PROGRESS NOTE ADULT - SUBJECTIVE AND OBJECTIVE BOX
[   ] ICU                                          [   ] CCU                                      [  X ] Medical Floor    Patient is a 81 year old femalewith Gastrointestinal bleeding and symptomatic anemia. GI consulted to evaluate.       HPI:  79 years old female from home, ambulates with walker, lives with son, with past medical history significant for CAD (s/p stents), CHF, chronic venous stasis, DM, HTN, Afib (on Xarelto, s/p PPM), chronic bronchitis with asthmatic component ( on Oxygen PRN at home ), and osteoarthritis presented to the emergency room with 2 weeks history of worsening SOB, Fatigue, associated with a few episodes of black stool.    pt had virtual  colonoscopy 1 year ago and the result was questionable. She never followed up.  Patient also c/o poor appetite with chronic diarrhea but denies abdominal pain, nausea, vomiting, hematemesis, hematochezia, melena, fever, chills, chest pain, SOB, cough, hematuria, dysuria or diarrhea.         PAIN MANAGEMENT:  Pain Scale:                 0/10  Pain Location:      Prior Colonoscopy:  No prior colonoscopy    PAST MEDICAL HISTORY  Obesity  Atrial fibrillation  Hypothyroidism  Venous stasis  Irritable bowel syndrome   CHF    Hyperlipidemia   Hypertension  DM  Myocardial Infarction  CAD    Asthma        PAST SURGICAL HISTORY  Coronary angiogram  Coronary stent placement  Pacemaker placement           Allergies    No Known Allergies    Intolerances  None         MEDICATIONS  (STANDING):  ALBUTerol    90 MICROgram(s) HFA Inhaler 2 Puff(s) Inhalation every 6 hours  atorvastatin 40 milliGRAM(s) Oral at bedtime  budesonide 160 MICROgram(s)/formoterol 4.5 MICROgram(s) Inhaler 2 Puff(s) Inhalation two times a day  carvedilol 6.25 milliGRAM(s) Oral every 12 hours  fluticasone propionate 50 MICROgram(s)/spray Nasal Spray 1 Spray(s) Both Nostrils every 12 hours  furosemide   Injectable 40 milliGRAM(s) IV Push every 12 hours  gabapentin 300 milliGRAM(s) Oral daily  influenza   Vaccine 0.5 milliLiter(s) IntraMuscular once  iron sucrose IVPB 200 milliGRAM(s) IV Intermittent every 24 hours  levothyroxine 175 MICROGram(s) Oral daily  montelukast 10 milliGRAM(s) Oral daily  oxybutynin 5 milliGRAM(s) Oral two times a day         SOCIAL HISTORY  Advanced Directives:       [ X ] Full Code       [  ] DNR  Marital Status:         [  ] M      [ X ] S      [  ] D       [  ] W  Children:       [ X ] Yes      [  ] No  Occupation:        [  ] Employed       [ X ] Unemployed       [  ] Retired  Diet:       [ X Regular       [  ] PEG feeding          [  ] NG tube feeding  Drug Use:           [ X ] Patient denied          [  ] Yes  Alcohol:           [X] No             [  ] Yes (socially)         [  ] Yes (chronic)  Tobacco:           [  ] Yes           [ X ] No      FAMILY HISTORY  [ X ] Heart Disease            [ X ] Diabetes             [ X ] HTN             [  ] Colon Cancer             [  ] Stomach Cancer              [  ] Pancreatic Cancer          VITALS  Vital Signs Last 24 Hrs  T(C): 36.4 (23 Mar 2021 05:16), Max: 36.8 (22 Mar 2021 14:40)  T(F): 97.6 (23 Mar 2021 05:16), Max: 98.2 (22 Mar 2021 14:40)  HR: 61 (23 Mar 2021 05:16) (59 - 71)  BP: 124/50 (23 Mar 2021 05:16) (114/49 - 130/51)  BP(mean): --  RR: 17 (23 Mar 2021 05:16) (17 - 20)  SpO2: 97% (23 Mar 2021 05:16) (97% - 100%)       MEDICATIONS  (STANDING):  ALBUTerol    90 MICROgram(s) HFA Inhaler 2 Puff(s) Inhalation every 6 hours  ascorbic acid 2000 milliGRAM(s) Oral every 8 hours  aspirin  chewable 81 milliGRAM(s) Oral daily  atorvastatin 40 milliGRAM(s) Oral at bedtime  budesonide 160 MICROgram(s)/formoterol 4.5 MICROgram(s) Inhaler 2 Puff(s) Inhalation two times a day  carvedilol 6.25 milliGRAM(s) Oral every 12 hours  cholecalciferol 1000 Unit(s) Oral daily  dronedarone 400 milliGRAM(s) Oral two times a day  fluticasone propionate 50 MICROgram(s)/spray Nasal Spray 1 Spray(s) Both Nostrils every 12 hours  gabapentin 300 milliGRAM(s) Oral daily  influenza   Vaccine 0.5 milliLiter(s) IntraMuscular once  insulin lispro (ADMELOG) corrective regimen sliding scale   SubCutaneous three times a day before meals  iron sucrose IVPB 200 milliGRAM(s) IV Intermittent every 24 hours  levothyroxine 175 MICROGram(s) Oral daily  montelukast 10 milliGRAM(s) Oral daily  oxybutynin 5 milliGRAM(s) Oral two times a day  pantoprazole    Tablet 40 milliGRAM(s) Oral two times a day  polyethylene glycol/electrolyte Solution 2 Liter(s) Oral once  sodium chloride 0.9%. 1000 milliLiter(s) (50 mL/Hr) IV Continuous <Continuous>  zinc sulfate 220 milliGRAM(s) Oral daily    MEDICATIONS  (PRN):                            8.2    6.54  )-----------( 268      ( 23 Mar 2021 08:27 )             25.5       03-23    134<L>  |  97  |  32<H>  ----------------------------<  165<H>  3.9   |  28  |  1.53<H>    Ca    8.8      23 Mar 2021 08:27  Phos  4.9     03-23  Mg     2.3     03-23    TPro  6.5  /  Alb  3.2<L>  /  TBili  0.3  /  DBili  x   /  AST  14  /  ALT  19  /  AlkPhos  68  03-23       [   ] ICU                                          [   ] CCU                                      [  X ] Medical Floor    Patient is a 81 year old femalewith Gastrointestinal bleeding and symptomatic anemia. GI consulted to evaluate.       HPI:  81 years old female from home, ambulates with walker, lives with son, with past medical history significant for CAD (s/p stents), CHF, chronic venous stasis, DM, HTN, Afib (on Xarelto, s/p PPM), chronic bronchitis with asthmatic component ( on Oxygen PRN at home ), and osteoarthritis presented to the emergency room with 2 weeks history of worsening SOB, Fatigue, associated with a few episodes of black stool.    pt had virtual  colonoscopy 1 year ago and the result was questionable. She never followed up.  Patient also c/o poor appetite with chronic diarrhea but denies abdominal pain, nausea, vomiting, hematemesis, hematochezia, melena, fever, chills, chest pain, SOB, cough, hematuria, dysuria or diarrhea.         PAIN MANAGEMENT:  Pain Scale:                 0/10  Pain Location:      Prior Colonoscopy:  No prior colonoscopy    PAST MEDICAL HISTORY  Obesity  Atrial fibrillation  Hypothyroidism  Venous stasis  Irritable bowel syndrome   CHF    Hyperlipidemia   Hypertension  DM  Myocardial Infarction  CAD    Asthma        PAST SURGICAL HISTORY  Coronary angiogram  Coronary stent placement  Pacemaker placement           Allergies    No Known Allergies    Intolerances  None         MEDICATIONS  (STANDING):  ALBUTerol    90 MICROgram(s) HFA Inhaler 2 Puff(s) Inhalation every 6 hours  atorvastatin 40 milliGRAM(s) Oral at bedtime  budesonide 160 MICROgram(s)/formoterol 4.5 MICROgram(s) Inhaler 2 Puff(s) Inhalation two times a day  carvedilol 6.25 milliGRAM(s) Oral every 12 hours  fluticasone propionate 50 MICROgram(s)/spray Nasal Spray 1 Spray(s) Both Nostrils every 12 hours  furosemide   Injectable 40 milliGRAM(s) IV Push every 12 hours  gabapentin 300 milliGRAM(s) Oral daily  influenza   Vaccine 0.5 milliLiter(s) IntraMuscular once  iron sucrose IVPB 200 milliGRAM(s) IV Intermittent every 24 hours  levothyroxine 175 MICROGram(s) Oral daily  montelukast 10 milliGRAM(s) Oral daily  oxybutynin 5 milliGRAM(s) Oral two times a day         SOCIAL HISTORY  Advanced Directives:       [ X ] Full Code       [  ] DNR  Marital Status:         [  ] M      [ X ] S      [  ] D       [  ] W  Children:       [ X ] Yes      [  ] No  Occupation:        [  ] Employed       [ X ] Unemployed       [  ] Retired  Diet:       [ X Regular       [  ] PEG feeding          [  ] NG tube feeding  Drug Use:           [ X ] Patient denied          [  ] Yes  Alcohol:           [X] No             [  ] Yes (socially)         [  ] Yes (chronic)  Tobacco:           [  ] Yes           [ X ] No      FAMILY HISTORY  [ X ] Heart Disease            [ X ] Diabetes             [ X ] HTN             [  ] Colon Cancer             [  ] Stomach Cancer              [  ] Pancreatic Cancer          VITALS  Vital Signs Last 24 Hrs  T(C): 36.4 (23 Mar 2021 05:16), Max: 36.8 (22 Mar 2021 14:40)  T(F): 97.6 (23 Mar 2021 05:16), Max: 98.2 (22 Mar 2021 14:40)  HR: 61 (23 Mar 2021 05:16) (59 - 71)  BP: 124/50 (23 Mar 2021 05:16) (114/49 - 130/51)  BP(mean): --  RR: 17 (23 Mar 2021 05:16) (17 - 20)  SpO2: 97% (23 Mar 2021 05:16) (97% - 100%)       MEDICATIONS  (STANDING):  ALBUTerol    90 MICROgram(s) HFA Inhaler 2 Puff(s) Inhalation every 6 hours  ascorbic acid 2000 milliGRAM(s) Oral every 8 hours  aspirin  chewable 81 milliGRAM(s) Oral daily  atorvastatin 40 milliGRAM(s) Oral at bedtime  budesonide 160 MICROgram(s)/formoterol 4.5 MICROgram(s) Inhaler 2 Puff(s) Inhalation two times a day  carvedilol 6.25 milliGRAM(s) Oral every 12 hours  cholecalciferol 1000 Unit(s) Oral daily  dronedarone 400 milliGRAM(s) Oral two times a day  fluticasone propionate 50 MICROgram(s)/spray Nasal Spray 1 Spray(s) Both Nostrils every 12 hours  gabapentin 300 milliGRAM(s) Oral daily  influenza   Vaccine 0.5 milliLiter(s) IntraMuscular once  insulin lispro (ADMELOG) corrective regimen sliding scale   SubCutaneous three times a day before meals  iron sucrose IVPB 200 milliGRAM(s) IV Intermittent every 24 hours  levothyroxine 175 MICROGram(s) Oral daily  montelukast 10 milliGRAM(s) Oral daily  oxybutynin 5 milliGRAM(s) Oral two times a day  pantoprazole    Tablet 40 milliGRAM(s) Oral two times a day  polyethylene glycol/electrolyte Solution 2 Liter(s) Oral once  sodium chloride 0.9%. 1000 milliLiter(s) (50 mL/Hr) IV Continuous <Continuous>  zinc sulfate 220 milliGRAM(s) Oral daily    MEDICATIONS  (PRN):                            8.2    6.54  )-----------( 268      ( 23 Mar 2021 08:27 )             25.5       03-23    134<L>  |  97  |  32<H>  ----------------------------<  165<H>  3.9   |  28  |  1.53<H>    Ca    8.8      23 Mar 2021 08:27  Phos  4.9     03-23  Mg     2.3     03-23    TPro  6.5  /  Alb  3.2<L>  /  TBili  0.3  /  DBili  x   /  AST  14  /  ALT  19  /  AlkPhos  68  03-23

## 2021-03-23 NOTE — PROGRESS NOTE ADULT - ASSESSMENT
79 years old [place] Islander-speaking female from home, ambulates with walker, lives with son, with PMHx of HFpEF, CAD (s/p stents), chronic venous stasis, DM, HTN and Afib (on Xarelto, s/p PPM), chronic bronchitis with asthmatic component ( on Oxygen PRN at home ), JOSE DANIEL ( supposed to be on nocturnal CPAP , but non compliant) presents to the ED with chief complaint of hypotension , SOB and  fatigue for almost 2 weeks.,symptomatic Fe def anemia.  1.GI f/u,colonoscopy-p.  2.PAF-multaq, coreg, asa.  3.HTN-cont bp medication.  4.CAD-asa,coreg,statin.  5.DM-insulin.  6.Anemia-IV iron.  7.Asthma-MDI.  8.ARF-IVF.  9.GI and DVT prophylaxis.

## 2021-03-23 NOTE — PROGRESS NOTE ADULT - PROBLEM SELECTOR PLAN 2
-hold home Xarelto given GIB  -discuss asa with GI after EGD/col 3/22-23  -continue home coreg, dronedarone added  -[place] PPM incompatible with interrogation   -EKG with BFB  -b/l LE doppler 3/21 negative for DVT  -cards Dr. Sandoval following

## 2021-03-23 NOTE — PROGRESS NOTE ADULT - ASSESSMENT
1. Anemia  2. Melena  3. S/p EGD  4. Gastritis  5. Esophagitis  6. No evidence of acute GI bleeding    Suggestions:    1. Monitor H/H  2. Transfuse PRBC as needed  3. Protonix 40mg daily   4. Follow up path   5. Avoid NSAID  6. Colonoscopy  7. DVT prophylaxis

## 2021-03-23 NOTE — PROGRESS NOTE ADULT - SUBJECTIVE AND OBJECTIVE BOX
PGY-1 Progress Note discussed with attending    PAGER #: [[phone]] TILL 5:00 PM  PLEASE CONTACT ON CALL TEAM:  - On Call Team (Please refer to Ana) FROM 5:00 PM - 8:30PM  - Nightfloat Team FROM 8:30 -7:30 AM    CHIEF COMPLAINT & BRIEF HOSPITAL COURSE:  79F Tajik-speaking from home with son, ambulates with walker, PMH HFpEF, CAD (s/p stents), chronic venous stasis, DM, HTN and Afib (Xarelto, s/p PPM), chronic bronchitis with asthmatic component (home O2 prn), JOSE DANIEL (noncompliant with nocturnal CPAP) presented 3/20 with x2 weeks increasing fatigue, SOB, and paleness, with prolonged history of dark bowel movements s/p possible transverse polyp colonoscopy 2020, no further fu. In ED, VS notable for /77 and SpO2 100% 4L NC. Labs notable for Hb 7.7 (baseline ~11), Fe 15, TIBC 457, 3% saturation. Trop, COVID, UA negative. EKG with BFB. CXR unremarkable.  CT a/p unremarkable for signs of bleeding. S/p x1 unit pRBC in ED. Admitted for workup and management of symptomatic anemia.    INTERVAL HPI/OVERNIGHT EVENTS: NAEON. SBPs soft 90-110s, VS otherwise wnl. Patient reports feeling achy from chronic knee and LBP, endorses constipation. [place]  [id number] assisted interview. Hb stable this am, Cr worsened 1.53, started gentle hydration. EGD 3/22 notable for gastritis, esophagitis, no GIB. Path sent. Will obtain colonoscopy 3/24, prepping this afternoon. Continuing PPI.       MEDICATIONS  (STANDING):  ALBUTerol    90 MICROgram(s) HFA Inhaler 2 Puff(s) Inhalation every 6 hours  ascorbic acid 2000 milliGRAM(s) Oral every 8 hours  aspirin  chewable 81 milliGRAM(s) Oral daily  atorvastatin 40 milliGRAM(s) Oral at bedtime  budesonide 160 MICROgram(s)/formoterol 4.5 MICROgram(s) Inhaler 2 Puff(s) Inhalation two times a day  carvedilol 6.25 milliGRAM(s) Oral every 12 hours  cholecalciferol 1000 Unit(s) Oral daily  dronedarone 400 milliGRAM(s) Oral two times a day  fluticasone propionate 50 MICROgram(s)/spray Nasal Spray 1 Spray(s) Both Nostrils every 12 hours  gabapentin 300 milliGRAM(s) Oral daily  influenza   Vaccine 0.5 milliLiter(s) IntraMuscular once  insulin lispro (ADMELOG) corrective regimen sliding scale   SubCutaneous three times a day before meals  iron sucrose IVPB 200 milliGRAM(s) IV Intermittent every 24 hours  levothyroxine 175 MICROGram(s) Oral daily  montelukast 10 milliGRAM(s) Oral daily  oxybutynin 5 milliGRAM(s) Oral two times a day  pantoprazole    Tablet 40 milliGRAM(s) Oral two times a day  polyethylene glycol/electrolyte Solution 2 Liter(s) Oral once  sodium chloride 0.9%. 1000 milliLiter(s) (50 mL/Hr) IV Continuous <Continuous>  zinc sulfate 220 milliGRAM(s) Oral daily    REVIEW OF SYSTEMS:  CONSTITUTIONAL: No fever or fatigue, feels well  RESPIRATORY: No cough; No shortness of breath  CARDIOVASCULAR: No chest pain, no dizziness  GASTROINTESTINAL: No abdominal pain. No nausea, vomiting, diarrhea, +constipation.   GENITOURINARY: No dysuria or hematuria  NEUROLOGICAL: No headache  SKIN: No itching or rashes  EXT: b/l knee pain "from the inside", chronic b/l LBP  all other systems reviewed and are negative      Vital Signs Last 24 Hrs  T(C): 36.3 (23 Mar 2021 14:08), Max: 36.7 (22 Mar 2021 21:13)  T(F): 97.4 (23 Mar 2021 14:08), Max: 98.1 (22 Mar 2021 21:13)  HR: 62 (23 Mar 2021 14:08) (59 - 62)  BP: 95/73 (23 Mar 2021 14:08) (95/73 - 130/51)  BP(mean): --  RR: 20 (23 Mar 2021 14:08) (17 - 20)  SpO2: 100% (23 Mar 2021 14:08) (97% - 100%)    PHYSICAL EXAMINATION:  GENERAL: NAD, obese, elderly woman  HEAD:  Atraumatic, Normocephalic  EYES: periorbital skin pink, eomi, perrl  NECK: Supple  CHEST/LUNG: no increased WOB, NC, no cough noted  HEART: RRR  ABDOMEN: Soft, Nontender, obese, Bowel sounds present  NERVOUS SYSTEM: alert and oriented x3  EXTREMITIES:  2+ Peripheral Pulses, No edema, +hematoma right knee, NTTP  SKIN: warm dry                          8.2    6.54  )-----------( 268      ( 23 Mar 2021 08:27 )             25.5     03-23    134<L>  |  97  |  32<H>  ----------------------------<  165<H>  3.9   |  28  |  1.53<H>    Ca    8.8      23 Mar 2021 08:27  Phos  4.9     03-23  Mg     2.3     03-23    TPro  6.5  /  Alb  3.2<L>  /  TBili  0.3  /  DBili  x   /  AST  14  /  ALT  19  /  AlkPhos  68  03-23    LIVER FUNCTIONS - ( 23 Mar 2021 08:27 )  Alb: 3.2 g/dL / Pro: 6.5 g/dL / ALK PHOS: 68 U/L / ALT: 19 U/L DA / AST: 14 U/L / GGT: x                   CAPILLARY BLOOD GLUCOSE      RADIOLOGY & ADDITIONAL TESTS:

## 2021-03-23 NOTE — PROGRESS NOTE ADULT - PROBLEM SELECTOR PLAN 1
presented 3/20 with x2 weeks increasing fatigue, SOB, and paleness, with prolonged history of dark bowel movements s/p possible transverse polyp colonoscopy 2020, no further fu  -in ED, VS notable for /77 and SpO2 100% 4L NC  -trop, COVID, UA negative  -pre-RBC adm Hb 7.7 (baseline ~11), Fe 15, TIBC 457, 3% saturation  -CT a/p unremarkable for signs of bleeding  -s/p x1 unit pRBC in ED  -fu FOBT  -IV iron 3/20-23, PPI BID  -Fe and %sat improved s/p transfusion and Venofer, Hb stable, transfuse if <8  -NPO from [place] 3/22 for EGD/col 3/22 with GI Dr. Moreland, gastritis, esophagitis, no GIB. Path sent. Will obtain colonoscopy 3/24, prepping this afternoon  -Continuing PPI.

## 2021-03-23 NOTE — PROGRESS NOTE ADULT - ATTENDING COMMENTS
Pt to get colonoscopy in a.m. given suspicion of a transverse colonic polyp in the setting of severe Fe+ deficiency anemia. EGD noted. Pt ultimately needs GI clearance for A/C, given an active indication of A-fib. Will continue on IV venofer and f/u retic count in response. D/C planning for home pending colonoscopy being uneventful, with outpt folow up.

## 2021-03-24 ENCOUNTER — RESULT REVIEW (OUTPATIENT)
Age: 81
End: 2021-03-24

## 2021-03-24 LAB
ALBUMIN SERPL ELPH-MCNC: 3.1 G/DL — LOW (ref 3.5–5)
ALP SERPL-CCNC: 71 U/L — SIGNIFICANT CHANGE UP (ref 40–120)
ALT FLD-CCNC: 22 U/L DA — SIGNIFICANT CHANGE UP (ref 10–60)
ANION GAP SERPL CALC-SCNC: 10 MMOL/L — SIGNIFICANT CHANGE UP (ref 5–17)
AST SERPL-CCNC: 16 U/L — SIGNIFICANT CHANGE UP (ref 10–40)
BILIRUB SERPL-MCNC: 0.3 MG/DL — SIGNIFICANT CHANGE UP (ref 0.2–1.2)
BUN SERPL-MCNC: 35 MG/DL — HIGH (ref 7–18)
CALCIUM SERPL-MCNC: 8.4 MG/DL — SIGNIFICANT CHANGE UP (ref 8.4–10.5)
CHLORIDE SERPL-SCNC: 103 MMOL/L — SIGNIFICANT CHANGE UP (ref 96–108)
CO2 SERPL-SCNC: 25 MMOL/L — SIGNIFICANT CHANGE UP (ref 22–31)
CREAT SERPL-MCNC: 1.57 MG/DL — HIGH (ref 0.5–1.3)
GLUCOSE BLDC GLUCOMTR-MCNC: 139 MG/DL — HIGH (ref 70–99)
GLUCOSE BLDC GLUCOMTR-MCNC: 190 MG/DL — HIGH (ref 70–99)
GLUCOSE BLDC GLUCOMTR-MCNC: 202 MG/DL — HIGH (ref 70–99)
GLUCOSE SERPL-MCNC: 151 MG/DL — HIGH (ref 70–99)
HCT VFR BLD CALC: 26.2 % — LOW (ref 34.5–45)
HGB BLD-MCNC: 8.4 G/DL — LOW (ref 11.5–15.5)
MAGNESIUM SERPL-MCNC: 2.2 MG/DL — SIGNIFICANT CHANGE UP (ref 1.6–2.6)
MCHC RBC-ENTMCNC: 26.2 PG — LOW (ref 27–34)
MCHC RBC-ENTMCNC: 32.1 GM/DL — SIGNIFICANT CHANGE UP (ref 32–36)
MCV RBC AUTO: 81.6 FL — SIGNIFICANT CHANGE UP (ref 80–100)
NRBC # BLD: 0 /100 WBCS — SIGNIFICANT CHANGE UP (ref 0–0)
PHOSPHATE SERPL-MCNC: 3.9 MG/DL — SIGNIFICANT CHANGE UP (ref 2.5–4.5)
PLATELET # BLD AUTO: 277 K/UL — SIGNIFICANT CHANGE UP (ref 150–400)
POTASSIUM SERPL-MCNC: 4 MMOL/L — SIGNIFICANT CHANGE UP (ref 3.5–5.3)
POTASSIUM SERPL-SCNC: 4 MMOL/L — SIGNIFICANT CHANGE UP (ref 3.5–5.3)
PROT SERPL-MCNC: 6.7 G/DL — SIGNIFICANT CHANGE UP (ref 6–8.3)
RBC # BLD: 3.21 M/UL — LOW (ref 3.8–5.2)
RBC # FLD: 14.1 % — SIGNIFICANT CHANGE UP (ref 10.3–14.5)
SODIUM SERPL-SCNC: 138 MMOL/L — SIGNIFICANT CHANGE UP (ref 135–145)
SURGICAL PATHOLOGY STUDY: SIGNIFICANT CHANGE UP
WBC # BLD: 6.26 K/UL — SIGNIFICANT CHANGE UP (ref 3.8–10.5)
WBC # FLD AUTO: 6.26 K/UL — SIGNIFICANT CHANGE UP (ref 3.8–10.5)

## 2021-03-24 PROCEDURE — 74018 RADEX ABDOMEN 1 VIEW: CPT | Mod: 26

## 2021-03-24 PROCEDURE — 88305 TISSUE EXAM BY PATHOLOGIST: CPT | Mod: 26

## 2021-03-24 RX ORDER — AMOXICILLIN 250 MG/5ML
1000 SUSPENSION, RECONSTITUTED, ORAL (ML) ORAL EVERY 12 HOURS
Refills: 0 | Status: DISCONTINUED | OUTPATIENT
Start: 2021-03-24 | End: 2021-03-27

## 2021-03-24 RX ORDER — MULTIVIT WITH MIN/MFOLATE/K2 340-15/3 G
1 POWDER (GRAM) ORAL ONCE
Refills: 0 | Status: COMPLETED | OUTPATIENT
Start: 2021-03-24 | End: 2021-03-24

## 2021-03-24 RX ORDER — METRONIDAZOLE 500 MG
500 TABLET ORAL EVERY 12 HOURS
Refills: 0 | Status: DISCONTINUED | OUTPATIENT
Start: 2021-03-24 | End: 2021-03-27

## 2021-03-24 RX ADMIN — Medication 1000 MILLIGRAM(S): at 18:55

## 2021-03-24 RX ADMIN — Medication 5 MILLIGRAM(S): at 06:13

## 2021-03-24 RX ADMIN — Medication 2000 MILLIGRAM(S): at 22:12

## 2021-03-24 RX ADMIN — Medication 1: at 08:00

## 2021-03-24 RX ADMIN — PANTOPRAZOLE SODIUM 40 MILLIGRAM(S): 20 TABLET, DELAYED RELEASE ORAL at 17:32

## 2021-03-24 RX ADMIN — PANTOPRAZOLE SODIUM 40 MILLIGRAM(S): 20 TABLET, DELAYED RELEASE ORAL at 06:13

## 2021-03-24 RX ADMIN — Medication 175 MICROGRAM(S): at 06:13

## 2021-03-24 RX ADMIN — Medication 500 MILLIGRAM(S): at 18:55

## 2021-03-24 RX ADMIN — Medication 1 SPRAY(S): at 17:28

## 2021-03-24 RX ADMIN — Medication 1 BOTTLE: at 22:12

## 2021-03-24 RX ADMIN — CARVEDILOL PHOSPHATE 6.25 MILLIGRAM(S): 80 CAPSULE, EXTENDED RELEASE ORAL at 17:30

## 2021-03-24 RX ADMIN — Medication 1 SPRAY(S): at 06:13

## 2021-03-24 RX ADMIN — DRONEDARONE 400 MILLIGRAM(S): 400 TABLET, FILM COATED ORAL at 17:31

## 2021-03-24 RX ADMIN — ALBUTEROL 2 PUFF(S): 90 AEROSOL, METERED ORAL at 22:12

## 2021-03-24 RX ADMIN — ATORVASTATIN CALCIUM 40 MILLIGRAM(S): 80 TABLET, FILM COATED ORAL at 22:12

## 2021-03-24 RX ADMIN — Medication 5 MILLIGRAM(S): at 17:33

## 2021-03-24 RX ADMIN — Medication 2: at 17:29

## 2021-03-24 RX ADMIN — BUDESONIDE AND FORMOTEROL FUMARATE DIHYDRATE 2 PUFF(S): 160; 4.5 AEROSOL RESPIRATORY (INHALATION) at 22:12

## 2021-03-24 RX ADMIN — DRONEDARONE 400 MILLIGRAM(S): 400 TABLET, FILM COATED ORAL at 06:13

## 2021-03-24 RX ADMIN — BUDESONIDE AND FORMOTEROL FUMARATE DIHYDRATE 2 PUFF(S): 160; 4.5 AEROSOL RESPIRATORY (INHALATION) at 13:16

## 2021-03-24 RX ADMIN — CARVEDILOL PHOSPHATE 6.25 MILLIGRAM(S): 80 CAPSULE, EXTENDED RELEASE ORAL at 06:13

## 2021-03-24 NOTE — PROGRESS NOTE ADULT - SUBJECTIVE AND OBJECTIVE BOX
PGY-1 Progress Note discussed with attending    PAGER #: [[phone]] TILL 5:00 PM  PLEASE CONTACT ON CALL TEAM:  - On Call Team (Please refer to Ana) FROM 5:00 PM - 8:30PM  - Nightfloat Team FROM 8:30 -7:30 AM    CHIEF COMPLAINT & BRIEF HOSPITAL COURSE: 79F [place]adian-speaking from home with son, ambulates with walker, PMH HFpEF, CAD (s/p stents), chronic venous stasis, DM, HTN and Afib (Xarelto, s/p PPM), chronic bronchitis with asthmatic component (home O2 prn), JOSE DANIEL (noncompliant with nocturnal CPAP) presented 3/20 with x2 weeks increasing fatigue, SOB, and paleness, with prolonged history of dark bowel movements s/p possible transverse polyp colonoscopy 2020, no further fu. In ED, VS notable for /77 and SpO2 100% 4L NC. Labs notable for Hb 7.7 (baseline ~11), Fe 15, TIBC 457, 3% saturation. Trop, COVID, UA negative. EKG with BFB. CXR unremarkable.  CT a/p unremarkable for signs of bleeding. S/p x1 unit pRBC in ED. Admitted for workup and management of symptomatic anemia.    INTERVAL HPI/OVERNIGHT EVENTS: NAEON. SBPs variable 110-170s (latter isolated reading, will monitor), VS otherwise wnl. Reports feeling "achy and old" but denies new complaints. Continuing to have dark stools. Interviewed with assistance fo Barbadian  Laura [id number]. Refused JJ vaccine. NPO from [place] for colonoscopy with GI Dr. Moreland today, noted several polyps, will repeat 3/25, restarted CLD today and will be NPO from [place] again tonight. Bowel prep with 1 bottle Mg citrate. Will fu abdominal XR post-procedure to r/o free air. Hb stably low, Cr mildly worse 1.57, continuing gentle IVF.    MEDICATIONS  (STANDING):  ALBUTerol    90 MICROgram(s) HFA Inhaler 2 Puff(s) Inhalation every 6 hours  ascorbic acid 2000 milliGRAM(s) Oral every 8 hours  aspirin  chewable 81 milliGRAM(s) Oral daily  atorvastatin 40 milliGRAM(s) Oral at bedtime  budesonide 160 MICROgram(s)/formoterol 4.5 MICROgram(s) Inhaler 2 Puff(s) Inhalation two times a day  carvedilol 6.25 milliGRAM(s) Oral every 12 hours  cholecalciferol 1000 Unit(s) Oral daily  dronedarone 400 milliGRAM(s) Oral two times a day  fluticasone propionate 50 MICROgram(s)/spray Nasal Spray 1 Spray(s) Both Nostrils every 12 hours  gabapentin 300 milliGRAM(s) Oral daily  influenza   Vaccine 0.5 milliLiter(s) IntraMuscular once  insulin lispro (ADMELOG) corrective regimen sliding scale   SubCutaneous three times a day before meals  levothyroxine 175 MICROGram(s) Oral daily  magnesium citrate Oral Solution 1 Bottle Oral once  montelukast 10 milliGRAM(s) Oral daily  oxybutynin 5 milliGRAM(s) Oral two times a day  pantoprazole    Tablet 40 milliGRAM(s) Oral two times a day  sodium chloride 0.9%. 1000 milliLiter(s) (50 mL/Hr) IV Continuous <Continuous>  zinc sulfate 220 milliGRAM(s) Oral daily    REVIEW OF SYSTEMS:  CONSTITUTIONAL: No fever or fatigue, feels "old"  RESPIRATORY: No cough; No shortness of breath  CARDIOVASCULAR: No chest pain, no dizziness  GASTROINTESTINAL: No abdominal pain. No nausea, vomiting, diarrhea, constipation. +dark stools  GENITOURINARY: No dysuria or hematuria  NEUROLOGICAL: No headache  SKIN: No itching or rashes  EXT: b/l knee pain "from the inside", chronic b/l LBP  all other systems reviewed and are negative      Vital Signs Last 24 Hrs  T(C): 36.3 (24 Mar 2021 13:45), Max: 36.7 (24 Mar 2021 05:29)  T(F): 97.4 (24 Mar 2021 13:45), Max: 98 (24 Mar 2021 05:29)  HR: 64 (24 Mar 2021 13:45) (61 - 76)  BP: 172/55 (24 Mar 2021 13:45) (116/55 - 172/55)  BP(mean): --  RR: 20 (24 Mar 2021 13:45) (17 - 20)  SpO2: 99% (24 Mar 2021 13:45) (98% - 99%)    PHYSICAL EXAMINATION:  GENERAL: NAD, obese, elderly woman  HEAD:  Atraumatic, Normocephalic  EYES: periorbital skin pink, eomi, perrl  NECK: Supple  CHEST/LUNG: no increased WOB, NC, no cough noted  HEART: RRR  ABDOMEN: Soft, Nontender, obese, Bowel sounds present  NERVOUS SYSTEM: alert and oriented x3  EXTREMITIES:  2+ Peripheral Pulses, No edema, +hematoma right knee, NTTP  SKIN: warm dry                          8.4    6.26  )-----------( 277      ( 24 Mar 2021 07:00 )             26.2     03-24    138  |  103  |  35<H>  ----------------------------<  151<H>  4.0   |  25  |  1.57<H>    Ca    8.4      24 Mar 2021 07:00  Phos  3.9     03-24  Mg     2.2     03-24    TPro  6.7  /  Alb  3.1<L>  /  TBili  0.3  /  DBili  x   /  AST  16  /  ALT  22  /  AlkPhos  71  03-24    LIVER FUNCTIONS - ( 24 Mar 2021 07:00 )  Alb: 3.1 g/dL / Pro: 6.7 g/dL / ALK PHOS: 71 U/L / ALT: 22 U/L DA / AST: 16 U/L / GGT: x                   CAPILLARY BLOOD GLUCOSE      RADIOLOGY & ADDITIONAL TESTS:                   PGY-1 Progress Note discussed with attending    PAGER #: [[phone]] TILL 5:00 PM  PLEASE CONTACT ON CALL TEAM:  - On Call Team (Please refer to Ana) FROM 5:00 PM - 8:30PM  - Nightfloat Team FROM 8:30 -7:30 AM    CHIEF COMPLAINT & BRIEF HOSPITAL COURSE: 79F Singaporean-speaking from home with son, ambulates with walker, PMH HFpEF, CAD (s/p stents), chronic venous stasis, DM, HTN and Afib (Xarelto, s/p PPM), chronic bronchitis with asthmatic component (home O2 prn), JOSE DANIEL (noncompliant with nocturnal CPAP) presented 3/20 with x2 weeks increasing fatigue, SOB, and paleness, with prolonged history of dark bowel movements s/p possible transverse polyp colonoscopy 2020, no further fu. In ED, VS notable for /77 and SpO2 100% 4L NC. Labs notable for Hb 7.7 (baseline ~11), Fe 15, TIBC 457, 3% saturation. Trop, COVID, UA negative. EKG with BFB. CXR unremarkable.  CT a/p unremarkable for signs of bleeding. S/p x1 unit pRBC in ED. Admitted for workup and management of symptomatic anemia.    INTERVAL HPI/OVERNIGHT EVENTS: NAEON. SBPs variable 110-170s (latter isolated reading, will monitor), VS otherwise wnl. Reports feeling "achy and old" but denies new complaints. Continuing to have dark stools. Interviewed with assistance fo Singaporean  Laura [id number]. Refused JJ vaccine. [place] from [place] for [place] with GI Dr. Moreland today, noted several polyps, will repeat 3/25, restarted CLD today and will be NPO from [place] again tonight. Bowel prep with 1 bottle Mg citrate. Will fu abdominal XR post-procedure to r/o free air. Hb stably low, Cr mildly worse 1.57, continuing gentle IVF. Family updated.    MEDICATIONS  (STANDING):  ALBUTerol    90 MICROgram(s) HFA Inhaler 2 Puff(s) Inhalation every 6 hours  ascorbic acid 2000 milliGRAM(s) Oral every 8 hours  aspirin  chewable 81 milliGRAM(s) Oral daily  atorvastatin 40 milliGRAM(s) Oral at bedtime  budesonide 160 MICROgram(s)/formoterol 4.5 MICROgram(s) Inhaler 2 Puff(s) Inhalation two times a day  carvedilol 6.25 milliGRAM(s) Oral every 12 hours  cholecalciferol 1000 Unit(s) Oral daily  dronedarone 400 milliGRAM(s) Oral two times a day  fluticasone propionate 50 MICROgram(s)/spray Nasal Spray 1 Spray(s) Both Nostrils every 12 hours  gabapentin 300 milliGRAM(s) Oral daily  influenza   Vaccine 0.5 milliLiter(s) IntraMuscular once  insulin lispro (ADMELOG) corrective regimen sliding scale   SubCutaneous three times a day before meals  levothyroxine 175 MICROGram(s) Oral daily  magnesium citrate Oral Solution 1 Bottle Oral once  montelukast 10 milliGRAM(s) Oral daily  oxybutynin 5 milliGRAM(s) Oral two times a day  pantoprazole    Tablet 40 milliGRAM(s) Oral two times a day  sodium chloride 0.9%. 1000 milliLiter(s) (50 mL/Hr) IV Continuous <Continuous>  zinc sulfate 220 milliGRAM(s) Oral daily    REVIEW OF SYSTEMS:  CONSTITUTIONAL: No fever or fatigue, feels "old"  RESPIRATORY: No cough; No shortness of breath  CARDIOVASCULAR: No chest pain, no dizziness  GASTROINTESTINAL: No abdominal pain. No nausea, vomiting, diarrhea, constipation. +dark stools  GENITOURINARY: No dysuria or hematuria  NEUROLOGICAL: No headache  SKIN: No itching or rashes  EXT: b/l knee pain "from the inside", chronic b/l LBP  all other systems reviewed and are negative      Vital Signs Last 24 Hrs  T(C): 36.3 (24 Mar 2021 13:45), Max: 36.7 (24 Mar 2021 05:29)  T(F): 97.4 (24 Mar 2021 13:45), Max: 98 (24 Mar 2021 05:29)  HR: 64 (24 Mar 2021 13:45) (61 - 76)  BP: 172/55 (24 Mar 2021 13:45) (116/55 - 172/55)  BP(mean): --  RR: 20 (24 Mar 2021 13:45) (17 - 20)  SpO2: 99% (24 Mar 2021 13:45) (98% - 99%)    PHYSICAL EXAMINATION:  GENERAL: NAD, obese, elderly woman  HEAD:  Atraumatic, Normocephalic  EYES: periorbital skin pink, eomi, perrl  NECK: Supple  CHEST/LUNG: no increased WOB, NC, no cough noted  HEART: RRR  ABDOMEN: Soft, Nontender, obese, Bowel sounds present  NERVOUS SYSTEM: alert and oriented x3  EXTREMITIES:  2+ Peripheral Pulses, No edema, +hematoma right knee, NTTP  SKIN: warm dry                          8.4    6.26  )-----------( 277      ( 24 Mar 2021 07:00 )             26.2     03-24    138  |  103  |  35<H>  ----------------------------<  151<H>  4.0   |  25  |  1.57<H>    Ca    8.4      24 Mar 2021 07:00  Phos  3.9     03-24  Mg     2.2     03-24    TPro  6.7  /  Alb  3.1<L>  /  TBili  0.3  /  DBili  x   /  AST  16  /  ALT  22  /  AlkPhos  71  03-24    LIVER FUNCTIONS - ( 24 Mar 2021 07:00 )  Alb: 3.1 g/dL / Pro: 6.7 g/dL / ALK PHOS: 71 U/L / ALT: 22 U/L DA / AST: 16 U/L / GGT: x                   CAPILLARY BLOOD GLUCOSE      RADIOLOGY & ADDITIONAL TESTS:

## 2021-03-24 NOTE — PROGRESS NOTE ADULT - SUBJECTIVE AND OBJECTIVE BOX
Patient is seen and examined at the bed side, is afebrile.  The Gastric antrum, biopsy pathology shows Chronic active gastritis, diffuse, with focal lymphoid aggregate; H. Pylori associated.      REVIEW OF SYSTEMS: All other review systems are negative      ALLERGIES: No Known Allergies      Vital Signs Last 24 Hrs  T(C): 36.6 (24 Mar 2021 16:20), Max: 36.7 (24 Mar 2021 05:29)  T(F): 97.9 (24 Mar 2021 16:20), Max: 98 (24 Mar 2021 05:29)  HR: 65 (24 Mar 2021 16:20) (63 - 76)  BP: 153/59 (24 Mar 2021 16:20) (116/55 - 172/55)  BP(mean): --  RR: 19 (24 Mar 2021 16:20) (17 - 20)  SpO2: 97% (24 Mar 2021 16:20) (97% - 99%)      PHYSICAL EXAM:  GENERAL: Not in distress   CHEST/LUNG: Not using accessory muscles   HEART: s1 and s2 present  ABDOMEN:  Nontender and  Nondistended  EXTREMITIES: No pedal  edema  CNS: Awake and Alert      LABS:                        8.4    6.26  )-----------( 277      ( 24 Mar 2021 07:00 )             26.2                                    8.2    6.54  )-----------( 268      ( 23 Mar 2021 08:27 )             25.5         03-24    138  |  103  |  35<H>  ----------------------------<  151<H>  4.0   |  25  |  1.57<H>    Ca    8.4      24 Mar 2021 07:00  Phos  3.9     03-24  Mg     2.2     03-24    TPro  6.7  /  Alb  3.1<L>  /  TBili  0.3  /  DBili  x   /  AST  16  /  ALT  22  /  AlkPhos  71  -24    03    135  |  97  |  22<H>  ----------------------------<  131<H>  3.8   |  29  |  1.16    Ca    9.3      22 Mar 2021 07:34  Phos  5.0     03-22  Mg     2.3         TPro  6.9  /  Alb  3.1<L>  /  TBili  0.3  /  DBili  x   /  AST  14  /  ALT  20  /  AlkPhos  70        CAPILLARY BLOOD GLUCOSE  POCT Blood Glucose.: 262 mg/dL (22 Mar 2021 16:34)  POCT Blood Glucose.: 150 mg/dL (22 Mar 2021 12:45)  POCT Blood Glucose.: 147 mg/dL (22 Mar 2021 10:56)  POCT Blood Glucose.: 196 mg/dL (22 Mar 2021 07:40      Urinalysis Basic - ( 20 Mar 2021 20:54 )  Color: Yellow / Appearance: Clear / S.015 / pH: x  Gluc: x / Ketone: Negative  / Bili: Negative / Urobili: Negative   Blood: x / Protein: Negative / Nitrite: Negative   Leuk Esterase: Negative / RBC: x / WBC x   Sq Epi: x / Non Sq Epi: x / Bacteria: x        MEDICATIONS  (STANDING):    ALBUTerol    90 MICROgram(s) HFA Inhaler 2 Puff(s) Inhalation every 6 hours  ascorbic acid 2000 milliGRAM(s) Oral every 8 hours  aspirin  chewable 81 milliGRAM(s) Oral daily  atorvastatin 40 milliGRAM(s) Oral at bedtime  budesonide 160 MICROgram(s)/formoterol 4.5 MICROgram(s) Inhaler 2 Puff(s) Inhalation two times a day  carvedilol 6.25 milliGRAM(s) Oral every 12 hours  cholecalciferol 1000 Unit(s) Oral daily  dronedarone 400 milliGRAM(s) Oral two times a day  fluticasone propionate 50 MICROgram(s)/spray Nasal Spray 1 Spray(s) Both Nostrils every 12 hours  gabapentin 300 milliGRAM(s) Oral daily  influenza   Vaccine 0.5 milliLiter(s) IntraMuscular once  insulin lispro (ADMELOG) corrective regimen sliding scale   SubCutaneous three times a day before meals  levothyroxine 175 MICROGram(s) Oral daily  magnesium citrate Oral Solution 1 Bottle Oral once  montelukast 10 milliGRAM(s) Oral daily  oxybutynin 5 milliGRAM(s) Oral two times a day  pantoprazole    Tablet 40 milliGRAM(s) Oral two times a day  sodium chloride 0.9%. 1000 milliLiter(s) (50 mL/Hr) IV Continuous <Continuous>  zinc sulfate 220 milliGRAM(s) Oral daily        RADIOLOGY & ADDITIONAL TESTS:    Surgical Pathology Report (21 @ 11:37)   Surgical Pathology Report:   ACCESSION No: [id number]   KWAME MOSQUERA 2   Surgical Final Report   Final Diagnosis   1. Gastric antrum, biopsy: Chronic active gastritis, diffuse,   with focal lymphoid aggregate; H. Pylori associated. (Special  stain for H. Pylori is positive)   2. Esophagus biopsy: Patchy acute esophagitis. Special stain for  fungi is negative.   Verified by: Mariam Perez M.D. Surgical Pathology Report (21 @ 11:37)       3/20/21 : CT Angio Abdomen and Pelvis w/ IV Cont (21 @ 22:55) No CT evidence of acute gastrointestinal hemorrhage. Few scattered colonic diverticula.    3/20/21 : CT Angio Abdomen and Pelvis w/ IV Cont (21 @ 22:55) LOWER CHEST: Partially imaged pacemaker leads. Mitral annular calcification.    3/20/21 : Xray Chest 1 View- PORTABLE-Urgent (21 @ 16:38): There is left-sided defibrillator. The heart is normal in size. The lungs are clear.          MICROBIOLOGY DATA:    COVID-19 Drew Domain Antibody (21 @ 11:07)   COVID-19 Drew Domain Antibody Result: >250.00:    Culture - Blood (21 @ 21:50)   Specimen Source: .Blood Blood-Venous   Culture Results: No growth to date.     Culture - Blood (21 @ 21:50)   Specimen Source: .Blood Blood-Venous   Culture Results: No growth to date.     COVID-19 PCR . (21 @ 16:32)   COVID-19 PCR: NotDetec:     MRSA/MSSA PCR (20 @ 14:34)   MRSA PCR Result.: NotDetec:     FLU A B RSV Detection by PCR (20 @ 20:00)   Flu A Result: NotDetec           Patient is seen and examined at the bed side, is afebrile.  The Gastric antrum, biopsy pathology shows Chronic active gastritis, diffuse, with focal lymphoid aggregate; H. Pylori associated.      REVIEW OF SYSTEMS: All other review systems are negative      ALLERGIES: No Known Allergies      Vital Signs Last 24 Hrs  T(C): 36.6 (24 Mar 2021 16:20), Max: 36.7 (24 Mar 2021 05:29)  T(F): 97.9 (24 Mar 2021 16:20), Max: 98 (24 Mar 2021 05:29)  HR: 65 (24 Mar 2021 16:20) (63 - 76)  BP: 153/59 (24 Mar 2021 16:20) (116/55 - 172/55)  BP(mean): --  RR: 19 (24 Mar 2021 16:20) (17 - 20)  SpO2: 97% (24 Mar 2021 16:20) (97% - 99%)      PHYSICAL EXAM:  GENERAL: Not in distress   CHEST/LUNG: Not using accessory muscles   HEART: s1 and s2 present  ABDOMEN:  Nontender and  Nondistended  EXTREMITIES: No pedal  edema  CNS: Awake and Alert      LABS:                        8.4    6.26  )-----------( 277      ( 24 Mar 2021 07:00 )             26.2                                    8.2    6.54  )-----------( 268      ( 23 Mar 2021 08:27 )             25.5         03-24    138  |  103  |  35<H>  ----------------------------<  151<H>  4.0   |  25  |  1.57<H>    Ca    8.4      24 Mar 2021 07:00  Phos  3.9     03-24  Mg     2.2     03-24    TPro  6.7  /  Alb  3.1<L>  /  TBili  0.3  /  DBili  x   /  AST  16  /  ALT  22  /  AlkPhos  71  -24    03    135  |  97  |  22<H>  ----------------------------<  131<H>  3.8   |  29  |  1.16    Ca    9.3      22 Mar 2021 07:34  Phos  5.0     03-22  Mg     2.3         TPro  6.9  /  Alb  3.1<L>  /  TBili  0.3  /  DBili  x   /  AST  14  /  ALT  20  /  AlkPhos  70        CAPILLARY BLOOD GLUCOSE  POCT Blood Glucose.: 262 mg/dL (22 Mar 2021 16:34)  POCT Blood Glucose.: 150 mg/dL (22 Mar 2021 12:45)  POCT Blood Glucose.: 147 mg/dL (22 Mar 2021 10:56)  POCT Blood Glucose.: 196 mg/dL (22 Mar 2021 07:40      Urinalysis Basic - ( 20 Mar 2021 20:54 )  Color: Yellow / Appearance: Clear / S.015 / pH: x  Gluc: x / Ketone: Negative  / Bili: Negative / Urobili: Negative   Blood: x / Protein: Negative / Nitrite: Negative   Leuk Esterase: Negative / RBC: x / WBC x   Sq Epi: x / Non Sq Epi: x / Bacteria: x        MEDICATIONS  (STANDING):    ALBUTerol    90 MICROgram(s) HFA Inhaler 2 Puff(s) Inhalation every 6 hours  ascorbic acid 2000 milliGRAM(s) Oral every 8 hours  aspirin  chewable 81 milliGRAM(s) Oral daily  atorvastatin 40 milliGRAM(s) Oral at bedtime  budesonide 160 MICROgram(s)/formoterol 4.5 MICROgram(s) Inhaler 2 Puff(s) Inhalation two times a day  carvedilol 6.25 milliGRAM(s) Oral every 12 hours  cholecalciferol 1000 Unit(s) Oral daily  dronedarone 400 milliGRAM(s) Oral two times a day  fluticasone propionate 50 MICROgram(s)/spray Nasal Spray 1 Spray(s) Both Nostrils every 12 hours  gabapentin 300 milliGRAM(s) Oral daily  influenza   Vaccine 0.5 milliLiter(s) IntraMuscular once  insulin lispro (ADMELOG) corrective regimen sliding scale   SubCutaneous three times a day before meals  levothyroxine 175 MICROGram(s) Oral daily  magnesium citrate Oral Solution 1 Bottle Oral once  montelukast 10 milliGRAM(s) Oral daily  oxybutynin 5 milliGRAM(s) Oral two times a day  pantoprazole    Tablet 40 milliGRAM(s) Oral two times a day  sodium chloride 0.9%. 1000 milliLiter(s) (50 mL/Hr) IV Continuous <Continuous>  zinc sulfate 220 milliGRAM(s) Oral daily        RADIOLOGY & ADDITIONAL TESTS:    Surgical Pathology Report (21 @ 11:37)   Surgical Pathology Report:   ACCESSION No: [id number]   KWAME MOSQUERA 2   Surgical Final Report   Final Diagnosis   1. Gastric antrum, biopsy: Chronic active gastritis, diffuse,   with focal lymphoid aggregate; H. Pylori associated. (Special  stain for H. Pylori is positive)   2. Esophagus biopsy: Patchy acute esophagitis. Special stain for  fungi is negative.   Verified by: Mariam Perez M.D. Surgical Pathology Report (21 @ 11:37)       3/20/21 : CT Angio Abdomen and Pelvis w/ IV Cont (21 @ 22:55) No CT evidence of acute gastrointestinal hemorrhage. Few scattered colonic diverticula.    3/20/21 : CT Angio Abdomen and Pelvis w/ IV Cont (21 @ 22:55) LOWER CHEST: Partially imaged pacemaker leads. Mitral annular calcification.    3/20/21 : Xray Chest 1 View- PORTABLE-Urgent (21 @ 16:38): There is left-sided defibrillator. The heart is normal in size. The lungs are clear.          MICROBIOLOGY DATA:    COVID-19 Drew Domain Antibody (21 @ 11:07)   COVID-19 Drew Domain Antibody Result: >250.00:    Culture - Blood (21 @ 21:50)   Specimen Source: .Blood Blood-Venous   Culture Results: No growth to date.     Culture - Blood (21 @ 21:50)   Specimen Source: .Blood Blood-Venous   Culture Results: No growth to date.     COVID-19 PCR . (21 @ 16:32)   COVID-19 PCR: NotDetec:     MRSA/MSSA PCR (20 @ 14:34)   MRSA PCR Result.: NotDetec:     FLU A B RSV Detection by PCR (20 @ 20:00)   Flu A Result: NotDetec

## 2021-03-24 NOTE — PROGRESS NOTE ADULT - ASSESSMENT
79 years old Norwegian-speaking female from home, ambulates with walker, lives with son, with PMHx of HFpEF, CAD (s/p stents), chronic venous stasis, DM, HTN and Afib (on Xarelto, s/p PPM), chronic bronchitis with asthmatic component ( on Oxygen PRN at home ), JOSE DANIEL ( supposed to be on nocturnal CPAP , but non compliant) presents to the ED with chief complaint of hypotension ,  SOB and  fatigue for almost 2 weeks. admitted for symptomatic anemia.            79F Danish-speaking from home with son, ambulates with walker, PMH HFpEF, CAD (s/p stents), chronic venous stasis, DM, HTN and Afib (Xarelto, s/p PPM), presented 3/20 with x2 weeks increasing fatigue, SOB, and paleness, with prolonged history of dark bowel movements s/p possible transverse polyp colonoscopy 2020, admitted for workup and management of symptomatic anemia.

## 2021-03-24 NOTE — PROGRESS NOTE ADULT - PROBLEM SELECTOR PLAN 1
presented 3/20 with x2 weeks increasing fatigue, SOB, and paleness, with prolonged history of dark bowel movements s/p possible transverse polyp colonoscopy 2020, no further fu  -in ED, VS notable for /77 and SpO2 100% 4L NC  -trop, COVID, UA negative  -pre-RBC adm Hb 7.7 (baseline ~11), Fe 15, TIBC 457, 3% saturation  -CT a/p unremarkable for signs of bleeding  -s/p x1 unit pRBC in ED  -fu FOBT  -IV iron 3/20-23, PPI BID  -Fe and %sat improved s/p transfusion and Venofer, Hb stable, transfuse if <8  -NPO from [place] 3/22 for EGD/col 3/22 with GI Dr. Moreland, gastritis, esophagitis, no GIB. Path sent. Will obtain colonoscopy 3/24, prepping this afternoon  -Continuing PPI. presented 3/20 with x2 weeks increasing fatigue, SOB, and paleness, with prolonged history of dark bowel movements s/p possible transverse polyp colonoscopy 2020, no further fu  -in ED, VS notable for /77 and SpO2 100% 4L NC  -trop, COVID, UA negative  -pre-RBC adm Hb 7.7 (baseline ~11), Fe 15, TIBC 457, 3% saturation  -CT a/p unremarkable for signs of bleeding  -s/p x1 unit pRBC in ED  -fu FOBT  -IV iron 3/20-23, PPI BID  -Fe and %sat improved s/p transfusion and Venofer, Hb stable, transfuse if <8  -NPO from [place] 3/22 for EGD/col 3/22 with GI Dr. Moreland, gastritis, esophagitis, no GIB. Path sent  -colonoscopy 3/24 with polyps, plan for repeat 3/25, prep with Mg citrate, CLD, NPO after midnight  -fu abdominal XR to ensure no free air 3/24  -continuing PPI

## 2021-03-24 NOTE — PROGRESS NOTE ADULT - ASSESSMENT
79 years old Mosotho-speaking female from home, ambulates with walker, lives with son, with PMHx of HFpEF, CAD (s/p stents), chronic venous stasis, DM, HTN and Afib (on Xarelto, s/p PPM), chronic bronchitis with asthmatic component ( on Oxygen PRN at home ), JOSE DANIEL ( supposed to be on nocturnal CPAP , but non compliant) presents to the ED with chief complaint of hypotension , SOB and  fatigue for almost 2 weeks.,symptomatic Fe def anemia.  1.GI f/u,colonoscopy-p.  2.PAF-multaq, coreg, asa.  3.HTN-cont bp medication.  4.CAD-asa,coreg,statin.  5.DM-insulin.  6.Anemia-IV iron.  7.Asthma-MDI.  8.ARF-IVF,renal sono.  9.GI and DVT prophylaxis.

## 2021-03-24 NOTE — PROGRESS NOTE ADULT - ASSESSMENT
79 years old Qatari-speaking female from home, ambulates with walker, lives with son, with PMHx of HFpEF, CAD (s/p stents), chronic venous stasis, DM, HTN and Afib (on Xarelto, s/p PPM), chronic bronchitis with asthmatic component ( on Oxygen PRN at home ), JOSE DANIEL ( supposed to be on nocturnal CPAP , but non compliant) presents to the ED with chief complaint of hypotension ,  SOB and  fatigue for almost 2 weeks. admitted for symptomatic anemia.

## 2021-03-24 NOTE — PROGRESS NOTE ADULT - ASSESSMENT
Patient is a 81y old  Female from home, ambulates with walker, lives with son, with PMHx of HFpEF, CAD (s/p stents), chronic venous stasis, DM, HTN and Afib (on Xarelto, s/p PPM), chronic bronchitis with asthmatic component ( on Oxygen PRN at home ), JOSE DANIEL ( supposed to be on nocturnal CPAP , but non compliant), now presents to the ER for evaluation of hypotension, SOB and  fatigue for almost 2 weeks. Per daughter and granddaughter patient has been complaining of increasing fatigue and sob for last few weeks, seemed to be pale , patient endorses dark loose bowel movement. Patient has chronic diarrhea, but no abdominal pain. She has evaluated by GI team and now the ID consult requested to assist with further evaluation of chronic diarrhea.     #  H. Pylori associated Chronic active gastritis, diffuse, with focal lymphoid aggregate  # Chronic diarrhea- C.diff vs Malignancy - NO Malignancy Antrum biopsy   # Symptomatic Anemia  - s/p EGD   # COVID negative 3/20/21    Would recommend:    1. Start on H.Pylori regimen, namely Amoxicillin 1 g q 12hours 7 days then 500mg Q 12hours, + Clarithromycin 500 mg q 12hours + Flagyl 500 mg q 12hours and + PPI  2. Need for total  of 14 days   3. Monitor H/H and transfuse as need  4. OOb to chair    Attending Attestation:    Spent more than 45 minutes on total encounter, more than 50 % of the visit was spent counseling and/or coordinating care by the Attending physician. Patient is a 81y old  Female from home, ambulates with walker, lives with son, with PMHx of HFpEF, CAD (s/p stents), chronic venous stasis, DM, HTN and Afib (on Xarelto, s/p PPM), chronic bronchitis with asthmatic component ( on Oxygen PRN at home ), JOSE DANIEL ( supposed to be on nocturnal CPAP , but non compliant), now presents to the ER for evaluation of hypotension, SOB and  fatigue for almost 2 weeks. Per daughter and granddaughter patient has been complaining of increasing fatigue and sob for last few weeks, seemed to be pale , patient endorses dark loose bowel movement. Patient has chronic diarrhea, but no abdominal pain. She has evaluated by GI team and now the ID consult requested to assist with further evaluation of chronic diarrhea.     #  H. Pylori associated Chronic active gastritis, diffuse, with focal lymphoid aggregate  # Chronic diarrhea- C.diff vs Malignancy - NO Malignancy Antrum biopsy   # Symptomatic Anemia  - s/p EGD and antrum biopsy shows H. Pylori associated Chronic active gastritis, diffuse, with focal lymphoid aggregate  # COVID negative 3/20/21    Would recommend:    1. Start on H.Pylori regimen, namely Amoxicillin 1 g q 12hours 7 days then 500mg Q 12hours, + Clarithromycin 500 mg q 12hours + Flagyl 500 mg q 12hours and + PPI  2. Need for total  of 14 days   3. Monitor H/H and transfuse as need  4. OOb to chair    Attending Attestation:    Spent more than 45 minutes on total encounter, more than 50 % of the visit was spent counseling and/or coordinating care by the Attending physician.

## 2021-03-24 NOTE — PROGRESS NOTE ADULT - SUBJECTIVE AND OBJECTIVE BOX
CHIEF COMPLAINT:Patient is a 81y old  Female who presents with a chief complaint of symptomatic anemia.Pt appears comfortable.    	  REVIEW OF SYSTEMS:  CONSTITUTIONAL: No fever, weight loss, or fatigue  EYES: No eye pain, visual disturbances, or discharge  ENT:  No difficulty hearing, tinnitus, vertigo; No sinus or throat pain  NECK: No pain or stiffness  RESPIRATORY: No cough, wheezing, chills or hemoptysis; No Shortness of Breath  CARDIOVASCULAR: No chest pain, palpitations, passing out, dizziness, or leg swelling  GASTROINTESTINAL: No abdominal or epigastric pain. No nausea, vomiting, or hematemesis; No diarrhea or constipation. No melena or hematochezia.  GENITOURINARY: No dysuria, frequency, hematuria, or incontinence  NEUROLOGICAL: No headaches, memory loss, loss of strength, numbness, or tremors  SKIN: No itching, burning, rashes, or lesions   LYMPH Nodes: No enlarged glands  ENDOCRINE: No heat or cold intolerance; No hair loss  MUSCULOSKELETAL: No joint pain or swelling; No muscle, back, or extremity pain  PSYCHIATRIC: No depression, anxiety, mood swings, or difficulty sleeping  HEME/LYMPH: No easy bruising, or bleeding gums  ALLERGY AND IMMUNOLOGIC: No hives or eczema	      PHYSICAL EXAM:  T(C): 36.7 (03-24-21 @ 05:29), Max: 36.7 (03-24-21 @ 05:29)  HR: 76 (03-24-21 @ 05:29) (61 - 76)  BP: 118/92 (03-24-21 @ 05:29) (95/73 - 124/49)  RR: 17 (03-24-21 @ 05:29) (17 - 20)  SpO2: 98% (03-24-21 @ 05:29) (98% - 100%)    I&O's Summary    23 Mar 2021 07:01  -  24 Mar 2021 07:00  --------------------------------------------------------  IN: 600 mL / OUT: 0 mL / NET: 600 mL        Appearance: Normal	  HEENT:   Normal oral mucosa, PERRL, EOMI	  Lymphatic: No lymphadenopathy  Cardiovascular: Normal S1 S2, No JVD, No murmurs, No edema  Respiratory: Lungs clear to auscultation	  Psychiatry: A & O x 3, Mood & affect appropriate  Gastrointestinal:  Soft, Non-tender, + BS	  Skin: No rashes, No ecchymoses, No cyanosis	  Neurologic: Non-focal  Extremities: Normal range of motion, No clubbing, cyanosis or edema  Vascular: Peripheral pulses palpable 2+ bilaterally    MEDICATIONS  (STANDING):  ALBUTerol    90 MICROgram(s) HFA Inhaler 2 Puff(s) Inhalation every 6 hours  ascorbic acid 2000 milliGRAM(s) Oral every 8 hours  aspirin  chewable 81 milliGRAM(s) Oral daily  atorvastatin 40 milliGRAM(s) Oral at bedtime  budesonide 160 MICROgram(s)/formoterol 4.5 MICROgram(s) Inhaler 2 Puff(s) Inhalation two times a day  carvedilol 6.25 milliGRAM(s) Oral every 12 hours  cholecalciferol 1000 Unit(s) Oral daily  dronedarone 400 milliGRAM(s) Oral two times a day  fluticasone propionate 50 MICROgram(s)/spray Nasal Spray 1 Spray(s) Both Nostrils every 12 hours  gabapentin 300 milliGRAM(s) Oral daily  influenza   Vaccine 0.5 milliLiter(s) IntraMuscular once  insulin lispro (ADMELOG) corrective regimen sliding scale   SubCutaneous three times a day before meals  levothyroxine 175 MICROGram(s) Oral daily  montelukast 10 milliGRAM(s) Oral daily  oxybutynin 5 milliGRAM(s) Oral two times a day  pantoprazole    Tablet 40 milliGRAM(s) Oral two times a day  sodium chloride 0.9%. 1000 milliLiter(s) (50 mL/Hr) IV Continuous <Continuous>  zinc sulfate 220 milliGRAM(s) Oral daily    	  	  LABS:	 	                       8.4    6.26  )-----------( 277      ( 24 Mar 2021 07:00 )             26.2     03-24    138  |  103  |  35<H>  ----------------------------<  151<H>  4.0   |  25  |  1.57<H>    Ca    8.4      24 Mar 2021 07:00  Phos  3.9     03-24  Mg     2.2     03-24    TPro  6.7  /  Alb  3.1<L>  /  TBili  0.3  /  DBili  x   /  AST  16  /  ALT  22  /  AlkPhos  71  03-24    proBNP: Serum Pro-Brain Natriuretic Peptide: 1155 pg/mL (03-21 @ 07:15)    Lipid Profile: Cholesterol 134  HDL 54  TG 82  Cholesterol 145  HDL 55  TG 86      TSH: Thyroid Stimulating Hormone, Serum: 2.67 uU/mL (03-22 @ 07:34)  Thyroid Stimulating Hormone, Serum: 3.61 uU/mL (03-21 @ 07:15)

## 2021-03-24 NOTE — PROGRESS NOTE ADULT - PROBLEM SELECTOR PLAN 3
adm CXR concerning for central congestion, s/p x4 doses Lasix 40mg IV BID  -BNP1K  -TTE 3/21 with G1DD and mildly high RVP  -resume home torsemide 3/23  -pulm Dr. Cruz following adm CXR concerning for central congestion, s/p x4 doses Lasix 40mg IV BID  -BNP1K  -TTE 3/21 with G1DD and mildly high RVP  -resume home torsemide 3/23  -gentle IVF given increasing Cr 1.57 3/24, monitor  -pulm Dr. Cruz following

## 2021-03-24 NOTE — PROGRESS NOTE ADULT - ATTENDING COMMENTS
Pt to get colonoscopy given suspicion of a transverse colonic polyp in the setting of severe Fe+ deficiency anemia. EGD noted. Pt ultimately needs GI clearance for A/C, given an active indication of A-fib. Will continue on IV Venofer and f/u retic count in response.     D/C planning for home pending colonoscopy being uneventful, with outpt folow up.

## 2021-03-24 NOTE — PROGRESS NOTE ADULT - PROBLEM SELECTOR PLAN 2
-hold home Xarelto given GIB  -discuss asa with GI after EGD/col 3/22-23  -continue home coreg, dronedarone added  -[place] PPM incompatible with interrogation   -EKG with BFB  -b/l LE doppler 3/21 negative for DVT  -cards Dr. Sandoval following -hold home Xarelto given GIB  -discuss asa with GI after EGD/col 3/22-24  -continue home coreg, dronedarone added  -[place] PPM incompatible with interrogation   -EKG with BFB  -b/l LE doppler 3/21 negative for DVT  -cards Dr. Sandoval following

## 2021-03-24 NOTE — PROGRESS NOTE ADULT - SUBJECTIVE AND OBJECTIVE BOX
KWAME MOSQUERA  MR# [id number]  81yFemale        Patient is a 81y old  Female who presents with a chief complaint of symptomatic anemia (23 Mar 2021 17:53)      INTERVAL HPI/OVERNIGHT EVENTS:  Patient seen and examined at bedside. No notations of chest pain, palpitation, SOB, orthopnea, nausea, vomiting or abdominal pain.    ALLERGIES  No Known Allergies      MEDICATIONS  ALBUTerol    90 MICROgram(s) HFA Inhaler 2 Puff(s) Inhalation every 6 hours  ascorbic acid 2000 milliGRAM(s) Oral every 8 hours  aspirin  chewable 81 milliGRAM(s) Oral daily  atorvastatin 40 milliGRAM(s) Oral at bedtime  budesonide 160 MICROgram(s)/formoterol 4.5 MICROgram(s) Inhaler 2 Puff(s) Inhalation two times a day  carvedilol 6.25 milliGRAM(s) Oral every 12 hours  cholecalciferol 1000 Unit(s) Oral daily  dronedarone 400 milliGRAM(s) Oral two times a day  fluticasone propionate 50 MICROgram(s)/spray Nasal Spray 1 Spray(s) Both Nostrils every 12 hours  gabapentin 300 milliGRAM(s) Oral daily  influenza   Vaccine 0.5 milliLiter(s) IntraMuscular once  insulin lispro (ADMELOG) corrective regimen sliding scale   SubCutaneous three times a day before meals  levothyroxine 175 MICROGram(s) Oral daily  montelukast 10 milliGRAM(s) Oral daily  oxybutynin 5 milliGRAM(s) Oral two times a day  pantoprazole    Tablet 40 milliGRAM(s) Oral two times a day  sodium chloride 0.9%. 1000 milliLiter(s) IV Continuous <Continuous>  zinc sulfate 220 milliGRAM(s) Oral daily              REVIEW OF SYSTEMS:  CONSTITUTIONAL: No fever, weight loss, or fatigue  EYES: No eye pain, visual disturbances, or discharge  ENT:  No difficulty hearing, tinnitus, vertigo; No sinus or throat pain  NECK: No pain or stiffness  RESPIRATORY: No cough, wheezing, chills or hemoptysis; No Shortness of Breath  CARDIOVASCULAR: No chest pain, palpitations, passing out, dizziness, or leg swelling  GASTROINTESTINAL: No abdominal or epigastric pain. No nausea, vomiting, or hematemesis; No diarrhea or constipation. No melena or hematochezia.  GENITOURINARY: No dysuria, frequency, hematuria, or incontinence  NEUROLOGICAL: No headaches, memory loss, loss of strength, numbness, or tremors  SKIN: No itching, burning, rashes, or lesions   LYMPH Nodes: No enlarged glands  ENDOCRINE: No heat or cold intolerance; No hair loss  MUSCULOSKELETAL: No joint pain or swelling; No muscle, back, or extremity pain  PSYCHIATRIC: No depression, anxiety, mood swings, or difficulty sleeping  HEME/LYMPH: No easy bruising, or bleeding gums  ALLERGY AND IMMUNOLOGIC: No hives or eczema	    [ ] All others negative	  [ ] Unable to obtain      T(C): 36.7 (03-24-21 @ 05:29), Max: 36.7 (03-24-21 @ 05:29)  T(F): 98 (03-24-21 @ 05:29), Max: 98 (03-24-21 @ 05:29)  HR: 76 (03-24-21 @ 05:29) (61 - 76)  BP: 118/92 (03-24-21 @ 05:29) (95/73 - 124/49)  RR: 17 (03-24-21 @ 05:29) (17 - 20)  SpO2: 98% (03-24-21 @ 05:29) (98% - 100%)  Wt(kg): --    I&O's Summary    23 Mar 2021 07:01  -  24 Mar 2021 07:00  --------------------------------------------------------  IN: 600 mL / OUT: 0 mL / NET: 600 mL          PHYSICAL EXAM:  A X O x  HEAD:  Atraumatic, Normocephalic  EYES: EOMI, PERRLA, conjunctiva and sclera clear  NECK: Supple, No JVD, Normal thyroid  Resp: CTAB, No crackles, wheezing,   CVS: Regular rate and rhythm; No discernable murmurs, rubs, or gallops  ABD: Soft, Nontender, Nondistended; Bowel sounds present  EXTREMITIES:  2+ Peripheral Pulses, No edema  LYMPH: No dicernable lymphadenopathy noted  GENERAL: NAD, well-groomed, well-developed      LABS:                        8.4    6.26  )-----------( 277      ( 24 Mar 2021 07:00 )             26.2     03-24    138  |  103  |  35<H>  ----------------------------<  151<H>  4.0   |  25  |  1.57<H>    Ca    8.4      24 Mar 2021 07:00  Phos  3.9     03-24  Mg     2.2     03-24    TPro  6.7  /  Alb  3.1<L>  /  TBili  0.3  /  DBili  x   /  AST  16  /  ALT  22  /  AlkPhos  71  03-24        CAPILLARY BLOOD GLUCOSE      POCT Blood Glucose.: 190 mg/dL (24 Mar 2021 07:52)  POCT Blood Glucose.: 128 mg/dL (23 Mar 2021 21:05)  POCT Blood Glucose.: 219 mg/dL (23 Mar 2021 16:26)      Troponins:  ProBNP:  Lipid Profile:   HgA1c:  TSH:           RADIOLOGY & ADDITIONAL TESTS:    Imaging Personally Reviewed:  [ ] YES  [ ] NO      Consultant(s) Notes Reviewed:  [x ] YES  [ ] NO    Care Discussed with Consultants/Other Providers [ x] YES  [ ] NO          PAST MEDICAL & SURGICAL HISTORY:  Obesity    Pacemaker    Atrial fibrillation    Hypothyroidism    Venous stasis    IBS (irritable bowel syndrome)    CHF (congestive heart failure), NYHA class I    HLD (Hyperlipidemia)    HTN (Hypertension)    Diabetes    Myocardial Infarction    CAD (Coronary Atherosclerotic Disease)    Asthma    S/P coronary angiogram          Anemia    H/o or current diagnosis of HF- Contraindication to ACEI/ARBs    H/o or current diagnosis of HF- ACEI/ARB contraindication unknown    H/o or current diagnosis of HF- Contraindication to ACEI/ARBs    H/o or current diagnosis of HF- ACEI/ARB contraindication unknown    H/o or current diagnosis of HF- Contraindication to ACEI/ARBs    H/o or current diagnosis of HF- ACEI/ARB contraindication unknown    Family history of heart disease    Handoff    MEWS Score    Obesity    Pacemaker    Atrial fibrillation    Hypothyroidism    Venous stasis    IBS (irritable bowel syndrome)    CHF (congestive heart failure), NYHA class I    HLD (Hyperlipidemia)    HTN (Hypertension)    Diabetes    Myocardial Infarction    CAD (Coronary Atherosclerotic Disease)    Asthma    Symptomatic anemia    Hypothyroidism    Prophylactic measure    JOSE DANIEL (obstructive sleep apnea)    Asthma    HTN (Hypertension)    Diabetes    CHF (congestive heart failure), NYHA class I    Atrial fibrillation    Symptomatic anemia    S/P coronary angiogram    No significant past surgical history    A) WEAKNESS    90+    Asthma    CAD (coronary atherosclerotic disease)    [email or web address]

## 2021-03-25 LAB
ALBUMIN SERPL ELPH-MCNC: 3 G/DL — LOW (ref 3.5–5)
ALP SERPL-CCNC: 69 U/L — SIGNIFICANT CHANGE UP (ref 40–120)
ALT FLD-CCNC: 22 U/L DA — SIGNIFICANT CHANGE UP (ref 10–60)
ANION GAP SERPL CALC-SCNC: 10 MMOL/L — SIGNIFICANT CHANGE UP (ref 5–17)
AST SERPL-CCNC: 15 U/L — SIGNIFICANT CHANGE UP (ref 10–40)
BILIRUB SERPL-MCNC: 0.4 MG/DL — SIGNIFICANT CHANGE UP (ref 0.2–1.2)
BUN SERPL-MCNC: 21 MG/DL — HIGH (ref 7–18)
CALCIUM SERPL-MCNC: 8.5 MG/DL — SIGNIFICANT CHANGE UP (ref 8.4–10.5)
CHLORIDE SERPL-SCNC: 102 MMOL/L — SIGNIFICANT CHANGE UP (ref 96–108)
CO2 SERPL-SCNC: 25 MMOL/L — SIGNIFICANT CHANGE UP (ref 22–31)
CREAT ?TM UR-MCNC: 112 MG/DL — SIGNIFICANT CHANGE UP
CREAT SERPL-MCNC: 1.14 MG/DL — SIGNIFICANT CHANGE UP (ref 0.5–1.3)
CULTURE RESULTS: SIGNIFICANT CHANGE UP
CULTURE RESULTS: SIGNIFICANT CHANGE UP
GLUCOSE BLDC GLUCOMTR-MCNC: 201 MG/DL — HIGH (ref 70–99)
GLUCOSE BLDC GLUCOMTR-MCNC: 217 MG/DL — HIGH (ref 70–99)
GLUCOSE BLDC GLUCOMTR-MCNC: 220 MG/DL — HIGH (ref 70–99)
GLUCOSE BLDC GLUCOMTR-MCNC: 323 MG/DL — HIGH (ref 70–99)
GLUCOSE SERPL-MCNC: 172 MG/DL — HIGH (ref 70–99)
HCT VFR BLD CALC: 24.2 % — LOW (ref 34.5–45)
HGB BLD-MCNC: 7.8 G/DL — LOW (ref 11.5–15.5)
MAGNESIUM SERPL-MCNC: 2.7 MG/DL — HIGH (ref 1.6–2.6)
MCHC RBC-ENTMCNC: 26.3 PG — LOW (ref 27–34)
MCHC RBC-ENTMCNC: 32.2 GM/DL — SIGNIFICANT CHANGE UP (ref 32–36)
MCV RBC AUTO: 81.5 FL — SIGNIFICANT CHANGE UP (ref 80–100)
NRBC # BLD: 0 /100 WBCS — SIGNIFICANT CHANGE UP (ref 0–0)
PHOSPHATE SERPL-MCNC: 2.4 MG/DL — LOW (ref 2.5–4.5)
PLATELET # BLD AUTO: 273 K/UL — SIGNIFICANT CHANGE UP (ref 150–400)
POTASSIUM SERPL-MCNC: 3.8 MMOL/L — SIGNIFICANT CHANGE UP (ref 3.5–5.3)
POTASSIUM SERPL-SCNC: 3.8 MMOL/L — SIGNIFICANT CHANGE UP (ref 3.5–5.3)
PROT SERPL-MCNC: 6.6 G/DL — SIGNIFICANT CHANGE UP (ref 6–8.3)
RBC # BLD: 2.97 M/UL — LOW (ref 3.8–5.2)
RBC # FLD: 14.2 % — SIGNIFICANT CHANGE UP (ref 10.3–14.5)
SODIUM SERPL-SCNC: 137 MMOL/L — SIGNIFICANT CHANGE UP (ref 135–145)
SODIUM UR-SCNC: 41 MMOL/L — SIGNIFICANT CHANGE UP
SPECIMEN SOURCE: SIGNIFICANT CHANGE UP
SPECIMEN SOURCE: SIGNIFICANT CHANGE UP
WBC # BLD: 13.47 K/UL — HIGH (ref 3.8–10.5)
WBC # FLD AUTO: 13.47 K/UL — HIGH (ref 3.8–10.5)

## 2021-03-25 RX ORDER — INSULIN LISPRO 100/ML
VIAL (ML) SUBCUTANEOUS
Refills: 0 | Status: DISCONTINUED | OUTPATIENT
Start: 2021-03-25 | End: 2021-03-31

## 2021-03-25 RX ORDER — CLARITHROMYCIN 500 MG
500 TABLET ORAL
Refills: 0 | Status: DISCONTINUED | OUTPATIENT
Start: 2021-03-25 | End: 2021-03-27

## 2021-03-25 RX ADMIN — PANTOPRAZOLE SODIUM 40 MILLIGRAM(S): 20 TABLET, DELAYED RELEASE ORAL at 20:03

## 2021-03-25 RX ADMIN — ALBUTEROL 2 PUFF(S): 90 AEROSOL, METERED ORAL at 09:01

## 2021-03-25 RX ADMIN — Medication 2: at 08:08

## 2021-03-25 RX ADMIN — ALBUTEROL 2 PUFF(S): 90 AEROSOL, METERED ORAL at 15:40

## 2021-03-25 RX ADMIN — ALBUTEROL 2 PUFF(S): 90 AEROSOL, METERED ORAL at 22:26

## 2021-03-25 RX ADMIN — DRONEDARONE 400 MILLIGRAM(S): 400 TABLET, FILM COATED ORAL at 06:29

## 2021-03-25 RX ADMIN — Medication 2000 MILLIGRAM(S): at 22:26

## 2021-03-25 RX ADMIN — GABAPENTIN 300 MILLIGRAM(S): 400 CAPSULE ORAL at 12:08

## 2021-03-25 RX ADMIN — Medication 2000 MILLIGRAM(S): at 06:29

## 2021-03-25 RX ADMIN — Medication 4: at 22:24

## 2021-03-25 RX ADMIN — Medication 500 MILLIGRAM(S): at 20:04

## 2021-03-25 RX ADMIN — Medication 5 MILLIGRAM(S): at 06:29

## 2021-03-25 RX ADMIN — Medication 4: at 17:47

## 2021-03-25 RX ADMIN — Medication 62.5 MILLIMOLE(S): at 22:27

## 2021-03-25 RX ADMIN — Medication 2000 MILLIGRAM(S): at 15:39

## 2021-03-25 RX ADMIN — Medication 1 SPRAY(S): at 06:28

## 2021-03-25 RX ADMIN — Medication 5 MILLIGRAM(S): at 20:04

## 2021-03-25 RX ADMIN — Medication 1000 MILLIGRAM(S): at 06:29

## 2021-03-25 RX ADMIN — CARVEDILOL PHOSPHATE 6.25 MILLIGRAM(S): 80 CAPSULE, EXTENDED RELEASE ORAL at 20:04

## 2021-03-25 RX ADMIN — Medication 175 MICROGRAM(S): at 06:29

## 2021-03-25 RX ADMIN — Medication 500 MILLIGRAM(S): at 22:25

## 2021-03-25 RX ADMIN — BUDESONIDE AND FORMOTEROL FUMARATE DIHYDRATE 2 PUFF(S): 160; 4.5 AEROSOL RESPIRATORY (INHALATION) at 11:53

## 2021-03-25 RX ADMIN — SODIUM CHLORIDE 50 MILLILITER(S): 9 INJECTION INTRAMUSCULAR; INTRAVENOUS; SUBCUTANEOUS at 06:29

## 2021-03-25 RX ADMIN — Medication 2: at 11:52

## 2021-03-25 RX ADMIN — PANTOPRAZOLE SODIUM 40 MILLIGRAM(S): 20 TABLET, DELAYED RELEASE ORAL at 06:29

## 2021-03-25 RX ADMIN — Medication 500 MILLIGRAM(S): at 06:29

## 2021-03-25 RX ADMIN — CARVEDILOL PHOSPHATE 6.25 MILLIGRAM(S): 80 CAPSULE, EXTENDED RELEASE ORAL at 06:29

## 2021-03-25 RX ADMIN — Medication 1000 MILLIGRAM(S): at 20:04

## 2021-03-25 NOTE — PROGRESS NOTE ADULT - SUBJECTIVE AND OBJECTIVE BOX
Time of Visit:  Patient seen and examined.     MEDICATIONS  (STANDING):  ALBUTerol    90 MICROgram(s) HFA Inhaler 2 Puff(s) Inhalation every 6 hours  amoxicillin 1000 milliGRAM(s) Oral every 12 hours  ascorbic acid 2000 milliGRAM(s) Oral every 8 hours  aspirin  chewable 81 milliGRAM(s) Oral daily  carvedilol 6.25 milliGRAM(s) Oral every 12 hours  cholecalciferol 1000 Unit(s) Oral daily  clarithromycin 500 milliGRAM(s) Oral two times a day  gabapentin 300 milliGRAM(s) Oral daily  influenza   Vaccine 0.5 milliLiter(s) IntraMuscular once  insulin lispro (ADMELOG) corrective regimen sliding scale   SubCutaneous Before meals and at bedtime  levothyroxine 175 MICROGram(s) Oral daily  metroNIDAZOLE    Tablet 500 milliGRAM(s) Oral every 12 hours  montelukast 10 milliGRAM(s) Oral daily  oxybutynin 5 milliGRAM(s) Oral two times a day  pantoprazole    Tablet 40 milliGRAM(s) Oral two times a day  sodium chloride 0.9%. 1000 milliLiter(s) (50 mL/Hr) IV Continuous <Continuous>  sodium phosphate IVPB 15 milliMole(s) IV Intermittent once  zinc sulfate 220 milliGRAM(s) Oral daily      MEDICATIONS  (PRN):       Medications up to date at time of exam.      PHYSICAL EXAMINATION:  Patient has no new complaints.  GENERAL: The patient is a well-developed, well-nourished, in no apparent distress.     Vital Signs Last 24 Hrs  T(C): 37.5 (25 Mar 2021 05:54), Max: 37.5 (25 Mar 2021 05:54)  T(F): 99.5 (25 Mar 2021 05:54), Max: 99.5 (25 Mar 2021 05:54)  HR: 63 (25 Mar 2021 20:00) (63 - 78)  BP: 131/74 (25 Mar 2021 20:00) (131/74 - 138/47)  BP(mean): --  RR: 20 (25 Mar 2021 05:54) (20 - 20)  SpO2: 100% (25 Mar 2021 05:54) (100% - 100%)   (if applicable)    Chest Tube (if applicable)    HEENT: Head is normocephalic and atraumatic. Extraocular muscles are intact. Mucous membranes are moist.     NECK: Supple, no palpable adenopathy.    LUNGS: Clear to auscultation, no wheezing, rales, or rhonchi.    HEART: Regular rate and rhythm without murmur.    ABDOMEN: Soft, nontender, and nondistended.  No hepatosplenomegaly is noted.    : No painful voiding, no pelvic pain    EXTREMITIES: Without any cyanosis, clubbing, rash, lesions or edema.    NEUROLOGIC: Awake, alert, oriented, grossly intact    SKIN: Warm, dry, good turgor.      LABS:                        7.8    13.47 )-----------( 273      ( 25 Mar 2021 06:40 )             24.2     03-25    137  |  102  |  21<H>  ----------------------------<  172<H>  3.8   |  25  |  1.14    Ca    8.5      25 Mar 2021 06:40  Phos  2.4     03-25  Mg     2.7     03-25    TPro  6.6  /  Alb  3.0<L>  /  TBili  0.4  /  DBili  x   /  AST  15  /  ALT  22  /  AlkPhos  69  03-25                        MICROBIOLOGY: (if applicable)    RADIOLOGY & ADDITIONAL STUDIES:  EKG:   CXR:  ECHO:    IMPRESSION: 81y Female PAST MEDICAL & SURGICAL HISTORY:  Obesity    Pacemaker    Atrial fibrillation    Hypothyroidism    Venous stasis    IBS (irritable bowel syndrome)    CHF (congestive heart failure), NYHA class I    HLD (Hyperlipidemia)    HTN (Hypertension)    Diabetes    Myocardial Infarction    CAD (Coronary Atherosclerotic Disease)    Asthma    S/P coronary angiogram     p/w         IMP: This  is a 79 years old morbid obese  Congolese-speaking woman  from home, ambulates with walker, lives with son, with  HFpEF, CAD (s/p stents), chronic venous stasis, DM, HTN and Afib (on Xarelto, s/p PPM), chronic bronchitis with asthmatic component ( on Oxygen PRN at home ), JOSE DANIEL ( supposed to be on nocturnal CPAP , but non compliant) presents to the ED with chief complaint of hypotension ,  SOB and  fatigue for almost 2 weeks due to a combination of symptomatic anemia , morbid obesity , and CAD.     Sugg;  -s/p EGD and colonoscopy  -started on H.Pylori treatment by ID  -hold PAP since pat was non compliant with devise  -Wt loss   -encourage compliance   -symbicort 160/4.5 q12h  -albuterol inhaler 2 puff q6h prn   -PT eval   -OOB to chair   -out pat pulmo f/u

## 2021-03-25 NOTE — PROGRESS NOTE ADULT - ATTENDING COMMENTS
Pt to get colonoscopy given suspicion of a transverse colonic polyp in the setting of severe Fe+ deficiency anemia. EGD noted. Pt ultimately needs GI clearance for A/C, given an active indication of A-fib. Will continue on IV Venofer and f/u retic count in response.     D/C planning for home pending colonoscopy being uneventful, with outpt folow up.    3/25/21- pt scheduled for repeat colonoscopy today given active melena.

## 2021-03-25 NOTE — PROGRESS NOTE ADULT - ASSESSMENT
Patient is a 81y old  Female from home, ambulates with walker, lives with son, with PMHx of HFpEF, CAD (s/p stents), chronic venous stasis, DM, HTN and Afib (on Xarelto, s/p PPM), chronic bronchitis with asthmatic component ( on Oxygen PRN at home ), JOSE DANIEL ( supposed to be on nocturnal CPAP , but non compliant), now presents to the ER for evaluation of hypotension, SOB and  fatigue for almost 2 weeks. Per daughter and granddaughter patient has been complaining of increasing fatigue and sob for last few weeks, seemed to be pale , patient endorses dark loose bowel movement. Patient has chronic diarrhea, but no abdominal pain. She has evaluated by GI team and now the ID consult requested to assist with further evaluation of chronic diarrhea.     #  H. Pylori associated Chronic active gastritis, diffuse, with focal lymphoid aggregate  # Chronic diarrhea- C.diff vs Malignancy - NO Malignancy Antrum biopsy   # Symptomatic Anemia  - s/p EGD and antrum biopsy shows H. Pylori associated Chronic active gastritis, diffuse, with focal lymphoid aggregate  # COVID negative 3/20/21    Would recommend:    1. Start on H.Pylori regimen, namely Amoxicillin 1 g q 12hours 7 days then 500mg Q 12hours, + Clarithromycin 500 mg q 12hours + Flagyl 500 mg q 12hours and + PPI  2. Need for total  of 14 days   3. Monitor H/H and transfuse as need  4. OOb to chair    Attending Attestation:    Spent more than 45 minutes on total encounter, more than 50 % of the visit was spent counseling and/or coordinating care by the Attending physician. Patient is a 81y old  Female from home, ambulates with walker, lives with son, with PMHx of HFpEF, CAD (s/p stents), chronic venous stasis, DM, HTN and Afib (on Xarelto, s/p PPM), chronic bronchitis with asthmatic component ( on Oxygen PRN at home ), JOSE DANIEL ( supposed to be on nocturnal CPAP , but non compliant), now presents to the ER for evaluation of hypotension, SOB and  fatigue for almost 2 weeks. Per daughter and granddaughter patient has been complaining of increasing fatigue and sob for last few weeks, seemed to be pale , patient endorses dark loose bowel movement. Patient has chronic diarrhea, but no abdominal pain. She has evaluated by GI team and now the ID consult requested to assist with further evaluation of chronic diarrhea.     #  H. Pylori associated Chronic active gastritis, diffuse, with focal lymphoid aggregate  # Chronic diarrhea- C.diff vs Malignancy - NO Malignancy Antrum biopsy   # Symptomatic Anemia  - s/p EGD and antrum biopsy shows H. Pylori associated Chronic active gastritis, diffuse, with focal lymphoid aggregate  # COVID negative 3/20/21  # S/p EGD 3/22  and s/p Colonoscopy 3/25    Would recommend:    1. Continue Amoxicillin 1 g q 12hours 7 days then 500mg Q 12hours, + start Clarithromycin 500 mg q 12hours and c/w  Flagyl 500 mg q 12hours and + PPI  2. Need for total  of 14 days   3. Monitor H/H and transfuse as need  4. OOb to chair    d/w House staff     Attending Attestation:    Spent more than 45 minutes on total encounter, more than 50 % of the visit was spent counseling and/or coordinating care by the Attending physician.

## 2021-03-25 NOTE — PROGRESS NOTE ADULT - SUBJECTIVE AND OBJECTIVE BOX
Patient is seen and examined at the bed side, is afebrile.  The Gastric antrum, biopsy pathology shows Chronic active gastritis, diffuse, with focal lymphoid aggregate; H. Pylori associated.      REVIEW OF SYSTEMS: All other review systems are negative      ALLERGIES: No Known Allergies      Vital Signs Last 24 Hrs  T(C): 37.5 (25 Mar 2021 05:54), Max: 37.5 (25 Mar 2021 05:54)  T(F): 99.5 (25 Mar 2021 05:54), Max: 99.5 (25 Mar 2021 05:54)  HR: 78 (25 Mar 2021 05:54) (65 - 78)  BP: 138/47 (25 Mar 2021 05:54) (138/47 - 153/59)  BP(mean): --  RR: 20 (25 Mar 2021 05:54) (18 - 20)  SpO2: 100% (25 Mar 2021 05:54) (97% - 100%)      PHYSICAL EXAM:  GENERAL: Not in distress   CHEST/LUNG: Not using accessory muscles   HEART: s1 and s2 present  ABDOMEN:  Nontender and  Nondistended  EXTREMITIES: No pedal  edema  CNS: Awake and Alert      LABS:                        7.8    13.47 )-----------( 273      ( 25 Mar 2021 06:40 )             24.2                           8.4    6.26  )-----------( 277      ( 24 Mar 2021 07:00 )             26.2                03-25    137  |  102  |  21<H>  ----------------------------<  172<H>  3.8   |  25  |  1.14    Ca    8.5      25 Mar 2021 06:40  Phos  2.4     03-25  Mg     2.7     03-25    TPro  6.6  /  Alb  3.0<L>  /  TBili  0.4  /  DBili  x   /  AST  15  /  ALT  22  /  AlkPhos  69  03-25    03-24    138  |  103  |  35<H>  ----------------------------<  151<H>  4.0   |  25  |  1.57<H>    Ca    8.4      24 Mar 2021 07:00  Phos  3.9     03-24  Mg     2.2     03-24    TPro  6.7  /  Alb  3.1<L>  /  TBili  0.3  /  DBili  x   /  AST  16  /  ALT  22  /  AlkPhos  71  -24      CAPILLARY BLOOD GLUCOSE  POCT Blood Glucose.: 262 mg/dL (22 Mar 2021 16:34)  POCT Blood Glucose.: 150 mg/dL (22 Mar 2021 12:45)  POCT Blood Glucose.: 147 mg/dL (22 Mar 2021 10:56)  POCT Blood Glucose.: 196 mg/dL (22 Mar 2021 07:40      Urinalysis Basic - ( 20 Mar 2021 20:54 )  Color: Yellow / Appearance: Clear / S.015 / pH: x  Gluc: x / Ketone: Negative  / Bili: Negative / Urobili: Negative   Blood: x / Protein: Negative / Nitrite: Negative   Leuk Esterase: Negative / RBC: x / WBC x   Sq Epi: x / Non Sq Epi: x / Bacteria: x        MEDICATIONS  (STANDING):    ALBUTerol    90 MICROgram(s) HFA Inhaler 2 Puff(s) Inhalation every 6 hours  amoxicillin 1000 milliGRAM(s) Oral every 12 hours  ascorbic acid 2000 milliGRAM(s) Oral every 8 hours  aspirin  chewable 81 milliGRAM(s) Oral daily  atorvastatin 40 milliGRAM(s) Oral at bedtime  budesonide 160 MICROgram(s)/formoterol 4.5 MICROgram(s) Inhaler 2 Puff(s) Inhalation two times a day  carvedilol 6.25 milliGRAM(s) Oral every 12 hours  cholecalciferol 1000 Unit(s) Oral daily  dronedarone 400 milliGRAM(s) Oral two times a day  fluticasone propionate 50 MICROgram(s)/spray Nasal Spray 1 Spray(s) Both Nostrils every 12 hours  gabapentin 300 milliGRAM(s) Oral daily  influenza   Vaccine 0.5 milliLiter(s) IntraMuscular once  insulin lispro (ADMELOG) corrective regimen sliding scale   SubCutaneous three times a day before meals  levothyroxine 175 MICROGram(s) Oral daily  metroNIDAZOLE    Tablet 500 milliGRAM(s) Oral every 12 hours  montelukast 10 milliGRAM(s) Oral daily  oxybutynin 5 milliGRAM(s) Oral two times a day  pantoprazole    Tablet 40 milliGRAM(s) Oral two times a day  sodium chloride 0.9%. 1000 milliLiter(s) (50 mL/Hr) IV Continuous <Continuous>  zinc sulfate 220 milliGRAM(s) Oral daily        RADIOLOGY & ADDITIONAL TESTS:    Surgical Pathology Report (21 @ 11:37)   Surgical Pathology Report:   ACCESSION No: [id number]   KWAME MOSQUERA 2   Surgical Final Report   Final Diagnosis   1. Gastric antrum, biopsy: Chronic active gastritis, diffuse,   with focal lymphoid aggregate; H. Pylori associated. (Special  stain for H. Pylori is positive)   2. Esophagus biopsy: Patchy acute esophagitis. Special stain for  fungi is negative.   Verified by: Mariam Perez M.D. Surgical Pathology Report (21 @ 11:37)       3/20/21 : CT Angio Abdomen and Pelvis w/ IV Cont (21 @ 22:55) No CT evidence of acute gastrointestinal hemorrhage. Few scattered colonic diverticula.    3/20/21 : CT Angio Abdomen and Pelvis w/ IV Cont (21 @ 22:55) LOWER CHEST: Partially imaged pacemaker leads. Mitral annular calcification.    3/20/21 : Xray Chest 1 View- PORTABLE-Urgent (21 @ 16:38): There is left-sided defibrillator. The heart is normal in size. The lungs are clear.          MICROBIOLOGY DATA:    COVID-19 Drew Domain Antibody (21 @ 11:07)   COVID-19 Drew Domain Antibody Result: >250.00:    Culture - Blood (21 @ 21:50)   Specimen Source: .Blood Blood-Venous   Culture Results: No growth to date.     Culture - Blood (21 @ 21:50)   Specimen Source: .Blood Blood-Venous   Culture Results: No growth to date.     COVID-19 PCR . (21 @ 16:32)   COVID-19 PCR: NotDetec:     MRSA/MSSA PCR (20 @ 14:34)   MRSA PCR Result.: NotDetec:     FLU A B RSV Detection by PCR (20 @ 20:00)   Flu A Result: NotDetec           Patient is seen and examined at the bed side, is afebrile. She is s/p Colonoscopy today and tolerated the procedure well.      REVIEW OF SYSTEMS: All other review systems are negative      ALLERGIES: No Known Allergies      Vital Signs Last 24 Hrs  T(C): 37.5 (25 Mar 2021 05:54), Max: 37.5 (25 Mar 2021 05:54)  T(F): 99.5 (25 Mar 2021 05:54), Max: 99.5 (25 Mar 2021 05:54)  HR: 78 (25 Mar 2021 05:54) (65 - 78)  BP: 138/47 (25 Mar 2021 05:54) (138/47 - 153/59)  BP(mean): --  RR: 20 (25 Mar 2021 05:54) (18 - 20)  SpO2: 100% (25 Mar 2021 05:54) (97% - 100%)      PHYSICAL EXAM:  GENERAL: Not in distress   CHEST/LUNG: Not using accessory muscles   HEART: s1 and s2 present  ABDOMEN:  Nontender and  Nondistended  EXTREMITIES: No pedal  edema  CNS: Awake and Alert      LABS:                        7.8    13.47 )-----------( 273      ( 25 Mar 2021 06:40 )             24.2                           8.4    6.26  )-----------( 277      ( 24 Mar 2021 07:00 )             26.2                03-25    137  |  102  |  21<H>  ----------------------------<  172<H>  3.8   |  25  |  1.14    Ca    8.5      25 Mar 2021 06:40  Phos  2.4     03-25  Mg     2.7     03-25    TPro  6.6  /  Alb  3.0<L>  /  TBili  0.4  /  DBili  x   /  AST  15  /  ALT  22  /  AlkPhos  69  03-25    03-24    138  |  103  |  35<H>  ----------------------------<  151<H>  4.0   |  25  |  1.57<H>    Ca    8.4      24 Mar 2021 07:00  Phos  3.9     03-24  Mg     2.2     03-24    TPro  6.7  /  Alb  3.1<L>  /  TBili  0.3  /  DBili  x   /  AST  16  /  ALT  22  /  AlkPhos  71  03-24      CAPILLARY BLOOD GLUCOSE  POCT Blood Glucose.: 262 mg/dL (22 Mar 2021 16:34)  POCT Blood Glucose.: 150 mg/dL (22 Mar 2021 12:45)  POCT Blood Glucose.: 147 mg/dL (22 Mar 2021 10:56)  POCT Blood Glucose.: 196 mg/dL (22 Mar 2021 07:40      Urinalysis Basic - ( 20 Mar 2021 20:54 )  Color: Yellow / Appearance: Clear / S.015 / pH: x  Gluc: x / Ketone: Negative  / Bili: Negative / Urobili: Negative   Blood: x / Protein: Negative / Nitrite: Negative   Leuk Esterase: Negative / RBC: x / WBC x   Sq Epi: x / Non Sq Epi: x / Bacteria: x        MEDICATIONS  (STANDING):    ALBUTerol    90 MICROgram(s) HFA Inhaler 2 Puff(s) Inhalation every 6 hours  amoxicillin 1000 milliGRAM(s) Oral every 12 hours  ascorbic acid 2000 milliGRAM(s) Oral every 8 hours  aspirin  chewable 81 milliGRAM(s) Oral daily  atorvastatin 40 milliGRAM(s) Oral at bedtime  budesonide 160 MICROgram(s)/formoterol 4.5 MICROgram(s) Inhaler 2 Puff(s) Inhalation two times a day  carvedilol 6.25 milliGRAM(s) Oral every 12 hours  cholecalciferol 1000 Unit(s) Oral daily  dronedarone 400 milliGRAM(s) Oral two times a day  fluticasone propionate 50 MICROgram(s)/spray Nasal Spray 1 Spray(s) Both Nostrils every 12 hours  gabapentin 300 milliGRAM(s) Oral daily  influenza   Vaccine 0.5 milliLiter(s) IntraMuscular once  insulin lispro (ADMELOG) corrective regimen sliding scale   SubCutaneous three times a day before meals  levothyroxine 175 MICROGram(s) Oral daily  metroNIDAZOLE    Tablet 500 milliGRAM(s) Oral every 12 hours  montelukast 10 milliGRAM(s) Oral daily  oxybutynin 5 milliGRAM(s) Oral two times a day  pantoprazole    Tablet 40 milliGRAM(s) Oral two times a day  sodium chloride 0.9%. 1000 milliLiter(s) (50 mL/Hr) IV Continuous <Continuous>  zinc sulfate 220 milliGRAM(s) Oral daily        RADIOLOGY & ADDITIONAL TESTS:    Surgical Pathology Report (21 @ 11:37)   Surgical Pathology Report:   ACCESSION No: [id number]   KWAME MOSQUERA 2   Surgical Final Report   Final Diagnosis   1. Gastric antrum, biopsy: Chronic active gastritis, diffuse,   with focal lymphoid aggregate; H. Pylori associated. (Special  stain for H. Pylori is positive)   2. Esophagus biopsy: Patchy acute esophagitis. Special stain for  fungi is negative.   Verified by: Mariam Perez M.D. Surgical Pathology Report (21 @ 11:37)       3/20/21 : CT Angio Abdomen and Pelvis w/ IV Cont (21 @ 22:55) No CT evidence of acute gastrointestinal hemorrhage. Few scattered colonic diverticula.    3/20/21 : CT Angio Abdomen and Pelvis w/ IV Cont (21 @ 22:55) LOWER CHEST: Partially imaged pacemaker leads. Mitral annular calcification.    3/20/21 : Xray Chest 1 View- PORTABLE-Urgent (21 @ 16:38): There is left-sided defibrillator. The heart is normal in size. The lungs are clear.          MICROBIOLOGY DATA:    COVID-19 Drew Domain Antibody (21 @ 11:07)   COVID-19 Drew Domain Antibody Result: >250.00:    Culture - Blood (21 @ 21:50)   Specimen Source: .Blood Blood-Venous   Culture Results: No growth to date.     Culture - Blood (21 @ 21:50)   Specimen Source: .Blood Blood-Venous   Culture Results: No growth to date.     COVID-19 PCR . (21 @ 16:32)   COVID-19 PCR: NotDetec:     MRSA/MSSA PCR (20 @ 14:34)   MRSA PCR Result.: NotDetec:     FLU A B RSV Detection by PCR (20 @ 20:00)   Flu A Result: NotDetec

## 2021-03-25 NOTE — PROGRESS NOTE ADULT - PROBLEM SELECTOR PLAN 2
-hold home Xarelto given GIB  -discuss asa with GI after EGD/col 3/22-25  -continue home coreg, dronedarone added  -[facility] PPM incompatible with interrogation   -EKG with BFB  -b/l LE doppler 3/21 negative for DVT  -cards Dr. Sandoval following

## 2021-03-25 NOTE — PROGRESS NOTE ADULT - PROBLEM SELECTOR PLAN 3
adm CXR concerning for central congestion, s/p x4 doses Lasix 40mg IV BID  -BNP1K  -TTE 3/21 with G1DD and mildly high RVP  -resume home torsemide 3/23  -Cr resolved  -pulm Dr. Cruz following

## 2021-03-25 NOTE — PROGRESS NOTE ADULT - SUBJECTIVE AND OBJECTIVE BOX
DATE OF PROCEDURE: 03/24/2021    Colonoscopy Report  Indication: Hematochezia   Referring: Rivera Moreland MD  Instrument:  # [id number]  Anesthesia: MAC  Consent:  Informed consent was obtained from the patient after providing any opportunity for questions  Procedure: After placing the patient in the left lateral decubitus position, the colonoscope was gently inserted into the rectum and advanced to the cecum. Color, texture, mucosa, and anatomy of the colon were carefully examined with the scope. The patient tolerated the procedure well. After completion of the exam, the patient was transferred to the recovery room.     Preparation:  Findings:   Anal Canal	        Large internal hemorrhoids  Rectum	                Normal  Sigmoid Colon 	        Few diverticula, retain stool  Descending Colon	Very large pedunculated polyp Removed by hot snare polypectomy (5 clips placed to prevent bleeding), Scattered diverticula   Splenic Flexure	Normal  Transverse Colon	Scattered diverticula retain stool  Hepatic Flexure	 4mm, and 5mm polyp removed by jumbo biopsy forceps  Ascending Colon	 Normal  Cecum	                 Normal  Ileo-cecal Valve	 15mm sessile polyp next to the valve removed by hot snare polypectomy 8 clips placed to prevent bleeding  Ileum 	                 Not intubated     EBL:0    Impression:     1. Colonic polyps  2. Diverticulosis  3. Poor prep  4. Internal hemorrhoids    Plan:  1. Follow up path  2. Repeat colonoscopy tomorrow         Procedure Start Time:  1035  Cecum Reached Time: 1037  Procedure End Time:   1157  Total Withdrawal Time: 70 minutes      Attending:     Rivera Moreland MD

## 2021-03-25 NOTE — PROGRESS NOTE ADULT - SUBJECTIVE AND OBJECTIVE BOX
PGY-1 Progress Note discussed with attending    PAGER #: [[phone]] TILL 5:00 PM  PLEASE CONTACT ON CALL TEAM:  - On Call Team (Please refer to Ana) FROM 5:00 PM - 8:30PM  - Nightfloat Team FROM 8:30 -7:30 AM    CHIEF COMPLAINT & BRIEF HOSPITAL COURSE: 79F Ethiopian-speaking from home with son, ambulates with walker, PMH HFpEF, CAD (s/p stents), chronic venous stasis, DM, HTN and Afib (Xarelto, s/p PPM), chronic bronchitis with asthmatic component (home O2 prn), JOSE DANIEL (noncompliant with nocturnal CPAP) presented 3/20 with x2 weeks increasing fatigue, SOB, and paleness, with prolonged history of dark bowel movements s/p possible transverse polyp colonoscopy 2020, no further fu. In ED, VS notable for /77 and SpO2 100% 4L NC. Labs notable for Hb 7.7 (baseline ~11), Fe 15, TIBC 457, 3% saturation. Trop, COVID, UA negative. EKG with BFB. CXR unremarkable.  CT a/p unremarkable for signs of bleeding. S/p x1 unit pRBC in ED. Admitted for workup and management of symptomatic anemia.    INTERVAL HPI/OVERNIGHT EVENTS: NAEON. SBPs 170s this am, improved to 130s this pm. Reports feeling hungry this am, and frustrated by her long hospital stay, with mild abdominal discomfort. Interview conducted with assistance of Ethiopian  Nicole [id number]. Abdominal XR negative for free air 3/24, repeat colonoscopy today notable for x2 polyps (removed), diverticulosis, and internal hemorrhoids. CLD resumed. EGD path +H pylori, treatment with   Amoxicillin 1g q12 x7 days followed by 500mg Q 12hours x7 days along with clarithromycin 500mg q12, Flagyl 500 mg q12, and x14 days. ID Dr. Butcher following. P repleted. Daughter updated.     MEDICATIONS  (STANDING):  ALBUTerol    90 MICROgram(s) HFA Inhaler 2 Puff(s) Inhalation every 6 hours  amoxicillin 1000 milliGRAM(s) Oral every 12 hours  ascorbic acid 2000 milliGRAM(s) Oral every 8 hours  aspirin  chewable 81 milliGRAM(s) Oral daily  atorvastatin 40 milliGRAM(s) Oral at bedtime  budesonide 160 MICROgram(s)/formoterol 4.5 MICROgram(s) Inhaler 2 Puff(s) Inhalation two times a day  carvedilol 6.25 milliGRAM(s) Oral every 12 hours  cholecalciferol 1000 Unit(s) Oral daily  dronedarone 400 milliGRAM(s) Oral two times a day  fluticasone propionate 50 MICROgram(s)/spray Nasal Spray 1 Spray(s) Both Nostrils every 12 hours  gabapentin 300 milliGRAM(s) Oral daily  influenza   Vaccine 0.5 milliLiter(s) IntraMuscular once  insulin lispro (ADMELOG) corrective regimen sliding scale   SubCutaneous three times a day before meals  levothyroxine 175 MICROGram(s) Oral daily  metroNIDAZOLE    Tablet 500 milliGRAM(s) Oral every 12 hours  montelukast 10 milliGRAM(s) Oral daily  oxybutynin 5 milliGRAM(s) Oral two times a day  pantoprazole    Tablet 40 milliGRAM(s) Oral two times a day  sodium chloride 0.9%. 1000 milliLiter(s) (50 mL/Hr) IV Continuous <Continuous>  zinc sulfate 220 milliGRAM(s) Oral daily    MEDICATIONS  (PRN):    REVIEW OF SYSTEMS:  CONSTITUTIONAL: No fever or fatigue  RESPIRATORY: No cough; No shortness of breath  CARDIOVASCULAR: No chest pain, no dizziness  GASTROINTESTINAL: mild abdominal pain after taking medication. No nausea, vomiting, diarrhea, constipation. +dark stools  GENITOURINARY: No dysuria or hematuria  NEUROLOGICAL: No headache  SKIN: No itching or rashes  EXT: b/l knee pain "from the inside", chronic b/l LBP  all other systems reviewed and are negative      Vital Signs Last 24 Hrs  T(C): 37.5 (25 Mar 2021 05:54), Max: 37.5 (25 Mar 2021 05:54)  T(F): 99.5 (25 Mar 2021 05:54), Max: 99.5 (25 Mar 2021 05:54)  HR: 78 (25 Mar 2021 05:54) (65 - 78)  BP: 138/47 (25 Mar 2021 05:54) (138/47 - 153/59)  BP(mean): --  RR: 20 (25 Mar 2021 05:54) (18 - 20)  SpO2: 100% (25 Mar 2021 05:54) (97% - 100%)    PHYSICAL EXAMINATION:  GENERAL: NAD, obese, elderly woman  HEAD:  Atraumatic, Normocephalic  EYES: periorbital skin pink, eomi, perrl  NECK: Supple  CHEST/LUNG: no increased WOB, NC, no cough noted  HEART: RRR  ABDOMEN: Soft, Nontender, obese, Bowel sounds present  NERVOUS SYSTEM: alert and oriented x3  EXTREMITIES:  2+ Peripheral Pulses, No edema, +hematoma right knee, NTTP  SKIN: warm dry                        7.8    13.47 )-----------( 273      ( 25 Mar 2021 06:40 )             24.2     03-25    137  |  102  |  21<H>  ----------------------------<  172<H>  3.8   |  25  |  1.14    Ca    8.5      25 Mar 2021 06:40  Phos  2.4     03-25  Mg     2.7     03-25    TPro  6.6  /  Alb  3.0<L>  /  TBili  0.4  /  DBili  x   /  AST  15  /  ALT  22  /  AlkPhos  69  03-25    LIVER FUNCTIONS - ( 25 Mar 2021 06:40 )  Alb: 3.0 g/dL / Pro: 6.6 g/dL / ALK PHOS: 69 U/L / ALT: 22 U/L DA / AST: 15 U/L / GGT: x                   CAPILLARY BLOOD GLUCOSE      RADIOLOGY & ADDITIONAL TESTS:

## 2021-03-25 NOTE — PROGRESS NOTE ADULT - SUBJECTIVE AND OBJECTIVE BOX
CHIEF COMPLAINT:Patient is a 81y old  Female who presents with a chief complaint of symptomatic anemia.Pt appears comfortable.    	  REVIEW OF SYSTEMS:  CONSTITUTIONAL: No fever, weight loss, or fatigue  EYES: No eye pain, visual disturbances, or discharge  ENT:  No difficulty hearing, tinnitus, vertigo; No sinus or throat pain  NECK: No pain or stiffness  RESPIRATORY: No cough, wheezing, chills or hemoptysis; No Shortness of Breath  CARDIOVASCULAR: No chest pain, palpitations, passing out, dizziness, or leg swelling  GASTROINTESTINAL: No abdominal or epigastric pain. No nausea, vomiting, or hematemesis; No diarrhea or constipation. No melena or hematochezia.  GENITOURINARY: No dysuria, frequency, hematuria, or incontinence  NEUROLOGICAL: No headaches, memory loss, loss of strength, numbness, or tremors  SKIN: No itching, burning, rashes, or lesions   LYMPH Nodes: No enlarged glands  ENDOCRINE: No heat or cold intolerance; No hair loss  MUSCULOSKELETAL: No joint pain or swelling; No muscle, back, or extremity pain  PSYCHIATRIC: No depression, anxiety, mood swings, or difficulty sleeping  HEME/LYMPH: No easy bruising, or bleeding gums  ALLERGY AND IMMUNOLOGIC: No hives or eczema	        PHYSICAL EXAM:  T(C): 37.5 (03-25-21 @ 05:54), Max: 37.5 (03-25-21 @ 05:54)  HR: 78 (03-25-21 @ 05:54) (64 - 78)  BP: 138/47 (03-25-21 @ 05:54) (138/47 - 172/55)  RR: 20 (03-25-21 @ 05:54) (18 - 20)  SpO2: 100% (03-25-21 @ 05:54) (97% - 100%)        Appearance: Normal	  HEENT:   Normal oral mucosa, PERRL, EOMI	  Lymphatic: No lymphadenopathy  Cardiovascular: Normal S1 S2, No JVD, No murmurs, No edema  Respiratory: Lungs clear to auscultation	  Psychiatry: A & O x 3, Mood & affect appropriate  Gastrointestinal:  Soft, Non-tender, + BS	  Skin: No rashes, No ecchymoses, No cyanosis	  Neurologic: Non-focal  Extremities: Normal range of motion, No clubbing, cyanosis or edema  Vascular: Peripheral pulses palpable 2+ bilaterally    MEDICATIONS  (STANDING):  ALBUTerol    90 MICROgram(s) HFA Inhaler 2 Puff(s) Inhalation every 6 hours  amoxicillin 1000 milliGRAM(s) Oral every 12 hours  ascorbic acid 2000 milliGRAM(s) Oral every 8 hours  aspirin  chewable 81 milliGRAM(s) Oral daily  atorvastatin 40 milliGRAM(s) Oral at bedtime  budesonide 160 MICROgram(s)/formoterol 4.5 MICROgram(s) Inhaler 2 Puff(s) Inhalation two times a day  carvedilol 6.25 milliGRAM(s) Oral every 12 hours  cholecalciferol 1000 Unit(s) Oral daily  dronedarone 400 milliGRAM(s) Oral two times a day  fluticasone propionate 50 MICROgram(s)/spray Nasal Spray 1 Spray(s) Both Nostrils every 12 hours  gabapentin 300 milliGRAM(s) Oral daily  influenza   Vaccine 0.5 milliLiter(s) IntraMuscular once  insulin lispro (ADMELOG) corrective regimen sliding scale   SubCutaneous three times a day before meals  levothyroxine 175 MICROGram(s) Oral daily  metroNIDAZOLE    Tablet 500 milliGRAM(s) Oral every 12 hours  montelukast 10 milliGRAM(s) Oral daily  oxybutynin 5 milliGRAM(s) Oral two times a day  pantoprazole    Tablet 40 milliGRAM(s) Oral two times a day  sodium chloride 0.9%. 1000 milliLiter(s) (50 mL/Hr) IV Continuous <Continuous>  zinc sulfate 220 milliGRAM(s) Oral daily      	  	  LABS:	 	                        7.8    13.47 )-----------( 273      ( 25 Mar 2021 06:40 )             24.2     03-25    137  |  102  |  21<H>  ----------------------------<  172<H>  3.8   |  25  |  1.14    Ca    8.5      25 Mar 2021 06:40  Phos  2.4     03-25  Mg     2.7     03-25    TPro  6.6  /  Alb  3.0<L>  /  TBili  0.4  /  DBili  x   /  AST  15  /  ALT  22  /  AlkPhos  69  03-25    proBNP: Serum Pro-Brain Natriuretic Peptide: 1155 pg/mL (03-21 @ 07:15)    Lipid Profile: Cholesterol 134  LDL --  HDL 54  TG 82  Cholesterol 145  LDL --  HDL 55  TG 86    TSH: Thyroid Stimulating Hormone, Serum: 2.67 uU/mL (03-22 @ 07:34)  Thyroid Stimulating Hormone, Serum: 3.61 uU/mL (03-21 @ 07:15)      	    Surgical Pathology Report:   ACCESSION No: [id number]   KWAME MOSQUERA 2   Surgical Final Report   Final Diagnosis   1. Gastric antrum, biopsy: Chronic active gastritis, diffuse,   with focal lymphoid aggregate; H. Pylori associated. (Special   stain for H. Pylori is positive)   2. Esophagus biopsy: Patchy acute esophagitis. Special stain for   fungi is   negative.   Verified by: Mariam Perez M.D.

## 2021-03-25 NOTE — PROGRESS NOTE ADULT - ASSESSMENT
79 years old Irish-speaking female from home, ambulates with walker, lives with son, with PMHx of HFpEF, CAD (s/p stents), chronic venous stasis, DM, HTN and Afib (on Xarelto, s/p PPM), chronic bronchitis with asthmatic component ( on Oxygen PRN at home ), JOSE DANIEL ( supposed to be on nocturnal CPAP , but non compliant) presents to the ED with chief complaint of hypotension , SOB and  fatigue for almost 2 weeks.,symptomatic Fe def anemia.  1.GI f/u,colonoscopy-p.  2.PAF-multaq, coreg, asa.  3.HTN-cont bp medication.  4.CAD-asa,coreg,statin.  5.DM-insulin.  6.Anemia-IV iron.  7.Asthma-MDI.  8.Tx for H.Pylori+.  9.GI and DVT prophylaxis.

## 2021-03-25 NOTE — PROGRESS NOTE ADULT - ASSESSMENT
79F Swedish-speaking from home with son, ambulates with walker, PMH HFpEF, CAD (s/p stents), chronic venous stasis, DM, HTN and Afib (Xarelto, s/p PPM), presented 3/20 with x2 weeks increasing fatigue, SOB, and paleness, with prolonged history of dark bowel movements s/p possible transverse polyp colonoscopy 2020, admitted for workup and management of symptomatic anemia.

## 2021-03-25 NOTE — PROGRESS NOTE ADULT - SUBJECTIVE AND OBJECTIVE BOX
KWAME MOSQUERA  MR# [id number]  81yFemale        Patient is a 81y old  Female who presents with a chief complaint of symptomatic anemia (25 Mar 2021 12:23)      INTERVAL HPI/OVERNIGHT EVENTS:  Patient seen and examined at bedside. No notations of chest pain, palpitation, SOB, orthopnea, nausea, vomiting or abdominal pain.    ALLERGIES  No Known Allergies      MEDICATIONS  ALBUTerol    90 MICROgram(s) HFA Inhaler 2 Puff(s) Inhalation every 6 hours  amoxicillin 1000 milliGRAM(s) Oral every 12 hours  ascorbic acid 2000 milliGRAM(s) Oral every 8 hours  aspirin  chewable 81 milliGRAM(s) Oral daily  atorvastatin 40 milliGRAM(s) Oral at bedtime  budesonide 160 MICROgram(s)/formoterol 4.5 MICROgram(s) Inhaler 2 Puff(s) Inhalation two times a day  carvedilol 6.25 milliGRAM(s) Oral every 12 hours  cholecalciferol 1000 Unit(s) Oral daily  dronedarone 400 milliGRAM(s) Oral two times a day  fluticasone propionate 50 MICROgram(s)/spray Nasal Spray 1 Spray(s) Both Nostrils every 12 hours  gabapentin 300 milliGRAM(s) Oral daily  influenza   Vaccine 0.5 milliLiter(s) IntraMuscular once  insulin lispro (ADMELOG) corrective regimen sliding scale   SubCutaneous three times a day before meals  levothyroxine 175 MICROGram(s) Oral daily  metroNIDAZOLE    Tablet 500 milliGRAM(s) Oral every 12 hours  montelukast 10 milliGRAM(s) Oral daily  oxybutynin 5 milliGRAM(s) Oral two times a day  pantoprazole    Tablet 40 milliGRAM(s) Oral two times a day  sodium chloride 0.9%. 1000 milliLiter(s) IV Continuous <Continuous>  zinc sulfate 220 milliGRAM(s) Oral daily              REVIEW OF SYSTEMS:  CONSTITUTIONAL: No fever, weight loss, or fatigue  EYES: No eye pain, visual disturbances, or discharge  ENT:  No difficulty hearing, tinnitus, vertigo; No sinus or throat pain  NECK: No pain or stiffness  RESPIRATORY: No cough, wheezing, chills or hemoptysis; No Shortness of Breath  CARDIOVASCULAR: No chest pain, palpitations, passing out, dizziness, or leg swelling  GASTROINTESTINAL: No abdominal or epigastric pain. No nausea, vomiting, or hematemesis; No diarrhea or constipation. No melena or hematochezia.  GENITOURINARY: No dysuria, frequency, hematuria, or incontinence  NEUROLOGICAL: No headaches, memory loss, loss of strength, numbness, or tremors  SKIN: No itching, burning, rashes, or lesions   LYMPH Nodes: No enlarged glands  ENDOCRINE: No heat or cold intolerance; No hair loss  MUSCULOSKELETAL: No joint pain or swelling; No muscle, back, or extremity pain  PSYCHIATRIC: No depression, anxiety, mood swings, or difficulty sleeping  HEME/LYMPH: No easy bruising, or bleeding gums  ALLERGY AND IMMUNOLOGIC: No hives or eczema	    [ ] All others negative	  [ ] Unable to obtain      T(C): 37.5 (03-25-21 @ 05:54), Max: 37.5 (03-25-21 @ 05:54)  T(F): 99.5 (03-25-21 @ 05:54), Max: 99.5 (03-25-21 @ 05:54)  HR: 78 (03-25-21 @ 05:54) (64 - 78)  BP: 138/47 (03-25-21 @ 05:54) (138/47 - 172/55)  RR: 20 (03-25-21 @ 05:54) (18 - 20)  SpO2: 100% (03-25-21 @ 05:54) (97% - 100%)  Wt(kg): --    I&O's Summary        PHYSICAL EXAM:  A X O x  HEAD:  Atraumatic, Normocephalic  EYES: EOMI, PERRLA, conjunctiva and sclera clear  NECK: Supple, No JVD, Normal thyroid  Resp: CTAB, No crackles, wheezing,   CVS: Regular rate and rhythm; No discernable murmurs, rubs, or gallops  ABD: Soft, Nontender, Nondistended; Bowel sounds present  EXTREMITIES:  2+ Peripheral Pulses, No edema  LYMPH: No dicernable lymphadenopathy noted  GENERAL: NAD, well-groomed, well-developed      LABS:                        7.8    13.47 )-----------( 273      ( 25 Mar 2021 06:40 )             24.2     03-25    137  |  102  |  21<H>  ----------------------------<  172<H>  3.8   |  25  |  1.14    Ca    8.5      25 Mar 2021 06:40  Phos  2.4     03-25  Mg     2.7     03-25    TPro  6.6  /  Alb  3.0<L>  /  TBili  0.4  /  DBili  x   /  AST  15  /  ALT  22  /  AlkPhos  69  03-25        CAPILLARY BLOOD GLUCOSE      POCT Blood Glucose.: 201 mg/dL (25 Mar 2021 11:17)  POCT Blood Glucose.: 220 mg/dL (25 Mar 2021 07:34)  POCT Blood Glucose.: 139 mg/dL (24 Mar 2021 21:18)  POCT Blood Glucose.: 202 mg/dL (24 Mar 2021 16:39)      Troponins:  ProBNP:  Lipid Profile:   HgA1c:  TSH:           RADIOLOGY & ADDITIONAL TESTS:    Imaging Personally Reviewed:  [ ] YES  [ ] NO      Consultant(s) Notes Reviewed:  [x ] YES  [ ] NO    Care Discussed with Consultants/Other Providers [ x] YES  [ ] NO          PAST MEDICAL & SURGICAL HISTORY:  Obesity    Pacemaker    Atrial fibrillation    Hypothyroidism    Venous stasis    IBS (irritable bowel syndrome)    CHF (congestive heart failure), NYHA class I    HLD (Hyperlipidemia)    HTN (Hypertension)    Diabetes    Myocardial Infarction    CAD (Coronary Atherosclerotic Disease)    Asthma    S/P coronary angiogram          Anemia    H/o or current diagnosis of HF- Contraindication to ACEI/ARBs    H/o or current diagnosis of HF- ACEI/ARB contraindication unknown    H/o or current diagnosis of HF- Contraindication to ACEI/ARBs    H/o or current diagnosis of HF- ACEI/ARB contraindication unknown    H/o or current diagnosis of HF- Contraindication to ACEI/ARBs    H/o or current diagnosis of HF- ACEI/ARB contraindication unknown    Family history of heart disease    Handoff    MEWS Score    Obesity    Pacemaker    Atrial fibrillation    Hypothyroidism    Venous stasis    IBS (irritable bowel syndrome)    CHF (congestive heart failure), NYHA class I    HLD (Hyperlipidemia)    HTN (Hypertension)    Diabetes    Myocardial Infarction    CAD (Coronary Atherosclerotic Disease)    Asthma    Symptomatic anemia    Hypothyroidism    Prophylactic measure    JOSE DANIEL (obstructive sleep apnea)    Asthma    HTN (Hypertension)    Diabetes    CHF (congestive heart failure), NYHA class I    Atrial fibrillation    Symptomatic anemia    S/P coronary angiogram    No significant past surgical history    A) WEAKNESS    90+    Asthma    CAD (coronary atherosclerotic disease)    [email or web address]

## 2021-03-25 NOTE — PROGRESS NOTE ADULT - PROBLEM SELECTOR PLAN 1
presented 3/20 with x2 weeks increasing fatigue, SOB, and paleness, with prolonged history of dark bowel movements s/p possible transverse polyp colonoscopy 2020, no further fu  -in ED, VS notable for /77 and SpO2 100% 4L NC  -trop, COVID, UA negative  -pre-RBC adm Hb 7.7 (baseline ~11), Fe 15, TIBC 457, 3% saturation  -CT a/p unremarkable for signs of bleeding  -s/p x1 unit pRBC in ED  -fu FOBT  -IV iron 3/20-23, PPI BID  -Fe and %sat improved s/p transfusion and Venofer, Hb stable, transfuse if <8  -EGD/col 3/22 with GI Dr. Moreland, gastritis, esophagitis, no GIB, path + H pylori  -treat with amoxicillin 1g q12 x7 days followed by 500mg Q 12hours x7 days along with clarithromycin 500mg q12, Flagyl 500 mg q12, and PPI x14 days  -ID Dr. Butcher following   -colonoscopy 3/24 with polyps  -abdominal XR negative for free air 3/24   -repeat colonoscopy 3/25 notable for x2 polyps (removed), diverticulosis, and internal hemorrhoids  -CLD resumed  -continuing PPI

## 2021-03-25 NOTE — PROGRESS NOTE ADULT - SUBJECTIVE AND OBJECTIVE BOX
DATE OF PROCEDURE: 03/25/2021     Colonoscopy Report  Indication: Hematochezia post polypectomy  Referring: Rivera Moreland MD  Instrument:  # [id number]  Anesthesia: MAC  Consent:  Informed consent was obtained from the patient after providing any opportunity for questions  Procedure: After placing the patient in the left lateral decubitus position, the colonoscope was gently inserted into the rectum and advanced to the cecum. Color, texture, mucosa, and anatomy of the colon were carefully examined with the scope. The patient tolerated the procedure well. After completion of the exam, the patient was transferred to the recovery room.     Preparation:  Findings:   Anal Canal	        Large internal hemorrhoids  Rectum	                Normal  Sigmoid Colon 	        Few diverticula  Descending Colon	Post polypectomy site with clips clean with no bleeding, scattered diverticula  Splenic Flexure	Normal  Transverse Colon	Scattered diverticula, Normal  Hepatic Flexure	   2 small polyps not removed due to recent bleeding  Ascending Colon	 post polypectomy site with clips clean with no bleeding  Cecum	                 Normal  Ileo-cecal Valve	 Normal   Ileum 	                 Not intubated     EBL:0    Impression:     1. Colonic polyps  2. Diverticulosis  3. Post polypectomy no bleeding  4. Internal hemorrhoids       Plan:  1. Follow up path  2. High fiber diet  3. Sitz bath      Procedure Start Time:  1:58  Cecum Reached Time: 2:00  Procedure End Time:  2:07   Total Withdrawal Time: 7 minutes      Attending:     Rivera Moreland MD

## 2021-03-26 ENCOUNTER — TRANSCRIPTION ENCOUNTER (OUTPATIENT)
Age: 81
End: 2021-03-26

## 2021-03-26 LAB
ALBUMIN SERPL ELPH-MCNC: 3.2 G/DL — LOW (ref 3.5–5)
ALP SERPL-CCNC: 70 U/L — SIGNIFICANT CHANGE UP (ref 40–120)
ALT FLD-CCNC: 25 U/L DA — SIGNIFICANT CHANGE UP (ref 10–60)
ANION GAP SERPL CALC-SCNC: 8 MMOL/L — SIGNIFICANT CHANGE UP (ref 5–17)
AST SERPL-CCNC: 21 U/L — SIGNIFICANT CHANGE UP (ref 10–40)
BILIRUB SERPL-MCNC: 0.3 MG/DL — SIGNIFICANT CHANGE UP (ref 0.2–1.2)
BUN SERPL-MCNC: 16 MG/DL — SIGNIFICANT CHANGE UP (ref 7–18)
CALCIUM SERPL-MCNC: 8.1 MG/DL — LOW (ref 8.4–10.5)
CHLORIDE SERPL-SCNC: 102 MMOL/L — SIGNIFICANT CHANGE UP (ref 96–108)
CO2 SERPL-SCNC: 22 MMOL/L — SIGNIFICANT CHANGE UP (ref 22–31)
CREAT SERPL-MCNC: 1.13 MG/DL — SIGNIFICANT CHANGE UP (ref 0.5–1.3)
GLUCOSE BLDC GLUCOMTR-MCNC: 179 MG/DL — HIGH (ref 70–99)
GLUCOSE BLDC GLUCOMTR-MCNC: 219 MG/DL — HIGH (ref 70–99)
GLUCOSE BLDC GLUCOMTR-MCNC: 232 MG/DL — HIGH (ref 70–99)
GLUCOSE BLDC GLUCOMTR-MCNC: 290 MG/DL — HIGH (ref 70–99)
GLUCOSE BLDC GLUCOMTR-MCNC: 527 MG/DL — CRITICAL HIGH (ref 70–99)
GLUCOSE SERPL-MCNC: 212 MG/DL — HIGH (ref 70–99)
HCT VFR BLD CALC: 25 % — LOW (ref 34.5–45)
HGB BLD-MCNC: 7.9 G/DL — LOW (ref 11.5–15.5)
MAGNESIUM SERPL-MCNC: 2.1 MG/DL — SIGNIFICANT CHANGE UP (ref 1.6–2.6)
MCHC RBC-ENTMCNC: 25.8 PG — LOW (ref 27–34)
MCHC RBC-ENTMCNC: 31.6 GM/DL — LOW (ref 32–36)
MCV RBC AUTO: 81.7 FL — SIGNIFICANT CHANGE UP (ref 80–100)
NRBC # BLD: 0 /100 WBCS — SIGNIFICANT CHANGE UP (ref 0–0)
PHOSPHATE SERPL-MCNC: 1.7 MG/DL — LOW (ref 2.5–4.5)
PLATELET # BLD AUTO: 269 K/UL — SIGNIFICANT CHANGE UP (ref 150–400)
POTASSIUM SERPL-MCNC: 4.4 MMOL/L — SIGNIFICANT CHANGE UP (ref 3.5–5.3)
POTASSIUM SERPL-SCNC: 4.4 MMOL/L — SIGNIFICANT CHANGE UP (ref 3.5–5.3)
PROT SERPL-MCNC: 7 G/DL — SIGNIFICANT CHANGE UP (ref 6–8.3)
RBC # BLD: 3.06 M/UL — LOW (ref 3.8–5.2)
RBC # FLD: 14.6 % — HIGH (ref 10.3–14.5)
SODIUM SERPL-SCNC: 132 MMOL/L — LOW (ref 135–145)
SURGICAL PATHOLOGY STUDY: SIGNIFICANT CHANGE UP
WBC # BLD: 8.47 K/UL — SIGNIFICANT CHANGE UP (ref 3.8–10.5)
WBC # FLD AUTO: 8.47 K/UL — SIGNIFICANT CHANGE UP (ref 3.8–10.5)

## 2021-03-26 RX ORDER — FLUTICASONE PROPIONATE 50 MCG
1 SPRAY, SUSPENSION NASAL
Qty: 0 | Refills: 0 | DISCHARGE

## 2021-03-26 RX ORDER — CLARITHROMYCIN 500 MG
1 TABLET ORAL
Qty: 26 | Refills: 0
Start: 2021-03-26 | End: 2021-04-07

## 2021-03-26 RX ORDER — DEXLANSOPRAZOLE 30 MG/1
1 CAPSULE, DELAYED RELEASE ORAL
Qty: 0 | Refills: 0 | DISCHARGE

## 2021-03-26 RX ORDER — GABAPENTIN 400 MG/1
1 CAPSULE ORAL
Qty: 0 | Refills: 0 | DISCHARGE

## 2021-03-26 RX ORDER — AMOXICILLIN 250 MG/5ML
2 SUSPENSION, RECONSTITUTED, ORAL (ML) ORAL
Qty: 21 | Refills: 0
Start: 2021-03-26 | End: 2021-04-08

## 2021-03-26 RX ORDER — PANTOPRAZOLE SODIUM 20 MG/1
1 TABLET, DELAYED RELEASE ORAL
Qty: 28 | Refills: 0
Start: 2021-03-26 | End: 2021-04-08

## 2021-03-26 RX ORDER — CHOLECALCIFEROL (VITAMIN D3) 125 MCG
1 CAPSULE ORAL
Qty: 30 | Refills: 0
Start: 2021-03-26 | End: 2021-04-24

## 2021-03-26 RX ORDER — ASCORBIC ACID 60 MG
2 TABLET,CHEWABLE ORAL
Qty: 180 | Refills: 0
Start: 2021-03-26 | End: 2021-04-24

## 2021-03-26 RX ORDER — FUROSEMIDE 40 MG
20 TABLET ORAL DAILY
Refills: 0 | Status: DISCONTINUED | OUTPATIENT
Start: 2021-03-26 | End: 2021-03-30

## 2021-03-26 RX ORDER — GABAPENTIN 400 MG/1
1 CAPSULE ORAL
Qty: 0 | Refills: 0 | DISCHARGE
Start: 2021-03-26

## 2021-03-26 RX ORDER — ASPIRIN/CALCIUM CARB/MAGNESIUM 324 MG
1 TABLET ORAL
Qty: 0 | Refills: 0 | DISCHARGE
Start: 2021-03-26

## 2021-03-26 RX ORDER — AMOXICILLIN 250 MG/5ML
2 SUSPENSION, RECONSTITUTED, ORAL (ML) ORAL
Qty: 38 | Refills: 0
Start: 2021-03-26 | End: 2021-04-06

## 2021-03-26 RX ORDER — AMOXICILLIN 250 MG/5ML
2 SUSPENSION, RECONSTITUTED, ORAL (ML) ORAL
Qty: 42 | Refills: 0
Start: 2021-03-26 | End: 2021-04-08

## 2021-03-26 RX ORDER — INSULIN GLARGINE 100 [IU]/ML
10 INJECTION, SOLUTION SUBCUTANEOUS AT BEDTIME
Refills: 0 | Status: DISCONTINUED | OUTPATIENT
Start: 2021-03-26 | End: 2021-04-06

## 2021-03-26 RX ORDER — IRON SUCROSE 20 MG/ML
200 INJECTION, SOLUTION INTRAVENOUS ONCE
Refills: 0 | Status: COMPLETED | OUTPATIENT
Start: 2021-03-26 | End: 2021-03-26

## 2021-03-26 RX ORDER — METRONIDAZOLE 500 MG
1 TABLET ORAL
Qty: 36 | Refills: 0
Start: 2021-03-26 | End: 2021-04-06

## 2021-03-26 RX ORDER — METRONIDAZOLE 500 MG
1 TABLET ORAL
Qty: 28 | Refills: 0
Start: 2021-03-26 | End: 2021-04-08

## 2021-03-26 RX ORDER — CLARITHROMYCIN 500 MG
1 TABLET ORAL
Qty: 28 | Refills: 0
Start: 2021-03-26 | End: 2021-04-08

## 2021-03-26 RX ADMIN — Medication 500 MILLIGRAM(S): at 06:55

## 2021-03-26 RX ADMIN — Medication 1000 MILLIGRAM(S): at 17:20

## 2021-03-26 RX ADMIN — Medication 2000 MILLIGRAM(S): at 21:35

## 2021-03-26 RX ADMIN — Medication 175 MICROGRAM(S): at 06:55

## 2021-03-26 RX ADMIN — Medication 4: at 17:18

## 2021-03-26 RX ADMIN — CARVEDILOL PHOSPHATE 6.25 MILLIGRAM(S): 80 CAPSULE, EXTENDED RELEASE ORAL at 17:21

## 2021-03-26 RX ADMIN — IRON SUCROSE 110 MILLIGRAM(S): 20 INJECTION, SOLUTION INTRAVENOUS at 14:50

## 2021-03-26 RX ADMIN — Medication 500 MILLIGRAM(S): at 17:20

## 2021-03-26 RX ADMIN — Medication 5 MILLIGRAM(S): at 06:55

## 2021-03-26 RX ADMIN — Medication 2: at 21:20

## 2021-03-26 RX ADMIN — CARVEDILOL PHOSPHATE 6.25 MILLIGRAM(S): 80 CAPSULE, EXTENDED RELEASE ORAL at 06:55

## 2021-03-26 RX ADMIN — Medication 6: at 12:02

## 2021-03-26 RX ADMIN — Medication 20 MILLIGRAM(S): at 17:24

## 2021-03-26 RX ADMIN — Medication 1000 UNIT(S): at 12:07

## 2021-03-26 RX ADMIN — PANTOPRAZOLE SODIUM 40 MILLIGRAM(S): 20 TABLET, DELAYED RELEASE ORAL at 17:22

## 2021-03-26 RX ADMIN — GABAPENTIN 300 MILLIGRAM(S): 400 CAPSULE ORAL at 12:09

## 2021-03-26 RX ADMIN — ZINC SULFATE TAB 220 MG (50 MG ZINC EQUIVALENT) 220 MILLIGRAM(S): 220 (50 ZN) TAB at 12:07

## 2021-03-26 RX ADMIN — Medication 2000 MILLIGRAM(S): at 14:50

## 2021-03-26 RX ADMIN — MONTELUKAST 10 MILLIGRAM(S): 4 TABLET, CHEWABLE ORAL at 12:07

## 2021-03-26 RX ADMIN — Medication 4: at 07:55

## 2021-03-26 RX ADMIN — Medication 5 MILLIGRAM(S): at 17:22

## 2021-03-26 RX ADMIN — PANTOPRAZOLE SODIUM 40 MILLIGRAM(S): 20 TABLET, DELAYED RELEASE ORAL at 06:55

## 2021-03-26 RX ADMIN — INSULIN GLARGINE 10 UNIT(S): 100 INJECTION, SOLUTION SUBCUTANEOUS at 21:32

## 2021-03-26 RX ADMIN — Medication 81 MILLIGRAM(S): at 12:05

## 2021-03-26 RX ADMIN — Medication 1000 MILLIGRAM(S): at 06:55

## 2021-03-26 RX ADMIN — Medication 2000 MILLIGRAM(S): at 06:55

## 2021-03-26 NOTE — PROGRESS NOTE ADULT - SUBJECTIVE AND OBJECTIVE BOX
[   ] ICU                                          [   ] CCU                                      [ X  ] Medical Floor    Patient is a 81 year old femalewith Gastrointestinal bleeding and symptomatic anemia. GI consulted to evaluate.        79 years old female from home, ambulates with walker, lives with son, with past medical history significant for CAD (s/p stents), CHF, chronic venous stasis, DM, HTN, Afib (on Xarelto, s/p PPM), chronic bronchitis with asthmatic component ( on Oxygen PRN at home ), and osteoarthritis presented to the emergency room with 2 weeks history of worsening SOB, Fatigue, associated with a few episodes of black stool.    pt had virtual  colonoscopy 1 year ago and the result was questionable. She never followed up.  Patient also c/o poor appetite with chronic diarrhea but denies abdominal pain, nausea, vomiting, hematemesis, hematochezia, melena, fever, chills, chest pain, SOB, cough, hematuria, dysuria or diarrhea.       Today patient appears comfortable with no new complaint. No abdominal pain, N/V, hematemesis, hematochezia, melena, fever, chills, chest pain, SOB, cough or diarrhea reported.      PAIN MANAGEMENT:  Pain Scale:                 0/10  Pain Location:      Prior Colonoscopy:  No prior colonoscopy    PAST MEDICAL HISTORY  Obesity  Atrial fibrillation  Hypothyroidism  Venous stasis  Irritable bowel syndrome   CHF    Hyperlipidemia   Hypertension  DM  Myocardial Infarction  CAD    Asthma        PAST SURGICAL HISTORY  Coronary angiogram  Coronary stent placement  Pacemaker placement           Allergies    No Known Allergies    Intolerances  None         MEDICATIONS  (STANDING):  ALBUTerol    90 MICROgram(s) HFA Inhaler 2 Puff(s) Inhalation every 6 hours  atorvastatin 40 milliGRAM(s) Oral at bedtime  budesonide 160 MICROgram(s)/formoterol 4.5 MICROgram(s) Inhaler 2 Puff(s) Inhalation two times a day  carvedilol 6.25 milliGRAM(s) Oral every 12 hours  fluticasone propionate 50 MICROgram(s)/spray Nasal Spray 1 Spray(s) Both Nostrils every 12 hours  furosemide   Injectable 40 milliGRAM(s) IV Push every 12 hours  gabapentin 300 milliGRAM(s) Oral daily  influenza   Vaccine 0.5 milliLiter(s) IntraMuscular once  iron sucrose IVPB 200 milliGRAM(s) IV Intermittent every 24 hours  levothyroxine 175 MICROGram(s) Oral daily  montelukast 10 milliGRAM(s) Oral daily  oxybutynin 5 milliGRAM(s) Oral two times a day         SOCIAL HISTORY  Advanced Directives:       [ X ] Full Code       [  ] DNR  Marital Status:         [  ] M      [ X ] S      [  ] D       [  ] W  Children:       [ X ] Yes      [  ] No  Occupation:        [  ] Employed       [ X ] Unemployed       [  ] Retired  Diet:       [ X Regular       [  ] PEG feeding          [  ] NG tube feeding  Drug Use:           [ X ] Patient denied          [  ] Yes  Alcohol:           [X] No             [  ] Yes (socially)         [  ] Yes (chronic)  Tobacco:           [  ] Yes           [ X ] No      FAMILY HISTORY  [ X ] Heart Disease            [ X ] Diabetes             [ X ] HTN             [  ] Colon Cancer             [  ] Stomach Cancer              [  ] Pancreatic Cancer        VITALS  Vital Signs Last 24 Hrs  T(C): 37.1 (26 Mar 2021 05:26), Max: 37.1 (26 Mar 2021 05:26)  T(F): 98.7 (26 Mar 2021 05:26), Max: 98.7 (26 Mar 2021 05:26)  HR: 62 (26 Mar 2021 05:26) (60 - 63)  BP: 134/41 (26 Mar 2021 05:26) (131/74 - 159/54)   RR: 16 (26 Mar 2021 05:26) (16 - 18)  SpO2: 100% (26 Mar 2021 05:26) (99% - 100%)       MEDICATIONS  (STANDING):  amoxicillin 1000 milliGRAM(s) Oral every 12 hours  ascorbic acid 2000 milliGRAM(s) Oral every 8 hours  aspirin  chewable 81 milliGRAM(s) Oral daily  carvedilol 6.25 milliGRAM(s) Oral every 12 hours  cholecalciferol 1000 Unit(s) Oral daily  clarithromycin 500 milliGRAM(s) Oral two times a day  gabapentin 300 milliGRAM(s) Oral daily  influenza   Vaccine 0.5 milliLiter(s) IntraMuscular once  insulin lispro (ADMELOG) corrective regimen sliding scale   SubCutaneous Before meals and at bedtime  levothyroxine 175 MICROGram(s) Oral daily  metroNIDAZOLE    Tablet 500 milliGRAM(s) Oral every 12 hours  montelukast 10 milliGRAM(s) Oral daily  oxybutynin 5 milliGRAM(s) Oral two times a day  pantoprazole    Tablet 40 milliGRAM(s) Oral two times a day  sodium chloride 0.9%. 1000 milliLiter(s) (50 mL/Hr) IV Continuous <Continuous>  zinc sulfate 220 milliGRAM(s) Oral daily    MEDICATIONS  (PRN):                            7.8    13.47 )-----------( 273      ( 25 Mar 2021 06:40 )             24.2       03-25    137  |  102  |  21<H>  ----------------------------<  172<H>  3.8   |  25  |  1.14    Ca    8.5      25 Mar 2021 06:40  Phos  2.4     03-25  Mg     2.7     03-25    TPro  6.6  /  Alb  3.0<L>  /  TBili  0.4  /  DBili  x   /  AST  15  /  ALT  22  /  AlkPhos  69  03-25       [   ] ICU                                          [   ] CCU                                      [ X  ] Medical Floor    Patient is a 81 year old femalewith Gastrointestinal bleeding and symptomatic anemia. GI consulted to evaluate.        81 years old female from home, ambulates with walker, lives with son, with past medical history significant for CAD (s/p stents), CHF, chronic venous stasis, DM, HTN, Afib (on Xarelto, s/p PPM), chronic bronchitis with asthmatic component ( on Oxygen PRN at home ), and osteoarthritis presented to the emergency room with 2 weeks history of worsening SOB, Fatigue, associated with a few episodes of black stool.    pt had virtual  colonoscopy 1 year ago and the result was questionable. She never followed up.  Patient also c/o poor appetite with chronic diarrhea but denies abdominal pain, nausea, vomiting, hematemesis, hematochezia, melena, fever, chills, chest pain, SOB, cough, hematuria, dysuria or diarrhea.       Today patient appears comfortable with no new complaint. No abdominal pain, N/V, hematemesis, hematochezia, melena, fever, chills, chest pain, SOB, cough or diarrhea reported.      PAIN MANAGEMENT:  Pain Scale:                 0/10  Pain Location:      Prior Colonoscopy:  No prior colonoscopy    PAST MEDICAL HISTORY  Obesity  Atrial fibrillation  Hypothyroidism  Venous stasis  Irritable bowel syndrome   CHF    Hyperlipidemia   Hypertension  DM  Myocardial Infarction  CAD    Asthma        PAST SURGICAL HISTORY  Coronary angiogram  Coronary stent placement  Pacemaker placement           Allergies    No Known Allergies    Intolerances  None         MEDICATIONS  (STANDING):  ALBUTerol    90 MICROgram(s) HFA Inhaler 2 Puff(s) Inhalation every 6 hours  atorvastatin 40 milliGRAM(s) Oral at bedtime  budesonide 160 MICROgram(s)/formoterol 4.5 MICROgram(s) Inhaler 2 Puff(s) Inhalation two times a day  carvedilol 6.25 milliGRAM(s) Oral every 12 hours  fluticasone propionate 50 MICROgram(s)/spray Nasal Spray 1 Spray(s) Both Nostrils every 12 hours  furosemide   Injectable 40 milliGRAM(s) IV Push every 12 hours  gabapentin 300 milliGRAM(s) Oral daily  influenza   Vaccine 0.5 milliLiter(s) IntraMuscular once  iron sucrose IVPB 200 milliGRAM(s) IV Intermittent every 24 hours  levothyroxine 175 MICROGram(s) Oral daily  montelukast 10 milliGRAM(s) Oral daily  oxybutynin 5 milliGRAM(s) Oral two times a day         SOCIAL HISTORY  Advanced Directives:       [ X ] Full Code       [  ] DNR  Marital Status:         [  ] M      [ X ] S      [  ] D       [  ] W  Children:       [ X ] Yes      [  ] No  Occupation:        [  ] Employed       [ X ] Unemployed       [  ] Retired  Diet:       [ X Regular       [  ] PEG feeding          [  ] NG tube feeding  Drug Use:           [ X ] Patient denied          [  ] Yes  Alcohol:           [X] No             [  ] Yes (socially)         [  ] Yes (chronic)  Tobacco:           [  ] Yes           [ X ] No      FAMILY HISTORY  [ X ] Heart Disease            [ X ] Diabetes             [ X ] HTN             [  ] Colon Cancer             [  ] Stomach Cancer              [  ] Pancreatic Cancer        VITALS  Vital Signs Last 24 Hrs  T(C): 37.1 (26 Mar 2021 05:26), Max: 37.1 (26 Mar 2021 05:26)  T(F): 98.7 (26 Mar 2021 05:26), Max: 98.7 (26 Mar 2021 05:26)  HR: 62 (26 Mar 2021 05:26) (60 - 63)  BP: 134/41 (26 Mar 2021 05:26) (131/74 - 159/54)   RR: 16 (26 Mar 2021 05:26) (16 - 18)  SpO2: 100% (26 Mar 2021 05:26) (99% - 100%)       MEDICATIONS  (STANDING):  amoxicillin 1000 milliGRAM(s) Oral every 12 hours  ascorbic acid 2000 milliGRAM(s) Oral every 8 hours  aspirin  chewable 81 milliGRAM(s) Oral daily  carvedilol 6.25 milliGRAM(s) Oral every 12 hours  cholecalciferol 1000 Unit(s) Oral daily  clarithromycin 500 milliGRAM(s) Oral two times a day  gabapentin 300 milliGRAM(s) Oral daily  influenza   Vaccine 0.5 milliLiter(s) IntraMuscular once  insulin lispro (ADMELOG) corrective regimen sliding scale   SubCutaneous Before meals and at bedtime  levothyroxine 175 MICROGram(s) Oral daily  metroNIDAZOLE    Tablet 500 milliGRAM(s) Oral every 12 hours  montelukast 10 milliGRAM(s) Oral daily  oxybutynin 5 milliGRAM(s) Oral two times a day  pantoprazole    Tablet 40 milliGRAM(s) Oral two times a day  sodium chloride 0.9%. 1000 milliLiter(s) (50 mL/Hr) IV Continuous <Continuous>  zinc sulfate 220 milliGRAM(s) Oral daily    MEDICATIONS  (PRN):                            7.8    13.47 )-----------( 273      ( 25 Mar 2021 06:40 )             24.2       03-25    137  |  102  |  21<H>  ----------------------------<  172<H>  3.8   |  25  |  1.14    Ca    8.5      25 Mar 2021 06:40  Phos  2.4     03-25  Mg     2.7     03-25    TPro  6.6  /  Alb  3.0<L>  /  TBili  0.4  /  DBili  x   /  AST  15  /  ALT  22  /  AlkPhos  69  03-25

## 2021-03-26 NOTE — PROGRESS NOTE ADULT - ASSESSMENT
· Assessment	  79F [place]-speaking from home with son, ambulates with walker, PMH HFpEF, CAD (s/p stents), chronic venous stasis, DM, HTN and Afib (Xarelto, s/p PPM), presented 3/20 with x2 weeks increasing fatigue, SOB, and paleness, with prolonged history of dark bowel movements s/p possible transverse polyp colonoscopy 2020, admitted for workup and management of symptomatic anemia.       Problem/Plan - 1:  ·  Problem: Symptomatic anemia.  Plan: presented 3/20 with x2 weeks increasing fatigue, SOB, and paleness, with prolonged history of dark bowel movements s/p possible transverse polyp colonoscopy 2020, no further fu  -in ED, VS notable for /77 and SpO2 100% 4L NC  -trop, COVID, UA negative  -pre-RBC adm Hb 7.7 (baseline ~11), Fe 15, TIBC 457, 3% saturation  -CT a/p unremarkable for signs of bleeding  -s/p x1 unit pRBC in ED  -fu FOBT  -IV iron 3/20-23, PPI BID  -Fe and %sat improved s/p transfusion and Venofer, Hb stable, transfuse if <8  -EGD/col 3/22 with GI Dr. Moreland, gastritis, esophagitis, no GIB, path + H pylori  -treat with amoxicillin 1g q12 x7 days followed by 500mg Q 12hours x7 days along with clarithromycin 500mg q12, Flagyl 500 mg q12, and PPI x14 days  -ID Dr. Butcher following   -colonoscopy 3/24 with polyps  -abdominal XR negative for free air 3/24   -repeat colonoscopy 3/25 notable for x2 polyps (removed), diverticulosis, and internal hemorrhoids  -CLD resumed  -continuing PPI.      Problem/Plan - 2:  ·  Problem: Atrial fibrillation.  Plan: -hold home Xarelto given GIB  -discuss asa with GI after EGD/col 3/22-25  -continue home coreg, dronedarone added  -[facility] PPM incompatible with interrogation   -EKG with BFB  -b/l LE doppler 3/21 negative for DVT  -cards Dr. Sandoval following.      Problem/Plan - 3:  ·  Problem: CHF (congestive heart failure), NYHA class I.  Plan: adm CXR concerning for central congestion, s/p x4 doses Lasix 40mg IV BID  -BNP1K  -TTE 3/21 with G1DD and mildly high RVP  -resume home torsemide 3/23  -Cr resolved  -pulm Dr. Cruz following.      Problem/Plan - 4:  ·  Problem: Diabetes.  Plan: -hold home metformin-glipizide and Janumet   -HSS while inpatient  -continue home gabapentin and oxybutynin   -BGs today 140-190, near goal 140-180  -a1c 6.6, no changes to home meds needed  -BG QACHS.      Problem/Plan - 5:  ·  Problem: HTN (Hypertension).  Plan: -continue home coreg, restart home torsemide 3/23 as above  -SBPs variable, monitor.      Problem/Plan - 6:  Problem: Asthma. Plan: -continue home montelukast, albuterol, flonase, symbicort  -continue O2 supplementation as needed.     Problem/Plan - 7:  ·  Problem: JOSE DANIEL (obstructive sleep apnea).  Plan: -noncompliant with CPAP  -remainder of history and management as above.      Problem/Plan - 8:  ·  Problem: Hypothyroidism.  Plan: -continue home Synthroid  -TSH wnl.      Problem/Plan - 9:  ·  Problem: Prophylactic measure.  Plan: will hold off chemoPPX for now, SCDs.

## 2021-03-26 NOTE — PROGRESS NOTE ADULT - ASSESSMENT
1. Anemia  2. Melena  3. S/p EGD and colonoscopy  4. Gastritis  5. Esophagitis  6. No evidence of acute GI bleeding  7. Large colonic polyps    Suggestions:    1. Monitor H/H  2. Transfuse PRBC as needed  3. Protonix 40mg daily   4. Follow up path   5. Continue IV antibiotics  6. DVT prophylaxis 1. Anemia  2. Melena  3. S/p EGD and colonoscopy  4. Gastritis  5. Esophagitis  6. No evidence of acute GI bleeding  7. Large colonic polyps    Suggestions:    1. Monitor H/H  2. Transfuse PRBC as needed  3. Protonix 40mg daily   4. Follow up path   5. Continue antibiotics  6. DVT prophylaxis

## 2021-03-26 NOTE — PROGRESS NOTE ADULT - SUBJECTIVE AND OBJECTIVE BOX
CHIEF COMPLAINT:Patient is a 81y old  Female who presents with a chief complaint of symptomatic anemia .Pt appears comfortable.    	  REVIEW OF SYSTEMS:  CONSTITUTIONAL: No fever, weight loss, or fatigue  EYES: No eye pain, visual disturbances, or discharge  ENT:  No difficulty hearing, tinnitus, vertigo; No sinus or throat pain  NECK: No pain or stiffness  RESPIRATORY: No cough, wheezing, chills or hemoptysis; No Shortness of Breath  CARDIOVASCULAR: No chest pain, palpitations, passing out, dizziness, or leg swelling  GASTROINTESTINAL: No abdominal or epigastric pain. No nausea, vomiting, or hematemesis; No diarrhea or constipation. No melena or hematochezia.  GENITOURINARY: No dysuria, frequency, hematuria, or incontinence  NEUROLOGICAL: No headaches, memory loss, loss of strength, numbness, or tremors  SKIN: No itching, burning, rashes, or lesions   LYMPH Nodes: No enlarged glands  ENDOCRINE: No heat or cold intolerance; No hair loss  MUSCULOSKELETAL: No joint pain or swelling; No muscle, back, or extremity pain  PSYCHIATRIC: No depression, anxiety, mood swings, or difficulty sleeping  HEME/LYMPH: No easy bruising, or bleeding gums  ALLERGY AND IMMUNOLOGIC: No hives or eczema	        PHYSICAL EXAM:  T(C): 37.1 (03-26-21 @ 05:26), Max: 37.1 (03-26-21 @ 05:26)  HR: 62 (03-26-21 @ 05:26) (60 - 63)  BP: 134/41 (03-26-21 @ 05:26) (131/74 - 159/54)  RR: 16 (03-26-21 @ 05:26) (16 - 18)  SpO2: 100% (03-26-21 @ 05:26) (99% - 100%)  Wt(kg): --  I&O's Summary      Appearance: Normal	  HEENT:   Normal oral mucosa, PERRL, EOMI	  Lymphatic: No lymphadenopathy  Cardiovascular: Normal S1 S2, No JVD, No murmurs, No edema  Respiratory: Lungs clear to auscultation	  Psychiatry: A & O x 3, Mood & affect appropriate  Gastrointestinal:  Soft, Non-tender, + BS	  Skin: No rashes, No ecchymoses, No cyanosis	  Neurologic: Non-focal  Extremities: Normal range of motion, No clubbing, cyanosis or edema  Vascular: Peripheral pulses palpable 2+ bilaterally    MEDICATIONS  (STANDING):  amoxicillin 1000 milliGRAM(s) Oral every 12 hours  ascorbic acid 2000 milliGRAM(s) Oral every 8 hours  aspirin  chewable 81 milliGRAM(s) Oral daily  carvedilol 6.25 milliGRAM(s) Oral every 12 hours  cholecalciferol 1000 Unit(s) Oral daily  clarithromycin 500 milliGRAM(s) Oral two times a day  gabapentin 300 milliGRAM(s) Oral daily  influenza   Vaccine 0.5 milliLiter(s) IntraMuscular once  insulin lispro (ADMELOG) corrective regimen sliding scale   SubCutaneous Before meals and at bedtime  levothyroxine 175 MICROGram(s) Oral daily  metroNIDAZOLE    Tablet 500 milliGRAM(s) Oral every 12 hours  montelukast 10 milliGRAM(s) Oral daily  oxybutynin 5 milliGRAM(s) Oral two times a day  pantoprazole    Tablet 40 milliGRAM(s) Oral two times a day  sodium chloride 0.9%. 1000 milliLiter(s) (50 mL/Hr) IV Continuous <Continuous>  zinc sulfate 220 milliGRAM(s) Oral daily      	  	  LABS:	 	                       7.8    13.47 )-----------( 273      ( 25 Mar 2021 06:40 )             24.2     03-25    137  |  102  |  21<H>  ----------------------------<  172<H>  3.8   |  25  |  1.14    Ca    8.5      25 Mar 2021 06:40  Phos  2.4     03-25  Mg     2.7     03-25    TPro  6.6  /  Alb  3.0<L>  /  TBili  0.4  /  DBili  x   /  AST  15  /  ALT  22  /  AlkPhos  69  03-25    proBNP: Serum Pro-Brain Natriuretic Peptide: 1155 pg/mL (03-21 @ 07:15)    Lipid Profile: Cholesterol 134  HDL 54  TG 82  Cholesterol 145  LDL --  HDL 55  TG 86    TSH: Thyroid Stimulating Hormone, Serum: 2.67 uU/mL (03-22 @ 07:34)  Thyroid Stimulating Hormone, Serum: 3.61 uU/mL (03-21 @ 07:15)      	    pSurgical Pathology Report:   ACCESSION No: [id number]   KWAME MOSQUERA 2   Surgical Final Report   Final Diagnosis   1. Ascending colon polyp; hot snare:   - Invasive colonic adenocarcinoma arising in association with   tubular adenoma. See comment.   - Fragments of tubular adenoma (the smallest polyps).   2. Hepatic flexure polyp; biopsy:   - Tubular adenoma.   3. Descending colon polyp; biopsy:   - Inflammatory polyps.   Verified by: Priscila Arana MD   (Electronic Signature)   Reported on: 03/26/21 11:11 EDT, [facility],   [address] [place], [place] [address], [place] [id number]   Phone: [phone] Fax: [phone]

## 2021-03-26 NOTE — DISCHARGE NOTE NURSING/CASE MANAGEMENT/SOCIAL WORK - PATIENT PORTAL LINK FT
You can access the FollowMyHealth Patient Portal offered by [facility] by registering at the following website: [email or web address]. By joining [employer]’s FollowMyHealth portal, you will also be able to view your health information using other applications (apps) compatible with our system.

## 2021-03-26 NOTE — CONSULT NOTE ADULT - ASSESSMENT
79 year old lady presented with weakness and anemia.  colonoscopy showed several large polyps.  The ascending colon polyp is cancerous, involving margin    1. cancer at polyp, involving the margin  she needs surgery.    2, anemia, will give venofer    3. Afib on xarelto  will hold xarelto until surgery is done

## 2021-03-26 NOTE — PROGRESS NOTE ADULT - PROBLEM SELECTOR PLAN 9
will hold off chemoPPX as above, SCDs will hold off chemoPPX as above, SCDs  zinc, vit C for general health promotion

## 2021-03-26 NOTE — PROGRESS NOTE ADULT - ASSESSMENT
Patient is a 81y old  Female from home, ambulates with walker, lives with son, with PMHx of HFpEF, CAD (s/p stents), chronic venous stasis, DM, HTN and Afib (on Xarelto, s/p PPM), chronic bronchitis with asthmatic component ( on Oxygen PRN at home ), JOSE DANIEL ( supposed to be on nocturnal CPAP , but non compliant), now presents to the ER for evaluation of hypotension, SOB and  fatigue for almost 2 weeks. Per daughter and granddaughter patient has been complaining of increasing fatigue and sob for last few weeks, seemed to be pale , patient endorses dark loose bowel movement. Patient has chronic diarrhea, but no abdominal pain. She has evaluated by GI team and now the ID consult requested to assist with further evaluation of chronic diarrhea.     #  H. Pylori associated Chronic active gastritis, diffuse, with focal lymphoid aggregate  # Chronic diarrhea- C.diff vs Malignancy - NO Malignancy Antrum biopsy   # Symptomatic Anemia  - s/p EGD and antrum biopsy shows H. Pylori associated Chronic active gastritis, diffuse, with focal lymphoid aggregate  # COVID negative 3/20/21  # S/p EGD 3/22  and s/p Colonoscopy 3/25    Would recommend:    1. Obtain Procalcitonin level  2. Monitor Temp. and c/w supportive care  3. Continue Amoxicillin 1 g q 12hours 7 days then 500mg Q 12hours, + start Clarithromycin 500 mg q 12hours and c/w  Flagyl 500 mg q 12hours and + PPI  4. Need for total  of 14 days   5. OOb to chair      Attending Attestation:    Spent more than 45 minutes on total encounter, more than 50 % of the visit was spent counseling and/or coordinating care by the Attending physician.

## 2021-03-26 NOTE — CONSULT NOTE ADULT - SUBJECTIVE AND OBJECTIVE BOX
Patient is a 81y old  Female who presents with a chief complaint of symptomatic anemia (26 Mar 2021 21:46)      HPI:  79 years old [place]raini-speaking female from home, ambulates with walker, lives with son, with PMHx of HFpEF, CAD (s/p stents), chronic venous stasis, DM, HTN and Afib (on Xarelto, s/p PPM), chronic bronchitis with asthmatic component ( on Oxygen PRN at home ), JOSE DANIEL ( supposed to be on nocturnal CPAP , but non compliant) presents to the ED with chief complaint of hypotension ,  SOB and  fatigue for almost 2 weeks. Per daughter and granddaughter patient has been complaining of increasing fatigue and sob for last few weeks, seemed to be pale , patient endorses dark loose bowel movement for quite a while.  pt had virtual  colonoscopy 1 year ago and the result was questionable. She never followed up. Per daughter pt refused all kinds of meat including chicken, meat , however denies any dysphasia. Patient  also endorses decreased appetite.  patient has chronic diarrhea. Patient Denies nausea, vomiting, abdominal pain, SOB, chest pain, CHAMBERLAIN, palpitations, dizziness, headache, cough, wheezing, joint pain or swelling, fever, chills.      GOC: Discussed with daughter,  mentioned she want stobe DNI , on discussion with patient, patient was so distressed about urge incontinency and was complaining of pain and IV infiltrations, so GOC discussion deferred to primary team. EGD was normal.  Colonoscopy showed several large polyp.  The ascending colon polyp is cancerous involving and margin.   (20 Mar 2021 19:53)       ROS:  Negative except for:    PAST MEDICAL & SURGICAL HISTORY:  Obesity    Pacemaker    Atrial fibrillation    Hypothyroidism    Venous stasis    IBS (irritable bowel syndrome)    CHF (congestive heart failure), NYHA class I    HLD (Hyperlipidemia)    HTN (Hypertension)    Diabetes    Myocardial Infarction    CAD (Coronary Atherosclerotic Disease)    Asthma    S/P coronary angiogram        SOCIAL HISTORY:    FAMILY HISTORY:  Family history of heart disease        MEDICATIONS  (STANDING):  amoxicillin 1000 milliGRAM(s) Oral every 12 hours  ascorbic acid 2000 milliGRAM(s) Oral every 8 hours  aspirin  chewable 81 milliGRAM(s) Oral daily  carvedilol 6.25 milliGRAM(s) Oral every 12 hours  cholecalciferol 1000 Unit(s) Oral daily  clarithromycin 500 milliGRAM(s) Oral two times a day  furosemide   Injectable 20 milliGRAM(s) IV Push daily  gabapentin 300 milliGRAM(s) Oral daily  influenza   Vaccine 0.5 milliLiter(s) IntraMuscular once  insulin glargine Injectable (LANTUS) 10 Unit(s) SubCutaneous at bedtime  insulin lispro (ADMELOG) corrective regimen sliding scale   SubCutaneous Before meals and at bedtime  levothyroxine 175 MICROGram(s) Oral daily  metroNIDAZOLE    Tablet 500 milliGRAM(s) Oral every 12 hours  montelukast 10 milliGRAM(s) Oral daily  oxybutynin 5 milliGRAM(s) Oral two times a day  pantoprazole    Tablet 40 milliGRAM(s) Oral two times a day  zinc sulfate 220 milliGRAM(s) Oral daily    MEDICATIONS  (PRN):      Allergies    No Known Allergies    Intolerances        Vital Signs Last 24 Hrs  T(C): 37.3 (26 Mar 2021 20:31), Max: 38.1 (26 Mar 2021 20:23)  T(F): 99.2 (26 Mar 2021 20:31), Max: 100.5 (26 Mar 2021 20:23)  HR: 62 (26 Mar 2021 20:23) (62 - 68)  BP: 151/51 (26 Mar 2021 20:23) (134/41 - 151/51)  BP(mean): --  RR: 17 (26 Mar 2021 20:23) (16 - 17)  SpO2: 98% (26 Mar 2021 20:23) (97% - 100%)    PHYSICAL EXAM  General: adult in NAD  HEENT: clear oropharynx, anicteric sclera, pink conjunctiva  Neck: supple  CV: normal S1/S2 with no murmur rubs or gallops  Lungs: positive air movement b/l ant lungs,clear to auscultation, no wheezes, no rales  Abdomen: soft non-tender non-distended, no hepatosplenomegaly  Ext: no clubbing cyanosis or edema  Skin: no rashes and no petechiae  Neuro: alert and oriented X 4, no focal deficits      LABS:                          7.9    8.47  )-----------( 269      ( 26 Mar 2021 15:57 )             25.0         Mean Cell Volume : 81.7 fl  Mean Cell Hemoglobin : 25.8 pg  Mean Cell Hemoglobin Concentration : 31.6 gm/dL  Auto Neutrophil # : x  Auto Lymphocyte # : x  Auto Monocyte # : x  Auto Eosinophil # : x  Auto Basophil # : x  Auto Neutrophil % : x  Auto Lymphocyte % : x  Auto Monocyte % : x  Auto Eosinophil % : x  Auto Basophil % : x      Serial CBC's  03-26 @ 15:57  Hct-25.0 / Hgb-7.9 / Plat-269 / RBC-3.06 / WBC-8.47  Serial CBC's  03-25 @ 06:40  Hct-24.2 / Hgb-7.8 / Plat-273 / RBC-2.97 / WBC-13.47  Serial CBC's  03-24 @ 07:00  Hct-26.2 / Hgb-8.4 / Plat-277 / RBC-3.21 / WBC-6.26  Serial CBC's  03-23 @ 08:27  Hct-25.5 / Hgb-8.2 / Plat-268 / RBC-3.21 / WBC-6.54      03-26    132<L>  |  102  |  16  ----------------------------<  212<H>  4.4   |  22  |  1.13    Ca    8.1<L>      26 Mar 2021 15:57  Phos  1.7     03-26  Mg     2.1     03-26    TPro  7.0  /  Alb  3.2<L>  /  TBili  0.3  /  DBili  x   /  AST  21  /  ALT  25  /  AlkPhos  70  03-26          Ferritin, Serum: 36 ng/mL (03-21 @ 11:07)  Vitamin B12, Serum: 461 pg/mL (03-21 @ 11:07)  Folate, Serum: 10.0 ng/mL (03-21 @ 11:07)  Iron - Total Binding Capacity.: 504 ug/dL (03-21 @ 07:15)  Iron - Total Binding Capacity.: 472 ug/dL (03-20 @ 16:31)              BLOOD SMEAR INTERPRETATION:       RADIOLOGY & ADDITIONAL STUDIES:    < from: CT Angio Abdomen and Pelvis w/ IV Cont (03.20.21 @ 22:55) >  CONTRAST/COMPLICATIONS:  IVContrast: Omnipaque 350  90 cc administered   10 cc discarded  Oral Contrast: NONE  Complications: None reported at time of study completion    PROCEDURE:  CT of the Abdomen and Pelvis was performed.  Precontrast, Arterial and Delayed phases were performed.  Sagittal and coronal reformats were performed.    FINDINGS:  LOWER CHEST: Partially imaged pacemaker leads. Mitral annular calcification.    LIVER: Within normal limits.  BILE DUCTS: Normal caliber.  GALLBLADDER: Within normal limits.  SPLEEN: Within normal limits.  PANCREAS: Within normal limits.  ADRENALS: Within normal limits.  KIDNEYS/URETERS: Multiple bilateral low-attenuation lesions too small to accurately characterize. No hydronephrosis.     < end of copied text >  < from: CT Angio Abdomen and Pelvis w/ IV Cont (03.20.21 @ 22:55) >  Symmetric parenchymal enhancement.    BLADDER: Within normal limits.  REPRODUCTIVE ORGANS: Uterus and adnexa within normal limits.    BOWEL: No bowel obstruction. No evidence of active gastrointestinal hemorrhage. Few scattered colonic diverticula. Appendix is normal.  PERITONEUM: No ascites.  VESSELS: Atherosclerosis of the abdominal aorta. Patent celiac, superior mesenteric, bilateral renal, and inferior mesenteric arteries. Retroaortic left renal vein, normal variant.  RETROPERITONEUM/LYMPH NODES: No lymphadenopathy.  ABDOMINAL WALL: Within normal limits.  BONES: Multilevel degenerative changes of the spine. Mild anterolisthesis of L4 and L5 secondary to facet joint arthrosis.    IMPRESSION:  No CT evidence of acute gastrointestinal hemorrhage.    Few scattered colonic diverticula.      < end of copied text >

## 2021-03-26 NOTE — PROGRESS NOTE ADULT - ASSESSMENT
79F Chinese-speaking from home with son, ambulates with walker, PMH HFpEF, CAD (s/p stents), chronic venous stasis, DM, HTN and Afib (Xarelto, s/p PPM), presented 3/20 with x2 weeks increasing fatigue, SOB, and paleness, with prolonged history of dark bowel movements s/p possible transverse polyp colonoscopy 2020, admitted for workup and management of symptomatic anemia.

## 2021-03-26 NOTE — PROGRESS NOTE ADULT - ASSESSMENT
79 years old Ugandan-speaking female from home, ambulates with walker, lives with son, with PMHx of HFpEF, CAD (s/p stents), chronic venous stasis, DM, HTN and Afib (on Xarelto, s/p PPM), chronic bronchitis with asthmatic component ( on Oxygen PRN at home ), JOSE DANIEL ( supposed to be on nocturnal CPAP , but non compliant) presents to the ED with chief complaint of hypotension , SOB and  fatigue for almost 2 weeks.,symptomatic Fe def anemia with Colon ca.  1.GI f/u.  2.PAF-multaq, coreg, asa.  3.HTN-cont bp medication.  4.CAD-asa,coreg,statin.  5.DM-insulin.  6.Anemia-IV iron.  7.Asthma-MDI.  8.Tx for H.Pylori+.  9.Colon ca-Surgical eval called.  10.GI and DVT prophylaxis.

## 2021-03-26 NOTE — PROGRESS NOTE ADULT - ATTENDING COMMENTS
S/P colonoscopy. Pt deemed stable to resume ASA & A/C as per GI. Pt medically stable for d/c home with outpt f/u with PMD and GI within one month. PT deemed with colonic CA. In goals of care d/w HCP; if condition is treatable and not end-stage would treatment (aside from chemo). Sx consult with Dr Granado made, even for a possible partial colectomy in a palliative setting (to relieve potential obstruction). PT deemed with colonic CA. In goals of care d/w HCP; if condition is treatable and not end-stage would consider treatment (aside from chemo). Sx consult with Dr Granado made, even for a possible partial colectomy in a palliative setting (to relieve potential obstruction).

## 2021-03-26 NOTE — CONSULT NOTE ADULT - SUBJECTIVE AND OBJECTIVE BOX
HPI:  79 years old [place]-speaking female from home, ambulates with walker, lives with son, with PMHx of HFpEF, CAD (s/p stents), chronic venous stasis, DM, HTN and Afib (on Xarelto, s/p PPM), chronic bronchitis with asthmatic component ( on Oxygen PRN at home ), JOSE DANIEL ( supposed to be on nocturnal CPAP , but non compliant) presents to the ED with chief complaint of hypotension ,  SOB and  fatigue for almost 2 weeks. Per daughter and granddaughter patient has been complaining of increasing fatigue and sob for last few weeks, seemed to be pale , patient endorses dark loose bowel movement for quite a while.  pt had virtual  colonoscopy 1 year ago and the result was questionable. She never followed up. Per daughter pt refused all kinds of meat including chicken, meat , however denies any dysphasia. Patient  also endorses decreased appetite.  patient has chronic diarrhea. Patient Denies nausea, vomiting, abdominal pain, SOB, chest pain, CHAMBERLAIN, palpitations, dizziness, headache, cough, wheezing, joint pain or swelling, fever, chills.    GOC: Discussed with daughter,  mentioned she want stobe DNI , on discussion with patient, patient was so distressed about urge incontinency and was complaining of pain and IV infiltrations, so GOC discussion deferred to primary team.    (20 Mar 2021 19:53)    SURGERY CONSULTATION:  Patient seen and examined for surgical consultation for possible colon resection in setting of invasive adenocarcinoma of the ascending colon.  Patient states experienced dark stools x 1 month. As per daughter patient was found to be hypotensive and fatigued prior to admission. Patient had a virtual colonoscopy x 1 year ago, states results were inconclusive due to stool present in the colon. Daughter states her brother was found to have colon cancer, s/p chemotherapy. No other known hx of colon cancer in the family. Denied weight loss, night sweats, abdominal pain, nausea or vomiting.    PAST MEDICAL & SURGICAL HISTORY:  Obesity    Pacemaker    Atrial fibrillation    Hypothyroidism    Venous stasis    IBS (irritable bowel syndrome)    CHF (congestive heart failure), NYHA class I    HLD (Hyperlipidemia)    HTN (Hypertension)    Diabetes    Myocardial Infarction    CAD (Coronary Atherosclerotic Disease)    Asthma    S/P coronary angiogram    MEDICATIONS  (STANDING):  amoxicillin 1000 milliGRAM(s) Oral every 12 hours  ascorbic acid 2000 milliGRAM(s) Oral every 8 hours  aspirin  chewable 81 milliGRAM(s) Oral daily  carvedilol 6.25 milliGRAM(s) Oral every 12 hours  cholecalciferol 1000 Unit(s) Oral daily  clarithromycin 500 milliGRAM(s) Oral two times a day  furosemide   Injectable 20 milliGRAM(s) IV Push daily  gabapentin 300 milliGRAM(s) Oral daily  influenza   Vaccine 0.5 milliLiter(s) IntraMuscular once  insulin glargine Injectable (LANTUS) 10 Unit(s) SubCutaneous at bedtime  insulin lispro (ADMELOG) corrective regimen sliding scale   SubCutaneous Before meals and at bedtime  levothyroxine 175 MICROGram(s) Oral daily  metroNIDAZOLE    Tablet 500 milliGRAM(s) Oral every 12 hours  montelukast 10 milliGRAM(s) Oral daily  oxybutynin 5 milliGRAM(s) Oral two times a day  pantoprazole    Tablet 40 milliGRAM(s) Oral two times a day  zinc sulfate 220 milliGRAM(s) Oral daily    MEDICATIONS  (PRN):      Allergies    No Known Allergies    Intolerances    Vital Signs Last 24 Hrs  T(C): 36.9 (26 Mar 2021 13:53), Max: 37.1 (26 Mar 2021 05:26)  T(F): 98.5 (26 Mar 2021 13:53), Max: 98.7 (26 Mar 2021 05:26)  HR: 68 (26 Mar 2021 13:53) (60 - 68)  BP: 139/64 (26 Mar 2021 13:53) (131/74 - 159/54)  RR: 17 (26 Mar 2021 13:53) (16 - 18)  SpO2: 97% (26 Mar 2021 13:53) (97% - 100%)    Physical:  Gen: A&Ox3. Morbidly obese. [place] speaking, NAD  Chest: respiration unlabored  Abd: Soft ND, NT  Ext: no calf tenderness, no edema    LABS:                        7.9    8.47  )-----------( 269      ( 26 Mar 2021 15:57 )             25.0              03-26    132<L>  |  102  |  16  ----------------------------<  212<H>  4.4   |  22  |  1.13    Ca    8.1<L>      26 Mar 2021 15:57  Phos  1.7     03-26  Mg     2.1     03-26    TPro  7.0  /  Alb  3.2<L>  /  TBili  0.3  /  DBili  x   /  AST  21  /  ALT  25  /  AlkPhos  70  03-26                  RADIOLOGY & ADDITIONAL STUDIES:  DATE OF PROCEDURE: 03/25/2021     Colonoscopy Report  Indication: Hematochezia post polypectomy  Referring: Rivera Moreland MD  Instrument:  # [id number]  Anesthesia: MAC  Consent:  Informed consent was obtained from the patient after providing any opportunity for questions  Procedure: After placing the patient in the left lateral decubitus position, the colonoscope was gently inserted into the rectum and advanced to the cecum. Color, texture, mucosa, and anatomy of the colon were carefully examined with the scope. The patient tolerated the procedure well. After completion of the exam, the patient was transferred to the recovery room.     Preparation:  Findings:   Anal Canal	        Large internal hemorrhoids  Rectum	                Normal  Sigmoid Colon 	        Few diverticula  Descending Colon	Post polypectomy site with clips clean with no bleeding, scattered diverticula  Splenic Flexure	Normal  Transverse Colon	Scattered diverticula, Normal  Hepatic Flexure	   2 small polyps not removed due to recent bleeding  Ascending Colon	 post polypectomy site with clips clean with no bleeding  Cecum	                 Normal  Ileo-cecal Valve	 Normal   Ileum 	                 Not intubated     EBL:0    Impression:     1. Colonic polyps  2. Diverticulosis  3. Post polypectomy no bleeding  4. Internal hemorrhoids       Plan:  1. Follow up path  2. High fiber diet  3. Sitz bath

## 2021-03-26 NOTE — PROGRESS NOTE ADULT - SUBJECTIVE AND OBJECTIVE BOX
KWAME MOSQUERA  MR# [id number]  81yFemale        Patient is a 81y old  Female who presents with a chief complaint of symptomatic anemia (26 Mar 2021 08:23)      INTERVAL HPI/OVERNIGHT EVENTS:  Patient seen and examined at bedside. No notations of chest pain, palpitation, SOB, orthopnea, nausea, vomiting or abdominal pain.    ALLERGIES  No Known Allergies      MEDICATIONS  amoxicillin 1000 milliGRAM(s) Oral every 12 hours  ascorbic acid 2000 milliGRAM(s) Oral every 8 hours  aspirin  chewable 81 milliGRAM(s) Oral daily  carvedilol 6.25 milliGRAM(s) Oral every 12 hours  cholecalciferol 1000 Unit(s) Oral daily  clarithromycin 500 milliGRAM(s) Oral two times a day  gabapentin 300 milliGRAM(s) Oral daily  influenza   Vaccine 0.5 milliLiter(s) IntraMuscular once  insulin lispro (ADMELOG) corrective regimen sliding scale   SubCutaneous Before meals and at bedtime  iron sucrose IVPB 200 milliGRAM(s) IV Intermittent once  levothyroxine 175 MICROGram(s) Oral daily  metroNIDAZOLE    Tablet 500 milliGRAM(s) Oral every 12 hours  montelukast 10 milliGRAM(s) Oral daily  oxybutynin 5 milliGRAM(s) Oral two times a day  pantoprazole    Tablet 40 milliGRAM(s) Oral two times a day  sodium chloride 0.9%. 1000 milliLiter(s) IV Continuous <Continuous>  zinc sulfate 220 milliGRAM(s) Oral daily              REVIEW OF SYSTEMS:  CONSTITUTIONAL: No fever, weight loss, or fatigue  EYES: No eye pain, visual disturbances, or discharge  ENT:  No difficulty hearing, tinnitus, vertigo; No sinus or throat pain  NECK: No pain or stiffness  RESPIRATORY: No cough, wheezing, chills or hemoptysis; No Shortness of Breath  CARDIOVASCULAR: No chest pain, palpitations, passing out, dizziness, or leg swelling  GASTROINTESTINAL: No abdominal or epigastric pain. No nausea, vomiting, or hematemesis; No diarrhea or constipation. No melena or hematochezia.  GENITOURINARY: No dysuria, frequency, hematuria, or incontinence  NEUROLOGICAL: No headaches, memory loss, loss of strength, numbness, or tremors  SKIN: No itching, burning, rashes, or lesions   LYMPH Nodes: No enlarged glands  ENDOCRINE: No heat or cold intolerance; No hair loss  MUSCULOSKELETAL: No joint pain or swelling; No muscle, back, or extremity pain  PSYCHIATRIC: No depression, anxiety, mood swings, or difficulty sleeping  HEME/LYMPH: No easy bruising, or bleeding gums  ALLERGY AND IMMUNOLOGIC: No hives or eczema	    [ ] All others negative	  [ ] Unable to obtain      T(C): 37.1 (03-26-21 @ 05:26), Max: 37.1 (03-26-21 @ 05:26)  T(F): 98.7 (03-26-21 @ 05:26), Max: 98.7 (03-26-21 @ 05:26)  HR: 62 (03-26-21 @ 05:26) (60 - 63)  BP: 134/41 (03-26-21 @ 05:26) (131/74 - 159/54)  RR: 16 (03-26-21 @ 05:26) (16 - 18)  SpO2: 100% (03-26-21 @ 05:26) (99% - 100%)  Wt(kg): --    I&O's Summary        PHYSICAL EXAM:  A X O x  HEAD:  Atraumatic, Normocephalic  EYES: EOMI, PERRLA, conjunctiva and sclera clear  NECK: Supple, No JVD, Normal thyroid  Resp: CTAB, No crackles, wheezing,   CVS: Regular rate and rhythm; No discernable murmurs, rubs, or gallops  ABD: Soft, Nontender, Nondistended; Bowel sounds present  EXTREMITIES:  2+ Peripheral Pulses, No edema  LYMPH: No dicernable lymphadenopathy noted  GENERAL: NAD, well-groomed, well-developed      LABS:                        7.8    13.47 )-----------( 273      ( 25 Mar 2021 06:40 )             24.2     03-25    137  |  102  |  21<H>  ----------------------------<  172<H>  3.8   |  25  |  1.14    Ca    8.5      25 Mar 2021 06:40  Phos  2.4     03-25  Mg     2.7     03-25    TPro  6.6  /  Alb  3.0<L>  /  TBili  0.4  /  DBili  x   /  AST  15  /  ALT  22  /  AlkPhos  69  03-25        CAPILLARY BLOOD GLUCOSE      POCT Blood Glucose.: 290 mg/dL (26 Mar 2021 11:13)  POCT Blood Glucose.: 219 mg/dL (26 Mar 2021 07:51)  POCT Blood Glucose.: 217 mg/dL (25 Mar 2021 21:38)  POCT Blood Glucose.: 323 mg/dL (25 Mar 2021 16:52)      Troponins:  ProBNP:  Lipid Profile:   HgA1c:  TSH:           RADIOLOGY & ADDITIONAL TESTS:    Imaging Personally Reviewed:  [ ] YES  [ ] NO      Consultant(s) Notes Reviewed:  [x ] YES  [ ] NO    Care Discussed with Consultants/Other Providers [ x] YES  [ ] NO          PAST MEDICAL & SURGICAL HISTORY:  Obesity    Pacemaker    Atrial fibrillation    Hypothyroidism    Venous stasis    IBS (irritable bowel syndrome)    CHF (congestive heart failure), NYHA class I    HLD (Hyperlipidemia)    HTN (Hypertension)    Diabetes    Myocardial Infarction    CAD (Coronary Atherosclerotic Disease)    Asthma    S/P coronary angiogram          Anemia    H/o or current diagnosis of HF- Contraindication to ACEI/ARBs    H/o or current diagnosis of HF- ACEI/ARB contraindication unknown    H/o or current diagnosis of HF- Contraindication to ACEI/ARBs    H/o or current diagnosis of HF- ACEI/ARB contraindication unknown    H/o or current diagnosis of HF- Contraindication to ACEI/ARBs    H/o or current diagnosis of HF- ACEI/ARB contraindication unknown    Family history of heart disease    Handoff    MEWS Score    Obesity    Pacemaker    Atrial fibrillation    Hypothyroidism    Venous stasis    IBS (irritable bowel syndrome)    CHF (congestive heart failure), NYHA class I    HLD (Hyperlipidemia)    HTN (Hypertension)    Diabetes    Myocardial Infarction    CAD (Coronary Atherosclerotic Disease)    Asthma    Helicobacter pylori gastritis    Symptomatic anemia    Hypothyroidism    Prophylactic measure    JOSE DANIEL (obstructive sleep apnea)    Asthma    HTN (Hypertension)    Diabetes    CHF (congestive heart failure), NYHA class I    Atrial fibrillation    Symptomatic anemia    S/P coronary angiogram    No significant past surgical history    A) WEAKNESS    90+    HTN (Hypertension)    Diabetes    JOSE DANIEL (obstructive sleep apnea)    CHF (congestive heart failure), NYHA class I    Hypothyroidism    Encounter for colonoscopy due to history of adenomatous colonic polyps    Paroxysmal atrial fibrillation    Helicobacter pylori gastritis    Asthma    CAD (coronary atherosclerotic disease)    SysAdmin_VisitLink

## 2021-03-26 NOTE — PROGRESS NOTE ADULT - SUBJECTIVE AND OBJECTIVE BOX
PGY-1 Progress Note discussed with attending    PAGER #: [[phone]] TILL 5:00 PM  PLEASE CONTACT ON CALL TEAM:  - On Call Team (Please refer to Ana) FROM 5:00 PM - 8:30PM  - Nightfloat Team FROM 8:30 -7:30 AM    CHIEF COMPLAINT & BRIEF HOSPITAL COURSE: 79F Tong[place]-speaking from home with son, ambulates with walker, PMH HFpEF, CAD (s/p stents), chronic venous stasis, DM, HTN and Afib (Xarelto, s/p PPM), chronic bronchitis with asthmatic component (home O2 prn), JOSE DANIEL (noncompliant with nocturnal CPAP) presented 3/20 with x2 weeks increasing fatigue, SOB, and paleness, with prolonged history of dark bowel movements s/p possible transverse polyp colonoscopy 2020, no further fu. In ED, VS notable for /77 and SpO2 100% 4L NC. Labs notable for Hb 7.7 (baseline ~11), Fe 15, TIBC 457, 3% saturation. Trop, COVID, UA negative. EKG with BFB. CXR unremarkable.  CT a/p unremarkable for signs of bleeding. S/p x1 unit pRBC in ED. Admitted for workup and management of symptomatic anemia.    INTERVAL HPI/OVERNIGHT EVENTS: NAEON. SBPs 130-150s this am, VS otherwise wnl. Patient reports chronic leg and back pain this am, denies new complaints. Initially planned for discharge with resumption of AC and outpatient GI follow up this am, but surgical pathology report resulted ~11am showing +invasive colonic adenocarcinoma with associated tubular adenoma, discharge held. Surgery Dr. Murrell and heme/onc Dr. Arango consulted. Dr. Sandoval discussed findings and plan with family and patient, agreeable. Patient additionally continuing amoxicillin, clarithromycin, BID PPI, and Flagyl for H pylori. Dronedarone, Symbicort, Flonase, and statin held during clarithromycin course (may be restarted 4/9 after completion of H pylori treatment). Refused labs this am.     MEDICATIONS  (STANDING):  amoxicillin 1000 milliGRAM(s) Oral every 12 hours  ascorbic acid 2000 milliGRAM(s) Oral every 8 hours  aspirin  chewable 81 milliGRAM(s) Oral daily  carvedilol 6.25 milliGRAM(s) Oral every 12 hours  cholecalciferol 1000 Unit(s) Oral daily  clarithromycin 500 milliGRAM(s) Oral two times a day  furosemide   Injectable 20 milliGRAM(s) IV Push daily  gabapentin 300 milliGRAM(s) Oral daily  influenza   Vaccine 0.5 milliLiter(s) IntraMuscular once  insulin glargine Injectable (LANTUS) 10 Unit(s) SubCutaneous at bedtime  insulin lispro (ADMELOG) corrective regimen sliding scale   SubCutaneous Before meals and at bedtime  iron sucrose IVPB 200 milliGRAM(s) IV Intermittent once  levothyroxine 175 MICROGram(s) Oral daily  metroNIDAZOLE    Tablet 500 milliGRAM(s) Oral every 12 hours  montelukast 10 milliGRAM(s) Oral daily  oxybutynin 5 milliGRAM(s) Oral two times a day  pantoprazole    Tablet 40 milliGRAM(s) Oral two times a day  zinc sulfate 220 milliGRAM(s) Oral daily    REVIEW OF SYSTEMS:  CONSTITUTIONAL: No fever or fatigue  RESPIRATORY: No cough; No shortness of breath  CARDIOVASCULAR: No chest pain, no dizziness  GASTROINTESTINAL: mild abdominal pain after taking medication. No nausea, vomiting, diarrhea, constipation. +dark stools  GENITOURINARY: No dysuria or hematuria  NEUROLOGICAL: No headache  SKIN: No itching or rashes  EXT: b/l knee pain "from the inside", chronic b/l LBP, chronic LE pain R>L  all other systems reviewed and are negative      Vital Signs Last 24 Hrs  T(C): 37.1 (26 Mar 2021 05:26), Max: 37.1 (26 Mar 2021 05:26)  T(F): 98.7 (26 Mar 2021 05:26), Max: 98.7 (26 Mar 2021 05:26)  HR: 62 (26 Mar 2021 05:26) (60 - 63)  BP: 134/41 (26 Mar 2021 05:26) (131/74 - 159/54)  BP(mean): --  RR: 16 (26 Mar 2021 05:26) (16 - 18)  SpO2: 100% (26 Mar 2021 05:26) (99% - 100%)    PHYSICAL EXAMINATION:  GENERAL: NAD, obese, elderly woman  HEAD:  Atraumatic, Normocephalic  EYES: periorbital skin pink, eomi, perrl  NECK: Supple  CHEST/LUNG: no increased WOB, NC, no cough noted  HEART: RRR  ABDOMEN: Soft, Nontender, obese, Bowel sounds present  NERVOUS SYSTEM: alert and oriented x3  EXTREMITIES:  2+ Peripheral Pulses, No edema, +hematoma right knee, NTTP; b/l LE warm, pulses intact  SKIN: warm dry                          7.8    13.47 )-----------( 273      ( 25 Mar 2021 06:40 )             24.2     03-25    137  |  102  |  21<H>  ----------------------------<  172<H>  3.8   |  25  |  1.14    Ca    8.5      25 Mar 2021 06:40  Phos  2.4     03-25  Mg     2.7     03-25    TPro  6.6  /  Alb  3.0<L>  /  TBili  0.4  /  DBili  x   /  AST  15  /  ALT  22  /  AlkPhos  69  03-25    LIVER FUNCTIONS - ( 25 Mar 2021 06:40 )  Alb: 3.0 g/dL / Pro: 6.6 g/dL / ALK PHOS: 69 U/L / ALT: 22 U/L DA / AST: 15 U/L / GGT: x                   CAPILLARY BLOOD GLUCOSE      RADIOLOGY & ADDITIONAL TESTS:

## 2021-03-26 NOTE — CONSULT NOTE ADULT - ASSESSMENT
81F presenting with GIB found to have invasive carcinoma of the ascending colon     -Plan for surgical intervention if patient/family is agreeable and once medically optimized  -Medical/cardiac clearance  -D/w Dr. Hu

## 2021-03-26 NOTE — PROGRESS NOTE ADULT - PROBLEM SELECTOR PLAN 2
noted to have pAF this admission  -hold home Xarelto given cancer as above  -restarted asa   -hold home statin until clarithromycin completed 4/9  -continue home coreg  -dronedarone added this admission, now on hold 2/2 clarithromycin, restart 4/9  -[facility] Rein[facility] PPM incompatible with interrogation   -EKG with BFB  -b/l LE doppler 3/21 negative for DVT  -cards Dr. Sandoval following

## 2021-03-26 NOTE — PROGRESS NOTE ADULT - PROBLEM SELECTOR PLAN 3
adm CXR concerning for central congestion, s/p x4 doses Lasix 40mg IV BID  -BNP1K  -TTE 3/21 with G1DD and mildly high RVP  -hold home torsemide given no signs of fluid overload at this time, not rec by pulm or cards  -Cr resolved  -pulm Dr. Cruz following adm CXR concerning for central congestion, s/p x4 doses Lasix 40mg IV BID  -BNP1K  -TTE 3/21 with G1DD and mildly high RVP  -hold home torsemide, continue daily Lasix 20mg IV while inpatient  -Cr resolved  -pulm Dr. Cruz following

## 2021-03-26 NOTE — PROGRESS NOTE ADULT - SUBJECTIVE AND OBJECTIVE BOX
Patient is seen and examined at the bed side, is afebrile now, spiked fever earlier. She is doing better. The Leukocytosis trended down to normal.      REVIEW OF SYSTEMS: All other review systems are negative      ALLERGIES: No Known Allergies      Vital Signs Last 24 Hrs  T(C): 37.3 (26 Mar 2021 20:31), Max: 38.1 (26 Mar 2021 20:23)  T(F): 99.2 (26 Mar 2021 20:31), Max: 100.5 (26 Mar 2021 20:23)  HR: 62 (26 Mar 2021 20:23) (60 - 68)  BP: 151/51 (26 Mar 2021 20:23) (134/41 - 159/54)  BP(mean): --  RR: 17 (26 Mar 2021 20:23) (16 - 18)  SpO2: 98% (26 Mar 2021 20:23) (97% - 100%)      PHYSICAL EXAM:  GENERAL: Not in distress   CHEST/LUNG: Not using accessory muscles   HEART: s1 and s2 present  ABDOMEN:  Nontender and  Nondistended  EXTREMITIES: No pedal  edema  CNS: Awake and Alert      LABS:                        7.9    8.47  )-----------( 269      ( 26 Mar 2021 15:57 )             25.0                           7.8    13.47 )-----------( 273      ( 25 Mar 2021 06:40 )             24.2       03-26    132<L>  |  102  |  16  ----------------------------<  212<H>  4.4   |  22  |  1.13    Ca    8.1<L>      26 Mar 2021 15:57  Phos  1.7     03-26  Mg     2.1     -26    TPro  7.0  /  Alb  3.2<L>  /  TBili  0.3  /  DBili  x   /  AST  21  /  ALT  25  /  AlkPhos  70  -26    03-25    137  |  102  |  21<H>  ----------------------------<  172<H>  3.8   |  25  |  1.14    Ca    8.5      25 Mar 2021 06:40  Phos  2.4     03-25  Mg     2.7     03-25    TPro  6.6  /  Alb  3.0<L>  /  TBili  0.4  /  DBili  x   /  AST  15  /  ALT  22  /  AlkPhos  69  -25        138  |  103  |  35<H>  ----------------------------<  151<H>  4.0   |  25  |  1.57<H>    Ca    8.4      24 Mar 2021 07:00  Phos  3.9       Mg     2.2         TPro  6.7  /  Alb  3.1<L>  /  TBili  0.3  /  DBili  x   /  AST  16  /  ALT  22  /  AlkPhos  71        CAPILLARY BLOOD GLUCOSE  POCT Blood Glucose.: 262 mg/dL (22 Mar 2021 16:34)  POCT Blood Glucose.: 150 mg/dL (22 Mar 2021 12:45)  POCT Blood Glucose.: 147 mg/dL (22 Mar 2021 10:56)  POCT Blood Glucose.: 196 mg/dL (22 Mar 2021 07:40      Urinalysis Basic - ( 20 Mar 2021 20:54 )  Color: Yellow / Appearance: Clear / S.015 / pH: x  Gluc: x / Ketone: Negative  / Bili: Negative / Urobili: Negative   Blood: x / Protein: Negative / Nitrite: Negative   Leuk Esterase: Negative / RBC: x / WBC x   Sq Epi: x / Non Sq Epi: x / Bacteria: x        MEDICATIONS  (STANDING):    ALBUTerol    90 MICROgram(s) HFA Inhaler 2 Puff(s) Inhalation every 6 hours  amoxicillin 1000 milliGRAM(s) Oral every 12 hours  ascorbic acid 2000 milliGRAM(s) Oral every 8 hours  aspirin  chewable 81 milliGRAM(s) Oral daily  atorvastatin 40 milliGRAM(s) Oral at bedtime  budesonide 160 MICROgram(s)/formoterol 4.5 MICROgram(s) Inhaler 2 Puff(s) Inhalation two times a day  carvedilol 6.25 milliGRAM(s) Oral every 12 hours  cholecalciferol 1000 Unit(s) Oral daily  dronedarone 400 milliGRAM(s) Oral two times a day  fluticasone propionate 50 MICROgram(s)/spray Nasal Spray 1 Spray(s) Both Nostrils every 12 hours  gabapentin 300 milliGRAM(s) Oral daily  influenza   Vaccine 0.5 milliLiter(s) IntraMuscular once  insulin lispro (ADMELOG) corrective regimen sliding scale   SubCutaneous three times a day before meals  levothyroxine 175 MICROGram(s) Oral daily  metroNIDAZOLE    Tablet 500 milliGRAM(s) Oral every 12 hours  montelukast 10 milliGRAM(s) Oral daily  oxybutynin 5 milliGRAM(s) Oral two times a day  pantoprazole    Tablet 40 milliGRAM(s) Oral two times a day  sodium chloride 0.9%. 1000 milliLiter(s) (50 mL/Hr) IV Continuous <Continuous>  zinc sulfate 220 milliGRAM(s) Oral daily        RADIOLOGY & ADDITIONAL TESTS:    Surgical Pathology Report (21 @ 11:37)   Surgical Pathology Report:   ACCESSION No: [id number]   KWAME MOSQUERA 2   Surgical Final Report   Final Diagnosis   1. Gastric antrum, biopsy: Chronic active gastritis, diffuse,   with focal lymphoid aggregate; H. Pylori associated. (Special  stain for H. Pylori is positive)   2. Esophagus biopsy: Patchy acute esophagitis. Special stain for  fungi is negative.   Verified by: Mariam Perez M.D. Surgical Pathology Report (21 @ 11:37)       3/20/21 : CT Angio Abdomen and Pelvis w/ IV Cont (21 @ 22:55) No CT evidence of acute gastrointestinal hemorrhage. Few scattered colonic diverticula.    3/20/21 : CT Angio Abdomen and Pelvis w/ IV Cont (21 @ 22:55) LOWER CHEST: Partially imaged pacemaker leads. Mitral annular calcification.    3/20/21 : Xray Chest 1 View- PORTABLE-Urgent (21 @ 16:38): There is left-sided defibrillator. The heart is normal in size. The lungs are clear.          MICROBIOLOGY DATA:    COVID-19 Drew Domain Antibody (21 @ 11:07)   COVID-19 Drew Domain Antibody Result: >250.00:    Culture - Blood (21 @ 21:50)   Specimen Source: .Blood Blood-Venous   Culture Results: No growth to date.     Culture - Blood (21 @ 21:50)   Specimen Source: .Blood Blood-Venous   Culture Results: No growth to date.     COVID-19 PCR . (21 @ 16:32)   COVID-19 PCR: NotDetec:     MRSA/MSSA PCR (20 @ 14:34)   MRSA PCR Result.: NotDetec:     FLU A B RSV Detection by PCR (20 @ 20:00)   Flu A Result: NotDetec

## 2021-03-26 NOTE — PROGRESS NOTE ADULT - PROBLEM SELECTOR PLAN 1
presented 3/20 with x2 weeks increasing fatigue, SOB, and paleness, with prolonged history of dark bowel movements s/p possible transverse polyp colonoscopy 2020, no further fu  -in ED, VS notable for /77 and SpO2 100% 4L NC  -trop, COVID, UA negative  -pre-RBC adm Hb 7.7 (baseline ~11), Fe 15, TIBC 457, 3% saturation  -CT a/p unremarkable for signs of bleeding  -s/p x1 unit pRBC in ED  -IV iron 3/20-23, PPI BID  -Fe and %sat improved s/p transfusion and Venofer, Hb stable, transfuse if <8 -refused labs 3/26  -EGD/col 3/22 with GI Dr. Moreland, gastritis, esophagitis, no GIB, path + H pylori  -treat with amoxicillin 1g q12 x7 days followed by 500mg Q 12hours x7 days along with clarithromycin 500mg q12, Flagyl 500 mg q12, and PPI BID x14 days   -ID Dr. 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 Murrell and heme/onc Dr. Arango consulted  -findings and plan discussed with family and patient, agreeable  -CLD advanced to soft DASH diabetic diet today presented 3/20 with x2 weeks increasing fatigue, SOB, and paleness, with prolonged history of dark bowel movements s/p possible transverse polyp colonoscopy 2020, no further fu  -in ED, VS notable for /77 and SpO2 100% 4L NC  -trop, COVID, UA negative  -pre-RBC adm Hb 7.7 (baseline ~11), Fe 15, TIBC 457, 3% saturation  -CT a/p unremarkable for signs of bleeding  -s/p x1 unit pRBC in ED  -IV iron 3/20-23 and 3/26  -Fe and %sat improved s/p transfusion and Venofer, Hb stable, transfuse if <8  -refused labs 3/26  -EGD/col 3/22 with GI Dr. Moreland, gastritis, esophagitis, no GIB, path + H pylori  -treat with amoxicillin 1g q12 x7 days followed by 500mg Q 12hours x7 days along with clarithromycin 500mg q12, Flagyl 500 mg q12, and PPI BID x14 days   -ID Dr. 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 Murrell and heme/onc Dr. Arango consulted  -findings and plan discussed with family and patient, agreeable  -CLD advanced to soft DASH diabetic diet today

## 2021-03-27 LAB
ALBUMIN SERPL ELPH-MCNC: 3.1 G/DL — LOW (ref 3.5–5)
ALP SERPL-CCNC: 72 U/L — SIGNIFICANT CHANGE UP (ref 40–120)
ALT FLD-CCNC: 23 U/L DA — SIGNIFICANT CHANGE UP (ref 10–60)
ANION GAP SERPL CALC-SCNC: 9 MMOL/L — SIGNIFICANT CHANGE UP (ref 5–17)
AST SERPL-CCNC: 19 U/L — SIGNIFICANT CHANGE UP (ref 10–40)
BILIRUB SERPL-MCNC: 0.3 MG/DL — SIGNIFICANT CHANGE UP (ref 0.2–1.2)
BUN SERPL-MCNC: 14 MG/DL — SIGNIFICANT CHANGE UP (ref 7–18)
CALCIUM SERPL-MCNC: 8.5 MG/DL — SIGNIFICANT CHANGE UP (ref 8.4–10.5)
CHLORIDE SERPL-SCNC: 102 MMOL/L — SIGNIFICANT CHANGE UP (ref 96–108)
CO2 SERPL-SCNC: 24 MMOL/L — SIGNIFICANT CHANGE UP (ref 22–31)
CREAT SERPL-MCNC: 1.12 MG/DL — SIGNIFICANT CHANGE UP (ref 0.5–1.3)
GLUCOSE BLDC GLUCOMTR-MCNC: 167 MG/DL — HIGH (ref 70–99)
GLUCOSE BLDC GLUCOMTR-MCNC: 201 MG/DL — HIGH (ref 70–99)
GLUCOSE BLDC GLUCOMTR-MCNC: 213 MG/DL — HIGH (ref 70–99)
GLUCOSE BLDC GLUCOMTR-MCNC: 261 MG/DL — HIGH (ref 70–99)
GLUCOSE SERPL-MCNC: 128 MG/DL — HIGH (ref 70–99)
HCT VFR BLD CALC: 26.1 % — LOW (ref 34.5–45)
HGB BLD-MCNC: 8.4 G/DL — LOW (ref 11.5–15.5)
MAGNESIUM SERPL-MCNC: 2.2 MG/DL — SIGNIFICANT CHANGE UP (ref 1.6–2.6)
MCHC RBC-ENTMCNC: 26.3 PG — LOW (ref 27–34)
MCHC RBC-ENTMCNC: 32.2 GM/DL — SIGNIFICANT CHANGE UP (ref 32–36)
MCV RBC AUTO: 81.6 FL — SIGNIFICANT CHANGE UP (ref 80–100)
NRBC # BLD: 0 /100 WBCS — SIGNIFICANT CHANGE UP (ref 0–0)
PHOSPHATE SERPL-MCNC: 2.6 MG/DL — SIGNIFICANT CHANGE UP (ref 2.5–4.5)
PLATELET # BLD AUTO: 275 K/UL — SIGNIFICANT CHANGE UP (ref 150–400)
POTASSIUM SERPL-MCNC: 4 MMOL/L — SIGNIFICANT CHANGE UP (ref 3.5–5.3)
POTASSIUM SERPL-SCNC: 4 MMOL/L — SIGNIFICANT CHANGE UP (ref 3.5–5.3)
PROT SERPL-MCNC: 7 G/DL — SIGNIFICANT CHANGE UP (ref 6–8.3)
RBC # BLD: 3.2 M/UL — LOW (ref 3.8–5.2)
RBC # FLD: 14.8 % — HIGH (ref 10.3–14.5)
SARS-COV-2 RNA SPEC QL NAA+PROBE: DETECTED
SODIUM SERPL-SCNC: 135 MMOL/L — SIGNIFICANT CHANGE UP (ref 135–145)
WBC # BLD: 7.12 K/UL — SIGNIFICANT CHANGE UP (ref 3.8–10.5)
WBC # FLD AUTO: 7.12 K/UL — SIGNIFICANT CHANGE UP (ref 3.8–10.5)

## 2021-03-27 RX ORDER — LISINOPRIL 2.5 MG/1
5 TABLET ORAL DAILY
Refills: 0 | Status: DISCONTINUED | OUTPATIENT
Start: 2021-03-27 | End: 2021-03-29

## 2021-03-27 RX ORDER — ACETAMINOPHEN 500 MG
650 TABLET ORAL ONCE
Refills: 0 | Status: COMPLETED | OUTPATIENT
Start: 2021-03-27 | End: 2021-03-27

## 2021-03-27 RX ORDER — DRONEDARONE 400 MG/1
400 TABLET, FILM COATED ORAL
Refills: 0 | Status: DISCONTINUED | OUTPATIENT
Start: 2021-03-27 | End: 2021-04-06

## 2021-03-27 RX ORDER — HEPARIN SODIUM 5000 [USP'U]/ML
5000 INJECTION INTRAVENOUS; SUBCUTANEOUS EVERY 8 HOURS
Refills: 0 | Status: DISCONTINUED | OUTPATIENT
Start: 2021-03-27 | End: 2021-03-31

## 2021-03-27 RX ORDER — IRON SUCROSE 20 MG/ML
200 INJECTION, SOLUTION INTRAVENOUS ONCE
Refills: 0 | Status: COMPLETED | OUTPATIENT
Start: 2021-03-27 | End: 2021-03-27

## 2021-03-27 RX ADMIN — Medication 175 MICROGRAM(S): at 06:03

## 2021-03-27 RX ADMIN — GABAPENTIN 300 MILLIGRAM(S): 400 CAPSULE ORAL at 12:21

## 2021-03-27 RX ADMIN — Medication 20 MILLIGRAM(S): at 06:04

## 2021-03-27 RX ADMIN — Medication 5 MILLIGRAM(S): at 18:19

## 2021-03-27 RX ADMIN — ZINC SULFATE TAB 220 MG (50 MG ZINC EQUIVALENT) 220 MILLIGRAM(S): 220 (50 ZN) TAB at 12:06

## 2021-03-27 RX ADMIN — Medication 650 MILLIGRAM(S): at 00:41

## 2021-03-27 RX ADMIN — Medication 2000 MILLIGRAM(S): at 15:56

## 2021-03-27 RX ADMIN — Medication 5 MILLIGRAM(S): at 06:03

## 2021-03-27 RX ADMIN — PANTOPRAZOLE SODIUM 40 MILLIGRAM(S): 20 TABLET, DELAYED RELEASE ORAL at 06:05

## 2021-03-27 RX ADMIN — Medication 4: at 12:06

## 2021-03-27 RX ADMIN — Medication 6: at 16:08

## 2021-03-27 RX ADMIN — IRON SUCROSE 110 MILLIGRAM(S): 20 INJECTION, SOLUTION INTRAVENOUS at 15:58

## 2021-03-27 RX ADMIN — Medication 1000 UNIT(S): at 12:14

## 2021-03-27 RX ADMIN — Medication 2: at 08:04

## 2021-03-27 RX ADMIN — Medication 2000 MILLIGRAM(S): at 06:04

## 2021-03-27 RX ADMIN — Medication 1000 MILLIGRAM(S): at 06:03

## 2021-03-27 RX ADMIN — Medication 500 MILLIGRAM(S): at 06:03

## 2021-03-27 RX ADMIN — Medication 2000 MILLIGRAM(S): at 21:43

## 2021-03-27 RX ADMIN — PANTOPRAZOLE SODIUM 40 MILLIGRAM(S): 20 TABLET, DELAYED RELEASE ORAL at 18:19

## 2021-03-27 RX ADMIN — Medication 4: at 21:43

## 2021-03-27 RX ADMIN — HEPARIN SODIUM 5000 UNIT(S): 5000 INJECTION INTRAVENOUS; SUBCUTANEOUS at 15:57

## 2021-03-27 RX ADMIN — CARVEDILOL PHOSPHATE 6.25 MILLIGRAM(S): 80 CAPSULE, EXTENDED RELEASE ORAL at 18:17

## 2021-03-27 RX ADMIN — CARVEDILOL PHOSPHATE 6.25 MILLIGRAM(S): 80 CAPSULE, EXTENDED RELEASE ORAL at 06:03

## 2021-03-27 RX ADMIN — MONTELUKAST 10 MILLIGRAM(S): 4 TABLET, CHEWABLE ORAL at 20:49

## 2021-03-27 RX ADMIN — Medication 500 MILLIGRAM(S): at 06:05

## 2021-03-27 RX ADMIN — Medication 650 MILLIGRAM(S): at 01:45

## 2021-03-27 RX ADMIN — INSULIN GLARGINE 10 UNIT(S): 100 INJECTION, SOLUTION SUBCUTANEOUS at 21:42

## 2021-03-27 RX ADMIN — Medication 81 MILLIGRAM(S): at 12:06

## 2021-03-27 RX ADMIN — DRONEDARONE 400 MILLIGRAM(S): 400 TABLET, FILM COATED ORAL at 18:18

## 2021-03-27 RX ADMIN — HEPARIN SODIUM 5000 UNIT(S): 5000 INJECTION INTRAVENOUS; SUBCUTANEOUS at 21:43

## 2021-03-27 NOTE — PROGRESS NOTE ADULT - ASSESSMENT
· Assessment	  79F [place]-speaking from home with son, ambulates with walker, PMH HFpEF, CAD (s/p stents), chronic venous stasis, DM, HTN and Afib (Xarelto, s/p PPM), presented 3/20 with x2 weeks increasing fatigue, SOB, and paleness, with prolonged history of dark bowel movements s/p possible transverse polyp colonoscopy 2020, admitted for workup and management of symptomatic anemia.       Problem/Plan - 1:  ·  Problem: Symptomatic anemia.  Plan: presented 3/20 with x2 weeks increasing fatigue, SOB, and paleness, with prolonged history of dark bowel movements s/p possible transverse polyp colonoscopy 2020, no further fu  -in ED, VS notable for /77 and SpO2 100% 4L NC  -trop, COVID, UA negative  -pre-RBC adm Hb 7.7 (baseline ~11), Fe 15, TIBC 457, 3% saturation  -CT a/p unremarkable for signs of bleeding  -s/p x1 unit pRBC in ED  -fu FOBT  -IV iron 3/20-23, PPI BID  -Fe and %sat improved s/p transfusion and Venofer, Hb stable, transfuse if <8  -EGD/col 3/22 with GI Dr. Moreland, gastritis, esophagitis, no GIB, path + H pylori  -treat with amoxicillin 1g q12 x7 days followed by 500mg Q 12hours x7 days along with clarithromycin 500mg q12, Flagyl 500 mg q12, and PPI x14 days  -ID Dr. Butcher following   -colonoscopy 3/24 with polyps  -abdominal XR negative for free air 3/24   -repeat colonoscopy 3/25 notable for x2 polyps (removed), diverticulosis, and internal hemorrhoids  -CLD resumed  -continuing PPI.      Problem/Plan - 2:  ·  Problem: Atrial fibrillation.  Plan: -hold home Xarelto given GIB  -discuss asa with GI after EGD/col 3/22-25  -continue home coreg, dronedarone added  -[facility] [facility] PPM incompatible with interrogation   -EKG with BFB  -b/l LE doppler 3/21 negative for DVT  -cards Dr. Sandoval following.      Problem/Plan - 3:  ·  Problem: CHF (congestive heart failure), NYHA class I.  Plan: adm CXR concerning for central congestion, s/p x4 doses Lasix 40mg IV BID  -BNP1K  -TTE 3/21 with G1DD and mildly high RVP  -resume home torsemide 3/23  -Cr resolved  -pulm Dr. Cruz following.      Problem/Plan - 4:  ·  Problem: Diabetes.  Plan: -hold home metformin-glipizide and Janumet   -HSS while inpatient  -continue home gabapentin and oxybutynin   -BGs today 140-190, near goal 140-180  -a1c 6.6, no changes to home meds needed  -BG QACHS.      Problem/Plan - 5:  ·  Problem: HTN (Hypertension).  Plan: -continue home coreg, restart home torsemide 3/23 as above  -SBPs variable, monitor.      Problem/Plan - 6:  Problem: Asthma. Plan: -continue home montelukast, albuterol, flonase, symbicort  -continue O2 supplementation as needed.     Problem/Plan - 7:  ·  Problem: JOSE DANIEL (obstructive sleep apnea).  Plan: -noncompliant with CPAP  -remainder of history and management as above.      Problem/Plan - 8:  ·  Problem: Hypothyroidism.  Plan: -continue home Synthroid  -TSH wnl.      Problem/Plan - 9:  ·  Problem: Prophylactic measure.  Plan: will hold off chemoPPX for now, SCDs.

## 2021-03-27 NOTE — DIETITIAN INITIAL EVALUATION ADULT. - PERTINENT MEDS FT
MEDICATIONS  (STANDING):  amoxicillin 1000 milliGRAM(s) Oral every 12 hours  ascorbic acid 2000 milliGRAM(s) Oral every 8 hours  aspirin  chewable 81 milliGRAM(s) Oral daily  carvedilol 6.25 milliGRAM(s) Oral every 12 hours  cholecalciferol 1000 Unit(s) Oral daily  clarithromycin 500 milliGRAM(s) Oral two times a day  furosemide   Injectable 20 milliGRAM(s) IV Push daily  gabapentin 300 milliGRAM(s) Oral daily  influenza   Vaccine 0.5 milliLiter(s) IntraMuscular once  insulin glargine Injectable (LANTUS) 10 Unit(s) SubCutaneous at bedtime  insulin lispro (ADMELOG) corrective regimen sliding scale   SubCutaneous Before meals and at bedtime  levothyroxine 175 MICROGram(s) Oral daily  metroNIDAZOLE    Tablet 500 milliGRAM(s) Oral every 12 hours  montelukast 10 milliGRAM(s) Oral daily  oxybutynin 5 milliGRAM(s) Oral two times a day  pantoprazole    Tablet 40 milliGRAM(s) Oral two times a day  zinc sulfate 220 milliGRAM(s) Oral daily

## 2021-03-27 NOTE — PROGRESS NOTE ADULT - ASSESSMENT
Patient is a 81y old  Female from home, ambulates with walker, lives with son, with PMHx of HFpEF, CAD (s/p stents), chronic venous stasis, DM, HTN and Afib (on Xarelto, s/p PPM), chronic bronchitis with asthmatic component ( on Oxygen PRN at home ), JOSE DANIEL ( supposed to be on nocturnal CPAP , but non compliant), now presents to the ER for evaluation of hypotension, SOB and  fatigue for almost 2 weeks. Per daughter and granddaughter patient has been complaining of increasing fatigue and sob for last few weeks, seemed to be pale , patient endorses dark loose bowel movement. Patient has chronic diarrhea, but no abdominal pain. She has evaluated by GI team and now the ID consult requested to assist with further evaluation of chronic diarrhea.     #  H. Pylori associated Chronic active gastritis, diffuse, with focal lymphoid aggregate  # Chronic diarrhea- C.diff vs Malignancy - NO Malignancy Antrum biopsy   # Symptomatic Anemia  - s/p EGD and antrum biopsy shows H. Pylori associated Chronic active gastritis, diffuse, with focal lymphoid aggregate  # COVID negative 3/20/21  # S/p EGD 3/22  and s/p Colonoscopy 3/25    Would recommend:    1. Obtain Procalcitonin level  2. Monitor Temp. and c/w supportive care  3. Continue Amoxicillin 1 g q 12hours 7 days then 500mg Q 12hours, + start Clarithromycin 500 mg q 12hours and c/w  Flagyl 500 mg q 12hours and + PPI  4. Need for total  of 14 days   5. OOb to chair      Attending Attestation:    Spent more than 45 minutes on total encounter, more than 50 % of the visit was spent counseling and/or coordinating care by the Attending physician.       Patient is a 81y old  Female from home, ambulates with walker, lives with son, with PMHx of HFpEF, CAD (s/p stents), chronic venous stasis, DM, HTN and Afib (on Xarelto, s/p PPM), chronic bronchitis with asthmatic component ( on Oxygen PRN at home ), JOSE DANIEL ( supposed to be on nocturnal CPAP , but non compliant), now presents to the ER for evaluation of hypotension, SOB and  fatigue for almost 2 weeks. Per daughter and granddaughter patient has been complaining of increasing fatigue and sob for last few weeks, seemed to be pale , patient endorses dark loose bowel movement. Patient has chronic diarrhea, but no abdominal pain. She has evaluated by GI team and now the ID consult requested to assist with further evaluation of chronic diarrhea.     #  H. Pylori associated Chronic active gastritis, diffuse, with focal lymphoid aggregate, DX 3/22 by Antrum biopsy  # Chronic diarrhea- C.diff vs Malignancy - NO Malignancy Antrum biopsy   # Symptomatic Anemia  - s/p EGD and antrum biopsy shows H. Pylori associated Chronic active gastritis, diffuse, with focal lymphoid aggregate  # COVID negative 3/20/21  # S/p EGD 3/22  and s/p Colonoscopy 3/25- pathilogy shows - Invasive colonic adenocarcinoma arising in association with  tubular adenoma    Would recommend:    1. Monitor Temp. and c/w supportive care  2. Management of Adenocarcinoma of colon as per Hem/Onc and Surgery   3. Continue Amoxicillin 1 g q 12hours 7 days then 500mg Q 12hours, + start Clarithromycin 500 mg q 12hours and c/w  Flagyl 500 mg q 12hours and + PPI X 14 days   4. OOb to chair      Attending Attestation:    Spent more than 45 minutes on total encounter, more than 50 % of the visit was spent counseling and/or coordinating care by the Attending physician.

## 2021-03-27 NOTE — DIETITIAN INITIAL EVALUATION ADULT. - DIET TYPE
Annmarie; Add Glucerna Shake 1can bid as medically feasible (440kcal, 20g protein)/DASH/TLC (sodium and cholesterol restricted diet)/soft/consistent carbohydrate (evening snack)

## 2021-03-27 NOTE — PROGRESS NOTE ADULT - ASSESSMENT
79 years old Mongolian-speaking female from home, ambulates with walker, lives with son, with PMHx of HFpEF, CAD (s/p stents), chronic venous stasis, DM, HTN and Afib (on Xarelto, s/p PPM), chronic bronchitis with asthmatic component ( on Oxygen PRN at home ), JOSE DANIEL ( supposed to be on nocturnal CPAP , but non compliant) presents to the ED with chief complaint of hypotension , SOB and  fatigue for almost 2 weeks.,symptomatic Fe def anemia with Colon ca.  1.GI f/u.  2.PAF-Resume multaq, cont coreg, asa.  3.HTN-cont bp medication.  4.CAD-asa,coreg,statin.  5.DM-insulin.  6.Anemia-IV iron.  7.Asthma-MDI.  8.Tx for H.Pylori+ after colon resection,d/w GI..  9.Colon ca-Surgical and Heme/Onc eval noted.  10.GI and DVT prophylaxis.  11.Stress test before OR clearance. 79 years old Swedish-speaking female from home, ambulates with walker, lives with son, with PMHx of HFpEF, CAD (s/p stents), chronic venous stasis, DM, HTN and Afib (on Xarelto, s/p PPM), chronic bronchitis with asthmatic component ( on Oxygen PRN at home ), JOSE DANIEL ( supposed to be on nocturnal CPAP , but non compliant) presents to the ED with chief complaint of hypotension , SOB and  fatigue for almost 2 weeks.,symptomatic Fe def anemia with Colon ca.  1.GI f/u.  2.PAF-Resume multaq, cont coreg, asa.  3.HTN-cont bp medication.  4.CAD-asa,coreg,statin.  5.DM-insulin.  6.Anemia-IV iron.  7.Asthma-MDI.  8.Tx for H.Pylori+ after colon resection,d/w GI..  9.Colon ca-Surgical and Heme/Onc eval noted.  10.GI and DVT prophylaxis.  11.HTN-resume Ace.  12.Stress test before OR clearance.

## 2021-03-27 NOTE — DIETITIAN NUTRITION RISK NOTIFICATION - TREATMENT: THE FOLLOWING DIET HAS BEEN RECOMMENDED
Poor appetite reported at present, rec to add oral nutritional supplement for now:   Diet, Soft:   Consistent Carbohydrate {Evening Snacks}  DASH/TLC {Sodium & Cholesterol Restricted}  Kosher  Supplement Feeding Modality:  Oral  Glucerna Shake Cans or Servings Per Day:  1       Frequency:  Two Times a day (03-27-21 @ 11:57) [Pending Verification By Attending]

## 2021-03-27 NOTE — DIETITIAN INITIAL EVALUATION ADULT. - FACTORS AFF FOOD INTAKE
persistent lack of appetite/Evangelical/ethnic/cultural/personal food preferences acute on chronic comorbidities including CHF, Colon Cancer,/difficulty swallowing/persistent lack of appetite/Confucianist/ethnic/cultural/personal food preferences

## 2021-03-27 NOTE — PROGRESS NOTE ADULT - SUBJECTIVE AND OBJECTIVE BOX
Patient is seen and examined at the bed side, is afebrile now, spiked fever earlier. 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       REVIEW OF SYSTEMS: All other review systems are negative      ALLERGIES: No Known Allergies      Vital Signs Last 24 Hrs  T(C): 36.6 (27 Mar 2021 13:34), Max: 38.1 (26 Mar 2021 20:23)  T(F): 97.8 (27 Mar 2021 13:34), Max: 100.5 (26 Mar 2021 20:23)  HR: 63 (27 Mar 2021 13:34) (62 - 64)  BP: 169/55 (27 Mar 2021 13:34) (151/51 - 169/55)  BP(mean): --  RR: 17 (27 Mar 2021 13:34) (17 - 18)  SpO2: 97% (27 Mar 2021 13:34) (97% - 98%)        PHYSICAL EXAM:  GENERAL: Not in distress   CHEST/LUNG: Not using accessory muscles   HEART: s1 and s2 present  ABDOMEN:  Nontender and  Nondistended  EXTREMITIES: No pedal  edema  CNS: Awake and Alert      LABS:                        8.4    7.12  )-----------( 275      ( 27 Mar 2021 06:36 )             26.1                           7.9    8.47  )-----------( 269      ( 26 Mar 2021 15:57 )             25.0                           7.8    13.47 )-----------( 273      ( 25 Mar 2021 06:40 )             24.2       03-    135  |  102  |  14  ----------------------------<  128<H>  4.0   |  24  |  1.12    Ca    8.5      27 Mar 2021 06:36  Phos  2.6     -  Mg     2.2         TPro  7.0  /  Alb  3.1<L>  /  TBili  0.3  /  DBili  x   /  AST  19  /  ALT  23  /  AlkPhos  72  -27    03-    132<L>  |  102  |  16  ----------------------------<  212<H>  4.4   |  22  |  1.13    Ca    8.1<L>      26 Mar 2021 15:57  Phos  1.7     -  Mg     2.1     -    TPro  7.0  /  Alb  3.2<L>  /  TBili  0.3  /  DBili  x   /  AST  21  /  ALT  25  /  AlkPhos  70  --    138  |  103  |  35<H>  ----------------------------<  151<H>  4.0   |  25  |  1.57<H>    Ca    8.4      24 Mar 2021 07:00  Phos  3.9     -  Mg     2.2         TPro  6.7  /  Alb  3.1<L>  /  TBili  0.3  /  DBili  x   /  AST  16  /  ALT  22  /  AlkPhos  71        CAPILLARY BLOOD GLUCOSE  POCT Blood Glucose.: 262 mg/dL (22 Mar 2021 16:34)  POCT Blood Glucose.: 150 mg/dL (22 Mar 2021 12:45)  POCT Blood Glucose.: 147 mg/dL (22 Mar 2021 10:56)  POCT Blood Glucose.: 196 mg/dL (22 Mar 2021 07:40      Urinalysis Basic - ( 20 Mar 2021 20:54 )  Color: Yellow / Appearance: Clear / S.015 / pH: x  Gluc: x / Ketone: Negative  / Bili: Negative / Urobili: Negative   Blood: x / Protein: Negative / Nitrite: Negative   Leuk Esterase: Negative / RBC: x / WBC x   Sq Epi: x / Non Sq Epi: x / Bacteria: x        MEDICATIONS  (STANDING):    ascorbic acid 2000 milliGRAM(s) Oral every 8 hours  aspirin  chewable 81 milliGRAM(s) Oral daily  carvedilol 6.25 milliGRAM(s) Oral every 12 hours  cholecalciferol 1000 Unit(s) Oral daily  dronedarone 400 milliGRAM(s) Oral two times a day  furosemide   Injectable 20 milliGRAM(s) IV Push daily  gabapentin 300 milliGRAM(s) Oral daily  heparin   Injectable 5000 Unit(s) SubCutaneous every 8 hours  influenza   Vaccine 0.5 milliLiter(s) IntraMuscular once  insulin glargine Injectable (LANTUS) 10 Unit(s) SubCutaneous at bedtime  insulin lispro (ADMELOG) corrective regimen sliding scale   SubCutaneous Before meals and at bedtime  levothyroxine 175 MICROGram(s) Oral daily  lisinopril 5 milliGRAM(s) Oral daily  montelukast 10 milliGRAM(s) Oral daily  oxybutynin 5 milliGRAM(s) Oral two times a day  pantoprazole    Tablet 40 milliGRAM(s) Oral two times a day  zinc sulfate 220 milliGRAM(s) Oral daily      RADIOLOGY & ADDITIONAL TESTS:    Surgical Pathology Report (21 @ 11:37)   Surgical Pathology Report:   ACCESSION No: [id number]   KWAME MOSQUERA 2   Surgical Final Report   Final Diagnosis   1. Gastric antrum, biopsy: Chronic active gastritis, diffuse,   with focal lymphoid aggregate; H. Pylori associated. (Special  stain for H. Pylori is positive)   2. Esophagus biopsy: Patchy acute esophagitis. Special stain for  fungi is negative.   Verified by: Mariam Perez M.D. Surgical Pathology Report (21 @ 11:37)       3/20/21 : CT Angio Abdomen and Pelvis w/ IV Cont (21 @ 22:55) No CT evidence of acute gastrointestinal hemorrhage. Few scattered colonic diverticula.    3/20/21 : CT Angio Abdomen and Pelvis w/ IV Cont (21 @ 22:55) LOWER CHEST: Partially imaged pacemaker leads. Mitral annular calcification.    3/20/21 : Xray Chest 1 View- PORTABLE-Urgent (21 @ 16:38): There is left-sided defibrillator. The heart is normal in size. The lungs are clear.          MICROBIOLOGY DATA:    COVID-19 Drew Domain Antibody (21 @ 11:07)   COVID-19 Drew Domain Antibody Result: >250.00:    Culture - Blood (21 @ 21:50)   Specimen Source: .Blood Blood-Venous   Culture Results: No growth to date.     Culture - Blood (21 @ 21:50)   Specimen Source: .Blood Blood-Venous   Culture Results: No growth to date.     COVID-19 PCR . (21 @ 16:32)   COVID-19 PCR: NotDetec:     MRSA/MSSA PCR (20 @ 14:34)   MRSA PCR Result.: NotDetec:     FLU A B RSV Detection by PCR (20 @ 20:00)   Flu A Result: NotDetec             Patient is seen and examined at the bed side, is afebrile now, spiked fever earlier. 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       REVIEW OF SYSTEMS: All other review systems are negative      ALLERGIES: No Known Allergies      Vital Signs Last 24 Hrs  T(C): 36.6 (27 Mar 2021 13:34), Max: 38.1 (26 Mar 2021 20:23)  T(F): 97.8 (27 Mar 2021 13:34), Max: 100.5 (26 Mar 2021 20:23)  HR: 63 (27 Mar 2021 13:34) (62 - 64)  BP: 169/55 (27 Mar 2021 13:34) (151/51 - 169/55)  BP(mean): --  RR: 17 (27 Mar 2021 13:34) (17 - 18)  SpO2: 97% (27 Mar 2021 13:34) (97% - 98%)        PHYSICAL EXAM:  GENERAL: Not in distress   CHEST/LUNG: Not using accessory muscles   HEART: s1 and s2 present  ABDOMEN:  Nontender and  Nondistended  EXTREMITIES: No pedal  edema  CNS: Awake and Alert      LABS:                        8.4    7.12  )-----------( 275      ( 27 Mar 2021 06:36 )             26.1                           7.9    8.47  )-----------( 269      ( 26 Mar 2021 15:57 )             25.0                           7.8    13.47 )-----------( 273      ( 25 Mar 2021 06:40 )             24.2       03-    135  |  102  |  14  ----------------------------<  128<H>  4.0   |  24  |  1.12    Ca    8.5      27 Mar 2021 06:36  Phos  2.6       Mg     2.2         TPro  7.0  /  Alb  3.1<L>  /  TBili  0.3  /  DBili  x   /  AST  19  /  ALT  23  /  AlkPhos  72  -      03-24    138  |  103  |  35<H>  ----------------------------<  151<H>  4.0   |  25  |  1.57<H>    Ca    8.4      24 Mar 2021 07:00  Phos  3.9     03-24  Mg     2.2     03-24    TPro  6.7  /  Alb  3.1<L>  /  TBili  0.3  /  DBili  x   /  AST  16  /  ALT  22  /  AlkPhos  71  03-24      CAPILLARY BLOOD GLUCOSE  POCT Blood Glucose.: 262 mg/dL (22 Mar 2021 16:34)  POCT Blood Glucose.: 150 mg/dL (22 Mar 2021 12:45)  POCT Blood Glucose.: 147 mg/dL (22 Mar 2021 10:56)  POCT Blood Glucose.: 196 mg/dL (22 Mar 2021 07:40      Urinalysis Basic - ( 20 Mar 2021 20:54 )  Color: Yellow / Appearance: Clear / S.015 / pH: x  Gluc: x / Ketone: Negative  / Bili: Negative / Urobili: Negative   Blood: x / Protein: Negative / Nitrite: Negative   Leuk Esterase: Negative / RBC: x / WBC x   Sq Epi: x / Non Sq Epi: x / Bacteria: x        MEDICATIONS  (STANDING):    ascorbic acid 2000 milliGRAM(s) Oral every 8 hours  aspirin  chewable 81 milliGRAM(s) Oral daily  carvedilol 6.25 milliGRAM(s) Oral every 12 hours  cholecalciferol 1000 Unit(s) Oral daily  dronedarone 400 milliGRAM(s) Oral two times a day  furosemide   Injectable 20 milliGRAM(s) IV Push daily  gabapentin 300 milliGRAM(s) Oral daily  heparin   Injectable 5000 Unit(s) SubCutaneous every 8 hours  influenza   Vaccine 0.5 milliLiter(s) IntraMuscular once  insulin glargine Injectable (LANTUS) 10 Unit(s) SubCutaneous at bedtime  insulin lispro (ADMELOG) corrective regimen sliding scale   SubCutaneous Before meals and at bedtime  levothyroxine 175 MICROGram(s) Oral daily  lisinopril 5 milliGRAM(s) Oral daily  montelukast 10 milliGRAM(s) Oral daily  oxybutynin 5 milliGRAM(s) Oral two times a day  pantoprazole    Tablet 40 milliGRAM(s) Oral two times a day  zinc sulfate 220 milliGRAM(s) Oral daily      RADIOLOGY & ADDITIONAL TESTS:    Surgical Pathology Report (21 @ 10:15)   Surgical Pathology Report:   ACCESSION No: [id number]   KWAME MOSQUERA 2   Surgical Final Report   Final Diagnosis   1. Ascending colon polyp; hot snare:   - Invasive colonic adenocarcinoma arising in association with   tubular adenoma. See comment.   - Fragments of tubular adenoma (the smallest polyps).   2. Hepatic flexure polyp; biopsy:   - Tubular adenoma.   3. Descending colon polyp; biopsy:   - Inflammatory polyps.   Verified by: Priscila Arana MD   (Electronic Signature)   Reported on: 21 11:11 EDT, [facility],     Surgical Pathology Report (21 @ 11:37)   Surgical Pathology Report:   ACCESSION No: [id number]   KWAME MOSQUERA 2   Surgical Final Report   Final Diagnosis   1. Gastric antrum, biopsy: Chronic active gastritis, diffuse,   with focal lymphoid aggregate; H. Pylori associated. (Special  stain for H. Pylori is positive)   2. Esophagus biopsy: Patchy acute esophagitis. Special stain for  fungi is negative.   Verified by: Mariam Perez M.D. Surgical Pathology Report (21 @ 11:37)       3/20/21 : CT Angio Abdomen and Pelvis w/ IV Cont (21 @ 22:55) No CT evidence of acute gastrointestinal hemorrhage. Few scattered colonic diverticula.    3/20/21 : CT Angio Abdomen and Pelvis w/ IV Cont (21 @ 22:55) LOWER CHEST: Partially imaged pacemaker leads. Mitral annular calcification.    3/20/21 : Xray Chest 1 View- PORTABLE-Urgent (21 @ 16:38): There is left-sided defibrillator. The heart is normal in size. The lungs are clear.          MICROBIOLOGY DATA:    COVID-19 Drew Domain Antibody (21 @ 11:07)   COVID-19 Drew Domain Antibody Result: >250.00:    Culture - Blood (21 @ 21:50)   Specimen Source: .Blood Blood-Venous   Culture Results: No growth to date.     Culture - Blood (21 @ 21:50)   Specimen Source: .Blood Blood-Venous   Culture Results: No growth to date.     COVID-19 PCR . (21 @ 16:32)   COVID-19 PCR: NotDetec:     MRSA/MSSA PCR (20 @ 14:34)   MRSA PCR Result.: NotDetec:     FLU A B RSV Detection by PCR (20 @ 20:00)   Flu A Result: NotDetec

## 2021-03-27 NOTE — PROGRESS NOTE ADULT - SUBJECTIVE AND OBJECTIVE BOX
CHIEF COMPLAINT:Patient is a 81y old  Female who presents with a chief complaint of symptomatic anemia .Pt appears comfortable.    	  REVIEW OF SYSTEMS:  CONSTITUTIONAL: No fever, weight loss, or fatigue  EYES: No eye pain, visual disturbances, or discharge  ENT:  No difficulty hearing, tinnitus, vertigo; No sinus or throat pain  NECK: No pain or stiffness  RESPIRATORY: No cough, wheezing, chills or hemoptysis; No Shortness of Breath  CARDIOVASCULAR: No chest pain, palpitations, passing out, dizziness, or leg swelling  GASTROINTESTINAL: No abdominal or epigastric pain. No nausea, vomiting, or hematemesis; No diarrhea or constipation. No melena or hematochezia.  GENITOURINARY: No dysuria, frequency, hematuria, or incontinence  NEUROLOGICAL: No headaches, memory loss, loss of strength, numbness, or tremors  SKIN: No itching, burning, rashes, or lesions   LYMPH Nodes: No enlarged glands  ENDOCRINE: No heat or cold intolerance; No hair loss  MUSCULOSKELETAL: No joint pain or swelling; No muscle, back, or extremity pain  PSYCHIATRIC: No depression, anxiety, mood swings, or difficulty sleeping  HEME/LYMPH: No easy bruising, or bleeding gums  ALLERGY AND IMMUNOLOGIC: No hives or eczema	    PHYSICAL EXAM:  T(C): 36.6 (03-27-21 @ 05:20), Max: 38.1 (03-26-21 @ 20:23)  HR: 64 (03-27-21 @ 05:20) (62 - 68)  BP: 157/52 (03-27-21 @ 05:20) (139/64 - 157/52)  RR: 18 (03-27-21 @ 05:20) (17 - 18)  SpO2: 97% (03-27-21 @ 05:20) (97% - 98%)  Wt(kg): --  I&O's Summary      Appearance: Normal	  HEENT:   Normal oral mucosa, PERRL, EOMI	  Lymphatic: No lymphadenopathy  Cardiovascular: Normal S1 S2, No JVD, No murmurs, +2 edema  Respiratory: Lungs clear to auscultation	  Psychiatry: A & O x 3, Mood & affect appropriate  Gastrointestinal:  Soft, Non-tender, + BS	  Skin: No rashes, No ecchymoses, No cyanosis	  Neurologic: Non-focal  Extremities: Normal range of motion, No clubbing, cyanosis +2 edema  Vascular: Peripheral pulses palpable 2+ bilaterally    MEDICATIONS  (STANDING):  ascorbic acid 2000 milliGRAM(s) Oral every 8 hours  aspirin  chewable 81 milliGRAM(s) Oral daily  carvedilol 6.25 milliGRAM(s) Oral every 12 hours  cholecalciferol 1000 Unit(s) Oral daily  dronedarone 400 milliGRAM(s) Oral two times a day  furosemide   Injectable 20 milliGRAM(s) IV Push daily  gabapentin 300 milliGRAM(s) Oral daily  heparin   Injectable 5000 Unit(s) SubCutaneous every 8 hours  influenza   Vaccine 0.5 milliLiter(s) IntraMuscular once  insulin glargine Injectable (LANTUS) 10 Unit(s) SubCutaneous at bedtime  insulin lispro (ADMELOG) corrective regimen sliding scale   SubCutaneous Before meals and at bedtime  iron sucrose IVPB 200 milliGRAM(s) IV Intermittent once  levothyroxine 175 MICROGram(s) Oral daily  lisinopril 5 milliGRAM(s) Oral daily  montelukast 10 milliGRAM(s) Oral daily  oxybutynin 5 milliGRAM(s) Oral two times a day  pantoprazole    Tablet 40 milliGRAM(s) Oral two times a day  zinc sulfate 220 milliGRAM(s) Oral daily      	  	  LABS:	 	                                8.4    7.12  )-----------( 275      ( 27 Mar 2021 06:36 )             26.1     03-27    135  |  102  |  14  ----------------------------<  128<H>  4.0   |  24  |  1.12    Ca    8.5      27 Mar 2021 06:36  Phos  2.6     03-27  Mg     2.2     03-27    TPro  7.0  /  Alb  3.1<L>  /  TBili  0.3  /  DBili  x   /  AST  19  /  ALT  23  /  AlkPhos  72  03-27    proBNP: Serum Pro-Brain Natriuretic Peptide: 1155 pg/mL (03-21 @ 07:15)    Lipid Profile: Cholesterol 134  LDL --  HDL 54  TG 82  Cholesterol 145  LDL --  HDL 55  TG 86    HgA1c:   TSH: Thyroid Stimulating Hormone, Serum: 2.67 uU/mL (03-22 @ 07:34)  Thyroid Stimulating Hormone, Serum: 3.61 uU/mL (03-21 @ 07:15)

## 2021-03-27 NOTE — PROGRESS NOTE ADULT - ATTENDING COMMENTS
PT deemed with colonic CA. In goals of care d/w HCP; if condition is treatable and not end-stage would consider treatment (aside from chemo). Sx consult with Dr Granado made, even for a possible partial colectomy in a palliative setting (to relieve potential obstruction).

## 2021-03-27 NOTE — PROGRESS NOTE ADULT - ASSESSMENT
1. Anemia  2. Melena  3. S/p EGD  4. Gastritis  5. Esophagitis  6. No evidence of acute GI bleeding    Suggestions:    1. Monitor H/H  2. Transfuse PRBC as needed  3. Protonix 40mg daily   4. Follow up path   5. Avoid NSAID  6. Colonoscopy  7. DVT prophylaxis 1. Anemia  2. Melena  3. S/p EGD  4. Gastritis  5. Esophagitis  6. Colon cancer  7. No evidence of acute GI bleeding    Suggestions:    1. Monitor H/H  2. Transfuse PRBC as needed  3. Protonix 40mg daily   4. Follow up path   5. Avoid NSAID  6. Oncology evaluation  7. Surgical evaluation  8. DVT prophylaxis

## 2021-03-27 NOTE — PROGRESS NOTE ADULT - SUBJECTIVE AND OBJECTIVE BOX
KWAME MOSQUERA  MR# [id number]  81yFemale        Patient is a 81y old  Female who presents with a chief complaint of symptomatic anemia (27 Mar 2021 19:38)      INTERVAL HPI/OVERNIGHT EVENTS:  Patient seen and examined at bedside. No notations of chest pain, palpitation, SOB, orthopnea, nausea, vomiting or abdominal pain.    ALLERGIES  No Known Allergies      MEDICATIONS  ascorbic acid 2000 milliGRAM(s) Oral every 8 hours  aspirin  chewable 81 milliGRAM(s) Oral daily  carvedilol 6.25 milliGRAM(s) Oral every 12 hours  cholecalciferol 1000 Unit(s) Oral daily  dronedarone 400 milliGRAM(s) Oral two times a day  furosemide   Injectable 20 milliGRAM(s) IV Push daily  gabapentin 300 milliGRAM(s) Oral daily  heparin   Injectable 5000 Unit(s) SubCutaneous every 8 hours  influenza   Vaccine 0.5 milliLiter(s) IntraMuscular once  insulin glargine Injectable (LANTUS) 10 Unit(s) SubCutaneous at bedtime  insulin lispro (ADMELOG) corrective regimen sliding scale   SubCutaneous Before meals and at bedtime  levothyroxine 175 MICROGram(s) Oral daily  lisinopril 5 milliGRAM(s) Oral daily  montelukast 10 milliGRAM(s) Oral daily  oxybutynin 5 milliGRAM(s) Oral two times a day  pantoprazole    Tablet 40 milliGRAM(s) Oral two times a day  zinc sulfate 220 milliGRAM(s) Oral daily              REVIEW OF SYSTEMS:  CONSTITUTIONAL: No fever, weight loss, or fatigue  EYES: No eye pain, visual disturbances, or discharge  ENT:  No difficulty hearing, tinnitus, vertigo; No sinus or throat pain  NECK: No pain or stiffness  RESPIRATORY: No cough, wheezing, chills or hemoptysis; No Shortness of Breath  CARDIOVASCULAR: No chest pain, palpitations, passing out, dizziness, or leg swelling  GASTROINTESTINAL: No abdominal or epigastric pain. No nausea, vomiting, or hematemesis; No diarrhea or constipation. No melena or hematochezia.  GENITOURINARY: No dysuria, frequency, hematuria, or incontinence  NEUROLOGICAL: No headaches, memory loss, loss of strength, numbness, or tremors  SKIN: No itching, burning, rashes, or lesions   LYMPH Nodes: No enlarged glands  ENDOCRINE: No heat or cold intolerance; No hair loss  MUSCULOSKELETAL: No joint pain or swelling; No muscle, back, or extremity pain  PSYCHIATRIC: No depression, anxiety, mood swings, or difficulty sleeping  HEME/LYMPH: No easy bruising, or bleeding gums  ALLERGY AND IMMUNOLOGIC: No hives or eczema	    [ ] All others negative	  [ ] Unable to obtain      T(C): 37.4 (03-27-21 @ 19:55), Max: 37.4 (03-27-21 @ 19:55)  T(F): 99.3 (03-27-21 @ 19:55), Max: 99.3 (03-27-21 @ 19:55)  HR: 65 (03-27-21 @ 19:55) (63 - 65)  BP: 160/94 (03-27-21 @ 19:55) (157/52 - 169/55)  RR: 18 (03-27-21 @ 19:55) (17 - 18)  SpO2: 98% (03-27-21 @ 19:55) (97% - 98%)  Wt(kg): --    I&O's Summary        PHYSICAL EXAM:  A X O x  HEAD:  Atraumatic, Normocephalic  EYES: EOMI, PERRLA, conjunctiva and sclera clear  NECK: Supple, No JVD, Normal thyroid  Resp: CTAB, No crackles, wheezing,   CVS: Regular rate and rhythm; No discernable murmurs, rubs, or gallops  ABD: Soft, Nontender, Nondistended; Bowel sounds present  EXTREMITIES:  2+ Peripheral Pulses, No edema  LYMPH: No dicernable lymphadenopathy noted  GENERAL: NAD, well-groomed, well-developed      LABS:                        8.4    7.12  )-----------( 275      ( 27 Mar 2021 06:36 )             26.1     03-27    135  |  102  |  14  ----------------------------<  128<H>  4.0   |  24  |  1.12    Ca    8.5      27 Mar 2021 06:36  Phos  2.6     03-27  Mg     2.2     03-27    TPro  7.0  /  Alb  3.1<L>  /  TBili  0.3  /  DBili  x   /  AST  19  /  ALT  23  /  AlkPhos  72  03-27        CAPILLARY BLOOD GLUCOSE      POCT Blood Glucose.: 261 mg/dL (27 Mar 2021 16:01)  POCT Blood Glucose.: 201 mg/dL (27 Mar 2021 11:20)  POCT Blood Glucose.: 167 mg/dL (27 Mar 2021 07:56)  POCT Blood Glucose.: 179 mg/dL (26 Mar 2021 21:10)      Troponins:  ProBNP:  Lipid Profile:   HgA1c:  TSH:           RADIOLOGY & ADDITIONAL TESTS:    Imaging Personally Reviewed:  [ ] YES  [ ] NO      Consultant(s) Notes Reviewed:  [x ] YES  [ ] NO    Care Discussed with Consultants/Other Providers [ x] YES  [ ] NO          PAST MEDICAL & SURGICAL HISTORY:  Obesity    Pacemaker    Atrial fibrillation    Hypothyroidism    Venous stasis    IBS (irritable bowel syndrome)    CHF (congestive heart failure), NYHA class I    HLD (Hyperlipidemia)    HTN (Hypertension)    Diabetes    Myocardial Infarction    CAD (Coronary Atherosclerotic Disease)    Asthma    S/P coronary angiogram          Anemia    H/o or current diagnosis of HF- Contraindication to ACEI/ARBs    H/o or current diagnosis of HF- ACEI/ARB contraindication unknown    H/o or current diagnosis of HF- Contraindication to ACEI/ARBs    H/o or current diagnosis of HF- ACEI/ARB contraindication unknown    H/o or current diagnosis of HF- Contraindication to ACEI/ARBs    H/o or current diagnosis of HF- ACEI/ARB contraindication unknown    Family history of heart disease    Handoff    MEWS Score    Obesity    Pacemaker    Atrial fibrillation    Hypothyroidism    Venous stasis    IBS (irritable bowel syndrome)    CHF (congestive heart failure), NYHA class I    HLD (Hyperlipidemia)    HTN (Hypertension)    Diabetes    Myocardial Infarction    CAD (Coronary Atherosclerotic Disease)    Asthma    Helicobacter pylori gastritis    Symptomatic anemia    Hypothyroidism    Prophylactic measure    JOSE DANIEL (obstructive sleep apnea)    Asthma    HTN (Hypertension)    Diabetes    CHF (congestive heart failure), NYHA class I    Atrial fibrillation    Symptomatic anemia    S/P coronary angiogram    No significant past surgical history    A) WEAKNESS    90+    Colon cancer    HTN (Hypertension)    Diabetes    JOSE DANIEL (obstructive sleep apnea)    CHF (congestive heart failure), NYHA class I    Hypothyroidism    Encounter for colonoscopy due to history of adenomatous colonic polyps    Paroxysmal atrial fibrillation    Helicobacter pylori gastritis    Asthma    CAD (coronary atherosclerotic disease)    [email or web address]_VisitLink

## 2021-03-27 NOTE — DIETITIAN INITIAL EVALUATION ADULT. - OTHER INFO
Pt lives home with family PTA, alert, oriented, speaks [place] Islander, prefers family (daughter) at bedside to contact RD/ for pt, well-communicated; decreased intake for long time, ? 1y, denied recent wt changes, denied GI distress, chewing or swallowing problem at present, food choices obtained and forwarded to Dietary; Wts in [place] EMR reviewed, a bit fluctuated, may due to scale/fluid variance, diuretic Rx Pt lives home with family PTA, alert, oriented, speaks Norwegian, prefers family (daughter) at bedside to contact RD/ for pt, well-communicated; decreased intake for long time, ? 1y, refused all kinds of meat, however denies any dysphasia; denied recent wt changes, denied GI distress, chewing or swallowing problem at present, food choices obtained and forwarded to Dietary; Wts in [place] EMR reviewed: 257.9 lb 3/18/2020-->262.7 lb 3/21/2021, 264.5 lb 3/26/2021, no drastic changes x 1y, slightly change may also due to scale/fluid variance, 2+ bilateral LE edema; found invasive Colon Cancer, Surgical/GI on the case

## 2021-03-27 NOTE — PROGRESS NOTE ADULT - SUBJECTIVE AND OBJECTIVE BOX
[   ] ICU                                          [   ] CCU                                      [ x  ] Medical Floor    Patient is a 81 year old female with Gastrointestinal bleeding and symptomatic anemia. GI consulted to evaluate.        79 years old female from home, ambulates with walker, lives with son, with past medical history significant for CAD (s/p stents), CHF, chronic venous stasis, DM, HTN, Afib (on Xarelto, s/p PPM), chronic bronchitis with asthmatic component ( on Oxygen PRN at home ), and osteoarthritis presented to the emergency room with 2 weeks history of worsening SOB, Fatigue, associated with a few episodes of black stool.    pt had virtual  colonoscopy 1 year ago and the result was questionable. She never followed up.  Patient also c/o poor appetite with chronic diarrhea but denies abdominal pain, nausea, vomiting, hematemesis, hematochezia, melena, fever, chills, chest pain, SOB, cough, hematuria, dysuria or diarrhea.       Today patient appears comfortable with no new complaint. No abdominal pain, N/V, hematemesis, hematochezia, melena, fever, chills, chest pain, SOB, cough or diarrhea reported.      PAIN MANAGEMENT:  Pain Scale:                 0/10  Pain Location:      Prior Colonoscopy:  No prior colonoscopy    PAST MEDICAL HISTORY  Obesity  Atrial fibrillation  Hypothyroidism  Venous stasis  Irritable bowel syndrome   CHF    Hyperlipidemia   Hypertension  DM  Myocardial Infarction  CAD    Asthma      PAST SURGICAL HISTORY  Coronary angiogram  Coronary stent placement  Pacemaker placement       Allergies    No Known Allergies    Intolerances  None        SOCIAL HISTORY  Advanced Directives:       [ X ] Full Code       [  ] DNR  Marital Status:         [  ] M      [ X ] S      [  ] D       [  ] W  Children:       [ X ] Yes      [  ] No  Occupation:        [  ] Employed       [ X ] Unemployed       [  ] Retired  Diet:       [ X Regular       [  ] PEG feeding          [  ] NG tube feeding  Drug Use:           [ X ] Patient denied          [  ] Yes  Alcohol:           [X] No             [  ] Yes (socially)         [  ] Yes (chronic)  Tobacco:           [  ] Yes           [ X ] No      FAMILY HISTORY  [ X ] Heart Disease            [ X ] Diabetes             [ X ] HTN             [  ] Colon Cancer             [  ] Stomach Cancer              [  ] Pancreatic Cancer    VITALS  Vital Signs Last 24 Hrs  T(C): 36.6 (27 Mar 2021 13:34), Max: 38.1 (26 Mar 2021 20:23)  T(F): 97.8 (27 Mar 2021 13:34), Max: 100.5 (26 Mar 2021 20:23)  HR: 63 (27 Mar 2021 13:34) (62 - 64)  BP: 169/55 (27 Mar 2021 13:34) (151/51 - 169/55)  BP(mean): --  RR: 17 (27 Mar 2021 13:34) (17 - 18)  SpO2: 97% (27 Mar 2021 13:34) (97% - 98%)       MEDICATIONS  (STANDING):  ascorbic acid 2000 milliGRAM(s) Oral every 8 hours  aspirin  chewable 81 milliGRAM(s) Oral daily  carvedilol 6.25 milliGRAM(s) Oral every 12 hours  cholecalciferol 1000 Unit(s) Oral daily  dronedarone 400 milliGRAM(s) Oral two times a day  furosemide   Injectable 20 milliGRAM(s) IV Push daily  gabapentin 300 milliGRAM(s) Oral daily  heparin   Injectable 5000 Unit(s) SubCutaneous every 8 hours  influenza   Vaccine 0.5 milliLiter(s) IntraMuscular once  insulin glargine Injectable (LANTUS) 10 Unit(s) SubCutaneous at bedtime  insulin lispro (ADMELOG) corrective regimen sliding scale   SubCutaneous Before meals and at bedtime  levothyroxine 175 MICROGram(s) Oral daily  lisinopril 5 milliGRAM(s) Oral daily  montelukast 10 milliGRAM(s) Oral daily  oxybutynin 5 milliGRAM(s) Oral two times a day  pantoprazole    Tablet 40 milliGRAM(s) Oral two times a day  zinc sulfate 220 milliGRAM(s) Oral daily                             8.4    7.12  )-----------( 275      ( 27 Mar 2021 06:36 )             26.1       03-27    135  |  102  |  14  ----------------------------<  128<H>  4.0   |  24  |  1.12    Ca    8.5      27 Mar 2021 06:36  Phos  2.6     03-27  Mg     2.2     03-27    TPro  7.0  /  Alb  3.1<L>  /  TBili  0.3  /  DBili  x   /  AST  19  /  ALT  23  /  AlkPhos  72  03-27                                        [   ] ICU                                          [   ] CCU                                      [ x  ] Medical Floor    Patient is a 81 year old female with Gastrointestinal bleeding and symptomatic anemia. GI consulted to evaluate.        81 years old female from home, ambulates with walker, lives with son, with past medical history significant for CAD (s/p stents), CHF, chronic venous stasis, DM, HTN, Afib (on Xarelto, s/p PPM), chronic bronchitis with asthmatic component ( on Oxygen PRN at home ), and osteoarthritis presented to the emergency room with 2 weeks history of worsening SOB, Fatigue, associated with a few episodes of black stool.    pt had virtual  colonoscopy 1 year ago and the result was questionable. She never followed up.  Patient also c/o poor appetite with chronic diarrhea but denies abdominal pain, nausea, vomiting, hematemesis, hematochezia, melena, fever, chills, chest pain, SOB, cough, hematuria, dysuria or diarrhea.       Today patient appears comfortable with no new complaint. No abdominal pain, N/V, hematemesis, hematochezia, melena, fever, chills, chest pain, SOB, cough or diarrhea reported.      PAIN MANAGEMENT:  Pain Scale:                 0/10  Pain Location:      Prior Colonoscopy:  No prior colonoscopy    PAST MEDICAL HISTORY  Obesity  Atrial fibrillation  Hypothyroidism  Venous stasis  Irritable bowel syndrome   CHF    Hyperlipidemia   Hypertension  DM  Myocardial Infarction  CAD    Asthma      PAST SURGICAL HISTORY  Coronary angiogram  Coronary stent placement  Pacemaker placement       Allergies    No Known Allergies    Intolerances  None        SOCIAL HISTORY  Advanced Directives:       [ X ] Full Code       [  ] DNR  Marital Status:         [  ] M      [ X ] S      [  ] D       [  ] W  Children:       [ X ] Yes      [  ] No  Occupation:        [  ] Employed       [ X ] Unemployed       [  ] Retired  Diet:       [ X Regular       [  ] PEG feeding          [  ] NG tube feeding  Drug Use:           [ X ] Patient denied          [  ] Yes  Alcohol:           [X] No             [  ] Yes (socially)         [  ] Yes (chronic)  Tobacco:           [  ] Yes           [ X ] No      FAMILY HISTORY  [ X ] Heart Disease            [ X ] Diabetes             [ X ] HTN             [  ] Colon Cancer             [  ] Stomach Cancer              [  ] Pancreatic Cancer    VITALS  Vital Signs Last 24 Hrs  T(C): 36.6 (27 Mar 2021 13:34), Max: 38.1 (26 Mar 2021 20:23)  T(F): 97.8 (27 Mar 2021 13:34), Max: 100.5 (26 Mar 2021 20:23)  HR: 63 (27 Mar 2021 13:34) (62 - 64)  BP: 169/55 (27 Mar 2021 13:34) (151/51 - 169/55)  BP(mean): --  RR: 17 (27 Mar 2021 13:34) (17 - 18)  SpO2: 97% (27 Mar 2021 13:34) (97% - 98%)       MEDICATIONS  (STANDING):  ascorbic acid 2000 milliGRAM(s) Oral every 8 hours  aspirin  chewable 81 milliGRAM(s) Oral daily  carvedilol 6.25 milliGRAM(s) Oral every 12 hours  cholecalciferol 1000 Unit(s) Oral daily  dronedarone 400 milliGRAM(s) Oral two times a day  furosemide   Injectable 20 milliGRAM(s) IV Push daily  gabapentin 300 milliGRAM(s) Oral daily  heparin   Injectable 5000 Unit(s) SubCutaneous every 8 hours  influenza   Vaccine 0.5 milliLiter(s) IntraMuscular once  insulin glargine Injectable (LANTUS) 10 Unit(s) SubCutaneous at bedtime  insulin lispro (ADMELOG) corrective regimen sliding scale   SubCutaneous Before meals and at bedtime  levothyroxine 175 MICROGram(s) Oral daily  lisinopril 5 milliGRAM(s) Oral daily  montelukast 10 milliGRAM(s) Oral daily  oxybutynin 5 milliGRAM(s) Oral two times a day  pantoprazole    Tablet 40 milliGRAM(s) Oral two times a day  zinc sulfate 220 milliGRAM(s) Oral daily                             8.4    7.12  )-----------( 275      ( 27 Mar 2021 06:36 )             26.1       03-27    135  |  102  |  14  ----------------------------<  128<H>  4.0   |  24  |  1.12    Ca    8.5      27 Mar 2021 06:36  Phos  2.6     03-27  Mg     2.2     03-27    TPro  7.0  /  Alb  3.1<L>  /  TBili  0.3  /  DBili  x   /  AST  19  /  ALT  23  /  AlkPhos  72  03-27

## 2021-03-27 NOTE — DIETITIAN INITIAL EVALUATION ADULT. - PERTINENT LABORATORY DATA
03-27 Na135 mmol/L Glu 128 mg/dL<H> K+ 4.0 mmol/L Cr  1.12 mg/dL BUN 14 mg/dL   03-27 Phos 2.6 mg/dL   03-27 Alb 3.1 g/dL<L>       03-22 Chol 134 mg/dL LDL --    HDL 54 mg/dL Trig 82 mg/dL  03-21-21 @ 11:12 HgbA1C 6.6 [4.0 - 5.6]

## 2021-03-28 LAB
ALBUMIN SERPL ELPH-MCNC: 3.2 G/DL — LOW (ref 3.5–5)
ALP SERPL-CCNC: 71 U/L — SIGNIFICANT CHANGE UP (ref 40–120)
ALT FLD-CCNC: 25 U/L DA — SIGNIFICANT CHANGE UP (ref 10–60)
ANION GAP SERPL CALC-SCNC: 8 MMOL/L — SIGNIFICANT CHANGE UP (ref 5–17)
AST SERPL-CCNC: 17 U/L — SIGNIFICANT CHANGE UP (ref 10–40)
BILIRUB SERPL-MCNC: 0.3 MG/DL — SIGNIFICANT CHANGE UP (ref 0.2–1.2)
BUN SERPL-MCNC: 15 MG/DL — SIGNIFICANT CHANGE UP (ref 7–18)
CALCIUM SERPL-MCNC: 8.8 MG/DL — SIGNIFICANT CHANGE UP (ref 8.4–10.5)
CHLORIDE SERPL-SCNC: 102 MMOL/L — SIGNIFICANT CHANGE UP (ref 96–108)
CO2 SERPL-SCNC: 26 MMOL/L — SIGNIFICANT CHANGE UP (ref 22–31)
COVID-19 NUCLEOCAPSID GAM AB INTERP: POSITIVE
COVID-19 NUCLEOCAPSID TOTAL GAM ANTIBODY RESULT: 95.2 INDEX — HIGH
CREAT SERPL-MCNC: 1.2 MG/DL — SIGNIFICANT CHANGE UP (ref 0.5–1.3)
GLUCOSE BLDC GLUCOMTR-MCNC: 174 MG/DL — HIGH (ref 70–99)
GLUCOSE BLDC GLUCOMTR-MCNC: 209 MG/DL — HIGH (ref 70–99)
GLUCOSE BLDC GLUCOMTR-MCNC: 210 MG/DL — HIGH (ref 70–99)
GLUCOSE BLDC GLUCOMTR-MCNC: 212 MG/DL — HIGH (ref 70–99)
GLUCOSE SERPL-MCNC: 133 MG/DL — HIGH (ref 70–99)
HCT VFR BLD CALC: 28.1 % — LOW (ref 34.5–45)
HGB BLD-MCNC: 9.2 G/DL — LOW (ref 11.5–15.5)
MAGNESIUM SERPL-MCNC: 2.1 MG/DL — SIGNIFICANT CHANGE UP (ref 1.6–2.6)
MCHC RBC-ENTMCNC: 26.6 PG — LOW (ref 27–34)
MCHC RBC-ENTMCNC: 32.7 GM/DL — SIGNIFICANT CHANGE UP (ref 32–36)
MCV RBC AUTO: 81.2 FL — SIGNIFICANT CHANGE UP (ref 80–100)
NRBC # BLD: 0 /100 WBCS — SIGNIFICANT CHANGE UP (ref 0–0)
PHOSPHATE SERPL-MCNC: 2.7 MG/DL — SIGNIFICANT CHANGE UP (ref 2.5–4.5)
PLATELET # BLD AUTO: 297 K/UL — SIGNIFICANT CHANGE UP (ref 150–400)
POTASSIUM SERPL-MCNC: 4.1 MMOL/L — SIGNIFICANT CHANGE UP (ref 3.5–5.3)
POTASSIUM SERPL-SCNC: 4.1 MMOL/L — SIGNIFICANT CHANGE UP (ref 3.5–5.3)
PROT SERPL-MCNC: 7.4 G/DL — SIGNIFICANT CHANGE UP (ref 6–8.3)
RBC # BLD: 3.46 M/UL — LOW (ref 3.8–5.2)
RBC # FLD: 15.3 % — HIGH (ref 10.3–14.5)
SARS-COV-2 IGG+IGM SERPL QL IA: 95.2 INDEX — HIGH
SARS-COV-2 IGG+IGM SERPL QL IA: POSITIVE
SARS-COV-2 RNA SPEC QL NAA+PROBE: SIGNIFICANT CHANGE UP
SODIUM SERPL-SCNC: 136 MMOL/L — SIGNIFICANT CHANGE UP (ref 135–145)
WBC # BLD: 8.54 K/UL — SIGNIFICANT CHANGE UP (ref 3.8–10.5)
WBC # FLD AUTO: 8.54 K/UL — SIGNIFICANT CHANGE UP (ref 3.8–10.5)

## 2021-03-28 PROCEDURE — 73562 X-RAY EXAM OF KNEE 3: CPT | Mod: 26,RT

## 2021-03-28 RX ORDER — BUDESONIDE AND FORMOTEROL FUMARATE DIHYDRATE 160; 4.5 UG/1; UG/1
2 AEROSOL RESPIRATORY (INHALATION)
Refills: 0 | Status: DISCONTINUED | OUTPATIENT
Start: 2021-03-28 | End: 2021-04-06

## 2021-03-28 RX ORDER — ATORVASTATIN CALCIUM 80 MG/1
40 TABLET, FILM COATED ORAL AT BEDTIME
Refills: 0 | Status: DISCONTINUED | OUTPATIENT
Start: 2021-03-28 | End: 2021-04-06

## 2021-03-28 RX ORDER — FLUTICASONE PROPIONATE 50 MCG
1 SPRAY, SUSPENSION NASAL EVERY 12 HOURS
Refills: 0 | Status: DISCONTINUED | OUTPATIENT
Start: 2021-03-28 | End: 2021-04-06

## 2021-03-28 RX ORDER — IRON SUCROSE 20 MG/ML
200 INJECTION, SOLUTION INTRAVENOUS ONCE
Refills: 0 | Status: COMPLETED | OUTPATIENT
Start: 2021-03-28 | End: 2021-03-28

## 2021-03-28 RX ADMIN — LISINOPRIL 5 MILLIGRAM(S): 2.5 TABLET ORAL at 06:37

## 2021-03-28 RX ADMIN — Medication 4: at 12:16

## 2021-03-28 RX ADMIN — BUDESONIDE AND FORMOTEROL FUMARATE DIHYDRATE 2 PUFF(S): 160; 4.5 AEROSOL RESPIRATORY (INHALATION) at 21:39

## 2021-03-28 RX ADMIN — DRONEDARONE 400 MILLIGRAM(S): 400 TABLET, FILM COATED ORAL at 06:36

## 2021-03-28 RX ADMIN — Medication 1 SPRAY(S): at 06:37

## 2021-03-28 RX ADMIN — Medication 175 MICROGRAM(S): at 06:37

## 2021-03-28 RX ADMIN — Medication 2: at 08:09

## 2021-03-28 RX ADMIN — HEPARIN SODIUM 5000 UNIT(S): 5000 INJECTION INTRAVENOUS; SUBCUTANEOUS at 21:37

## 2021-03-28 RX ADMIN — Medication 1 SPRAY(S): at 17:54

## 2021-03-28 RX ADMIN — Medication 81 MILLIGRAM(S): at 12:16

## 2021-03-28 RX ADMIN — HEPARIN SODIUM 5000 UNIT(S): 5000 INJECTION INTRAVENOUS; SUBCUTANEOUS at 13:17

## 2021-03-28 RX ADMIN — Medication 5 MILLIGRAM(S): at 06:36

## 2021-03-28 RX ADMIN — MONTELUKAST 10 MILLIGRAM(S): 4 TABLET, CHEWABLE ORAL at 12:16

## 2021-03-28 RX ADMIN — INSULIN GLARGINE 10 UNIT(S): 100 INJECTION, SOLUTION SUBCUTANEOUS at 21:37

## 2021-03-28 RX ADMIN — Medication 4: at 16:51

## 2021-03-28 RX ADMIN — Medication 5 MILLIGRAM(S): at 17:54

## 2021-03-28 RX ADMIN — Medication 4: at 21:38

## 2021-03-28 RX ADMIN — Medication 2000 MILLIGRAM(S): at 21:37

## 2021-03-28 RX ADMIN — Medication 2000 MILLIGRAM(S): at 06:36

## 2021-03-28 RX ADMIN — DRONEDARONE 400 MILLIGRAM(S): 400 TABLET, FILM COATED ORAL at 17:54

## 2021-03-28 RX ADMIN — ZINC SULFATE TAB 220 MG (50 MG ZINC EQUIVALENT) 220 MILLIGRAM(S): 220 (50 ZN) TAB at 12:16

## 2021-03-28 RX ADMIN — HEPARIN SODIUM 5000 UNIT(S): 5000 INJECTION INTRAVENOUS; SUBCUTANEOUS at 06:38

## 2021-03-28 RX ADMIN — CARVEDILOL PHOSPHATE 6.25 MILLIGRAM(S): 80 CAPSULE, EXTENDED RELEASE ORAL at 06:37

## 2021-03-28 RX ADMIN — PANTOPRAZOLE SODIUM 40 MILLIGRAM(S): 20 TABLET, DELAYED RELEASE ORAL at 06:38

## 2021-03-28 RX ADMIN — Medication 20 MILLIGRAM(S): at 06:43

## 2021-03-28 RX ADMIN — Medication 1000 UNIT(S): at 12:16

## 2021-03-28 RX ADMIN — PANTOPRAZOLE SODIUM 40 MILLIGRAM(S): 20 TABLET, DELAYED RELEASE ORAL at 17:54

## 2021-03-28 RX ADMIN — BUDESONIDE AND FORMOTEROL FUMARATE DIHYDRATE 2 PUFF(S): 160; 4.5 AEROSOL RESPIRATORY (INHALATION) at 09:13

## 2021-03-28 RX ADMIN — ATORVASTATIN CALCIUM 40 MILLIGRAM(S): 80 TABLET, FILM COATED ORAL at 21:39

## 2021-03-28 RX ADMIN — CARVEDILOL PHOSPHATE 6.25 MILLIGRAM(S): 80 CAPSULE, EXTENDED RELEASE ORAL at 17:54

## 2021-03-28 RX ADMIN — Medication 2000 MILLIGRAM(S): at 13:17

## 2021-03-28 RX ADMIN — GABAPENTIN 300 MILLIGRAM(S): 400 CAPSULE ORAL at 12:20

## 2021-03-28 RX ADMIN — IRON SUCROSE 110 MILLIGRAM(S): 20 INJECTION, SOLUTION INTRAVENOUS at 16:24

## 2021-03-28 NOTE — PROGRESS NOTE ADULT - ASSESSMENT
Patient is a 81y old  Female from home, ambulates with walker, lives with son, with PMHx of HFpEF, CAD (s/p stents), chronic venous stasis, DM, HTN and Afib (on Xarelto, s/p PPM), chronic bronchitis with asthmatic component ( on Oxygen PRN at home ), JOSE DANIEL ( supposed to be on nocturnal CPAP , but non compliant), now presents to the ER for evaluation of hypotension, SOB and  fatigue for almost 2 weeks. Per daughter and granddaughter patient has been complaining of increasing fatigue and sob for last few weeks, seemed to be pale , patient endorses dark loose bowel movement. Patient has chronic diarrhea, but no abdominal pain. She has evaluated by GI team and now the ID consult requested to assist with further evaluation of chronic diarrhea.     #  H. Pylori associated Chronic active gastritis, diffuse, with focal lymphoid aggregate, DX 3/22 by Antrum biopsy  # Chronic diarrhea- C.diff vs Malignancy - NO Malignancy Antrum biopsy   # Symptomatic Anemia  - s/p EGD and antrum biopsy shows H. Pylori associated Chronic active gastritis, diffuse, with focal lymphoid aggregate  # COVID negative 3/20/21, positive 3/27 and negative on 3/28  - d/w Dr. Spence regarding exposure VS false positive, in th esetting of positive Titers  # S/p EGD 3/22  and s/p Colonoscopy 3/25- pathology shows - Invasive colonic adenocarcinoma arising in association with  tubular adenoma  # The Right knee xray shows Round mass anterior to the patella.    Would recommend:    1. Management of Right knee Mass as per Primary team, Consider  ortho evaluation   2. Management of Adenocarcinoma of colon as per Hem/Onc and Surgery   3. Continue Amoxicillin 1 g q 12hours 7 days then 500mg Q 12hours, + Clarithromycin 500 mg q 12hours and c/w  Flagyl 500 mg q 12hours and + PPI X 14 days   4. OOb to chair    d/w DR. Ruiz    Attending Attestation:    Spent more than 45 minutes on total encounter, more than 50 % of the visit was spent counseling and/or coordinating care by the Attending physician.

## 2021-03-28 NOTE — PROGRESS NOTE ADULT - ASSESSMENT
79F [place]-speaking from home with son, ambulates with walker, PMH HFpEF, CAD (s/p stents), chronic venous stasis, DM, HTN and Afib (Xarelto, s/p PPM), presented 3/20 with x2 weeks increasing fatigue, SOB, and paleness, with prolonged history of dark bowel movements s/p possible transverse polyp colonoscopy 2020, admitted for workup and management of symptomatic anemia.

## 2021-03-28 NOTE — PROGRESS NOTE ADULT - PROBLEM SELECTOR PLAN 1
presented 3/20 with x2 weeks increasing fatigue, SOB, and paleness, with prolonged history of dark bowel movements s/p possible transverse polyp colonoscopy 2020, no further fu  -in ED, VS notable for /77 and SpO2 100% 4L NC  -adm trop, COVID, UA negative  -pre-RBC adm Hb 7.7 (baseline ~11), Fe 15, TIBC 457, 3% saturation  -CT a/p unremarkable for signs of bleeding  -s/p x1 unit pRBC in ED  -IV iron 3/20-23 and 3/26-27  -Fe and %sat improved s/p transfusion and Venofer, Hb stable, transfuse if <8  -EGD/col 3/22 with GI Dr. Moreland, gastritis, esophagitis, no GIB, path + H pylori  -H pylori treatment (namely amoxicillin 1g q12 x7 days followed by 500mg Q 12hours x7 days along with clarithromycin 500mg q12, Flagyl 500 mg q12, and PPI BID x14 days) on hold until surgery completed  -ID Dr. Butcher following   -colonoscopy 3/24 with polyps (removed), abdominal XR negative for free air 3/24   -repeat colonoscopy 3/25 notable for diverticulosis and internal hemorrhoids  -path resulted 3/26 +invasive colonic adenocarcinoma with associated tubular adenoma  -surgery Dr. Murrell to resect pending IM/cards clearance  -COVID positive 3/27, retest 3/28, discuss implications with surgery team  -heme/onc Dr. Arango following  -findings and plan discussed with family and patient, agreeable  -nutrition recs appreciated

## 2021-03-28 NOTE — PROGRESS NOTE ADULT - SUBJECTIVE AND OBJECTIVE BOX
[   ] ICU                                          [   ] CCU                                      [ X  ] Medical Floor      Patient is a 81 year old female with Gastrointestinal bleeding and symptomatic anemia. GI consulted to evaluate.        79 years old female from home, ambulates with walker, lives with son, with past medical history significant for CAD (s/p stents), CHF, chronic venous stasis, DM, HTN, Afib (on Xarelto, s/p PPM), chronic bronchitis with asthmatic component ( on Oxygen PRN at home ), and osteoarthritis presented to the emergency room with 2 weeks history of worsening SOB, Fatigue, associated with a few episodes of black stool.    pt had virtual  colonoscopy 1 year ago and the result was questionable. She never followed up.  Patient also c/o poor appetite with chronic diarrhea but denies abdominal pain, nausea, vomiting, hematemesis, hematochezia, melena, fever, chills, chest pain, SOB, cough, hematuria, dysuria or diarrhea.       Today patient appears comfortable with no new complaint. No abdominal pain, N/V, hematemesis, hematochezia, melena, fever, chills, chest pain, SOB, cough or diarrhea reported.      PAIN MANAGEMENT:  Pain Scale:                 0/10  Pain Location:        Prior Colonoscopy:  No prior colonoscopy      PAST MEDICAL HISTORY  Obesity  Atrial fibrillation  Hypothyroidism  Venous stasis  Irritable bowel syndrome   CHF    Hyperlipidemia   Hypertension  DM  Myocardial Infarction  CAD    Asthma      PAST SURGICAL HISTORY  Coronary angiogram  Coronary stent placement  Pacemaker placement       Allergies    No Known Allergies    Intolerances  None        SOCIAL HISTORY  Advanced Directives:       [ X ] Full Code       [  ] DNR  Marital Status:         [  ] M      [ X ] S      [  ] D       [  ] W  Children:       [ X ] Yes      [  ] No  Occupation:        [  ] Employed       [ X ] Unemployed       [  ] Retired  Diet:       [ X Regular       [  ] PEG feeding          [  ] NG tube feeding  Drug Use:           [ X ] Patient denied          [  ] Yes  Alcohol:           [X] No             [  ] Yes (socially)         [  ] Yes (chronic)  Tobacco:           [  ] Yes           [ X ] No      FAMILY HISTORY  [ X ] Heart Disease            [ X ] Diabetes             [ X ] HTN             [  ] Colon Cancer             [  ] Stomach Cancer              [  ] Pancreatic Cancer        VITALS  Vital Signs Last 24 Hrs  T(C): 36.8 (28 Mar 2021 06:05), Max: 37.4 (27 Mar 2021 19:55)  T(F): 98.2 (28 Mar 2021 06:05), Max: 99.3 (27 Mar 2021 19:55)  HR: 61 (28 Mar 2021 06:05) (61 - 65)  BP: 153/58 (28 Mar 2021 06:05) (153/58 - 160/94)   RR: 18 (28 Mar 2021 06:05) (18 - 18)  SpO2: 98% (28 Mar 2021 06:05) (98% - 98%)       MEDICATIONS  (STANDING):  ascorbic acid 2000 milliGRAM(s) Oral every 8 hours  aspirin  chewable 81 milliGRAM(s) Oral daily  atorvastatin 40 milliGRAM(s) Oral at bedtime  budesonide 160 MICROgram(s)/formoterol 4.5 MICROgram(s) Inhaler 2 Puff(s) Inhalation two times a day  carvedilol 6.25 milliGRAM(s) Oral every 12 hours  cholecalciferol 1000 Unit(s) Oral daily  dronedarone 400 milliGRAM(s) Oral two times a day  fluticasone propionate 50 MICROgram(s)/spray Nasal Spray 1 Spray(s) Both Nostrils every 12 hours  furosemide   Injectable 20 milliGRAM(s) IV Push daily  gabapentin 300 milliGRAM(s) Oral daily  heparin   Injectable 5000 Unit(s) SubCutaneous every 8 hours  influenza   Vaccine 0.5 milliLiter(s) IntraMuscular once  insulin glargine Injectable (LANTUS) 10 Unit(s) SubCutaneous at bedtime  insulin lispro (ADMELOG) corrective regimen sliding scale   SubCutaneous Before meals and at bedtime  iron sucrose IVPB 200 milliGRAM(s) IV Intermittent once  levothyroxine 175 MICROGram(s) Oral daily  lisinopril 5 milliGRAM(s) Oral daily  montelukast 10 milliGRAM(s) Oral daily  oxybutynin 5 milliGRAM(s) Oral two times a day  pantoprazole    Tablet 40 milliGRAM(s) Oral two times a day  zinc sulfate 220 milliGRAM(s) Oral daily                             9.2    8.54  )-----------( 297      ( 28 Mar 2021 08:01 )             28.1       03-28    136  |  102  |  15  ----------------------------<  133<H>  4.1   |  26  |  1.20    Ca    8.8      28 Mar 2021 08:01  Phos  2.7     03-28  Mg     2.1     03-28    TPro  7.4  /  Alb  3.2<L>  /  TBili  0.3  /  DBili  x   /  AST  17  /  ALT  25  /  AlkPhos  71  03-28    Surgical Pathology Report (03.24.21 @ 10:15)   Surgical Pathology Report:   ACCESSION No: [id number]   KWAME MOSQUERA 2   Surgical Final Report   Final Diagnosis   1. Ascending colon polyp; hot snare:   - Invasive colonic adenocarcinoma arising in association with   tubular adenoma. See comment.   - Fragments of tubular adenoma (the smallest polyps).   2. Hepatic flexure polyp; biopsy:   - Tubular adenoma.   3. Descending colon polyp; biopsy:   - Inflammatory polyps.   Verified by: Priscila Arana MD   (Electronic Signature)    [   ] ICU                                          [   ] CCU                                      [ X  ] Medical Floor      Patient is a 81 year old female with Gastrointestinal bleeding and symptomatic anemia. GI consulted to evaluate.        81 years old female from home, ambulates with walker, lives with son, with past medical history significant for CAD (s/p stents), CHF, chronic venous stasis, DM, HTN, Afib (on Xarelto, s/p PPM), chronic bronchitis with asthmatic component ( on Oxygen PRN at home ), and osteoarthritis presented to the emergency room with 2 weeks history of worsening SOB, Fatigue, associated with a few episodes of black stool.    pt had virtual  colonoscopy 1 year ago and the result was questionable. She never followed up.  Patient also c/o poor appetite with chronic diarrhea but denies abdominal pain, nausea, vomiting, hematemesis, hematochezia, melena, fever, chills, chest pain, SOB, cough, hematuria, dysuria or diarrhea.       Today patient appears comfortable with no new complaint. No abdominal pain, N/V, hematemesis, hematochezia, melena, fever, chills, chest pain, SOB, cough or diarrhea reported.      PAIN MANAGEMENT:  Pain Scale:                 0/10  Pain Location:        Prior Colonoscopy:  No prior colonoscopy      PAST MEDICAL HISTORY  Obesity  Atrial fibrillation  Hypothyroidism  Venous stasis  Irritable bowel syndrome   CHF    Hyperlipidemia   Hypertension  DM  Myocardial Infarction  CAD    Asthma      PAST SURGICAL HISTORY  Coronary angiogram  Coronary stent placement  Pacemaker placement       Allergies    No Known Allergies    Intolerances  None        SOCIAL HISTORY  Advanced Directives:       [ X ] Full Code       [  ] DNR  Marital Status:         [  ] M      [ X ] S      [  ] D       [  ] W  Children:       [ X ] Yes      [  ] No  Occupation:        [  ] Employed       [ X ] Unemployed       [  ] Retired  Diet:       [ X Regular       [  ] PEG feeding          [  ] NG tube feeding  Drug Use:           [ X ] Patient denied          [  ] Yes  Alcohol:           [X] No             [  ] Yes (socially)         [  ] Yes (chronic)  Tobacco:           [  ] Yes           [ X ] No      FAMILY HISTORY  [ X ] Heart Disease            [ X ] Diabetes             [ X ] HTN             [  ] Colon Cancer             [  ] Stomach Cancer              [  ] Pancreatic Cancer        VITALS  Vital Signs Last 24 Hrs  T(C): 36.8 (28 Mar 2021 06:05), Max: 37.4 (27 Mar 2021 19:55)  T(F): 98.2 (28 Mar 2021 06:05), Max: 99.3 (27 Mar 2021 19:55)  HR: 61 (28 Mar 2021 06:05) (61 - 65)  BP: 153/58 (28 Mar 2021 06:05) (153/58 - 160/94)   RR: 18 (28 Mar 2021 06:05) (18 - 18)  SpO2: 98% (28 Mar 2021 06:05) (98% - 98%)       MEDICATIONS  (STANDING):  ascorbic acid 2000 milliGRAM(s) Oral every 8 hours  aspirin  chewable 81 milliGRAM(s) Oral daily  atorvastatin 40 milliGRAM(s) Oral at bedtime  budesonide 160 MICROgram(s)/formoterol 4.5 MICROgram(s) Inhaler 2 Puff(s) Inhalation two times a day  carvedilol 6.25 milliGRAM(s) Oral every 12 hours  cholecalciferol 1000 Unit(s) Oral daily  dronedarone 400 milliGRAM(s) Oral two times a day  fluticasone propionate 50 MICROgram(s)/spray Nasal Spray 1 Spray(s) Both Nostrils every 12 hours  furosemide   Injectable 20 milliGRAM(s) IV Push daily  gabapentin 300 milliGRAM(s) Oral daily  heparin   Injectable 5000 Unit(s) SubCutaneous every 8 hours  influenza   Vaccine 0.5 milliLiter(s) IntraMuscular once  insulin glargine Injectable (LANTUS) 10 Unit(s) SubCutaneous at bedtime  insulin lispro (ADMELOG) corrective regimen sliding scale   SubCutaneous Before meals and at bedtime  iron sucrose IVPB 200 milliGRAM(s) IV Intermittent once  levothyroxine 175 MICROGram(s) Oral daily  lisinopril 5 milliGRAM(s) Oral daily  montelukast 10 milliGRAM(s) Oral daily  oxybutynin 5 milliGRAM(s) Oral two times a day  pantoprazole    Tablet 40 milliGRAM(s) Oral two times a day  zinc sulfate 220 milliGRAM(s) Oral daily                             9.2    8.54  )-----------( 297      ( 28 Mar 2021 08:01 )             28.1       03-28    136  |  102  |  15  ----------------------------<  133<H>  4.1   |  26  |  1.20    Ca    8.8      28 Mar 2021 08:01  Phos  2.7     03-28  Mg     2.1     03-28    TPro  7.4  /  Alb  3.2<L>  /  TBili  0.3  /  DBili  x   /  AST  17  /  ALT  25  /  AlkPhos  71  03-28    Surgical Pathology Report (03.24.21 @ 10:15)   Surgical Pathology Report:   ACCESSION No: [id number]   KWAME MOSQUERA 2   Surgical Final Report   Final Diagnosis   1. Ascending colon polyp; hot snare:   - Invasive colonic adenocarcinoma arising in association with   tubular adenoma. See comment.   - Fragments of tubular adenoma (the smallest polyps).   2. Hepatic flexure polyp; biopsy:   - Tubular adenoma.   3. Descending colon polyp; biopsy:   - Inflammatory polyps.   Verified by: Priscila Arana MD   (Electronic Signature)

## 2021-03-28 NOTE — PROGRESS NOTE ADULT - SUBJECTIVE AND OBJECTIVE BOX
PGY-1 Progress Note discussed with attending    PAGER #: [phone]] TILL 5:00 PM  PLEASE CONTACT ON CALL TEAM:  - On Call Team (Please refer to Ana) FROM 5:00 PM - 8:30PM  - Nightfloat Team FROM 8:30 -7:30 AM    CHIEF COMPLAINT & BRIEF HOSPITAL COURSE: 79F [place]-speaking from home with son, ambulates with walker, PMH HFpEF, CAD (s/p stents), chronic venous stasis, DM, HTN and Afib (Xarelto, s/p PPM), chronic bronchitis with asthmatic component (home O2 prn), JOSE DANIEL (noncompliant with nocturnal CPAP) presented 3/20 with x2 weeks increasing fatigue, SOB, and paleness, with prolonged history of dark bowel movements s/p possible transverse polyp colonoscopy 2020, no further fu. In ED, VS notable for /77 and SpO2 100% 4L NC. Labs notable for Hb 7.7 (baseline ~11), Fe 15, TIBC 457, 3% saturation. Trop, COVID, UA negative. EKG with BFB. CXR unremarkable.  CT a/p unremarkable for signs of bleeding. S/p x1 unit pRBC in ED. Admitted for workup and management of symptomatic anemia. Colonoscopy +invasive colon cancer 3/24 (resulted 3/26).    INTERVAL HPI/OVERNIGHT EVENTS: Tmax 99.3 overnight, SBPs 150-160s overnight, home lisinopril resumed. +COVID overnight. Surgery Dr. Murrell planning resection s/p medical/cardiac clearance, heme/onc Dr. Arango agreeable. Will hold H pylori treatment until after surgery, Multaq, statin, Symbicort, and Flonase resumed for now. Per cards Dr. Sandoval, NST prior to OR clearance. S/p x2 days IV iron.    MEDICATIONS  (STANDING):  ascorbic acid 2000 milliGRAM(s) Oral every 8 hours  aspirin  chewable 81 milliGRAM(s) Oral daily  carvedilol 6.25 milliGRAM(s) Oral every 12 hours  cholecalciferol 1000 Unit(s) Oral daily  dronedarone 400 milliGRAM(s) Oral two times a day  furosemide   Injectable 20 milliGRAM(s) IV Push daily  gabapentin 300 milliGRAM(s) Oral daily  heparin   Injectable 5000 Unit(s) SubCutaneous every 8 hours  influenza   Vaccine 0.5 milliLiter(s) IntraMuscular once  insulin glargine Injectable (LANTUS) 10 Unit(s) SubCutaneous at bedtime  insulin lispro (ADMELOG) corrective regimen sliding scale   SubCutaneous Before meals and at bedtime  levothyroxine 175 MICROGram(s) Oral daily  lisinopril 5 milliGRAM(s) Oral daily  montelukast 10 milliGRAM(s) Oral daily  oxybutynin 5 milliGRAM(s) Oral two times a day  pantoprazole    Tablet 40 milliGRAM(s) Oral two times a day  zinc sulfate 220 milliGRAM(s) Oral daily    REVIEW OF SYSTEMS:  CONSTITUTIONAL: No fever or fatigue  RESPIRATORY: No cough; No shortness of breath  CARDIOVASCULAR: No chest pain, no dizziness  GASTROINTESTINAL: mild abdominal pain after taking medication. No nausea, vomiting, diarrhea, constipation. +dark stools  GENITOURINARY: No dysuria or hematuria  NEUROLOGICAL: No headache  SKIN: No itching or rashes  EXT: b/l knee pain, chronic b/l LBP, chronic LE pain R>L  all other systems reviewed and are negative      Vital Signs Last 24 Hrs  T(C): 37.4 (27 Mar 2021 19:55), Max: 37.4 (27 Mar 2021 19:55)  T(F): 99.3 (27 Mar 2021 19:55), Max: 99.3 (27 Mar 2021 19:55)  HR: 65 (27 Mar 2021 19:55) (63 - 65)  BP: 160/94 (27 Mar 2021 19:55) (157/52 - 169/55)  BP(mean): --  RR: 18 (27 Mar 2021 19:55) (17 - 18)  SpO2: 98% (27 Mar 2021 19:55) (97% - 98%)    PHYSICAL EXAMINATION:  GENERAL: NAD, obese, elderly woman  HEAD:  Atraumatic, Normocephalic  EYES: periorbital skin pink, eomi, perrl  NECK: Supple  CHEST/LUNG: no increased WOB, NC, no cough noted  HEART: RRR  ABDOMEN: Soft, Nontender, obese, Bowel sounds present  NERVOUS SYSTEM: alert and oriented x3  EXTREMITIES:  2+ Peripheral Pulses, No edema, +hematoma right knee, NTTP; b/l LE warm, pulses intact  SKIN: warm dry                        8.4    7.12  )-----------( 275      ( 27 Mar 2021 06:36 )             26.1     03-27    135  |  102  |  14  ----------------------------<  128<H>  4.0   |  24  |  1.12    Ca    8.5      27 Mar 2021 06:36  Phos  2.6     03-27  Mg     2.2     03-27    TPro  7.0  /  Alb  3.1<L>  /  TBili  0.3  /  DBili  x   /  AST  19  /  ALT  23  /  AlkPhos  72  03-27    LIVER FUNCTIONS - ( 27 Mar 2021 06:36 )  Alb: 3.1 g/dL / Pro: 7.0 g/dL / ALK PHOS: 72 U/L / ALT: 23 U/L DA / AST: 19 U/L / GGT: x                   CAPILLARY BLOOD GLUCOSE      RADIOLOGY & ADDITIONAL TESTS:

## 2021-03-28 NOTE — PROGRESS NOTE ADULT - PROBLEM SELECTOR PLAN 2
noted to have pAF this admission  -hold home Xarelto given cancer as above  -fu NST for OR clearance  -restarted asa   -resume home statin, adm dronedarone as clarithromycin is on hold 3/28  -continue home coreg  -[facility] PPM incompatible with interrogation   -EKG with BFB  -b/l LE doppler 3/21 negative for DVT  -cards Dr. Sandoval following

## 2021-03-28 NOTE — PROGRESS NOTE ADULT - ASSESSMENT
1. Ascending colon invasive adenocarcinoma  2. Anemia  3. Melena   4. Gastritis  5. Esophagitis  6. No evidence of acute GI bleeding    Suggestions:    1. Monitor H/H  2. Transfuse PRBC as needed  3. Protonix 40mg daily   4. Follow up path   5. Avoid NSAID  6. Oncology evaluation  7. Surgical evaluation  8. DVT prophylaxis

## 2021-03-28 NOTE — PROGRESS NOTE ADULT - ASSESSMENT
79 years old [place]-speaking female from home, ambulates with walker, lives with son, with PMHx of HFpEF, CAD (s/p stents), chronic venous stasis, DM, HTN and Afib (on Xarelto, s/p PPM), chronic bronchitis with asthmatic component ( on Oxygen PRN at home ), JOSE DANIEL ( supposed to be on nocturnal CPAP , but non compliant) presents to the ED with chief complaint of hypotension , SOB and  fatigue for almost 2 weeks.,symptomatic Fe def anemia with Colon ca.  1.GI f/u.  2.PAF- multaq, coreg, asa.  3.HTN-cont bp medication.  4.CAD-asa,coreg,statin.  5.DM-insulin.  6.Anemia-IV iron.  7.Asthma-MDI.  8.Tx for H.Pylori+ after colon resection,d/w GI..  9.Colon ca-Surgical and Heme/Onc eval noted.  10.GI and DVT prophylaxis.  11.HTN-resume Ace.  12.Stress test in AM, before OR clearance.

## 2021-03-28 NOTE — PROGRESS NOTE ADULT - PROBLEM SELECTOR PLAN 3
adm CXR concerning for central congestion, s/p x4 doses Lasix 40mg IV BID  -BNP1K  -TTE 3/21 with G1DD and mildly high RVP  -hold home torsemide, continue daily Lasix 20mg IV while inpatient  -Cr resolved  -pulm Dr. Cruz following

## 2021-03-28 NOTE — PROGRESS NOTE ADULT - SUBJECTIVE AND OBJECTIVE BOX
Patient is seen and examined at the bed side, is afebrile now, spiked fever earlier. 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       REVIEW OF SYSTEMS: All other review systems are negative      ALLERGIES: No Known Allergies      Vital Signs Last 24 Hrs  T(C): 36.9 (28 Mar 2021 14:04), Max: 36.9 (28 Mar 2021 14:04)  T(F): 98.4 (28 Mar 2021 14:04), Max: 98.4 (28 Mar 2021 14:04)  HR: 61 (28 Mar 2021 18:02) (61 - 63)  BP: 137/44 (28 Mar 2021 18:02) (135/52 - 153/58)  BP(mean): --  RR: 17 (28 Mar 2021 14:04) (17 - 18)  SpO2: 97% (28 Mar 2021 14:04) (97% - 98%)      PHYSICAL EXAM:  GENERAL: Not in distress   CHEST/LUNG: Not using accessory muscles   HEART: s1 and s2 present  ABDOMEN:  Nontender and  Nondistended  EXTREMITIES: No pedal  edema  CNS: Awake and Alert      LABS:                        9.2    8.54  )-----------( 297      ( 28 Mar 2021 08:01 )             28.1                         7.9    8.47  )-----------( 269      ( 26 Mar 2021 15:57 )             25.0                           7.8    13.47 )-----------( 273      ( 25 Mar 2021 06:40 )             24.2             136  |  102  |  15  ----------------------------<  133<H>  4.1   |  26  |  1.20    Ca    8.8      28 Mar 2021 08:01  Phos  2.7       Mg     2.1         TPro  7.4  /  Alb  3.2<L>  /  TBili  0.3  /  DBili  x   /  AST  17  /  ALT  25  /  AlkPhos  71  -28        135  |  102  |  14  ----------------------------<  128<H>  4.0   |  24  |  1.12    Ca    8.5      27 Mar 2021 06:36  Phos  2.6       Mg     2.2         TPro  7.0  /  Alb  3.1<L>  /  TBili  0.3  /  DBili  x   /  AST  19  /  ALT  23  /  AlkPhos  72      138  |  103  |  35<H>  ----------------------------<  151<H>  4.0   |  25  |  1.57<H>    Ca    8.4      24 Mar 2021 07:00  Phos  3.9       Mg     2.2         TPro  6.7  /  Alb  3.1<L>  /  TBili  0.3  /  DBili  x   /  AST  16  /  ALT  22  /  AlkPhos  71        CAPILLARY BLOOD GLUCOSE  POCT Blood Glucose.: 262 mg/dL (22 Mar 2021 16:34)  POCT Blood Glucose.: 150 mg/dL (22 Mar 2021 12:45)  POCT Blood Glucose.: 147 mg/dL (22 Mar 2021 10:56)  POCT Blood Glucose.: 196 mg/dL (22 Mar 2021 07:40      Urinalysis Basic - ( 20 Mar 2021 20:54 )  Color: Yellow / Appearance: Clear / S.015 / pH: x  Gluc: x / Ketone: Negative  / Bili: Negative / Urobili: Negative   Blood: x / Protein: Negative / Nitrite: Negative   Leuk Esterase: Negative / RBC: x / WBC x   Sq Epi: x / Non Sq Epi: x / Bacteria: x        MEDICATIONS  (STANDING):    ascorbic acid 2000 milliGRAM(s) Oral every 8 hours  aspirin  chewable 81 milliGRAM(s) Oral daily  atorvastatin 40 milliGRAM(s) Oral at bedtime  budesonide 160 MICROgram(s)/formoterol 4.5 MICROgram(s) Inhaler 2 Puff(s) Inhalation two times a day  carvedilol 6.25 milliGRAM(s) Oral every 12 hours  cholecalciferol 1000 Unit(s) Oral daily  dronedarone 400 milliGRAM(s) Oral two times a day  fluticasone propionate 50 MICROgram(s)/spray Nasal Spray 1 Spray(s) Both Nostrils every 12 hours  furosemide   Injectable 20 milliGRAM(s) IV Push daily  gabapentin 300 milliGRAM(s) Oral daily  heparin   Injectable 5000 Unit(s) SubCutaneous every 8 hours  influenza   Vaccine 0.5 milliLiter(s) IntraMuscular once  insulin glargine Injectable (LANTUS) 10 Unit(s) SubCutaneous at bedtime  insulin lispro (ADMELOG) corrective regimen sliding scale   SubCutaneous Before meals and at bedtime  levothyroxine 175 MICROGram(s) Oral daily  lisinopril 5 milliGRAM(s) Oral daily  montelukast 10 milliGRAM(s) Oral daily  oxybutynin 5 milliGRAM(s) Oral two times a day  pantoprazole    Tablet 40 milliGRAM(s) Oral two times a day  zinc sulfate 220 milliGRAM(s) Oral daily      RADIOLOGY & ADDITIONAL TESTS:    Surgical Pathology Report (21 @ 10:15)   Surgical Pathology Report:   ACCESSION No: [id number]   KWAEM MOSQUERA 2   Surgical Final Report   Final Diagnosis   1. Ascending colon polyp; hot snare:   - Invasive colonic adenocarcinoma arising in association with   tubular adenoma. See comment.   - Fragments of tubular adenoma (the smallest polyps).   2. Hepatic flexure polyp; biopsy:   - Tubular adenoma.   3. Descending colon polyp; biopsy:   - Inflammatory polyps.   Verified by: Priscila Arana MD   (Electronic Signature)   Reported on: 21 11:11 EDT, [facility],     Surgical Pathology Report (21 @ 11:37)   Surgical Pathology Report:   ACCESSION No: [id number]   KWAME MOSQUERA 2   Surgical Final Report   Final Diagnosis   1. Gastric antrum, biopsy: Chronic active gastritis, diffuse,   with focal lymphoid aggregate; H. Pylori associated. (Special  stain for H. Pylori is positive)   2. Esophagus biopsy: Patchy acute esophagitis. Special stain for  fungi is negative.   Verified by: Mariam Perez M.D. Surgical Pathology Report (21 @ 11:37)       3/20/21 : CT Angio Abdomen and Pelvis w/ IV Cont (21 @ 22:55) No CT evidence of acute gastrointestinal hemorrhage. Few scattered colonic diverticula.    3/20/21 : CT Angio Abdomen and Pelvis w/ IV Cont (21 @ 22:55) LOWER CHEST: Partially imaged pacemaker leads. Mitral annular calcification.    3/20/21 : Xray Chest 1 View- PORTABLE-Urgent (21 @ 16:38): There is left-sided defibrillator. The heart is normal in size. The lungs are clear.          MICROBIOLOGY DATA:    COVID-19 Drew Domain Antibody (21 @ 11:07)   COVID-19 Drew Domain Antibody Result: >250.00:    Culture - Blood (21 @ 21:50)   Specimen Source: .Blood Blood-Venous   Culture Results: No growth to date.     Culture - Blood (21 @ 21:50)   Specimen Source: .Blood Blood-Venous   Culture Results: No growth to date.     COVID-19 PCR . (21 @ 16:32)   COVID-19 PCR: NotDetec:     MRSA/MSSA PCR (20 @ 14:34)   MRSA PCR Result.: NotDetec:     FLU A B RSV Detection by PCR (20 @ 20:00)   Flu A Result: NotDetec                Assessment and Plan:   · Assessment	    Patient is a 81y old  Female from home, ambulates with walker, lives with son, with PMHx of HFpEF, CAD (s/p stents), chronic venous stasis, DM, HTN and Afib (on Xarelto, s/p PPM), chronic bronchitis with asthmatic component ( on Oxygen PRN at home ), JOSE DANIEL ( supposed to be on nocturnal CPAP , but non compliant), now presents to the ER for evaluation of hypotension, SOB and  fatigue for almost 2 weeks. Per daughter and granddaughter patient has been complaining of increasing fatigue and sob for last few weeks, seemed to be pale , patient endorses dark loose bowel movement. Patient has chronic diarrhea, but no abdominal pain. She has evaluated by GI team and now the ID consult requested to assist with further evaluation of chronic diarrhea.     #  H. Pylori associated Chronic active gastritis, diffuse, with focal lymphoid aggregate, DX 3/22 by Antrum biopsy  # Chronic diarrhea- C.diff vs Malignancy - NO Malignancy Antrum biopsy   # Symptomatic Anemia  - s/p EGD and antrum biopsy shows H. Pylori associated Chronic active gastritis, diffuse, with focal lymphoid aggregate  # COVID negative 3/20/21  # S/p EGD 3/22  and s/p Colonoscopy 3/25- pathilogy shows - Invasive colonic adenocarcinoma arising in association with  tubular adenoma    Would recommend:    1. Monitor Temp. and c/w supportive care  2. Management of Adenocarcinoma of colon as per Hem/Onc and Surgery   3. Continue Amoxicillin 1 g q 12hours 7 days then 500mg Q 12hours, + start Clarithromycin 500 mg q 12hours and c/w  Flagyl 500 mg q 12hours and + PPI X 14 days   4. OOb to chair      Attending Attestation:    Spent more than 45 minutes on total encounter, more than 50 % of the visit was spent counseling and/or coordinating care by the Attending physician.       Patient is seen and examined at the bed side, is afebrile today. The COVID PCR is positive as of 3/27 but negative as of 3/28.  As per patient she has not received the COVID vaccination.  The Right knee xray shows Round mass anterior to the patella.      REVIEW OF SYSTEMS: All other review systems are negative      ALLERGIES: No Known Allergies      Vital Signs Last 24 Hrs  T(C): 36.9 (28 Mar 2021 14:04), Max: 36.9 (28 Mar 2021 14:04)  T(F): 98.4 (28 Mar 2021 14:04), Max: 98.4 (28 Mar 2021 14:04)  HR: 61 (28 Mar 2021 18:02) (61 - 63)  BP: 137/44 (28 Mar 2021 18:02) (135/52 - 153/58)  BP(mean): --  RR: 17 (28 Mar 2021 14:04) (17 - 18)  SpO2: 97% (28 Mar 2021 14:04) (97% - 98%)      PHYSICAL EXAM:  GENERAL: Not in distress   CHEST/LUNG: Not using accessory muscles   HEART: s1 and s2 present  ABDOMEN:  Nontender and  Nondistended  EXTREMITIES: No pedal  edema  CNS: Awake and Alert      LABS:                        9.2    8.54  )-----------( 297      ( 28 Mar 2021 08:01 )             28.1                         7.9    8.47  )-----------( 269      ( 26 Mar 2021 15:57 )             25.0                           7.8    13.47 )-----------( 273      ( 25 Mar 2021 06:40 )             24.2             136  |  102  |  15  ----------------------------<  133<H>  4.1   |  26  |  1.20    Ca    8.8      28 Mar 2021 08:01  Phos  2.7       Mg     2.1         TPro  7.4  /  Alb  3.2<L>  /  TBili  0.3  /  DBili  x   /  AST  17  /  ALT  25  /  AlkPhos  71      135  |  102  |  14  ----------------------------<  128<H>  4.0   |  24  |  1.12    Ca    8.5      27 Mar 2021 06:36  Phos  2.6     -  Mg     2.2         TPro  7.0  /  Alb  3.1<L>  /  TBili  0.3  /  DBili  x   /  AST  19  /  ALT  23  /  AlkPhos  72  -    138  |  103  |  35<H>  ----------------------------<  151<H>  4.0   |  25  |  1.57<H>    Ca    8.4      24 Mar 2021 07:00  Phos  3.9     -  Mg     2.2         TPro  6.7  /  Alb  3.1<L>  /  TBili  0.3  /  DBili  x   /  AST  16  /  ALT  22  /  AlkPhos  71  03-      CAPILLARY BLOOD GLUCOSE  POCT Blood Glucose.: 262 mg/dL (22 Mar 2021 16:34)  POCT Blood Glucose.: 150 mg/dL (22 Mar 2021 12:45)  POCT Blood Glucose.: 147 mg/dL (22 Mar 2021 10:56)  POCT Blood Glucose.: 196 mg/dL (22 Mar 2021 07:40      Urinalysis Basic - ( 20 Mar 2021 20:54 )  Color: Yellow / Appearance: Clear / S.015 / pH: x  Gluc: x / Ketone: Negative  / Bili: Negative / Urobili: Negative   Blood: x / Protein: Negative / Nitrite: Negative   Leuk Esterase: Negative / RBC: x / WBC x   Sq Epi: x / Non Sq Epi: x / Bacteria: x        MEDICATIONS  (STANDING):    ascorbic acid 2000 milliGRAM(s) Oral every 8 hours  aspirin  chewable 81 milliGRAM(s) Oral daily  atorvastatin 40 milliGRAM(s) Oral at bedtime  budesonide 160 MICROgram(s)/formoterol 4.5 MICROgram(s) Inhaler 2 Puff(s) Inhalation two times a day  carvedilol 6.25 milliGRAM(s) Oral every 12 hours  cholecalciferol 1000 Unit(s) Oral daily  dronedarone 400 milliGRAM(s) Oral two times a day  fluticasone propionate 50 MICROgram(s)/spray Nasal Spray 1 Spray(s) Both Nostrils every 12 hours  furosemide   Injectable 20 milliGRAM(s) IV Push daily  gabapentin 300 milliGRAM(s) Oral daily  heparin   Injectable 5000 Unit(s) SubCutaneous every 8 hours  influenza   Vaccine 0.5 milliLiter(s) IntraMuscular once  insulin glargine Injectable (LANTUS) 10 Unit(s) SubCutaneous at bedtime  insulin lispro (ADMELOG) corrective regimen sliding scale   SubCutaneous Before meals and at bedtime  levothyroxine 175 MICROGram(s) Oral daily  lisinopril 5 milliGRAM(s) Oral daily  montelukast 10 milliGRAM(s) Oral daily  oxybutynin 5 milliGRAM(s) Oral two times a day  pantoprazole    Tablet 40 milliGRAM(s) Oral two times a day  zinc sulfate 220 milliGRAM(s) Oral daily      RADIOLOGY & ADDITIONAL TESTS:    Surgical Pathology Report (21 @ 10:15)   Surgical Pathology Report:   ACCESSION No: [id number]   KWAME MOSQUERA 2   Surgical Final Report   Final Diagnosis   1. Ascending colon polyp; hot snare:   - Invasive colonic adenocarcinoma arising in association with   tubular adenoma. See comment.   - Fragments of tubular adenoma (the smallest polyps).   2. Hepatic flexure polyp; biopsy:   - Tubular adenoma.   3. Descending colon polyp; biopsy:   - Inflammatory polyps.   Verified by: Priscila Arana MD   (Electronic Signature)   Reported on: 21 11:11 EDT, [facility],     Surgical Pathology Report (21 @ 11:37)   Surgical Pathology Report: , ACCESSION No: [id number]   KWAME MOSQUERA 2   Surgical Final Report , Final Diagnosis   1. Gastric antrum, biopsy: Chronic active gastritis, diffuse,   with focal lymphoid aggregate; H. Pylori associated. (Special  stain for H. Pylori is positive)   2. Esophagus biopsy: Patchy acute esophagitis. Special stain for  fungi is negative.   Verified by: Mariam Perez M.D. Surgical Pathology Report (21 @ 11:37)     3/28/21 : Xray Knee 3 Views, Right (21 @ 14:20) >    IMPRESSION: Round mass anterior to the patella is noted. This is new since 2018.      3/20/21 : CT Angio Abdomen and Pelvis w/ IV Cont (21 @ 22:55) No CT evidence of acute gastrointestinal hemorrhage. Few scattered colonic diverticula.    3/20/21 : CT Angio Abdomen and Pelvis w/ IV Cont (21 @ 22:55) LOWER CHEST: Partially imaged pacemaker leads. Mitral annular calcification.    3/20/21 : Xray Chest 1 View- PORTABLE-Urgent (21 @ 16:38): There is left-sided defibrillator. The heart is normal in size. The lungs are clear.          MICROBIOLOGY DATA:    COVID-19 Drew Domain Antibody (21 @ 11:07)   COVID-19 Drew Domain Antibody Result: >250.00:    Culture - Blood (21 @ 21:50)   Specimen Source: .Blood Blood-Venous   Culture Results: No growth to date.     Culture - Blood (21 @ 21:50)   Specimen Source: .Blood Blood-Venous   Culture Results: No growth to date.     COVID-19 PCR . (21 @ 16:32)   COVID-19 PCR: NotDetec:     MRSA/MSSA PCR (20 @ 14:34)   MRSA PCR Result.: NotDetec:     FLU A B RSV Detection by PCR (20 @ 20:00)   Flu A Result: NotDetec

## 2021-03-28 NOTE — PROGRESS NOTE ADULT - SUBJECTIVE AND OBJECTIVE BOX
CHIEF COMPLAINT:Patient is a 81y old  Female who presents with a chief complaint of symptomatic anemia .Pt appears comfortable,pain Rt knee.    	  REVIEW OF SYSTEMS:  CONSTITUTIONAL: No fever, weight loss, or fatigue  EYES: No eye pain, visual disturbances, or discharge  ENT:  No difficulty hearing, tinnitus, vertigo; No sinus or throat pain  NECK: No pain or stiffness  RESPIRATORY: No cough, wheezing, chills or hemoptysis; No Shortness of Breath  CARDIOVASCULAR: No chest pain, palpitations, passing out, dizziness, or leg swelling  GASTROINTESTINAL: No abdominal or epigastric pain. No nausea, vomiting, or hematemesis; No diarrhea or constipation. No melena or hematochezia.  GENITOURINARY: No dysuria, frequency, hematuria, or incontinence  NEUROLOGICAL: No headaches, memory loss, loss of strength, numbness, or tremors  SKIN: No itching, burning, rashes, or lesions   LYMPH Nodes: No enlarged glands  ENDOCRINE: No heat or cold intolerance; No hair loss  MUSCULOSKELETAL: No joint pain or swelling; No muscle, back, or extremity pain  PSYCHIATRIC: No depression, anxiety, mood swings, or difficulty sleeping  HEME/LYMPH: No easy bruising, or bleeding gums  ALLERGY AND IMMUNOLOGIC: No hives or eczema	        PHYSICAL EXAM:  T(C): 36.8 (03-28-21 @ 06:05), Max: 37.4 (03-27-21 @ 19:55)  HR: 61 (03-28-21 @ 06:05) (61 - 65)  BP: 153/58 (03-28-21 @ 06:05) (153/58 - 169/55)  RR: 18 (03-28-21 @ 06:05) (17 - 18)  SpO2: 98% (03-28-21 @ 06:05) (97% - 98%)  Wt(kg): --  I&O's Summary      Appearance: Normal	  HEENT:   Normal oral mucosa, PERRL, EOMI	  Lymphatic: No lymphadenopathy  Cardiovascular: Normal S1 S2, No JVD, No murmurs, +1 edema  Respiratory: Lungs clear to auscultation	  Psychiatry: A & O x 3, Mood & affect appropriate  Gastrointestinal:  Soft, Non-tender, + BS	  Skin: No rashes, No ecchymoses, No cyanosis	  Neurologic: Non-focal  Extremities: Normal range of motion, No clubbing, cyanosis,+1edema  Vascular: Peripheral pulses palpable 2+ bilaterally    MEDICATIONS  (STANDING):  ascorbic acid 2000 milliGRAM(s) Oral every 8 hours  aspirin  chewable 81 milliGRAM(s) Oral daily  atorvastatin 40 milliGRAM(s) Oral at bedtime  budesonide 160 MICROgram(s)/formoterol 4.5 MICROgram(s) Inhaler 2 Puff(s) Inhalation two times a day  carvedilol 6.25 milliGRAM(s) Oral every 12 hours  cholecalciferol 1000 Unit(s) Oral daily  dronedarone 400 milliGRAM(s) Oral two times a day  fluticasone propionate 50 MICROgram(s)/spray Nasal Spray 1 Spray(s) Both Nostrils every 12 hours  furosemide   Injectable 20 milliGRAM(s) IV Push daily  gabapentin 300 milliGRAM(s) Oral daily  heparin   Injectable 5000 Unit(s) SubCutaneous every 8 hours  influenza   Vaccine 0.5 milliLiter(s) IntraMuscular once  insulin glargine Injectable (LANTUS) 10 Unit(s) SubCutaneous at bedtime  insulin lispro (ADMELOG) corrective regimen sliding scale   SubCutaneous Before meals and at bedtime  iron sucrose IVPB 200 milliGRAM(s) IV Intermittent once  levothyroxine 175 MICROGram(s) Oral daily  lisinopril 5 milliGRAM(s) Oral daily  montelukast 10 milliGRAM(s) Oral daily  oxybutynin 5 milliGRAM(s) Oral two times a day  pantoprazole    Tablet 40 milliGRAM(s) Oral two times a day  zinc sulfate 220 milliGRAM(s) Oral daily      	  	  LABS:	 	                       9.2    8.54  )-----------( 297      ( 28 Mar 2021 08:01 )             28.1     03-28    136  |  102  |  15  ----------------------------<  133<H>  4.1   |  26  |  1.20    Ca    8.8      28 Mar 2021 08:01  Phos  2.7     03-28  Mg     2.1     03-28    TPro  7.4  /  Alb  3.2<L>  /  TBili  0.3  /  DBili  x   /  AST  17  /  ALT  25  /  AlkPhos  71  03-28    proBNP: Serum Pro-Brain Natriuretic Peptide: 1155 pg/mL (03-21 @ 07:15)    Lipid Profile: Cholesterol 134  HDL 54  TG 82  Cholesterol 145  LDL --  HDL 55  TG 86      TSH: Thyroid Stimulating Hormone, Serum: 2.67 uU/mL (03-22 @ 07:34)  Thyroid Stimulating Hormone, Serum: 3.61 uU/mL (03-21 @ 07:15)

## 2021-03-29 DIAGNOSIS — M25.469 EFFUSION, UNSPECIFIED KNEE: ICD-10-CM

## 2021-03-29 LAB
ALBUMIN SERPL ELPH-MCNC: 2.9 G/DL — LOW (ref 3.5–5)
ALP SERPL-CCNC: 61 U/L — SIGNIFICANT CHANGE UP (ref 40–120)
ALT FLD-CCNC: 18 U/L DA — SIGNIFICANT CHANGE UP (ref 10–60)
ANION GAP SERPL CALC-SCNC: 8 MMOL/L — SIGNIFICANT CHANGE UP (ref 5–17)
AST SERPL-CCNC: 14 U/L — SIGNIFICANT CHANGE UP (ref 10–40)
BILIRUB SERPL-MCNC: 0.2 MG/DL — SIGNIFICANT CHANGE UP (ref 0.2–1.2)
BUN SERPL-MCNC: 22 MG/DL — HIGH (ref 7–18)
CALCIUM SERPL-MCNC: 8.7 MG/DL — SIGNIFICANT CHANGE UP (ref 8.4–10.5)
CHLORIDE SERPL-SCNC: 102 MMOL/L — SIGNIFICANT CHANGE UP (ref 96–108)
CO2 SERPL-SCNC: 28 MMOL/L — SIGNIFICANT CHANGE UP (ref 22–31)
CREAT SERPL-MCNC: 1.31 MG/DL — HIGH (ref 0.5–1.3)
GLUCOSE BLDC GLUCOMTR-MCNC: 116 MG/DL — HIGH (ref 70–99)
GLUCOSE BLDC GLUCOMTR-MCNC: 123 MG/DL — HIGH (ref 70–99)
GLUCOSE BLDC GLUCOMTR-MCNC: 177 MG/DL — HIGH (ref 70–99)
GLUCOSE BLDC GLUCOMTR-MCNC: 288 MG/DL — HIGH (ref 70–99)
GLUCOSE SERPL-MCNC: 79 MG/DL — SIGNIFICANT CHANGE UP (ref 70–99)
HCT VFR BLD CALC: 25.6 % — LOW (ref 34.5–45)
HGB BLD-MCNC: 8.1 G/DL — LOW (ref 11.5–15.5)
MAGNESIUM SERPL-MCNC: 2.2 MG/DL — SIGNIFICANT CHANGE UP (ref 1.6–2.6)
MCHC RBC-ENTMCNC: 26.3 PG — LOW (ref 27–34)
MCHC RBC-ENTMCNC: 31.6 GM/DL — LOW (ref 32–36)
MCV RBC AUTO: 83.1 FL — SIGNIFICANT CHANGE UP (ref 80–100)
NRBC # BLD: 0 /100 WBCS — SIGNIFICANT CHANGE UP (ref 0–0)
PHOSPHATE SERPL-MCNC: 3.4 MG/DL — SIGNIFICANT CHANGE UP (ref 2.5–4.5)
PLATELET # BLD AUTO: 277 K/UL — SIGNIFICANT CHANGE UP (ref 150–400)
POTASSIUM SERPL-MCNC: 3.7 MMOL/L — SIGNIFICANT CHANGE UP (ref 3.5–5.3)
POTASSIUM SERPL-SCNC: 3.7 MMOL/L — SIGNIFICANT CHANGE UP (ref 3.5–5.3)
PROT SERPL-MCNC: 6.3 G/DL — SIGNIFICANT CHANGE UP (ref 6–8.3)
RBC # BLD: 3.08 M/UL — LOW (ref 3.8–5.2)
RBC # FLD: 15.9 % — HIGH (ref 10.3–14.5)
SODIUM SERPL-SCNC: 138 MMOL/L — SIGNIFICANT CHANGE UP (ref 135–145)
WBC # BLD: 6.81 K/UL — SIGNIFICANT CHANGE UP (ref 3.8–10.5)
WBC # FLD AUTO: 6.81 K/UL — SIGNIFICANT CHANGE UP (ref 3.8–10.5)

## 2021-03-29 PROCEDURE — 74019 RADEX ABDOMEN 2 VIEWS: CPT | Mod: 26

## 2021-03-29 RX ORDER — ACETAMINOPHEN 500 MG
1000 TABLET ORAL ONCE
Refills: 0 | Status: COMPLETED | OUTPATIENT
Start: 2021-03-29 | End: 2021-03-29

## 2021-03-29 RX ORDER — AMOXICILLIN 250 MG/5ML
1000 SUSPENSION, RECONSTITUTED, ORAL (ML) ORAL
Refills: 0 | Status: DISCONTINUED | OUTPATIENT
Start: 2021-03-29 | End: 2021-03-31

## 2021-03-29 RX ORDER — METRONIDAZOLE 500 MG
500 TABLET ORAL EVERY 12 HOURS
Refills: 0 | Status: DISCONTINUED | OUTPATIENT
Start: 2021-03-29 | End: 2021-03-31

## 2021-03-29 RX ADMIN — Medication 1 SPRAY(S): at 20:01

## 2021-03-29 RX ADMIN — Medication 500 MILLIGRAM(S): at 06:55

## 2021-03-29 RX ADMIN — HEPARIN SODIUM 5000 UNIT(S): 5000 INJECTION INTRAVENOUS; SUBCUTANEOUS at 21:39

## 2021-03-29 RX ADMIN — Medication 2: at 21:38

## 2021-03-29 RX ADMIN — ATORVASTATIN CALCIUM 40 MILLIGRAM(S): 80 TABLET, FILM COATED ORAL at 21:39

## 2021-03-29 RX ADMIN — Medication 2000 MILLIGRAM(S): at 21:39

## 2021-03-29 RX ADMIN — INSULIN GLARGINE 10 UNIT(S): 100 INJECTION, SOLUTION SUBCUTANEOUS at 21:38

## 2021-03-29 RX ADMIN — Medication 1 SPRAY(S): at 06:57

## 2021-03-29 RX ADMIN — MONTELUKAST 10 MILLIGRAM(S): 4 TABLET, CHEWABLE ORAL at 21:39

## 2021-03-29 RX ADMIN — CARVEDILOL PHOSPHATE 6.25 MILLIGRAM(S): 80 CAPSULE, EXTENDED RELEASE ORAL at 06:56

## 2021-03-29 RX ADMIN — Medication 20 MILLIGRAM(S): at 06:56

## 2021-03-29 RX ADMIN — DRONEDARONE 400 MILLIGRAM(S): 400 TABLET, FILM COATED ORAL at 06:56

## 2021-03-29 RX ADMIN — Medication 400 MILLIGRAM(S): at 20:01

## 2021-03-29 RX ADMIN — GABAPENTIN 300 MILLIGRAM(S): 400 CAPSULE ORAL at 15:29

## 2021-03-29 RX ADMIN — HEPARIN SODIUM 5000 UNIT(S): 5000 INJECTION INTRAVENOUS; SUBCUTANEOUS at 15:20

## 2021-03-29 RX ADMIN — HEPARIN SODIUM 5000 UNIT(S): 5000 INJECTION INTRAVENOUS; SUBCUTANEOUS at 06:57

## 2021-03-29 RX ADMIN — Medication 2000 MILLIGRAM(S): at 15:19

## 2021-03-29 RX ADMIN — ZINC SULFATE TAB 220 MG (50 MG ZINC EQUIVALENT) 220 MILLIGRAM(S): 220 (50 ZN) TAB at 15:19

## 2021-03-29 RX ADMIN — DRONEDARONE 400 MILLIGRAM(S): 400 TABLET, FILM COATED ORAL at 18:02

## 2021-03-29 RX ADMIN — Medication 500 MILLIGRAM(S): at 18:02

## 2021-03-29 RX ADMIN — Medication 1000 MILLIGRAM(S): at 17:59

## 2021-03-29 RX ADMIN — PANTOPRAZOLE SODIUM 40 MILLIGRAM(S): 20 TABLET, DELAYED RELEASE ORAL at 06:56

## 2021-03-29 RX ADMIN — Medication 81 MILLIGRAM(S): at 15:19

## 2021-03-29 RX ADMIN — PANTOPRAZOLE SODIUM 40 MILLIGRAM(S): 20 TABLET, DELAYED RELEASE ORAL at 18:03

## 2021-03-29 RX ADMIN — Medication 6: at 17:59

## 2021-03-29 RX ADMIN — Medication 5 MILLIGRAM(S): at 06:56

## 2021-03-29 RX ADMIN — LISINOPRIL 5 MILLIGRAM(S): 2.5 TABLET ORAL at 06:56

## 2021-03-29 RX ADMIN — Medication 1000 MILLIGRAM(S): at 06:55

## 2021-03-29 RX ADMIN — Medication 175 MICROGRAM(S): at 06:56

## 2021-03-29 RX ADMIN — Medication 5 MILLIGRAM(S): at 18:04

## 2021-03-29 RX ADMIN — Medication 1000 UNIT(S): at 15:19

## 2021-03-29 RX ADMIN — Medication 2000 MILLIGRAM(S): at 06:56

## 2021-03-29 RX ADMIN — BUDESONIDE AND FORMOTEROL FUMARATE DIHYDRATE 2 PUFF(S): 160; 4.5 AEROSOL RESPIRATORY (INHALATION) at 21:40

## 2021-03-29 NOTE — PROGRESS NOTE ADULT - ASSESSMENT
Patient is a 81y old  Female from home, ambulates with walker, lives with son, with PMHx of HFpEF, CAD (s/p stents), chronic venous stasis, DM, HTN and Afib (on Xarelto, s/p PPM), chronic bronchitis with asthmatic component ( on Oxygen PRN at home ), JOSE DANIEL ( supposed to be on nocturnal CPAP , but non compliant), now presents to the ER for evaluation of hypotension, SOB and  fatigue for almost 2 weeks. Per daughter and granddaughter patient has been complaining of increasing fatigue and sob for last few weeks, seemed to be pale , patient endorses dark loose bowel movement. Patient has chronic diarrhea, but no abdominal pain. She has evaluated by GI team and now the ID consult requested to assist with further evaluation of chronic diarrhea.     #  H. Pylori associated Chronic active gastritis, diffuse, with focal lymphoid aggregate, DX 3/22 by Antrum biopsy  # Chronic diarrhea- C.diff vs Malignancy - NO Malignancy Antrum biopsy   # Symptomatic Anemia  - s/p EGD and antrum biopsy shows H. Pylori associated Chronic active gastritis, diffuse, with focal lymphoid aggregate  # COVID negative 3/20/21, positive 3/27 and negative on 3/28  - d/w Dr. Spence regarding exposure VS false positive, in th esetting of positive Titers  # S/p EGD 3/22  and s/p Colonoscopy 3/25- pathology shows - Invasive colonic adenocarcinoma arising in association with  tubular adenoma  # The Right knee xray shows Round mass anterior to the patella.    Would recommend:    1. Management of Right knee Mass as per Primary team, Consider  ortho evaluation   2. Management of Adenocarcinoma of colon as per Hem/Onc and Surgery   3. Continue Amoxicillin 1 g q 12hours 7 days then 500mg Q 12hours, + Clarithromycin 500 mg q 12hours and c/w  Flagyl 500 mg q 12hours and + PPI X 14 days   4. OOb to chair    d/w DR. Sandoval and House staff      Attending Attestation:    Spent more than 45 minutes on total encounter, more than 50 % of the visit was spent counseling and/or coordinating care by the Attending physician.     Patient is a 81y old  Female from home, ambulates with walker, lives with son, with PMHx of HFpEF, CAD (s/p stents), chronic venous stasis, DM, HTN and Afib (on Xarelto, s/p PPM), chronic bronchitis with asthmatic component ( on Oxygen PRN at home ), JOSE DANIEL ( supposed to be on nocturnal CPAP , but non compliant), now presents to the ER for evaluation of hypotension, SOB and  fatigue for almost 2 weeks. Per daughter and granddaughter patient has been complaining of increasing fatigue and sob for last few weeks, seemed to be pale , patient endorses dark loose bowel movement. Patient has chronic diarrhea, but no abdominal pain. She has evaluated by GI team and now the ID consult requested to assist with further evaluation of chronic diarrhea.     #  H. Pylori associated Chronic active gastritis, diffuse, with focal lymphoid aggregate, DX 3/22 by Antrum biopsy  # Chronic diarrhea- C.diff vs Malignancy - NO Malignancy Antrum biopsy   # Symptomatic Anemia  - s/p EGD and antrum biopsy shows H. Pylori associated Chronic active gastritis, diffuse, with focal lymphoid aggregate  # COVID negative 3/20/21, positive 3/27 and negative on 3/28  - d/w Dr. Spence regarding exposure VS false positive, in th esetting of positive Titers  # S/p EGD 3/22  and s/p Colonoscopy 3/25- pathology shows - Invasive colonic adenocarcinoma arising in association with  tubular adenoma  # The Right knee xray shows Round mass anterior to the patella.    Would recommend:    1. Please start Clarithromycin since QTc is 456, less than 500   2. Management of Adenocarcinoma of colon as per Hem/Onc and Surgery   3. Continue Amoxicillin 1 g q 12hours 7 days then 500mg Q 12hours, +  Add Clarithromycin 500 mg q 12hours and c/w  Flagyl 500 mg q 12hours and + PPI X 14 days   4. OOb to chair    d/w DR. Sandoval and House staff      Attending Attestation:    Spent more than 45 minutes on total encounter, more than 50 % of the visit was spent counseling and/or coordinating care by the Attending physician.

## 2021-03-29 NOTE — PROGRESS NOTE ADULT - ASSESSMENT
· Assessment	  79 year old lady presented with weakness and anemia.  colonoscopy showed several large polyps.  The ascending colon polyp is cancerous, involving margin    1. cancer at polyp, involving the margin  she needs surgery.    2, anemia, will give venofer  transfuse before surgery    3. Afib on xarelto  will hold xarelto until surgery is done    4. a mobile lump at right knee since a fall 2 years ago  will do a US to see this a cyst or mass

## 2021-03-29 NOTE — PROGRESS NOTE ADULT - ASSESSMENT
79 years old [place]-speaking female from home, ambulates with walker, lives with son, with PMHx of HFpEF, CAD (s/p stents), chronic venous stasis, DM, HTN and Afib (on Xarelto, s/p PPM), chronic bronchitis with asthmatic component ( on Oxygen PRN at home ), JOSE DANIEL ( supposed to be on nocturnal CPAP , but non compliant) presents to the ED with chief complaint of hypotension , SOB and  fatigue for almost 2 weeks.,symptomatic Fe def anemia with Colon ca.  1.GI f/u.  2.PAF- multaq, coreg, asa.  3.HTN-cont bp medication.  4.CAD-asa,coreg,statin.  5.DM-insulin.  6.Anemia-IV iron.  7.Asthma-MDI.  8.Tx for H.Pylori+.  9.Colon ca-Surgical and Heme/Onc f/u.  10.GI and DVT prophylaxis.  11.HTN-resume Ace.  12.Stress test today.  13.Rt knee pain,hx of fall 2 yrs ago with a cyst-Ortho eval called.

## 2021-03-29 NOTE — PROGRESS NOTE ADULT - SUBJECTIVE AND OBJECTIVE BOX
CHIEF COMPLAINT:Patient is a 81y old  Female who presents with a chief complaint of symptomatic anemia.Pt appears comfortable.    	  REVIEW OF SYSTEMS:  CONSTITUTIONAL: No fever, weight loss, or fatigue  EYES: No eye pain, visual disturbances, or discharge  ENT:  No difficulty hearing, tinnitus, vertigo; No sinus or throat pain  NECK: No pain or stiffness  RESPIRATORY: No cough, wheezing, chills or hemoptysis; No Shortness of Breath  CARDIOVASCULAR: No chest pain, palpitations, passing out, dizziness, or leg swelling  GASTROINTESTINAL: No abdominal or epigastric pain. No nausea, vomiting, or hematemesis; No diarrhea or constipation. No melena or hematochezia.  GENITOURINARY: No dysuria, frequency, hematuria, or incontinence  NEUROLOGICAL: No headaches, memory loss, loss of strength, numbness, or tremors  SKIN: No itching, burning, rashes, or lesions   LYMPH Nodes: No enlarged glands  ENDOCRINE: No heat or cold intolerance; No hair loss  MUSCULOSKELETAL: No joint pain or swelling; No muscle, back, or extremity pain  PSYCHIATRIC: No depression, anxiety, mood swings, or difficulty sleeping  HEME/LYMPH: No easy bruising, or bleeding gums  ALLERGY AND IMMUNOLOGIC: No hives or eczema	        PHYSICAL EXAM:  T(C): 36.8 (03-29-21 @ 05:06), Max: 36.9 (03-28-21 @ 14:04)  HR: 60 (03-29-21 @ 05:06) (59 - 63)  BP: 133/50 (03-29-21 @ 05:06) (130/63 - 137/44)  RR: 18 (03-29-21 @ 05:06) (16 - 18)  SpO2: 100% (03-29-21 @ 05:06) (97% - 100%)        Appearance: Normal	  HEENT:   Normal oral mucosa, PERRL, EOMI	  Lymphatic: No lymphadenopathy  Cardiovascular: Normal S1 S2, No JVD, No murmurs, No edema  Respiratory: Lungs clear to auscultation	  Psychiatry: A & O x 3, Mood & affect appropriate  Gastrointestinal:  Soft, Non-tender, + BS	  Skin: No rashes, No ecchymoses, No cyanosis	  Neurologic: Non-focal  Extremities: Normal range of motion, No clubbing, cyanosis or edema  Vascular: Peripheral pulses palpable 2+ bilaterally    MEDICATIONS  (STANDING):  amoxicillin 1000 milliGRAM(s) Oral two times a day  ascorbic acid 2000 milliGRAM(s) Oral every 8 hours  aspirin  chewable 81 milliGRAM(s) Oral daily  atorvastatin 40 milliGRAM(s) Oral at bedtime  budesonide 160 MICROgram(s)/formoterol 4.5 MICROgram(s) Inhaler 2 Puff(s) Inhalation two times a day  carvedilol 6.25 milliGRAM(s) Oral every 12 hours  cholecalciferol 1000 Unit(s) Oral daily  dronedarone 400 milliGRAM(s) Oral two times a day  fluticasone propionate 50 MICROgram(s)/spray Nasal Spray 1 Spray(s) Both Nostrils every 12 hours  furosemide   Injectable 20 milliGRAM(s) IV Push daily  gabapentin 300 milliGRAM(s) Oral daily  heparin   Injectable 5000 Unit(s) SubCutaneous every 8 hours  influenza   Vaccine 0.5 milliLiter(s) IntraMuscular once  insulin glargine Injectable (LANTUS) 10 Unit(s) SubCutaneous at bedtime  insulin lispro (ADMELOG) corrective regimen sliding scale   SubCutaneous Before meals and at bedtime  levothyroxine 175 MICROGram(s) Oral daily  metroNIDAZOLE    Tablet 500 milliGRAM(s) Oral every 12 hours  montelukast 10 milliGRAM(s) Oral daily  oxybutynin 5 milliGRAM(s) Oral two times a day  pantoprazole    Tablet 40 milliGRAM(s) Oral two times a day  zinc sulfate 220 milliGRAM(s) Oral daily      	  	  LABS:	 	                       8.1    6.81  )-----------( 277      ( 29 Mar 2021 07:23 )             25.6     03-29    138  |  102  |  22<H>  ----------------------------<  79  3.7   |  28  |  1.31<H>    Ca    8.7      29 Mar 2021 07:23  Phos  3.4     03-29  Mg     2.2     03-29    TPro  6.3  /  Alb  2.9<L>  /  TBili  0.2  /  DBili  x   /  AST  14  /  ALT  18  /  AlkPhos  61  03-29    proBNP: Serum Pro-Brain Natriuretic Peptide: 1155 pg/mL (03-21 @ 07:15)    Lipid Profile: Cholesterol 134  HDL 54  TG 82  Cholesterol 145  HDL 55  TG 86      TSH: Thyroid Stimulating Hormone, Serum: 2.67 uU/mL (03-22 @ 07:34)  Thyroid Stimulating Hormone, Serum: 3.61 uU/mL (03-21 @ 07:15)        EXAM:  KNEE AP.LAT&OBL.RIGHT                            PROCEDURE DATE:  03/28/2021          INTERPRETATION:  Right knee. Patient has local pain. 3 views.    No effusion. Mild degeneration concentrated around the articular patella.    On the lateral view there is a well-circumscribed round density in the subcutaneous tissues anterior to the patella measuring approximately 3.2 cm.    IMPRESSION: Round mass anterior to the patella is noted. This is new since December 22, 2018.

## 2021-03-29 NOTE — PROGRESS NOTE ADULT - PROBLEM SELECTOR PLAN 2
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 PPM incompatible with interrogation   -EKG with BFB  -b/l LE doppler 3/21 negative for DVT  -cards Dr. Sandoval following

## 2021-03-29 NOTE — PROGRESS NOTE ADULT - SUBJECTIVE AND OBJECTIVE BOX
Condition discussed with daughter and patient, options risks and benefits explained.   Oncology consult noted.  Awaiting Medical/Cardiology clearance.

## 2021-03-29 NOTE — PROGRESS NOTE ADULT - PROBLEM SELECTOR PLAN 1
presented 3/20 with x2 weeks increasing fatigue, SOB, and paleness, with prolonged history of dark bowel movements s/p possible transverse polyp colonoscopy 2020, no further fu  -in ED, VS notable for /77, SpO2 100% 4L NC, adm trop, COVID, UA negative  -pre-RBC adm Hb 7.7, improved s/p x1pRBC in ED, Fe 15, TIBC 457, 3% sat  -CT a/p unremarkable for signs of bleeding  -IV iron 3/20-23 and 3/26-27  -Fe and %sat improved s/p transfusion and Venofer, Hb stable, transfuse if <8  -EGD/col 3/22 with GI Dr. Moreland, gastritis, esophagitis, no GIB, path + H pylori  -H pylori treatment:       -amox 1g q12 x7 days>500mg Q 12hours x7 days 3/24pm-27, restarted 3/29          -Flagyl 500 mg q12 3/24pm-27, restarted 3/29         -PPI BID x14 days 3/23-       -s/p clarithromycin 3/25-27, held given interactions with Multaq, statin, flonase  -ID Dr. Butcher following   -colonoscopy 3/24 with polyps (removed), abdominal XR negative for free air 3/24   -repeat colonoscopy 3/25 notable for diverticulosis and internal hemorrhoids  -path resulted 3/26 +invasive colonic adenocarcinoma with associated tubular adenoma  -partial colon resection with surgery Dr. Murrell pending IM/cards clearance  -COVID positive 3/27, retest negative 3/28, +Ab, first false positive  -heme/onc Dr. Arango following

## 2021-03-29 NOTE — PROGRESS NOTE ADULT - SUBJECTIVE AND OBJECTIVE BOX
Patient is seen and examined at the bed side, is afebrile today. The COVID PCR is positive as of 3/27 but negative as of 3/28.  As per patient she has not received the COVID vaccination.  The Right knee xray shows Round mass anterior to the patella.      REVIEW OF SYSTEMS: All other review systems are negative      ALLERGIES: No Known Allergies      Vital Signs Last 24 Hrs  T(C): 36.4 (29 Mar 2021 13:59), Max: 36.8 (28 Mar 2021 21:00)  T(F): 97.5 (29 Mar 2021 13:59), Max: 98.3 (28 Mar 2021 21:00)  HR: 67 (29 Mar 2021 13:59) (59 - 67)  BP: 107/43 (29 Mar 2021 13:59) (107/43 - 133/50)  BP(mean): --  RR: 18 (29 Mar 2021 13:59) (16 - 18)  SpO2: 100% (29 Mar 2021 13:59) (100% - 100%)      PHYSICAL EXAM:  GENERAL: Not in distress   CHEST/LUNG: Not using accessory muscles   HEART: s1 and s2 present  ABDOMEN:  Nontender and  Nondistended  EXTREMITIES: No pedal  edema  CNS: Awake and Alert      LABS:                        8.1    6.81  )-----------( 277      ( 29 Mar 2021 07:23 )             25.6                  7.9    8.47  )-----------( 269      ( 26 Mar 2021 15:57 )             25.0                           7.8    13.47 )-----------( 273      ( 25 Mar 2021 06:40 )             24.2           138  |  102  |  22<H>  ----------------------------<  79  3.7   |  28  |  1.31<H>    Ca    8.7      29 Mar 2021 07:23  Phos  3.4       Mg     2.2         TPro  6.3  /  Alb  2.9<L>  /  TBili  0.2  /  DBili  x   /  AST  14  /  ALT  18  /  AlkPhos  61      136  |  102  |  15  ----------------------------<  133<H>  4.1   |  26  |  1.20    Ca    8.8      28 Mar 2021 08:01  Phos  2.7     03-28  Mg     2.1     -28    TPro  7.4  /  Alb  3.2<L>  /  TBili  0.3  /  DBili  x   /  AST  17  /  ALT  25  /  AlkPhos  71  -    135  |  102  |  14  ----------------------------<  128<H>  4.0   |  24  |  1.12    Ca    8.5      27 Mar 2021 06:36  Phos  2.6     03-27  Mg     2.2     -    TPro  7.0  /  Alb  3.1<L>  /  TBili  0.3  /  DBili  x   /  AST  19  /  ALT  23  /  AlkPhos  72  -      03-    138  |  103  |  35<H>  ----------------------------<  151<H>  4.0   |  25  |  1.57<H>    Ca    8.4      24 Mar 2021 07:00  Phos  3.9     03-24  Mg     2.2     -    TPro  6.7  /  Alb  3.1<L>  /  TBili  0.3  /  DBili  x   /  AST  16  /  ALT  22  /  AlkPhos  71  -24      CAPILLARY BLOOD GLUCOSE  POCT Blood Glucose.: 262 mg/dL (22 Mar 2021 16:34)  POCT Blood Glucose.: 150 mg/dL (22 Mar 2021 12:45)  POCT Blood Glucose.: 147 mg/dL (22 Mar 2021 10:56)  POCT Blood Glucose.: 196 mg/dL (22 Mar 2021 07:40      Urinalysis Basic - ( 20 Mar 2021 20:54 )  Color: Yellow / Appearance: Clear / S.015 / pH: x  Gluc: x / Ketone: Negative  / Bili: Negative / Urobili: Negative   Blood: x / Protein: Negative / Nitrite: Negative   Leuk Esterase: Negative / RBC: x / WBC x   Sq Epi: x / Non Sq Epi: x / Bacteria: x        MEDICATIONS  (STANDING):    amoxicillin 1000 milliGRAM(s) Oral two times a day  ascorbic acid 2000 milliGRAM(s) Oral every 8 hours  aspirin  chewable 81 milliGRAM(s) Oral daily  atorvastatin 40 milliGRAM(s) Oral at bedtime  budesonide 160 MICROgram(s)/formoterol 4.5 MICROgram(s) Inhaler 2 Puff(s) Inhalation two times a day  carvedilol 6.25 milliGRAM(s) Oral every 12 hours  cholecalciferol 1000 Unit(s) Oral daily  dronedarone 400 milliGRAM(s) Oral two times a day  fluticasone propionate 50 MICROgram(s)/spray Nasal Spray 1 Spray(s) Both Nostrils every 12 hours  furosemide   Injectable 20 milliGRAM(s) IV Push daily  gabapentin 300 milliGRAM(s) Oral daily  heparin   Injectable 5000 Unit(s) SubCutaneous every 8 hours  influenza   Vaccine 0.5 milliLiter(s) IntraMuscular once  insulin glargine Injectable (LANTUS) 10 Unit(s) SubCutaneous at bedtime  insulin lispro (ADMELOG) corrective regimen sliding scale   SubCutaneous Before meals and at bedtime  levothyroxine 175 MICROGram(s) Oral daily  metroNIDAZOLE    Tablet 500 milliGRAM(s) Oral every 12 hours  montelukast 10 milliGRAM(s) Oral daily  oxybutynin 5 milliGRAM(s) Oral two times a day  pantoprazole    Tablet 40 milliGRAM(s) Oral two times a day  zinc sulfate 220 milliGRAM(s) Oral daily        RADIOLOGY & ADDITIONAL TESTS:    Surgical Pathology Report (21 @ 10:15)   Surgical Pathology Report:   ACCESSION No: [id number]   KWAME MOSQUERA 2   Surgical Final Report   Final Diagnosis   1. Ascending colon polyp; hot snare:   - Invasive colonic adenocarcinoma arising in association with   tubular adenoma. See comment.   - Fragments of tubular adenoma (the smallest polyps).   2. Hepatic flexure polyp; biopsy:   - Tubular adenoma.   3. Descending colon polyp; biopsy:   - Inflammatory polyps.   Verified by: Priscila Arana MD   (Electronic Signature)   Reported on: 21 11:11 EDT, [facility],     Surgical Pathology Report (21 @ 11:37)   Surgical Pathology Report: , ACCESSION No: [id number]   KWAME MOSQUERA 2   Surgical Final Report , Final Diagnosis   1. Gastric antrum, biopsy: Chronic active gastritis, diffuse,   with focal lymphoid aggregate; H. Pylori associated. (Special  stain for H. Pylori is positive)   2. Esophagus biopsy: Patchy acute esophagitis. Special stain for  fungi is negative.   Verified by: Mariam Perez M.D. Surgical Pathology Report (21 @ 11:37)     3/28/21 : Xray Knee 3 Views, Right (21 @ 14:20) >    IMPRESSION: Round mass anterior to the patella is noted. This is new since 2018.      3/20/21 : CT Angio Abdomen and Pelvis w/ IV Cont (21 @ 22:55) No CT evidence of acute gastrointestinal hemorrhage. Few scattered colonic diverticula.    3/20/21 : CT Angio Abdomen and Pelvis w/ IV Cont (21 @ 22:55) LOWER CHEST: Partially imaged pacemaker leads. Mitral annular calcification.    3/20/21 : Xray Chest 1 View- PORTABLE-Urgent (21 @ 16:38): There is left-sided defibrillator. The heart is normal in size. The lungs are clear.          MICROBIOLOGY DATA:    COVID-19 Drew Domain Antibody (21 @ 11:07)   COVID-19 Drew Domain Antibody Result: >250.00:    Culture - Blood (21 @ 21:50)   Specimen Source: .Blood Blood-Venous   Culture Results: No growth to date.     Culture - Blood (21 @ 21:50)   Specimen Source: .Blood Blood-Venous   Culture Results: No growth to date.     COVID-19 PCR . (21 @ 16:32)   COVID-19 PCR: NotDetec:     MRSA/MSSA PCR (20 @ 14:34)   MRSA PCR Result.: NotDetec:     FLU A B RSV Detection by PCR (20 @ 20:00)   Flu A Result: NotDetec           Patient is seen and examined at the bed side, is afebrile.  The Ortho recommendation  appreciated regarding  Right knee mass anterior to the patella.      REVIEW OF SYSTEMS: All other review systems are negative      ALLERGIES: No Known Allergies      Vital Signs Last 24 Hrs  T(C): 36.4 (29 Mar 2021 13:59), Max: 36.8 (28 Mar 2021 21:00)  T(F): 97.5 (29 Mar 2021 13:59), Max: 98.3 (28 Mar 2021 21:00)  HR: 67 (29 Mar 2021 13:59) (59 - 67)  BP: 107/43 (29 Mar 2021 13:59) (107/43 - 133/50)  BP(mean): --  RR: 18 (29 Mar 2021 13:59) (16 - 18)  SpO2: 100% (29 Mar 2021 13:59) (100% - 100%)      PHYSICAL EXAM:  GENERAL: Not in distress   CHEST/LUNG: Not using accessory muscles   HEART: s1 and s2 present  ABDOMEN:  Nontender and  Nondistended  EXTREMITIES: No pedal  edema  CNS: Awake and Alert      LABS:                        8.1    6.81  )-----------( 277      ( 29 Mar 2021 07:23 )             25.6                  7.9    8.47  )-----------( 269      ( 26 Mar 2021 15:57 )             25.0                           7.8    13.47 )-----------( 273      ( 25 Mar 2021 06:40 )             24.2       03    138  |  102  |  22<H>  ----------------------------<  79  3.7   |  28  |  1.31<H>    Ca    8.7      29 Mar 2021 07:23  Phos  3.4     03-  Mg     2.2         TPro  6.3  /  Alb  2.9<L>  /  TBili  0.2  /  DBili  x   /  AST  14  /  ALT  18  /  AlkPhos  61  -    135  |  102  |  14  ----------------------------<  128<H>  4.0   |  24  |  1.12    Ca    8.5      27 Mar 2021 06:36  Phos  2.6       Mg     2.2         TPro  7.0  /  Alb  3.1<L>  /  TBili  0.3  /  DBili  x   /  AST  19  /  ALT  23  /  AlkPhos  72      138  |  103  |  35<H>  ----------------------------<  151<H>  4.0   |  25  |  1.57<H>    Ca    8.4      24 Mar 2021 07:00  Phos  3.9       Mg     2.2         TPro  6.7  /  Alb  3.1<L>  /  TBili  0.3  /  DBili  x   /  AST  16  /  ALT  22  /  AlkPhos  71        CAPILLARY BLOOD GLUCOSE  POCT Blood Glucose.: 262 mg/dL (22 Mar 2021 16:34)  POCT Blood Glucose.: 150 mg/dL (22 Mar 2021 12:45)  POCT Blood Glucose.: 147 mg/dL (22 Mar 2021 10:56)  POCT Blood Glucose.: 196 mg/dL (22 Mar 2021 07:40      Urinalysis Basic - ( 20 Mar 2021 20:54 )  Color: Yellow / Appearance: Clear / S.015 / pH: x  Gluc: x / Ketone: Negative  / Bili: Negative / Urobili: Negative   Blood: x / Protein: Negative / Nitrite: Negative   Leuk Esterase: Negative / RBC: x / WBC x   Sq Epi: x / Non Sq Epi: x / Bacteria: x        MEDICATIONS  (STANDING):    amoxicillin 1000 milliGRAM(s) Oral two times a day  ascorbic acid 2000 milliGRAM(s) Oral every 8 hours  aspirin  chewable 81 milliGRAM(s) Oral daily  atorvastatin 40 milliGRAM(s) Oral at bedtime  budesonide 160 MICROgram(s)/formoterol 4.5 MICROgram(s) Inhaler 2 Puff(s) Inhalation two times a day  carvedilol 6.25 milliGRAM(s) Oral every 12 hours  cholecalciferol 1000 Unit(s) Oral daily  dronedarone 400 milliGRAM(s) Oral two times a day  fluticasone propionate 50 MICROgram(s)/spray Nasal Spray 1 Spray(s) Both Nostrils every 12 hours  furosemide   Injectable 20 milliGRAM(s) IV Push daily  gabapentin 300 milliGRAM(s) Oral daily  heparin   Injectable 5000 Unit(s) SubCutaneous every 8 hours  influenza   Vaccine 0.5 milliLiter(s) IntraMuscular once  insulin glargine Injectable (LANTUS) 10 Unit(s) SubCutaneous at bedtime  insulin lispro (ADMELOG) corrective regimen sliding scale   SubCutaneous Before meals and at bedtime  levothyroxine 175 MICROGram(s) Oral daily  metroNIDAZOLE    Tablet 500 milliGRAM(s) Oral every 12 hours  montelukast 10 milliGRAM(s) Oral daily  oxybutynin 5 milliGRAM(s) Oral two times a day  pantoprazole    Tablet 40 milliGRAM(s) Oral two times a day  zinc sulfate 220 milliGRAM(s) Oral daily        RADIOLOGY & ADDITIONAL TESTS:    < from: 12 Lead ECG (21 @ 16:02) >  Ventricular Rate 73 BPM    Atrial Rate 73 BPM    P-R Interval 188 ms    QRS Duration 126 ms    Q-T Interval 414 ms    QTC Calculation(Bazett) 456 ms    P Axis 59 degrees    R Axis -57 degrees    T Axis 56 degrees    < end of copied text >    Surgical Pathology Report (21 @ 10:15)   Surgical Pathology Report:   ACCESSION No: [id number]   KWAME MOSQUERA 2   Surgical Final Report   Final Diagnosis   1. Ascending colon polyp; hot snare:   - Invasive colonic adenocarcinoma arising in association with   tubular adenoma. See comment.   - Fragments of tubular adenoma (the smallest polyps).   2. Hepatic flexure polyp; biopsy:   - Tubular adenoma.   3. Descending colon polyp; biopsy:   - Inflammatory polyps.   Verified by: Priscila Arana MD   (Electronic Signature)   Reported on: 21 11:11 EDT, [facility],     Surgical Pathology Report (21 @ 11:37)   Surgical Pathology Report: , ACCESSION No: [id number]   KWAME MOSQUERA 2   Surgical Final Report , Final Diagnosis   1. Gastric antrum, biopsy: Chronic active gastritis, diffuse,   with focal lymphoid aggregate; H. Pylori associated. (Special  stain for H. Pylori is positive)   2. Esophagus biopsy: Patchy acute esophagitis. Special stain for  fungi is negative.   Verified by: Mariam Perez M.D. Surgical Pathology Report (21 @ 11:37)     3/28/21 : Xray Knee 3 Views, Right (21 @ 14:20) >    IMPRESSION: Round mass anterior to the patella is noted. This is new since 2018.      3/20/21 : CT Angio Abdomen and Pelvis w/ IV Cont (21 @ 22:55) No CT evidence of acute gastrointestinal hemorrhage. Few scattered colonic diverticula.    3/20/21 : CT Angio Abdomen and Pelvis w/ IV Cont (21 @ 22:55) LOWER CHEST: Partially imaged pacemaker leads. Mitral annular calcification.    3/20/21 : Xray Chest 1 View- PORTABLE-Urgent (21 @ 16:38): There is left-sided defibrillator. The heart is normal in size. The lungs are clear.          MICROBIOLOGY DATA:    COVID-19 Drew Domain Antibody (21 @ 11:07)   COVID-19 Drew Domain Antibody Result: >250.00:    Culture - Blood (21 @ 21:50)   Specimen Source: .Blood Blood-Venous   Culture Results: No growth to date.     Culture - Blood (21 @ 21:50)   Specimen Source: .Blood Blood-Venous   Culture Results: No growth to date.     COVID-19 PCR . (21 @ 16:32)   COVID-19 PCR: NotDetec:     MRSA/MSSA PCR (20 @ 14:34)   MRSA PCR Result.: NotDetec:     FLU A B RSV Detection by PCR (20 @ 20:00)   Flu A Result: NotDetec

## 2021-03-29 NOTE — PROGRESS NOTE ADULT - SUBJECTIVE AND OBJECTIVE BOX
KWAME MOSQUERA  MR# [id number]  81yFemale        Patient is a 81y old  Female who presents with a chief complaint of symptomatic anemia (29 Mar 2021 12:07)      INTERVAL HPI/OVERNIGHT EVENTS:  Patient seen and examined at bedside. No notations of chest pain, palpitation, SOB, orthopnea, nausea, vomiting or abdominal pain.    ALLERGIES  No Known Allergies      MEDICATIONS  amoxicillin 1000 milliGRAM(s) Oral two times a day  ascorbic acid 2000 milliGRAM(s) Oral every 8 hours  aspirin  chewable 81 milliGRAM(s) Oral daily  atorvastatin 40 milliGRAM(s) Oral at bedtime  budesonide 160 MICROgram(s)/formoterol 4.5 MICROgram(s) Inhaler 2 Puff(s) Inhalation two times a day  carvedilol 6.25 milliGRAM(s) Oral every 12 hours  cholecalciferol 1000 Unit(s) Oral daily  dronedarone 400 milliGRAM(s) Oral two times a day  fluticasone propionate 50 MICROgram(s)/spray Nasal Spray 1 Spray(s) Both Nostrils every 12 hours  furosemide   Injectable 20 milliGRAM(s) IV Push daily  gabapentin 300 milliGRAM(s) Oral daily  heparin   Injectable 5000 Unit(s) SubCutaneous every 8 hours  influenza   Vaccine 0.5 milliLiter(s) IntraMuscular once  insulin glargine Injectable (LANTUS) 10 Unit(s) SubCutaneous at bedtime  insulin lispro (ADMELOG) corrective regimen sliding scale   SubCutaneous Before meals and at bedtime  levothyroxine 175 MICROGram(s) Oral daily  metroNIDAZOLE    Tablet 500 milliGRAM(s) Oral every 12 hours  montelukast 10 milliGRAM(s) Oral daily  oxybutynin 5 milliGRAM(s) Oral two times a day  pantoprazole    Tablet 40 milliGRAM(s) Oral two times a day  zinc sulfate 220 milliGRAM(s) Oral daily              REVIEW OF SYSTEMS:  CONSTITUTIONAL: No fever, weight loss, or fatigue  EYES: No eye pain, visual disturbances, or discharge  ENT:  No difficulty hearing, tinnitus, vertigo; No sinus or throat pain  NECK: No pain or stiffness  RESPIRATORY: No cough, wheezing, chills or hemoptysis; No Shortness of Breath  CARDIOVASCULAR: No chest pain, palpitations, passing out, dizziness, or leg swelling  GASTROINTESTINAL: No abdominal or epigastric pain. No nausea, vomiting, or hematemesis; No diarrhea or constipation. No melena or hematochezia.  GENITOURINARY: No dysuria, frequency, hematuria, or incontinence  NEUROLOGICAL: No headaches, memory loss, loss of strength, numbness, or tremors  SKIN: No itching, burning, rashes, or lesions   LYMPH Nodes: No enlarged glands  ENDOCRINE: No heat or cold intolerance; No hair loss  MUSCULOSKELETAL: No joint pain or swelling; No muscle, back, or extremity pain  PSYCHIATRIC: No depression, anxiety, mood swings, or difficulty sleeping  HEME/LYMPH: No easy bruising, or bleeding gums  ALLERGY AND IMMUNOLOGIC: No hives or eczema	    [ ] All others negative	  [ ] Unable to obtain      T(C): 36.8 (03-29-21 @ 05:06), Max: 36.9 (03-28-21 @ 14:04)  T(F): 98.2 (03-29-21 @ 05:06), Max: 98.4 (03-28-21 @ 14:04)  HR: 60 (03-29-21 @ 05:06) (59 - 63)  BP: 133/50 (03-29-21 @ 05:06) (130/63 - 137/44)  RR: 18 (03-29-21 @ 05:06) (16 - 18)  SpO2: 100% (03-29-21 @ 05:06) (97% - 100%)  Wt(kg): --    I&O's Summary        PHYSICAL EXAM:  A X O x  HEAD:  Atraumatic, Normocephalic  EYES: EOMI, PERRLA, conjunctiva and sclera clear  NECK: Supple, No JVD, Normal thyroid  Resp: CTAB, No crackles, wheezing,   CVS: Regular rate and rhythm; No discernable murmurs, rubs, or gallops  ABD: Soft, Nontender, Nondistended; Bowel sounds present  EXTREMITIES:  2+ Peripheral Pulses, No edema  LYMPH: No dicernable lymphadenopathy noted  GENERAL: NAD, well-groomed, well-developed      LABS:                        8.1    6.81  )-----------( 277      ( 29 Mar 2021 07:23 )             25.6     03-29    138  |  102  |  22<H>  ----------------------------<  79  3.7   |  28  |  1.31<H>    Ca    8.7      29 Mar 2021 07:23  Phos  3.4     03-29  Mg     2.2     03-29    TPro  6.3  /  Alb  2.9<L>  /  TBili  0.2  /  DBili  x   /  AST  14  /  ALT  18  /  AlkPhos  61  03-29        CAPILLARY BLOOD GLUCOSE      POCT Blood Glucose.: 123 mg/dL (29 Mar 2021 11:20)  POCT Blood Glucose.: 116 mg/dL (29 Mar 2021 07:50)  POCT Blood Glucose.: 209 mg/dL (28 Mar 2021 21:15)  POCT Blood Glucose.: 210 mg/dL (28 Mar 2021 16:31)      Troponins:  ProBNP:  Lipid Profile:   HgA1c:  TSH:           RADIOLOGY & ADDITIONAL TESTS:    Imaging Personally Reviewed:  [ ] YES  [ ] NO      Consultant(s) Notes Reviewed:  [x ] YES  [ ] NO    Care Discussed with Consultants/Other Providers [ x] YES  [ ] NO          PAST MEDICAL & SURGICAL HISTORY:  Obesity    Pacemaker    Atrial fibrillation    Hypothyroidism    Venous stasis    IBS (irritable bowel syndrome)    CHF (congestive heart failure), NYHA class I    HLD (Hyperlipidemia)    HTN (Hypertension)    Diabetes    Myocardial Infarction    CAD (Coronary Atherosclerotic Disease)    Asthma    S/P coronary angiogram          Anemia    H/o or current diagnosis of HF- Contraindication to ACEI/ARBs    H/o or current diagnosis of HF- ACEI/ARB contraindication unknown    H/o or current diagnosis of HF- Contraindication to ACEI/ARBs    H/o or current diagnosis of HF- ACEI/ARB contraindication unknown    H/o or current diagnosis of HF- Contraindication to ACEI/ARBs    H/o or current diagnosis of HF- ACEI/ARB contraindication unknown    Family history of heart disease    Handoff    MEWS Score    Obesity    Pacemaker    Atrial fibrillation    Hypothyroidism    Venous stasis    IBS (irritable bowel syndrome)    CHF (congestive heart failure), NYHA class I    HLD (Hyperlipidemia)    HTN (Hypertension)    Diabetes    Myocardial Infarction    CAD (Coronary Atherosclerotic Disease)    Asthma    Helicobacter pylori gastritis    Symptomatic anemia    Hypothyroidism    Prophylactic measure    JOSE DANIEL (obstructive sleep apnea)    Asthma    HTN (Hypertension)    Diabetes    CHF (congestive heart failure), NYHA class I    Atrial fibrillation    Symptomatic anemia    S/P coronary angiogram    No significant past surgical history    A) WEAKNESS    90+    Colon cancer    HTN (Hypertension)    Diabetes    JOSE DANIEL (obstructive sleep apnea)    CHF (congestive heart failure), NYHA class I    Hypothyroidism    Encounter for colonoscopy due to history of adenomatous colonic polyps    Paroxysmal atrial fibrillation    Helicobacter pylori gastritis    Asthma    CAD (coronary atherosclerotic disease)    [email or web address]

## 2021-03-29 NOTE — PROGRESS NOTE ADULT - SUBJECTIVE AND OBJECTIVE BOX
feel ok  no pain  there is pain at right knee after a fall 2 years ago    MEDICATIONS  (STANDING):  amoxicillin 1000 milliGRAM(s) Oral two times a day  ascorbic acid 2000 milliGRAM(s) Oral every 8 hours  aspirin  chewable 81 milliGRAM(s) Oral daily  atorvastatin 40 milliGRAM(s) Oral at bedtime  budesonide 160 MICROgram(s)/formoterol 4.5 MICROgram(s) Inhaler 2 Puff(s) Inhalation two times a day  carvedilol 6.25 milliGRAM(s) Oral every 12 hours  cholecalciferol 1000 Unit(s) Oral daily  dronedarone 400 milliGRAM(s) Oral two times a day  fluticasone propionate 50 MICROgram(s)/spray Nasal Spray 1 Spray(s) Both Nostrils every 12 hours  furosemide   Injectable 20 milliGRAM(s) IV Push daily  gabapentin 300 milliGRAM(s) Oral daily  heparin   Injectable 5000 Unit(s) SubCutaneous every 8 hours  influenza   Vaccine 0.5 milliLiter(s) IntraMuscular once  insulin glargine Injectable (LANTUS) 10 Unit(s) SubCutaneous at bedtime  insulin lispro (ADMELOG) corrective regimen sliding scale   SubCutaneous Before meals and at bedtime  levothyroxine 175 MICROGram(s) Oral daily  metroNIDAZOLE    Tablet 500 milliGRAM(s) Oral every 12 hours  montelukast 10 milliGRAM(s) Oral daily  oxybutynin 5 milliGRAM(s) Oral two times a day  pantoprazole    Tablet 40 milliGRAM(s) Oral two times a day  zinc sulfate 220 milliGRAM(s) Oral daily    MEDICATIONS  (PRN):      Allergies    No Known Allergies    Intolerances        Vital Signs Last 24 Hrs  T(C): 36.8 (29 Mar 2021 05:06), Max: 36.9 (28 Mar 2021 14:04)  T(F): 98.2 (29 Mar 2021 05:06), Max: 98.4 (28 Mar 2021 14:04)  HR: 60 (29 Mar 2021 05:06) (59 - 63)  BP: 133/50 (29 Mar 2021 05:06) (130/63 - 137/44)  BP(mean): --  RR: 18 (29 Mar 2021 05:06) (16 - 18)  SpO2: 100% (29 Mar 2021 05:06) (97% - 100%)    PHYSICAL EXAM  General: adult in NAD  HEENT: clear oropharynx, anicteric sclera, pink conjunctiva  Neck: supple  CV: normal S1/S2 with no murmur rubs or gallops  Lungs: positive air movement b/l ant lungs,clear to auscultation, no wheezes, no rales  Abdomen: soft non-tender non-distended, no hepatosplenomegaly  Ext: no clubbing cyanosis or edema  Skin: no rashes and no petechiae  Neuro: alert and oriented X 4, no focal deficits  LABS:                          8.1    6.81  )-----------( 277      ( 29 Mar 2021 07:23 )             25.6         Mean Cell Volume : 83.1 fl  Mean Cell Hemoglobin : 26.3 pg  Mean Cell Hemoglobin Concentration : 31.6 gm/dL  Auto Neutrophil # : x  Auto Lymphocyte # : x  Auto Monocyte # : x  Auto Eosinophil # : x  Auto Basophil # : x  Auto Neutrophil % : x  Auto Lymphocyte % : x  Auto Monocyte % : x  Auto Eosinophil % : x  Auto Basophil % : x    Serial CBC  Hematocrit 25.6  Hemoglobin 8.1  Plat 277  RBC 3.08  WBC 6.81  Serial CBC  Hematocrit 28.1  Hemoglobin 9.2  Plat 297  RBC 3.46  WBC 8.54  Serial CBC  Hematocrit 26.1  Hemoglobin 8.4  Plat 275  RBC 3.20  WBC 7.12  Serial CBC  Hematocrit 25.0  Hemoglobin 7.9  Plat 269  RBC 3.06  WBC 8.47    03-29    138  |  102  |  22<H>  ----------------------------<  79  3.7   |  28  |  1.31<H>    Ca    8.7      29 Mar 2021 07:23  Phos  3.4     03-29  Mg     2.2     03-29    TPro  6.3  /  Alb  2.9<L>  /  TBili  0.2  /  DBili  x   /  AST  14  /  ALT  18  /  AlkPhos  61  03-29                    BLOOD SMEAR INTERPRETATION:       RADIOLOGY & ADDITIONAL STUDIES:

## 2021-03-29 NOTE — PROGRESS NOTE ADULT - ATTENDING COMMENTS
PT deemed with colonic CA. In goals of care d/w HCP; if condition is treatable and not end-stage would consider treatment (aside from chemo). Sx consult with Dr Granado made, even for a possible partial colectomy in a palliative setting (to relieve potential obstruction).    3/29/21 - Pt has been agreeable to the surgical option of partial colonic resection  -will be medically optimized and cleared for sx  -cont on amoxi / metronidazole  & PPI for H.Pylori rx  -GI/DVT PPX

## 2021-03-29 NOTE — PROGRESS NOTE ADULT - ASSESSMENT
79F Green[place]ic-speaking from home with son, ambulates with walker, PMH HFpEF, CAD (s/p stents), chronic venous stasis, DM, HTN and Afib (Xarelto, s/p PPM), presented 3/20 with x2 weeks increasing fatigue, SOB, and paleness, with prolonged history of dark bowel movements s/p possible transverse polyp colonoscopy 2020, admitted for workup and management of symptomatic anemia.

## 2021-03-29 NOTE — PROGRESS NOTE ADULT - SUBJECTIVE AND OBJECTIVE BOX
PGY-1 Progress Note discussed with attending    PAGER #: [[phone]] TILL 5:00 PM  PLEASE CONTACT ON CALL TEAM:  - On Call Team (Please refer to Ana) FROM 5:00 PM - 8:30PM  - Nightfloat Team FROM 8:30 -7:30 AM    CHIEF COMPLAINT & BRIEF HOSPITAL COURSE:  79F Citizen of [place]-speaking from home with son, ambulates with walker, PMH HFpEF, CAD (s/p stents), chronic venous stasis, DM, HTN and Afib (Xarelto, s/p PPM), chronic bronchitis with asthmatic component (home O2 prn), JOSE DANIEL (noncompliant with nocturnal CPAP) presented 3/20 with x2 weeks increasing fatigue, SOB, and paleness, with prolonged history of dark bowel movements s/p possible transverse polyp colonoscopy 2020, no further fu. In ED, VS notable for /77 and SpO2 100% 4L NC. Labs notable for Hb 7.7 (baseline ~11), Fe 15, TIBC 457, 3% saturation. Trop, COVID, UA negative. EKG with BFB. CXR unremarkable.  CT a/p unremarkable for signs of bleeding. S/p x1 unit pRBC in ED. Admitted for workup and management of symptomatic anemia. Colonoscopy +invasive colon cancer 3/24 (resulted 3/26).    INTERVAL HPI/OVERNIGHT EVENTS: NAEON. SBPs 150s overnight, improved to 130s this am. VS otherwise wnl. Repeat COVID negative, first considered false positive given +nucleocapsid and spike Ab.     MEDICATIONS  (STANDING):  amoxicillin 1000 milliGRAM(s) Oral two times a day  ascorbic acid 2000 milliGRAM(s) Oral every 8 hours  aspirin  chewable 81 milliGRAM(s) Oral daily  atorvastatin 40 milliGRAM(s) Oral at bedtime  budesonide 160 MICROgram(s)/formoterol 4.5 MICROgram(s) Inhaler 2 Puff(s) Inhalation two times a day  carvedilol 6.25 milliGRAM(s) Oral every 12 hours  cholecalciferol 1000 Unit(s) Oral daily  dronedarone 400 milliGRAM(s) Oral two times a day  fluticasone propionate 50 MICROgram(s)/spray Nasal Spray 1 Spray(s) Both Nostrils every 12 hours  furosemide   Injectable 20 milliGRAM(s) IV Push daily  gabapentin 300 milliGRAM(s) Oral daily  heparin   Injectable 5000 Unit(s) SubCutaneous every 8 hours  influenza   Vaccine 0.5 milliLiter(s) IntraMuscular once  insulin glargine Injectable (LANTUS) 10 Unit(s) SubCutaneous at bedtime  insulin lispro (ADMELOG) corrective regimen sliding scale   SubCutaneous Before meals and at bedtime  levothyroxine 175 MICROGram(s) Oral daily  metroNIDAZOLE    Tablet 500 milliGRAM(s) Oral every 12 hours  montelukast 10 milliGRAM(s) Oral daily  oxybutynin 5 milliGRAM(s) Oral two times a day  pantoprazole    Tablet 40 milliGRAM(s) Oral two times a day  zinc sulfate 220 milliGRAM(s) Oral daily    MEDICATIONS  (PRN):        REVIEW OF SYSTEMS:  CONSTITUTIONAL: No fever, weight loss, or fatigue  RESPIRATORY: No cough, wheezing, chills or hemoptysis; No shortness of breath  CARDIOVASCULAR: No chest pain, palpitations, dizziness, or leg swelling  GASTROINTESTINAL: No abdominal pain. No nausea, vomiting, or hematemesis; No diarrhea or constipation. No melena or hematochezia.  GENITOURINARY: No dysuria or hematuria, urinary frequency  NEUROLOGICAL: No headaches, memory loss, loss of strength, numbness, or tremors  SKIN: No itching, burning, rashes, or lesions     Vital Signs Last 24 Hrs  T(C): 36.8 (29 Mar 2021 05:06), Max: 36.9 (28 Mar 2021 14:04)  T(F): 98.2 (29 Mar 2021 05:06), Max: 98.4 (28 Mar 2021 14:04)  HR: 60 (29 Mar 2021 05:06) (59 - 63)  BP: 133/50 (29 Mar 2021 05:06) (130/63 - 137/44)  BP(mean): --  RR: 18 (29 Mar 2021 05:06) (16 - 18)  SpO2: 100% (29 Mar 2021 05:06) (97% - 100%)    PHYSICAL EXAMINATION:  GENERAL: NAD, well built  HEAD:  Atraumatic, Normocephalic  EYES:  conjunctiva and sclera clear  NECK: Supple, No JVD, Normal thyroid  CHEST/LUNG: Clear to auscultation. Clear to percussion bilaterally; No rales, rhonchi, wheezing, or rubs  HEART: Regular rate and rhythm; No murmurs, rubs, or gallops  ABDOMEN: Soft, Nontender, Nondistended; Bowel sounds present  NERVOUS SYSTEM: alert and oriented x3  EXTREMITIES:  2+ Peripheral Pulses, No clubbing, cyanosis, or edema  SKIN: warm dry                          8.1    6.81  )-----------( 277      ( 29 Mar 2021 07:23 )             25.6     03-29    138  |  102  |  22<H>  ----------------------------<  79  3.7   |  28  |  1.31<H>    Ca    8.7      29 Mar 2021 07:23  Phos  3.4     03-29  Mg     2.2     03-29    TPro  6.3  /  Alb  2.9<L>  /  TBili  0.2  /  DBili  x   /  AST  14  /  ALT  18  /  AlkPhos  61  03-29    LIVER FUNCTIONS - ( 29 Mar 2021 07:23 )  Alb: 2.9 g/dL / Pro: 6.3 g/dL / ALK PHOS: 61 U/L / ALT: 18 U/L DA / AST: 14 U/L / GGT: x                   CAPILLARY BLOOD GLUCOSE      RADIOLOGY & ADDITIONAL TESTS:                   PGY-1 Progress Note discussed with attending    PAGER #: [[phone]] TILL 5:00 PM  PLEASE CONTACT ON CALL TEAM:  - On Call Team (Please refer to Ana) FROM 5:00 PM - 8:30PM  - Nightfloat Team FROM 8:30 -7:30 AM    CHIEF COMPLAINT & BRIEF HOSPITAL COURSE:  79F [place]ian-speaking from home with son, ambulates with walker, PMH HFpEF, CAD (s/p stents), chronic venous stasis, DM, HTN and Afib (Xarelto, s/p PPM), chronic bronchitis with asthmatic component (home O2 prn), JOSE DANIEL (noncompliant with nocturnal CPAP) presented 3/20 with x2 weeks increasing fatigue, SOB, and paleness, with prolonged history of dark bowel movements s/p possible transverse polyp colonoscopy 2020, no further fu. In ED, VS notable for /77 and SpO2 100% 4L NC. Labs notable for Hb 7.7 (baseline ~11), Fe 15, TIBC 457, 3% saturation. Trop, COVID, UA negative. EKG with BFB. CXR unremarkable.  CT a/p unremarkable for signs of bleeding. S/p x1 unit pRBC in ED. Admitted for workup and management of symptomatic anemia. Colonoscopy +invasive colon cancer 3/24 (resulted 3/26).    INTERVAL HPI/OVERNIGHT EVENTS: NAEON. SBPs 150s overnight, improved to 130s this am. VS otherwise wnl. Repeat COVID negative, first considered false positive given +nucleocapsid and spike Ab. Restarted H pylori meds except clarithromycin, given its interaction with Multaq and statin. Right knee hematoma evaluated with US, ortho recs outpatient follow up. NST negative for ischemia, awaiting cards risk stratification for surgery. Optimizing medically for partial colonic resection with Dr. Murrell.    MEDICATIONS  (STANDING):  amoxicillin 1000 milliGRAM(s) Oral two times a day  ascorbic acid 2000 milliGRAM(s) Oral every 8 hours  aspirin  chewable 81 milliGRAM(s) Oral daily  atorvastatin 40 milliGRAM(s) Oral at bedtime  budesonide 160 MICROgram(s)/formoterol 4.5 MICROgram(s) Inhaler 2 Puff(s) Inhalation two times a day  carvedilol 6.25 milliGRAM(s) Oral every 12 hours  cholecalciferol 1000 Unit(s) Oral daily  dronedarone 400 milliGRAM(s) Oral two times a day  fluticasone propionate 50 MICROgram(s)/spray Nasal Spray 1 Spray(s) Both Nostrils every 12 hours  furosemide   Injectable 20 milliGRAM(s) IV Push daily  gabapentin 300 milliGRAM(s) Oral daily  heparin   Injectable 5000 Unit(s) SubCutaneous every 8 hours  influenza   Vaccine 0.5 milliLiter(s) IntraMuscular once  insulin glargine Injectable (LANTUS) 10 Unit(s) SubCutaneous at bedtime  insulin lispro (ADMELOG) corrective regimen sliding scale   SubCutaneous Before meals and at bedtime  levothyroxine 175 MICROGram(s) Oral daily  metroNIDAZOLE    Tablet 500 milliGRAM(s) Oral every 12 hours  montelukast 10 milliGRAM(s) Oral daily  oxybutynin 5 milliGRAM(s) Oral two times a day  pantoprazole    Tablet 40 milliGRAM(s) Oral two times a day  zinc sulfate 220 milliGRAM(s) Oral daily    MEDICATIONS  (PRN):    REVIEW OF SYSTEMS:  CONSTITUTIONAL: No fever or fatigue  RESPIRATORY: No cough; No shortness of breath  CARDIOVASCULAR: No chest pain, no dizziness  GASTROINTESTINAL: mild abdominal pain after taking medication. No nausea, vomiting, diarrhea, constipation. +dark stools  GENITOURINARY: No dysuria or hematuria  NEUROLOGICAL: No headache  SKIN: No itching or rashes  EXT: b/l knee pain, chronic b/l LBP, chronic LE pain R>L  all other systems reviewed and are negative      Vital Signs Last 24 Hrs  T(C): 36.8 (29 Mar 2021 05:06), Max: 36.9 (28 Mar 2021 14:04)  T(F): 98.2 (29 Mar 2021 05:06), Max: 98.4 (28 Mar 2021 14:04)  HR: 60 (29 Mar 2021 05:06) (59 - 63)  BP: 133/50 (29 Mar 2021 05:06) (130/63 - 137/44)  BP(mean): --  RR: 18 (29 Mar 2021 05:06) (16 - 18)  SpO2: 100% (29 Mar 2021 05:06) (97% - 100%)    PHYSICAL EXAMINATION:  GENERAL: NAD, obese, elderly woman  HEAD:  Atraumatic, Normocephalic  EYES: periorbital skin pink, eomi, perrl  NECK: Supple  CHEST/LUNG: no increased WOB, NC, no cough noted  HEART: RRR  ABDOMEN: Soft, Nontender, obese, Bowel sounds present  NERVOUS SYSTEM: alert and oriented x3  EXTREMITIES:  2+ Peripheral Pulses, No edema, +hematoma right knee, NTTP; b/l LE warm, pulses intact  SKIN: warm dry                          8.1    6.81  )-----------( 277      ( 29 Mar 2021 07:23 )             25.6     03-29    138  |  102  |  22<H>  ----------------------------<  79  3.7   |  28  |  1.31<H>    Ca    8.7      29 Mar 2021 07:23  Phos  3.4     03-29  Mg     2.2     03-29    TPro  6.3  /  Alb  2.9<L>  /  TBili  0.2  /  DBili  x   /  AST  14  /  ALT  18  /  AlkPhos  61  03-29    LIVER FUNCTIONS - ( 29 Mar 2021 07:23 )  Alb: 2.9 g/dL / Pro: 6.3 g/dL / ALK PHOS: 61 U/L / ALT: 18 U/L DA / AST: 14 U/L / GGT: x                   CAPILLARY BLOOD GLUCOSE      RADIOLOGY & ADDITIONAL TESTS:

## 2021-03-29 NOTE — CONSULT NOTE ADULT - SUBJECTIVE AND OBJECTIVE BOX
Pt Name: KWAME MOSQUERA  MRN: [id number]    ORTHOPEDIC CONSULT    Orthopedic diagnosis:    81yFemaleHPI:  79 years old Serbian-speaking female from home, ambulates with walker, lives with son, with PMHx of HFpEF, CAD (s/p stents), chronic venous stasis, DM, HTN and Afib (on Xarelto, s/p PPM), chronic bronchitis with asthmatic component ( on Oxygen PRN at home ), JOSE DANIEL ( supposed to be on nocturnal CPAP , but non compliant) presents to the ED with chief complaint of hypotension ,  SOB and  fatigue for almost 2 weeks. Per daughter and granddaughter patient has been complaining of increasing fatigue and sob for last few weeks, seemed to be pale , patient endorses dark loose bowel movement for quite a while.  pt had virtual  colonoscopy 1 year ago and the result was questionable. She never followed up. Per daughter pt refused all kinds of meat including chicken, meat , however denies any dysphasia. Patient  also endorses decreased appetite.  patient has chronic diarrhea. Patient Denies nausea, vomiting, abdominal pain, SOB, chest pain, CHAMBERLAIN, palpitations, dizziness, headache, cough, wheezing, joint pain or swelling, fever, chills.      GOC: Discussed with daughter,  mentioned she want stobe DNI , on discussion with patient, patient was so distressed about urge incontinency and was complaining of pain and IV infiltrations, so GOC discussion deferred to primary team.    (20 Mar 2021 19:53)    Pt was seen at bedside. Serbian translation via phone with Daughter on pt's phone. Patient has history of right knee mass since a fall 2 years ago. twice the knee mass was aspirated and fluid was removed and the mass returned. Patient states that there is no pain at the site of the mass, but more pain in the thigh with swelling and some pain in the front of the knee. Pt denies Chest pain, SOB, dyspnea, paresthesias, N/V/D, abdominal pain, syncope, or pain anywhere else. Patient states it causes discomfort and the pain comes from the low back.         AMBULATION: Baseline Ambulation  [     ] independent   [     ] With Cane   [     ] With Walker   [     ]  Bedbound   [     ] Pivot transfers to Wheelchair only    4M's age-friendly:  - Medication: age-appropriate  - Mentation:  - Mobility:   - Matters-Most/Goals of Care:    PAST MEDICAL & SURGICAL HISTORY:  Obesity    Pacemaker    Atrial fibrillation    Hypothyroidism    Venous stasis    IBS (irritable bowel syndrome)    CHF (congestive heart failure), NYHA class I    HLD (Hyperlipidemia)    HTN (Hypertension)    Diabetes    Myocardial Infarction    CAD (Coronary Atherosclerotic Disease)    Asthma    S/P coronary angiogram        ALLERGIES: No Known Allergies      MEDICATIONS: amoxicillin 1000 milliGRAM(s) Oral two times a day  ascorbic acid 2000 milliGRAM(s) Oral every 8 hours  aspirin  chewable 81 milliGRAM(s) Oral daily  atorvastatin 40 milliGRAM(s) Oral at bedtime  budesonide 160 MICROgram(s)/formoterol 4.5 MICROgram(s) Inhaler 2 Puff(s) Inhalation two times a day  carvedilol 6.25 milliGRAM(s) Oral every 12 hours  cholecalciferol 1000 Unit(s) Oral daily  dronedarone 400 milliGRAM(s) Oral two times a day  fluticasone propionate 50 MICROgram(s)/spray Nasal Spray 1 Spray(s) Both Nostrils every 12 hours  furosemide   Injectable 20 milliGRAM(s) IV Push daily  gabapentin 300 milliGRAM(s) Oral daily  heparin   Injectable 5000 Unit(s) SubCutaneous every 8 hours  influenza   Vaccine 0.5 milliLiter(s) IntraMuscular once  insulin glargine Injectable (LANTUS) 10 Unit(s) SubCutaneous at bedtime  insulin lispro (ADMELOG) corrective regimen sliding scale   SubCutaneous Before meals and at bedtime  levothyroxine 175 MICROGram(s) Oral daily  metroNIDAZOLE    Tablet 500 milliGRAM(s) Oral every 12 hours  montelukast 10 milliGRAM(s) Oral daily  oxybutynin 5 milliGRAM(s) Oral two times a day  pantoprazole    Tablet 40 milliGRAM(s) Oral two times a day  zinc sulfate 220 milliGRAM(s) Oral daily      PHYSICAL EXAM:    Vital Signs Last 24 Hrs  T(C): 36.8 (29 Mar 2021 05:06), Max: 36.9 (28 Mar 2021 14:04)  T(F): 98.2 (29 Mar 2021 05:06), Max: 98.4 (28 Mar 2021 14:04)  HR: 60 (29 Mar 2021 05:06) (59 - 63)  BP: 133/50 (29 Mar 2021 05:06) (130/63 - 137/44)  BP(mean): --  RR: 18 (29 Mar 2021 05:06) (16 - 18)  SpO2: 100% (29 Mar 2021 05:06) (97% - 100%)    Gen: well developed, well nourished, comfortable  MSK:  Skin pink, warm. No open lesions.  no ct  calves soft  DP/PT 2+, brisk b/l  nvi silt  5/5 strength ehl/ta/gastroc b/l.  RLE: swelling of right thigh relative to contralateral side. 3cm mobile, painless, fluid filled mass noted on the anterosuperior aspect of the knee. no tenderness along joint lines. no tenderness along the quad, vastus medialis/lateralis. Mild pain to palpation along the tibial tubercle. able to SLR. flexion and extension without issues. MS is 5/5.       LABS:                        8.1    6.81  )-----------( 277      ( 29 Mar 2021 07:23 )             25.6     03-29    138  |  102  |  22<H>  ----------------------------<  79  3.7   |  28  |  1.31<H>    Ca    8.7      29 Mar 2021 07:23  Phos  3.4     03-29  Mg     2.2     03-29    TPro  6.3  /  Alb  2.9<L>  /  TBili  0.2  /  DBili  x   /  AST  14  /  ALT  18  /  AlkPhos  61  03-29        RADIOLOGY:     < from: Xray Knee 3 Views, Right (03.28.21 @ 14:20) >    EXAM:  KNEE AP.LAT&OBL.RIGHT                            PROCEDURE DATE:  03/28/2021          INTERPRETATION:  Right knee. Patient has local pain. 3 views.    No effusion. Mild degeneration concentrated around the articular patella.    On the lateral view there is a well-circumscribed round density in the subcutaneous tissues anterior to the patella measuring approximately 3.2 cm.    IMPRESSION: Round mass anterior to the patella is noted. This is new since December 22, 2018.            GUERRERO HUSSEIN M.D., ATTENDING RADIOLOGIST  This document has been electronically signed. Mar 28 2021  3:48PM    < end of copied text >      A/P:   81y Female with:  Right knee superficial cystic mass and possible medial tibial stress syndrome    #  -  Recommendation: [ x ] Conservative treatment     -  All the patient's questions were answered.  -  due to her current pressing oncological issue, pt is being optimized for surgery. as per nephro, should not get NSAIDs, and steroids (po or intraarticular) are not recommended at this time as well. As such, we can only continue with conservative management and pain control as per pain management.   -  patient can follow up outpatient for removal of cystic mass once she has recovered from her upcoming procedure.  -  rest is recommended for her MTSS, no surgical treatment required.   -  defer to medicine,   -  Pain control  -  DVT prophylaxis  -  Daily PT  -  Case d/w Dr. Honeycutt  -  Pt is orthopedically stable for discharge.

## 2021-03-29 NOTE — PROGRESS NOTE ADULT - SUBJECTIVE AND OBJECTIVE BOX
[   ] ICU                                          [   ] CCU                                      [ X  ] Medical Floor    Patient is a 81 year old female with Gastrointestinal bleeding and symptomatic anemia. GI consulted to evaluate.        79 years old female from home, ambulates with walker, lives with son, with past medical history significant for CAD (s/p stents), CHF, chronic venous stasis, DM, HTN, Afib (on Xarelto, s/p PPM), chronic bronchitis with asthmatic component ( on Oxygen PRN at home ), and osteoarthritis presented to the emergency room with 2 weeks history of worsening SOB, Fatigue, associated with a few episodes of black stool.    pt had virtual  colonoscopy 1 year ago and the result was questionable. She never followed up.  Patient also c/o poor appetite with chronic diarrhea but denies abdominal pain, nausea, vomiting, hematemesis, hematochezia, melena, fever, chills, chest pain, SOB, cough, hematuria, dysuria or diarrhea.       Today patient appears comfortable with no new complaint. No abdominal pain, N/V, hematemesis, hematochezia, melena, fever, chills, chest pain, SOB, cough or diarrhea reported.      PAIN MANAGEMENT:  Pain Scale:                 0/10  Pain Location:        Prior Colonoscopy:  No prior colonoscopy      PAST MEDICAL HISTORY  Obesity  Atrial fibrillation  Hypothyroidism  Venous stasis  Irritable bowel syndrome   CHF    Hyperlipidemia   Hypertension  DM  Myocardial Infarction  CAD    Asthma      PAST SURGICAL HISTORY  Coronary angiogram  Coronary stent placement  Pacemaker placement       Allergies    No Known Allergies    Intolerances  None        SOCIAL HISTORY  Advanced Directives:       [ X ] Full Code       [  ] DNR  Marital Status:         [  ] M      [ X ] S      [  ] D       [  ] W  Children:       [ X ] Yes      [  ] No  Occupation:        [  ] Employed       [ X ] Unemployed       [  ] Retired  Diet:       [ X Regular       [  ] PEG feeding          [  ] NG tube feeding  Drug Use:           [ X ] Patient denied          [  ] Yes  Alcohol:           [X] No             [  ] Yes (socially)         [  ] Yes (chronic)  Tobacco:           [  ] Yes           [ X ] No      FAMILY HISTORY  [ X ] Heart Disease            [ X ] Diabetes             [ X ] HTN             [  ] Colon Cancer             [  ] Stomach Cancer              [  ] Pancreatic Cancer      VITALS  Vital Signs Last 24 Hrs  T(C): 36.8 (29 Mar 2021 05:06), Max: 36.9 (28 Mar 2021 14:04)  T(F): 98.2 (29 Mar 2021 05:06), Max: 98.4 (28 Mar 2021 14:04)  HR: 60 (29 Mar 2021 05:06) (59 - 63)  BP: 133/50 (29 Mar 2021 05:06) (130/63 - 137/44)   RR: 18 (29 Mar 2021 05:06) (16 - 18)  SpO2: 100% (29 Mar 2021 05:06) (97% - 100%)       MEDICATIONS  (STANDING):  amoxicillin 1000 milliGRAM(s) Oral two times a day  ascorbic acid 2000 milliGRAM(s) Oral every 8 hours  aspirin  chewable 81 milliGRAM(s) Oral daily  atorvastatin 40 milliGRAM(s) Oral at bedtime  budesonide 160 MICROgram(s)/formoterol 4.5 MICROgram(s) Inhaler 2 Puff(s) Inhalation two times a day  carvedilol 6.25 milliGRAM(s) Oral every 12 hours  cholecalciferol 1000 Unit(s) Oral daily  dronedarone 400 milliGRAM(s) Oral two times a day  fluticasone propionate 50 MICROgram(s)/spray Nasal Spray 1 Spray(s) Both Nostrils every 12 hours  furosemide   Injectable 20 milliGRAM(s) IV Push daily  gabapentin 300 milliGRAM(s) Oral daily  heparin   Injectable 5000 Unit(s) SubCutaneous every 8 hours  influenza   Vaccine 0.5 milliLiter(s) IntraMuscular once  insulin glargine Injectable (LANTUS) 10 Unit(s) SubCutaneous at bedtime  insulin lispro (ADMELOG) corrective regimen sliding scale   SubCutaneous Before meals and at bedtime  levothyroxine 175 MICROGram(s) Oral daily  metroNIDAZOLE    Tablet 500 milliGRAM(s) Oral every 12 hours  montelukast 10 milliGRAM(s) Oral daily  oxybutynin 5 milliGRAM(s) Oral two times a day  pantoprazole    Tablet 40 milliGRAM(s) Oral two times a day  zinc sulfate 220 milliGRAM(s) Oral daily                             8.1    6.81  )-----------( 277      ( 29 Mar 2021 07:23 )             25.6       03-29    138  |  102  |  22<H>  ----------------------------<  79  3.7   |  28  |  1.31<H>    Ca    8.7      29 Mar 2021 07:23  Phos  3.4     03-29  Mg     2.2     03-29    TPro  6.3  /  Alb  2.9<L>  /  TBili  0.2  /  DBili  x   /  AST  14  /  ALT  18  /  AlkPhos  61  03-29       [   ] ICU                                          [   ] CCU                                      [ X  ] Medical Floor    Patient is a 81 year old female with Gastrointestinal bleeding and symptomatic anemia. GI consulted to evaluate.        81 years old female from home, ambulates with walker, lives with son, with past medical history significant for CAD (s/p stents), CHF, chronic venous stasis, DM, HTN, Afib (on Xarelto, s/p PPM), chronic bronchitis with asthmatic component ( on Oxygen PRN at home ), and osteoarthritis presented to the emergency room with 2 weeks history of worsening SOB, Fatigue, associated with a few episodes of black stool.    pt had virtual  colonoscopy 1 year ago and the result was questionable. She never followed up.  Patient also c/o poor appetite with chronic diarrhea but denies abdominal pain, nausea, vomiting, hematemesis, hematochezia, melena, fever, chills, chest pain, SOB, cough, hematuria, dysuria or diarrhea.       Today patient appears comfortable with no new complaint. No abdominal pain, N/V, hematemesis, hematochezia, melena, fever, chills, chest pain, SOB, cough or diarrhea reported.      PAIN MANAGEMENT:  Pain Scale:                 0/10  Pain Location:        Prior Colonoscopy:  No prior colonoscopy      PAST MEDICAL HISTORY  Obesity  Atrial fibrillation  Hypothyroidism  Venous stasis  Irritable bowel syndrome   CHF    Hyperlipidemia   Hypertension  DM  Myocardial Infarction  CAD    Asthma      PAST SURGICAL HISTORY  Coronary angiogram  Coronary stent placement  Pacemaker placement       Allergies    No Known Allergies    Intolerances  None        SOCIAL HISTORY  Advanced Directives:       [ X ] Full Code       [  ] DNR  Marital Status:         [  ] M      [ X ] S      [  ] D       [  ] W  Children:       [ X ] Yes      [  ] No  Occupation:        [  ] Employed       [ X ] Unemployed       [  ] Retired  Diet:       [ X Regular       [  ] PEG feeding          [  ] NG tube feeding  Drug Use:           [ X ] Patient denied          [  ] Yes  Alcohol:           [X] No             [  ] Yes (socially)         [  ] Yes (chronic)  Tobacco:           [  ] Yes           [ X ] No      FAMILY HISTORY  [ X ] Heart Disease            [ X ] Diabetes             [ X ] HTN             [  ] Colon Cancer             [  ] Stomach Cancer              [  ] Pancreatic Cancer      VITALS  Vital Signs Last 24 Hrs  T(C): 36.8 (29 Mar 2021 05:06), Max: 36.9 (28 Mar 2021 14:04)  T(F): 98.2 (29 Mar 2021 05:06), Max: 98.4 (28 Mar 2021 14:04)  HR: 60 (29 Mar 2021 05:06) (59 - 63)  BP: 133/50 (29 Mar 2021 05:06) (130/63 - 137/44)   RR: 18 (29 Mar 2021 05:06) (16 - 18)  SpO2: 100% (29 Mar 2021 05:06) (97% - 100%)       MEDICATIONS  (STANDING):  amoxicillin 1000 milliGRAM(s) Oral two times a day  ascorbic acid 2000 milliGRAM(s) Oral every 8 hours  aspirin  chewable 81 milliGRAM(s) Oral daily  atorvastatin 40 milliGRAM(s) Oral at bedtime  budesonide 160 MICROgram(s)/formoterol 4.5 MICROgram(s) Inhaler 2 Puff(s) Inhalation two times a day  carvedilol 6.25 milliGRAM(s) Oral every 12 hours  cholecalciferol 1000 Unit(s) Oral daily  dronedarone 400 milliGRAM(s) Oral two times a day  fluticasone propionate 50 MICROgram(s)/spray Nasal Spray 1 Spray(s) Both Nostrils every 12 hours  furosemide   Injectable 20 milliGRAM(s) IV Push daily  gabapentin 300 milliGRAM(s) Oral daily  heparin   Injectable 5000 Unit(s) SubCutaneous every 8 hours  influenza   Vaccine 0.5 milliLiter(s) IntraMuscular once  insulin glargine Injectable (LANTUS) 10 Unit(s) SubCutaneous at bedtime  insulin lispro (ADMELOG) corrective regimen sliding scale   SubCutaneous Before meals and at bedtime  levothyroxine 175 MICROGram(s) Oral daily  metroNIDAZOLE    Tablet 500 milliGRAM(s) Oral every 12 hours  montelukast 10 milliGRAM(s) Oral daily  oxybutynin 5 milliGRAM(s) Oral two times a day  pantoprazole    Tablet 40 milliGRAM(s) Oral two times a day  zinc sulfate 220 milliGRAM(s) Oral daily                             8.1    6.81  )-----------( 277      ( 29 Mar 2021 07:23 )             25.6       03-29    138  |  102  |  22<H>  ----------------------------<  79  3.7   |  28  |  1.31<H>    Ca    8.7      29 Mar 2021 07:23  Phos  3.4     03-29  Mg     2.2     03-29    TPro  6.3  /  Alb  2.9<L>  /  TBili  0.2  /  DBili  x   /  AST  14  /  ALT  18  /  AlkPhos  61  03-29

## 2021-03-29 NOTE — PROGRESS NOTE ADULT - ASSESSMENT
79F Dutch-speaking from home with son, ambulates with walker, PMH HFpEF, CAD (s/p stents), chronic venous stasis, DM, HTN and Afib (Xarelto, s/p PPM), presented 3/20 with x2 weeks increasing fatigue, SOB, and paleness, with prolonged history of dark bowel movements s/p possible transverse polyp colonoscopy 2020, admitted for workup and management of symptomatic anemia.

## 2021-03-29 NOTE — PROGRESS NOTE ADULT - SUBJECTIVE AND OBJECTIVE BOX
Time of Visit:  Patient seen and examined.     MEDICATIONS  (STANDING):  amoxicillin 1000 milliGRAM(s) Oral two times a day  ascorbic acid 2000 milliGRAM(s) Oral every 8 hours  aspirin  chewable 81 milliGRAM(s) Oral daily  atorvastatin 40 milliGRAM(s) Oral at bedtime  budesonide 160 MICROgram(s)/formoterol 4.5 MICROgram(s) Inhaler 2 Puff(s) Inhalation two times a day  carvedilol 6.25 milliGRAM(s) Oral every 12 hours  cholecalciferol 1000 Unit(s) Oral daily  dronedarone 400 milliGRAM(s) Oral two times a day  fluticasone propionate 50 MICROgram(s)/spray Nasal Spray 1 Spray(s) Both Nostrils every 12 hours  furosemide   Injectable 20 milliGRAM(s) IV Push daily  gabapentin 300 milliGRAM(s) Oral daily  heparin   Injectable 5000 Unit(s) SubCutaneous every 8 hours  influenza   Vaccine 0.5 milliLiter(s) IntraMuscular once  insulin glargine Injectable (LANTUS) 10 Unit(s) SubCutaneous at bedtime  insulin lispro (ADMELOG) corrective regimen sliding scale   SubCutaneous Before meals and at bedtime  levothyroxine 175 MICROGram(s) Oral daily  metroNIDAZOLE    Tablet 500 milliGRAM(s) Oral every 12 hours  montelukast 10 milliGRAM(s) Oral daily  oxybutynin 5 milliGRAM(s) Oral two times a day  pantoprazole    Tablet 40 milliGRAM(s) Oral two times a day  zinc sulfate 220 milliGRAM(s) Oral daily      MEDICATIONS  (PRN):       Medications up to date at time of exam.    ROS; No fever, chills, cough, congestion.   PHYSICAL EXAMINATION:      Vital Signs Last 24 Hrs  T(C): 36.4 (29 Mar 2021 13:59), Max: 36.8 (28 Mar 2021 21:00)  T(F): 97.5 (29 Mar 2021 13:59), Max: 98.3 (28 Mar 2021 21:00)  HR: 67 (29 Mar 2021 13:59) (59 - 67)  BP: 107/43 (29 Mar 2021 13:59) (107/43 - 137/44)  BP(mean): --  RR: 18 (29 Mar 2021 13:59) (16 - 18)  SpO2: 100% (29 Mar 2021 13:59) (100% - 100%)   (if applicable)    General: Alert and oriented. Able to answer question with no SOB. No acute distress.     HEENT: Head is normocephalic and atraumatic. No nasal tenderness. Extraocular muscles are intact. Mucous membranes are moist.     NECK: Supple, no palpable adenopathy.    LUNGS: Clear to auscultation bilaterally with  no wheezing, rales, or rhonchi.    HEART: S1 S2 irregular. No click/ rub.     ABDOMEN: Soft, nontender, and nondistended.  Active bowel sounds.     NEUROLOGIC: Awake, alert, oriented.     SKIN: Warm and moist. Non diaphoretic.       LABS:                        8.1    6.81  )-----------( 277      ( 29 Mar 2021 07:23 )             25.6     03-29    138  |  102  |  22<H>  ----------------------------<  79  3.7   |  28  |  1.31<H>    Ca    8.7      29 Mar 2021 07:23  Phos  3.4     03-29  Mg     2.2     03-29    TPro  6.3  /  Alb  2.9<L>  /  TBili  0.2  /  DBili  x   /  AST  14  /  ALT  18  /  AlkPhos  61  03-29                MICROBIOLOGY: (if applicable)    RADIOLOGY & ADDITIONAL STUDIES:  EKG:   CXR:   ECHO:    IMPRESSION: 81y Female PAST MEDICAL & SURGICAL HISTORY:  Obesity    Pacemaker    Atrial fibrillation    Hypothyroidism    Venous stasis    IBS (irritable bowel syndrome)    CHF (congestive heart failure), NYHA class I    HLD (Hyperlipidemia)    HTN (Hypertension)    Diabetes    Myocardial Infarction    CAD (Coronary Atherosclerotic Disease)    Asthma    S/P coronary angiogram    Impression: 80 Y/O Female presented in ED with increased fatigue, SOB and paleness with prolong Hx of Dark BM secondary to Symptomatic Anemia. Has Asthma but no exacerbation. Positive Covid 19 PCR on 03-27-21 and Negative Covid 19 PCR on 03-28-21, 03-23-21. CXR No acute findings.     Suggestion:  O2 saturation 98% room air. So far saturating good room air on exam.  Continue Budesonide 2 puffs Twice daily.   Fluticasone 2 sprays Nasally Q 12 hours.  Continue Singulair 10 mg orally Daily .  Oral hygiene care.  DVT/ GI prophylactic.     Time of Visit:  Patient seen and examined.     MEDICATIONS  (STANDING):  amoxicillin 1000 milliGRAM(s) Oral two times a day  ascorbic acid 2000 milliGRAM(s) Oral every 8 hours  aspirin  chewable 81 milliGRAM(s) Oral daily  atorvastatin 40 milliGRAM(s) Oral at bedtime  budesonide 160 MICROgram(s)/formoterol 4.5 MICROgram(s) Inhaler 2 Puff(s) Inhalation two times a day  carvedilol 6.25 milliGRAM(s) Oral every 12 hours  cholecalciferol 1000 Unit(s) Oral daily  dronedarone 400 milliGRAM(s) Oral two times a day  fluticasone propionate 50 MICROgram(s)/spray Nasal Spray 1 Spray(s) Both Nostrils every 12 hours  furosemide   Injectable 20 milliGRAM(s) IV Push daily  gabapentin 300 milliGRAM(s) Oral daily  heparin   Injectable 5000 Unit(s) SubCutaneous every 8 hours  influenza   Vaccine 0.5 milliLiter(s) IntraMuscular once  insulin glargine Injectable (LANTUS) 10 Unit(s) SubCutaneous at bedtime  insulin lispro (ADMELOG) corrective regimen sliding scale   SubCutaneous Before meals and at bedtime  levothyroxine 175 MICROGram(s) Oral daily  metroNIDAZOLE    Tablet 500 milliGRAM(s) Oral every 12 hours  montelukast 10 milliGRAM(s) Oral daily  oxybutynin 5 milliGRAM(s) Oral two times a day  pantoprazole    Tablet 40 milliGRAM(s) Oral two times a day  zinc sulfate 220 milliGRAM(s) Oral daily      MEDICATIONS  (PRN):       Medications up to date at time of exam.    ROS; No fever, chills, cough, congestion.   PHYSICAL EXAMINATION:      Vital Signs Last 24 Hrs  T(C): 36.4 (29 Mar 2021 13:59), Max: 36.8 (28 Mar 2021 21:00)  T(F): 97.5 (29 Mar 2021 13:59), Max: 98.3 (28 Mar 2021 21:00)  HR: 67 (29 Mar 2021 13:59) (59 - 67)  BP: 107/43 (29 Mar 2021 13:59) (107/43 - 137/44)  BP(mean): --  RR: 18 (29 Mar 2021 13:59) (16 - 18)  SpO2: 100% (29 Mar 2021 13:59) (100% - 100%)   (if applicable)    General: Alert and oriented. Able to answer question with no SOB. No acute distress.     HEENT: Head is normocephalic and atraumatic. No nasal tenderness. Extraocular muscles are intact. Mucous membranes are moist.     NECK: Supple, no palpable adenopathy.    LUNGS: Clear to auscultation bilaterally with  no wheezing, rales, or rhonchi.    HEART: S1 S2 irregular. No click/ rub.     ABDOMEN: Soft, nontender, and nondistended.  Active bowel sounds.     NEUROLOGIC: Awake, alert, oriented.     SKIN: Warm and moist. Non diaphoretic.       LABS:                        8.1    6.81  )-----------( 277      ( 29 Mar 2021 07:23 )             25.6     03-29    138  |  102  |  22<H>  ----------------------------<  79  3.7   |  28  |  1.31<H>    Ca    8.7      29 Mar 2021 07:23  Phos  3.4     03-29  Mg     2.2     03-29    TPro  6.3  /  Alb  2.9<L>  /  TBili  0.2  /  DBili  x   /  AST  14  /  ALT  18  /  AlkPhos  61  03-29                MICROBIOLOGY: (if applicable)    RADIOLOGY & ADDITIONAL STUDIES:  EKG:   CXR:   ECHO:    IMPRESSION: 81y Female PAST MEDICAL & SURGICAL HISTORY:  Obesity    Pacemaker    Atrial fibrillation    Hypothyroidism    Venous stasis    IBS (irritable bowel syndrome)    CHF (congestive heart failure), NYHA class I    HLD (Hyperlipidemia)    HTN (Hypertension)    Diabetes    Myocardial Infarction    CAD (Coronary Atherosclerotic Disease)    Asthma    S/P coronary angiogram    Impression: 80 Y/O Female presented in ED with increased fatigue, SOB and paleness with prolong Hx of Dark BM secondary to Symptomatic Anemia. Has Asthma but no exacerbation. Positive Covid 19 PCR on 03-27-21 and Negative Covid 19 PCR on 03-28-21, 03-23-21. CXR No acute findings.     Suggestion:  O2 saturation 98% room air. So far saturating good room air on exam.  Continue Budesonide 2 puffs Twice daily.   Fluticasone 2 sprays Nasally Q 12 hours.  Continue Singulair 10 mg orally Daily .  Oral hygiene care.  DVT/ GI prophylactic.        Agree with above assessment and plan as transcribed.

## 2021-03-30 ENCOUNTER — TRANSCRIPTION ENCOUNTER (OUTPATIENT)
Age: 81
End: 2021-03-30

## 2021-03-30 LAB
ALBUMIN SERPL ELPH-MCNC: 2.8 G/DL — LOW (ref 3.5–5)
ALP SERPL-CCNC: 57 U/L — SIGNIFICANT CHANGE UP (ref 40–120)
ALT FLD-CCNC: 21 U/L DA — SIGNIFICANT CHANGE UP (ref 10–60)
ANION GAP SERPL CALC-SCNC: 9 MMOL/L — SIGNIFICANT CHANGE UP (ref 5–17)
AST SERPL-CCNC: 19 U/L — SIGNIFICANT CHANGE UP (ref 10–40)
BILIRUB SERPL-MCNC: 0.3 MG/DL — SIGNIFICANT CHANGE UP (ref 0.2–1.2)
BLD GP AB SCN SERPL QL: SIGNIFICANT CHANGE UP
BUN SERPL-MCNC: 24 MG/DL — HIGH (ref 7–18)
CALCIUM SERPL-MCNC: 8.4 MG/DL — SIGNIFICANT CHANGE UP (ref 8.4–10.5)
CHLORIDE SERPL-SCNC: 101 MMOL/L — SIGNIFICANT CHANGE UP (ref 96–108)
CO2 SERPL-SCNC: 25 MMOL/L — SIGNIFICANT CHANGE UP (ref 22–31)
CREAT SERPL-MCNC: 1.45 MG/DL — HIGH (ref 0.5–1.3)
GLUCOSE BLDC GLUCOMTR-MCNC: 128 MG/DL — HIGH (ref 70–99)
GLUCOSE BLDC GLUCOMTR-MCNC: 157 MG/DL — HIGH (ref 70–99)
GLUCOSE BLDC GLUCOMTR-MCNC: 181 MG/DL — HIGH (ref 70–99)
GLUCOSE BLDC GLUCOMTR-MCNC: 275 MG/DL — HIGH (ref 70–99)
GLUCOSE BLDC GLUCOMTR-MCNC: 293 MG/DL — HIGH (ref 70–99)
GLUCOSE SERPL-MCNC: 150 MG/DL — HIGH (ref 70–99)
HCT VFR BLD CALC: 26.5 % — LOW (ref 34.5–45)
HGB BLD-MCNC: 8.3 G/DL — LOW (ref 11.5–15.5)
MAGNESIUM SERPL-MCNC: 2 MG/DL — SIGNIFICANT CHANGE UP (ref 1.6–2.6)
MCHC RBC-ENTMCNC: 26.5 PG — LOW (ref 27–34)
MCHC RBC-ENTMCNC: 31.3 GM/DL — LOW (ref 32–36)
MCV RBC AUTO: 84.7 FL — SIGNIFICANT CHANGE UP (ref 80–100)
NRBC # BLD: 0 /100 WBCS — SIGNIFICANT CHANGE UP (ref 0–0)
PHOSPHATE SERPL-MCNC: 2.9 MG/DL — SIGNIFICANT CHANGE UP (ref 2.5–4.5)
PLATELET # BLD AUTO: 267 K/UL — SIGNIFICANT CHANGE UP (ref 150–400)
POTASSIUM SERPL-MCNC: 4.2 MMOL/L — SIGNIFICANT CHANGE UP (ref 3.5–5.3)
POTASSIUM SERPL-SCNC: 4.2 MMOL/L — SIGNIFICANT CHANGE UP (ref 3.5–5.3)
PROT SERPL-MCNC: 6.3 G/DL — SIGNIFICANT CHANGE UP (ref 6–8.3)
RBC # BLD: 3.13 M/UL — LOW (ref 3.8–5.2)
RBC # FLD: 16.3 % — HIGH (ref 10.3–14.5)
SARS-COV-2 RNA SPEC QL NAA+PROBE: SIGNIFICANT CHANGE UP
SODIUM SERPL-SCNC: 135 MMOL/L — SIGNIFICANT CHANGE UP (ref 135–145)
WBC # BLD: 6.34 K/UL — SIGNIFICANT CHANGE UP (ref 3.8–10.5)
WBC # FLD AUTO: 6.34 K/UL — SIGNIFICANT CHANGE UP (ref 3.8–10.5)

## 2021-03-30 RX ORDER — CHLORHEXIDINE GLUCONATE 213 G/1000ML
1 SOLUTION TOPICAL ONCE
Refills: 0 | Status: COMPLETED | OUTPATIENT
Start: 2021-03-30 | End: 2021-03-31

## 2021-03-30 RX ORDER — INSULIN GLARGINE 100 [IU]/ML
5 INJECTION, SOLUTION SUBCUTANEOUS ONCE
Refills: 0 | Status: COMPLETED | OUTPATIENT
Start: 2021-03-30 | End: 2021-03-30

## 2021-03-30 RX ORDER — SOD SULF/SODIUM/NAHCO3/KCL/PEG
2000 SOLUTION, RECONSTITUTED, ORAL ORAL ONCE
Refills: 0 | Status: COMPLETED | OUTPATIENT
Start: 2021-03-30 | End: 2021-03-30

## 2021-03-30 RX ORDER — SOD SULF/SODIUM/NAHCO3/KCL/PEG
4000 SOLUTION, RECONSTITUTED, ORAL ORAL ONCE
Refills: 0 | Status: DISCONTINUED | OUTPATIENT
Start: 2021-03-30 | End: 2021-03-30

## 2021-03-30 RX ORDER — FUROSEMIDE 40 MG
20 TABLET ORAL ONCE
Refills: 0 | Status: COMPLETED | OUTPATIENT
Start: 2021-03-30 | End: 2021-03-30

## 2021-03-30 RX ORDER — NEOMYCIN SULFATE 500 MG/1
1000 TABLET ORAL
Refills: 0 | Status: DISCONTINUED | OUTPATIENT
Start: 2021-03-30 | End: 2021-03-31

## 2021-03-30 RX ADMIN — Medication 2000 MILLILITER(S): at 13:27

## 2021-03-30 RX ADMIN — Medication 1000 MILLIGRAM(S): at 05:09

## 2021-03-30 RX ADMIN — GABAPENTIN 300 MILLIGRAM(S): 400 CAPSULE ORAL at 13:00

## 2021-03-30 RX ADMIN — Medication 1000 UNIT(S): at 13:22

## 2021-03-30 RX ADMIN — Medication 81 MILLIGRAM(S): at 13:22

## 2021-03-30 RX ADMIN — MONTELUKAST 10 MILLIGRAM(S): 4 TABLET, CHEWABLE ORAL at 13:24

## 2021-03-30 RX ADMIN — HEPARIN SODIUM 5000 UNIT(S): 5000 INJECTION INTRAVENOUS; SUBCUTANEOUS at 05:08

## 2021-03-30 RX ADMIN — INSULIN GLARGINE 5 UNIT(S): 100 INJECTION, SOLUTION SUBCUTANEOUS at 21:41

## 2021-03-30 RX ADMIN — NEOMYCIN SULFATE 1000 MILLIGRAM(S): 500 TABLET ORAL at 19:02

## 2021-03-30 RX ADMIN — Medication 20 MILLIGRAM(S): at 05:08

## 2021-03-30 RX ADMIN — Medication 175 MICROGRAM(S): at 05:09

## 2021-03-30 RX ADMIN — BUDESONIDE AND FORMOTEROL FUMARATE DIHYDRATE 2 PUFF(S): 160; 4.5 AEROSOL RESPIRATORY (INHALATION) at 21:44

## 2021-03-30 RX ADMIN — Medication 20 MILLIGRAM(S): at 19:18

## 2021-03-30 RX ADMIN — ATORVASTATIN CALCIUM 40 MILLIGRAM(S): 80 TABLET, FILM COATED ORAL at 21:43

## 2021-03-30 RX ADMIN — PANTOPRAZOLE SODIUM 40 MILLIGRAM(S): 20 TABLET, DELAYED RELEASE ORAL at 05:09

## 2021-03-30 RX ADMIN — DRONEDARONE 400 MILLIGRAM(S): 400 TABLET, FILM COATED ORAL at 18:10

## 2021-03-30 RX ADMIN — Medication 2000 MILLIGRAM(S): at 21:43

## 2021-03-30 RX ADMIN — Medication 1 SPRAY(S): at 18:12

## 2021-03-30 RX ADMIN — Medication 1 SPRAY(S): at 05:09

## 2021-03-30 RX ADMIN — Medication 2: at 08:56

## 2021-03-30 RX ADMIN — Medication 2: at 17:47

## 2021-03-30 RX ADMIN — Medication 2000 MILLIGRAM(S): at 13:26

## 2021-03-30 RX ADMIN — Medication 500 MILLIGRAM(S): at 18:11

## 2021-03-30 RX ADMIN — Medication 2000 MILLIGRAM(S): at 05:08

## 2021-03-30 RX ADMIN — Medication 5 MILLIGRAM(S): at 18:10

## 2021-03-30 RX ADMIN — NEOMYCIN SULFATE 1000 MILLIGRAM(S): 500 TABLET ORAL at 21:43

## 2021-03-30 RX ADMIN — HEPARIN SODIUM 5000 UNIT(S): 5000 INJECTION INTRAVENOUS; SUBCUTANEOUS at 13:26

## 2021-03-30 RX ADMIN — BUDESONIDE AND FORMOTEROL FUMARATE DIHYDRATE 2 PUFF(S): 160; 4.5 AEROSOL RESPIRATORY (INHALATION) at 10:44

## 2021-03-30 RX ADMIN — Medication 5 MILLIGRAM(S): at 05:09

## 2021-03-30 RX ADMIN — Medication 500 MILLIGRAM(S): at 05:09

## 2021-03-30 RX ADMIN — HEPARIN SODIUM 5000 UNIT(S): 5000 INJECTION INTRAVENOUS; SUBCUTANEOUS at 21:41

## 2021-03-30 RX ADMIN — Medication 1000 MILLIGRAM(S): at 18:09

## 2021-03-30 RX ADMIN — NEOMYCIN SULFATE 1000 MILLIGRAM(S): 500 TABLET ORAL at 18:09

## 2021-03-30 RX ADMIN — PANTOPRAZOLE SODIUM 40 MILLIGRAM(S): 20 TABLET, DELAYED RELEASE ORAL at 18:12

## 2021-03-30 RX ADMIN — CARVEDILOL PHOSPHATE 6.25 MILLIGRAM(S): 80 CAPSULE, EXTENDED RELEASE ORAL at 05:09

## 2021-03-30 RX ADMIN — DRONEDARONE 400 MILLIGRAM(S): 400 TABLET, FILM COATED ORAL at 05:09

## 2021-03-30 RX ADMIN — ZINC SULFATE TAB 220 MG (50 MG ZINC EQUIVALENT) 220 MILLIGRAM(S): 220 (50 ZN) TAB at 13:22

## 2021-03-30 RX ADMIN — Medication 6: at 13:20

## 2021-03-30 RX ADMIN — CARVEDILOL PHOSPHATE 6.25 MILLIGRAM(S): 80 CAPSULE, EXTENDED RELEASE ORAL at 18:11

## 2021-03-30 NOTE — PROGRESS NOTE ADULT - SUBJECTIVE AND OBJECTIVE BOX
CHIEF COMPLAINT:Patient is a 81y old  Female who presents with a chief complaint of symptomatic anemia.Pt appears comfortable.    	  REVIEW OF SYSTEMS:  CONSTITUTIONAL: No fever, weight loss, or fatigue  EYES: No eye pain, visual disturbances, or discharge  ENT:  No difficulty hearing, tinnitus, vertigo; No sinus or throat pain  NECK: No pain or stiffness  RESPIRATORY: No cough, wheezing, chills or hemoptysis; No Shortness of Breath  CARDIOVASCULAR: No chest pain, palpitations, passing out, dizziness, or leg swelling  GASTROINTESTINAL: No abdominal or epigastric pain. No nausea, vomiting, or hematemesis; No diarrhea or constipation. No melena or hematochezia.  GENITOURINARY: No dysuria, frequency, hematuria, or incontinence  NEUROLOGICAL: No headaches, memory loss, loss of strength, numbness, or tremors  SKIN: No itching, burning, rashes, or lesions   LYMPH Nodes: No enlarged glands  ENDOCRINE: No heat or cold intolerance; No hair loss  MUSCULOSKELETAL: No joint pain or swelling; No muscle, back, or extremity pain  PSYCHIATRIC: No depression, anxiety, mood swings, or difficulty sleeping  HEME/LYMPH: No easy bruising, or bleeding gums  ALLERGY AND IMMUNOLOGIC: No hives or eczema	        PHYSICAL EXAM:  T(C): 36.4 (03-30-21 @ 05:05), Max: 36.8 (03-29-21 @ 21:05)  HR: 65 (03-30-21 @ 05:05) (58 - 67)  BP: 139/60 (03-30-21 @ 05:05) (107/43 - 139/60)  RR: 19 (03-30-21 @ 05:05) (18 - 19)  SpO2: 96% (03-30-21 @ 05:05) (96% - 100%)  Wt(kg): --  I&O's Summary      Appearance: Normal	  HEENT:   Normal oral mucosa, PERRL, EOMI	  Lymphatic: No lymphadenopathy  Cardiovascular: Normal S1 S2, No JVD, No murmurs, +1 edema  Respiratory: Lungs clear to auscultation	  Psychiatry: A & O x 3, Mood & affect appropriate  Gastrointestinal:  Soft, Non-tender, + BS	  Skin: No rashes, No ecchymoses, No cyanosis	  Neurologic: Non-focal  Extremities: Normal range of motion, No clubbing, cyanosis +1 edema  Vascular: Peripheral pulses palpable 2+ bilaterally    MEDICATIONS  (STANDING):  amoxicillin 1000 milliGRAM(s) Oral two times a day  ascorbic acid 2000 milliGRAM(s) Oral every 8 hours  aspirin  chewable 81 milliGRAM(s) Oral daily  atorvastatin 40 milliGRAM(s) Oral at bedtime  budesonide 160 MICROgram(s)/formoterol 4.5 MICROgram(s) Inhaler 2 Puff(s) Inhalation two times a day  carvedilol 6.25 milliGRAM(s) Oral every 12 hours  cholecalciferol 1000 Unit(s) Oral daily  dronedarone 400 milliGRAM(s) Oral two times a day  fluticasone propionate 50 MICROgram(s)/spray Nasal Spray 1 Spray(s) Both Nostrils every 12 hours  furosemide   Injectable 20 milliGRAM(s) IV Push once  furosemide   Injectable 20 milliGRAM(s) IV Push daily  gabapentin 300 milliGRAM(s) Oral daily  heparin   Injectable 5000 Unit(s) SubCutaneous every 8 hours  influenza   Vaccine 0.5 milliLiter(s) IntraMuscular once  insulin glargine Injectable (LANTUS) 10 Unit(s) SubCutaneous at bedtime  insulin lispro (ADMELOG) corrective regimen sliding scale   SubCutaneous Before meals and at bedtime  levothyroxine 175 MICROGram(s) Oral daily  metroNIDAZOLE    Tablet 500 milliGRAM(s) Oral every 12 hours  montelukast 10 milliGRAM(s) Oral daily  neomycin 1000 milliGRAM(s) Oral <User Schedule>  oxybutynin 5 milliGRAM(s) Oral two times a day  pantoprazole    Tablet 40 milliGRAM(s) Oral two times a day  polyethylene glycol/electrolyte Solution 2000 milliLiter(s) Oral once  zinc sulfate 220 milliGRAM(s) Oral daily        	  LABS:	 	                       8.3    6.34  )-----------( 267      ( 30 Mar 2021 07:52 )             26.5     03-30    135  |  101  |  24<H>  ----------------------------<  150<H>  4.2   |  25  |  1.45<H>    Ca    8.4      30 Mar 2021 07:52  Phos  2.9     03-30  Mg     2.0     03-30    TPro  6.3  /  Alb  2.8<L>  /  TBili  0.3  /  DBili  x   /  AST  19  /  ALT  21  /  AlkPhos  57  03-30    proBNP: Serum Pro-Brain Natriuretic Peptide: 1155 pg/mL (03-21 @ 07:15)    Lipid Profile: Cholesterol 134  HDL 54  TG 82  Cholesterol 145  LDL --  HDL 55  TG 86      TSH: Thyroid Stimulating Hormone, Serum: 2.67 uU/mL (03-22 @ 07:34)  Thyroid Stimulating Hormone, Serum: 3.61 uU/mL (03-21 @ 07:15)      	    sres< from: Nuclear Stress Test-Pharmacologic (03.29.21 @ 05:07) >    IMPRESSIONS:Normal Study  * Negative ECG evidence of ischemia after IV of Lexiscan.  * Review of raw data shows: The study is of good technical  quality.  * The left ventricle was normal in size. Normal myocardial  perfusion scan, with no evidence of infarction or  inducible ischemia.  * Gated wall motion analysis is performed, and shows  normal wall motion with post stress LVEF of 70%.    < end of copied text >

## 2021-03-30 NOTE — PROGRESS NOTE ADULT - ASSESSMENT
1. Ascending colon invasive adenocarcinoma  2. Anemia  3. Melena   4. Gastritis  5. Esophagitis  6. No evidence of acute GI bleeding    Suggestions:    1. Monitor H/H  2. Transfuse PRBC as needed  3. Protonix 40mg daily   4. Follow up path   5. Avoid NSAID  6. Oncology follow up  7. Surgical follow up  8. DVT prophylaxis

## 2021-03-30 NOTE — PROGRESS NOTE ADULT - ASSESSMENT
Patient is a 81y old  Female from home, ambulates with walker, lives with son, with PMHx of HFpEF, CAD (s/p stents), chronic venous stasis, DM, HTN and Afib (on Xarelto, s/p PPM), chronic bronchitis with asthmatic component ( on Oxygen PRN at home ), JOSE DANIEL ( supposed to be on nocturnal CPAP , but non compliant), now presents to the ER for evaluation of hypotension, SOB and  fatigue for almost 2 weeks. Per daughter and granddaughter patient has been complaining of increasing fatigue and sob for last few weeks, seemed to be pale , patient endorses dark loose bowel movement. Patient has chronic diarrhea, but no abdominal pain. She has evaluated by GI team and now the ID consult requested to assist with further evaluation of chronic diarrhea.     #  H. Pylori associated Chronic active gastritis, diffuse, with focal lymphoid aggregate, DX 3/22 by Antrum biopsy  # Chronic diarrhea- C.diff vs Malignancy - NO Malignancy Antrum biopsy   # Symptomatic Anemia  - s/p EGD and antrum biopsy shows H. Pylori associated Chronic active gastritis, diffuse, with focal lymphoid aggregate  # COVID negative 3/20/21, positive 3/27 and negative on 3/28  - d/w Dr. Spence regarding exposure VS false positive, in th esetting of positive Titers  # S/p EGD 3/22  and s/p Colonoscopy 3/25- pathology shows - Invasive colonic adenocarcinoma arising in association with  tubular adenoma  # The Right knee xray shows Round mass anterior to the patella.    Would recommend:    1. Please start Clarithromycin since QTc is 456, less than 500   2. Management of Adenocarcinoma of colon as per Hem/Onc and Surgery   3. Continue Amoxicillin 1 g q 12hours 7 days then 500mg Q 12hours, +  Add Clarithromycin 500 mg q 12hours and c/w  Flagyl 500 mg q 12hours and + PPI X 14 days   4. OOb to chair    d/w DR. Sandoval and House staff      Attending Attestation:    Spent more than 45 minutes on total encounter, more than 50 % of the visit was spent counseling and/or coordinating care by the Attending physician.   Patient is a 81y old  Female from home, ambulates with walker, lives with son, with PMHx of HFpEF, CAD (s/p stents), chronic venous stasis, DM, HTN and Afib (on Xarelto, s/p PPM), chronic bronchitis with asthmatic component ( on Oxygen PRN at home ), JOSE DANIEL ( supposed to be on nocturnal CPAP , but non compliant), now presents to the ER for evaluation of hypotension, SOB and  fatigue for almost 2 weeks. Per daughter and granddaughter patient has been complaining of increasing fatigue and sob for last few weeks, seemed to be pale , patient endorses dark loose bowel movement. Patient has chronic diarrhea, but no abdominal pain. She has evaluated by GI team and now the ID consult requested to assist with further evaluation of chronic diarrhea.     #  H. Pylori associated Chronic active gastritis, diffuse, with focal lymphoid aggregate, DX 3/22 by Antrum biopsy  # Chronic diarrhea- C.diff vs Malignancy - NO Malignancy Antrum biopsy   # Symptomatic Anemia  - s/p EGD and antrum biopsy shows H. Pylori associated Chronic active gastritis, diffuse, with focal lymphoid aggregate  # COVID negative 3/20/21, positive 3/27 and negative on 3/28  - d/w Dr. Spence regarding exposure VS false positive, in th esetting of positive Titers  # S/p EGD 3/22  and s/p Colonoscopy 3/25- pathology shows - Invasive colonic adenocarcinoma arising in association with  tubular adenoma  # The Right knee xray shows Round mass anterior to the patella.    Would recommend:    1. NPO after midnight for OR in AM for Laparoscopic assisted right colon resection in AM  2.  Hold H.pylori treatment  until colon resection and start on oral  diet   3. OOb to chair    d/w DR. Sandoval and House staff      Attending Attestation:    Spent more than 45 minutes on total encounter, more than 50 % of the visit was spent counseling and/or coordinating care by the Attending physician.

## 2021-03-30 NOTE — PROGRESS NOTE ADULT - ASSESSMENT
79F [place]-speaking from home with son, ambulates with walker, PMH HFpEF, CAD (s/p stents), chronic venous stasis, DM, HTN and Afib (Xarelto, s/p PPM), presented 3/20 with x2 weeks increasing fatigue, SOB, and paleness, with prolonged history of dark bowel movements s/p possible transverse polyp colonoscopy 2020, admitted for workup and management of symptomatic anemia.

## 2021-03-30 NOTE — PROGRESS NOTE ADULT - SUBJECTIVE AND OBJECTIVE BOX
Condition discussed with  patient and daughter, options risks and benefits explained.  Questions answered.  Laparoscopic assisted right colon resection in AM  Consent signed.

## 2021-03-30 NOTE — PROGRESS NOTE ADULT - SUBJECTIVE AND OBJECTIVE BOX
Preop Dx: invasive adenocarcinoma of ascending colon  Surgeon: Dr. Hu  Procedure: laparoscopic assisted right hemicolectomy     Vital Signs Last 24 Hrs  T(C): 36.4 (30 Mar 2021 05:05), Max: 36.8 (29 Mar 2021 21:05)  T(F): 97.5 (30 Mar 2021 05:05), Max: 98.2 (29 Mar 2021 21:05)  HR: 65 (30 Mar 2021 05:05) (58 - 67)  BP: 139/60 (30 Mar 2021 05:05) (107/43 - 139/60)  RR: 19 (30 Mar 2021 05:05) (18 - 19)  SpO2: 96% (30 Mar 2021 05:05) (96% - 100%)             LABS:             8.3    6.34  )-----------( 267      ( 30 Mar 2021 07:52 )             26.5     03-30  135  |  101  |  24<H>  ----------------------------<  150<H>  4.2   |  25  |  1.45<H>    Ca    8.4      30 Mar 2021 07:52  Phos  2.9     03-30  Mg     2.0     03-30    TPro  6.3  /  Alb  2.8<L>  /  TBili  0.3  /  DBili  x   /  AST  19  /  ALT  21  /  AlkPhos  57  03-30    EKG: < from: Transthoracic Echocardiogram (03.21.21 @ 07:01) >  ------------------------------------------------------------------------  CONCLUSIONS:  1. Moderate mitral annular calcification. Trace mitral  regurgitation. Mild mitral stenosis.  2. Calcified, probably trileaflet aortic valve with  decreased opening. Mild to moderate aortic stenosis. No  aortic valveregurgitation seen.  3. Normal aortic root.  4. Normal left atrium.  5. Mild left ventricular enlargement.  6. Normal Left Ventricular Systolic Function,  (EF = 66% by  biplane)  7. Grade I diastolic dysfunction (Impaired relaxation).  Diastolic function may not be accurately assessed in the  setting of mitral stenosis.  8. Normal right atrium.  9. Normal right ventricular size and systolic function  (TAPSE 2.8 cm). A device lead is visualized in the right  heart.  10. RV systolic pressure is mildly increased at  37 mm Hg.  11. Tricuspid valve not well seen. Mild tricuspid  regurgitation.  12. No pericardial effusion.    *** Compared with echocardiogram report of 11/26/2019, no  significant changes noted.  ------------------------------------------------------------------------  Confirmed on  3/21/2021 - 16:11:35 by Davidson Estrada MD  ------------------------------------------------------------------------  < end of copied text >    CXR: < from: Xray Chest 1 View- PORTABLE-Urgent (03.20.21 @ 16:38) >  PROCEDURE DATE:  03/20/2021    INTERPRETATION:  AP chest.  Clinical indications: Chest pain.  IMPRESSION: There is left-sided defibrillator. The heart is normal in size. The lungs are clear.  < end of copied text >

## 2021-03-30 NOTE — PROGRESS NOTE ADULT - SUBJECTIVE AND OBJECTIVE BOX
PGY-1 Progress Note discussed with attending    PAGER #: [[phone]] TILL 5:00 PM  PLEASE CONTACT ON CALL TEAM:  - On Call Team (Please refer to Ana) FROM 5:00 PM - 8:30PM  - Nightfloat Team FROM 8:30 -7:30 AM    CHIEF COMPLAINT & BRIEF HOSPITAL COURSE: 79F [place]an-speaking from home with son, ambulates with walker, PMH HFpEF, CAD (s/p stents), chronic venous stasis, DM, HTN and Afib (Xarelto, s/p PPM), chronic bronchitis with asthmatic component (home O2 prn), JOSE DANIEL (noncompliant with nocturnal CPAP) presented 3/20 with x2 weeks increasing fatigue, SOB, and paleness, with prolonged history of dark bowel movements s/p possible transverse polyp colonoscopy 2020, no further fu. In ED, VS notable for /77 and SpO2 100% 4L NC. Labs notable for Hb 7.7 (baseline ~11), Fe 15, TIBC 457, 3% saturation. Trop, COVID, UA negative. EKG with BFB. CXR unremarkable.  CT a/p unremarkable for signs of bleeding. S/p x1 unit pRBC in ED. Admitted for workup and management of symptomatic anemia. Colonoscopy +invasive colon cancer 3/24 (resulted 3/26).    INTERVAL HPI/OVERNIGHT EVENTS: NAEON. SBPs 100-130s, VS otherwise wnl. Interviewed with assistance of Ugandan  Beth [phone]. Reports feeling well, endorses continued "dark" stools. OR for partial colectomy tomorrow, will prep with CLD, Moviprep, and neomycin today, NPO after MN except meds, and give x1 unit pRBC per request of surgery Dr. Murrell.     MEDICATIONS  (STANDING):  amoxicillin 1000 milliGRAM(s) Oral two times a day  ascorbic acid 2000 milliGRAM(s) Oral every 8 hours  aspirin  chewable 81 milliGRAM(s) Oral daily  atorvastatin 40 milliGRAM(s) Oral at bedtime  budesonide 160 MICROgram(s)/formoterol 4.5 MICROgram(s) Inhaler 2 Puff(s) Inhalation two times a day  carvedilol 6.25 milliGRAM(s) Oral every 12 hours  cholecalciferol 1000 Unit(s) Oral daily  dronedarone 400 milliGRAM(s) Oral two times a day  fluticasone propionate 50 MICROgram(s)/spray Nasal Spray 1 Spray(s) Both Nostrils every 12 hours  furosemide   Injectable 20 milliGRAM(s) IV Push daily  gabapentin 300 milliGRAM(s) Oral daily  heparin   Injectable 5000 Unit(s) SubCutaneous every 8 hours  influenza   Vaccine 0.5 milliLiter(s) IntraMuscular once  insulin glargine Injectable (LANTUS) 10 Unit(s) SubCutaneous at bedtime  insulin lispro (ADMELOG) corrective regimen sliding scale   SubCutaneous Before meals and at bedtime  levothyroxine 175 MICROGram(s) Oral daily  metroNIDAZOLE    Tablet 500 milliGRAM(s) Oral every 12 hours  montelukast 10 milliGRAM(s) Oral daily  neomycin 1000 milliGRAM(s) Oral <User Schedule>  oxybutynin 5 milliGRAM(s) Oral two times a day  pantoprazole    Tablet 40 milliGRAM(s) Oral two times a day  polyethylene glycol/electrolyte Solution 2000 milliLiter(s) Oral once  zinc sulfate 220 milliGRAM(s) Oral daily    REVIEW OF SYSTEMS:  CONSTITUTIONAL: No fever or fatigue  RESPIRATORY: No cough; No shortness of breath  CARDIOVASCULAR: No chest pain, no dizziness  GASTROINTESTINAL: mild abdominal pain after taking medication. No nausea, vomiting, diarrhea, constipation. +dark stools  GENITOURINARY: No dysuria or hematuria  NEUROLOGICAL: No headache  SKIN: No itching or rashes  EXT: b/l knee pain, chronic b/l LBP, chronic LE pain R>L  all other systems reviewed and are negative    Vital Signs Last 24 Hrs  T(C): 36.4 (30 Mar 2021 05:05), Max: 36.8 (29 Mar 2021 21:05)  T(F): 97.5 (30 Mar 2021 05:05), Max: 98.2 (29 Mar 2021 21:05)  HR: 65 (30 Mar 2021 05:05) (58 - 67)  BP: 139/60 (30 Mar 2021 05:05) (107/43 - 139/60)  BP(mean): --  RR: 19 (30 Mar 2021 05:05) (18 - 19)  SpO2: 96% (30 Mar 2021 05:05) (96% - 100%)    PHYSICAL EXAMINATION:  GENERAL: NAD, obese, elderly woman  HEAD:  Atraumatic, Normocephalic  EYES: periorbital skin pink, eomi, perrl  NECK: Supple  CHEST/LUNG: no increased WOB, NC, no cough noted  HEART: RRR  ABDOMEN: Soft, Nontender, obese, Bowel sounds present  NERVOUS SYSTEM: alert and oriented x3  EXTREMITIES:  2+ Peripheral Pulses, No edema, +hematoma right knee, NTTP; b/l LE warm, pulses intact  SKIN: warm dry                          8.3    6.34  )-----------( 267      ( 30 Mar 2021 07:52 )             26.5     03-30    135  |  101  |  24<H>  ----------------------------<  150<H>  4.2   |  25  |  1.45<H>    Ca    8.4      30 Mar 2021 07:52  Phos  2.9     03-30  Mg     2.0     03-30    TPro  6.3  /  Alb  2.8<L>  /  TBili  0.3  /  DBili  x   /  AST  19  /  ALT  21  /  AlkPhos  57  03-30    LIVER FUNCTIONS - ( 30 Mar 2021 07:52 )  Alb: 2.8 g/dL / Pro: 6.3 g/dL / ALK PHOS: 57 U/L / ALT: 21 U/L DA / AST: 19 U/L / GGT: x                   CAPILLARY BLOOD GLUCOSE      RADIOLOGY & ADDITIONAL TESTS:                   PGY-1 Progress Note discussed with attending    PAGER #: [[phone]] TILL 5:00 PM  PLEASE CONTACT ON CALL TEAM:  - On Call Team (Please refer to Ana) FROM 5:00 PM - 8:30PM  - Nightfloat Team FROM 8:30 -7:30 AM    CHIEF COMPLAINT & BRIEF HOSPITAL COURSE: 79F [place]i-speaking from home with son, ambulates with walker, PMH HFpEF, CAD (s/p stents), chronic venous stasis, DM, HTN and Afib (Xarelto, s/p PPM), chronic bronchitis with asthmatic component (home O2 prn), JOSE DANIEL (noncompliant with nocturnal CPAP) presented 3/20 with x2 weeks increasing fatigue, SOB, and paleness, with prolonged history of dark bowel movements s/p possible transverse polyp colonoscopy 2020, no further fu. In ED, VS notable for /77 and SpO2 100% 4L NC. Labs notable for Hb 7.7 (baseline ~11), Fe 15, TIBC 457, 3% saturation. Trop, COVID, UA negative. EKG with BFB. CXR unremarkable.  CT a/p unremarkable for signs of bleeding. S/p x1 unit pRBC in ED. Admitted for workup and management of symptomatic anemia. Colonoscopy +invasive colon cancer 3/24 (resulted 3/26).    INTERVAL HPI/OVERNIGHT EVENTS: NAEON. SBPs 100-130s, VS otherwise wnl. Interviewed with assistance of [place]i  Beth [phone]. Reports feeling well, endorses continued "dark" stools. OR for partial colectomy tomorrow, will prep with CLD, Moviprep, and neomycin today, NPO after MN except meds, and give x1 unit pRBC per request of surgery Dr. Murrell. Daughter bedside.    MEDICATIONS  (STANDING):  amoxicillin 1000 milliGRAM(s) Oral two times a day  ascorbic acid 2000 milliGRAM(s) Oral every 8 hours  aspirin  chewable 81 milliGRAM(s) Oral daily  atorvastatin 40 milliGRAM(s) Oral at bedtime  budesonide 160 MICROgram(s)/formoterol 4.5 MICROgram(s) Inhaler 2 Puff(s) Inhalation two times a day  carvedilol 6.25 milliGRAM(s) Oral every 12 hours  cholecalciferol 1000 Unit(s) Oral daily  dronedarone 400 milliGRAM(s) Oral two times a day  fluticasone propionate 50 MICROgram(s)/spray Nasal Spray 1 Spray(s) Both Nostrils every 12 hours  furosemide   Injectable 20 milliGRAM(s) IV Push daily  gabapentin 300 milliGRAM(s) Oral daily  heparin   Injectable 5000 Unit(s) SubCutaneous every 8 hours  influenza   Vaccine 0.5 milliLiter(s) IntraMuscular once  insulin glargine Injectable (LANTUS) 10 Unit(s) SubCutaneous at bedtime  insulin lispro (ADMELOG) corrective regimen sliding scale   SubCutaneous Before meals and at bedtime  levothyroxine 175 MICROGram(s) Oral daily  metroNIDAZOLE    Tablet 500 milliGRAM(s) Oral every 12 hours  montelukast 10 milliGRAM(s) Oral daily  neomycin 1000 milliGRAM(s) Oral <User Schedule>  oxybutynin 5 milliGRAM(s) Oral two times a day  pantoprazole    Tablet 40 milliGRAM(s) Oral two times a day  polyethylene glycol/electrolyte Solution 2000 milliLiter(s) Oral once  zinc sulfate 220 milliGRAM(s) Oral daily    REVIEW OF SYSTEMS:  CONSTITUTIONAL: No fever or fatigue  RESPIRATORY: No cough; No shortness of breath  CARDIOVASCULAR: No chest pain, no dizziness  GASTROINTESTINAL: mild abdominal pain after taking medication. No nausea, vomiting, diarrhea, constipation. +dark stools  GENITOURINARY: No dysuria or hematuria  NEUROLOGICAL: No headache  SKIN: No itching or rashes  EXT: b/l knee pain, chronic b/l LBP, chronic LE pain R>L  all other systems reviewed and are negative    Vital Signs Last 24 Hrs  T(C): 36.4 (30 Mar 2021 05:05), Max: 36.8 (29 Mar 2021 21:05)  T(F): 97.5 (30 Mar 2021 05:05), Max: 98.2 (29 Mar 2021 21:05)  HR: 65 (30 Mar 2021 05:05) (58 - 67)  BP: 139/60 (30 Mar 2021 05:05) (107/43 - 139/60)  BP(mean): --  RR: 19 (30 Mar 2021 05:05) (18 - 19)  SpO2: 96% (30 Mar 2021 05:05) (96% - 100%)    PHYSICAL EXAMINATION:  GENERAL: NAD, obese, elderly woman  HEAD:  Atraumatic, Normocephalic  EYES: periorbital skin pink, eomi, perrl  NECK: Supple  CHEST/LUNG: no increased WOB, NC, no cough noted  HEART: RRR  ABDOMEN: Soft, Nontender, obese, Bowel sounds present  NERVOUS SYSTEM: alert and oriented x3  EXTREMITIES:  2+ Peripheral Pulses, No edema, +hematoma right knee, NTTP; b/l LE warm, pulses intact  SKIN: warm dry                          8.3    6.34  )-----------( 267      ( 30 Mar 2021 07:52 )             26.5     03-30    135  |  101  |  24<H>  ----------------------------<  150<H>  4.2   |  25  |  1.45<H>    Ca    8.4      30 Mar 2021 07:52  Phos  2.9     03-30  Mg     2.0     03-30    TPro  6.3  /  Alb  2.8<L>  /  TBili  0.3  /  DBili  x   /  AST  19  /  ALT  21  /  AlkPhos  57  03-30    LIVER FUNCTIONS - ( 30 Mar 2021 07:52 )  Alb: 2.8 g/dL / Pro: 6.3 g/dL / ALK PHOS: 57 U/L / ALT: 21 U/L DA / AST: 19 U/L / GGT: x                   CAPILLARY BLOOD GLUCOSE      RADIOLOGY & ADDITIONAL TESTS:                   PGY-1 Progress Note discussed with attending    PAGER #: [[phone]] TILL 5:00 PM  PLEASE CONTACT ON CALL TEAM:  - On Call Team (Please refer to Ana) FROM 5:00 PM - 8:30PM  - Nightfloat Team FROM 8:30 -7:30 AM    CHIEF COMPLAINT & BRIEF HOSPITAL COURSE: 79F Nigerian-speaking from home with son, ambulates with walker, PMH HFpEF, CAD (s/p stents), chronic venous stasis, DM, HTN and Afib (Xarelto, s/p PPM), chronic bronchitis with asthmatic component (home O2 prn), JOSE DANIEL (noncompliant with nocturnal CPAP) presented 3/20 with x2 weeks increasing fatigue, SOB, and paleness, with prolonged history of dark bowel movements s/p possible transverse polyp colonoscopy 2020, no further fu. In ED, VS notable for /77 and SpO2 100% 4L NC. Labs notable for Hb 7.7 (baseline ~11), Fe 15, TIBC 457, 3% saturation. Trop, COVID, UA negative. EKG with BFB. CXR unremarkable.  CT a/p unremarkable for signs of bleeding. S/p x1 unit pRBC in ED. Admitted for workup and management of symptomatic anemia. Colonoscopy +invasive colon cancer 3/24 (resulted 3/26).    INTERVAL HPI/OVERNIGHT EVENTS: NAEON. SBPs 100-130s, VS otherwise wnl. Interviewed with assistance of Nigerian  Beth [phone]. Reports feeling well, endorses continued "dark" stools. OR for partial colectomy tomorrow, will prep with CLD, Moviprep, and neomycin today, NPO after MN except meds, and give x1 unit pRBC per request of surgery Dr. Murrell. Daughter bedside.    PM update: patient to be transfused 2 units pRBCs. Ordered.    MEDICATIONS  (STANDING):  amoxicillin 1000 milliGRAM(s) Oral two times a day  ascorbic acid 2000 milliGRAM(s) Oral every 8 hours  aspirin  chewable 81 milliGRAM(s) Oral daily  atorvastatin 40 milliGRAM(s) Oral at bedtime  budesonide 160 MICROgram(s)/formoterol 4.5 MICROgram(s) Inhaler 2 Puff(s) Inhalation two times a day  carvedilol 6.25 milliGRAM(s) Oral every 12 hours  cholecalciferol 1000 Unit(s) Oral daily  dronedarone 400 milliGRAM(s) Oral two times a day  fluticasone propionate 50 MICROgram(s)/spray Nasal Spray 1 Spray(s) Both Nostrils every 12 hours  furosemide   Injectable 20 milliGRAM(s) IV Push daily  gabapentin 300 milliGRAM(s) Oral daily  heparin   Injectable 5000 Unit(s) SubCutaneous every 8 hours  influenza   Vaccine 0.5 milliLiter(s) IntraMuscular once  insulin glargine Injectable (LANTUS) 10 Unit(s) SubCutaneous at bedtime  insulin lispro (ADMELOG) corrective regimen sliding scale   SubCutaneous Before meals and at bedtime  levothyroxine 175 MICROGram(s) Oral daily  metroNIDAZOLE    Tablet 500 milliGRAM(s) Oral every 12 hours  montelukast 10 milliGRAM(s) Oral daily  neomycin 1000 milliGRAM(s) Oral <User Schedule>  oxybutynin 5 milliGRAM(s) Oral two times a day  pantoprazole    Tablet 40 milliGRAM(s) Oral two times a day  polyethylene glycol/electrolyte Solution 2000 milliLiter(s) Oral once  zinc sulfate 220 milliGRAM(s) Oral daily    REVIEW OF SYSTEMS:  CONSTITUTIONAL: No fever or fatigue  RESPIRATORY: No cough; No shortness of breath  CARDIOVASCULAR: No chest pain, no dizziness  GASTROINTESTINAL: mild abdominal pain after taking medication. No nausea, vomiting, diarrhea, constipation. +dark stools  GENITOURINARY: No dysuria or hematuria  NEUROLOGICAL: No headache  SKIN: No itching or rashes  EXT: b/l knee pain, chronic b/l LBP, chronic LE pain R>L  all other systems reviewed and are negative    Vital Signs Last 24 Hrs  T(C): 36.4 (30 Mar 2021 05:05), Max: 36.8 (29 Mar 2021 21:05)  T(F): 97.5 (30 Mar 2021 05:05), Max: 98.2 (29 Mar 2021 21:05)  HR: 65 (30 Mar 2021 05:05) (58 - 67)  BP: 139/60 (30 Mar 2021 05:05) (107/43 - 139/60)  BP(mean): --  RR: 19 (30 Mar 2021 05:05) (18 - 19)  SpO2: 96% (30 Mar 2021 05:05) (96% - 100%)    PHYSICAL EXAMINATION:  GENERAL: NAD, obese, elderly woman  HEAD:  Atraumatic, Normocephalic  EYES: periorbital skin pink, eomi, perrl  NECK: Supple  CHEST/LUNG: no increased WOB, NC, no cough noted  HEART: RRR  ABDOMEN: Soft, Nontender, obese, Bowel sounds present  NERVOUS SYSTEM: alert and oriented x3  EXTREMITIES:  2+ Peripheral Pulses, No edema, +hematoma right knee, NTTP; b/l LE warm, pulses intact  SKIN: warm dry                          8.3    6.34  )-----------( 267      ( 30 Mar 2021 07:52 )             26.5     03-30    135  |  101  |  24<H>  ----------------------------<  150<H>  4.2   |  25  |  1.45<H>    Ca    8.4      30 Mar 2021 07:52  Phos  2.9     03-30  Mg     2.0     03-30    TPro  6.3  /  Alb  2.8<L>  /  TBili  0.3  /  DBili  x   /  AST  19  /  ALT  21  /  AlkPhos  57  03-30    LIVER FUNCTIONS - ( 30 Mar 2021 07:52 )  Alb: 2.8 g/dL / Pro: 6.3 g/dL / ALK PHOS: 57 U/L / ALT: 21 U/L DA / AST: 19 U/L / GGT: x                   CAPILLARY BLOOD GLUCOSE      RADIOLOGY & ADDITIONAL TESTS:

## 2021-03-30 NOTE — PROGRESS NOTE ADULT - PROBLEM SELECTOR PLAN 3
adm CXR concerning for central congestion, s/p x4 doses Lasix 40mg IV BID  -BNP 1K  -TTE 3/21 with G1DD and mildly high RVP  -hold home torsemide, continue daily Lasix 20mg IV while inpatient  -Cr resolved  -pulm Dr. Cruz following

## 2021-03-30 NOTE — PROGRESS NOTE ADULT - ATTENDING COMMENTS
Pt cleared at moderate to high risk for OR in a.m., consent signed.  Pt medically optimized for partial colonic resection in a.m. (possibly via laparoscopic route).  Will cont in current regimen of po medication.  GI/DVT PPx

## 2021-03-30 NOTE — PROGRESS NOTE ADULT - SUBJECTIVE AND OBJECTIVE BOX
[   ] ICU                                          [   ] CCU                                      [ X  ] Medical Floor    Patient is a 81 year old female with Gastrointestinal bleeding and symptomatic anemia. GI consulted to evaluate.        79 years old female from home, ambulates with walker, lives with son, with past medical history significant for CAD (s/p stents), CHF, chronic venous stasis, DM, HTN, Afib (on Xarelto, s/p PPM), chronic bronchitis with asthmatic component ( on Oxygen PRN at home ), and osteoarthritis presented to the emergency room with 2 weeks history of worsening SOB, Fatigue, associated with a few episodes of black stool.    pt had virtual  colonoscopy 1 year ago and the result was questionable. She never followed up.  Patient also c/o poor appetite with chronic diarrhea but denies abdominal pain, nausea, vomiting, hematemesis, hematochezia, melena, fever, chills, chest pain, SOB, cough, hematuria, dysuria or diarrhea.       Today patient appears comfortable with no new complaint. No abdominal pain, N/V, hematemesis, hematochezia, melena, fever, chills, chest pain, SOB, cough or diarrhea reported.      PAIN MANAGEMENT:  Pain Scale:                 0/10  Pain Location:        Prior Colonoscopy:  No prior colonoscopy      PAST MEDICAL HISTORY  Obesity  Atrial fibrillation  Hypothyroidism  Venous stasis  Irritable bowel syndrome   CHF    Hyperlipidemia   Hypertension  DM  Myocardial Infarction  CAD    Asthma      PAST SURGICAL HISTORY  Coronary angiogram  Coronary stent placement  Pacemaker placement       Allergies    No Known Allergies    Intolerances  None        SOCIAL HISTORY  Advanced Directives:       [ X ] Full Code       [  ] DNR  Marital Status:         [  ] M      [ X ] S      [  ] D       [  ] W  Children:       [ X ] Yes      [  ] No  Occupation:        [  ] Employed       [ X ] Unemployed       [  ] Retired  Diet:       [ X Regular       [  ] PEG feeding          [  ] NG tube feeding  Drug Use:           [ X ] Patient denied          [  ] Yes  Alcohol:           [X] No             [  ] Yes (socially)         [  ] Yes (chronic)  Tobacco:           [  ] Yes           [ X ] No      FAMILY HISTORY  [ X ] Heart Disease            [ X ] Diabetes             [ X ] HTN             [  ] Colon Cancer             [  ] Stomach Cancer              [  ] Pancreatic Cancer      VITALS  Vital Signs Last 24 Hrs  T(C): 36.1 (30 Mar 2021 14:03), Max: 36.8 (29 Mar 2021 21:05)  T(F): 97 (30 Mar 2021 14:03), Max: 98.2 (29 Mar 2021 21:05)  HR: 62 (30 Mar 2021 14:03) (58 - 65)  BP: 139/53 (30 Mar 2021 14:03) (132/90 - 139/60)   RR: 18 (30 Mar 2021 14:03) (18 - 19)  SpO2: 100% (30 Mar 2021 14:03) (96% - 100%)       MEDICATIONS  (STANDING):  amoxicillin 1000 milliGRAM(s) Oral two times a day  ascorbic acid 2000 milliGRAM(s) Oral every 8 hours  aspirin  chewable 81 milliGRAM(s) Oral daily  atorvastatin 40 milliGRAM(s) Oral at bedtime  budesonide 160 MICROgram(s)/formoterol 4.5 MICROgram(s) Inhaler 2 Puff(s) Inhalation two times a day  carvedilol 6.25 milliGRAM(s) Oral every 12 hours  cholecalciferol 1000 Unit(s) Oral daily  dronedarone 400 milliGRAM(s) Oral two times a day  fluticasone propionate 50 MICROgram(s)/spray Nasal Spray 1 Spray(s) Both Nostrils every 12 hours  furosemide   Injectable 20 milliGRAM(s) IV Push once  gabapentin 300 milliGRAM(s) Oral daily  heparin   Injectable 5000 Unit(s) SubCutaneous every 8 hours  influenza   Vaccine 0.5 milliLiter(s) IntraMuscular once  insulin glargine Injectable (LANTUS) 10 Unit(s) SubCutaneous at bedtime  insulin lispro (ADMELOG) corrective regimen sliding scale   SubCutaneous Before meals and at bedtime  levothyroxine 175 MICROGram(s) Oral daily  metroNIDAZOLE    Tablet 500 milliGRAM(s) Oral every 12 hours  montelukast 10 milliGRAM(s) Oral daily  neomycin 1000 milliGRAM(s) Oral <User Schedule>  oxybutynin 5 milliGRAM(s) Oral two times a day  pantoprazole    Tablet 40 milliGRAM(s) Oral two times a day  zinc sulfate 220 milliGRAM(s) Oral daily    MEDICATIONS  (PRN):                            8.3    6.34  )-----------( 267      ( 30 Mar 2021 07:52 )             26.5       03-30    135  |  101  |  24<H>  ----------------------------<  150<H>  4.2   |  25  |  1.45<H>    Ca    8.4      30 Mar 2021 07:52  Phos  2.9     03-30  Mg     2.0     03-30    TPro  6.3  /  Alb  2.8<L>  /  TBili  0.3  /  DBili  x   /  AST  19  /  ALT  21  /  AlkPhos  57  03-30       [   ] ICU                                          [   ] CCU                                      [ X  ] Medical Floor    Patient is a 81 year old female with Gastrointestinal bleeding and symptomatic anemia. GI consulted to evaluate.        81 years old female from home, ambulates with walker, lives with son, with past medical history significant for CAD (s/p stents), CHF, chronic venous stasis, DM, HTN, Afib (on Xarelto, s/p PPM), chronic bronchitis with asthmatic component ( on Oxygen PRN at home ), and osteoarthritis presented to the emergency room with 2 weeks history of worsening SOB, Fatigue, associated with a few episodes of black stool.    pt had virtual  colonoscopy 1 year ago and the result was questionable. She never followed up.  Patient also c/o poor appetite with chronic diarrhea but denies abdominal pain, nausea, vomiting, hematemesis, hematochezia, melena, fever, chills, chest pain, SOB, cough, hematuria, dysuria or diarrhea.       Today patient appears comfortable with no new complaint. No abdominal pain, N/V, hematemesis, hematochezia, melena, fever, chills, chest pain, SOB, cough or diarrhea reported.      PAIN MANAGEMENT:  Pain Scale:                 0/10  Pain Location:        Prior Colonoscopy:  No prior colonoscopy      PAST MEDICAL HISTORY  Obesity  Atrial fibrillation  Hypothyroidism  Venous stasis  Irritable bowel syndrome   CHF    Hyperlipidemia   Hypertension  DM  Myocardial Infarction  CAD    Asthma      PAST SURGICAL HISTORY  Coronary angiogram  Coronary stent placement  Pacemaker placement       Allergies    No Known Allergies    Intolerances  None        SOCIAL HISTORY  Advanced Directives:       [ X ] Full Code       [  ] DNR  Marital Status:         [  ] M      [ X ] S      [  ] D       [  ] W  Children:       [ X ] Yes      [  ] No  Occupation:        [  ] Employed       [ X ] Unemployed       [  ] Retired  Diet:       [ X Regular       [  ] PEG feeding          [  ] NG tube feeding  Drug Use:           [ X ] Patient denied          [  ] Yes  Alcohol:           [X] No             [  ] Yes (socially)         [  ] Yes (chronic)  Tobacco:           [  ] Yes           [ X ] No      FAMILY HISTORY  [ X ] Heart Disease            [ X ] Diabetes             [ X ] HTN             [  ] Colon Cancer             [  ] Stomach Cancer              [  ] Pancreatic Cancer      VITALS  Vital Signs Last 24 Hrs  T(C): 36.1 (30 Mar 2021 14:03), Max: 36.8 (29 Mar 2021 21:05)  T(F): 97 (30 Mar 2021 14:03), Max: 98.2 (29 Mar 2021 21:05)  HR: 62 (30 Mar 2021 14:03) (58 - 65)  BP: 139/53 (30 Mar 2021 14:03) (132/90 - 139/60)   RR: 18 (30 Mar 2021 14:03) (18 - 19)  SpO2: 100% (30 Mar 2021 14:03) (96% - 100%)       MEDICATIONS  (STANDING):  amoxicillin 1000 milliGRAM(s) Oral two times a day  ascorbic acid 2000 milliGRAM(s) Oral every 8 hours  aspirin  chewable 81 milliGRAM(s) Oral daily  atorvastatin 40 milliGRAM(s) Oral at bedtime  budesonide 160 MICROgram(s)/formoterol 4.5 MICROgram(s) Inhaler 2 Puff(s) Inhalation two times a day  carvedilol 6.25 milliGRAM(s) Oral every 12 hours  cholecalciferol 1000 Unit(s) Oral daily  dronedarone 400 milliGRAM(s) Oral two times a day  fluticasone propionate 50 MICROgram(s)/spray Nasal Spray 1 Spray(s) Both Nostrils every 12 hours  furosemide   Injectable 20 milliGRAM(s) IV Push once  gabapentin 300 milliGRAM(s) Oral daily  heparin   Injectable 5000 Unit(s) SubCutaneous every 8 hours  influenza   Vaccine 0.5 milliLiter(s) IntraMuscular once  insulin glargine Injectable (LANTUS) 10 Unit(s) SubCutaneous at bedtime  insulin lispro (ADMELOG) corrective regimen sliding scale   SubCutaneous Before meals and at bedtime  levothyroxine 175 MICROGram(s) Oral daily  metroNIDAZOLE    Tablet 500 milliGRAM(s) Oral every 12 hours  montelukast 10 milliGRAM(s) Oral daily  neomycin 1000 milliGRAM(s) Oral <User Schedule>  oxybutynin 5 milliGRAM(s) Oral two times a day  pantoprazole    Tablet 40 milliGRAM(s) Oral two times a day  zinc sulfate 220 milliGRAM(s) Oral daily    MEDICATIONS  (PRN):                            8.3    6.34  )-----------( 267      ( 30 Mar 2021 07:52 )             26.5       03-30    135  |  101  |  24<H>  ----------------------------<  150<H>  4.2   |  25  |  1.45<H>    Ca    8.4      30 Mar 2021 07:52  Phos  2.9     03-30  Mg     2.0     03-30    TPro  6.3  /  Alb  2.8<L>  /  TBili  0.3  /  DBili  x   /  AST  19  /  ALT  21  /  AlkPhos  57  03-30

## 2021-03-30 NOTE — PROGRESS NOTE ADULT - ASSESSMENT
81y Female with invasive adenocarcinoma of ascending colon    - OR 3/31/21 for laparoscopic assisted right hemicolectomy with Dr. Hu  - Clear liquid diet today  - Bowel prep today  - NPO past midnight, except medications  - IVF while NPO  - Consent to be obtained  - Medical clearance for OR  - Recommend administering 1u of pRBCs today  - Discussed with Dr. Hu

## 2021-03-30 NOTE — PROGRESS NOTE ADULT - PROBLEM SELECTOR PLAN 1
presented 3/20 with x2 weeks increasing fatigue, SOB, and paleness, with prolonged history of dark bowel movements s/p possible transverse polyp colonoscopy 2020, no further fu  -in ED, VS notable for /77, SpO2 100% 4L NC, adm trop, COVID, UA negative  -pre-RBC adm Hb 7.7, improved s/p x1pRBC in ED, Fe 15, TIBC 457, 3% sat  -CT a/p unremarkable for signs of bleeding  -IV iron 3/20-23 and 3/26-27  -Fe and %sat improved s/p transfusion and Venofer, Hb stable, transfuse if <8  -EGD/col 3/22 with GI Dr. Moreland, gastritis, esophagitis, no GIB, path +H pylori  -H pylori treatment:       -amox 1g q12 x7 days>500mg Q 12hours x7 days 3/24pm-27, restarted 3/29          -Flagyl 500 mg q12 3/24pm-27, restarted 3/29         -PPI BID x14 days 3/23-       -s/p clarithromycin 3/25-27, held given interactions with Multaq, statin, flonase  -ID Dr. Butcher following   -colonoscopy 3/24 with polyps (removed), abdominal XR negative for free air 3/24   -repeat colonoscopy 3/25 notable for diverticulosis and internal hemorrhoids  -path resulted 3/26 +invasive colonic adenocarcinoma with tubular adenoma  -partial colon resection with surgery Dr. Murrell 3/31  -x1 pRBC pre-op, moviprep, neomycin, CLD>NPO 3/30  -COVID positive 3/27, retest negative 3/28, +Ab, first false positive  -heme/onc Dr. Arango following

## 2021-03-30 NOTE — PROGRESS NOTE ADULT - SUBJECTIVE AND OBJECTIVE BOX
Patient is seen and examined at the bed side, is afebrile.  The Ortho recommendation  appreciated regarding  Right knee mass anterior to the patella.      REVIEW OF SYSTEMS: All other review systems are negative      ALLERGIES: No Known Allergies      Vital Signs Last 24 Hrs  T(C): 36.4 (30 Mar 2021 19:10), Max: 36.8 (29 Mar 2021 21:05)  T(F): 97.6 (30 Mar 2021 19:10), Max: 98.2 (29 Mar 2021 21:05)  HR: 61 (30 Mar 2021 19:10) (58 - 65)  BP: 177/48 (30 Mar 2021 19:10) (132/90 - 178/58)  BP(mean): --  RR: 18 (30 Mar 2021 19:10) (18 - 19)  SpO2: 100% (30 Mar 2021 19:10) (96% - 100%)      PHYSICAL EXAM:  GENERAL: Not in distress   CHEST/LUNG: Not using accessory muscles   HEART: s1 and s2 present  ABDOMEN:  Nontender and  Nondistended  EXTREMITIES: No pedal  edema  CNS: Awake and Alert      LABS:                        8.3    6.34  )-----------( 267      ( 30 Mar 2021 07:52 )             26.5                7.9    8.47  )-----------( 269      ( 26 Mar 2021 15:57 )             25.0                           7.8    13.47 )-----------( 273      ( 25 Mar 2021 06:40 )             24.2       03-30    135  |  101  |  24<H>  ----------------------------<  150<H>  4.2   |  25  |  1.45<H>    Ca    8.4      30 Mar 2021 07:52  Phos  2.9     03-30  Mg     2.0     30    TPro  6.3  /  Alb  2.8<L>  /  TBili  0.3  /  DBili  x   /  AST  19  /  ALT  21  /  AlkPhos  57  30    29    138  |  102  |  22<H>  ----------------------------<  79  3.7   |  28  |  1.31<H>    Ca    8.7      29 Mar 2021 07:23  Phos  3.4     -  Mg     2.2         TPro  6.3  /  Alb  2.9<L>  /  TBili  0.2  /  DBili  x   /  AST  14  /  ALT  18  /  AlkPhos  61      138  |  103  |  35<H>  ----------------------------<  151<H>  4.0   |  25  |  1.57<H>    Ca    8.4      24 Mar 2021 07:00  Phos  3.9     -  Mg     2.2         TPro  6.7  /  Alb  3.1<L>  /  TBili  0.3  /  DBili  x   /  AST  16  /  ALT  22  /  AlkPhos  71        CAPILLARY BLOOD GLUCOSE  POCT Blood Glucose.: 262 mg/dL (22 Mar 2021 16:34)  POCT Blood Glucose.: 150 mg/dL (22 Mar 2021 12:45)  POCT Blood Glucose.: 147 mg/dL (22 Mar 2021 10:56)  POCT Blood Glucose.: 196 mg/dL (22 Mar 2021 07:40      Urinalysis Basic - ( 20 Mar 2021 20:54 )  Color: Yellow / Appearance: Clear / S.015 / pH: x  Gluc: x / Ketone: Negative  / Bili: Negative / Urobili: Negative   Blood: x / Protein: Negative / Nitrite: Negative   Leuk Esterase: Negative / RBC: x / WBC x   Sq Epi: x / Non Sq Epi: x / Bacteria: x        MEDICATIONS  (STANDING):    amoxicillin 1000 milliGRAM(s) Oral two times a day  ascorbic acid 2000 milliGRAM(s) Oral every 8 hours  aspirin  chewable 81 milliGRAM(s) Oral daily  atorvastatin 40 milliGRAM(s) Oral at bedtime  budesonide 160 MICROgram(s)/formoterol 4.5 MICROgram(s) Inhaler 2 Puff(s) Inhalation two times a day  carvedilol 6.25 milliGRAM(s) Oral every 12 hours  chlorhexidine 2% Cloths 1 Application(s) Topical once  cholecalciferol 1000 Unit(s) Oral daily  dronedarone 400 milliGRAM(s) Oral two times a day  fluticasone propionate 50 MICROgram(s)/spray Nasal Spray 1 Spray(s) Both Nostrils every 12 hours  gabapentin 300 milliGRAM(s) Oral daily  heparin   Injectable 5000 Unit(s) SubCutaneous every 8 hours  influenza   Vaccine 0.5 milliLiter(s) IntraMuscular once  insulin glargine Injectable (LANTUS) 10 Unit(s) SubCutaneous at bedtime  insulin lispro (ADMELOG) corrective regimen sliding scale   SubCutaneous Before meals and at bedtime  levothyroxine 175 MICROGram(s) Oral daily  metroNIDAZOLE    Tablet 500 milliGRAM(s) Oral every 12 hours  montelukast 10 milliGRAM(s) Oral daily  neomycin 1000 milliGRAM(s) Oral <User Schedule>  oxybutynin 5 milliGRAM(s) Oral two times a day  pantoprazole    Tablet 40 milliGRAM(s) Oral two times a day  zinc sulfate 220 milliGRAM(s) Oral daily        RADIOLOGY & ADDITIONAL TESTS:    < from: 12 Lead ECG (21 @ 16:02) >  Ventricular Rate 73 BPM    Atrial Rate 73 BPM    P-R Interval 188 ms    QRS Duration 126 ms    Q-T Interval 414 ms    QTC Calculation(Bazett) 456 ms    P Axis 59 degrees    R Axis -57 degrees    T Axis 56 degrees    < end of copied text >    Surgical Pathology Report (21 @ 10:15)   Surgical Pathology Report:   ACCESSION No: [id number]   KWAME MOSQUERA 2   Surgical Final Report   Final Diagnosis   1. Ascending colon polyp; hot snare:   - Invasive colonic adenocarcinoma arising in association with   tubular adenoma. See comment.   - Fragments of tubular adenoma (the smallest polyps).   2. Hepatic flexure polyp; biopsy:   - Tubular adenoma.   3. Descending colon polyp; biopsy:   - Inflammatory polyps.   Verified by: Priscila Arana MD   (Electronic Signature)   Reported on: 21 11:11 EDT, [facility],     Surgical Pathology Report (21 @ 11:37)   Surgical Pathology Report: , ACCESSION No: [id number]   KWAME MOSQUERA 2   Surgical Final Report , Final Diagnosis   1. Gastric antrum, biopsy: Chronic active gastritis, diffuse,   with focal lymphoid aggregate; H. Pylori associated. (Special  stain for H. Pylori is positive)   2. Esophagus biopsy: Patchy acute esophagitis. Special stain for  fungi is negative.   Verified by: Mariam Perez M.D. Surgical Pathology Report (21 @ 11:37)     3/28/21 : Xray Knee 3 Views, Right (21 @ 14:20) >    IMPRESSION: Round mass anterior to the patella is noted. This is new since 2018.      3/20/21 : CT Angio Abdomen and Pelvis w/ IV Cont (21 @ 22:55) No CT evidence of acute gastrointestinal hemorrhage. Few scattered colonic diverticula.    3/20/21 : CT Angio Abdomen and Pelvis w/ IV Cont (21 @ 22:55) LOWER CHEST: Partially imaged pacemaker leads. Mitral annular calcification.    3/20/21 : Xray Chest 1 View- PORTABLE-Urgent (21 @ 16:38): There is left-sided defibrillator. The heart is normal in size. The lungs are clear.          MICROBIOLOGY DATA:    COVID-19 Drew Domain Antibody (21 @ 11:07)   COVID-19 Drew Domain Antibody Result: >250.00:    Culture - Blood (21 @ 21:50)   Specimen Source: .Blood Blood-Venous   Culture Results: No growth to date.     Culture - Blood (21 @ 21:50)   Specimen Source: .Blood Blood-Venous   Culture Results: No growth to date.     COVID-19 PCR . (21 @ 16:32)   COVID-19 PCR: NotDetec:     MRSA/MSSA PCR (20 @ 14:34)   MRSA PCR Result.: NotDetec:     FLU A B RSV Detection by PCR (20 @ 20:00)   Flu A Result: NotDetec             Patient is seen and examined at the bed side, is afebrile.  The Surgery follow up noted regarding OR schedule.       REVIEW OF SYSTEMS: All other review systems are negative      ALLERGIES: No Known Allergies      Vital Signs Last 24 Hrs  T(C): 36.4 (30 Mar 2021 19:10), Max: 36.8 (29 Mar 2021 21:05)  T(F): 97.6 (30 Mar 2021 19:10), Max: 98.2 (29 Mar 2021 21:05)  HR: 61 (30 Mar 2021 19:10) (58 - 65)  BP: 177/48 (30 Mar 2021 19:10) (132/90 - 178/58)  BP(mean): --  RR: 18 (30 Mar 2021 19:10) (18 - 19)  SpO2: 100% (30 Mar 2021 19:10) (96% - 100%)      PHYSICAL EXAM:  GENERAL: Not in distress   CHEST/LUNG: Not using accessory muscles   HEART: s1 and s2 present  ABDOMEN:  Nontender and  Nondistended  EXTREMITIES: No pedal  edema  CNS: Awake and Alert      LABS:                        8.3    6.34  )-----------( 267      ( 30 Mar 2021 07:52 )             26.5                7.9    8.47  )-----------( 269      ( 26 Mar 2021 15:57 )             25.0                           7.8    13.47 )-----------( 273      ( 25 Mar 2021 06:40 )             24.2       03-30    135  |  101  |  24<H>  ----------------------------<  150<H>  4.2   |  25  |  1.45<H>    Ca    8.4      30 Mar 2021 07:52  Phos  2.9     03-30  Mg     2.0     30    TPro  6.3  /  Alb  2.8<L>  /  TBili  0.3  /  DBili  x   /  AST  19  /  ALT  21  /  AlkPhos  57  -30    03-24    138  |  103  |  35<H>  ----------------------------<  151<H>  4.0   |  25  |  1.57<H>    Ca    8.4      24 Mar 2021 07:00  Phos  3.9     03-24  Mg     2.2     03-24    TPro  6.7  /  Alb  3.1<L>  /  TBili  0.3  /  DBili  x   /  AST  16  /  ALT  22  /  AlkPhos  71  03-24      CAPILLARY BLOOD GLUCOSE  POCT Blood Glucose.: 262 mg/dL (22 Mar 2021 16:34)  POCT Blood Glucose.: 150 mg/dL (22 Mar 2021 12:45)  POCT Blood Glucose.: 147 mg/dL (22 Mar 2021 10:56)  POCT Blood Glucose.: 196 mg/dL (22 Mar 2021 07:40      Urinalysis Basic - ( 20 Mar 2021 20:54 )  Color: Yellow / Appearance: Clear / S.015 / pH: x  Gluc: x / Ketone: Negative  / Bili: Negative / Urobili: Negative   Blood: x / Protein: Negative / Nitrite: Negative   Leuk Esterase: Negative / RBC: x / WBC x   Sq Epi: x / Non Sq Epi: x / Bacteria: x        MEDICATIONS  (STANDING):    amoxicillin 1000 milliGRAM(s) Oral two times a day  ascorbic acid 2000 milliGRAM(s) Oral every 8 hours  aspirin  chewable 81 milliGRAM(s) Oral daily  atorvastatin 40 milliGRAM(s) Oral at bedtime  budesonide 160 MICROgram(s)/formoterol 4.5 MICROgram(s) Inhaler 2 Puff(s) Inhalation two times a day  carvedilol 6.25 milliGRAM(s) Oral every 12 hours  chlorhexidine 2% Cloths 1 Application(s) Topical once  cholecalciferol 1000 Unit(s) Oral daily  dronedarone 400 milliGRAM(s) Oral two times a day  fluticasone propionate 50 MICROgram(s)/spray Nasal Spray 1 Spray(s) Both Nostrils every 12 hours  gabapentin 300 milliGRAM(s) Oral daily  heparin   Injectable 5000 Unit(s) SubCutaneous every 8 hours  influenza   Vaccine 0.5 milliLiter(s) IntraMuscular once  insulin glargine Injectable (LANTUS) 10 Unit(s) SubCutaneous at bedtime  insulin lispro (ADMELOG) corrective regimen sliding scale   SubCutaneous Before meals and at bedtime  levothyroxine 175 MICROGram(s) Oral daily  metroNIDAZOLE    Tablet 500 milliGRAM(s) Oral every 12 hours  montelukast 10 milliGRAM(s) Oral daily  neomycin 1000 milliGRAM(s) Oral <User Schedule>  oxybutynin 5 milliGRAM(s) Oral two times a day  pantoprazole    Tablet 40 milliGRAM(s) Oral two times a day  zinc sulfate 220 milliGRAM(s) Oral daily        RADIOLOGY & ADDITIONAL TESTS:    < from: 12 Lead ECG (21 @ 16:02) >  Ventricular Rate 73 BPM    Atrial Rate 73 BPM    P-R Interval 188 ms    QRS Duration 126 ms    Q-T Interval 414 ms    QTC Calculation(Bazett) 456 ms    P Axis 59 degrees    R Axis -57 degrees    T Axis 56 degrees    < end of copied text >    Surgical Pathology Report (21 @ 10:15)   Surgical Pathology Report:   ACCESSION No: [id number]   KWAME MOSQUERA 2   Surgical Final Report   Final Diagnosis   1. Ascending colon polyp; hot snare:   - Invasive colonic adenocarcinoma arising in association with   tubular adenoma. See comment.   - Fragments of tubular adenoma (the smallest polyps).   2. Hepatic flexure polyp; biopsy:   - Tubular adenoma.   3. Descending colon polyp; biopsy:   - Inflammatory polyps.   Verified by: Priscila Arana MD   (Electronic Signature)   Reported on: 21 11:11 EDT, [facility],     Surgical Pathology Report (21 @ 11:37)   Surgical Pathology Report: , ACCESSION No: [id number]   KWAME MOSQUERA 2   Surgical Final Report , Final Diagnosis   1. Gastric antrum, biopsy: Chronic active gastritis, diffuse,   with focal lymphoid aggregate; H. Pylori associated. (Special  stain for H. Pylori is positive)   2. Esophagus biopsy: Patchy acute esophagitis. Special stain for  fungi is negative.   Verified by: Mariam Perez M.D. Surgical Pathology Report (21 @ 11:37)     3/28/21 : Xray Knee 3 Views, Right (21 @ 14:20) >    IMPRESSION: Round mass anterior to the patella is noted. This is new since 2018.      3/20/21 : CT Angio Abdomen and Pelvis w/ IV Cont (21 @ 22:55) No CT evidence of acute gastrointestinal hemorrhage. Few scattered colonic diverticula.    3/20/21 : CT Angio Abdomen and Pelvis w/ IV Cont (21 @ 22:55) LOWER CHEST: Partially imaged pacemaker leads. Mitral annular calcification.    3/20/21 : Xray Chest 1 View- PORTABLE-Urgent (21 @ 16:38): There is left-sided defibrillator. The heart is normal in size. The lungs are clear.          MICROBIOLOGY DATA:    COVID-19 Drew Domain Antibody (21 @ 11:07)   COVID-19 Drew Domain Antibody Result: >250.00:    Culture - Blood (21 @ 21:50)   Specimen Source: .Blood Blood-Venous   Culture Results: No growth to date.     Culture - Blood (21 @ 21:50)   Specimen Source: .Blood Blood-Venous   Culture Results: No growth to date.     COVID-19 PCR . (21 @ 16:32)   COVID-19 PCR: NotDetec:     MRSA/MSSA PCR (20 @ 14:34)   MRSA PCR Result.: NotDetec:     FLU A B RSV Detection by PCR (20 @ 20:00)   Flu A Result: NotDetec

## 2021-03-30 NOTE — PROGRESS NOTE ADULT - ASSESSMENT
79 years old [place]ian-speaking female from home, ambulates with walker, lives with son, with PMHx of HFpEF, CAD (s/p stents), chronic venous stasis, DM, HTN and Afib (on Xarelto, s/p PPM), chronic bronchitis with asthmatic component ( on Oxygen PRN at home ), JOSE DANIEL ( supposed to be on nocturnal CPAP , but non compliant) presents to the ED with chief complaint of hypotension , SOB and  fatigue for almost 2 weeks.,symptomatic Fe def anemia with Colon ca.  1.Pt at moderte risk for sue-operative cardiac complication. Pt for colon resection in am.  2.PAF- multaq, coreg, asa.  3.HTN-cont bp medication.  4.CAD-asa,coreg,statin.  5.DM-insulin.  6.Anemia-Transfuse 2 prbc today. .  7.Asthma-MDI.  8.Tx for H.Pylori+.  9.Colon ca-Surgical and Heme/Onc f/u.  10.GI and DVT prophylaxis.  11.Rt knee pain,hx of fall 2 yrs ago with a cyst-Ortho eval appreciated.

## 2021-03-31 ENCOUNTER — RESULT REVIEW (OUTPATIENT)
Age: 81
End: 2021-03-31

## 2021-03-31 DIAGNOSIS — G47.33 OBSTRUCTIVE SLEEP APNEA (ADULT) (PEDIATRIC): ICD-10-CM

## 2021-03-31 DIAGNOSIS — Z98.890 OTHER SPECIFIED POSTPROCEDURAL STATES: ICD-10-CM

## 2021-03-31 DIAGNOSIS — I50.9 HEART FAILURE, UNSPECIFIED: ICD-10-CM

## 2021-03-31 DIAGNOSIS — R10.84 GENERALIZED ABDOMINAL PAIN: ICD-10-CM

## 2021-03-31 DIAGNOSIS — C18.9 MALIGNANT NEOPLASM OF COLON, UNSPECIFIED: ICD-10-CM

## 2021-03-31 DIAGNOSIS — E11.9 TYPE 2 DIABETES MELLITUS WITHOUT COMPLICATIONS: ICD-10-CM

## 2021-03-31 LAB
ALBUMIN SERPL ELPH-MCNC: 2.8 G/DL — LOW (ref 3.5–5)
ALBUMIN SERPL ELPH-MCNC: 3.1 G/DL — LOW (ref 3.5–5)
ALP SERPL-CCNC: 58 U/L — SIGNIFICANT CHANGE UP (ref 40–120)
ALP SERPL-CCNC: 63 U/L — SIGNIFICANT CHANGE UP (ref 40–120)
ALT FLD-CCNC: 31 U/L DA — SIGNIFICANT CHANGE UP (ref 10–60)
ALT FLD-CCNC: 33 U/L DA — SIGNIFICANT CHANGE UP (ref 10–60)
ANION GAP SERPL CALC-SCNC: 9 MMOL/L — SIGNIFICANT CHANGE UP (ref 5–17)
ANION GAP SERPL CALC-SCNC: 9 MMOL/L — SIGNIFICANT CHANGE UP (ref 5–17)
APPEARANCE UR: CLEAR — SIGNIFICANT CHANGE UP
APTT BLD: 34.4 SEC — SIGNIFICANT CHANGE UP (ref 27.5–35.5)
AST SERPL-CCNC: 34 U/L — SIGNIFICANT CHANGE UP (ref 10–40)
AST SERPL-CCNC: 44 U/L — HIGH (ref 10–40)
BASOPHILS # BLD AUTO: 0.03 K/UL — SIGNIFICANT CHANGE UP (ref 0–0.2)
BASOPHILS NFR BLD AUTO: 0.3 % — SIGNIFICANT CHANGE UP (ref 0–2)
BILIRUB SERPL-MCNC: 0.5 MG/DL — SIGNIFICANT CHANGE UP (ref 0.2–1.2)
BILIRUB SERPL-MCNC: 0.5 MG/DL — SIGNIFICANT CHANGE UP (ref 0.2–1.2)
BILIRUB UR-MCNC: NEGATIVE — SIGNIFICANT CHANGE UP
BLD GP AB SCN SERPL QL: SIGNIFICANT CHANGE UP
BUN SERPL-MCNC: 18 MG/DL — SIGNIFICANT CHANGE UP (ref 7–18)
BUN SERPL-MCNC: 21 MG/DL — HIGH (ref 7–18)
CALCIUM SERPL-MCNC: 8.2 MG/DL — LOW (ref 8.4–10.5)
CALCIUM SERPL-MCNC: 8.6 MG/DL — SIGNIFICANT CHANGE UP (ref 8.4–10.5)
CHLORIDE SERPL-SCNC: 106 MMOL/L — SIGNIFICANT CHANGE UP (ref 96–108)
CHLORIDE SERPL-SCNC: 106 MMOL/L — SIGNIFICANT CHANGE UP (ref 96–108)
CO2 SERPL-SCNC: 22 MMOL/L — SIGNIFICANT CHANGE UP (ref 22–31)
CO2 SERPL-SCNC: 23 MMOL/L — SIGNIFICANT CHANGE UP (ref 22–31)
COLOR SPEC: YELLOW — SIGNIFICANT CHANGE UP
CREAT SERPL-MCNC: 1.33 MG/DL — HIGH (ref 0.5–1.3)
CREAT SERPL-MCNC: 1.46 MG/DL — HIGH (ref 0.5–1.3)
D DIMER BLD IA.RAPID-MCNC: 417 NG/ML DDU — HIGH
DIFF PNL FLD: NEGATIVE — SIGNIFICANT CHANGE UP
EOSINOPHIL # BLD AUTO: 0.13 K/UL — SIGNIFICANT CHANGE UP (ref 0–0.5)
EOSINOPHIL NFR BLD AUTO: 1.4 % — SIGNIFICANT CHANGE UP (ref 0–6)
GLUCOSE BLDC GLUCOMTR-MCNC: 126 MG/DL — HIGH (ref 70–99)
GLUCOSE BLDC GLUCOMTR-MCNC: 158 MG/DL — HIGH (ref 70–99)
GLUCOSE BLDC GLUCOMTR-MCNC: 172 MG/DL — HIGH (ref 70–99)
GLUCOSE BLDC GLUCOMTR-MCNC: 183 MG/DL — HIGH (ref 70–99)
GLUCOSE BLDC GLUCOMTR-MCNC: 184 MG/DL — HIGH (ref 70–99)
GLUCOSE SERPL-MCNC: 131 MG/DL — HIGH (ref 70–99)
GLUCOSE SERPL-MCNC: 194 MG/DL — HIGH (ref 70–99)
GLUCOSE UR QL: NEGATIVE — SIGNIFICANT CHANGE UP
HCT VFR BLD CALC: 30.8 % — LOW (ref 34.5–45)
HCT VFR BLD CALC: 32.6 % — LOW (ref 34.5–45)
HGB BLD-MCNC: 10 G/DL — LOW (ref 11.5–15.5)
HGB BLD-MCNC: 10.8 G/DL — LOW (ref 11.5–15.5)
IMM GRANULOCYTES NFR BLD AUTO: 0.6 % — SIGNIFICANT CHANGE UP (ref 0–1.5)
INR BLD: 1.11 RATIO — SIGNIFICANT CHANGE UP (ref 0.88–1.16)
KETONES UR-MCNC: ABNORMAL
LACTATE SERPL-SCNC: 0.7 MMOL/L — SIGNIFICANT CHANGE UP (ref 0.7–2)
LEUKOCYTE ESTERASE UR-ACNC: NEGATIVE — SIGNIFICANT CHANGE UP
LYMPHOCYTES # BLD AUTO: 1.65 K/UL — SIGNIFICANT CHANGE UP (ref 1–3.3)
LYMPHOCYTES # BLD AUTO: 18.2 % — SIGNIFICANT CHANGE UP (ref 13–44)
MAGNESIUM SERPL-MCNC: 1.7 MG/DL — SIGNIFICANT CHANGE UP (ref 1.6–2.6)
MAGNESIUM SERPL-MCNC: 2 MG/DL — SIGNIFICANT CHANGE UP (ref 1.6–2.6)
MCHC RBC-ENTMCNC: 27.1 PG — SIGNIFICANT CHANGE UP (ref 27–34)
MCHC RBC-ENTMCNC: 28 PG — SIGNIFICANT CHANGE UP (ref 27–34)
MCHC RBC-ENTMCNC: 32.5 GM/DL — SIGNIFICANT CHANGE UP (ref 32–36)
MCHC RBC-ENTMCNC: 33.1 GM/DL — SIGNIFICANT CHANGE UP (ref 32–36)
MCV RBC AUTO: 83.5 FL — SIGNIFICANT CHANGE UP (ref 80–100)
MCV RBC AUTO: 84.5 FL — SIGNIFICANT CHANGE UP (ref 80–100)
MONOCYTES # BLD AUTO: 0.65 K/UL — SIGNIFICANT CHANGE UP (ref 0–0.9)
MONOCYTES NFR BLD AUTO: 7.2 % — SIGNIFICANT CHANGE UP (ref 2–14)
NEUTROPHILS # BLD AUTO: 6.54 K/UL — SIGNIFICANT CHANGE UP (ref 1.8–7.4)
NEUTROPHILS NFR BLD AUTO: 72.3 % — SIGNIFICANT CHANGE UP (ref 43–77)
NITRITE UR-MCNC: NEGATIVE — SIGNIFICANT CHANGE UP
NRBC # BLD: 0 /100 WBCS — SIGNIFICANT CHANGE UP (ref 0–0)
NRBC # BLD: 0 /100 WBCS — SIGNIFICANT CHANGE UP (ref 0–0)
PH UR: 5 — SIGNIFICANT CHANGE UP (ref 5–8)
PHOSPHATE SERPL-MCNC: 2.4 MG/DL — LOW (ref 2.5–4.5)
PHOSPHATE SERPL-MCNC: 2.5 MG/DL — SIGNIFICANT CHANGE UP (ref 2.5–4.5)
PLATELET # BLD AUTO: 243 K/UL — SIGNIFICANT CHANGE UP (ref 150–400)
PLATELET # BLD AUTO: 258 K/UL — SIGNIFICANT CHANGE UP (ref 150–400)
POTASSIUM SERPL-MCNC: 4.3 MMOL/L — SIGNIFICANT CHANGE UP (ref 3.5–5.3)
POTASSIUM SERPL-MCNC: 4.4 MMOL/L — SIGNIFICANT CHANGE UP (ref 3.5–5.3)
POTASSIUM SERPL-SCNC: 4.3 MMOL/L — SIGNIFICANT CHANGE UP (ref 3.5–5.3)
POTASSIUM SERPL-SCNC: 4.4 MMOL/L — SIGNIFICANT CHANGE UP (ref 3.5–5.3)
PROCALCITONIN SERPL-MCNC: 0.09 NG/ML — SIGNIFICANT CHANGE UP (ref 0.02–0.1)
PROT SERPL-MCNC: 6.1 G/DL — SIGNIFICANT CHANGE UP (ref 6–8.3)
PROT SERPL-MCNC: 6.9 G/DL — SIGNIFICANT CHANGE UP (ref 6–8.3)
PROT UR-MCNC: NEGATIVE — SIGNIFICANT CHANGE UP
PROTHROM AB SERPL-ACNC: 13.1 SEC — SIGNIFICANT CHANGE UP (ref 10.6–13.6)
RBC # BLD: 3.69 M/UL — LOW (ref 3.8–5.2)
RBC # BLD: 3.86 M/UL — SIGNIFICANT CHANGE UP (ref 3.8–5.2)
RBC # FLD: 15.9 % — HIGH (ref 10.3–14.5)
RBC # FLD: 15.9 % — HIGH (ref 10.3–14.5)
SODIUM SERPL-SCNC: 137 MMOL/L — SIGNIFICANT CHANGE UP (ref 135–145)
SODIUM SERPL-SCNC: 138 MMOL/L — SIGNIFICANT CHANGE UP (ref 135–145)
SP GR SPEC: 1.02 — SIGNIFICANT CHANGE UP (ref 1.01–1.02)
UROBILINOGEN FLD QL: NEGATIVE — SIGNIFICANT CHANGE UP
WBC # BLD: 6.51 K/UL — SIGNIFICANT CHANGE UP (ref 3.8–10.5)
WBC # BLD: 9.05 K/UL — SIGNIFICANT CHANGE UP (ref 3.8–10.5)
WBC # FLD AUTO: 6.51 K/UL — SIGNIFICANT CHANGE UP (ref 3.8–10.5)
WBC # FLD AUTO: 9.05 K/UL — SIGNIFICANT CHANGE UP (ref 3.8–10.5)

## 2021-03-31 PROCEDURE — 44205 LAP COLECTOMY PART W/ILEUM: CPT | Mod: AS,RT

## 2021-03-31 PROCEDURE — 88304 TISSUE EXAM BY PATHOLOGIST: CPT | Mod: 26

## 2021-03-31 PROCEDURE — 99221 1ST HOSP IP/OBS SF/LOW 40: CPT

## 2021-03-31 PROCEDURE — 88307 TISSUE EXAM BY PATHOLOGIST: CPT | Mod: 26

## 2021-03-31 PROCEDURE — 44205 LAP COLECTOMY PART W/ILEUM: CPT

## 2021-03-31 RX ORDER — CHLORHEXIDINE GLUCONATE 213 G/1000ML
1 SOLUTION TOPICAL
Refills: 0 | Status: DISCONTINUED | OUTPATIENT
Start: 2021-03-31 | End: 2021-03-31

## 2021-03-31 RX ORDER — PANTOPRAZOLE SODIUM 20 MG/1
40 TABLET, DELAYED RELEASE ORAL DAILY
Refills: 0 | Status: DISCONTINUED | OUTPATIENT
Start: 2021-03-31 | End: 2021-04-06

## 2021-03-31 RX ORDER — METRONIDAZOLE 500 MG
500 TABLET ORAL EVERY 8 HOURS
Refills: 0 | Status: DISCONTINUED | OUTPATIENT
Start: 2021-03-31 | End: 2021-04-05

## 2021-03-31 RX ORDER — INSULIN LISPRO 100/ML
VIAL (ML) SUBCUTANEOUS EVERY 6 HOURS
Refills: 0 | Status: DISCONTINUED | OUTPATIENT
Start: 2021-03-31 | End: 2021-04-02

## 2021-03-31 RX ORDER — MORPHINE SULFATE 50 MG/1
4 CAPSULE, EXTENDED RELEASE ORAL EVERY 6 HOURS
Refills: 0 | Status: DISCONTINUED | OUTPATIENT
Start: 2021-03-31 | End: 2021-03-31

## 2021-03-31 RX ORDER — ONDANSETRON 8 MG/1
4 TABLET, FILM COATED ORAL EVERY 6 HOURS
Refills: 0 | Status: DISCONTINUED | OUTPATIENT
Start: 2021-03-31 | End: 2021-04-06

## 2021-03-31 RX ORDER — HEPARIN SODIUM 5000 [USP'U]/ML
5000 INJECTION INTRAVENOUS; SUBCUTANEOUS EVERY 12 HOURS
Refills: 0 | Status: DISCONTINUED | OUTPATIENT
Start: 2021-03-31 | End: 2021-03-31

## 2021-03-31 RX ORDER — ACETAMINOPHEN 500 MG
650 TABLET ORAL EVERY 6 HOURS
Refills: 0 | Status: DISCONTINUED | OUTPATIENT
Start: 2021-03-31 | End: 2021-03-31

## 2021-03-31 RX ORDER — CEFTRIAXONE 500 MG/1
1000 INJECTION, POWDER, FOR SOLUTION INTRAMUSCULAR; INTRAVENOUS ONCE
Refills: 0 | Status: COMPLETED | OUTPATIENT
Start: 2021-03-31 | End: 2021-03-31

## 2021-03-31 RX ORDER — ASPIRIN/CALCIUM CARB/MAGNESIUM 324 MG
81 TABLET ORAL DAILY
Refills: 0 | Status: DISCONTINUED | OUTPATIENT
Start: 2021-03-31 | End: 2021-04-02

## 2021-03-31 RX ORDER — ACETAMINOPHEN 500 MG
1000 TABLET ORAL ONCE
Refills: 0 | Status: COMPLETED | OUTPATIENT
Start: 2021-04-01 | End: 2021-04-01

## 2021-03-31 RX ORDER — ACETAMINOPHEN 500 MG
1000 TABLET ORAL ONCE
Refills: 0 | Status: COMPLETED | OUTPATIENT
Start: 2021-03-31 | End: 2021-03-31

## 2021-03-31 RX ORDER — HEPARIN SODIUM 5000 [USP'U]/ML
5000 INJECTION INTRAVENOUS; SUBCUTANEOUS EVERY 8 HOURS
Refills: 0 | Status: DISCONTINUED | OUTPATIENT
Start: 2021-03-31 | End: 2021-04-02

## 2021-03-31 RX ORDER — HYDROMORPHONE HYDROCHLORIDE 2 MG/ML
0.5 INJECTION INTRAMUSCULAR; INTRAVENOUS; SUBCUTANEOUS EVERY 4 HOURS
Refills: 0 | Status: DISCONTINUED | OUTPATIENT
Start: 2021-03-31 | End: 2021-04-05

## 2021-03-31 RX ORDER — LEVOTHYROXINE SODIUM 125 MCG
130 TABLET ORAL AT BEDTIME
Refills: 0 | Status: DISCONTINUED | OUTPATIENT
Start: 2021-03-31 | End: 2021-04-03

## 2021-03-31 RX ORDER — SODIUM CHLORIDE 9 MG/ML
1000 INJECTION, SOLUTION INTRAVENOUS
Refills: 0 | Status: DISCONTINUED | OUTPATIENT
Start: 2021-03-31 | End: 2021-03-31

## 2021-03-31 RX ORDER — CEFTRIAXONE 500 MG/1
INJECTION, POWDER, FOR SOLUTION INTRAMUSCULAR; INTRAVENOUS
Refills: 0 | Status: DISCONTINUED | OUTPATIENT
Start: 2021-03-31 | End: 2021-04-05

## 2021-03-31 RX ORDER — SODIUM CHLORIDE 9 MG/ML
500 INJECTION, SOLUTION INTRAVENOUS ONCE
Refills: 0 | Status: COMPLETED | OUTPATIENT
Start: 2021-03-31 | End: 2021-03-31

## 2021-03-31 RX ORDER — HEPARIN SODIUM 5000 [USP'U]/ML
5000 INJECTION INTRAVENOUS; SUBCUTANEOUS EVERY 8 HOURS
Refills: 0 | Status: DISCONTINUED | OUTPATIENT
Start: 2021-03-31 | End: 2021-03-31

## 2021-03-31 RX ORDER — HYDROMORPHONE HYDROCHLORIDE 2 MG/ML
1 INJECTION INTRAMUSCULAR; INTRAVENOUS; SUBCUTANEOUS EVERY 4 HOURS
Refills: 0 | Status: DISCONTINUED | OUTPATIENT
Start: 2021-03-31 | End: 2021-03-31

## 2021-03-31 RX ORDER — CEFTRIAXONE 500 MG/1
1000 INJECTION, POWDER, FOR SOLUTION INTRAMUSCULAR; INTRAVENOUS EVERY 24 HOURS
Refills: 0 | Status: DISCONTINUED | OUTPATIENT
Start: 2021-04-01 | End: 2021-04-05

## 2021-03-31 RX ORDER — SODIUM CHLORIDE 9 MG/ML
1000 INJECTION, SOLUTION INTRAVENOUS
Refills: 0 | Status: DISCONTINUED | OUTPATIENT
Start: 2021-03-31 | End: 2021-04-01

## 2021-03-31 RX ADMIN — PANTOPRAZOLE SODIUM 40 MILLIGRAM(S): 20 TABLET, DELAYED RELEASE ORAL at 14:18

## 2021-03-31 RX ADMIN — BUDESONIDE AND FORMOTEROL FUMARATE DIHYDRATE 2 PUFF(S): 160; 4.5 AEROSOL RESPIRATORY (INHALATION) at 23:10

## 2021-03-31 RX ADMIN — Medication 2: at 23:09

## 2021-03-31 RX ADMIN — HEPARIN SODIUM 5000 UNIT(S): 5000 INJECTION INTRAVENOUS; SUBCUTANEOUS at 23:01

## 2021-03-31 RX ADMIN — Medication 1000 MILLIGRAM(S): at 17:00

## 2021-03-31 RX ADMIN — SODIUM CHLORIDE 1000 MILLILITER(S): 9 INJECTION, SOLUTION INTRAVENOUS at 14:30

## 2021-03-31 RX ADMIN — Medication 1 SPRAY(S): at 05:39

## 2021-03-31 RX ADMIN — Medication 2000 MILLIGRAM(S): at 05:38

## 2021-03-31 RX ADMIN — SODIUM CHLORIDE 150 MILLILITER(S): 9 INJECTION, SOLUTION INTRAVENOUS at 13:13

## 2021-03-31 RX ADMIN — Medication 1000 MILLIGRAM(S): at 05:38

## 2021-03-31 RX ADMIN — CHLORHEXIDINE GLUCONATE 1 APPLICATION(S): 213 SOLUTION TOPICAL at 16:27

## 2021-03-31 RX ADMIN — Medication 400 MILLIGRAM(S): at 23:03

## 2021-03-31 RX ADMIN — HEPARIN SODIUM 5000 UNIT(S): 5000 INJECTION INTRAVENOUS; SUBCUTANEOUS at 17:15

## 2021-03-31 RX ADMIN — DRONEDARONE 400 MILLIGRAM(S): 400 TABLET, FILM COATED ORAL at 05:38

## 2021-03-31 RX ADMIN — Medication 1000 MILLIGRAM(S): at 20:19

## 2021-03-31 RX ADMIN — Medication 2: at 16:26

## 2021-03-31 RX ADMIN — INSULIN GLARGINE 10 UNIT(S): 100 INJECTION, SOLUTION SUBCUTANEOUS at 23:09

## 2021-03-31 RX ADMIN — Medication 175 MICROGRAM(S): at 05:38

## 2021-03-31 RX ADMIN — Medication 1 SPRAY(S): at 17:14

## 2021-03-31 RX ADMIN — SODIUM CHLORIDE 150 MILLILITER(S): 9 INJECTION, SOLUTION INTRAVENOUS at 16:25

## 2021-03-31 RX ADMIN — SODIUM CHLORIDE 150 MILLILITER(S): 9 INJECTION, SOLUTION INTRAVENOUS at 23:09

## 2021-03-31 RX ADMIN — CARVEDILOL PHOSPHATE 6.25 MILLIGRAM(S): 80 CAPSULE, EXTENDED RELEASE ORAL at 05:38

## 2021-03-31 RX ADMIN — Medication 100 MILLIGRAM(S): at 16:41

## 2021-03-31 RX ADMIN — HYDROMORPHONE HYDROCHLORIDE 1 MILLIGRAM(S): 2 INJECTION INTRAMUSCULAR; INTRAVENOUS; SUBCUTANEOUS at 13:24

## 2021-03-31 RX ADMIN — CEFTRIAXONE 100 MILLIGRAM(S): 500 INJECTION, POWDER, FOR SOLUTION INTRAMUSCULAR; INTRAVENOUS at 17:14

## 2021-03-31 RX ADMIN — Medication 500 MILLIGRAM(S): at 05:38

## 2021-03-31 RX ADMIN — Medication 130 MICROGRAM(S): at 23:00

## 2021-03-31 RX ADMIN — HYDROMORPHONE HYDROCHLORIDE 1 MILLIGRAM(S): 2 INJECTION INTRAMUSCULAR; INTRAVENOUS; SUBCUTANEOUS at 13:09

## 2021-03-31 RX ADMIN — PANTOPRAZOLE SODIUM 40 MILLIGRAM(S): 20 TABLET, DELAYED RELEASE ORAL at 05:39

## 2021-03-31 RX ADMIN — Medication 400 MILLIGRAM(S): at 16:27

## 2021-03-31 RX ADMIN — Medication 100 MILLIGRAM(S): at 23:01

## 2021-03-31 RX ADMIN — Medication 5 MILLIGRAM(S): at 05:38

## 2021-03-31 NOTE — PROGRESS NOTE ADULT - SUBJECTIVE AND OBJECTIVE BOX
Patient is seen and examined at the bed side, is afebrile. She is s/p Laparoscopic assisted right hemicolectomy today, tolerated the procedure well, has transferred to ICU for Post-op management.       REVIEW OF SYSTEMS: All other review systems are negative      ALLERGIES: No Known Allergies      ICU Vital Signs Last 24 Hrs  T(C): 36.2 (31 Mar 2021 16:00), Max: 37.1 (31 Mar 2021 03:05)  T(F): 97.1 (31 Mar 2021 16:00), Max: 98.7 (31 Mar 2021 03:05)  HR: 62 (31 Mar 2021 18:00) (60 - 70)  BP: 135/45 (31 Mar 2021 16:00) (98/48 - 179/62)  BP(mean): 62 (31 Mar 2021 16:00) (58 - 87)  ABP: 149/46 (31 Mar 2021 18:00) (125/50 - 166/56)  ABP(mean): 80 (31 Mar 2021 18:00) (52 - 98)  RR: 22 (31 Mar 2021 18:00) (11 - 22)  SpO2: 98% (31 Mar 2021 18:00) (91% - 100%)      PHYSICAL EXAM:  GENERAL: Not in distress   CHEST/LUNG: Not using accessory muscles   HEART: s1 and s2 present  ABDOMEN:  Incision sites looks clean and RUQ has a drain in placed  EXTREMITIES: No pedal  edema  CNS: Awake and Alert      LABS:                        10.0   9.05  )-----------( 243      ( 31 Mar 2021 13:01 )             30.8                7.9    8.47  )-----------( 269      ( 26 Mar 2021 15:57 )             25.0                           7.8    13.47 )-----------( 273      ( 25 Mar 2021 06:40 )             24.2     03-31    137  |  106  |  18  ----------------------------<  194<H>  4.4   |  22  |  1.46<H>    Ca    8.2<L>      31 Mar 2021 13:01  Phos  2.4     03-31  Mg     1.7     03-31    TPro  6.1  /  Alb  2.8<L>  /  TBili  0.5  /  DBili  x   /  AST  44<H>  /  ALT  33  /  AlkPhos  58      138  |  103  |  35<H>  ----------------------------<  151<H>  4.0   |  25  |  1.57<H>    Ca    8.4      24 Mar 2021 07:00  Phos  3.9       Mg     2.2         TPro  6.7  /  Alb  3.1<L>  /  TBili  0.3  /  DBili  x   /  AST  16  /  ALT  22  /  AlkPhos  71  -24      CAPILLARY BLOOD GLUCOSE  POCT Blood Glucose.: 262 mg/dL (22 Mar 2021 16:34)  POCT Blood Glucose.: 150 mg/dL (22 Mar 2021 12:45)  POCT Blood Glucose.: 147 mg/dL (22 Mar 2021 10:56)  POCT Blood Glucose.: 196 mg/dL (22 Mar 2021 07:40      Urinalysis Basic - ( 20 Mar 2021 20:54 )  Color: Yellow / Appearance: Clear / S.015 / pH: x  Gluc: x / Ketone: Negative  / Bili: Negative / Urobili: Negative   Blood: x / Protein: Negative / Nitrite: Negative   Leuk Esterase: Negative / RBC: x / WBC x   Sq Epi: x / Non Sq Epi: x / Bacteria: x        MEDICATIONS  (STANDING):    acetaminophen  IVPB .. 1000 milliGRAM(s) IV Intermittent once  ascorbic acid 2000 milliGRAM(s) Oral every 8 hours  aspirin  chewable 81 milliGRAM(s) Oral daily  atorvastatin 40 milliGRAM(s) Oral at bedtime  budesonide 160 MICROgram(s)/formoterol 4.5 MICROgram(s) Inhaler 2 Puff(s) Inhalation two times a day  carvedilol 6.25 milliGRAM(s) Oral every 12 hours  cefTRIAXone   IVPB      cholecalciferol 1000 Unit(s) Oral daily  dronedarone 400 milliGRAM(s) Oral two times a day  fluticasone propionate 50 MICROgram(s)/spray Nasal Spray 1 Spray(s) Both Nostrils every 12 hours  gabapentin 300 milliGRAM(s) Oral daily  heparin   Injectable 5000 Unit(s) SubCutaneous every 8 hours  influenza   Vaccine 0.5 milliLiter(s) IntraMuscular once  insulin glargine Injectable (LANTUS) 10 Unit(s) SubCutaneous at bedtime  insulin lispro (ADMELOG) corrective regimen sliding scale   SubCutaneous Before meals and at bedtime  lactated ringers. 1000 milliLiter(s) (150 mL/Hr) IV Continuous <Continuous>  levothyroxine Injectable 130 MICROGram(s) IV Push at bedtime  metroNIDAZOLE  IVPB 500 milliGRAM(s) IV Intermittent every 8 hours  montelukast 10 milliGRAM(s) Oral daily  oxybutynin 5 milliGRAM(s) Oral two times a day  pantoprazole  Injectable 40 milliGRAM(s) IV Push daily  zinc sulfate 220 milliGRAM(s) Oral daily        RADIOLOGY & ADDITIONAL TESTS:    3/29/21 : Xray Abdomen 2 Views (21 @ 19:50) : No evidence of free air. Right surgical clips as noted.    < from: 12 Lead ECG (21 @ 16:02) >  Ventricular Rate 73 BPM    Atrial Rate 73 BPM    P-R Interval 188 ms    QRS Duration 126 ms    Q-T Interval 414 ms    QTC Calculation(Bazett) 456 ms    P Axis 59 degrees    R Axis -57 degrees    T Axis 56 degrees    < end of copied text >    Surgical Pathology Report (21 @ 10:15)   Surgical Pathology Report:   ACCESSION No: [id number]   KWAME MOSQUERA 2   Surgical Final Report   Final Diagnosis   1. Ascending colon polyp; hot snare:   - Invasive colonic adenocarcinoma arising in association with   tubular adenoma. See comment.   - Fragments of tubular adenoma (the smallest polyps).   2. Hepatic flexure polyp; biopsy:   - Tubular adenoma.   3. Descending colon polyp; biopsy:   - Inflammatory polyps.   Verified by: Priscila Arana MD   (Electronic Signature)   Reported on: 21 11:11 EDT, [facility],     Surgical Pathology Report (21 @ 11:37)   Surgical Pathology Report: , ACCESSION No: [id number]   KWAME MOSQUERA 2   Surgical Final Report , Final Diagnosis   1. Gastric antrum, biopsy: Chronic active gastritis, diffuse,   with focal lymphoid aggregate; H. Pylori associated. (Special  stain for H. Pylori is positive)   2. Esophagus biopsy: Patchy acute esophagitis. Special stain for  fungi is negative.   Verified by: Mariam Ana, M.D. Surgical Pathology Report (21 @ 11:37)     3/28/21 : Xray Knee 3 Views, Right (21 @ 14:20) >    IMPRESSION: Round mass anterior to the patella is noted. This is new since 2018.      3/20/21 : CT Angio Abdomen and Pelvis w/ IV Cont (21 @ 22:55) No CT evidence of acute gastrointestinal hemorrhage. Few scattered colonic diverticula.    3/20/21 : CT Angio Abdomen and Pelvis w/ IV Cont (21 @ 22:55) LOWER CHEST: Partially imaged pacemaker leads. Mitral annular calcification.    3/20/21 : Xray Chest 1 View- PORTABLE-Urgent (21 @ 16:38): There is left-sided defibrillator. The heart is normal in size. The lungs are clear.          MICROBIOLOGY DATA:    COVID-19 Drew Domain Antibody (21 @ 11:07)   COVID-19 Drew Domain Antibody Result: >250.00:    Culture - Blood (21 @ 21:50)   Specimen Source: .Blood Blood-Venous   Culture Results: No growth to date.     Culture - Blood (21 @ 21:50)   Specimen Source: .Blood Blood-Venous   Culture Results: No growth to date.     COVID-19 PCR . (21 @ 16:32)   COVID-19 PCR: NotDetec:     MRSA/MSSA PCR (20 @ 14:34)   MRSA PCR Result.: NotDetec:     FLU A B RSV Detection by PCR (20 @ 20:00)   Flu A Result: NotDetec

## 2021-03-31 NOTE — PROGRESS NOTE ADULT - PROBLEM SELECTOR PLAN 5
-continue home coreg  -lisinopril 3/27-29, dc 2/2 Cr 1.31  -SBPs variable, monitor
-continue home coreg  -lisinopril 3/27-29, dc 2/2 Cr 1.31  -SBPs variable, monitor
-continue home coreg, restart home torsemide 3/23 as above  -SBPs variable, monitor
-continue home coreg  -lisinopril 3/27-29, dc 2/2 Cr 1.31  -SBPs variable, monitor
-continue home coreg  -SBPs variable, monitor
-continue home coreg, restart home torsemide 3/23 as above  -SBPs variable, monitor
-continue home coreg, add lisinopril  -SBPs variable, monitor
-continue home coreg, add lisinopril  -SBPs variable, monitor
-continue home coreg, restart home torsemide 3/23 as above  -SBPs variable, monitor
on coreg 6.25 BId at home , will resume with parameters    on torsemide 10 mg qd at home, will start on Lasix 40 mg ID X4 doses for now   Monitor BP
-continue home coreg  -lisinopril 3/27-29, dc 2/2 Cr 1.31  -SBPs variable, monitor

## 2021-03-31 NOTE — PROGRESS NOTE ADULT - SUBJECTIVE AND OBJECTIVE BOX
PGY-1 Progress Note discussed with attending    PAGER #: [[phone]] TILL 5:00 PM  PLEASE CONTACT ON CALL TEAM:  - On Call Team (Please refer to Ana) FROM 5:00 PM - 8:30PM  - Nightfloat Team FROM 8:30 -7:30 AM    CHIEF COMPLAINT & BRIEF HOSPITAL COURSE: 79F [place]-speaking from home with son, ambulates with walker, PMH HFpEF, CAD (s/p stents), chronic venous stasis, DM, HTN and Afib (Xarelto, s/p PPM), chronic bronchitis with asthmatic component (home O2 prn), JOSE DANIEL (noncompliant with nocturnal CPAP) presented 3/20 with x2 weeks increasing fatigue, SOB, and paleness, with prolonged history of dark bowel movements s/p possible transverse polyp colonoscopy 2020, no further fu. In ED, VS notable for /77 and SpO2 100% 4L NC. Labs notable for Hb 7.7 (baseline ~11), Fe 15, TIBC 457, 3% saturation. Trop, COVID, UA negative. EKG with BFB. CXR unremarkable.  CT a/p unremarkable for signs of bleeding. S/p x1 unit pRBC in ED. Admitted for workup and management of symptomatic anemia. Colonoscopy +invasive colon cancer 3/24 (resulted 3/26).    INTERVAL HPI/OVERNIGHT EVENTS: NAEON. SBPs peaked 170s this am, decreased to 90-100s. OR for partial hemicolectoym today, transferred to ICU. Will continue post-op management there.     MEDICATIONS  (STANDING):  amoxicillin 1000 milliGRAM(s) Oral two times a day  ascorbic acid 2000 milliGRAM(s) Oral every 8 hours  aspirin  chewable 81 milliGRAM(s) Oral daily  atorvastatin 40 milliGRAM(s) Oral at bedtime  budesonide 160 MICROgram(s)/formoterol 4.5 MICROgram(s) Inhaler 2 Puff(s) Inhalation two times a day  carvedilol 6.25 milliGRAM(s) Oral every 12 hours  chlorhexidine 2% Cloths 1 Application(s) Topical once  cholecalciferol 1000 Unit(s) Oral daily  dronedarone 400 milliGRAM(s) Oral two times a day  fluticasone propionate 50 MICROgram(s)/spray Nasal Spray 1 Spray(s) Both Nostrils every 12 hours  gabapentin 300 milliGRAM(s) Oral daily  heparin   Injectable 5000 Unit(s) SubCutaneous every 12 hours  influenza   Vaccine 0.5 milliLiter(s) IntraMuscular once  insulin glargine Injectable (LANTUS) 10 Unit(s) SubCutaneous at bedtime  insulin lispro (ADMELOG) corrective regimen sliding scale   SubCutaneous Before meals and at bedtime  lactated ringers Bolus 500 milliLiter(s) IV Bolus once  lactated ringers. 1000 milliLiter(s) (150 mL/Hr) IV Continuous <Continuous>  levothyroxine 175 MICROGram(s) Oral daily  levothyroxine Injectable 130 MICROGram(s) IV Push at bedtime  metroNIDAZOLE    Tablet 500 milliGRAM(s) Oral every 12 hours  montelukast 10 milliGRAM(s) Oral daily  oxybutynin 5 milliGRAM(s) Oral two times a day  pantoprazole  Injectable 40 milliGRAM(s) IV Push daily  zinc sulfate 220 milliGRAM(s) Oral daily    MEDICATIONS  (PRN):  acetaminophen   Tablet .. 650 milliGRAM(s) Oral every 6 hours PRN Temp greater or equal to 38C (100.4F), Mild Pain (1 - 3)  HYDROmorphone  Injectable 1 milliGRAM(s) IV Push every 4 hours PRN Severe Pain (7 - 10)  ondansetron Injectable 4 milliGRAM(s) IV Push every 6 hours PRN Nausea    REVIEW OF SYSTEMS:  CONSTITUTIONAL: No fever or fatigue  RESPIRATORY: No cough; No shortness of breath  CARDIOVASCULAR: No chest pain, no dizziness  GASTROINTESTINAL: mild abdominal pain after taking medication. No nausea, vomiting, diarrhea, constipation. +dark stools  GENITOURINARY: No dysuria or hematuria  NEUROLOGICAL: No headache  SKIN: No itching or rashes  EXT: b/l knee pain, chronic b/l LBP, chronic LE pain R>L  all other systems reviewed and are negative    Vital Signs Last 24 Hrs  T(C): 36.6 (31 Mar 2021 13:00), Max: 37.1 (31 Mar 2021 03:05)  T(F): 97.9 (31 Mar 2021 13:00), Max: 98.7 (31 Mar 2021 03:05)  HR: 63 (31 Mar 2021 14:00) (59 - 70)  BP: 107/49 (31 Mar 2021 14:00) (98/48 - 179/62)  BP(mean): 65 (31 Mar 2021 14:00) (58 - 87)  RR: 11 (31 Mar 2021 14:00) (11 - 20)  SpO2: 100% (31 Mar 2021 14:00) (97% - 100%)    PHYSICAL EXAMINATION:  GENERAL: NAD, obese, elderly woman  HEAD:  Atraumatic, Normocephalic  EYES: periorbital skin pink, eomi, perrl  NECK: Supple  CHEST/LUNG: no increased WOB, NC, no cough noted  HEART: RRR  ABDOMEN: Soft, Nontender, obese, Bowel sounds present  NERVOUS SYSTEM: alert and oriented x3  EXTREMITIES:  2+ Peripheral Pulses, No edema, +hematoma right knee, NTTP; b/l LE warm, pulses intact  SKIN: warm dry                          10.0   9.05  )-----------( 243      ( 31 Mar 2021 13:01 )             30.8     03-31    137  |  106  |  18  ----------------------------<  194<H>  4.4   |  22  |  1.46<H>    Ca    8.2<L>      31 Mar 2021 13:01  Phos  2.4     03-31  Mg     1.7     03-31    TPro  6.1  /  Alb  2.8<L>  /  TBili  0.5  /  DBili  x   /  AST  44<H>  /  ALT  33  /  AlkPhos  58  03-31    LIVER FUNCTIONS - ( 31 Mar 2021 13:01 )  Alb: 2.8 g/dL / Pro: 6.1 g/dL / ALK PHOS: 58 U/L / ALT: 33 U/L DA / AST: 44 U/L / GGT: x               PT/INR - ( 31 Mar 2021 05:42 )   PT: 13.1 sec;   INR: 1.11 ratio         PTT - ( 31 Mar 2021 05:42 )  PTT:34.4 sec    CAPILLARY BLOOD GLUCOSE      RADIOLOGY & ADDITIONAL TESTS:

## 2021-03-31 NOTE — PROGRESS NOTE ADULT - ASSESSMENT
79F [place]-speaking from home with son, ambulates with walker, PMH HFpEF, CAD (s/p stents), chronic venous stasis, DM, HTN and Afib (Xarelto, s/p PPM), presented 3/20 with x2 weeks increasing fatigue, SOB, and paleness, with prolonged history of dark bowel movements s/p possible transverse polyp colonoscopy 2020, admitted for workup and management of symptomatic anemia.

## 2021-03-31 NOTE — PROGRESS NOTE ADULT - ASSESSMENT
Patient is a 81y old  Female from home, ambulates with walker, lives with son, with PMHx of HFpEF, CAD (s/p stents), chronic venous stasis, DM, HTN and Afib (on Xarelto, s/p PPM), chronic bronchitis with asthmatic component ( on Oxygen PRN at home ), JOSE DANIEL ( supposed to be on nocturnal CPAP , but non compliant), now presents to the ER for evaluation of hypotension, SOB and  fatigue for almost 2 weeks. Per daughter and granddaughter patient has been complaining of increasing fatigue and sob for last few weeks, seemed to be pale , patient endorses dark loose bowel movement. Patient has chronic diarrhea, but no abdominal pain. She has evaluated by GI team and now the ID consult requested to assist with further evaluation of chronic diarrhea.     #  H. Pylori associated Chronic active gastritis, diffuse, with focal lymphoid aggregate, DX 3/22 by Antrum biopsy  # Chronic diarrhea- C.diff vs Malignancy - NO Malignancy Antrum biopsy   # Symptomatic Anemia  - s/p EGD and antrum biopsy shows H. Pylori associated Chronic active gastritis, diffuse, with focal lymphoid aggregate  # COVID negative 3/20/21, positive 3/27 and negative on 3/28  - d/w Dr. Spence regarding exposure VS false positive, in th esetting of positive Titers  # S/p EGD 3/22  and s/p Colonoscopy 3/25- pathology shows - Invasive colonic adenocarcinoma arising in association with  tubular adenoma  # The Right knee xray shows Round mass anterior to the patella.  # s/p Laparoscopic assisted right hemicolectomy , 3/31/21    Would recommend:    1. Post-op Labs  2. Continue Ceftriaxone and Flagyl as per Surgery protocol  3. Monitor kidney function  4. Pain management as needed    d/w ICU team     Attending Attestation:    Spent more than 45 minutes on total encounter, more than 50 % of the visit was spent counseling and/or coordinating care by the Attending physician.

## 2021-03-31 NOTE — PROGRESS NOTE ADULT - ASSESSMENT
79F [place]-speaking from home with son, ambulates with walker, PMH HFpEF, CAD (s/p stents), chronic venous stasis, DM, HTN and Afib (Xarelto, s/p PPM), presented 3/20 with x2 weeks increasing fatigue, SOB, and paleness, with prolonged history of dark bowel movements s/p possible transverse polyp colonoscopy 2020, admitted for workup and management of symptomatic anemia.

## 2021-03-31 NOTE — PROGRESS NOTE ADULT - SUBJECTIVE AND OBJECTIVE BOX
[  X ] ICU                                          [   ] CCU                                      [   ] Medical Floor    Patient is a 81 year old female with Gastrointestinal bleeding and symptomatic anemia. GI consulted to evaluate.        79 years old female from home, ambulates with walker, lives with son, with past medical history significant for CAD (s/p stents), CHF, chronic venous stasis, DM, HTN, Afib (on Xarelto, s/p PPM), chronic bronchitis with asthmatic component ( on Oxygen PRN at home ), and osteoarthritis presented to the emergency room with 2 weeks history of worsening SOB, Fatigue, associated with a few episodes of black stool.    pt had virtual  colonoscopy 1 year ago and the result was questionable. She never followed up.  Patient also c/o poor appetite with chronic diarrhea but denies abdominal pain, nausea, vomiting, hematemesis, hematochezia, melena, fever, chills, chest pain, SOB, cough, hematuria, dysuria or diarrhea.       Today patient post lap R hemicolectomy monitored in ICU. Patient appears comfortable. No abdominal pain, N/V, hematemesis, hematochezia, melena, fever, chills, chest pain, SOB, cough or diarrhea reported.      PAIN MANAGEMENT:  Pain Scale:                 0/10  Pain Location:        Prior Colonoscopy:  No prior colonoscopy      PAST MEDICAL HISTORY  Obesity  Atrial fibrillation  Hypothyroidism  Venous stasis  Irritable bowel syndrome   CHF    Hyperlipidemia   Hypertension  DM  Myocardial Infarction  CAD    Asthma      PAST SURGICAL HISTORY  Coronary angiogram  Coronary stent placement  Pacemaker placement       Allergies    No Known Allergies    Intolerances  None        SOCIAL HISTORY  Advanced Directives:       [ X ] Full Code       [  ] DNR  Marital Status:         [  ] M      [ X ] S      [  ] D       [  ] W  Children:       [ X ] Yes      [  ] No  Occupation:        [  ] Employed       [ X ] Unemployed       [  ] Retired  Diet:       [ X Regular       [  ] PEG feeding          [  ] NG tube feeding  Drug Use:           [ X ] Patient denied          [  ] Yes  Alcohol:           [X] No             [  ] Yes (socially)         [  ] Yes (chronic)  Tobacco:           [  ] Yes           [ X ] No      FAMILY HISTORY  [ X ] Heart Disease            [ X ] Diabetes             [ X ] HTN             [  ] Colon Cancer             [  ] Stomach Cancer              [  ] Pancreatic Cancer      VITALS  Vital Signs Last 24 Hrs  T(C): 36.2 (31 Mar 2021 16:00), Max: 37.1 (31 Mar 2021 03:05)  T(F): 97.1 (31 Mar 2021 16:00), Max: 98.7 (31 Mar 2021 03:05)  HR: 62 (31 Mar 2021 18:00) (60 - 70)  BP: 135/45 (31 Mar 2021 16:00) (98/48 - 179/62)  BP(mean): 62 (31 Mar 2021 16:00) (58 - 87)  RR: 22 (31 Mar 2021 18:00) (11 - 22)  SpO2: 98% (31 Mar 2021 18:00) (91% - 100%)       MEDICATIONS  (STANDING):  acetaminophen  IVPB .. 1000 milliGRAM(s) IV Intermittent once  ascorbic acid 2000 milliGRAM(s) Oral every 8 hours  aspirin  chewable 81 milliGRAM(s) Oral daily  atorvastatin 40 milliGRAM(s) Oral at bedtime  budesonide 160 MICROgram(s)/formoterol 4.5 MICROgram(s) Inhaler 2 Puff(s) Inhalation two times a day  carvedilol 6.25 milliGRAM(s) Oral every 12 hours  cefTRIAXone   IVPB      cholecalciferol 1000 Unit(s) Oral daily  dronedarone 400 milliGRAM(s) Oral two times a day  fluticasone propionate 50 MICROgram(s)/spray Nasal Spray 1 Spray(s) Both Nostrils every 12 hours  gabapentin 300 milliGRAM(s) Oral daily  heparin   Injectable 5000 Unit(s) SubCutaneous every 8 hours  influenza   Vaccine 0.5 milliLiter(s) IntraMuscular once  insulin glargine Injectable (LANTUS) 10 Unit(s) SubCutaneous at bedtime  insulin lispro (ADMELOG) corrective regimen sliding scale   SubCutaneous Before meals and at bedtime  lactated ringers. 1000 milliLiter(s) (150 mL/Hr) IV Continuous <Continuous>  levothyroxine Injectable 130 MICROGram(s) IV Push at bedtime  metroNIDAZOLE  IVPB 500 milliGRAM(s) IV Intermittent every 8 hours  montelukast 10 milliGRAM(s) Oral daily  oxybutynin 5 milliGRAM(s) Oral two times a day  pantoprazole  Injectable 40 milliGRAM(s) IV Push daily  zinc sulfate 220 milliGRAM(s) Oral daily    MEDICATIONS  (PRN):  HYDROmorphone  Injectable 0.5 milliGRAM(s) IV Push every 4 hours PRN Severe Pain (7 - 10)  ondansetron Injectable 4 milliGRAM(s) IV Push every 6 hours PRN Nausea                            10.0   9.05  )-----------( 243      ( 31 Mar 2021 13:01 )             30.8       03-31    137  |  106  |  18  ----------------------------<  194<H>  4.4   |  22  |  1.46<H>    Ca    8.2<L>      31 Mar 2021 13:01  Phos  2.4     03-31  Mg     1.7     03-31    TPro  6.1  /  Alb  2.8<L>  /  TBili  0.5  /  DBili  x   /  AST  44<H>  /  ALT  33  /  AlkPhos  58  03-31      PT/INR - ( 31 Mar 2021 05:42 )   PT: 13.1 sec;   INR: 1.11 ratio         PTT - ( 31 Mar 2021 05:42 )  PTT:34.4 sec [  X ] ICU                                          [   ] CCU                                      [   ] Medical Floor    Patient is a 81 year old female with Gastrointestinal bleeding and symptomatic anemia. GI consulted to evaluate.        81 years old female from home, ambulates with walker, lives with son, with past medical history significant for CAD (s/p stents), CHF, chronic venous stasis, DM, HTN, Afib (on Xarelto, s/p PPM), chronic bronchitis with asthmatic component ( on Oxygen PRN at home ), and osteoarthritis presented to the emergency room with 2 weeks history of worsening SOB, Fatigue, associated with a few episodes of black stool.    pt had virtual  colonoscopy 1 year ago and the result was questionable. She never followed up.  Patient also c/o poor appetite with chronic diarrhea but denies abdominal pain, nausea, vomiting, hematemesis, hematochezia, melena, fever, chills, chest pain, SOB, cough, hematuria, dysuria or diarrhea.       Today patient post lap R hemicolectomy monitored in ICU. Patient appears comfortable. No abdominal pain, N/V, hematemesis, hematochezia, melena, fever, chills, chest pain, SOB, cough or diarrhea reported.      PAIN MANAGEMENT:  Pain Scale:                 0/10  Pain Location:        Prior Colonoscopy:  No prior colonoscopy      PAST MEDICAL HISTORY  Obesity  Atrial fibrillation  Hypothyroidism  Venous stasis  Irritable bowel syndrome   CHF    Hyperlipidemia   Hypertension  DM  Myocardial Infarction  CAD    Asthma      PAST SURGICAL HISTORY  Coronary angiogram  Coronary stent placement  Pacemaker placement       Allergies    No Known Allergies    Intolerances  None        SOCIAL HISTORY  Advanced Directives:       [ X ] Full Code       [  ] DNR  Marital Status:         [  ] M      [ X ] S      [  ] D       [  ] W  Children:       [ X ] Yes      [  ] No  Occupation:        [  ] Employed       [ X ] Unemployed       [  ] Retired  Diet:       [ X Regular       [  ] PEG feeding          [  ] NG tube feeding  Drug Use:           [ X ] Patient denied          [  ] Yes  Alcohol:           [X] No             [  ] Yes (socially)         [  ] Yes (chronic)  Tobacco:           [  ] Yes           [ X ] No      FAMILY HISTORY  [ X ] Heart Disease            [ X ] Diabetes             [ X ] HTN             [  ] Colon Cancer             [  ] Stomach Cancer              [  ] Pancreatic Cancer      VITALS  Vital Signs Last 24 Hrs  T(C): 36.2 (31 Mar 2021 16:00), Max: 37.1 (31 Mar 2021 03:05)  T(F): 97.1 (31 Mar 2021 16:00), Max: 98.7 (31 Mar 2021 03:05)  HR: 62 (31 Mar 2021 18:00) (60 - 70)  BP: 135/45 (31 Mar 2021 16:00) (98/48 - 179/62)  BP(mean): 62 (31 Mar 2021 16:00) (58 - 87)  RR: 22 (31 Mar 2021 18:00) (11 - 22)  SpO2: 98% (31 Mar 2021 18:00) (91% - 100%)       MEDICATIONS  (STANDING):  acetaminophen  IVPB .. 1000 milliGRAM(s) IV Intermittent once  ascorbic acid 2000 milliGRAM(s) Oral every 8 hours  aspirin  chewable 81 milliGRAM(s) Oral daily  atorvastatin 40 milliGRAM(s) Oral at bedtime  budesonide 160 MICROgram(s)/formoterol 4.5 MICROgram(s) Inhaler 2 Puff(s) Inhalation two times a day  carvedilol 6.25 milliGRAM(s) Oral every 12 hours  cefTRIAXone   IVPB      cholecalciferol 1000 Unit(s) Oral daily  dronedarone 400 milliGRAM(s) Oral two times a day  fluticasone propionate 50 MICROgram(s)/spray Nasal Spray 1 Spray(s) Both Nostrils every 12 hours  gabapentin 300 milliGRAM(s) Oral daily  heparin   Injectable 5000 Unit(s) SubCutaneous every 8 hours  influenza   Vaccine 0.5 milliLiter(s) IntraMuscular once  insulin glargine Injectable (LANTUS) 10 Unit(s) SubCutaneous at bedtime  insulin lispro (ADMELOG) corrective regimen sliding scale   SubCutaneous Before meals and at bedtime  lactated ringers. 1000 milliLiter(s) (150 mL/Hr) IV Continuous <Continuous>  levothyroxine Injectable 130 MICROGram(s) IV Push at bedtime  metroNIDAZOLE  IVPB 500 milliGRAM(s) IV Intermittent every 8 hours  montelukast 10 milliGRAM(s) Oral daily  oxybutynin 5 milliGRAM(s) Oral two times a day  pantoprazole  Injectable 40 milliGRAM(s) IV Push daily  zinc sulfate 220 milliGRAM(s) Oral daily    MEDICATIONS  (PRN):  HYDROmorphone  Injectable 0.5 milliGRAM(s) IV Push every 4 hours PRN Severe Pain (7 - 10)  ondansetron Injectable 4 milliGRAM(s) IV Push every 6 hours PRN Nausea                            10.0   9.05  )-----------( 243      ( 31 Mar 2021 13:01 )             30.8       03-31    137  |  106  |  18  ----------------------------<  194<H>  4.4   |  22  |  1.46<H>    Ca    8.2<L>      31 Mar 2021 13:01  Phos  2.4     03-31  Mg     1.7     03-31    TPro  6.1  /  Alb  2.8<L>  /  TBili  0.5  /  DBili  x   /  AST  44<H>  /  ALT  33  /  AlkPhos  58  03-31      PT/INR - ( 31 Mar 2021 05:42 )   PT: 13.1 sec;   INR: 1.11 ratio         PTT - ( 31 Mar 2021 05:42 )  PTT:34.4 sec

## 2021-03-31 NOTE — CONSULT NOTE ADULT - PROBLEM SELECTOR RECOMMENDATION 9
Pt with generalized abdominal pain which is somatic and neuropathic in nature due to lap colon resection 3/31, POD #0.    High risk medications reviewed. Avoid polypharmacy. Avoid NSAIDs and benzodiazepines. Non-pharmacological sleep aides initiated. Non-opioid medications and non-pharmacological pain management measures initiated.   Opioid pain recommendations   - Recommend Dilaudid 0.5mg IV q4h  PRN severe pain. Monitor for sedation/ respiratory depression.   Non-opioid pain recommendations   - Acetaminophen 1 gram IV q 6 hours for 24 hours only. Monitor LFTs  Bowel Regimen  - Recommend Miralax 17G PO daily once tolerating PO  - Recommend Senna 2 tablets at bedtime for constipation once tolerating PO  Mild pain   - Non-pharmacological pain treatment recommendations  - Warm/ Cool packs PRN   - Repositioning, imagery, relaxation, distraction.  - Physical therapy OOB if no contraindications   Recommendations discussed with primary team and RN

## 2021-03-31 NOTE — PROGRESS NOTE ADULT - PROBLEM SELECTOR PLAN 8
-continue home Synthroid  -TSH wnl
-continue home Synthroid  -TSH wnl
-right knee hematoma evaluated with US  -ortho recs outpatient follow up
-right knee hematoma evaluated with US  -ortho recs outpatient follow up
-continue home Synthroid  -TSH wnl
-continue home Synthroid  -TSH wnl
-right knee hematoma evaluated with US  -ortho recs outpatient follow up
-continue home Synthroid  -TSH wnl
home dose synthroid resumed  will check TSH
-right knee hematoma evaluated with US  -ortho recs outpatient follow up
-continue home Synthroid  -TSH wnl

## 2021-03-31 NOTE — PROGRESS NOTE ADULT - SUBJECTIVE AND OBJECTIVE BOX
INTERVAL HPI/OVERNIGHT EVENTS: Patient is a 82 y/o F with extensive past medical history, s/p laparoscopic assisted R hemicolectomy POD#0; patient currently laying in bed comfortably without acute complaints; surgical service made aware of patient removing NG tube and decision was made to leave it out; patient denies N/V or current pain; pain well managed on Dilaudid; does mention mild pulling of incision sites with movement however; [place] with 75 mL output; denies fever, chills, CP or SOB    MEDICATIONS  (STANDING):  acetaminophen  IVPB .. 1000 milliGRAM(s) IV Intermittent once  ascorbic acid 2000 milliGRAM(s) Oral every 8 hours  aspirin  chewable 81 milliGRAM(s) Oral daily  atorvastatin 40 milliGRAM(s) Oral at bedtime  budesonide 160 MICROgram(s)/formoterol 4.5 MICROgram(s) Inhaler 2 Puff(s) Inhalation two times a day  carvedilol 6.25 milliGRAM(s) Oral every 12 hours  cefTRIAXone   IVPB      cholecalciferol 1000 Unit(s) Oral daily  dronedarone 400 milliGRAM(s) Oral two times a day  fluticasone propionate 50 MICROgram(s)/spray Nasal Spray 1 Spray(s) Both Nostrils every 12 hours  gabapentin 300 milliGRAM(s) Oral daily  heparin   Injectable 5000 Unit(s) SubCutaneous every 12 hours  influenza   Vaccine 0.5 milliLiter(s) IntraMuscular once  insulin glargine Injectable (LANTUS) 10 Unit(s) SubCutaneous at bedtime  insulin lispro (ADMELOG) corrective regimen sliding scale   SubCutaneous Before meals and at bedtime  lactated ringers. 1000 milliLiter(s) (150 mL/Hr) IV Continuous <Continuous>  levothyroxine Injectable 130 MICROGram(s) IV Push at bedtime  metroNIDAZOLE  IVPB 500 milliGRAM(s) IV Intermittent every 8 hours  montelukast 10 milliGRAM(s) Oral daily  oxybutynin 5 milliGRAM(s) Oral two times a day  pantoprazole  Injectable 40 milliGRAM(s) IV Push daily  zinc sulfate 220 milliGRAM(s) Oral daily    MEDICATIONS  (PRN):  HYDROmorphone  Injectable 0.5 milliGRAM(s) IV Push every 4 hours PRN Severe Pain (7 - 10)  ondansetron Injectable 4 milliGRAM(s) IV Push every 6 hours PRN Nausea    Vital Signs Last 24 Hrs  T(C): 36.2 (31 Mar 2021 16:00), Max: 37.1 (31 Mar 2021 03:05)  T(F): 97.1 (31 Mar 2021 16:00), Max: 98.7 (31 Mar 2021 03:05)  HR: 62 (31 Mar 2021 17:00) (60 - 70)  BP: 135/45 (31 Mar 2021 16:00) (98/48 - 179/62)  BP(mean): 62 (31 Mar 2021 16:00) (58 - 87)  RR: 14 (31 Mar 2021 17:00) (11 - 21)  SpO2: 98% (31 Mar 2021 17:00) (91% - 100%)    Physical:  General: Obese  F laying in bed comfortably in no acute distress; AxO x3  Abdomen: Protuberant; no abnormalities on inspection; incision site Steri-Strips with mild bleeding; otherwise incision sites clean, dry and intact; BAILEY dressing in place with proper suction; abdomen soft, nondistended, nontender to light and deep palpation in all four quadrants     I&O's Detail    30 Mar 2021 07:01  -  31 Mar 2021 07:00  --------------------------------------------------------  IN:    PRBCs (Packed Red Blood Cells): 350 mL  Total IN: 350 mL    OUT:  Total OUT: 0 mL    Total NET: 350 mL      31 Mar 2021 07:01  -  31 Mar 2021 17:28  --------------------------------------------------------  IN:    IV PiggyBack: 100 mL    Lactated Ringers: 750 mL    Lactated Ringers Bolus: 500 mL  Total IN: 1350 mL    OUT:    Indwelling Catheter - Urethral (mL): 160 mL    Nasogastric/Oral tube (mL): 10 mL  Total OUT: 170 mL    Total NET: 1180 mL    LABS:                        10.0   9.05  )-----------( 243      ( 31 Mar 2021 13:01 )             30.8             03-31    137  |  106  |  18  ----------------------------<  194<H>  4.4   |  22  |  1.46<H>    Ca    8.2<L>      31 Mar 2021 13:01  Phos  2.4     03-31  Mg     1.7     03-31    TPro  6.1  /  Alb  2.8<L>  /  TBili  0.5  /  DBili  x   /  AST  44<H>  /  ALT  33  /  AlkPhos  58  03-31   INTERVAL HPI/OVERNIGHT EVENTS: Patient is a 80 y/o F with extensive past medical history, s/p laparoscopic assisted R hemicolectomy POD#0; patient currently laying in bed comfortably without acute complaints; surgical service made aware of patient removing NG tube and decision was made to leave it out; patient denies N/V or current pain; pain well managed on Dilaudid; does mention mild pulling of incision sites with movement however; [place] with 75 mL output post op; denies fever, chills, CP or SOB    MEDICATIONS  (STANDING):  acetaminophen  IVPB .. 1000 milliGRAM(s) IV Intermittent once  ascorbic acid 2000 milliGRAM(s) Oral every 8 hours  aspirin  chewable 81 milliGRAM(s) Oral daily  atorvastatin 40 milliGRAM(s) Oral at bedtime  budesonide 160 MICROgram(s)/formoterol 4.5 MICROgram(s) Inhaler 2 Puff(s) Inhalation two times a day  carvedilol 6.25 milliGRAM(s) Oral every 12 hours  cefTRIAXone   IVPB      cholecalciferol 1000 Unit(s) Oral daily  dronedarone 400 milliGRAM(s) Oral two times a day  fluticasone propionate 50 MICROgram(s)/spray Nasal Spray 1 Spray(s) Both Nostrils every 12 hours  gabapentin 300 milliGRAM(s) Oral daily  heparin   Injectable 5000 Unit(s) SubCutaneous every 12 hours  influenza   Vaccine 0.5 milliLiter(s) IntraMuscular once  insulin glargine Injectable (LANTUS) 10 Unit(s) SubCutaneous at bedtime  insulin lispro (ADMELOG) corrective regimen sliding scale   SubCutaneous Before meals and at bedtime  lactated ringers. 1000 milliLiter(s) (150 mL/Hr) IV Continuous <Continuous>  levothyroxine Injectable 130 MICROGram(s) IV Push at bedtime  metroNIDAZOLE  IVPB 500 milliGRAM(s) IV Intermittent every 8 hours  montelukast 10 milliGRAM(s) Oral daily  oxybutynin 5 milliGRAM(s) Oral two times a day  pantoprazole  Injectable 40 milliGRAM(s) IV Push daily  zinc sulfate 220 milliGRAM(s) Oral daily    MEDICATIONS  (PRN):  HYDROmorphone  Injectable 0.5 milliGRAM(s) IV Push every 4 hours PRN Severe Pain (7 - 10)  ondansetron Injectable 4 milliGRAM(s) IV Push every 6 hours PRN Nausea    Vital Signs Last 24 Hrs  T(C): 36.2 (31 Mar 2021 16:00), Max: 37.1 (31 Mar 2021 03:05)  T(F): 97.1 (31 Mar 2021 16:00), Max: 98.7 (31 Mar 2021 03:05)  HR: 62 (31 Mar 2021 17:00) (60 - 70)  BP: 135/45 (31 Mar 2021 16:00) (98/48 - 179/62)  BP(mean): 62 (31 Mar 2021 16:00) (58 - 87)  RR: 14 (31 Mar 2021 17:00) (11 - 21)  SpO2: 98% (31 Mar 2021 17:00) (91% - 100%)    Physical:  General: Obese  F laying in bed comfortably in no acute distress; AxO x3  Abdomen: Protuberant; no abnormalities on inspection; incision site Steri-Strips with mildly stained w/blood, otherwise incision sites clean, dry and intact; BAILEY dressing in place with good seal, abdomen soft, nondistended, nontender to light and deep palpation in all four quadrants     I&O's Detail    30 Mar 2021 07:01  -  31 Mar 2021 07:00  --------------------------------------------------------  IN:    PRBCs (Packed Red Blood Cells): 350 mL  Total IN: 350 mL    OUT:  Total OUT: 0 mL    Total NET: 350 mL      31 Mar 2021 07:01  -  31 Mar 2021 17:28  --------------------------------------------------------  IN:    IV PiggyBack: 100 mL    Lactated Ringers: 750 mL    Lactated Ringers Bolus: 500 mL  Total IN: 1350 mL    OUT:    Indwelling Catheter - Urethral (mL): 160 mL    Nasogastric/Oral tube (mL): 10 mL  Total OUT: 170 mL    Total NET: 1180 mL    LABS:                        10.0   9.05  )-----------( 243      ( 31 Mar 2021 13:01 )             30.8             03-31    137  |  106  |  18  ----------------------------<  194<H>  4.4   |  22  |  1.46<H>    Ca    8.2<L>      31 Mar 2021 13:01  Phos  2.4     03-31  Mg     1.7     03-31    TPro  6.1  /  Alb  2.8<L>  /  TBili  0.5  /  DBili  x   /  AST  44<H>  /  ALT  33  /  AlkPhos  58  03-31

## 2021-03-31 NOTE — CONSULT NOTE ADULT - SUBJECTIVE AND OBJECTIVE BOX
Source of information: KWAME MOSQUERA, Chart review  Patient language: Greenlandic  : [id number]    HPI:  79 years old Greenlandic-speaking female from home, ambulates with walker, lives with son, with PMHx of HFpEF, CAD (s/p stents), chronic venous stasis, DM, HTN and Afib (on Xarelto, s/p PPM), chronic bronchitis with asthmatic component ( on Oxygen PRN at home ), JOSE DANIEL ( supposed to be on nocturnal CPAP , but non compliant) presents to the ED with chief complaint of hypotension ,  SOB and  fatigue for almost 2 weeks. Per daughter and granddaughter patient has been complaining of increasing fatigue and sob for last few weeks, seemed to be pale , patient endorses dark loose bowel movement for quite a while.  pt had virtual  colonoscopy 1 year ago and the result was questionable. She never followed up. Per daughter pt refused all kinds of meat including chicken, meat , however denies any dysphasia. Patient  also endorses decreased appetite.  patient has chronic diarrhea. Patient Denies nausea, vomiting, abdominal pain, SOB, chest pain, CHAMBERLAIN, palpitations, dizziness, headache, cough, wheezing, joint pain or swelling, fever, chills.      GOC: Discussed with daughter,  mentioned she want stobe DNI , on discussion with patient, patient was so distressed about urge incontinency and was complaining of pain and IV infiltrations, so GOC discussion deferred to primary team.    (20 Mar 2021 19:53)      Patient is a 81y old  Female  PMHx of HFpEF, CAD (s/p stents), chronic venous stasis, DM, HTN and Afib (on Xarelto, s/p PPM), chronic bronchitis with asthmatic component ( on Oxygen PRN at home ), JOSE DANIEL ( supposed to be on nocturnal CPAP , but non compliant) presents to the ED with chief complaint of hypotension ,  SOB and  fatigue for almost 2 weeks. Found to have symptomatic anemia. Pt s/p lap colon resection 3/31, POD #0.  Pain consulted for post operative pain. Pt seen and examined at bedside. Reports abdominal pain score 2/10 and tolerable at this time  SCALE USED: (1-10 VNRS). Pt describes pain as aching, non- radiating, alleviated by pain medication, exacerbated by movement and palpation. Pt also reports chronic right shoulder pain and right leg numbness. Pt is NPO. Denies lethargy, nausea, vomiting, constipation, itchiness. Patient stated goal for pain control: to be able to take deep breaths, get out of bed to chair and ambulate with tolerable pain control. 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     PAST MEDICAL & SURGICAL HISTORY:  Asthma    CAD (Coronary Atherosclerotic Disease)    Myocardial Infarction    Diabetes    HTN (Hypertension)    HLD (Hyperlipidemia)    CHF (congestive heart failure), NYHA class I    IBS (irritable bowel syndrome)    Venous stasis    Hypothyroidism    Atrial fibrillation    Pacemaker    Obesity    S/P coronary angiogram        FAMILY HISTORY:  Family history of heart disease        Social History:  Alcohol: Denied  Smoking: Denied  Illicit drugs: Denied (20 Mar 2021 19:53)    Allergies    No Known Allergies    MEDICATIONS  (STANDING):  acetaminophen  IVPB .. 1000 milliGRAM(s) IV Intermittent once  acetaminophen  IVPB .. 1000 milliGRAM(s) IV Intermittent once  amoxicillin 1000 milliGRAM(s) Oral two times a day  ascorbic acid 2000 milliGRAM(s) Oral every 8 hours  aspirin  chewable 81 milliGRAM(s) Oral daily  atorvastatin 40 milliGRAM(s) Oral at bedtime  budesonide 160 MICROgram(s)/formoterol 4.5 MICROgram(s) Inhaler 2 Puff(s) Inhalation two times a day  carvedilol 6.25 milliGRAM(s) Oral every 12 hours  chlorhexidine 2% Cloths 1 Application(s) Topical once  cholecalciferol 1000 Unit(s) Oral daily  dronedarone 400 milliGRAM(s) Oral two times a day  fluticasone propionate 50 MICROgram(s)/spray Nasal Spray 1 Spray(s) Both Nostrils every 12 hours  gabapentin 300 milliGRAM(s) Oral daily  heparin   Injectable 5000 Unit(s) SubCutaneous every 12 hours  influenza   Vaccine 0.5 milliLiter(s) IntraMuscular once  insulin glargine Injectable (LANTUS) 10 Unit(s) SubCutaneous at bedtime  insulin lispro (ADMELOG) corrective regimen sliding scale   SubCutaneous Before meals and at bedtime  lactated ringers Bolus 500 milliLiter(s) IV Bolus once  lactated ringers. 1000 milliLiter(s) (150 mL/Hr) IV Continuous <Continuous>  levothyroxine Injectable 130 MICROGram(s) IV Push at bedtime  metroNIDAZOLE    Tablet 500 milliGRAM(s) Oral every 12 hours  montelukast 10 milliGRAM(s) Oral daily  oxybutynin 5 milliGRAM(s) Oral two times a day  pantoprazole  Injectable 40 milliGRAM(s) IV Push daily  zinc sulfate 220 milliGRAM(s) Oral daily    MEDICATIONS  (PRN):  HYDROmorphone  Injectable 1 milliGRAM(s) IV Push every 4 hours PRN Severe Pain (7 - 10)  ondansetron Injectable 4 milliGRAM(s) IV Push every 6 hours PRN Nausea      Vital Signs Last 24 Hrs  T(C): 36.6 (31 Mar 2021 13:00), Max: 37.1 (31 Mar 2021 03:05)  T(F): 97.9 (31 Mar 2021 13:00), Max: 98.7 (31 Mar 2021 03:05)  HR: 63 (31 Mar 2021 14:00) (59 - 70)  BP: 107/49 (31 Mar 2021 14:00) (98/48 - 179/62)  BP(mean): 65 (31 Mar 2021 14:00) (58 - 87)  RR: 11 (31 Mar 2021 14:00) (11 - 20)  SpO2: 100% (31 Mar 2021 14:00) (97% - 100%)    LABS: Reviewed.                          10.0   9.05  )-----------( 243      ( 31 Mar 2021 13:01 )             30.8     03-31    137  |  106  |  18  ----------------------------<  194<H>  4.4   |  22  |  1.46<H>    Ca    8.2<L>      31 Mar 2021 13:01  Phos  2.4     03-31  Mg     1.7     03-31    TPro  6.1  /  Alb  2.8<L>  /  TBili  0.5  /  DBili  x   /  AST  44<H>  /  ALT  33  /  AlkPhos  58  03-31    PT/INR - ( 31 Mar 2021 05:42 )   PT: 13.1 sec;   INR: 1.11 ratio         PTT - ( 31 Mar 2021 05:42 )  PTT:34.4 sec  LIVER FUNCTIONS - ( 31 Mar 2021 13:01 )  Alb: 2.8 g/dL / Pro: 6.1 g/dL / ALK PHOS: 58 U/L / ALT: 33 U/L DA / AST: 44 U/L / GGT: x           Urinalysis Basic - ( 31 Mar 2021 13:01 )    Color: Yellow / Appearance: Clear / S.020 / pH: x  Gluc: x / Ketone: Trace  / Bili: Negative / Urobili: Negative   Blood: x / Protein: Negative / Nitrite: Negative   Leuk Esterase: Negative / RBC: x / WBC x   Sq Epi: x / Non Sq Epi: x / Bacteria: x      CAPILLARY BLOOD GLUCOSE      POCT Blood Glucose.: 183 mg/dL (31 Mar 2021 12:21)  POCT Blood Glucose.: 172 mg/dL (31 Mar 2021 11:02)  POCT Blood Glucose.: 126 mg/dL (31 Mar 2021 07:48)  POCT Blood Glucose.: 128 mg/dL (30 Mar 2021 20:50)  POCT Blood Glucose.: 157 mg/dL (30 Mar 2021 17:09)    COVID-19 PCR: NotDetec (30 Mar 2021 20:42)  COVID-19 PCR: NotDetec (28 Mar 2021 04:16)  COVID-19 PCR: Detected (27 Mar 2021 19:25)  COVID-19 PCR: NotDetec (23 Mar 2021 15:12)  COVID-19 PCR: NotDetec (20 Mar 2021 16:32)      Radiology: Reviewed.   < from: Xray Abdomen 2 Views (21 @ 19:50) >    EXAM:  XR ABDOMEN 2V                            PROCEDURE DATE:  2021          INTERPRETATION:  Abdomen 2 views    HISTORY: Evaluate for free air.    COMPARISON: 3/20/2021    Supine and upright views of the abdomen show a normal gas pattern. The psoas margins are within normal limits. There is no evidence of free air. Surgical clips project over the right abdomen were not seen in the prior study.    IMPRESSION: No evidence of free air. Right surgical clips as noted.    Thank you for this referral.            SAUNDRA COOPER MD; Attending Interventional Radiologist  This document has been electronically signed. Mar 30 2021 10:46AM    < end of copied text >    < from: Xray Knee 3 Views, Right (21 @ 14:20) >    EXAM:  KNEE AP.LAT&OBL.RIGHT                            PROCEDURE DATE:  2021          INTERPRETATION:  Right knee. Patient has local pain. 3 views.    No effusion. Mild degeneration concentrated around the articular patella.    On the lateral view there is a well-circumscribed round density in the subcutaneous tissues anterior to the patella measuring approximately 3.2 cm.    IMPRESSION: Round mass anterior to the patella is noted. This is new since 2018.            GUERRERO HUSSEIN M.D., ATTENDING RADIOLOGIST  This document has been electronically signed. Mar 28 2021  3:48PM    < end of copied text >      ORT Score -   Family Hx of substance abuse	Female	      Male  Alcohol 	                                           1                     3  Illegal drugs	                                   2                     3  Rx drugs                                           4 	                  4  Personal Hx of substance abuse		  Alcohol 	                                          3	                  3  Illegal drugs                                     4	                  4  Rx drugs                                            5 	                  5  Age between 16- 45 years	           1                     1  hx preadolescent sexual abuse	   3 	                  0  Psychological disease		  ADD, OCD, bipolar, schizophrenia   2	          2  Depression                                           1 	          1  Total: 0    a score of 3 or lower indicates low risk for opioid abuse		  a score of 4-7 indicates moderate risk for opioid abuse		  a score of 8 or higher indicates high risk for opioid abuse  	  REVIEW OF SYSTEMS:  CONSTITUTIONAL: No fever + fatigue  HEENT:  No difficulty hearing, no change in vision  NECK: No pain or stiffness  RESPIRATORY: No cough, wheezing, chills or hemoptysis; No shortness of breath  CARDIOVASCULAR: No chest pain, palpitations, dizziness, or leg swelling  GASTROINTESTINAL: No loss of appetite, decreased PO intake. + abdominal pain. No nausea, vomiting; No diarrhea or constipation.   GENITOURINARY: No dysuria, frequency, hematuria, retention or incontinence  MUSCULOSKELETAL: + chronic right shoulder pain, no swelling; No muscle, back, or extremity pain, no upper or lower motor strength weakness, no saddle anesthesia, bowel/bladder incontinence, no falls   NEURO: No headaches, + numbness/tingling right leg, No weakness  PSYCHIATRIC: No depression, anxiety or difficulty sleeping    PHYSICAL EXAM:  GENERAL:  Alert & Oriented X4, cooperative, NAD, Good concentration. Speech is clear.   RESPIRATORY: Respirations even and unlabored. Clear to auscultation bilaterally; No rales, rhonchi, wheezing, or rubs  CARDIOVASCULAR: Normal S1/S2, regular rate and rhythm; No murmurs, rubs, or gallops. No JVD.   GASTROINTESTINAL:  Soft, + appropriately tender, Nondistended; No Bowel sounds present + lap sites c/d/i   PERIPHERAL VASCULAR:  Extremities warm without edema. 2+ Peripheral Pulses, No cyanosis, No calf tenderness  MUSCULOSKELETAL: Motor Strength 5/5 B/L upper and lower extremities; moves all extremities equally against gravity; ROM intact; negative SLR; + right shoulder tender to palpation   SKIN: + abdominal lap sites c/d/i; + BAILEY dressing in place, c/d/i    Risk factors associated with adverse outcomes related to opioid treatment  [ ]  Concurrent benzodiazepine use  [ ]  History/ Active substance use or alcohol use disorder  [ ] Psychiatric co-morbidity  [ ] Sleep apnea  [ ] COPD  [X ] BMI> 35  [ ] Liver dysfunction  [ ] Renal dysfunction  [ ] CHF  [ ] Smoker  [X ]  Age > 60 years    [X ]  [place]  Reviewed and Copied to Chart. See below.    Plan of care and goal oriented pain management treatment options were discussed with patient and /or primary care giver; all questions and concerns were addressed and care was aligned with patient's wishes.    Educated patient on goal oriented pain management treatment options        Source of information: KWAME MOSQUERA, Chart review  Patient language: Polish  : [id number]    HPI:  79 years old Polish-speaking female from home, ambulates with walker, lives with son, with PMHx of HFpEF, CAD (s/p stents), chronic venous stasis, DM, HTN and Afib (on Xarelto, s/p PPM), chronic bronchitis with asthmatic component ( on Oxygen PRN at home ), JOSE DANIEL ( supposed to be on nocturnal CPAP , but non compliant) presents to the ED with chief complaint of hypotension ,  SOB and  fatigue for almost 2 weeks. Per daughter and granddaughter patient has been complaining of increasing fatigue and sob for last few weeks, seemed to be pale , patient endorses dark loose bowel movement for quite a while.  pt had virtual  colonoscopy 1 year ago and the result was questionable. She never followed up. Per daughter pt refused all kinds of meat including chicken, meat , however denies any dysphasia. Patient  also endorses decreased appetite.  patient has chronic diarrhea. Patient Denies nausea, vomiting, abdominal pain, SOB, chest pain, CHAMBERLAIN, palpitations, dizziness, headache, cough, wheezing, joint pain or swelling, fever, chills.      GOC: Discussed with daughter,  mentioned she want stobe DNI , on discussion with patient, patient was so distressed about urge incontinency and was complaining of pain and IV infiltrations, so GOC discussion deferred to primary team.    (20 Mar 2021 19:53)      Patient is a 81y old  Female  PMHx of HFpEF, CAD (s/p stents), chronic venous stasis, DM, HTN and Afib (on Xarelto, s/p PPM), chronic bronchitis with asthmatic component ( on Oxygen PRN at home ), JOSE DANIEL ( supposed to be on nocturnal CPAP , but non compliant) presents to the ED with chief complaint of hypotension ,  SOB and  fatigue for almost 2 weeks. Found to have symptomatic anemia. Pt s/p lap colon resection 3/31, POD #0.  Pain consulted for post operative pain. Pt seen and examined at bedside. Reports abdominal pain score 2/10 and tolerable at this time  SCALE USED: (1-10 VNRS). Pt describes pain as aching, non- radiating, alleviated by pain medication, exacerbated by movement and palpation. Pt also reports chronic right shoulder pain and right leg numbness. Pt is NPO. Denies lethargy, nausea, vomiting, constipation, itchiness. Patient stated goal for pain control: to be able to take deep breaths, get out of bed to chair and ambulate with tolerable pain control. 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     PAST MEDICAL & SURGICAL HISTORY:  Asthma    CAD (Coronary Atherosclerotic Disease)    Myocardial Infarction    Diabetes    HTN (Hypertension)    HLD (Hyperlipidemia)    CHF (congestive heart failure), NYHA class I    IBS (irritable bowel syndrome)    Venous stasis    Hypothyroidism    Atrial fibrillation    Pacemaker    Obesity    S/P coronary angiogram        FAMILY HISTORY:  Family history of heart disease        Social History:  Alcohol: Denied  Smoking: Denied  Illicit drugs: Denied (20 Mar 2021 19:53)    Allergies    No Known Allergies    MEDICATIONS  (STANDING):  acetaminophen  IVPB .. 1000 milliGRAM(s) IV Intermittent once  acetaminophen  IVPB .. 1000 milliGRAM(s) IV Intermittent once  amoxicillin 1000 milliGRAM(s) Oral two times a day  ascorbic acid 2000 milliGRAM(s) Oral every 8 hours  aspirin  chewable 81 milliGRAM(s) Oral daily  atorvastatin 40 milliGRAM(s) Oral at bedtime  budesonide 160 MICROgram(s)/formoterol 4.5 MICROgram(s) Inhaler 2 Puff(s) Inhalation two times a day  carvedilol 6.25 milliGRAM(s) Oral every 12 hours  chlorhexidine 2% Cloths 1 Application(s) Topical once  cholecalciferol 1000 Unit(s) Oral daily  dronedarone 400 milliGRAM(s) Oral two times a day  fluticasone propionate 50 MICROgram(s)/spray Nasal Spray 1 Spray(s) Both Nostrils every 12 hours  gabapentin 300 milliGRAM(s) Oral daily  heparin   Injectable 5000 Unit(s) SubCutaneous every 12 hours  influenza   Vaccine 0.5 milliLiter(s) IntraMuscular once  insulin glargine Injectable (LANTUS) 10 Unit(s) SubCutaneous at bedtime  insulin lispro (ADMELOG) corrective regimen sliding scale   SubCutaneous Before meals and at bedtime  lactated ringers Bolus 500 milliLiter(s) IV Bolus once  lactated ringers. 1000 milliLiter(s) (150 mL/Hr) IV Continuous <Continuous>  levothyroxine Injectable 130 MICROGram(s) IV Push at bedtime  metroNIDAZOLE    Tablet 500 milliGRAM(s) Oral every 12 hours  montelukast 10 milliGRAM(s) Oral daily  oxybutynin 5 milliGRAM(s) Oral two times a day  pantoprazole  Injectable 40 milliGRAM(s) IV Push daily  zinc sulfate 220 milliGRAM(s) Oral daily    MEDICATIONS  (PRN):  HYDROmorphone  Injectable 1 milliGRAM(s) IV Push every 4 hours PRN Severe Pain (7 - 10)  ondansetron Injectable 4 milliGRAM(s) IV Push every 6 hours PRN Nausea      Vital Signs Last 24 Hrs  T(C): 36.6 (31 Mar 2021 13:00), Max: 37.1 (31 Mar 2021 03:05)  T(F): 97.9 (31 Mar 2021 13:00), Max: 98.7 (31 Mar 2021 03:05)  HR: 63 (31 Mar 2021 14:00) (59 - 70)  BP: 107/49 (31 Mar 2021 14:00) (98/48 - 179/62)  BP(mean): 65 (31 Mar 2021 14:00) (58 - 87)  RR: 11 (31 Mar 2021 14:00) (11 - 20)  SpO2: 100% (31 Mar 2021 14:00) (97% - 100%)    LABS: Reviewed.                          10.0   9.05  )-----------( 243      ( 31 Mar 2021 13:01 )             30.8     03-31    137  |  106  |  18  ----------------------------<  194<H>  4.4   |  22  |  1.46<H>    Ca    8.2<L>      31 Mar 2021 13:01  Phos  2.4     03-31  Mg     1.7     03-31    TPro  6.1  /  Alb  2.8<L>  /  TBili  0.5  /  DBili  x   /  AST  44<H>  /  ALT  33  /  AlkPhos  58  03-31    PT/INR - ( 31 Mar 2021 05:42 )   PT: 13.1 sec;   INR: 1.11 ratio         PTT - ( 31 Mar 2021 05:42 )  PTT:34.4 sec  LIVER FUNCTIONS - ( 31 Mar 2021 13:01 )  Alb: 2.8 g/dL / Pro: 6.1 g/dL / ALK PHOS: 58 U/L / ALT: 33 U/L DA / AST: 44 U/L / GGT: x           Urinalysis Basic - ( 31 Mar 2021 13:01 )    Color: Yellow / Appearance: Clear / S.020 / pH: x  Gluc: x / Ketone: Trace  / Bili: Negative / Urobili: Negative   Blood: x / Protein: Negative / Nitrite: Negative   Leuk Esterase: Negative / RBC: x / WBC x   Sq Epi: x / Non Sq Epi: x / Bacteria: x      CAPILLARY BLOOD GLUCOSE      POCT Blood Glucose.: 183 mg/dL (31 Mar 2021 12:21)  POCT Blood Glucose.: 172 mg/dL (31 Mar 2021 11:02)  POCT Blood Glucose.: 126 mg/dL (31 Mar 2021 07:48)  POCT Blood Glucose.: 128 mg/dL (30 Mar 2021 20:50)  POCT Blood Glucose.: 157 mg/dL (30 Mar 2021 17:09)    COVID-19 PCR: NotDetec (30 Mar 2021 20:42)  COVID-19 PCR: NotDetec (28 Mar 2021 04:16)  COVID-19 PCR: Detected (27 Mar 2021 19:25)  COVID-19 PCR: NotDetec (23 Mar 2021 15:12)  COVID-19 PCR: NotDetec (20 Mar 2021 16:32)      Radiology: Reviewed.   < from: Xray Abdomen 2 Views (21 @ 19:50) >    EXAM:  XR ABDOMEN 2V                            PROCEDURE DATE:  2021          INTERPRETATION:  Abdomen 2 views    HISTORY: Evaluate for free air.    COMPARISON: 3/20/2021    Supine and upright views of the abdomen show a normal gas pattern. The psoas margins are within normal limits. There is no evidence of free air. Surgical clips project over the right abdomen were not seen in the prior study.    IMPRESSION: No evidence of free air. Right surgical clips as noted.    Thank you for this referral.            SAUNDRA COOPER MD; Attending Interventional Radiologist  This document has been electronically signed. Mar 30 2021 10:46AM    < end of copied text >    < from: Xray Knee 3 Views, Right (21 @ 14:20) >    EXAM:  KNEE AP.LAT&OBL.RIGHT                            PROCEDURE DATE:  2021          INTERPRETATION:  Right knee. Patient has local pain. 3 views.    No effusion. Mild degeneration concentrated around the articular patella.    On the lateral view there is a well-circumscribed round density in the subcutaneous tissues anterior to the patella measuring approximately 3.2 cm.    IMPRESSION: Round mass anterior to the patella is noted. This is new since 2018.            GUERRERO HUSSEIN M.D., ATTENDING RADIOLOGIST  This document has been electronically signed. Mar 28 2021  3:48PM    < end of copied text >      ORT Score -   Family Hx of substance abuse	Female	      Male  Alcohol 	                                           1                     3  Illegal drugs	                                   2                     3  Rx drugs                                           4 	                  4  Personal Hx of substance abuse		  Alcohol 	                                          3	                  3  Illegal drugs                                     4	                  4  Rx drugs                                            5 	                  5  Age between 16- 45 years	           1                     1  hx preadolescent sexual abuse	   3 	                  0  Psychological disease		  ADD, OCD, bipolar, schizophrenia   2	          2  Depression                                           1 	          1  Total: 0    a score of 3 or lower indicates low risk for opioid abuse		  a score of 4-7 indicates moderate risk for opioid abuse		  a score of 8 or higher indicates high risk for opioid abuse  	  REVIEW OF SYSTEMS:  CONSTITUTIONAL: No fever + fatigue  HEENT:  No difficulty hearing, no change in vision  NECK: No pain or stiffness  RESPIRATORY: No cough, wheezing, chills or hemoptysis; No shortness of breath  CARDIOVASCULAR: No chest pain, palpitations, dizziness, or leg swelling  GASTROINTESTINAL: No loss of appetite, decreased PO intake. + abdominal pain. No nausea, vomiting; No diarrhea or constipation.   GENITOURINARY: No dysuria, frequency, hematuria, retention or incontinence  MUSCULOSKELETAL: + chronic right shoulder pain, no swelling; No muscle, back, or extremity pain, no upper or lower motor strength weakness, no saddle anesthesia, bowel/bladder incontinence, no falls   NEURO: No headaches, + numbness/tingling right leg, No weakness  PSYCHIATRIC: No depression, anxiety or difficulty sleeping    PHYSICAL EXAM:  GENERAL:  Alert & Oriented X4, cooperative, NAD, Good concentration. Speech is clear.   RESPIRATORY: Respirations even and unlabored. Clear to auscultation bilaterally; No rales, rhonchi, wheezing, or rubs + ETCO2 monitor in place   CARDIOVASCULAR: Normal S1/S2, regular rate and rhythm; No murmurs, rubs, or gallops. No JVD. + cardiac monitor in place   GASTROINTESTINAL:  Soft, + appropriately tender, Nondistended; No Bowel sounds present + lap sites c/d/i   GENITOURINARY: Urinary catheter draining clear yellow urine   PERIPHERAL VASCULAR:  Extremities warm without edema. 2+ Peripheral Pulses, No cyanosis, No calf tenderness  MUSCULOSKELETAL: Motor Strength 5/5 B/L upper and lower extremities; moves all extremities equally against gravity; ROM intact; negative SLR; + right shoulder tender to palpation   SKIN: + abdominal lap sites c/d/i; + BAILEY dressing in place, c/d/i    Risk factors associated with adverse outcomes related to opioid treatment  [ ]  Concurrent benzodiazepine use  [ ]  History/ Active substance use or alcohol use disorder  [ ] Psychiatric co-morbidity  [ ] Sleep apnea  [ ] COPD  [X ] BMI> 35  [ ] Liver dysfunction  [ ] Renal dysfunction  [ ] CHF  [ ] Smoker  [X ]  Age > 60 years    [X ]  [place]  Reviewed and Copied to Chart. See below.    Plan of care and goal oriented pain management treatment options were discussed with patient and /or primary care giver; all questions and concerns were addressed and care was aligned with patient's wishes.    Educated patient on goal oriented pain management treatment options

## 2021-03-31 NOTE — PROGRESS NOTE ADULT - PROBLEM SELECTOR PLAN 10
HSQ, SCDs, per surgery no need to hold  zinc, vit C for general health promotion
HSQ, SCDs, per surgery no need to hold  zinc, vit C for general health promotion
will hold off chemoPPX as above, SCDs  zinc, vit C for general health promotion
HSQ, SCDs, per surgery no need to hold  zinc, vit C for general health promotion

## 2021-03-31 NOTE — PROGRESS NOTE ADULT - PROBLEM SELECTOR PLAN 1
presented 3/20 with x2 weeks increasing fatigue, SOB, and paleness, with prolonged history of dark bowel movements s/p possible transverse polyp colonoscopy 2020, no further fu  -in ED, VS notable for /77, SpO2 100% 4L NC, adm trop, COVID, UA negative  -pre-RBC adm Hb 7.7, improved s/p x1pRBC in ED, Fe 15, TIBC 457, 3% sat  -CT a/p unremarkable for signs of bleeding  -IV iron 3/20-23 and 3/26-27  -Fe and %sat improved s/p transfusion and Venofer, Hb stable, transfuse if <8  -EGD/col 3/22 with GI Dr. Moreland, gastritis, esophagitis, no GIB, path +H pylori  -H pylori treatment:       -amox 1g q12 x7 days>500mg Q 12hours x7 days 3/24pm-27, restarted 3/29          -Flagyl 500 mg q12 3/24pm-27, restarted 3/29         -PPI BID x14 days 3/23-       -s/p clarithromycin 3/25-27, held given interactions with Multaq, statin, flonase  -ID Dr. Butcher following   -colonoscopy 3/24 with polyps (removed), abdominal XR negative for free air 3/24   -repeat colonoscopy 3/25 notable for diverticulosis and internal hemorrhoids  -path resulted 3/26 +invasive colonic adenocarcinoma with tubular adenoma  -partial colon resection with surgery Dr. Murrell 3/31  -S/P Laparoscopic Right Hemicolectomy 3/31/21; in PCAU- S/P extubation doing well on ambient air.

## 2021-03-31 NOTE — PROGRESS NOTE ADULT - PROBLEM SELECTOR PLAN 7
-noncompliant with CPAP  -recommend outpatient pulm fu  -remainder of history and management as above
-noncompliant with CPAP  -recommend outpatient pulm fu  -remainder of history and management as above
-noncompliant with CPAP  -remainder of history and management as above
supposed to be at nocturnal CPAP at home , non compliant   oxygen supplementation as needed
-noncompliant with CPAP  -remainder of history and management as above
-noncompliant with CPAP  -remainder of history and management as above
-noncompliant with CPAP  -recommend outpatient pulm fu  -remainder of history and management as above
-noncompliant with CPAP  -remainder of history and management as above
-noncompliant with CPAP  -recommend outpatient pulm fu  -remainder of history and management as above
-noncompliant with CPAP  -remainder of history and management as above
-noncompliant with CPAP  -recommend outpatient pulm fu  -remainder of history and management as above

## 2021-03-31 NOTE — CONSULT NOTE ADULT - ASSESSMENT
80 y/o  [place] citizen-speaking female  with PMH  of HFpEF, CAD (s/p stents), chronic venous stasis, DM, HTN and Afib (on Xarelto, s/p PPM), chronic bronchitis with asthmatic component ( on Oxygen PRN at home ), JOSE DANIEL ( supposed to be on nocturnal CPAP , but non compliant) presented to the ED with chief complaint of hypotension ,  SOB and  fatigue for almost 2 weeks.   Pt was admitted to medicine for symptomatic anemia. Colonoscopy showed colon mass. Biopsy result showed invasive adenocarcinoma of colon. Pt underwent laparoscopic right hemicolectomy today. POD # 0  Transferred to ICU for post operative care.    Assessment  1. S/P Right Hemicolectomy   2. Ascending colon invasive adenocarcinoma  3. Symptomatic anemia  4. Afib   5. Diabetes Mellitus  6. History of Asthma  7. Hypertension  8.History of coronary artery disease  9.JOSE DANIEL  10. Obesity     Plan:      NEURO;  - Pt is awake and alert  -No acute issues  -IV Dilaudid for abdominal pain    CARDIOVASCULAR  #Afib   -Hold  full dose anticoagulation until tomorrow  -Resume carvedilol when diet is resumed  -Continue telemonitoring    # History of Hypertension  -Resume Multaq and Coreg after resuming diet  -Monitor blood pressure      PULMONARY  # No acute issues  -Pt is breathing well on ambient air    GASTROINTESTINAL  # Ascending colon invasive adenocarcinoma  -S/P Laparoscopic right hemicolectomy   -POD#0  - Pain management   -Continue IV fluids  -Keep NPO as per surgery      # NPO for now  -Protonix for GI prophylaxis    RENAL  #    INFECTIOUS DISEASE  #  ENDOCRINE  #  HEME  #  FLUIDS/ELECTROLYTES/NUTRITION  -IVF  -Monitor, Replete to K>4 and Mg>2  -Diet  PROPHYLAXIS  -DVT  -GI    DISPO  CODE STATUS 80 y/o  Citizen of the [place]-speaking female  with PMH  of HFpEF, CAD (s/p stents), chronic venous stasis, DM, HTN and Afib (on Xarelto, s/p PPM), chronic bronchitis with asthmatic component ( on Oxygen PRN at home ), JOSE DANIEL ( supposed to be on nocturnal CPAP , but non compliant) presented to the ED with chief complaint of hypotension ,  SOB and  fatigue for almost 2 weeks.   Pt was admitted to medicine for symptomatic anemia. Colonoscopy showed colon mass. Biopsy result showed invasive adenocarcinoma of colon. Pt underwent laparoscopic right hemicolectomy today. POD # 0  Transferred to ICU for post operative care.    Assessment  1. S/P Right Hemicolectomy   2. Ascending colon invasive adenocarcinoma  3. Symptomatic anemia  4. Afib   5. Diabetes Mellitus  6. History of Asthma  7. Hypertension  8.History of coronary artery disease  9.JOSE DANIEL  10. Obesity   11. H. Pylori infection     Plan:      NEURO;  - Pt is awake and alert  -No acute issues  -IV Dilaudid for abdominal pain    CARDIOVASCULAR  #Afib   -Hold  full dose anticoagulation until tomorrow  -Resume carvedilol when diet is resumed  -Continue telemonitoring    # History of Hypertension  -Resume Multaq and Coreg after resuming diet  -Monitor blood pressure      PULMONARY  # No acute issues  -Pt is breathing well on ambient air    GASTROINTESTINAL  # Ascending colon invasive adenocarcinoma  -S/P Laparoscopic right hemicolectomy   -POD#0  - Pain management   -Continue IV fluids  -Keep NPO as per surgery      # NPO for now  -Protonix for GI prophylaxis    RENAL  # SRIDHAR  -Likely pre renal  -Continue IV fluids  -Monitor BMP avoid nephrotoxic medications    INFECTIOUS DISEASE  # H.Pylori infection  -Pt is on iv metronidazole and amoxicillin fro H. Pylori infection  ID:      ENDOCRINE  #DM  -Continue Lantus 10 U at bedtime  and sliding scale Humalog  -Monitor blood glucose    HEME  # Anemia  -Secondary to GI Blood loss  -S/P 3 U PRBC so far  -Monitor CBC , pt/inr      -Monitor, Replete to K>4 and Mg>2  -Diet: NPO    PROPHYLAXIS  -DVT:  Heparin s/c for DVT prophylaxis   -GI: Protonix for GI prophylaxis    DISPO: Transferred to ICU    CODE STATUS: Full CODE

## 2021-03-31 NOTE — PROGRESS NOTE ADULT - ASSESSMENT
Assessment and Plan: 80 y/o F s/p R hemicolectomy POD#0; VSS, afebrile, pain well managed   -Monitor labs (H/H) and vitals   -C/w NPO; consider advancement to clear liquids tomorrow as tolerated   -C/w IVF  -C/w Ceftriaxone and Metronidazole   -C/w pain management PRN   -DVT ppx with compression stockings   -Consider Ashton removal tomorrow with adequate output    Assessment and Plan: 82 y/o F s/p R hemicolectomy POD#0; VSS, afebrile, pain well managed   -Monitor labs (H/H) and vitals   -C/w NPO; await bowel function   -C/w IVF while NPO  -C/w Ceftriaxone and Metronidazole x24 hr post op  -C/w pain management PRN   -DVT ppx with compression stockings   -OOB in AM  -Consider Ashton removal tomorrow with adequate output

## 2021-03-31 NOTE — CONSULT NOTE ADULT - ATTENDING COMMENTS
Agree with above, I have seen and examined the patient
pt evaluated.  agree with above.  prepatellar mass, follow up as outpatient for further evaluation after other medical issues addressed.  rec MRI
IMP: This is an 79 yr old  [place]-speaking womann  with  HFpEF, CAD (s/p stents), chronic venous stasis, DM, HTN and Afib (on Xarelto, s/p PPM), chronic bronchitis with asthmatic component ( on Oxygen PRN at home ), JOSE DANIEL ( supposed to be on nocturnal CPAP , but non compliant) presented to the ED with chief complaint of hypotension ,  SOB and  fatigue for almost 2 weeks.   Pt was admitted to medicine for symptomatic anemia. Colonoscopy showed colon mass. Biopsy result showed invasive adenocarcinoma of colon. Pt underwent laparoscopic right hemicolectomy today. POD # 0  Transferred to ICU for post operative care.    Assessment  1. S/P Right Hemicolectomy   2. Ascending colon invasive adenocarcinoma  3. Symptomatic anemia  4. Afib   5. Diabetes Mellitus  6. History of Asthma  7. Hypertension  8.History of coronary artery disease  9.JOSE DANIEL  10. Obesity   11. H. Pylori infection       Plan;  -trasfer to ICU   -hemodynamic monitoring   -npo  -pain control   -ivf  -trend i/i  -wound care as per surgery  -blood sugar monitoring with coverage   -continue meds  -continue antibx for H. pylori

## 2021-03-31 NOTE — PROGRESS NOTE ADULT - SUBJECTIVE AND OBJECTIVE BOX
PGY-1 Progress Note discussed with attending    PAGER #: [[phone]] TILL 5:00 PM  PLEASE CONTACT ON CALL TEAM:  - On Call Team (Please refer to Ana) FROM 5:00 PM - 8:30PM  - Nightfloat Team FROM 8:30 -7:30 AM    CHIEF COMPLAINT & BRIEF HOSPITAL COURSE:    INTERVAL HPI/OVERNIGHT EVENTS:     MEDICATIONS  (STANDING):  amoxicillin 1000 milliGRAM(s) Oral two times a day  ascorbic acid 2000 milliGRAM(s) Oral every 8 hours  aspirin  chewable 81 milliGRAM(s) Oral daily  atorvastatin 40 milliGRAM(s) Oral at bedtime  budesonide 160 MICROgram(s)/formoterol 4.5 MICROgram(s) Inhaler 2 Puff(s) Inhalation two times a day  carvedilol 6.25 milliGRAM(s) Oral every 12 hours  chlorhexidine 2% Cloths 1 Application(s) Topical once  cholecalciferol 1000 Unit(s) Oral daily  dronedarone 400 milliGRAM(s) Oral two times a day  fluticasone propionate 50 MICROgram(s)/spray Nasal Spray 1 Spray(s) Both Nostrils every 12 hours  gabapentin 300 milliGRAM(s) Oral daily  heparin   Injectable 5000 Unit(s) SubCutaneous every 12 hours  influenza   Vaccine 0.5 milliLiter(s) IntraMuscular once  insulin glargine Injectable (LANTUS) 10 Unit(s) SubCutaneous at bedtime  insulin lispro (ADMELOG) corrective regimen sliding scale   SubCutaneous Before meals and at bedtime  lactated ringers Bolus 500 milliLiter(s) IV Bolus once  lactated ringers. 1000 milliLiter(s) (150 mL/Hr) IV Continuous <Continuous>  levothyroxine 175 MICROGram(s) Oral daily  levothyroxine Injectable 130 MICROGram(s) IV Push at bedtime  metroNIDAZOLE    Tablet 500 milliGRAM(s) Oral every 12 hours  montelukast 10 milliGRAM(s) Oral daily  oxybutynin 5 milliGRAM(s) Oral two times a day  pantoprazole  Injectable 40 milliGRAM(s) IV Push daily  zinc sulfate 220 milliGRAM(s) Oral daily    MEDICATIONS  (PRN):  acetaminophen   Tablet .. 650 milliGRAM(s) Oral every 6 hours PRN Temp greater or equal to 38C (100.4F), Mild Pain (1 - 3)  HYDROmorphone  Injectable 1 milliGRAM(s) IV Push every 4 hours PRN Severe Pain (7 - 10)  ondansetron Injectable 4 milliGRAM(s) IV Push every 6 hours PRN Nausea        REVIEW OF SYSTEMS:  CONSTITUTIONAL: No fever, weight loss, or fatigue  RESPIRATORY: No cough, wheezing, chills or hemoptysis; No shortness of breath  CARDIOVASCULAR: No chest pain, palpitations, dizziness, or leg swelling  GASTROINTESTINAL: No abdominal pain. No nausea, vomiting, or hematemesis; No diarrhea or constipation. No melena or hematochezia.  GENITOURINARY: No dysuria or hematuria, urinary frequency  NEUROLOGICAL: No headaches, memory loss, loss of strength, numbness, or tremors  SKIN: No itching, burning, rashes, or lesions     Vital Signs Last 24 Hrs  T(C): 36.6 (31 Mar 2021 13:00), Max: 37.1 (31 Mar 2021 03:05)  T(F): 97.9 (31 Mar 2021 13:00), Max: 98.7 (31 Mar 2021 03:05)  HR: 63 (31 Mar 2021 14:00) (59 - 70)  BP: 107/49 (31 Mar 2021 14:00) (98/48 - 179/62)  BP(mean): 65 (31 Mar 2021 14:00) (58 - 87)  RR: 11 (31 Mar 2021 14:00) (11 - 20)  SpO2: 100% (31 Mar 2021 14:00) (97% - 100%)    PHYSICAL EXAMINATION:  GENERAL: NAD, well built  HEAD:  Atraumatic, Normocephalic  EYES:  conjunctiva and sclera clear  NECK: Supple, No JVD, Normal thyroid  CHEST/LUNG: Clear to auscultation. Clear to percussion bilaterally; No rales, rhonchi, wheezing, or rubs  HEART: Regular rate and rhythm; No murmurs, rubs, or gallops  ABDOMEN: Soft, Nontender, Nondistended; Bowel sounds present  NERVOUS SYSTEM: alert and oriented x3  EXTREMITIES:  2+ Peripheral Pulses, No clubbing, cyanosis, or edema  SKIN: warm dry                          10.0   9.05  )-----------( 243      ( 31 Mar 2021 13:01 )             30.8     03-31    137  |  106  |  18  ----------------------------<  194<H>  4.4   |  22  |  1.46<H>    Ca    8.2<L>      31 Mar 2021 13:01  Phos  2.4     03-31  Mg     1.7     03-31    TPro  6.1  /  Alb  2.8<L>  /  TBili  0.5  /  DBili  x   /  AST  44<H>  /  ALT  33  /  AlkPhos  58  03-31    LIVER FUNCTIONS - ( 31 Mar 2021 13:01 )  Alb: 2.8 g/dL / Pro: 6.1 g/dL / ALK PHOS: 58 U/L / ALT: 33 U/L DA / AST: 44 U/L / GGT: x               PT/INR - ( 31 Mar 2021 05:42 )   PT: 13.1 sec;   INR: 1.11 ratio         PTT - ( 31 Mar 2021 05:42 )  PTT:34.4 sec    CAPILLARY BLOOD GLUCOSE      RADIOLOGY & ADDITIONAL TESTS:

## 2021-03-31 NOTE — PROGRESS NOTE ADULT - ASSESSMENT
1. Ascending colon invasive adenocarcinoma  2. Anemia  3. S/p R hemicolectomy   4. Gastritis  5. Esophagitis  6. No evidence of acute GI bleeding    Suggestions:    1. Monitor H/H  2. Transfuse PRBC as needed  3. Protonix 40mg daily   4. Surgical follow up   5. Avoid NSAID  6. Oncology follow up  7. Continue ICU monitoring  8. DVT prophylaxis

## 2021-03-31 NOTE — CONSULT NOTE ADULT - SUBJECTIVE AND OBJECTIVE BOX
Patient is a 81y old  Female who presents with a chief complaint of symptomatic anemia (30 Mar 2021 19:27)      HPI:  79 years old English-speaking female from home, ambulates with walker, lives with son, with PMHx of HFpEF, CAD (s/p stents), chronic venous stasis, DM, HTN and Afib (on Xarelto, s/p PPM), chronic bronchitis with asthmatic component ( on Oxygen PRN at home ), JOSE DANIEL ( supposed to be on nocturnal CPAP , but non compliant) presented to the ED with chief complaint of hypotension ,  SOB and  fatigue for almost 2 weeks.   Pt had virtual  colonoscopy 1 year ago and the result was questionable. She never followed up. Per daughter pt refused all kinds of meat including chicken, meat , however denies any dysphasia. Patient  also endorses decreased appetite.  patient has chronic diarrhea. Patient Denies nausea, vomiting, abdominal pain, SOB, chest pain, CHAMBERLAIN, palpitations, dizziness, headache, cough, wheezing, joint pain or swelling, fever, chills.    Hospital course: Pt was admitted to medicine floor for symptomatic anemia secondary to GI bleed. Colonoscopy was done during hospital stay. 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      Allergies    No Known Allergies    Intolerances        MEDICATIONS  (STANDING):  amoxicillin 1000 milliGRAM(s) Oral two times a day  ascorbic acid 2000 milliGRAM(s) Oral every 8 hours  atorvastatin 40 milliGRAM(s) Oral at bedtime  budesonide 160 MICROgram(s)/formoterol 4.5 MICROgram(s) Inhaler 2 Puff(s) Inhalation two times a day  carvedilol 6.25 milliGRAM(s) Oral every 12 hours  chlorhexidine 2% Cloths 1 Application(s) Topical once  cholecalciferol 1000 Unit(s) Oral daily  dronedarone 400 milliGRAM(s) Oral two times a day  fluticasone propionate 50 MICROgram(s)/spray Nasal Spray 1 Spray(s) Both Nostrils every 12 hours  gabapentin 300 milliGRAM(s) Oral daily  influenza   Vaccine 0.5 milliLiter(s) IntraMuscular once  insulin glargine Injectable (LANTUS) 10 Unit(s) SubCutaneous at bedtime  insulin lispro (ADMELOG) corrective regimen sliding scale   SubCutaneous Before meals and at bedtime  levothyroxine 175 MICROGram(s) Oral daily  metroNIDAZOLE    Tablet 500 milliGRAM(s) Oral every 12 hours  montelukast 10 milliGRAM(s) Oral daily  neomycin 1000 milliGRAM(s) Oral <User Schedule>  oxybutynin 5 milliGRAM(s) Oral two times a day  pantoprazole    Tablet 40 milliGRAM(s) Oral two times a day  zinc sulfate 220 milliGRAM(s) Oral daily    MEDICATIONS  (PRN):      Daily     Daily     Drug Dosing Weight  Height (cm): 160 (20 Mar 2021 15:46)  Weight (kg): 119.2 (21 Mar 2021 04:11)  BMI (kg/m2): 46.6 (21 Mar 2021 04:11)  BSA (m2): 2.17 (21 Mar 2021 04:11)    PAST MEDICAL & SURGICAL HISTORY:  Asthma    CAD (Coronary Atherosclerotic Disease)    Myocardial Infarction    Diabetes    HTN (Hypertension)    HLD (Hyperlipidemia)    CHF (congestive heart failure), NYHA class I    IBS (irritable bowel syndrome)    Venous stasis    Hypothyroidism    Atrial fibrillation    Pacemaker    Obesity    S/P coronary angiogram        FAMILY HISTORY:  Family history of heart disease        SOCIAL HISTORY:    ADVANCE DIRECTIVES:    REVIEW OF SYSTEMS:    CONSTITUTIONAL: No fever, weight loss, or fatigue  EYES: No eye pain, visual disturbances, or discharge  ENMT:  No difficulty hearing, tinnitus, vertigo; No sinus or throat pain  NECK: No pain or stiffness  BREASTS: No pain, masses, or nipple discharge  RESPIRATORY: No cough, wheezing, chills or hemoptysis; No shortness of breath  CARDIOVASCULAR: No chest pain, palpitations, dizziness, or leg swelling  GASTROINTESTINAL: No abdominal or epigastric pain. No nausea, vomiting, or hematemesis; No diarrhea or constipation. No melena or hematochezia.  GENITOURINARY: No dysuria, frequency, hematuria, or incontinence  NEUROLOGICAL: No headaches, memory loss, loss of strength, numbness, or tremors  SKIN: No itching, burning, rashes, or lesions   LYMPH NODES: No enlarged glands  ENDOCRINE: No heat or cold intolerance; No hair loss  MUSCULOSKELETAL: No joint pain or swelling; No muscle, back, or extremity pain  PSYCHIATRIC: No depression, anxiety, mood swings, or difficulty sleeping  HEME/LYMPH: No easy bruising, or bleeding gums  ALLERGY AND IMMUNOLOGIC: No hives or eczema          ICU Vital Signs Last 24 Hrs  T(C): 36.8 (31 Mar 2021 08:01), Max: 37.1 (31 Mar 2021 03:05)  T(F): 98.2 (31 Mar 2021 08:01), Max: 98.7 (31 Mar 2021 03:05)  HR: 62 (31 Mar 2021 08:01) (59 - 70)  BP: 179/62 (31 Mar 2021 08:01) (124/48 - 179/62)  BP(mean): 87 (31 Mar 2021 08:01) (87 - 87)  ABP: --  ABP(mean): --  RR: 18 (31 Mar 2021 08:01) (18 - 18)  SpO2: 97% (31 Mar 2021 08:01) (97% - 100%)          I&O's Detail    30 Mar 2021 07:01  -  31 Mar 2021 07:00  --------------------------------------------------------  IN:    PRBCs (Packed Red Blood Cells): 350 mL  Total IN: 350 mL    OUT:  Total OUT: 0 mL    Total NET: 350 mL          PHYSICAL EXAM:    GENERAL: NAD, well-groomed, well-developed  HEAD:  Atraumatic, Normocephalic  EYES: EOMI, PERRLA, conjunctiva and sclera clear  ENMT: No tonsillar erythema, exudates, or enlargement; Moist mucous membranes, Good dentition, No lesions  NECK: Supple, No JVD, Normal thyroid  NERVOUS SYSTEM:  Alert & Oriented X3, Good concentration; Motor Strength 5/5 B/L upper and lower extremities; DTRs 2+ intact and symmetric  CHEST/LUNG: Clear to percussion bilaterally; No rales, rhonchi, wheezing, or rubs  HEART: Regular rate and rhythm; No murmurs, rubs, or gallops  ABDOMEN: Soft, Nontender, Nondistended; Bowel sounds present  EXTREMITIES:  2+ Peripheral Pulses, No clubbing, cyanosis, or edema  LYMPH: No lymphadenopathy noted  SKIN: No rashes or lesions    LABS:  CBC Full  -  ( 31 Mar 2021 05:42 )  WBC Count : 6.51 K/uL  RBC Count : 3.86 M/uL  Hemoglobin : 10.8 g/dL  Hematocrit : 32.6 %  Platelet Count - Automated : 258 K/uL  Mean Cell Volume : 84.5 fl  Mean Cell Hemoglobin : 28.0 pg  Mean Cell Hemoglobin Concentration : 33.1 gm/dL  Auto Neutrophil # : x  Auto Lymphocyte # : x  Auto Monocyte # : x  Auto Eosinophil # : x  Auto Basophil # : x  Auto Neutrophil % : x  Auto Lymphocyte % : x  Auto Monocyte % : x  Auto Eosinophil % : x  Auto Basophil % : x    03-31    138  |  106  |  21<H>  ----------------------------<  131<H>  4.3   |  23  |  1.33<H>    Ca    8.6      31 Mar 2021 05:42  Phos  2.5     03-31  Mg     2.0     03-31    TPro  6.9  /  Alb  3.1<L>  /  TBili  0.5  /  DBili  x   /  AST  34  /  ALT  31  /  AlkPhos  63  03-31    CAPILLARY BLOOD GLUCOSE      POCT Blood Glucose.: 172 mg/dL (31 Mar 2021 11:02)    PT/INR - ( 31 Mar 2021 05:42 )   PT: 13.1 sec;   INR: 1.11 ratio         PTT - ( 31 Mar 2021 05:42 )  PTT:34.4 sec            EKG:    ECHO, US:    RADIOLOGY:    CRITICAL CARE TIME SPENT:   Patient is a 81y old  Female who presents with a chief complaint of symptomatic anemia (30 Mar 2021 19:27)      HPI:  79 years old Egyptian-speaking female from home, ambulates with walker, lives with son, with PMHx of HFpEF, CAD (s/p stents), chronic venous stasis, DM, HTN and Afib (on Xarelto, s/p PPM), chronic bronchitis with asthmatic component ( on Oxygen PRN at home ), JOSE DANIEL ( supposed to be on nocturnal CPAP , but non compliant) presented to the ED with chief complaint of hypotension ,  SOB and  fatigue for almost 2 weeks.   Pt had virtual  colonoscopy 1 year ago and the result was questionable. She never followed up. Per daughter pt refused all kinds of meat including chicken, meat , however denies any dysphasia. Patient  also endorses decreased appetite.  patient has chronic diarrhea. Patient Denies nausea, vomiting, abdominal pain, SOB, chest pain, CHAMBERLAIN, palpitations, dizziness, headache, cough, wheezing, joint pain or swelling, fever, chills.    Hospital course: Pt was admitted to medicine floor for symptomatic anemia secondary to GI bleed. Colonoscopy was done during hospital stay. 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 Pt underwent laparoscopic rught hemicolectomy today 3/31 with estimated 20 ml blood loss. Pt was extubated in OR and brought to ICU for post operative care as pt has multiple comorbidities.      Allergies    No Known Allergies    Intolerances        MEDICATIONS  (STANDING):  amoxicillin 1000 milliGRAM(s) Oral two times a day  ascorbic acid 2000 milliGRAM(s) Oral every 8 hours  atorvastatin 40 milliGRAM(s) Oral at bedtime  budesonide 160 MICROgram(s)/formoterol 4.5 MICROgram(s) Inhaler 2 Puff(s) Inhalation two times a day  carvedilol 6.25 milliGRAM(s) Oral every 12 hours  chlorhexidine 2% Cloths 1 Application(s) Topical once  cholecalciferol 1000 Unit(s) Oral daily  dronedarone 400 milliGRAM(s) Oral two times a day  fluticasone propionate 50 MICROgram(s)/spray Nasal Spray 1 Spray(s) Both Nostrils every 12 hours  gabapentin 300 milliGRAM(s) Oral daily  influenza   Vaccine 0.5 milliLiter(s) IntraMuscular once  insulin glargine Injectable (LANTUS) 10 Unit(s) SubCutaneous at bedtime  insulin lispro (ADMELOG) corrective regimen sliding scale   SubCutaneous Before meals and at bedtime  levothyroxine 175 MICROGram(s) Oral daily  metroNIDAZOLE    Tablet 500 milliGRAM(s) Oral every 12 hours  montelukast 10 milliGRAM(s) Oral daily  neomycin 1000 milliGRAM(s) Oral <User Schedule>  oxybutynin 5 milliGRAM(s) Oral two times a day  pantoprazole    Tablet 40 milliGRAM(s) Oral two times a day  zinc sulfate 220 milliGRAM(s) Oral daily    MEDICATIONS  (PRN):      Daily     Daily     Drug Dosing Weight  Height (cm): 160 (20 Mar 2021 15:46)  Weight (kg): 119.2 (21 Mar 2021 04:11)  BMI (kg/m2): 46.6 (21 Mar 2021 04:11)  BSA (m2): 2.17 (21 Mar 2021 04:11)    PAST MEDICAL & SURGICAL HISTORY:  Asthma    CAD (Coronary Atherosclerotic Disease)    Myocardial Infarction    Diabetes    HTN (Hypertension)    HLD (Hyperlipidemia)    CHF (congestive heart failure), NYHA class I    IBS (irritable bowel syndrome)    Venous stasis    Hypothyroidism    Atrial fibrillation    Pacemaker    Obesity    S/P coronary angiogram        FAMILY HISTORY:  Family history of heart disease        SOCIAL HISTORY:    ADVANCE DIRECTIVES:    REVIEW OF SYSTEMS:    CONSTITUTIONAL: No fever, weight loss, or fatigue  EYES: No eye pain, visual disturbances, or discharge  ENMT:  No difficulty hearing, tinnitus, vertigo; No sinus or throat pain  NECK: No pain or stiffness  RESPIRATORY: No cough, wheezing, chills or hemoptysis; No shortness of breath  CARDIOVASCULAR: No chest pain, palpitations, dizziness, or leg swelling  GASTROINTESTINAL: Abdominal pain  GENITOURINARY: No dysuria, frequency, hematuria, or incontinence  NEUROLOGICAL: No headaches, memory loss, loss of strength, numbness, or tremors   ENDOCRINE: No heat or cold intolerance; No hair loss  MUSCULOSKELETAL: No joint pain or swelling; No muscle, back, or extremity pain              ICU Vital Signs Last 24 Hrs  T(C): 36.8 (31 Mar 2021 08:01), Max: 37.1 (31 Mar 2021 03:05)  T(F): 98.2 (31 Mar 2021 08:01), Max: 98.7 (31 Mar 2021 03:05)  HR: 62 (31 Mar 2021 08:01) (59 - 70)  BP: 179/62 (31 Mar 2021 08:01) (124/48 - 179/62)  BP(mean): 87 (31 Mar 2021 08:01) (87 - 87)  ABP: --  ABP(mean): --  RR: 18 (31 Mar 2021 08:01) (18 - 18)  SpO2: 97% (31 Mar 2021 08:01) (97% - 100%)          I&O's Detail    30 Mar 2021 07:01  -  31 Mar 2021 07:00  --------------------------------------------------------  IN:    PRBCs (Packed Red Blood Cells): 350 mL  Total IN: 350 mL    OUT:  Total OUT: 0 mL    Total NET: 350 mL          PHYSICAL EXAM:  GENERAL: Mild distress due to abdominal pain,  HEAD:  Atraumatic, Normocephalic  EYES: EOMI, PERRLA, conjunctiva and sclera clear  ENMT: No tonsillar erythema, exudates, or enlargement; Moist mucous membranes, Good dentition, No lesions  NECK: Supple, No JVD, Normal thyroid  NERVOUS SYSTEM:  Alert & Oriented X3, Good concentration; Motor Strength 5/5 B/L upper and lower extremities; DTRs 2+ intact and symmetric  CHEST/LUNG: Clear to percussion bilaterally; No rales, rhonchi, wheezing, or rubs  HEART: Regular rate and rhythm; No murmurs, rubs, or gallops  ABDOMEN: Mild diffuse abdominal tenderness. BAILEY drain on right lower quadrant noted.   EXTREMITIES:  2+ Peripheral Pulses, No clubbing, cyanosis, or edema  SKIN: No rashes or lesions    LABS:  CBC Full  -  ( 31 Mar 2021 05:42 )  WBC Count : 6.51 K/uL  RBC Count : 3.86 M/uL  Hemoglobin : 10.8 g/dL  Hematocrit : 32.6 %  Platelet Count - Automated : 258 K/uL  Mean Cell Volume : 84.5 fl  Mean Cell Hemoglobin : 28.0 pg  Mean Cell Hemoglobin Concentration : 33.1 gm/dL  Auto Neutrophil # : x  Auto Lymphocyte # : x  Auto Monocyte # : x  Auto Eosinophil # : x  Auto Basophil # : x  Auto Neutrophil % : x  Auto Lymphocyte % : x  Auto Monocyte % : x  Auto Eosinophil % : x  Auto Basophil % : x    03-31    138  |  106  |  21<H>  ----------------------------<  131<H>  4.3   |  23  |  1.33<H>    Ca    8.6      31 Mar 2021 05:42  Phos  2.5     03-31  Mg     2.0     03-31    TPro  6.9  /  Alb  3.1<L>  /  TBili  0.5  /  DBili  x   /  AST  34  /  ALT  31  /  AlkPhos  63  03-31    CAPILLARY BLOOD GLUCOSE      POCT Blood Glucose.: 172 mg/dL (31 Mar 2021 11:02)    PT/INR - ( 31 Mar 2021 05:42 )   PT: 13.1 sec;   INR: 1.11 ratio         PTT - ( 31 Mar 2021 05:42 )  PTT:34.4 sec                RADIOLOGY: < from: Xray Abdomen 2 Views (03.29.21 @ 19:50) >    EXAM:  XR ABDOMEN 2V                            PROCEDURE DATE:  03/29/2021          INTERPRETATION:  Abdomen 2 views    HISTORY: Evaluate for free air.    COMPARISON: 3/20/2021    Supine and upright views of the abdomen show a normal gas pattern. The psoas margins are within normal limits. There is no evidence of free air. Surgical clips project over the right abdomen were not seen in the prior study.    IMPRESSION: No evidence of free air. Right surgical clips as noted.    Thank you for this referral.        CRITICAL CARE TIME SPENT: 45 minutes

## 2021-03-31 NOTE — PROGRESS NOTE ADULT - PROBLEM SELECTOR PLAN 1
presented 3/20 with x2 weeks increasing fatigue, SOB, and paleness, with prolonged history of dark bowel movements s/p possible transverse polyp colonoscopy 2020, no further fu  -in ED, VS notable for /77, SpO2 100% 4L NC, adm trop, COVID, UA negative  -pre-RBC adm Hb 7.7, improved s/p x1pRBC in ED, Fe 15, TIBC 457, 3% sat  -CT a/p unremarkable for signs of bleeding  -IV iron 3/20-23 and 3/26-27  -Fe and %sat improved s/p transfusion and Venofer, Hb stable, transfuse if <8  -EGD/col 3/22 with GI Dr. Moreland, gastritis, esophagitis, no GIB, path +H pylori  -H pylori treatment:       -amox 1g q12 x7 days>500mg Q 12hours x7 days 3/24pm-27, restarted 3/29          -Flagyl 500 mg q12 3/24pm-27, restarted 3/29         -PPI BID x14 days 3/23-       -s/p clarithromycin 3/25-27, held given interactions with Multaq, statin, flonase  -ID Dr. Butcher following   -colonoscopy 3/24 with polyps (removed), abdominal XR negative for free air 3/24   -repeat colonoscopy 3/25 notable for diverticulosis and internal hemorrhoids  -path resulted 3/26 +invasive colonic adenocarcinoma with tubular adenoma  -partial colon resection with surgery Dr. Murrell 3/31, transferred to ICU for post-op management   -x1 pRBC pre-op, moviprep, neomycin, CLD>NPO 3/30  -COVID positive 3/27, retest negative 3/28, +Ab, first false positive  -heme/onc Dr. Arango following

## 2021-03-31 NOTE — PROGRESS NOTE ADULT - SUBJECTIVE AND OBJECTIVE BOX
CHIEF COMPLAINT:Patient is a 81y old  Female who presents with a chief complaint of symptomatic anemia.Pt seen in ICU,no chest pain or shortness of breath,    	  REVIEW OF SYSTEMS:  CONSTITUTIONAL: No fever, weight loss, or fatigue  EYES: No eye pain, visual disturbances, or discharge  ENT:  No difficulty hearing, tinnitus, vertigo; No sinus or throat pain  NECK: No pain or stiffness  RESPIRATORY: No cough, wheezing, chills or hemoptysis; No Shortness of Breath  CARDIOVASCULAR: No chest pain, palpitations, passing out, dizziness, or leg swelling  GASTROINTESTINAL: No abdominal or epigastric pain. No nausea, vomiting, or hematemesis; No diarrhea or constipation. No melena or hematochezia.  GENITOURINARY: No dysuria, frequency, hematuria, or incontinence  NEUROLOGICAL: No headaches, memory loss, loss of strength, numbness, or tremors  SKIN: No itching, burning, rashes, or lesions   LYMPH Nodes: No enlarged glands  ENDOCRINE: No heat or cold intolerance; No hair loss  MUSCULOSKELETAL: No joint pain or swelling; No muscle, back, or extremity pain  PSYCHIATRIC: No depression, anxiety, mood swings, or difficulty sleeping  HEME/LYMPH: No easy bruising, or bleeding gums  ALLERGY AND IMMUNOLOGIC: No hives or eczema	        PHYSICAL EXAM:  T(C): 36.5 (03-31-21 @ 12:45), Max: 37.1 (03-31-21 @ 03:05)  HR: 60 (03-31-21 @ 12:45) (59 - 70)  BP: 107/49 (03-31-21 @ 12:15) (98/48 - 179/62)  RR: 17 (03-31-21 @ 12:45) (15 - 20)  SpO2: 100% (03-31-21 @ 12:45) (97% - 100%)  Wt(kg): --  I&O's Summary    30 Mar 2021 07:01  -  31 Mar 2021 07:00  --------------------------------------------------------  IN: 350 mL / OUT: 0 mL / NET: 350 mL        Appearance: Normal	  HEENT:   Normal oral mucosa, PERRL, EOMI	  Lymphatic: No lymphadenopathy  Cardiovascular: Normal S1 S2, No JVD, No murmurs, No edema  Respiratory: Lungs clear to auscultation	  Psychiatry: A & O x 3, Mood & affect appropriate  Gastrointestinal:  Soft  Skin: No rashes, No ecchymoses, No cyanosis	  Neurologic: Non-focal  Extremities: Normal range of motion, No clubbing, cyanosis or edema  Vascular: Peripheral pulses palpable 2+ bilaterally    MEDICATIONS  (STANDING):  amoxicillin 1000 milliGRAM(s) Oral two times a day  ascorbic acid 2000 milliGRAM(s) Oral every 8 hours  aspirin  chewable 81 milliGRAM(s) Oral daily  atorvastatin 40 milliGRAM(s) Oral at bedtime  budesonide 160 MICROgram(s)/formoterol 4.5 MICROgram(s) Inhaler 2 Puff(s) Inhalation two times a day  carvedilol 6.25 milliGRAM(s) Oral every 12 hours  chlorhexidine 2% Cloths 1 Application(s) Topical once  chlorhexidine 4% Liquid 1 Application(s) Topical <User Schedule>  cholecalciferol 1000 Unit(s) Oral daily  dronedarone 400 milliGRAM(s) Oral two times a day  fluticasone propionate 50 MICROgram(s)/spray Nasal Spray 1 Spray(s) Both Nostrils every 12 hours  gabapentin 300 milliGRAM(s) Oral daily  heparin   Injectable 5000 Unit(s) SubCutaneous every 12 hours  influenza   Vaccine 0.5 milliLiter(s) IntraMuscular once  insulin glargine Injectable (LANTUS) 10 Unit(s) SubCutaneous at bedtime  insulin lispro (ADMELOG) corrective regimen sliding scale   SubCutaneous Before meals and at bedtime  lactated ringers. 1000 milliLiter(s) (150 mL/Hr) IV Continuous <Continuous>  lactated ringers. 1000 milliLiter(s) (75 mL/Hr) IV Continuous <Continuous>  levothyroxine 175 MICROGram(s) Oral daily  levothyroxine Injectable 130 MICROGram(s) IV Push at bedtime  metroNIDAZOLE    Tablet 500 milliGRAM(s) Oral every 12 hours  montelukast 10 milliGRAM(s) Oral daily  neomycin 1000 milliGRAM(s) Oral <User Schedule>  oxybutynin 5 milliGRAM(s) Oral two times a day  pantoprazole    Tablet 40 milliGRAM(s) Oral two times a day  pantoprazole  Injectable 40 milliGRAM(s) IV Push daily  zinc sulfate 220 milliGRAM(s) Oral daily      TELEMETRY: a paced	    	  	  LABS:	 	                         10.8   6.51  )-----------( 258      ( 31 Mar 2021 05:42 )             32.6     03-31    138  |  106  |  21<H>  ----------------------------<  131<H>  4.3   |  23  |  1.33<H>    Ca    8.6      31 Mar 2021 05:42  Phos  2.5     03-31  Mg     2.0     03-31    TPro  6.9  /  Alb  3.1<L>  /  TBili  0.5  /  DBili  x   /  AST  34  /  ALT  31  /  AlkPhos  63  03-31    proBNP: Serum Pro-Brain Natriuretic Peptide: 1155 pg/mL (03-21 @ 07:15)    Lipid Profile: Cholesterol 134  LDL --  HDL 54  TG 82  Cholesterol 145  LDL --  HDL 55  TG 86      TSH: Thyroid Stimulating Hormone, Serum: 2.67 uU/mL (03-22 @ 07:34)  Thyroid Stimulating Hormone, Serum: 3.61 uU/mL (03-21 @ 07:15)

## 2021-03-31 NOTE — PROGRESS NOTE ADULT - SUBJECTIVE AND OBJECTIVE BOX
KWAME MOSQUERA  MR# [id number]  81yFemale        Patient is a 81y old  Female who presents with a chief complaint of symptomatic anemia (31 Mar 2021 12:44)      INTERVAL HPI/OVERNIGHT EVENTS:  Patient seen and examined at bedside. No notations of chest pain, palpitation, SOB, orthopnea, nausea, vomiting or abdominal pain.    ALLERGIES  No Known Allergies      MEDICATIONS  acetaminophen   Tablet .. 650 milliGRAM(s) Oral every 6 hours PRN Temp greater or equal to 38C (100.4F), Mild Pain (1 - 3)  amoxicillin 1000 milliGRAM(s) Oral two times a day  ascorbic acid 2000 milliGRAM(s) Oral every 8 hours  aspirin  chewable 81 milliGRAM(s) Oral daily  atorvastatin 40 milliGRAM(s) Oral at bedtime  budesonide 160 MICROgram(s)/formoterol 4.5 MICROgram(s) Inhaler 2 Puff(s) Inhalation two times a day  carvedilol 6.25 milliGRAM(s) Oral every 12 hours  chlorhexidine 2% Cloths 1 Application(s) Topical once  cholecalciferol 1000 Unit(s) Oral daily  dronedarone 400 milliGRAM(s) Oral two times a day  fluticasone propionate 50 MICROgram(s)/spray Nasal Spray 1 Spray(s) Both Nostrils every 12 hours  gabapentin 300 milliGRAM(s) Oral daily  heparin   Injectable 5000 Unit(s) SubCutaneous every 12 hours  HYDROmorphone  Injectable 1 milliGRAM(s) IV Push every 4 hours PRN Severe Pain (7 - 10)  influenza   Vaccine 0.5 milliLiter(s) IntraMuscular once  insulin glargine Injectable (LANTUS) 10 Unit(s) SubCutaneous at bedtime  insulin lispro (ADMELOG) corrective regimen sliding scale   SubCutaneous Before meals and at bedtime  lactated ringers. 1000 milliLiter(s) IV Continuous <Continuous>  lactated ringers. 1000 milliLiter(s) IV Continuous <Continuous>  levothyroxine 175 MICROGram(s) Oral daily  levothyroxine Injectable 130 MICROGram(s) IV Push at bedtime  metroNIDAZOLE    Tablet 500 milliGRAM(s) Oral every 12 hours  montelukast 10 milliGRAM(s) Oral daily  ondansetron Injectable 4 milliGRAM(s) IV Push every 6 hours PRN Nausea  oxybutynin 5 milliGRAM(s) Oral two times a day  pantoprazole    Tablet 40 milliGRAM(s) Oral two times a day  pantoprazole  Injectable 40 milliGRAM(s) IV Push daily  zinc sulfate 220 milliGRAM(s) Oral daily              REVIEW OF SYSTEMS:  CONSTITUTIONAL: No fever, weight loss, or fatigue  EYES: No eye pain, visual disturbances, or discharge  ENT:  No difficulty hearing, tinnitus, vertigo; No sinus or throat pain  NECK: No pain or stiffness  RESPIRATORY: No cough, wheezing, chills or hemoptysis; No Shortness of Breath  CARDIOVASCULAR: No chest pain, palpitations, passing out, dizziness, or leg swelling  GASTROINTESTINAL: No abdominal or epigastric pain. No nausea, vomiting, or hematemesis; No diarrhea or constipation. No melena or hematochezia.  GENITOURINARY: No dysuria, frequency, hematuria, or incontinence  NEUROLOGICAL: No headaches, memory loss, loss of strength, numbness, or tremors  SKIN: No itching, burning, rashes, or lesions   LYMPH Nodes: No enlarged glands  ENDOCRINE: No heat or cold intolerance; No hair loss  MUSCULOSKELETAL: No joint pain or swelling; No muscle, back, or extremity pain  PSYCHIATRIC: No depression, anxiety, mood swings, or difficulty sleeping  HEME/LYMPH: No easy bruising, or bleeding gums  ALLERGY AND IMMUNOLOGIC: No hives or eczema	    [ ] All others negative	  [ ] Unable to obtain      T(C): 36.5 (21 @ 12:45), Max: 37.1 (21 @ 03:05)  T(F): 97.7 (21 @ 12:45), Max: 98.7 (21 @ 03:05)  HR: 60 (21 @ 12:45) (59 - 70)  BP: 107/49 (21 @ 12:15) (98/48 - 179/62)  RR: 17 (21 @ 12:45) (15 - 20)  SpO2: 100% (21 @ 12:45) (97% - 100%)  Wt(kg): --    I&O's Summary    30 Mar 2021 07:01  -  31 Mar 2021 07:00  --------------------------------------------------------  IN: 350 mL / OUT: 0 mL / NET: 350 mL          PHYSICAL EXAM:  A X O x  HEAD:  Atraumatic, Normocephalic  EYES: EOMI, PERRLA, conjunctiva and sclera clear  NECK: Supple, No JVD, Normal thyroid  Resp: CTAB, No crackles, wheezing,   CVS: Regular rate and rhythm; No discernable murmurs, rubs, or gallops  ABD: Soft, Nontender, Nondistended; Bowel sounds present  EXTREMITIES:  2+ Peripheral Pulses, No edema  LYMPH: No dicernable lymphadenopathy noted  GENERAL: NAD, well-groomed, well-developed      LABS:                        10.0   9.05  )-----------( 243      ( 31 Mar 2021 13:01 )             30.8     03-31    137  |  106  |  18  ----------------------------<  194<H>  4.4   |  22  |  1.46<H>    Ca    8.2<L>      31 Mar 2021 13:01  Phos  2.4       Mg     1.7         TPro  6.1  /  Alb  2.8<L>  /  TBili  0.5  /  DBili  x   /  AST  44<H>  /  ALT  33  /  AlkPhos  58  03-31    PT/INR - ( 31 Mar 2021 05:42 )   PT: 13.1 sec;   INR: 1.11 ratio         PTT - ( 31 Mar 2021 05:42 )  PTT:34.4 sec  Urinalysis Basic - ( 31 Mar 2021 13:01 )    Color: Yellow / Appearance: Clear / S.020 / pH: x  Gluc: x / Ketone: Trace  / Bili: Negative / Urobili: Negative   Blood: x / Protein: Negative / Nitrite: Negative   Leuk Esterase: Negative / RBC: x / WBC x   Sq Epi: x / Non Sq Epi: x / Bacteria: x      CAPILLARY BLOOD GLUCOSE      POCT Blood Glucose.: 183 mg/dL (31 Mar 2021 12:21)  POCT Blood Glucose.: 172 mg/dL (31 Mar 2021 11:02)  POCT Blood Glucose.: 126 mg/dL (31 Mar 2021 07:48)  POCT Blood Glucose.: 128 mg/dL (30 Mar 2021 20:50)  POCT Blood Glucose.: 157 mg/dL (30 Mar 2021 17:09)      Troponins:  ProBNP:  Lipid Profile:   HgA1c:  TSH:           RADIOLOGY & ADDITIONAL TESTS:    Imaging Personally Reviewed:  [ ] YES  [ ] NO      Consultant(s) Notes Reviewed:  [x ] YES  [ ] NO    Care Discussed with Consultants/Other Providers [ x] YES  [ ] NO          PAST MEDICAL & SURGICAL HISTORY:  Asthma    CAD (Coronary Atherosclerotic Disease)    Myocardial Infarction    Diabetes    HTN (Hypertension)    HLD (Hyperlipidemia)    CHF (congestive heart failure), NYHA class I    IBS (irritable bowel syndrome)    Venous stasis    Hypothyroidism    Atrial fibrillation    Pacemaker    Obesity    S/P coronary angiogram          Anemia    H/o or current diagnosis of HF- ACEI/ARB contraindication unknown    H/o or current diagnosis of HF- Contraindication to ACEI/ARBs    H/o or current diagnosis of HF- ACEI/ARB contraindication unknown    H/o or current diagnosis of HF- Contraindication to ACEI/ARBs    H/o or current diagnosis of HF- ACEI/ARB contraindication unknown    H/o or current diagnosis of HF- Contraindication to ACEI/ARBs    Family history of heart disease    Handoff    MEWS Score    Asthma    CAD (Coronary Atherosclerotic Disease)    Myocardial Infarction    Diabetes    HTN (Hypertension)    HLD (Hyperlipidemia)    CHF (congestive heart failure), NYHA class I    IBS (irritable bowel syndrome)    Venous stasis    Hypothyroidism    Atrial fibrillation    Pacemaker    Obesity    Colon adenocarcinoma    Colon adenocarcinoma    Symptomatic anemia    Helicobacter pylori gastritis    Symptomatic anemia    Atrial fibrillation    CHF (congestive heart failure), NYHA class I    Diabetes    HTN (Hypertension)    Asthma    JOSE DANIEL (obstructive sleep apnea)    Prophylactic measure    Hypothyroidism    Knee swelling    Laparoscopy assisted right hemicolectomy    No significant past surgical history    S/P coronary angiogram    A) WEAKNESS    90+    CAD (coronary atherosclerotic disease)    Asthma    Helicobacter pylori gastritis    Paroxysmal atrial fibrillation    Encounter for colonoscopy due to history of adenomatous colonic polyps    Hypothyroidism    CHF (congestive heart failure), NYHA class I    JOSE DANIEL (obstructive sleep apnea)    Diabetes    HTN (Hypertension)    Colon cancer    [email or web address]

## 2021-03-31 NOTE — PROGRESS NOTE ADULT - PROBLEM SELECTOR PLAN 6
-continue home montelukast, albuterol  -restart flonase and symbicort while clarithromycin on hold 3/28  -continue O2 supplementation as needed
-continue home montelukast, albuterol  -restart flonase and symbicort while clarithromycin on hold 3/28  -continue O2 supplementation as needed
-continue home montelukast, albuterol, flonase, symbicort  -continue O2 supplementation as needed
-continue home montelukast, albuterol, flonase, symbicort  -continue O2 supplementation as needed
-continue home montelukast, albuterol  -restart flonase and symbicort while clarithromycin on hold 3/28  -continue O2 supplementation as needed
-continue home montelukast, albuterol  -hold flonase and symbicort while on clarithromycin, resume 4/9  -continue O2 supplementation as needed
-continue home montelukast, albuterol, flonase, symbicort  -continue O2 supplementation as needed
-continue home montelukast, albuterol, flonase, symbicort  -continue O2 supplementation as needed
-continue home montelukast, albuterol  -restart flonase and symbicort while clarithromycin on hold 3/28  -continue O2 supplementation as needed
-continue home montelukast, albuterol  -restart flonase and symbicort while clarithromycin on hold 3/28  -continue O2 supplementation as needed
On inhalers at home, primary team to confirm with pharmacy   On oxygen PRN at home    will start on Symbicort and Albuterol    O2 supplemtation as needed
-continue home montelukast, albuterol, flonase, symbicort  -continue O2 supplementation as needed
-continue home montelukast, albuterol  -restart flonase and symbicort while clarithromycin on hold 3/28  -continue O2 supplementation as needed

## 2021-03-31 NOTE — PROGRESS NOTE ADULT - ASSESSMENT
79 years old Slovenian-speaking female from home, ambulates with walker, lives with son, with PMHx of HFpEF, CAD (s/p stents), chronic venous stasis, DM, HTN and Afib (on Xarelto, s/p PPM), chronic bronchitis with asthmatic component ( on Oxygen PRN at home ), JOSE DANIEL ( supposed to be on nocturnal CPAP , but non compliant) presents to the ED with chief complaint of hypotension , SOB and  fatigue for almost 2 weeks.,symptomatic Fe def anemia with Colon ca-s/p resection.  1.ICU monitoring.  2.NPO.  3.IVF.  4.GI and DVT prophylaxis.

## 2021-03-31 NOTE — PROGRESS NOTE ADULT - PROBLEM SELECTOR PLAN 4
-hold home metformin-glipizide and Janumet   -HSS while inpatient  -BGs 200s, goal 140-180  -Lantus 10 U QHS added 3/26  -continue home gabapentin and oxybutynin   -a1c 6.6, no changes to home meds needed  -BG QACHS
-hold home metformin-glipizide and Janumet   -HSS while inpatient  -continue home gabapentin and oxybutynin   -BGs today 140-190, near goal 140-180  -a1c 6.6, no changes to home meds needed  -BG QACHS
patient takes metformin-glipizide and Janumet at home  will change to insulin HSS  Monitor Fs, titrate insulin as needed    will cehck A1c
-hold home metformin-glipizide and Janumet   -HSS while inpatient  -BGs 200s, goal 140-180  -Lantus 10 U QHS added 3/26  -continue home gabapentin and oxybutynin   -a1c 6.6, no changes to home meds needed  -BG QACHS
-hold home metformin-glipizide and Janumet   -HSS while inpatient  -BGs 200s, goal 140-180  -Lantus 10 U QHS added 3/26  -continue home gabapentin and oxybutynin   -a1c 6.6, no changes to home meds needed  -BG QACHS
-hold home metformin-glipizide and Janumet   -HSS while inpatient  -BGs high 100s, goal 140-180  -Lantus 10 U QHS added 3/26  -continue home gabapentin and oxybutynin   -a1c 6.6, no changes to home meds needed  -FS QACHS  -nutrition recs appreciated
-hold home metformin-glipizide and Janumet   -HSS while inpatient  -BGs 200s, goal 140-180  -Lantus 10 U QHS added 3/26  -continue home gabapentin and oxybutynin   -a1c 6.6, no changes to home meds needed  -FS QACHS  -nutrition recs appreciated
-hold home metformin-glipizide and Janumet   -HSS while inpatient  -continue home gabapentin and oxybutynin   -BGs today 140-190, near goal 140-180  -a1c 6.6, no changes to home meds needed  -BG QACHS

## 2021-03-31 NOTE — CONSULT NOTE ADULT - ASSESSMENT
Confidential Drug Utilization Report  Search Terms: Blanca Gil, 1940   Search Date: 03/31/2021 15:16:13 PM   The Drug Utilization Report below displays all of the controlled substance prescriptions, if any, that your patient has filled in the last twelve months. The information displayed on this report is compiled from pharmacy submissions to the Department, and accurately reflects the information as submitted by the pharmacies.  This report was requested by: Betina Rubin | Reference #: [id number]   You have not added a GERARDO number. Keeping your GERARDO number(s) up to date on the My GERARDO Numbers page will enable the separation of your prescriptions from others in the search results.   Others' Prescriptions  Patient Name: Blanca Gil   YOB: 1940   Address: [address] [place] [address]   Sex: Female   Rx Written	Rx Dispensed	Drug	Quantity	Days Supply	Prescriber Name	Payment Method	Dispenser  09/10/2020	03/12/2021	zolpidem tartrate 5 mg tablet 	30	30	Mihai Madrid	Insurance	[facility]  09/10/2020	02/17/2021	zolpidem tartrate 5 mg tablet 	30	30	Mihai Madrid	Insurance	[facility]  09/10/2020	11/30/2020	zolpidem tartrate 5 mg tablet 	30	30	Mihai Madrid MD	Cash	[facility]  09/10/2020	09/14/2020	lorazepam 0.5 mg tablet 	20	10	Mihai Madrid MD	Insurance	[facility]  09/10/2020	09/14/2020	zolpidem tartrate 5 mg tablet 	30	30	Mihai Madrid MD	Cash	[facility]  05/05/2020	05/07/2020	zolpidem tartrate 5 mg tablet 	30	30	Mihai Madrid MD	Insurance	[facility]  04/03/2020	04/06/2020	zolpidem tartrate 5 mg tablet 	30	30	Mihai aMdrid MD	Insurance	[facility]  * - Drugs marked with an asterisk are compound drugs. If the compound drug is made up of more than one controlled substance, then each controlled substance will be a separate row in the table.

## 2021-04-01 LAB
ALBUMIN SERPL ELPH-MCNC: 2.5 G/DL — LOW (ref 3.5–5)
ALP SERPL-CCNC: 52 U/L — SIGNIFICANT CHANGE UP (ref 40–120)
ALT FLD-CCNC: 39 U/L DA — SIGNIFICANT CHANGE UP (ref 10–60)
ANION GAP SERPL CALC-SCNC: 7 MMOL/L — SIGNIFICANT CHANGE UP (ref 5–17)
AST SERPL-CCNC: 57 U/L — HIGH (ref 10–40)
BASOPHILS # BLD AUTO: 0.03 K/UL — SIGNIFICANT CHANGE UP (ref 0–0.2)
BASOPHILS NFR BLD AUTO: 0.4 % — SIGNIFICANT CHANGE UP (ref 0–2)
BILIRUB SERPL-MCNC: 0.2 MG/DL — SIGNIFICANT CHANGE UP (ref 0.2–1.2)
BUN SERPL-MCNC: 11 MG/DL — SIGNIFICANT CHANGE UP (ref 7–18)
CALCIUM SERPL-MCNC: 8.1 MG/DL — LOW (ref 8.4–10.5)
CHLORIDE SERPL-SCNC: 109 MMOL/L — HIGH (ref 96–108)
CO2 SERPL-SCNC: 23 MMOL/L — SIGNIFICANT CHANGE UP (ref 22–31)
CREAT SERPL-MCNC: 0.91 MG/DL — SIGNIFICANT CHANGE UP (ref 0.5–1.3)
EOSINOPHIL # BLD AUTO: 0.27 K/UL — SIGNIFICANT CHANGE UP (ref 0–0.5)
EOSINOPHIL NFR BLD AUTO: 3.6 % — SIGNIFICANT CHANGE UP (ref 0–6)
GLUCOSE BLDC GLUCOMTR-MCNC: 134 MG/DL — HIGH (ref 70–99)
GLUCOSE BLDC GLUCOMTR-MCNC: 140 MG/DL — HIGH (ref 70–99)
GLUCOSE BLDC GLUCOMTR-MCNC: 157 MG/DL — HIGH (ref 70–99)
GLUCOSE SERPL-MCNC: 126 MG/DL — HIGH (ref 70–99)
HCT VFR BLD CALC: 29.9 % — LOW (ref 34.5–45)
HGB BLD-MCNC: 9.6 G/DL — LOW (ref 11.5–15.5)
IMM GRANULOCYTES NFR BLD AUTO: 0.4 % — SIGNIFICANT CHANGE UP (ref 0–1.5)
LYMPHOCYTES # BLD AUTO: 1.46 K/UL — SIGNIFICANT CHANGE UP (ref 1–3.3)
LYMPHOCYTES # BLD AUTO: 19.3 % — SIGNIFICANT CHANGE UP (ref 13–44)
MAGNESIUM SERPL-MCNC: 1.6 MG/DL — SIGNIFICANT CHANGE UP (ref 1.6–2.6)
MCHC RBC-ENTMCNC: 27.5 PG — SIGNIFICANT CHANGE UP (ref 27–34)
MCHC RBC-ENTMCNC: 32.1 GM/DL — SIGNIFICANT CHANGE UP (ref 32–36)
MCV RBC AUTO: 85.7 FL — SIGNIFICANT CHANGE UP (ref 80–100)
MONOCYTES # BLD AUTO: 0.71 K/UL — SIGNIFICANT CHANGE UP (ref 0–0.9)
MONOCYTES NFR BLD AUTO: 9.4 % — SIGNIFICANT CHANGE UP (ref 2–14)
NEUTROPHILS # BLD AUTO: 5.06 K/UL — SIGNIFICANT CHANGE UP (ref 1.8–7.4)
NEUTROPHILS NFR BLD AUTO: 66.9 % — SIGNIFICANT CHANGE UP (ref 43–77)
NRBC # BLD: 0 /100 WBCS — SIGNIFICANT CHANGE UP (ref 0–0)
PHOSPHATE SERPL-MCNC: 2.3 MG/DL — LOW (ref 2.5–4.5)
PLATELET # BLD AUTO: 243 K/UL — SIGNIFICANT CHANGE UP (ref 150–400)
POTASSIUM SERPL-MCNC: 4.6 MMOL/L — SIGNIFICANT CHANGE UP (ref 3.5–5.3)
POTASSIUM SERPL-SCNC: 4.6 MMOL/L — SIGNIFICANT CHANGE UP (ref 3.5–5.3)
PROT SERPL-MCNC: 5.8 G/DL — LOW (ref 6–8.3)
RBC # BLD: 3.49 M/UL — LOW (ref 3.8–5.2)
RBC # FLD: 16.4 % — HIGH (ref 10.3–14.5)
SODIUM SERPL-SCNC: 139 MMOL/L — SIGNIFICANT CHANGE UP (ref 135–145)
WBC # BLD: 7.56 K/UL — SIGNIFICANT CHANGE UP (ref 3.8–10.5)
WBC # FLD AUTO: 7.56 K/UL — SIGNIFICANT CHANGE UP (ref 3.8–10.5)

## 2021-04-01 PROCEDURE — 99231 SBSQ HOSP IP/OBS SF/LOW 25: CPT

## 2021-04-01 RX ORDER — ACETAMINOPHEN 500 MG
1000 TABLET ORAL ONCE
Refills: 0 | Status: COMPLETED | OUTPATIENT
Start: 2021-04-01 | End: 2021-04-01

## 2021-04-01 RX ORDER — ACETAMINOPHEN 500 MG
1000 TABLET ORAL ONCE
Refills: 0 | Status: COMPLETED | OUTPATIENT
Start: 2021-04-02 | End: 2021-04-02

## 2021-04-01 RX ORDER — METOPROLOL TARTRATE 50 MG
5 TABLET ORAL EVERY 6 HOURS
Refills: 0 | Status: DISCONTINUED | OUTPATIENT
Start: 2021-04-01 | End: 2021-04-02

## 2021-04-01 RX ORDER — HYDRALAZINE HCL 50 MG
5 TABLET ORAL EVERY 6 HOURS
Refills: 0 | Status: DISCONTINUED | OUTPATIENT
Start: 2021-04-01 | End: 2021-04-03

## 2021-04-01 RX ORDER — POTASSIUM PHOSPHATE, MONOBASIC POTASSIUM PHOSPHATE, DIBASIC 236; 224 MG/ML; MG/ML
30 INJECTION, SOLUTION INTRAVENOUS ONCE
Refills: 0 | Status: COMPLETED | OUTPATIENT
Start: 2021-04-01 | End: 2021-04-01

## 2021-04-01 RX ADMIN — Medication 1000 MILLIGRAM(S): at 14:38

## 2021-04-01 RX ADMIN — CEFTRIAXONE 100 MILLIGRAM(S): 500 INJECTION, POWDER, FOR SOLUTION INTRAMUSCULAR; INTRAVENOUS at 15:04

## 2021-04-01 RX ADMIN — Medication 400 MILLIGRAM(S): at 14:38

## 2021-04-01 RX ADMIN — POTASSIUM PHOSPHATE, MONOBASIC POTASSIUM PHOSPHATE, DIBASIC 83.33 MILLIMOLE(S): 236; 224 INJECTION, SOLUTION INTRAVENOUS at 10:02

## 2021-04-01 RX ADMIN — Medication 100 MILLIGRAM(S): at 13:02

## 2021-04-01 RX ADMIN — Medication 1000 MILLIGRAM(S): at 06:44

## 2021-04-01 RX ADMIN — INSULIN GLARGINE 10 UNIT(S): 100 INJECTION, SOLUTION SUBCUTANEOUS at 23:39

## 2021-04-01 RX ADMIN — Medication 5 MILLIGRAM(S): at 23:39

## 2021-04-01 RX ADMIN — HYDROMORPHONE HYDROCHLORIDE 0.5 MILLIGRAM(S): 2 INJECTION INTRAMUSCULAR; INTRAVENOUS; SUBCUTANEOUS at 12:51

## 2021-04-01 RX ADMIN — Medication 130 MICROGRAM(S): at 21:56

## 2021-04-01 RX ADMIN — Medication 100 MILLIGRAM(S): at 21:58

## 2021-04-01 RX ADMIN — HEPARIN SODIUM 5000 UNIT(S): 5000 INJECTION INTRAVENOUS; SUBCUTANEOUS at 05:27

## 2021-04-01 RX ADMIN — Medication 1000 MILLIGRAM(S): at 09:05

## 2021-04-01 RX ADMIN — HEPARIN SODIUM 5000 UNIT(S): 5000 INJECTION INTRAVENOUS; SUBCUTANEOUS at 13:02

## 2021-04-01 RX ADMIN — Medication 5 MILLIGRAM(S): at 11:22

## 2021-04-01 RX ADMIN — Medication 5 MILLIGRAM(S): at 17:06

## 2021-04-01 RX ADMIN — HEPARIN SODIUM 5000 UNIT(S): 5000 INJECTION INTRAVENOUS; SUBCUTANEOUS at 21:56

## 2021-04-01 RX ADMIN — HYDROMORPHONE HYDROCHLORIDE 0.5 MILLIGRAM(S): 2 INJECTION INTRAMUSCULAR; INTRAVENOUS; SUBCUTANEOUS at 13:15

## 2021-04-01 RX ADMIN — BUDESONIDE AND FORMOTEROL FUMARATE DIHYDRATE 2 PUFF(S): 160; 4.5 AEROSOL RESPIRATORY (INHALATION) at 23:41

## 2021-04-01 RX ADMIN — Medication 400 MILLIGRAM(S): at 09:04

## 2021-04-01 RX ADMIN — Medication 5 MILLIGRAM(S): at 12:51

## 2021-04-01 RX ADMIN — Medication 1 SPRAY(S): at 05:28

## 2021-04-01 RX ADMIN — Medication 100 MILLIGRAM(S): at 06:44

## 2021-04-01 RX ADMIN — Medication 400 MILLIGRAM(S): at 21:56

## 2021-04-01 RX ADMIN — Medication 400 MILLIGRAM(S): at 05:15

## 2021-04-01 RX ADMIN — PANTOPRAZOLE SODIUM 40 MILLIGRAM(S): 20 TABLET, DELAYED RELEASE ORAL at 11:23

## 2021-04-01 RX ADMIN — Medication 1000 MILLIGRAM(S): at 22:26

## 2021-04-01 RX ADMIN — Medication 1 SPRAY(S): at 17:00

## 2021-04-01 NOTE — PROGRESS NOTE ADULT - ASSESSMENT
Confidential Drug Utilization Report  Search Terms: Blanca Gil, 1940   Search Date: 03/31/2021 15:16:13 PM   The Drug Utilization Report below displays all of the controlled substance prescriptions, if any, that your patient has filled in the last twelve months. The information displayed on this report is compiled from pharmacy submissions to the Department, and accurately reflects the information as submitted by the pharmacies.  This report was requested by: Betina Rubin | Reference #: [id number]   You have not added a GERARDO number. Keeping your GERARDO number(s) up to date on the My GERARDO Numbers page will enable the separation of your prescriptions from others in the search results.   Others' Prescriptions  Patient Name: Blanca Gil   YOB: 1940   Address: [address] [place] [address]   Sex: Female   Rx Written	Rx Dispensed	Drug	Quantity	Days Supply	Prescriber Name	Payment Method	Dispenser  09/10/2020	03/12/2021	zolpidem tartrate 5 mg tablet 	30	30	Mihai Madrid	Insurance	[facility]  09/10/2020	02/17/2021	zolpidem tartrate 5 mg tablet 	30	30	Mihai Madrid	Insurance	[facility]  09/10/2020	11/30/2020	zolpidem tartrate 5 mg tablet 	30	30	Mihai Madrid MD	Cash	[facility]  09/10/2020	09/14/2020	lorazepam 0.5 mg tablet 	20	10	Mihai Madrid MD	Insurance	[facility]  09/10/2020	09/14/2020	zolpidem tartrate 5 mg tablet 	30	30	Mihai Madrid MD	Cash	[facility]  05/05/2020	05/07/2020	zolpidem tartrate 5 mg tablet 	30	30	Mihai Madrid MD	Insurance	[facility]  04/03/2020	04/06/2020	zolpidem tartrate 5 mg tablet 	30	30	Mihai Madrid MD	Insurance	[facility]  * - Drugs marked with an asterisk are compound drugs. If the compound drug is made up of more than one controlled substance, then each controlled substance will be a separate row in the table.

## 2021-04-01 NOTE — PROGRESS NOTE ADULT - SUBJECTIVE AND OBJECTIVE BOX
INTERVAL HPI/OVERNIGHT EVENTS:  Pt stable.   NPO  No flatus/BM.  Minimal abdominal pain.    Vital Signs Last 24 Hrs  T(C): 36.9 (01 Apr 2021 04:48), Max: 37.2 (01 Apr 2021 00:00)  T(F): 98.5 (01 Apr 2021 04:48), Max: 99 (01 Apr 2021 00:00)  HR: 65 (01 Apr 2021 09:00) (60 - 81)  BP: 135/45 (31 Mar 2021 16:00) (98/48 - 135/52)  BP(mean): 62 (31 Mar 2021 16:00) (58 - 72)  RR: 29 (01 Apr 2021 09:00) (11 - 33)  SpO2: 99% (01 Apr 2021 09:00) (91% - 100%)    Physical:  Abdomen: Soft nondistended, nontender.  BAILEY dressing in place.    I&O's Summary    31 Mar 2021 07:01  -  01 Apr 2021 07:00  --------------------------------------------------------  IN: 3900 mL / OUT: 855 mL / NET: 3045 mL        LABS:                        9.6    7.56  )-----------( 243      ( 01 Apr 2021 05:33 )             29.9             04-01    139  |  109<H>  |  11  ----------------------------<  126<H>  4.6   |  23  |  0.91    Ca    8.1<L>      01 Apr 2021 05:33  Phos  2.3     04-01  Mg     1.6     04-01    TPro  5.8<L>  /  Alb  2.5<L>  /  TBili  0.2  /  DBili  x   /  AST  57<H>  /  ALT  39  /  AlkPhos  52  04-01

## 2021-04-01 NOTE — PHYSICAL THERAPY INITIAL EVALUATION ADULT - IMPAIRMENTS FOUND, PT EVAL
aerobic capacity/endurance/gait, locomotion, and balance/gross motor/muscle strength/posture/ventilation and respiration/gas exchange

## 2021-04-01 NOTE — PROGRESS NOTE ADULT - ASSESSMENT
78 y/o  Algerian-speaking female  with PMH  of HFpEF, CAD (s/p stents), chronic venous stasis, DM, HTN and Afib (on Xarelto, s/p PPM), chronic bronchitis with asthmatic component ( on Oxygen PRN at home ), JOSE DANIEL ( supposed to be on nocturnal CPAP , but non compliant) presented to the ED with chief complaint of hypotension ,  SOB and  fatigue for almost 2 weeks.   Pt was admitted to medicine for symptomatic anemia. Colonoscopy showed colon mass. Biopsy result showed invasive adenocarcinoma of colon. Pt underwent laparoscopic right hemicolectomy today. POD # 0  Transferred to ICU for post operative care.    Assessment  1. S/P Right Hemicolectomy   2. Ascending colon invasive adenocarcinoma  3. Symptomatic anemia  4. Afib   5. Diabetes Mellitus  6. History of Asthma  7. Hypertension  8.History of coronary artery disease  9.JOSE DANIEL  10. Obesity   11. H. Pylori infection     Plan:      NEURO;  - Pt is awake and alert  -No acute issues  -IV Dilaudid for abdominal pain    CARDIOVASCULAR  #Afib   -Hold  full dose anticoagulation heparin drip as per cardio as pt in NOrmal sinus , will resume oral meds after started on diet  -Resume carvedilol when diet is resumed  -Continue telemonitoring    # History of Hypertension  -Resume Multaq and Coreg after resuming diet  for now started on metoprolol 5mg iv pushes standing with parameters  -Monitor blood pressure      PULMONARY  # No acute issues  -Pt is breathing well on ambient air    GASTROINTESTINAL  # Ascending colon invasive adenocarcinoma  -S/P Laparoscopic right hemicolectomy   -POD#1  - Pain management   -Continue IV fluids  -Keep NPO till bowel function returns as per surgery  -Protonix for GI prophylaxis    RENAL  # SRIDHAR  resolved  -Likely pre renal  -Continue IV fluids  -Monitor BMP avoid nephrotoxic medications    INFECTIOUS DISEASE  # H.Pylori infection  -Pt is on iv metronidazole and amoxicillin fro H. Pylori infection  ID:      ENDOCRINE  #DM  -Continue Lantus 10 U at bedtime  and moderate sliding scale  -Monitor blood glucose    HEME  # Anemia  -Secondary to GI Blood loss  -S/P 3 U PRBC so far  -Monitor CBC , pt/inr      -Monitor, Replete to K>4 and Mg>2  -Diet: NPO    PROPHYLAXIS  -DVT:  Heparin s/c for DVT prophylaxis   -GI: Protonix for GI prophylaxis    DISPO: Transferred to ICU    CODE STATUS: Full CODE

## 2021-04-01 NOTE — PROGRESS NOTE ADULT - ASSESSMENT
Patient is a 81y old  Female from home, ambulates with walker, lives with son, with PMHx of HFpEF, CAD (s/p stents), chronic venous stasis, DM, HTN and Afib (on Xarelto, s/p PPM), chronic bronchitis with asthmatic component ( on Oxygen PRN at home ), JOSE DANIEL ( supposed to be on nocturnal CPAP , but non compliant), now presents to the ER for evaluation of hypotension, SOB and  fatigue for almost 2 weeks. Per daughter and granddaughter patient has been complaining of increasing fatigue and sob for last few weeks, seemed to be pale , patient endorses dark loose bowel movement. Patient has chronic diarrhea, but no abdominal pain. She has evaluated by GI team and now the ID consult requested to assist with further evaluation of chronic diarrhea.     #  H. Pylori associated Chronic active gastritis, diffuse, with focal lymphoid aggregate, DX 3/22 by Antrum biopsy  # Chronic diarrhea- C.diff vs Malignancy - NO Malignancy Antrum biopsy   # Symptomatic Anemia  - s/p EGD and antrum biopsy shows H. Pylori associated Chronic active gastritis, diffuse, with focal lymphoid aggregate  # COVID negative 3/20/21, positive 3/27 and negative on 3/28  - d/w Dr. Spence regarding exposure VS false positive, in th esetting of positive Titers  # S/p EGD 3/22  and s/p Colonoscopy 3/25- pathology shows - Invasive colonic adenocarcinoma arising in association with  tubular adenoma  # The Right knee xray shows Round mass anterior to the patella.  # s/p Laparoscopic assisted right hemicolectomy , 3/31/21    Would recommend:    1. Follow up pathology   2. Continue Ceftriaxone and Flagyl as per Surgery protocol  3. Monitor kidney function  4. Pain management as needed    d/w ICU team     Attending Attestation:    Spent more than 45 minutes on total encounter, more than 50 % of the visit was spent counseling and/or coordinating care by the Attending physician.

## 2021-04-01 NOTE — PHYSICAL THERAPY INITIAL EVALUATION ADULT - GENERAL OBSERVATIONS, REHAB EVAL
icu referral pt icu referral, team clear oob mobilization, tolerating rw transfers unit bedside ambulation with assistance and supplemental lines in place.

## 2021-04-01 NOTE — PROGRESS NOTE ADULT - SUBJECTIVE AND OBJECTIVE BOX
had hemicolectomy  await path  tolerated well  no flatus yet    MEDICATIONS  (STANDING):  acetaminophen  IVPB .. 1000 milliGRAM(s) IV Intermittent once  acetaminophen  IVPB .. 1000 milliGRAM(s) IV Intermittent once  ascorbic acid 2000 milliGRAM(s) Oral every 8 hours  aspirin  chewable 81 milliGRAM(s) Oral daily  atorvastatin 40 milliGRAM(s) Oral at bedtime  budesonide 160 MICROgram(s)/formoterol 4.5 MICROgram(s) Inhaler 2 Puff(s) Inhalation two times a day  carvedilol 6.25 milliGRAM(s) Oral every 12 hours  cefTRIAXone   IVPB      cefTRIAXone   IVPB 1000 milliGRAM(s) IV Intermittent every 24 hours  cholecalciferol 1000 Unit(s) Oral daily  dronedarone 400 milliGRAM(s) Oral two times a day  fluticasone propionate 50 MICROgram(s)/spray Nasal Spray 1 Spray(s) Both Nostrils every 12 hours  gabapentin 300 milliGRAM(s) Oral daily  heparin   Injectable 5000 Unit(s) SubCutaneous every 8 hours  influenza   Vaccine 0.5 milliLiter(s) IntraMuscular once  insulin glargine Injectable (LANTUS) 10 Unit(s) SubCutaneous at bedtime  insulin lispro (ADMELOG) corrective regimen sliding scale   SubCutaneous every 6 hours  lactated ringers. 1000 milliLiter(s) (150 mL/Hr) IV Continuous <Continuous>  levothyroxine Injectable 130 MICROGram(s) IV Push at bedtime  metoprolol tartrate Injectable 5 milliGRAM(s) IV Push every 6 hours  metroNIDAZOLE  IVPB 500 milliGRAM(s) IV Intermittent every 8 hours  montelukast 10 milliGRAM(s) Oral daily  oxybutynin 5 milliGRAM(s) Oral two times a day  pantoprazole  Injectable 40 milliGRAM(s) IV Push daily  zinc sulfate 220 milliGRAM(s) Oral daily    MEDICATIONS  (PRN):  HYDROmorphone  Injectable 0.5 milliGRAM(s) IV Push every 4 hours PRN Severe Pain (7 - 10)  ondansetron Injectable 4 milliGRAM(s) IV Push every 6 hours PRN Nausea      Allergies    No Known Allergies    Intolerances        Vital Signs Last 24 Hrs  T(C): 36.8 (01 Apr 2021 08:00), Max: 37.2 (01 Apr 2021 00:00)  T(F): 98.3 (01 Apr 2021 08:00), Max: 99 (01 Apr 2021 00:00)  HR: 68 (01 Apr 2021 11:00) (60 - 81)  BP: 174/37 (01 Apr 2021 10:00) (98/48 - 174/37)  BP(mean): 62 (31 Mar 2021 16:00) (58 - 72)  RR: 17 (01 Apr 2021 11:00) (11 - 33)  SpO2: 99% (01 Apr 2021 11:00) (91% - 100%)    PHYSICAL EXAM  General: adult in NAD  HEENT: clear oropharynx, anicteric sclera, pink conjunctiva  Neck: supple  CV: normal S1/S2 with no murmur rubs or gallops  Lungs: positive air movement b/l ant lungs,clear to auscultation, no wheezes, no rales  Abdomen: soft non-tender non-distended, no hepatosplenomegaly  Ext: no clubbing cyanosis or edema  Skin: no rashes and no petechiae  Neuro: alert and oriented X 4, no focal deficits  LABS:                          9.6    7.56  )-----------( 243      ( 01 Apr 2021 05:33 )             29.9         Mean Cell Volume : 85.7 fl  Mean Cell Hemoglobin : 27.5 pg  Mean Cell Hemoglobin Concentration : 32.1 gm/dL  Auto Neutrophil # : 5.06 K/uL  Auto Lymphocyte # : 1.46 K/uL  Auto Monocyte # : 0.71 K/uL  Auto Eosinophil # : 0.27 K/uL  Auto Basophil # : 0.03 K/uL  Auto Neutrophil % : 66.9 %  Auto Lymphocyte % : 19.3 %  Auto Monocyte % : 9.4 %  Auto Eosinophil % : 3.6 %  Auto Basophil % : 0.4 %    Serial CBC  Hematocrit 29.9  Hemoglobin 9.6  Plat 243  RBC 3.49  WBC 7.56  Serial CBC  Hematocrit 30.8  Hemoglobin 10.0  Plat 243  RBC 3.69  WBC 9.05  Serial CBC  Hematocrit 32.6  Hemoglobin 10.8  Plat 258  RBC 3.86  WBC 6.51  Serial CBC  Hematocrit 26.5  Hemoglobin 8.3  Plat 267  RBC 3.13  WBC 6.34  Serial CBC  Hematocrit 25.6  Hemoglobin 8.1  Plat 277  RBC 3.08  WBC 6.81    04-01    139  |  109<H>  |  11  ----------------------------<  126<H>  4.6   |  23  |  0.91    Ca    8.1<L>      01 Apr 2021 05:33  Phos  2.3     04-01  Mg     1.6     04-01    TPro  5.8<L>  /  Alb  2.5<L>  /  TBili  0.2  /  DBili  x   /  AST  57<H>  /  ALT  39  /  AlkPhos  52  04-01      PT/INR - ( 31 Mar 2021 05:42 )   PT: 13.1 sec;   INR: 1.11 ratio         PTT - ( 31 Mar 2021 05:42 )  PTT:34.4 sec              BLOOD SMEAR INTERPRETATION:       RADIOLOGY & ADDITIONAL STUDIES:

## 2021-04-01 NOTE — PROGRESS NOTE ADULT - SUBJECTIVE AND OBJECTIVE BOX
KWAME MOSQUERA  MR# [id number]  81yFemale        Patient is a 81y old  Female who presents with a chief complaint of symptomatic anemia (2021 12:07)      INTERVAL HPI/OVERNIGHT EVENTS:  Patient seen and examined at bedside. No notations of chest pain, palpitation, SOB, orthopnea, nausea, vomiting or abdominal pain.    ALLERGIES  No Known Allergies      MEDICATIONS  acetaminophen  IVPB .. 1000 milliGRAM(s) IV Intermittent once  acetaminophen  IVPB .. 1000 milliGRAM(s) IV Intermittent once  ascorbic acid 2000 milliGRAM(s) Oral every 8 hours  aspirin  chewable 81 milliGRAM(s) Oral daily  atorvastatin 40 milliGRAM(s) Oral at bedtime  budesonide 160 MICROgram(s)/formoterol 4.5 MICROgram(s) Inhaler 2 Puff(s) Inhalation two times a day  carvedilol 6.25 milliGRAM(s) Oral every 12 hours  cefTRIAXone   IVPB 1000 milliGRAM(s) IV Intermittent every 24 hours  cefTRIAXone   IVPB      cholecalciferol 1000 Unit(s) Oral daily  dronedarone 400 milliGRAM(s) Oral two times a day  fluticasone propionate 50 MICROgram(s)/spray Nasal Spray 1 Spray(s) Both Nostrils every 12 hours  gabapentin 300 milliGRAM(s) Oral daily  heparin   Injectable 5000 Unit(s) SubCutaneous every 8 hours  hydrALAZINE Injectable 5 milliGRAM(s) IV Push every 6 hours PRN sbp > 150  HYDROmorphone  Injectable 0.5 milliGRAM(s) IV Push every 4 hours PRN Severe Pain (7 - 10)  influenza   Vaccine 0.5 milliLiter(s) IntraMuscular once  insulin glargine Injectable (LANTUS) 10 Unit(s) SubCutaneous at bedtime  insulin lispro (ADMELOG) corrective regimen sliding scale   SubCutaneous every 6 hours  levothyroxine Injectable 130 MICROGram(s) IV Push at bedtime  metoprolol tartrate Injectable 5 milliGRAM(s) IV Push every 6 hours  metroNIDAZOLE  IVPB 500 milliGRAM(s) IV Intermittent every 8 hours  montelukast 10 milliGRAM(s) Oral daily  ondansetron Injectable 4 milliGRAM(s) IV Push every 6 hours PRN Nausea  oxybutynin 5 milliGRAM(s) Oral two times a day  pantoprazole  Injectable 40 milliGRAM(s) IV Push daily  zinc sulfate 220 milliGRAM(s) Oral daily              REVIEW OF SYSTEMS:  CONSTITUTIONAL: No fever, weight loss, or fatigue  EYES: No eye pain, visual disturbances, or discharge  ENT:  No difficulty hearing, tinnitus, vertigo; No sinus or throat pain  NECK: No pain or stiffness  RESPIRATORY: No cough, wheezing, chills or hemoptysis; No Shortness of Breath  CARDIOVASCULAR: No chest pain, palpitations, passing out, dizziness, or leg swelling  GASTROINTESTINAL: No abdominal or epigastric pain. No nausea, vomiting, or hematemesis; No diarrhea or constipation. No melena or hematochezia.  GENITOURINARY: No dysuria, frequency, hematuria, or incontinence  NEUROLOGICAL: No headaches, memory loss, loss of strength, numbness, or tremors  SKIN: No itching, burning, rashes, or lesions   LYMPH Nodes: No enlarged glands  ENDOCRINE: No heat or cold intolerance; No hair loss  MUSCULOSKELETAL: No joint pain or swelling; No muscle, back, or extremity pain  PSYCHIATRIC: No depression, anxiety, mood swings, or difficulty sleeping  HEME/LYMPH: No easy bruising, or bleeding gums  ALLERGY AND IMMUNOLOGIC: No hives or eczema	    [ ] All others negative	  [ ] Unable to obtain      T(C): 36.8 (21 @ 08:00), Max: 37.2 (21 @ 00:00)  T(F): 98.3 (21 @ 08:00), Max: 99 (21 @ 00:00)  HR: 68 (21 @ 11:00) (60 - 81)  BP: 174/37 (21 @ 10:00) (107/49 - 174/37)  RR: 17 (21 @ 11:00) (11 - 33)  SpO2: 99% (21 @ 11:00) (91% - 100%)  Wt(kg): --    I&O's Summary    31 Mar 2021 07:01  -  2021 07:00  --------------------------------------------------------  IN: 3900 mL / OUT: 905 mL / NET: 2995 mL    2021 07:01  -  2021 13:18  --------------------------------------------------------  IN: 866.6 mL / OUT: 220 mL / NET: 646.6 mL          PHYSICAL EXAM:  A X O x  HEAD:  Atraumatic, Normocephalic  EYES: EOMI, PERRLA, conjunctiva and sclera clear  NECK: Supple, No JVD, Normal thyroid  Resp: CTAB, No crackles, wheezing,   CVS: Regular rate and rhythm; No discernable murmurs, rubs, or gallops  ABD: Soft, Nontender, Nondistended; Bowel sounds present  EXTREMITIES:  2+ Peripheral Pulses, No edema  LYMPH: No dicernable lymphadenopathy noted  GENERAL: NAD, well-groomed, well-developed      LABS:                        9.6    7.56  )-----------( 243      ( 2021 05:33 )             29.9     04-01    139  |  109<H>  |  11  ----------------------------<  126<H>  4.6   |  23  |  0.91    Ca    8.1<L>      2021 05:33  Phos  2.3     04-  Mg     1.6     04-    TPro  5.8<L>  /  Alb  2.5<L>  /  TBili  0.2  /  DBili  x   /  AST  57<H>  /  ALT  39  /  AlkPhos  52  04-01    PT/INR - ( 31 Mar 2021 05:42 )   PT: 13.1 sec;   INR: 1.11 ratio         PTT - ( 31 Mar 2021 05:42 )  PTT:34.4 sec  Urinalysis Basic - ( 31 Mar 2021 13:01 )    Color: Yellow / Appearance: Clear / S.020 / pH: x  Gluc: x / Ketone: Trace  / Bili: Negative / Urobili: Negative   Blood: x / Protein: Negative / Nitrite: Negative   Leuk Esterase: Negative / RBC: x / WBC x   Sq Epi: x / Non Sq Epi: x / Bacteria: x      CAPILLARY BLOOD GLUCOSE      POCT Blood Glucose.: 134 mg/dL (2021 11:07)  POCT Blood Glucose.: 158 mg/dL (31 Mar 2021 23:08)  POCT Blood Glucose.: 184 mg/dL (31 Mar 2021 16:19)      Troponins:  ProBNP:  Lipid Profile:   HgA1c:  TSH:           RADIOLOGY & ADDITIONAL TESTS:    Imaging Personally Reviewed:  [ ] YES  [ ] NO      Consultant(s) Notes Reviewed:  [x ] YES  [ ] NO    Care Discussed with Consultants/Other Providers [ x] YES  [ ] NO          PAST MEDICAL & SURGICAL HISTORY:  Asthma    CAD (Coronary Atherosclerotic Disease)    Myocardial Infarction    Diabetes    HTN (Hypertension)    HLD (Hyperlipidemia)    CHF (congestive heart failure), NYHA class I    IBS (irritable bowel syndrome)    Venous stasis    Hypothyroidism    Atrial fibrillation    Pacemaker    Obesity    S/P coronary angiogram          Anemia    H/o or current diagnosis of HF- ACEI/ARB contraindication unknown    H/o or current diagnosis of HF- Contraindication to ACEI/ARBs    H/o or current diagnosis of HF- ACEI/ARB contraindication unknown    H/o or current diagnosis of HF- Contraindication to ACEI/ARBs    H/o or current diagnosis of HF- ACEI/ARB contraindication unknown    H/o or current diagnosis of HF- Contraindication to ACEI/ARBs    Family history of heart disease    Handoff    MEWS Score    Asthma    CAD (Coronary Atherosclerotic Disease)    Myocardial Infarction    Diabetes    HTN (Hypertension)    HLD (Hyperlipidemia)    CHF (congestive heart failure), NYHA class I    IBS (irritable bowel syndrome)    Venous stasis    Hypothyroidism    Atrial fibrillation    Pacemaker    Obesity    Colon adenocarcinoma    Colon adenocarcinoma    Symptomatic anemia    Helicobacter pylori gastritis    Symptomatic anemia    Atrial fibrillation    CHF (congestive heart failure), NYHA class I    Diabetes    HTN (Hypertension)    Asthma    JOSE DANIEL (obstructive sleep apnea)    Prophylactic measure    Hypothyroidism    Knee swelling    Generalized abdominal pain    S/P laparoscopic procedure    CHF (congestive heart failure), NYHA class I    JOSE DANIEL (obstructive sleep apnea)    Diabetes    Colon cancer    Laparoscopy assisted right hemicolectomy    No significant past surgical history    S/P coronary angiogram    A) WEAKNESS    90+    CAD (coronary atherosclerotic disease)    Asthma    Helicobacter pylori gastritis    Paroxysmal atrial fibrillation    Encounter for colonoscopy due to history of adenomatous colonic polyps    Hypothyroidism    CHF (congestive heart failure), NYHA class I    JOSE DANIEL (obstructive sleep apnea)    Diabetes    HTN (Hypertension)    Colon cancer    [email or web address]

## 2021-04-01 NOTE — PROGRESS NOTE ADULT - SUBJECTIVE AND OBJECTIVE BOX
Source of information: KWAME MOSQUERA, Chart review  Patient language: Fijian  : [id number]    HPI:  79 years old Fijian-speaking female from home, ambulates with walker, lives with son, with PMHx of HFpEF, CAD (s/p stents), chronic venous stasis, DM, HTN and Afib (on Xarelto, s/p PPM), chronic bronchitis with asthmatic component ( on Oxygen PRN at home ), JOSE DANIEL ( supposed to be on nocturnal CPAP , but non compliant) presents to the ED with chief complaint of hypotension ,  SOB and  fatigue for almost 2 weeks. Per daughter and granddaughter patient has been complaining of increasing fatigue and sob for last few weeks, seemed to be pale , patient endorses dark loose bowel movement for quite a while.  pt had virtual  colonoscopy 1 year ago and the result was questionable. She never followed up. Per daughter pt refused all kinds of meat including chicken, meat , however denies any dysphasia. Patient  also endorses decreased appetite.  patient has chronic diarrhea. Patient Denies nausea, vomiting, abdominal pain, SOB, chest pain, CHAMBERLAIN, palpitations, dizziness, headache, cough, wheezing, joint pain or swelling, fever, chills.      GOC: Discussed with daughter,  mentioned she want stobe DNI , on discussion with patient, patient was so distressed about urge incontinency and was complaining of pain and IV infiltrations, so GOC discussion deferred to primary team.    (20 Mar 2021 19:53)      Patient is a 81y old  Female  PMHx of HFpEF, CAD (s/p stents), chronic venous stasis, DM, HTN and Afib (on Xarelto, s/p PPM), chronic bronchitis with asthmatic component ( on Oxygen PRN at home ), JOSE DANIEL ( supposed to be on nocturnal CPAP , but non compliant) presents to the ED with chief complaint of hypotension ,  SOB and  fatigue for almost 2 weeks. Found to have symptomatic anemia. Pt s/p lap colon resection 3/31, POD #1.  Pain consulted for post operative pain. Pt seen and examined at bedside. Pt sitting in chair, reports abdominal pain score 2/10 and tolerable at this time  SCALE USED: (1-10 VNRS). Pt describes pain as aching, non- radiating, alleviated by pain medication, exacerbated by movement and palpation. Pt also reports chronic right shoulder pain and right leg numbness. Pt is NPO. Denies lethargy, nausea, vomiting, constipation, itchiness. Pt has not yet passed flatus. Patient stated goal for pain control: to be able to take deep breaths, get out of bed to chair and ambulate with tolerable pain control. 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     PAST MEDICAL & SURGICAL HISTORY:  Asthma    CAD (Coronary Atherosclerotic Disease)    Myocardial Infarction    Diabetes    HTN (Hypertension)    HLD (Hyperlipidemia)    CHF (congestive heart failure), NYHA class I    IBS (irritable bowel syndrome)    Venous stasis    Hypothyroidism    Atrial fibrillation    Pacemaker    Obesity    S/P coronary angiogram        FAMILY HISTORY:  Family history of heart disease        Social History:  Alcohol: Denied  Smoking: Denied  Illicit drugs: Denied (20 Mar 2021 19:53)    Allergies    No Known Allergies      MEDICATIONS  (STANDING):  acetaminophen  IVPB .. 1000 milliGRAM(s) IV Intermittent once  acetaminophen  IVPB .. 1000 milliGRAM(s) IV Intermittent once  ascorbic acid 2000 milliGRAM(s) Oral every 8 hours  aspirin  chewable 81 milliGRAM(s) Oral daily  atorvastatin 40 milliGRAM(s) Oral at bedtime  budesonide 160 MICROgram(s)/formoterol 4.5 MICROgram(s) Inhaler 2 Puff(s) Inhalation two times a day  carvedilol 6.25 milliGRAM(s) Oral every 12 hours  cefTRIAXone   IVPB      cefTRIAXone   IVPB 1000 milliGRAM(s) IV Intermittent every 24 hours  cholecalciferol 1000 Unit(s) Oral daily  dronedarone 400 milliGRAM(s) Oral two times a day  fluticasone propionate 50 MICROgram(s)/spray Nasal Spray 1 Spray(s) Both Nostrils every 12 hours  gabapentin 300 milliGRAM(s) Oral daily  heparin   Injectable 5000 Unit(s) SubCutaneous every 8 hours  influenza   Vaccine 0.5 milliLiter(s) IntraMuscular once  insulin glargine Injectable (LANTUS) 10 Unit(s) SubCutaneous at bedtime  insulin lispro (ADMELOG) corrective regimen sliding scale   SubCutaneous every 6 hours  levothyroxine Injectable 130 MICROGram(s) IV Push at bedtime  metoprolol tartrate Injectable 5 milliGRAM(s) IV Push every 6 hours  metroNIDAZOLE  IVPB 500 milliGRAM(s) IV Intermittent every 8 hours  montelukast 10 milliGRAM(s) Oral daily  oxybutynin 5 milliGRAM(s) Oral two times a day  pantoprazole  Injectable 40 milliGRAM(s) IV Push daily  zinc sulfate 220 milliGRAM(s) Oral daily    MEDICATIONS  (PRN):  HYDROmorphone  Injectable 0.5 milliGRAM(s) IV Push every 4 hours PRN Severe Pain (7 - 10)  ondansetron Injectable 4 milliGRAM(s) IV Push every 6 hours PRN Nausea      Vital Signs Last 24 Hrs  T(C): 36.8 (2021 08:00), Max: 37.2 (2021 00:00)  T(F): 98.3 (2021 08:00), Max: 99 (2021 00:00)  HR: 68 (2021 11:00) (60 - 81)  BP: 174/37 (2021 10:00) (107/49 - 174/37)  BP(mean): 62 (31 Mar 2021 16:00) (61 - 72)  RR: 17 (2021 11:00) (11 - 33)  SpO2: 99% (2021 11:00) (91% - 100%)  COVID-19 PCR: NotDetec (30 Mar 2021 20:42)  COVID-19 PCR: NotDetec (28 Mar 2021 04:16)  COVID-19 PCR: Detected (27 Mar 2021 19:25)  COVID-19 PCR: NotDetec (23 Mar 2021 15:12)  COVID-19 PCR: NotDetec (20 Mar 2021 16:32)    LABS: Reviewed                          9.6    7.56  )-----------( 243      ( 2021 05:33 )             29.9     04-01    139  |  109<H>  |  11  ----------------------------<  126<H>  4.6   |  23  |  0.91    Ca    8.1<L>      2021 05:33  Phos  2.3     04-01  Mg     1.6     04-01    TPro  5.8<L>  /  Alb  2.5<L>  /  TBili  0.2  /  DBili  x   /  AST  57<H>  /  ALT  39  /  AlkPhos  52  04-    PT/INR - ( 31 Mar 2021 05:42 )   PT: 13.1 sec;   INR: 1.11 ratio         PTT - ( 31 Mar 2021 05:42 )  PTT:34.4 sec  LIVER FUNCTIONS - ( 2021 05:33 )  Alb: 2.5 g/dL / Pro: 5.8 g/dL / ALK PHOS: 52 U/L / ALT: 39 U/L DA / AST: 57 U/L / GGT: x           Urinalysis Basic - ( 31 Mar 2021 13:01 )    Color: Yellow / Appearance: Clear / S.020 / pH: x  Gluc: x / Ketone: Trace  / Bili: Negative / Urobili: Negative   Blood: x / Protein: Negative / Nitrite: Negative   Leuk Esterase: Negative / RBC: x / WBC x   Sq Epi: x / Non Sq Epi: x / Bacteria: x      CAPILLARY BLOOD GLUCOSE      POCT Blood Glucose.: 134 mg/dL (2021 11:07)  POCT Blood Glucose.: 158 mg/dL (31 Mar 2021 23:08)  POCT Blood Glucose.: 184 mg/dL (31 Mar 2021 16:19)  POCT Blood Glucose.: 183 mg/dL (31 Mar 2021 12:21)    COVID-19 PCR: NotDetec (30 Mar 2021 20:42)  COVID-19 PCR: NotDetec (28 Mar 2021 04:16)  COVID-19 PCR: Detected (27 Mar 2021 19:25)  COVID-19 PCR: NotDetec (23 Mar 2021 15:12)  COVID-19 PCR: NotDetec (20 Mar 2021 16:32)      Radiology: Reviewed.   < from: Xray Abdomen 2 Views (21 @ 19:50) >    EXAM:  XR ABDOMEN 2V                            PROCEDURE DATE:  2021          INTERPRETATION:  Abdomen 2 views    HISTORY: Evaluate for free air.    COMPARISON: 3/20/2021    Supine and upright views of the abdomen show a normal gas pattern. The psoas margins are within normal limits. There is no evidence of free air. Surgical clips project over the right abdomen were not seen in the prior study.    IMPRESSION: No evidence of free air. Right surgical clips as noted.    Thank you for this referral.            SAUNDRA COOPER MD; Attending Interventional Radiologist  This document has been electronically signed. Mar 30 2021 10:46AM    < end of copied text >    < from: Xray Knee 3 Views, Right (21 @ 14:20) >    EXAM:  KNEE AP.LAT&OBL.RIGHT                            PROCEDURE DATE:  2021          INTERPRETATION:  Right knee. Patient has local pain. 3 views.    No effusion. Mild degeneration concentrated around the articular patella.    On the lateral view there is a well-circumscribed round density in the subcutaneous tissues anterior to the patella measuring approximately 3.2 cm.    IMPRESSION: Round mass anterior to the patella is noted. This is new since 2018.            GUERRERO HUSSEIN M.D., ATTENDING RADIOLOGIST  This document has been electronically signed. Mar 28 2021  3:48PM    < end of copied text >      ORT Score -   Family Hx of substance abuse	Female	      Male  Alcohol 	                                           1                     3  Illegal drugs	                                   2                     3  Rx drugs                                           4 	                  4  Personal Hx of substance abuse		  Alcohol 	                                          3	                  3  Illegal drugs                                     4	                  4  Rx drugs                                            5 	                  5  Age between 16- 45 years	           1                     1  hx preadolescent sexual abuse	   3 	                  0  Psychological disease		  ADD, OCD, bipolar, schizophrenia   2	          2  Depression                                           1 	          1  Total: 0    a score of 3 or lower indicates low risk for opioid abuse		  a score of 4-7 indicates moderate risk for opioid abuse		  a score of 8 or higher indicates high risk for opioid abuse  	  REVIEW OF SYSTEMS:  CONSTITUTIONAL: No fever + fatigue  HEENT:  No difficulty hearing, no change in vision  NECK: No pain or stiffness  RESPIRATORY: No cough, wheezing, chills or hemoptysis; No shortness of breath  CARDIOVASCULAR: No chest pain, palpitations, dizziness, or leg swelling  GASTROINTESTINAL: No loss of appetite, decreased PO intake. + abdominal pain. No nausea, vomiting; No diarrhea or constipation.   GENITOURINARY: No dysuria, frequency, hematuria, retention or incontinence  MUSCULOSKELETAL: + chronic right shoulder pain, no swelling; No muscle, back, or extremity pain, no upper or lower motor strength weakness, no saddle anesthesia, bowel/bladder incontinence, no falls   NEURO: No headaches, + numbness/tingling right leg, No weakness  PSYCHIATRIC: No depression, anxiety or difficulty sleeping    PHYSICAL EXAM:  GENERAL:  Alert & Oriented X4, cooperative, NAD, Good concentration. Speech is clear.   RESPIRATORY: Respirations even and unlabored. Clear to auscultation bilaterally; No rales, rhonchi, wheezing, or rubs + ETCO2 monitor in place   CARDIOVASCULAR: Normal S1/S2, regular rate and rhythm; No murmurs, rubs, or gallops. No JVD. + cardiac monitor in place   GASTROINTESTINAL:  Soft, + appropriately tender, Nondistended; No Bowel sounds present + lap sites c/d/i   GENITOURINARY: Urinary catheter draining clear yellow urine   PERIPHERAL VASCULAR:  Extremities warm without edema. 2+ Peripheral Pulses, No cyanosis, No calf tenderness  MUSCULOSKELETAL: Motor Strength 5/5 B/L upper and lower extremities; moves all extremities equally against gravity; ROM intact; negative SLR; + right shoulder tender to palpation   SKIN: + abdominal lap sites c/d/i; + BAILEY dressing in place, c/d/i    Risk factors associated with adverse outcomes related to opioid treatment  [ ]  Concurrent benzodiazepine use  [ ]  History/ Active substance use or alcohol use disorder  [ ] Psychiatric co-morbidity  [ ] Sleep apnea  [ ] COPD  [X ] BMI> 35  [ ] Liver dysfunction  [ ] Renal dysfunction  [ ] CHF  [ ] Smoker  [X ]  Age > 60 years    [X ]  [place]  Reviewed and Copied to Chart. See below.    Plan of care and goal oriented pain management treatment options were discussed with patient and /or primary care giver; all questions and concerns were addressed and care was aligned with patient's wishes.    Educated patient on goal oriented pain management treatment options     21 @ 12:10

## 2021-04-01 NOTE — PROGRESS NOTE ADULT - ATTENDING COMMENTS
IMP: This is an 79 yr old  Ind[place]ian-speaking womann  with  HFpEF, CAD (s/p stents), chronic venous stasis, DM, HTN and Afib (on Xarelto, s/p PPM), chronic bronchitis with asthmatic component ( on Oxygen PRN at home ), JOSE DANIEL ( supposed to be on nocturnal CPAP , but non compliant) presented to the ED with chief complaint of hypotension ,  SOB and  fatigue for almost 2 weeks.   Pt was admitted to medicine for symptomatic anemia. Colonoscopy showed colon mass. Biopsy result showed invasive adenocarcinoma of colon. Pt underwent laparoscopic right hemicolectomy today. POD # 0  Transferred to ICU for post operative care.    Assessment  1. S/P Right Hemicolectomy   2. Ascending colon invasive adenocarcinoma  3. Symptomatic anemia  4. Afib   5. Diabetes Mellitus  6. History of Asthma  7. Hypertension  8.History of coronary artery disease  9.JOSE DANIEL  10. Obesity   11. H. Pylori infection       Plan;  -s/p right hemicolectomy with primary anastomosis 3/30  -no flatus   -f/u pat   -hemodynamic monitoring   -npo  -pat pulled out NGT   -pain control   -ivf  -wound care as per surgery  -blood sugar monitoring with coverage   -continue meds  -continue antibx for H. pylori .   -Surgical f/u noted .. deferred therapeutic anticoag to cards  -Cards f/u  DVT prophy  -Onco waitng for path   -OOB to chair

## 2021-04-01 NOTE — PROGRESS NOTE ADULT - SUBJECTIVE AND OBJECTIVE BOX
INTERVAL HPI/OVERNIGHT EVENTS: ***    PRESSORS: [ ] YES [ ] NO  WHICH:    ANTIBIOTICS:                      Antimicrobial:  cefTRIAXone   IVPB      cefTRIAXone   IVPB 1000 milliGRAM(s) IV Intermittent every 24 hours  metroNIDAZOLE  IVPB 500 milliGRAM(s) IV Intermittent every 8 hours    Cardiovascular:  carvedilol 6.25 milliGRAM(s) Oral every 12 hours  dronedarone 400 milliGRAM(s) Oral two times a day    Pulmonary:  budesonide 160 MICROgram(s)/formoterol 4.5 MICROgram(s) Inhaler 2 Puff(s) Inhalation two times a day  montelukast 10 milliGRAM(s) Oral daily    Hematalogic:  aspirin  chewable 81 milliGRAM(s) Oral daily  heparin   Injectable 5000 Unit(s) SubCutaneous every 8 hours    Other:  acetaminophen  IVPB .. 1000 milliGRAM(s) IV Intermittent once  ascorbic acid 2000 milliGRAM(s) Oral every 8 hours  atorvastatin 40 milliGRAM(s) Oral at bedtime  cholecalciferol 1000 Unit(s) Oral daily  fluticasone propionate 50 MICROgram(s)/spray Nasal Spray 1 Spray(s) Both Nostrils every 12 hours  gabapentin 300 milliGRAM(s) Oral daily  HYDROmorphone  Injectable 0.5 milliGRAM(s) IV Push every 4 hours PRN  influenza   Vaccine 0.5 milliLiter(s) IntraMuscular once  insulin glargine Injectable (LANTUS) 10 Unit(s) SubCutaneous at bedtime  insulin lispro (ADMELOG) corrective regimen sliding scale   SubCutaneous every 6 hours  lactated ringers. 1000 milliLiter(s) IV Continuous <Continuous>  levothyroxine Injectable 130 MICROGram(s) IV Push at bedtime  ondansetron Injectable 4 milliGRAM(s) IV Push every 6 hours PRN  oxybutynin 5 milliGRAM(s) Oral two times a day  pantoprazole  Injectable 40 milliGRAM(s) IV Push daily  zinc sulfate 220 milliGRAM(s) Oral daily    acetaminophen  IVPB .. 1000 milliGRAM(s) IV Intermittent once  ascorbic acid 2000 milliGRAM(s) Oral every 8 hours  aspirin  chewable 81 milliGRAM(s) Oral daily  atorvastatin 40 milliGRAM(s) Oral at bedtime  budesonide 160 MICROgram(s)/formoterol 4.5 MICROgram(s) Inhaler 2 Puff(s) Inhalation two times a day  carvedilol 6.25 milliGRAM(s) Oral every 12 hours  cefTRIAXone   IVPB      cefTRIAXone   IVPB 1000 milliGRAM(s) IV Intermittent every 24 hours  cholecalciferol 1000 Unit(s) Oral daily  dronedarone 400 milliGRAM(s) Oral two times a day  fluticasone propionate 50 MICROgram(s)/spray Nasal Spray 1 Spray(s) Both Nostrils every 12 hours  gabapentin 300 milliGRAM(s) Oral daily  heparin   Injectable 5000 Unit(s) SubCutaneous every 8 hours  HYDROmorphone  Injectable 0.5 milliGRAM(s) IV Push every 4 hours PRN  influenza   Vaccine 0.5 milliLiter(s) IntraMuscular once  insulin glargine Injectable (LANTUS) 10 Unit(s) SubCutaneous at bedtime  insulin lispro (ADMELOG) corrective regimen sliding scale   SubCutaneous every 6 hours  lactated ringers. 1000 milliLiter(s) IV Continuous <Continuous>  levothyroxine Injectable 130 MICROGram(s) IV Push at bedtime  metroNIDAZOLE  IVPB 500 milliGRAM(s) IV Intermittent every 8 hours  montelukast 10 milliGRAM(s) Oral daily  ondansetron Injectable 4 milliGRAM(s) IV Push every 6 hours PRN  oxybutynin 5 milliGRAM(s) Oral two times a day  pantoprazole  Injectable 40 milliGRAM(s) IV Push daily  zinc sulfate 220 milliGRAM(s) Oral daily    Drug Dosing Weight  Height (cm): 160 (20 Mar 2021 15:46)  Weight (kg): 119.6 (31 Mar 2021 12:50)  BMI (kg/m2): 46.7 (31 Mar 2021 12:50)  BSA (m2): 2.17 (31 Mar 2021 12:50)    CENTRAL LINE: [ ] YES [ ] NO  LOCATION:   DATE INSERTED:  REMOVE: [ ] YES [ ] NO  EXPLAIN:    TORRES: [ ] YES [ ] NO    DATE INSERTED:  REMOVE:  [ ] YES [ ] NO  EXPLAIN:    A-LINE:  [ ] YES [ ] NO  LOCATION:   DATE INSERTED:  REMOVE:  [ ] YES [ ] NO  EXPLAIN:    PMH -reviewed admission note, no change since admission    ICU Vital Signs Last 24 Hrs  T(C): 36.9 (2021 04:48), Max: 37.2 (2021 00:00)  T(F): 98.5 (2021 04:48), Max: 99 (2021 00:00)  HR: 68 (2021 07:00) (60 - 81)  BP: 135/45 (31 Mar 2021 16:00) (98/48 - 179/62)  BP(mean): 62 (31 Mar 2021 16:00) (58 - 87)  ABP: 167/53 (2021 07:00) (125/50 - 173/55)  ABP(mean): 93 (2021 07:00) (52 - 98)  RR: 23 (2021 07:00) (11 - 33)  SpO2: 99% (2021 07:00) (91% - 100%)             @ 07:01  -  - @ 07:00  --------------------------------------------------------  IN: 3900 mL / OUT: 855 mL / NET: 3045 mL            PHYSICAL EXAM:    GENERAL: NAD, well-groomed, well-developed  HEAD:  Atraumatic, Normocephalic  EYES: EOMI, PERRLA, conjunctiva and sclera clear  ENMT: No tonsillar erythema, exudates, or enlargement; Moist mucous membranes, Good dentition, No lesions  NECK: Supple, normal appearance, No JVD; Normal thyroid; Trachea midline  NERVOUS SYSTEM:  Alert & Oriented X3, Good concentration; Motor Strength 5/5 B/L upper and lower extremities; DTRs 2+ intact and symmetric  CHEST/LUNG: No chest deformity; Normal percussion bilaterally; No rales, rhonchi, wheezing   HEART: Regular rate and rhythm; No murmurs, rubs, or gallops  ABDOMEN: Soft, Nontender, Nondistended; Bowel sounds present  EXTREMITIES:  2+ Peripheral Pulses, No clubbing, cyanosis, or edema  LYMPH: No lymphadenopathy noted  SKIN: No rashes or lesions; Good capillary refill      LABS:  CBC Full  -  ( 2021 05:33 )  WBC Count : 7.56 K/uL  RBC Count : 3.49 M/uL  Hemoglobin : 9.6 g/dL  Hematocrit : 29.9 %  Platelet Count - Automated : 243 K/uL  Mean Cell Volume : 85.7 fl  Mean Cell Hemoglobin : 27.5 pg  Mean Cell Hemoglobin Concentration : 32.1 gm/dL  Auto Neutrophil # : 5.06 K/uL  Auto Lymphocyte # : 1.46 K/uL  Auto Monocyte # : 0.71 K/uL  Auto Eosinophil # : 0.27 K/uL  Auto Basophil # : 0.03 K/uL  Auto Neutrophil % : 66.9 %  Auto Lymphocyte % : 19.3 %  Auto Monocyte % : 9.4 %  Auto Eosinophil % : 3.6 %  Auto Basophil % : 0.4 %    04-    139  |  109<H>  |  11  ----------------------------<  126<H>  4.6   |  23  |  0.91    Ca    8.1<L>      2021 05:33  Phos  2.3     04-  Mg     1.6         TPro  5.8<L>  /  Alb  2.5<L>  /  TBili  0.2  /  DBili  x   /  AST  57<H>  /  ALT  39  /  AlkPhos  52  04-01    PT/INR - ( 31 Mar 2021 05:42 )   PT: 13.1 sec;   INR: 1.11 ratio         PTT - ( 31 Mar 2021 05:42 )  PTT:34.4 sec  Urinalysis Basic - ( 31 Mar 2021 13:01 )    Color: Yellow / Appearance: Clear / S.020 / pH: x  Gluc: x / Ketone: Trace  / Bili: Negative / Urobili: Negative   Blood: x / Protein: Negative / Nitrite: Negative   Leuk Esterase: Negative / RBC: x / WBC x   Sq Epi: x / Non Sq Epi: x / Bacteria: x          RADIOLOGY & ADDITIONAL STUDIES REVIEWED:  ***    GOALS OF CARE DISCUSSION WITH PATIENT/FAMILY/PROXY:    CRITICAL CARE TIME SPENT: 35 minutes INTERVAL HPI/OVERNIGHT EVENTS: Pt npo till bowel function returns                       Antimicrobial:  cefTRIAXone   IVPB      cefTRIAXone   IVPB 1000 milliGRAM(s) IV Intermittent every 24 hours  metroNIDAZOLE  IVPB 500 milliGRAM(s) IV Intermittent every 8 hours    Cardiovascular:  carvedilol 6.25 milliGRAM(s) Oral every 12 hours  dronedarone 400 milliGRAM(s) Oral two times a day    Pulmonary:  budesonide 160 MICROgram(s)/formoterol 4.5 MICROgram(s) Inhaler 2 Puff(s) Inhalation two times a day  montelukast 10 milliGRAM(s) Oral daily    Hematalogic:  aspirin  chewable 81 milliGRAM(s) Oral daily  heparin   Injectable 5000 Unit(s) SubCutaneous every 8 hours    Other:  acetaminophen  IVPB .. 1000 milliGRAM(s) IV Intermittent once  ascorbic acid 2000 milliGRAM(s) Oral every 8 hours  atorvastatin 40 milliGRAM(s) Oral at bedtime  cholecalciferol 1000 Unit(s) Oral daily  fluticasone propionate 50 MICROgram(s)/spray Nasal Spray 1 Spray(s) Both Nostrils every 12 hours  gabapentin 300 milliGRAM(s) Oral daily  HYDROmorphone  Injectable 0.5 milliGRAM(s) IV Push every 4 hours PRN  influenza   Vaccine 0.5 milliLiter(s) IntraMuscular once  insulin glargine Injectable (LANTUS) 10 Unit(s) SubCutaneous at bedtime  insulin lispro (ADMELOG) corrective regimen sliding scale   SubCutaneous every 6 hours  lactated ringers. 1000 milliLiter(s) IV Continuous <Continuous>  levothyroxine Injectable 130 MICROGram(s) IV Push at bedtime  ondansetron Injectable 4 milliGRAM(s) IV Push every 6 hours PRN  oxybutynin 5 milliGRAM(s) Oral two times a day  pantoprazole  Injectable 40 milliGRAM(s) IV Push daily  zinc sulfate 220 milliGRAM(s) Oral daily    acetaminophen  IVPB .. 1000 milliGRAM(s) IV Intermittent once  ascorbic acid 2000 milliGRAM(s) Oral every 8 hours  aspirin  chewable 81 milliGRAM(s) Oral daily  atorvastatin 40 milliGRAM(s) Oral at bedtime  budesonide 160 MICROgram(s)/formoterol 4.5 MICROgram(s) Inhaler 2 Puff(s) Inhalation two times a day  carvedilol 6.25 milliGRAM(s) Oral every 12 hours  cefTRIAXone   IVPB      cefTRIAXone   IVPB 1000 milliGRAM(s) IV Intermittent every 24 hours  cholecalciferol 1000 Unit(s) Oral daily  dronedarone 400 milliGRAM(s) Oral two times a day  fluticasone propionate 50 MICROgram(s)/spray Nasal Spray 1 Spray(s) Both Nostrils every 12 hours  gabapentin 300 milliGRAM(s) Oral daily  heparin   Injectable 5000 Unit(s) SubCutaneous every 8 hours  HYDROmorphone  Injectable 0.5 milliGRAM(s) IV Push every 4 hours PRN  influenza   Vaccine 0.5 milliLiter(s) IntraMuscular once  insulin glargine Injectable (LANTUS) 10 Unit(s) SubCutaneous at bedtime  insulin lispro (ADMELOG) corrective regimen sliding scale   SubCutaneous every 6 hours  lactated ringers. 1000 milliLiter(s) IV Continuous <Continuous>  levothyroxine Injectable 130 MICROGram(s) IV Push at bedtime  metroNIDAZOLE  IVPB 500 milliGRAM(s) IV Intermittent every 8 hours  montelukast 10 milliGRAM(s) Oral daily  ondansetron Injectable 4 milliGRAM(s) IV Push every 6 hours PRN  oxybutynin 5 milliGRAM(s) Oral two times a day  pantoprazole  Injectable 40 milliGRAM(s) IV Push daily  zinc sulfate 220 milliGRAM(s) Oral daily    Drug Dosing Weight  Height (cm): 160 (20 Mar 2021 15:46)  Weight (kg): 119.6 (31 Mar 2021 12:50)  BMI (kg/m2): 46.7 (31 Mar 2021 12:50)  BSA (m2): 2.17 (31 Mar 2021 12:50)    CENTRAL LINE: [ ] YES [ x] NO  LOCATION:   DATE INSERTED:  REMOVE: [ ] YES [ ] NO  EXPLAIN:    TORRES: [ ] YES [ x] NO    DATE INSERTED:  REMOVE:  [ ] YES [ ] NO  EXPLAIN:    A-LINE:  [ ] YES [x ] NO  LOCATION:   DATE INSERTED:  REMOVE:  [ ] YES [ ] NO  EXPLAIN:    PMH -reviewed admission note, no change since admission    ICU Vital Signs Last 24 Hrs  T(C): 36.9 (2021 04:48), Max: 37.2 (2021 00:00)  T(F): 98.5 (2021 04:48), Max: 99 (2021 00:00)  HR: 68 (2021 07:00) (60 - 81)  BP: 135/45 (31 Mar 2021 16:00) (98/48 - 179/62)  BP(mean): 62 (31 Mar 2021 16:00) (58 - 87)  ABP: 167/53 (2021 07:00) (125/50 - 173/55)  ABP(mean): 93 (2021 07:00) (52 - 98)  RR: 23 (2021 07:00) (11 - 33)  SpO2: 99% (2021 07:00) (91% - 100%)             @ 07:01  -  - @ 07:00  --------------------------------------------------------  IN: 3900 mL / OUT: 855 mL / NET: 3045 mL            PHYSICAL EXAM:    GENERAL: minimal pain , pr awake and talking  HEAD:  Atraumatic, Normocephalic  EYES: EOMI, PERRLA, conjunctiva and sclera clear  ENMT: No tonsillar erythema, exudates, or enlargement; Moist mucous membranes, Good dentition, No lesions  NECK: Supple, No JVD, Normal thyroid  NERVOUS SYSTEM:  Alert & Oriented ,   CHEST/LUNG: Clear to percussion bilaterally; No rales, rhonchi, wheezing, or rubs  HEART: Regular rate and rhythm; No murmurs, rubs, or gallops  ABDOMEN: Mild diffuse abdominal tenderness. BAILEY drain on right lower quadrant noted.   EXTREMITIES:  2+ Peripheral Pulses, No clubbing, cyanosis, or edema  SKIN: No rashes or lesions        LABS:  CBC Full  -  ( 2021 05:33 )  WBC Count : 7.56 K/uL  RBC Count : 3.49 M/uL  Hemoglobin : 9.6 g/dL  Hematocrit : 29.9 %  Platelet Count - Automated : 243 K/uL  Mean Cell Volume : 85.7 fl  Mean Cell Hemoglobin : 27.5 pg  Mean Cell Hemoglobin Concentration : 32.1 gm/dL  Auto Neutrophil # : 5.06 K/uL  Auto Lymphocyte # : 1.46 K/uL  Auto Monocyte # : 0.71 K/uL  Auto Eosinophil # : 0.27 K/uL  Auto Basophil # : 0.03 K/uL  Auto Neutrophil % : 66.9 %  Auto Lymphocyte % : 19.3 %  Auto Monocyte % : 9.4 %  Auto Eosinophil % : 3.6 %  Auto Basophil % : 0.4 %        139  |  109<H>  |  11  ----------------------------<  126<H>  4.6   |  23  |  0.91    Ca    8.1<L>      2021 05:33  Phos  2.3       Mg     1.6         TPro  5.8<L>  /  Alb  2.5<L>  /  TBili  0.2  /  DBili  x   /  AST  57<H>  /  ALT  39  /  AlkPhos  52  -01    PT/INR - ( 31 Mar 2021 05:42 )   PT: 13.1 sec;   INR: 1.11 ratio         PTT - ( 31 Mar 2021 05:42 )  PTT:34.4 sec  Urinalysis Basic - ( 31 Mar 2021 13:01 )    Color: Yellow / Appearance: Clear / S.020 / pH: x  Gluc: x / Ketone: Trace  / Bili: Negative / Urobili: Negative   Blood: x / Protein: Negative / Nitrite: Negative   Leuk Esterase: Negative / RBC: x / WBC x   Sq Epi: x / Non Sq Epi: x / Bacteria: x      CRITICAL CARE TIME SPENT: 35 minutes

## 2021-04-01 NOTE — PROGRESS NOTE ADULT - PROBLEM SELECTOR PLAN 1
Pt with generalized abdominal pain which is somatic and neuropathic in nature due to lap colon resection 3/31, POD #1.    High risk medications reviewed. Avoid polypharmacy. Avoid NSAIDs and benzodiazepines. Non-pharmacological sleep aides initiated. Non-opioid medications and non-pharmacological pain management measures initiated.   Opioid pain recommendations   - Continue Dilaudid 0.5mg IV q4h  PRN severe pain. Monitor for sedation/ respiratory depression.   Non-opioid pain recommendations   - Renewed Acetaminophen 1 gram IV q 6 hours for 24 hours only. Monitor LFTs  Bowel Regimen  - Recommend Miralax 17G PO daily once tolerating PO  - Recommend Senna 2 tablets at bedtime for constipation once tolerating PO  Mild pain   - Non-pharmacological pain treatment recommendations  - Warm/ Cool packs PRN   - Repositioning, imagery, relaxation, distraction.  - Physical therapy OOB if no contraindications   Recommendations discussed with primary team and RN.

## 2021-04-01 NOTE — PROGRESS NOTE ADULT - SUBJECTIVE AND OBJECTIVE BOX
CHIEF COMPLAINT:Patient is a 81y old  Female who presents with a chief complaint of symptomatic anemia.Pt appears comfortable.    	  REVIEW OF SYSTEMS:  CONSTITUTIONAL: No fever, weight loss, or fatigue  EYES: No eye pain, visual disturbances, or discharge  ENT:  No difficulty hearing, tinnitus, vertigo; No sinus or throat pain  NECK: No pain or stiffness  RESPIRATORY: No cough, wheezing, chills or hemoptysis; No Shortness of Breath  CARDIOVASCULAR: No chest pain, palpitations, passing out, dizziness, or leg swelling  GASTROINTESTINAL: No abdominal or epigastric pain. No nausea, vomiting, or hematemesis; No diarrhea or constipation. No melena or hematochezia.  GENITOURINARY: No dysuria, frequency, hematuria, or incontinence  NEUROLOGICAL: No headaches, memory loss, loss of strength, numbness, or tremors  SKIN: No itching, burning, rashes, or lesions   LYMPH Nodes: No enlarged glands  ENDOCRINE: No heat or cold intolerance; No hair loss  MUSCULOSKELETAL: No joint pain or swelling; No muscle, back, or extremity pain  PSYCHIATRIC: No depression, anxiety, mood swings, or difficulty sleeping  HEME/LYMPH: No easy bruising, or bleeding gums  ALLERGY AND IMMUNOLOGIC: No hives or eczema	        PHYSICAL EXAM:  T(C): 36.8 (04-01-21 @ 08:00), Max: 37.2 (04-01-21 @ 00:00)  HR: 73 (04-01-21 @ 10:00) (60 - 81)  BP: 174/37 (04-01-21 @ 10:00) (98/48 - 174/37)  RR: 29 (04-01-21 @ 09:00) (11 - 33)  SpO2: 99% (04-01-21 @ 10:00) (91% - 100%)  Wt(kg): --  I&O's Summary    31 Mar 2021 07:01  -  01 Apr 2021 07:00  --------------------------------------------------------  IN: 3900 mL / OUT: 855 mL / NET: 3045 mL        Appearance: Normal	  HEENT:   Normal oral mucosa, PERRL, EOMI	  Lymphatic: No lymphadenopathy  Cardiovascular: Normal S1 S2, No JVD, No murmurs, No edema  Respiratory: Lungs clear to auscultation	  Psychiatry: A & O x 3, Mood & affect appropriate  Gastrointestinal:  Soft, Non-tender  Skin: No rashes, No ecchymoses, No cyanosis	  Neurologic: Non-focal  Extremities: Normal range of motion, No clubbing, cyanosis or edema  Vascular: Peripheral pulses palpable 2+ bilaterally    MEDICATIONS  (STANDING):  acetaminophen  IVPB .. 1000 milliGRAM(s) IV Intermittent once  acetaminophen  IVPB .. 1000 milliGRAM(s) IV Intermittent once  ascorbic acid 2000 milliGRAM(s) Oral every 8 hours  aspirin  chewable 81 milliGRAM(s) Oral daily  atorvastatin 40 milliGRAM(s) Oral at bedtime  budesonide 160 MICROgram(s)/formoterol 4.5 MICROgram(s) Inhaler 2 Puff(s) Inhalation two times a day  carvedilol 6.25 milliGRAM(s) Oral every 12 hours  cefTRIAXone   IVPB      cefTRIAXone   IVPB 1000 milliGRAM(s) IV Intermittent every 24 hours  cholecalciferol 1000 Unit(s) Oral daily  dronedarone 400 milliGRAM(s) Oral two times a day  fluticasone propionate 50 MICROgram(s)/spray Nasal Spray 1 Spray(s) Both Nostrils every 12 hours  gabapentin 300 milliGRAM(s) Oral daily  heparin   Injectable 5000 Unit(s) SubCutaneous every 8 hours  influenza   Vaccine 0.5 milliLiter(s) IntraMuscular once  insulin glargine Injectable (LANTUS) 10 Unit(s) SubCutaneous at bedtime  insulin lispro (ADMELOG) corrective regimen sliding scale   SubCutaneous every 6 hours  lactated ringers. 1000 milliLiter(s) (150 mL/Hr) IV Continuous <Continuous>  levothyroxine Injectable 130 MICROGram(s) IV Push at bedtime  metoprolol tartrate Injectable 5 milliGRAM(s) IV Push every 6 hours  metroNIDAZOLE  IVPB 500 milliGRAM(s) IV Intermittent every 8 hours  montelukast 10 milliGRAM(s) Oral daily  oxybutynin 5 milliGRAM(s) Oral two times a day  pantoprazole  Injectable 40 milliGRAM(s) IV Push daily  zinc sulfate 220 milliGRAM(s) Oral daily      TELEMETRY: nsr,intermittent v paced	    	  	  LABS:	 	                        9.6    7.56  )-----------( 243      ( 01 Apr 2021 05:33 )             29.9     04-01    139  |  109<H>  |  11  ----------------------------<  126<H>  4.6   |  23  |  0.91    Ca    8.1<L>      01 Apr 2021 05:33  Phos  2.3     04-01  Mg     1.6     04-01    TPro  5.8<L>  /  Alb  2.5<L>  /  TBili  0.2  /  DBili  x   /  AST  57<H>  /  ALT  39  /  AlkPhos  52  04-01    proBNP: Serum Pro-Brain Natriuretic Peptide: 1155 pg/mL (03-21 @ 07:15)    Lipid Profile: Cholesterol 134    HDL 54  TG 82    Cholesterol 145  HDL 55  TG 86    TSH: Thyroid Stimulating Hormone, Serum: 2.67 uU/mL (03-22 @ 07:34)  Thyroid Stimulating Hormone, Serum: 3.61 uU/mL (03-21 @ 07:15)

## 2021-04-01 NOTE — PROGRESS NOTE ADULT - ASSESSMENT
· Assessment	  79 year old lady presented with weakness and anemia.  colonoscopy showed several large polyps.  The ascending colon polyp is cancerous, involving margin    1. cancer at polyp, involving the margin   surgery was done.  await path    2, anemia, will give venofer  transfuse before surgery    3. Afib on xarelto  will hold xarelto until surgery is done    4. a mobile lump at right knee since a fall 2 years ago  will do a US to see this a cyst or mass

## 2021-04-01 NOTE — PROGRESS NOTE ADULT - ASSESSMENT
79 years old Turkmen-speaking female from home, ambulates with walker, lives with son, with PMHx of HFpEF, CAD (s/p stents), chronic venous stasis, DM, HTN and Afib (on Xarelto, s/p PPM), chronic bronchitis with asthmatic component ( on Oxygen PRN at home ), JOSE DANIEL ( supposed to be on nocturnal CPAP , but non compliant) presents to the ED with chief complaint of hypotension , SOB and  fatigue for almost 2 weeks.,symptomatic Fe def anemia with Colon ca-s/p resection.  1.ICU monitoring.  2.NPO.  3.IVF.  4.HTN-IV lopressor 5mg IV q6H.  5.GI and DVT prophylaxis.

## 2021-04-01 NOTE — PROGRESS NOTE ADULT - ASSESSMENT
· Assessment	  78 y/o  [place]ib[place]ian-speaking female  with PMH  of HFpEF, CAD (s/p stents), chronic venous stasis, DM, HTN and Afib (on Xarelto, s/p PPM), chronic bronchitis with asthmatic component ( on Oxygen PRN at home ), JOSE DANIEL ( supposed to be on nocturnal CPAP , but non compliant) presented to the ED with chief complaint of hypotension ,  SOB and  fatigue for almost 2 weeks.   Pt was admitted to medicine for symptomatic anemia. Colonoscopy showed colon mass. Biopsy result showed invasive adenocarcinoma of colon. Pt underwent laparoscopic right hemicolectomy today. POD # 0  Transferred to ICU for post operative care.    Assessment  1. S/P Right Hemicolectomy   2. Ascending colon invasive adenocarcinoma  3. Symptomatic anemia  4. Afib   5. Diabetes Mellitus  6. History of Asthma  7. Hypertension  8.History of coronary artery disease  9.JOSE DANIEL  10. Obesity   11. H. Pylori infection     Plan:      NEURO;  - Pt is awake and alert  -No acute issues  -IV Dilaudid for abdominal pain    CARDIOVASCULAR  #Afib   -Hold  full dose anticoagulation heparin drip as per cardio  -Resume carvedilol when diet is resumed  -Continue telemonitoring    # History of Hypertension  -Resume Multaq and Coreg after resuming diet  -Monitor blood pressure      PULMONARY  # No acute issues  -Pt is breathing well on ambient air    GASTROINTESTINAL  # Ascending colon invasive adenocarcinoma  -S/P Laparoscopic right hemicolectomy   -POD#1  - Pain management   -Continue IV fluids  -Keep NPO till bowel function returns as per surgery  -Protonix for GI prophylaxis    RENAL  # SRIDHAR  resolved  -Likely pre renal  -Continue IV fluids  -Monitor BMP avoid nephrotoxic medications    INFECTIOUS DISEASE  # H.Pylori infection  -Pt is on iv metronidazole and amoxicillin fro H. Pylori infection  ID:      ENDOCRINE  #DM  -Continue Lantus 10 U at bedtime  and moderate sliding scale  -Monitor blood glucose    HEME  # Anemia  -Secondary to GI Blood loss  -S/P 3 U PRBC so far  -Monitor CBC , pt/inr      -Monitor, Replete to K>4 and Mg>2  -Diet: NPO    PROPHYLAXIS  -DVT:  Heparin s/c for DVT prophylaxis   -GI: Protonix for GI prophylaxis    DISPO: Transferred to ICU    CODE STATUS: Full CODE   · Assessment	  80 y/o  Maldivian-speaking female  with PMH  of HFpEF, CAD (s/p stents), chronic venous stasis, DM, HTN and Afib (on Xarelto, s/p PPM), chronic bronchitis with asthmatic component ( on Oxygen PRN at home ), JOSE DANIEL ( supposed to be on nocturnal CPAP , but non compliant) presented to the ED with chief complaint of hypotension ,  SOB and  fatigue for almost 2 weeks.   Pt was admitted to medicine for symptomatic anemia. Colonoscopy showed colon mass. Biopsy result showed invasive adenocarcinoma of colon. Pt underwent laparoscopic right hemicolectomy today. POD # 0  Transferred to ICU for post operative care.    Assessment  1. S/P Right Hemicolectomy   2. Ascending colon invasive adenocarcinoma  3. Symptomatic anemia  4. Afib   5. Diabetes Mellitus  6. History of Asthma  7. Hypertension  8.History of coronary artery disease  9.JOSE DANIEL  10. Obesity   11. H. Pylori infection     Plan:      NEURO;  - Pt is awake and alert  -No acute issues  -IV Dilaudid for abdominal pain    CARDIOVASCULAR  #Afib   -Hold  full dose anticoagulation heparin drip as per cardio as pt in NOrmal sinus , will resume oral meds after started on diet  -Resume carvedilol when diet is resumed  -Continue telemonitoring    # History of Hypertension  -Resume Multaq and Coreg after resuming diet  for now started on metoprolol 5mg iv pushes standing with parameters  -Monitor blood pressure      PULMONARY  # No acute issues  -Pt is breathing well on ambient air    GASTROINTESTINAL  # Ascending colon invasive adenocarcinoma  -S/P Laparoscopic right hemicolectomy   -POD#1  - Pain management   -Continue IV fluids  -Keep NPO till bowel function returns as per surgery  -Protonix for GI prophylaxis    RENAL  # SRIDHAR  resolved  -Likely pre renal  -Continue IV fluids  -Monitor BMP avoid nephrotoxic medications    INFECTIOUS DISEASE  # H.Pylori infection  -Pt is on iv metronidazole and amoxicillin fro H. Pylori infection  ID:      ENDOCRINE  #DM  -Continue Lantus 10 U at bedtime  and moderate sliding scale  -Monitor blood glucose    HEME  # Anemia  -Secondary to GI Blood loss  -S/P 3 U PRBC so far  -Monitor CBC , pt/inr      -Monitor, Replete to K>4 and Mg>2  -Diet: NPO    PROPHYLAXIS  -DVT:  Heparin s/c for DVT prophylaxis   -GI: Protonix for GI prophylaxis    DISPO: Transferred to ICU    CODE STATUS: Full CODE

## 2021-04-01 NOTE — PROGRESS NOTE ADULT - SUBJECTIVE AND OBJECTIVE BOX
[ X  ] ICU                                          [   ] CCU                                      [   ] Medical Floor    Patient is a 81 year old female with Gastrointestinal bleeding and symptomatic anemia. GI consulted to evaluate.        79 years old female from home, ambulates with walker, lives with son, with past medical history significant for CAD (s/p stents), CHF, chronic venous stasis, DM, HTN, Afib (on Xarelto, s/p PPM), chronic bronchitis with asthmatic component ( on Oxygen PRN at home ), and osteoarthritis presented to the emergency room with 2 weeks history of worsening SOB, Fatigue, associated with a few episodes of black stool.    pt had virtual  colonoscopy 1 year ago and the result was questionable. She never followed up.  Patient also c/o poor appetite with chronic diarrhea but denies abdominal pain, nausea, vomiting, hematemesis, hematochezia, melena, fever, chills, chest pain, SOB, cough, hematuria, dysuria or diarrhea.       Today patient post lap R hemicolectomy monitored in ICU. Patient appears comfortable. No abdominal pain, N/V, hematemesis, hematochezia, melena, fever, chills, chest pain, SOB, cough or diarrhea reported.      PAIN MANAGEMENT:  Pain Scale:                 0/10  Pain Location:        Prior Colonoscopy:  No prior colonoscopy      PAST MEDICAL HISTORY  Obesity  Atrial fibrillation  Hypothyroidism  Venous stasis  Irritable bowel syndrome   CHF    Hyperlipidemia   Hypertension  DM  Myocardial Infarction  CAD    Asthma      PAST SURGICAL HISTORY  Coronary angiogram  Coronary stent placement  Pacemaker placement       Allergies    No Known Allergies    Intolerances  None        SOCIAL HISTORY  Advanced Directives:       [ X ] Full Code       [  ] DNR  Marital Status:         [  ] M      [ X ] S      [  ] D       [  ] W  Children:       [ X ] Yes      [  ] No  Occupation:        [  ] Employed       [ X ] Unemployed       [  ] Retired  Diet:       [ X Regular       [  ] PEG feeding          [  ] NG tube feeding  Drug Use:           [ X ] Patient denied          [  ] Yes  Alcohol:           [X] No             [  ] Yes (socially)         [  ] Yes (chronic)  Tobacco:           [  ] Yes           [ X ] No      FAMILY HISTORY  [ X ] Heart Disease            [ X ] Diabetes             [ X ] HTN             [  ] Colon Cancer             [  ] Stomach Cancer              [  ] Pancreatic Cancer      VITALS  Vital Signs Last 24 Hrs  T(C): 36.8 (01 Apr 2021 08:00), Max: 37.2 (01 Apr 2021 00:00)  T(F): 98.3 (01 Apr 2021 08:00), Max: 99 (01 Apr 2021 00:00)  HR: 61 (01 Apr 2021 13:00) (60 - 81)  BP: 153/62 (01 Apr 2021 13:00) (107/49 - 174/37)  BP(mean): 86 (01 Apr 2021 13:00) (62 - 86)  RR: 16 (01 Apr 2021 13:00) (11 - 33)  SpO2: 99% (01 Apr 2021 13:00) (91% - 100%)       MEDICATIONS  (STANDING):  acetaminophen  IVPB .. 1000 milliGRAM(s) IV Intermittent once  acetaminophen  IVPB .. 1000 milliGRAM(s) IV Intermittent once  ascorbic acid 2000 milliGRAM(s) Oral every 8 hours  aspirin  chewable 81 milliGRAM(s) Oral daily  atorvastatin 40 milliGRAM(s) Oral at bedtime  budesonide 160 MICROgram(s)/formoterol 4.5 MICROgram(s) Inhaler 2 Puff(s) Inhalation two times a day  carvedilol 6.25 milliGRAM(s) Oral every 12 hours  cefTRIAXone   IVPB      cefTRIAXone   IVPB 1000 milliGRAM(s) IV Intermittent every 24 hours  cholecalciferol 1000 Unit(s) Oral daily  dronedarone 400 milliGRAM(s) Oral two times a day  fluticasone propionate 50 MICROgram(s)/spray Nasal Spray 1 Spray(s) Both Nostrils every 12 hours  gabapentin 300 milliGRAM(s) Oral daily  heparin   Injectable 5000 Unit(s) SubCutaneous every 8 hours  influenza   Vaccine 0.5 milliLiter(s) IntraMuscular once  insulin glargine Injectable (LANTUS) 10 Unit(s) SubCutaneous at bedtime  insulin lispro (ADMELOG) corrective regimen sliding scale   SubCutaneous every 6 hours  levothyroxine Injectable 130 MICROGram(s) IV Push at bedtime  metoprolol tartrate Injectable 5 milliGRAM(s) IV Push every 6 hours  metroNIDAZOLE  IVPB 500 milliGRAM(s) IV Intermittent every 8 hours  montelukast 10 milliGRAM(s) Oral daily  oxybutynin 5 milliGRAM(s) Oral two times a day  pantoprazole  Injectable 40 milliGRAM(s) IV Push daily  zinc sulfate 220 milliGRAM(s) Oral daily    MEDICATIONS  (PRN):  hydrALAZINE Injectable 5 milliGRAM(s) IV Push every 6 hours PRN sbp > 150  HYDROmorphone  Injectable 0.5 milliGRAM(s) IV Push every 4 hours PRN Severe Pain (7 - 10)  ondansetron Injectable 4 milliGRAM(s) IV Push every 6 hours PRN Nausea                            9.6    7.56  )-----------( 243      ( 01 Apr 2021 05:33 )             29.9       04-01    139  |  109<H>  |  11  ----------------------------<  126<H>  4.6   |  23  |  0.91    Ca    8.1<L>      01 Apr 2021 05:33  Phos  2.3     04-01  Mg     1.6     04-01    TPro  5.8<L>  /  Alb  2.5<L>  /  TBili  0.2  /  DBili  x   /  AST  57<H>  /  ALT  39  /  AlkPhos  52  04-01      PT/INR - ( 31 Mar 2021 05:42 )   PT: 13.1 sec;   INR: 1.11 ratio         PTT - ( 31 Mar 2021 05:42 )  PTT:34.4 sec [ X  ] ICU                                          [   ] CCU                                      [   ] Medical Floor    Patient is a 81 year old female with Gastrointestinal bleeding and symptomatic anemia. GI consulted to evaluate.        81 years old female from home, ambulates with walker, lives with son, with past medical history significant for CAD (s/p stents), CHF, chronic venous stasis, DM, HTN, Afib (on Xarelto, s/p PPM), chronic bronchitis with asthmatic component ( on Oxygen PRN at home ), and osteoarthritis presented to the emergency room with 2 weeks history of worsening SOB, Fatigue, associated with a few episodes of black stool.    pt had virtual  colonoscopy 1 year ago and the result was questionable. She never followed up.  Patient also c/o poor appetite with chronic diarrhea but denies abdominal pain, nausea, vomiting, hematemesis, hematochezia, melena, fever, chills, chest pain, SOB, cough, hematuria, dysuria or diarrhea.       Today patient post lap R hemicolectomy monitored in ICU. Patient appears comfortable. No abdominal pain, N/V, hematemesis, hematochezia, melena, fever, chills, chest pain, SOB, cough or diarrhea reported.      PAIN MANAGEMENT:  Pain Scale:                 0/10  Pain Location:        Prior Colonoscopy:  No prior colonoscopy      PAST MEDICAL HISTORY  Obesity  Atrial fibrillation  Hypothyroidism  Venous stasis  Irritable bowel syndrome   CHF    Hyperlipidemia   Hypertension  DM  Myocardial Infarction  CAD    Asthma      PAST SURGICAL HISTORY  Coronary angiogram  Coronary stent placement  Pacemaker placement       Allergies    No Known Allergies    Intolerances  None        SOCIAL HISTORY  Advanced Directives:       [ X ] Full Code       [  ] DNR  Marital Status:         [  ] M      [ X ] S      [  ] D       [  ] W  Children:       [ X ] Yes      [  ] No  Occupation:        [  ] Employed       [ X ] Unemployed       [  ] Retired  Diet:       [ X Regular       [  ] PEG feeding          [  ] NG tube feeding  Drug Use:           [ X ] Patient denied          [  ] Yes  Alcohol:           [X] No             [  ] Yes (socially)         [  ] Yes (chronic)  Tobacco:           [  ] Yes           [ X ] No      FAMILY HISTORY  [ X ] Heart Disease            [ X ] Diabetes             [ X ] HTN             [  ] Colon Cancer             [  ] Stomach Cancer              [  ] Pancreatic Cancer      VITALS  Vital Signs Last 24 Hrs  T(C): 36.8 (01 Apr 2021 08:00), Max: 37.2 (01 Apr 2021 00:00)  T(F): 98.3 (01 Apr 2021 08:00), Max: 99 (01 Apr 2021 00:00)  HR: 61 (01 Apr 2021 13:00) (60 - 81)  BP: 153/62 (01 Apr 2021 13:00) (107/49 - 174/37)  BP(mean): 86 (01 Apr 2021 13:00) (62 - 86)  RR: 16 (01 Apr 2021 13:00) (11 - 33)  SpO2: 99% (01 Apr 2021 13:00) (91% - 100%)       MEDICATIONS  (STANDING):  acetaminophen  IVPB .. 1000 milliGRAM(s) IV Intermittent once  acetaminophen  IVPB .. 1000 milliGRAM(s) IV Intermittent once  ascorbic acid 2000 milliGRAM(s) Oral every 8 hours  aspirin  chewable 81 milliGRAM(s) Oral daily  atorvastatin 40 milliGRAM(s) Oral at bedtime  budesonide 160 MICROgram(s)/formoterol 4.5 MICROgram(s) Inhaler 2 Puff(s) Inhalation two times a day  carvedilol 6.25 milliGRAM(s) Oral every 12 hours  cefTRIAXone   IVPB      cefTRIAXone   IVPB 1000 milliGRAM(s) IV Intermittent every 24 hours  cholecalciferol 1000 Unit(s) Oral daily  dronedarone 400 milliGRAM(s) Oral two times a day  fluticasone propionate 50 MICROgram(s)/spray Nasal Spray 1 Spray(s) Both Nostrils every 12 hours  gabapentin 300 milliGRAM(s) Oral daily  heparin   Injectable 5000 Unit(s) SubCutaneous every 8 hours  influenza   Vaccine 0.5 milliLiter(s) IntraMuscular once  insulin glargine Injectable (LANTUS) 10 Unit(s) SubCutaneous at bedtime  insulin lispro (ADMELOG) corrective regimen sliding scale   SubCutaneous every 6 hours  levothyroxine Injectable 130 MICROGram(s) IV Push at bedtime  metoprolol tartrate Injectable 5 milliGRAM(s) IV Push every 6 hours  metroNIDAZOLE  IVPB 500 milliGRAM(s) IV Intermittent every 8 hours  montelukast 10 milliGRAM(s) Oral daily  oxybutynin 5 milliGRAM(s) Oral two times a day  pantoprazole  Injectable 40 milliGRAM(s) IV Push daily  zinc sulfate 220 milliGRAM(s) Oral daily    MEDICATIONS  (PRN):  hydrALAZINE Injectable 5 milliGRAM(s) IV Push every 6 hours PRN sbp > 150  HYDROmorphone  Injectable 0.5 milliGRAM(s) IV Push every 4 hours PRN Severe Pain (7 - 10)  ondansetron Injectable 4 milliGRAM(s) IV Push every 6 hours PRN Nausea                            9.6    7.56  )-----------( 243      ( 01 Apr 2021 05:33 )             29.9       04-01    139  |  109<H>  |  11  ----------------------------<  126<H>  4.6   |  23  |  0.91    Ca    8.1<L>      01 Apr 2021 05:33  Phos  2.3     04-01  Mg     1.6     04-01    TPro  5.8<L>  /  Alb  2.5<L>  /  TBili  0.2  /  DBili  x   /  AST  57<H>  /  ALT  39  /  AlkPhos  52  04-01      PT/INR - ( 31 Mar 2021 05:42 )   PT: 13.1 sec;   INR: 1.11 ratio         PTT - ( 31 Mar 2021 05:42 )  PTT:34.4 sec

## 2021-04-01 NOTE — PROGRESS NOTE ADULT - SUBJECTIVE AND OBJECTIVE BOX
Patient is seen and examined at the bed side, is afebrile. She is doing better. The WBC count and kidney function is normal.       REVIEW OF SYSTEMS: All other review systems are negative      ALLERGIES: No Known Allergies      ICU Vital Signs Last 24 Hrs  T(C): 37 (2021 16:10), Max: 37.2 (2021 00:00)  T(F): 98.6 (2021 16:10), Max: 99 (2021 00:00)  HR: 68 (2021 20:00) (60 - 81)  BP: 153/62 (2021 13:00) (153/62 - 174/37)  BP(mean): 86 (2021 13:00) (86 - 86)  ABP: 185/68 (2021 20:00) (135/33 - 185/68)  ABP(mean): 111 (2021 20:00) (69 - 111)  RR: 17 (2021 20:00) (16 - 33)  SpO2: 96% (2021 20:00) (94% - 100%)        PHYSICAL EXAM:  GENERAL: Not in distress   CHEST/LUNG: Not using accessory muscles   HEART: s1 and s2 present  ABDOMEN:  Incision sites looks clean and RUQ has a drain in placed  EXTREMITIES: No pedal  edema  CNS: Awake and Alert      LABS:                        9.6    7.56  )-----------( 243      ( 2021 05:33 )             29.9                  7.9    8.47  )-----------( 269      ( 26 Mar 2021 15:57 )             25.0                           7.8    13.47 )-----------( 273      ( 25 Mar 2021 06:40 )             24.2       04-01    139  |  109<H>  |  11  ----------------------------<  126<H>  4.6   |  23  |  0.91    Ca    8.1<L>      2021 05:33  Phos  2.3     04-01  Mg     1.6     04-01    TPro  5.8<L>  /  Alb  2.5<L>  /  TBili  0.2  /  DBili  x   /  AST  57<H>  /  ALT  39  /  AlkPhos  52  04-01      03-24    138  |  103  |  35<H>  ----------------------------<  151<H>  4.0   |  25  |  1.57<H>    Ca    8.4      24 Mar 2021 07:00  Phos  3.9     03-24  Mg     2.2     -24    TPro  6.7  /  Alb  3.1<L>  /  TBili  0.3  /  DBili  x   /  AST  16  /  ALT  22  /  AlkPhos  71  03-24      CAPILLARY BLOOD GLUCOSE  POCT Blood Glucose.: 262 mg/dL (22 Mar 2021 16:34)  POCT Blood Glucose.: 150 mg/dL (22 Mar 2021 12:45)  POCT Blood Glucose.: 147 mg/dL (22 Mar 2021 10:56)  POCT Blood Glucose.: 196 mg/dL (22 Mar 2021 07:40      Urinalysis Basic - ( 20 Mar 2021 20:54 )  Color: Yellow / Appearance: Clear / S.015 / pH: x  Gluc: x / Ketone: Negative  / Bili: Negative / Urobili: Negative   Blood: x / Protein: Negative / Nitrite: Negative   Leuk Esterase: Negative / RBC: x / WBC x   Sq Epi: x / Non Sq Epi: x / Bacteria: x        MEDICATIONS  (STANDING):    acetaminophen  IVPB .. 1000 milliGRAM(s) IV Intermittent once  ascorbic acid 2000 milliGRAM(s) Oral every 8 hours  aspirin  chewable 81 milliGRAM(s) Oral daily  atorvastatin 40 milliGRAM(s) Oral at bedtime  budesonide 160 MICROgram(s)/formoterol 4.5 MICROgram(s) Inhaler 2 Puff(s) Inhalation two times a day  carvedilol 6.25 milliGRAM(s) Oral every 12 hours  cefTRIAXone   IVPB      cefTRIAXone   IVPB 1000 milliGRAM(s) IV Intermittent every 24 hours  cholecalciferol 1000 Unit(s) Oral daily  dronedarone 400 milliGRAM(s) Oral two times a day  fluticasone propionate 50 MICROgram(s)/spray Nasal Spray 1 Spray(s) Both Nostrils every 12 hours  gabapentin 300 milliGRAM(s) Oral daily  heparin   Injectable 5000 Unit(s) SubCutaneous every 8 hours  influenza   Vaccine 0.5 milliLiter(s) IntraMuscular once  insulin glargine Injectable (LANTUS) 10 Unit(s) SubCutaneous at bedtime  insulin lispro (ADMELOG) corrective regimen sliding scale   SubCutaneous every 6 hours  levothyroxine Injectable 130 MICROGram(s) IV Push at bedtime  metoprolol tartrate Injectable 5 milliGRAM(s) IV Push every 6 hours  metroNIDAZOLE  IVPB 500 milliGRAM(s) IV Intermittent every 8 hours  montelukast 10 milliGRAM(s) Oral daily  oxybutynin 5 milliGRAM(s) Oral two times a day  pantoprazole  Injectable 40 milliGRAM(s) IV Push daily  zinc sulfate 220 milliGRAM(s) Oral daily        RADIOLOGY & ADDITIONAL TESTS:    3/29/21 : Xray Abdomen 2 Views (21 @ 19:50) : No evidence of free air. Right surgical clips as noted.    < from: 12 Lead ECG (21 @ 16:02) >  Ventricular Rate 73 BPM    Atrial Rate 73 BPM    P-R Interval 188 ms    QRS Duration 126 ms    Q-T Interval 414 ms    QTC Calculation(Bazett) 456 ms    P Axis 59 degrees    R Axis -57 degrees    T Axis 56 degrees    < end of copied text >    Surgical Pathology Report (21 @ 10:15)   Surgical Pathology Report:   ACCESSION No: [id number]   KWAME MOSQUERA 2   Surgical Final Report   Final Diagnosis   1. Ascending colon polyp; hot snare:   - Invasive colonic adenocarcinoma arising in association with   tubular adenoma. See comment.   - Fragments of tubular adenoma (the smallest polyps).   2. Hepatic flexure polyp; biopsy:   - Tubular adenoma.   3. Descending colon polyp; biopsy:   - Inflammatory polyps.   Verified by: Priscila Arana MD   (Electronic Signature)   Reported on: 21 11:11 EDT, [facility],     Surgical Pathology Report (21 @ 11:37)   Surgical Pathology Report: , ACCESSION No: [id number]   KWAME MOSQUERA 2   Surgical Final Report , Final Diagnosis   1. Gastric antrum, biopsy: Chronic active gastritis, diffuse,   with focal lymphoid aggregate; H. Pylori associated. (Special  stain for H. Pylori is positive)   2. Esophagus biopsy: Patchy acute esophagitis. Special stain for  fungi is negative.   Verified by: Mariam Perez M.D. Surgical Pathology Report (21 @ 11:37)     3/28/21 : Xray Knee 3 Views, Right (21 @ 14:20) >    IMPRESSION: Round mass anterior to the patella is noted. This is new since 2018.      3/20/21 : CT Angio Abdomen and Pelvis w/ IV Cont (21 @ 22:55) No CT evidence of acute gastrointestinal hemorrhage. Few scattered colonic diverticula.    3/20/21 : CT Angio Abdomen and Pelvis w/ IV Cont (21 @ 22:55) LOWER CHEST: Partially imaged pacemaker leads. Mitral annular calcification.    3/20/21 : Xray Chest 1 View- PORTABLE-Urgent (21 @ 16:38): There is left-sided defibrillator. The heart is normal in size. The lungs are clear.          MICROBIOLOGY DATA:    COVID-19 Drew Domain Antibody (21 @ 11:07)   COVID-19 Drew Domain Antibody Result: >250.00:    Culture - Blood (21 @ 21:50)   Specimen Source: .Blood Blood-Venous   Culture Results: No growth to date.     Culture - Blood (21 @ 21:50)   Specimen Source: .Blood Blood-Venous   Culture Results: No growth to date.     COVID-19 PCR . (21 @ 16:32)   COVID-19 PCR: NotDetec:     MRSA/MSSA PCR (20 @ 14:34)   MRSA PCR Result.: NotDetec:     FLU A B RSV Detection by PCR (20 @ 20:00)   Flu A Result: NotDetec

## 2021-04-02 LAB
ALBUMIN SERPL ELPH-MCNC: 2.4 G/DL — LOW (ref 3.5–5)
ALBUMIN SERPL ELPH-MCNC: 2.9 G/DL — LOW (ref 3.5–5)
ALP SERPL-CCNC: 55 U/L — SIGNIFICANT CHANGE UP (ref 40–120)
ALP SERPL-CCNC: 60 U/L — SIGNIFICANT CHANGE UP (ref 40–120)
ALT FLD-CCNC: 38 U/L DA — SIGNIFICANT CHANGE UP (ref 10–60)
ALT FLD-CCNC: 40 U/L DA — SIGNIFICANT CHANGE UP (ref 10–60)
ANION GAP SERPL CALC-SCNC: 7 MMOL/L — SIGNIFICANT CHANGE UP (ref 5–17)
ANION GAP SERPL CALC-SCNC: 9 MMOL/L — SIGNIFICANT CHANGE UP (ref 5–17)
AST SERPL-CCNC: 30 U/L — SIGNIFICANT CHANGE UP (ref 10–40)
AST SERPL-CCNC: 34 U/L — SIGNIFICANT CHANGE UP (ref 10–40)
BASOPHILS # BLD AUTO: 0.04 K/UL — SIGNIFICANT CHANGE UP (ref 0–0.2)
BASOPHILS NFR BLD AUTO: 0.6 % — SIGNIFICANT CHANGE UP (ref 0–2)
BILIRUB SERPL-MCNC: 0.3 MG/DL — SIGNIFICANT CHANGE UP (ref 0.2–1.2)
BILIRUB SERPL-MCNC: 0.3 MG/DL — SIGNIFICANT CHANGE UP (ref 0.2–1.2)
BUN SERPL-MCNC: 10 MG/DL — SIGNIFICANT CHANGE UP (ref 7–18)
BUN SERPL-MCNC: 11 MG/DL — SIGNIFICANT CHANGE UP (ref 7–18)
CALCIUM SERPL-MCNC: 8.3 MG/DL — LOW (ref 8.4–10.5)
CALCIUM SERPL-MCNC: 8.4 MG/DL — SIGNIFICANT CHANGE UP (ref 8.4–10.5)
CHLORIDE SERPL-SCNC: 104 MMOL/L — SIGNIFICANT CHANGE UP (ref 96–108)
CHLORIDE SERPL-SCNC: 106 MMOL/L — SIGNIFICANT CHANGE UP (ref 96–108)
CO2 SERPL-SCNC: 21 MMOL/L — LOW (ref 22–31)
CO2 SERPL-SCNC: 24 MMOL/L — SIGNIFICANT CHANGE UP (ref 22–31)
CREAT SERPL-MCNC: 0.81 MG/DL — SIGNIFICANT CHANGE UP (ref 0.5–1.3)
CREAT SERPL-MCNC: 0.91 MG/DL — SIGNIFICANT CHANGE UP (ref 0.5–1.3)
EOSINOPHIL # BLD AUTO: 0.18 K/UL — SIGNIFICANT CHANGE UP (ref 0–0.5)
EOSINOPHIL NFR BLD AUTO: 2.5 % — SIGNIFICANT CHANGE UP (ref 0–6)
GLUCOSE BLDC GLUCOMTR-MCNC: 128 MG/DL — HIGH (ref 70–99)
GLUCOSE BLDC GLUCOMTR-MCNC: 155 MG/DL — HIGH (ref 70–99)
GLUCOSE BLDC GLUCOMTR-MCNC: 183 MG/DL — HIGH (ref 70–99)
GLUCOSE BLDC GLUCOMTR-MCNC: 197 MG/DL — HIGH (ref 70–99)
GLUCOSE SERPL-MCNC: 138 MG/DL — HIGH (ref 70–99)
GLUCOSE SERPL-MCNC: 191 MG/DL — HIGH (ref 70–99)
HCT VFR BLD CALC: 30.9 % — LOW (ref 34.5–45)
HGB BLD-MCNC: 10 G/DL — LOW (ref 11.5–15.5)
IMM GRANULOCYTES NFR BLD AUTO: 0.3 % — SIGNIFICANT CHANGE UP (ref 0–1.5)
LYMPHOCYTES # BLD AUTO: 1.41 K/UL — SIGNIFICANT CHANGE UP (ref 1–3.3)
LYMPHOCYTES # BLD AUTO: 19.8 % — SIGNIFICANT CHANGE UP (ref 13–44)
MAGNESIUM SERPL-MCNC: 1.8 MG/DL — SIGNIFICANT CHANGE UP (ref 1.6–2.6)
MCHC RBC-ENTMCNC: 27.6 PG — SIGNIFICANT CHANGE UP (ref 27–34)
MCHC RBC-ENTMCNC: 32.4 GM/DL — SIGNIFICANT CHANGE UP (ref 32–36)
MCV RBC AUTO: 85.4 FL — SIGNIFICANT CHANGE UP (ref 80–100)
MONOCYTES # BLD AUTO: 0.7 K/UL — SIGNIFICANT CHANGE UP (ref 0–0.9)
MONOCYTES NFR BLD AUTO: 9.8 % — SIGNIFICANT CHANGE UP (ref 2–14)
NEUTROPHILS # BLD AUTO: 4.77 K/UL — SIGNIFICANT CHANGE UP (ref 1.8–7.4)
NEUTROPHILS NFR BLD AUTO: 67 % — SIGNIFICANT CHANGE UP (ref 43–77)
NRBC # BLD: 0 /100 WBCS — SIGNIFICANT CHANGE UP (ref 0–0)
PHOSPHATE SERPL-MCNC: 1.8 MG/DL — LOW (ref 2.5–4.5)
PHOSPHATE SERPL-MCNC: 2 MG/DL — LOW (ref 2.5–4.5)
PLATELET # BLD AUTO: 253 K/UL — SIGNIFICANT CHANGE UP (ref 150–400)
POTASSIUM SERPL-MCNC: 4.3 MMOL/L — SIGNIFICANT CHANGE UP (ref 3.5–5.3)
POTASSIUM SERPL-MCNC: 4.7 MMOL/L — SIGNIFICANT CHANGE UP (ref 3.5–5.3)
POTASSIUM SERPL-SCNC: 4.3 MMOL/L — SIGNIFICANT CHANGE UP (ref 3.5–5.3)
POTASSIUM SERPL-SCNC: 4.7 MMOL/L — SIGNIFICANT CHANGE UP (ref 3.5–5.3)
PROT SERPL-MCNC: 6.1 G/DL — SIGNIFICANT CHANGE UP (ref 6–8.3)
PROT SERPL-MCNC: 6.5 G/DL — SIGNIFICANT CHANGE UP (ref 6–8.3)
RBC # BLD: 3.62 M/UL — LOW (ref 3.8–5.2)
RBC # FLD: 16.7 % — HIGH (ref 10.3–14.5)
SODIUM SERPL-SCNC: 134 MMOL/L — LOW (ref 135–145)
SODIUM SERPL-SCNC: 137 MMOL/L — SIGNIFICANT CHANGE UP (ref 135–145)
WBC # BLD: 7.12 K/UL — SIGNIFICANT CHANGE UP (ref 3.8–10.5)
WBC # FLD AUTO: 7.12 K/UL — SIGNIFICANT CHANGE UP (ref 3.8–10.5)

## 2021-04-02 RX ORDER — ALBUTEROL 90 UG/1
2 AEROSOL, METERED ORAL EVERY 6 HOURS
Refills: 0 | Status: DISCONTINUED | OUTPATIENT
Start: 2021-04-02 | End: 2021-04-06

## 2021-04-02 RX ORDER — MAGNESIUM SULFATE 500 MG/ML
1 VIAL (ML) INJECTION ONCE
Refills: 0 | Status: COMPLETED | OUTPATIENT
Start: 2021-04-02 | End: 2021-04-02

## 2021-04-02 RX ORDER — ZOLPIDEM TARTRATE 10 MG/1
5 TABLET ORAL AT BEDTIME
Refills: 0 | Status: DISCONTINUED | OUTPATIENT
Start: 2021-04-02 | End: 2021-04-06

## 2021-04-02 RX ORDER — SODIUM CHLORIDE 9 MG/ML
1000 INJECTION, SOLUTION INTRAVENOUS
Refills: 0 | Status: DISCONTINUED | OUTPATIENT
Start: 2021-04-02 | End: 2021-04-05

## 2021-04-02 RX ORDER — INSULIN LISPRO 100/ML
VIAL (ML) SUBCUTANEOUS
Refills: 0 | Status: DISCONTINUED | OUTPATIENT
Start: 2021-04-02 | End: 2021-04-06

## 2021-04-02 RX ORDER — RIVAROXABAN 15 MG-20MG
20 KIT ORAL DAILY
Refills: 0 | Status: DISCONTINUED | OUTPATIENT
Start: 2021-04-02 | End: 2021-04-06

## 2021-04-02 RX ADMIN — Medication 1000 MILLIGRAM(S): at 03:37

## 2021-04-02 RX ADMIN — RIVAROXABAN 20 MILLIGRAM(S): KIT at 14:36

## 2021-04-02 RX ADMIN — DRONEDARONE 400 MILLIGRAM(S): 400 TABLET, FILM COATED ORAL at 16:40

## 2021-04-02 RX ADMIN — HEPARIN SODIUM 5000 UNIT(S): 5000 INJECTION INTRAVENOUS; SUBCUTANEOUS at 05:10

## 2021-04-02 RX ADMIN — Medication 130 MICROGRAM(S): at 21:47

## 2021-04-02 RX ADMIN — Medication 1000 UNIT(S): at 09:43

## 2021-04-02 RX ADMIN — Medication 5 MILLIGRAM(S): at 05:10

## 2021-04-02 RX ADMIN — Medication 2: at 12:26

## 2021-04-02 RX ADMIN — ZINC SULFATE TAB 220 MG (50 MG ZINC EQUIVALENT) 220 MILLIGRAM(S): 220 (50 ZN) TAB at 09:45

## 2021-04-02 RX ADMIN — Medication 1 SPRAY(S): at 05:10

## 2021-04-02 RX ADMIN — ATORVASTATIN CALCIUM 40 MILLIGRAM(S): 80 TABLET, FILM COATED ORAL at 21:46

## 2021-04-02 RX ADMIN — CEFTRIAXONE 100 MILLIGRAM(S): 500 INJECTION, POWDER, FOR SOLUTION INTRAMUSCULAR; INTRAVENOUS at 17:00

## 2021-04-02 RX ADMIN — ALBUTEROL 2 PUFF(S): 90 AEROSOL, METERED ORAL at 21:47

## 2021-04-02 RX ADMIN — BUDESONIDE AND FORMOTEROL FUMARATE DIHYDRATE 2 PUFF(S): 160; 4.5 AEROSOL RESPIRATORY (INHALATION) at 21:46

## 2021-04-02 RX ADMIN — Medication 400 MILLIGRAM(S): at 03:07

## 2021-04-02 RX ADMIN — Medication 100 MILLIGRAM(S): at 14:36

## 2021-04-02 RX ADMIN — Medication 2: at 21:53

## 2021-04-02 RX ADMIN — Medication 2: at 17:01

## 2021-04-02 RX ADMIN — Medication 400 MILLIGRAM(S): at 09:45

## 2021-04-02 RX ADMIN — MONTELUKAST 10 MILLIGRAM(S): 4 TABLET, CHEWABLE ORAL at 09:48

## 2021-04-02 RX ADMIN — Medication 2000 MILLIGRAM(S): at 21:46

## 2021-04-02 RX ADMIN — INSULIN GLARGINE 10 UNIT(S): 100 INJECTION, SOLUTION SUBCUTANEOUS at 21:48

## 2021-04-02 RX ADMIN — SODIUM CHLORIDE 70 MILLILITER(S): 9 INJECTION, SOLUTION INTRAVENOUS at 09:46

## 2021-04-02 RX ADMIN — CARVEDILOL PHOSPHATE 6.25 MILLIGRAM(S): 80 CAPSULE, EXTENDED RELEASE ORAL at 16:40

## 2021-04-02 RX ADMIN — Medication 1 SPRAY(S): at 16:39

## 2021-04-02 RX ADMIN — Medication 1000 MILLIGRAM(S): at 10:00

## 2021-04-02 RX ADMIN — Medication 100 GRAM(S): at 09:46

## 2021-04-02 RX ADMIN — GABAPENTIN 300 MILLIGRAM(S): 400 CAPSULE ORAL at 09:45

## 2021-04-02 RX ADMIN — Medication 100 MILLIGRAM(S): at 05:09

## 2021-04-02 RX ADMIN — Medication 2000 MILLIGRAM(S): at 09:47

## 2021-04-02 RX ADMIN — Medication 100 MILLIGRAM(S): at 21:47

## 2021-04-02 RX ADMIN — Medication 5 MILLIGRAM(S): at 16:40

## 2021-04-02 RX ADMIN — PANTOPRAZOLE SODIUM 40 MILLIGRAM(S): 20 TABLET, DELAYED RELEASE ORAL at 09:47

## 2021-04-02 RX ADMIN — Medication 5 MILLIGRAM(S): at 05:34

## 2021-04-02 RX ADMIN — Medication 85 MILLIMOLE(S): at 09:46

## 2021-04-02 RX ADMIN — Medication 81 MILLIGRAM(S): at 09:44

## 2021-04-02 RX ADMIN — CARVEDILOL PHOSPHATE 6.25 MILLIGRAM(S): 80 CAPSULE, EXTENDED RELEASE ORAL at 10:00

## 2021-04-02 RX ADMIN — Medication 5 MILLIGRAM(S): at 12:25

## 2021-04-02 RX ADMIN — BUDESONIDE AND FORMOTEROL FUMARATE DIHYDRATE 2 PUFF(S): 160; 4.5 AEROSOL RESPIRATORY (INHALATION) at 09:48

## 2021-04-02 NOTE — PROGRESS NOTE ADULT - SUBJECTIVE AND OBJECTIVE BOX
[  X ] ICU                                          [   ] CCU                                      [   ] Medical Floor    Patient is a 81 year old female with Gastrointestinal bleeding and symptomatic anemia. GI consulted to evaluate.        79 years old female from home, ambulates with walker, lives with son, with past medical history significant for CAD (s/p stents), CHF, chronic venous stasis, DM, HTN, Afib (on Xarelto, s/p PPM), chronic bronchitis with asthmatic component ( on Oxygen PRN at home ), and osteoarthritis presented to the emergency room with 2 weeks history of worsening SOB, Fatigue, associated with a few episodes of black stool.    pt had virtual  colonoscopy 1 year ago and the result was questionable. She never followed up.  Patient also c/o poor appetite with chronic diarrhea but denies abdominal pain, nausea, vomiting, hematemesis, hematochezia, melena, fever, chills, chest pain, SOB, cough, hematuria, dysuria or diarrhea.       Today patient post lap R hemicolectomy monitored in ICU. Patient appears comfortable. No abdominal pain, N/V, hematemesis, hematochezia, melena, fever, chills, chest pain, SOB, cough or diarrhea reported.      PAIN MANAGEMENT:  Pain Scale:                 0/10  Pain Location:        Prior Colonoscopy:  No prior colonoscopy      PAST MEDICAL HISTORY  Obesity  Atrial fibrillation  Hypothyroidism  Venous stasis  Irritable bowel syndrome   CHF    Hyperlipidemia   Hypertension  DM  Myocardial Infarction  CAD    Asthma      PAST SURGICAL HISTORY  Coronary angiogram  Coronary stent placement  Pacemaker placement       Allergies    No Known Allergies    Intolerances  None        SOCIAL HISTORY  Advanced Directives:       [ X ] Full Code       [  ] DNR  Marital Status:         [  ] M      [ X ] S      [  ] D       [  ] W  Children:       [ X ] Yes      [  ] No  Occupation:        [  ] Employed       [ X ] Unemployed       [  ] Retired  Diet:       [ X Regular       [  ] PEG feeding          [  ] NG tube feeding  Drug Use:           [ X ] Patient denied          [  ] Yes  Alcohol:           [X] No             [  ] Yes (socially)         [  ] Yes (chronic)  Tobacco:           [  ] Yes           [ X ] No      FAMILY HISTORY  [ X ] Heart Disease            [ X ] Diabetes             [ X ] HTN             [  ] Colon Cancer             [  ] Stomach Cancer              [  ] Pancreatic Cancer        VITALS  Vital Signs Last 24 Hrs  T(C): 36.7 (02 Apr 2021 15:35), Max: 37.8 (02 Apr 2021 00:00)  T(F): 98 (02 Apr 2021 15:35), Max: 100 (02 Apr 2021 00:00)  HR: 64 (02 Apr 2021 17:00) (62 - 79)  BP: 167/54 (02 Apr 2021 17:00) (140/84 - 209/95)  BP(mean): 84 (02 Apr 2021 17:00) (83 - 137)  RR: 19 (02 Apr 2021 17:00) (17 - 23)  SpO2: 99% (02 Apr 2021 17:00) (94% - 100%)       MEDICATIONS  (STANDING):  ascorbic acid 2000 milliGRAM(s) Oral every 8 hours  atorvastatin 40 milliGRAM(s) Oral at bedtime  budesonide 160 MICROgram(s)/formoterol 4.5 MICROgram(s) Inhaler 2 Puff(s) Inhalation two times a day  carvedilol 6.25 milliGRAM(s) Oral every 12 hours  cefTRIAXone   IVPB      cefTRIAXone   IVPB 1000 milliGRAM(s) IV Intermittent every 24 hours  cholecalciferol 1000 Unit(s) Oral daily  dronedarone 400 milliGRAM(s) Oral two times a day  fluticasone propionate 50 MICROgram(s)/spray Nasal Spray 1 Spray(s) Both Nostrils every 12 hours  gabapentin 300 milliGRAM(s) Oral daily  influenza   Vaccine 0.5 milliLiter(s) IntraMuscular once  insulin glargine Injectable (LANTUS) 10 Unit(s) SubCutaneous at bedtime  insulin lispro (ADMELOG) corrective regimen sliding scale   SubCutaneous every 6 hours  lactated ringers. 1000 milliLiter(s) (70 mL/Hr) IV Continuous <Continuous>  levothyroxine Injectable 130 MICROGram(s) IV Push at bedtime  metroNIDAZOLE  IVPB 500 milliGRAM(s) IV Intermittent every 8 hours  montelukast 10 milliGRAM(s) Oral daily  oxybutynin 5 milliGRAM(s) Oral two times a day  pantoprazole  Injectable 40 milliGRAM(s) IV Push daily  rivaroxaban 20 milliGRAM(s) Oral daily  zinc sulfate 220 milliGRAM(s) Oral daily    MEDICATIONS  (PRN):  hydrALAZINE Injectable 5 milliGRAM(s) IV Push every 6 hours PRN sbp > 150  HYDROmorphone  Injectable 0.5 milliGRAM(s) IV Push every 4 hours PRN Severe Pain (7 - 10)  ondansetron Injectable 4 milliGRAM(s) IV Push every 6 hours PRN Nausea  zolpidem 5 milliGRAM(s) Oral at bedtime PRN Insomnia                            10.0   7.12  )-----------( 253      ( 02 Apr 2021 05:06 )             30.9       04-02    134<L>  |  104  |  11  ----------------------------<  191<H>  4.7   |  21<L>  |  0.91    Ca    8.3<L>      02 Apr 2021 13:41  Phos  1.8     04-02  Mg     1.8     04-02    TPro  6.5  /  Alb  2.9<L>  /  TBili  0.3  /  DBili  x   /  AST  30  /  ALT  40  /  AlkPhos  60  04-02       [  X ] ICU                                          [   ] CCU                                      [   ] Medical Floor    Patient is a 81 year old female with Gastrointestinal bleeding and symptomatic anemia. GI consulted to evaluate.        81 years old female from home, ambulates with walker, lives with son, with past medical history significant for CAD (s/p stents), CHF, chronic venous stasis, DM, HTN, Afib (on Xarelto, s/p PPM), chronic bronchitis with asthmatic component ( on Oxygen PRN at home ), and osteoarthritis presented to the emergency room with 2 weeks history of worsening SOB, Fatigue, associated with a few episodes of black stool.    pt had virtual  colonoscopy 1 year ago and the result was questionable. She never followed up.  Patient also c/o poor appetite with chronic diarrhea but denies abdominal pain, nausea, vomiting, hematemesis, hematochezia, melena, fever, chills, chest pain, SOB, cough, hematuria, dysuria or diarrhea.       Today patient post lap R hemicolectomy monitored in ICU. Patient appears comfortable. No abdominal pain, N/V, hematemesis, hematochezia, melena, fever, chills, chest pain, SOB, cough or diarrhea reported.      PAIN MANAGEMENT:  Pain Scale:                 0/10  Pain Location:        Prior Colonoscopy:  No prior colonoscopy      PAST MEDICAL HISTORY  Obesity  Atrial fibrillation  Hypothyroidism  Venous stasis  Irritable bowel syndrome   CHF    Hyperlipidemia   Hypertension  DM  Myocardial Infarction  CAD    Asthma      PAST SURGICAL HISTORY  Coronary angiogram  Coronary stent placement  Pacemaker placement       Allergies    No Known Allergies    Intolerances  None        SOCIAL HISTORY  Advanced Directives:       [ X ] Full Code       [  ] DNR  Marital Status:         [  ] M      [ X ] S      [  ] D       [  ] W  Children:       [ X ] Yes      [  ] No  Occupation:        [  ] Employed       [ X ] Unemployed       [  ] Retired  Diet:       [ X Regular       [  ] PEG feeding          [  ] NG tube feeding  Drug Use:           [ X ] Patient denied          [  ] Yes  Alcohol:           [X] No             [  ] Yes (socially)         [  ] Yes (chronic)  Tobacco:           [  ] Yes           [ X ] No      FAMILY HISTORY  [ X ] Heart Disease            [ X ] Diabetes             [ X ] HTN             [  ] Colon Cancer             [  ] Stomach Cancer              [  ] Pancreatic Cancer        VITALS  Vital Signs Last 24 Hrs  T(C): 36.7 (02 Apr 2021 15:35), Max: 37.8 (02 Apr 2021 00:00)  T(F): 98 (02 Apr 2021 15:35), Max: 100 (02 Apr 2021 00:00)  HR: 64 (02 Apr 2021 17:00) (62 - 79)  BP: 167/54 (02 Apr 2021 17:00) (140/84 - 209/95)  BP(mean): 84 (02 Apr 2021 17:00) (83 - 137)  RR: 19 (02 Apr 2021 17:00) (17 - 23)  SpO2: 99% (02 Apr 2021 17:00) (94% - 100%)       MEDICATIONS  (STANDING):  ascorbic acid 2000 milliGRAM(s) Oral every 8 hours  atorvastatin 40 milliGRAM(s) Oral at bedtime  budesonide 160 MICROgram(s)/formoterol 4.5 MICROgram(s) Inhaler 2 Puff(s) Inhalation two times a day  carvedilol 6.25 milliGRAM(s) Oral every 12 hours  cefTRIAXone   IVPB      cefTRIAXone   IVPB 1000 milliGRAM(s) IV Intermittent every 24 hours  cholecalciferol 1000 Unit(s) Oral daily  dronedarone 400 milliGRAM(s) Oral two times a day  fluticasone propionate 50 MICROgram(s)/spray Nasal Spray 1 Spray(s) Both Nostrils every 12 hours  gabapentin 300 milliGRAM(s) Oral daily  influenza   Vaccine 0.5 milliLiter(s) IntraMuscular once  insulin glargine Injectable (LANTUS) 10 Unit(s) SubCutaneous at bedtime  insulin lispro (ADMELOG) corrective regimen sliding scale   SubCutaneous every 6 hours  lactated ringers. 1000 milliLiter(s) (70 mL/Hr) IV Continuous <Continuous>  levothyroxine Injectable 130 MICROGram(s) IV Push at bedtime  metroNIDAZOLE  IVPB 500 milliGRAM(s) IV Intermittent every 8 hours  montelukast 10 milliGRAM(s) Oral daily  oxybutynin 5 milliGRAM(s) Oral two times a day  pantoprazole  Injectable 40 milliGRAM(s) IV Push daily  rivaroxaban 20 milliGRAM(s) Oral daily  zinc sulfate 220 milliGRAM(s) Oral daily    MEDICATIONS  (PRN):  hydrALAZINE Injectable 5 milliGRAM(s) IV Push every 6 hours PRN sbp > 150  HYDROmorphone  Injectable 0.5 milliGRAM(s) IV Push every 4 hours PRN Severe Pain (7 - 10)  ondansetron Injectable 4 milliGRAM(s) IV Push every 6 hours PRN Nausea  zolpidem 5 milliGRAM(s) Oral at bedtime PRN Insomnia                            10.0   7.12  )-----------( 253      ( 02 Apr 2021 05:06 )             30.9       04-02    134<L>  |  104  |  11  ----------------------------<  191<H>  4.7   |  21<L>  |  0.91    Ca    8.3<L>      02 Apr 2021 13:41  Phos  1.8     04-02  Mg     1.8     04-02    TPro  6.5  /  Alb  2.9<L>  /  TBili  0.3  /  DBili  x   /  AST  30  /  ALT  40  /  AlkPhos  60  04-02

## 2021-04-02 NOTE — PROGRESS NOTE ADULT - ASSESSMENT
79 years old [place]ic-speaking female from home, ambulates with walker, lives with son, with PMHx of HFpEF, CAD (s/p stents), chronic venous stasis, DM, HTN and Afib (on Xarelto, s/p PPM), chronic bronchitis with asthmatic component ( on Oxygen PRN at home ), JOSE DANIEL ( supposed to be on nocturnal CPAP , but non compliant) presents to the ED with chief complaint of hypotension ,  SOB and  fatigue for almost 2 weeks. Per daughter and granddaughter patient has been complaining of increasing fatigue and sob for last few weeks, seemed to be pale , patient endorses dark loose bowel movement for quite a while.  pt had virtual  colonoscopy 1 year ago and the result was questionable. She never followed up. Per daughter pt refused all kinds of meat including chicken, meat , however denies any dysphasia. Patient  also endorses decreased appetite.  patient has chronic diarrhea. Patient Denies nausea, vomiting, abdominal pain, SOB, chest pain, CHAMBERLAIN, palpitations, dizziness, headache, cough, wheezing, joint pain or swelling, fever, chills.      GOC: Discussed with daughter,  mentioned she want stobe DNI , on discussion with patient, patient was so distressed about urge incontinency and was complaining of pain and IV infiltrations, so GOC discussion deferred to primary team.    (20 Mar 2021 19:53)    Hospital course: Pt was admitted to medicine floor for symptomatic anemia secondary to GI bleed. Colonoscopy was done during hospital stay. 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 Pt underwent laparoscopic rught hemicolectomy today 3/31 with estimated 20 ml blood loss. Pt was extubated in OR and brought to ICU for post operative care as pt has multiple comorbidities.    icu coarse:   Pt was brought to icu for post op monitoring  s/p R hemicolectomy 3/31. Pt started passing gas and was Advance to clear liquids. Pt urine output was monitored. Pt is able to tolerate diet. Pt will be transferred to surgery service under Dr. Rocha service. resumed home meds.

## 2021-04-02 NOTE — PROGRESS NOTE ADULT - SUBJECTIVE AND OBJECTIVE BOX
INTERVAL HPI/OVERNIGHT EVENTS: ***    PRESSORS: [ ] YES [ ] NO  WHICH:    ANTIBIOTICS:                      Antimicrobial:  cefTRIAXone   IVPB      cefTRIAXone   IVPB 1000 milliGRAM(s) IV Intermittent every 24 hours  metroNIDAZOLE  IVPB 500 milliGRAM(s) IV Intermittent every 8 hours    Cardiovascular:  carvedilol 6.25 milliGRAM(s) Oral every 12 hours  dronedarone 400 milliGRAM(s) Oral two times a day  hydrALAZINE Injectable 5 milliGRAM(s) IV Push every 6 hours PRN  metoprolol tartrate Injectable 5 milliGRAM(s) IV Push every 6 hours    Pulmonary:  budesonide 160 MICROgram(s)/formoterol 4.5 MICROgram(s) Inhaler 2 Puff(s) Inhalation two times a day  montelukast 10 milliGRAM(s) Oral daily    Hematalogic:  aspirin  chewable 81 milliGRAM(s) Oral daily  heparin   Injectable 5000 Unit(s) SubCutaneous every 8 hours    Other:  acetaminophen  IVPB .. 1000 milliGRAM(s) IV Intermittent once  ascorbic acid 2000 milliGRAM(s) Oral every 8 hours  atorvastatin 40 milliGRAM(s) Oral at bedtime  cholecalciferol 1000 Unit(s) Oral daily  fluticasone propionate 50 MICROgram(s)/spray Nasal Spray 1 Spray(s) Both Nostrils every 12 hours  gabapentin 300 milliGRAM(s) Oral daily  HYDROmorphone  Injectable 0.5 milliGRAM(s) IV Push every 4 hours PRN  influenza   Vaccine 0.5 milliLiter(s) IntraMuscular once  insulin glargine Injectable (LANTUS) 10 Unit(s) SubCutaneous at bedtime  insulin lispro (ADMELOG) corrective regimen sliding scale   SubCutaneous every 6 hours  lactated ringers. 1000 milliLiter(s) IV Continuous <Continuous>  levothyroxine Injectable 130 MICROGram(s) IV Push at bedtime  magnesium sulfate  IVPB 1 Gram(s) IV Intermittent once  ondansetron Injectable 4 milliGRAM(s) IV Push every 6 hours PRN  oxybutynin 5 milliGRAM(s) Oral two times a day  pantoprazole  Injectable 40 milliGRAM(s) IV Push daily  sodium phosphate IVPB 30 milliMole(s) IV Intermittent once  zinc sulfate 220 milliGRAM(s) Oral daily    acetaminophen  IVPB .. 1000 milliGRAM(s) IV Intermittent once  ascorbic acid 2000 milliGRAM(s) Oral every 8 hours  aspirin  chewable 81 milliGRAM(s) Oral daily  atorvastatin 40 milliGRAM(s) Oral at bedtime  budesonide 160 MICROgram(s)/formoterol 4.5 MICROgram(s) Inhaler 2 Puff(s) Inhalation two times a day  carvedilol 6.25 milliGRAM(s) Oral every 12 hours  cefTRIAXone   IVPB      cefTRIAXone   IVPB 1000 milliGRAM(s) IV Intermittent every 24 hours  cholecalciferol 1000 Unit(s) Oral daily  dronedarone 400 milliGRAM(s) Oral two times a day  fluticasone propionate 50 MICROgram(s)/spray Nasal Spray 1 Spray(s) Both Nostrils every 12 hours  gabapentin 300 milliGRAM(s) Oral daily  heparin   Injectable 5000 Unit(s) SubCutaneous every 8 hours  hydrALAZINE Injectable 5 milliGRAM(s) IV Push every 6 hours PRN  HYDROmorphone  Injectable 0.5 milliGRAM(s) IV Push every 4 hours PRN  influenza   Vaccine 0.5 milliLiter(s) IntraMuscular once  insulin glargine Injectable (LANTUS) 10 Unit(s) SubCutaneous at bedtime  insulin lispro (ADMELOG) corrective regimen sliding scale   SubCutaneous every 6 hours  lactated ringers. 1000 milliLiter(s) IV Continuous <Continuous>  levothyroxine Injectable 130 MICROGram(s) IV Push at bedtime  magnesium sulfate  IVPB 1 Gram(s) IV Intermittent once  metoprolol tartrate Injectable 5 milliGRAM(s) IV Push every 6 hours  metroNIDAZOLE  IVPB 500 milliGRAM(s) IV Intermittent every 8 hours  montelukast 10 milliGRAM(s) Oral daily  ondansetron Injectable 4 milliGRAM(s) IV Push every 6 hours PRN  oxybutynin 5 milliGRAM(s) Oral two times a day  pantoprazole  Injectable 40 milliGRAM(s) IV Push daily  sodium phosphate IVPB 30 milliMole(s) IV Intermittent once  zinc sulfate 220 milliGRAM(s) Oral daily    Drug Dosing Weight  Height (cm): 160 (20 Mar 2021 15:46)  Weight (kg): 119.6 (31 Mar 2021 12:50)  BMI (kg/m2): 46.7 (31 Mar 2021 12:50)  BSA (m2): 2.17 (31 Mar 2021 12:50)    CENTRAL LINE: [ ] YES [ ] NO  LOCATION:   DATE INSERTED:  REMOVE: [ ] YES [ ] NO  EXPLAIN:    TORRES: [ ] YES [ ] NO    DATE INSERTED:  REMOVE:  [ ] YES [ ] NO  EXPLAIN:    A-LINE:  [ ] YES [ ] NO  LOCATION:   DATE INSERTED:  REMOVE:  [ ] YES [ ] NO  EXPLAIN:    PMH -reviewed admission note, no change since admission    ICU Vital Signs Last 24 Hrs  T(C): 36.5 (2021 05:43), Max: 37.8 (2021 00:00)  T(F): 97.7 (2021 05:43), Max: 100 (2021 00:00)  HR: 62 (2021 07:00) (61 - 79)  BP: 209/95 (2021 05:43) (153/62 - 209/95)  BP(mean): 122 (2021 05:43) (86 - 122)  ABP: 148/48 (2021 07:00) (135/33 - 197/65)  ABP(mean): 82 (2021 07:00) (69 - 117)  RR: 21 (2021 07:00) (16 - 29)  SpO2: 100% (2021 07:00) (94% - 100%)             @ 07:01  -  -02 @ 07:00  --------------------------------------------------------  IN: 1549.8 mL / OUT: 1105 mL / NET: 444.8 mL            PHYSICAL EXAM:    GENERAL: NAD, well-groomed, well-developed  HEAD:  Atraumatic, Normocephalic  EYES: EOMI, PERRLA, conjunctiva and sclera clear  ENMT: No tonsillar erythema, exudates, or enlargement; Moist mucous membranes, Good dentition, No lesions  NECK: Supple, normal appearance, No JVD; Normal thyroid; Trachea midline  NERVOUS SYSTEM:  Alert & Oriented X3, Good concentration; Motor Strength 5/5 B/L upper and lower extremities; DTRs 2+ intact and symmetric  CHEST/LUNG: No chest deformity; Normal percussion bilaterally; No rales, rhonchi, wheezing   HEART: Regular rate and rhythm; No murmurs, rubs, or gallops  ABDOMEN: Soft, Nontender, Nondistended; Bowel sounds present  EXTREMITIES:  2+ Peripheral Pulses, No clubbing, cyanosis, or edema  LYMPH: No lymphadenopathy noted  SKIN: No rashes or lesions; Good capillary refill      LABS:  CBC Full  -  ( 2021 05:06 )  WBC Count : 7.12 K/uL  RBC Count : 3.62 M/uL  Hemoglobin : 10.0 g/dL  Hematocrit : 30.9 %  Platelet Count - Automated : 253 K/uL  Mean Cell Volume : 85.4 fl  Mean Cell Hemoglobin : 27.6 pg  Mean Cell Hemoglobin Concentration : 32.4 gm/dL  Auto Neutrophil # : 4.77 K/uL  Auto Lymphocyte # : 1.41 K/uL  Auto Monocyte # : 0.70 K/uL  Auto Eosinophil # : 0.18 K/uL  Auto Basophil # : 0.04 K/uL  Auto Neutrophil % : 67.0 %  Auto Lymphocyte % : 19.8 %  Auto Monocyte % : 9.8 %  Auto Eosinophil % : 2.5 %  Auto Basophil % : 0.6 %    04-02    137  |  106  |  10  ----------------------------<  138<H>  4.3   |  24  |  0.81    Ca    8.4      2021 05:06  Phos  2.0     04-02  Mg     1.8     04-02    TPro  6.1  /  Alb  2.4<L>  /  TBili  0.3  /  DBili  x   /  AST  34  /  ALT  38  /  AlkPhos  55  04-02      Urinalysis Basic - ( 31 Mar 2021 13:01 )    Color: Yellow / Appearance: Clear / S.020 / pH: x  Gluc: x / Ketone: Trace  / Bili: Negative / Urobili: Negative   Blood: x / Protein: Negative / Nitrite: Negative   Leuk Esterase: Negative / RBC: x / WBC x   Sq Epi: x / Non Sq Epi: x / Bacteria: x          RADIOLOGY & ADDITIONAL STUDIES REVIEWED:  ***    GOALS OF CARE DISCUSSION WITH PATIENT/FAMILY/PROXY:    CRITICAL CARE TIME SPENT: 35 minutes INTERVAL HPI/OVERNIGHT EVENTS: No acute event overnight                        Antimicrobial:  cefTRIAXone   IVPB      cefTRIAXone   IVPB 1000 milliGRAM(s) IV Intermittent every 24 hours  metroNIDAZOLE  IVPB 500 milliGRAM(s) IV Intermittent every 8 hours    Cardiovascular:  carvedilol 6.25 milliGRAM(s) Oral every 12 hours  dronedarone 400 milliGRAM(s) Oral two times a day  hydrALAZINE Injectable 5 milliGRAM(s) IV Push every 6 hours PRN  metoprolol tartrate Injectable 5 milliGRAM(s) IV Push every 6 hours    Pulmonary:  budesonide 160 MICROgram(s)/formoterol 4.5 MICROgram(s) Inhaler 2 Puff(s) Inhalation two times a day  montelukast 10 milliGRAM(s) Oral daily    Hematalogic:  aspirin  chewable 81 milliGRAM(s) Oral daily  heparin   Injectable 5000 Unit(s) SubCutaneous every 8 hours    Other:  acetaminophen  IVPB .. 1000 milliGRAM(s) IV Intermittent once  ascorbic acid 2000 milliGRAM(s) Oral every 8 hours  atorvastatin 40 milliGRAM(s) Oral at bedtime  cholecalciferol 1000 Unit(s) Oral daily  fluticasone propionate 50 MICROgram(s)/spray Nasal Spray 1 Spray(s) Both Nostrils every 12 hours  gabapentin 300 milliGRAM(s) Oral daily  HYDROmorphone  Injectable 0.5 milliGRAM(s) IV Push every 4 hours PRN  influenza   Vaccine 0.5 milliLiter(s) IntraMuscular once  insulin glargine Injectable (LANTUS) 10 Unit(s) SubCutaneous at bedtime  insulin lispro (ADMELOG) corrective regimen sliding scale   SubCutaneous every 6 hours  lactated ringers. 1000 milliLiter(s) IV Continuous <Continuous>  levothyroxine Injectable 130 MICROGram(s) IV Push at bedtime  magnesium sulfate  IVPB 1 Gram(s) IV Intermittent once  ondansetron Injectable 4 milliGRAM(s) IV Push every 6 hours PRN  oxybutynin 5 milliGRAM(s) Oral two times a day  pantoprazole  Injectable 40 milliGRAM(s) IV Push daily  sodium phosphate IVPB 30 milliMole(s) IV Intermittent once  zinc sulfate 220 milliGRAM(s) Oral daily    acetaminophen  IVPB .. 1000 milliGRAM(s) IV Intermittent once  ascorbic acid 2000 milliGRAM(s) Oral every 8 hours  aspirin  chewable 81 milliGRAM(s) Oral daily  atorvastatin 40 milliGRAM(s) Oral at bedtime  budesonide 160 MICROgram(s)/formoterol 4.5 MICROgram(s) Inhaler 2 Puff(s) Inhalation two times a day  carvedilol 6.25 milliGRAM(s) Oral every 12 hours  cefTRIAXone   IVPB      cefTRIAXone   IVPB 1000 milliGRAM(s) IV Intermittent every 24 hours  cholecalciferol 1000 Unit(s) Oral daily  dronedarone 400 milliGRAM(s) Oral two times a day  fluticasone propionate 50 MICROgram(s)/spray Nasal Spray 1 Spray(s) Both Nostrils every 12 hours  gabapentin 300 milliGRAM(s) Oral daily  heparin   Injectable 5000 Unit(s) SubCutaneous every 8 hours  hydrALAZINE Injectable 5 milliGRAM(s) IV Push every 6 hours PRN  HYDROmorphone  Injectable 0.5 milliGRAM(s) IV Push every 4 hours PRN  influenza   Vaccine 0.5 milliLiter(s) IntraMuscular once  insulin glargine Injectable (LANTUS) 10 Unit(s) SubCutaneous at bedtime  insulin lispro (ADMELOG) corrective regimen sliding scale   SubCutaneous every 6 hours  lactated ringers. 1000 milliLiter(s) IV Continuous <Continuous>  levothyroxine Injectable 130 MICROGram(s) IV Push at bedtime  magnesium sulfate  IVPB 1 Gram(s) IV Intermittent once  metoprolol tartrate Injectable 5 milliGRAM(s) IV Push every 6 hours  metroNIDAZOLE  IVPB 500 milliGRAM(s) IV Intermittent every 8 hours  montelukast 10 milliGRAM(s) Oral daily  ondansetron Injectable 4 milliGRAM(s) IV Push every 6 hours PRN  oxybutynin 5 milliGRAM(s) Oral two times a day  pantoprazole  Injectable 40 milliGRAM(s) IV Push daily  sodium phosphate IVPB 30 milliMole(s) IV Intermittent once  zinc sulfate 220 milliGRAM(s) Oral daily    Drug Dosing Weight  Height (cm): 160 (20 Mar 2021 15:46)  Weight (kg): 119.6 (31 Mar 2021 12:50)  BMI (kg/m2): 46.7 (31 Mar 2021 12:50)  BSA (m2): 2.17 (31 Mar 2021 12:50)    CENTRAL LINE: [ ] YES [ X] NO  LOCATION:   DATE INSERTED:  REMOVE: [ ] YES [ ] NO  EXPLAIN:    TORRES: X[ ] YES [ ] NO    DATE INSERTED:  REMOVE:  [ ] YES [ ] NO  EXPLAIN:    A-LINE:  [x ] YES [ ] NO  LOCATION: [facility]  DATE INSERTED: 3/31  REMOVE:  [ ] YES [ ] NO  EXPLAIN:    PMH -reviewed admission note, no change since admission    ICU Vital Signs Last 24 Hrs  T(C): 36.5 (2021 05:43), Max: 37.8 (2021 00:00)  T(F): 97.7 (2021 05:43), Max: 100 (2021 00:00)  HR: 62 (2021 07:00) (61 - 79)  BP: 209/95 (2021 05:43) (153/62 - 209/95)  BP(mean): 122 (2021 05:43) (86 - 122)  ABP: 148/48 (2021 07:00) (135/33 - 197/65)  ABP(mean): 82 (2021 07:00) (69 - 117)  RR: 21 (2021 07:00) (16 - 29)  SpO2: 100% (2021 07:00) (94% - 100%)            04- @ 07:01  -  -02 @ 07:00  --------------------------------------------------------  IN: 1549.8 mL / OUT: 1105 mL / NET: 444.8 mL            PHYSICAL EXAM:    GENERAL: minimal pain , pr awake and talking  HEAD:  Atraumatic, Normocephalic  EYES: EOMI, PERRLA, conjunctiva and sclera clear  ENMT: No tonsillar erythema, exudates, or enlargement; Moist mucous membranes, Good dentition, No lesions  NECK: Supple, No JVD, Normal thyroid  NERVOUS SYSTEM:  Alert & Oriented ,   CHEST/LUNG: Clear to percussion bilaterally; No rales, rhonchi, wheezing, or rubs  HEART: Regular rate and rhythm; No murmurs, rubs, or gallops  ABDOMEN: Mild diffuse abdominal tenderness. BAILEY drain on right lower quadrant noted.   EXTREMITIES:  2+ Peripheral Pulses, No clubbing, cyanosis, or edema  SKIN: No rashes or lesions      LABS:  CBC Full  -  ( 2021 05:06 )  WBC Count : 7.12 K/uL  RBC Count : 3.62 M/uL  Hemoglobin : 10.0 g/dL  Hematocrit : 30.9 %  Platelet Count - Automated : 253 K/uL  Mean Cell Volume : 85.4 fl  Mean Cell Hemoglobin : 27.6 pg  Mean Cell Hemoglobin Concentration : 32.4 gm/dL  Auto Neutrophil # : 4.77 K/uL  Auto Lymphocyte # : 1.41 K/uL  Auto Monocyte # : 0.70 K/uL  Auto Eosinophil # : 0.18 K/uL  Auto Basophil # : 0.04 K/uL  Auto Neutrophil % : 67.0 %  Auto Lymphocyte % : 19.8 %  Auto Monocyte % : 9.8 %  Auto Eosinophil % : 2.5 %  Auto Basophil % : 0.6 %    04-02    137  |  106  |  10  ----------------------------<  138<H>  4.3   |  24  |  0.81    Ca    8.4      2021 05:06  Phos  2.0     04-02  Mg     1.8     04-02    TPro  6.1  /  Alb  2.4<L>  /  TBili  0.3  /  DBili  x   /  AST  34  /  ALT  38  /  AlkPhos  55  04-02      Urinalysis Basic - ( 31 Mar 2021 13:01 )    Color: Yellow / Appearance: Clear / S.020 / pH: x  Gluc: x / Ketone: Trace  / Bili: Negative / Urobili: Negative   Blood: x / Protein: Negative / Nitrite: Negative   Leuk Esterase: Negative / RBC: x / WBC x   Sq Epi: x / Non Sq Epi: x / Bacteria: x      CRITICAL CARE TIME SPENT: 35 minutes

## 2021-04-02 NOTE — PROGRESS NOTE ADULT - ASSESSMENT
81F s/p R hemicolectomy 3/31, afebrile, pain well managed     -Advance to clear liquids  -IV abx   -Pain control PRN  -D/c hansen catheter  -OOB/Ambulation/DVT prophylaxis

## 2021-04-02 NOTE — PROGRESS NOTE ADULT - ATTENDING COMMENTS
IMP: This is an 79 yr old  [place]orean-speaking womann  with  HFpEF, CAD (s/p stents), chronic venous stasis, DM, HTN and Afib (on Xarelto, s/p PPM), chronic bronchitis with asthmatic component ( on Oxygen PRN at home ), JOSE DANIEL ( supposed to be on nocturnal CPAP , but non compliant) presented to the ED with chief complaint of hypotension ,  SOB and  fatigue for almost 2 weeks.   Pt was admitted to medicine for symptomatic anemia. Colonoscopy showed colon mass. Biopsy result showed invasive adenocarcinoma of colon. Pt underwent laparoscopic right hemicolectomy today. POD # 0  Transferred to ICU for post operative care.    Assessment  1. S/P Right Hemicolectomy   2. Ascending colon invasive adenocarcinoma  3. Symptomatic anemia  4. Afib   5. Diabetes Mellitus  6. History of Asthma  7. Hypertension  8.History of coronary artery disease  9.JOSE DANIEL  10. Obesity   11. H. Pylori infection       Plan;  -s/p right hemicolectomy with primary anastomosis 3/30  -positive flatus   -f/u path  -hemodynamic monitoring   -clear liquid diet   -pain control   -d/c hansen   -wound care as per surgery  -blood sugar monitoring with coverage   -continue meds  -continue antibx for H. pylori .   -Surgical f/u noted .. deferred therapeutic anticoag to cards  -Cards f/u  DVT prophy  -Onco waiting  for path   -OOB to chair .

## 2021-04-02 NOTE — PROGRESS NOTE ADULT - SUBJECTIVE AND OBJECTIVE BOX
KWAME MOSQUERA  MR# [id number]  81yFemale        Patient is a 81y old  Female who presents with a chief complaint of symptomatic anemia (2021 09:57)      INTERVAL HPI/OVERNIGHT EVENTS:  Patient seen and examined at bedside. No notations of chest pain, palpitation, SOB, orthopnea, nausea, vomiting or abdominal pain.    ALLERGIES  No Known Allergies      MEDICATIONS  ascorbic acid 2000 milliGRAM(s) Oral every 8 hours  aspirin  chewable 81 milliGRAM(s) Oral daily  atorvastatin 40 milliGRAM(s) Oral at bedtime  budesonide 160 MICROgram(s)/formoterol 4.5 MICROgram(s) Inhaler 2 Puff(s) Inhalation two times a day  carvedilol 6.25 milliGRAM(s) Oral every 12 hours  cefTRIAXone   IVPB      cefTRIAXone   IVPB 1000 milliGRAM(s) IV Intermittent every 24 hours  cholecalciferol 1000 Unit(s) Oral daily  dronedarone 400 milliGRAM(s) Oral two times a day  fluticasone propionate 50 MICROgram(s)/spray Nasal Spray 1 Spray(s) Both Nostrils every 12 hours  gabapentin 300 milliGRAM(s) Oral daily  hydrALAZINE Injectable 5 milliGRAM(s) IV Push every 6 hours PRN sbp > 150  HYDROmorphone  Injectable 0.5 milliGRAM(s) IV Push every 4 hours PRN Severe Pain (7 - 10)  influenza   Vaccine 0.5 milliLiter(s) IntraMuscular once  insulin glargine Injectable (LANTUS) 10 Unit(s) SubCutaneous at bedtime  insulin lispro (ADMELOG) corrective regimen sliding scale   SubCutaneous every 6 hours  lactated ringers. 1000 milliLiter(s) IV Continuous <Continuous>  levothyroxine Injectable 130 MICROGram(s) IV Push at bedtime  metroNIDAZOLE  IVPB 500 milliGRAM(s) IV Intermittent every 8 hours  montelukast 10 milliGRAM(s) Oral daily  ondansetron Injectable 4 milliGRAM(s) IV Push every 6 hours PRN Nausea  oxybutynin 5 milliGRAM(s) Oral two times a day  pantoprazole  Injectable 40 milliGRAM(s) IV Push daily  rivaroxaban 20 milliGRAM(s) Oral daily  zinc sulfate 220 milliGRAM(s) Oral daily  zolpidem 5 milliGRAM(s) Oral at bedtime PRN Insomnia              REVIEW OF SYSTEMS:  CONSTITUTIONAL: No fever, weight loss, or fatigue  EYES: No eye pain, visual disturbances, or discharge  ENT:  No difficulty hearing, tinnitus, vertigo; No sinus or throat pain  NECK: No pain or stiffness  RESPIRATORY: No cough, wheezing, chills or hemoptysis; No Shortness of Breath  CARDIOVASCULAR: No chest pain, palpitations, passing out, dizziness, or leg swelling  GASTROINTESTINAL: No abdominal or epigastric pain. No nausea, vomiting, or hematemesis; No diarrhea or constipation. No melena or hematochezia.  GENITOURINARY: No dysuria, frequency, hematuria, or incontinence  NEUROLOGICAL: No headaches, memory loss, loss of strength, numbness, or tremors  SKIN: No itching, burning, rashes, or lesions   LYMPH Nodes: No enlarged glands  ENDOCRINE: No heat or cold intolerance; No hair loss  MUSCULOSKELETAL: No joint pain or swelling; No muscle, back, or extremity pain  PSYCHIATRIC: No depression, anxiety, mood swings, or difficulty sleeping  HEME/LYMPH: No easy bruising, or bleeding gums  ALLERGY AND IMMUNOLOGIC: No hives or eczema	    [ ] All others negative	  [ ] Unable to obtain      T(C): 36.5 (21 @ 05:43), Max: 37.8 (21 @ 00:00)  T(F): 97.7 (21 @ 05:43), Max: 100 (21 @ 00:00)  HR: 65 (21 @ 09:00) (61 - 79)  BP: 209/95 (21 05:43) (153/62 - 209/95)  RR: 22 (21 @ 09:00) (16 - 27)  SpO2: 98% (21 @ 09:00) (94% - 100%)  Wt(kg): --    I&O's Summary    2021 07:01  -  2021 07:00  --------------------------------------------------------  IN: 1549.8 mL / OUT: 1105 mL / NET: 444.8 mL          PHYSICAL EXAM:  A X O x  HEAD:  Atraumatic, Normocephalic  EYES: EOMI, PERRLA, conjunctiva and sclera clear  NECK: Supple, No JVD, Normal thyroid  Resp: CTAB, No crackles, wheezing,   CVS: Regular rate and rhythm; No discernable murmurs, rubs, or gallops  ABD: Soft, Nontender, Nondistended; Bowel sounds present  EXTREMITIES:  2+ Peripheral Pulses, No edema  LYMPH: No dicernable lymphadenopathy noted  GENERAL: NAD, well-groomed, well-developed      LABS:                        10.0   7.12  )-----------( 253      ( 2021 05:06 )             30.9     04-02    137  |  106  |  10  ----------------------------<  138<H>  4.3   |  24  |  0.81    Ca    8.4      2021 05:06  Phos  2.0     04-02  Mg     1.8     04-02    TPro  6.1  /  Alb  2.4<L>  /  TBili  0.3  /  DBili  x   /  AST  34  /  ALT  38  /  AlkPhos  55  04-02      Urinalysis Basic - ( 31 Mar 2021 13:01 )    Color: Yellow / Appearance: Clear / S.020 / pH: x  Gluc: x / Ketone: Trace  / Bili: Negative / Urobili: Negative   Blood: x / Protein: Negative / Nitrite: Negative   Leuk Esterase: Negative / RBC: x / WBC x   Sq Epi: x / Non Sq Epi: x / Bacteria: x      CAPILLARY BLOOD GLUCOSE      POCT Blood Glucose.: 183 mg/dL (2021 12:15)  POCT Blood Glucose.: 128 mg/dL (2021 06:35)  POCT Blood Glucose.: 140 mg/dL (2021 23:17)  POCT Blood Glucose.: 157 mg/dL (2021 16:29)      Troponins:  ProBNP:  Lipid Profile:   HgA1c:  TSH:           RADIOLOGY & ADDITIONAL TESTS:    Imaging Personally Reviewed:  [ ] YES  [ ] NO      Consultant(s) Notes Reviewed:  [x ] YES  [ ] NO    Care Discussed with Consultants/Other Providers [ x] YES  [ ] NO          PAST MEDICAL & SURGICAL HISTORY:  Asthma    CAD (Coronary Atherosclerotic Disease)    Myocardial Infarction    Diabetes    HTN (Hypertension)    HLD (Hyperlipidemia)    CHF (congestive heart failure), NYHA class I    IBS (irritable bowel syndrome)    Venous stasis    Hypothyroidism    Atrial fibrillation    Pacemaker    Obesity    S/P coronary angiogram          Anemia    H/o or current diagnosis of HF- ACEI/ARB contraindication unknown    H/o or current diagnosis of HF- Contraindication to ACEI/ARBs    H/o or current diagnosis of HF- ACEI/ARB contraindication unknown    H/o or current diagnosis of HF- Contraindication to ACEI/ARBs    H/o or current diagnosis of HF- ACEI/ARB contraindication unknown    H/o or current diagnosis of HF- Contraindication to ACEI/ARBs    Family history of heart disease    Handoff    MEWS Score    Asthma    CAD (Coronary Atherosclerotic Disease)    Myocardial Infarction    Diabetes    HTN (Hypertension)    HLD (Hyperlipidemia)    CHF (congestive heart failure), NYHA class I    IBS (irritable bowel syndrome)    Venous stasis    Hypothyroidism    Atrial fibrillation    Pacemaker    Obesity    Colon adenocarcinoma    Colon adenocarcinoma    Symptomatic anemia    Helicobacter pylori gastritis    Symptomatic anemia    Atrial fibrillation    CHF (congestive heart failure), NYHA class I    Diabetes    HTN (Hypertension)    Asthma    JOSE DANIEL (obstructive sleep apnea)    Prophylactic measure    Hypothyroidism    Knee swelling    Generalized abdominal pain    S/P laparoscopic procedure    CHF (congestive heart failure), NYHA class I    JOSE DANIEL (obstructive sleep apnea)    Diabetes    Colon cancer    Laparoscopy assisted right hemicolectomy    No significant past surgical history    S/P coronary angiogram    A) WEAKNESS    90+    CAD (coronary atherosclerotic disease)    Asthma    Helicobacter pylori gastritis    Paroxysmal atrial fibrillation    Encounter for colonoscopy due to history of adenomatous colonic polyps    Hypothyroidism    CHF (congestive heart failure), NYHA class I    JOSE DANIEL (obstructive sleep apnea)    Diabetes    HTN (Hypertension)    Colon cancer    [email or web address]

## 2021-04-02 NOTE — PROGRESS NOTE ADULT - SUBJECTIVE AND OBJECTIVE BOX
INTERVAL HPI/OVERNIGHT EVENTS:  Pt stable.   Tolerating diet.   flatus, no BM    Vital Signs Last 24 Hrs  T(C): 36.7 (02 Apr 2021 15:35), Max: 37.8 (02 Apr 2021 00:00)  T(F): 98 (02 Apr 2021 15:35), Max: 100 (02 Apr 2021 00:00)  HR: 64 (02 Apr 2021 17:00) (62 - 79)  BP: 167/54 (02 Apr 2021 17:00) (140/84 - 209/95)  BP(mean): 84 (02 Apr 2021 17:00) (83 - 137)  RR: 19 (02 Apr 2021 17:00) (17 - 23)  SpO2: 99% (02 Apr 2021 17:00) (94% - 100%)    Physical:  Abdomen: Soft nondistended, nontender.  Port sites clean.  BAILEY dressing in place.    I&O's Summary    01 Apr 2021 07:01  -  02 Apr 2021 07:00  --------------------------------------------------------  IN: 1549.8 mL / OUT: 1105 mL / NET: 444.8 mL    02 Apr 2021 07:01  -  02 Apr 2021 17:23  --------------------------------------------------------  IN: 965 mL / OUT: 250 mL / NET: 715 mL        LABS:                        10.0   7.12  )-----------( 253      ( 02 Apr 2021 05:06 )             30.9             04-02    134<L>  |  104  |  11  ----------------------------<  191<H>  4.7   |  21<L>  |  0.91    Ca    8.3<L>      02 Apr 2021 13:41  Phos  1.8     04-02  Mg     1.8     04-02    TPro  6.5  /  Alb  2.9<L>  /  TBili  0.3  /  DBili  x   /  AST  30  /  ALT  40  /  AlkPhos  60  04-02

## 2021-04-02 NOTE — PROGRESS NOTE ADULT - SUBJECTIVE AND OBJECTIVE BOX
INTERVAL HPI/OVERNIGHT EVENTS:  Turkish  [id number]  S/P lap assisted R jaimee 3/31   Pt resting comfortably. No acute complaints.   +Flatus/no BM  Denies N/V    MEDICATIONS  (STANDING):  ascorbic acid 2000 milliGRAM(s) Oral every 8 hours  aspirin  chewable 81 milliGRAM(s) Oral daily  atorvastatin 40 milliGRAM(s) Oral at bedtime  budesonide 160 MICROgram(s)/formoterol 4.5 MICROgram(s) Inhaler 2 Puff(s) Inhalation two times a day  carvedilol 6.25 milliGRAM(s) Oral every 12 hours  cefTRIAXone   IVPB      cefTRIAXone   IVPB 1000 milliGRAM(s) IV Intermittent every 24 hours  cholecalciferol 1000 Unit(s) Oral daily  dronedarone 400 milliGRAM(s) Oral two times a day  fluticasone propionate 50 MICROgram(s)/spray Nasal Spray 1 Spray(s) Both Nostrils every 12 hours  gabapentin 300 milliGRAM(s) Oral daily  heparin   Injectable 5000 Unit(s) SubCutaneous every 8 hours  influenza   Vaccine 0.5 milliLiter(s) IntraMuscular once  insulin glargine Injectable (LANTUS) 10 Unit(s) SubCutaneous at bedtime  insulin lispro (ADMELOG) corrective regimen sliding scale   SubCutaneous every 6 hours  lactated ringers. 1000 milliLiter(s) (70 mL/Hr) IV Continuous <Continuous>  levothyroxine Injectable 130 MICROGram(s) IV Push at bedtime  metoprolol tartrate Injectable 5 milliGRAM(s) IV Push every 6 hours  metroNIDAZOLE  IVPB 500 milliGRAM(s) IV Intermittent every 8 hours  montelukast 10 milliGRAM(s) Oral daily  oxybutynin 5 milliGRAM(s) Oral two times a day  pantoprazole  Injectable 40 milliGRAM(s) IV Push daily  zinc sulfate 220 milliGRAM(s) Oral daily    MEDICATIONS  (PRN):  hydrALAZINE Injectable 5 milliGRAM(s) IV Push every 6 hours PRN sbp > 150  HYDROmorphone  Injectable 0.5 milliGRAM(s) IV Push every 4 hours PRN Severe Pain (7 - 10)  ondansetron Injectable 4 milliGRAM(s) IV Push every 6 hours PRN Nausea    Vital Signs Last 24 Hrs  T(C): 36.5 (02 Apr 2021 05:43), Max: 37.8 (02 Apr 2021 00:00)  T(F): 97.7 (02 Apr 2021 05:43), Max: 100 (02 Apr 2021 00:00)  HR: 65 (02 Apr 2021 09:00) (61 - 79)  BP: 209/95 (02 Apr 2021 05:43) (153/62 - 209/95)  BP(mean): 122 (02 Apr 2021 05:43) (86 - 122)  RR: 22 (02 Apr 2021 09:00) (16 - 27)  SpO2: 98% (02 Apr 2021 09:00) (94% - 100%)    Physical:  General: A&Ox3. NAD.  Chest: respiration unlabored  Abdomen: Soft nondistended, nontender. BAILEY dressing with good seal.  Ext: no calf tenderness, no edmea    I&O's Detail    01 Apr 2021 07:01  -  02 Apr 2021 07:00  --------------------------------------------------------  IN:    IV PiggyBack: 200 mL    IV PiggyBack: 599.8 mL    Lactated Ringers: 750 mL  Total IN: 1549.8 mL    OUT:    Indwelling Catheter - Urethral (mL): 1105 mL  Total OUT: 1105 mL    Total NET: 444.8 mL    LABS:                        10.0   7.12  )-----------( 253      ( 02 Apr 2021 05:06 )             30.9             04-02    137  |  106  |  10  ----------------------------<  138<H>  4.3   |  24  |  0.81    Ca    8.4      02 Apr 2021 05:06  Phos  2.0     04-02  Mg     1.8     04-02    TPro  6.1  /  Alb  2.4<L>  /  TBili  0.3  /  DBili  x   /  AST  34  /  ALT  38  /  AlkPhos  55  04-02

## 2021-04-02 NOTE — PROGRESS NOTE ADULT - ASSESSMENT
· Assessment	  80 y/o  Danish-speaking female  with PMH  of HFpEF, CAD (s/p stents), chronic venous stasis, DM, HTN and Afib (on Xarelto, s/p PPM), chronic bronchitis with asthmatic component ( on Oxygen PRN at home ), JOSE DANIEL ( supposed to be on nocturnal CPAP , but non compliant) presented to the ED with chief complaint of hypotension ,  SOB and  fatigue for almost 2 weeks.   Pt was admitted to medicine for symptomatic anemia. Colonoscopy showed colon mass. Biopsy result showed invasive adenocarcinoma of colon. Pt underwent laparoscopic right hemicolectomy today. POD # 0  Transferred to ICU for post operative care.    Assessment  1. S/P Right Hemicolectomy   2. Ascending colon invasive adenocarcinoma  3. Symptomatic anemia  4. Afib   5. Diabetes Mellitus  6. History of Asthma  7. Hypertension  8.History of coronary artery disease  9.JOSE DANIEL  10. Obesity   11. H. Pylori infection     Plan:      NEURO;  - Pt is awake and alert  -No acute issues  -IV Dilaudid for abdominal pain    CARDIOVASCULAR  #Afib   -Hold  full dose anticoagulation heparin drip as per cardio as pt in NOrmal sinus , will resume oral meds after started on diet  -Resume carvedilol when diet is resumed  -Continue telemonitoring    # History of Hypertension  -Resume Multaq and Coreg after resuming diet  for now started on metoprolol 5mg iv pushes standing with parameters  -Monitor blood pressure      PULMONARY  # No acute issues  -Pt is breathing well on ambient air    GASTROINTESTINAL  # Ascending colon invasive adenocarcinoma  -S/P Laparoscopic right hemicolectomy   -POD#1  - Pain management   -Continue IV fluids  -Keep NPO till bowel function returns as per surgery  -Protonix for GI prophylaxis    RENAL  # SRIDHAR  resolved  -Likely pre renal  -Continue IV fluids  -Monitor BMP avoid nephrotoxic medications    INFECTIOUS DISEASE  # H.Pylori infection  -Pt is on iv metronidazole and amoxicillin fro H. Pylori infection  ID:      ENDOCRINE  #DM  -Continue Lantus 10 U at bedtime  and moderate sliding scale  -Monitor blood glucose    HEME  # Anemia  -Secondary to GI Blood loss  -S/P 3 U PRBC so far  -Monitor CBC , pt/inr      -Monitor, Replete to K>4 and Mg>2  -Diet: NPO    PROPHYLAXIS  -DVT:  Heparin s/c for DVT prophylaxis   -GI: Protonix for GI prophylaxis    DISPO: Transferred to ICU    CODE STATUS: Full CODE   · Assessment	  80 y/o  Ivorian-speaking female  with PMH  of HFpEF, CAD (s/p stents), chronic venous stasis, DM, HTN and Afib (on Xarelto, s/p PPM), chronic bronchitis with asthmatic component ( on Oxygen PRN at home ), JOSE DANIEL ( supposed to be on nocturnal CPAP , but non compliant) presented to the ED with chief complaint of hypotension ,  SOB and  fatigue for almost 2 weeks.   Pt was admitted to medicine for symptomatic anemia. Colonoscopy showed colon mass. Biopsy result showed invasive adenocarcinoma of colon. Pt underwent laparoscopic right hemicolectomy today. POD # 0  Transferred to ICU for post operative care.    Assessment  1. S/P Right Hemicolectomy   2. Ascending colon invasive adenocarcinoma  3. Symptomatic anemia  4. Afib   5. Diabetes Mellitus  6. History of Asthma  7. Hypertension  8.History of coronary artery disease  9.JOSE DANIEL  10. Obesity   11. H. Pylori infection     Plan:      NEURO;  - Pt is awake and alert  -No acute issues  -IV Dilaudid for abdominal pain    CARDIOVASCULAR  #Afib   -Hold  full dose anticoagulation heparin drip as per cardio as pt in NOrmal sinus , will resume oral meds after started on diet  -Resume carvedilol when diet is resumed  -Continue telemonitoring    # History of Hypertension  -Resume Multaq and Coreg after resuming diet  for now started on metoprolol 5mg iv pushes standing with parameters  -Monitor blood pressure      PULMONARY  # No acute issues  -Pt is breathing well on ambient air    GASTROINTESTINAL  # Ascending colon invasive adenocarcinoma  -S/P Laparoscopic right hemicolectomy   -POD#2  - Pain management   -Continue IV fluids  -Keep NPO till bowel function returns as per surgery  -Protonix for GI prophylaxis    RENAL  # SRIDHAR  resolved  -Likely pre renal  -Continue IV fluids  -Monitor BMP avoid nephrotoxic medications    INFECTIOUS DISEASE  # H.Pylori infection  -Pt is on iv metronidazole and ORAL  amoxicillin fro H. Pylori infection- changed to iv ceftriaxone  ID:      ENDOCRINE  #DM  -Continue Lantus 10 U at bedtime  and moderate sliding scale  -Monitor blood glucose    HEME  # Anemia  -Secondary to GI Blood loss  -S/P 3 U PRBC so far  -Monitor CBC , pt/inr      -Monitor, Replete to K>4 and Mg>2  -Diet: NPO    PROPHYLAXIS  -DVT:  Heparin s/c for DVT prophylaxis   -GI: Protonix for GI prophylaxis    DISPO:  ICU    CODE STATUS: Full CODE   · Assessment	  80 y/o  Slovenian-speaking female  with PMH  of HFpEF, CAD (s/p stents), chronic venous stasis, DM, HTN and Afib (on Xarelto, s/p PPM), chronic bronchitis with asthmatic component ( on Oxygen PRN at home ), JOSE DANIEL ( supposed to be on nocturnal CPAP , but non compliant) presented to the ED with chief complaint of hypotension ,  SOB and  fatigue for almost 2 weeks.   Pt was admitted to medicine for symptomatic anemia. Colonoscopy showed colon mass. Biopsy result showed invasive adenocarcinoma of colon. Pt underwent laparoscopic right hemicolectomy today. POD # 0  Transferred to ICU for post operative care.    Assessment  1. S/P Right Hemicolectomy   2. Ascending colon invasive adenocarcinoma  3. Symptomatic anemia  4. Afib   5. Diabetes Mellitus  6. History of Asthma  7. Hypertension  8.History of coronary artery disease  9.JOSE DANIEL  10. Obesity   11. H. Pylori infection     Plan:      NEURO;  - Pt is awake and alert  -No acute issues  -IV Dilaudid for abdominal pain    CARDIOVASCULAR  #Afib   -Hold  full dose anticoagulation heparin drip as per cardio as pt in NOrmal sinus , will resume oral meds after started on diet  -Resume carvedilol when diet is resumed  -Continue telemonitoring    # History of Hypertension  -Resume Multaq and Coreg after resuming diet  for now started on metoprolol 5mg iv pushes standing with parameters  -Monitor blood pressure      PULMONARY  # No acute issues  -Pt is breathing well on ambient air    GASTROINTESTINAL  # Ascending colon invasive adenocarcinoma  -S/P Laparoscopic right hemicolectomy   -POD#2  - Pain management   - IV fluids  -pt passing flatus  - Pt started on clears diet  -Protonix for GI prophylaxis    RENAL  # SRIDHAR  resolved  -Likely pre renal  -Continue IV fluids  -Monitor BMP avoid nephrotoxic medications    INFECTIOUS DISEASE  # H.Pylori infection  -Pt is on iv metronidazole and ORAL  amoxicillin fro H. Pylori infection- changed to iv ceftriaxone  ID:      ENDOCRINE  #DM  -Continue Lantus 10 U at bedtime  and moderate sliding scale  -Monitor blood glucose    HEME  # Anemia  -Secondary to GI Blood loss  -S/P 3 U PRBC so far  -Monitor CBC , pt/inr      -Monitor, Replete to K>4 and Mg>2  -Diet:  clears    PROPHYLAXIS  -DVT:  Heparin s/c for DVT prophylaxis   -GI: Protonix for GI prophylaxis    DISPO:  ICU    CODE STATUS: Full CODE

## 2021-04-02 NOTE — PROGRESS NOTE ADULT - ASSESSMENT
Patient is a 81y old  Female from home, ambulates with walker, lives with son, with PMHx of HFpEF, CAD (s/p stents), chronic venous stasis, DM, HTN and Afib (on Xarelto, s/p PPM), chronic bronchitis with asthmatic component ( on Oxygen PRN at home ), JOSE DANIEL ( supposed to be on nocturnal CPAP , but non compliant), now presents to the ER for evaluation of hypotension, SOB and  fatigue for almost 2 weeks. Per daughter and granddaughter patient has been complaining of increasing fatigue and sob for last few weeks, seemed to be pale , patient endorses dark loose bowel movement. Patient has chronic diarrhea, but no abdominal pain. She has evaluated by GI team and now the ID consult requested to assist with further evaluation of chronic diarrhea.     #  H. Pylori associated Chronic active gastritis, diffuse, with focal lymphoid aggregate, DX 3/22 by Antrum biopsy  # Chronic diarrhea- C.diff vs Malignancy - NO Malignancy Antrum biopsy   # Symptomatic Anemia  - s/p EGD and antrum biopsy shows H. Pylori associated Chronic active gastritis, diffuse, with focal lymphoid aggregate  # COVID negative 3/20/21, positive 3/27 and negative on 3/28  - d/w Dr. Spence regarding exposure VS false positive, in th esetting of positive Titers  # S/p EGD 3/22  and s/p Colonoscopy 3/25- pathology shows - Invasive colonic adenocarcinoma arising in association with  tubular adenoma  # The Right knee xray shows Round mass anterior to the patella.  # s/p Laparoscopic assisted right hemicolectomy , 3/31/21    Would recommend:    1. Advanced diet as tolerated   2. Continue Ceftriaxone and Flagyl as per Surgery protocol  3. Monitor kidney function  4. Pain management as needed  5. Follow up pathology     Attending Attestation:    Spent more than 45 minutes on total encounter, more than 50 % of the visit was spent counseling and/or coordinating care by the Attending physician.

## 2021-04-02 NOTE — CHART NOTE - NSCHARTNOTEFT_GEN_A_CORE
HPI:  79 years old Singaporean-speaking female from home, ambulates with walker, lives with son, with PMHx of HFpEF, CAD (s/p stents), chronic venous stasis, DM, HTN and Afib (on Xarelto, s/p PPM), chronic bronchitis with asthmatic component ( on Oxygen PRN at home ), JOSE DANIEL ( supposed to be on nocturnal CPAP , but non compliant) presents to the ED with chief complaint of hypotension ,  SOB and  fatigue for almost 2 weeks. Per daughter and granddaughter patient has been complaining of increasing fatigue and sob for last few weeks, seemed to be pale , patient endorses dark loose bowel movement for quite a while.  pt had virtual  colonoscopy 1 year ago and the result was questionable. She never followed up. Per daughter pt refused all kinds of meat including chicken, meat , however denies any dysphasia. Patient  also endorses decreased appetite.  patient has chronic diarrhea. Patient Denies nausea, vomiting, abdominal pain, SOB, chest pain, CHAMBERLAIN, palpitations, dizziness, headache, cough, wheezing, joint pain or swelling, fever, chills.      GOC: Discussed with daughter,  mentioned she want stobe DNI , on discussion with patient, patient was so distressed about urge incontinency and was complaining of pain and IV infiltrations, so GOC discussion deferred to primary team.    (20 Mar 2021 19:53)    Hospital course: Pt was admitted to medicine floor for symptomatic anemia secondary to GI bleed. Colonoscopy was done during hospital stay. 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 Pt underwent laparoscopic rught hemicolectomy today 3/31 with estimated 20 ml blood loss. Pt was extubated in OR and brought to ICU for post operative care as pt has multiple comorbidities.    icu coarse:   Pt was brought to icu for post op monitoring  s/p R hemicolectomy 3/31. Pt started passing gas and was Advance to clear liquids. Pt urine output was monitored. Pt is able to tolerate diet. Pt will be transferred to surgery service under Dr. Rocha service. resumed home meds.    THINGS TO FOLLOW;   MONITOR urine output  monitor bowel function.   resumed oral meds
Spoke  to daughter, Vaishali Blandon #[phone], updated the hospital course and plan. All questions answered.
Assessment:   Patient is a 81y old  Female who presents with a chief complaint of symptomatic anemia (2021 12:33) Pt s/p right jaimee-colectomy 3/31 for colon CA, transferred to ICU for post-op monitoring. Now noted for D/G (transfer note). Pt Clears/ NPO day #4      Factors impacting intake: [ ] none [ ] nausea  [ ] vomiting [ ] diarrhea [ ] constipation  [ ]chewing problems [ ] swallowing issues  [x ] other:     altered GI fx/ structure    Diet Prescription: Diet, Clear Liquid (21 @ 08:53)        Daily     Daily Weight in k (2021 07:00)  Weight in k (26 Mar 2021 07:01)  Weight in k (25 Mar 2021 07:07)  Weight in k (24 Mar 2021 05:29)  Weight in k.5 (23 Mar 2021 05:16)    % Weight Change: I>O    Pertinent Medications: MEDICATIONS  (STANDING):  ascorbic acid 2000 milliGRAM(s) Oral every 8 hours  atorvastatin 40 milliGRAM(s) Oral at bedtime  budesonide 160 MICROgram(s)/formoterol 4.5 MICROgram(s) Inhaler 2 Puff(s) Inhalation two times a day  carvedilol 6.25 milliGRAM(s) Oral every 12 hours  cefTRIAXone   IVPB      cefTRIAXone   IVPB 1000 milliGRAM(s) IV Intermittent every 24 hours  cholecalciferol 1000 Unit(s) Oral daily  dronedarone 400 milliGRAM(s) Oral two times a day  fluticasone propionate 50 MICROgram(s)/spray Nasal Spray 1 Spray(s) Both Nostrils every 12 hours  gabapentin 300 milliGRAM(s) Oral daily  influenza   Vaccine 0.5 milliLiter(s) IntraMuscular once  insulin glargine Injectable (LANTUS) 10 Unit(s) SubCutaneous at bedtime  insulin lispro (ADMELOG) corrective regimen sliding scale   SubCutaneous every 6 hours  lactated ringers. 1000 milliLiter(s) (70 mL/Hr) IV Continuous <Continuous>  levothyroxine Injectable 130 MICROGram(s) IV Push at bedtime  metroNIDAZOLE  IVPB 500 milliGRAM(s) IV Intermittent every 8 hours  montelukast 10 milliGRAM(s) Oral daily  oxybutynin 5 milliGRAM(s) Oral two times a day  pantoprazole  Injectable 40 milliGRAM(s) IV Push daily  rivaroxaban 20 milliGRAM(s) Oral daily  zinc sulfate 220 milliGRAM(s) Oral daily    MEDICATIONS  (PRN):  hydrALAZINE Injectable 5 milliGRAM(s) IV Push every 6 hours PRN sbp > 150  HYDROmorphone  Injectable 0.5 milliGRAM(s) IV Push every 4 hours PRN Severe Pain (7 - 10)  ondansetron Injectable 4 milliGRAM(s) IV Push every 6 hours PRN Nausea  zolpidem 5 milliGRAM(s) Oral at bedtime PRN Insomnia    Pertinent Labs:  Na134 mmol/L<L> Glu 191 mg/dL<H> K+ 4.7 mmol/L Cr  0.91 mg/dL BUN 11 mg/dL  Phos 1.8 mg/dL<L>  Alb 2.9 g/dL<L>  Chol 134 mg/dL LDL --    HDL 54 mg/dL Trig 82 mg/dL     CAPILLARY BLOOD GLUCOSE      POCT Blood Glucose.: 183 mg/dL (2021 12:15)  POCT Blood Glucose.: 128 mg/dL (2021 06:35)  POCT Blood Glucose.: 140 mg/dL (2021 23:17)  POCT Blood Glucose.: 157 mg/dL (2021 16:29)        Estimated Needs:   [x ] no change since previous assessment  [ ] recalculated:       Previous Nutrition Diagnosis:   [ ] Altered GI function  [x ]Inadequate Oral Intake [ ] Swallowing Difficulty   [ ] Altered nutrition related labs [ ] Increased Nutrient Needs [ ] Overweight/Obesity   [ ] Unintended Weight Loss [ ] Food & Nutrition Related Knowledge Deficit [ ] Malnutrition   [ ] Other:     Nutrition Diagnosis is [x ] ongoing  [ ] resolved [ ] not applicable         Interventions:   Recommend  [ ] Change Diet To:  [x ] Nutrition Supplement: Add Ensure clears TID to clears. Diet advancement per MD. MD to monitor. RD available.   [ ] Nutrition Support  [ ] Other:     Monitoring and Evaluation: [ x ] follow up per protocol
Discussed with ID Dr. Butcher, will start clarithromycin 500 BID for H. pylori in addition to amoxicillin and PPI. Clarithromycin is non-formulary so writer called pharmacy, clarithromycin has major drug interaction with multaq, statin, symbicort and flonase, discussed with ID Dr. Butcher, who recommended to hold all these meds for 14 days. Will hold meds for 14 days and prescribe clarithromycin.

## 2021-04-02 NOTE — PROGRESS NOTE ADULT - SUBJECTIVE AND OBJECTIVE BOX
CHIEF COMPLAINT:Patient is a 81y old  Female who presents with a chief complaint of symptomatic anemia.Pt appears comfortable.    	  REVIEW OF SYSTEMS:  CONSTITUTIONAL: No fever, weight loss, or fatigue  EYES: No eye pain, visual disturbances, or discharge  ENT:  No difficulty hearing, tinnitus, vertigo; No sinus or throat pain  NECK: No pain or stiffness  RESPIRATORY: No cough, wheezing, chills or hemoptysis; No Shortness of Breath  CARDIOVASCULAR: No chest pain, palpitations, passing out, dizziness, or leg swelling  GASTROINTESTINAL: No abdominal or epigastric pain. No nausea, vomiting, or hematemesis; No diarrhea or constipation. No melena or hematochezia.  GENITOURINARY: No dysuria, frequency, hematuria, or incontinence  NEUROLOGICAL: No headaches, memory loss, loss of strength, numbness, or tremors  SKIN: No itching, burning, rashes, or lesions   LYMPH Nodes: No enlarged glands  ENDOCRINE: No heat or cold intolerance; No hair loss  MUSCULOSKELETAL: No joint pain or swelling; No muscle, back, or extremity pain  PSYCHIATRIC: No depression, anxiety, mood swings, or difficulty sleeping  HEME/LYMPH: No easy bruising, or bleeding gums  ALLERGY AND IMMUNOLOGIC: No hives or eczema	      PHYSICAL EXAM:  T(C): 36.5 (04-02-21 @ 05:43), Max: 37.8 (04-02-21 @ 00:00)  HR: 65 (04-02-21 @ 09:00) (61 - 79)  BP: 209/95 (04-02-21 @ 05:43) (153/62 - 209/95)  RR: 22 (04-02-21 @ 09:00) (16 - 27)  SpO2: 98% (04-02-21 @ 09:00) (94% - 100%)  Wt(kg): --  I&O's Summary    01 Apr 2021 07:01  -  02 Apr 2021 07:00  --------------------------------------------------------  IN: 1549.8 mL / OUT: 1105 mL / NET: 444.8 mL        Appearance: Normal	  HEENT:   Normal oral mucosa, PERRL, EOMI	  Lymphatic: No lymphadenopathy  Cardiovascular: Normal S1 S2, No JVD, No murmurs, No edema  Respiratory: Lungs clear to auscultation	  Psychiatry: A & O x 3, Mood & affect appropriate  Gastrointestinal:  Soft, Non-tender  Skin: No rashes, No ecchymoses, No cyanosis	  Neurologic: Non-focal  Extremities: Normal range of motion, No clubbing, cyanosis or edema  Vascular: Peripheral pulses palpable 2+ bilaterally    MEDICATIONS  (STANDING):  ascorbic acid 2000 milliGRAM(s) Oral every 8 hours  atorvastatin 40 milliGRAM(s) Oral at bedtime  budesonide 160 MICROgram(s)/formoterol 4.5 MICROgram(s) Inhaler 2 Puff(s) Inhalation two times a day  carvedilol 6.25 milliGRAM(s) Oral every 12 hours  cefTRIAXone   IVPB      cefTRIAXone   IVPB 1000 milliGRAM(s) IV Intermittent every 24 hours  cholecalciferol 1000 Unit(s) Oral daily  dronedarone 400 milliGRAM(s) Oral two times a day  fluticasone propionate 50 MICROgram(s)/spray Nasal Spray 1 Spray(s) Both Nostrils every 12 hours  gabapentin 300 milliGRAM(s) Oral daily  influenza   Vaccine 0.5 milliLiter(s) IntraMuscular once  insulin glargine Injectable (LANTUS) 10 Unit(s) SubCutaneous at bedtime  insulin lispro (ADMELOG) corrective regimen sliding scale   SubCutaneous every 6 hours  lactated ringers. 1000 milliLiter(s) (70 mL/Hr) IV Continuous <Continuous>  levothyroxine Injectable 130 MICROGram(s) IV Push at bedtime  metroNIDAZOLE  IVPB 500 milliGRAM(s) IV Intermittent every 8 hours  montelukast 10 milliGRAM(s) Oral daily  oxybutynin 5 milliGRAM(s) Oral two times a day  pantoprazole  Injectable 40 milliGRAM(s) IV Push daily  rivaroxaban 20 milliGRAM(s) Oral daily  zinc sulfate 220 milliGRAM(s) Oral daily      	  	  LABS:	 	                         10.0   7.12  )-----------( 253      ( 02 Apr 2021 05:06 )             30.9     04-02    137  |  106  |  10  ----------------------------<  138<H>  4.3   |  24  |  0.81    Ca    8.4      02 Apr 2021 05:06  Phos  2.0     04-02  Mg     1.8     04-02    TPro  6.1  /  Alb  2.4<L>  /  TBili  0.3  /  DBili  x   /  AST  34  /  ALT  38  /  AlkPhos  55  04-02    proBNP: Serum Pro-Brain Natriuretic Peptide: 1155 pg/mL (03-21 @ 07:15)    Lipid Profile: Cholesterol 134  HDL 54  TG 82  Cholesterol 145  HDL 55  TG 86    HgA1c:   TSH: Thyroid Stimulating Hormone, Serum: 2.67 uU/mL (03-22 @ 07:34)  Thyroid Stimulating Hormone, Serum: 3.61 uU/mL (03-21 @ 07:15)

## 2021-04-02 NOTE — PROGRESS NOTE ADULT - SUBJECTIVE AND OBJECTIVE BOX
Patient is seen and examined at the bed side, is afebrile. She has transferred out of ICU.      REVIEW OF SYSTEMS: All other review systems are negative      ALLERGIES: No Known Allergies      Vital Signs Last 24 Hrs  T(C): 36.7 (2021 17:52), Max: 37.8 (2021 00:00)  T(F): 98 (2021 17:52), Max: 100 (2021 00:00)  HR: 84 (2021 17:52) (62 - 84)  BP: 148/62 (2021 17:52) (140/84 - 209/95)  BP(mean): 84 (2021 17:00) (83 - 137)  RR: 18 (2021 17:52) (17 - 23)  SpO2: 96% (2021 17:52) (94% - 100%)      PHYSICAL EXAM:  GENERAL: Not in distress   CHEST/LUNG: Not using accessory muscles   HEART: s1 and s2 present  ABDOMEN:  Incision sites looks clean and RUQ has a drain in placed  EXTREMITIES: No pedal  edema  CNS: Awake and Alert      LABS:                        10.0   7.12  )-----------( 253      ( 2021 05:06 )             30.9                           9.6    7.56  )-----------( 243      ( 2021 05:33 )             29.9                             7.8    13.47 )-----------( 273      ( 25 Mar 2021 06:40 )             24.2     04-02    134<L>  |  104  |  11  ----------------------------<  191<H>  4.7   |  21<L>  |  0.91    Ca    8.3<L>      2021 13:41  Phos  1.8     04-02  Mg     1.8     04-02    TPro  6.5  /  Alb  2.9<L>  /  TBili  0.3  /  DBili  x   /  AST  30  /  ALT  40  /  AlkPhos  60  04-02    04-01    139  |  109<H>  |  11  ----------------------------<  126<H>  4.6   |  23  |  0.91    Ca    8.1<L>      2021 05:33  Phos  2.3     04-  Mg     1.6     -    TPro  5.8<L>  /  Alb  2.5<L>  /  TBili  0.2  /  DBili  x   /  AST  57<H>  /  ALT  39  /  AlkPhos  52  04--    138  |  103  |  35<H>  ----------------------------<  151<H>  4.0   |  25  |  1.57<H>    Ca    8.4      24 Mar 2021 07:00  Phos  3.9     -  Mg     2.2         TPro  6.7  /  Alb  3.1<L>  /  TBili  0.3  /  DBili  x   /  AST  16  /  ALT  22  /  AlkPhos  71  -24      CAPILLARY BLOOD GLUCOSE  POCT Blood Glucose.: 262 mg/dL (22 Mar 2021 16:34)  POCT Blood Glucose.: 150 mg/dL (22 Mar 2021 12:45)  POCT Blood Glucose.: 147 mg/dL (22 Mar 2021 10:56)  POCT Blood Glucose.: 196 mg/dL (22 Mar 2021 07:40      Urinalysis Basic - ( 20 Mar 2021 20:54 )  Color: Yellow / Appearance: Clear / S.015 / pH: x  Gluc: x / Ketone: Negative  / Bili: Negative / Urobili: Negative   Blood: x / Protein: Negative / Nitrite: Negative   Leuk Esterase: Negative / RBC: x / WBC x   Sq Epi: x / Non Sq Epi: x / Bacteria: x        MEDICATIONS  (STANDING):    ascorbic acid 2000 milliGRAM(s) Oral every 8 hours  atorvastatin 40 milliGRAM(s) Oral at bedtime  budesonide 160 MICROgram(s)/formoterol 4.5 MICROgram(s) Inhaler 2 Puff(s) Inhalation two times a day  carvedilol 6.25 milliGRAM(s) Oral every 12 hours  cefTRIAXone   IVPB      cefTRIAXone   IVPB 1000 milliGRAM(s) IV Intermittent every 24 hours  cholecalciferol 1000 Unit(s) Oral daily  dronedarone 400 milliGRAM(s) Oral two times a day  fluticasone propionate 50 MICROgram(s)/spray Nasal Spray 1 Spray(s) Both Nostrils every 12 hours  gabapentin 300 milliGRAM(s) Oral daily  influenza   Vaccine 0.5 milliLiter(s) IntraMuscular once  insulin glargine Injectable (LANTUS) 10 Unit(s) SubCutaneous at bedtime  insulin lispro (ADMELOG) corrective regimen sliding scale   SubCutaneous every 6 hours  lactated ringers. 1000 milliLiter(s) (70 mL/Hr) IV Continuous <Continuous>  levothyroxine Injectable 130 MICROGram(s) IV Push at bedtime  metroNIDAZOLE  IVPB 500 milliGRAM(s) IV Intermittent every 8 hours  montelukast 10 milliGRAM(s) Oral daily  oxybutynin 5 milliGRAM(s) Oral two times a day  pantoprazole  Injectable 40 milliGRAM(s) IV Push daily  rivaroxaban 20 milliGRAM(s) Oral daily  zinc sulfate 220 milliGRAM(s) Oral daily        RADIOLOGY & ADDITIONAL TESTS:    3/29/21 : Xray Abdomen 2 Views (21 @ 19:50) : No evidence of free air. Right surgical clips as noted.    < from: 12 Lead ECG (21 @ 16:02) >  Ventricular Rate 73 BPM    Atrial Rate 73 BPM    P-R Interval 188 ms    QRS Duration 126 ms    Q-T Interval 414 ms    QTC Calculation(Bazett) 456 ms    P Axis 59 degrees    R Axis -57 degrees    T Axis 56 degrees    < end of copied text >    Surgical Pathology Report (21 @ 10:15)   Surgical Pathology Report:   ACCESSION No: [id number]   KWAME MOSQUERA 2   Surgical Final Report   Final Diagnosis   1. Ascending colon polyp; hot snare:   - Invasive colonic adenocarcinoma arising in association with   tubular adenoma. See comment.   - Fragments of tubular adenoma (the smallest polyps).   2. Hepatic flexure polyp; biopsy:   - Tubular adenoma.   3. Descending colon polyp; biopsy:   - Inflammatory polyps.   Verified by: Priscila Arana MD   (Electronic Signature)   Reported on: 21 11:11 EDT, [facility],     Surgical Pathology Report (21 @ 11:37)   Surgical Pathology Report: , ACCESSION No: [id number]   KWAME MOSQUERA 2   Surgical Final Report , Final Diagnosis   1. Gastric antrum, biopsy: Chronic active gastritis, diffuse,   with focal lymphoid aggregate; H. Pylori associated. (Special  stain for H. Pylori is positive)   2. Esophagus biopsy: Patchy acute esophagitis. Special stain for  fungi is negative.   Verified by: Mariam Perez M.D. Surgical Pathology Report (21 @ 11:37)     3/28/21 : Xray Knee 3 Views, Right (21 @ 14:20) >    IMPRESSION: Round mass anterior to the patella is noted. This is new since 2018.      3/20/21 : CT Angio Abdomen and Pelvis w/ IV Cont (21 @ 22:55) No CT evidence of acute gastrointestinal hemorrhage. Few scattered colonic diverticula.    3/20/21 : CT Angio Abdomen and Pelvis w/ IV Cont (21 @ 22:55) LOWER CHEST: Partially imaged pacemaker leads. Mitral annular calcification.    3/20/21 : Xray Chest 1 View- PORTABLE-Urgent (21 @ 16:38): There is left-sided defibrillator. The heart is normal in size. The lungs are clear.          MICROBIOLOGY DATA:    COVID-19 Drew Domain Antibody (21 @ 11:07)   COVID-19 Drew Domain Antibody Result: >250.00:    Culture - Blood (21 @ 21:50)   Specimen Source: .Blood Blood-Venous   Culture Results: No growth to date.     Culture - Blood (21 @ 21:50)   Specimen Source: .Blood Blood-Venous   Culture Results: No growth to date.     COVID-19 PCR . (21 @ 16:32)   COVID-19 PCR: NotDetec:     MRSA/MSSA PCR (20 @ 14:34)   MRSA PCR Result.: NotDetec:     FLU A B RSV Detection by PCR (20 @ 20:00)   Flu A Result: NotDetec              Assessment and Plan:   · Assessment	  Patient is a 81y old  Female from home, ambulates with walker, lives with son, with PMHx of HFpEF, CAD (s/p stents), chronic venous stasis, DM, HTN and Afib (on Xarelto, s/p PPM), chronic bronchitis with asthmatic component ( on Oxygen PRN at home ), JOSE DANIEL ( supposed to be on nocturnal CPAP , but non compliant), now presents to the ER for evaluation of hypotension, SOB and  fatigue for almost 2 weeks. Per daughter and granddaughter patient has been complaining of increasing fatigue and sob for last few weeks, seemed to be pale , patient endorses dark loose bowel movement. Patient has chronic diarrhea, but no abdominal pain. She has evaluated by GI team and now the ID consult requested to assist with further evaluation of chronic diarrhea.     #  H. Pylori associated Chronic active gastritis, diffuse, with focal lymphoid aggregate, DX 3/22 by Antrum biopsy  # Chronic diarrhea- C.diff vs Malignancy - NO Malignancy Antrum biopsy   # Symptomatic Anemia  - s/p EGD and antrum biopsy shows H. Pylori associated Chronic active gastritis, diffuse, with focal lymphoid aggregate  # COVID negative 3/20/21, positive 3/27 and negative on 3/28  - d/w Dr. Spence regarding exposure VS false positive, in th esetting of positive Titers  # S/p EGD 3/22  and s/p Colonoscopy 3/25- pathology shows - Invasive colonic adenocarcinoma arising in association with  tubular adenoma  # The Right knee xray shows Round mass anterior to the patella.  # s/p Laparoscopic assisted right hemicolectomy , 3/31/21    Would recommend:    1. Follow up pathology   2. Continue Ceftriaxone and Flagyl as per Surgery protocol  3. Monitor kidney function  4. Pain management as needed    Attending Attestation:    Spent more than 45 minutes on total encounter, more than 50 % of the visit was spent counseling and/or coordinating care by the Attending physician.     Patient is seen and examined at the bed side, is afebrile. She has transferred out of ICU, doing better.      REVIEW OF SYSTEMS: All other review systems are negative      ALLERGIES: No Known Allergies      Vital Signs Last 24 Hrs  T(C): 36.7 (2021 17:52), Max: 37.8 (2021 00:00)  T(F): 98 (2021 17:52), Max: 100 (2021 00:00)  HR: 84 (2021 17:52) (62 - 84)  BP: 148/62 (2021 17:52) (140/84 - 209/95)  BP(mean): 84 (2021 17:00) (83 - 137)  RR: 18 (2021 17:52) (17 - 23)  SpO2: 96% (2021 17:52) (94% - 100%)      PHYSICAL EXAM:  GENERAL: Not in distress   CHEST/LUNG: Not using accessory muscles   HEART: s1 and s2 present  ABDOMEN:  Incision sites looks clean and RUQ has a drain in placed  EXTREMITIES: No pedal  edema  CNS: Awake and Alert      LABS:                        10.0   7.12  )-----------( 253      ( 2021 05:06 )             30.9                           9.6    7.56  )-----------( 243      ( 2021 05:33 )             29.9                         7.8    13.47 )-----------( 273      ( 25 Mar 2021 06:40 )             24.2         04-02    134<L>  |  104  |  11  ----------------------------<  191<H>  4.7   |  21<L>  |  0.91    Ca    8.3<L>      2021 13:41  Phos  1.8     04-02  Mg     1.8     04-02    TPro  6.5  /  Alb  2.9<L>  /  TBili  0.3  /  DBili  x   /  AST  30  /  ALT  40  /  AlkPhos  60  04-02        03-24    138  |  103  |  35<H>  ----------------------------<  151<H>  4.0   |  25  |  1.57<H>    Ca    8.4      24 Mar 2021 07:00  Phos  3.9     03-24  Mg     2.2     03-24    TPro  6.7  /  Alb  3.1<L>  /  TBili  0.3  /  DBili  x   /  AST  16  /  ALT  22  /  AlkPhos  71  03-24      CAPILLARY BLOOD GLUCOSE  POCT Blood Glucose.: 262 mg/dL (22 Mar 2021 16:34)  POCT Blood Glucose.: 150 mg/dL (22 Mar 2021 12:45)  POCT Blood Glucose.: 147 mg/dL (22 Mar 2021 10:56)  POCT Blood Glucose.: 196 mg/dL (22 Mar 2021 07:40      Urinalysis Basic - ( 20 Mar 2021 20:54 )  Color: Yellow / Appearance: Clear / S.015 / pH: x  Gluc: x / Ketone: Negative  / Bili: Negative / Urobili: Negative   Blood: x / Protein: Negative / Nitrite: Negative   Leuk Esterase: Negative / RBC: x / WBC x   Sq Epi: x / Non Sq Epi: x / Bacteria: x        MEDICATIONS  (STANDING):    ascorbic acid 2000 milliGRAM(s) Oral every 8 hours  atorvastatin 40 milliGRAM(s) Oral at bedtime  budesonide 160 MICROgram(s)/formoterol 4.5 MICROgram(s) Inhaler 2 Puff(s) Inhalation two times a day  carvedilol 6.25 milliGRAM(s) Oral every 12 hours  cefTRIAXone   IVPB      cefTRIAXone   IVPB 1000 milliGRAM(s) IV Intermittent every 24 hours  cholecalciferol 1000 Unit(s) Oral daily  dronedarone 400 milliGRAM(s) Oral two times a day  fluticasone propionate 50 MICROgram(s)/spray Nasal Spray 1 Spray(s) Both Nostrils every 12 hours  gabapentin 300 milliGRAM(s) Oral daily  influenza   Vaccine 0.5 milliLiter(s) IntraMuscular once  insulin glargine Injectable (LANTUS) 10 Unit(s) SubCutaneous at bedtime  insulin lispro (ADMELOG) corrective regimen sliding scale   SubCutaneous every 6 hours  lactated ringers. 1000 milliLiter(s) (70 mL/Hr) IV Continuous <Continuous>  levothyroxine Injectable 130 MICROGram(s) IV Push at bedtime  metroNIDAZOLE  IVPB 500 milliGRAM(s) IV Intermittent every 8 hours  montelukast 10 milliGRAM(s) Oral daily  oxybutynin 5 milliGRAM(s) Oral two times a day  pantoprazole  Injectable 40 milliGRAM(s) IV Push daily  rivaroxaban 20 milliGRAM(s) Oral daily  zinc sulfate 220 milliGRAM(s) Oral daily        RADIOLOGY & ADDITIONAL TESTS:    3/29/21 : Xray Abdomen 2 Views (21 @ 19:50) : No evidence of free air. Right surgical clips as noted.    < from: 12 Lead ECG (21 @ 16:02) >  Ventricular Rate 73 BPM    Atrial Rate 73 BPM    P-R Interval 188 ms    QRS Duration 126 ms    Q-T Interval 414 ms    QTC Calculation(Bazett) 456 ms    P Axis 59 degrees    R Axis -57 degrees    T Axis 56 degrees    < end of copied text >    Surgical Pathology Report (21 @ 10:15)   Surgical Pathology Report:   ACCESSION No: [id number]   KWAME MOSQUERA 2   Surgical Final Report   Final Diagnosis   1. Ascending colon polyp; hot snare:   - Invasive colonic adenocarcinoma arising in association with   tubular adenoma. See comment.   - Fragments of tubular adenoma (the smallest polyps).   2. Hepatic flexure polyp; biopsy:   - Tubular adenoma.   3. Descending colon polyp; biopsy:   - Inflammatory polyps.   Verified by: Priscila Arana MD   (Electronic Signature)   Reported on: 21 11:11 EDT, [facility],     Surgical Pathology Report (21 @ 11:37)   Surgical Pathology Report: , ACCESSION No: [id number]   KWAME MOSQUERA 2   Surgical Final Report , Final Diagnosis   1. Gastric antrum, biopsy: Chronic active gastritis, diffuse,   with focal lymphoid aggregate; H. Pylori associated. (Special  stain for H. Pylori is positive)   2. Esophagus biopsy: Patchy acute esophagitis. Special stain for  fungi is negative.   Verified by: Mariam Perez M.D. Surgical Pathology Report (21 @ 11:37)     3/28/21 : Xray Knee 3 Views, Right (21 @ 14:20) >    IMPRESSION: Round mass anterior to the patella is noted. This is new since 2018.      3/20/21 : CT Angio Abdomen and Pelvis w/ IV Cont (21 @ 22:55) No CT evidence of acute gastrointestinal hemorrhage. Few scattered colonic diverticula.    3/20/21 : CT Angio Abdomen and Pelvis w/ IV Cont (21 @ 22:55) LOWER CHEST: Partially imaged pacemaker leads. Mitral annular calcification.    3/20/21 : Xray Chest 1 View- PORTABLE-Urgent (21 @ 16:38): There is left-sided defibrillator. The heart is normal in size. The lungs are clear.          MICROBIOLOGY DATA:    COVID-19 Drew Domain Antibody (21 @ 11:07)   COVID-19 Drew Domain Antibody Result: >250.00:    Culture - Blood (21 @ 21:50)   Specimen Source: .Blood Blood-Venous   Culture Results: No growth to date.     Culture - Blood (21 @ 21:50)   Specimen Source: .Blood Blood-Venous   Culture Results: No growth to date.     COVID-19 PCR . (21 @ 16:32)   COVID-19 PCR: NotDetec:     MRSA/MSSA PCR (20 @ 14:34)   MRSA PCR Result.: NotDetec:     FLU A B RSV Detection by PCR (20 @ 20:00)   Flu A Result: NotDetec

## 2021-04-02 NOTE — PROGRESS NOTE ADULT - ASSESSMENT
79 years old Swedish-speaking female from home, ambulates with walker, lives with son, with PMHx of HFpEF, CAD (s/p stents), chronic venous stasis, DM, HTN and Afib (on Xarelto, s/p PPM), chronic bronchitis with asthmatic component ( on Oxygen PRN at home ), JOSE DANIEL ( supposed to be on nocturnal CPAP , but non compliant) presents to the ED with chief complaint of hypotension , SOB and  fatigue for almost 2 weeks.,symptomatic Fe def anemia with Colon ca-s/p resection.  1.Transfer to floor.  2.Liquid diet.  3.Hypothyroidsm-synthroid.  4.HTN-b blocker.  5.PAF-xarelto,multaq.  6.GI prophylaxis.

## 2021-04-03 LAB
ALBUMIN SERPL ELPH-MCNC: 2.5 G/DL — LOW (ref 3.5–5)
ALP SERPL-CCNC: 56 U/L — SIGNIFICANT CHANGE UP (ref 40–120)
ALT FLD-CCNC: 31 U/L DA — SIGNIFICANT CHANGE UP (ref 10–60)
ANION GAP SERPL CALC-SCNC: 9 MMOL/L — SIGNIFICANT CHANGE UP (ref 5–17)
AST SERPL-CCNC: 27 U/L — SIGNIFICANT CHANGE UP (ref 10–40)
BASOPHILS # BLD AUTO: 0.02 K/UL — SIGNIFICANT CHANGE UP (ref 0–0.2)
BASOPHILS NFR BLD AUTO: 0.3 % — SIGNIFICANT CHANGE UP (ref 0–2)
BILIRUB SERPL-MCNC: 0.4 MG/DL — SIGNIFICANT CHANGE UP (ref 0.2–1.2)
BUN SERPL-MCNC: 9 MG/DL — SIGNIFICANT CHANGE UP (ref 7–18)
CALCIUM SERPL-MCNC: 8.2 MG/DL — LOW (ref 8.4–10.5)
CHLORIDE SERPL-SCNC: 105 MMOL/L — SIGNIFICANT CHANGE UP (ref 96–108)
CO2 SERPL-SCNC: 22 MMOL/L — SIGNIFICANT CHANGE UP (ref 22–31)
CREAT SERPL-MCNC: 0.75 MG/DL — SIGNIFICANT CHANGE UP (ref 0.5–1.3)
EOSINOPHIL # BLD AUTO: 0.15 K/UL — SIGNIFICANT CHANGE UP (ref 0–0.5)
EOSINOPHIL NFR BLD AUTO: 2.1 % — SIGNIFICANT CHANGE UP (ref 0–6)
GLUCOSE BLDC GLUCOMTR-MCNC: 158 MG/DL — HIGH (ref 70–99)
GLUCOSE BLDC GLUCOMTR-MCNC: 158 MG/DL — HIGH (ref 70–99)
GLUCOSE BLDC GLUCOMTR-MCNC: 171 MG/DL — HIGH (ref 70–99)
GLUCOSE BLDC GLUCOMTR-MCNC: 215 MG/DL — HIGH (ref 70–99)
GLUCOSE SERPL-MCNC: 149 MG/DL — HIGH (ref 70–99)
HCT VFR BLD CALC: 30.5 % — LOW (ref 34.5–45)
HGB BLD-MCNC: 9.8 G/DL — LOW (ref 11.5–15.5)
IMM GRANULOCYTES NFR BLD AUTO: 0.4 % — SIGNIFICANT CHANGE UP (ref 0–1.5)
LYMPHOCYTES # BLD AUTO: 1.27 K/UL — SIGNIFICANT CHANGE UP (ref 1–3.3)
LYMPHOCYTES # BLD AUTO: 17.4 % — SIGNIFICANT CHANGE UP (ref 13–44)
MAGNESIUM SERPL-MCNC: 1.8 MG/DL — SIGNIFICANT CHANGE UP (ref 1.6–2.6)
MCHC RBC-ENTMCNC: 27.3 PG — SIGNIFICANT CHANGE UP (ref 27–34)
MCHC RBC-ENTMCNC: 32.1 GM/DL — SIGNIFICANT CHANGE UP (ref 32–36)
MCV RBC AUTO: 85 FL — SIGNIFICANT CHANGE UP (ref 80–100)
MONOCYTES # BLD AUTO: 0.69 K/UL — SIGNIFICANT CHANGE UP (ref 0–0.9)
MONOCYTES NFR BLD AUTO: 9.5 % — SIGNIFICANT CHANGE UP (ref 2–14)
NEUTROPHILS # BLD AUTO: 5.13 K/UL — SIGNIFICANT CHANGE UP (ref 1.8–7.4)
NEUTROPHILS NFR BLD AUTO: 70.3 % — SIGNIFICANT CHANGE UP (ref 43–77)
NRBC # BLD: 0 /100 WBCS — SIGNIFICANT CHANGE UP (ref 0–0)
PHOSPHATE SERPL-MCNC: 1.6 MG/DL — LOW (ref 2.5–4.5)
PLATELET # BLD AUTO: 252 K/UL — SIGNIFICANT CHANGE UP (ref 150–400)
POTASSIUM SERPL-MCNC: 4.1 MMOL/L — SIGNIFICANT CHANGE UP (ref 3.5–5.3)
POTASSIUM SERPL-SCNC: 4.1 MMOL/L — SIGNIFICANT CHANGE UP (ref 3.5–5.3)
PROT SERPL-MCNC: 6.1 G/DL — SIGNIFICANT CHANGE UP (ref 6–8.3)
RBC # BLD: 3.59 M/UL — LOW (ref 3.8–5.2)
RBC # FLD: 17.3 % — HIGH (ref 10.3–14.5)
SODIUM SERPL-SCNC: 136 MMOL/L — SIGNIFICANT CHANGE UP (ref 135–145)
WBC # BLD: 7.29 K/UL — SIGNIFICANT CHANGE UP (ref 3.8–10.5)
WBC # FLD AUTO: 7.29 K/UL — SIGNIFICANT CHANGE UP (ref 3.8–10.5)

## 2021-04-03 RX ORDER — GABAPENTIN 400 MG/1
300 CAPSULE ORAL ONCE
Refills: 0 | Status: COMPLETED | OUTPATIENT
Start: 2021-04-03 | End: 2021-04-03

## 2021-04-03 RX ORDER — SODIUM,POTASSIUM PHOSPHATES 278-250MG
1 POWDER IN PACKET (EA) ORAL THREE TIMES A DAY
Refills: 0 | Status: DISCONTINUED | OUTPATIENT
Start: 2021-04-03 | End: 2021-04-06

## 2021-04-03 RX ORDER — CARVEDILOL PHOSPHATE 80 MG/1
12.5 CAPSULE, EXTENDED RELEASE ORAL EVERY 12 HOURS
Refills: 0 | Status: DISCONTINUED | OUTPATIENT
Start: 2021-04-03 | End: 2021-04-06

## 2021-04-03 RX ORDER — LEVOTHYROXINE SODIUM 125 MCG
175 TABLET ORAL DAILY
Refills: 0 | Status: DISCONTINUED | OUTPATIENT
Start: 2021-04-03 | End: 2021-04-06

## 2021-04-03 RX ADMIN — Medication 1 SPRAY(S): at 17:08

## 2021-04-03 RX ADMIN — DRONEDARONE 400 MILLIGRAM(S): 400 TABLET, FILM COATED ORAL at 17:08

## 2021-04-03 RX ADMIN — ALBUTEROL 2 PUFF(S): 90 AEROSOL, METERED ORAL at 21:29

## 2021-04-03 RX ADMIN — CARVEDILOL PHOSPHATE 12.5 MILLIGRAM(S): 80 CAPSULE, EXTENDED RELEASE ORAL at 17:08

## 2021-04-03 RX ADMIN — CEFTRIAXONE 100 MILLIGRAM(S): 500 INJECTION, POWDER, FOR SOLUTION INTRAMUSCULAR; INTRAVENOUS at 16:05

## 2021-04-03 RX ADMIN — Medication 5 MILLIGRAM(S): at 17:08

## 2021-04-03 RX ADMIN — Medication 100 MILLIGRAM(S): at 05:19

## 2021-04-03 RX ADMIN — Medication 100 MILLIGRAM(S): at 13:00

## 2021-04-03 RX ADMIN — ALBUTEROL 2 PUFF(S): 90 AEROSOL, METERED ORAL at 08:59

## 2021-04-03 RX ADMIN — BUDESONIDE AND FORMOTEROL FUMARATE DIHYDRATE 2 PUFF(S): 160; 4.5 AEROSOL RESPIRATORY (INHALATION) at 21:28

## 2021-04-03 RX ADMIN — ZINC SULFATE TAB 220 MG (50 MG ZINC EQUIVALENT) 220 MILLIGRAM(S): 220 (50 ZN) TAB at 11:13

## 2021-04-03 RX ADMIN — Medication 2: at 16:48

## 2021-04-03 RX ADMIN — PANTOPRAZOLE SODIUM 40 MILLIGRAM(S): 20 TABLET, DELAYED RELEASE ORAL at 11:13

## 2021-04-03 RX ADMIN — CARVEDILOL PHOSPHATE 6.25 MILLIGRAM(S): 80 CAPSULE, EXTENDED RELEASE ORAL at 05:17

## 2021-04-03 RX ADMIN — RIVAROXABAN 20 MILLIGRAM(S): KIT at 11:12

## 2021-04-03 RX ADMIN — Medication 1 PACKET(S): at 13:00

## 2021-04-03 RX ADMIN — MONTELUKAST 10 MILLIGRAM(S): 4 TABLET, CHEWABLE ORAL at 11:12

## 2021-04-03 RX ADMIN — Medication 2: at 08:01

## 2021-04-03 RX ADMIN — Medication 2000 MILLIGRAM(S): at 21:29

## 2021-04-03 RX ADMIN — ATORVASTATIN CALCIUM 40 MILLIGRAM(S): 80 TABLET, FILM COATED ORAL at 21:29

## 2021-04-03 RX ADMIN — Medication 1 PACKET(S): at 21:30

## 2021-04-03 RX ADMIN — HYDROMORPHONE HYDROCHLORIDE 0.5 MILLIGRAM(S): 2 INJECTION INTRAMUSCULAR; INTRAVENOUS; SUBCUTANEOUS at 16:45

## 2021-04-03 RX ADMIN — GABAPENTIN 300 MILLIGRAM(S): 400 CAPSULE ORAL at 03:10

## 2021-04-03 RX ADMIN — ALBUTEROL 2 PUFF(S): 90 AEROSOL, METERED ORAL at 02:13

## 2021-04-03 RX ADMIN — BUDESONIDE AND FORMOTEROL FUMARATE DIHYDRATE 2 PUFF(S): 160; 4.5 AEROSOL RESPIRATORY (INHALATION) at 09:43

## 2021-04-03 RX ADMIN — Medication 4: at 11:36

## 2021-04-03 RX ADMIN — DRONEDARONE 400 MILLIGRAM(S): 400 TABLET, FILM COATED ORAL at 05:17

## 2021-04-03 RX ADMIN — HYDROMORPHONE HYDROCHLORIDE 0.5 MILLIGRAM(S): 2 INJECTION INTRAMUSCULAR; INTRAVENOUS; SUBCUTANEOUS at 17:09

## 2021-04-03 RX ADMIN — Medication 5 MILLIGRAM(S): at 05:17

## 2021-04-03 RX ADMIN — Medication 2000 MILLIGRAM(S): at 13:00

## 2021-04-03 RX ADMIN — ALBUTEROL 2 PUFF(S): 90 AEROSOL, METERED ORAL at 14:12

## 2021-04-03 RX ADMIN — INSULIN GLARGINE 10 UNIT(S): 100 INJECTION, SOLUTION SUBCUTANEOUS at 21:29

## 2021-04-03 RX ADMIN — Medication 1 SPRAY(S): at 05:17

## 2021-04-03 RX ADMIN — Medication 100 MILLIGRAM(S): at 21:29

## 2021-04-03 RX ADMIN — Medication 1000 UNIT(S): at 11:13

## 2021-04-03 RX ADMIN — Medication 2000 MILLIGRAM(S): at 05:17

## 2021-04-03 RX ADMIN — Medication 175 MICROGRAM(S): at 11:12

## 2021-04-03 RX ADMIN — GABAPENTIN 300 MILLIGRAM(S): 400 CAPSULE ORAL at 11:13

## 2021-04-03 RX ADMIN — Medication 2: at 21:30

## 2021-04-03 NOTE — PROGRESS NOTE ADULT - ASSESSMENT
79 years old [place]inean-speaking female from home, ambulates with walker, lives with son, with PMHx of HFpEF, CAD (s/p stents), chronic venous stasis, DM, HTN and Afib (on Xarelto, s/p PPM), chronic bronchitis with asthmatic component ( on Oxygen PRN at home ), JOSE DANIEL ( supposed to be on nocturnal CPAP , but non compliant) presents to the ED with chief complaint of hypotension ,  SOB and  fatigue for almost 2 weeks. Per daughter and granddaughter patient has been complaining of increasing fatigue and sob for last few weeks, seemed to be pale , patient endorses dark loose bowel movement for quite a while.  pt had virtual  colonoscopy 1 year ago and the result was questionable. She never followed up. Per daughter pt refused all kinds of meat including chicken, meat , however denies any dysphasia. Patient  also endorses decreased appetite.  patient has chronic diarrhea. Patient Denies nausea, vomiting, abdominal pain, SOB, chest pain, CHAMBERLAIN, palpitations, dizziness, headache, cough, wheezing, joint pain or swelling, fever, chills.      GOC: Discussed with daughter,  mentioned she want stobe DNI , on discussion with patient, patient was so distressed about urge incontinency and was complaining of pain and IV infiltrations, so GOC discussion deferred to primary team.    (20 Mar 2021 19:53)    Hospital course: Pt was admitted to medicine floor for symptomatic anemia secondary to GI bleed. Colonoscopy was done during hospital stay. 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 Pt underwent laparoscopic rught hemicolectomy today 3/31 with estimated 20 ml blood loss. Pt was extubated in OR and brought to ICU for post operative care as pt has multiple comorbidities.    icu coarse:   Pt was brought to icu for post op monitoring  s/p R hemicolectomy 3/31. Pt started passing gas and was Advance to clear liquids. Pt urine output was monitored. Pt is able to tolerate diet. Pt will be transferred to surgery service under Dr. Rocha service. resumed home meds.          78 y/o  [place]an-speaking female  with PMH  of HFpEF, CAD (s/p stents), chronic venous stasis, DM, HTN and Afib (on Xarelto, s/p PPM), chronic bronchitis with asthmatic component ( on Oxygen PRN at home ), JOSE DANIEL ( supposed to be on nocturnal CPAP , but non compliant) presented to the ED with chief complaint of hypotension ,  SOB and  fatigue for almost 2 weeks.   Pt was admitted to medicine for symptomatic anemia. Colonoscopy showed colon mass. Biopsy result showed invasive adenocarcinoma of colon. Pt underwent laparoscopic right hemicolectomy today. POD # 0  Transferred to ICU for post operative care.    Assessment  1. S/P Right Hemicolectomy   2. Ascending colon invasive adenocarcinoma  3. Symptomatic anemia  4. Afib   5. Diabetes Mellitus  6. History of Asthma  7. Hypertension  8.History of coronary artery disease  9.JOSE DANIEL  10. Obesity   11. H. Pylori infection     Plan:      NEURO;  - Pt is awake and alert  -No acute issues  -IV Dilaudid for abdominal pain    CARDIOVASCULAR  #Afib   -Hold  full dose anticoagulation heparin drip as per cardio as pt in NOrmal sinus , will resume oral meds after started on diet  -Resume carvedilol when diet is resumed  -Continue telemonitoring    # History of Hypertension  -Resume Multaq and Coreg after resuming diet  for now started on metoprolol 5mg iv pushes standing with parameters  -Monitor blood pressure      PULMONARY  # No acute issues  -Pt is breathing well on ambient air    GASTROINTESTINAL  # Ascending colon invasive adenocarcinoma  -S/P Laparoscopic right hemicolectomy   - Pain management   -Continue IV fluids  -Keep NPO till bowel function returns as per surgery  -Protonix for GI prophylaxis    RENAL  # SRIDHAR  resolved  -Likely pre renal  -Continue IV fluids  -Monitor BMP avoid nephrotoxic medications    INFECTIOUS DISEASE  # H.Pylori infection  -Pt is on iv metronidazole and amoxicillin fro H. Pylori infection  ID:      ENDOCRINE  #DM  -Continue Lantus 10 U at bedtime  and moderate sliding scale  -Monitor blood glucose    HEME  # Anemia  -Secondary to GI Blood loss  -S/P 3 U PRBC so far  -Monitor CBC , pt/inr      -Monitor, Replete to K>4 and Mg>2  -Diet: NPO    PROPHYLAXIS  -DVT:  Heparin s/c for DVT prophylaxis   -GI: Protonix for GI prophylaxis    DISPO: Transferred to ICU    CODE STATUS: Full CODE

## 2021-04-03 NOTE — PROGRESS NOTE ADULT - SUBJECTIVE AND OBJECTIVE BOX
Patient is seen and examined at the bed side, is afebrile. She has transferred out of ICU, doing better.      REVIEW OF SYSTEMS: All other review systems are negative      ALLERGIES: No Known Allergies        Vital Signs Last 24 Hrs  T(C): 36.6 (2021 18:00), Max: 37.4 (2021 05:10)  T(F): 97.8 (2021 18:00), Max: 99.4 (2021 05:10)  HR: 63 (2021 18:00) (62 - 68)  BP: 143/53 (2021 18:00) (133/60 - 157/70)  BP(mean): 78 (2021 00:17) (78 - 99)  RR: 18 (2021 18:00) (18 - 20)  SpO2: 97% (2021 18:00) (95% - 99%)      PHYSICAL EXAM:  GENERAL: Not in distress   CHEST/LUNG: Not using accessory muscles   HEART: s1 and s2 present  ABDOMEN:  Incision sites looks clean and RUQ has a drain in placed  EXTREMITIES: No pedal  edema  CNS: Awake and Alert      LABS:                        9.8    7.29  )-----------( 252      ( 2021 06:30 )             30.5                           10.0   7.12  )-----------( 253      ( 2021 05:06 )             30.9                         7.8    13.47 )-----------( 273      ( 25 Mar 2021 06:40 )             24.2         04-03    136  |  105  |  9   ----------------------------<  149<H>  4.1   |  22  |  0.75    Ca    8.2<L>      2021 06:30  Phos  1.6     04-03  Mg     1.8     04-03    TPro  6.1  /  Alb  2.5<L>  /  TBili  0.4  /  DBili  x   /  AST  27  /  ALT  31  /  AlkPhos  56  04-03    04-02    134<L>  |  104  |  11  ----------------------------<  191<H>  4.7   |  21<L>  |  0.91    Ca    8.3<L>      2021 13:41  Phos  1.8     04-02  Mg     1.8     04-02    TPro  6.5  /  Alb  2.9<L>  /  TBili  0.3  /  DBili  x   /  AST  30  /  ALT  40  /  AlkPhos  60  04-02        03-24    138  |  103  |  35<H>  ----------------------------<  151<H>  4.0   |  25  |  1.57<H>    Ca    8.4      24 Mar 2021 07:00  Phos  3.9     03-24  Mg     2.2     03-24    TPro  6.7  /  Alb  3.1<L>  /  TBili  0.3  /  DBili  x   /  AST  16  /  ALT  22  /  AlkPhos  71  03-24      CAPILLARY BLOOD GLUCOSE  POCT Blood Glucose.: 262 mg/dL (22 Mar 2021 16:34)  POCT Blood Glucose.: 150 mg/dL (22 Mar 2021 12:45)  POCT Blood Glucose.: 147 mg/dL (22 Mar 2021 10:56)  POCT Blood Glucose.: 196 mg/dL (22 Mar 2021 07:40      Urinalysis Basic - ( 20 Mar 2021 20:54 )  Color: Yellow / Appearance: Clear / S.015 / pH: x  Gluc: x / Ketone: Negative  / Bili: Negative / Urobili: Negative   Blood: x / Protein: Negative / Nitrite: Negative   Leuk Esterase: Negative / RBC: x / WBC x   Sq Epi: x / Non Sq Epi: x / Bacteria: x        MEDICATIONS  (STANDING):    ALBUTerol    90 MICROgram(s) HFA Inhaler 2 Puff(s) Inhalation every 6 hours  ascorbic acid 2000 milliGRAM(s) Oral every 8 hours  atorvastatin 40 milliGRAM(s) Oral at bedtime  budesonide 160 MICROgram(s)/formoterol 4.5 MICROgram(s) Inhaler 2 Puff(s) Inhalation two times a day  carvedilol 12.5 milliGRAM(s) Oral every 12 hours  cefTRIAXone   IVPB 1000 milliGRAM(s) IV Intermittent every 24 hours  cefTRIAXone   IVPB      cholecalciferol 1000 Unit(s) Oral daily  dronedarone 400 milliGRAM(s) Oral two times a day  fluticasone propionate 50 MICROgram(s)/spray Nasal Spray 1 Spray(s) Both Nostrils every 12 hours  gabapentin 300 milliGRAM(s) Oral daily  influenza   Vaccine 0.5 milliLiter(s) IntraMuscular once  insulin glargine Injectable (LANTUS) 10 Unit(s) SubCutaneous at bedtime  insulin lispro (ADMELOG) corrective regimen sliding scale   SubCutaneous Before meals and at bedtime  lactated ringers. 1000 milliLiter(s) (70 mL/Hr) IV Continuous <Continuous>  levothyroxine 175 MICROGram(s) Oral daily  metroNIDAZOLE  IVPB 500 milliGRAM(s) IV Intermittent every 8 hours  montelukast 10 milliGRAM(s) Oral daily  oxybutynin 5 milliGRAM(s) Oral two times a day  pantoprazole  Injectable 40 milliGRAM(s) IV Push daily  potassium phosphate / sodium phosphate Powder (PHOS-NaK) 1 Packet(s) Oral three times a day  rivaroxaban 20 milliGRAM(s) Oral daily  zinc sulfate 220 milliGRAM(s) Oral daily        RADIOLOGY & ADDITIONAL TESTS:    3/29/21 : Xray Abdomen 2 Views (21 @ 19:50) : No evidence of free air. Right surgical clips as noted.    < from: 12 Lead ECG (21 @ 16:02) >  Ventricular Rate 73 BPM    Atrial Rate 73 BPM    P-R Interval 188 ms    QRS Duration 126 ms    Q-T Interval 414 ms    QTC Calculation(Bazett) 456 ms    P Axis 59 degrees    R Axis -57 degrees    T Axis 56 degrees    < end of copied text >    Surgical Pathology Report (21 @ 10:15)   Surgical Pathology Report:   ACCESSION No: [id number]   KWAME MOSQUERA 2   Surgical Final Report   Final Diagnosis   1. Ascending colon polyp; hot snare:   - Invasive colonic adenocarcinoma arising in association with   tubular adenoma. See comment.   - Fragments of tubular adenoma (the smallest polyps).   2. Hepatic flexure polyp; biopsy:   - Tubular adenoma.   3. Descending colon polyp; biopsy:   - Inflammatory polyps.   Verified by: Priscila Arana MD   (Electronic Signature)   Reported on: 21 11:11 EDT, [facility],     Surgical Pathology Report (21 @ 11:37)   Surgical Pathology Report: , ACCESSION No: [id number]   KWAME MOSQUERA 2   Surgical Final Report , Final Diagnosis   1. Gastric antrum, biopsy: Chronic active gastritis, diffuse,   with focal lymphoid aggregate; H. Pylori associated. (Special  stain for H. Pylori is positive)   2. Esophagus biopsy: Patchy acute esophagitis. Special stain for  fungi is negative.   Verified by: Mariam Perez M.D. Surgical Pathology Report (21 @ 11:37)     3/28/21 : Xray Knee 3 Views, Right (21 @ 14:20) >    IMPRESSION: Round mass anterior to the patella is noted. This is new since 2018.      3/20/21 : CT Angio Abdomen and Pelvis w/ IV Cont (21 @ 22:55) No CT evidence of acute gastrointestinal hemorrhage. Few scattered colonic diverticula.    3/20/21 : CT Angio Abdomen and Pelvis w/ IV Cont (21 @ 22:55) LOWER CHEST: Partially imaged pacemaker leads. Mitral annular calcification.    3/20/21 : Xray Chest 1 View- PORTABLE-Urgent (21 @ 16:38): There is left-sided defibrillator. The heart is normal in size. The lungs are clear.          MICROBIOLOGY DATA:    COVID-19 Drew Domain Antibody (21 @ 11:07)   COVID-19 Drew Domain Antibody Result: >250.00:    Culture - Blood (21 @ 21:50)   Specimen Source: .Blood Blood-Venous   Culture Results: No growth to date.     Culture - Blood (21 @ 21:50)   Specimen Source: .Blood Blood-Venous   Culture Results: No growth to date.     COVID-19 PCR . (21 @ 16:32)   COVID-19 PCR: NotDetec:     MRSA/MSSA PCR (20 @ 14:34)   MRSA PCR Result.: NotDetec:     FLU A B RSV Detection by PCR (20 @ 20:00)   Flu A Result: NotDetec                Assessment and Plan:   · Assessment	  Patient is a 81y old  Female from home, ambulates with walker, lives with son, with PMHx of HFpEF, CAD (s/p stents), chronic venous stasis, DM, HTN and Afib (on Xarelto, s/p PPM), chronic bronchitis with asthmatic component ( on Oxygen PRN at home ), JOSE DANIEL ( supposed to be on nocturnal CPAP , but non compliant), now presents to the ER for evaluation of hypotension, SOB and  fatigue for almost 2 weeks. Per daughter and granddaughter patient has been complaining of increasing fatigue and sob for last few weeks, seemed to be pale , patient endorses dark loose bowel movement. Patient has chronic diarrhea, but no abdominal pain. She has evaluated by GI team and now the ID consult requested to assist with further evaluation of chronic diarrhea.     #  H. Pylori associated Chronic active gastritis, diffuse, with focal lymphoid aggregate, DX 3/22 by Antrum biopsy  # Chronic diarrhea- C.diff vs Malignancy - NO Malignancy Antrum biopsy   # Symptomatic Anemia  - s/p EGD and antrum biopsy shows H. Pylori associated Chronic active gastritis, diffuse, with focal lymphoid aggregate  # COVID negative 3/20/21, positive 3/27 and negative on 3/28  - d/w Dr. Spence regarding exposure VS false positive, in th esetting of positive Titers  # S/p EGD 3/22  and s/p Colonoscopy 3/25- pathology shows - Invasive colonic adenocarcinoma arising in association with  tubular adenoma  # The Right knee xray shows Round mass anterior to the patella.  # s/p Laparoscopic assisted right hemicolectomy , 3/31/21    Would recommend:    1. Advanced diet as tolerated   2. Continue Ceftriaxone and Flagyl as per Surgery protocol  3. Monitor kidney function  4. Pain management as needed  5. Follow up pathology     Attending Attestation:    Spent more than 45 minutes on total encounter, more than 50 % of the visit was spent counseling and/or coordinating care by the Attending physician.           Patient is seen and examined at the bed side, is afebrile. She is on clear liquid diet and tolerating  okay.       REVIEW OF SYSTEMS: All other review systems are negative      ALLERGIES: No Known Allergies      Vital Signs Last 24 Hrs  T(C): 36.6 (2021 18:00), Max: 37.4 (2021 05:10)  T(F): 97.8 (2021 18:00), Max: 99.4 (2021 05:10)  HR: 63 (2021 18:00) (62 - 68)  BP: 143/53 (2021 18:00) (133/60 - 157/70)  BP(mean): 78 (2021 00:17) (78 - 99)  RR: 18 (2021 18:00) (18 - 20)  SpO2: 97% (2021 18:00) (95% - 99%)      PHYSICAL EXAM:  GENERAL: Not in distress   CHEST/LUNG: Not using accessory muscles   HEART: s1 and s2 present  ABDOMEN:  Incision sites looks clean and RUQ has a drain in placed  EXTREMITIES: No pedal  edema  CNS: Awake and Alert      LABS:                        9.8    7.29  )-----------( 252      ( 2021 06:30 )             30.5                           10.0   7.12  )-----------( 253      ( 2021 05:06 )             30.9                         7.8    13.47 )-----------( 273      ( 25 Mar 2021 06:40 )             24.2         04-03    136  |  105  |  9   ----------------------------<  149<H>  4.1   |  22  |  0.75    Ca    8.2<L>      2021 06:30  Phos  1.6     04-03  Mg     1.8     04-03    TPro  6.1  /  Alb  2.5<L>  /  TBili  0.4  /  DBili  x   /  AST  27  /  ALT  31  /  AlkPhos  56  04-03    04-02    134<L>  |  104  |  11  ----------------------------<  191<H>  4.7   |  21<L>  |  0.91    Ca    8.3<L>      2021 13:41  Phos  1.8     04-02  Mg     1.8     04-02    TPro  6.5  /  Alb  2.9<L>  /  TBili  0.3  /  DBili  x   /  AST  30  /  ALT  40  /  AlkPhos  60  04-02        03-24    138  |  103  |  35<H>  ----------------------------<  151<H>  4.0   |  25  |  1.57<H>    Ca    8.4      24 Mar 2021 07:00  Phos  3.9     03-24  Mg     2.2     -24    TPro  6.7  /  Alb  3.1<L>  /  TBili  0.3  /  DBili  x   /  AST  16  /  ALT  22  /  AlkPhos  71  03-24      CAPILLARY BLOOD GLUCOSE  POCT Blood Glucose.: 262 mg/dL (22 Mar 2021 16:34)  POCT Blood Glucose.: 150 mg/dL (22 Mar 2021 12:45)  POCT Blood Glucose.: 147 mg/dL (22 Mar 2021 10:56)  POCT Blood Glucose.: 196 mg/dL (22 Mar 2021 07:40      Urinalysis Basic - ( 20 Mar 2021 20:54 )  Color: Yellow / Appearance: Clear / S.015 / pH: x  Gluc: x / Ketone: Negative  / Bili: Negative / Urobili: Negative   Blood: x / Protein: Negative / Nitrite: Negative   Leuk Esterase: Negative / RBC: x / WBC x   Sq Epi: x / Non Sq Epi: x / Bacteria: x        MEDICATIONS  (STANDING):    ALBUTerol    90 MICROgram(s) HFA Inhaler 2 Puff(s) Inhalation every 6 hours  ascorbic acid 2000 milliGRAM(s) Oral every 8 hours  atorvastatin 40 milliGRAM(s) Oral at bedtime  budesonide 160 MICROgram(s)/formoterol 4.5 MICROgram(s) Inhaler 2 Puff(s) Inhalation two times a day  carvedilol 12.5 milliGRAM(s) Oral every 12 hours  cefTRIAXone   IVPB 1000 milliGRAM(s) IV Intermittent every 24 hours  cefTRIAXone   IVPB      cholecalciferol 1000 Unit(s) Oral daily  dronedarone 400 milliGRAM(s) Oral two times a day  fluticasone propionate 50 MICROgram(s)/spray Nasal Spray 1 Spray(s) Both Nostrils every 12 hours  gabapentin 300 milliGRAM(s) Oral daily  influenza   Vaccine 0.5 milliLiter(s) IntraMuscular once  insulin glargine Injectable (LANTUS) 10 Unit(s) SubCutaneous at bedtime  insulin lispro (ADMELOG) corrective regimen sliding scale   SubCutaneous Before meals and at bedtime  lactated ringers. 1000 milliLiter(s) (70 mL/Hr) IV Continuous <Continuous>  levothyroxine 175 MICROGram(s) Oral daily  metroNIDAZOLE  IVPB 500 milliGRAM(s) IV Intermittent every 8 hours  montelukast 10 milliGRAM(s) Oral daily  oxybutynin 5 milliGRAM(s) Oral two times a day  pantoprazole  Injectable 40 milliGRAM(s) IV Push daily  potassium phosphate / sodium phosphate Powder (PHOS-NaK) 1 Packet(s) Oral three times a day  rivaroxaban 20 milliGRAM(s) Oral daily  zinc sulfate 220 milliGRAM(s) Oral daily        RADIOLOGY & ADDITIONAL TESTS:    3/29/21 : Xray Abdomen 2 Views (21 @ 19:50) : No evidence of free air. Right surgical clips as noted.    < from: 12 Lead ECG (21 @ 16:02) >  Ventricular Rate 73 BPM    Atrial Rate 73 BPM    P-R Interval 188 ms    QRS Duration 126 ms    Q-T Interval 414 ms    QTC Calculation(Bazett) 456 ms    P Axis 59 degrees    R Axis -57 degrees    T Axis 56 degrees    < end of copied text >    Surgical Pathology Report (21 @ 10:15)   Surgical Pathology Report:   ACCESSION No: 70 [id number]   KWAME MOSQUERA 2   Surgical Final Report   Final Diagnosis   1. Ascending colon polyp; hot snare:   - Invasive colonic adenocarcinoma arising in association with   tubular adenoma. See comment.   - Fragments of tubular adenoma (the smallest polyps).   2. Hepatic flexure polyp; biopsy:   - Tubular adenoma.   3. Descending colon polyp; biopsy:   - Inflammatory polyps.   Verified by: Priscila Arana MD   (Electronic Signature)   Reported on: 21 11:11 EDT, [facility],     Surgical Pathology Report (21 @ 11:37)   Surgical Pathology Report: , ACCESSION No: [id number]   KWAME MOSQUERA 2   Surgical Final Report , Final Diagnosis   1. Gastric antrum, biopsy: Chronic active gastritis, diffuse,   with focal lymphoid aggregate; H. Pylori associated. (Special  stain for H. Pylori is positive)   2. Esophagus biopsy: Patchy acute esophagitis. Special stain for  fungi is negative.   Verified by: Mariam Perez M.D. Surgical Pathology Report (21 @ 11:37)     3/28/21 : Xray Knee 3 Views, Right (21 @ 14:20) >    IMPRESSION: Round mass anterior to the patella is noted. This is new since 2018.      3/20/21 : CT Angio Abdomen and Pelvis w/ IV Cont (21 @ 22:55) No CT evidence of acute gastrointestinal hemorrhage. Few scattered colonic diverticula.    3/20/21 : CT Angio Abdomen and Pelvis w/ IV Cont (21 @ 22:55) LOWER CHEST: Partially imaged pacemaker leads. Mitral annular calcification.    3/20/21 : Xray Chest 1 View- PORTABLE-Urgent (21 @ 16:38): There is left-sided defibrillator. The heart is normal in size. The lungs are clear.          MICROBIOLOGY DATA:    COVID-19 Drew Domain Antibody (21 @ 11:07)   COVID-19 Drew Domain Antibody Result: >250.00:    Culture - Blood (21 @ 21:50)   Specimen Source: .Blood Blood-Venous   Culture Results: No growth to date.     Culture - Blood (21 @ 21:50)   Specimen Source: .Blood Blood-Venous   Culture Results: No growth to date.     COVID-19 PCR . (21 @ 16:32)   COVID-19 PCR: NotDetec:     MRSA/MSSA PCR (20 @ 14:34)   MRSA PCR Result.: NotDetec:     FLU A B RSV Detection by PCR (20 @ 20:00)   Flu A Result: NotDetec

## 2021-04-03 NOTE — PROGRESS NOTE ADULT - SUBJECTIVE AND OBJECTIVE BOX
CHIEF COMPLAINT:Patient is a 81y old  Female who presents with a chief complaint of symptomatic anemia .Pt appears comfortable.    	  REVIEW OF SYSTEMS:  CONSTITUTIONAL: No fever, weight loss, or fatigue  EYES: No eye pain, visual disturbances, or discharge  ENT:  No difficulty hearing, tinnitus, vertigo; No sinus or throat pain  NECK: No pain or stiffness  RESPIRATORY: No cough, wheezing, chills or hemoptysis; No Shortness of Breath  CARDIOVASCULAR: No chest pain, palpitations, passing out, dizziness, or leg swelling  GASTROINTESTINAL: No abdominal or epigastric pain. No nausea, vomiting, or hematemesis; No diarrhea or constipation. No melena or hematochezia.  GENITOURINARY: No dysuria, frequency, hematuria, or incontinence  NEUROLOGICAL: No headaches, memory loss, loss of strength, numbness, or tremors  SKIN: No itching, burning, rashes, or lesions   LYMPH Nodes: No enlarged glands  ENDOCRINE: No heat or cold intolerance; No hair loss  MUSCULOSKELETAL: No joint pain or swelling; No muscle, back, or extremity pain  PSYCHIATRIC: No depression, anxiety, mood swings, or difficulty sleeping  HEME/LYMPH: No easy bruising, or bleeding gums  ALLERGY AND IMMUNOLOGIC: No hives or eczema	        PHYSICAL EXAM:  T(C): 37.4 (04-03-21 @ 05:10), Max: 37.4 (04-03-21 @ 05:10)  HR: 68 (04-03-21 @ 05:10) (62 - 84)  BP: 154/75 (04-03-21 @ 05:10) (140/84 - 167/54)  RR: 20 (04-03-21 @ 05:10) (17 - 23)  SpO2: 95% (04-03-21 @ 05:10) (95% - 100%)    I&O's Summary    02 Apr 2021 07:01  -  03 Apr 2021 07:00  --------------------------------------------------------  IN: 1455 mL / OUT: 250 mL / NET: 1205 mL        Appearance: Normal	  HEENT:   Normal oral mucosa, PERRL, EOMI	  Lymphatic: No lymphadenopathy  Cardiovascular: Normal S1 S2, No JVD, No murmurs, No edema  Respiratory: Lungs clear to auscultation	  Psychiatry: A & O x 3, Mood & affect appropriate  Gastrointestinal:  Soft, Non-tender, + BS	  Skin: No rashes, No ecchymoses, No cyanosis	  Neurologic: Non-focal  Extremities: Normal range of motion, No clubbing, cyanosis or edema  Vascular: Peripheral pulses palpable 2+ bilaterally    MEDICATIONS  (STANDING):  ALBUTerol    90 MICROgram(s) HFA Inhaler 2 Puff(s) Inhalation every 6 hours  ascorbic acid 2000 milliGRAM(s) Oral every 8 hours  atorvastatin 40 milliGRAM(s) Oral at bedtime  budesonide 160 MICROgram(s)/formoterol 4.5 MICROgram(s) Inhaler 2 Puff(s) Inhalation two times a day  carvedilol 12.5 milliGRAM(s) Oral every 12 hours  cefTRIAXone   IVPB 1000 milliGRAM(s) IV Intermittent every 24 hours  cefTRIAXone   IVPB      cholecalciferol 1000 Unit(s) Oral daily  dronedarone 400 milliGRAM(s) Oral two times a day  fluticasone propionate 50 MICROgram(s)/spray Nasal Spray 1 Spray(s) Both Nostrils every 12 hours  gabapentin 300 milliGRAM(s) Oral daily  influenza   Vaccine 0.5 milliLiter(s) IntraMuscular once  insulin glargine Injectable (LANTUS) 10 Unit(s) SubCutaneous at bedtime  insulin lispro (ADMELOG) corrective regimen sliding scale   SubCutaneous Before meals and at bedtime  lactated ringers. 1000 milliLiter(s) (70 mL/Hr) IV Continuous <Continuous>  levothyroxine 175 MICROGram(s) Oral daily  metroNIDAZOLE  IVPB 500 milliGRAM(s) IV Intermittent every 8 hours  montelukast 10 milliGRAM(s) Oral daily  oxybutynin 5 milliGRAM(s) Oral two times a day  pantoprazole  Injectable 40 milliGRAM(s) IV Push daily  potassium phosphate / sodium phosphate Powder (PHOS-NaK) 1 Packet(s) Oral three times a day  rivaroxaban 20 milliGRAM(s) Oral daily  zinc sulfate 220 milliGRAM(s) Oral daily       	  	  LABS:	 	                       9.8    7.29  )-----------( 252      ( 03 Apr 2021 06:30 )             30.5     04-03    136  |  105  |  9   ----------------------------<  149<H>  4.1   |  22  |  0.75    Ca    8.2<L>      03 Apr 2021 06:30  Phos  1.6     04-03  Mg     1.8     04-03    TPro  6.1  /  Alb  2.5<L>  /  TBili  0.4  /  DBili  x   /  AST  27  /  ALT  31  /  AlkPhos  56  04-03    proBNP: Serum Pro-Brain Natriuretic Peptide: 1155 pg/mL (03-21 @ 07:15)    Lipid Profile: Cholesterol 134  LDL --  HDL 54  TG 82  Cholesterol 145  LDL --  HDL 55  TG 86    HgA1c:   TSH: Thyroid Stimulating Hormone, Serum: 2.67 uU/mL (03-22 @ 07:34)  Thyroid Stimulating Hormone, Serum: 3.61 uU/mL (03-21 @ 07:15)

## 2021-04-03 NOTE — PROGRESS NOTE ADULT - SUBJECTIVE AND OBJECTIVE BOX
KWAME MOSQUERA  MR# [id number]  81yFemale        Patient is a 81y old  Female who presents with a chief complaint of symptomatic anemia (03 Apr 2021 18:16)      INTERVAL HPI/OVERNIGHT EVENTS:  Patient seen and examined at bedside. Positive flatus, no bm; tolerating clears diet well. No notations of chest pain, palpitation, SOB, orthopnea, nausea, vomiting or abdominal pain.    ALLERGIES  No Known Allergies      MEDICATIONS  ALBUTerol    90 MICROgram(s) HFA Inhaler 2 Puff(s) Inhalation every 6 hours  ascorbic acid 2000 milliGRAM(s) Oral every 8 hours  atorvastatin 40 milliGRAM(s) Oral at bedtime  budesonide 160 MICROgram(s)/formoterol 4.5 MICROgram(s) Inhaler 2 Puff(s) Inhalation two times a day  carvedilol 12.5 milliGRAM(s) Oral every 12 hours  cefTRIAXone   IVPB 1000 milliGRAM(s) IV Intermittent every 24 hours  cefTRIAXone   IVPB      cholecalciferol 1000 Unit(s) Oral daily  dronedarone 400 milliGRAM(s) Oral two times a day  fluticasone propionate 50 MICROgram(s)/spray Nasal Spray 1 Spray(s) Both Nostrils every 12 hours  gabapentin 300 milliGRAM(s) Oral daily  HYDROmorphone  Injectable 0.5 milliGRAM(s) IV Push every 4 hours PRN Severe Pain (7 - 10)  influenza   Vaccine 0.5 milliLiter(s) IntraMuscular once  insulin glargine Injectable (LANTUS) 10 Unit(s) SubCutaneous at bedtime  insulin lispro (ADMELOG) corrective regimen sliding scale   SubCutaneous Before meals and at bedtime  lactated ringers. 1000 milliLiter(s) IV Continuous <Continuous>  levothyroxine 175 MICROGram(s) Oral daily  metroNIDAZOLE  IVPB 500 milliGRAM(s) IV Intermittent every 8 hours  montelukast 10 milliGRAM(s) Oral daily  ondansetron Injectable 4 milliGRAM(s) IV Push every 6 hours PRN Nausea  oxybutynin 5 milliGRAM(s) Oral two times a day  pantoprazole  Injectable 40 milliGRAM(s) IV Push daily  potassium phosphate / sodium phosphate Powder (PHOS-NaK) 1 Packet(s) Oral three times a day  rivaroxaban 20 milliGRAM(s) Oral daily  zinc sulfate 220 milliGRAM(s) Oral daily  zolpidem 5 milliGRAM(s) Oral at bedtime PRN Insomnia              REVIEW OF SYSTEMS:  CONSTITUTIONAL: No fever, weight loss, or fatigue  EYES: No eye pain, visual disturbances, or discharge  ENT:  No difficulty hearing, tinnitus, vertigo; No sinus or throat pain  NECK: No pain or stiffness  RESPIRATORY: No cough, wheezing, chills or hemoptysis; No Shortness of Breath  CARDIOVASCULAR: No chest pain, palpitations, passing out, dizziness, or leg swelling  GASTROINTESTINAL: No abdominal or epigastric pain. No nausea, vomiting, or hematemesis; No diarrhea or constipation. No melena or hematochezia.  GENITOURINARY: No dysuria, frequency, hematuria, or incontinence  NEUROLOGICAL: No headaches, memory loss, loss of strength, numbness, or tremors  SKIN: No itching, burning, rashes, or lesions   LYMPH Nodes: No enlarged glands  ENDOCRINE: No heat or cold intolerance; No hair loss  MUSCULOSKELETAL: No joint pain or swelling; No muscle, back, or extremity pain  PSYCHIATRIC: No depression, anxiety, mood swings, or difficulty sleeping  HEME/LYMPH: No easy bruising, or bleeding gums  ALLERGY AND IMMUNOLOGIC: No hives or eczema	    [ ] All others negative	  [ ] Unable to obtain      T(C): 36.6 (04-03-21 @ 18:00), Max: 37.4 (04-03-21 @ 05:10)  T(F): 97.8 (04-03-21 @ 18:00), Max: 99.4 (04-03-21 @ 05:10)  HR: 63 (04-03-21 @ 18:00) (62 - 68)  BP: 143/53 (04-03-21 @ 18:00) (133/60 - 157/70)  RR: 18 (04-03-21 @ 18:00) (18 - 20)  SpO2: 97% (04-03-21 @ 18:00) (95% - 99%)  Wt(kg): --    I&O's Summary    02 Apr 2021 07:01  -  03 Apr 2021 07:00  --------------------------------------------------------  IN: 1455 mL / OUT: 250 mL / NET: 1205 mL    03 Apr 2021 07:01  -  03 Apr 2021 19:44  --------------------------------------------------------  IN: 990 mL / OUT: 0 mL / NET: 990 mL          PHYSICAL EXAM:  A X O x  HEAD:  Atraumatic, Normocephalic  EYES: EOMI, PERRLA, conjunctiva and sclera clear  NECK: Supple, No JVD, Normal thyroid  Resp: CTAB, No crackles, wheezing,   CVS: Regular rate and rhythm; No discernable murmurs, rubs, or gallops  ABD: Soft, Nontender, Nondistended; Bowel sounds present  EXTREMITIES:  2+ Peripheral Pulses, No edema  LYMPH: No dicernable lymphadenopathy noted  GENERAL: NAD, well-groomed, well-developed      LABS:                        9.8    7.29  )-----------( 252      ( 03 Apr 2021 06:30 )             30.5     04-03    136  |  105  |  9   ----------------------------<  149<H>  4.1   |  22  |  0.75    Ca    8.2<L>      03 Apr 2021 06:30  Phos  1.6     04-03  Mg     1.8     04-03    TPro  6.1  /  Alb  2.5<L>  /  TBili  0.4  /  DBili  x   /  AST  27  /  ALT  31  /  AlkPhos  56  04-03        CAPILLARY BLOOD GLUCOSE      POCT Blood Glucose.: 171 mg/dL (03 Apr 2021 16:42)  POCT Blood Glucose.: 215 mg/dL (03 Apr 2021 11:33)  POCT Blood Glucose.: 158 mg/dL (03 Apr 2021 07:42)  POCT Blood Glucose.: 155 mg/dL (02 Apr 2021 21:30)      Troponins:  ProBNP:  Lipid Profile:   HgA1c:  TSH:           RADIOLOGY & ADDITIONAL TESTS:    Imaging Personally Reviewed:  [ ] YES  [ ] NO      Consultant(s) Notes Reviewed:  [x ] YES  [ ] NO    Care Discussed with Consultants/Other Providers [ x] YES  [ ] NO          PAST MEDICAL & SURGICAL HISTORY:  Asthma    CAD (Coronary Atherosclerotic Disease)    Myocardial Infarction    Diabetes    HTN (Hypertension)    HLD (Hyperlipidemia)    CHF (congestive heart failure), NYHA class I    IBS (irritable bowel syndrome)    Venous stasis    Hypothyroidism    Atrial fibrillation    Pacemaker    Obesity    S/P coronary angiogram          Anemia    H/o or current diagnosis of HF- ACEI/ARB contraindication unknown    H/o or current diagnosis of HF- Contraindication to ACEI/ARBs    H/o or current diagnosis of HF- ACEI/ARB contraindication unknown    H/o or current diagnosis of HF- Contraindication to ACEI/ARBs    H/o or current diagnosis of HF- ACEI/ARB contraindication unknown    H/o or current diagnosis of HF- Contraindication to ACEI/ARBs    Family history of heart disease    Handoff    MEWS Score    Asthma    CAD (Coronary Atherosclerotic Disease)    Myocardial Infarction    Diabetes    HTN (Hypertension)    HLD (Hyperlipidemia)    CHF (congestive heart failure), NYHA class I    IBS (irritable bowel syndrome)    Venous stasis    Hypothyroidism    Atrial fibrillation    Pacemaker    Obesity    Colon adenocarcinoma    Colon adenocarcinoma    Symptomatic anemia    Helicobacter pylori gastritis    Symptomatic anemia    Atrial fibrillation    CHF (congestive heart failure), NYHA class I    Diabetes    HTN (Hypertension)    Asthma    JOSE DANIEL (obstructive sleep apnea)    Prophylactic measure    Hypothyroidism    Knee swelling    Generalized abdominal pain    S/P laparoscopic procedure    CHF (congestive heart failure), NYHA class I    JOSE DANIEL (obstructive sleep apnea)    Diabetes    Colon cancer    Laparoscopy assisted right hemicolectomy    No significant past surgical history    S/P coronary angiogram    A) WEAKNESS    90+    CAD (coronary atherosclerotic disease)    Asthma    Helicobacter pylori gastritis    Paroxysmal atrial fibrillation    Encounter for colonoscopy due to history of adenomatous colonic polyps    Hypothyroidism    CHF (congestive heart failure), NYHA class I    JOSE DANIEL (obstructive sleep apnea)    Diabetes    HTN (Hypertension)    Colon cancer    [email or web address]

## 2021-04-03 NOTE — PROGRESS NOTE ADULT - ASSESSMENT
79 years old [place]-speaking female from home, ambulates with walker, lives with son, with PMHx of HFpEF, CAD (s/p stents), chronic venous stasis, DM, HTN and Afib (on Xarelto, s/p PPM), chronic bronchitis with asthmatic component ( on Oxygen PRN at home ), JOSE DANIEL ( supposed to be on nocturnal CPAP , but non compliant) presents to the ED with chief complaint of hypotension , SOB and  fatigue for almost 2 weeks.,symptomatic Fe def anemia with Colon ca-s/p resection.  1.OOB to chair.  2.Liquid diet.  3.Hypothyroidsm-synthroid.  4.HTN-inc coreg 12.5mg bid.  5.PAF-xarelto,multaq.  6.GI prophylaxis.

## 2021-04-03 NOTE — PROGRESS NOTE ADULT - ASSESSMENT
1. Ascending colon invasive adenocarcinoma  2. Anemia  3. S/p R hemicolectomy   4. Gastritis  5. Esophagitis  6. No evidence of acute GI bleeding    Suggestions:    1. Monitor H/H  2. Transfuse PRBC as needed  3. Protonix 40mg daily   4. Surgical follow up   5. Avoid NSAID  6. Oncology follow up  7. DVT prophylaxis

## 2021-04-03 NOTE — PROGRESS NOTE ADULT - SUBJECTIVE AND OBJECTIVE BOX
[   ] ICU                                          [   ] CCU                                      [ X  ] Medical Floor    Patient is a 81 year old female with Gastrointestinal bleeding and symptomatic anemia. GI consulted to evaluate.        79 years old female from home, ambulates with walker, lives with son, with past medical history significant for CAD (s/p stents), CHF, chronic venous stasis, DM, HTN, Afib (on Xarelto, s/p PPM), chronic bronchitis with asthmatic component ( on Oxygen PRN at home ), and osteoarthritis presented to the emergency room with 2 weeks history of worsening SOB, Fatigue, associated with a few episodes of black stool.    pt had virtual  colonoscopy 1 year ago and the result was questionable. She never followed up.  Patient also c/o poor appetite with chronic diarrhea but denies abdominal pain, nausea, vomiting, hematemesis, hematochezia, melena, fever, chills, chest pain, SOB, cough, hematuria, dysuria or diarrhea.       Today patient appears comfortable with no new complaints. Patient post lap R hemicolectomy transferred out of ICU. Patient appears comfortable. No abdominal pain, N/V, hematemesis, hematochezia, melena, fever, chills, chest pain, SOB, cough or diarrhea reported.      PAIN MANAGEMENT:  Pain Scale:                 0/10  Pain Location:        Prior Colonoscopy:  No prior colonoscopy      PAST MEDICAL HISTORY  Obesity  Atrial fibrillation  Hypothyroidism  Venous stasis  Irritable bowel syndrome   CHF    Hyperlipidemia   Hypertension  DM  Myocardial Infarction  CAD    Asthma      PAST SURGICAL HISTORY  Coronary angiogram  Coronary stent placement  Pacemaker placement       Allergies    No Known Allergies    Intolerances  None        SOCIAL HISTORY  Advanced Directives:       [ X ] Full Code       [  ] DNR  Marital Status:         [  ] M      [ X ] S      [  ] D       [  ] W  Children:       [ X ] Yes      [  ] No  Occupation:        [  ] Employed       [ X ] Unemployed       [  ] Retired  Diet:       [ X Regular       [  ] PEG feeding          [  ] NG tube feeding  Drug Use:           [ X ] Patient denied          [  ] Yes  Alcohol:           [X] No             [  ] Yes (socially)         [  ] Yes (chronic)  Tobacco:           [  ] Yes           [ X ] No      FAMILY HISTORY  [ X ] Heart Disease            [ X ] Diabetes             [ X ] HTN             [  ] Colon Cancer             [  ] Stomach Cancer              [  ] Pancreatic Cancer        VITALS  Vital Signs Last 24 Hrs  T(C): 36.8 (03 Apr 2021 10:00), Max: 37.4 (03 Apr 2021 05:10)  T(F): 98.3 (03 Apr 2021 10:00), Max: 99.4 (03 Apr 2021 05:10)  HR: 67 (03 Apr 2021 10:00) (62 - 84)  BP: 153/53 (03 Apr 2021 10:00) (148/62 - 167/54)  BP(mean): 78 (03 Apr 2021 00:17) (78 - 137)  RR: 18 (03 Apr 2021 10:00) (17 - 20)  SpO2: 96% (03 Apr 2021 10:00) (95% - 100%)       MEDICATIONS  (STANDING):  ALBUTerol    90 MICROgram(s) HFA Inhaler 2 Puff(s) Inhalation every 6 hours  ascorbic acid 2000 milliGRAM(s) Oral every 8 hours  atorvastatin 40 milliGRAM(s) Oral at bedtime  budesonide 160 MICROgram(s)/formoterol 4.5 MICROgram(s) Inhaler 2 Puff(s) Inhalation two times a day  carvedilol 12.5 milliGRAM(s) Oral every 12 hours  cefTRIAXone   IVPB 1000 milliGRAM(s) IV Intermittent every 24 hours  cefTRIAXone   IVPB      cholecalciferol 1000 Unit(s) Oral daily  dronedarone 400 milliGRAM(s) Oral two times a day  fluticasone propionate 50 MICROgram(s)/spray Nasal Spray 1 Spray(s) Both Nostrils every 12 hours  gabapentin 300 milliGRAM(s) Oral daily  influenza   Vaccine 0.5 milliLiter(s) IntraMuscular once  insulin glargine Injectable (LANTUS) 10 Unit(s) SubCutaneous at bedtime  insulin lispro (ADMELOG) corrective regimen sliding scale   SubCutaneous Before meals and at bedtime  lactated ringers. 1000 milliLiter(s) (70 mL/Hr) IV Continuous <Continuous>  levothyroxine 175 MICROGram(s) Oral daily  metroNIDAZOLE  IVPB 500 milliGRAM(s) IV Intermittent every 8 hours  montelukast 10 milliGRAM(s) Oral daily  oxybutynin 5 milliGRAM(s) Oral two times a day  pantoprazole  Injectable 40 milliGRAM(s) IV Push daily  potassium phosphate / sodium phosphate Powder (PHOS-NaK) 1 Packet(s) Oral three times a day  rivaroxaban 20 milliGRAM(s) Oral daily  zinc sulfate 220 milliGRAM(s) Oral daily    MEDICATIONS  (PRN):  HYDROmorphone  Injectable 0.5 milliGRAM(s) IV Push every 4 hours PRN Severe Pain (7 - 10)  ondansetron Injectable 4 milliGRAM(s) IV Push every 6 hours PRN Nausea  zolpidem 5 milliGRAM(s) Oral at bedtime PRN Insomnia                            9.8    7.29  )-----------( 252      ( 03 Apr 2021 06:30 )             30.5       04-03    136  |  105  |  9   ----------------------------<  149<H>  4.1   |  22  |  0.75    Ca    8.2<L>      03 Apr 2021 06:30  Phos  1.6     04-03  Mg     1.8     04-03    TPro  6.1  /  Alb  2.5<L>  /  TBili  0.4  /  DBili  x   /  AST  27  /  ALT  31  /  AlkPhos  56  04-03       [   ] ICU                                          [   ] CCU                                      [ X  ] Medical Floor    Patient is a 81 year old female with Gastrointestinal bleeding and symptomatic anemia. GI consulted to evaluate.        81 years old female from home, ambulates with walker, lives with son, with past medical history significant for CAD (s/p stents), CHF, chronic venous stasis, DM, HTN, Afib (on Xarelto, s/p PPM), chronic bronchitis with asthmatic component ( on Oxygen PRN at home ), and osteoarthritis presented to the emergency room with 2 weeks history of worsening SOB, Fatigue, associated with a few episodes of black stool.    pt had virtual  colonoscopy 1 year ago and the result was questionable. She never followed up.  Patient also c/o poor appetite with chronic diarrhea but denies abdominal pain, nausea, vomiting, hematemesis, hematochezia, melena, fever, chills, chest pain, SOB, cough, hematuria, dysuria or diarrhea.       Today patient appears comfortable with no new complaints. Patient post lap R hemicolectomy transferred out of ICU. Patient appears comfortable. No abdominal pain, N/V, hematemesis, hematochezia, melena, fever, chills, chest pain, SOB, cough or diarrhea reported.      PAIN MANAGEMENT:  Pain Scale:                 0/10  Pain Location:        Prior Colonoscopy:  No prior colonoscopy      PAST MEDICAL HISTORY  Obesity  Atrial fibrillation  Hypothyroidism  Venous stasis  Irritable bowel syndrome   CHF    Hyperlipidemia   Hypertension  DM  Myocardial Infarction  CAD    Asthma      PAST SURGICAL HISTORY  Coronary angiogram  Coronary stent placement  Pacemaker placement       Allergies    No Known Allergies    Intolerances  None        SOCIAL HISTORY  Advanced Directives:       [ X ] Full Code       [  ] DNR  Marital Status:         [  ] M      [ X ] S      [  ] D       [  ] W  Children:       [ X ] Yes      [  ] No  Occupation:        [  ] Employed       [ X ] Unemployed       [  ] Retired  Diet:       [ X Regular       [  ] PEG feeding          [  ] NG tube feeding  Drug Use:           [ X ] Patient denied          [  ] Yes  Alcohol:           [X] No             [  ] Yes (socially)         [  ] Yes (chronic)  Tobacco:           [  ] Yes           [ X ] No      FAMILY HISTORY  [ X ] Heart Disease            [ X ] Diabetes             [ X ] HTN             [  ] Colon Cancer             [  ] Stomach Cancer              [  ] Pancreatic Cancer        VITALS  Vital Signs Last 24 Hrs  T(C): 36.8 (03 Apr 2021 10:00), Max: 37.4 (03 Apr 2021 05:10)  T(F): 98.3 (03 Apr 2021 10:00), Max: 99.4 (03 Apr 2021 05:10)  HR: 67 (03 Apr 2021 10:00) (62 - 84)  BP: 153/53 (03 Apr 2021 10:00) (148/62 - 167/54)  BP(mean): 78 (03 Apr 2021 00:17) (78 - 137)  RR: 18 (03 Apr 2021 10:00) (17 - 20)  SpO2: 96% (03 Apr 2021 10:00) (95% - 100%)       MEDICATIONS  (STANDING):  ALBUTerol    90 MICROgram(s) HFA Inhaler 2 Puff(s) Inhalation every 6 hours  ascorbic acid 2000 milliGRAM(s) Oral every 8 hours  atorvastatin 40 milliGRAM(s) Oral at bedtime  budesonide 160 MICROgram(s)/formoterol 4.5 MICROgram(s) Inhaler 2 Puff(s) Inhalation two times a day  carvedilol 12.5 milliGRAM(s) Oral every 12 hours  cefTRIAXone   IVPB 1000 milliGRAM(s) IV Intermittent every 24 hours  cefTRIAXone   IVPB      cholecalciferol 1000 Unit(s) Oral daily  dronedarone 400 milliGRAM(s) Oral two times a day  fluticasone propionate 50 MICROgram(s)/spray Nasal Spray 1 Spray(s) Both Nostrils every 12 hours  gabapentin 300 milliGRAM(s) Oral daily  influenza   Vaccine 0.5 milliLiter(s) IntraMuscular once  insulin glargine Injectable (LANTUS) 10 Unit(s) SubCutaneous at bedtime  insulin lispro (ADMELOG) corrective regimen sliding scale   SubCutaneous Before meals and at bedtime  lactated ringers. 1000 milliLiter(s) (70 mL/Hr) IV Continuous <Continuous>  levothyroxine 175 MICROGram(s) Oral daily  metroNIDAZOLE  IVPB 500 milliGRAM(s) IV Intermittent every 8 hours  montelukast 10 milliGRAM(s) Oral daily  oxybutynin 5 milliGRAM(s) Oral two times a day  pantoprazole  Injectable 40 milliGRAM(s) IV Push daily  potassium phosphate / sodium phosphate Powder (PHOS-NaK) 1 Packet(s) Oral three times a day  rivaroxaban 20 milliGRAM(s) Oral daily  zinc sulfate 220 milliGRAM(s) Oral daily    MEDICATIONS  (PRN):  HYDROmorphone  Injectable 0.5 milliGRAM(s) IV Push every 4 hours PRN Severe Pain (7 - 10)  ondansetron Injectable 4 milliGRAM(s) IV Push every 6 hours PRN Nausea  zolpidem 5 milliGRAM(s) Oral at bedtime PRN Insomnia                            9.8    7.29  )-----------( 252      ( 03 Apr 2021 06:30 )             30.5       04-03    136  |  105  |  9   ----------------------------<  149<H>  4.1   |  22  |  0.75    Ca    8.2<L>      03 Apr 2021 06:30  Phos  1.6     04-03  Mg     1.8     04-03    TPro  6.1  /  Alb  2.5<L>  /  TBili  0.4  /  DBili  x   /  AST  27  /  ALT  31  /  AlkPhos  56  04-03

## 2021-04-03 NOTE — PROGRESS NOTE ADULT - ASSESSMENT
Patient is a 81y old  Female from home, ambulates with walker, lives with son, with PMHx of HFpEF, CAD (s/p stents), chronic venous stasis, DM, HTN and Afib (on Xarelto, s/p PPM), chronic bronchitis with asthmatic component ( on Oxygen PRN at home ), JOSE DANIEL ( supposed to be on nocturnal CPAP , but non compliant), now presents to the ER for evaluation of hypotension, SOB and  fatigue for almost 2 weeks. Per daughter and granddaughter patient has been complaining of increasing fatigue and sob for last few weeks, seemed to be pale , patient endorses dark loose bowel movement. Patient has chronic diarrhea, but no abdominal pain. She has evaluated by GI team and now the ID consult requested to assist with further evaluation of chronic diarrhea.     #  H. Pylori associated Chronic active gastritis, diffuse, with focal lymphoid aggregate, DX 3/22 by Antrum biopsy  # Chronic diarrhea- C.diff vs Malignancy - NO Malignancy Antrum biopsy   # Symptomatic Anemia  - s/p EGD and antrum biopsy shows H. Pylori associated Chronic active gastritis, diffuse, with focal lymphoid aggregate  # COVID negative 3/20/21, positive 3/27 and negative on 3/28  - d/w Dr. Spence regarding exposure VS false positive, in th esetting of positive Titers  # S/p EGD 3/22  and s/p Colonoscopy 3/25- pathology shows - Invasive colonic adenocarcinoma arising in association with  tubular adenoma  # The Right knee xray shows Round mass anterior to the patella.  # s/p Laparoscopic assisted right hemicolectomy , 3/31/21    Would recommend:    1. Please Advanced diet as tolerated   2. Continue Ceftriaxone and Flagyl as per Surgery protocol X ? 7 days  3. Monitor kidney function  4. Pain management as needed  5. Follow up pathology     Attending Attestation:    Spent more than 35 minutes on total encounter, more than 50 % of the visit was spent counseling and/or coordinating care by the Attending physician.

## 2021-04-03 NOTE — PROGRESS NOTE ADULT - SUBJECTIVE AND OBJECTIVE BOX
INTERVAL HPI/OVERNIGHT EVENTS:  Pt resting comfortably. No overnight events.  Tolerating clear liquid diet.   Voiding.  -BM.   Denies N/V    MEDICATIONS  (STANDING):  ALBUTerol    90 MICROgram(s) HFA Inhaler 2 Puff(s) Inhalation every 6 hours  ascorbic acid 2000 milliGRAM(s) Oral every 8 hours  atorvastatin 40 milliGRAM(s) Oral at bedtime  budesonide 160 MICROgram(s)/formoterol 4.5 MICROgram(s) Inhaler 2 Puff(s) Inhalation two times a day  carvedilol 6.25 milliGRAM(s) Oral every 12 hours  cefTRIAXone   IVPB      cefTRIAXone   IVPB 1000 milliGRAM(s) IV Intermittent every 24 hours  cholecalciferol 1000 Unit(s) Oral daily  dronedarone 400 milliGRAM(s) Oral two times a day  fluticasone propionate 50 MICROgram(s)/spray Nasal Spray 1 Spray(s) Both Nostrils every 12 hours  gabapentin 300 milliGRAM(s) Oral daily  influenza   Vaccine 0.5 milliLiter(s) IntraMuscular once  insulin glargine Injectable (LANTUS) 10 Unit(s) SubCutaneous at bedtime  insulin lispro (ADMELOG) corrective regimen sliding scale   SubCutaneous Before meals and at bedtime  lactated ringers. 1000 milliLiter(s) (70 mL/Hr) IV Continuous <Continuous>  levothyroxine Injectable 130 MICROGram(s) IV Push at bedtime  metroNIDAZOLE  IVPB 500 milliGRAM(s) IV Intermittent every 8 hours  montelukast 10 milliGRAM(s) Oral daily  oxybutynin 5 milliGRAM(s) Oral two times a day  pantoprazole  Injectable 40 milliGRAM(s) IV Push daily  rivaroxaban 20 milliGRAM(s) Oral daily  zinc sulfate 220 milliGRAM(s) Oral daily    MEDICATIONS  (PRN):  hydrALAZINE Injectable 5 milliGRAM(s) IV Push every 6 hours PRN sbp > 150  HYDROmorphone  Injectable 0.5 milliGRAM(s) IV Push every 4 hours PRN Severe Pain (7 - 10)  ondansetron Injectable 4 milliGRAM(s) IV Push every 6 hours PRN Nausea  zolpidem 5 milliGRAM(s) Oral at bedtime PRN Insomnia    Vital Signs Last 24 Hrs  T(C): 37.4 (03 Apr 2021 05:10), Max: 37.4 (03 Apr 2021 05:10)  T(F): 99.4 (03 Apr 2021 05:10), Max: 99.4 (03 Apr 2021 05:10)  HR: 68 (03 Apr 2021 05:10) (62 - 84)  BP: 154/75 (03 Apr 2021 05:10) (140/84 - 167/54)  BP(mean): 78 (03 Apr 2021 00:17) (78 - 137)  RR: 20 (03 Apr 2021 05:10) (17 - 23)  SpO2: 95% (03 Apr 2021 05:10) (95% - 100%)    Physical:  General: A&Ox3. NAD.  Abdomen: Soft obese, Dressing C/D/I. BAILEY dressing with good seal.     I&O's Detail    02 Apr 2021 07:01  -  03 Apr 2021 07:00  --------------------------------------------------------  IN:    IV PiggyBack: 615 mL    Lactated Ringers: 840 mL  Total IN: 1455 mL    OUT:    Indwelling Catheter - Urethral (mL): 250 mL  Total OUT: 250 mL    Total NET: 1205 mL    LABS:                        9.8    7.29  )-----------( 252      ( 03 Apr 2021 06:30 )             30.5             04-03    136  |  105  |  9   ----------------------------<  149<H>  4.1   |  22  |  0.75    Ca    8.2<L>      03 Apr 2021 06:30  Phos  1.6     04-03  Mg     1.8     04-03    TPro  6.1  /  Alb  2.5<L>  /  TBili  0.4  /  DBili  x   /  AST  27  /  ALT  31  /  AlkPhos  56  04-03

## 2021-04-03 NOTE — PROGRESS NOTE ADULT - ASSESSMENT
81y.o. Female s/p lap assisted R jaimee POD#3    -Keep clear liquid diet  -BAILEY on suction  -OOB ambulate  -Pain control prn    PAF  -con't Xarelto and Multaq    Hypothyroidism  -switch to PO synthroid    HTN  -switch to PO coreg

## 2021-04-04 LAB
ALBUMIN SERPL ELPH-MCNC: 2.5 G/DL — LOW (ref 3.5–5)
ALP SERPL-CCNC: 55 U/L — SIGNIFICANT CHANGE UP (ref 40–120)
ALT FLD-CCNC: 32 U/L DA — SIGNIFICANT CHANGE UP (ref 10–60)
ANION GAP SERPL CALC-SCNC: 6 MMOL/L — SIGNIFICANT CHANGE UP (ref 5–17)
AST SERPL-CCNC: 28 U/L — SIGNIFICANT CHANGE UP (ref 10–40)
BASOPHILS # BLD AUTO: 0.04 K/UL — SIGNIFICANT CHANGE UP (ref 0–0.2)
BASOPHILS NFR BLD AUTO: 0.6 % — SIGNIFICANT CHANGE UP (ref 0–2)
BILIRUB SERPL-MCNC: 0.3 MG/DL — SIGNIFICANT CHANGE UP (ref 0.2–1.2)
BUN SERPL-MCNC: 8 MG/DL — SIGNIFICANT CHANGE UP (ref 7–18)
CALCIUM SERPL-MCNC: 8.1 MG/DL — LOW (ref 8.4–10.5)
CHLORIDE SERPL-SCNC: 106 MMOL/L — SIGNIFICANT CHANGE UP (ref 96–108)
CO2 SERPL-SCNC: 24 MMOL/L — SIGNIFICANT CHANGE UP (ref 22–31)
CREAT SERPL-MCNC: 0.84 MG/DL — SIGNIFICANT CHANGE UP (ref 0.5–1.3)
EOSINOPHIL # BLD AUTO: 0.33 K/UL — SIGNIFICANT CHANGE UP (ref 0–0.5)
EOSINOPHIL NFR BLD AUTO: 5.3 % — SIGNIFICANT CHANGE UP (ref 0–6)
GLUCOSE BLDC GLUCOMTR-MCNC: 129 MG/DL — HIGH (ref 70–99)
GLUCOSE BLDC GLUCOMTR-MCNC: 143 MG/DL — HIGH (ref 70–99)
GLUCOSE BLDC GLUCOMTR-MCNC: 156 MG/DL — HIGH (ref 70–99)
GLUCOSE BLDC GLUCOMTR-MCNC: 160 MG/DL — HIGH (ref 70–99)
GLUCOSE SERPL-MCNC: 119 MG/DL — HIGH (ref 70–99)
HCT VFR BLD CALC: 31.7 % — LOW (ref 34.5–45)
HGB BLD-MCNC: 10.1 G/DL — LOW (ref 11.5–15.5)
IMM GRANULOCYTES NFR BLD AUTO: 0.5 % — SIGNIFICANT CHANGE UP (ref 0–1.5)
LYMPHOCYTES # BLD AUTO: 1.88 K/UL — SIGNIFICANT CHANGE UP (ref 1–3.3)
LYMPHOCYTES # BLD AUTO: 30.4 % — SIGNIFICANT CHANGE UP (ref 13–44)
MAGNESIUM SERPL-MCNC: 2 MG/DL — SIGNIFICANT CHANGE UP (ref 1.6–2.6)
MCHC RBC-ENTMCNC: 28.1 PG — SIGNIFICANT CHANGE UP (ref 27–34)
MCHC RBC-ENTMCNC: 31.9 GM/DL — LOW (ref 32–36)
MCV RBC AUTO: 88.1 FL — SIGNIFICANT CHANGE UP (ref 80–100)
MONOCYTES # BLD AUTO: 0.62 K/UL — SIGNIFICANT CHANGE UP (ref 0–0.9)
MONOCYTES NFR BLD AUTO: 10 % — SIGNIFICANT CHANGE UP (ref 2–14)
NEUTROPHILS # BLD AUTO: 3.28 K/UL — SIGNIFICANT CHANGE UP (ref 1.8–7.4)
NEUTROPHILS NFR BLD AUTO: 53.2 % — SIGNIFICANT CHANGE UP (ref 43–77)
NRBC # BLD: 0 /100 WBCS — SIGNIFICANT CHANGE UP (ref 0–0)
PHOSPHATE SERPL-MCNC: 2.1 MG/DL — LOW (ref 2.5–4.5)
PLATELET # BLD AUTO: 246 K/UL — SIGNIFICANT CHANGE UP (ref 150–400)
POTASSIUM SERPL-MCNC: 4.2 MMOL/L — SIGNIFICANT CHANGE UP (ref 3.5–5.3)
POTASSIUM SERPL-SCNC: 4.2 MMOL/L — SIGNIFICANT CHANGE UP (ref 3.5–5.3)
PROT SERPL-MCNC: 6 G/DL — SIGNIFICANT CHANGE UP (ref 6–8.3)
RBC # BLD: 3.6 M/UL — LOW (ref 3.8–5.2)
RBC # FLD: 17.8 % — HIGH (ref 10.3–14.5)
SODIUM SERPL-SCNC: 136 MMOL/L — SIGNIFICANT CHANGE UP (ref 135–145)
WBC # BLD: 6.18 K/UL — SIGNIFICANT CHANGE UP (ref 3.8–10.5)
WBC # FLD AUTO: 6.18 K/UL — SIGNIFICANT CHANGE UP (ref 3.8–10.5)

## 2021-04-04 RX ADMIN — Medication 2000 MILLIGRAM(S): at 05:24

## 2021-04-04 RX ADMIN — Medication 100 MILLIGRAM(S): at 21:54

## 2021-04-04 RX ADMIN — Medication 5 MILLIGRAM(S): at 05:24

## 2021-04-04 RX ADMIN — CEFTRIAXONE 100 MILLIGRAM(S): 500 INJECTION, POWDER, FOR SOLUTION INTRAMUSCULAR; INTRAVENOUS at 15:33

## 2021-04-04 RX ADMIN — Medication 2: at 11:51

## 2021-04-04 RX ADMIN — ALBUTEROL 2 PUFF(S): 90 AEROSOL, METERED ORAL at 08:15

## 2021-04-04 RX ADMIN — Medication 1 PACKET(S): at 15:33

## 2021-04-04 RX ADMIN — GABAPENTIN 300 MILLIGRAM(S): 400 CAPSULE ORAL at 11:51

## 2021-04-04 RX ADMIN — Medication 1000 UNIT(S): at 11:51

## 2021-04-04 RX ADMIN — ALBUTEROL 2 PUFF(S): 90 AEROSOL, METERED ORAL at 03:23

## 2021-04-04 RX ADMIN — Medication 100 MILLIGRAM(S): at 05:24

## 2021-04-04 RX ADMIN — Medication 2000 MILLIGRAM(S): at 15:33

## 2021-04-04 RX ADMIN — INSULIN GLARGINE 10 UNIT(S): 100 INJECTION, SOLUTION SUBCUTANEOUS at 22:00

## 2021-04-04 RX ADMIN — Medication 175 MICROGRAM(S): at 05:24

## 2021-04-04 RX ADMIN — Medication 1 PACKET(S): at 05:24

## 2021-04-04 RX ADMIN — Medication 1 PACKET(S): at 21:54

## 2021-04-04 RX ADMIN — Medication 2: at 17:22

## 2021-04-04 RX ADMIN — RIVAROXABAN 20 MILLIGRAM(S): KIT at 11:51

## 2021-04-04 RX ADMIN — PANTOPRAZOLE SODIUM 40 MILLIGRAM(S): 20 TABLET, DELAYED RELEASE ORAL at 11:51

## 2021-04-04 RX ADMIN — ALBUTEROL 2 PUFF(S): 90 AEROSOL, METERED ORAL at 15:33

## 2021-04-04 RX ADMIN — DRONEDARONE 400 MILLIGRAM(S): 400 TABLET, FILM COATED ORAL at 05:24

## 2021-04-04 RX ADMIN — ZINC SULFATE TAB 220 MG (50 MG ZINC EQUIVALENT) 220 MILLIGRAM(S): 220 (50 ZN) TAB at 11:51

## 2021-04-04 RX ADMIN — CARVEDILOL PHOSPHATE 12.5 MILLIGRAM(S): 80 CAPSULE, EXTENDED RELEASE ORAL at 17:22

## 2021-04-04 RX ADMIN — BUDESONIDE AND FORMOTEROL FUMARATE DIHYDRATE 2 PUFF(S): 160; 4.5 AEROSOL RESPIRATORY (INHALATION) at 11:50

## 2021-04-04 RX ADMIN — Medication 100 MILLIGRAM(S): at 15:33

## 2021-04-04 RX ADMIN — ATORVASTATIN CALCIUM 40 MILLIGRAM(S): 80 TABLET, FILM COATED ORAL at 21:55

## 2021-04-04 RX ADMIN — Medication 5 MILLIGRAM(S): at 17:21

## 2021-04-04 RX ADMIN — Medication 2000 MILLIGRAM(S): at 21:56

## 2021-04-04 RX ADMIN — Medication 1 SPRAY(S): at 05:26

## 2021-04-04 RX ADMIN — DRONEDARONE 400 MILLIGRAM(S): 400 TABLET, FILM COATED ORAL at 17:21

## 2021-04-04 RX ADMIN — MONTELUKAST 10 MILLIGRAM(S): 4 TABLET, CHEWABLE ORAL at 11:51

## 2021-04-04 RX ADMIN — ALBUTEROL 2 PUFF(S): 90 AEROSOL, METERED ORAL at 21:57

## 2021-04-04 RX ADMIN — CARVEDILOL PHOSPHATE 12.5 MILLIGRAM(S): 80 CAPSULE, EXTENDED RELEASE ORAL at 05:26

## 2021-04-04 RX ADMIN — SODIUM CHLORIDE 70 MILLILITER(S): 9 INJECTION, SOLUTION INTRAVENOUS at 22:58

## 2021-04-04 NOTE — PROGRESS NOTE ADULT - SUBJECTIVE AND OBJECTIVE BOX
CHIEF COMPLAINT:Patient is a 81y old  Female who presents with a chief complaint of symptomatic anemia.Pt still having pain Rt thigh.    	  REVIEW OF SYSTEMS:  CONSTITUTIONAL: No fever, weight loss, or fatigue  EYES: No eye pain, visual disturbances, or discharge  ENT:  No difficulty hearing, tinnitus, vertigo; No sinus or throat pain  NECK: No pain or stiffness  RESPIRATORY: No cough, wheezing, chills or hemoptysis; No Shortness of Breath  CARDIOVASCULAR: No chest pain, palpitations, passing out, dizziness, or leg swelling  GASTROINTESTINAL: No abdominal or epigastric pain. No nausea, vomiting, or hematemesis; No diarrhea or constipation. No melena or hematochezia.  GENITOURINARY: No dysuria, frequency, hematuria, or incontinence  NEUROLOGICAL: No headaches, memory loss, loss of strength, numbness, or tremors  SKIN: No itching, burning, rashes, or lesions   LYMPH Nodes: No enlarged glands  ENDOCRINE: No heat or cold intolerance; No hair loss  MUSCULOSKELETAL: No joint pain or swelling; No muscle, back, +extremity pain  PSYCHIATRIC: No depression, anxiety, mood swings, or difficulty sleeping  HEME/LYMPH: No easy bruising, or bleeding gums  ALLERGY AND IMMUNOLOGIC: No hives or eczema	      PHYSICAL EXAM:  T(C): 36.7 (04-04-21 @ 05:31), Max: 36.7 (04-03-21 @ 14:00)  HR: 62 (04-04-21 @ 05:31) (62 - 67)  BP: 139/49 (04-04-21 @ 05:31) (133/60 - 143/53)  RR: 18 (04-04-21 @ 05:31) (18 - 18)  SpO2: 97% (04-04-21 @ 05:31) (97% - 100%)  Wt(kg): --  I&O's Summary    03 Apr 2021 07:01  -  04 Apr 2021 07:00  --------------------------------------------------------  IN: 990 mL / OUT: 0 mL / NET: 990 mL        Appearance: Normal	  HEENT:   Normal oral mucosa, PERRL, EOMI	  Lymphatic: No lymphadenopathy  Cardiovascular: Normal S1 S2, No JVD, No murmurs, No edema  Respiratory: Lungs clear to auscultation	  Psychiatry: A & O x 3, Mood & affect appropriate  Gastrointestinal:  Soft, Non-tender, + BS	  Skin: No rashes, No ecchymoses, No cyanosis	  Neurologic: Non-focal  Extremities: Normal range of motion, No clubbing, cyanosis or edema  Vascular: Peripheral pulses palpable 2+ bilaterally    MEDICATIONS  (STANDING):  ALBUTerol    90 MICROgram(s) HFA Inhaler 2 Puff(s) Inhalation every 6 hours  ascorbic acid 2000 milliGRAM(s) Oral every 8 hours  atorvastatin 40 milliGRAM(s) Oral at bedtime  budesonide 160 MICROgram(s)/formoterol 4.5 MICROgram(s) Inhaler 2 Puff(s) Inhalation two times a day  carvedilol 12.5 milliGRAM(s) Oral every 12 hours  cefTRIAXone   IVPB 1000 milliGRAM(s) IV Intermittent every 24 hours  cefTRIAXone   IVPB      cholecalciferol 1000 Unit(s) Oral daily  dronedarone 400 milliGRAM(s) Oral two times a day  fluticasone propionate 50 MICROgram(s)/spray Nasal Spray 1 Spray(s) Both Nostrils every 12 hours  gabapentin 300 milliGRAM(s) Oral daily  influenza   Vaccine 0.5 milliLiter(s) IntraMuscular once  insulin glargine Injectable (LANTUS) 10 Unit(s) SubCutaneous at bedtime  insulin lispro (ADMELOG) corrective regimen sliding scale   SubCutaneous Before meals and at bedtime  lactated ringers. 1000 milliLiter(s) (70 mL/Hr) IV Continuous <Continuous>  levothyroxine 175 MICROGram(s) Oral daily  metroNIDAZOLE  IVPB 500 milliGRAM(s) IV Intermittent every 8 hours  montelukast 10 milliGRAM(s) Oral daily  oxybutynin 5 milliGRAM(s) Oral two times a day  pantoprazole  Injectable 40 milliGRAM(s) IV Push daily  potassium phosphate / sodium phosphate Powder (PHOS-NaK) 1 Packet(s) Oral three times a day  rivaroxaban 20 milliGRAM(s) Oral daily  zinc sulfate 220 milliGRAM(s) Oral daily     	  	  LABS:	 	                      10.1   6.18  )-----------( 246      ( 04 Apr 2021 06:16 )             31.7     04-04    136  |  106  |  8   ----------------------------<  119<H>  4.2   |  24  |  0.84    Ca    8.1<L>      04 Apr 2021 06:16  Phos  2.1     04-04  Mg     2.0     04-04    TPro  6.0  /  Alb  2.5<L>  /  TBili  0.3  /  DBili  x   /  AST  28  /  ALT  32  /  AlkPhos  55  04-04    proBNP: Serum Pro-Brain Natriuretic Peptide: 1155 pg/mL (03-21 @ 07:15)    Lipid Profile: Cholesterol 134  LDL --  HDL 54  TG 82  Cholesterol 145  LDL --  HDL 55  TG 86    HgA1c:   TSH: Thyroid Stimulating Hormone, Serum: 2.67 uU/mL (03-22 @ 07:34)  Thyroid Stimulating Hormone, Serum: 3.61 uU/mL (03-21 @ 07:15)

## 2021-04-04 NOTE — PROGRESS NOTE ADULT - SUBJECTIVE AND OBJECTIVE BOX
INTERVAL HPI/OVERNIGHT EVENTS:  Pt resting comfortably. No acute complaints.   Tolerating diet.   +flatus/-BM.   Denies N/V  Coreg increased by cardio.    MEDICATIONS  (STANDING):  ALBUTerol    90 MICROgram(s) HFA Inhaler 2 Puff(s) Inhalation every 6 hours  ascorbic acid 2000 milliGRAM(s) Oral every 8 hours  atorvastatin 40 milliGRAM(s) Oral at bedtime  budesonide 160 MICROgram(s)/formoterol 4.5 MICROgram(s) Inhaler 2 Puff(s) Inhalation two times a day  carvedilol 12.5 milliGRAM(s) Oral every 12 hours  cefTRIAXone   IVPB      cefTRIAXone   IVPB 1000 milliGRAM(s) IV Intermittent every 24 hours  cholecalciferol 1000 Unit(s) Oral daily  dronedarone 400 milliGRAM(s) Oral two times a day  fluticasone propionate 50 MICROgram(s)/spray Nasal Spray 1 Spray(s) Both Nostrils every 12 hours  gabapentin 300 milliGRAM(s) Oral daily  influenza   Vaccine 0.5 milliLiter(s) IntraMuscular once  insulin glargine Injectable (LANTUS) 10 Unit(s) SubCutaneous at bedtime  insulin lispro (ADMELOG) corrective regimen sliding scale   SubCutaneous Before meals and at bedtime  lactated ringers. 1000 milliLiter(s) (70 mL/Hr) IV Continuous <Continuous>  levothyroxine 175 MICROGram(s) Oral daily  metroNIDAZOLE  IVPB 500 milliGRAM(s) IV Intermittent every 8 hours  montelukast 10 milliGRAM(s) Oral daily  oxybutynin 5 milliGRAM(s) Oral two times a day  pantoprazole  Injectable 40 milliGRAM(s) IV Push daily  potassium phosphate / sodium phosphate Powder (PHOS-NaK) 1 Packet(s) Oral three times a day  rivaroxaban 20 milliGRAM(s) Oral daily  zinc sulfate 220 milliGRAM(s) Oral daily    MEDICATIONS  (PRN):  HYDROmorphone  Injectable 0.5 milliGRAM(s) IV Push every 4 hours PRN Severe Pain (7 - 10)  ondansetron Injectable 4 milliGRAM(s) IV Push every 6 hours PRN Nausea  zolpidem 5 milliGRAM(s) Oral at bedtime PRN Insomnia      Vital Signs Last 24 Hrs  T(C): 36.7 (04 Apr 2021 05:31), Max: 36.7 (03 Apr 2021 14:00)  T(F): 98 (04 Apr 2021 05:31), Max: 98.1 (03 Apr 2021 14:00)  HR: 62 (04 Apr 2021 05:31) (62 - 67)  BP: 139/49 (04 Apr 2021 05:31) (133/60 - 143/53)  BP(mean): --  RR: 18 (04 Apr 2021 05:31) (18 - 18)  SpO2: 97% (04 Apr 2021 05:31) (97% - 100%)    Physical:  General: A&Ox3. NAD.  Abdomen: Soft nondistended, BAILEY dressing in place with good seal. Rest of Dressing C/D/I     I&O's Detail    03 Apr 2021 07:01  -  04 Apr 2021 07:00  --------------------------------------------------------  IN:    IV PiggyBack: 100 mL    IV PiggyBack: 50 mL    Lactated Ringers: 840 mL  Total IN: 990 mL    OUT:  Total OUT: 0 mL    Total NET: 990 mL    LABS:                        10.1   6.18  )-----------( 246      ( 04 Apr 2021 06:16 )             31.7             04-04    136  |  106  |  8   ----------------------------<  119<H>  4.2   |  24  |  0.84    Ca    8.1<L>      04 Apr 2021 06:16  Phos  2.1     04-04  Mg     2.0     04-04    TPro  6.0  /  Alb  2.5<L>  /  TBili  0.3  /  DBili  x   /  AST  28  /  ALT  32  /  AlkPhos  55  04-04

## 2021-04-04 NOTE — PROGRESS NOTE ADULT - ASSESSMENT
1. Ascending colon invasive adenocarcinoma  2. Anemia  3. S/p R hemicolectomy   4. Gastritis  5. Esophagitis  6. No evidence of acute GI bleeding    Suggestions:    1. Monitor H/H  2. Transfuse PRBC as needed  3. Protonix 40mg daily   4. Surgical follow up   5. Diet as per surgery  6. Avoid NSAID  7. Oncology follow up  8. DVT prophylaxis

## 2021-04-04 NOTE — PROGRESS NOTE ADULT - ASSESSMENT
81y.o. Female s/p R jaimee POD#4    -Awaiting BM  -Adv diet as tolerated  -f/u path  -Pain control prn  -OOB as tolerated

## 2021-04-04 NOTE — PROGRESS NOTE ADULT - SUBJECTIVE AND OBJECTIVE BOX
[   ] ICU                                          [   ] CCU                                      [ X  ] Medical Floor    Patient is a 81 year old female with Gastrointestinal bleeding and symptomatic anemia. GI consulted to evaluate.        81 years old female from home, ambulates with walker, lives with son, with past medical history significant for CAD (s/p stents), CHF, chronic venous stasis, DM, HTN, Afib (on Xarelto, s/p PPM), chronic bronchitis with asthmatic component ( on Oxygen PRN at home ), and osteoarthritis presented to the emergency room with 2 weeks history of worsening SOB, Fatigue, associated with a few episodes of black stool.    pt had virtual  colonoscopy 1 year ago and the result was questionable. She never followed up.  Patient also c/o poor appetite with chronic diarrhea but denies abdominal pain, nausea, vomiting, hematemesis, hematochezia, melena, fever, chills, chest pain, SOB, cough, hematuria, dysuria or diarrhea.       Today patient appears comfortable with no new complaints. Patient post lap R hemicolectomy transferred out of ICU. Patient appears comfortable. No abdominal pain, N/V, hematemesis, hematochezia, melena, fever, chills, chest pain, SOB, cough or diarrhea reported.      PAIN MANAGEMENT:  Pain Scale:                 0/10  Pain Location:        Prior Colonoscopy:  No prior colonoscopy      PAST MEDICAL HISTORY  Obesity  Atrial fibrillation  Hypothyroidism  Venous stasis  Irritable bowel syndrome   CHF    Hyperlipidemia   Hypertension  DM  Myocardial Infarction  CAD    Asthma      PAST SURGICAL HISTORY  Coronary angiogram  Coronary stent placement  Pacemaker placement       Allergies    No Known Allergies    Intolerances  None        SOCIAL HISTORY  Advanced Directives:       [ X ] Full Code       [  ] DNR  Marital Status:         [  ] M      [ X ] S      [  ] D       [  ] W  Children:       [ X ] Yes      [  ] No  Occupation:        [  ] Employed       [ X ] Unemployed       [  ] Retired  Diet:       [ X Regular       [  ] PEG feeding          [  ] NG tube feeding  Drug Use:           [ X ] Patient denied          [  ] Yes  Alcohol:           [X] No             [  ] Yes (socially)         [  ] Yes (chronic)  Tobacco:           [  ] Yes           [ X ] No      FAMILY HISTORY  [ X ] Heart Disease            [ X ] Diabetes             [ X ] HTN             [  ] Colon Cancer             [  ] Stomach Cancer              [  ] Pancreatic Cancer        VITALS  Vital Signs Last 24 Hrs  T(C): 36.7 (04 Apr 2021 05:31), Max: 36.7 (03 Apr 2021 14:00)  T(F): 98 (04 Apr 2021 05:31), Max: 98.1 (03 Apr 2021 14:00)  HR: 62 (04 Apr 2021 05:31) (62 - 67)  BP: 139/49 (04 Apr 2021 05:31) (133/60 - 143/53)   RR: 18 (04 Apr 2021 05:31) (18 - 18)  SpO2: 97% (04 Apr 2021 05:31) (97% - 100%)       MEDICATIONS  (STANDING):  ALBUTerol    90 MICROgram(s) HFA Inhaler 2 Puff(s) Inhalation every 6 hours  ascorbic acid 2000 milliGRAM(s) Oral every 8 hours  atorvastatin 40 milliGRAM(s) Oral at bedtime  budesonide 160 MICROgram(s)/formoterol 4.5 MICROgram(s) Inhaler 2 Puff(s) Inhalation two times a day  carvedilol 12.5 milliGRAM(s) Oral every 12 hours  cefTRIAXone   IVPB 1000 milliGRAM(s) IV Intermittent every 24 hours  cefTRIAXone   IVPB      cholecalciferol 1000 Unit(s) Oral daily  dronedarone 400 milliGRAM(s) Oral two times a day  fluticasone propionate 50 MICROgram(s)/spray Nasal Spray 1 Spray(s) Both Nostrils every 12 hours  gabapentin 300 milliGRAM(s) Oral daily  influenza   Vaccine 0.5 milliLiter(s) IntraMuscular once  insulin glargine Injectable (LANTUS) 10 Unit(s) SubCutaneous at bedtime  insulin lispro (ADMELOG) corrective regimen sliding scale   SubCutaneous Before meals and at bedtime  lactated ringers. 1000 milliLiter(s) (70 mL/Hr) IV Continuous <Continuous>  levothyroxine 175 MICROGram(s) Oral daily  metroNIDAZOLE  IVPB 500 milliGRAM(s) IV Intermittent every 8 hours  montelukast 10 milliGRAM(s) Oral daily  oxybutynin 5 milliGRAM(s) Oral two times a day  pantoprazole  Injectable 40 milliGRAM(s) IV Push daily  potassium phosphate / sodium phosphate Powder (PHOS-NaK) 1 Packet(s) Oral three times a day  rivaroxaban 20 milliGRAM(s) Oral daily  zinc sulfate 220 milliGRAM(s) Oral daily    MEDICATIONS  (PRN):  HYDROmorphone  Injectable 0.5 milliGRAM(s) IV Push every 4 hours PRN Severe Pain (7 - 10)  ondansetron Injectable 4 milliGRAM(s) IV Push every 6 hours PRN Nausea  zolpidem 5 milliGRAM(s) Oral at bedtime PRN Insomnia                            10.1   6.18  )-----------( 246      ( 04 Apr 2021 06:16 )             31.7       04-04    136  |  106  |  8   ----------------------------<  119<H>  4.2   |  24  |  0.84    Ca    8.1<L>      04 Apr 2021 06:16  Phos  2.1     04-04  Mg     2.0     04-04    TPro  6.0  /  Alb  2.5<L>  /  TBili  0.3  /  DBili  x   /  AST  28  /  ALT  32  /  AlkPhos  55  04-04

## 2021-04-04 NOTE — PROGRESS NOTE ADULT - ASSESSMENT
79 years old [place]e-speaking female from home, ambulates with walker, lives with son, with PMHx of HFpEF, CAD (s/p stents), chronic venous stasis, DM, HTN and Afib (on Xarelto, s/p PPM), chronic bronchitis with asthmatic component ( on Oxygen PRN at home ), JOSE DANIEL ( supposed to be on nocturnal CPAP , but non compliant) presents to the ED with chief complaint of hypotension , SOB and  fatigue for almost 2 weeks.,symptomatic Fe def anemia with Colon ca-s/p resection.  1.OOB to chair.  2.Liquid diet.  3.Hypothyroidsm-synthroid.  4.HTN-coreg 12.5mg bid.  5.PAF-xarelto,multaq.  6.RT Thigh CT scan.  7.GI prophylaxis.

## 2021-04-04 NOTE — PROGRESS NOTE ADULT - SUBJECTIVE AND OBJECTIVE BOX
Patient is seen and examined at the bed side, is afebrile. She is on clear liquid diet and tolerating  okay.       REVIEW OF SYSTEMS: All other review systems are negative      ALLERGIES: No Known Allergies      Vital Signs Last 24 Hrs  T(C): 36.3 (2021 13:47), Max: 36.7 (2021 05:31)  T(F): 97.3 (2021 13:47), Max: 98 (2021 05:31)  HR: 63 (2021 13:47) (62 - 63)  BP: 134/50 (2021 13:47) (134/50 - 143/53)  BP(mean): --  RR: 18 (2021 13:47) (18 - 18)  SpO2: 97% (2021 13:47) (97% - 100%)      PHYSICAL EXAM:  GENERAL: Not in distress   CHEST/LUNG: Not using accessory muscles   HEART: s1 and s2 present  ABDOMEN:  Incision sites looks clean and RUQ has a drain in placed  EXTREMITIES: No pedal  edema  CNS: Awake and Alert      LABS:                        10.1   6.18  )-----------( 246      ( 2021 06:16 )             31.7                           9.8    7.29  )-----------( 252      ( 2021 06:30 )             30.5                           7.8    13.47 )-----------( 273      ( 25 Mar 2021 06:40 )             24.2       -    136  |  106  |  8   ----------------------------<  119<H>  4.2   |  24  |  0.84    Ca    8.1<L>      2021 06:16  Phos  2.1     04-04  Mg     2.0     04-04    TPro  6.0  /  Alb  2.5<L>  /  TBili  0.3  /  DBili  x   /  AST  28  /  ALT  32  /  AlkPhos  55  04-04    04-03    136  |  105  |  9   ----------------------------<  149<H>  4.1   |  22  |  0.75    Ca    8.2<L>      2021 06:30  Phos  1.6     04-03  Mg     1.8     04-03    TPro  6.1  /  Alb  2.5<L>  /  TBili  0.4  /  DBili  x   /  AST  27  /  ALT  31  /  AlkPhos  56  04-03    03-24    138  |  103  |  35<H>  ----------------------------<  151<H>  4.0   |  25  |  1.57<H>    Ca    8.4      24 Mar 2021 07:00  Phos  3.9     -24  Mg     2.2     -24    TPro  6.7  /  Alb  3.1<L>  /  TBili  0.3  /  DBili  x   /  AST  16  /  ALT  22  /  AlkPhos  71  03-24      CAPILLARY BLOOD GLUCOSE  POCT Blood Glucose.: 262 mg/dL (22 Mar 2021 16:34)  POCT Blood Glucose.: 150 mg/dL (22 Mar 2021 12:45)  POCT Blood Glucose.: 147 mg/dL (22 Mar 2021 10:56)  POCT Blood Glucose.: 196 mg/dL (22 Mar 2021 07:40      Urinalysis Basic - ( 20 Mar 2021 20:54 )  Color: Yellow / Appearance: Clear / S.015 / pH: x  Gluc: x / Ketone: Negative  / Bili: Negative / Urobili: Negative   Blood: x / Protein: Negative / Nitrite: Negative   Leuk Esterase: Negative / RBC: x / WBC x   Sq Epi: x / Non Sq Epi: x / Bacteria: x        MEDICATIONS  (STANDING):    ALBUTerol    90 MICROgram(s) HFA Inhaler 2 Puff(s) Inhalation every 6 hours  ascorbic acid 2000 milliGRAM(s) Oral every 8 hours  atorvastatin 40 milliGRAM(s) Oral at bedtime  budesonide 160 MICROgram(s)/formoterol 4.5 MICROgram(s) Inhaler 2 Puff(s) Inhalation two times a day  carvedilol 12.5 milliGRAM(s) Oral every 12 hours  cefTRIAXone   IVPB 1000 milliGRAM(s) IV Intermittent every 24 hours  cefTRIAXone   IVPB      cholecalciferol 1000 Unit(s) Oral daily  dronedarone 400 milliGRAM(s) Oral two times a day  fluticasone propionate 50 MICROgram(s)/spray Nasal Spray 1 Spray(s) Both Nostrils every 12 hours  gabapentin 300 milliGRAM(s) Oral daily  influenza   Vaccine 0.5 milliLiter(s) IntraMuscular once  insulin glargine Injectable (LANTUS) 10 Unit(s) SubCutaneous at bedtime  insulin lispro (ADMELOG) corrective regimen sliding scale   SubCutaneous Before meals and at bedtime  lactated ringers. 1000 milliLiter(s) (70 mL/Hr) IV Continuous <Continuous>  levothyroxine 175 MICROGram(s) Oral daily  metroNIDAZOLE  IVPB 500 milliGRAM(s) IV Intermittent every 8 hours  montelukast 10 milliGRAM(s) Oral daily  oxybutynin 5 milliGRAM(s) Oral two times a day  pantoprazole  Injectable 40 milliGRAM(s) IV Push daily  potassium phosphate / sodium phosphate Powder (PHOS-NaK) 1 Packet(s) Oral three times a day  rivaroxaban 20 milliGRAM(s) Oral daily  zinc sulfate 220 milliGRAM(s) Oral daily        RADIOLOGY & ADDITIONAL TESTS:    3/29/21 : Xray Abdomen 2 Views (21 @ 19:50) : No evidence of free air. Right surgical clips as noted.    < from: 12 Lead ECG (21 @ 16:02) >  Ventricular Rate 73 BPM    Atrial Rate 73 BPM    P-R Interval 188 ms    QRS Duration 126 ms    Q-T Interval 414 ms    QTC Calculation(Bazett) 456 ms    P Axis 59 degrees    R Axis -57 degrees    T Axis 56 degrees    < end of copied text >    Surgical Pathology Report (21 @ 10:15)   Surgical Pathology Report:   ACCESSION No: [id number]   KWAME MOSQUERA 2   Surgical Final Report   Final Diagnosis   1. Ascending colon polyp; hot snare:   - Invasive colonic adenocarcinoma arising in association with   tubular adenoma. See comment.   - Fragments of tubular adenoma (the smallest polyps).   2. Hepatic flexure polyp; biopsy:   - Tubular adenoma.   3. Descending colon polyp; biopsy:   - Inflammatory polyps.   Verified by: Priscila Araan MD   (Electronic Signature)   Reported on: 21 11:11 EDT, [facility],     Surgical Pathology Report (21 @ 11:37)   Surgical Pathology Report: , ACCESSION No: [id number]   KWAME MOSQUERA 2   Surgical Final Report , Final Diagnosis   1. Gastric antrum, biopsy: Chronic active gastritis, diffuse,   with focal lymphoid aggregate; H. Pylori associated. (Special  stain for H. Pylori is positive)   2. Esophagus biopsy: Patchy acute esophagitis. Special stain for  fungi is negative.   Verified by: Marima Perez M.D. Surgical Pathology Report (21 @ 11:37)     3/28/21 : Xray Knee 3 Views, Right (21 @ 14:20) >    IMPRESSION: Round mass anterior to the patella is noted. This is new since 2018.      3/20/21 : CT Angio Abdomen and Pelvis w/ IV Cont (21 @ 22:55) No CT evidence of acute gastrointestinal hemorrhage. Few scattered colonic diverticula.    3/20/21 : CT Angio Abdomen and Pelvis w/ IV Cont (21 @ 22:55) LOWER CHEST: Partially imaged pacemaker leads. Mitral annular calcification.    3/20/21 : Xray Chest 1 View- PORTABLE-Urgent (21 @ 16:38): There is left-sided defibrillator. The heart is normal in size. The lungs are clear.          MICROBIOLOGY DATA:    COVID-19 Drew Domain Antibody (21 @ 11:07)   COVID-19 Drew Domain Antibody Result: >250.00:    Culture - Blood (21 @ 21:50)   Specimen Source: .Blood Blood-Venous   Culture Results: No growth to date.     Culture - Blood (21 @ 21:50)   Specimen Source: .Blood Blood-Venous   Culture Results: No growth to date.     COVID-19 PCR . (21 @ 16:32)   COVID-19 PCR: NotDetec:     MRSA/MSSA PCR (20 @ 14:34)   MRSA PCR Result.: NotDetec:     FLU A B RSV Detection by PCR (20 @ 20:00)   Flu A Result: NotDetec             Patient is seen and examined at the bed side, is afebrile. She is tolerating diet  okay.       REVIEW OF SYSTEMS: All other review systems are negative      ALLERGIES: No Known Allergies      Vital Signs Last 24 Hrs  T(C): 36.3 (2021 13:47), Max: 36.7 (2021 05:31)  T(F): 97.3 (2021 13:47), Max: 98 (2021 05:31)  HR: 63 (:47) (62 - 63)  BP: 134/50 (2021 13:47) (134/50 - 143/53)  BP(mean): --  RR: 18 (:47) (18 - 18)  SpO2: 97% (:47) (97% - 100%)      PHYSICAL EXAM:  GENERAL: Not in distress   CHEST/LUNG: Not using accessory muscles   HEART: s1 and s2 present  ABDOMEN:  Incision sites looks clean and nontender   EXTREMITIES: No pedal  edema  CNS: Awake and Alert      LABS:                        10.1   6.18  )-----------( 246      ( 2021 06:16 )             31.7                           9.8    7.29  )-----------( 252      ( 2021 06:30 )             30.5                           7.8    13.47 )-----------( 273      ( 25 Mar 2021 06:40 )             24.2       -    136  |  106  |  8   ----------------------------<  119<H>  4.2   |  24  |  0.84    Ca    8.1<L>      2021 06:16  Phos  2.1     04-04  Mg     2.0     04-04    TPro  6.0  /  Alb  2.5<L>  /  TBili  0.3  /  DBili  x   /  AST  28  /  ALT  32  /  AlkPhos  55  04-04    04-03    136  |  105  |  9   ----------------------------<  149<H>  4.1   |  22  |  0.75    Ca    8.2<L>      2021 06:30  Phos  1.6     04-03  Mg     1.8     04-03    TPro  6.1  /  Alb  2.5<L>  /  TBili  0.4  /  DBili  x   /  AST  27  /  ALT  31  /  AlkPhos  56  04-03    -    138  |  103  |  35<H>  ----------------------------<  151<H>  4.0   |  25  |  1.57<H>    Ca    8.4      24 Mar 2021 07:00  Phos  3.9     -  Mg     2.2         TPro  6.7  /  Alb  3.1<L>  /  TBili  0.3  /  DBili  x   /  AST  16  /  ALT  22  /  AlkPhos  71  -      CAPILLARY BLOOD GLUCOSE  POCT Blood Glucose.: 262 mg/dL (22 Mar 2021 16:34)  POCT Blood Glucose.: 150 mg/dL (22 Mar 2021 12:45)  POCT Blood Glucose.: 147 mg/dL (22 Mar 2021 10:56)  POCT Blood Glucose.: 196 mg/dL (22 Mar 2021 07:40      Urinalysis Basic - ( 20 Mar 2021 20:54 )  Color: Yellow / Appearance: Clear / S.015 / pH: x  Gluc: x / Ketone: Negative  / Bili: Negative / Urobili: Negative   Blood: x / Protein: Negative / Nitrite: Negative   Leuk Esterase: Negative / RBC: x / WBC x   Sq Epi: x / Non Sq Epi: x / Bacteria: x        MEDICATIONS  (STANDING):    ALBUTerol    90 MICROgram(s) HFA Inhaler 2 Puff(s) Inhalation every 6 hours  ascorbic acid 2000 milliGRAM(s) Oral every 8 hours  atorvastatin 40 milliGRAM(s) Oral at bedtime  budesonide 160 MICROgram(s)/formoterol 4.5 MICROgram(s) Inhaler 2 Puff(s) Inhalation two times a day  carvedilol 12.5 milliGRAM(s) Oral every 12 hours  cefTRIAXone   IVPB 1000 milliGRAM(s) IV Intermittent every 24 hours  cefTRIAXone   IVPB      cholecalciferol 1000 Unit(s) Oral daily  dronedarone 400 milliGRAM(s) Oral two times a day  fluticasone propionate 50 MICROgram(s)/spray Nasal Spray 1 Spray(s) Both Nostrils every 12 hours  gabapentin 300 milliGRAM(s) Oral daily  influenza   Vaccine 0.5 milliLiter(s) IntraMuscular once  insulin glargine Injectable (LANTUS) 10 Unit(s) SubCutaneous at bedtime  insulin lispro (ADMELOG) corrective regimen sliding scale   SubCutaneous Before meals and at bedtime  lactated ringers. 1000 milliLiter(s) (70 mL/Hr) IV Continuous <Continuous>  levothyroxine 175 MICROGram(s) Oral daily  metroNIDAZOLE  IVPB 500 milliGRAM(s) IV Intermittent every 8 hours  montelukast 10 milliGRAM(s) Oral daily  oxybutynin 5 milliGRAM(s) Oral two times a day  pantoprazole  Injectable 40 milliGRAM(s) IV Push daily  potassium phosphate / sodium phosphate Powder (PHOS-NaK) 1 Packet(s) Oral three times a day  rivaroxaban 20 milliGRAM(s) Oral daily  zinc sulfate 220 milliGRAM(s) Oral daily        RADIOLOGY & ADDITIONAL TESTS:    3/29/21 : Xray Abdomen 2 Views (21 @ 19:50) : No evidence of free air. Right surgical clips as noted.    < from: 12 Lead ECG (21 @ 16:02) >  Ventricular Rate 73 BPM    Atrial Rate 73 BPM    P-R Interval 188 ms    QRS Duration 126 ms    Q-T Interval 414 ms    QTC Calculation(Bazett) 456 ms    P Axis 59 degrees    R Axis -57 degrees    T Axis 56 degrees    < end of copied text >    Surgical Pathology Report (21 @ 10:15)   Surgical Pathology Report:   ACCESSION No: 70 [id number]   KWAME MOSQUERA 2   Surgical Final Report   Final Diagnosis   1. Ascending colon polyp; hot snare:   - Invasive colonic adenocarcinoma arising in association with   tubular adenoma. See comment.   - Fragments of tubular adenoma (the smallest polyps).   2. Hepatic flexure polyp; biopsy:   - Tubular adenoma.   3. Descending colon polyp; biopsy:   - Inflammatory polyps.   Verified by: Priscila Arana MD   (Electronic Signature)   Reported on: 21 11:11 EDT, [facility],     Surgical Pathology Report (21 @ 11:37)   Surgical Pathology Report: , ACCESSION No: [id number]   KWAME MOSQUERA 2   Surgical Final Report , Final Diagnosis   1. Gastric antrum, biopsy: Chronic active gastritis, diffuse,   with focal lymphoid aggregate; H. Pylori associated. (Special  stain for H. Pylori is positive)   2. Esophagus biopsy: Patchy acute esophagitis. Special stain for  fungi is negative.   Verified by: Mariam Perez M.D. Surgical Pathology Report (21 @ 11:37)     3/28/21 : Xray Knee 3 Views, Right (21 @ 14:20) >    IMPRESSION: Round mass anterior to the patella is noted. This is new since 2018.      3/20/21 : CT Angio Abdomen and Pelvis w/ IV Cont (21 @ 22:55) No CT evidence of acute gastrointestinal hemorrhage. Few scattered colonic diverticula.    3/20/21 : CT Angio Abdomen and Pelvis w/ IV Cont (21 @ 22:55) LOWER CHEST: Partially imaged pacemaker leads. Mitral annular calcification.    3/20/21 : Xray Chest 1 View- PORTABLE-Urgent (21 @ 16:38): There is left-sided defibrillator. The heart is normal in size. The lungs are clear.          MICROBIOLOGY DATA:    COVID-19 Drew Domain Antibody (21 @ 11:07)   COVID-19 Drew Domain Antibody Result: >250.00:    Culture - Blood (21 @ 21:50)   Specimen Source: .Blood Blood-Venous   Culture Results: No growth to date.     Culture - Blood (21 @ 21:50)   Specimen Source: .Blood Blood-Venous   Culture Results: No growth to date.     COVID-19 PCR . (21 @ 16:32)   COVID-19 PCR: NotDetec:     MRSA/MSSA PCR (20 @ 14:34)   MRSA PCR Result.: NotDetec:     FLU A B RSV Detection by PCR (20 @ 20:00)   Flu A Result: NotDetec

## 2021-04-04 NOTE — PROGRESS NOTE ADULT - ASSESSMENT
Patient is a 81y old  Female from home, ambulates with walker, lives with son, with PMHx of HFpEF, CAD (s/p stents), chronic venous stasis, DM, HTN and Afib (on Xarelto, s/p PPM), chronic bronchitis with asthmatic component ( on Oxygen PRN at home ), JOSE DANIEL ( supposed to be on nocturnal CPAP , but non compliant), now presents to the ER for evaluation of hypotension, SOB and  fatigue for almost 2 weeks. Per daughter and granddaughter patient has been complaining of increasing fatigue and sob for last few weeks, seemed to be pale , patient endorses dark loose bowel movement. Patient has chronic diarrhea, but no abdominal pain. She has evaluated by GI team and now the ID consult requested to assist with further evaluation of chronic diarrhea.     #  H. Pylori associated Chronic active gastritis, diffuse, with focal lymphoid aggregate, DX 3/22 by Antrum biopsy  # Chronic diarrhea- C.diff vs Malignancy - NO Malignancy Antrum biopsy   # Symptomatic Anemia  - s/p EGD and antrum biopsy shows H. Pylori associated Chronic active gastritis, diffuse, with focal lymphoid aggregate  # COVID negative 3/20/21, positive 3/27 and negative on 3/28  - d/w Dr. Spence regarding exposure VS false positive, in th esetting of positive Titers  # S/p EGD 3/22  and s/p Colonoscopy 3/25- pathology shows - Invasive colonic adenocarcinoma arising in association with  tubular adenoma  # The Right knee xray shows Round mass anterior to the patella.  # s/p Laparoscopic assisted right hemicolectomy , 3/31/21    Would recommend:    1. Please Advanced diet as tolerated   2. Continue Ceftriaxone and Flagyl as per Surgery protocol X ? 7 days  3. Monitor kidney function  4. Pain management as needed  5. Follow up pathology     Attending Attestation:    Spent more than 35 minutes on total encounter, more than 50 % of the visit was spent counseling and/or coordinating care by the Attending physician.       Patient is a 81y old  Female from home, ambulates with walker, lives with son, with PMHx of HFpEF, CAD (s/p stents), chronic venous stasis, DM, HTN and Afib (on Xarelto, s/p PPM), chronic bronchitis with asthmatic component ( on Oxygen PRN at home ), JOSE DANIEL ( supposed to be on nocturnal CPAP , but non compliant), now presents to the ER for evaluation of hypotension, SOB and  fatigue for almost 2 weeks. Per daughter and granddaughter patient has been complaining of increasing fatigue and sob for last few weeks, seemed to be pale , patient endorses dark loose bowel movement. Patient has chronic diarrhea, but no abdominal pain. She has evaluated by GI team and now the ID consult requested to assist with further evaluation of chronic diarrhea.     #  H. Pylori associated Chronic active gastritis, diffuse, with focal lymphoid aggregate, DX 3/22 by Antrum biopsy  # Chronic diarrhea- C.diff vs Malignancy - NO Malignancy Antrum biopsy   # Symptomatic Anemia  - s/p EGD and antrum biopsy shows H. Pylori associated Chronic active gastritis, diffuse, with focal lymphoid aggregate  # COVID negative 3/20/21, positive 3/27 and negative on 3/28  - d/w Dr. Spence regarding exposure VS false positive, in th esetting of positive Titers  # S/p EGD 3/22  and s/p Colonoscopy 3/25- pathology shows - Invasive colonic adenocarcinoma arising in association with  tubular adenoma  # The Right knee xray shows Round mass anterior to the patella.  # s/p Laparoscopic assisted right hemicolectomy , 3/31/21    Would recommend:    1. Follow up pathology   2. Continue Ceftriaxone and Flagyl as per Surgery protocol X ? 7 days  3. Monitor kidney function  4. Pain management as needed  5. OOB to chair     Attending Attestation:    Spent more than 35 minutes on total encounter, more than 50 % of the visit was spent counseling and/or coordinating care by the Attending physician.

## 2021-04-04 NOTE — PROGRESS NOTE ADULT - ASSESSMENT
79 years old [place]ian-speaking female from home, ambulates with walker, lives with son, with PMHx of HFpEF, CAD (s/p stents), chronic venous stasis, DM, HTN and Afib (on Xarelto, s/p PPM), chronic bronchitis with asthmatic component ( on Oxygen PRN at home ), JOSE DANIEL ( supposed to be on nocturnal CPAP , but non compliant) presents to the ED with chief complaint of hypotension ,  SOB and  fatigue for almost 2 weeks. Per daughter and granddaughter patient has been complaining of increasing fatigue and sob for last few weeks, seemed to be pale , patient endorses dark loose bowel movement for quite a while.  pt had virtual  colonoscopy 1 year ago and the result was questionable. She never followed up. Per daughter pt refused all kinds of meat including chicken, meat , however denies any dysphasia. Patient  also endorses decreased appetite.  patient has chronic diarrhea. Patient Denies nausea, vomiting, abdominal pain, SOB, chest pain, CHAMBERLAIN, palpitations, dizziness, headache, cough, wheezing, joint pain or swelling, fever, chills.      GOC: Discussed with daughter,  mentioned she want stobe DNI , on discussion with patient, patient was so distressed about urge incontinency and was complaining of pain and IV infiltrations, so GOC discussion deferred to primary team.    (20 Mar 2021 19:53)    Hospital course: Pt was admitted to medicine floor for symptomatic anemia secondary to GI bleed. Colonoscopy was done during hospital stay. 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 Pt underwent laparoscopic rught hemicolectomy today 3/31 with estimated 20 ml blood loss. Pt was extubated in OR and brought to ICU for post operative care as pt has multiple comorbidities.    icu coarse:   Pt was brought to icu for post op monitoring  s/p R hemicolectomy 3/31. Pt started passing gas and was Advance to clear liquids. Pt urine output was monitored. Pt is able to tolerate diet. Pt will be transferred to surgery service under Dr. Rocha service. resumed home meds.          80 y/o  [place]ovenian-speaking female  with PMH  of HFpEF, CAD (s/p stents), chronic venous stasis, DM, HTN and Afib (on Xarelto, s/p PPM), chronic bronchitis with asthmatic component ( on Oxygen PRN at home ), JOSE DANIEL ( supposed to be on nocturnal CPAP , but non compliant) presented to the ED with chief complaint of hypotension ,  SOB and  fatigue for almost 2 weeks.   Pt was admitted to medicine for symptomatic anemia. Colonoscopy showed colon mass. Biopsy result showed invasive adenocarcinoma of colon. Pt underwent laparoscopic right hemicolectomy today. POD # 0  Transferred to ICU for post operative care.    Assessment  1. S/P Right Hemicolectomy   2. Ascending colon invasive adenocarcinoma  3. Symptomatic anemia  4. Afib   5. Diabetes Mellitus  6. History of Asthma  7. Hypertension  8.History of coronary artery disease  9.JOSE DANIEL  10. Obesity   11. H. Pylori infection     Plan:      NEURO;  - Pt is awake and alert  -No acute issues  -IV Dilaudid for abdominal pain    CARDIOVASCULAR  #Afib   -Hold  full dose anticoagulation heparin drip as per cardio as pt in NOrmal sinus , will resume oral meds after started on diet  -Resume carvedilol when diet is resumed  -Continue telemonitoring    # History of Hypertension  -Resume Multaq and Coreg after resuming diet  for now started on metoprolol 5mg iv pushes standing with parameters  -Monitor blood pressure      PULMONARY  # No acute issues  -Pt is breathing well on ambient air    GASTROINTESTINAL  # Ascending colon invasive adenocarcinoma  -S/P Laparoscopic right hemicolectomy   - Pain management   -Continue IV fluids  -Keep NPO till bowel function returns as per surgery  -Protonix for GI prophylaxis    RENAL  # SRIDHAR  resolved  -Likely pre renal  -Continue IV fluids  -Monitor BMP avoid nephrotoxic medications    INFECTIOUS DISEASE  # H.Pylori infection  -Pt is on iv metronidazole and amoxicillin fro H. Pylori infection  ID:      ENDOCRINE  #DM  -Continue Lantus 10 U at bedtime  and moderate sliding scale  -Monitor blood glucose    HEME  # Anemia  -Secondary to GI Blood loss  -S/P 3 U PRBC so far  -Monitor CBC , pt/inr      -Monitor, Replete to K>4 and Mg>2  -Diet: NPO    PROPHYLAXIS  -DVT:  Heparin s/c for DVT prophylaxis   -GI: Protonix for GI prophylaxis    DISPO: Transferred to ICU    CODE STATUS: Full CODE

## 2021-04-05 LAB
ALBUMIN SERPL ELPH-MCNC: 2.5 G/DL — LOW (ref 3.5–5)
ALP SERPL-CCNC: 55 U/L — SIGNIFICANT CHANGE UP (ref 40–120)
ALT FLD-CCNC: 30 U/L DA — SIGNIFICANT CHANGE UP (ref 10–60)
ANION GAP SERPL CALC-SCNC: 7 MMOL/L — SIGNIFICANT CHANGE UP (ref 5–17)
AST SERPL-CCNC: 28 U/L — SIGNIFICANT CHANGE UP (ref 10–40)
BASOPHILS # BLD AUTO: 0.05 K/UL — SIGNIFICANT CHANGE UP (ref 0–0.2)
BASOPHILS NFR BLD AUTO: 0.9 % — SIGNIFICANT CHANGE UP (ref 0–2)
BILIRUB SERPL-MCNC: 0.3 MG/DL — SIGNIFICANT CHANGE UP (ref 0.2–1.2)
BUN SERPL-MCNC: 8 MG/DL — SIGNIFICANT CHANGE UP (ref 7–18)
CALCIUM SERPL-MCNC: 8.3 MG/DL — LOW (ref 8.4–10.5)
CHLORIDE SERPL-SCNC: 106 MMOL/L — SIGNIFICANT CHANGE UP (ref 96–108)
CO2 SERPL-SCNC: 23 MMOL/L — SIGNIFICANT CHANGE UP (ref 22–31)
CREAT SERPL-MCNC: 0.82 MG/DL — SIGNIFICANT CHANGE UP (ref 0.5–1.3)
EOSINOPHIL # BLD AUTO: 0.23 K/UL — SIGNIFICANT CHANGE UP (ref 0–0.5)
EOSINOPHIL NFR BLD AUTO: 4.2 % — SIGNIFICANT CHANGE UP (ref 0–6)
GLUCOSE BLDC GLUCOMTR-MCNC: 153 MG/DL — HIGH (ref 70–99)
GLUCOSE BLDC GLUCOMTR-MCNC: 170 MG/DL — HIGH (ref 70–99)
GLUCOSE BLDC GLUCOMTR-MCNC: 179 MG/DL — HIGH (ref 70–99)
GLUCOSE SERPL-MCNC: 111 MG/DL — HIGH (ref 70–99)
HCT VFR BLD CALC: 30.9 % — LOW (ref 34.5–45)
HGB BLD-MCNC: 9.8 G/DL — LOW (ref 11.5–15.5)
IMM GRANULOCYTES NFR BLD AUTO: 0.6 % — SIGNIFICANT CHANGE UP (ref 0–1.5)
LYMPHOCYTES # BLD AUTO: 1.62 K/UL — SIGNIFICANT CHANGE UP (ref 1–3.3)
LYMPHOCYTES # BLD AUTO: 29.8 % — SIGNIFICANT CHANGE UP (ref 13–44)
MAGNESIUM SERPL-MCNC: 1.8 MG/DL — SIGNIFICANT CHANGE UP (ref 1.6–2.6)
MCHC RBC-ENTMCNC: 27.6 PG — SIGNIFICANT CHANGE UP (ref 27–34)
MCHC RBC-ENTMCNC: 31.7 GM/DL — LOW (ref 32–36)
MCV RBC AUTO: 87 FL — SIGNIFICANT CHANGE UP (ref 80–100)
MONOCYTES # BLD AUTO: 0.53 K/UL — SIGNIFICANT CHANGE UP (ref 0–0.9)
MONOCYTES NFR BLD AUTO: 9.7 % — SIGNIFICANT CHANGE UP (ref 2–14)
NEUTROPHILS # BLD AUTO: 2.98 K/UL — SIGNIFICANT CHANGE UP (ref 1.8–7.4)
NEUTROPHILS NFR BLD AUTO: 54.8 % — SIGNIFICANT CHANGE UP (ref 43–77)
NRBC # BLD: 0 /100 WBCS — SIGNIFICANT CHANGE UP (ref 0–0)
PHOSPHATE SERPL-MCNC: 2.1 MG/DL — LOW (ref 2.5–4.5)
PLATELET # BLD AUTO: 265 K/UL — SIGNIFICANT CHANGE UP (ref 150–400)
POTASSIUM SERPL-MCNC: 4.4 MMOL/L — SIGNIFICANT CHANGE UP (ref 3.5–5.3)
POTASSIUM SERPL-SCNC: 4.4 MMOL/L — SIGNIFICANT CHANGE UP (ref 3.5–5.3)
PROT SERPL-MCNC: 6 G/DL — SIGNIFICANT CHANGE UP (ref 6–8.3)
RBC # BLD: 3.55 M/UL — LOW (ref 3.8–5.2)
RBC # FLD: 17.7 % — HIGH (ref 10.3–14.5)
SARS-COV-2 RNA SPEC QL NAA+PROBE: SIGNIFICANT CHANGE UP
SODIUM SERPL-SCNC: 136 MMOL/L — SIGNIFICANT CHANGE UP (ref 135–145)
SURGICAL PATHOLOGY STUDY: SIGNIFICANT CHANGE UP
WBC # BLD: 5.44 K/UL — SIGNIFICANT CHANGE UP (ref 3.8–10.5)
WBC # FLD AUTO: 5.44 K/UL — SIGNIFICANT CHANGE UP (ref 3.8–10.5)

## 2021-04-05 PROCEDURE — 99231 SBSQ HOSP IP/OBS SF/LOW 25: CPT

## 2021-04-05 PROCEDURE — 73700 CT LOWER EXTREMITY W/O DYE: CPT | Mod: 26,RT

## 2021-04-05 RX ORDER — SENNA PLUS 8.6 MG/1
2 TABLET ORAL AT BEDTIME
Refills: 0 | Status: DISCONTINUED | OUTPATIENT
Start: 2021-04-05 | End: 2021-04-05

## 2021-04-05 RX ORDER — LIDOCAINE 4 G/100G
1 CREAM TOPICAL DAILY
Refills: 0 | Status: DISCONTINUED | OUTPATIENT
Start: 2021-04-05 | End: 2021-04-06

## 2021-04-05 RX ORDER — ACETAMINOPHEN 500 MG
1000 TABLET ORAL EVERY 8 HOURS
Refills: 0 | Status: DISCONTINUED | OUTPATIENT
Start: 2021-04-05 | End: 2021-04-06

## 2021-04-05 RX ORDER — OXYCODONE HYDROCHLORIDE 5 MG/1
5 TABLET ORAL EVERY 4 HOURS
Refills: 0 | Status: DISCONTINUED | OUTPATIENT
Start: 2021-04-05 | End: 2021-04-06

## 2021-04-05 RX ORDER — POLYETHYLENE GLYCOL 3350 17 G/17G
17 POWDER, FOR SOLUTION ORAL DAILY
Refills: 0 | Status: DISCONTINUED | OUTPATIENT
Start: 2021-04-05 | End: 2021-04-05

## 2021-04-05 RX ADMIN — HYDROMORPHONE HYDROCHLORIDE 0.5 MILLIGRAM(S): 2 INJECTION INTRAMUSCULAR; INTRAVENOUS; SUBCUTANEOUS at 01:06

## 2021-04-05 RX ADMIN — Medication 2: at 21:31

## 2021-04-05 RX ADMIN — SODIUM CHLORIDE 70 MILLILITER(S): 9 INJECTION, SOLUTION INTRAVENOUS at 05:15

## 2021-04-05 RX ADMIN — ALBUTEROL 2 PUFF(S): 90 AEROSOL, METERED ORAL at 03:57

## 2021-04-05 RX ADMIN — Medication 2000 MILLIGRAM(S): at 11:09

## 2021-04-05 RX ADMIN — Medication 1000 MILLIGRAM(S): at 21:26

## 2021-04-05 RX ADMIN — CARVEDILOL PHOSPHATE 12.5 MILLIGRAM(S): 80 CAPSULE, EXTENDED RELEASE ORAL at 17:01

## 2021-04-05 RX ADMIN — ALBUTEROL 2 PUFF(S): 90 AEROSOL, METERED ORAL at 21:26

## 2021-04-05 RX ADMIN — Medication 1 SPRAY(S): at 05:16

## 2021-04-05 RX ADMIN — Medication 1000 MILLIGRAM(S): at 13:27

## 2021-04-05 RX ADMIN — Medication 1 PACKET(S): at 21:26

## 2021-04-05 RX ADMIN — BUDESONIDE AND FORMOTEROL FUMARATE DIHYDRATE 2 PUFF(S): 160; 4.5 AEROSOL RESPIRATORY (INHALATION) at 11:10

## 2021-04-05 RX ADMIN — HYDROMORPHONE HYDROCHLORIDE 0.5 MILLIGRAM(S): 2 INJECTION INTRAMUSCULAR; INTRAVENOUS; SUBCUTANEOUS at 00:51

## 2021-04-05 RX ADMIN — Medication 2000 MILLIGRAM(S): at 21:26

## 2021-04-05 RX ADMIN — Medication 5 MILLIGRAM(S): at 17:01

## 2021-04-05 RX ADMIN — Medication 100 MILLIGRAM(S): at 05:13

## 2021-04-05 RX ADMIN — Medication 1 PACKET(S): at 05:13

## 2021-04-05 RX ADMIN — Medication 2: at 16:31

## 2021-04-05 RX ADMIN — RIVAROXABAN 20 MILLIGRAM(S): KIT at 11:08

## 2021-04-05 RX ADMIN — DRONEDARONE 400 MILLIGRAM(S): 400 TABLET, FILM COATED ORAL at 17:01

## 2021-04-05 RX ADMIN — Medication 2000 MILLIGRAM(S): at 05:15

## 2021-04-05 RX ADMIN — ZINC SULFATE TAB 220 MG (50 MG ZINC EQUIVALENT) 220 MILLIGRAM(S): 220 (50 ZN) TAB at 11:08

## 2021-04-05 RX ADMIN — DRONEDARONE 400 MILLIGRAM(S): 400 TABLET, FILM COATED ORAL at 05:13

## 2021-04-05 RX ADMIN — ALBUTEROL 2 PUFF(S): 90 AEROSOL, METERED ORAL at 09:45

## 2021-04-05 RX ADMIN — Medication 1 PACKET(S): at 13:27

## 2021-04-05 RX ADMIN — BUDESONIDE AND FORMOTEROL FUMARATE DIHYDRATE 2 PUFF(S): 160; 4.5 AEROSOL RESPIRATORY (INHALATION) at 21:26

## 2021-04-05 RX ADMIN — GABAPENTIN 300 MILLIGRAM(S): 400 CAPSULE ORAL at 11:10

## 2021-04-05 RX ADMIN — Medication 1 SPRAY(S): at 17:02

## 2021-04-05 RX ADMIN — ALBUTEROL 2 PUFF(S): 90 AEROSOL, METERED ORAL at 14:11

## 2021-04-05 RX ADMIN — PANTOPRAZOLE SODIUM 40 MILLIGRAM(S): 20 TABLET, DELAYED RELEASE ORAL at 11:11

## 2021-04-05 RX ADMIN — Medication 1000 MILLIGRAM(S): at 22:27

## 2021-04-05 RX ADMIN — LIDOCAINE 1 PATCH: 4 CREAM TOPICAL at 16:47

## 2021-04-05 RX ADMIN — CARVEDILOL PHOSPHATE 12.5 MILLIGRAM(S): 80 CAPSULE, EXTENDED RELEASE ORAL at 05:14

## 2021-04-05 RX ADMIN — Medication 5 MILLIGRAM(S): at 05:14

## 2021-04-05 RX ADMIN — Medication 1000 MILLIGRAM(S): at 13:40

## 2021-04-05 RX ADMIN — Medication 175 MICROGRAM(S): at 05:14

## 2021-04-05 RX ADMIN — INSULIN GLARGINE 10 UNIT(S): 100 INJECTION, SOLUTION SUBCUTANEOUS at 21:31

## 2021-04-05 RX ADMIN — Medication 1000 UNIT(S): at 11:08

## 2021-04-05 RX ADMIN — LIDOCAINE 1 PATCH: 4 CREAM TOPICAL at 18:07

## 2021-04-05 RX ADMIN — ATORVASTATIN CALCIUM 40 MILLIGRAM(S): 80 TABLET, FILM COATED ORAL at 21:26

## 2021-04-05 RX ADMIN — MONTELUKAST 10 MILLIGRAM(S): 4 TABLET, CHEWABLE ORAL at 11:08

## 2021-04-05 NOTE — PROGRESS NOTE ADULT - PROBLEM SELECTOR PROBLEM 8
Hypothyroidism
Asthma
Hypothyroidism
Knee swelling
Hypothyroidism
Hypothyroidism
Asthma
Knee swelling
Hypothyroidism
Knee swelling
Hypothyroidism
Knee swelling

## 2021-04-05 NOTE — PROGRESS NOTE ADULT - PROBLEM SELECTOR PLAN 1
Pt with generalized abdominal pain which is somatic and neuropathic in nature due to lap colon resection 3/31, POD #5. Pt also with new right thigh pain which is somatic in nature due to cystic mass/ collection of right knee as seen on CT.     High risk medications reviewed. Avoid polypharmacy. Avoid NSAIDs and benzodiazepines. Non-pharmacological sleep aides initiated. Non-opioid medications and non-pharmacological pain management measures initiated.   Opioid pain recommendations   - Discontinued Dilaudid 0.5mg IV q4h  PRN severe pain. Monitor for sedation/ respiratory depression.   - Oxycodone 5mg PO q6h PRN severe pain.   Non-opioid pain recommendations   - Acetaminophen 1 gram PO q 8 hours PRN moderate pain. Monitor LFTs  - Continue gabapentin 300mg PO daily.   - Lidoderm 5% patch to right thigh daily.   Bowel Regimen  - Miralax 17G PO daily   - Senna 2 tablets at bedtime for constipation   Mild pain   - Non-pharmacological pain treatment recommendations  - Warm/ Cool packs PRN   - Repositioning, imagery, relaxation, distraction.  - Physical therapy OOB if no contraindications   Recommendations discussed with primary team and RN.  Pain team will sign off at this time. Please reconsult if needed. Pt with generalized abdominal pain which is somatic and neuropathic in nature due to lap colon resection 3/31, POD #5. Pt also with new right thigh pain which is somatic in nature due to cystic mass/ collection of right knee as seen on CT.     High risk medications reviewed. Avoid polypharmacy. Avoid NSAIDs and benzodiazepines. Non-pharmacological sleep aides initiated. Non-opioid medications and non-pharmacological pain management measures initiated.   Opioid pain recommendations   - Discontinued Dilaudid 0.5mg IV q4h  PRN severe pain. Monitor for sedation/ respiratory depression.   - Oxycodone 5mg PO q4h PRN severe pain. Monitor for respiratory depression/sedation.  Non-opioid pain recommendations   - Acetaminophen 1 gram PO q 8 hours x 2 days. Monitor LFTs  - Continue gabapentin 300mg PO daily.   - Lidoderm 5% patch to right thigh daily.   Bowel Regimen  - Miralax 17G PO daily   - Senna 2 tablets at bedtime for constipation   Mild pain   - Non-pharmacological pain treatment recommendations  - Warm/ Cool packs PRN   - Repositioning, imagery, relaxation, distraction.  - Physical therapy OOB if no contraindications   Recommendations discussed with primary team and RN.  Pain team will sign off at this time. Please reconsult if needed. Pt with generalized abdominal pain which is somatic and neuropathic in nature due to lap colon resection 3/31, POD #5. Pt also with new right thigh pain which is somatic in nature due to cystic mass/ collection of right knee as seen on CT.     High risk medications reviewed. Avoid polypharmacy. Avoid NSAIDs and benzodiazepines. Non-pharmacological sleep aides initiated. Non-opioid medications and non-pharmacological pain management measures initiated.   Opioid pain recommendations   - Discontinued Dilaudid 0.5mg IV q4h  PRN severe pain. Monitor for sedation/ respiratory depression.   - Oxycodone 5mg PO q4h PRN severe pain. Monitor for respiratory depression/sedation.  Non-opioid pain recommendations   - Acetaminophen 1 gram PO q 8 hours x 2 days. Monitor LFTs  - Continue gabapentin 300mg PO daily.   - Lidoderm 5% patch to right thigh daily.   Bowel Regimen  - Will defer to primary team due to abdominal surgery  Mild pain   - Non-pharmacological pain treatment recommendations  - Warm/ Cool packs PRN   - Repositioning, imagery, relaxation, distraction.  - Physical therapy OOB if no contraindications   Recommendations discussed with primary team and RN.  Pain team will sign off at this time. Please reconsult if needed.

## 2021-04-05 NOTE — PROGRESS NOTE ADULT - SUBJECTIVE AND OBJECTIVE BOX
[   ] ICU                                          [   ] CCU                                      [ X  ] Medical Floor    Patient is a 81 year old female with Gastrointestinal bleeding and symptomatic anemia. GI consulted to evaluate.        81 years old female from home, ambulates with walker, lives with son, with past medical history significant for CAD (s/p stents), CHF, chronic venous stasis, DM, HTN, Afib (on Xarelto, s/p PPM), chronic bronchitis with asthmatic component ( on Oxygen PRN at home ), and osteoarthritis presented to the emergency room with 2 weeks history of worsening SOB, Fatigue, associated with a few episodes of black stool.    pt had virtual  colonoscopy 1 year ago and the result was questionable. She never followed up.  Patient also c/o poor appetite with chronic diarrhea but denies abdominal pain, nausea, vomiting, hematemesis, hematochezia, melena, fever, chills, chest pain, SOB, cough, hematuria, dysuria or diarrhea.       Today patient appears comfortable with no new complaints. Patient post lap R hemicolectomy transferred out of ICU. Patient appears comfortable. No abdominal pain, N/V, hematemesis, hematochezia, melena, fever, chills, chest pain, SOB, cough or diarrhea reported.      PAIN MANAGEMENT:  Pain Scale:                 0/10  Pain Location:        Prior Colonoscopy:  No prior colonoscopy      PAST MEDICAL HISTORY  Obesity  Atrial fibrillation  Hypothyroidism  Venous stasis  Irritable bowel syndrome   CHF    Hyperlipidemia   Hypertension  DM  Myocardial Infarction  CAD    Asthma      PAST SURGICAL HISTORY  Coronary angiogram  Coronary stent placement  Pacemaker placement       Allergies    No Known Allergies    Intolerances  None        SOCIAL HISTORY  Advanced Directives:       [ X ] Full Code       [  ] DNR  Marital Status:         [  ] M      [ X ] S      [  ] D       [  ] W  Children:       [ X ] Yes      [  ] No  Occupation:        [  ] Employed       [ X ] Unemployed       [  ] Retired  Diet:       [ X Regular       [  ] PEG feeding          [  ] NG tube feeding  Drug Use:           [ X ] Patient denied          [  ] Yes  Alcohol:           [X] No             [  ] Yes (socially)         [  ] Yes (chronic)  Tobacco:           [  ] Yes           [ X ] No      FAMILY HISTORY  [ X ] Heart Disease            [ X ] Diabetes             [ X ] HTN             [  ] Colon Cancer             [  ] Stomach Cancer              [  ] Pancreatic Cancer        VITALS  Vital Signs Last 24 Hrs  T(C): 36.8 (05 Apr 2021 05:38), Max: 37.6 (04 Apr 2021 20:52)  T(F): 98.2 (05 Apr 2021 05:38), Max: 99.7 (04 Apr 2021 20:52)  HR: 62 (05 Apr 2021 05:38) (61 - 63)  BP: 134/68 (05 Apr 2021 05:38) (130/51 - 134/68)   RR: 18 (05 Apr 2021 05:38) (18 - 18)  SpO2: 96% (05 Apr 2021 05:38) (96% - 98%)       MEDICATIONS  (STANDING):  acetaminophen   Tablet .. 1000 milliGRAM(s) Oral every 8 hours  ALBUTerol    90 MICROgram(s) HFA Inhaler 2 Puff(s) Inhalation every 6 hours  ascorbic acid 2000 milliGRAM(s) Oral every 8 hours  atorvastatin 40 milliGRAM(s) Oral at bedtime  budesonide 160 MICROgram(s)/formoterol 4.5 MICROgram(s) Inhaler 2 Puff(s) Inhalation two times a day  carvedilol 12.5 milliGRAM(s) Oral every 12 hours  cholecalciferol 1000 Unit(s) Oral daily  dronedarone 400 milliGRAM(s) Oral two times a day  fluticasone propionate 50 MICROgram(s)/spray Nasal Spray 1 Spray(s) Both Nostrils every 12 hours  gabapentin 300 milliGRAM(s) Oral daily  influenza   Vaccine 0.5 milliLiter(s) IntraMuscular once  insulin glargine Injectable (LANTUS) 10 Unit(s) SubCutaneous at bedtime  insulin lispro (ADMELOG) corrective regimen sliding scale   SubCutaneous Before meals and at bedtime  levothyroxine 175 MICROGram(s) Oral daily  montelukast 10 milliGRAM(s) Oral daily  oxybutynin 5 milliGRAM(s) Oral two times a day  pantoprazole  Injectable 40 milliGRAM(s) IV Push daily  potassium phosphate / sodium phosphate Powder (PHOS-NaK) 1 Packet(s) Oral three times a day  rivaroxaban 20 milliGRAM(s) Oral daily  zinc sulfate 220 milliGRAM(s) Oral daily    MEDICATIONS  (PRN):  ondansetron Injectable 4 milliGRAM(s) IV Push every 6 hours PRN Nausea  oxyCODONE    IR 5 milliGRAM(s) Oral every 4 hours PRN Severe Pain (7 - 10)  zolpidem 5 milliGRAM(s) Oral at bedtime PRN Insomnia                            9.8    5.44  )-----------( 265      ( 05 Apr 2021 05:58 )             30.9       04-05    136  |  106  |  8   ----------------------------<  111<H>  4.4   |  23  |  0.82    Ca    8.3<L>      05 Apr 2021 05:58  Phos  2.1     04-05  Mg     1.8     04-05    TPro  6.0  /  Alb  2.5<L>  /  TBili  0.3  /  DBili  x   /  AST  28  /  ALT  30  /  AlkPhos  55  04-05

## 2021-04-05 NOTE — PROGRESS NOTE ADULT - SUBJECTIVE AND OBJECTIVE BOX
CHIEF COMPLAINT:Patient is a 81y old  Female who presents with a chief complaint of symptomatic anemia.Pt appears comfortable.    	  REVIEW OF SYSTEMS:  CONSTITUTIONAL: No fever, weight loss, or fatigue  EYES: No eye pain, visual disturbances, or discharge  ENT:  No difficulty hearing, tinnitus, vertigo; No sinus or throat pain  NECK: No pain or stiffness  RESPIRATORY: No cough, wheezing, chills or hemoptysis; No Shortness of Breath  CARDIOVASCULAR: No chest pain, palpitations, passing out, dizziness, or leg swelling  GASTROINTESTINAL: No abdominal or epigastric pain. No nausea, vomiting, or hematemesis; No diarrhea or constipation. No melena or hematochezia.  GENITOURINARY: No dysuria, frequency, hematuria, or incontinence  NEUROLOGICAL: No headaches, memory loss, loss of strength, numbness, or tremors  SKIN: No itching, burning, rashes, or lesions   LYMPH Nodes: No enlarged glands  ENDOCRINE: No heat or cold intolerance; No hair loss  MUSCULOSKELETAL: No joint pain or swelling; No muscle, back, or extremity pain  PSYCHIATRIC: No depression, anxiety, mood swings, or difficulty sleeping  HEME/LYMPH: No easy bruising, or bleeding gums  ALLERGY AND IMMUNOLOGIC: No hives or eczema	      PHYSICAL EXAM:  T(C): 36.8 (04-05-21 @ 05:38), Max: 37.6 (04-04-21 @ 20:52)  HR: 62 (04-05-21 @ 05:38) (61 - 63)  BP: 134/68 (04-05-21 @ 05:38) (130/51 - 134/68)  RR: 18 (04-05-21 @ 05:38) (18 - 18)  SpO2: 96% (04-05-21 @ 05:38) (96% - 98%)        Appearance: Normal	  HEENT:   Normal oral mucosa, PERRL, EOMI	  Lymphatic: No lymphadenopathy  Cardiovascular: Normal S1 S2, No JVD, No murmurs, No edema  Respiratory: Lungs clear to auscultation	  Psychiatry: A & O x 3, Mood & affect appropriate  Gastrointestinal:  Soft, Non-tender, + BS	  Skin: No rashes, No ecchymoses, No cyanosis	  Neurologic: Non-focal  Extremities: Normal range of motion, No clubbing, cyanosis or edema  Vascular: Peripheral pulses palpable 2+ bilaterally    MEDICATIONS  (STANDING):  acetaminophen   Tablet .. 1000 milliGRAM(s) Oral every 8 hours  ALBUTerol    90 MICROgram(s) HFA Inhaler 2 Puff(s) Inhalation every 6 hours  ascorbic acid 2000 milliGRAM(s) Oral every 8 hours  atorvastatin 40 milliGRAM(s) Oral at bedtime  budesonide 160 MICROgram(s)/formoterol 4.5 MICROgram(s) Inhaler 2 Puff(s) Inhalation two times a day  carvedilol 12.5 milliGRAM(s) Oral every 12 hours  cholecalciferol 1000 Unit(s) Oral daily  dronedarone 400 milliGRAM(s) Oral two times a day  fluticasone propionate 50 MICROgram(s)/spray Nasal Spray 1 Spray(s) Both Nostrils every 12 hours  gabapentin 300 milliGRAM(s) Oral daily  influenza   Vaccine 0.5 milliLiter(s) IntraMuscular once  insulin glargine Injectable (LANTUS) 10 Unit(s) SubCutaneous at bedtime  insulin lispro (ADMELOG) corrective regimen sliding scale   SubCutaneous Before meals and at bedtime  levothyroxine 175 MICROGram(s) Oral daily  montelukast 10 milliGRAM(s) Oral daily  oxybutynin 5 milliGRAM(s) Oral two times a day  pantoprazole  Injectable 40 milliGRAM(s) IV Push daily  potassium phosphate / sodium phosphate Powder (PHOS-NaK) 1 Packet(s) Oral three times a day  rivaroxaban 20 milliGRAM(s) Oral daily  zinc sulfate 220 milliGRAM(s) Oral daily    	  	  LABS:	 	                        9.8    5.44  )-----------( 265      ( 05 Apr 2021 05:58 )             30.9     04-05    136  |  106  |  8   ----------------------------<  111<H>  4.4   |  23  |  0.82    Ca    8.3<L>      05 Apr 2021 05:58  Phos  2.1     04-05  Mg     1.8     04-05    TPro  6.0  /  Alb  2.5<L>  /  TBili  0.3  /  DBili  x   /  AST  28  /  ALT  30  /  AlkPhos  55  04-05    proBNP: Serum Pro-Brain Natriuretic Peptide: 1155 pg/mL (03-21 @ 07:15)    Lipid Profile: Cholesterol 134  LDL --  HDL 54  TG 82  Cholesterol 145  LDL --  HDL 55  TG 86    HgA1c:   TSH: Thyroid Stimulating Hormone, Serum: 2.67 uU/mL (03-22 @ 07:34)  Thyroid Stimulating Hormone, Serum: 3.61 uU/mL (03-21 @ 07:15)      	    c< from: CT Femur No Cont, Right (04.05.21 @ 09:01) >  EXAM:  CT FEMUR ONLY RT                            PROCEDURE DATE:  04/05/2021          INTERPRETATION:  EXAMINATION: CT of the right femur without contrast    CLINICAL INFORMATION: Right thigh pain and swelling    TECHNIQUE: Axial CT images were obtained through the right femur. Coronal and sagittal reformatted images were made. 3-D reconstruction images were also performed.    FINDINGS: No acute fracture or dislocation is demonstrated. No lytic or blastic lesions are demonstrated. There is noCT evidence of osteomyelitis. The bones are diffusely demineralized. 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 The hip joint space is grossly preserved. There is mild right sacroiliac joint arthrosis. There is nonspecific diffuse subcutaneous soft tissue infiltration/edema, most pronounced laterally. There is nonspecific 4.1 x 2.6 x 2.9 cm cystic mass in the anterior subcutaneous tissues overlying the inferior aspect of the patella. Correlate clinically.    IMPRESSION: No acute fracture or dislocation.  Degenerative changes, as above.  Nonspecific diffuse subcutaneous soft tissue infiltration/edema, more pronounced laterally.  4.1 x 2.6 x 2.9 cm nonspecific cystic mass/collection in the anterior subcutaneous tissues of the knee. Correlate clinically.            JENNIFER MORLEY MD; Attending Radiologist

## 2021-04-05 NOTE — PROGRESS NOTE ADULT - SUBJECTIVE AND OBJECTIVE BOX
INTERVAL HPI/OVERNIGHT EVENTS:  Pt stable.   Tolerating diet.   flatus/BM.    Vital Signs Last 24 Hrs  T(C): 36.8 (05 Apr 2021 05:38), Max: 37.6 (04 Apr 2021 20:52)  T(F): 98.2 (05 Apr 2021 05:38), Max: 99.7 (04 Apr 2021 20:52)  HR: 62 (05 Apr 2021 05:38) (61 - 63)  BP: 134/68 (05 Apr 2021 05:38) (130/51 - 134/68)  BP(mean): --  RR: 18 (05 Apr 2021 05:38) (18 - 18)  SpO2: 96% (05 Apr 2021 05:38) (96% - 98%)    Physical:  Abdomen: Soft nondistended, nontender.  Wound clean    I&O's Summary      LABS:                        9.8    5.44  )-----------( 265      ( 05 Apr 2021 05:58 )             30.9             04-05    136  |  106  |  8   ----------------------------<  111<H>  4.4   |  23  |  0.82    Ca    8.3<L>      05 Apr 2021 05:58  Phos  2.1     04-05  Mg     1.8     04-05    TPro  6.0  /  Alb  2.5<L>  /  TBili  0.3  /  DBili  x   /  AST  28  /  ALT  30  /  AlkPhos  55  04-05

## 2021-04-05 NOTE — PROGRESS NOTE ADULT - PROBLEM SELECTOR PROBLEM 3
Atrial fibrillation
CHF (congestive heart failure), NYHA class I
Atrial fibrillation
CHF (congestive heart failure), NYHA class I

## 2021-04-05 NOTE — PROGRESS NOTE ADULT - SUBJECTIVE AND OBJECTIVE BOX
Patient is seen and examined at the bed side, is afebrile. She is tolerating diet  okay.       REVIEW OF SYSTEMS: All other review systems are negative      ALLERGIES: No Known Allergies      Vital Signs Last 24 Hrs  T(C): 36.8 (2021 13:54), Max: 37.6 (2021 20:52)  T(F): 98.3 (2021 13:54), Max: 99.7 (2021 20:52)  HR: 66 (:54) (61 - 66)  BP: 139/83 (2021 13:54) (130/51 - 139/83)  BP(mean): --  RR: 18 (:54) (18 - 18)  SpO2: 98% (:54) (96% - 98%)        PHYSICAL EXAM:  GENERAL: Not in distress   CHEST/LUNG: Not using accessory muscles   HEART: s1 and s2 present  ABDOMEN:  Incision sites looks clean and nontender   EXTREMITIES: No pedal  edema  CNS: Awake and Alert      LABS:                        9.8    5.44  )-----------( 265      ( 2021 05:58 )             30.9                           9.8    7.29  )-----------( 252      ( 2021 06:30 )             30.5                           7.8    13.47 )-----------( 273      ( 25 Mar 2021 06:40 )             24.2           136  |  106  |  8   ----------------------------<  111<H>  4.4   |  23  |  0.82    Ca    8.3<L>      2021 05:58  Phos  2.1     04-05  Mg     1.8     -    TPro  6.0  /  Alb  2.5<L>  /  TBili  0.3  /  DBili  x   /  AST  28  /  ALT  30  /  AlkPhos  55  04-    136  |  106  |  8   ----------------------------<  119<H>  4.2   |  24  |  0.84    Ca    8.1<L>      2021 06:16  Phos  2.1     04-04  Mg     2.0     04-04    TPro  6.0  /  Alb  2.5<L>  /  TBili  0.3  /  DBili  x   /  AST  28  /  ALT  32  /  AlkPhos  55  04-04      -24    138  |  103  |  35<H>  ----------------------------<  151<H>  4.0   |  25  |  1.57<H>    Ca    8.4      24 Mar 2021 07:00  Phos  3.9     -24  Mg     2.2     -    TPro  6.7  /  Alb  3.1<L>  /  TBili  0.3  /  DBili  x   /  AST  16  /  ALT  22  /  AlkPhos  71  -      CAPILLARY BLOOD GLUCOSE  POCT Blood Glucose.: 262 mg/dL (22 Mar 2021 16:34)  POCT Blood Glucose.: 150 mg/dL (22 Mar 2021 12:45)  POCT Blood Glucose.: 147 mg/dL (22 Mar 2021 10:56)  POCT Blood Glucose.: 196 mg/dL (22 Mar 2021 07:40      Urinalysis Basic - ( 20 Mar 2021 20:54 )  Color: Yellow / Appearance: Clear / S.015 / pH: x  Gluc: x / Ketone: Negative  / Bili: Negative / Urobili: Negative   Blood: x / Protein: Negative / Nitrite: Negative   Leuk Esterase: Negative / RBC: x / WBC x   Sq Epi: x / Non Sq Epi: x / Bacteria: x        MEDICATIONS  (STANDING):    acetaminophen   Tablet .. 1000 milliGRAM(s) Oral every 8 hours  ALBUTerol    90 MICROgram(s) HFA Inhaler 2 Puff(s) Inhalation every 6 hours  ascorbic acid 2000 milliGRAM(s) Oral every 8 hours  atorvastatin 40 milliGRAM(s) Oral at bedtime  budesonide 160 MICROgram(s)/formoterol 4.5 MICROgram(s) Inhaler 2 Puff(s) Inhalation two times a day  carvedilol 12.5 milliGRAM(s) Oral every 12 hours  cholecalciferol 1000 Unit(s) Oral daily  dronedarone 400 milliGRAM(s) Oral two times a day  fluticasone propionate 50 MICROgram(s)/spray Nasal Spray 1 Spray(s) Both Nostrils every 12 hours  gabapentin 300 milliGRAM(s) Oral daily  influenza   Vaccine 0.5 milliLiter(s) IntraMuscular once  insulin glargine Injectable (LANTUS) 10 Unit(s) SubCutaneous at bedtime  insulin lispro (ADMELOG) corrective regimen sliding scale   SubCutaneous Before meals and at bedtime  levothyroxine 175 MICROGram(s) Oral daily  lidocaine   Patch 1 Patch Transdermal daily  montelukast 10 milliGRAM(s) Oral daily  oxybutynin 5 milliGRAM(s) Oral two times a day  pantoprazole  Injectable 40 milliGRAM(s) IV Push daily  potassium phosphate / sodium phosphate Powder (PHOS-NaK) 1 Packet(s) Oral three times a day  rivaroxaban 20 milliGRAM(s) Oral daily  zinc sulfate 220 milliGRAM(s) Oral daily          RADIOLOGY & ADDITIONAL TESTS:    3/29/21 : Xray Abdomen 2 Views (21 @ 19:50) : No evidence of free air. Right surgical clips as noted.    < from: 12 Lead ECG (21 @ 16:02) >  Ventricular Rate 73 BPM    Atrial Rate 73 BPM    P-R Interval 188 ms    QRS Duration 126 ms    Q-T Interval 414 ms    QTC Calculation(Bazett) 456 ms    P Axis 59 degrees    R Axis -57 degrees    T Axis 56 degrees    < end of copied text >    Surgical Pathology Report (21 @ 10:15)   Surgical Pathology Report:   ACCESSION No: [id number]   KWAME MOSQUERA 2   Surgical Final Report   Final Diagnosis   1. Ascending colon polyp; hot snare:   - Invasive colonic adenocarcinoma arising in association with   tubular adenoma. See comment.   - Fragments of tubular adenoma (the smallest polyps).   2. Hepatic flexure polyp; biopsy:   - Tubular adenoma.   3. Descending colon polyp; biopsy:   - Inflammatory polyps.   Verified by: Priscila Arana MD   (Electronic Signature)   Reported on: 21 11:11 EDT, [facility],     Surgical Pathology Report (21 @ 11:37)   Surgical Pathology Report: , ACCESSION No: [id number]   KWAME MOSQUERA 2   Surgical Final Report , Final Diagnosis   1. Gastric antrum, biopsy: Chronic active gastritis, diffuse,   with focal lymphoid aggregate; H. Pylori associated. (Special  stain for H. Pylori is positive)   2. Esophagus biopsy: Patchy acute esophagitis. Special stain for  fungi is negative.   Verified by: Mariam Perez M.D. Surgical Pathology Report (21 @ 11:37)     3/28/21 : Xray Knee 3 Views, Right (21 @ 14:20) >    IMPRESSION: Round mass anterior to the patella is noted. This is new since 2018.      3/20/21 : CT Angio Abdomen and Pelvis w/ IV Cont (21 @ 22:55) No CT evidence of acute gastrointestinal hemorrhage. Few scattered colonic diverticula.    3/20/21 : CT Angio Abdomen and Pelvis w/ IV Cont (21 @ 22:55) LOWER CHEST: Partially imaged pacemaker leads. Mitral annular calcification.    3/20/21 : Xray Chest 1 View- PORTABLE-Urgent (21 @ 16:38): There is left-sided defibrillator. The heart is normal in size. The lungs are clear.          MICROBIOLOGY DATA:    COVID-19 Drew Domain Antibody (21 @ 11:07)   COVID-19 Drew Domain Antibody Result: >250.00:    Culture - Blood (21 @ 21:50)   Specimen Source: .Blood Blood-Venous   Culture Results: No growth to date.     Culture - Blood (21 @ 21:50)   Specimen Source: .Blood Blood-Venous   Culture Results: No growth to date.     COVID-19 PCR . (21 @ 16:32)   COVID-19 PCR: NotDetec:     MRSA/MSSA PCR (20 @ 14:34)   MRSA PCR Result.: NotDetec:     FLU A B RSV Detection by PCR (20 @ 20:00)   Flu A Result: NotDetec         Patient is seen and examined at the bed side, is afebrile. She is tolerating diet  okay and had a small BM in the morning.       REVIEW OF SYSTEMS: All other review systems are negative      ALLERGIES: No Known Allergies      Vital Signs Last 24 Hrs  T(C): 36.8 (2021 13:54), Max: 37.6 (2021 20:52)  T(F): 98.3 (2021 13:54), Max: 99.7 (2021 20:52)  HR: 66 (2021 13:54) (61 - 66)  BP: 139/83 (2021 13:54) (130/51 - 139/83)  BP(mean): --  RR: 18 (2021 13:54) (18 - 18)  SpO2: 98% (2021 13:54) (96% - 98%)        PHYSICAL EXAM:  GENERAL: Not in distress   CHEST/LUNG: Not using accessory muscles   HEART: s1 and s2 present  ABDOMEN:  Incision sites looks clean and nontender   EXTREMITIES: No pedal  edema  CNS: Awake and Alert      LABS:                        9.8    5.44  )-----------( 265      ( 2021 05:58 )             30.9                           9.8    7.29  )-----------( 252      ( 2021 06:30 )             30.5                           7.8    13.47 )-----------( 273      ( 25 Mar 2021 06:40 )             24.2           136  |  106  |  8   ----------------------------<  111<H>  4.4   |  23  |  0.82    Ca    8.3<L>      2021 05:58  Phos  2.1     04-05  Mg     1.8     04-05    TPro  6.0  /  Alb  2.5<L>  /  TBili  0.3  /  DBili  x   /  AST  28  /  ALT  30  /  AlkPhos  55  04-05    04-04    136  |  106  |  8   ----------------------------<  119<H>  4.2   |  24  |  0.84    Ca    8.1<L>      2021 06:16  Phos  2.1     04-04  Mg     2.0     04-04    TPro  6.0  /  Alb  2.5<L>  /  TBili  0.3  /  DBili  x   /  AST  28  /  ALT  32  /  AlkPhos  55  04-04      03-24    138  |  103  |  35<H>  ----------------------------<  151<H>  4.0   |  25  |  1.57<H>    Ca    8.4      24 Mar 2021 07:00  Phos  3.9     03-24  Mg     2.2     03-24    TPro  6.7  /  Alb  3.1<L>  /  TBili  0.3  /  DBili  x   /  AST  16  /  ALT  22  /  AlkPhos  71  03-24      CAPILLARY BLOOD GLUCOSE  POCT Blood Glucose.: 262 mg/dL (22 Mar 2021 16:34)  POCT Blood Glucose.: 150 mg/dL (22 Mar 2021 12:45)  POCT Blood Glucose.: 147 mg/dL (22 Mar 2021 10:56)  POCT Blood Glucose.: 196 mg/dL (22 Mar 2021 07:40      Urinalysis Basic - ( 20 Mar 2021 20:54 )  Color: Yellow / Appearance: Clear / S.015 / pH: x  Gluc: x / Ketone: Negative  / Bili: Negative / Urobili: Negative   Blood: x / Protein: Negative / Nitrite: Negative   Leuk Esterase: Negative / RBC: x / WBC x   Sq Epi: x / Non Sq Epi: x / Bacteria: x        MEDICATIONS  (STANDING):    acetaminophen   Tablet .. 1000 milliGRAM(s) Oral every 8 hours  ALBUTerol    90 MICROgram(s) HFA Inhaler 2 Puff(s) Inhalation every 6 hours  ascorbic acid 2000 milliGRAM(s) Oral every 8 hours  atorvastatin 40 milliGRAM(s) Oral at bedtime  budesonide 160 MICROgram(s)/formoterol 4.5 MICROgram(s) Inhaler 2 Puff(s) Inhalation two times a day  carvedilol 12.5 milliGRAM(s) Oral every 12 hours  cholecalciferol 1000 Unit(s) Oral daily  dronedarone 400 milliGRAM(s) Oral two times a day  fluticasone propionate 50 MICROgram(s)/spray Nasal Spray 1 Spray(s) Both Nostrils every 12 hours  gabapentin 300 milliGRAM(s) Oral daily  influenza   Vaccine 0.5 milliLiter(s) IntraMuscular once  insulin glargine Injectable (LANTUS) 10 Unit(s) SubCutaneous at bedtime  insulin lispro (ADMELOG) corrective regimen sliding scale   SubCutaneous Before meals and at bedtime  levothyroxine 175 MICROGram(s) Oral daily  lidocaine   Patch 1 Patch Transdermal daily  montelukast 10 milliGRAM(s) Oral daily  oxybutynin 5 milliGRAM(s) Oral two times a day  pantoprazole  Injectable 40 milliGRAM(s) IV Push daily  potassium phosphate / sodium phosphate Powder (PHOS-NaK) 1 Packet(s) Oral three times a day  rivaroxaban 20 milliGRAM(s) Oral daily  zinc sulfate 220 milliGRAM(s) Oral daily      RADIOLOGY & ADDITIONAL TESTS:    r    3/29/21 : Xray Abdomen 2 Views (21 @ 19:50) : No evidence of free air. Right surgical clips as noted.    < from: 12 Lead ECG (21 @ 16:02) >  Ventricular Rate 73 BPM    Atrial Rate 73 BPM    P-R Interval 188 ms    QRS Duration 126 ms    Q-T Interval 414 ms    QTC Calculation(Bazett) 456 ms    P Axis 59 degrees    R Axis -57 degrees    T Axis 56 degrees    < end of copied text >    Surgical Pathology Report (21 @ 10:15)   Surgical Pathology Report:   ACCESSION No: [id number]   KWAME MOSQUERA 2   Surgical Final Report   Final Diagnosis   1. Ascending colon polyp; hot snare:   - Invasive colonic adenocarcinoma arising in association with   tubular adenoma. See comment.   - Fragments of tubular adenoma (the smallest polyps).   2. Hepatic flexure polyp; biopsy:   - Tubular adenoma.   3. Descending colon polyp; biopsy:   - Inflammatory polyps.   Verified by: Priscila Arana MD   (Electronic Signature)   Reported on: 21 11:11 EDT, [facility],     Surgical Pathology Report (21 @ 11:37)   Surgical Pathology Report: , ACCESSION No: [id number]   KWAME MOSQUERA 2   Surgical Final Report , Final Diagnosis   1. Gastric antrum, biopsy: Chronic active gastritis, diffuse,   with focal lymphoid aggregate; H. Pylori associated. (Special  stain for H. Pylori is positive)   2. Esophagus biopsy: Patchy acute esophagitis. Special stain for  fungi is negative.   Verified by: Mariam Perez M.D. Surgical Pathology Report (21 @ 11:37)     3/28/21 : Xray Knee 3 Views, Right (21 @ 14:20) >    IMPRESSION: Round mass anterior to the patella is noted. This is new since 2018.      3/20/21 : CT Angio Abdomen and Pelvis w/ IV Cont (21 @ 22:55) No CT evidence of acute gastrointestinal hemorrhage. Few scattered colonic diverticula.    3/20/21 : CT Angio Abdomen and Pelvis w/ IV Cont (21 @ 22:55) LOWER CHEST: Partially imaged pacemaker leads. Mitral annular calcification.    3/20/21 : Xray Chest 1 View- PORTABLE-Urgent (21 @ 16:38): There is left-sided defibrillator. The heart is normal in size. The lungs are clear.          MICROBIOLOGY DATA:    COVID-19 Drew Domain Antibody (21 @ 11:07)   COVID-19 Drew Domain Antibody Result: >250.00:    Culture - Blood (21 @ 21:50)   Specimen Source: .Blood Blood-Venous   Culture Results: No growth to date.     Culture - Blood (21 @ 21:50)   Specimen Source: .Blood Blood-Venous   Culture Results: No growth to date.     COVID-19 PCR . (21 @ 16:32)   COVID-19 PCR: NotDetec:     MRSA/MSSA PCR (20 @ 14:34)   MRSA PCR Result.: NotDetec:     FLU A B RSV Detection by PCR (20 @ 20:00)   Flu A Result: NotDetec

## 2021-04-05 NOTE — PROGRESS NOTE ADULT - ASSESSMENT
Patient is a 81y old  Female from home, ambulates with walker, lives with son, with PMHx of HFpEF, CAD (s/p stents), chronic venous stasis, DM, HTN and Afib (on Xarelto, s/p PPM), chronic bronchitis with asthmatic component ( on Oxygen PRN at home ), JOSE DANIEL ( supposed to be on nocturnal CPAP , but non compliant), now presents to the ER for evaluation of hypotension, SOB and  fatigue for almost 2 weeks. Per daughter and granddaughter patient has been complaining of increasing fatigue and sob for last few weeks, seemed to be pale , patient endorses dark loose bowel movement. Patient has chronic diarrhea, but no abdominal pain. She has evaluated by GI team and now the ID consult requested to assist with further evaluation of chronic diarrhea.     #  H. Pylori associated Chronic active gastritis, diffuse, with focal lymphoid aggregate, DX 3/22 by Antrum biopsy  # Chronic diarrhea- C.diff vs Malignancy - NO Malignancy Antrum biopsy   # Symptomatic Anemia  - s/p EGD and antrum biopsy shows H. Pylori associated Chronic active gastritis, diffuse, with focal lymphoid aggregate  # COVID negative 3/20/21, positive 3/27 and negative on 3/28  - d/w Dr. Spence regarding exposure VS false positive, in the setting of positive Titers  # S/p EGD 3/22  and s/p Colonoscopy 3/25- pathology shows - Invasive colonic adenocarcinoma arising in association with  tubular adenoma  # The Right knee xray shows Round mass anterior to the patella.  # s/p Laparoscopic assisted right hemicolectomy , 3/31/21    Would recommend:    1. Follow up pathology   2. Continue Ceftriaxone and Flagyl as per Surgery protocol X ? 7 days  3. Monitor kidney function  4. Pain management as needed  5. OOB to chair     Attending Attestation:    Spent more than 35 minutes on total encounter, more than 50 % of the visit was spent counseling and/or coordinating care by the Attending physician.       Patient is a 81y old  Female from home, ambulates with walker, lives with son, with PMHx of HFpEF, CAD (s/p stents), chronic venous stasis, DM, HTN and Afib (on Xarelto, s/p PPM), chronic bronchitis with asthmatic component ( on Oxygen PRN at home ), JOSE DANIEL ( supposed to be on nocturnal CPAP , but non compliant), now presents to the ER for evaluation of hypotension, SOB and  fatigue for almost 2 weeks. Per daughter and granddaughter patient has been complaining of increasing fatigue and sob for last few weeks, seemed to be pale , patient endorses dark loose bowel movement. Patient has chronic diarrhea, but no abdominal pain. She has evaluated by GI team and now the ID consult requested to assist with further evaluation of chronic diarrhea.     #  H. Pylori associated Chronic active gastritis, diffuse, with focal lymphoid aggregate, DX 3/22 by Antrum biopsy  # Chronic diarrhea- C.diff vs Malignancy - NO Malignancy Antrum biopsy   # Symptomatic Anemia  - s/p EGD and antrum biopsy shows H. Pylori associated Chronic active gastritis, diffuse, with focal lymphoid aggregate  # COVID negative 3/20/21, positive 3/27 and negative on 3/28  - d/w Dr. Spence regarding exposure VS false positive, in the setting of positive Titers  # S/p EGD 3/22  and s/p Colonoscopy 3/25- pathology shows - Invasive colonic adenocarcinoma arising in association with  tubular adenoma  # The Right knee xray shows Round mass anterior to the patella.  # s/p Laparoscopic assisted right hemicolectomy , 3/31/21 - pathology shows Colonic Adenocarcinoma      Would recommend:    1. Please start patient on oral Amoxicillin 1 g q 12hours 7 days then 500mg Q 12hours, + Clarithromycin 500 mg q 12hours and c/w  Flagyl 500 mg q 12hours and + PPI X 14 days , to treat H.pylori  2. Management of  Colonic Adenocarcinoma as per Hem/Onc  3. Monitor kidney function  4. Pain management as needed  5. OOB to chair     Attending Attestation:    Spent more than 35 minutes on total encounter, more than 50 % of the visit was spent counseling and/or coordinating care by the Attending physician.

## 2021-04-05 NOTE — PROGRESS NOTE ADULT - ASSESSMENT
Confidential Drug Utilization Report  Search Terms: Blanca Gil, 1940   Search Date: 03/31/2021 15:16:13 PM   The Drug Utilization Report below displays all of the controlled substance prescriptions, if any, that your patient has filled in the last twelve months. The information displayed on this report is compiled from pharmacy submissions to the Department, and accurately reflects the information as submitted by the pharmacies.  This report was requested by: Betina Rubin | Reference #: [id number]   You have not added a GERARDO number. Keeping your GERARDO number(s) up to date on the My GERARDO Numbers page will enable the separation of your prescriptions from others in the search results.   Others' Prescriptions  Patient Name: Blanca Gil   YOB: 1940   Address: [address] [place] [address]   Sex: Female   Rx Written	Rx Dispensed	Drug	Quantity	Days Supply	Prescriber Name	Payment Method	Dispenser  09/10/2020	03/12/2021	zolpidem tartrate 5 mg tablet 	30	30	Mihai Madrid	Insurance	[facility]  09/10/2020	02/17/2021	zolpidem tartrate 5 mg tablet 	30	30	Mihai Madrid	Insurance	[facility]  09/10/2020	11/30/2020	zolpidem tartrate 5 mg tablet 	30	30	Mihai Madrid MD	Cash	[facility]  09/10/2020	09/14/2020	lorazepam 0.5 mg tablet 	20	10	Mihai Madrid MD	Insurance	[facility]  09/10/2020	09/14/2020	zolpidem tartrate 5 mg tablet 	30	30	Mihai Madrid MD	Cash	[facility]  05/05/2020	05/07/2020	zolpidem tartrate 5 mg tablet 	30	30	Mihai Madrid MD	Insurance	[facility]  04/03/2020	04/06/2020	zolpidem tartrate 5 mg tablet 	30	30	Mihai Madrid MD	Insurance	[facility]  * - Drugs marked with an asterisk are compound drugs. If the compound drug is made up of more than one controlled substance, then each controlled substance will be a separate row in the table.

## 2021-04-05 NOTE — PROGRESS NOTE ADULT - PROBLEM SELECTOR PROBLEM 7
JOSE DANIEL (obstructive sleep apnea)
HTN (Hypertension)
JOSE DANIEL (obstructive sleep apnea)
HTN (Hypertension)
JOSE DANIEL (obstructive sleep apnea)
JOES DANIEL (obstructive sleep apnea)
JOSE DANIEL (obstructive sleep apnea)

## 2021-04-05 NOTE — PROGRESS NOTE ADULT - PROBLEM SELECTOR PROBLEM 9
Hypothyroidism
Prophylactic measure
Prophylactic measure
Colon cancer
Hypothyroidism
Colon cancer
Hypothyroidism
Prophylactic measure
Hypothyroidism
Prophylactic measure

## 2021-04-05 NOTE — PROGRESS NOTE ADULT - PROBLEM SELECTOR PROBLEM 5
JOSE DANIEL (obstructive sleep apnea)
HTN (Hypertension)
JOSE DANIEL (obstructive sleep apnea)
HTN (Hypertension)

## 2021-04-05 NOTE — PROGRESS NOTE ADULT - SUBJECTIVE AND OBJECTIVE BOX
KWAME MOSQUERA  MR# [id number]  81yFemale        Patient is a 81y old  Female who presents with a chief complaint of symptomatic anemia (05 Apr 2021 13:51)      INTERVAL HPI/OVERNIGHT EVENTS:  Patient seen and examined at bedside. Tolerating advancing diet.  No notations of chest pain, palpitation, SOB, orthopnea, nausea, vomiting or abdominal pain.    ALLERGIES  No Known Allergies      MEDICATIONS  acetaminophen   Tablet .. 1000 milliGRAM(s) Oral every 8 hours  ALBUTerol    90 MICROgram(s) HFA Inhaler 2 Puff(s) Inhalation every 6 hours  ascorbic acid 2000 milliGRAM(s) Oral every 8 hours  atorvastatin 40 milliGRAM(s) Oral at bedtime  budesonide 160 MICROgram(s)/formoterol 4.5 MICROgram(s) Inhaler 2 Puff(s) Inhalation two times a day  carvedilol 12.5 milliGRAM(s) Oral every 12 hours  cholecalciferol 1000 Unit(s) Oral daily  dronedarone 400 milliGRAM(s) Oral two times a day  fluticasone propionate 50 MICROgram(s)/spray Nasal Spray 1 Spray(s) Both Nostrils every 12 hours  gabapentin 300 milliGRAM(s) Oral daily  influenza   Vaccine 0.5 milliLiter(s) IntraMuscular once  insulin glargine Injectable (LANTUS) 10 Unit(s) SubCutaneous at bedtime  insulin lispro (ADMELOG) corrective regimen sliding scale   SubCutaneous Before meals and at bedtime  levothyroxine 175 MICROGram(s) Oral daily  lidocaine   Patch 1 Patch Transdermal daily  montelukast 10 milliGRAM(s) Oral daily  ondansetron Injectable 4 milliGRAM(s) IV Push every 6 hours PRN Nausea  oxybutynin 5 milliGRAM(s) Oral two times a day  oxyCODONE    IR 5 milliGRAM(s) Oral every 4 hours PRN Severe Pain (7 - 10)  pantoprazole  Injectable 40 milliGRAM(s) IV Push daily  potassium phosphate / sodium phosphate Powder (PHOS-NaK) 1 Packet(s) Oral three times a day  rivaroxaban 20 milliGRAM(s) Oral daily  zinc sulfate 220 milliGRAM(s) Oral daily  zolpidem 5 milliGRAM(s) Oral at bedtime PRN Insomnia              REVIEW OF SYSTEMS:  CONSTITUTIONAL: No fever, weight loss, or fatigue  EYES: No eye pain, visual disturbances, or discharge  ENT:  No difficulty hearing, tinnitus, vertigo; No sinus or throat pain  NECK: No pain or stiffness  RESPIRATORY: No cough, wheezing, chills or hemoptysis; No Shortness of Breath  CARDIOVASCULAR: No chest pain, palpitations, passing out, dizziness, or leg swelling  GASTROINTESTINAL: No abdominal or epigastric pain. No nausea, vomiting, or hematemesis; No diarrhea or constipation. No melena or hematochezia.  GENITOURINARY: No dysuria, frequency, hematuria, or incontinence  NEUROLOGICAL: No headaches, memory loss, loss of strength, numbness, or tremors  SKIN: No itching, burning, rashes, or lesions   LYMPH Nodes: No enlarged glands  ENDOCRINE: No heat or cold intolerance; No hair loss  MUSCULOSKELETAL: No joint pain or swelling; No muscle, back, or extremity pain  PSYCHIATRIC: No depression, anxiety, mood swings, or difficulty sleeping  HEME/LYMPH: No easy bruising, or bleeding gums  ALLERGY AND IMMUNOLOGIC: No hives or eczema	    [ ] All others negative	  [ ] Unable to obtain      T(C): 36.8 (04-05-21 @ 13:54), Max: 37.6 (04-04-21 @ 20:52)  T(F): 98.3 (04-05-21 @ 13:54), Max: 99.7 (04-04-21 @ 20:52)  HR: 66 (04-05-21 @ 13:54) (61 - 66)  BP: 139/83 (04-05-21 @ 13:54) (130/51 - 139/83)  RR: 18 (04-05-21 @ 13:54) (18 - 18)  SpO2: 98% (04-05-21 @ 13:54) (96% - 98%)  Wt(kg): --    I&O's Summary        PHYSICAL EXAM:  A X O x  HEAD:  Atraumatic, Normocephalic  EYES: EOMI, PERRLA, conjunctiva and sclera clear  NECK: Supple, No JVD, Normal thyroid  Resp: CTAB, No crackles, wheezing,   CVS: Regular rate and rhythm; No discernable murmurs, rubs, or gallops  ABD: Soft, Nontender, Nondistended; Bowel sounds present  EXTREMITIES:  2+ Peripheral Pulses, No edema  LYMPH: No dicernable lymphadenopathy noted  GENERAL: NAD, well-groomed, well-developed      LABS:                        9.8    5.44  )-----------( 265      ( 05 Apr 2021 05:58 )             30.9     04-05    136  |  106  |  8   ----------------------------<  111<H>  4.4   |  23  |  0.82    Ca    8.3<L>      05 Apr 2021 05:58  Phos  2.1     04-05  Mg     1.8     04-05    TPro  6.0  /  Alb  2.5<L>  /  TBili  0.3  /  DBili  x   /  AST  28  /  ALT  30  /  AlkPhos  55  04-05        CAPILLARY BLOOD GLUCOSE      POCT Blood Glucose.: 153 mg/dL (05 Apr 2021 11:28)  POCT Blood Glucose.: 124 mg/dL (05 Apr 2021 07:43)  POCT Blood Glucose.: 143 mg/dL (04 Apr 2021 21:57)  POCT Blood Glucose.: 156 mg/dL (04 Apr 2021 16:30)      Troponins:  ProBNP:  Lipid Profile:   HgA1c:  TSH:           RADIOLOGY & ADDITIONAL TESTS:    Imaging Personally Reviewed:  [ ] YES  [ ] NO      Consultant(s) Notes Reviewed:  [x ] YES  [ ] NO    Care Discussed with Consultants/Other Providers [ x] YES  [ ] NO          PAST MEDICAL & SURGICAL HISTORY:  Asthma    CAD (Coronary Atherosclerotic Disease)    Myocardial Infarction    Diabetes    HTN (Hypertension)    HLD (Hyperlipidemia)    CHF (congestive heart failure), NYHA class I    IBS (irritable bowel syndrome)    Venous stasis    Hypothyroidism    Atrial fibrillation    Pacemaker    Obesity    S/P coronary angiogram          Anemia    H/o or current diagnosis of HF- ACEI/ARB contraindication unknown    H/o or current diagnosis of HF- Contraindication to ACEI/ARBs    H/o or current diagnosis of HF- ACEI/ARB contraindication unknown    H/o or current diagnosis of HF- Contraindication to ACEI/ARBs    H/o or current diagnosis of HF- ACEI/ARB contraindication unknown    H/o or current diagnosis of HF- Contraindication to ACEI/ARBs    Family history of heart disease    Handoff    MEWS Score    Asthma    CAD (Coronary Atherosclerotic Disease)    Myocardial Infarction    Diabetes    HTN (Hypertension)    HLD (Hyperlipidemia)    CHF (congestive heart failure), NYHA class I    IBS (irritable bowel syndrome)    Venous stasis    Hypothyroidism    Atrial fibrillation    Pacemaker    Obesity    Colon adenocarcinoma    Colon adenocarcinoma    Symptomatic anemia    Helicobacter pylori gastritis    Symptomatic anemia    Atrial fibrillation    CHF (congestive heart failure), NYHA class I    Diabetes    HTN (Hypertension)    Asthma    JOSE DANIEL (obstructive sleep apnea)    Prophylactic measure    Hypothyroidism    Knee swelling    Generalized abdominal pain    S/P laparoscopic procedure    CHF (congestive heart failure), NYHA class I    JOSE DANIEL (obstructive sleep apnea)    Diabetes    Colon cancer    Laparoscopy assisted right hemicolectomy    No significant past surgical history    S/P coronary angiogram    A) WEAKNESS    90+    CAD (coronary atherosclerotic disease)    Asthma    Helicobacter pylori gastritis    Paroxysmal atrial fibrillation    Encounter for colonoscopy due to history of adenomatous colonic polyps    Hypothyroidism    CHF (congestive heart failure), NYHA class I    JOSE DANIEL (obstructive sleep apnea)    Diabetes    HTN (Hypertension)    Colon cancer    [email or web address]

## 2021-04-05 NOTE — PROGRESS NOTE ADULT - PROBLEM SELECTOR PROBLEM 1
Symptomatic anemia
Generalized abdominal pain
Symptomatic anemia
Symptomatic anemia
Generalized abdominal pain
Symptomatic anemia

## 2021-04-05 NOTE — PROGRESS NOTE ADULT - ASSESSMENT
79 years old [place]-speaking female from home, ambulates with walker, lives with son, with PMHx of HFpEF, CAD (s/p stents), chronic venous stasis, DM, HTN and Afib (on Xarelto, s/p PPM), chronic bronchitis with asthmatic component ( on Oxygen PRN at home ), JOSE DANIEL ( supposed to be on nocturnal CPAP , but non compliant) presents to the ED with chief complaint of hypotension ,  SOB and  fatigue for almost 2 weeks. Per daughter and granddaughter patient has been complaining of increasing fatigue and sob for last few weeks, seemed to be pale , patient endorses dark loose bowel movement for quite a while.  pt had virtual  colonoscopy 1 year ago and the result was questionable. She never followed up. Per daughter pt refused all kinds of meat including chicken, meat , however denies any dysphasia. Patient  also endorses decreased appetite.  patient has chronic diarrhea. Patient Denies nausea, vomiting, abdominal pain, SOB, chest pain, CHAMBERLAIN, palpitations, dizziness, headache, cough, wheezing, joint pain or swelling, fever, chills.      GOC: Discussed with daughter,  mentioned she want stobe DNI , on discussion with patient, patient was so distressed about urge incontinency and was complaining of pain and IV infiltrations, so GOC discussion deferred to primary team.    (20 Mar 2021 19:53)    Hospital course: Pt was admitted to medicine floor for symptomatic anemia secondary to GI bleed. Colonoscopy was done during hospital stay. 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 Pt underwent laparoscopic rught hemicolectomy today 3/31 with estimated 20 ml blood loss. Pt was extubated in OR and brought to ICU for post operative care as pt has multiple comorbidities.    icu coarse:   Pt was brought to icu for post op monitoring  s/p R hemicolectomy 3/31. Pt started passing gas and was Advance to clear liquids. Pt urine output was monitored. Pt is able to tolerate diet. Pt will be transferred to surgery service under Dr. Rocha service. resumed home meds.          80 y/o  [place]-speaking female  with PMH  of HFpEF, CAD (s/p stents), chronic venous stasis, DM, HTN and Afib (on Xarelto, s/p PPM), chronic bronchitis with asthmatic component ( on Oxygen PRN at home ), JOSE DANIEL ( supposed to be on nocturnal CPAP , but non compliant) presented to the ED with chief complaint of hypotension ,  SOB and  fatigue for almost 2 weeks.   Pt was admitted to medicine for symptomatic anemia. Colonoscopy showed colon mass. Biopsy result showed invasive adenocarcinoma of colon. Pt underwent laparoscopic right hemicolectomy today. POD # 0  Transferred to ICU for post operative care.    Assessment  1. S/P Right Hemicolectomy   2. Ascending colon invasive adenocarcinoma  3. Symptomatic anemia  4. Afib   5. Diabetes Mellitus  6. History of Asthma  7. Hypertension  8.History of coronary artery disease  9.JOSE DANIEL  10. Obesity   11. H. Pylori infection       -OOB to chair w/ PT/OT and incentive shelli; tolerating advancing diet  -D/C planning for home tomorrow  -Heme/Onc consult (Dr Arango) for outpt f/u; awaiting path report  -Cont on A/C  -Complete H.Pylori Rx

## 2021-04-05 NOTE — PROGRESS NOTE ADULT - PROBLEM SELECTOR PROBLEM 2
Atrial fibrillation
Atrial fibrillation
S/P laparoscopic procedure
Atrial fibrillation
S/P laparoscopic procedure
Atrial fibrillation

## 2021-04-05 NOTE — PROGRESS NOTE ADULT - PROBLEM SELECTOR PROBLEM 6
Asthma
Diabetes
Asthma
Diabetes
Asthma

## 2021-04-05 NOTE — PROGRESS NOTE ADULT - SUBJECTIVE AND OBJECTIVE BOX
Source of information: KWAME MOSQUERA, Chart review  Patient language: [place]  : [id number] Sage    HPI:  79 years old [place]-speaking female from home, ambulates with walker, lives with son, with PMHx of HFpEF, CAD (s/p stents), chronic venous stasis, DM, HTN and Afib (on Xarelto, s/p PPM), chronic bronchitis with asthmatic component ( on Oxygen PRN at home ), JOSE DANIEL ( supposed to be on nocturnal CPAP , but non compliant) presents to the ED with chief complaint of hypotension ,  SOB and  fatigue for almost 2 weeks. Per daughter and granddaughter patient has been complaining of increasing fatigue and sob for last few weeks, seemed to be pale , patient endorses dark loose bowel movement for quite a while.  pt had virtual  colonoscopy 1 year ago and the result was questionable. She never followed up. Per daughter pt refused all kinds of meat including chicken, meat , however denies any dysphasia. Patient  also endorses decreased appetite.  patient has chronic diarrhea. Patient Denies nausea, vomiting, abdominal pain,  SOB, chest pain, CHAMBERLAIN, palpitations, dizziness, headache, cough, wheezing, joint pain or swelling, fever, chills.      GOC: Discussed with daughter,  mentioned she want stobe DNI , on discussion with patient, patient was so distressed about urge incontinency and was complaining of pain and IV infiltrations, so GOC discussion deferred to primary team.    (20 Mar 2021 19:53)      Patient is a 81y old  Female  PMHx of HFpEF, CAD (s/p stents), chronic venous stasis, DM, HTN and Afib (on Xarelto, s/p PPM), chronic bronchitis with asthmatic component ( on Oxygen PRN at home ), JOSE DANIEL ( supposed to be on nocturnal CPAP , but non compliant) presents to the ED with chief complaint of hypotension ,  SOB and  fatigue for almost 2 weeks. Found to have symptomatic anemia. Pt s/p lap colon resection 3/31, POD #5.  Pain consulted for post operative pain. Pt seen and examined at bedside. Pt sitting in chair, denies abdominal pain. Pt reports right thigh pain 7/10 associated with swelling. Pt describes pain as burning, alleviated by placing alcohol on thigh. SCALE USED: (1-10 VNRS). CT of right femur shows No acute fracture or dislocation. Degenerative changes, Nonspecific diffuse subcutaneous soft tissue infiltration/edema, more pronounced laterally. 4.1 x 2.6 x 2.9 cm nonspecific cystic mass/collection in the anterior subcutaneous tissues of the knee.   Pt tolerating PO diet. Denies lethargy, nausea, vomiting, constipation, itchiness. Reports passing flatus. Patient stated goal for pain control: to be able to take deep breaths, get out of bed to chair and ambulate with tolerable pain control. 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     PAST MEDICAL & SURGICAL HISTORY:  Asthma    CAD (Coronary Atherosclerotic Disease)    Myocardial Infarction    Diabetes    HTN (Hypertension)    HLD (Hyperlipidemia)    CHF (congestive heart failure), NYHA class I    IBS (irritable bowel syndrome)    Venous stasis    Hypothyroidism    Atrial fibrillation    Pacemaker    Obesity    S/P coronary angiogram        FAMILY HISTORY:  Family history of heart disease        Social History:  Alcohol: Denied  Smoking: Denied  Illicit drugs: Denied (20 Mar 2021 19:53)    Allergies    No Known Allergies    MEDICATIONS  (STANDING):  acetaminophen   Tablet .. 1000 milliGRAM(s) Oral every 8 hours  ALBUTerol    90 MICROgram(s) HFA Inhaler 2 Puff(s) Inhalation every 6 hours  ascorbic acid 2000 milliGRAM(s) Oral every 8 hours  atorvastatin 40 milliGRAM(s) Oral at bedtime  budesonide 160 MICROgram(s)/formoterol 4.5 MICROgram(s) Inhaler 2 Puff(s) Inhalation two times a day  carvedilol 12.5 milliGRAM(s) Oral every 12 hours  cholecalciferol 1000 Unit(s) Oral daily  dronedarone 400 milliGRAM(s) Oral two times a day  fluticasone propionate 50 MICROgram(s)/spray Nasal Spray 1 Spray(s) Both Nostrils every 12 hours  gabapentin 300 milliGRAM(s) Oral daily  influenza   Vaccine 0.5 milliLiter(s) IntraMuscular once  insulin glargine Injectable (LANTUS) 10 Unit(s) SubCutaneous at bedtime  insulin lispro (ADMELOG) corrective regimen sliding scale   SubCutaneous Before meals and at bedtime  levothyroxine 175 MICROGram(s) Oral daily  montelukast 10 milliGRAM(s) Oral daily  oxybutynin 5 milliGRAM(s) Oral two times a day  pantoprazole  Injectable 40 milliGRAM(s) IV Push daily  potassium phosphate / sodium phosphate Powder (PHOS-NaK) 1 Packet(s) Oral three times a day  rivaroxaban 20 milliGRAM(s) Oral daily  zinc sulfate 220 milliGRAM(s) Oral daily    MEDICATIONS  (PRN):  ondansetron Injectable 4 milliGRAM(s) IV Push every 6 hours PRN Nausea  oxyCODONE    IR 5 milliGRAM(s) Oral every 4 hours PRN Severe Pain (7 - 10)  zolpidem 5 milliGRAM(s) Oral at bedtime PRN Insomnia      Vital Signs Last 24 Hrs  T(C): 36.8 (05 Apr 2021 13:54), Max: 37.6 (04 Apr 2021 20:52)  T(F): 98.3 (05 Apr 2021 13:54), Max: 99.7 (04 Apr 2021 20:52)  HR: 66 (05 Apr 2021 13:54) (61 - 66)  BP: 139/83 (05 Apr 2021 13:54) (130/51 - 139/83)  BP(mean): --  RR: 18 (05 Apr 2021 13:54) (18 - 18)  SpO2: 98% (05 Apr 2021 13:54) (96% - 98%)  COVID-19 PCR: NotDetec (30 Mar 2021 20:42)  COVID-19 PCR: NotDetec (28 Mar 2021 04:16)  COVID-19 PCR: Detected (27 Mar 2021 19:25)  COVID-19 PCR: NotDetec (23 Mar 2021 15:12)  COVID-19 PCR: NotDetec (20 Mar 2021 16:32)    LABS: Reviewed                          9.8    5.44  )-----------( 265      ( 05 Apr 2021 05:58 )             30.9     04-05    136  |  106  |  8   ----------------------------<  111<H>  4.4   |  23  |  0.82    Ca    8.3<L>      05 Apr 2021 05:58  Phos  2.1     04-05  Mg     1.8     04-05    TPro  6.0  /  Alb  2.5<L>  /  TBili  0.3  /  DBili  x   /  AST  28  /  ALT  30  /  AlkPhos  55  04-05      LIVER FUNCTIONS - ( 05 Apr 2021 05:58 )  Alb: 2.5 g/dL / Pro: 6.0 g/dL / ALK PHOS: 55 U/L / ALT: 30 U/L DA / AST: 28 U/L / GGT: x             CAPILLARY BLOOD GLUCOSE      POCT Blood Glucose.: 153 mg/dL (05 Apr 2021 11:28)  POCT Blood Glucose.: 124 mg/dL (05 Apr 2021 07:43)  POCT Blood Glucose.: 143 mg/dL (04 Apr 2021 21:57)  POCT Blood Glucose.: 156 mg/dL (04 Apr 2021 16:30)    COVID-19 PCR: NotDetec (30 Mar 2021 20:42)  COVID-19 PCR: NotDetec (28 Mar 2021 04:16)  COVID-19 PCR: Detected (27 Mar 2021 19:25)  COVID-19 PCR: NotDetec (23 Mar 2021 15:12)  COVID-19 PCR: NotDetec (20 Mar 2021 16:32)        Radiology: Reviewed.  < from: CT Femur No Cont, Right (04.05.21 @ 09:01) >    EXAM:  CT FEMUR ONLY RT                            PROCEDURE DATE:  04/05/2021          INTERPRETATION:  EXAMINATION: CT of the right femur without contrast    CLINICAL INFORMATION: Right thigh pain and swelling    TECHNIQUE: Axial CT images were obtained through the right femur. Coronal and sagittal reformatted images were made. 3-D reconstruction images were also performed.    FINDINGS: No acute fracture or dislocation is demonstrated. No lytic or blastic lesions are demonstrated. There is noCT evidence of osteomyelitis. The bones are diffusely demineralized. 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 The hip joint space is grossly preserved. There is mild right sacroiliac joint arthrosis. There is nonspecific diffuse subcutaneous soft tissue infiltration/edema, most pronounced laterally. There is nonspecific 4.1 x 2.6 x 2.9 cm cystic mass in the anterior subcutaneous tissues overlying the inferior aspect of the patella. Correlate clinically.    IMPRESSION: No acute fracture or dislocation.  Degenerative changes, as above.  Nonspecific diffuse subcutaneous soft tissue infiltration/edema, more pronounced laterally.  4.1 x 2.6 x 2.9 cm nonspecific cystic mass/collection in the anterior subcutaneous tissues of the knee. Correlate clinically.            JENNIFER MORLEY MD; Attending Radiologist  This document has been electronically signed. Apr 5 2021  9:39AM    < end of copied text >     < from: Xray Abdomen 2 Views (03.29.21 @ 19:50) >    EXAM:  XR ABDOMEN 2V                            PROCEDURE DATE:  03/29/2021          INTERPRETATION:  Abdomen 2 views    HISTORY: Evaluate for free air.    COMPARISON: 3/20/2021    Supine and upright views of the abdomen show a normal gas pattern. The psoas margins are within normal limits. There is no evidence of free air. Surgical clips project over the right abdomen were not seen in the prior study.    IMPRESSION: No evidence of free air. Right surgical clips as noted.    Thank you for this referral.            SAUNDRA COOEPR MD; Attending Interventional Radiologist  This document has been electronically signed. Mar 30 2021 10:46AM    < end of copied text >    < from: Xray Knee 3 Views, Right (03.28.21 @ 14:20) >    EXAM:  KNEE AP.LAT&OBL.RIGHT                            PROCEDURE DATE:  03/28/2021          INTERPRETATION:  Right knee. Patient has local pain. 3 views.    No effusion. Mild degeneration concentrated around the articular patella.    On the lateral view there is a well-circumscribed round density in the subcutaneous tissues anterior to the patella measuring approximately 3.2 cm.    IMPRESSION: Round mass anterior to the patella is noted. This is new since December 22, 2018.            GUERRERO HUSSEIN M.D., ATTENDING RADIOLOGIST  This document has been electronically signed. Mar 28 2021  3:48PM    < end of copied text >      ORT Score -   Family Hx of substance abuse	Female	      Male  Alcohol 	                                           1                     3  Illegal drugs	                                   2                     3  Rx drugs                                           4 	                  4  Personal Hx of substance abuse		  Alcohol 	                                          3	                  3  Illegal drugs                                     4	                  4  Rx drugs                                            5 	                  5  Age between 16- 45 years	           1                     1  hx preadolescent sexual abuse	   3 	                  0  Psychological disease		  ADD, OCD, bipolar, schizophrenia   2	          2  Depression                                           1 	          1  Total: 0    a score of 3 or lower indicates low risk for opioid abuse		  a score of 4-7 indicates moderate risk for opioid abuse		  a score of 8 or higher indicates high risk for opioid abuse  	  REVIEW OF SYSTEMS:  CONSTITUTIONAL: No fever No fatigue  HEENT:  No difficulty hearing, no change in vision  NECK: No pain or stiffness  RESPIRATORY: No cough, wheezing, chills or hemoptysis; No shortness of breath  CARDIOVASCULAR: No chest pain, palpitations, dizziness, + right thigh swelling  GASTROINTESTINAL: No loss of appetite, decreased PO intake. + abdominal pain (resolved). No nausea, vomiting; No diarrhea or constipation.   GENITOURINARY: No dysuria, frequency, hematuria, retention or incontinence  MUSCULOSKELETAL: + right thigh pain; + chronic right shoulder pain, no swelling; No muscle, back, or extremity pain, no upper or lower motor strength weakness, no saddle anesthesia, bowel/bladder incontinence, no falls   NEURO: No headaches, + numbness/tingling right leg, No weakness  PSYCHIATRIC: No depression, anxiety or difficulty sleeping    PHYSICAL EXAM:  GENERAL:  Alert & Oriented X4, cooperative, NAD, Good concentration. Speech is clear.   RESPIRATORY: Respirations even and unlabored. Clear to auscultation bilaterally; No rales, rhonchi, wheezing, or rubs   CARDIOVASCULAR: Normal S1/S2, regular rate and rhythm; No murmurs, rubs, or gallops. No JVD.    GASTROINTESTINAL:  Soft, + appropriately tender, Nondistended; Bowel sounds present + lap sites c/d/i, right upper quadrant dressing c/d/i   PERIPHERAL VASCULAR: + right thigh moderate edema ;Extremities warm. 2+ Peripheral Pulses, No cyanosis, No calf tenderness  MUSCULOSKELETAL: Motor Strength 5/5 B/L upper and lower extremities; moves all extremities equally against gravity; ROM intact; negative SLR; + right shoulder tender to palpation   SKIN: + abdominal lap sites c/d/i; + BAILEY dressing in place, c/d/i, + b/l LE mild erythema.     Risk factors associated with adverse outcomes related to opioid treatment  [ ]  Concurrent benzodiazepine use  [ ]  History/ Active substance use or alcohol use disorder  [ ] Psychiatric co-morbidity  [ ] Sleep apnea  [ ] COPD  [X ] BMI> 35  [ ] Liver dysfunction  [ ] Renal dysfunction  [ ] CHF  [ ] Smoker  [X ]  Age > 60 years    [X ]  [place]  Reviewed and Copied to Chart. See below.    Plan of care and goal oriented pain management treatment options were discussed with patient and /or primary care giver; all questions and concerns were addressed and care was aligned with patient's wishes.    Educated patient on goal oriented pain management treatment options     04-05-21 @ 13:59

## 2021-04-05 NOTE — PROGRESS NOTE ADULT - ASSESSMENT
79 years old Salvadorean-speaking female from home, ambulates with walker, lives with son, with PMHx of HFpEF, CAD (s/p stents), chronic venous stasis, DM, HTN and Afib (on Xarelto, s/p PPM), chronic bronchitis with asthmatic component ( on Oxygen PRN at home ), JOSE DANIEL ( supposed to be on nocturnal CPAP , but non compliant) presents to the ED with chief complaint of hypotension , SOB and  fatigue for almost 2 weeks.,symptomatic Fe def anemia with Colon ca-s/p resection.  1.OOB to chair.  2.PO diet.  3.Hypothyroidsm-synthroid.  4.HTN-coreg 12.5mg bid.  5.PAF-xarelto,multaq.  6.Await surgical pathology.  7.GI prophylaxis.

## 2021-04-05 NOTE — PROGRESS NOTE ADULT - NEUROLOGICAL DETAILS
alert and oriented x 3/responds to pain/responds to verbal commands/sensation intact/cranial nerves intact
responds to pain/responds to verbal commands/sensation intact/cranial nerves intact

## 2021-04-05 NOTE — PROGRESS NOTE ADULT - SUBJECTIVE AND OBJECTIVE BOX
feel ok  no BM yet  mild pain a t incision site      MEDICATIONS  (STANDING):  acetaminophen   Tablet .. 1000 milliGRAM(s) Oral every 8 hours  ALBUTerol    90 MICROgram(s) HFA Inhaler 2 Puff(s) Inhalation every 6 hours  ascorbic acid 2000 milliGRAM(s) Oral every 8 hours  atorvastatin 40 milliGRAM(s) Oral at bedtime  budesonide 160 MICROgram(s)/formoterol 4.5 MICROgram(s) Inhaler 2 Puff(s) Inhalation two times a day  carvedilol 12.5 milliGRAM(s) Oral every 12 hours  cholecalciferol 1000 Unit(s) Oral daily  dronedarone 400 milliGRAM(s) Oral two times a day  fluticasone propionate 50 MICROgram(s)/spray Nasal Spray 1 Spray(s) Both Nostrils every 12 hours  gabapentin 300 milliGRAM(s) Oral daily  influenza   Vaccine 0.5 milliLiter(s) IntraMuscular once  insulin glargine Injectable (LANTUS) 10 Unit(s) SubCutaneous at bedtime  insulin lispro (ADMELOG) corrective regimen sliding scale   SubCutaneous Before meals and at bedtime  levothyroxine 175 MICROGram(s) Oral daily  lidocaine   Patch 1 Patch Transdermal daily  montelukast 10 milliGRAM(s) Oral daily  oxybutynin 5 milliGRAM(s) Oral two times a day  pantoprazole  Injectable 40 milliGRAM(s) IV Push daily  potassium phosphate / sodium phosphate Powder (PHOS-NaK) 1 Packet(s) Oral three times a day  rivaroxaban 20 milliGRAM(s) Oral daily  zinc sulfate 220 milliGRAM(s) Oral daily    MEDICATIONS  (PRN):  ondansetron Injectable 4 milliGRAM(s) IV Push every 6 hours PRN Nausea  oxyCODONE    IR 5 milliGRAM(s) Oral every 4 hours PRN Severe Pain (7 - 10)  zolpidem 5 milliGRAM(s) Oral at bedtime PRN Insomnia      Allergies    No Known Allergies    Intolerances        Vital Signs Last 24 Hrs  T(C): 36.8 (05 Apr 2021 13:54), Max: 37.6 (04 Apr 2021 20:52)  T(F): 98.3 (05 Apr 2021 13:54), Max: 99.7 (04 Apr 2021 20:52)  HR: 66 (05 Apr 2021 13:54) (61 - 66)  BP: 139/83 (05 Apr 2021 13:54) (130/51 - 139/83)  BP(mean): --  RR: 18 (05 Apr 2021 13:54) (18 - 18)  SpO2: 98% (05 Apr 2021 13:54) (96% - 98%)    PHYSICAL EXAM  General: adult in NAD  HEENT: clear oropharynx, anicteric sclera, pink conjunctiva  Neck: supple  CV: normal S1/S2 with no murmur rubs or gallops  Lungs: positive air movement b/l ant lungs,clear to auscultation, no wheezes, no rales  Abdomen: soft non-tender non-distended, no hepatosplenomegaly  Ext: no clubbing cyanosis or edema  Skin: no rashes and no petechiae  Neuro: alert and oriented X 4, no focal deficits  LABS:                          9.8    5.44  )-----------( 265      ( 05 Apr 2021 05:58 )             30.9         Mean Cell Volume : 87.0 fl  Mean Cell Hemoglobin : 27.6 pg  Mean Cell Hemoglobin Concentration : 31.7 gm/dL  Auto Neutrophil # : 2.98 K/uL  Auto Lymphocyte # : 1.62 K/uL  Auto Monocyte # : 0.53 K/uL  Auto Eosinophil # : 0.23 K/uL  Auto Basophil # : 0.05 K/uL  Auto Neutrophil % : 54.8 %  Auto Lymphocyte % : 29.8 %  Auto Monocyte % : 9.7 %  Auto Eosinophil % : 4.2 %  Auto Basophil % : 0.9 %    Serial CBC  Hematocrit 30.9  Hemoglobin 9.8  Plat 265  RBC 3.55  WBC 5.44  Serial CBC  Hematocrit 31.7  Hemoglobin 10.1  Plat 246  RBC 3.60  WBC 6.18  Serial CBC  Hematocrit 30.5  Hemoglobin 9.8  Plat 252  RBC 3.59  WBC 7.29  Serial CBC  Hematocrit 30.9  Hemoglobin 10.0  Plat 253  RBC 3.62  WBC 7.12    04-05    136  |  106  |  8   ----------------------------<  111<H>  4.4   |  23  |  0.82    Ca    8.3<L>      05 Apr 2021 05:58  Phos  2.1     04-05  Mg     1.8     04-05    TPro  6.0  /  Alb  2.5<L>  /  TBili  0.3  /  DBili  x   /  AST  28  /  ALT  30  /  AlkPhos  55  04-05                    BLOOD SMEAR INTERPRETATION:       RADIOLOGY & ADDITIONAL STUDIES:

## 2021-04-06 ENCOUNTER — NON-APPOINTMENT (OUTPATIENT)
Age: 81
End: 2021-04-06

## 2021-04-06 ENCOUNTER — APPOINTMENT (OUTPATIENT)
Dept: ELECTROPHYSIOLOGY | Facility: CLINIC | Age: 81
End: 2021-04-06
Payer: MEDICARE

## 2021-04-06 VITALS
TEMPERATURE: 98 F | SYSTOLIC BLOOD PRESSURE: 146 MMHG | OXYGEN SATURATION: 100 % | DIASTOLIC BLOOD PRESSURE: 78 MMHG | HEART RATE: 77 BPM | RESPIRATION RATE: 17 BRPM

## 2021-04-06 LAB
GLUCOSE BLDC GLUCOMTR-MCNC: 134 MG/DL — HIGH (ref 70–99)
GLUCOSE BLDC GLUCOMTR-MCNC: 144 MG/DL — HIGH (ref 70–99)
GLUCOSE BLDC GLUCOMTR-MCNC: 210 MG/DL — HIGH (ref 70–99)

## 2021-04-06 PROCEDURE — 83880 ASSAY OF NATRIURETIC PEPTIDE: CPT

## 2021-04-06 PROCEDURE — C9399: CPT

## 2021-04-06 PROCEDURE — 88312 SPECIAL STAINS GROUP 1: CPT

## 2021-04-06 PROCEDURE — 86901 BLOOD TYPING SEROLOGIC RH(D): CPT

## 2021-04-06 PROCEDURE — 83735 ASSAY OF MAGNESIUM: CPT

## 2021-04-06 PROCEDURE — 78452 HT MUSCLE IMAGE SPECT MULT: CPT

## 2021-04-06 PROCEDURE — 86850 RBC ANTIBODY SCREEN: CPT

## 2021-04-06 PROCEDURE — 73562 X-RAY EXAM OF KNEE 3: CPT

## 2021-04-06 PROCEDURE — 93017 CV STRESS TEST TRACING ONLY: CPT

## 2021-04-06 PROCEDURE — 88304 TISSUE EXAM BY PATHOLOGIST: CPT

## 2021-04-06 PROCEDURE — 83036 HEMOGLOBIN GLYCOSYLATED A1C: CPT

## 2021-04-06 PROCEDURE — 36415 COLL VENOUS BLD VENIPUNCTURE: CPT

## 2021-04-06 PROCEDURE — 73700 CT LOWER EXTREMITY W/O DYE: CPT

## 2021-04-06 PROCEDURE — 85730 THROMBOPLASTIN TIME PARTIAL: CPT

## 2021-04-06 PROCEDURE — 93970 EXTREMITY STUDY: CPT

## 2021-04-06 PROCEDURE — 93005 ELECTROCARDIOGRAM TRACING: CPT

## 2021-04-06 PROCEDURE — 97161 PT EVAL LOW COMPLEX 20 MIN: CPT

## 2021-04-06 PROCEDURE — 82728 ASSAY OF FERRITIN: CPT

## 2021-04-06 PROCEDURE — 88305 TISSUE EXAM BY PATHOLOGIST: CPT

## 2021-04-06 PROCEDURE — 82962 GLUCOSE BLOOD TEST: CPT

## 2021-04-06 PROCEDURE — 84484 ASSAY OF TROPONIN QUANT: CPT

## 2021-04-06 PROCEDURE — 93294 REM INTERROG EVL PM/LDLS PM: CPT

## 2021-04-06 PROCEDURE — 80053 COMPREHEN METABOLIC PANEL: CPT

## 2021-04-06 PROCEDURE — 82746 ASSAY OF FOLIC ACID SERUM: CPT

## 2021-04-06 PROCEDURE — 86900 BLOOD TYPING SEROLOGIC ABO: CPT

## 2021-04-06 PROCEDURE — 84145 PROCALCITONIN (PCT): CPT

## 2021-04-06 PROCEDURE — 99285 EMERGENCY DEPT VISIT HI MDM: CPT | Mod: 25

## 2021-04-06 PROCEDURE — 85027 COMPLETE CBC AUTOMATED: CPT

## 2021-04-06 PROCEDURE — 85610 PROTHROMBIN TIME: CPT

## 2021-04-06 PROCEDURE — 74018 RADEX ABDOMEN 1 VIEW: CPT

## 2021-04-06 PROCEDURE — 74019 RADEX ABDOMEN 2 VIEWS: CPT

## 2021-04-06 PROCEDURE — 84443 ASSAY THYROID STIM HORMONE: CPT

## 2021-04-06 PROCEDURE — 82570 ASSAY OF URINE CREATININE: CPT

## 2021-04-06 PROCEDURE — 86769 SARS-COV-2 COVID-19 ANTIBODY: CPT

## 2021-04-06 PROCEDURE — 94640 AIRWAY INHALATION TREATMENT: CPT

## 2021-04-06 PROCEDURE — 80061 LIPID PANEL: CPT

## 2021-04-06 PROCEDURE — A9502: CPT

## 2021-04-06 PROCEDURE — 87040 BLOOD CULTURE FOR BACTERIA: CPT

## 2021-04-06 PROCEDURE — P9040: CPT

## 2021-04-06 PROCEDURE — 81003 URINALYSIS AUTO W/O SCOPE: CPT

## 2021-04-06 PROCEDURE — 36430 TRANSFUSION BLD/BLD COMPNT: CPT

## 2021-04-06 PROCEDURE — 93296 REM INTERROG EVL PM/IDS: CPT

## 2021-04-06 PROCEDURE — 88307 TISSUE EXAM BY PATHOLOGIST: CPT

## 2021-04-06 PROCEDURE — 85025 COMPLETE CBC W/AUTO DIFF WBC: CPT

## 2021-04-06 PROCEDURE — 84100 ASSAY OF PHOSPHORUS: CPT

## 2021-04-06 PROCEDURE — 83540 ASSAY OF IRON: CPT

## 2021-04-06 PROCEDURE — 84300 ASSAY OF URINE SODIUM: CPT

## 2021-04-06 PROCEDURE — 71045 X-RAY EXAM CHEST 1 VIEW: CPT

## 2021-04-06 PROCEDURE — 82306 VITAMIN D 25 HYDROXY: CPT

## 2021-04-06 PROCEDURE — 93306 TTE W/DOPPLER COMPLETE: CPT

## 2021-04-06 PROCEDURE — 85379 FIBRIN DEGRADATION QUANT: CPT

## 2021-04-06 PROCEDURE — 74174 CTA ABD&PLVS W/CONTRAST: CPT

## 2021-04-06 PROCEDURE — 82607 VITAMIN B-12: CPT

## 2021-04-06 PROCEDURE — C1889: CPT

## 2021-04-06 PROCEDURE — 87635 SARS-COV-2 COVID-19 AMP PRB: CPT

## 2021-04-06 PROCEDURE — 83550 IRON BINDING TEST: CPT

## 2021-04-06 PROCEDURE — 86923 COMPATIBILITY TEST ELECTRIC: CPT

## 2021-04-06 PROCEDURE — 83605 ASSAY OF LACTIC ACID: CPT

## 2021-04-06 RX ORDER — AMOXICILLIN 250 MG/5ML
1 SUSPENSION, RECONSTITUTED, ORAL (ML) ORAL
Qty: 38 | Refills: 0
Start: 2021-04-06 | End: 2021-04-15

## 2021-04-06 RX ORDER — ACETAMINOPHEN 500 MG
2 TABLET ORAL
Qty: 0 | Refills: 0 | DISCHARGE
Start: 2021-04-06

## 2021-04-06 RX ORDER — PANTOPRAZOLE SODIUM 20 MG/1
1 TABLET, DELAYED RELEASE ORAL
Qty: 20 | Refills: 0
Start: 2021-04-06 | End: 2021-04-15

## 2021-04-06 RX ORDER — METRONIDAZOLE 500 MG
1 TABLET ORAL
Qty: 30 | Refills: 0
Start: 2021-04-06 | End: 2021-04-15

## 2021-04-06 RX ADMIN — Medication 1 PACKET(S): at 05:10

## 2021-04-06 RX ADMIN — PANTOPRAZOLE SODIUM 40 MILLIGRAM(S): 20 TABLET, DELAYED RELEASE ORAL at 11:41

## 2021-04-06 RX ADMIN — Medication 175 MICROGRAM(S): at 05:10

## 2021-04-06 RX ADMIN — RIVAROXABAN 20 MILLIGRAM(S): KIT at 11:40

## 2021-04-06 RX ADMIN — ALBUTEROL 2 PUFF(S): 90 AEROSOL, METERED ORAL at 11:38

## 2021-04-06 RX ADMIN — CARVEDILOL PHOSPHATE 12.5 MILLIGRAM(S): 80 CAPSULE, EXTENDED RELEASE ORAL at 05:10

## 2021-04-06 RX ADMIN — Medication 1 PACKET(S): at 13:08

## 2021-04-06 RX ADMIN — Medication 1 SPRAY(S): at 05:11

## 2021-04-06 RX ADMIN — DRONEDARONE 400 MILLIGRAM(S): 400 TABLET, FILM COATED ORAL at 05:10

## 2021-04-06 RX ADMIN — ALBUTEROL 2 PUFF(S): 90 AEROSOL, METERED ORAL at 15:44

## 2021-04-06 RX ADMIN — Medication 1000 UNIT(S): at 11:40

## 2021-04-06 RX ADMIN — LIDOCAINE 1 PATCH: 4 CREAM TOPICAL at 11:39

## 2021-04-06 RX ADMIN — Medication 2000 MILLIGRAM(S): at 05:10

## 2021-04-06 RX ADMIN — DRONEDARONE 400 MILLIGRAM(S): 400 TABLET, FILM COATED ORAL at 17:07

## 2021-04-06 RX ADMIN — Medication 4: at 11:39

## 2021-04-06 RX ADMIN — BUDESONIDE AND FORMOTEROL FUMARATE DIHYDRATE 2 PUFF(S): 160; 4.5 AEROSOL RESPIRATORY (INHALATION) at 11:38

## 2021-04-06 RX ADMIN — GABAPENTIN 300 MILLIGRAM(S): 400 CAPSULE ORAL at 11:43

## 2021-04-06 RX ADMIN — ZINC SULFATE TAB 220 MG (50 MG ZINC EQUIVALENT) 220 MILLIGRAM(S): 220 (50 ZN) TAB at 11:41

## 2021-04-06 RX ADMIN — LIDOCAINE 1 PATCH: 4 CREAM TOPICAL at 04:22

## 2021-04-06 RX ADMIN — Medication 5 MILLIGRAM(S): at 17:07

## 2021-04-06 RX ADMIN — MONTELUKAST 10 MILLIGRAM(S): 4 TABLET, CHEWABLE ORAL at 11:40

## 2021-04-06 RX ADMIN — Medication 1000 MILLIGRAM(S): at 05:11

## 2021-04-06 RX ADMIN — Medication 2000 MILLIGRAM(S): at 13:09

## 2021-04-06 RX ADMIN — Medication 1000 MILLIGRAM(S): at 13:08

## 2021-04-06 RX ADMIN — CARVEDILOL PHOSPHATE 12.5 MILLIGRAM(S): 80 CAPSULE, EXTENDED RELEASE ORAL at 17:07

## 2021-04-06 RX ADMIN — Medication 1000 MILLIGRAM(S): at 06:06

## 2021-04-06 RX ADMIN — Medication 5 MILLIGRAM(S): at 05:10

## 2021-04-06 NOTE — PROGRESS NOTE ADULT - NEGATIVE GASTROINTESTINAL SYMPTOMS
no nausea/no vomiting/no flatulence/no abdominal pain/no hematochezia/no steatorrhea/no jaundice

## 2021-04-06 NOTE — PROGRESS NOTE ADULT - NEGATIVE SKIN SYMPTOMS
no rash/no itching/no dryness/no brittle nails/no pitted nails/no hair loss

## 2021-04-06 NOTE — PROGRESS NOTE ADULT - NEGATIVE RESPIRATORY AND THORAX SYMPTOMS
no wheezing/no dyspnea/no cough/no hemoptysis

## 2021-04-06 NOTE — CONSULT NOTE ADULT - CONSULT REQUESTED DATE/TIME
21-Mar-2021 15:29
31-Mar-2021 12:10
21-Mar-2021 12:19
22-Mar-2021 20:00
26-Mar-2021 16:41
26-Mar-2021 22:00
31-Mar-2021 15:00
06-Apr-2021 13:55
29-Mar-2021 12:07
21-Mar-2021 11:02

## 2021-04-06 NOTE — PROGRESS NOTE ADULT - NECK DETAILS
supple/no JVD

## 2021-04-06 NOTE — PROGRESS NOTE ADULT - ASSESSMENT
· Assessment	  79 year old lady presented with weakness and anemia.  colonoscopy showed several large polyps.  The ascending colon polyp is cancerous, involving margin    1. cancer at polyp, involving the margin   surgery was done.    path T1 N0, no need for adjuvant treatment    2, anemia, will give venofer  transfuse before surgery    3. Afib on xarelto  will hold xarelto until surgery is done    4. a mobile lump at right knee since a fall 2 years ago  will do a US to see this a cyst or mass

## 2021-04-06 NOTE — PROGRESS NOTE ADULT - SUBJECTIVE AND OBJECTIVE BOX
condition stable  no fever or pain    MEDICATIONS  (STANDING):  acetaminophen   Tablet .. 1000 milliGRAM(s) Oral every 8 hours  ALBUTerol    90 MICROgram(s) HFA Inhaler 2 Puff(s) Inhalation every 6 hours  ascorbic acid 2000 milliGRAM(s) Oral every 8 hours  atorvastatin 40 milliGRAM(s) Oral at bedtime  budesonide 160 MICROgram(s)/formoterol 4.5 MICROgram(s) Inhaler 2 Puff(s) Inhalation two times a day  carvedilol 12.5 milliGRAM(s) Oral every 12 hours  cholecalciferol 1000 Unit(s) Oral daily  dronedarone 400 milliGRAM(s) Oral two times a day  fluticasone propionate 50 MICROgram(s)/spray Nasal Spray 1 Spray(s) Both Nostrils every 12 hours  gabapentin 300 milliGRAM(s) Oral daily  influenza   Vaccine 0.5 milliLiter(s) IntraMuscular once  insulin glargine Injectable (LANTUS) 10 Unit(s) SubCutaneous at bedtime  insulin lispro (ADMELOG) corrective regimen sliding scale   SubCutaneous Before meals and at bedtime  levothyroxine 175 MICROGram(s) Oral daily  lidocaine   Patch 1 Patch Transdermal daily  montelukast 10 milliGRAM(s) Oral daily  oxybutynin 5 milliGRAM(s) Oral two times a day  pantoprazole  Injectable 40 milliGRAM(s) IV Push daily  potassium phosphate / sodium phosphate Powder (PHOS-NaK) 1 Packet(s) Oral three times a day  rivaroxaban 20 milliGRAM(s) Oral daily  zinc sulfate 220 milliGRAM(s) Oral daily    MEDICATIONS  (PRN):  ondansetron Injectable 4 milliGRAM(s) IV Push every 6 hours PRN Nausea  oxyCODONE    IR 5 milliGRAM(s) Oral every 4 hours PRN Severe Pain (7 - 10)  zolpidem 5 milliGRAM(s) Oral at bedtime PRN Insomnia      Allergies    No Known Allergies    Intolerances        Vital Signs Last 24 Hrs  T(C): 36.8 (06 Apr 2021 15:00), Max: 36.8 (06 Apr 2021 15:00)  T(F): 98.2 (06 Apr 2021 15:00), Max: 98.2 (06 Apr 2021 15:00)  HR: 77 (06 Apr 2021 15:00) (61 - 77)  BP: 146/78 (06 Apr 2021 15:00) (126/77 - 169/63)  BP(mean): --  RR: 17 (06 Apr 2021 15:00) (17 - 18)  SpO2: 100% (06 Apr 2021 15:00) (95% - 100%)    PHYSICAL EXAM  General: adult in NAD  HEENT: clear oropharynx, anicteric sclera, pink conjunctiva  Neck: supple  CV: normal S1/S2 with no murmur rubs or gallops  Lungs: positive air movement b/l ant lungs,clear to auscultation, no wheezes, no rales  Abdomen: soft non-tender non-distended, no hepatosplenomegaly  Ext: no clubbing cyanosis or edema  Skin: no rashes and no petechiae  Neuro: alert and oriented X 4, no focal deficits  LABS:                          9.8    5.44  )-----------( 265      ( 05 Apr 2021 05:58 )             30.9         Mean Cell Volume : 87.0 fl  Mean Cell Hemoglobin : 27.6 pg  Mean Cell Hemoglobin Concentration : 31.7 gm/dL  Auto Neutrophil # : 2.98 K/uL  Auto Lymphocyte # : 1.62 K/uL  Auto Monocyte # : 0.53 K/uL  Auto Eosinophil # : 0.23 K/uL  Auto Basophil # : 0.05 K/uL  Auto Neutrophil % : 54.8 %  Auto Lymphocyte % : 29.8 %  Auto Monocyte % : 9.7 %  Auto Eosinophil % : 4.2 %  Auto Basophil % : 0.9 %    Serial CBC  Hematocrit 30.9  Hemoglobin 9.8  Plat 265  RBC 3.55  WBC 5.44  Serial CBC  Hematocrit 31.7  Hemoglobin 10.1  Plat 246  RBC 3.60  WBC 6.18  Serial CBC  Hematocrit 30.5  Hemoglobin 9.8  Plat 252  RBC 3.59  WBC 7.29    04-05    136  |  106  |  8   ----------------------------<  111<H>  4.4   |  23  |  0.82    Ca    8.3<L>      05 Apr 2021 05:58  Phos  2.1     04-05  Mg     1.8     04-05    TPro  6.0  /  Alb  2.5<L>  /  TBili  0.3  /  DBili  x   /  AST  28  /  ALT  30  /  AlkPhos  55  04-05                    BLOOD SMEAR INTERPRETATION:       RADIOLOGY & ADDITIONAL STUDIES:

## 2021-04-06 NOTE — PROGRESS NOTE ADULT - NEGATIVE PSYCHIATRIC SYMPTOMS
no suicidal ideation/no depression/no anxiety/no insomnia/no mood swings/no agitation

## 2021-04-06 NOTE — PROGRESS NOTE ADULT - MOUTH
normal mouth and gums/moist

## 2021-04-06 NOTE — PROGRESS NOTE ADULT - RS GEN PE MLT RESP DETAILS PC
airway patent/breath sounds equal/good air movement/respirations non-labored/no rales/no rhonchi
airway patent/breath sounds equal/good air movement/respirations non-labored/no rales/no rhonchi/no wheezes
airway patent/breath sounds equal/good air movement/respirations non-labored/no rales/no rhonchi/no wheezes
airway patent/breath sounds equal/good air movement/no intercostal retractions/no rales/no rhonchi/no wheezes
airway patent/breath sounds equal/good air movement/no rales/no rhonchi/no wheezes
airway patent/breath sounds equal/good air movement/respirations non-labored/clear to auscultation bilaterally/no rales/no rhonchi/no wheezes
airway patent/breath sounds equal/good air movement/no rales/no rhonchi/no wheezes
airway patent/breath sounds equal/respirations non-labored/clear to auscultation bilaterally/no rales/no rhonchi/no wheezes
airway patent/breath sounds equal/good air movement/respirations non-labored/clear to auscultation bilaterally/no rales/no rhonchi
airway patent/breath sounds equal/good air movement/respirations non-labored/no rales/no rhonchi/no wheezes
airway patent/breath sounds equal/good air movement/respirations non-labored/clear to auscultation bilaterally/no rales/no rhonchi/no wheezes
airway patent/breath sounds equal/good air movement/respirations non-labored/no rales/no rhonchi/no wheezes

## 2021-04-06 NOTE — PROGRESS NOTE ADULT - ASSESSMENT
1. Colon cancer  2. S/p R hemicolectomy   3. Anemia   4. Gastritis  5. Esophagitis  6. No evidence of acute GI bleeding    Suggestions:    1. Monitor H/H  2. Transfuse PRBC as needed  3. Protonix 40mg daily   4. Surgical follow up   5. Diet as per surgery  6. Avoid NSAID  7. Oncology follow up  8. DVT prophylaxis

## 2021-04-06 NOTE — PROGRESS NOTE ADULT - SUBJECTIVE AND OBJECTIVE BOX
CHIEF COMPLAINT:Patient is a 81y old  Female who presents with a chief complaint of symptomatic anemia.Pt appears comfortable.    	  REVIEW OF SYSTEMS:  CONSTITUTIONAL: No fever, weight loss, or fatigue  EYES: No eye pain, visual disturbances, or discharge  ENT:  No difficulty hearing, tinnitus, vertigo; No sinus or throat pain  NECK: No pain or stiffness  RESPIRATORY: No cough, wheezing, chills or hemoptysis; No Shortness of Breath  CARDIOVASCULAR: No chest pain, palpitations, passing out, dizziness, or leg swelling  GASTROINTESTINAL: No abdominal or epigastric pain. No nausea, vomiting, or hematemesis; No diarrhea or constipation. No melena or hematochezia.  GENITOURINARY: No dysuria, frequency, hematuria, or incontinence  NEUROLOGICAL: No headaches, memory loss, loss of strength, numbness, or tremors  SKIN: No itching, burning, rashes, or lesions   LYMPH Nodes: No enlarged glands  ENDOCRINE: No heat or cold intolerance; No hair loss  MUSCULOSKELETAL: No joint pain or swelling; No muscle, back, or extremity pain  PSYCHIATRIC: No depression, anxiety, mood swings, or difficulty sleeping  HEME/LYMPH: No easy bruising, or bleeding gums  ALLERGY AND IMMUNOLOGIC: No hives or eczema	        PHYSICAL EXAM:  T(C): 36.7 (04-06-21 @ 06:00), Max: 36.8 (04-05-21 @ 13:54)  HR: 61 (04-06-21 @ 06:00) (61 - 66)  BP: 169/63 (04-06-21 @ 06:00) (139/83 - 169/63)  RR: 18 (04-06-21 @ 06:00) (18 - 18)  SpO2: 96% (04-06-21 @ 06:00) (95% - 98%)  Wt(kg): --  I&O's Summary      Appearance: Normal	  HEENT:   Normal oral mucosa, PERRL, EOMI	  Lymphatic: No lymphadenopathy  Cardiovascular: Normal S1 S2, No JVD, No murmurs, No edema  Respiratory: Lungs clear to auscultation	  Psychiatry: A & O x 3, Mood & affect appropriate  Gastrointestinal:  Soft, Non-tender, + BS	  Skin: No rashes, No ecchymoses, No cyanosis	  Neurologic: Non-focal  Extremities: Normal range of motion, No clubbing, cyanosis or edema  Vascular: Peripheral pulses palpable 2+ bilaterally    MEDICATIONS  (STANDING):  acetaminophen   Tablet .. 1000 milliGRAM(s) Oral every 8 hours  ALBUTerol    90 MICROgram(s) HFA Inhaler 2 Puff(s) Inhalation every 6 hours  ascorbic acid 2000 milliGRAM(s) Oral every 8 hours  atorvastatin 40 milliGRAM(s) Oral at bedtime  budesonide 160 MICROgram(s)/formoterol 4.5 MICROgram(s) Inhaler 2 Puff(s) Inhalation two times a day  carvedilol 12.5 milliGRAM(s) Oral every 12 hours  cholecalciferol 1000 Unit(s) Oral daily  dronedarone 400 milliGRAM(s) Oral two times a day  fluticasone propionate 50 MICROgram(s)/spray Nasal Spray 1 Spray(s) Both Nostrils every 12 hours  gabapentin 300 milliGRAM(s) Oral daily  influenza   Vaccine 0.5 milliLiter(s) IntraMuscular once  insulin glargine Injectable (LANTUS) 10 Unit(s) SubCutaneous at bedtime  insulin lispro (ADMELOG) corrective regimen sliding scale   SubCutaneous Before meals and at bedtime  levothyroxine 175 MICROGram(s) Oral daily  lidocaine   Patch 1 Patch Transdermal daily  montelukast 10 milliGRAM(s) Oral daily  oxybutynin 5 milliGRAM(s) Oral two times a day  pantoprazole  Injectable 40 milliGRAM(s) IV Push daily  potassium phosphate / sodium phosphate Powder (PHOS-NaK) 1 Packet(s) Oral three times a day  rivaroxaban 20 milliGRAM(s) Oral daily  zinc sulfate 220 milliGRAM(s) Oral daily    	  	  LABS:	 	                         9.8    5.44  )-----------( 265      ( 05 Apr 2021 05:58 )             30.9     04-05    136  |  106  |  8   ----------------------------<  111<H>  4.4   |  23  |  0.82    Ca    8.3<L>      05 Apr 2021 05:58  Phos  2.1     04-05  Mg     1.8     04-05    TPro  6.0  /  Alb  2.5<L>  /  TBili  0.3  /  DBili  x   /  AST  28  /  ALT  30  /  AlkPhos  55  04-05    proBNP: Serum Pro-Brain Natriuretic Peptide: 1155 pg/mL (03-21 @ 07:15)    Lipid Profile: Cholesterol 134  LDL --  HDL 54  TG 82  Cholesterol 145  LDL --  HDL 55  TG 86      TSH: Thyroid Stimulating Hormone, Serum: 2.67 uU/mL (03-22 @ 07:34)  Thyroid Stimulating Hormone, Serum: 3.61 uU/mL (03-21 @ 07:15)      	  Surgical Pathology Report:   ACCESSION No: [id number]   KWAME MOSQUERA 4   Surgical Final Report   Final Diagnosis   1. Right colon; laparoscopically assisted right hemicolectomy:   - Invasive moderately differentiated colonic adenocarcinoma of   the cecum measuring 2.0 x 1.2 x 0.8 cm and located equidistantly   from the proximal and mesenteric margin.   - Colonic adenocarcinoma invades the submucosa, pT1.   - No definitive lymphovascular or perineural invasion is   identified.   - Fourteenregional lymph nodes, negative for metastatic   carcinoma, pN0.   - Tubular adenoma of the cecum measuring 0.5 cm.   - Mucosal prolapse polyp of the cecum.   - Focally ulcerated colonic mucosa marked by the metal rods,   consistent with prior polypectomy sites.   - See synoptic report.   2. Donuts; resection:   - Segment of large and small intestinal wall, negative for   dysplasia or   malignancy.   Verified by: Priscila Arana MD   (Electronic Signature)   Reported on: 04/05/21 18:00 EDT, [facility],

## 2021-04-06 NOTE — PROGRESS NOTE ADULT - NSICDXPILOT_GEN_ALL_CORE
[place]

## 2021-04-06 NOTE — PROGRESS NOTE ADULT - NEGATIVE CARDIOVASCULAR SYMPTOMS
no chest pain/no palpitations/no orthopnea

## 2021-04-06 NOTE — PROGRESS NOTE ADULT - SUBJECTIVE AND OBJECTIVE BOX
KWAME MOSQUERA  MR# [id number]  81yFemale        Patient is a 81y old  Female who presents with a chief complaint of symptomatic anemia (06 Apr 2021 13:55)      INTERVAL HPI/OVERNIGHT EVENTS:  Patient seen and examined at bedside, positive flatus & BM, tolerating regular diet. No notations of chest pain, palpitation, SOB, orthopnea, nausea, vomiting or abdominal pain.    ALLERGIES  No Known Allergies      MEDICATIONS  acetaminophen   Tablet .. 1000 milliGRAM(s) Oral every 8 hours  ALBUTerol    90 MICROgram(s) HFA Inhaler 2 Puff(s) Inhalation every 6 hours  ascorbic acid 2000 milliGRAM(s) Oral every 8 hours  atorvastatin 40 milliGRAM(s) Oral at bedtime  budesonide 160 MICROgram(s)/formoterol 4.5 MICROgram(s) Inhaler 2 Puff(s) Inhalation two times a day  carvedilol 12.5 milliGRAM(s) Oral every 12 hours  cholecalciferol 1000 Unit(s) Oral daily  dronedarone 400 milliGRAM(s) Oral two times a day  fluticasone propionate 50 MICROgram(s)/spray Nasal Spray 1 Spray(s) Both Nostrils every 12 hours  gabapentin 300 milliGRAM(s) Oral daily  influenza   Vaccine 0.5 milliLiter(s) IntraMuscular once  insulin glargine Injectable (LANTUS) 10 Unit(s) SubCutaneous at bedtime  insulin lispro (ADMELOG) corrective regimen sliding scale   SubCutaneous Before meals and at bedtime  levothyroxine 175 MICROGram(s) Oral daily  lidocaine   Patch 1 Patch Transdermal daily  montelukast 10 milliGRAM(s) Oral daily  ondansetron Injectable 4 milliGRAM(s) IV Push every 6 hours PRN Nausea  oxybutynin 5 milliGRAM(s) Oral two times a day  oxyCODONE    IR 5 milliGRAM(s) Oral every 4 hours PRN Severe Pain (7 - 10)  pantoprazole  Injectable 40 milliGRAM(s) IV Push daily  potassium phosphate / sodium phosphate Powder (PHOS-NaK) 1 Packet(s) Oral three times a day  rivaroxaban 20 milliGRAM(s) Oral daily  zinc sulfate 220 milliGRAM(s) Oral daily  zolpidem 5 milliGRAM(s) Oral at bedtime PRN Insomnia              REVIEW OF SYSTEMS:  CONSTITUTIONAL: No fever, weight loss, or fatigue  EYES: No eye pain, visual disturbances, or discharge  ENT:  No difficulty hearing, tinnitus, vertigo; No sinus or throat pain  NECK: No pain or stiffness  RESPIRATORY: No cough, wheezing, chills or hemoptysis; No Shortness of Breath  CARDIOVASCULAR: No chest pain, palpitations, passing out, dizziness, or leg swelling  GASTROINTESTINAL: No abdominal or epigastric pain. No nausea, vomiting, or hematemesis; No diarrhea or constipation. No melena or hematochezia.  GENITOURINARY: No dysuria, frequency, hematuria, or incontinence  NEUROLOGICAL: No headaches, memory loss, loss of strength, numbness, or tremors  SKIN: No itching, burning, rashes, or lesions   LYMPH Nodes: No enlarged glands  ENDOCRINE: No heat or cold intolerance; No hair loss  MUSCULOSKELETAL: No joint pain or swelling; No muscle, back, or extremity pain  PSYCHIATRIC: No depression, anxiety, mood swings, or difficulty sleeping  HEME/LYMPH: No easy bruising, or bleeding gums  ALLERGY AND IMMUNOLOGIC: No hives or eczema	    [ ] All others negative	  [ ] Unable to obtain      T(C): 36.7 (04-06-21 @ 14:22), Max: 36.7 (04-05-21 @ 21:35)  T(F): 98.1 (04-06-21 @ 14:22), Max: 98.1 (04-05-21 @ 21:35)  HR: 72 (04-06-21 @ 14:22) (61 - 72)  BP: 153/74 (04-06-21 @ 14:22) (126/77 - 169/63)  RR: 18 (04-06-21 @ 14:22) (18 - 18)  SpO2: 100% (04-06-21 @ 14:22) (95% - 100%)  Wt(kg): --    I&O's Summary        PHYSICAL EXAM:  A X O x  HEAD:  Atraumatic, Normocephalic  EYES: EOMI, PERRLA, conjunctiva and sclera clear  NECK: Supple, No JVD, Normal thyroid  Resp: CTAB, No crackles, wheezing,   CVS: Regular rate and rhythm; No discernable murmurs, rubs, or gallops  ABD: Soft, Nontender, Nondistended; Bowel sounds present  EXTREMITIES:  2+ Peripheral Pulses, No edema  LYMPH: No dicernable lymphadenopathy noted  GENERAL: NAD, well-groomed, well-developed      LABS:                        9.8    5.44  )-----------( 265      ( 05 Apr 2021 05:58 )             30.9     04-05    136  |  106  |  8   ----------------------------<  111<H>  4.4   |  23  |  0.82    Ca    8.3<L>      05 Apr 2021 05:58  Phos  2.1     04-05  Mg     1.8     04-05    TPro  6.0  /  Alb  2.5<L>  /  TBili  0.3  /  DBili  x   /  AST  28  /  ALT  30  /  AlkPhos  55  04-05        CAPILLARY BLOOD GLUCOSE      POCT Blood Glucose.: 210 mg/dL (06 Apr 2021 11:30)  POCT Blood Glucose.: 134 mg/dL (06 Apr 2021 07:33)  POCT Blood Glucose.: 179 mg/dL (05 Apr 2021 21:29)  POCT Blood Glucose.: 170 mg/dL (05 Apr 2021 16:17)      Troponins:  ProBNP:  Lipid Profile:   HgA1c:  TSH:           RADIOLOGY & ADDITIONAL TESTS:    Imaging Personally Reviewed:  [ ] YES  [ ] NO      Consultant(s) Notes Reviewed:  [x ] YES  [ ] NO    Care Discussed with Consultants/Other Providers [ x] YES  [ ] NO          PAST MEDICAL & SURGICAL HISTORY:  Asthma    CAD (Coronary Atherosclerotic Disease)    Myocardial Infarction    Diabetes    HTN (Hypertension)    HLD (Hyperlipidemia)    CHF (congestive heart failure), NYHA class I    IBS (irritable bowel syndrome)    Venous stasis    Hypothyroidism    Atrial fibrillation    Pacemaker    Obesity    S/P coronary angiogram          Anemia    H/o or current diagnosis of HF- ACEI/ARB contraindication unknown    H/o or current diagnosis of HF- Contraindication to ACEI/ARBs    H/o or current diagnosis of HF- ACEI/ARB contraindication unknown    H/o or current diagnosis of HF- Contraindication to ACEI/ARBs    H/o or current diagnosis of HF- ACEI/ARB contraindication unknown    H/o or current diagnosis of HF- Contraindication to ACEI/ARBs    Family history of heart disease    Handoff    MEWS Score    Asthma    CAD (Coronary Atherosclerotic Disease)    Myocardial Infarction    Diabetes    HTN (Hypertension)    HLD (Hyperlipidemia)    CHF (congestive heart failure), NYHA class I    IBS (irritable bowel syndrome)    Venous stasis    Hypothyroidism    Atrial fibrillation    Pacemaker    Obesity    Colon adenocarcinoma    Colon adenocarcinoma    Laparoscopy assisted right hemicolectomy    Symptomatic anemia    Helicobacter pylori gastritis    Symptomatic anemia    Atrial fibrillation    CHF (congestive heart failure), NYHA class I    Diabetes    HTN (Hypertension)    Asthma    JOSE DANIEL (obstructive sleep apnea)    Prophylactic measure    Hypothyroidism    Knee swelling    Generalized abdominal pain    S/P laparoscopic procedure    CHF (congestive heart failure), NYHA class I    JOSE DANIEL (obstructive sleep apnea)    Diabetes    Colon cancer    Laparoscopy assisted right hemicolectomy    No significant past surgical history    S/P coronary angiogram    A) WEAKNESS    90+    CAD (coronary atherosclerotic disease)    Asthma    Helicobacter pylori gastritis    Paroxysmal atrial fibrillation    Encounter for colonoscopy due to history of adenomatous colonic polyps    Hypothyroidism    CHF (congestive heart failure), NYHA class I    JOSE DANIEL (obstructive sleep apnea)    Diabetes    HTN (Hypertension)    Colon cancer    [email or web address]_VisitLink

## 2021-04-06 NOTE — PROGRESS NOTE ADULT - ASSESSMENT
79 years old Norwegian-speaking female from home, ambulates with walker, lives with son, with PMHx of HFpEF, CAD (s/p stents), chronic venous stasis, DM, HTN and Afib (on Xarelto, s/p PPM), chronic bronchitis with asthmatic component ( on Oxygen PRN at home ), JOSE DANIEL ( supposed to be on nocturnal CPAP , but non compliant) presents to the ED with chief complaint of hypotension , SOB and  fatigue for almost 2 weeks.,symptomatic Fe def anemia with Colon ca-s/p resection.  1.OOB to chair.  2.PO diet.  3.Hypothyroidsm-synthroid.  4.HTN-coreg 12.5mg bid.  5.PAF-xarelto,multaq.  6.Check PPM q3mo.  7.GI prophylaxis.

## 2021-04-06 NOTE — PROGRESS NOTE ADULT - CARDIOVASCULAR DETAILS
positive S1/positive S2
positive S1/positive S2
irregular rate and rhythm
positive S1/positive S2
rub/positive S1/positive S2
positive S1/positive S2

## 2021-04-06 NOTE — PROGRESS NOTE ADULT - MS EXT PE MLT D E PC
no clubbing/no cyanosis
no clubbing
no clubbing
no clubbing/no cyanosis

## 2021-04-06 NOTE — PROGRESS NOTE ADULT - ASSESSMENT
79 years old Citizen of [place]-speaking female from home, ambulates with walker, lives with son, with PMHx of HFpEF, CAD (s/p stents), chronic venous stasis, DM, HTN and Afib (on Xarelto, s/p PPM), chronic bronchitis with asthmatic component ( on Oxygen PRN at home ), JOSE DANIEL ( supposed to be on nocturnal CPAP , but non compliant) presents to the ED with chief complaint of hypotension ,  SOB and  fatigue for almost 2 weeks. Per daughter and granddaughter patient has been complaining of increasing fatigue and sob for last few weeks, seemed to be pale , patient endorses dark loose bowel movement for quite a while.  pt had virtual  colonoscopy 1 year ago and the result was questionable. She never followed up. Per daughter pt refused all kinds of meat including chicken, meat , however denies any dysphasia. Patient  also endorses decreased appetite.  patient has chronic diarrhea. Patient Denies nausea, vomiting, abdominal pain, SOB, chest pain, CHAMBERLAIN, palpitations, dizziness, headache, cough, wheezing, joint pain or swelling, fever, chills.      GOC: Discussed with daughter,  mentioned she want stobe DNI , on discussion with patient, patient was so distressed about urge incontinency and was complaining of pain and IV infiltrations, so GOC discussion deferred to primary team.    (20 Mar 2021 19:53)    Hospital course: Pt was admitted to medicine floor for symptomatic anemia secondary to GI bleed. Colonoscopy was done during hospital stay. 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 Pt underwent laparoscopic rught hemicolectomy today 3/31 with estimated 20 ml blood loss. Pt was extubated in OR and brought to ICU for post operative care as pt has multiple comorbidities.    icu coarse:   Pt was brought to icu for post op monitoring  s/p R hemicolectomy 3/31. Pt started passing gas and was Advance to clear liquids. Pt urine output was monitored. Pt is able to tolerate diet. Pt will be transferred to surgery service under Dr. Rocha service. resumed home meds.          78 y/o  Citizen of [place]-speaking female  with PMH  of HFpEF, CAD (s/p stents), chronic venous stasis, DM, HTN and Afib (on Xarelto, s/p PPM), chronic bronchitis with asthmatic component ( on Oxygen PRN at home ), JOSE DANIEL ( supposed to be on nocturnal CPAP , but non compliant) presented to the ED with chief complaint of hypotension ,  SOB and  fatigue for almost 2 weeks.   Pt was admitted to medicine for symptomatic anemia. Colonoscopy showed colon mass. Biopsy result showed invasive adenocarcinoma of colon. Pt underwent laparoscopic right hemicolectomy today. POD # 0  Transferred to ICU for post operative care.    Assessment  1. S/P Right Hemicolectomy   2. Ascending colon invasive adenocarcinoma  3. Symptomatic anemia  4. Afib   5. Diabetes Mellitus  6. History of Asthma  7. Hypertension  8.History of coronary artery disease  9.JOSE DANIEL  10. Obesity   11. H. Pylori infection       -OOB to chair w/ PT/OT and incentive shelli; tolerating advancing diet  -D/C planning for home   -Heme/Onc consult (Dr Arango) for outpt f/u; awaiting path report  -Cont on A/C for A-fib.  -Complete H.Pylori Rx

## 2021-04-06 NOTE — PROGRESS NOTE ADULT - CONSTITUTIONAL DETAILS
well-groomed/no distress

## 2021-04-06 NOTE — PROGRESS NOTE ADULT - ASSESSMENT
s/p right hemicolectemy  wound healing well, bowel function restored, tolerating diet    discharge plan for today

## 2021-04-06 NOTE — PROGRESS NOTE ADULT - NUTRITIONAL ASSESSMENT
This patient has been assessed with a concern for Malnutrition and has been determined to have a diagnosis/diagnoses of Morbid obesity (BMI > 40).    This patient is being managed with:   Diet Clear Liquid-  Entered: Apr 2 2021  8:52AM    
This patient has been assessed with a concern for Malnutrition and has been determined to have a diagnosis/diagnoses of Morbid obesity (BMI > 40).    This patient is being managed with:   Diet Soft-  Consistent Carbohydrate {Evening Snacks}  DASH/TLC {Sodium & Cholesterol Restricted}  Kosher  Supplement Feeding Modality:  Oral  Glucerna Shake Cans or Servings Per Day:  1       Frequency:  Two Times a day  Entered: Mar 27 2021 11:57AM    
This patient has been assessed with a concern for Malnutrition and has been determined to have a diagnosis/diagnoses of Morbid obesity (BMI > 40).    This patient is being managed with:   Diet Soft-  Consistent Carbohydrate {Evening Snacks}  DASH/TLC {Sodium & Cholesterol Restricted}  Kosher  Supplement Feeding Modality:  Oral  Glucerna Shake Cans or Servings Per Day:  1       Frequency:  Two Times a day  Entered: Mar 27 2021 11:57AM    
This patient has been assessed with a concern for Malnutrition and has been determined to have a diagnosis/diagnoses of Morbid obesity (BMI > 40).    This patient is being managed with:   Diet Consistent Carbohydrate/No Snacks-  Soft  DASH/TLC {Sodium & Cholesterol Restricted}  Entered: Apr 4 2021 11:49AM    
This patient has been assessed with a concern for Malnutrition and has been determined to have a diagnosis/diagnoses of Morbid obesity (BMI > 40).    This patient is being managed with:   Diet Consistent Carbohydrate/No Snacks-  Soft  DASH/TLC {Sodium & Cholesterol Restricted}  Entered: Apr 4 2021 11:49AM    
This patient has been assessed with a concern for Malnutrition and has been determined to have a diagnosis/diagnoses of Morbid obesity (BMI > 40).    This patient is being managed with:   Diet NPO after Midnight-     NPO Start Date: 30-Mar-2021   NPO Start Time: 23:59  Except Medications  Entered: Mar 30 2021 10:19AM    Diet Clear Liquid-  Entered: Mar 30 2021  9:59AM    
This patient has been assessed with a concern for Malnutrition and has been determined to have a diagnosis/diagnoses of Morbid obesity (BMI > 40).    This patient is being managed with:   Diet NPO-  Entered: Mar 31 2021 12:37PM    
This patient has been assessed with a concern for Malnutrition and has been determined to have a diagnosis/diagnoses of Morbid obesity (BMI > 40).    This patient is being managed with:   Diet Soft-  Consistent Carbohydrate {Evening Snacks}  DASH/TLC {Sodium & Cholesterol Restricted}  Kosher  Supplement Feeding Modality:  Oral  Glucerna Shake Cans or Servings Per Day:  1       Frequency:  Two Times a day  Entered: Mar 27 2021 11:57AM    
This patient has been assessed with a concern for Malnutrition and has been determined to have a diagnosis/diagnoses of Morbid obesity (BMI > 40).    This patient is being managed with:   Diet Clear Liquid-  Entered: Apr 2 2021  8:52AM    
This patient has been assessed with a concern for Malnutrition and has been determined to have a diagnosis/diagnoses of Morbid obesity (BMI > 40).    This patient is being managed with:   Diet Consistent Carbohydrate/No Snacks-  Soft  DASH/TLC {Sodium & Cholesterol Restricted}  Entered: Apr 4 2021 11:49AM    
This patient has been assessed with a concern for Malnutrition and has been determined to have a diagnosis/diagnoses of Morbid obesity (BMI > 40).    This patient is being managed with:   Diet NPO-  Entered: Mar 31 2021 12:37PM    
This patient has been assessed with a concern for Malnutrition and has been determined to have a diagnosis/diagnoses of Morbid obesity (BMI > 40).    This patient is being managed with:   Diet Soft-  Consistent Carbohydrate {Evening Snacks}  DASH/TLC {Sodium & Cholesterol Restricted}  Kosher  Supplement Feeding Modality:  Oral  Glucerna Shake Cans or Servings Per Day:  1       Frequency:  Two Times a day  Entered: Mar 27 2021 11:57AM    Diet Soft-  Consistent Carbohydrate {Evening Snacks}  DASH/TLC {Sodium & Cholesterol Restricted}  Entered: Mar 26 2021  2:07PM    The following pending diet order is being considered for treatment of Morbid obesity (BMI > 40):null
This patient has been assessed with a concern for Malnutrition and has been determined to have a diagnosis/diagnoses of Morbid obesity (BMI > 40).    This patient is being managed with:   Diet Clear Liquid-  Entered: Apr 2 2021  8:52AM    
This patient has been assessed with a concern for Malnutrition and has been determined to have a diagnosis/diagnoses of Morbid obesity (BMI > 40).    This patient is being managed with:   Diet NPO-  Entered: Mar 31 2021 12:37PM    
This patient has been assessed with a concern for Malnutrition and has been determined to have a diagnosis/diagnoses of Morbid obesity (BMI > 40).    This patient is being managed with:   Diet Consistent Carbohydrate/No Snacks-  Soft  DASH/TLC {Sodium & Cholesterol Restricted}  Entered: Apr 4 2021 11:49AM    
This patient has been assessed with a concern for Malnutrition and has been determined to have a diagnosis/diagnoses of Morbid obesity (BMI > 40).    This patient is being managed with:   Diet Clear Liquid-  Entered: Mar 30 2021  9:59AM    
This patient has been assessed with a concern for Malnutrition and has been determined to have a diagnosis/diagnoses of Morbid obesity (BMI > 40).    This patient is being managed with:   Diet NPO-  Entered: Mar 31 2021 12:37PM    
This patient has been assessed with a concern for Malnutrition and has been determined to have a diagnosis/diagnoses of Morbid obesity (BMI > 40).    This patient is being managed with:   Diet Soft-  Consistent Carbohydrate {Evening Snacks}  DASH/TLC {Sodium & Cholesterol Restricted}  Kosher  Supplement Feeding Modality:  Oral  Glucerna Shake Cans or Servings Per Day:  1       Frequency:  Two Times a day  Entered: Mar 27 2021 11:57AM    
79F Cameroonian-speaking from home with son, ambulates with walker, PMH HFpEF, CAD (s/p stents), chronic venous stasis, DM, HTN and Afib (Xarelto, s/p PPM), chronic bronchitis with asthmatic component (home O2 prn), JOSE DANIEL (noncompliant with nocturnal CPAP) . Admitted for workup and management of symptomatic anemia.     Continuing dronedarone (new this adm), home statin, coreg, COPD meds.
This patient has been assessed with a concern for Malnutrition and has been determined to have a diagnosis/diagnoses of Morbid obesity (BMI > 40).    This patient is being managed with:   Diet Soft-  Consistent Carbohydrate {Evening Snacks}  DASH/TLC {Sodium & Cholesterol Restricted}  Kosher  Supplement Feeding Modality:  Oral  Glucerna Shake Cans or Servings Per Day:  1       Frequency:  Two Times a day  Entered: Mar 27 2021 11:57AM    Diet Soft-  Consistent Carbohydrate {Evening Snacks}  DASH/TLC {Sodium & Cholesterol Restricted}  Entered: Mar 26 2021  2:07PM    The following pending diet order is being considered for treatment of Morbid obesity (BMI > 40):null
This patient has been assessed with a concern for Malnutrition and has been determined to have a diagnosis/diagnoses of Morbid obesity (BMI > 40).    This patient is being managed with:   Diet Clear Liquid-  Entered: Apr 2 2021  8:52AM    
This patient has been assessed with a concern for Malnutrition and has been determined to have a diagnosis/diagnoses of Morbid obesity (BMI > 40).    This patient is being managed with:   Diet Clear Liquid-  Entered: Apr 2 2021  8:52AM    
This patient has been assessed with a concern for Malnutrition and has been determined to have a diagnosis/diagnoses of Morbid obesity (BMI > 40).    This patient is being managed with:   Diet Soft-  Consistent Carbohydrate {Evening Snacks}  DASH/TLC {Sodium & Cholesterol Restricted}  Kosher  Supplement Feeding Modality:  Oral  Glucerna Shake Cans or Servings Per Day:  1       Frequency:  Two Times a day  Entered: Mar 27 2021 11:57AM    
79F Bahamian-speaking from home with son, ambulates with walker, PMH HFpEF, CAD (s/p stents), chronic venous stasis, DM, HTN and Afib (Xarelto, s/p PPM), chronic bronchitis with asthmatic component (home O2 prn), JOSE DANIEL (noncompliant with nocturnal CPAP) . Admitted for workup and management of symptomatic anemia.     Continuing dronedarone (new this adm), home statin, coreg, COPD meds.
79F Citizen of [place]-speaking from home with son, ambulates with walker, PMH HFpEF, CAD (s/p stents), chronic venous stasis, DM, HTN and Afib (Xarelto, s/p PPM), chronic bronchitis with asthmatic component (home O2 prn), JOSE DANIEL (noncompliant with nocturnal CPAP) . Admitted for workup and management of symptomatic anemia.     Continuing dronedarone (new this adm), home statin, coreg, COPD meds.
This patient has been assessed with a concern for Malnutrition and has been determined to have a diagnosis/diagnoses of Morbid obesity (BMI > 40).    This patient is being managed with:   Diet NPO-  Entered: Mar 31 2021 12:37PM    
This patient has been assessed with a concern for Malnutrition and has been determined to have a diagnosis/diagnoses of Morbid obesity (BMI > 40).    This patient is being managed with:   Diet Soft-  Consistent Carbohydrate {Evening Snacks}  DASH/TLC {Sodium & Cholesterol Restricted}  Kosher  Supplement Feeding Modality:  Oral  Glucerna Shake Cans or Servings Per Day:  1       Frequency:  Two Times a day  Entered: Mar 27 2021 11:57AM    
This patient has been assessed with a concern for Malnutrition and has been determined to have a diagnosis/diagnoses of Morbid obesity (BMI > 40).    This patient is being managed with:   Diet Soft-  Consistent Carbohydrate {Evening Snacks}  DASH/TLC {Sodium & Cholesterol Restricted}  Kosher  Supplement Feeding Modality:  Oral  Glucerna Shake Cans or Servings Per Day:  1       Frequency:  Two Times a day  Entered: Mar 27 2021 11:57AM    Diet Soft-  Consistent Carbohydrate {Evening Snacks}  DASH/TLC {Sodium & Cholesterol Restricted}  Entered: Mar 26 2021  2:07PM    The following pending diet order is being considered for treatment of Morbid obesity (BMI > 40):null
This patient has been assessed with a concern for Malnutrition and has been determined to have a diagnosis/diagnoses of Morbid obesity (BMI > 40).    This patient is being managed with:   Diet NPO after Midnight-     NPO Start Date: 30-Mar-2021   NPO Start Time: 23:59  Except Medications  Entered: Mar 30 2021 10:19AM    Diet Clear Liquid-  Entered: Mar 30 2021  9:59AM    
This patient has been assessed with a concern for Malnutrition and has been determined to have a diagnosis/diagnoses of Morbid obesity (BMI > 40).    This patient is being managed with:   Diet NPO-  Entered: Mar 31 2021 12:37PM    
This patient has been assessed with a concern for Malnutrition and has been determined to have a diagnosis/diagnoses of Morbid obesity (BMI > 40).    This patient is being managed with:   Diet Consistent Carbohydrate/No Snacks-  Soft  DASH/TLC {Sodium & Cholesterol Restricted}  Entered: Apr 4 2021 11:49AM

## 2021-04-06 NOTE — PROGRESS NOTE ADULT - PROVIDER SPECIALTY LIST ADULT
CCU
Cardiology
Heme/Onc
Infectious Disease
Internal Medicine
Surgery
Surgery
Cardiology
Gastroenterology
Infectious Disease
Infectious Disease
Internal Medicine
Internal Medicine
Pulmonology
Surgery
Cardiology
Heme/Onc
Infectious Disease
Internal Medicine
Internal Medicine
Surgery
Cardiology
Cardiology
Critical Care
Heme/Onc
Heme/Onc
Infectious Disease
Internal Medicine
Internal Medicine
Pulmonology
Pulmonology
Surgery
Cardiology
Cardiology
Infectious Disease
Surgery
Surgery
Cardiology
Internal Medicine
Surgery
Gastroenterology
Gastroenterology
Pain Medicine
Internal Medicine
Gastroenterology
Internal Medicine
Pain Medicine
Internal Medicine
Gastroenterology
Internal Medicine
Gastroenterology

## 2021-04-06 NOTE — PROGRESS NOTE ADULT - LYMPH NODES
No lymphadedenopathy
detailed exam
No lymphadedenopathy

## 2021-04-06 NOTE — PROGRESS NOTE ADULT - PHARYNX
no redness/no discharge

## 2021-04-06 NOTE — PROGRESS NOTE ADULT - SUBJECTIVE AND OBJECTIVE BOX
[   ] ICU                                          [   ] CCU                                      [ X  ] Medical Floor    Patient is a 81 year old female with Gastrointestinal bleeding and symptomatic anemia. GI consulted to evaluate.        81 years old female from home, ambulates with walker, lives with son, with past medical history significant for CAD (s/p stents), CHF, chronic venous stasis, DM, HTN, Afib (on Xarelto, s/p PPM), chronic bronchitis with asthmatic component ( on Oxygen PRN at home ), and osteoarthritis presented to the emergency room with 2 weeks history of worsening SOB, Fatigue, associated with a few episodes of black stool.    pt had virtual  colonoscopy 1 year ago and the result was questionable. She never followed up.  Patient also c/o poor appetite with chronic diarrhea but denies abdominal pain, nausea, vomiting, hematemesis, hematochezia, melena, fever, chills, chest pain, SOB, cough, hematuria, dysuria or diarrhea.       Today patient appears comfortable with no new complaints. Patient post lap R hemicolectomy transferred out of ICU. Patient appears comfortable. No abdominal pain, N/V, hematemesis, hematochezia, melena, fever, chills, chest pain, SOB, cough or diarrhea reported.      PAIN MANAGEMENT:  Pain Scale:                 0/10  Pain Location:        Prior Colonoscopy:  No prior colonoscopy      PAST MEDICAL HISTORY  Obesity  Atrial fibrillation  Hypothyroidism  Venous stasis  Irritable bowel syndrome   CHF    Hyperlipidemia   Hypertension  DM  Myocardial Infarction  CAD    Asthma      PAST SURGICAL HISTORY  Coronary angiogram  Coronary stent placement  Pacemaker placement       Allergies    No Known Allergies    Intolerances  None        SOCIAL HISTORY  Advanced Directives:       [ X ] Full Code       [  ] DNR  Marital Status:         [  ] M      [ X ] S      [  ] D       [  ] W  Children:       [ X ] Yes      [  ] No  Occupation:        [  ] Employed       [ X ] Unemployed       [  ] Retired  Diet:       [ X Regular       [  ] PEG feeding          [  ] NG tube feeding  Drug Use:           [ X ] Patient denied          [  ] Yes  Alcohol:           [X] No             [  ] Yes (socially)         [  ] Yes (chronic)  Tobacco:           [  ] Yes           [ X ] No      FAMILY HISTORY  [ X ] Heart Disease            [ X ] Diabetes             [ X ] HTN             [  ] Colon Cancer             [  ] Stomach Cancer              [  ] Pancreatic Cancer      VITALS  Vital Signs Last 24 Hrs  T(C): 36.8 (06 Apr 2021 15:00), Max: 36.8 (06 Apr 2021 15:00)  T(F): 98.2 (06 Apr 2021 15:00), Max: 98.2 (06 Apr 2021 15:00)  HR: 77 (06 Apr 2021 15:00) (61 - 77)  BP: 146/78 (06 Apr 2021 15:00) (126/77 - 169/63)   RR: 17 (06 Apr 2021 15:00) (17 - 18)  SpO2: 100% (06 Apr 2021 15:00) (96% - 100%)       MEDICATIONS  (STANDING):  acetaminophen   Tablet .. 1000 milliGRAM(s) Oral every 8 hours  ALBUTerol    90 MICROgram(s) HFA Inhaler 2 Puff(s) Inhalation every 6 hours  ascorbic acid 2000 milliGRAM(s) Oral every 8 hours  atorvastatin 40 milliGRAM(s) Oral at bedtime  budesonide 160 MICROgram(s)/formoterol 4.5 MICROgram(s) Inhaler 2 Puff(s) Inhalation two times a day  carvedilol 12.5 milliGRAM(s) Oral every 12 hours  cholecalciferol 1000 Unit(s) Oral daily  dronedarone 400 milliGRAM(s) Oral two times a day  fluticasone propionate 50 MICROgram(s)/spray Nasal Spray 1 Spray(s) Both Nostrils every 12 hours  gabapentin 300 milliGRAM(s) Oral daily  influenza   Vaccine 0.5 milliLiter(s) IntraMuscular once  insulin glargine Injectable (LANTUS) 10 Unit(s) SubCutaneous at bedtime  insulin lispro (ADMELOG) corrective regimen sliding scale   SubCutaneous Before meals and at bedtime  levothyroxine 175 MICROGram(s) Oral daily  lidocaine   Patch 1 Patch Transdermal daily  montelukast 10 milliGRAM(s) Oral daily  oxybutynin 5 milliGRAM(s) Oral two times a day  pantoprazole  Injectable 40 milliGRAM(s) IV Push daily  potassium phosphate / sodium phosphate Powder (PHOS-NaK) 1 Packet(s) Oral three times a day  rivaroxaban 20 milliGRAM(s) Oral daily  zinc sulfate 220 milliGRAM(s) Oral daily    MEDICATIONS  (PRN):  ondansetron Injectable 4 milliGRAM(s) IV Push every 6 hours PRN Nausea  oxyCODONE    IR 5 milliGRAM(s) Oral every 4 hours PRN Severe Pain (7 - 10)  zolpidem 5 milliGRAM(s) Oral at bedtime PRN Insomnia                            9.8    5.44  )-----------( 265      ( 05 Apr 2021 05:58 )             30.9       04-05    136  |  106  |  8   ----------------------------<  111<H>  4.4   |  23  |  0.82    Ca    8.3<L>      05 Apr 2021 05:58  Phos  2.1     04-05  Mg     1.8     04-05    TPro  6.0  /  Alb  2.5<L>  /  TBili  0.3  /  DBili  x   /  AST  28  /  ALT  30  /  AlkPhos  55  04-05

## 2021-04-06 NOTE — PROGRESS NOTE ADULT - NEGATIVE GENERAL SYMPTOMS
no fever/no chills/no sweating
no fever/no chills/no sweating/no polyphagia/no polyuria/no polydipsia
no fever/no chills/no sweating/no polyphagia/no polyuria/no polydipsia
no fever/no chills/no sweating

## 2021-04-06 NOTE — PROGRESS NOTE ADULT - ENDOCRINE
details…

## 2021-04-06 NOTE — PROGRESS NOTE ADULT - NEGATIVE OPHTHALMOLOGIC SYMPTOMS
no diplopia/no photophobia/no lacrimation L/no lacrimation R/no blurred vision L/no blurred vision R/no discharge L/no discharge R/no pain L/no pain R/no irritation L/no irritation R/no loss of vision L/no loss of vision R/no scleral injection L/no scleral injection R

## 2021-04-06 NOTE — CONSULT NOTE ADULT - SUBJECTIVE AND OBJECTIVE BOX
Pt Name: KWAME MOSQUERA  MRN: [id number]    ORTHOPEDIC CONSULT:    Orthopedic diagnosis:    HPI: Patient is a 81y Female admitted s/p right hemicolectomy. Orthopedics called to evaluate right cystic mass/collection anterior to right knee. [facility]  [place] #[id number] used. Patient states she has had pain in right leg intermittently but has increased over past 2 days. Pain is generalized in right lower extremity from upper thigh to knee. Patient states she has had intermittent pain since she had fall 3 years ago onto the right knee. Pain was treated conservatively with no procedures done. Patient also notes that she is aware of right knee mass on front of right knee which had developed after her fall 3 years ago. She states she thinks the size of mass has stayed the same and has not followed up with anyone on right knee mass. Pain is generalized and not specified to area of mass. Patient ambulates with walker as assistive device.       PAST MEDICAL & SURGICAL HISTORY:  Asthma    CAD (Coronary Atherosclerotic Disease)    Myocardial Infarction    Diabetes    HTN (Hypertension)    HLD (Hyperlipidemia)    CHF (congestive heart failure), NYHA class I    IBS (irritable bowel syndrome)    Venous stasis    Hypothyroidism    Atrial fibrillation    Pacemaker    Obesity    S/P coronary angiogram        ALLERGIES: No Known Allergies      MEDICATIONS: acetaminophen   Tablet .. 1000 milliGRAM(s) Oral every 8 hours  ALBUTerol    90 MICROgram(s) HFA Inhaler 2 Puff(s) Inhalation every 6 hours  ascorbic acid 2000 milliGRAM(s) Oral every 8 hours  atorvastatin 40 milliGRAM(s) Oral at bedtime  budesonide 160 MICROgram(s)/formoterol 4.5 MICROgram(s) Inhaler 2 Puff(s) Inhalation two times a day  carvedilol 12.5 milliGRAM(s) Oral every 12 hours  cholecalciferol 1000 Unit(s) Oral daily  dronedarone 400 milliGRAM(s) Oral two times a day  fluticasone propionate 50 MICROgram(s)/spray Nasal Spray 1 Spray(s) Both Nostrils every 12 hours  gabapentin 300 milliGRAM(s) Oral daily  influenza   Vaccine 0.5 milliLiter(s) IntraMuscular once  insulin glargine Injectable (LANTUS) 10 Unit(s) SubCutaneous at bedtime  insulin lispro (ADMELOG) corrective regimen sliding scale   SubCutaneous Before meals and at bedtime  levothyroxine 175 MICROGram(s) Oral daily  lidocaine   Patch 1 Patch Transdermal daily  montelukast 10 milliGRAM(s) Oral daily  ondansetron Injectable 4 milliGRAM(s) IV Push every 6 hours PRN  oxybutynin 5 milliGRAM(s) Oral two times a day  oxyCODONE    IR 5 milliGRAM(s) Oral every 4 hours PRN  pantoprazole  Injectable 40 milliGRAM(s) IV Push daily  potassium phosphate / sodium phosphate Powder (PHOS-NaK) 1 Packet(s) Oral three times a day  rivaroxaban 20 milliGRAM(s) Oral daily  zinc sulfate 220 milliGRAM(s) Oral daily  zolpidem 5 milliGRAM(s) Oral at bedtime PRN      PHYSICAL EXAM:    Vital Signs Last 24 Hrs  T(C): 36.7 (06 Apr 2021 06:00), Max: 36.7 (05 Apr 2021 21:35)  T(F): 98.1 (06 Apr 2021 06:00), Max: 98.1 (05 Apr 2021 21:35)  HR: 62 (06 Apr 2021 11:15) (61 - 62)  BP: 126/77 (06 Apr 2021 11:15) (126/77 - 169/63)  BP(mean): --  RR: 18 (06 Apr 2021 06:00) (18 - 18)  SpO2: 99% (06 Apr 2021 11:15) (95% - 99%)    Right Knee: Palpable mass on superior anterior aspect of right patella. No pain on palpation of mass. Generalized pain in right thigh. No effusion noted. Sensation intact. Calves soft. ROM of toes and ankles B/L. Edema noted to B/L LE. ROM of right knee 0-60 degrees.       LABS:                        9.8    5.44  )-----------( 265      ( 05 Apr 2021 05:58 )             30.9     04-05    136  |  106  |  8   ----------------------------<  111<H>  4.4   |  23  |  0.82    Ca    8.3<L>      05 Apr 2021 05:58  Phos  2.1     04-05  Mg     1.8     04-05    TPro  6.0  /  Alb  2.5<L>  /  TBili  0.3  /  DBili  x   /  AST  28  /  ALT  30  /  AlkPhos  55  04-05        RADIOLOGY:   < from: Xray Knee 3 Views, Right (03.28.21 @ 14:20) >  EXAM:  KNEE AP.LAT&OBL.RIGHT                            PROCEDURE DATE:  03/28/2021          INTERPRETATION:  Right knee. Patient has local pain. 3 views.    No effusion. Mild degeneration concentrated around the articular patella.    On the lateral view there is a well-circumscribed round density in the subcutaneous tissues anterior to the patella measuring approximately 3.2 cm.    IMPRESSION: Round mass anterior to the patella is noted. This is new since December 22, 2018.            GUERRERO HUSSEIN M.D., ATTENDING RADIOLOGIST  This document has been electronically signed. Mar 28 2021  3:48PM    < end of copied text >  `< from: CT Femur No Cont, Right (04.05.21 @ 09:01) >  EXAM:  CT FEMUR ONLY RT                            PROCEDURE DATE:  04/05/2021          INTERPRETATION:  EXAMINATION: CT of the right femur without contrast    CLINICAL INFORMATION: Right thigh pain and swelling    TECHNIQUE: Axial CT images were obtained through the right femur. Coronal and sagittal reformatted images were made. 3-D reconstruction images were also performed.    FINDINGS: No acute fracture or dislocation is demonstrated. No lytic or blastic lesions are demonstrated. There is noCT evidence of osteomyelitis. The bones are diffusely demineralized. 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 The hip joint space is grossly preserved. There is mild right sacroiliac joint arthrosis. There is nonspecific diffuse subcutaneous soft tissue infiltration/edema, most pronounced laterally. There is nonspecific 4.1 x 2.6 x 2.9 cm cystic mass in the anterior subcutaneous tissues overlying the inferior aspect of the patella. Correlate clinically.    IMPRESSION: No acute fracture or dislocation.  Degenerative changes, as above.  Nonspecific diffuse subcutaneous soft tissue infiltration/edema, more pronounced laterally.  4.1 x 2.6 x 2.9 cm nonspecific cystic mass/collection in the anterior subcutaneous tissues of the knee. Correlate clinically.            JENNIFER MORLEY MD; Attending Radiologist  This document has been electronically signed. Apr 5 2021  9:39AM    < end of copied text >        IMPRESSION: Pt is a  81y Female with Right Lower extremity cystic mass     PLAN:  -  Pain management  -  DVT prophylaxis  -  OOB as tolerated with walker  -  Continue care as per surgery team  -  No Orthopedic surgical intervention at this time of right knee mass  -  Mass is likely traumatic cyst from prior fall 3 years ago. Recommend follow up to monitor size of mass.   -  Case discussed with Dr. Hawkins

## 2021-04-06 NOTE — PROGRESS NOTE ADULT - SUBJECTIVE AND OBJECTIVE BOX
Pt s- complaints. +bm tolerating diet  ICU Vital Signs Last 24 Hrs  T(C): 36.7 (06 Apr 2021 06:00), Max: 36.8 (05 Apr 2021 13:54)  T(F): 98.1 (06 Apr 2021 06:00), Max: 98.3 (05 Apr 2021 13:54)  HR: 61 (06 Apr 2021 06:00) (61 - 66)  BP: 169/63 (06 Apr 2021 06:00) (139/83 - 169/63)  BP(mean): --  ABP: --  ABP(mean): --  RR: 18 (06 Apr 2021 06:00) (18 - 18)  SpO2: 96% (06 Apr 2021 06:00) (95% - 98%)  Alert nad  Abd: soft nt nd. siena in place, cdi  no calf swelling or erythema                          9.8    5.44  )-----------( 265      ( 05 Apr 2021 05:58 )             30.9     04-05    136  |  106  |  8   ----------------------------<  111<H>  4.4   |  23  |  0.82    Ca    8.3<L>      05 Apr 2021 05:58  Phos  2.1     04-05  Mg     1.8     04-05    TPro  6.0  /  Alb  2.5<L>  /  TBili  0.3  /  DBili  x   /  AST  28  /  ALT  30  /  AlkPhos  55  04-05

## 2021-04-06 NOTE — PROGRESS NOTE ADULT - REASON FOR ADMISSION
Colonoscopy with hot snare polypectomy, biopsy and clipp placement
EGD with biopsy
symptomatic anemia

## 2021-04-06 NOTE — PROGRESS NOTE ADULT - NOSE
no discharge/no deviation
no discharge/no deviation/clear discharge
no discharge/no deviation

## 2021-04-06 NOTE — PROGRESS NOTE ADULT - NEGATIVE ENDOCRINE SYMPTOMS
no cold intolerance/no heat intolerance

## 2021-04-06 NOTE — PROGRESS NOTE ADULT - TONSILS
no redness/no swelling
no redness
no redness/no swelling
no redness
no redness/no swelling
no redness/no swelling

## 2021-04-06 NOTE — PROGRESS NOTE ADULT - NEGATIVE GENERAL GENITOURINARY SYMPTOMS
no hematuria/no renal colic/no flank pain L/no flank pain R/no dysuria

## 2021-04-06 NOTE — PROGRESS NOTE ADULT - GIT GEN HX ROS MEA POS PC
diarrhea/melena

## 2021-04-06 NOTE — PROGRESS NOTE ADULT - CVS HE PE MLT D E PC
regular rate and rhythm/no rub
no rub
regular rate and rhythm/no rub

## 2021-04-08 ENCOUNTER — EMERGENCY (EMERGENCY)
Facility: HOSPITAL | Age: 81
LOS: 1 days | Discharge: HOME | End: 2021-04-08
Attending: EMERGENCY MEDICINE
Payer: MEDICARE

## 2021-04-08 VITALS
DIASTOLIC BLOOD PRESSURE: 61 MMHG | HEART RATE: 72 BPM | SYSTOLIC BLOOD PRESSURE: 177 MMHG | RESPIRATION RATE: 16 BRPM | HEIGHT: 63 IN | TEMPERATURE: 98 F | OXYGEN SATURATION: 97 %

## 2021-04-08 VITALS
SYSTOLIC BLOOD PRESSURE: 133 MMHG | RESPIRATION RATE: 20 BRPM | DIASTOLIC BLOOD PRESSURE: 60 MMHG | OXYGEN SATURATION: 97 % | TEMPERATURE: 98 F | HEART RATE: 70 BPM

## 2021-04-08 DIAGNOSIS — Z98.89 OTHER SPECIFIED POSTPROCEDURAL STATES: Chronic | ICD-10-CM

## 2021-04-08 LAB
ALBUMIN SERPL ELPH-MCNC: 3.1 G/DL — LOW (ref 3.5–5)
ALP SERPL-CCNC: 56 U/L — SIGNIFICANT CHANGE UP (ref 40–120)
ALT FLD-CCNC: 24 U/L DA — SIGNIFICANT CHANGE UP (ref 10–60)
ANION GAP SERPL CALC-SCNC: 7 MMOL/L — SIGNIFICANT CHANGE UP (ref 5–17)
APTT BLD: 37.6 SEC — HIGH (ref 27.5–35.5)
AST SERPL-CCNC: 18 U/L — SIGNIFICANT CHANGE UP (ref 10–40)
BASOPHILS # BLD AUTO: 0.05 K/UL — SIGNIFICANT CHANGE UP (ref 0–0.2)
BASOPHILS NFR BLD AUTO: 0.7 % — SIGNIFICANT CHANGE UP (ref 0–2)
BILIRUB SERPL-MCNC: 0.3 MG/DL — SIGNIFICANT CHANGE UP (ref 0.2–1.2)
BUN SERPL-MCNC: 10 MG/DL — SIGNIFICANT CHANGE UP (ref 7–18)
CALCIUM SERPL-MCNC: 8.6 MG/DL — SIGNIFICANT CHANGE UP (ref 8.4–10.5)
CHLORIDE SERPL-SCNC: 101 MMOL/L — SIGNIFICANT CHANGE UP (ref 96–108)
CO2 SERPL-SCNC: 27 MMOL/L — SIGNIFICANT CHANGE UP (ref 22–31)
CREAT SERPL-MCNC: 1.16 MG/DL — SIGNIFICANT CHANGE UP (ref 0.5–1.3)
EOSINOPHIL # BLD AUTO: 0.09 K/UL — SIGNIFICANT CHANGE UP (ref 0–0.5)
EOSINOPHIL NFR BLD AUTO: 1.2 % — SIGNIFICANT CHANGE UP (ref 0–6)
GLUCOSE SERPL-MCNC: 130 MG/DL — HIGH (ref 70–99)
HCT VFR BLD CALC: 30 % — LOW (ref 34.5–45)
HGB BLD-MCNC: 9.9 G/DL — LOW (ref 11.5–15.5)
IMM GRANULOCYTES NFR BLD AUTO: 0.5 % — SIGNIFICANT CHANGE UP (ref 0–1.5)
INR BLD: 1.49 RATIO — HIGH (ref 0.88–1.16)
LACTATE SERPL-SCNC: 2 MMOL/L — SIGNIFICANT CHANGE UP (ref 0.7–2)
LIDOCAIN IGE QN: 150 U/L — SIGNIFICANT CHANGE UP (ref 73–393)
LYMPHOCYTES # BLD AUTO: 1.71 K/UL — SIGNIFICANT CHANGE UP (ref 1–3.3)
LYMPHOCYTES # BLD AUTO: 22.8 % — SIGNIFICANT CHANGE UP (ref 13–44)
MCHC RBC-ENTMCNC: 28.4 PG — SIGNIFICANT CHANGE UP (ref 27–34)
MCHC RBC-ENTMCNC: 33 GM/DL — SIGNIFICANT CHANGE UP (ref 32–36)
MCV RBC AUTO: 86 FL — SIGNIFICANT CHANGE UP (ref 80–100)
MONOCYTES # BLD AUTO: 0.67 K/UL — SIGNIFICANT CHANGE UP (ref 0–0.9)
MONOCYTES NFR BLD AUTO: 8.9 % — SIGNIFICANT CHANGE UP (ref 2–14)
NEUTROPHILS # BLD AUTO: 4.95 K/UL — SIGNIFICANT CHANGE UP (ref 1.8–7.4)
NEUTROPHILS NFR BLD AUTO: 65.9 % — SIGNIFICANT CHANGE UP (ref 43–77)
NRBC # BLD: 0 /100 WBCS — SIGNIFICANT CHANGE UP (ref 0–0)
OB PNL STL: NEGATIVE — SIGNIFICANT CHANGE UP
PLATELET # BLD AUTO: 341 K/UL — SIGNIFICANT CHANGE UP (ref 150–400)
POTASSIUM SERPL-MCNC: 4.1 MMOL/L — SIGNIFICANT CHANGE UP (ref 3.5–5.3)
POTASSIUM SERPL-SCNC: 4.1 MMOL/L — SIGNIFICANT CHANGE UP (ref 3.5–5.3)
PROT SERPL-MCNC: 6.9 G/DL — SIGNIFICANT CHANGE UP (ref 6–8.3)
PROTHROM AB SERPL-ACNC: 17.4 SEC — HIGH (ref 10.6–13.6)
RBC # BLD: 3.49 M/UL — LOW (ref 3.8–5.2)
RBC # FLD: 18.2 % — HIGH (ref 10.3–14.5)
SARS-COV-2 RNA SPEC QL NAA+PROBE: SIGNIFICANT CHANGE UP
SODIUM SERPL-SCNC: 135 MMOL/L — SIGNIFICANT CHANGE UP (ref 135–145)
WBC # BLD: 7.51 K/UL — SIGNIFICANT CHANGE UP (ref 3.8–10.5)
WBC # FLD AUTO: 7.51 K/UL — SIGNIFICANT CHANGE UP (ref 3.8–10.5)

## 2021-04-08 PROCEDURE — 93005 ELECTROCARDIOGRAM TRACING: CPT

## 2021-04-08 PROCEDURE — 83690 ASSAY OF LIPASE: CPT

## 2021-04-08 PROCEDURE — 99285 EMERGENCY DEPT VISIT HI MDM: CPT

## 2021-04-08 PROCEDURE — 86850 RBC ANTIBODY SCREEN: CPT

## 2021-04-08 PROCEDURE — 87635 SARS-COV-2 COVID-19 AMP PRB: CPT

## 2021-04-08 PROCEDURE — 80053 COMPREHEN METABOLIC PANEL: CPT

## 2021-04-08 PROCEDURE — 83605 ASSAY OF LACTIC ACID: CPT

## 2021-04-08 PROCEDURE — 36415 COLL VENOUS BLD VENIPUNCTURE: CPT

## 2021-04-08 PROCEDURE — 99284 EMERGENCY DEPT VISIT MOD MDM: CPT

## 2021-04-08 PROCEDURE — 85025 COMPLETE CBC W/AUTO DIFF WBC: CPT

## 2021-04-08 PROCEDURE — 86901 BLOOD TYPING SEROLOGIC RH(D): CPT

## 2021-04-08 PROCEDURE — 85610 PROTHROMBIN TIME: CPT

## 2021-04-08 PROCEDURE — 85730 THROMBOPLASTIN TIME PARTIAL: CPT

## 2021-04-08 PROCEDURE — 93010 ELECTROCARDIOGRAM REPORT: CPT

## 2021-04-08 PROCEDURE — 86900 BLOOD TYPING SEROLOGIC ABO: CPT

## 2021-04-08 PROCEDURE — 82272 OCCULT BLD FECES 1-3 TESTS: CPT

## 2021-04-08 NOTE — CONSULT NOTE ADULT - ASSESSMENT
81 yoF with PSH of lap assisted R hemicolectomy for invasive adenoCA 3/31/21, presents with melena    likely from old blood with slow oozing from anastomosis after resuming Xarelto; no evidence of active bleeding; h/h stable 9/30, HDS; INR 1.4, not hypercoagulable  FOBT negative  Per cardiologist, Dr. Sandoval, please hold Xarelto until outpt f/u; continue ASA 81mg QD  F/u with Dr. Hu within 1-2 weeks  Plans/recs discussed with attending and ED MD, Dr. JUNIOR Vega

## 2021-04-08 NOTE — CONSULT NOTE ADULT - SUBJECTIVE AND OBJECTIVE BOX
HPI:  81 yoF Ghanaian-speaking from home with son, ambulates with walker, PMH HFpEF, CAD (s/p stents), chronic venous stasis, DM, HTN and Afib (Xarelto, s/p PPM), chronic bronchitis with asthmatic component (home O2 prn), JOSE DANIEL (noncompliant with nocturnal CPAP), and invasive colon adenoCA s/p R hemicolectomy 3/31/21; presented to ED after having 5x melenic episodes within 2 days. Pt resumed bASA and Xarelto on discharge. Denies n/v/f/c, no dizziness or lightheadedness, no CP/SOB or palpitations. Denies hematuria or easy bleeding.     Pt remains hemodynamically stable, rectal exam reveals brown soft stool, no melissa blood, FOBT negative. H/H 9/30 which is same from h/h drawn on discharge. INR 1.4, subtherapeutic. No further hematochezia or melena seen in ED. Afeb, vital signs stable.         REVIEW OF SYSTEMS:  Negative except stated above in HPI    PAST MEDICAL & SURGICAL HISTORY:  Asthma    CAD (Coronary Atherosclerotic Disease)    Myocardial Infarction    Diabetes    HTN (Hypertension)    HLD (Hyperlipidemia)    CHF (congestive heart failure), NYHA class I    IBS (irritable bowel syndrome)    Venous stasis    Hypothyroidism    Atrial fibrillation    Pacemaker    Obesity    S/P coronary angiogram        MEDICATIONS  (STANDING):    MEDICATIONS  (PRN):      Allergies    No Known Allergies    Intolerances        Vital Signs Last 24 Hrs  T(C): 36.3 (08 Apr 2021 15:57), Max: 36.8 (08 Apr 2021 13:26)  T(F): 97.4 (08 Apr 2021 15:57), Max: 98.2 (08 Apr 2021 13:26)  HR: 66 (08 Apr 2021 15:57) (66 - 72)  BP: 139/57 (08 Apr 2021 15:57) (139/57 - 177/61)  BP(mean): --  RR: 21 (08 Apr 2021 15:57) (16 - 21)  SpO2: 99% (08 Apr 2021 15:57) (97% - 99%)    PHYSCAL EXAM:   General: Alert and oriented, not in acute distress  Resp: Breathing unlabored  GI: soft, nontender, nondistended, RQ inc well healed, no erythema/induration or drainage, port sites c/d/i with steristrips; scattered ecchymoses likely due to heparin injections while in patient  : No [place], no dysuria or hematuria  Extremities: skin changes c/w chronic venous insufficiency, BL pitting edema      I&O's Detail      LABS:                        9.9    7.51  )-----------( 341      ( 08 Apr 2021 14:26 )             30.0              04-08    135  |  101  |  10  ----------------------------<  130<H>  4.1   |  27  |  1.16    Ca    8.6      08 Apr 2021 14:26    TPro  6.9  /  Alb  3.1<L>  /  TBili  0.3  /  DBili  x   /  AST  18  /  ALT  24  /  AlkPhos  56  04-08            PT/INR - ( 08 Apr 2021 14:26 )   PT: 17.4 sec;   INR: 1.49 ratio         PTT - ( 08 Apr 2021 14:26 )  PTT:37.6 sec

## 2021-04-08 NOTE — ED PROVIDER NOTE - CLINICAL SUMMARY MEDICAL DECISION MAKING FREE TEXT BOX
82 y/o F with recent surgery, now with rectal bleeding. Will do labs and admit. 82 y/o F with recent surgery, now with rectal bleeding. Will do labs, light brown stool, labs normal. pending surgery evaluation

## 2021-04-08 NOTE — ED PROVIDER NOTE - PATIENT PORTAL LINK FT
You can access the FollowMyHealth Patient Portal offered by [facility] by registering at the following website: [email or web address]. By joining [facility]’s FollowMyHealth portal, you will also be able to view your health information using other applications (apps) compatible with our system.

## 2021-04-08 NOTE — ED ADULT NURSE NOTE - OBJECTIVE STATEMENT
Pt AOx4, ambulatory, c/o rectal bleed since this morning. Pt had recent colo-rectal surgery, currently taking Xarelto. EKG done, pt placed on cardiac monitor, no signs of distress noted.

## 2021-04-08 NOTE — ED ADULT NURSE NOTE - NSIMPLEMENTINTERV_GEN_ALL_ED
Implemented All Fall with Harm Risk Interventions:  [place] to call system. Call bell, personal items and telephone within reach. Instruct patient to call for assistance. Room bathroom lighting operational. Non-slip footwear when patient is off stretcher. Physically safe environment: no spills, clutter or unnecessary equipment. Stretcher in lowest position, wheels locked, appropriate side rails in place. Provide visual cue, wrist band, yellow gown, etc. Monitor gait and stability. Monitor for mental status changes and reorient to person, place, and time. Review medications for side effects contributing to fall risk. Reinforce activity limits and safety measures with patient and family. Provide visual clues: red socks.

## 2021-04-08 NOTE — ED PROVIDER NOTE - OBJECTIVE STATEMENT
82 y/o F with a significant PMHx of HTN, HLD, DM, asthma, atrial fibrillation, CAD, CHF, MI, obesity, and recent hemicolectomy presents to the ED for 4 large bloody bowel movements overnight and one black bowel movement this morning. Patient became concerned, prompting her to come to the ED. Patient taking Xarelto. Denies chest pain, dizziness or any other acute complaints.

## 2021-04-08 NOTE — ED PROVIDER NOTE - NSFOLLOWUPINSTRUCTIONS_ED_ALL_ED_FT
continue to hold xarelto until pt see dr veliz next week. and routine follow up with surgery.      Rectal Bleeding    WHAT YOU NEED TO KNOW:    Rectal bleeding can be caused by constipation, hemorrhoids, or anal fissures. It may also be caused by polyps, tumors, or medical conditions, such as colitis or diverticulitis.    DISCHARGE INSTRUCTIONS:    Medicines:   •Pain medicine: You may be given medicine to take away or decrease pain. Do not wait until the pain is severe before you take your medicine.      •Iron supplement: Iron helps your body make more red blood cells.       •Steroids: This medicine decreases inflammation in your rectum. It may be applied as a cream, ointment, or lotion.      •Take your medicine as directed. Contact your healthcare provider if you think your medicine is not helping or if you have side effects. Tell him of her if you are allergic to any medicine. Keep a list of the medicines, vitamins, and herbs you take. Include the amounts, and when and why you take them. Bring the list or the pill bottles to follow-up visits. Carry your medicine list with you in case of an emergency.      Follow up with your healthcare provider as directed: Write down your questions so you remember to ask them during your visits.     Drink liquids as directed: Ask your healthcare provider how much liquid to drink each day and which liquids are best for you. This will help prevent dehydration and constipation.    Contact your healthcare provider if:   •You have a fever.      •Your rectal bleeding stopped for a time, but has started again.       •You have nausea.      •You have cold, sweaty, pale skin.      •You have changes in your bowel movements, such as diarrhea.      •You have questions or concerns about your condition or care.      Return to the emergency department if:   •You are breathing faster than usual.      •You are dizzy, lightheaded, or feel faint.      •You are confused or cannot think clearly.      •You urinate less than usual or not at all.      •Your rectal bleeding is constant or heavy.      •You have severe abdominal pain or cramping.

## 2021-04-08 NOTE — ED PROVIDER NOTE - PROGRESS NOTE DETAILS
Vega: s/o from dr yusuf to f/u with surgery. surgery eval and ok to dc and continue to hold xarelto until pt see dr veliz next week. and routine follow up with surgery. dc return if bleeding occur

## 2021-04-09 RX ORDER — DRONEDARONE 400 MG/1
1 TABLET, FILM COATED ORAL
Qty: 60 | Refills: 0
Start: 2021-04-09 | End: 2021-05-08

## 2021-04-15 ENCOUNTER — APPOINTMENT (OUTPATIENT)
Dept: SURGERY | Facility: CLINIC | Age: 81
End: 2021-04-15
Payer: MEDICARE

## 2021-04-15 DIAGNOSIS — C18.9 MALIGNANT NEOPLASM OF COLON, UNSPECIFIED: ICD-10-CM

## 2021-04-15 PROCEDURE — 99024 POSTOP FOLLOW-UP VISIT: CPT

## 2021-04-15 NOTE — HISTORY OF PRESENT ILLNESS
[de-identified] : Ms. MOSQUERA  is s/p laparoscopically assisted right hemicolectomy on 03/31/2021. Patient's pathology results were  consistent with  Invasive moderately differentiated colonic adenocarcinoma of the cecum measuring 2.0 x 1.2 x 0.8 cm and located equidistantly from the proximal and mesenteric margin.Fourteen regional lymph nodes, negative for metastatic carcinoma, pN0. All margins are uninvolved by invasive carcinoma. Today Ms. MOSQUERA offers no complaints. patient reports no fever, chills,  or  pain. Her  surgical wounds are  healing well. No signs of inflammation, infection or exudate. Patient reports good bowel movements and appetite.

## 2021-04-15 NOTE — DATA REVIEWED
[FreeTextEntry1] : KWAME MOSQUERA                      4\par \par \par \par Surgical Final Report\par \par \par \par \par Final Diagnosis\par \par 1. Right colon; laparoscopically assisted right hemicolectomy:\par - Invasive moderately differentiated colonic adenocarcinoma of\par the cecum measuring 2.0 x 1.2 x 0.8 cm and located equidistantly\par from the proximal and mesenteric margin.\par - Colonic adenocarcinoma invades the submucosa, pT1.\par - No definitive lymphovascular or perineural invasion is\par identified.\par - Fourteen regional lymph nodes, negative for metastatic\par carcinoma, pN0.\par - Tubular adenoma of the cecum measuring 0.5 cm.\par - Mucosal prolapse polyp of the cecum.\par - Focally ulcerated colonic mucosa marked by the metal rods,\par consistent with prior polypectomy sites.\par - See synoptic report.\par \par 2. Donuts; resection:\par - Segment of large and small intestinal wall, negative for\par dysplasia or\par malignancy.\par \par Verified by: Priscila Arana MD\par (Electronic Signature)\par Reported on: 04/05/21 18:00 EDT, [facility],\par [address], [place] [address]\par Phone: [phone]   Fax: [phone]\par _________________________________________________________________\par \par Synoptic Summary\par \par Surgical Pathology Cancer Case Summary\par \par Protocol posting date: February 2020

## 2021-04-15 NOTE — PHYSICAL EXAM
[Calm] : calm [de-identified] : She  is alert, well-groomed, and in NAD\par   [de-identified] : anicteric.  Nasal mucosa pink, septum midline. Oral mucosa pink.  Tongue midline, Pharynx without exudates.\par   [de-identified] : Surgical wounds are  healing well.   no signs of  inflammation or infection.

## 2021-04-15 NOTE — ASSESSMENT
[FreeTextEntry1] : Ms. MOSQUERA is doing well, with excellent post-operative recovery. All surgical incisions are healing well and as expected. There is no evidence of infection or complication, and she is progressing as expected. Post-operative wound care, activity, restrictions and precautions reinforced. Patient instructed to refrain from any heavy lifting greater than 10-15 pounds for at least 4 weeks post-operatively. pathology results were discussed in details.   PAtient was advised to loose weight. I reviewed importance of behavioral modification. Nutrition was  reviewed and reinforced, including the importance  of making healthy food choices. The importance of maintaining regular exercise/physical activity also reinforced. Recommend patient to see nutritionist. Patient's questions and concerns addressed to patient's satisfaction.

## 2021-04-22 NOTE — PROGRESS NOTE ADULT - PROBLEM SELECTOR PLAN 6
SS NOTE: SW attempted to meet with pt today, however he was sleeping loudly. SW could not awaken him. SW reviewed pt's PT notes for today relating that pt stood and moved with no assistance. SW will continue to attempt to see pt to discuss dch planning. Pt's wife is at home and is has no ability to assist pt if he requires it at home. Pt has a CPAP and home O2 from [address] Eliza Landa. 4/22/2021.1:47 PM. Resumed Synthroid; TSH WNLs

## 2021-05-04 RX ADMIN — BUDESONIDE AND FORMOTEROL FUMARATE DIHYDRATE 2 PUFF(S): 160; 4.5 AEROSOL RESPIRATORY (INHALATION) at 21:57

## 2021-07-06 NOTE — PATIENT PROFILE ADULT - NSPROHMCARDIOMGMTSTRAT_GEN_A_NUR
-Keep your wound dressing clean, dry, and intact.    -Change your dressing if it becomes soiled, soaked, or falls off.    -Should you experience any significant changes in your wound(s), such as infection (redness, swelling, localized heat, increased pain, fever > 101 F, chills) or have any questions regarding your home care instructions, please contact the wound center at [phone]. If after hours, contact your primary care physician or go to the hospital emergency room.    weight management/exercise/medication therapy

## 2021-07-07 ENCOUNTER — NON-APPOINTMENT (OUTPATIENT)
Age: 81
End: 2021-07-07

## 2021-07-07 ENCOUNTER — APPOINTMENT (OUTPATIENT)
Dept: ELECTROPHYSIOLOGY | Facility: CLINIC | Age: 81
End: 2021-07-07
Payer: MEDICARE

## 2021-07-07 PROCEDURE — 93296 REM INTERROG EVL PM/IDS: CPT

## 2021-07-07 PROCEDURE — 93294 REM INTERROG EVL PM/LDLS PM: CPT

## 2021-08-12 NOTE — PROGRESS NOTE ADULT - PROBLEM SELECTOR PLAN 2
Patient advised to call if any problems, questions, or concerns. Pt takes Carvedilol and Xarelto at home  Continue with home regimen   Dr. Sandoval (Cardiology) following

## 2021-08-16 ENCOUNTER — APPOINTMENT (OUTPATIENT)
Dept: SURGERY | Facility: CLINIC | Age: 81
End: 2021-08-16

## 2021-12-28 NOTE — PATIENT PROFILE ADULT - VISION (WITH CORRECTIVE LENSES IF THE PATIENT USUALLY WEARS THEM):
Medication(s) Requested:   Disp Refills Start End     amphetamine-dextroamphetamine (ADDERALL) 20 MG tablet 60 tablet 0 11/29/2021     Sig - Route: Take 1 tablet by mouth 2 times daily. Must last 30 days - Oral    Sent to pharmacy as: Amphetamine-Dextroamphetamine 20 MG Oral Tablet (ADDERALL)    Class: Eprescribe    Earliest Fill Date: 11/29/2021    E-Prescribing Status: Receipt confirmed by pharmacy (11/29/2021  1:15 PM CST)        Last appointment: 12/01/2021  Advised follow up: 4 months  Appointment: not scheduled  Last refill: 11/30/2021 per PDMP  Is the patient due for refill of this medication(s): Yes  PDMP review: Criteria met. Forwarded to Physician/LIZA for signature.   Enedelia Baileyth & Gerardo Jerry       Partially impaired: cannot see medication labels or newsprint, but can see obstacles in path, and the surrounding layout; can count fingers at arm's length

## 2022-01-24 NOTE — DISCHARGE NOTE ADULT - ABILITY TO HEAR (WITH HEARING AID OR HEARING APPLIANCE IF NORMALLY USED):
Adequate: hears normal conversation without difficulty [FreeTextEntry1] : FENO was 26 ; a normal value being less than 25\par Fractional exhaled nitric oxide (FENO) is regarded as a simple, noninvasive method for assessing eosinophilic airway inflammation. Produced by a variety of cells within the lung, nitric oxide (NO) concentrations are generally low in healthy individuals. However, high concentrations of NO appear to be involved in nonspecific host defense mechanisms and chronic inflammatory diseases such as asthma. The American Thoracic Society (ATS) therefore has recommended using FENO to aid in the diagnosis and monitoring of eosinophilic airway inflammation and asthma, and for identifying steroid responsive individuals whose chronic respiratory symptoms may be caused by airway inflammation. \par \par CT LDCT Lung CA Screening (11.22.2021) revealed Bilateral 2 mm pulmonary nodules.\par Emphysema.\par Lung-RADS 2 (Benign Appearance or Behavior) : Continue annual screening with low-dose chest CT in 12 months if the person continues to meet eligibility criteria.\par \par Full PFT revealed normal flows, with a FEV1 of 2.56 L,which is 97 % of predicted,  normal lung volumes, and a diffusion of 18.3 , which is 97% of predicted with a normal flow volume loop

## 2022-02-18 NOTE — H&P ADULT - PROBLEM SELECTOR PROBLEM 6
Contacted patient and notified her that we were unable to schedule appointments for other offices and she would need to contact Dr. King or go to clinic; patient expressed understanding  
THE PATIENT STATES THAT SHE HAS SEEN DOCTOR AHMED FOR PROLIA INJECTIONS THE PATIENT STATES THAT SHE IS HAVING A HARD TIME CONTACTING DOCTOR JEB THE PATIENT WOULD LIKE HELP TO MAKE AN APPOINTMENT WITH DOCTOR LEIJA TO GET HER PROLOIA INJECTION THE PATIENT WOULD LIKE AN APPOINTMENT ANYTIME AFTER 11:00 AM      CALL [phone]  
Asthma

## 2022-04-04 ENCOUNTER — INPATIENT (INPATIENT)
Facility: HOSPITAL | Age: 82
LOS: 6 days | Discharge: HOME | DRG: 644 | End: 2022-04-11
Attending: STUDENT IN AN ORGANIZED HEALTH CARE EDUCATION/TRAINING PROGRAM | Admitting: STUDENT IN AN ORGANIZED HEALTH CARE EDUCATION/TRAINING PROGRAM
Payer: MEDICARE

## 2022-04-04 VITALS
RESPIRATION RATE: 16 BRPM | HEART RATE: 57 BPM | OXYGEN SATURATION: 98 % | HEIGHT: 63 IN | DIASTOLIC BLOOD PRESSURE: 69 MMHG | TEMPERATURE: 98 F | SYSTOLIC BLOOD PRESSURE: 148 MMHG | WEIGHT: 265 LBS

## 2022-04-04 DIAGNOSIS — Z98.89 OTHER SPECIFIED POSTPROCEDURAL STATES: Chronic | ICD-10-CM

## 2022-04-04 DIAGNOSIS — E87.1 HYPO-OSMOLALITY AND HYPONATREMIA: ICD-10-CM

## 2022-04-04 LAB
ALBUMIN SERPL ELPH-MCNC: 3.4 G/DL — LOW (ref 3.5–5)
ALP SERPL-CCNC: 64 U/L — SIGNIFICANT CHANGE UP (ref 40–120)
ALT FLD-CCNC: 27 U/L DA — SIGNIFICANT CHANGE UP (ref 10–60)
ANION GAP SERPL CALC-SCNC: 8 MMOL/L — SIGNIFICANT CHANGE UP (ref 5–17)
AST SERPL-CCNC: 20 U/L — SIGNIFICANT CHANGE UP (ref 10–40)
BASOPHILS # BLD AUTO: 0.03 K/UL — SIGNIFICANT CHANGE UP (ref 0–0.2)
BASOPHILS NFR BLD AUTO: 0.4 % — SIGNIFICANT CHANGE UP (ref 0–2)
BILIRUB SERPL-MCNC: 0.5 MG/DL — SIGNIFICANT CHANGE UP (ref 0.2–1.2)
BUN SERPL-MCNC: 27 MG/DL — HIGH (ref 7–18)
CALCIUM SERPL-MCNC: 9.3 MG/DL — SIGNIFICANT CHANGE UP (ref 8.4–10.5)
CHLORIDE SERPL-SCNC: 87 MMOL/L — LOW (ref 96–108)
CO2 SERPL-SCNC: 26 MMOL/L — SIGNIFICANT CHANGE UP (ref 22–31)
CREAT SERPL-MCNC: 1.37 MG/DL — HIGH (ref 0.5–1.3)
EGFR: 38 ML/MIN/1.73M2 — LOW
EOSINOPHIL # BLD AUTO: 0.06 K/UL — SIGNIFICANT CHANGE UP (ref 0–0.5)
EOSINOPHIL NFR BLD AUTO: 0.8 % — SIGNIFICANT CHANGE UP (ref 0–6)
GLUCOSE SERPL-MCNC: 225 MG/DL — HIGH (ref 70–99)
HCT VFR BLD CALC: 32 % — LOW (ref 34.5–45)
HGB BLD-MCNC: 11.3 G/DL — LOW (ref 11.5–15.5)
IMM GRANULOCYTES NFR BLD AUTO: 0.3 % — SIGNIFICANT CHANGE UP (ref 0–1.5)
LYMPHOCYTES # BLD AUTO: 1.77 K/UL — SIGNIFICANT CHANGE UP (ref 1–3.3)
LYMPHOCYTES # BLD AUTO: 23.1 % — SIGNIFICANT CHANGE UP (ref 13–44)
MAGNESIUM SERPL-MCNC: 1.7 MG/DL — SIGNIFICANT CHANGE UP (ref 1.6–2.6)
MCHC RBC-ENTMCNC: 28.8 PG — SIGNIFICANT CHANGE UP (ref 27–34)
MCHC RBC-ENTMCNC: 35.3 GM/DL — SIGNIFICANT CHANGE UP (ref 32–36)
MCV RBC AUTO: 81.4 FL — SIGNIFICANT CHANGE UP (ref 80–100)
MONOCYTES # BLD AUTO: 0.44 K/UL — SIGNIFICANT CHANGE UP (ref 0–0.9)
MONOCYTES NFR BLD AUTO: 5.7 % — SIGNIFICANT CHANGE UP (ref 2–14)
NEUTROPHILS # BLD AUTO: 5.35 K/UL — SIGNIFICANT CHANGE UP (ref 1.8–7.4)
NEUTROPHILS NFR BLD AUTO: 69.7 % — SIGNIFICANT CHANGE UP (ref 43–77)
NRBC # BLD: 0 /100 WBCS — SIGNIFICANT CHANGE UP (ref 0–0)
PHOSPHATE SERPL-MCNC: 3.8 MG/DL — SIGNIFICANT CHANGE UP (ref 2.5–4.5)
PLATELET # BLD AUTO: 288 K/UL — SIGNIFICANT CHANGE UP (ref 150–400)
POTASSIUM SERPL-MCNC: 4.4 MMOL/L — SIGNIFICANT CHANGE UP (ref 3.5–5.3)
POTASSIUM SERPL-SCNC: 4.4 MMOL/L — SIGNIFICANT CHANGE UP (ref 3.5–5.3)
PROT SERPL-MCNC: 7.8 G/DL — SIGNIFICANT CHANGE UP (ref 6–8.3)
RBC # BLD: 3.93 M/UL — SIGNIFICANT CHANGE UP (ref 3.8–5.2)
RBC # FLD: 12 % — SIGNIFICANT CHANGE UP (ref 10.3–14.5)
SODIUM SERPL-SCNC: 121 MMOL/L — LOW (ref 135–145)
WBC # BLD: 7.67 K/UL — SIGNIFICANT CHANGE UP (ref 3.8–10.5)
WBC # FLD AUTO: 7.67 K/UL — SIGNIFICANT CHANGE UP (ref 3.8–10.5)

## 2022-04-04 PROCEDURE — 99285 EMERGENCY DEPT VISIT HI MDM: CPT

## 2022-04-04 PROCEDURE — 99223 1ST HOSP IP/OBS HIGH 75: CPT | Mod: GC

## 2022-04-04 PROCEDURE — 93010 ELECTROCARDIOGRAM REPORT: CPT

## 2022-04-04 RX ORDER — IPRATROPIUM/ALBUTEROL SULFATE 18-103MCG
3 AEROSOL WITH ADAPTER (GRAM) INHALATION ONCE
Refills: 0 | Status: COMPLETED | OUTPATIENT
Start: 2022-04-04 | End: 2022-04-04

## 2022-04-04 RX ORDER — SODIUM CHLORIDE 9 MG/ML
1000 INJECTION INTRAMUSCULAR; INTRAVENOUS; SUBCUTANEOUS ONCE
Refills: 0 | Status: COMPLETED | OUTPATIENT
Start: 2022-04-04 | End: 2022-04-04

## 2022-04-04 RX ORDER — FAMOTIDINE 10 MG/ML
20 INJECTION INTRAVENOUS ONCE
Refills: 0 | Status: COMPLETED | OUTPATIENT
Start: 2022-04-04 | End: 2022-04-04

## 2022-04-04 RX ADMIN — FAMOTIDINE 20 MILLIGRAM(S): 10 INJECTION INTRAVENOUS at 21:23

## 2022-04-04 RX ADMIN — SODIUM CHLORIDE 1000 MILLILITER(S): 9 INJECTION INTRAMUSCULAR; INTRAVENOUS; SUBCUTANEOUS at 20:36

## 2022-04-04 RX ADMIN — Medication 3 MILLILITER(S): at 20:36

## 2022-04-04 NOTE — H&P ADULT - HISTORY OF PRESENT ILLNESS
Patient is a 83yoF, w/ PMH of HTN, DM, hypothyroidism, asthma, afib, CAD, CHF (s/p pacemaker), presents with vomiting. Patient reports NBNB vomiting for 2 weeks. Prior to start of vomiting, patient noted to have increased coughing, and subsequent CXR showed "water retention in lung", and she was started on furosemid 40 qod per daughter. She also complains of chills, night sweat, generalized weakness and increased SOB, even at rest. She denies fever, chest pain, abdominal pain, bowel movement changes

## 2022-04-04 NOTE — H&P ADULT - PROBLEM SELECTOR PLAN 2
- noted to have Cr 1.37  - likely 2/2 volume depletion  - s/p NS 1L bolus  - c/w IVF  - f/u urine e-lytes

## 2022-04-04 NOTE — DISCHARGE NOTE ADULT - IF YOU ARE A SMOKER, IT IS IMPORTANT FOR YOUR HEALTH TO STOP SMOKING. PLEASE BE AWARE THAT SECOND HAND SMOKE IS ALSO HARMFUL.
Statement Selected

## 2022-04-04 NOTE — H&P ADULT - ASSESSMENT
Patient is a 83yoF, w/ PMH of HTN, DM, hypothyroidism, asthma, afib, CAD, CHF (s/p pacemaker), presents with vomiting. Acute hyponatremia.    Primary team to follow up on home medications. Daughter took pictures of home meds, but surescript has many more medications recently picked up that is unaccounted for in the pic.

## 2022-04-04 NOTE — ED ADULT NURSE NOTE - ED STAT RN HANDOFF DETAILS 3
Patient endorsed to floor [facility]. Patient to be transported to unit via stretcher stable in no acute distress safety maintained.

## 2022-04-04 NOTE — H&P ADULT - ATTENDING COMMENTS
Vital Signs Last 24 Hrs  T(C): 36.4 (04 Apr 2022 23:32), Max: 36.8 (04 Apr 2022 17:43)  T(F): 97.5 (04 Apr 2022 23:32), Max: 98.3 (04 Apr 2022 17:43)  HR: 63 (04 Apr 2022 23:32) (57 - 63)  BP: 133/81 (04 Apr 2022 23:32) (133/81 - 148/69)  BP(mean): --  RR: 16 (04 Apr 2022 23:32) (16 - 16)  SpO2: 96% (04 Apr 2022 23:32) (96% - 98%) Patient is a 83yoF, w/ PMH of HTN, DM, hypothyroidism, asthma, afib, CAD, CHF (s/p pacemaker), colon cancer s/p R hemicolectomy presents with vomiting. Patient reports NBNB vomiting for 2 weeks. Prior to start of vomiting, patient noted to have increased coughing, and subsequent CXR showed "water retention in lung", and she was started on furosemide 40 qd per daughter. She also complains of chills, night sweat, generalized weakness and increased SOB, even at rest. She denies fever, chest pain, abdominal pain, bowel movement changes.  Of note patient was admitted last year under surgery service for colon mass s/p R hemicolectomy, no h/o melena, weight loss, abdominal pain. no chemo/radiation received.   Patient seen and examined at bedside. Vincentian speaking, translation by her daughter at bedside.    Vital Signs Last 24 Hrs  T(C): 36.4 (04 Apr 2022 23:32), Max: 36.8 (04 Apr 2022 17:43)  T(F): 97.5 (04 Apr 2022 23:32), Max: 98.3 (04 Apr 2022 17:43)  HR: 63 (04 Apr 2022 23:32) (57 - 63)  BP: 133/81 (04 Apr 2022 23:32) (133/81 - 148/69)  BP(mean): --  RR: 16 (04 Apr 2022 23:32) (16 - 16)  SpO2: 96% (04 Apr 2022 23:32) (96% - 98%)    Physical exam as above.    Labs and imaging studies noted.    Assessment and plan:  Patient is a 83yoF, w/ PMH of HTN, DM, hypothyroidism, asthma, afib, CAD, CHF (s/p pacemaker), presents with generalized weakness, decreased po intake a/w vomiting x 2 weeks admitted for hyponatremia.    Acute hypovolemic hyponatremia  SRIDHAR  h/o colon cancer s/p R hemicolectomy ( 2021)  Afib on Xarelto  CHF ( last EF >60% grade I DD in 2021)- compensated.  hypothyroidism  Type 2 DM  hypertension    - p/w nausea/ vomiting x 2weeks 1-2 /week, now c/o generalized weakness, poor oral intake last few days.  - noted to have sodium 121, along with SRIDHAR creat 1.37. likely hypovolemic hyponatremia. s/p iv NS 1 lite in ED.  - send urine lytes, urine osm, s osm, u sodium. monitor BMP avoid overcorrection > 8 meq/24 hours.  - iv ppi, . hold Olmesartan and Lasix. daughter reports patient not taking since last few days.  - was admitted last year for localized adenocarcinoma of colon s/p R hemicolectomy. no changes in BM, hb 11.  - c/w Xarelto and BB.  - consider GI consult if no improvement with nausea/vomiting.  - BG elevated, 200s-300s. daughter reports recently elevated BG at home. On janumet and Repaglinide. keep SSI, adjust accordingly.

## 2022-04-04 NOTE — H&P ADULT - PROBLEM SELECTOR PLAN 1
- p/w n/v x2wk  - likely 2/2 multiple diuretic use (possibly using torsemide and lasix, although denies)  - Na 121  - s/p NS 1L bolus in ED  - f/u BMP, urine & serum osm, and urine sodium - p/w n/v x2wk  - likely 2/2 multiple diuretic use (possibly using torsemide and lasix, although denies)  - Na 121  - s/p NS 1L bolus in ED  - c/w IVF  - monitor BMP  - f/u BMP, U/A, urine & serum osm, and urine sodium - p/w n/v x2wk  - likely 2/2 multiple diuretic use (possibly using torsemide and lasix, although denies)  - Na 121  - s/p NS 1L bolus in ED  - c/w NS 50cc/h  - monitor BMP q6h  - goal: 6-8 mEq/24h  - f/u BMP, U/A, urine & serum osm, and urine sodium

## 2022-04-04 NOTE — H&P ADULT - PROBLEM SELECTOR PLAN 3
- h/o CHF (s/p pacemaker placement), on olmesartan, furosemide/torsemide, carvedilol  - c/w carvedilol  - hold olmesartan, and diuretics given SRIDHAR and acute hyponatremia  - primary team to follow up on other medications

## 2022-04-04 NOTE — ED PROVIDER NOTE - OBJECTIVE STATEMENT
82F, pmh of HTN, HLD, DM, asthma, atrial fibrillation, CAD, CHF, presenting with two weeks of vomiting. history obtained from patient's daughter at bedside. patient has had vomiting for two weeks. sometimes is coughing and then vomits, other times she vomits after eating or taking her medications. patient denies feeling like anything is stuck in her throat or that she is having any difficulty breathing or swallowing.

## 2022-04-04 NOTE — ED PROVIDER NOTE - PHYSICAL EXAMINATION
General: well appearing female, no acute distress   HEENT: normocephalic, atraumatic   Respiratory: normal work of breathing, diffuse wheezing    Cardiac: regular rate and rhythm   Abdomen: soft, non-tender, no guarding or rebound   MSK: no swelling or tenderness of lower extremities, moving all extremities spontaneously   Skin: warm, dry   Neuro: A&Ox3  Psych: appropriate affect

## 2022-04-04 NOTE — ED ADULT NURSE NOTE - NSIMPLEMENTINTERV_GEN_ALL_ED
Implemented All Fall with Harm Risk Interventions:  [place] to call system. Call bell, personal items and telephone within reach. Instruct patient to call for assistance. Room bathroom lighting operational. Non-slip footwear when patient is off stretcher. Physically safe environment: no spills, clutter or unnecessary equipment. Stretcher in lowest position, wheels locked, appropriate side rails in place. Provide visual cue, wrist band, yellow gown, etc. Monitor gait and stability. Monitor for mental status changes and reorient to person, place, and time. Review medications for side effects contributing to fall risk. Reinforce activity limits and safety measures with patient and family. Provide visual clues: red socks.

## 2022-04-04 NOTE — ED PROVIDER NOTE - CLINICAL SUMMARY MEDICAL DECISION MAKING FREE TEXT BOX
82F presenting with weakness and vomiting. found to have hyponatremia and flower. likely dehydrated. will admit.

## 2022-04-04 NOTE — H&P ADULT - PROBLEM SELECTOR PLAN 5
- h/o HTN, on olmesartan/amlodipine/hydrochlorothiazine, carvedilol  - c/w amlodipine and carvedilol  - monitor BP  - primary team to follow up on other medications

## 2022-04-05 DIAGNOSIS — E87.1 HYPO-OSMOLALITY AND HYPONATREMIA: ICD-10-CM

## 2022-04-05 DIAGNOSIS — E03.9 HYPOTHYROIDISM, UNSPECIFIED: ICD-10-CM

## 2022-04-05 DIAGNOSIS — I48.91 UNSPECIFIED ATRIAL FIBRILLATION: ICD-10-CM

## 2022-04-05 DIAGNOSIS — I50.32 CHRONIC DIASTOLIC (CONGESTIVE) HEART FAILURE: ICD-10-CM

## 2022-04-05 DIAGNOSIS — R05.3 CHRONIC COUGH: ICD-10-CM

## 2022-04-05 DIAGNOSIS — I50.9 HEART FAILURE, UNSPECIFIED: ICD-10-CM

## 2022-04-05 DIAGNOSIS — N17.9 ACUTE KIDNEY FAILURE, UNSPECIFIED: ICD-10-CM

## 2022-04-05 DIAGNOSIS — F41.9 ANXIETY DISORDER, UNSPECIFIED: ICD-10-CM

## 2022-04-05 DIAGNOSIS — E11.9 TYPE 2 DIABETES MELLITUS WITHOUT COMPLICATIONS: ICD-10-CM

## 2022-04-05 DIAGNOSIS — I10 ESSENTIAL (PRIMARY) HYPERTENSION: ICD-10-CM

## 2022-04-05 DIAGNOSIS — Z29.9 ENCOUNTER FOR PROPHYLACTIC MEASURES, UNSPECIFIED: ICD-10-CM

## 2022-04-05 LAB
ALBUMIN SERPL ELPH-MCNC: 3.3 G/DL — LOW (ref 3.5–5)
ALP SERPL-CCNC: 62 U/L — SIGNIFICANT CHANGE UP (ref 40–120)
ALT FLD-CCNC: 24 U/L DA — SIGNIFICANT CHANGE UP (ref 10–60)
ANION GAP SERPL CALC-SCNC: 10 MMOL/L — SIGNIFICANT CHANGE UP (ref 5–17)
ANION GAP SERPL CALC-SCNC: 11 MMOL/L — SIGNIFICANT CHANGE UP (ref 5–17)
ANION GAP SERPL CALC-SCNC: 5 MMOL/L — SIGNIFICANT CHANGE UP (ref 5–17)
ANION GAP SERPL CALC-SCNC: 7 MMOL/L — SIGNIFICANT CHANGE UP (ref 5–17)
APPEARANCE UR: CLEAR — SIGNIFICANT CHANGE UP
AST SERPL-CCNC: 19 U/L — SIGNIFICANT CHANGE UP (ref 10–40)
BASOPHILS # BLD AUTO: 0.04 K/UL — SIGNIFICANT CHANGE UP (ref 0–0.2)
BASOPHILS NFR BLD AUTO: 0.4 % — SIGNIFICANT CHANGE UP (ref 0–2)
BILIRUB SERPL-MCNC: 0.5 MG/DL — SIGNIFICANT CHANGE UP (ref 0.2–1.2)
BILIRUB UR-MCNC: NEGATIVE — SIGNIFICANT CHANGE UP
BUN SERPL-MCNC: 22 MG/DL — HIGH (ref 7–18)
BUN SERPL-MCNC: 25 MG/DL — HIGH (ref 7–18)
BUN SERPL-MCNC: 27 MG/DL — HIGH (ref 7–18)
BUN SERPL-MCNC: 27 MG/DL — HIGH (ref 7–18)
CALCIUM SERPL-MCNC: 8.7 MG/DL — SIGNIFICANT CHANGE UP (ref 8.4–10.5)
CALCIUM SERPL-MCNC: 9.2 MG/DL — SIGNIFICANT CHANGE UP (ref 8.4–10.5)
CHLORIDE SERPL-SCNC: 89 MMOL/L — LOW (ref 96–108)
CHLORIDE SERPL-SCNC: 90 MMOL/L — LOW (ref 96–108)
CO2 SERPL-SCNC: 20 MMOL/L — LOW (ref 22–31)
CO2 SERPL-SCNC: 25 MMOL/L — SIGNIFICANT CHANGE UP (ref 22–31)
CO2 SERPL-SCNC: 26 MMOL/L — SIGNIFICANT CHANGE UP (ref 22–31)
CO2 SERPL-SCNC: 27 MMOL/L — SIGNIFICANT CHANGE UP (ref 22–31)
COLOR SPEC: YELLOW — SIGNIFICANT CHANGE UP
CREAT ?TM UR-MCNC: 56 MG/DL — SIGNIFICANT CHANGE UP
CREAT SERPL-MCNC: 1.24 MG/DL — SIGNIFICANT CHANGE UP (ref 0.5–1.3)
CREAT SERPL-MCNC: 1.29 MG/DL — SIGNIFICANT CHANGE UP (ref 0.5–1.3)
CREAT SERPL-MCNC: 1.32 MG/DL — HIGH (ref 0.5–1.3)
CREAT SERPL-MCNC: 1.34 MG/DL — HIGH (ref 0.5–1.3)
DIFF PNL FLD: NEGATIVE — SIGNIFICANT CHANGE UP
EGFR: 39 ML/MIN/1.73M2 — LOW
EGFR: 40 ML/MIN/1.73M2 — LOW
EGFR: 41 ML/MIN/1.73M2 — LOW
EGFR: 43 ML/MIN/1.73M2 — LOW
EOSINOPHIL # BLD AUTO: 0.1 K/UL — SIGNIFICANT CHANGE UP (ref 0–0.5)
EOSINOPHIL NFR BLD AUTO: 1.1 % — SIGNIFICANT CHANGE UP (ref 0–6)
EPI CELLS # UR: SIGNIFICANT CHANGE UP /HPF
GLUCOSE BLDC GLUCOMTR-MCNC: 194 MG/DL — HIGH (ref 70–99)
GLUCOSE BLDC GLUCOMTR-MCNC: 231 MG/DL — HIGH (ref 70–99)
GLUCOSE BLDC GLUCOMTR-MCNC: 235 MG/DL — HIGH (ref 70–99)
GLUCOSE BLDC GLUCOMTR-MCNC: 248 MG/DL — HIGH (ref 70–99)
GLUCOSE SERPL-MCNC: 159 MG/DL — HIGH (ref 70–99)
GLUCOSE SERPL-MCNC: 187 MG/DL — HIGH (ref 70–99)
GLUCOSE SERPL-MCNC: 206 MG/DL — HIGH (ref 70–99)
GLUCOSE SERPL-MCNC: 206 MG/DL — HIGH (ref 70–99)
GLUCOSE UR QL: NEGATIVE — SIGNIFICANT CHANGE UP
HCT VFR BLD CALC: 30.2 % — LOW (ref 34.5–45)
HGB BLD-MCNC: 10.9 G/DL — LOW (ref 11.5–15.5)
IMM GRANULOCYTES NFR BLD AUTO: 0.4 % — SIGNIFICANT CHANGE UP (ref 0–1.5)
KETONES UR-MCNC: NEGATIVE — SIGNIFICANT CHANGE UP
LEUKOCYTE ESTERASE UR-ACNC: NEGATIVE — SIGNIFICANT CHANGE UP
LYMPHOCYTES # BLD AUTO: 2.2 K/UL — SIGNIFICANT CHANGE UP (ref 1–3.3)
LYMPHOCYTES # BLD AUTO: 24.6 % — SIGNIFICANT CHANGE UP (ref 13–44)
MAGNESIUM SERPL-MCNC: 1.5 MG/DL — LOW (ref 1.6–2.6)
MCHC RBC-ENTMCNC: 29.6 PG — SIGNIFICANT CHANGE UP (ref 27–34)
MCHC RBC-ENTMCNC: 36.1 GM/DL — HIGH (ref 32–36)
MCV RBC AUTO: 82.1 FL — SIGNIFICANT CHANGE UP (ref 80–100)
MONOCYTES # BLD AUTO: 0.82 K/UL — SIGNIFICANT CHANGE UP (ref 0–0.9)
MONOCYTES NFR BLD AUTO: 9.2 % — SIGNIFICANT CHANGE UP (ref 2–14)
NEUTROPHILS # BLD AUTO: 5.75 K/UL — SIGNIFICANT CHANGE UP (ref 1.8–7.4)
NEUTROPHILS NFR BLD AUTO: 64.3 % — SIGNIFICANT CHANGE UP (ref 43–77)
NITRITE UR-MCNC: NEGATIVE — SIGNIFICANT CHANGE UP
NRBC # BLD: 0 /100 WBCS — SIGNIFICANT CHANGE UP (ref 0–0)
OSMOLALITY SERPL: 264 MOSMOL/KG — LOW (ref 280–301)
OSMOLALITY UR: 326 MOS/KG — SIGNIFICANT CHANGE UP (ref 50–1200)
PH UR: 6 — SIGNIFICANT CHANGE UP (ref 5–8)
PHOSPHATE SERPL-MCNC: 3.8 MG/DL — SIGNIFICANT CHANGE UP (ref 2.5–4.5)
PLATELET # BLD AUTO: 266 K/UL — SIGNIFICANT CHANGE UP (ref 150–400)
POTASSIUM SERPL-MCNC: 4 MMOL/L — SIGNIFICANT CHANGE UP (ref 3.5–5.3)
POTASSIUM SERPL-MCNC: 4.1 MMOL/L — SIGNIFICANT CHANGE UP (ref 3.5–5.3)
POTASSIUM SERPL-MCNC: 4.4 MMOL/L — SIGNIFICANT CHANGE UP (ref 3.5–5.3)
POTASSIUM SERPL-MCNC: 4.4 MMOL/L — SIGNIFICANT CHANGE UP (ref 3.5–5.3)
POTASSIUM SERPL-SCNC: 4 MMOL/L — SIGNIFICANT CHANGE UP (ref 3.5–5.3)
POTASSIUM SERPL-SCNC: 4.1 MMOL/L — SIGNIFICANT CHANGE UP (ref 3.5–5.3)
POTASSIUM SERPL-SCNC: 4.4 MMOL/L — SIGNIFICANT CHANGE UP (ref 3.5–5.3)
POTASSIUM SERPL-SCNC: 4.4 MMOL/L — SIGNIFICANT CHANGE UP (ref 3.5–5.3)
PROT SERPL-MCNC: 7.3 G/DL — SIGNIFICANT CHANGE UP (ref 6–8.3)
PROT UR-MCNC: NEGATIVE — SIGNIFICANT CHANGE UP
RBC # BLD: 3.68 M/UL — LOW (ref 3.8–5.2)
RBC # FLD: 12.3 % — SIGNIFICANT CHANGE UP (ref 10.3–14.5)
RBC CASTS # UR COMP ASSIST: SIGNIFICANT CHANGE UP /HPF (ref 0–2)
SARS-COV-2 RNA SPEC QL NAA+PROBE: SIGNIFICANT CHANGE UP
SODIUM SERPL-SCNC: 120 MMOL/L — CRITICAL LOW (ref 135–145)
SODIUM SERPL-SCNC: 120 MMOL/L — CRITICAL LOW (ref 135–145)
SODIUM SERPL-SCNC: 124 MMOL/L — LOW (ref 135–145)
SODIUM SERPL-SCNC: 124 MMOL/L — LOW (ref 135–145)
SODIUM UR-SCNC: 39 MMOL/L — SIGNIFICANT CHANGE UP
SP GR SPEC: 1.01 — SIGNIFICANT CHANGE UP (ref 1.01–1.02)
TSH SERPL-MCNC: 0.63 UU/ML — SIGNIFICANT CHANGE UP (ref 0.34–4.82)
UROBILINOGEN FLD QL: NEGATIVE — SIGNIFICANT CHANGE UP
WBC # BLD: 8.95 K/UL — SIGNIFICANT CHANGE UP (ref 3.8–10.5)
WBC # FLD AUTO: 8.95 K/UL — SIGNIFICANT CHANGE UP (ref 3.8–10.5)
WBC UR QL: SIGNIFICANT CHANGE UP /HPF (ref 0–5)

## 2022-04-05 PROCEDURE — 99233 SBSQ HOSP IP/OBS HIGH 50: CPT

## 2022-04-05 PROCEDURE — 71045 X-RAY EXAM CHEST 1 VIEW: CPT | Mod: 26

## 2022-04-05 RX ORDER — DEXTROSE 50 % IN WATER 50 %
12.5 SYRINGE (ML) INTRAVENOUS ONCE
Refills: 0 | Status: DISCONTINUED | OUTPATIENT
Start: 2022-04-05 | End: 2022-04-05

## 2022-04-05 RX ORDER — ALBUTEROL 90 UG/1
3 AEROSOL, METERED ORAL
Qty: 0 | Refills: 0 | DISCHARGE

## 2022-04-05 RX ORDER — ALBUTEROL 90 UG/1
2.5 AEROSOL, METERED ORAL ONCE
Refills: 0 | Status: CANCELLED | OUTPATIENT
Start: 2022-04-05 | End: 2022-04-11

## 2022-04-05 RX ORDER — SODIUM CHLORIDE 9 MG/ML
1000 INJECTION INTRAMUSCULAR; INTRAVENOUS; SUBCUTANEOUS
Refills: 0 | Status: DISCONTINUED | OUTPATIENT
Start: 2022-04-05 | End: 2022-04-05

## 2022-04-05 RX ORDER — SODIUM CHLORIDE 9 MG/ML
1000 INJECTION, SOLUTION INTRAVENOUS
Refills: 0 | Status: DISCONTINUED | OUTPATIENT
Start: 2022-04-05 | End: 2022-04-05

## 2022-04-05 RX ORDER — TOLTERODINE TARTRATE 1 MG/1
0 TABLET, FILM COATED ORAL
Qty: 0 | Refills: 0 | DISCHARGE

## 2022-04-05 RX ORDER — FLUTICASONE PROPIONATE 50 MCG
1 SPRAY, SUSPENSION NASAL
Qty: 0 | Refills: 0 | DISCHARGE

## 2022-04-05 RX ORDER — BUDESONIDE AND FORMOTEROL FUMARATE DIHYDRATE 160; 4.5 UG/1; UG/1
2 AEROSOL RESPIRATORY (INHALATION)
Refills: 0 | Status: DISCONTINUED | OUTPATIENT
Start: 2022-04-05 | End: 2022-04-11

## 2022-04-05 RX ORDER — DEXTROSE 50 % IN WATER 50 %
25 SYRINGE (ML) INTRAVENOUS ONCE
Refills: 0 | Status: DISCONTINUED | OUTPATIENT
Start: 2022-04-05 | End: 2022-04-05

## 2022-04-05 RX ORDER — SODIUM CHLORIDE 9 MG/ML
1000 INJECTION INTRAMUSCULAR; INTRAVENOUS; SUBCUTANEOUS
Refills: 0 | Status: DISCONTINUED | OUTPATIENT
Start: 2022-04-05 | End: 2022-04-06

## 2022-04-05 RX ORDER — ALBUTEROL 90 UG/1
2 AEROSOL, METERED ORAL EVERY 6 HOURS
Refills: 0 | Status: DISCONTINUED | OUTPATIENT
Start: 2022-04-05 | End: 2022-04-07

## 2022-04-05 RX ORDER — DRONEDARONE 400 MG/1
400 TABLET, FILM COATED ORAL
Refills: 0 | Status: DISCONTINUED | OUTPATIENT
Start: 2022-04-05 | End: 2022-04-11

## 2022-04-05 RX ORDER — DEXTROSE 50 % IN WATER 50 %
15 SYRINGE (ML) INTRAVENOUS ONCE
Refills: 0 | Status: DISCONTINUED | OUTPATIENT
Start: 2022-04-05 | End: 2022-04-05

## 2022-04-05 RX ORDER — TIOTROPIUM BROMIDE 18 UG/1
1 CAPSULE ORAL; RESPIRATORY (INHALATION)
Qty: 0 | Refills: 0 | DISCHARGE

## 2022-04-05 RX ORDER — AMLODIPINE BESYLATE 2.5 MG/1
5 TABLET ORAL DAILY
Refills: 0 | Status: DISCONTINUED | OUTPATIENT
Start: 2022-04-05 | End: 2022-04-11

## 2022-04-05 RX ORDER — RIVAROXABAN 15 MG-20MG
15 KIT ORAL
Refills: 0 | Status: DISCONTINUED | OUTPATIENT
Start: 2022-04-05 | End: 2022-04-11

## 2022-04-05 RX ORDER — LEVOTHYROXINE SODIUM 125 MCG
175 TABLET ORAL DAILY
Refills: 0 | Status: DISCONTINUED | OUTPATIENT
Start: 2022-04-05 | End: 2022-04-11

## 2022-04-05 RX ORDER — FLUTICASONE PROPIONATE AND SALMETEROL 50; 250 UG/1; UG/1
1 POWDER ORAL; RESPIRATORY (INHALATION)
Qty: 0 | Refills: 0 | DISCHARGE

## 2022-04-05 RX ORDER — PANTOPRAZOLE SODIUM 20 MG/1
40 TABLET, DELAYED RELEASE ORAL
Refills: 0 | Status: DISCONTINUED | OUTPATIENT
Start: 2022-04-05 | End: 2022-04-11

## 2022-04-05 RX ORDER — INSULIN LISPRO 100/ML
VIAL (ML) SUBCUTANEOUS
Refills: 0 | Status: DISCONTINUED | OUTPATIENT
Start: 2022-04-05 | End: 2022-04-11

## 2022-04-05 RX ORDER — GLUCAGON INJECTION, SOLUTION 0.5 MG/.1ML
1 INJECTION, SOLUTION SUBCUTANEOUS ONCE
Refills: 0 | Status: DISCONTINUED | OUTPATIENT
Start: 2022-04-05 | End: 2022-04-05

## 2022-04-05 RX ORDER — CARVEDILOL PHOSPHATE 80 MG/1
12.5 CAPSULE, EXTENDED RELEASE ORAL EVERY 12 HOURS
Refills: 0 | Status: DISCONTINUED | OUTPATIENT
Start: 2022-04-05 | End: 2022-04-11

## 2022-04-05 RX ORDER — ZOLPIDEM TARTRATE 10 MG/1
1 TABLET ORAL
Qty: 0 | Refills: 0 | DISCHARGE

## 2022-04-05 RX ORDER — RIVAROXABAN 15 MG-20MG
20 KIT ORAL
Qty: 0 | Refills: 0 | DISCHARGE

## 2022-04-05 RX ADMIN — Medication 0.5 MILLIGRAM(S): at 12:30

## 2022-04-05 RX ADMIN — CARVEDILOL PHOSPHATE 12.5 MILLIGRAM(S): 80 CAPSULE, EXTENDED RELEASE ORAL at 06:18

## 2022-04-05 RX ADMIN — Medication 2: at 22:02

## 2022-04-05 RX ADMIN — DRONEDARONE 400 MILLIGRAM(S): 400 TABLET, FILM COATED ORAL at 17:06

## 2022-04-05 RX ADMIN — Medication 175 MICROGRAM(S): at 06:18

## 2022-04-05 RX ADMIN — CARVEDILOL PHOSPHATE 12.5 MILLIGRAM(S): 80 CAPSULE, EXTENDED RELEASE ORAL at 17:06

## 2022-04-05 RX ADMIN — Medication 2: at 12:16

## 2022-04-05 RX ADMIN — Medication 1: at 17:06

## 2022-04-05 RX ADMIN — DRONEDARONE 400 MILLIGRAM(S): 400 TABLET, FILM COATED ORAL at 06:18

## 2022-04-05 RX ADMIN — AMLODIPINE BESYLATE 5 MILLIGRAM(S): 2.5 TABLET ORAL at 06:18

## 2022-04-05 RX ADMIN — BUDESONIDE AND FORMOTEROL FUMARATE DIHYDRATE 2 PUFF(S): 160; 4.5 AEROSOL RESPIRATORY (INHALATION) at 22:39

## 2022-04-05 RX ADMIN — ALBUTEROL 2 PUFF(S): 90 AEROSOL, METERED ORAL at 19:49

## 2022-04-05 RX ADMIN — RIVAROXABAN 15 MILLIGRAM(S): KIT at 17:06

## 2022-04-05 NOTE — PROGRESS NOTE ADULT - PROBLEM SELECTOR PLAN 6
Uncontrolled  Pt takes Janumet and Repaglinide at home  Start sliding scale insulin coverage  Continue glucose monitoring  Continue low carb diet

## 2022-04-05 NOTE — PROGRESS NOTE ADULT - SUBJECTIVE AND OBJECTIVE BOX
NP student Note discussed with supervising resident and primary attending    Patient is a 82y old  Female who presents with a chief complaint of hyponatremia (04 Apr 2022 23:48)      INTERVAL HPI/OVERNIGHT EVENTS:     MEDICATIONS  (STANDING):  amLODIPine   Tablet 5 milliGRAM(s) Oral daily  carvedilol 12.5 milliGRAM(s) Oral every 12 hours  dextrose 5%. 1000 milliLiter(s) (50 mL/Hr) IV Continuous <Continuous>  dextrose 5%. 1000 milliLiter(s) (100 mL/Hr) IV Continuous <Continuous>  dextrose 50% Injectable 25 Gram(s) IV Push once  dextrose 50% Injectable 12.5 Gram(s) IV Push once  dextrose 50% Injectable 25 Gram(s) IV Push once  dronedarone 400 milliGRAM(s) Oral two times a day  glucagon  Injectable 1 milliGRAM(s) IntraMuscular once  insulin lispro (ADMELOG) corrective regimen sliding scale   SubCutaneous Before meals and at bedtime  levothyroxine 175 MICROGram(s) Oral daily  rivaroxaban 15 milliGRAM(s) Oral with dinner  sodium chloride 0.9%. 1000 milliLiter(s) (50 mL/Hr) IV Continuous <Continuous>    MEDICATIONS  (PRN):  dextrose Oral Gel 15 Gram(s) Oral once PRN Blood Glucose LESS THAN 70 milliGRAM(s)/deciliter  LORazepam     Tablet 0.5 milliGRAM(s) Oral daily PRN Anxiety      __________________________________________________  REVIEW OF SYSTEMS:    CONSTITUTIONAL: No fever,   EYES: no acute visual disturbances  NECK: No pain or stiffness  RESPIRATORY: +cough; No shortness of breath  CARDIOVASCULAR: No chest pain, no palpitations  GASTROINTESTINAL: No pain. No nausea or vomiting; No diarrhea   NEUROLOGICAL: No headache or numbness, no tremors  MUSCULOSKELETAL: No joint pain, no muscle pain  GENITOURINARY: no dysuria, no frequency, no hesitancy  PSYCHIATRY: no depression , no anxiety  ALL OTHER  ROS negative        Vital Signs Last 24 Hrs  T(C): 36.4 (05 Apr 2022 12:45), Max: 36.8 (04 Apr 2022 17:43)  T(F): 97.5 (05 Apr 2022 12:45), Max: 98.3 (04 Apr 2022 17:43)  HR: 65 (05 Apr 2022 12:45) (57 - 65)  BP: 156/63 (05 Apr 2022 12:45) (110/88 - 156/63)  BP(mean): --  RR: 17 (05 Apr 2022 12:45) (16 - 18)  SpO2: 98% (05 Apr 2022 12:45) (96% - 98%)    ________________________________________________  PHYSICAL EXAM:  GENERAL: NAD  HEENT: Normocephalic;  conjunctivae and sclerae clear; moist mucous membranes;   NECK : supple  CHEST/LUNG: Clear to auscultation bilaterally with good air entry   HEART: S1 S2  regular; no murmurs, gallops or rubs  ABDOMEN: Soft, Nontender, Nondistended; Bowel sounds present  EXTREMITIES: no cyanosis; no edema; no calf tenderness  SKIN: warm and dry; no rash  NERVOUS SYSTEM:  Awake and alert; Oriented  to place, person and time ; no new deficits    _________________________________________________  LABS:                        10.9   8.95  )-----------( 266      ( 05 Apr 2022 05:52 )             30.2     04-05    124<L>  |  89<L>  |  27<H>  ----------------------------<  159<H>  4.0   |  25  |  1.32<H>    Ca    8.7      05 Apr 2022 05:52  Phos  3.8     04-05  Mg     1.5     04-05    TPro  7.3  /  Alb  3.3<L>  /  TBili  0.5  /  DBili  x   /  AST  19  /  ALT  24  /  AlkPhos  62  04-05        CAPILLARY BLOOD GLUCOSE      POCT Blood Glucose.: 248 mg/dL (05 Apr 2022 11:18)  POCT Blood Glucose.: 231 mg/dL (05 Apr 2022 08:39)        RADIOLOGY & ADDITIONAL TESTS:    Imaging Personally Reviewed:  YES    Consultant(s) Notes Reviewed:   YES    Care Discussed with Consultants : YES     Plan of care was discussed with patient and /or primary care giver; all questions and concerns were addressed and care was aligned with patient's wishes.     NP student Note discussed with primary attending    Patient is a 82y old Female who presents with a chief complaint of hyponatremia (04 Apr 2022 23:48)      INTERVAL HPI/OVERNIGHT EVENTS:     MEDICATIONS  (STANDING):  amLODIPine   Tablet 5 milliGRAM(s) Oral daily  carvedilol 12.5 milliGRAM(s) Oral every 12 hours  dextrose 5%. 1000 milliLiter(s) (50 mL/Hr) IV Continuous <Continuous>  dextrose 5%. 1000 milliLiter(s) (100 mL/Hr) IV Continuous <Continuous>  dextrose 50% Injectable 25 Gram(s) IV Push once  dextrose 50% Injectable 12.5 Gram(s) IV Push once  dextrose 50% Injectable 25 Gram(s) IV Push once  dronedarone 400 milliGRAM(s) Oral two times a day  glucagon  Injectable 1 milliGRAM(s) IntraMuscular once  insulin lispro (ADMELOG) corrective regimen sliding scale   SubCutaneous Before meals and at bedtime  levothyroxine 175 MICROGram(s) Oral daily  rivaroxaban 15 milliGRAM(s) Oral with dinner  sodium chloride 0.9%. 1000 milliLiter(s) (50 mL/Hr) IV Continuous <Continuous>    MEDICATIONS  (PRN):  dextrose Oral Gel 15 Gram(s) Oral once PRN Blood Glucose LESS THAN 70 milliGRAM(s)/deciliter  LORazepam     Tablet 0.5 milliGRAM(s) Oral daily PRN Anxiety      __________________________________________________  REVIEW OF SYSTEMS:    CONSTITUTIONAL: No fever,   EYES: no acute visual disturbances  NECK: No pain or stiffness  RESPIRATORY: +cough; No shortness of breath  CARDIOVASCULAR: No chest pain, no palpitations  GASTROINTESTINAL: No pain. No nausea or vomiting; No diarrhea   NEUROLOGICAL: No headache or numbness, no tremors  MUSCULOSKELETAL: No joint pain, no muscle pain  GENITOURINARY: no dysuria, no frequency, no hesitancy  PSYCHIATRY: no depression , no anxiety  ALL OTHER  ROS negative        Vital Signs Last 24 Hrs  T(C): 36.4 (05 Apr 2022 12:45), Max: 36.8 (04 Apr 2022 17:43)  T(F): 97.5 (05 Apr 2022 12:45), Max: 98.3 (04 Apr 2022 17:43)  HR: 65 (05 Apr 2022 12:45) (57 - 65)  BP: 156/63 (05 Apr 2022 12:45) (110/88 - 156/63)  BP(mean): --  RR: 17 (05 Apr 2022 12:45) (16 - 18)  SpO2: 98% (05 Apr 2022 12:45) (96% - 98%)    ________________________________________________  PHYSICAL EXAM:  GENERAL: NAD  HEENT: Normocephalic;  conjunctivae and sclerae clear; moist mucous membranes;   NECK : supple  CHEST/LUNG: Clear to auscultation bilaterally with good air entry   HEART: S1 S2  regular; no murmurs, gallops or rubs  ABDOMEN: Soft, Nontender, Nondistended; Bowel sounds present  EXTREMITIES: no cyanosis; no edema; no calf tenderness  SKIN: warm and dry; no rash  NERVOUS SYSTEM:  Awake and alert; Oriented  to place, person and time ; no new deficits    _________________________________________________  LABS:                        10.9   8.95  )-----------( 266      ( 05 Apr 2022 05:52 )             30.2     04-05    124<L>  |  89<L>  |  27<H>  ----------------------------<  159<H>  4.0   |  25  |  1.32<H>    Ca    8.7      05 Apr 2022 05:52  Phos  3.8     04-05  Mg     1.5     04-05    TPro  7.3  /  Alb  3.3<L>  /  TBili  0.5  /  DBili  x   /  AST  19  /  ALT  24  /  AlkPhos  62  04-05        CAPILLARY BLOOD GLUCOSE      POCT Blood Glucose.: 248 mg/dL (05 Apr 2022 11:18)  POCT Blood Glucose.: 231 mg/dL (05 Apr 2022 08:39)        RADIOLOGY & ADDITIONAL TESTS:    Imaging Personally Reviewed:  YES    Consultant(s) Notes Reviewed:   YES    Care Discussed with Consultants : YES     Plan of care was discussed with patient and /or primary care giver; all questions and concerns were addressed and care was aligned with patient's wishes.     NP student Note discussed with primary attending    Patient is a 82y old Female who presents with a chief complaint of hyponatremia (04 Apr 2022 23:48)      INTERVAL HPI/OVERNIGHT EVENTS:   [place]ian : [id number] Tracey + [phone] Kaushik  Assessed patient at bedside with MD Tyler and daughter on phone. Patient states she's ot feeling well, and unsure why. Denies chest pain, abdominal pain, fever, N/V/D. MD Tyler spoke with daughter (on phone at bedside) and updated daughter on plan of care    MEDICATIONS  (STANDING):  amLODIPine   Tablet 5 milliGRAM(s) Oral daily  carvedilol 12.5 milliGRAM(s) Oral every 12 hours  dextrose 5%. 1000 milliLiter(s) (50 mL/Hr) IV Continuous <Continuous>  dextrose 5%. 1000 milliLiter(s) (100 mL/Hr) IV Continuous <Continuous>  dextrose 50% Injectable 25 Gram(s) IV Push once  dextrose 50% Injectable 12.5 Gram(s) IV Push once  dextrose 50% Injectable 25 Gram(s) IV Push once  dronedarone 400 milliGRAM(s) Oral two times a day  glucagon  Injectable 1 milliGRAM(s) IntraMuscular once  insulin lispro (ADMELOG) corrective regimen sliding scale   SubCutaneous Before meals and at bedtime  levothyroxine 175 MICROGram(s) Oral daily  rivaroxaban 15 milliGRAM(s) Oral with dinner  sodium chloride 0.9%. 1000 milliLiter(s) (50 mL/Hr) IV Continuous <Continuous>    MEDICATIONS  (PRN):  dextrose Oral Gel 15 Gram(s) Oral once PRN Blood Glucose LESS THAN 70 milliGRAM(s)/deciliter  LORazepam     Tablet 0.5 milliGRAM(s) Oral daily PRN Anxiety      __________________________________________________  REVIEW OF SYSTEMS:    CONSTITUTIONAL: No fever, no chills  EYES: no acute visual disturbances  NECK: No pain or stiffness  RESPIRATORY: +cough; + shortness of breath  CARDIOVASCULAR: No chest pain, no palpitations  GASTROINTESTINAL: No pain. No nausea or vomiting at this time; No diarrhea   NEUROLOGICAL: No headache or numbness, no tremors  MUSCULOSKELETAL: No joint pain, no muscle pain  PSYCHIATRY: no depression , no anxiety      Vital Signs Last 24 Hrs  T(C): 36.4 (05 Apr 2022 12:45), Max: 36.8 (04 Apr 2022 17:43)  T(F): 97.5 (05 Apr 2022 12:45), Max: 98.3 (04 Apr 2022 17:43)  HR: 65 (05 Apr 2022 12:45) (57 - 65)  BP: 156/63 (05 Apr 2022 12:45) (110/88 - 156/63)  BP(mean): --  RR: 17 (05 Apr 2022 12:45) (16 - 18)  SpO2: 98% (05 Apr 2022 12:45) (96% - 98%)    ________________________________________________  PHYSICAL EXAM:  GENERAL: Awake, alert, sitting up in bed, NAD  HEENT: Normocephalic;  conjunctivae and sclerae clear; moist mucous membranes;   NECK : supple  CHEST/LUNG: Expiratory wheezing  HEART: S1 S2  regular; no murmurs, gallops or rubs  ABDOMEN: Soft, Nontender, Nondistended; Bowel sounds present  EXTREMITIES: no cyanosis; no edema; no calf tenderness  SKIN: warm and dry; no rash    _________________________________________________  LABS:                        10.9   8.95  )-----------( 266      ( 05 Apr 2022 05:52 )             30.2     04-05    124<L>  |  89<L>  |  27<H>  ----------------------------<  159<H>  4.0   |  25  |  1.32<H>    Ca    8.7      05 Apr 2022 05:52  Phos  3.8     04-05  Mg     1.5     04-05    TPro  7.3  /  Alb  3.3<L>  /  TBili  0.5  /  DBili  x   /  AST  19  /  ALT  24  /  AlkPhos  62  04-05        CAPILLARY BLOOD GLUCOSE      POCT Blood Glucose.: 248 mg/dL (05 Apr 2022 11:18)  POCT Blood Glucose.: 231 mg/dL (05 Apr 2022 08:39)        RADIOLOGY & ADDITIONAL TESTS:    Imaging Personally Reviewed:  YES    Consultant(s) Notes Reviewed:   YES    Care Discussed with Consultants : YES     Plan of care was discussed with patient and /or primary care giver; all questions and concerns were addressed and care was aligned with patient's wishes.     NP student Note discussed with primary attending    Patient is a 82y old Female who presents with a chief complaint of hyponatremia (04 Apr 2022 23:48)      INTERVAL HPI/OVERNIGHT EVENTS:   [place]ritrean : [id number] Tracey + [phone] Kaushik  Patient assessed at bedside with MD Tyler and daughter on phone. No apparent distress. Mildly anxious, stating she's not feeling well. Denies chest pain, abdominal pain, fever, N/V/D. MD Tyler spoke with daughter (on phone) and updated daughter on plan of care.      MEDICATIONS  (STANDING):  amLODIPine   Tablet 5 milliGRAM(s) Oral daily  carvedilol 12.5 milliGRAM(s) Oral every 12 hours  dextrose 5%. 1000 milliLiter(s) (50 mL/Hr) IV Continuous <Continuous>  dextrose 5%. 1000 milliLiter(s) (100 mL/Hr) IV Continuous <Continuous>  dextrose 50% Injectable 25 Gram(s) IV Push once  dextrose 50% Injectable 12.5 Gram(s) IV Push once  dextrose 50% Injectable 25 Gram(s) IV Push once  dronedarone 400 milliGRAM(s) Oral two times a day  glucagon  Injectable 1 milliGRAM(s) IntraMuscular once  insulin lispro (ADMELOG) corrective regimen sliding scale   SubCutaneous Before meals and at bedtime  levothyroxine 175 MICROGram(s) Oral daily  rivaroxaban 15 milliGRAM(s) Oral with dinner  sodium chloride 0.9%. 1000 milliLiter(s) (50 mL/Hr) IV Continuous <Continuous>    MEDICATIONS  (PRN):  dextrose Oral Gel 15 Gram(s) Oral once PRN Blood Glucose LESS THAN 70 milliGRAM(s)/deciliter  LORazepam     Tablet 0.5 milliGRAM(s) Oral daily PRN Anxiety      __________________________________________________  REVIEW OF SYSTEMS:    CONSTITUTIONAL: No fever, no chills  EYES: no acute visual disturbances  NECK: No pain or stiffness  RESPIRATORY: +cough; + shortness of breath  CARDIOVASCULAR: No chest pain, no palpitations  GASTROINTESTINAL: No pain. No nausea or vomiting at this time; No diarrhea   NEUROLOGICAL: No headache or numbness, no tremors  MUSCULOSKELETAL: No joint pain, no muscle pain  PSYCHIATRY: no depression , no anxiety      Vital Signs Last 24 Hrs  T(C): 36.4 (05 Apr 2022 12:45), Max: 36.8 (04 Apr 2022 17:43)  T(F): 97.5 (05 Apr 2022 12:45), Max: 98.3 (04 Apr 2022 17:43)  HR: 65 (05 Apr 2022 12:45) (57 - 65)  BP: 156/63 (05 Apr 2022 12:45) (110/88 - 156/63)  BP(mean): --  RR: 17 (05 Apr 2022 12:45) (16 - 18)  SpO2: 98% (05 Apr 2022 12:45) (96% - 98%)    ________________________________________________  PHYSICAL EXAM:  GENERAL: Awake, alert, sitting up in bed, NAD  HEENT: Normocephalic;  conjunctivae and sclerae clear; moist mucous membranes;   NECK : supple  CHEST/LUNG: Expiratory wheezing  HEART: S1 S2  regular; no murmurs, gallops or rubs  ABDOMEN: Soft, Nontender, Nondistended; Bowel sounds present  EXTREMITIES: no cyanosis; no edema; no calf tenderness  SKIN: warm and dry; no rash    _________________________________________________  LABS:                        10.9   8.95  )-----------( 266      ( 05 Apr 2022 05:52 )             30.2     04-05    124<L>  |  89<L>  |  27<H>  ----------------------------<  159<H>  4.0   |  25  |  1.32<H>    Ca    8.7      05 Apr 2022 05:52  Phos  3.8     04-05  Mg     1.5     04-05    TPro  7.3  /  Alb  3.3<L>  /  TBili  0.5  /  DBili  x   /  AST  19  /  ALT  24  /  AlkPhos  62  04-05        CAPILLARY BLOOD GLUCOSE      POCT Blood Glucose.: 248 mg/dL (05 Apr 2022 11:18)  POCT Blood Glucose.: 231 mg/dL (05 Apr 2022 08:39)        RADIOLOGY & ADDITIONAL TESTS:    Imaging Personally Reviewed:  YES    Consultant(s) Notes Reviewed:   YES    Care Discussed with Consultants : YES     Plan of care was discussed with patient and /or primary care giver; all questions and concerns were addressed and care was aligned with patient's wishes.     NP student Note discussed with primary attending    Patient is a 82y old Female who presents with a chief complaint of hyponatremia (04 Apr 2022 23:48)      INTERVAL HPI/OVERNIGHT EVENTS:   German : [id number] Tracey + [phone] Kaushik  Patient assessed at bedside with MD Tyler and daughter on phone. No apparent distress. Mildly anxious, stating she's not feeling well. Denies chest pain, abdominal pain, fever, N/V/D. MD Tyler spoke with daughter (on phone) and updated daughter on plan of care.      MEDICATIONS  (STANDING):  amLODIPine   Tablet 5 milliGRAM(s) Oral daily  carvedilol 12.5 milliGRAM(s) Oral every 12 hours  dextrose 5%. 1000 milliLiter(s) (50 mL/Hr) IV Continuous <Continuous>  dextrose 5%. 1000 milliLiter(s) (100 mL/Hr) IV Continuous <Continuous>  dextrose 50% Injectable 25 Gram(s) IV Push once  dextrose 50% Injectable 12.5 Gram(s) IV Push once  dextrose 50% Injectable 25 Gram(s) IV Push once  dronedarone 400 milliGRAM(s) Oral two times a day  glucagon  Injectable 1 milliGRAM(s) IntraMuscular once  insulin lispro (ADMELOG) corrective regimen sliding scale   SubCutaneous Before meals and at bedtime  levothyroxine 175 MICROGram(s) Oral daily  rivaroxaban 15 milliGRAM(s) Oral with dinner  sodium chloride 0.9%. 1000 milliLiter(s) (50 mL/Hr) IV Continuous <Continuous>    MEDICATIONS  (PRN):  dextrose Oral Gel 15 Gram(s) Oral once PRN Blood Glucose LESS THAN 70 milliGRAM(s)/deciliter  LORazepam     Tablet 0.5 milliGRAM(s) Oral daily PRN Anxiety      __________________________________________________  REVIEW OF SYSTEMS:    CONSTITUTIONAL: No fever, no chills  EYES: no acute visual disturbances  NECK: No pain or stiffness  RESPIRATORY: +cough; + shortness of breath  CARDIOVASCULAR: No chest pain, no palpitations  GASTROINTESTINAL: No pain. No nausea or vomiting at this time; No diarrhea   NEUROLOGICAL: No headache or numbness, no tremors  MUSCULOSKELETAL: No joint pain, no muscle pain  PSYCHIATRY: no depression , no anxiety      Vital Signs Last 24 Hrs  T(C): 36.4 (05 Apr 2022 12:45), Max: 36.8 (04 Apr 2022 17:43)  T(F): 97.5 (05 Apr 2022 12:45), Max: 98.3 (04 Apr 2022 17:43)  HR: 65 (05 Apr 2022 12:45) (57 - 65)  BP: 156/63 (05 Apr 2022 12:45) (110/88 - 156/63)  BP(mean): --  RR: 17 (05 Apr 2022 12:45) (16 - 18)  SpO2: 98% (05 Apr 2022 12:45) (96% - 98%)    ________________________________________________  PHYSICAL EXAM:  GENERAL: Awake, alert, sitting up in bed, NAD  HEENT: Normocephalic;  conjunctivae and sclerae clear; moist mucous membranes;   NECK : supple  CHEST/LUNG: Expiratory wheezing  HEART: S1 S2  regular; no murmurs, gallops or rubs  ABDOMEN: Soft, Nontender, Nondistended; Bowel sounds present  EXTREMITIES: chronic venous stasis changes, edema 1+   SKIN: warm and dry; no rash    _________________________________________________  LABS:                        10.9   8.95  )-----------( 266      ( 05 Apr 2022 05:52 )             30.2     04-05    124<L>  |  89<L>  |  27<H>  ----------------------------<  159<H>  4.0   |  25  |  1.32<H>    Ca    8.7      05 Apr 2022 05:52  Phos  3.8     04-05  Mg     1.5     04-05    TPro  7.3  /  Alb  3.3<L>  /  TBili  0.5  /  DBili  x   /  AST  19  /  ALT  24  /  AlkPhos  62  04-05        CAPILLARY BLOOD GLUCOSE      POCT Blood Glucose.: 248 mg/dL (05 Apr 2022 11:18)  POCT Blood Glucose.: 231 mg/dL (05 Apr 2022 08:39)        RADIOLOGY & ADDITIONAL TESTS:    Imaging Personally Reviewed:  YES    Consultant(s) Notes Reviewed:   YES    Care Discussed with Consultants : YES     Plan of care was discussed with patient and /or primary care giver; all questions and concerns were addressed and care was aligned with patient's wishes.

## 2022-04-05 NOTE — PROGRESS NOTE ADULT - PROBLEM SELECTOR PLAN 8
- DVT: xarelto - h/o CHF (s/p pacemaker placement), on olmesartan, furosemide/torsemide, carvedilol  - c/w carvedilol  - hold olmesartan, and diuretics given SRIDHAR and acute hyponatremia  - primary team to follow up on other medications. - last EF >60% grade I DD in 2021  - s/p pacemaker placement, on olmesartan, furosemide/torsemide, carvedilol  - c/w carvedilol  - hold olmesartan, and diuretics given SRIDHAR and acute hyponatremia - Last EF >60% grade I DD in 2021, no s/s of volume overload.   - s/p pacemaker placement, on olmesartan, furosemide/torsemide, carvedilol  - c/w carvedilol  - hold olmesartan, and diuretics given SRIDHAR and acute hyponatremia

## 2022-04-05 NOTE — PATIENT PROFILE ADULT - SBIRT REFERRAL
Patient Weight (Optional But Required For Cumulative Dose-Numbers And Decimals Only): 73 SBIRT referral

## 2022-04-05 NOTE — PROGRESS NOTE ADULT - PROBLEM SELECTOR PLAN 5
- h/o HTN, on olmesartan/amlodipine/hydrochlorothiazine, carvedilol  - c/w amlodipine and carvedilol  - monitor BP  - primary team to follow up on other medications - h/o DM, on janumet and repaglinide  - c/w ISS  - FS qACHs - h/o DM, on Janumet and repaglinide  - c/w HSS  - FS qACHs

## 2022-04-05 NOTE — PROGRESS NOTE ADULT - ASSESSMENT
Patient is a 83yoF, w/ PMH of HTN, DM, hypothyroidism, asthma, afib, CAD, CHF (s/p pacemaker), presents with vomiting. Acute hyponatremia.

## 2022-04-05 NOTE — PROGRESS NOTE ADULT - ASSESSMENT
Patient is a 83yoF, w/ PMH of HTN, DM, hypothyroidism, asthma, afib, CAD, CHF (s/p pacemaker), presents with vomiting. Acute hyponatremia.    Primary team to follow up on home medications. Daughter took pictures of home meds, but surescript has many more medications recently picked up that is unaccounted for in the pic. Patient is a 83yoF, w/ PMH of HTN, HLD, DM, hypothyroidism, asthma, Afib, CAD, CHF (s/p pacemaker), presents with vomiting. Acute hyponatremia.   Patient is a 83yoF, w/ PMH of HTN, HLD, DM, hypothyroidism, asthma, Afib, CAD, CHF (s/p pacemaker), presents with vomiting. Admitted for  Acute hyponatremia.

## 2022-04-05 NOTE — PROGRESS NOTE ADULT - ATTENDING COMMENTS
Patient is a 83yoF, w/ PMH of HTN, DM, hypothyroidism, asthma, afib, CAD, CHF (s/p pacemaker), colon cancer s/p R hemicolectomy presents with vomiting x2 weeks. Admitted for Hyponatremia     Patient was seen and examined, [employer]  services utilized for this encounter ([id number])- Tracey. She denies any nausea, vomiting at this time. Reports feeling unwell and weak.     Labs reviewed.    PE as above     A/P:  #Hypovolumic hyponatremia   #Vomiting  #SRIDHAR- pre-renal   #Chronic Cough  #Anxiety  #Chronic Atrial fibrillation   #Hypothyroidism   #Chronic Diastolic Heart Failure   #Hypertension  #Diabetes Mellitus   #H/O Asthma     Plan:  -Patient p/w nausea/vomiting, noted to have sodium 121 upon arrival. Likely hypo-osmolar Hypovolumic hyponatremia. Improved to 124 post IV hydration. Obtain urine studies(still pending). Will c/w IV hydration. Hold diuretics. BMP q 8 rs. Goal 6-8 meq in 24 hrs, avoid over-correction.   -Patient p/w nausea, vomiting, now resolved.   -Scr improved to 1.24 post IV hydration   -C/w Multaq, carvedilol and Xarelto for Afib. Rate controlled   -BP stable, c/w amlodipine and carvedilol   -Patient with hx of diastolic CHF- EF 66%' 2021. No s/s of volume overload at this time. C/w mild hydration. Monitor for signs of volume overload.   -Patient reports chronic non-productive cough, obtain Chest- X-ray. Reports that she follows with Dr. Nicole Morales as out-patient.     Out-patient provider: PCP- Dr. Mihai Washington, Pulmonologist  Nicole Morales

## 2022-04-05 NOTE — PROGRESS NOTE ADULT - PROBLEM SELECTOR PLAN 1
- p/w n/v x2wk  - likely 2/2 multiple diuretic use (possibly using torsemide and lasix, although denies)  - Na 121  - s/p NS 1L bolus in ED  - c/w NS 50cc/h  - monitor BMP q6h  - goal: 6-8 mEq/24h  - f/u BMP, U/A, urine & serum osm, and urine sodium - p/w vomiting x2 weeks  - Na 124 (from 121 this AM)  - s/p NS 1L bolus in ED  - c/w NS 50cc/h  - tolerating clear liquid diet; advanced to regular diet  - monitor BMP q6h  - goal: 6-8 mEq/24h  - f/u BMP, U/A, urine & serum osm, and urine sodium - p/w vomiting x2 weeks  - Na improved to 124 from 121  last night. Likely hypovolumic hyponatremia   - s/p NS 1L bolus in ED  - c/w NS 50cc/h  - tolerating clear liquid diet; advanced to regular diet  - monitor BMP q8h  - goal: 6-8 mEq/24h  - f/u BMP, U/A, urine studies

## 2022-04-05 NOTE — PROGRESS NOTE ADULT - PROBLEM SELECTOR PLAN 5
Controlled  Pt takes olmesartan, amlodipine, carvedilol, furosemide and Torsemide at home  Continue home regimen amlodipine and carvedilol  Hold sartan and diuretics in the setting of SRIDHAR  Monitor BP

## 2022-04-05 NOTE — PROGRESS NOTE ADULT - PROBLEM SELECTOR PLAN 6
- h/o DM, on janumet and repaglinide  - c/w ISS  - FS qACHs - h/o thyroidism, levothyroxine  - c/w synthroid  - f/u TSH - h/o thyroidism on levothyroxine  - c/w  levothyroxine  - Normal TSH: 0.63

## 2022-04-05 NOTE — PROGRESS NOTE ADULT - PROBLEM SELECTOR PLAN 3
- h/o CHF (s/p pacemaker placement), on olmesartan, furosemide/torsemide, carvedilol  - c/w carvedilol  - hold olmesartan, and diuretics given SRIDHAR and acute hyponatremia  - primary team to follow up on other medications - h/o afib, on xarelto and carvedilol  - c/w home meds - h/o Afib, on Xarelto and carvedilol  - c/w home meds

## 2022-04-05 NOTE — CHART NOTE - NSCHARTNOTEFT_GEN_A_CORE
EVENT: Sodium, Serum: 120:  EDT mmol/L (04.05.22 @ 20:37)    BRIEF HPI: 83yoF, w/ PMH of HTN, HLD, DM, hypothyroidism, asthma, Afib, CAD, CHF (s/p pacemaker), presents with vomiting. Admitted for  Acute hyponatremia. Sodium still 120meq    Vital Signs Last 24 Hrs  T(C): 37.3 (05 Apr 2022 20:48), Max: 37.3 (05 Apr 2022 20:48)  T(F): 99.2 (05 Apr 2022 20:48), Max: 99.2 (05 Apr 2022 20:48)  HR: 62 (05 Apr 2022 20:48) (62 - 80)  BP: 134/73 (05 Apr 2022 20:48) (110/88 - 156/63)  BP(mean): --  RR: 18 (05 Apr 2022 20:48) (16 - 18)  SpO2: 96% (05 Apr 2022 20:48) (96% - 98%)        Pr EVENT: Sodium, Serum: 120:  EDT mmol/L (04.05.22 @ 20:37)    BRIEF HPI: 83yoF, w/ PMH of HTN, HLD, DM, hypothyroidism, asthma, Afib, CAD, CHF (s/p pacemaker), presents with vomiting. Admitted for  Acute hyponatremia. Sodium still 120meq    Vital Signs Last 24 Hrs  T(C): 37.3 (05 Apr 2022 20:48), Max: 37.3 (05 Apr 2022 20:48)  T(F): 99.2 (05 Apr 2022 20:48), Max: 99.2 (05 Apr 2022 20:48)  HR: 62 (05 Apr 2022 20:48) (62 - 80)  BP: 134/73 (05 Apr 2022 20:48) (110/88 - 156/63)  BP(mean): --  RR: 18 (05 Apr 2022 20:48) (16 - 18)  SpO2: 96% (05 Apr 2022 20:48) (96% - 98%)    FOCUSSED PE:  NEURO:  RESP: Even, unlabored  CV:S1 S2    PROBLEM: Hyponatremia probably due to dual diuretic therapy vs hyperemesis X 2 wks  PLAN:  1. Cont sodium chloride 0.9%. 1000 milliliter(s) (80 mL/Hr) IV Continuous  2. AM lab BMP, cortisol, TSH    Discussed with Dr Tyler EVENT: Sodium, Serum: 120:  EDT mmol/L (04.05.22 @ 20:37)    BRIEF HPI: 83yoF, w/ PMH of HTN, HLD, DM, hypothyroidism, asthma, Afib, CAD, CHF (s/p pacemaker), presents with vomiting. Admitted for  Acute hyponatremia. Sodium still 120meq    Vital Signs Last 24 Hrs  T(C): 37.3 (05 Apr 2022 20:48), Max: 37.3 (05 Apr 2022 20:48)  T(F): 99.2 (05 Apr 2022 20:48), Max: 99.2 (05 Apr 2022 20:48)  HR: 62 (05 Apr 2022 20:48) (62 - 80)  BP: 134/73 (05 Apr 2022 20:48) (110/88 - 156/63)  BP(mean): --  RR: 18 (05 Apr 2022 20:48) (16 - 18)  SpO2: 96% (05 Apr 2022 20:48) (96% - 98%)    FOCUSSED PE:  NEURO: Lethargic, oriented x 2  RESP: Even, unlabored  CV:S1 S2    PROBLEM: Hyponatremia probably due to dual diuretic therapy vs hyperemesis X 2 wks  PLAN:  1. Cont sodium chloride 0.9%. 1000 milliliter(s) (80 mL/Hr) IV Continuous  2. AM lab BMP, cortisol, TSH    Discussed with Dr Tyler EVENT: Sodium, Serum: 120 mmol/L (04.05.22 @ 20:37)    BRIEF HPI: 83yoF, w/ PMH of HTN, HLD, DM, hypothyroidism, asthma, Afib, CAD, CHF (s/p pacemaker), presents with vomiting. Admitted for  Acute hyponatremia. Sodium still 120 mmol/L    Vital Signs Last 24 Hrs  T(C): 37.3 (05 Apr 2022 20:48), Max: 37.3 (05 Apr 2022 20:48)  T(F): 99.2 (05 Apr 2022 20:48), Max: 99.2 (05 Apr 2022 20:48)  HR: 62 (05 Apr 2022 20:48) (62 - 80)  BP: 134/73 (05 Apr 2022 20:48) (110/88 - 156/63)  BP(mean): --  RR: 18 (05 Apr 2022 20:48) (16 - 18)  SpO2: 96% (05 Apr 2022 20:48) (96% - 98%)    FOCUSSED PE:  NEURO: Lethargic, oriented x 2  RESP: Even, unlabored  CV:S1 S2    PROBLEM: Hyponatremia probably due to dual diuretic therapy vs hyperemesis X 2 wks  PLAN:  1. Cont sodium chloride 0.9%. 1000 milliliter(s) (80 mL/Hr) IV Continuous  2. AM lab BMP, cortisol, TSH    Discussed with Dr Tyler

## 2022-04-05 NOTE — ED ADULT NURSE REASSESSMENT NOTE - NS ED NURSE REASSESS COMMENT FT1
Received patient admitted to medicine daughter at bedside , A&OX4 [place] speaking ambulates with can. Bed assignment for 6 south will give report. Patient resting comfortably continue to monitor patient.

## 2022-04-05 NOTE — PROGRESS NOTE ADULT - PROBLEM SELECTOR PLAN 2
- noted to have Cr 1.37  - likely 2/2 volume depletion  - s/p NS 1L bolus  - c/w IVF  - f/u urine e-lytes - noted to have Cr 1.32 (from 1.37)  - likely 2/2 volume depletion  - s/p NS 1L bolus  - c/w IVF  - f/u urine e-lytes

## 2022-04-05 NOTE — PROGRESS NOTE ADULT - PROBLEM SELECTOR PLAN 9
- chest x-ray order  - will continue to monitor  - follow up with pulmonologist outpatient - chest x-ray order  - will continue to monitor  - follow up with pulmonologist outpatient- Dr Nicole Morales

## 2022-04-05 NOTE — PROGRESS NOTE ADULT - SUBJECTIVE AND OBJECTIVE BOX
HPI:  83 YOf admitted with hyponatremia.  Emesis controlled, started on CLD.    OVERNIGHT EVENTS:      REVIEW OF SYSTEMS:      CONSTITUTIONAL: No fever  EYES: no acute visual disturbances  NECK: No pain or stiffness  RESPIRATORY: No cough; No shortness of breath  CARDIOVASCULAR: No chest pain, no palpitations  GASTROINTESTINAL: No pain. No nausea, vomiting or diarrhea   NEUROLOGICAL: No headache or numbness, no tremors  MUSCULOSKELETAL: No joint pain, no muscle pain  GENITOURINARY: no dysuria, no frequency, no hesitancy  PSYCHIATRY: no depression, no anxiety  ALL OTHER  ROS negative        Vital Signs Last 24 Hrs  T(C): 36.4 (2022 12:45), Max: 36.8 (2022 05:40)  T(F): 97.5 (2022 12:45), Max: 98.3 (2022 05:40)  HR: 80 (2022 17:11) (62 - 80)  BP: 141/57 (2022 17:11) (110/88 - 156/63)  BP(mean): --  RR: 17 (2022 12:45) (16 - 18)  SpO2: 98% (2022 12:45) (96% - 98%)    ________________________________________________  PHYSICAL EXAM:    GENERAL: NAD  HEENT: Normocephalic; conjunctivae and sclerae clear;  NECK : supple, no JVD  CHEST/LUNG: Clear to auscultation; Nonlabored  HEART: S1 S2  regular  ABDOMEN: Soft, Nontender, Nondistended; Bowel sounds present  EXTREMITIES: no cyanosis; no LE edema; no calf tenderness  SKIN: warm and dry; No rashes or lesions  NERVOUS SYSTEM:  Alert; no new deficits    _________________________________________________  CURRENT MEDICATIONS:    MEDICATIONS  (STANDING):  amLODIPine   Tablet 5 milliGRAM(s) Oral daily  budesonide 160 MICROgram(s)/formoterol 4.5 MICROgram(s) Inhaler 2 Puff(s) Inhalation two times a day  carvedilol 12.5 milliGRAM(s) Oral every 12 hours  dronedarone 400 milliGRAM(s) Oral two times a day  insulin lispro (ADMELOG) corrective regimen sliding scale   SubCutaneous Before meals and at bedtime  levothyroxine 175 MICROGram(s) Oral daily  pantoprazole    Tablet 40 milliGRAM(s) Oral before breakfast  rivaroxaban 15 milliGRAM(s) Oral with dinner  sodium chloride 0.9%. 1000 milliLiter(s) (80 mL/Hr) IV Continuous <Continuous>    MEDICATIONS  (PRN):  ALBUTerol    90 MICROgram(s) HFA Inhaler 2 Puff(s) Inhalation every 6 hours PRN Shortness of Breath and/or Wheezing  LORazepam     Tablet 0.5 milliGRAM(s) Oral daily PRN Anxiety      __________________________________________________  LABS:                          10.9   8.95  )-----------( 266      ( 2022 05:52 )             30.2     04-05    120<LL>  |  89<L>  |  25<H>  ----------------------------<  206<H>  4.4   |  26  |  1.29    Ca    8.7      2022 14:53  Phos  3.8     04-05  Mg     1.5     04-05    TPro  7.3  /  Alb  3.3<L>  /  TBili  0.5  /  DBili  x   /  AST  19  /  ALT  24  /  AlkPhos  62  04-05      Urinalysis Basic - ( 2022 16:05 )    Color: Yellow / Appearance: Clear / S.015 / pH: x  Gluc: x / Ketone: Negative  / Bili: Negative / Urobili: Negative   Blood: x / Protein: Negative / Nitrite: Negative   Leuk Esterase: Negative / RBC: 0-2 /HPF / WBC 0-2 /HPF   Sq Epi: x / Non Sq Epi: Few /HPF / Bacteria: x      CAPILLARY BLOOD GLUCOSE      POCT Blood Glucose.: 194 mg/dL (2022 16:39)  POCT Blood Glucose.: 248 mg/dL (2022 11:18)  POCT Blood Glucose.: 231 mg/dL (2022 08:39)      __________________________________________________  RADIOLOGY & ADDITIONAL TESTS:    Imaging Personally Reviewed:  YES    < from: Xray Chest 1 View- PORTABLE-Urgent (Xray Chest 1 View- PORTABLE-Urgent .) (22 @ 14:46) >  Impression:  No significant change. Redemonstration of a pacemaker.    < end of copied text >    Consultant(s) Notes Reviewed:   YES     Plan of care was discussed with patient and /or primary care giver; all questions and concerns were addressed and care was aligned with patient's wishes.    Plan discussed with attending and consulting physicians.

## 2022-04-05 NOTE — PROGRESS NOTE ADULT - PROBLEM SELECTOR PLAN 1
Na 121 -> 124 -> 120  Likely secondary to dual diuretic therapy (Furosemide and Torsemide)  Start IVF  FU repeat BMP q8H

## 2022-04-05 NOTE — PROGRESS NOTE ADULT - PROBLEM SELECTOR PLAN 4
- h/o afib, on xarelto and carvedilol  - c/w home meds - h/o HTN, on olmesartan/amlodipine/hydrochlorothiazine, carvedilol  - c/w amlodipine and carvedilol  - monitor BP  - primary team to follow up on other medications - h/o HTN, on olmesartan/amlodipine/hydrochlorothiazine, carvedilol  - c/w amlodipine and carvedilol  - monitor BP

## 2022-04-05 NOTE — PROGRESS NOTE ADULT - PROBLEM SELECTOR PLAN 3
S/p pacemaker placement  Pt takes olmesartan, furosemide/torsemide, carvedilol at home  Continue home regimen carvedilol  Hold olmesartan, and diuretics secondary to SRIDHAR and acute hyponatremia

## 2022-04-05 NOTE — PATIENT PROFILE ADULT - FALL HARM RISK - HARM RISK INTERVENTIONS

## 2022-04-06 LAB
A1C WITH ESTIMATED AVERAGE GLUCOSE RESULT: 8.4 % — HIGH (ref 4–5.6)
ANION GAP SERPL CALC-SCNC: 10 MMOL/L — SIGNIFICANT CHANGE UP (ref 5–17)
ANION GAP SERPL CALC-SCNC: 8 MMOL/L — SIGNIFICANT CHANGE UP (ref 5–17)
BUN SERPL-MCNC: 18 MG/DL — SIGNIFICANT CHANGE UP (ref 7–18)
BUN SERPL-MCNC: 18 MG/DL — SIGNIFICANT CHANGE UP (ref 7–18)
CALCIUM SERPL-MCNC: 8.6 MG/DL — SIGNIFICANT CHANGE UP (ref 8.4–10.5)
CALCIUM SERPL-MCNC: 8.9 MG/DL — SIGNIFICANT CHANGE UP (ref 8.4–10.5)
CHLORIDE SERPL-SCNC: 90 MMOL/L — LOW (ref 96–108)
CHLORIDE SERPL-SCNC: 91 MMOL/L — LOW (ref 96–108)
CO2 SERPL-SCNC: 22 MMOL/L — SIGNIFICANT CHANGE UP (ref 22–31)
CO2 SERPL-SCNC: 24 MMOL/L — SIGNIFICANT CHANGE UP (ref 22–31)
CREAT SERPL-MCNC: 1.07 MG/DL — SIGNIFICANT CHANGE UP (ref 0.5–1.3)
CREAT SERPL-MCNC: 1.23 MG/DL — SIGNIFICANT CHANGE UP (ref 0.5–1.3)
EGFR: 44 ML/MIN/1.73M2 — LOW
EGFR: 52 ML/MIN/1.73M2 — LOW
ESTIMATED AVERAGE GLUCOSE: 194 MG/DL — HIGH (ref 68–114)
GLUCOSE BLDC GLUCOMTR-MCNC: 182 MG/DL — HIGH (ref 70–99)
GLUCOSE BLDC GLUCOMTR-MCNC: 192 MG/DL — HIGH (ref 70–99)
GLUCOSE BLDC GLUCOMTR-MCNC: 200 MG/DL — HIGH (ref 70–99)
GLUCOSE BLDC GLUCOMTR-MCNC: 215 MG/DL — HIGH (ref 70–99)
GLUCOSE BLDC GLUCOMTR-MCNC: 264 MG/DL — HIGH (ref 70–99)
GLUCOSE SERPL-MCNC: 149 MG/DL — HIGH (ref 70–99)
GLUCOSE SERPL-MCNC: 270 MG/DL — HIGH (ref 70–99)
HCT VFR BLD CALC: 28.4 % — LOW (ref 34.5–45)
HGB BLD-MCNC: 10.3 G/DL — LOW (ref 11.5–15.5)
MAGNESIUM SERPL-MCNC: 1.6 MG/DL — SIGNIFICANT CHANGE UP (ref 1.6–2.6)
MCHC RBC-ENTMCNC: 29.2 PG — SIGNIFICANT CHANGE UP (ref 27–34)
MCHC RBC-ENTMCNC: 36.3 GM/DL — HIGH (ref 32–36)
MCV RBC AUTO: 80.5 FL — SIGNIFICANT CHANGE UP (ref 80–100)
NRBC # BLD: 0 /100 WBCS — SIGNIFICANT CHANGE UP (ref 0–0)
PHOSPHATE SERPL-MCNC: 3 MG/DL — SIGNIFICANT CHANGE UP (ref 2.5–4.5)
PLATELET # BLD AUTO: 251 K/UL — SIGNIFICANT CHANGE UP (ref 150–400)
POTASSIUM SERPL-MCNC: 4 MMOL/L — SIGNIFICANT CHANGE UP (ref 3.5–5.3)
POTASSIUM SERPL-MCNC: 4.5 MMOL/L — SIGNIFICANT CHANGE UP (ref 3.5–5.3)
POTASSIUM SERPL-SCNC: 4 MMOL/L — SIGNIFICANT CHANGE UP (ref 3.5–5.3)
POTASSIUM SERPL-SCNC: 4.5 MMOL/L — SIGNIFICANT CHANGE UP (ref 3.5–5.3)
RBC # BLD: 3.53 M/UL — LOW (ref 3.8–5.2)
RBC # FLD: 12.2 % — SIGNIFICANT CHANGE UP (ref 10.3–14.5)
SODIUM SERPL-SCNC: 122 MMOL/L — LOW (ref 135–145)
SODIUM SERPL-SCNC: 123 MMOL/L — LOW (ref 135–145)
TSH SERPL-MCNC: 0.54 UU/ML — SIGNIFICANT CHANGE UP (ref 0.34–4.82)
UUN UR-MCNC: 486 MG/DL — SIGNIFICANT CHANGE UP
WBC # BLD: 7.84 K/UL — SIGNIFICANT CHANGE UP (ref 3.8–10.5)
WBC # FLD AUTO: 7.84 K/UL — SIGNIFICANT CHANGE UP (ref 3.8–10.5)

## 2022-04-06 PROCEDURE — 99233 SBSQ HOSP IP/OBS HIGH 50: CPT

## 2022-04-06 RX ORDER — LANOLIN ALCOHOL/MO/W.PET/CERES
5 CREAM (GRAM) TOPICAL ONCE
Refills: 0 | Status: COMPLETED | OUTPATIENT
Start: 2022-04-06 | End: 2022-04-06

## 2022-04-06 RX ORDER — FUROSEMIDE 40 MG
40 TABLET ORAL DAILY
Refills: 0 | Status: DISCONTINUED | OUTPATIENT
Start: 2022-04-06 | End: 2022-04-07

## 2022-04-06 RX ORDER — SODIUM CHLORIDE 9 MG/ML
2 INJECTION INTRAMUSCULAR; INTRAVENOUS; SUBCUTANEOUS ONCE
Refills: 0 | Status: COMPLETED | OUTPATIENT
Start: 2022-04-06 | End: 2022-04-06

## 2022-04-06 RX ORDER — INSULIN LISPRO 100/ML
2 VIAL (ML) SUBCUTANEOUS
Refills: 0 | Status: DISCONTINUED | OUTPATIENT
Start: 2022-04-06 | End: 2022-04-07

## 2022-04-06 RX ORDER — SODIUM CHLORIDE 9 MG/ML
1 INJECTION INTRAMUSCULAR; INTRAVENOUS; SUBCUTANEOUS
Refills: 0 | Status: DISCONTINUED | OUTPATIENT
Start: 2022-04-06 | End: 2022-04-07

## 2022-04-06 RX ORDER — SODIUM CHLORIDE 9 MG/ML
1000 INJECTION INTRAMUSCULAR; INTRAVENOUS; SUBCUTANEOUS
Refills: 0 | Status: DISCONTINUED | OUTPATIENT
Start: 2022-04-06 | End: 2022-04-06

## 2022-04-06 RX ADMIN — BUDESONIDE AND FORMOTEROL FUMARATE DIHYDRATE 2 PUFF(S): 160; 4.5 AEROSOL RESPIRATORY (INHALATION) at 21:41

## 2022-04-06 RX ADMIN — RIVAROXABAN 15 MILLIGRAM(S): KIT at 17:03

## 2022-04-06 RX ADMIN — PANTOPRAZOLE SODIUM 40 MILLIGRAM(S): 20 TABLET, DELAYED RELEASE ORAL at 05:41

## 2022-04-06 RX ADMIN — Medication 2 UNIT(S): at 16:43

## 2022-04-06 RX ADMIN — Medication 175 MICROGRAM(S): at 05:38

## 2022-04-06 RX ADMIN — Medication 3: at 12:18

## 2022-04-06 RX ADMIN — SODIUM CHLORIDE 2 GRAM(S): 9 INJECTION INTRAMUSCULAR; INTRAVENOUS; SUBCUTANEOUS at 21:37

## 2022-04-06 RX ADMIN — SODIUM CHLORIDE 1 GRAM(S): 9 INJECTION INTRAMUSCULAR; INTRAVENOUS; SUBCUTANEOUS at 18:06

## 2022-04-06 RX ADMIN — Medication 1: at 08:11

## 2022-04-06 RX ADMIN — CARVEDILOL PHOSPHATE 12.5 MILLIGRAM(S): 80 CAPSULE, EXTENDED RELEASE ORAL at 17:01

## 2022-04-06 RX ADMIN — DRONEDARONE 400 MILLIGRAM(S): 400 TABLET, FILM COATED ORAL at 05:38

## 2022-04-06 RX ADMIN — AMLODIPINE BESYLATE 5 MILLIGRAM(S): 2.5 TABLET ORAL at 05:38

## 2022-04-06 RX ADMIN — BUDESONIDE AND FORMOTEROL FUMARATE DIHYDRATE 2 PUFF(S): 160; 4.5 AEROSOL RESPIRATORY (INHALATION) at 09:59

## 2022-04-06 RX ADMIN — Medication 1: at 21:39

## 2022-04-06 RX ADMIN — DRONEDARONE 400 MILLIGRAM(S): 400 TABLET, FILM COATED ORAL at 17:00

## 2022-04-06 RX ADMIN — Medication 40 MILLIGRAM(S): at 18:16

## 2022-04-06 RX ADMIN — Medication 5 MILLIGRAM(S): at 21:38

## 2022-04-06 RX ADMIN — CARVEDILOL PHOSPHATE 12.5 MILLIGRAM(S): 80 CAPSULE, EXTENDED RELEASE ORAL at 05:38

## 2022-04-06 RX ADMIN — Medication 2: at 16:42

## 2022-04-06 RX ADMIN — Medication 0.5 MILLIGRAM(S): at 21:38

## 2022-04-06 RX ADMIN — ALBUTEROL 2 PUFF(S): 90 AEROSOL, METERED ORAL at 16:52

## 2022-04-06 NOTE — PROGRESS NOTE ADULT - SUBJECTIVE AND OBJECTIVE BOX
NP student Note discussed with primary attending    Patient is a 82y old Female who presents with a chief complaint of hyponatremia (2022 18:26)      INTERVAL HPI/OVERNIGHT EVENTS:       MEDICATIONS  (STANDING):  amLODIPine   Tablet 5 milliGRAM(s) Oral daily  budesonide 160 MICROgram(s)/formoterol 4.5 MICROgram(s) Inhaler 2 Puff(s) Inhalation two times a day  carvedilol 12.5 milliGRAM(s) Oral every 12 hours  dronedarone 400 milliGRAM(s) Oral two times a day  insulin lispro (ADMELOG) corrective regimen sliding scale   SubCutaneous Before meals and at bedtime  levothyroxine 175 MICROGram(s) Oral daily  pantoprazole    Tablet 40 milliGRAM(s) Oral before breakfast  rivaroxaban 15 milliGRAM(s) Oral with dinner  sodium chloride 0.9%. 1000 milliLiter(s) (50 mL/Hr) IV Continuous <Continuous>    MEDICATIONS  (PRN):  ALBUTerol    90 MICROgram(s) HFA Inhaler 2 Puff(s) Inhalation every 6 hours PRN Shortness of Breath and/or Wheezing  LORazepam     Tablet 0.5 milliGRAM(s) Oral daily PRN Anxiety      __________________________________________________  REVIEW OF SYSTEMS:    CONSTITUTIONAL: No fever, no chills  EYES: no acute visual disturbances  NECK: No pain or stiffness  RESPIRATORY: + cough; + shortness of breath  CARDIOVASCULAR: No chest pain, no palpitations  GASTROINTESTINAL: No pain. No nausea or vomiting; No diarrhea   NEUROLOGICAL: No headache or numbness, no tremors  MUSCULOSKELETAL: No joint pain, no muscle pain  GENITOURINARY: no dysuria, no frequency, no hesitancy  PSYCHIATRY: no depression , no anxiety  ALL OTHER  ROS negative        Vital Signs Last 24 Hrs  T(C): 36.4 (2022 05:30), Max: 37.3 (2022 20:48)  T(F): 97.5 (2022 05:30), Max: 99.2 (2022 20:48)  HR: 65 (2022 05:30) (62 - 80)  BP: 147/64 (2022 05:30) (134/73 - 156/63)  BP(mean): --  RR: 20 (2022 05:30) (17 - 20)  SpO2: 97% (2022 05:30) (96% - 98%)    ________________________________________________  PHYSICAL EXAM:  GENERAL: NAD  HEENT: Normocephalic;  conjunctivae and sclerae clear; moist mucous membranes;   NECK : supple  CHEST/LUNG: Clear to auscultation bilaterally with good air entry   HEART: S1 S2  regular; no murmurs, gallops or rubs  ABDOMEN: Soft, Nontender, Nondistended; Bowel sounds present  EXTREMITIES: no cyanosis; no edema; no calf tenderness  SKIN: warm and dry; no rash  NERVOUS SYSTEM:  Awake and alert; Oriented  to place, person and time ; no new deficits    _________________________________________________  LABS:                        10.3   7.84  )-----------( 251      ( 2022 06:41 )             28.4     04-06    123<L>  |  91<L>  |  18  ----------------------------<  149<H>  4.0   |  24  |  1.07    Ca    8.9      2022 06:41  Phos  3.0     04-06  Mg     1.6     04-06    TPro  7.3  /  Alb  3.3<L>  /  TBili  0.5  /  DBili  x   /  AST  19  /  ALT  24  /  AlkPhos  62  04-05      Urinalysis Basic - ( 2022 16:05 )    Color: Yellow / Appearance: Clear / S.015 / pH: x  Gluc: x / Ketone: Negative  / Bili: Negative / Urobili: Negative   Blood: x / Protein: Negative / Nitrite: Negative   Leuk Esterase: Negative / RBC: 0-2 /HPF / WBC 0-2 /HPF   Sq Epi: x / Non Sq Epi: Few /HPF / Bacteria: x      CAPILLARY BLOOD GLUCOSE      POCT Blood Glucose.: 192 mg/dL (2022 07:36)  POCT Blood Glucose.: 235 mg/dL (2022 21:16)  POCT Blood Glucose.: 194 mg/dL (2022 16:39)  POCT Blood Glucose.: 248 mg/dL (2022 11:18)        RADIOLOGY & ADDITIONAL TESTS:    Imaging Personally Reviewed:  YES    Consultant(s) Notes Reviewed:   YES    Care Discussed with Consultants : YES     Plan of care was discussed with patient and /or primary care giver; all questions and concerns were addressed and care was aligned with patient's wishes.     NP student Note discussed with primary attending    Patient is a 82y old Female who presents with a chief complaint of hyponatremia (2022 18:26)      INTERVAL HPI/OVERNIGHT EVENTS:   [place] : [id number] Tracey utilized for this encounter.   Patient laying in bed, assessed with MD Tyler.  Patient states she's feeling better today. No apparent distress.  Denies chest pain, abdominal pain, fever, N/V/D.      MEDICATIONS  (STANDING):  amLODIPine   Tablet 5 milliGRAM(s) Oral daily  budesonide 160 MICROgram(s)/formoterol 4.5 MICROgram(s) Inhaler 2 Puff(s) Inhalation two times a day  carvedilol 12.5 milliGRAM(s) Oral every 12 hours  dronedarone 400 milliGRAM(s) Oral two times a day  insulin lispro (ADMELOG) corrective regimen sliding scale   SubCutaneous Before meals and at bedtime  levothyroxine 175 MICROGram(s) Oral daily  pantoprazole    Tablet 40 milliGRAM(s) Oral before breakfast  rivaroxaban 15 milliGRAM(s) Oral with dinner  sodium chloride 0.9%. 1000 milliLiter(s) (50 mL/Hr) IV Continuous <Continuous>    MEDICATIONS  (PRN):  ALBUTerol    90 MICROgram(s) HFA Inhaler 2 Puff(s) Inhalation every 6 hours PRN Shortness of Breath and/or Wheezing  LORazepam     Tablet 0.5 milliGRAM(s) Oral daily PRN Anxiety      __________________________________________________  REVIEW OF SYSTEMS:    CONSTITUTIONAL: No fever, no chills  NECK: No pain or stiffness  RESPIRATORY: + cough; + shortness of breath  CARDIOVASCULAR: No chest pain, no palpitations  GASTROINTESTINAL: No pain. No nausea or vomiting; No diarrhea   NEUROLOGICAL: No headache or numbness, no tremors  MUSCULOSKELETAL: No joint pain, no muscle pain  PSYCHIATRY: no depression , no anxiety      Vital Signs Last 24 Hrs  T(C): 36.4 (2022 05:30), Max: 37.3 (2022 20:48)  T(F): 97.5 (2022 05:30), Max: 99.2 (2022 20:48)  HR: 65 (2022 05:30) (62 - 80)  BP: 147/64 (2022 05:30) (134/73 - 156/63)  BP(mean): --  RR: 20 (2022 05:30) (17 - 20)  SpO2: 97% (2022 05:30) (96% - 98%)    ________________________________________________  PHYSICAL EXAM:  GENERAL: NAD  HEENT: Normocephalic;  conjunctivae and sclerae clear; moist mucous membranes;   NECK : supple  CHEST/LUNG: SOB with exertion; Expiratory wheezing   HEART: S1 S2  regular; no murmurs, gallops or rubs  ABDOMEN: Soft, Nontender, Nondistended; Bowel sounds present  EXTREMITIES: chronic venous stasis changes, edema 1+  SKIN: warm and dry; no rash      _________________________________________________  LABS:                        10.3   7.84  )-----------( 251      ( 2022 06:41 )             28.4     04-06    123<L>  |  91<L>  |  18  ----------------------------<  149<H>  4.0   |  24  |  1.07    Ca    8.9      2022 06:41  Phos  3.0     04-06  Mg     1.6     04-06    TPro  7.3  /  Alb  3.3<L>  /  TBili  0.5  /  DBili  x   /  AST  19  /  ALT  24  /  AlkPhos  62  04-05      Urinalysis Basic - ( 2022 16:05 )    Color: Yellow / Appearance: Clear / S.015 / pH: x  Gluc: x / Ketone: Negative  / Bili: Negative / Urobili: Negative   Blood: x / Protein: Negative / Nitrite: Negative   Leuk Esterase: Negative / RBC: 0-2 /HPF / WBC 0-2 /HPF   Sq Epi: x / Non Sq Epi: Few /HPF / Bacteria: x      CAPILLARY BLOOD GLUCOSE      POCT Blood Glucose.: 192 mg/dL (2022 07:36)  POCT Blood Glucose.: 235 mg/dL (2022 21:16)  POCT Blood Glucose.: 194 mg/dL (2022 16:39)  POCT Blood Glucose.: 248 mg/dL (2022 11:18)        RADIOLOGY & ADDITIONAL TESTS:    Imaging Personally Reviewed:  YES    < from: Xray Chest 1 View- PORTABLE-Urgent (Xray Chest 1 View- PORTABLE-Urgent .) (22 @ 14:46) >  Impression:  No significant change. Redemonstration of a pacemaker.    < end of copied text >    Consultant(s) Notes Reviewed:   YES    Care Discussed with Consultants : YES     Plan of care was discussed with patient and /or primary care giver; all questions and concerns were addressed and care was aligned with patient's wishes.

## 2022-04-06 NOTE — PROGRESS NOTE ADULT - NS ATTEND AMEND GEN_ALL_CORE FT
83yoF, w/ PMH of HTN, DM, hypothyroidism, asthma, afib, CAD, CHF (s/p pacemaker), colon cancer s/p R hemicolectomy presents with vomiting x2 weeks. Admitted for Hyponatremia     Patient was seen and examined, [place]  services utilized for this encounter ([id number])- Tracey. She denies any diarrhea, abdominal pain, nausea, vomiting. She c/o generalized body aches and shortness of breath with minimal exertion.     Labs reviewed.    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 Initially thought to have hypovolumic hyponatremia, however, no improvement in sodium with IV hydration. Urine studies are suggestive of SAIDH, will DC IV fluids, add salt tablets. Appreciate nephro consult   -Patient p/w nausea, vomiting, now resolved.   -Scr improved to 1.24 post IV hydration.   -C/w Multaq, carvedilol and Xarelto for Afib. Rate controlled   -BP stable, c/w amlodipine and carvedilol   -Patient with hx of diastolic CHF- EF 66%' 2021. Patient with mild shortness of breath and wheezing on exam, will resume Lasix.   -Patient reports chronic non-productive cough, chest X-ray unremarkable. Reports that she follows with Dr. Nicole Morales as out-patient.   -Discussed care plan with patient's daughter at bedside.     Out-patient provider: PCP- Dr. Mihai Washington, Pulmonologist  Nicole Morales

## 2022-04-06 NOTE — PROGRESS NOTE ADULT - ASSESSMENT
Patient is a 83yoF, w/ PMH of HTN, HLD, DM, hypothyroidism, asthma, Afib, CAD, CHF (s/p pacemaker), presents with vomiting. Admitted for  Acute hyponatremia.

## 2022-04-06 NOTE — PROGRESS NOTE ADULT - PROBLEM SELECTOR PLAN 2
- Improved SCr 1.07   - Likely 2/2 volume depletion  - c/w IVF  - Urine studies WNL - Improved SCr 1.07   - Likely 2/2 volume depletion  - c/w IVF  - Urine Sodium 36: possibly d/t diuretics - Improved SCr 1.07   - Urine Sodium 36: possibly d/t diuretics

## 2022-04-06 NOTE — PROGRESS NOTE ADULT - PROBLEM SELECTOR PLAN 8
- s/p pacemaker placement, on olmesartan, furosemide/torsemide, carvedilol  - Last EF 66% grade I DD in 2021, no s/s of volume overload at this time  - c/w carvedilol  - Hold olmesartan, and diuretics given SRIDHAR and acute hyponatremia. - Chest x-ray: "FINDINGS: No significant abnormal pulmonary opacity. No sizable pleural   effusion."  - Will continue to monitor  - Follow up with pulmonologist outpatient- Dr Nicole Morales.

## 2022-04-06 NOTE — PROGRESS NOTE ADULT - PROBLEM SELECTOR PLAN 6
Uncontrolled  Pt takes Janumet and Repaglinide at home  Start standing dose Admelog AC  Continue sliding scale insulin coverage  Continue glucose monitoring  Continue low carb diet

## 2022-04-06 NOTE — PROGRESS NOTE ADULT - PROBLEM SELECTOR PLAN 1
- p/w vomiting x2 weeks now improved  - Na 123 (from 120 yesterday)   - Decreased NS 50cc/h  - Tolerating regular diet  - Monitor BMP q8h  - Na Goal: 6-8 mEq/24h  - Urine studies WNL  - Hold diuretics - p/w vomiting x2 weeks now improved  - Na 123 (from 120 yesterday)   - Decreased NS 50cc/h  - Tolerating regular diet  - Monitor BMP q8h  - Na Goal: 6-8 mEq/24h  - Urine Sodium 36: possibly d/t diuretics  - Hold diuretics - p/w vomiting x2 weeks now improved  - Na 122 (from 123 this AM); not improving probably d/t SIADH   - IVF discontinued  - 1L fluid restriction  - Monitor BMP q8h  - Na Goal: 6-8 mEq/24h  - Urine Sodium 36: possibly d/t diuretics  - Hold diuretics  - Nephro consulted  - May initiate Sodium Chloride Tabs.

## 2022-04-06 NOTE — PROGRESS NOTE ADULT - NS ATTEND AMEND GEN_ALL_CORE FT
Patient is a 83yoF, w/ PMH of HTN, DM, hypothyroidism, asthma, afib, CAD, CHF (s/p pacemaker), colon cancer s/p R hemicolectomy presents with vomiting x2 weeks. Admitted for Hyponatremia     Patient was seen and examined, [place]  services utilized for this encounter ([id number])- Tracey. She denies any diarrhea, abdominal pain, nausea, vomiting. She c/o generalized body aches and shortness of breath with minimal exertion.     Labs reviewed.    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 Initially thought to have hypovolumic hyponatremia, however, no improvement in sodium with IV hydration. Urine studies are suggestive of SAIDH, will DC IV fluids, add salt tablets. Appreciate nephro consult   -Patient p/w nausea, vomiting, now resolved.   -Scr improved to 1.24 post IV hydration.   -C/w Multaq, carvedilol and Xarelto for Afib. Rate controlled   -BP stable, c/w amlodipine and carvedilol   -Patient with hx of diastolic CHF- EF 66%' 2021. Patient with mild shortness of breath and wheezing on exam, will resume Lasix.   -Patient reports chronic non-productive cough, chest X-ray unremarkable. Reports that she follows with Dr. Nicole Morales as out-patient.   -Discussed care plan with patient's daughter at bedside.     Out-patient provider: PCP- Dr. Mihai Washington, Pulmonologist  Nicole Morales .

## 2022-04-06 NOTE — PROGRESS NOTE ADULT - PROBLEM SELECTOR PLAN 9
- Chest x-ray: "FINDINGS: No significant abnormal pulmonary opacity. No sizable pleural   effusion."  - Will continue to monitor  - Follow up with pulmonologist outpatient- Dr Nicole Morales. - Continue ativan 0.5mg for anxiety.

## 2022-04-06 NOTE — CONSULT NOTE ADULT - SUBJECTIVE AND OBJECTIVE BOX
PT SEEN AND EXAMINED; FULL NOTE TO FOLLOW    d/c IVF. Will start NaCl 1g PO bid tabs. Repeat urine osm, serum osm and urine Na in am [facility] NEPHROLOGY- CONSULTATION NOTE    Malian  # [id number]  Patient is a 81yo Malian speaking Female with HTN, DM, hypothyroidism, asthma, afib, CAD, CHF (s/p pacemaker) recently started on Lasix p/w vomiting for 2 weeks. Nephrology consulted for hyponatremia.   Pt c/o SOB with cough; intermittent phelgm (unsure of color). Pt denies any chest pain, further n/v, denies diarrhea but c/o intermittent LE edema.     As per H& P: pt reports NBNB vomiting for 2 weeks. Prior to start of vomiting, patient noted to have increased coughing, and subsequent CXR showed "water retention in lung", and she was started on furosemid 40 qod per daughter.    PAST MEDICAL & SURGICAL HISTORY:  Asthma    CAD (Coronary Atherosclerotic Disease)    Myocardial Infarction    Diabetes    HTN (Hypertension)    HLD (Hyperlipidemia)    CHF (congestive heart failure), NYHA class I    IBS (irritable bowel syndrome)    Venous stasis    Hypothyroidism    Atrial fibrillation    Pacemaker    Obesity    S/P coronary angiogram      No Known Allergies    Home Medications Reviewed  Hospital Medications:   MEDICATIONS  (STANDING):  amLODIPine   Tablet 5 milliGRAM(s) Oral daily  budesonide 160 MICROgram(s)/formoterol 4.5 MICROgram(s) Inhaler 2 Puff(s) Inhalation two times a day  carvedilol 12.5 milliGRAM(s) Oral every 12 hours  dronedarone 400 milliGRAM(s) Oral two times a day  furosemide    Tablet 40 milliGRAM(s) Oral daily  insulin lispro (ADMELOG) corrective regimen sliding scale   SubCutaneous Before meals and at bedtime  insulin lispro Injectable (ADMELOG) 2 Unit(s) SubCutaneous three times a day before meals  levothyroxine 175 MICROGram(s) Oral daily  pantoprazole    Tablet 40 milliGRAM(s) Oral before breakfast  rivaroxaban 15 milliGRAM(s) Oral with dinner  sodium chloride 1 Gram(s) Oral two times a day    SOCIAL HISTORY:  Denies ETOh, Smoking, or drug use  FAMILY HISTORY:  Family history of heart disease        REVIEW OF SYSTEMS:  Gen: no changes in weight  HEENT: no rhinorrhea  Neck: no sore throat  Cards: no chest pain  Resp: +dyspnea with cough  GI: no nausea or vomiting or diarrhea  : no dysuria or hematuria  Vascular: no LE edema  Derm: no rashes  Neuro: no numbness/tingling  All other review of systems is negative unless indicated above.    VITALS:  T(F): 98.1 (22 @ 12:49), Max: 99.2 (22 @ 20:48)  HR: 84 (22 @ 18:07)  BP: 153/72 (22 @ 18:07)  RR: 18 (22 @ 12:49)  SpO2: 99% (22 @ 12:49)  Wt(kg): --      PHYSICAL EXAM:  Gen: NAD, calm  HEENT: MMM  Neck: no JVD  Cards: RRR, +S1/S2, +VINH  Resp: +mild exp wheezing at right base  GI: soft, NT/ND, NABS  : no CVA tenderness  Extremities: LE fullness B/L  Derm: no rashes  Neuro: non-focal    LABS:    122 <--, 123 <--, 120 <--, 120 <--, 124 <--, 124 <--, 121 <--  122<L>  |  90<L>  |  18  ----------------------------<  270<H>  4.5   |  22  |  1.23    Ca    8.6      2022 13:50  Phos  3.0       Mg     1.6         TPro  7.3  /  Alb  3.3<L>  /  TBili  0.5  /  DBili      /  AST  19  /  ALT  24  /  AlkPhos  62      Creatinine Trend: 1.23 <--, 1.07 <--, 1.24 <--, 1.29 <--, 1.32 <--, 1.34 <--, 1.37 <--                        10.3   7.84  )-----------( 251      ( 2022 06:41 )             28.4     Urine Studies:  Urinalysis Basic - ( 2022 16:05 )    Color: Yellow / Appearance: Clear / S.015 / pH:   Gluc:  / Ketone: Negative  / Bili: Negative / Urobili: Negative   Blood:  / Protein: Negative / Nitrite: Negative   Leuk Esterase: Negative / RBC: 0-2 /HPF / WBC 0-2 /HPF   Sq Epi:  / Non Sq Epi: Few /HPF / Bacteria:       Creatinine, Random Urine: 56 mg/dL ( @ 16:06)  Sodium, Random Urine: 39 mmol/L ( @ 16:06)  Osmolality, Random Urine: 326 mos/kg ( @ 16:06)    RADIOLOGY & ADDITIONAL STUDIES:    < from: Xray Chest 1 View- PORTABLE-Urgent (Xray Chest 1 View- PORTABLE-Urgent .) (22 @ 14:46) >  ACC: [id number] EXAM:  XR CHEST PORTABLE URGENT 1V                          PROCEDURE DATE:  2022          INTERPRETATION:  INDICATION: Chronic cough. Evaluate for pneumonia.    COMPARISON: 2021.    FINDINGS:  Heart/Vascular: Magnified cardiac and mediastinal silhouette, stable.   Dual-lead pacemaker.  Pulmonary: No significant abnormal pulmonary opacity. No sizable pleural   effusion.  Bones: Within normal limits.    Impression:  No significant change. Redemonstration of a pacemaker.    < end of copied text >

## 2022-04-06 NOTE — CONSULT NOTE ADULT - ASSESSMENT
Patient is a 83yo [place]ine[place] speaking Female with HTN, DM, hypothyroidism, asthma, afib, CAD, CHF (s/p pacemaker) recently started on Lasix p/w vomiting for 2 weeks. Nephrology consulted for hyponatremia.     1. Hyponatremia- urine studies consistent with SIADH; likely performed on IVF. Recc to d/c IVF. Will give NaCl 2g PO x1 and start NaCl 1g PO bid. . Repeat urine osm, serum osm and urine Na in am. TSH  wnl. Check AM Cortisol. Monitor serial BMP's and UO for overcorrection. Do not correct more than 8 meQ/24 hours.   If sodium declines further, will d/c Lasix and give Tolvaptan 15mg PO x1 (will check LFTs in am). Monitor serum sodium.    2. SRIDHAR- previous SCr 0.7-1.0    3. Essential HTN- BP improved. . Continue with current anti-hypertensive medications. Monitor BP.    4. Shortness of breath- pt with h/o AS. Check CXR in am. c/w Lasix 40mg PO qd.  Patient is a 83yo Gabonese speaking Female with HTN, DM, hypothyroidism, asthma, afib, CAD, CHF (s/p pacemaker) recently started on Lasix p/w vomiting for 2 weeks. Nephrology consulted for hyponatremia.     1. Hyponatremia- urine studies consistent with SIADH; likely performed on IVF. Recc to d/c IVF. Will give NaCl 2g PO x1 and start NaCl 1g PO bid. . Repeat urine osm, serum osm and urine Na in am. TSH  wnl. Check AM Cortisol. Monitor serial BMP's and UO for overcorrection. Do not correct more than 8 meQ/24 hours.   If sodium declines further, will d/c Lasix and give Tolvaptan 15mg PO x1 (will check LFTs in am). Monitor serum sodium.    2. SRIDHAR- previous SCr 0.7-1.0; unclear eitology. UA bland. FeUrea 48.13%. Recc post void bladder scan to r/o retention. Strict I/Os. Avoid nephrotoxins/ NSAIDs/ RCA. Monitor BMP.    3. Essential HTN- BP improved. . Continue with current anti-hypertensive medications. Monitor BP.    4. Shortness of breath- pt with h/o AS. Check CXR in am. c/w Lasix 40mg PO qd.     [facility] NEPHROLOGY  Nick Estrada M.D.  Dylan Coleman D.O.  Karla Montesinos M.D.  Kimberlyn Barrett, MSN, ANP-C  [phone]    [address] [place] [address]

## 2022-04-06 NOTE — PROGRESS NOTE ADULT - PROBLEM SELECTOR PLAN 4
- h/o HTN, stable at this time   - Patient takes olmesartan, amlodipine, carvedilol, furosemide and Torsemide at home  - c/w amlodipine and carvedilol  - Hold sartan and diuretics in the setting of SRIDHAR  - Monitor BP.

## 2022-04-06 NOTE — PROGRESS NOTE ADULT - SUBJECTIVE AND OBJECTIVE BOX
HPI:  83 YOF admitted with hyponatremia.  Pt now euvolemic, hyponatremia persists, nephrology consulted.    OVERNIGHT EVENTS:  No new overnight events.  Seen and examined at bedside.     REVIEW OF SYSTEMS:      CONSTITUTIONAL: No fever  EYES: no acute visual disturbances  NECK: No pain or stiffness  RESPIRATORY: No cough; No shortness of breath  CARDIOVASCULAR: No chest pain, no palpitations  GASTROINTESTINAL: No pain. No nausea, vomiting or diarrhea   NEUROLOGICAL: No headache or numbness, no tremors  MUSCULOSKELETAL: No joint pain, no muscle pain  GENITOURINARY: no dysuria, no frequency, no hesitancy  PSYCHIATRY: no depression, no anxiety  ALL OTHER  ROS negative        Vital Signs Last 24 Hrs  T(C): 36.7 (2022 12:49), Max: 37.3 (2022 20:48)  T(F): 98.1 (2022 12:49), Max: 99.2 (2022 20:48)  HR: 69 (2022 12:49) (62 - 80)  BP: 143/57 (2022 12:49) (134/73 - 147/64)  BP(mean): --  RR: 18 (2022 12:49) (18 - 20)  SpO2: 99% (2022 12:49) (96% - 99%)    ________________________________________________  PHYSICAL EXAM:    GENERAL: NAD  HEENT: Normocephalic; conjunctivae and sclerae clear;  NECK : supple, no JVD  CHEST/LUNG: Clear to auscultation; Nonlabored  HEART: S1 S2  regular  ABDOMEN: Soft, Nontender, Nondistended; Bowel sounds present  EXTREMITIES: no cyanosis; no LE edema; no calf tenderness  SKIN: warm and dry; No rashes or lesions  NERVOUS SYSTEM:  Alert; no new deficits    _________________________________________________  CURRENT MEDICATIONS:    MEDICATIONS  (STANDING):  amLODIPine   Tablet 5 milliGRAM(s) Oral daily  budesonide 160 MICROgram(s)/formoterol 4.5 MICROgram(s) Inhaler 2 Puff(s) Inhalation two times a day  carvedilol 12.5 milliGRAM(s) Oral every 12 hours  dronedarone 400 milliGRAM(s) Oral two times a day  insulin lispro (ADMELOG) corrective regimen sliding scale   SubCutaneous Before meals and at bedtime  levothyroxine 175 MICROGram(s) Oral daily  pantoprazole    Tablet 40 milliGRAM(s) Oral before breakfast  rivaroxaban 15 milliGRAM(s) Oral with dinner    MEDICATIONS  (PRN):  ALBUTerol    90 MICROgram(s) HFA Inhaler 2 Puff(s) Inhalation every 6 hours PRN Shortness of Breath and/or Wheezing  LORazepam     Tablet 0.5 milliGRAM(s) Oral daily PRN Anxiety      __________________________________________________  LABS:                          10.3   7.84  )-----------( 251      ( 2022 06:41 )             28.4     04-06    122<L>  |  90<L>  |  18  ----------------------------<  270<H>  4.5   |  22  |  1.23    Ca    8.6      2022 13:50  Phos  3.0     04-06  Mg     1.6     04-06    TPro  7.3  /  Alb  3.3<L>  /  TBili  0.5  /  DBili  x   /  AST  19  /  ALT  24  /  AlkPhos  62  04-05      Urinalysis Basic - ( 2022 16:05 )    Color: Yellow / Appearance: Clear / S.015 / pH: x  Gluc: x / Ketone: Negative  / Bili: Negative / Urobili: Negative   Blood: x / Protein: Negative / Nitrite: Negative   Leuk Esterase: Negative / RBC: 0-2 /HPF / WBC 0-2 /HPF   Sq Epi: x / Non Sq Epi: Few /HPF / Bacteria: x      CAPILLARY BLOOD GLUCOSE      POCT Blood Glucose.: 264 mg/dL (2022 12:16)  POCT Blood Glucose.: 182 mg/dL (2022 11:04)  POCT Blood Glucose.: 192 mg/dL (2022 07:36)  POCT Blood Glucose.: 235 mg/dL (2022 21:16)  POCT Blood Glucose.: 194 mg/dL (2022 16:39)      __________________________________________________  RADIOLOGY & ADDITIONAL TESTS:    Imaging Personally Reviewed:  YES    < from: Xray Chest 1 View- PORTABLE-Urgent (Xray Chest 1 View- PORTABLE-Urgent .) (22 @ 14:46) >  Impression:  No significant change. Redemonstration of a pacemaker.    < end of copied text >    Consultant(s) Notes Reviewed:   YES     Plan of care was discussed with patient and /or primary care giver; all questions and concerns were addressed and care was aligned with patient's wishes.    Plan discussed with attending and consulting physicians.

## 2022-04-06 NOTE — PROGRESS NOTE ADULT - PROBLEM SELECTOR PLAN 7
- DVT: Xarelto.  - GI PPX: PPI. - s/p pacemaker placement, on olmesartan, furosemide/torsemide, carvedilol  - Last EF 66% grade I DD in 2021, no s/s of volume overload at this time  - c/w carvedilol  - Hold olmesartan, and diuretics given SRIDHAR and acute hyponatremia.

## 2022-04-06 NOTE — PROGRESS NOTE ADULT - PROBLEM SELECTOR PLAN 5
- h/o DM, uncontrolled on Janumet and repaglinide at home  - c/w HSS  - FS qACHs.  - Nutritional insulin?  - Consistent carb diet? - h/o DM, uncontrolled on Janumet and repaglinide at home  - c/w HSS  - FS qACHs.  - Consistent carb diet  - Consider Nutritional insulin if needed, monitor FS after initiating Consistent carb diet

## 2022-04-06 NOTE — CONSULT NOTE ADULT - NS ATTEND AMEND GEN_ALL_CORE FT
comments: 83yoF, w/ PMH of HTN, DM, hypothyroidism, asthma, afib, CAD, CHF (s/p pacemaker), colon cancer s/p R hemicolectomy presents with vomiting x2 weeks. Admitted for Hyponatremia     Patient was seen and examined, daughter at bedside assisted with translation. Patient was sitting in chair, c/o some shortness of breath.     Labs reviewed.    PE:  General: NAD, elderly female   Resp: wheezing b/l   Card: S1 S2, no murmur   GI: abd soft, non tender  Neuro: AAOx3, non focal  Extremities: edema 1+, chronic venous stasis changes     A/P:  #Hypo-osmolar Hyponatremia- SIADH   #SRIDHAR- pre-renal   #Shortness of breath  #Anxiety  #Chronic Atrial fibrillation   #Hypothyroidism   #Chronic Diastolic Heart Failure   #Hypertension  #Diabetes Mellitus   #H/O Asthma     Plan:  - Hyponatremia 2/2 SIADH, sodium improved to 124 s/p salt tablets. Inc to 2g BID. Water restriction 1L/day. Nephro Dr. Montesinos following   -Pt reports shortness of breath, chest X-Ray clear. Has hx of asthma, wheezing on exam, Start Duoneb and Symbicort.   -C/w Multaq, carvedilol and Xarelto for Afib. Rate controlled   -BP stable, c/w amlodipine and carvedilol    -Scr improved   -Blood sugars elevated, start Humalog 4 units pre-meals   -Discussed care plan with patient's daughter at bedside.     Out-patient provider: PCP- Dr. Mihai Washington, Pulmonologist  Nicole Morales.

## 2022-04-07 DIAGNOSIS — E22.2 SYNDROME OF INAPPROPRIATE SECRETION OF ANTIDIURETIC HORMONE: ICD-10-CM

## 2022-04-07 DIAGNOSIS — J45.909 UNSPECIFIED ASTHMA, UNCOMPLICATED: ICD-10-CM

## 2022-04-07 DIAGNOSIS — Z02.9 ENCOUNTER FOR ADMINISTRATIVE EXAMINATIONS, UNSPECIFIED: ICD-10-CM

## 2022-04-07 LAB
ALBUMIN SERPL ELPH-MCNC: 2.8 G/DL — LOW (ref 3.5–5)
ALP SERPL-CCNC: 54 U/L — SIGNIFICANT CHANGE UP (ref 40–120)
ALT FLD-CCNC: 23 U/L DA — SIGNIFICANT CHANGE UP (ref 10–60)
ANION GAP SERPL CALC-SCNC: 12 MMOL/L — SIGNIFICANT CHANGE UP (ref 5–17)
ANION GAP SERPL CALC-SCNC: 9 MMOL/L — SIGNIFICANT CHANGE UP (ref 5–17)
AST SERPL-CCNC: 20 U/L — SIGNIFICANT CHANGE UP (ref 10–40)
BILIRUB SERPL-MCNC: 0.3 MG/DL — SIGNIFICANT CHANGE UP (ref 0.2–1.2)
BUN SERPL-MCNC: 16 MG/DL — SIGNIFICANT CHANGE UP (ref 7–18)
BUN SERPL-MCNC: 20 MG/DL — HIGH (ref 7–18)
CALCIUM SERPL-MCNC: 8.7 MG/DL — SIGNIFICANT CHANGE UP (ref 8.4–10.5)
CALCIUM SERPL-MCNC: 8.8 MG/DL — SIGNIFICANT CHANGE UP (ref 8.4–10.5)
CHLORIDE SERPL-SCNC: 88 MMOL/L — LOW (ref 96–108)
CHLORIDE SERPL-SCNC: 89 MMOL/L — LOW (ref 96–108)
CO2 SERPL-SCNC: 23 MMOL/L — SIGNIFICANT CHANGE UP (ref 22–31)
CO2 SERPL-SCNC: 24 MMOL/L — SIGNIFICANT CHANGE UP (ref 22–31)
CREAT SERPL-MCNC: 1.11 MG/DL — SIGNIFICANT CHANGE UP (ref 0.5–1.3)
CREAT SERPL-MCNC: 1.33 MG/DL — HIGH (ref 0.5–1.3)
EGFR: 40 ML/MIN/1.73M2 — LOW
EGFR: 49 ML/MIN/1.73M2 — LOW
GLUCOSE BLDC GLUCOMTR-MCNC: 192 MG/DL — HIGH (ref 70–99)
GLUCOSE BLDC GLUCOMTR-MCNC: 237 MG/DL — HIGH (ref 70–99)
GLUCOSE BLDC GLUCOMTR-MCNC: 285 MG/DL — HIGH (ref 70–99)
GLUCOSE SERPL-MCNC: 147 MG/DL — HIGH (ref 70–99)
GLUCOSE SERPL-MCNC: 255 MG/DL — HIGH (ref 70–99)
HCT VFR BLD CALC: 27.7 % — LOW (ref 34.5–45)
HGB BLD-MCNC: 10.1 G/DL — LOW (ref 11.5–15.5)
MAGNESIUM SERPL-MCNC: 1.6 MG/DL — SIGNIFICANT CHANGE UP (ref 1.6–2.6)
MCHC RBC-ENTMCNC: 29.8 PG — SIGNIFICANT CHANGE UP (ref 27–34)
MCHC RBC-ENTMCNC: 36.5 GM/DL — HIGH (ref 32–36)
MCV RBC AUTO: 81.7 FL — SIGNIFICANT CHANGE UP (ref 80–100)
NRBC # BLD: 0 /100 WBCS — SIGNIFICANT CHANGE UP (ref 0–0)
OSMOLALITY SERPL: 256 MOSMOL/KG — LOW (ref 280–301)
OSMOLALITY UR: 177 MOS/KG — SIGNIFICANT CHANGE UP (ref 50–1200)
PHOSPHATE SERPL-MCNC: 3.1 MG/DL — SIGNIFICANT CHANGE UP (ref 2.5–4.5)
PLATELET # BLD AUTO: 235 K/UL — SIGNIFICANT CHANGE UP (ref 150–400)
POTASSIUM SERPL-MCNC: 4.2 MMOL/L — SIGNIFICANT CHANGE UP (ref 3.5–5.3)
POTASSIUM SERPL-MCNC: 4.4 MMOL/L — SIGNIFICANT CHANGE UP (ref 3.5–5.3)
POTASSIUM SERPL-SCNC: 4.2 MMOL/L — SIGNIFICANT CHANGE UP (ref 3.5–5.3)
POTASSIUM SERPL-SCNC: 4.4 MMOL/L — SIGNIFICANT CHANGE UP (ref 3.5–5.3)
PROT SERPL-MCNC: 6.7 G/DL — SIGNIFICANT CHANGE UP (ref 6–8.3)
RBC # BLD: 3.39 M/UL — LOW (ref 3.8–5.2)
RBC # FLD: 12.3 % — SIGNIFICANT CHANGE UP (ref 10.3–14.5)
SODIUM SERPL-SCNC: 121 MMOL/L — LOW (ref 135–145)
SODIUM SERPL-SCNC: 124 MMOL/L — LOW (ref 135–145)
SODIUM UR-SCNC: 54 MMOL/L — SIGNIFICANT CHANGE UP
WBC # BLD: 6.43 K/UL — SIGNIFICANT CHANGE UP (ref 3.8–10.5)
WBC # FLD AUTO: 6.43 K/UL — SIGNIFICANT CHANGE UP (ref 3.8–10.5)

## 2022-04-07 PROCEDURE — 99233 SBSQ HOSP IP/OBS HIGH 50: CPT

## 2022-04-07 PROCEDURE — 71045 X-RAY EXAM CHEST 1 VIEW: CPT | Mod: 26

## 2022-04-07 RX ORDER — INSULIN GLARGINE 100 [IU]/ML
5 INJECTION, SOLUTION SUBCUTANEOUS AT BEDTIME
Refills: 0 | Status: DISCONTINUED | OUTPATIENT
Start: 2022-04-07 | End: 2022-04-09

## 2022-04-07 RX ORDER — SODIUM CHLORIDE 9 MG/ML
2 INJECTION INTRAMUSCULAR; INTRAVENOUS; SUBCUTANEOUS THREE TIMES A DAY
Refills: 0 | Status: DISCONTINUED | OUTPATIENT
Start: 2022-04-07 | End: 2022-04-09

## 2022-04-07 RX ORDER — SODIUM CHLORIDE 9 MG/ML
2 INJECTION INTRAMUSCULAR; INTRAVENOUS; SUBCUTANEOUS
Refills: 0 | Status: DISCONTINUED | OUTPATIENT
Start: 2022-04-07 | End: 2022-04-07

## 2022-04-07 RX ORDER — IPRATROPIUM/ALBUTEROL SULFATE 18-103MCG
3 AEROSOL WITH ADAPTER (GRAM) INHALATION EVERY 6 HOURS
Refills: 0 | Status: DISCONTINUED | OUTPATIENT
Start: 2022-04-07 | End: 2022-04-11

## 2022-04-07 RX ORDER — INSULIN LISPRO 100/ML
4 VIAL (ML) SUBCUTANEOUS
Refills: 0 | Status: DISCONTINUED | OUTPATIENT
Start: 2022-04-07 | End: 2022-04-11

## 2022-04-07 RX ADMIN — Medication 2: at 11:40

## 2022-04-07 RX ADMIN — BUDESONIDE AND FORMOTEROL FUMARATE DIHYDRATE 2 PUFF(S): 160; 4.5 AEROSOL RESPIRATORY (INHALATION) at 09:35

## 2022-04-07 RX ADMIN — INSULIN GLARGINE 5 UNIT(S): 100 INJECTION, SOLUTION SUBCUTANEOUS at 21:23

## 2022-04-07 RX ADMIN — BUDESONIDE AND FORMOTEROL FUMARATE DIHYDRATE 2 PUFF(S): 160; 4.5 AEROSOL RESPIRATORY (INHALATION) at 21:24

## 2022-04-07 RX ADMIN — Medication 2 UNIT(S): at 07:41

## 2022-04-07 RX ADMIN — Medication 2 UNIT(S): at 11:26

## 2022-04-07 RX ADMIN — CARVEDILOL PHOSPHATE 12.5 MILLIGRAM(S): 80 CAPSULE, EXTENDED RELEASE ORAL at 06:23

## 2022-04-07 RX ADMIN — Medication 3: at 16:52

## 2022-04-07 RX ADMIN — SODIUM CHLORIDE 2 GRAM(S): 9 INJECTION INTRAMUSCULAR; INTRAVENOUS; SUBCUTANEOUS at 16:55

## 2022-04-07 RX ADMIN — AMLODIPINE BESYLATE 5 MILLIGRAM(S): 2.5 TABLET ORAL at 06:25

## 2022-04-07 RX ADMIN — RIVAROXABAN 15 MILLIGRAM(S): KIT at 16:56

## 2022-04-07 RX ADMIN — Medication 175 MICROGRAM(S): at 06:24

## 2022-04-07 RX ADMIN — PANTOPRAZOLE SODIUM 40 MILLIGRAM(S): 20 TABLET, DELAYED RELEASE ORAL at 06:23

## 2022-04-07 RX ADMIN — Medication 40 MILLIGRAM(S): at 06:24

## 2022-04-07 RX ADMIN — SODIUM CHLORIDE 2 GRAM(S): 9 INJECTION INTRAMUSCULAR; INTRAVENOUS; SUBCUTANEOUS at 21:25

## 2022-04-07 RX ADMIN — DRONEDARONE 400 MILLIGRAM(S): 400 TABLET, FILM COATED ORAL at 16:56

## 2022-04-07 RX ADMIN — SODIUM CHLORIDE 1 GRAM(S): 9 INJECTION INTRAMUSCULAR; INTRAVENOUS; SUBCUTANEOUS at 06:24

## 2022-04-07 RX ADMIN — Medication 4 UNIT(S): at 16:52

## 2022-04-07 RX ADMIN — Medication 3 MILLILITER(S): at 16:51

## 2022-04-07 RX ADMIN — Medication 1: at 21:23

## 2022-04-07 RX ADMIN — DRONEDARONE 400 MILLIGRAM(S): 400 TABLET, FILM COATED ORAL at 06:23

## 2022-04-07 NOTE — PROGRESS NOTE ADULT - PROBLEM SELECTOR PLAN 5
Controlled  Pt takes olmesartan, amlodipine, carvedilol, furosemide and Torsemide at home  Continue home regimen amlodipine and carvedilol  Hold sartan and diuretics in the setting of SRIDHAR  Monitor BP -Rate Controlled   -Pt takes Multaq, Xarelto and Carvedilol at home  -Continue home regimen

## 2022-04-07 NOTE — PROGRESS NOTE ADULT - PROBLEM SELECTOR PLAN 3
S/p pacemaker placement  Pt takes olmesartan, furosemide/torsemide, carvedilol at home  Continue home regimen carvedilol  Hold olmesartan, and diuretics secondary to SRIDHAR and acute hyponatremia -hx Asthma, no meds taking  -pt c/o sob, wheezing on exam  -CXR clear  -started duonebx, Symbicort

## 2022-04-07 NOTE — CHART NOTE - NSCHARTNOTEFT_GEN_A_CORE
repeat BMP resulted 121, d/c'd lasix today.  Increased Salt tab 2g TID, one dose STAT as discussed with dr Montesinos.  will follow up urine study.

## 2022-04-07 NOTE — PROGRESS NOTE ADULT - SUBJECTIVE AND OBJECTIVE BOX
[facility] NEPHROLOGY- PROGRESS NOTE    Patient is a 81yo Japanese speaking Female with HTN, DM, hypothyroidism, asthma, afib, CAD, CHF (s/p pacemaker) recently started on Lasix p/w vomiting for 2 weeks. Nephrology consulted for hyponatremia.     Hospital Medications: Medications reviewed.  REVIEW OF SYSTEMS: Japanese  # [id number]  CONSTITUTIONAL: No fevers or chills  RESPIRATORY: +shortness of breath intermittently at rest but anna with exertion  CARDIOVASCULAR: No chest pain.  GASTROINTESTINAL: No nausea, vomiting, diarrhea or abdominal pain.   VASCULAR: No bilateral lower extremity edema.     VITALS:  T(F): 98.2 (22 @ 12:07), Max: 98.5 (22 @ 09:03)  HR: 60 (22 @ 12:07)  BP: 123/66 (22 @ 12:07)  RR: 18 (22 @ 12:07)  SpO2: 96% (22 @ 12:07)  Wt(kg): --  Height (cm): 160 ( @ 17:43)  Weight (kg): 120.2 ( @ 17:43)  BMI (kg/m2): 47 ( @ 17:43)  BSA (m2): 2.18 ( @ 17:43)    0406 @ 07:01  -   @ 07:00  --------------------------------------------------------  IN: 0 mL / OUT: 500 mL / NET: -500 mL      PHYSICAL EXAM:  Gen: NAD, calm  HEENT: MMM  Neck: no JVD  Cards: RRR, +S1/S2, +VINH  Resp: +diffuse exp wheezing b/l  GI: soft, NT/ND, NABS  : no CVA tenderness  Extremities: LE fullness B/L    LABS:    124 <--, 122 <--, 123 <--, 120 <--, 120 <--, 124 <--, 124 <--  124<L>  |  89<L>  |  16  ----------------------------<  147<H>  4.2   |  23  |  1.11    Ca    8.8      2022 06:21  Phos  3.1     -  Mg     1.6         TPro  6.7  /  Alb  2.8<L>  /  TBili  0.3  /  DBili      /  AST  20  /  ALT  23  /  AlkPhos  54  -    Creatinine Trend: 1.11 <--, 1.23 <--, 1.07 <--, 1.24 <--, 1.29 <--, 1.32 <--, 1.34 <--, 1.37 <--                        10.1   6.43  )-----------( 235      ( 2022 06:21 )             27.7     Urine Studies:  Urinalysis Basic - ( 2022 16:05 )    Color: Yellow / Appearance: Clear / S.015 / pH:   Gluc:  / Ketone: Negative  / Bili: Negative / Urobili: Negative   Blood:  / Protein: Negative / Nitrite: Negative   Leuk Esterase: Negative / RBC: 0-2 /HPF / WBC 0-2 /HPF   Sq Epi:  / Non Sq Epi: Few /HPF / Bacteria:       Osmolality, Random Urine: 177 mos/kg ( @ 07:13)  Sodium, Random Urine: 54 mmol/L ( @ 07:13)  Creatinine, Random Urine: 56 mg/dL ( @ 16:06)  Sodium, Random Urine: 39 mmol/L ( @ 16:06)  Osmolality, Random Urine: 326 mos/kg ( @ 16:06)    RADIOLOGY & ADDITIONAL STUDIES:

## 2022-04-07 NOTE — PROGRESS NOTE ADULT - PROBLEM SELECTOR PLAN 6
Uncontrolled  Pt takes Janumet and Repaglinide at home  Start standing dose Admelog AC  Continue sliding scale insulin coverage  Continue glucose monitoring  Continue low carb diet -BP Controlled  -Pt takes olmesartan, amlodipine, carvedilol, furosemide and Torsemide at home  -Continue home regimen amlodipine and carvedilol  -Hold sartan and diuretics in the setting of SRIDHAR  -Monitor BP

## 2022-04-07 NOTE — PROGRESS NOTE ADULT - PROBLEM SELECTOR PLAN 7
Pt takes synthroid at home  Continue home regimen -S/p pacemaker placement  -Pt takes olmesartan, furosemide/torsemide, carvedilol at home  -Continue home regimen carvedilol  -Hold olmesartan, and diuretics secondary to SRIDHAR and acute hyponatremia.

## 2022-04-07 NOTE — PROGRESS NOTE ADULT - PROBLEM SELECTOR PLAN 8
DVT PPX: Xarelto  GI PPX: PPI -Uncontrolled  -Pt takes Janumet and Repaglinide at home  -Start standing dose Admelog AC  -Continue sliding scale insulin coverage  -Continue glucose monitoring  -Continue low carb diet

## 2022-04-07 NOTE — PROGRESS NOTE ADULT - ASSESSMENT
Patient is a 83yo Icelandic speaking Female with HTN, DM, hypothyroidism, asthma, afib, CAD, CHF (s/p pacemaker) recently started on Lasix p/w vomiting for 2 weeks. Nephrology consulted for hyponatremia.     1. Hyponatremia- urine studies initially consistent with SIADH-- now with increased fluid intake. Monitor off IVF/ Lasix. Will increase NaCl to 2g PO bid. Repeat urine osm, serum osm and urine Na in am. TSH  wnl. Check AM Cortisol. Monitor serial BMP's and UO for overcorrection. Do not correct more than 8 meQ/24 hours. Monitor serum sodium.    2. SRIDHAR- previous SCr 0.7-1.0; unclear etiology UA bland. FeUrea 48.13%. Recc post void bladder scan to r/o retention. Strict I/Os. Avoid nephrotoxins/ NSAIDs/ RCA. Monitor BMP.    3. Essential HTN- BP acceptable. Continue with current anti-hypertensive medications. Monitor BP.    4. Shortness of breath- pt with h/o AS. CXR performed; will f/u report. Monitor off Lasix. Pt with diffuse wheezing; recc [facility].     [facility] NEPHROLOGY  Nick Estrada M.D.  Dylan Coleman D.O.  Karla Montesinos M.D.  Kimberlyn Barrett, MSN, ANP-C  [phone]    [address] [place] [address]   Patient is a 83yo Ivorian speaking Female with HTN, DM, hypothyroidism, asthma, afib, CAD, CHF (s/p pacemaker) recently started on Lasix p/w vomiting for 2 weeks. Nephrology consulted for hyponatremia.     1. Hyponatremia- urine studies initially consistent with SIADH-- now with increased fluid intake. Monitor off IVF/ Lasix. Will increase NaCl to 2g PO bid. c/w 1L FR and will start glucerna tid to increase osmolar intake.   Repeat urine osm, serum osm and urine Na in am. TSH  wnl. Check AM Cortisol. Monitor serial BMP's and UO for overcorrection. Do not correct more than 8 meQ/24 hours. Monitor serum sodium.    2. SRIDHAR- previous SCr 0.7-1.0; unclear etiology UA bland. FeUrea 48.13%. Recc post void bladder scan to r/o retention. Strict I/Os. Avoid nephrotoxins/ NSAIDs/ RCA. Monitor BMP.    3. Essential HTN- BP acceptable. Continue with current anti-hypertensive medications. Monitor BP.    4. Shortness of breath- pt with h/o AS. CXR performed; will f/u report. Monitor off Lasix. Pt with diffuse wheezing; recc [facility].     [facility] NEPHROLOGY  Nick Estrada M.D.  Dylan Coleman D.O.  Karla Montesinos M.D.  Kimberlyn Barrett, MSN, ANP-C  [phone]    [address] [place] [address]

## 2022-04-07 NOTE — PROGRESS NOTE ADULT - ASSESSMENT
Patient is a 83yoF, w/ PMH of HTN, DM, hypothyroidism, asthma, afib, CAD, CHF (s/p pacemaker), presents with vomiting. Acute hyponatremia.   Patient is a 83yoF, w/ PMH of HTN, DM, hypothyroidism, asthma, afib, CAD, CHF (s/p pacemaker), presents with vomiting. Acute hyponatremia likely due to SIADH, started salt tabs, SNa improved 124. Followed by nephrology.   Pt noted sob, hx asthma, wheezing on exam, started inhalers. CXR clear.   PT consulted.

## 2022-04-07 NOTE — PROGRESS NOTE ADULT - PROBLEM SELECTOR PLAN 1
Na 123 -> 122  D/c IVF  FU repeat BMP  Dr. Montesinos, Nephrology, consulted -Na 123 -> 122---> 124  -d/c'd IVF  -Urine study shows Dx- SIADH  -started salt tab 2g BID  -water restriction 1L/day.   -Nephro Dr. Montesinos following   -f/u BMP

## 2022-04-07 NOTE — PROGRESS NOTE ADULT - NS ATTEND AMEND GEN_ALL_CORE FT
83yoF, w/ PMH of HTN, DM, hypothyroidism, asthma, afib, CAD, CHF (s/p pacemaker), colon cancer s/p R hemicolectomy presents with vomiting x2 weeks. Admitted for Hyponatremia     Patient was seen and examined, daughter at bedside assisted with translation. Patient was sitting in chair, c/o some shortness of breath.     Labs reviewed.    PE:  General: NAD, elderly female   Resp: wheezing b/l   Card: S1 S2, no murmur   GI: abd soft, non tender  Neuro: AAOx3, non focal  Extremities: edema 1+, chronic venous stasis changes     A/P:  #Hypo-osmolar Hyponatremia- SIADH   #SRIDHAR- pre-renal   #Shortness of breath  #Anxiety  #Chronic Atrial fibrillation   #Hypothyroidism   #Chronic Diastolic Heart Failure   #Hypertension  #Diabetes Mellitus   #H/O Asthma     Plan:  - Hyponatremia 2/2 SIADH, sodium improved to 124 s/p salt tablets. Inc to 2g BID. Water restriction 1L/day. Nephro Dr. Montesinos following   -Pt reports shortness of breath, chest X-Ray clear. Has hx of asthma, wheezing on exam, Start Duoneb and Symbicort.   -C/w Multaq, carvedilol and Xarelto for Afib. Rate controlled   -BP stable, c/w amlodipine and carvedilol    -Scr improved   -Blood sugars elevated, start Humalog 4 units pre-meals   -Discussed care plan with patient's daughter at bedside.     Out-patient provider: PCP- Dr. Mihai Washington, Pulmonologist  Nicole Morales.

## 2022-04-07 NOTE — PROGRESS NOTE ADULT - SUBJECTIVE AND OBJECTIVE BOX
NP Note discussed with  Primary Attending    INTERVAL HPI/OVERNIGHT EVENTS: no new complaints    MEDICATIONS  (STANDING):  albuterol/ipratropium for Nebulization 3 milliLiter(s) Nebulizer every 6 hours  amLODIPine   Tablet 5 milliGRAM(s) Oral daily  budesonide 160 MICROgram(s)/formoterol 4.5 MICROgram(s) Inhaler 2 Puff(s) Inhalation two times a day  carvedilol 12.5 milliGRAM(s) Oral every 12 hours  dronedarone 400 milliGRAM(s) Oral two times a day  insulin lispro (ADMELOG) corrective regimen sliding scale   SubCutaneous Before meals and at bedtime  insulin lispro Injectable (ADMELOG) 4 Unit(s) SubCutaneous three times a day before meals  levothyroxine 175 MICROGram(s) Oral daily  pantoprazole    Tablet 40 milliGRAM(s) Oral before breakfast  rivaroxaban 15 milliGRAM(s) Oral with dinner  sodium chloride 2 Gram(s) Oral two times a day    MEDICATIONS  (PRN):  LORazepam     Tablet 0.5 milliGRAM(s) Oral daily PRN Anxiety      __________________________________________________  REVIEW OF SYSTEMS:    CONSTITUTIONAL: No fever,   EYES: no acute visual disturbances  NECK: No pain or stiffness  RESPIRATORY: No cough; No shortness of breath  CARDIOVASCULAR: No chest pain, no palpitations  GASTROINTESTINAL: No pain. No nausea or vomiting; No diarrhea   NEUROLOGICAL: No headache or numbness, no tremors  MUSCULOSKELETAL: No joint pain, no muscle pain  GENITOURINARY: no dysuria, no frequency, no hesitancy  PSYCHIATRY: no depression , no anxiety  ALL OTHER  ROS negative        Vital Signs Last 24 Hrs  T(C): 36.8 (2022 12:07), Max: 36.9 (2022 09:03)  T(F): 98.2 (2022 12:07), Max: 98.5 (2022 09:03)  HR: 60 (2022 12:07) (60 - 95)  BP: 123/66 (2022 12:07) (123/66 - 178/81)  BP(mean): 83 (2022 09:03) (83 - 83)  RR: 18 (2022 12:07) (18 - 18)  SpO2: 96% (2022 12:07) (96% - 98%)    ________________________________________________  PHYSICAL EXAM:  GENERAL: NAD  HEENT: Normocephalic;  conjunctivae and sclerae clear; moist mucous membranes;   NECK : supple  CHEST/LUNG: Clear to auscultation bilaterally with good air entry   HEART: S1 S2  regular; no murmurs, gallops or rubs  ABDOMEN: Soft, Nontender, Nondistended; Bowel sounds present  EXTREMITIES: no cyanosis; no edema; no calf tenderness  SKIN: warm and dry; no rash  NERVOUS SYSTEM:  Awake and alert; Oriented  to place, person and time ; no new deficits    _________________________________________________  LABS:                        10.1   6.43  )-----------( 235      ( 2022 06:21 )             27.7     04-07    124<L>  |  89<L>  |  16  ----------------------------<  147<H>  4.2   |  23  |  1.11    Ca    8.8      2022 06:21  Phos  3.1     04-07  Mg     1.6     04-07    TPro  6.7  /  Alb  2.8<L>  /  TBili  0.3  /  DBili  x   /  AST  20  /  ALT  23  /  AlkPhos  54  04-07      Urinalysis Basic - ( 2022 16:05 )    Color: Yellow / Appearance: Clear / S.015 / pH: x  Gluc: x / Ketone: Negative  / Bili: Negative / Urobili: Negative   Blood: x / Protein: Negative / Nitrite: Negative   Leuk Esterase: Negative / RBC: 0-2 /HPF / WBC 0-2 /HPF   Sq Epi: x / Non Sq Epi: Few /HPF / Bacteria: x      CAPILLARY BLOOD GLUCOSE      POCT Blood Glucose.: 237 mg/dL (2022 11:38)  POCT Blood Glucose.: 200 mg/dL (2022 21:26)  POCT Blood Glucose.: 215 mg/dL (2022 16:20)        RADIOLOGY & ADDITIONAL TESTS:    Imaging  Reviewed:  YES    Consultant(s) Notes Reviewed:   YES      Plan of care was discussed with patient and /or primary care giver; all questions and concerns were addressed  NP Note discussed with  Primary Attending  [place]  # [id number] Angy OCAMPO HPI/OVERNIGHT EVENTS: seen at bedside, pt states feeling sob on exertion, better at rest.     MEDICATIONS  (STANDING):  albuterol/ipratropium for Nebulization 3 milliLiter(s) Nebulizer every 6 hours  amLODIPine   Tablet 5 milliGRAM(s) Oral daily  budesonide 160 MICROgram(s)/formoterol 4.5 MICROgram(s) Inhaler 2 Puff(s) Inhalation two times a day  carvedilol 12.5 milliGRAM(s) Oral every 12 hours  dronedarone 400 milliGRAM(s) Oral two times a day  insulin lispro (ADMELOG) corrective regimen sliding scale   SubCutaneous Before meals and at bedtime  insulin lispro Injectable (ADMELOG) 4 Unit(s) SubCutaneous three times a day before meals  levothyroxine 175 MICROGram(s) Oral daily  pantoprazole    Tablet 40 milliGRAM(s) Oral before breakfast  rivaroxaban 15 milliGRAM(s) Oral with dinner  sodium chloride 2 Gram(s) Oral two times a day    MEDICATIONS  (PRN):  LORazepam     Tablet 0.5 milliGRAM(s) Oral daily PRN Anxiety      __________________________________________________  REVIEW OF SYSTEMS:    CONSTITUTIONAL: No fever,   EYES: no acute visual disturbances  NECK: No pain or stiffness  RESPIRATORY: No cough; + shortness of breath  CARDIOVASCULAR: No chest pain, no palpitations  GASTROINTESTINAL: No pain. No nausea or vomiting; No diarrhea   NEUROLOGICAL: No headache or numbness, no tremors  MUSCULOSKELETAL: No joint pain, no muscle pain  GENITOURINARY: no dysuria, no frequency, no hesitancy  PSYCHIATRY: no depression , no anxiety  ALL OTHER  ROS negative        Vital Signs Last 24 Hrs  T(C): 36.8 (2022 12:07), Max: 36.9 (2022 09:03)  T(F): 98.2 (2022 12:07), Max: 98.5 (2022 09:03)  HR: 60 (2022 12:07) (60 - 95)  BP: 123/66 (2022 12:07) (123/66 - 178/81)  BP(mean): 83 (2022 09:03) (83 - 83)  RR: 18 (2022 12:07) (18 - 18)  SpO2: 96% (2022 12:07) (96% - 98%)    ________________________________________________  PHYSICAL EXAM:  GENERAL: NAD  HEENT: Normocephalic;  conjunctivae and sclerae clear; moist mucous membranes;   NECK : supple  CHEST/LUNG: wheezing on exam b/l upper lungs,  +PPM  HEART: S1 S2  regular; soft murmur,  gallops or rubs  ABDOMEN: Soft, Nontender, Nondistended; Bowel sounds present  EXTREMITIES: no cyanosis; trace leg edema; no calf tenderness  SKIN: warm and dry; no rash  NERVOUS SYSTEM:  Awake and alert; Oriented  to place, person and time ; no new deficits    _________________________________________________  LABS:                        10.1   6.43  )-----------( 235      ( 2022 06:21 )             27.7     04-07    124<L>  |  89<L>  |  16  ----------------------------<  147<H>  4.2   |  23  |  1.11    Ca    8.8      2022 06:21  Phos  3.1     04-07  Mg     1.6     04-07    TPro  6.7  /  Alb  2.8<L>  /  TBili  0.3  /  DBili  x   /  AST  20  /  ALT  23  /  AlkPhos  54  04-07      Urinalysis Basic - ( 2022 16:05 )    Color: Yellow / Appearance: Clear / S.015 / pH: x  Gluc: x / Ketone: Negative  / Bili: Negative / Urobili: Negative   Blood: x / Protein: Negative / Nitrite: Negative   Leuk Esterase: Negative / RBC: 0-2 /HPF / WBC 0-2 /HPF   Sq Epi: x / Non Sq Epi: Few /HPF / Bacteria: x      CAPILLARY BLOOD GLUCOSE      POCT Blood Glucose.: 237 mg/dL (2022 11:38)  POCT Blood Glucose.: 200 mg/dL (2022 21:26)  POCT Blood Glucose.: 215 mg/dL (2022 16:20)        RADIOLOGY & ADDITIONAL TESTS:    Imaging  Reviewed:  YES  < from: Xray Chest 1 View- PORTABLE-Urgent (Xray Chest 1 View- PORTABLE-Urgent .) (22 @ 14:46) >    ACC: [id number] EXAM:  XR CHEST PORTABLE URGENT 1V                          PROCEDURE DATE:  2022    INTERPRETATION:  INDICATION: Chronic cough. Evaluate for pneumonia.    COMPARISON: 2021.  FINDINGS:  Heart/Vascular: Magnified cardiac and mediastinal silhouette, stable.   Dual-lead pacemaker.  Pulmonary: No significant abnormal pulmonary opacity. No sizable pleural   effusion.  Bones: Within normal limits.    Impression:  No significant change. Redemonstration of a pacemaker.  --- End of Report ---    BETTINA SALES MD; Attending Radiologist  This document has been electronically signed. 2022  4:55PM    < end of copied text >  < from: CT Femur No Cont, Right (21 @ 09:01) >    EXAM:  CT FEMUR ONLY RT                          PROCEDURE DATE:  2021    INTERPRETATION:  EXAMINATION: CT of the right femur without contrast    CLINICAL INFORMATION: Right thigh pain and swelling    TECHNIQUE: Axial CT images were obtained through the right femur. Coronal and sagittal reformatted images were made. 3-D reconstruction images were also performed.    FINDINGS: No acute fracture or dislocation is demonstrated. No lytic or blastic lesions are demonstrated. There is noCT evidence of osteomyelitis. The bones are diffusely demineralized. 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 The hip joint space is grossly preserved. There is mild right sacroiliac joint arthrosis. There is nonspecific diffuse subcutaneous soft tissue infiltration/edema, most pronounced laterally. There is nonspecific 4.1 x 2.6 x 2.9 cm cystic mass in the anterior subcutaneous tissues overlying the inferior aspect of the patella. Correlate clinically.    IMPRESSION: No acute fracture or dislocation.  Degenerative changes, as above.  Nonspecific diffuse subcutaneous soft tissue infiltration/edema, more pronounced laterally.  4.1 x 2.6 x 2.9 cm nonspecific cystic mass/collection in the anterior subcutaneous tissues of the knee. Correlate clinically.    JENNIFER MORLEY MD; Attending Radiologist  This document has been electronically signed. 2021  9:39AM  < end of copied text >  Consultant(s) Notes Reviewed:   YES      Plan of care was discussed with patient and /or primary care giver; all questions and concerns were addressed

## 2022-04-07 NOTE — CHART NOTE - NSCHARTNOTEFT_GEN_A_CORE
Spoke with daughter Vaishali at bedside, updated pt condition and discussed plan of care, answered all questions. no further concerns voiced.  contact # [phone]

## 2022-04-08 LAB
ANION GAP SERPL CALC-SCNC: 9 MMOL/L — SIGNIFICANT CHANGE UP (ref 5–17)
BUN SERPL-MCNC: 19 MG/DL — HIGH (ref 7–18)
CALCIUM SERPL-MCNC: 8.8 MG/DL — SIGNIFICANT CHANGE UP (ref 8.4–10.5)
CHLORIDE SERPL-SCNC: 92 MMOL/L — LOW (ref 96–108)
CO2 SERPL-SCNC: 25 MMOL/L — SIGNIFICANT CHANGE UP (ref 22–31)
CORTIS AM PEAK SERPL-MCNC: 15.5 UG/DL — SIGNIFICANT CHANGE UP (ref 6–18.4)
CREAT SERPL-MCNC: 1.15 MG/DL — SIGNIFICANT CHANGE UP (ref 0.5–1.3)
EGFR: 47 ML/MIN/1.73M2 — LOW
GLUCOSE BLDC GLUCOMTR-MCNC: 196 MG/DL — HIGH (ref 70–99)
GLUCOSE BLDC GLUCOMTR-MCNC: 229 MG/DL — HIGH (ref 70–99)
GLUCOSE BLDC GLUCOMTR-MCNC: 240 MG/DL — HIGH (ref 70–99)
GLUCOSE BLDC GLUCOMTR-MCNC: 283 MG/DL — HIGH (ref 70–99)
GLUCOSE SERPL-MCNC: 176 MG/DL — HIGH (ref 70–99)
HCT VFR BLD CALC: 28.1 % — LOW (ref 34.5–45)
HGB BLD-MCNC: 10.2 G/DL — LOW (ref 11.5–15.5)
MCHC RBC-ENTMCNC: 29.5 PG — SIGNIFICANT CHANGE UP (ref 27–34)
MCHC RBC-ENTMCNC: 36.3 GM/DL — HIGH (ref 32–36)
MCV RBC AUTO: 81.2 FL — SIGNIFICANT CHANGE UP (ref 80–100)
NRBC # BLD: 0 /100 WBCS — SIGNIFICANT CHANGE UP (ref 0–0)
OSMOLALITY SERPL: 270 MOSMOL/KG — LOW (ref 280–301)
OSMOLALITY UR: 303 MOS/KG — SIGNIFICANT CHANGE UP (ref 50–1200)
PLATELET # BLD AUTO: 264 K/UL — SIGNIFICANT CHANGE UP (ref 150–400)
POTASSIUM SERPL-MCNC: 4.1 MMOL/L — SIGNIFICANT CHANGE UP (ref 3.5–5.3)
POTASSIUM SERPL-SCNC: 4.1 MMOL/L — SIGNIFICANT CHANGE UP (ref 3.5–5.3)
RBC # BLD: 3.46 M/UL — LOW (ref 3.8–5.2)
RBC # FLD: 12.4 % — SIGNIFICANT CHANGE UP (ref 10.3–14.5)
SODIUM SERPL-SCNC: 126 MMOL/L — LOW (ref 135–145)
SODIUM UR-SCNC: 17 MMOL/L — SIGNIFICANT CHANGE UP
WBC # BLD: 6.33 K/UL — SIGNIFICANT CHANGE UP (ref 3.8–10.5)
WBC # FLD AUTO: 6.33 K/UL — SIGNIFICANT CHANGE UP (ref 3.8–10.5)

## 2022-04-08 PROCEDURE — 99233 SBSQ HOSP IP/OBS HIGH 50: CPT

## 2022-04-08 RX ADMIN — Medication 1: at 07:58

## 2022-04-08 RX ADMIN — SODIUM CHLORIDE 2 GRAM(S): 9 INJECTION INTRAMUSCULAR; INTRAVENOUS; SUBCUTANEOUS at 13:47

## 2022-04-08 RX ADMIN — INSULIN GLARGINE 5 UNIT(S): 100 INJECTION, SOLUTION SUBCUTANEOUS at 21:53

## 2022-04-08 RX ADMIN — Medication 3 MILLILITER(S): at 21:14

## 2022-04-08 RX ADMIN — DRONEDARONE 400 MILLIGRAM(S): 400 TABLET, FILM COATED ORAL at 17:03

## 2022-04-08 RX ADMIN — BUDESONIDE AND FORMOTEROL FUMARATE DIHYDRATE 2 PUFF(S): 160; 4.5 AEROSOL RESPIRATORY (INHALATION) at 11:34

## 2022-04-08 RX ADMIN — SODIUM CHLORIDE 2 GRAM(S): 9 INJECTION INTRAMUSCULAR; INTRAVENOUS; SUBCUTANEOUS at 05:37

## 2022-04-08 RX ADMIN — Medication 3: at 11:33

## 2022-04-08 RX ADMIN — Medication 4 UNIT(S): at 17:01

## 2022-04-08 RX ADMIN — CARVEDILOL PHOSPHATE 12.5 MILLIGRAM(S): 80 CAPSULE, EXTENDED RELEASE ORAL at 17:03

## 2022-04-08 RX ADMIN — RIVAROXABAN 15 MILLIGRAM(S): KIT at 17:03

## 2022-04-08 RX ADMIN — Medication 4 UNIT(S): at 07:59

## 2022-04-08 RX ADMIN — Medication 3 MILLILITER(S): at 14:57

## 2022-04-08 RX ADMIN — Medication 2: at 17:00

## 2022-04-08 RX ADMIN — Medication 2: at 21:55

## 2022-04-08 RX ADMIN — CARVEDILOL PHOSPHATE 12.5 MILLIGRAM(S): 80 CAPSULE, EXTENDED RELEASE ORAL at 05:36

## 2022-04-08 RX ADMIN — SODIUM CHLORIDE 2 GRAM(S): 9 INJECTION INTRAMUSCULAR; INTRAVENOUS; SUBCUTANEOUS at 21:54

## 2022-04-08 RX ADMIN — DRONEDARONE 400 MILLIGRAM(S): 400 TABLET, FILM COATED ORAL at 05:37

## 2022-04-08 RX ADMIN — BUDESONIDE AND FORMOTEROL FUMARATE DIHYDRATE 2 PUFF(S): 160; 4.5 AEROSOL RESPIRATORY (INHALATION) at 21:54

## 2022-04-08 RX ADMIN — Medication 4 UNIT(S): at 11:34

## 2022-04-08 RX ADMIN — Medication 175 MICROGRAM(S): at 05:37

## 2022-04-08 RX ADMIN — AMLODIPINE BESYLATE 5 MILLIGRAM(S): 2.5 TABLET ORAL at 05:36

## 2022-04-08 RX ADMIN — PANTOPRAZOLE SODIUM 40 MILLIGRAM(S): 20 TABLET, DELAYED RELEASE ORAL at 05:36

## 2022-04-08 RX ADMIN — Medication 3 MILLILITER(S): at 09:58

## 2022-04-08 NOTE — PHYSICAL THERAPY INITIAL EVALUATION ADULT - PATIENT/FAMILY/SIGNIFICANT OTHER GOALS STATEMENT, PT EVAL
To be able to go home once cleared by doctors
Patient to go home with prior arrangements once, cleared by M.D.
To be able to go home, once cleared by doctors

## 2022-04-08 NOTE — PROGRESS NOTE ADULT - SUBJECTIVE AND OBJECTIVE BOX
NP Note discussed with  Primary Attending    INTERVAL HPI/OVERNIGHT EVENTS: no overnight event she states feeling much better today. denies any discomfort on exam.     MEDICATIONS  (STANDING):  albuterol/ipratropium for Nebulization 3 milliLiter(s) Nebulizer every 6 hours  amLODIPine   Tablet 5 milliGRAM(s) Oral daily  budesonide 160 MICROgram(s)/formoterol 4.5 MICROgram(s) Inhaler 2 Puff(s) Inhalation two times a day  carvedilol 12.5 milliGRAM(s) Oral every 12 hours  dronedarone 400 milliGRAM(s) Oral two times a day  insulin glargine Injectable (LANTUS) 5 Unit(s) SubCutaneous at bedtime  insulin lispro (ADMELOG) corrective regimen sliding scale   SubCutaneous Before meals and at bedtime  insulin lispro Injectable (ADMELOG) 4 Unit(s) SubCutaneous three times a day before meals  levothyroxine 175 MICROGram(s) Oral daily  pantoprazole    Tablet 40 milliGRAM(s) Oral before breakfast  rivaroxaban 15 milliGRAM(s) Oral with dinner  sodium chloride 2 Gram(s) Oral three times a day    MEDICATIONS  (PRN):  LORazepam     Tablet 0.5 milliGRAM(s) Oral daily PRN Anxiety  __________________________________________________  REVIEW OF SYSTEMS:    CONSTITUTIONAL: No fever,   EYES: no acute visual disturbances  NECK: No pain or stiffness  RESPIRATORY: No cough; + shortness of breath on exertion  CARDIOVASCULAR: No chest pain, no palpitations  GASTROINTESTINAL: No pain. No nausea or vomiting; No diarrhea   NEUROLOGICAL: No headache or numbness, no tremors  MUSCULOSKELETAL: No joint pain, no muscle pain  GENITOURINARY: no dysuria, no frequency, no hesitancy  PSYCHIATRY: no depression , no anxiety  ALL OTHER  ROS negative        Vital Signs Last 24 Hrs  T(C): 36.8 (08 Apr 2022 04:55), Max: 36.8 (07 Apr 2022 12:07)  T(F): 98.3 (08 Apr 2022 04:55), Max: 98.3 (08 Apr 2022 04:55)  HR: 60 (08 Apr 2022 07:03) (60 - 73)  BP: 99/65 (08 Apr 2022 07:03) (99/65 - 145/108)  BP(mean): 86 (07 Apr 2022 16:59) (86 - 86)  RR: 18 (08 Apr 2022 07:03) (18 - 18)  SpO2: 95% (08 Apr 2022 07:03) (93% - 96%)    ________________________________________________  PHYSICAL EXAM:  GENERAL: NAD obese  HEENT: Normocephalic;  conjunctivae and sclerae clear; moist mucous membranes;   NECK : supple  CHEST/LUNG: fair air entry, diminished BS at bases [place] on room air at rest  HEART: S1 S2  regular; + murmurs, gallops or rubs + PPM  ABDOMEN: Soft, Nontender, Nondistended; Bowel sounds present  EXTREMITIES: no cyanosis; trace lower leg edema; no calf tenderness  SKIN: warm and dry; no rash  NERVOUS SYSTEM:  Awake and alert; Oriented  to place, person and time ; no new deficits    _________________________________________________  LABS:                        10.2   6.33  )-----------( 264      ( 08 Apr 2022 06:52 )             28.1     04-08    126<L>  |  92<L>  |  19<H>  ----------------------------<  176<H>  4.1   |  25  |  1.15    Ca    8.8      08 Apr 2022 06:52  Phos  3.1     04-07  Mg     1.6     04-07    TPro  6.7  /  Alb  2.8<L>  /  TBili  0.3  /  DBili  x   /  AST  20  /  ALT  23  /  AlkPhos  54  04-07  CAPILLARY BLOOD GLUCOSE    POCT Blood Glucose.: 196 mg/dL (08 Apr 2022 07:40)  POCT Blood Glucose.: 192 mg/dL (07 Apr 2022 21:02)  POCT Blood Glucose.: 285 mg/dL (07 Apr 2022 16:28)  POCT Blood Glucose.: 237 mg/dL (07 Apr 2022 11:38)  RADIOLOGY & ADDITIONAL TESTS:    Imaging  Reviewed:  YES  < from: Xray Chest 1 View- PORTABLE-Urgent (Xray Chest 1 View- PORTABLE-Urgent .) (04.05.22 @ 14:46) >    ACC: [id number] EXAM:  XR CHEST PORTABLE URGENT 1V                          PROCEDURE DATE:  04/05/2022          INTERPRETATION:  INDICATION: Chronic cough. Evaluate for pneumonia.    COMPARISON: 03/20/2021.    FINDINGS:  Heart/Vascular: Magnified cardiac and mediastinal silhouette, stable.   Dual-lead pacemaker.  Pulmonary: No significant abnormal pulmonary opacity. No sizable pleural   effusion.  Bones: Within normal limits.    Impression:  No significant change. Redemonstration of a pacemaker.    --- End of Report ---  BETTINA SALES MD; Attending Radiologist  This document has been electronically signed. Apr 5 2022  4:55PM    < end of copied text >    < from: CT Femur No Cont, Right (04.05.21 @ 09:01) >    EXAM:  CT FEMUR ONLY RT                            PROCEDURE DATE:  04/05/2021          INTERPRETATION:  EXAMINATION: CT of the right femur without contrast    CLINICAL INFORMATION: Right thigh pain and swelling    TECHNIQUE: Axial CT images were obtained through the right femur. Coronal and sagittal reformatted images were made. 3-D reconstruction images were also performed.    FINDINGS: No acute fracture or dislocation is demonstrated. No lytic or blastic lesions are demonstrated. There is noCT evidence of osteomyelitis. The bones are diffusely demineralized. 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 The hip joint space is grossly preserved. There is mild right sacroiliac joint arthrosis. There is nonspecific diffuse subcutaneous soft tissue infiltration/edema, most pronounced laterally. There is nonspecific 4.1 x 2.6 x 2.9 cm cystic mass in the anterior subcutaneous tissues overlying the inferior aspect of the patella. Correlate clinically.    IMPRESSION: No acute fracture or dislocation.  Degenerative changes, as above.  Nonspecific diffuse subcutaneous soft tissue infiltration/edema, more pronounced laterally.  4.1 x 2.6 x 2.9 cm nonspecific cystic mass/collection in the anterior subcutaneous tissues of the knee. Correlate clinically.    JENNIFER MORLEY MD; Attending Radiologist  This document has been electronically signed. Apr 5 2021  9:39AM    < end of copied text >    Consultant(s) Notes Reviewed:   YES      Plan of care was discussed with patient and /or primary care giver; all questions and concerns were addressed

## 2022-04-08 NOTE — PROGRESS NOTE ADULT - PROBLEM SELECTOR PLAN 11
-pending clinical improvement with Hyponatremia, PT eval.
-pending clinical improvement with Hyponatremia, PT eval.

## 2022-04-08 NOTE — PROGRESS NOTE ADULT - NS ATTEND AMEND GEN_ALL_CORE FT
83yoF, w/ PMH of HTN, DM, hypothyroidism, asthma, afib, CAD, CHF (s/p pacemaker), colon cancer s/p R hemicolectomy presents with vomiting x2 weeks. Admitted for Hyponatremia     Patient was seen and examined, prefers her daughter to translate and called her to assist. She reports feeling same. When asked if she has pain anywhere she refused. Reports shortness of breath     Labs reviewed.    PE as above     A/P:  #Hypo-osmolar Hyponatremia- SIADH   #SRIDHAR- pre-renal- resolved   #Shortness of breath 2/2 deconditioning, possible JOSE DANIEL?   #Anxiety  #Chronic Atrial fibrillation   #Hypothyroidism   #Chronic Diastolic Heart Failure   #Hypertension  #Diabetes Mellitus   #H/O Asthma     Plan:  - Hyponatremia 2/2 SIADH, sodium improved to 126 s/p salt tablets. C/w salt tablets. Water restriction 1L/day. Nephro Dr. Montesinos following   -Pt reports shortness of breath, likely 2/2 deconditioning. Chest X-Ray negative, no s/s of volume overload.  She had recent ischemic eval earlier this year which was negative. Will c/w Duoneb and Symbicort. Pulm recs appreciated.   -C/w Multaq, carvedilol and Xarelto for Afib. Rate controlled   -BP stable, c/w amlodipine and carvedilol    -Scr improved   -Blood sugars elevated, c/w Lantus 5 units, Humalog   -Discussed care plan with patient's daughter at bedside.     Out-patient provider: PCP- Dr. Mihai Washington, Pulmonologist  [facility]. 83yoF, w/ PMH of HTN, DM, hypothyroidism, asthma, afib, CAD, CHF (s/p pacemaker), colon cancer s/p R hemicolectomy presents with vomiting x2 weeks. Admitted for Hyponatremia     Patient was seen and examined, prefers her daughter to translate and called her to assist. She reports feeling same. When asked if she has pain anywhere she refused. Reports shortness of breath     Labs reviewed.    PE as above     A/P:  #Hypo-osmolar Hyponatremia- SIADH   #SRIDHAR- pre-renal- resolved   #Shortness of breath 2/2 deconditioning, possible JOSE DANIEL?   #Anxiety  #Chronic Atrial fibrillation   #Hypothyroidism   #Chronic Diastolic Heart Failure   #Hypertension  #Diabetes Mellitus   #H/O Asthma     Plan:  - Hyponatremia 2/2 SIADH, sodium improved to 126 s/p salt tablets. C/w salt tablets. Water restriction 1L/day. Nephro Dr. Montesinos following   -Pt reports shortness of breath, likely 2/2 deconditioning. Chest X-Ray negative, no s/s of volume overload.  She had recent ischemic eval earlier this year which was negative. Will c/w Duoneb and Symbicort. Pulm recs appreciated.   -C/w Multaq, carvedilol and Xarelto for Afib. Rate controlled   -BP stable, c/w amlodipine and carvedilol    -Scr improved   -Blood sugars elevated, c/w Lantus 5 units, Humalog 4 units, HSS. HbA1C 8.4%   -Discussed care plan with patient and her daughter.     Out-patient provider: PCP- Dr. Mihai Washington, Pulmonologist  [facility].

## 2022-04-08 NOTE — PROGRESS NOTE ADULT - PROBLEM SELECTOR PLAN 6
-BP Controlled  -Pt takes olmesartan, amlodipine, carvedilol, furosemide and Torsemide at home  -Continue home regimen amlodipine and carvedilol  -Hold sartan and diuretics in the setting of SRIDHAR  -Monitor BP

## 2022-04-08 NOTE — PROGRESS NOTE ADULT - NUTRITIONAL ASSESSMENT
-Pt reports shortness of breath, chest X-Ray clear. Has hx of asthma, wheezing on exam, Start Duoneb and Symbicort.
-Pt reports shortness of breath, chest X-Ray clear. Has hx of asthma, wheezing on exam, Start Duoneb and Symbicort.
Tolerating regular diet
Tolerated clear liquid diet, advanced to regular diet

## 2022-04-08 NOTE — PROGRESS NOTE ADULT - PROBLEM SELECTOR PLAN 1
-Na 123 -> 124--121--> 126  -d/c'd IVF  -Urine study shows Dx- SIADH  -started salt tab 2g BID but increased to TID due to persistent low Na  -water restriction 1L/day.   -Nephro Dr. Montesinos following   -f/u BMP  -f/u AM cortisol, urine study

## 2022-04-08 NOTE — PROGRESS NOTE ADULT - PROBLEM SELECTOR PLAN 7
-S/p pacemaker placement  -Pt takes olmesartan, furosemide/torsemide, carvedilol at home  -Continue home regimen carvedilol  -Hold olmesartan, and diuretics secondary to SRIDHAR and acute hyponatremia.

## 2022-04-08 NOTE — CONSULT NOTE ADULT - SUBJECTIVE AND OBJECTIVE BOX
PULMONARY CONSULT NOTE      KWAME MOSQUERA  MRN-[id number]    Patient is a 82y old  Female who presents with a chief complaint of hyponatremia (08 Apr 2022 09:32)  c/o off and on sob  Hx chart lab and Xrays reviewed  Pt Examined    HISTORY OF PRESENT ILLNESS:    Home Medications:  carvedilol 12.5 mg oral tablet: 1 tab(s) orally 2 times a day (05 Apr 2022 03:00)  escitalopram 10 mg oral tablet: 1 tab(s) orally once a day (05 Apr 2022 03:00)  furosemide 40 mg oral tablet: 1 tab(s) orally once a day (05 Apr 2022 03:00)  Janumet 50 mg-500 mg oral tablet: 1 tab(s) orally 2 times a day (05 Apr 2022 03:00)  levothyroxine 175 mcg (0.175 mg) oral tablet: 1 tab(s) orally once a day (05 Apr 2022 03:00)  LORazepam 0.5 mg oral tablet: 1 tab(s) orally once a day, As Needed (05 Apr 2022 03:00)  Multaq 400 mg oral tablet: 1 tab(s) orally 2 times a day (05 Apr 2022 03:00)  potassium chloride 20 mEq oral tablet, extended release: 1 tab(s) orally once a day (05 Apr 2022 03:00)  repaglinide 1 mg oral tablet: 1 tab(s) orally 3 times a day (before meals) (05 Apr 2022 03:00)  torsemide 10 mg oral tablet: 1 tab(s) orally once a day (05 Apr 2022 03:00)  Tribenzor 40 mg-5 mg-25 mg oral tablet: 1 tab(s) orally once a day (05 Apr 2022 03:00)  Xarelto 20 mg oral tablet: 1 tab(s) orally once a day (in the evening) (05 Apr 2022 03:00)      MEDICATIONS  (STANDING):  albuterol/ipratropium for Nebulization 3 milliLiter(s) Nebulizer every 6 hours  amLODIPine   Tablet 5 milliGRAM(s) Oral daily  budesonide 160 MICROgram(s)/formoterol 4.5 MICROgram(s) Inhaler 2 Puff(s) Inhalation two times a day  carvedilol 12.5 milliGRAM(s) Oral every 12 hours  dronedarone 400 milliGRAM(s) Oral two times a day  insulin glargine Injectable (LANTUS) 5 Unit(s) SubCutaneous at bedtime  insulin lispro (ADMELOG) corrective regimen sliding scale   SubCutaneous Before meals and at bedtime  insulin lispro Injectable (ADMELOG) 4 Unit(s) SubCutaneous three times a day before meals  levothyroxine 175 MICROGram(s) Oral daily  pantoprazole    Tablet 40 milliGRAM(s) Oral before breakfast  rivaroxaban 15 milliGRAM(s) Oral with dinner  sodium chloride 2 Gram(s) Oral three times a day      MEDICATIONS  (PRN):  LORazepam     Tablet 0.5 milliGRAM(s) Oral daily PRN Anxiety      Allergies    No Known Allergies    Intolerances        PAST MEDICAL & SURGICAL HISTORY:  Asthma    CAD (Coronary Atherosclerotic Disease)    Myocardial Infarction    Diabetes    HTN (Hypertension)    HLD (Hyperlipidemia)    CHF (congestive heart failure), NYHA class I    IBS (irritable bowel syndrome)    Venous stasis    Hypothyroidism    Atrial fibrillation    Pacemaker    Obesity    S/P coronary angiogram        FAMILY HISTORY:  Family history of heart disease    HTN    DM   IHD   Asthma  COPD     SOCIAL HISTORY SMOKING    ETOH     DRUGS    REVIEW OF SYSTEMS:  CONSTITUTIONAL: No fever, weight loss, or fatigue   EYES: No eye pain, visual disturbances, or discharge  ENT:  No difficulty hearing, tinnitus, vertigo; No sinus or throat pain  NECK: No pain or stiffness   RESPIRATORY:  cough   wheezing   chills   hemoptysis    Shortness of Breath  CARDIOVASCULAR: No chest pain, palpitations, passing out, dizziness, or leg swelling  GASTROINTESTINAL: No abdominal or epigastric pain. No nausea, vomiting, or hematemesis; No diarrhea or constipation. No melena or hematochezia.  GENITOURINARY: No dysuria, frequency, hematuria, or incontinence  NEUROLOGICAL: No headaches, memory loss, loss of strength, numbness, or tremors  SKIN: No itching, burning, rashes, or lesions   LYMPH Nodes: No enlarged glands  ENDOCRINE: No heat or cold intolerance; No hair loss  MUSCULOSKELETAL: No joint pain or swelling; No muscle, back, or extremity pain  PSYCHIATRIC: No depression, anxiety, mood swings, or difficulty sleeping  HEME/LYMPH: No easy bruising, or bleeding gums  ALLERGY AND IMMUNOLOGIC: No hives or eczema      Vital Signs Last 24 Hrs  T(C): 36.8 (08 Apr 2022 04:55), Max: 36.8 (07 Apr 2022 12:07)  T(F): 98.3 (08 Apr 2022 04:55), Max: 98.3 (08 Apr 2022 04:55)  HR: 68 (08 Apr 2022 09:48) (60 - 73)  BP: 99/65 (08 Apr 2022 07:03) (99/65 - 145/108)  BP(mean): 86 (07 Apr 2022 16:59) (86 - 86)  RR: 18 (08 Apr 2022 07:03) (18 - 18)  SpO2: 98% (08 Apr 2022 09:48) (93% - 98%)  I&O's Detail    07 Apr 2022 07:01  -  08 Apr 2022 07:00  --------------------------------------------------------  IN:    Oral Fluid: 100 mL  Total IN: 100 mL    OUT:  Total OUT: 0 mL    Total NET: 100 mL          PHYSICAL EXAMINATION:    GENERAL: The patient is a well-developed, well-nourished in no apparent distress.   SKIN: No rashes ecchymoses or cyanosis  HEENT: Head is normocephalic and atraumatic. Extraocular muscles are intact. Mucous membranes are moist.   Neck supple LN not felt, JVP not increased  Thyroid not enlarged  Lymphatic: No lymphadenopathy  Cardiovascular:  S1 S2  heard ,RSR , JVP not increased , syst murmur at apex, No  gallop or rub  Respiratory:  Symmetrical chest wall movements Breathing vesicular , Percussion note normal no dulness   with   rales   wheeze  ABDOMEN:  Soft, Non-tender,   No  hepatosplenomegaly ,BS positive		  Extremities: Normal range of motion, No clubbing, cyanosis or edema , No calf tenderness  Vascular: Peripheral pulses palpable 2+ bilaterally  CNS: Alert and oriented x3,  Mood and affect appropriate  Cranial nerves intact  sensory intact  motor Power5/5, DTR 2+   Babinski neg    LABS:                        10.2   6.33  )-----------( 264      ( 08 Apr 2022 06:52 )             28.1     04-08    126<L>  |  92<L>  |  19<H>  ----------------------------<  176<H>  4.1   |  25  |  1.15    Ca    8.8      08 Apr 2022 06:52  Phos  3.1     04-07  Mg     1.6     04-07    TPro  6.7  /  Alb  2.8<L>  /  TBili  0.3  /  DBili  x   /  AST  20  /  ALT  23  /  AlkPhos  54  04-07        HbA1C 8.4          RADIOLOGY & ADDITIONAL STUDIES:    CXR: No Infilt, PPM lt side, small heart        ekg;< from: 12 Lead ECG (04.08.21 @ 14:35) >  Right bundle branch block  Left anterior fascicular block        echo:< from: Transthoracic Echocardiogram (03.21.21 @ 07:01) >  1. Moderate mitral annular calcification. Trace mitral  regurgitation. Mild mitral stenosis.  2. Calcified, probably trileaflet aortic valve with  decreased opening. Mild to moderate aortic stenosis. No  aortic valveregurgitation seen.  3. Normal aortic root.  4. Normal left atrium.  5. Mild left ventricular enlargement.  6. Normal Left Ventricular Systolic Function,  (EF = 66% by  biplane)  7. Grade I diastolic dysfunction (Impaired relaxation).  Diastolic function may not be accurately assessed in the  setting of mitral stenosis.  8. Normal right atrium.  9. Normal right ventricular size and systolic function  (TAPSE 2.8 cm). A device lead is visualized in the right  heart.  10. RV systolic pressure is mildly increased at  37 mm Hg.  11. Tricuspid valve not well seen. Mild tricuspid  regurgitation.  12. No pericardial effusion.     PULMONARY CONSULT NOTE      KWAME MOSQUERA  MRN-[id number]    Patient is a 82y old  Female who presents with a chief complaint of hyponatremia (08 Apr 2022 09:32)  c/o off and on sob  Hx chart lab and Xrays reviewed  Pt Examined    HISTORY OF PRESENT ILLNESS:Patient is a 83yoF, w/ PMH of HTN, DM, hypothyroidism, asthma, afib, CAD, CHF (s/p pacemaker), presents with vomiting. Patient reports NBNB vomiting for 2 weeks. Prior to start of vomiting, patient noted to have increased coughing, and subsequent CXR showed "water retention in lung", and she was started on furosemid 40 qod per daughter. She also complains of chills, night sweat, generalized weakness and increased SOB, even at rest. She denies fever, chest pain, abdominal pain, bowel movement changes    Home Medications:  carvedilol 12.5 mg oral tablet: 1 tab(s) orally 2 times a day (05 Apr 2022 03:00)  escitalopram 10 mg oral tablet: 1 tab(s) orally once a day (05 Apr 2022 03:00)  furosemide 40 mg oral tablet: 1 tab(s) orally once a day (05 Apr 2022 03:00)  Janumet 50 mg-500 mg oral tablet: 1 tab(s) orally 2 times a day (05 Apr 2022 03:00)  levothyroxine 175 mcg (0.175 mg) oral tablet: 1 tab(s) orally once a day (05 Apr 2022 03:00)  LORazepam 0.5 mg oral tablet: 1 tab(s) orally once a day, As Needed (05 Apr 2022 03:00)  Multaq 400 mg oral tablet: 1 tab(s) orally 2 times a day (05 Apr 2022 03:00)  potassium chloride 20 mEq oral tablet, extended release: 1 tab(s) orally once a day (05 Apr 2022 03:00)  repaglinide 1 mg oral tablet: 1 tab(s) orally 3 times a day (before meals) (05 Apr 2022 03:00)  torsemide 10 mg oral tablet: 1 tab(s) orally once a day (05 Apr 2022 03:00)  Tribenzor 40 mg-5 mg-25 mg oral tablet: 1 tab(s) orally once a day (05 Apr 2022 03:00)  Xarelto 20 mg oral tablet: 1 tab(s) orally once a day (in the evening) (05 Apr 2022 03:00)      MEDICATIONS  (STANDING):  albuterol/ipratropium for Nebulization 3 milliLiter(s) Nebulizer every 6 hours  amLODIPine   Tablet 5 milliGRAM(s) Oral daily  budesonide 160 MICROgram(s)/formoterol 4.5 MICROgram(s) Inhaler 2 Puff(s) Inhalation two times a day  carvedilol 12.5 milliGRAM(s) Oral every 12 hours  dronedarone 400 milliGRAM(s) Oral two times a day  insulin glargine Injectable (LANTUS) 5 Unit(s) SubCutaneous at bedtime  insulin lispro (ADMELOG) corrective regimen sliding scale   SubCutaneous Before meals and at bedtime  insulin lispro Injectable (ADMELOG) 4 Unit(s) SubCutaneous three times a day before meals  levothyroxine 175 MICROGram(s) Oral daily  pantoprazole    Tablet 40 milliGRAM(s) Oral before breakfast  rivaroxaban 15 milliGRAM(s) Oral with dinner  sodium chloride 2 Gram(s) Oral three times a day      MEDICATIONS  (PRN):  LORazepam     Tablet 0.5 milliGRAM(s) Oral daily PRN Anxiety      Allergies    No Known Allergies            PAST MEDICAL & SURGICAL HISTORY:  Asthma    CAD (Coronary Atherosclerotic Disease)    Myocardial Infarction    Diabetes    HTN (Hypertension)    HLD (Hyperlipidemia)    CHF (congestive heart failure), NYHA class I    IBS (irritable bowel syndrome)    Venous stasis    Hypothyroidism    Atrial fibrillation    Pacemaker    Obesity    S/P coronary angiogram        FAMILY HISTORY:  Family history of heart disease    HTN+    DM+   IHD+   Asthma--  COPD --    SOCIAL HISTORY SMOKING--    ETOH--     DRUGS--    REVIEW OF SYSTEMS:  CONSTITUTIONAL: No fever, weight loss, or fatigue   EYES: No eye pain, visual disturbances, or discharge  ENT:  No difficulty hearing, tinnitus, vertigo; No sinus or throat pain  NECK: No pain or stiffness   RESPIRATORY:  cough --  wheezing--   chills--   hemoptysis--    Shortness of Breath+  CARDIOVASCULAR: No chest pain, palpitations, passing out, dizziness, or leg swelling  GASTROINTESTINAL: No abdominal or epigastric pain. No nausea, vomiting,   NEUROLOGICAL: No headaches, memory loss, loss of strength, numbness, or tremors  SKIN: No itching, burning, rashes, or lesions   LYMPH Nodes: No enlarged glands  ENDOCRINE: No heat or cold intolerance; No hair loss  MUSCULOSKELETAL: No joint pain or swelling; No muscle, back, or extremity pain  PSYCHIATRIC: No depression, anxiety, mood swings, or difficulty sleeping  HEME/LYMPH: No easy bruising, or bleeding gums  ALLERGY AND IMMUNOLOGIC: No hives or eczema      Vital Signs Last 24 Hrs  T(C): 36.8 (08 Apr 2022 04:55), Max: 36.8 (07 Apr 2022 12:07)  T(F): 98.3 (08 Apr 2022 04:55), Max: 98.3 (08 Apr 2022 04:55)  HR: 68 (08 Apr 2022 09:48) (60 - 73)  BP: 99/65 (08 Apr 2022 07:03) (99/65 - 145/108)  BP(mean): 86 (07 Apr 2022 16:59) (86 - 86)  RR: 18 (08 Apr 2022 07:03) (18 - 18)  SpO2: 98% (08 Apr 2022 09:48) (93% - 98%)  I&O's Detail    07 Apr 2022 07:01  -  08 Apr 2022 07:00  --------------------------------------------------------  IN:    Oral Fluid: 100 mL  Total IN: 100 mL    OUT:  Total OUT: 0 mL    Total NET: 100 mL          PHYSICAL EXAMINATION:    GENERAL: The patient is an obese w/f  in no apparent distress.   SKIN: No rashes ecchymoses or cyanosis  HEENT: Head is normocephalic and atraumatic. Extraocular muscles are intact. Mucous membranes are moist.   Neck supple LN not felt, JVP not increased  Thyroid not enlarged  Lymphatic: No lymphadenopathy  Cardiovascular:  S1 S2  heard ,RSR , JVP not increased , systolic murmur at apex, No  gallop or rub, PPM lt side  Respiratory:  Symmetrical chest wall movements Breathing vesicular , Percussion note normal no dulness   with no   rales  or  wheeze  ABDOMEN:  Soft, Non-tender, obese,  No  hepatosplenomegaly ,BS positive		  Extremities: Normal range of motion, No clubbing, cyanosis or edema , No calf tenderness, Chr stasis changes  Vascular: Peripheral pulses palpable 2+ bilaterally  CNS: Alert and oriented x3,  Mood and affect appropriate  Cranial nerves intact  sensory intact  motor Power5/5, DTR 2+  Babinski neg        LABS:                        10.2   6.33  )-----------( 264      ( 08 Apr 2022 06:52 )             28.1     04-08    126<L>  |  92<L>  |  19<H>  ----------------------------<  176<H>  4.1   |  25  |  1.15    Ca    8.8      08 Apr 2022 06:52  Phos  3.1     04-07  Mg     1.6     04-07    TPro  6.7  /  Alb  2.8<L>  /  TBili  0.3  /  DBili  x   /  AST  20  /  ALT  23  /  AlkPhos  54  04-07        HbA1C 8.4          RADIOLOGY & ADDITIONAL STUDIES:    CXR: No Infilt, PPM lt side, small heart        ekg;< from: 12 Lead ECG (04.08.21 @ 14:35) >  Right bundle branch block  Left anterior fascicular block        echo:< from: Transthoracic Echocardiogram (03.21.21 @ 07:01) >  1. Moderate mitral annular calcification. Trace mitral  regurgitation. Mild mitral stenosis.  2. Calcified, probably trileaflet aortic valve with  decreased opening. Mild to moderate aortic stenosis. No  aortic valveregurgitation seen.  3. Normal aortic root.  4. Normal left atrium.  5. Mild left ventricular enlargement.  6. Normal Left Ventricular Systolic Function,  (EF = 66% by  biplane)  7. Grade I diastolic dysfunction (Impaired relaxation).  Diastolic function may not be accurately assessed in the  setting of mitral stenosis.  8. Normal right atrium.  9. Normal right ventricular size and systolic function  (TAPSE 2.8 cm). A device lead is visualized in the right  heart.  10. RV systolic pressure is mildly increased at  37 mm Hg.  11. Tricuspid valve not well seen. Mild tricuspid  regurgitation.  12. No pericardial effusion.

## 2022-04-08 NOTE — PHYSICAL THERAPY INITIAL EVALUATION ADULT - PERTINENT HX OF CURRENT PROBLEM, REHAB EVAL
Pt is a 63 yo F from home, independent with no PMH presents to the ED after an episode of slurring speech yesterday.
Pt is a 63 yo F from home, independent with no PMH presents to the ED after an episode of slurring speech yesterday.
PMH of HTN, DM, hypothyroidism, asthma, afib, CAD, CHF (s/p pacemaker), presents with vomiting. Patient reports NBNB vomiting for 2 weeks. Prior to start of vomiting, patient noted to have increased coughing, and subsequent CXR showed "water retention in lung",. Patient was hospitalized due to Hyponatremia. Patient presented with generalized weakness.

## 2022-04-08 NOTE — PHYSICAL THERAPY INITIAL EVALUATION ADULT - MANUAL MUSCLE TESTING RESULTS, REHAB EVAL
no strength deficits were identified
no strength deficits were identified
Both U.E. 3PLUS/5, Both L.E. 3minus-3/5

## 2022-04-08 NOTE — CONSULT NOTE ADULT - ASSESSMENT
Dyspnea sec to Obesity/ Deconditioning ? JOSE DANIEL   Br Asthma   Hyponatremia ? 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

## 2022-04-08 NOTE — PROGRESS NOTE ADULT - PROBLEM SELECTOR PLAN 3
-hx Asthma, no meds taking  -pt c/o sob, wheezing on exam  -CXR clear  -started duonebx, Symbicort -hx Asthma, no meds taking  -pt c/o sob, wheezing on exam  -CXR clear  -started duonebx, Symbicort  -pulm Dr. Cruz

## 2022-04-08 NOTE — PROGRESS NOTE ADULT - SUBJECTIVE AND OBJECTIVE BOX
[facility] NEPHROLOGY- PROGRESS NOTE    Patient is a 81yo Malaysian speaking Female with HTN, DM, hypothyroidism, asthma, afib, CAD, CHF (s/p pacemaker) recently started on Lasix p/w vomiting for 2 weeks. Nephrology consulted for hyponatremia.     Hospital Medications: Medications reviewed.  REVIEW OF SYSTEMS:   CONSTITUTIONAL: No fevers or chills  RESPIRATORY: +shortness of breath i with intermittent cough  CARDIOVASCULAR: No chest pain.  GASTROINTESTINAL: No nausea, vomiting, diarrhea or abdominal pain.   VASCULAR: No bilateral lower extremity edema.     VITALS:  T(F): 98.3 (22 @ 04:55), Max: 98.3 (22 @ 04:55)  HR: 68 (22 @ 09:48)  BP: 99/65 (22 @ 07:03)  RR: 18 (22 @ 07:03)  SpO2: 98% (22 @ 09:48)  Wt(kg): --     @ 07:01  -   @ 07:00  --------------------------------------------------------  IN: 100 mL / OUT: 0 mL / NET: 100 mL      PHYSICAL EXAM:  Gen: NAD, calm  HEENT: MMM  Neck: no JVD  Cards: RRR, +S1/S2, +VINH  Resp: clear; no current wheezing b/l  GI: soft, NT/ND, NABS  : no CVA tenderness  Extremities: LE fullness B/L    LABS:    126 <--, 121 <--, 124 <--, 122 <--, 123 <--, 120 <--, 120 <--  126<L>  |  92<L>  |  19<H>  ----------------------------<  176<H>  4.1   |  25  |  1.15    Ca    8.8      2022 06:52  Phos  3.1       Mg     1.6         TPro  6.7  /  Alb  2.8<L>  /  TBili  0.3  /  DBili      /  AST  20  /  ALT  23  /  AlkPhos  54      Creatinine Trend: 1.15 <--, 1.33 <--, 1.11 <--, 1.23 <--, 1.07 <--, 1.24 <--, 1.29 <--, 1.32 <--, 1.34 <--, 1.37 <--                        10.2   6.33  )-----------( 264      ( 2022 06:52 )             28.1     Urine Studies:  Urinalysis Basic - ( 2022 16:05 )    Color: Yellow / Appearance: Clear / S.015 / pH:   Gluc:  / Ketone: Negative  / Bili: Negative / Urobili: Negative   Blood:  / Protein: Negative / Nitrite: Negative   Leuk Esterase: Negative / RBC: 0-2 /HPF / WBC 0-2 /HPF   Sq Epi:  / Non Sq Epi: Few /HPF / Bacteria:       Osmolality, Random Urine: 303 mos/kg ( @ 09:53)  Sodium, Random Urine: 17 mmol/L ( @ 09:53)  Osmolality, Random Urine: 177 mos/kg ( @ 07:13)  Sodium, Random Urine: 54 mmol/L ( @ 07:13)  Creatinine, Random Urine: 56 mg/dL ( @ 16:06)  Sodium, Random Urine: 39 mmol/L ( @ 16:06)  Osmolality, Random Urine: 326 mos/kg ( @ 16:06)    < from: Xray Chest 1 View- PORTABLE-Urgent (Xray Chest 1 View- PORTABLE-Urgent .) (22 @ 14:46) >    ACC: [id number] EXAM:  XR CHEST PORTABLE URGENT 1V                          PROCEDURE DATE:  2022          INTERPRETATION:  INDICATION: Chronic cough. Evaluate for pneumonia.    COMPARISON: 2021.    FINDINGS:  Heart/Vascular: Magnified cardiac and mediastinal silhouette, stable.   Dual-lead pacemaker.  Pulmonary: No significant abnormal pulmonary opacity. No sizable pleural   effusion.  Bones: Within normal limits.    Impression:  No significant change. Redemonstration of a pacemaker.        --- End of Report ---      < end of copied text >

## 2022-04-08 NOTE — PROGRESS NOTE ADULT - ASSESSMENT
Patient is a 83yoF, w/ PMH of HTN, DM, hypothyroidism, asthma, afib, CAD, CHF (s/p pacemaker), presents with vomiting. 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   Pt noted sob, hx asthma, wheezing on exam, started inhalers. CXR clear.   PT consulted.    Patient is a 83yoF, w/ PMH of HTN, DM, hypothyroidism, asthma, afib, CAD, CHF (s/p pacemaker), presents with vomiting. 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   Pt noted sob, hx asthma, wheezing on exam, started inhalers. CXR clear. pulmonary consulted.   PT consulted.

## 2022-04-08 NOTE — PROGRESS NOTE ADULT - PROBLEM SELECTOR PROBLEM 1
Acute hyponatremia

## 2022-04-08 NOTE — PROGRESS NOTE ADULT - ASSESSMENT
Patient is a 81yo Indian speaking Female with HTN, DM, hypothyroidism, asthma, afib, CAD, CHF (s/p pacemaker) recently started on Lasix p/w vomiting for 2 weeks. Nephrology consulted for hyponatremia.     1. Hyponatremia- urine studies initially consistent with SIADH-- then repeat with increased fluid intake. Monitor off IVF/ Lasix. Serum Na improving on NaCl 2g PO tid. c/w 1L FR and glucerna tid to increase osmolar intake.   Repeat urine osm, serum osm and urine Na in am. TSH  wnl. Check AM Cortisol. Monitor serial BMP's and UO for overcorrection. Do not correct more than 8 meQ/24 hours. Monitor serum sodium.    2. SRIDHAR- previous SCr 0.7-1.0; unclear etiology UA bland. FeUrea 48.13%. Renal function with mild improvement off Lasix. Recc post void bladder scan to r/o retention. Strict I/Os. Avoid nephrotoxins/ NSAIDs/ RCA. Monitor BMP.    3. Essential HTN- BP low normal. Continue with current anti-hypertensive medications. Monitor BP.    4. Shortness of breath- pt with h/o AS. CXR with no effusion. Monitor off Lasix. Pulmonary following    [facility] NEPHROLOGY  Nick Estrada M.D.  Dylan Coleman D.O.  Karla Montesinos M.D.  Kimberlyn Barrett, MSN, ANP-C  [phone]    [address] [place] [address]

## 2022-04-08 NOTE — PHYSICAL THERAPY INITIAL EVALUATION ADULT - GENERAL OBSERVATIONS, REHAB EVAL
Patient received in bed with Tele monitor inplace.
Patient received in bed with tele monitor in place.
Patient was received in bed.

## 2022-04-08 NOTE — PHYSICAL THERAPY INITIAL EVALUATION ADULT - ACTIVE RANGE OF MOTION EXAMINATION, REHAB EVAL
no Active ROM deficits were identified
bilateral upper extremity Active ROM was WFL (within functional limits)
no Active ROM deficits were identified

## 2022-04-08 NOTE — PHYSICAL THERAPY INITIAL EVALUATION ADULT - GROSSLY INTACT, SENSORY
to touch, pressure/Grossly Intact

## 2022-04-08 NOTE — PHYSICAL THERAPY INITIAL EVALUATION ADULT - CRITERIA FOR SKILLED THERAPEUTIC INTERVENTIONS
rehab potential/anticipated discharge recommendation
impairments found/functional limitations in following categories/risk reduction/prevention/rehab potential/therapy frequency/predicted duration of therapy intervention/anticipated discharge recommendation
rehab potential/anticipated discharge recommendation

## 2022-04-09 LAB
ANION GAP SERPL CALC-SCNC: 10 MMOL/L — SIGNIFICANT CHANGE UP (ref 5–17)
BUN SERPL-MCNC: 21 MG/DL — HIGH (ref 7–18)
CALCIUM SERPL-MCNC: 8.5 MG/DL — SIGNIFICANT CHANGE UP (ref 8.4–10.5)
CHLORIDE SERPL-SCNC: 98 MMOL/L — SIGNIFICANT CHANGE UP (ref 96–108)
CO2 SERPL-SCNC: 24 MMOL/L — SIGNIFICANT CHANGE UP (ref 22–31)
CREAT SERPL-MCNC: 1.05 MG/DL — SIGNIFICANT CHANGE UP (ref 0.5–1.3)
EGFR: 53 ML/MIN/1.73M2 — LOW
GLUCOSE BLDC GLUCOMTR-MCNC: 173 MG/DL — HIGH (ref 70–99)
GLUCOSE BLDC GLUCOMTR-MCNC: 229 MG/DL — HIGH (ref 70–99)
GLUCOSE BLDC GLUCOMTR-MCNC: 293 MG/DL — HIGH (ref 70–99)
GLUCOSE BLDC GLUCOMTR-MCNC: 313 MG/DL — HIGH (ref 70–99)
GLUCOSE SERPL-MCNC: 166 MG/DL — HIGH (ref 70–99)
HCT VFR BLD CALC: 26.7 % — LOW (ref 34.5–45)
HGB BLD-MCNC: 9.5 G/DL — LOW (ref 11.5–15.5)
MCHC RBC-ENTMCNC: 29.4 PG — SIGNIFICANT CHANGE UP (ref 27–34)
MCHC RBC-ENTMCNC: 35.6 GM/DL — SIGNIFICANT CHANGE UP (ref 32–36)
MCV RBC AUTO: 82.7 FL — SIGNIFICANT CHANGE UP (ref 80–100)
NRBC # BLD: 0 /100 WBCS — SIGNIFICANT CHANGE UP (ref 0–0)
PLATELET # BLD AUTO: 255 K/UL — SIGNIFICANT CHANGE UP (ref 150–400)
POTASSIUM SERPL-MCNC: 4.2 MMOL/L — SIGNIFICANT CHANGE UP (ref 3.5–5.3)
POTASSIUM SERPL-SCNC: 4.2 MMOL/L — SIGNIFICANT CHANGE UP (ref 3.5–5.3)
RBC # BLD: 3.23 M/UL — LOW (ref 3.8–5.2)
RBC # FLD: 12.2 % — SIGNIFICANT CHANGE UP (ref 10.3–14.5)
SODIUM SERPL-SCNC: 132 MMOL/L — LOW (ref 135–145)
WBC # BLD: 6.62 K/UL — SIGNIFICANT CHANGE UP (ref 3.8–10.5)
WBC # FLD AUTO: 6.62 K/UL — SIGNIFICANT CHANGE UP (ref 3.8–10.5)

## 2022-04-09 PROCEDURE — 99233 SBSQ HOSP IP/OBS HIGH 50: CPT

## 2022-04-09 RX ORDER — SODIUM CHLORIDE 9 MG/ML
2 INJECTION INTRAMUSCULAR; INTRAVENOUS; SUBCUTANEOUS
Refills: 0 | Status: DISCONTINUED | OUTPATIENT
Start: 2022-04-10 | End: 2022-04-11

## 2022-04-09 RX ORDER — INSULIN GLARGINE 100 [IU]/ML
8 INJECTION, SOLUTION SUBCUTANEOUS AT BEDTIME
Refills: 0 | Status: DISCONTINUED | OUTPATIENT
Start: 2022-04-09 | End: 2022-04-11

## 2022-04-09 RX ORDER — INSULIN GLARGINE 100 [IU]/ML
7 INJECTION, SOLUTION SUBCUTANEOUS AT BEDTIME
Refills: 0 | Status: DISCONTINUED | OUTPATIENT
Start: 2022-04-09 | End: 2022-04-09

## 2022-04-09 RX ADMIN — DRONEDARONE 400 MILLIGRAM(S): 400 TABLET, FILM COATED ORAL at 06:03

## 2022-04-09 RX ADMIN — Medication 200 MILLIGRAM(S): at 17:44

## 2022-04-09 RX ADMIN — CARVEDILOL PHOSPHATE 12.5 MILLIGRAM(S): 80 CAPSULE, EXTENDED RELEASE ORAL at 06:03

## 2022-04-09 RX ADMIN — Medication 3: at 11:46

## 2022-04-09 RX ADMIN — DRONEDARONE 400 MILLIGRAM(S): 400 TABLET, FILM COATED ORAL at 17:40

## 2022-04-09 RX ADMIN — Medication 3 MILLILITER(S): at 09:21

## 2022-04-09 RX ADMIN — Medication 3 MILLILITER(S): at 03:51

## 2022-04-09 RX ADMIN — Medication 3 MILLILITER(S): at 20:07

## 2022-04-09 RX ADMIN — Medication 4 UNIT(S): at 11:46

## 2022-04-09 RX ADMIN — SODIUM CHLORIDE 2 GRAM(S): 9 INJECTION INTRAMUSCULAR; INTRAVENOUS; SUBCUTANEOUS at 06:03

## 2022-04-09 RX ADMIN — Medication 0.5 MILLIGRAM(S): at 21:15

## 2022-04-09 RX ADMIN — Medication 175 MICROGRAM(S): at 06:04

## 2022-04-09 RX ADMIN — Medication 4 UNIT(S): at 17:13

## 2022-04-09 RX ADMIN — Medication 4: at 21:43

## 2022-04-09 RX ADMIN — Medication 4 UNIT(S): at 08:06

## 2022-04-09 RX ADMIN — INSULIN GLARGINE 8 UNIT(S): 100 INJECTION, SOLUTION SUBCUTANEOUS at 21:44

## 2022-04-09 RX ADMIN — Medication 1: at 08:06

## 2022-04-09 RX ADMIN — CARVEDILOL PHOSPHATE 12.5 MILLIGRAM(S): 80 CAPSULE, EXTENDED RELEASE ORAL at 17:40

## 2022-04-09 RX ADMIN — BUDESONIDE AND FORMOTEROL FUMARATE DIHYDRATE 2 PUFF(S): 160; 4.5 AEROSOL RESPIRATORY (INHALATION) at 11:45

## 2022-04-09 RX ADMIN — AMLODIPINE BESYLATE 5 MILLIGRAM(S): 2.5 TABLET ORAL at 06:07

## 2022-04-09 RX ADMIN — SODIUM CHLORIDE 2 GRAM(S): 9 INJECTION INTRAMUSCULAR; INTRAVENOUS; SUBCUTANEOUS at 13:29

## 2022-04-09 RX ADMIN — Medication 200 MILLIGRAM(S): at 23:25

## 2022-04-09 RX ADMIN — Medication 3 MILLILITER(S): at 15:14

## 2022-04-09 RX ADMIN — Medication 2: at 17:13

## 2022-04-09 RX ADMIN — PANTOPRAZOLE SODIUM 40 MILLIGRAM(S): 20 TABLET, DELAYED RELEASE ORAL at 06:07

## 2022-04-09 RX ADMIN — BUDESONIDE AND FORMOTEROL FUMARATE DIHYDRATE 2 PUFF(S): 160; 4.5 AEROSOL RESPIRATORY (INHALATION) at 21:45

## 2022-04-09 RX ADMIN — RIVAROXABAN 15 MILLIGRAM(S): KIT at 17:41

## 2022-04-09 NOTE — PROGRESS NOTE ADULT - SUBJECTIVE AND OBJECTIVE BOX
[facility] NEPHROLOGY- PROGRESS NOTE    Patient is a 81yo [place] speaking Female with HTN, DM, hypothyroidism, asthma, afib, CAD, CHF (s/p pacemaker) recently started on Lasix p/w vomiting for 2 weeks. Nephrology consulted for hyponatremia.     Hospital Medications: Medications reviewed.  REVIEW OF SYSTEMS:   CONSTITUTIONAL: No fevers or chills  RESPIRATORY: +shortness of breath with intermittent cough  CARDIOVASCULAR: No chest pain.  GASTROINTESTINAL: No nausea, vomiting, diarrhea or abdominal pain.   VASCULAR: No bilateral lower extremity edema.     VITALS:  T(F): 98.8 (22 @ 14:24), Max: 98.8 (22 @ 14:24)  HR: 62 (22 @ :24)  BP: 137/69 (22 @ 14:24)  RR: 17 (22 @ 14:24)  SpO2: 99% (22 @ :24)  Wt(kg): --      PHYSICAL EXAM:  Gen: NAD, calm  HEENT: MMM  Neck: no JVD  Cards: RRR, +S1/S2, +VINH  Resp: clear; no current wheezing b/l  GI: soft, NT/ND, NABS  : no CVA tenderness  Extremities: LE fullness B/L    LABS:      132<L>  |  98  |  21<H>  ----------------------------<  166<H>  4.2   |  24  |  1.05    Ca    8.5      2022 07:08      Creatinine Trend: 1.05 <--, 1.15 <--, 1.33 <--, 1.11 <--, 1.23 <--, 1.07 <--, 1.24 <--, 1.29 <--, 1.32 <--, 1.34 <--, 1.37 <--                        9.5    6.62  )-----------( 255      ( 2022 07:08 )             26.7     Urine Studies:  Urinalysis Basic - ( 2022 16:05 )    Color: Yellow / Appearance: Clear / S.015 / pH:   Gluc:  / Ketone: Negative  / Bili: Negative / Urobili: Negative   Blood:  / Protein: Negative / Nitrite: Negative   Leuk Esterase: Negative / RBC: 0-2 /HPF / WBC 0-2 /HPF   Sq Epi:  / Non Sq Epi: Few /HPF / Bacteria:       Osmolality, Random Urine: 303 mos/kg ( @ 09:53)  Sodium, Random Urine: 17 mmol/L ( @ 09:53)  Osmolality, Random Urine: 177 mos/kg ( @ 07:13)  Sodium, Random Urine: 54 mmol/L ( @ 07:13)  Creatinine, Random Urine: 56 mg/dL ( @ 16:06)  Sodium, Random Urine: 39 mmol/L ( @ 16:06)  Osmolality, Random Urine: 326 mos/kg ( @ 16:06)      < from: Xray Chest 1 View- PORTABLE-Routine (Xray Chest 1 View- PORTABLE-Routine in AM.) (22 @ 10:23) >    ACC: [id number] EXAM:  XR CHEST PORTABLE ROUTINE 1V                          PROCEDURE DATE:  2022          INTERPRETATION:  Portable chest radiograph    CLINICAL INFORMATION: Dyspnea, shortness of breath.    TECHNIQUE:  Portable  AP chest radiograph.    COMPARISON: 2022 chest radiograph .    FINDINGS:  CATHETERS AND TUBES: None    PULMONARY: The visualized lungs are clear of airspace consolidations or   effusions.   No pneumothorax.    HEART/VASCULAR: The heart is mildly enlarged in transverse diameter.  Cardiac device wire leads are within right atrium and right ventricle.    BONES: Visualized osseous structures are intact.    IMPRESSION:   No radiographic evidence of active chest disease.    --- End of Report ---            GUERRERO SHAH MD; Attending Radiologist  This document has been electronically signed. 2022 10:51AM    < end of copied text >

## 2022-04-09 NOTE — PROGRESS NOTE ADULT - ASSESSMENT
Patient is a 83yo [place]ian speaking Female with HTN, DM, hypothyroidism, asthma, afib, CAD, CHF (s/p pacemaker) recently started on Lasix p/w vomiting for 2 weeks. Nephrology consulted for hyponatremia.     1. Hyponatremia- urine studies initially consistent with SIADH-- then repeat with increased fluid intake. Monitor off IVF/ Lasix. Serum Na improving, will decrease NaCl 2g PO to bid. c/w 1L FR and glucerna tid to increase osmolar intake.   Repeat urine osm, serum osm and urine Na in am. TSH and AM Cortisol wnl. Monitor serial BMP's and UO for overcorrection. Do not correct more than 8 meQ/24 hours. Monitor serum sodium.    2. SRIDHAR- previous SCr 0.7-1.0; unclear etiology UA bland. FeUrea 48.13%. SRIDHAR resolved. Renal function at baseline. Strict I/Os. Avoid nephrotoxins/ NSAIDs/ RCA. Monitor BMP.    3. Essential HTN- BP acceptable Continue with current anti-hypertensive medications. Monitor BP.    4. Shortness of breath- pt with h/o AS. CXR with no effusion. Monitor off Lasix. Pulmonary following    [facility] NEPHROLOGY  Nick Estrada M.D.  Dylan Coleman D.O.  Karla Montesinos M.D.  Kimberlyn Barrett, MSN, ANP-C  [phone]    [address] [place] [id number]

## 2022-04-09 NOTE — PROGRESS NOTE ADULT - SUBJECTIVE AND OBJECTIVE BOX
Patient is a 82y old  Female who presents with a chief complaint of hyponatremia (08 Apr 2022 14:00)      INTERVAL HPI/OVERNIGHT EVENTS: no events noted overnight.    MEDICATIONS  (STANDING):  albuterol/ipratropium for Nebulization 3 milliLiter(s) Nebulizer every 6 hours  amLODIPine   Tablet 5 milliGRAM(s) Oral daily  budesonide 160 MICROgram(s)/formoterol 4.5 MICROgram(s) Inhaler 2 Puff(s) Inhalation two times a day  carvedilol 12.5 milliGRAM(s) Oral every 12 hours  dronedarone 400 milliGRAM(s) Oral two times a day  insulin glargine Injectable (LANTUS) 7 Unit(s) SubCutaneous at bedtime  insulin lispro (ADMELOG) corrective regimen sliding scale   SubCutaneous Before meals and at bedtime  insulin lispro Injectable (ADMELOG) 4 Unit(s) SubCutaneous three times a day before meals  levothyroxine 175 MICROGram(s) Oral daily  pantoprazole    Tablet 40 milliGRAM(s) Oral before breakfast  rivaroxaban 15 milliGRAM(s) Oral with dinner    MEDICATIONS  (PRN):  guaiFENesin Oral Liquid (Sugar-Free) 200 milliGRAM(s) Oral every 6 hours PRN Cough  LORazepam     Tablet 0.5 milliGRAM(s) Oral daily PRN Anxiety      __________________________________________________  REVIEW OF SYSTEMS:    CONSTITUTIONAL: No fever,   EYES: no acute visual disturbances  NECK: No pain or stiffness  RESPIRATORY: No cough; No shortness of breath  CARDIOVASCULAR: No chest pain, no palpitations  GASTROINTESTINAL: No pain. No nausea or vomiting; No diarrhea   NEUROLOGICAL: No headache or numbness, no tremors  MUSCULOSKELETAL: No joint pain, no muscle pain  GENITOURINARY: no dysuria, no frequency, no hesitancy  PSYCHIATRY: no depression , no anxiety  ALL OTHER  ROS negative        Vital Signs Last 24 Hrs  T(C): 37.1 (09 Apr 2022 14:24), Max: 37.1 (09 Apr 2022 14:24)  T(F): 98.8 (09 Apr 2022 14:24), Max: 98.8 (09 Apr 2022 14:24)  HR: 62 (09 Apr 2022 14:24) (62 - 67)  BP: 137/69 (09 Apr 2022 14:24) (120/59 - 153/69)  BP(mean): 80 (08 Apr 2022 20:03) (80 - 80)  RR: 17 (09 Apr 2022 14:24) (17 - 18)  SpO2: 99% (09 Apr 2022 14:24) (94% - 99%)    ________________________________________________  PHYSICAL EXAM:  GENERAL: NAD  HEENT: Normocephalic;  conjunctivae and sclerae clear; moist mucous membranes;   NECK : supple  CHEST/LUNG: Clear to auscultation bilaterally with good air entry   HEART: S1 S2  regular; no murmurs, gallops or rubs  ABDOMEN: Soft, Nontender, Nondistended; Bowel sounds present  EXTREMITIES: no cyanosis; no edema; no calf tenderness  SKIN: warm and dry; no rash  NERVOUS SYSTEM:  Awake and alert; Oriented  to place, person and time ; no new deficits    _________________________________________________  LABS:                        9.5    6.62  )-----------( 255      ( 09 Apr 2022 07:08 )             26.7     04-09    132<L>  |  98  |  21<H>  ----------------------------<  166<H>  4.2   |  24  |  1.05    Ca    8.5      09 Apr 2022 07:08          CAPILLARY BLOOD GLUCOSE      POCT Blood Glucose.: 293 mg/dL (09 Apr 2022 11:32)  POCT Blood Glucose.: 173 mg/dL (09 Apr 2022 07:21)  POCT Blood Glucose.: 240 mg/dL (08 Apr 2022 21:06)  POCT Blood Glucose.: 229 mg/dL (08 Apr 2022 16:33)        RADIOLOGY & ADDITIONAL TESTS:    Imaging Personally Reviewed:  YES    Consultant(s) Notes Reviewed:   YES    Care Discussed with Consultants : YES     Plan of care was discussed with patient and /or primary care giver; all questions and concerns were addressed and care was aligned with patient's wishes.

## 2022-04-09 NOTE — PROGRESS NOTE ADULT - ASSESSMENT
83yoF, w/ PMH of HTN, DM, hypothyroidism, asthma, afib, CAD, CHF (s/p pacemaker), colon cancer s/p R hemicolectomy presents with vomiting x2 weeks. Admitted for Hyponatremia     Patient was seen and examined, prefers her daughter to translate and called her to assist. Patient feels well, had some cough         A/P:  #Hypo-osmolar Hyponatremia- SIADH   #Uncontrolled Diabetes Mellitus   #SRIDHAR- pre-renal- resolved   #Shortness of breath 2/2 deconditioning, possible JOSE DANIEL?   #Anxiety  #Chronic Atrial fibrillation   #Hypothyroidism   #Chronic Diastolic Heart Failure   #Hypertension  #H/O Asthma     Plan:  - Hyponatremia 2/2 SIADH, sodium improved to 132, decreased salt tablets to 2g BID . Water restriction 1L/day. Nephro Dr. Montesinos following   -Pt reports shortness of breath, likely 2/2 deconditioning. Chest X-Ray negative, no s/s of volume overload.  She had recent ischemic eval earlier this year which was negative. Will c/w Duoneb and Symbicort. Pulm recs appreciated.   -C/w Multaq, carvedilol and Xarelto for Afib. Rate controlled   -BP stable, c/w amlodipine and carvedilol    -Scr improved   -Blood sugars elevated, increased Lantus to 8 units, admelog 4 units TID and Hss . HbA1C 8.4%   -Discussed care plan with patient and her daughter, DC planning in am. Spoke with CM for resumption of HHA    Out-patient provider: PCP- Dr. Mihai Washington, Pulmonologist  Nicole Morales.

## 2022-04-10 LAB
ANION GAP SERPL CALC-SCNC: 8 MMOL/L — SIGNIFICANT CHANGE UP (ref 5–17)
BUN SERPL-MCNC: 21 MG/DL — HIGH (ref 7–18)
CALCIUM SERPL-MCNC: 8.7 MG/DL — SIGNIFICANT CHANGE UP (ref 8.4–10.5)
CHLORIDE SERPL-SCNC: 99 MMOL/L — SIGNIFICANT CHANGE UP (ref 96–108)
CO2 SERPL-SCNC: 23 MMOL/L — SIGNIFICANT CHANGE UP (ref 22–31)
CREAT SERPL-MCNC: 1.06 MG/DL — SIGNIFICANT CHANGE UP (ref 0.5–1.3)
EGFR: 52 ML/MIN/1.73M2 — LOW
GLUCOSE BLDC GLUCOMTR-MCNC: 158 MG/DL — HIGH (ref 70–99)
GLUCOSE BLDC GLUCOMTR-MCNC: 163 MG/DL — HIGH (ref 70–99)
GLUCOSE BLDC GLUCOMTR-MCNC: 172 MG/DL — HIGH (ref 70–99)
GLUCOSE BLDC GLUCOMTR-MCNC: 195 MG/DL — HIGH (ref 70–99)
GLUCOSE BLDC GLUCOMTR-MCNC: 215 MG/DL — HIGH (ref 70–99)
GLUCOSE BLDC GLUCOMTR-MCNC: 291 MG/DL — HIGH (ref 70–99)
GLUCOSE BLDC GLUCOMTR-MCNC: 323 MG/DL — HIGH (ref 70–99)
GLUCOSE SERPL-MCNC: 147 MG/DL — HIGH (ref 70–99)
HCT VFR BLD CALC: 27.3 % — LOW (ref 34.5–45)
HGB BLD-MCNC: 9.5 G/DL — LOW (ref 11.5–15.5)
MCHC RBC-ENTMCNC: 28.8 PG — SIGNIFICANT CHANGE UP (ref 27–34)
MCHC RBC-ENTMCNC: 34.8 GM/DL — SIGNIFICANT CHANGE UP (ref 32–36)
MCV RBC AUTO: 82.7 FL — SIGNIFICANT CHANGE UP (ref 80–100)
NRBC # BLD: 0 /100 WBCS — SIGNIFICANT CHANGE UP (ref 0–0)
PLATELET # BLD AUTO: 257 K/UL — SIGNIFICANT CHANGE UP (ref 150–400)
POTASSIUM SERPL-MCNC: 4.7 MMOL/L — SIGNIFICANT CHANGE UP (ref 3.5–5.3)
POTASSIUM SERPL-SCNC: 4.7 MMOL/L — SIGNIFICANT CHANGE UP (ref 3.5–5.3)
RBC # BLD: 3.3 M/UL — LOW (ref 3.8–5.2)
RBC # FLD: 12.7 % — SIGNIFICANT CHANGE UP (ref 10.3–14.5)
SODIUM SERPL-SCNC: 130 MMOL/L — LOW (ref 135–145)
WBC # BLD: 7.07 K/UL — SIGNIFICANT CHANGE UP (ref 3.8–10.5)
WBC # FLD AUTO: 7.07 K/UL — SIGNIFICANT CHANGE UP (ref 3.8–10.5)

## 2022-04-10 PROCEDURE — 71045 X-RAY EXAM CHEST 1 VIEW: CPT | Mod: 26

## 2022-04-10 PROCEDURE — 99232 SBSQ HOSP IP/OBS MODERATE 35: CPT

## 2022-04-10 RX ORDER — SODIUM CHLORIDE 9 MG/ML
2 INJECTION INTRAMUSCULAR; INTRAVENOUS; SUBCUTANEOUS ONCE
Refills: 0 | Status: COMPLETED | OUTPATIENT
Start: 2022-04-10 | End: 2022-04-10

## 2022-04-10 RX ORDER — LANOLIN ALCOHOL/MO/W.PET/CERES
3 CREAM (GRAM) TOPICAL AT BEDTIME
Refills: 0 | Status: DISCONTINUED | OUTPATIENT
Start: 2022-04-10 | End: 2022-04-11

## 2022-04-10 RX ADMIN — Medication 3 MILLIGRAM(S): at 01:17

## 2022-04-10 RX ADMIN — Medication 4 UNIT(S): at 08:00

## 2022-04-10 RX ADMIN — Medication 3 MILLILITER(S): at 20:17

## 2022-04-10 RX ADMIN — Medication 1: at 17:09

## 2022-04-10 RX ADMIN — Medication 1: at 08:01

## 2022-04-10 RX ADMIN — Medication 4 UNIT(S): at 12:14

## 2022-04-10 RX ADMIN — Medication 4 UNIT(S): at 17:09

## 2022-04-10 RX ADMIN — CARVEDILOL PHOSPHATE 12.5 MILLIGRAM(S): 80 CAPSULE, EXTENDED RELEASE ORAL at 05:51

## 2022-04-10 RX ADMIN — PANTOPRAZOLE SODIUM 40 MILLIGRAM(S): 20 TABLET, DELAYED RELEASE ORAL at 05:51

## 2022-04-10 RX ADMIN — Medication 175 MICROGRAM(S): at 05:51

## 2022-04-10 RX ADMIN — DRONEDARONE 400 MILLIGRAM(S): 400 TABLET, FILM COATED ORAL at 05:51

## 2022-04-10 RX ADMIN — BUDESONIDE AND FORMOTEROL FUMARATE DIHYDRATE 2 PUFF(S): 160; 4.5 AEROSOL RESPIRATORY (INHALATION) at 22:31

## 2022-04-10 RX ADMIN — SODIUM CHLORIDE 2 GRAM(S): 9 INJECTION INTRAMUSCULAR; INTRAVENOUS; SUBCUTANEOUS at 05:51

## 2022-04-10 RX ADMIN — CARVEDILOL PHOSPHATE 12.5 MILLIGRAM(S): 80 CAPSULE, EXTENDED RELEASE ORAL at 17:39

## 2022-04-10 RX ADMIN — BUDESONIDE AND FORMOTEROL FUMARATE DIHYDRATE 2 PUFF(S): 160; 4.5 AEROSOL RESPIRATORY (INHALATION) at 12:15

## 2022-04-10 RX ADMIN — Medication 3 MILLILITER(S): at 08:38

## 2022-04-10 RX ADMIN — RIVAROXABAN 15 MILLIGRAM(S): KIT at 17:39

## 2022-04-10 RX ADMIN — DRONEDARONE 400 MILLIGRAM(S): 400 TABLET, FILM COATED ORAL at 17:38

## 2022-04-10 RX ADMIN — INSULIN GLARGINE 8 UNIT(S): 100 INJECTION, SOLUTION SUBCUTANEOUS at 22:20

## 2022-04-10 RX ADMIN — Medication 3 MILLILITER(S): at 14:42

## 2022-04-10 RX ADMIN — AMLODIPINE BESYLATE 5 MILLIGRAM(S): 2.5 TABLET ORAL at 05:50

## 2022-04-10 RX ADMIN — Medication 3: at 22:19

## 2022-04-10 RX ADMIN — Medication 2: at 12:13

## 2022-04-10 RX ADMIN — Medication 200 MILLIGRAM(S): at 05:50

## 2022-04-10 RX ADMIN — SODIUM CHLORIDE 2 GRAM(S): 9 INJECTION INTRAMUSCULAR; INTRAVENOUS; SUBCUTANEOUS at 13:37

## 2022-04-10 RX ADMIN — SODIUM CHLORIDE 2 GRAM(S): 9 INJECTION INTRAMUSCULAR; INTRAVENOUS; SUBCUTANEOUS at 17:38

## 2022-04-10 NOTE — PROGRESS NOTE ADULT - ASSESSMENT
Dyspnea sec to Obesity/ Deconditioning ? JOSE DANIEL   Br Asthma   Hyponatremia ? 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

## 2022-04-10 NOTE — DIETITIAN INITIAL EVALUATION ADULT - PERTINENT LABORATORY DATA
04-10    130<L>  |  99  |  21<H>  ----------------------------<  147<H>  4.7   |  23  |  1.06    Ca    8.7      10 Apr 2022 06:47    POCT Blood Glucose.: 215 mg/dL (04-10-22 @ 11:31)  A1C with Estimated Average Glucose Result: 8.4 % (04-06-22 @ 10:26)

## 2022-04-10 NOTE — DIETITIAN INITIAL EVALUATION ADULT - PERTINENT MEDS FT
MEDICATIONS  (STANDING):  albuterol/ipratropium for Nebulization 3 milliLiter(s) Nebulizer every 6 hours  amLODIPine   Tablet 5 milliGRAM(s) Oral daily  budesonide 160 MICROgram(s)/formoterol 4.5 MICROgram(s) Inhaler 2 Puff(s) Inhalation two times a day  carvedilol 12.5 milliGRAM(s) Oral every 12 hours  dronedarone 400 milliGRAM(s) Oral two times a day  insulin glargine Injectable (LANTUS) 8 Unit(s) SubCutaneous at bedtime  insulin lispro (ADMELOG) corrective regimen sliding scale   SubCutaneous Before meals and at bedtime  insulin lispro Injectable (ADMELOG) 4 Unit(s) SubCutaneous three times a day before meals  levothyroxine 175 MICROGram(s) Oral daily  pantoprazole    Tablet 40 milliGRAM(s) Oral before breakfast  rivaroxaban 15 milliGRAM(s) Oral with dinner  sodium chloride 2 Gram(s) Oral two times a day    MEDICATIONS  (PRN):  guaiFENesin Oral Liquid (Sugar-Free) 200 milliGRAM(s) Oral every 6 hours PRN Cough  LORazepam     Tablet 0.5 milliGRAM(s) Oral daily PRN Anxiety  melatonin 3 milliGRAM(s) Oral at bedtime PRN Insomnia

## 2022-04-10 NOTE — PROGRESS NOTE ADULT - SUBJECTIVE AND OBJECTIVE BOX
Patient is a 82y old  Female who presents with a chief complaint of Hyponatremia, hypo-osmolarity, or hypo-osmolar hyponatremia     (10 Apr 2022 15:54)      INTERVAL HPI/OVERNIGHT EVENTS: no events noted overnight.    MEDICATIONS  (STANDING):  albuterol/ipratropium for Nebulization 3 milliLiter(s) Nebulizer every 6 hours  amLODIPine   Tablet 5 milliGRAM(s) Oral daily  budesonide 160 MICROgram(s)/formoterol 4.5 MICROgram(s) Inhaler 2 Puff(s) Inhalation two times a day  carvedilol 12.5 milliGRAM(s) Oral every 12 hours  dronedarone 400 milliGRAM(s) Oral two times a day  insulin glargine Injectable (LANTUS) 8 Unit(s) SubCutaneous at bedtime  insulin lispro (ADMELOG) corrective regimen sliding scale   SubCutaneous Before meals and at bedtime  insulin lispro Injectable (ADMELOG) 4 Unit(s) SubCutaneous three times a day before meals  levothyroxine 175 MICROGram(s) Oral daily  pantoprazole    Tablet 40 milliGRAM(s) Oral before breakfast  rivaroxaban 15 milliGRAM(s) Oral with dinner  sodium chloride 2 Gram(s) Oral two times a day    MEDICATIONS  (PRN):  guaiFENesin Oral Liquid (Sugar-Free) 200 milliGRAM(s) Oral every 6 hours PRN Cough  LORazepam     Tablet 0.5 milliGRAM(s) Oral daily PRN Anxiety  melatonin 3 milliGRAM(s) Oral at bedtime PRN Insomnia      __________________________________________________  REVIEW OF SYSTEMS:    CONSTITUTIONAL: No fever,   EYES: no acute visual disturbances  NECK: No pain or stiffness  RESPIRATORY: No cough; No shortness of breath  CARDIOVASCULAR: No chest pain, no palpitations  GASTROINTESTINAL: No pain. No nausea or vomiting; No diarrhea   NEUROLOGICAL: No headache or numbness, no tremors  MUSCULOSKELETAL: No joint pain, no muscle pain  GENITOURINARY: no dysuria, no frequency, no hesitancy  PSYCHIATRY: no depression , no anxiety  ALL OTHER  ROS negative        Vital Signs Last 24 Hrs  T(C): 36.3 (10 Apr 2022 14:37), Max: 36.8 (10 Apr 2022 05:05)  T(F): 97.4 (10 Apr 2022 14:37), Max: 98.2 (10 Apr 2022 05:05)  HR: 61 (10 Apr 2022 17:42) (58 - 65)  BP: 141/61 (10 Apr 2022 17:42) (106/68 - 141/61)  BP(mean): --  RR: 16 (10 Apr 2022 14:37) (16 - 18)  SpO2: 96% (10 Apr 2022 14:37) (96% - 99%)    ________________________________________________  PHYSICAL EXAM:  GENERAL: NAD  HEENT: Normocephalic;  conjunctivae and sclerae clear; moist mucous membranes;   CHEST/LUNG: Clear to auscultation bilaterally, no wheezing   HEART: S1 S2  regular; VINH +   ABDOMEN: Soft, Nontender, Nondistended; Bowel sounds present  EXTREMITIES: edema, chronic venous stasis changes   SKIN: no rash   NERVOUS SYSTEM:  Awake and alert; Oriented  to place, person and time ; no new deficits    _________________________________________________  LABS:                        9.5    7.07  )-----------( 257      ( 10 Apr 2022 06:47 )             27.3     04-10    130<L>  |  99  |  21<H>  ----------------------------<  147<H>  4.7   |  23  |  1.06    Ca    8.7      10 Apr 2022 06:47          CAPILLARY BLOOD GLUCOSE      POCT Blood Glucose.: 163 mg/dL (10 Apr 2022 16:25)  POCT Blood Glucose.: 215 mg/dL (10 Apr 2022 11:31)  POCT Blood Glucose.: 158 mg/dL (10 Apr 2022 07:37)  POCT Blood Glucose.: 313 mg/dL (09 Apr 2022 21:17)        RADIOLOGY & ADDITIONAL TESTS:    Imaging Personally Reviewed:  YES    Consultant(s) Notes Reviewed:   YES    Care Discussed with Consultants : YES     Plan of care was discussed with patient and /or primary care giver; all questions and concerns were addressed and care was aligned with patient's wishes.

## 2022-04-10 NOTE — PROGRESS NOTE ADULT - SUBJECTIVE AND OBJECTIVE BOX
PULMONARY  progress note    KWAME MOSQUERA  MRN-[id number]    Patient is a 82y old  Female who presents with a chief complaint of hyponatremia (10 Apr 2022 13:55)  Feels better, no sob or cough      MEDICATIONS  (STANDING):  albuterol/ipratropium for Nebulization 3 milliLiter(s) Nebulizer every 6 hours  amLODIPine   Tablet 5 milliGRAM(s) Oral daily  budesonide 160 MICROgram(s)/formoterol 4.5 MICROgram(s) Inhaler 2 Puff(s) Inhalation two times a day  carvedilol 12.5 milliGRAM(s) Oral every 12 hours  dronedarone 400 milliGRAM(s) Oral two times a day  insulin glargine Injectable (LANTUS) 8 Unit(s) SubCutaneous at bedtime  insulin lispro (ADMELOG) corrective regimen sliding scale   SubCutaneous Before meals and at bedtime  insulin lispro Injectable (ADMELOG) 4 Unit(s) SubCutaneous three times a day before meals  levothyroxine 175 MICROGram(s) Oral daily  pantoprazole    Tablet 40 milliGRAM(s) Oral before breakfast  rivaroxaban 15 milliGRAM(s) Oral with dinner  sodium chloride 2 Gram(s) Oral two times a day      MEDICATIONS  (PRN):  guaiFENesin Oral Liquid (Sugar-Free) 200 milliGRAM(s) Oral every 6 hours PRN Cough  LORazepam     Tablet 0.5 milliGRAM(s) Oral daily PRN Anxiety  melatonin 3 milliGRAM(s) Oral at bedtime PRN Insomnia        PAST MEDICAL & SURGICAL HISTORY:  Asthma    CAD (Coronary Atherosclerotic Disease)    Myocardial Infarction    Diabetes    HTN (Hypertension)    HLD (Hyperlipidemia)    CHF (congestive heart failure), NYHA class I    IBS (irritable bowel syndrome)    Venous stasis    Hypothyroidism    Atrial fibrillation    Pacemaker    Obesity    S/P coronary angiogram             REVIEW OF SYSTEMS:  CONSTITUTIONAL: No fever, weight loss, or fatigue   EYES: No eye pain, visual disturbances, or discharge  ENT:  No difficulty hearing, tinnitus, vertigo; No sinus or throat pain  NECK: No pain or stiffness or nodes  RESPIRATORY:  cough--   wheezing--   chills--   hemoptysis--  Shortness of Breath+  CARDIOVASCULAR: No chest pain, palpitations, passing out, dizziness, or leg swelling  GASTROINTESTINAL: No abdominal or epigastric pain. No nausea, vomiting,  NEUROLOGICAL: No headaches, memory loss, loss of strength, numbness, or tremors  SKIN: No itching, burning, rashes, or lesions   LYMPH Nodes: No enlarged glands  HEME/LYMPH: No easy bruising, or bleeding gums  ALLERGY AND IMMUNOLOGIC: No hives or eczema        Vital Signs Last 24 Hrs  T(C): 36.3 (10 Apr 2022 14:37), Max: 36.8 (10 Apr 2022 05:05)  T(F): 97.4 (10 Apr 2022 14:37), Max: 98.2 (10 Apr 2022 05:05)  HR: 63 (10 Apr 2022 14:37) (58 - 66)  BP: 132/76 (10 Apr 2022 14:37) (106/68 - 132/76)  BP(mean): --  RR: 16 (10 Apr 2022 14:37) (16 - 18)  SpO2: 96% (10 Apr 2022 14:37) (96% - 99%)  I&O's Detail      PHYSICAL EXAMINATION:    GENERAL: The patient is obese w/f  in no apparent distress.   SKIN no rash ecchymoses or bruises  HEENT: Head is normocephalic and atraumatic  CECELIA , Extraocular muscles are intact. Mucous membranes  moist.   Neck supple ,No LN felt JVP not increased  Thyroid not enlarged  Cardiovascular:  S1 S2 heard, RSR, No JVD , systolic  murmur at apex, No gallop or rub, PPM lt side  Respiratory: Chest wall symmetrical with good air entry ,Percussion note normal,    Lungs vesicular breathing with  no  rales  or  wheeze	  ABDOMEN:  Soft, Non-tender, obese ,  no hepatomegaly or splenomegaly BS positive	  Extremities: Normal range of motion, No clubbing, cyanosis or edema  Vascular: Peripheral pulses palpable 2+ bilaterally  CNS:  Alert and oriented x3  Cranial nerves intact  sensory intact  motor power5/5  dtr 2+  Babinski neg        LABS:                        9.5    7.07  )-----------( 257      ( 10 Apr 2022 06:47 )             27.3     04-10    130<L>  |  99  |  21<H>  ----------------------------<  147<H>  4.7   |  23  |  1.06    Ca    8.7      10 Apr 2022 06:47      urine Na -- 17

## 2022-04-10 NOTE — PROGRESS NOTE ADULT - SUBJECTIVE AND OBJECTIVE BOX
[facility] NEPHROLOGY- PROGRESS NOTE    Patient is a 83yo [place]i speaking Female with HTN, DM, hypothyroidism, asthma, afib, CAD, CHF (s/p pacemaker) recently started on Lasix p/w vomiting for 2 weeks. Nephrology consulted for hyponatremia.     Hospital Medications: Medications reviewed.  REVIEW OF SYSTEMS:   CONSTITUTIONAL: No fevers or chills +sleepy  RESPIRATORY: +shortness of breath with intermittent cough  CARDIOVASCULAR: No chest pain.  GASTROINTESTINAL: No nausea, vomiting, diarrhea or abdominal pain.   VASCULAR: No bilateral lower extremity edema.     VITALS:  T(F): 98.2 (04-10-22 @ 05:05), Max: 98.8 (22 @ 14:24)  HR: 58 (04-10-22 @ 05:05)  BP: 106/68 (04-10-22 @ 05:05)  RR: 18 (04-10-22 @ 05:05)  SpO2: 99% (04-10-22 @ 05:05)  Wt(kg): --    PHYSICAL EXAM:  Gen: NAD, calm  HEENT: MMM  Neck: no JVD  Cards: RRR, +S1/S2, +VINH  Resp: clear; no current wheezing b/l  GI: soft, NT/ND, NABS  : no CVA tenderness  Extremities: LE fullness B/L      LABS:  04-10  130 <--, 132 <--, 126 <--, 121 <--, 124 <--, 122 <--, 123 <--  130<L>  |  99  |  21<H>  ----------------------------<  147<H>  4.7   |  23  |  1.06    Ca    8.7      10 Apr 2022 06:47      Creatinine Trend: 1.06 <--, 1.05 <--, 1.15 <--, 1.33 <--, 1.11 <--, 1.23 <--, 1.07 <--, 1.24 <--, 1.29 <--, 1.32 <--, 1.34 <--, 1.37 <--                        9.5    7.07  )-----------( 257      ( 10 Apr 2022 06:47 )             27.3     Urine Studies:  Urinalysis Basic - ( 2022 16:05 )    Color: Yellow / Appearance: Clear / S.015 / pH:   Gluc:  / Ketone: Negative  / Bili: Negative / Urobili: Negative   Blood:  / Protein: Negative / Nitrite: Negative   Leuk Esterase: Negative / RBC: 0-2 /HPF / WBC 0-2 /HPF   Sq Epi:  / Non Sq Epi: Few /HPF / Bacteria:       Osmolality, Random Urine: 303 mos/kg ( @ 09:53)  Sodium, Random Urine: 17 mmol/L ( @ 09:53)  Osmolality, Random Urine: 177 mos/kg ( @ 07:13)  Sodium, Random Urine: 54 mmol/L ( @ 07:13)  Creatinine, Random Urine: 56 mg/dL ( @ 16:06)  Sodium, Random Urine: 39 mmol/L ( @ 16:06)  Osmolality, Random Urine: 326 mos/kg ( @ 16:06)

## 2022-04-10 NOTE — PROGRESS NOTE ADULT - ASSESSMENT
83yoF, w/ PMH of HTN, DM, hypothyroidism, asthma, afib, CAD, CHF (s/p pacemaker), colon cancer s/p R hemicolectomy presents with vomiting x2 weeks. Admitted for Hyponatremia     Patient was seen and examined, prefers her daughter to translate and called her to assist. Patient feels well, had some cough         A/P:  #Hypo-osmolar Hyponatremia- SIADH   #Uncontrolled Diabetes Mellitus   #SRIDHAR- pre-renal- resolved   #Shortness of breath 2/2 deconditioning, possible JOSE DANIEL?   #Anxiety  #Chronic Atrial fibrillation   #Hypothyroidism   #Chronic Diastolic Heart Failure   #Hypertension  #H/O Asthma     Plan:  - Hyponatremia 2/2 SIADH, sodium 130 this am, received extra dose of salt tab today. C/w salt tablets to 2g BID . Water restriction 1L/day. Nephro Dr. Montesinos following   -Pt reports shortness of breath, likely 2/2 deconditioning. Chest X-Ray negative, no s/s of volume overload.  She had recent ischemic eval earlier this year which was negative. Will c/w Duoneb and Symbicort. Pulm recs appreciated.   -C/w Multaq, carvedilol and Xarelto for Afib. Rate controlled   -BP stable, c/w amlodipine and carvedilol    -Scr improved   -Blood sugars stable, increased Lantus to 8 units, admelog 4 units TID and Hss . HbA1C 8.4%   -DC plan in am with resumption of HHA     Out-patient provider: PCP- Dr. Mihai Washington, Pulmonologist  Nicole Morales.

## 2022-04-10 NOTE — PROGRESS NOTE ADULT - ASSESSMENT
Patient is a 81yo [place] speaking Female with HTN, DM, hypothyroidism, asthma, afib, CAD, CHF (s/p pacemaker) recently started on Lasix p/w vomiting for 2 weeks. Nephrology consulted for hyponatremia.     1. Hyponatremia- urine studies initially consistent with SIADH-- then repeat with increased fluid intake. Monitor off IVF/ Lasix. Serum Na improving with mild decrease today. Will give extra NaCl 2g PO x1. c/w NaCl 2g PO bid. c/w 1L FR and glucerna tid to increase osmolar intake.   Repeat urine osm, serum osm and urine Na in am. TSH and AM Cortisol wnl. Monitor serial BMP's and UO for overcorrection. Do not correct more than 8 meQ/24 hours. Monitor serum sodium.    2. SRIDHAR- previous SCr 0.7-1.0; unclear etiology UA bland. FeUrea 48.13%. SRIDHAR resolved. Renal function at baseline. Strict I/Os. Avoid nephrotoxins/ NSAIDs/ RCA. Monitor BMP.    3. Essential HTN- BP acceptable Continue with current anti-hypertensive medications. Monitor BP.    4. Shortness of breath- pt with h/o AS. CXR with no effusion. Monitor off Lasix. Pulmonary following    [facility] NEPHROLOGY  Nick Estrada M.D.  Dylan Coleman D.O.  Karla Montesinos M.D.  Kimberlyn Barrett, MSN, ANP-C  [phone]    [address] [place] [address]

## 2022-04-10 NOTE — CHART NOTE - NSCHARTNOTEFT_GEN_A_CORE
EVENT: Received telephone call from RN that pt is c/o of insomnia & is requesting melatonin    HPI: Patient is a 83yoF, w/ PMH of HTN, DM, hypothyroidism, asthma, afib, CAD, CHF (s/p pacemaker), presents with vomiting. Admitted for Acute hyponatremia.    SUBJECTIVE: n/a    OBJECTIVE:  Vital Signs Last 24 Hrs  T(C): 36.7 (09 Apr 2022 20:17), Max: 37.1 (09 Apr 2022 14:24)  T(F): 98 (09 Apr 2022 20:17), Max: 98.8 (09 Apr 2022 14:24)  HR: 65 (09 Apr 2022 20:17) (62 - 66)  BP: 120/65 (09 Apr 2022 20:17) (114/63 - 138/53)  BP(mean): --  RR: 18 (09 Apr 2022 20:17) (17 - 18)  SpO2: 99% (09 Apr 2022 20:17) (97% - 99%)    FOCUSED PHYSICAL EXAM:  Neuro: awake, alert, oriented x 3. No neuro deficit  Cardiovascular: Pulses +2 B/L in lower and upper extremities, HR regular, BP stable, No edema.  Respiratory: Respirations regular, unlabored, breath sounds clear B/L.   GI: Abdomen soft, non-tender, positive bowel sounds.  : no bladder distention noted. No complaints at this time.  Skin: Dry, intact, no bruising, no diaphoresis.    LABS:                        9.5    6.62  )-----------( 255      ( 09 Apr 2022 07:08 )             26.7     04-09    132<L>  |  98  |  21<H>  ----------------------------<  166<H>  4.2   |  24  |  1.05    Ca    8.5      09 Apr 2022 07:08        EKG:   IMAGING:    ASSESSMENT/PROBLEM: Insomnia most likely 2/2 to hospitalization      PLAN:   1. Melatonin 3mg, PO, QHS, PRN, insomnia ordered  2. Monitor response to treatment  3. Cont present care/treatment  4. Supportive care

## 2022-04-11 ENCOUNTER — TRANSCRIPTION ENCOUNTER (OUTPATIENT)
Age: 82
End: 2022-04-11

## 2022-04-11 VITALS
RESPIRATION RATE: 17 BRPM | SYSTOLIC BLOOD PRESSURE: 151 MMHG | HEART RATE: 63 BPM | TEMPERATURE: 99 F | OXYGEN SATURATION: 100 % | DIASTOLIC BLOOD PRESSURE: 55 MMHG

## 2022-04-11 LAB
ANION GAP SERPL CALC-SCNC: 8 MMOL/L — SIGNIFICANT CHANGE UP (ref 5–17)
BUN SERPL-MCNC: 20 MG/DL — HIGH (ref 7–18)
CALCIUM SERPL-MCNC: 9.4 MG/DL — SIGNIFICANT CHANGE UP (ref 8.4–10.5)
CHLORIDE SERPL-SCNC: 101 MMOL/L — SIGNIFICANT CHANGE UP (ref 96–108)
CO2 SERPL-SCNC: 23 MMOL/L — SIGNIFICANT CHANGE UP (ref 22–31)
CREAT SERPL-MCNC: 1.04 MG/DL — SIGNIFICANT CHANGE UP (ref 0.5–1.3)
EGFR: 53 ML/MIN/1.73M2 — LOW
GLUCOSE BLDC GLUCOMTR-MCNC: 167 MG/DL — HIGH (ref 70–99)
GLUCOSE BLDC GLUCOMTR-MCNC: 217 MG/DL — HIGH (ref 70–99)
GLUCOSE SERPL-MCNC: 161 MG/DL — HIGH (ref 70–99)
HCT VFR BLD CALC: 30.7 % — LOW (ref 34.5–45)
HGB BLD-MCNC: 10.6 G/DL — LOW (ref 11.5–15.5)
MCHC RBC-ENTMCNC: 29.1 PG — SIGNIFICANT CHANGE UP (ref 27–34)
MCHC RBC-ENTMCNC: 34.5 GM/DL — SIGNIFICANT CHANGE UP (ref 32–36)
MCV RBC AUTO: 84.3 FL — SIGNIFICANT CHANGE UP (ref 80–100)
NRBC # BLD: 0 /100 WBCS — SIGNIFICANT CHANGE UP (ref 0–0)
PLATELET # BLD AUTO: 289 K/UL — SIGNIFICANT CHANGE UP (ref 150–400)
POTASSIUM SERPL-MCNC: 4.4 MMOL/L — SIGNIFICANT CHANGE UP (ref 3.5–5.3)
POTASSIUM SERPL-SCNC: 4.4 MMOL/L — SIGNIFICANT CHANGE UP (ref 3.5–5.3)
RBC # BLD: 3.64 M/UL — LOW (ref 3.8–5.2)
RBC # FLD: 12.8 % — SIGNIFICANT CHANGE UP (ref 10.3–14.5)
SARS-COV-2 RNA SPEC QL NAA+PROBE: SIGNIFICANT CHANGE UP
SODIUM SERPL-SCNC: 132 MMOL/L — LOW (ref 135–145)
WBC # BLD: 8 K/UL — SIGNIFICANT CHANGE UP (ref 3.8–10.5)
WBC # FLD AUTO: 8 K/UL — SIGNIFICANT CHANGE UP (ref 3.8–10.5)

## 2022-04-11 PROCEDURE — 84443 ASSAY THYROID STIM HORMONE: CPT

## 2022-04-11 PROCEDURE — 87635 SARS-COV-2 COVID-19 AMP PRB: CPT

## 2022-04-11 PROCEDURE — 94640 AIRWAY INHALATION TREATMENT: CPT

## 2022-04-11 PROCEDURE — 82570 ASSAY OF URINE CREATININE: CPT

## 2022-04-11 PROCEDURE — 99285 EMERGENCY DEPT VISIT HI MDM: CPT | Mod: 25

## 2022-04-11 PROCEDURE — 84100 ASSAY OF PHOSPHORUS: CPT

## 2022-04-11 PROCEDURE — 83735 ASSAY OF MAGNESIUM: CPT

## 2022-04-11 PROCEDURE — 71045 X-RAY EXAM CHEST 1 VIEW: CPT

## 2022-04-11 PROCEDURE — 84300 ASSAY OF URINE SODIUM: CPT

## 2022-04-11 PROCEDURE — 83036 HEMOGLOBIN GLYCOSYLATED A1C: CPT

## 2022-04-11 PROCEDURE — 82533 TOTAL CORTISOL: CPT

## 2022-04-11 PROCEDURE — 85025 COMPLETE CBC W/AUTO DIFF WBC: CPT

## 2022-04-11 PROCEDURE — 80048 BASIC METABOLIC PNL TOTAL CA: CPT

## 2022-04-11 PROCEDURE — 93005 ELECTROCARDIOGRAM TRACING: CPT

## 2022-04-11 PROCEDURE — 99239 HOSP IP/OBS DSCHRG MGMT >30: CPT

## 2022-04-11 PROCEDURE — 84540 ASSAY OF URINE/UREA-N: CPT

## 2022-04-11 PROCEDURE — 80053 COMPREHEN METABOLIC PANEL: CPT

## 2022-04-11 PROCEDURE — 97162 PT EVAL MOD COMPLEX 30 MIN: CPT

## 2022-04-11 PROCEDURE — 85027 COMPLETE CBC AUTOMATED: CPT

## 2022-04-11 PROCEDURE — 81001 URINALYSIS AUTO W/SCOPE: CPT

## 2022-04-11 PROCEDURE — 36415 COLL VENOUS BLD VENIPUNCTURE: CPT

## 2022-04-11 PROCEDURE — 82962 GLUCOSE BLOOD TEST: CPT

## 2022-04-11 PROCEDURE — 96374 THER/PROPH/DIAG INJ IV PUSH: CPT

## 2022-04-11 PROCEDURE — 83935 ASSAY OF URINE OSMOLALITY: CPT

## 2022-04-11 PROCEDURE — 83930 ASSAY OF BLOOD OSMOLALITY: CPT

## 2022-04-11 RX ORDER — BUDESONIDE AND FORMOTEROL FUMARATE DIHYDRATE 160; 4.5 UG/1; UG/1
2 AEROSOL RESPIRATORY (INHALATION)
Qty: 120 | Refills: 0
Start: 2022-04-11 | End: 2022-05-10

## 2022-04-11 RX ORDER — AMLODIPINE BESYLATE 2.5 MG/1
1 TABLET ORAL
Qty: 0 | Refills: 0 | DISCHARGE
Start: 2022-04-11

## 2022-04-11 RX ORDER — LOSARTAN POTASSIUM 100 MG/1
1 TABLET, FILM COATED ORAL
Qty: 30 | Refills: 0
Start: 2022-04-11 | End: 2022-05-10

## 2022-04-11 RX ORDER — SODIUM CHLORIDE 9 MG/ML
2 INJECTION INTRAMUSCULAR; INTRAVENOUS; SUBCUTANEOUS THREE TIMES A DAY
Refills: 0 | Status: DISCONTINUED | OUTPATIENT
Start: 2022-04-11 | End: 2022-04-11

## 2022-04-11 RX ORDER — AMLODIPINE BESYLATE 2.5 MG/1
1 TABLET ORAL
Qty: 30 | Refills: 0
Start: 2022-04-11 | End: 2022-05-10

## 2022-04-11 RX ORDER — OLMESARTAN MEDOXOMIL / AMLODIPINE BESYLATE / HYDROCHLOROTHIAZIDE 40; 10; 25 MG/1; MG/1; MG/1
1 TABLET, FILM COATED ORAL
Qty: 0 | Refills: 0 | DISCHARGE

## 2022-04-11 RX ORDER — FUROSEMIDE 40 MG
1 TABLET ORAL
Qty: 0 | Refills: 0 | DISCHARGE

## 2022-04-11 RX ORDER — POTASSIUM CHLORIDE 20 MEQ
1 PACKET (EA) ORAL
Qty: 0 | Refills: 0 | DISCHARGE

## 2022-04-11 RX ORDER — ALBUTEROL 90 UG/1
2 AEROSOL, METERED ORAL
Qty: 1 | Refills: 0
Start: 2022-04-11 | End: 2022-05-10

## 2022-04-11 RX ORDER — SODIUM CHLORIDE 9 MG/ML
2 INJECTION INTRAMUSCULAR; INTRAVENOUS; SUBCUTANEOUS
Qty: 42 | Refills: 0
Start: 2022-04-11 | End: 2022-04-17

## 2022-04-11 RX ADMIN — Medication 4 UNIT(S): at 08:12

## 2022-04-11 RX ADMIN — SODIUM CHLORIDE 2 GRAM(S): 9 INJECTION INTRAMUSCULAR; INTRAVENOUS; SUBCUTANEOUS at 06:25

## 2022-04-11 RX ADMIN — SODIUM CHLORIDE 2 GRAM(S): 9 INJECTION INTRAMUSCULAR; INTRAVENOUS; SUBCUTANEOUS at 13:27

## 2022-04-11 RX ADMIN — BUDESONIDE AND FORMOTEROL FUMARATE DIHYDRATE 2 PUFF(S): 160; 4.5 AEROSOL RESPIRATORY (INHALATION) at 11:48

## 2022-04-11 RX ADMIN — Medication 3 MILLILITER(S): at 08:43

## 2022-04-11 RX ADMIN — AMLODIPINE BESYLATE 5 MILLIGRAM(S): 2.5 TABLET ORAL at 06:20

## 2022-04-11 RX ADMIN — DRONEDARONE 400 MILLIGRAM(S): 400 TABLET, FILM COATED ORAL at 06:20

## 2022-04-11 RX ADMIN — Medication 200 MILLIGRAM(S): at 11:46

## 2022-04-11 RX ADMIN — Medication 2: at 11:47

## 2022-04-11 RX ADMIN — Medication 4 UNIT(S): at 11:47

## 2022-04-11 RX ADMIN — Medication 175 MICROGRAM(S): at 06:12

## 2022-04-11 RX ADMIN — Medication 1: at 08:12

## 2022-04-11 RX ADMIN — CARVEDILOL PHOSPHATE 12.5 MILLIGRAM(S): 80 CAPSULE, EXTENDED RELEASE ORAL at 06:12

## 2022-04-11 RX ADMIN — PANTOPRAZOLE SODIUM 40 MILLIGRAM(S): 20 TABLET, DELAYED RELEASE ORAL at 06:13

## 2022-04-11 NOTE — DISCHARGE NOTE PROVIDER - HOSPITAL COURSE
Patient is a 83yoF, w/ PMH of HTN, DM, hypothyroidism, asthma, afib, CAD, CHF (s/p pacemaker), presents with vomiting. 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 Now clinical improved, PT reccs outpatient PT   Given clinical course decision made to discharge patient home with outpatient follow up   Discharge discussed with attending  This is a brief summary of patient's hospital stay, for full course please see EMR

## 2022-04-11 NOTE — PROGRESS NOTE ADULT - ASSESSMENT
Patient is a 81yo [place] speaking Female with HTN, DM, hypothyroidism, asthma, afib, CAD, CHF (s/p pacemaker) recently started on Lasix p/w vomiting for 2 weeks. Nephrology consulted for hyponatremia.     1. Hyponatremia- urine studies initially consistent with SIADH-- then repeat with increased fluid intake. Monitor off IVF/ Lasix. Serum Na improving; will d/c on NaCl 2g PO tid. c/w 1L FR and glucerna tid to increase osmolar intake.  TSH and AM Cortisol wnl. Monitor serial BMP's and UO for overcorrection. Do not correct more than 8 meQ/24 hours. Monitor serum sodium.    2. SRIDHAR- previous SCr 0.7-1.0; unclear etiology UA bland. FeUrea 48.13%. SRIDHAR resolved. Renal function at baseline. Strict I/Os. Avoid nephrotoxins/ NSAIDs/ RCA. Monitor BMP.    3. Essential HTN- BP acceptable Continue with current anti-hypertensive medications. Monitor BP.    4. Shortness of breath- pt with h/o AS. CXR with no effusion. Monitor off Lasix. Pulmonary following    [facility] NEPHROLOGY  Nick Estrada M.D.  Dylan Coleman D.O.  Karla Montesinos M.D.  Kimberlyn Barrett, MSN, ANP-C  [phone]    [id number] [place] [address]

## 2022-04-11 NOTE — PROGRESS NOTE ADULT - SUBJECTIVE AND OBJECTIVE BOX
[facility] NEPHROLOGY- PROGRESS NOTE    Patient is a 81yo Citizen of [place] speaking Female with HTN, DM, hypothyroidism, asthma, afib, CAD, CHF (s/p pacemaker) recently started on Lasix p/w vomiting for 2 weeks. Nephrology consulted for hyponatremia.     Hospital Medications: Medications reviewed.  REVIEW OF SYSTEMS:   CONSTITUTIONAL: No fevers or chills +sleepy  RESPIRATORY: +shortness of breath with intermittent cough  CARDIOVASCULAR: No chest pain.  GASTROINTESTINAL: No nausea, vomiting, diarrhea or abdominal pain.   VASCULAR: No bilateral lower extremity edema.     VITALS:  T(F): 99.1 (22 @ 13:00), Max: 99.1 (22 @ 13:00)  HR: 63 (22 @ :00)  BP: 151/55 (22 @ 13:00)  RR: 17 (22 @ 13:00)  SpO2: 100% (22 @ :00)  Wt(kg): --    PHYSICAL EXAM:  Gen: NAD, calm  HEENT: MMM  Neck: no JVD  Cards: RRR, +S1/S2, +VINH  Resp: clear; no current wheezing b/l  GI: soft, NT/ND, NABS  : no CVA tenderness  Extremities: LE fullness -refusing palpation      LABS:    132 <--, 130 <--, 132 <--, 126 <--, 121 <--, 124 <--, 122 <--  132<L>  |  101  |  20<H>  ----------------------------<  161<H>  4.4   |  23  |  1.04    Ca    9.4      2022 07:14      Creatinine Trend: 1.04 <--, 1.06 <--, 1.05 <--, 1.15 <--, 1.33 <--, 1.11 <--, 1.23 <--, 1.07 <--, 1.24 <--, 1.29 <--, 1.32 <--, 1.34 <--, 1.37 <--                        10.6   8.00  )-----------( 289      ( 2022 07:14 )             30.7     Urine Studies:  Urinalysis Basic - ( 2022 16:05 )    Color: Yellow / Appearance: Clear / S.015 / pH:   Gluc:  / Ketone: Negative  / Bili: Negative / Urobili: Negative   Blood:  / Protein: Negative / Nitrite: Negative   Leuk Esterase: Negative / RBC: 0-2 /HPF / WBC 0-2 /HPF   Sq Epi:  / Non Sq Epi: Few /HPF / Bacteria:       Osmolality, Random Urine: 303 mos/kg ( @ 09:53)  Sodium, Random Urine: 17 mmol/L ( @ 09:53)  Osmolality, Random Urine: 177 mos/kg ( @ 07:13)  Sodium, Random Urine: 54 mmol/L ( @ 07:13)  Creatinine, Random Urine: 56 mg/dL ( @ 16:06)  Sodium, Random Urine: 39 mmol/L ( @ 16:06)  Osmolality, Random Urine: 326 mos/kg ( @ 16:06)

## 2022-04-11 NOTE — DISCHARGE NOTE PROVIDER - PROVIDER TOKENS
PROVIDER:[TOKEN:[[id number]],FOLLOWUP:[1 week]],PROVIDER:[TOKEN:[[id number]],FOLLOWUP:[1 week]] PROVIDER:[TOKEN:[[id number]],SCHEDULEDAPPT:[04/14/2022],SCHEDULEDAPPTTIME:[03:45 PM],ESTABLISHEDPATIENT:[T]],PROVIDER:[TOKEN:[[id number]],FOLLOWUP:[1 week]]

## 2022-04-11 NOTE — PROGRESS NOTE ADULT - ASSESSMENT
Dyspnea sec to Obesity/ Deconditioning ? JOSE DANIEL   Br Asthma   Hyponatremia ? 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

## 2022-04-11 NOTE — DISCHARGE NOTE PROVIDER - NSDCMRMEDTOKEN_GEN_ALL_CORE_FT
amLODIPine 5 mg oral tablet: 1 tab(s) orally once a day   budesonide-formoterol 160 mcg-4.5 mcg/inh inhalation aerosol: 2  inhaled 2 times a day   carvedilol 12.5 mg oral tablet: 1 tab(s) orally 2 times a day  escitalopram 10 mg oral tablet: 1 tab(s) orally once a day  guaiFENesin 100 mg/5 mL oral liquid: 10 milliliter(s) orally every 6 hours, As needed, Cough  Janumet 50 mg-500 mg oral tablet: 1 tab(s) orally 2 times a day  levothyroxine 175 mcg (0.175 mg) oral tablet: 1 tab(s) orally once a day  LORazepam 0.5 mg oral tablet: 1 tab(s) orally once a day, As Needed  losartan 25 mg oral tablet: 1 tab(s) orally once a day   Multaq 400 mg oral tablet: 1 tab(s) orally 2 times a day  Outpatient PT 2-3x a week for 2 weeks balance training; gait training; transfer training; strengthening:   ProAir HFA 90 mcg/inh inhalation aerosol: 2 puff(s) inhaled every 4 hours, As Needed -for bronchospasm   repaglinide 1 mg oral tablet: 1 tab(s) orally 3 times a day (before meals)  sodium chloride 1 g oral tablet: 2 tab(s) orally 3 times a day  Xarelto 20 mg oral tablet: 1 tab(s) orally once a day (in the evening)   budesonide-formoterol 160 mcg-4.5 mcg/inh inhalation aerosol: 2  inhaled 2 times a day   carvedilol 12.5 mg oral tablet: 1 tab(s) orally 2 times a day  escitalopram 10 mg oral tablet: 1 tab(s) orally once a day  guaiFENesin 100 mg/5 mL oral liquid: 10 milliliter(s) orally every 6 hours, As needed, Cough  Janumet 50 mg-500 mg oral tablet: 1 tab(s) orally 2 times a day  levothyroxine 175 mcg (0.175 mg) oral tablet: 1 tab(s) orally once a day  LORazepam 0.5 mg oral tablet: 1 tab(s) orally once a day, As Needed  losartan 25 mg oral tablet: 1 tab(s) orally once a day   Multaq 400 mg oral tablet: 1 tab(s) orally 2 times a day  Outpatient PT 2-3x a week for 2 weeks balance training; gait training; transfer training; strengthening:   ProAir HFA 90 mcg/inh inhalation aerosol: 2 puff(s) inhaled every 4 hours, As Needed -for bronchospasm   repaglinide 1 mg oral tablet: 1 tab(s) orally 3 times a day (before meals)  sodium chloride 1 g oral tablet: 2 tab(s) orally 3 times a day  Xarelto 20 mg oral tablet: 1 tab(s) orally once a day (in the evening)   amLODIPine 5 mg oral tablet: 1 tab(s) orally once a day  budesonide-formoterol 160 mcg-4.5 mcg/inh inhalation aerosol: 2  inhaled 2 times a day   carvedilol 12.5 mg oral tablet: 1 tab(s) orally 2 times a day  escitalopram 10 mg oral tablet: 1 tab(s) orally once a day  guaiFENesin 100 mg/5 mL oral liquid: 10 milliliter(s) orally every 6 hours, As needed, Cough  Janumet 50 mg-500 mg oral tablet: 1 tab(s) orally 2 times a day  levothyroxine 175 mcg (0.175 mg) oral tablet: 1 tab(s) orally once a day  LORazepam 0.5 mg oral tablet: 1 tab(s) orally once a day, As Needed  losartan 25 mg oral tablet: 1 tab(s) orally once a day   Multaq 400 mg oral tablet: 1 tab(s) orally 2 times a day  Outpatient PT 2-3x a week for 2 weeks balance training; gait training; transfer training; strengthening:   ProAir HFA 90 mcg/inh inhalation aerosol: 2 puff(s) inhaled every 4 hours, As Needed -for bronchospasm   repaglinide 1 mg oral tablet: 1 tab(s) orally 3 times a day (before meals)  sodium chloride 1 g oral tablet: 2 tab(s) orally 3 times a day  Xarelto 20 mg oral tablet: 1 tab(s) orally once a day (in the evening)   amLODIPine 5 mg oral tablet: 1 tab(s) orally once a day  amLODIPine 5 mg oral tablet: 1 tab(s) orally once a day   budesonide-formoterol 160 mcg-4.5 mcg/inh inhalation aerosol: 2  inhaled 2 times a day   carvedilol 12.5 mg oral tablet: 1 tab(s) orally 2 times a day  escitalopram 10 mg oral tablet: 1 tab(s) orally once a day  guaiFENesin 100 mg/5 mL oral liquid: 10 milliliter(s) orally every 6 hours, As needed, Cough  Janumet 50 mg-500 mg oral tablet: 1 tab(s) orally 2 times a day  levothyroxine 175 mcg (0.175 mg) oral tablet: 1 tab(s) orally once a day  LORazepam 0.5 mg oral tablet: 1 tab(s) orally once a day, As Needed  losartan 25 mg oral tablet: 1 tab(s) orally once a day   Multaq 400 mg oral tablet: 1 tab(s) orally 2 times a day  Outpatient PT 2-3x a week for 2 weeks balance training; gait training; transfer training; strengthening:   ProAir HFA 90 mcg/inh inhalation aerosol: 2 puff(s) inhaled every 4 hours, As Needed -for bronchospasm   repaglinide 1 mg oral tablet: 1 tab(s) orally 3 times a day (before meals)  sodium chloride 1 g oral tablet: 2 tab(s) orally 3 times a day  Xarelto 20 mg oral tablet: 1 tab(s) orally once a day (in the evening)

## 2022-04-11 NOTE — DISCHARGE NOTE PROVIDER - NSDCCPCAREPLAN_GEN_ALL_CORE_FT
PRINCIPAL DISCHARGE DIAGNOSIS  Diagnosis: Hyponatremia  Assessment and Plan of Treatment: Hyponatremia occurs when the amount of sodium (salt) in your blood is lower than normal. Sodium is an electrolyte (mineral) that helps your muscles, heart, and digestive system work properly. It helps control blood pressure and fluid balance. This was likely caused by the medications you were taking. This has now improved and you should continue your new blood pressure medications as well as your salt tabs. You should follow up with Dr Montesinos (kidney specialist) in 1 week to have your blood work checked. Contact your healthcare provider if: You have muscle cramps or twitching.  You feel very weak or tired. You have nausea or are vomiting. You have questions or concerns about your condition or care. Seek care immediately or call 911 if:  You have a seizure. You have an irregular heartbeat. You have trouble breathing. You cannot move your arms and legs. You are confused or cannot think clearly.      SECONDARY DISCHARGE DIAGNOSES  Diagnosis: HTN (hypertension)  Assessment and Plan of Treatment: You have a history of high blood pressure, your medications have been changed. Take them as directed and follow up with your primary care doctor. Low salt diet Activity as tolerated. Take all medication as prescribed. Follow up with your medical doctor for routine blood pressure monitoring at your next visit. Notify your doctor if you have any of the following symptoms: Dizziness, Lightheadedness, Blurry vision, Headache, Chest pain, Shortness of breath    Diagnosis: Afib  Assessment and Plan of Treatment: Atrial fibrillation is the most common heart rhythm problem.  The condition puts you at risk for has stroke and heart attack  It helps if you control your blood pressure, not drink more than 1-2 alcohol drinks per day, cut down on caffeine, getting treatment for over active thyroid gland, and get regular exercise  Call your doctor if you feel your heart racing or beating unusually, chest tightness or pain, lightheaded, faint, shortness of breath especially with exercise  It is important to take your heart medication as prescribed  You may be on anticoagulation which is very important to take as directed - you may need blood work to monitor drug levels      Diagnosis: SRIDHAR (acute kidney injury)  Assessment and Plan of Treatment: You have been diagnosed with acute kidney injury. This means that your kidneys are not working properly. When both kidneys are healthy, they help filter out fluid and waste from the blood and body. Acute kidney injury has many causes. These include urinary blockages, infection, lack of enough blood supply, and medicines that can injure kidneys. In some cases, acute kidney injury is short-term (temporary), lasting several days to a few months. This is because the kidney can repair itself. But acute kidney injury can also result in chronic kidney disease or end stage renal failure.   It is important that you stay hydrated, and keep a record of how much you urinate. Avoid kidney toxic medications like NSAIDs (Motrin ibuprofen and IV contrast).       PRINCIPAL DISCHARGE DIAGNOSIS  Diagnosis: Hyponatremia  Assessment and Plan of Treatment: Hyponatremia occurs when the amount of sodium (salt) in your blood is lower than normal. Sodium is an electrolyte (mineral) that helps your muscles, heart, and digestive system work properly. It helps control blood pressure and fluid balance. This was likely caused by the medications you were taking. This has now improved and you should continue your new blood pressure medications as well as your salt tabs. You should follow up with Dr Montesinos (kidney specialist) in 1 week to have your blood work checked. Contact your healthcare provider if: You have muscle cramps or twitching.  You feel very weak or tired. You have nausea or are vomiting. You have questions or concerns about your condition or care. Seek care immediately or call 911 if:  You have a seizure. You have an irregular heartbeat. You have trouble breathing. You cannot move your arms and legs. You are confused or cannot think clearly.      SECONDARY DISCHARGE DIAGNOSES  Diagnosis: Acute asthma exacerbation  Assessment and Plan of Treatment: HOME CARE INSTRUCTIONS  Take medicines as directed by your caregiver.  Control your home environment in the following ways to help prevent asthma attacks:  Change your heating and air conditioning filter at least once a month.  Do not smoke. Do not stay in places where others are smoking.  Get rid of pests (such as roaches and mice) and their droppings.  If you see mold on a plant, throw it away.  Clean your floors and dust every week. Use unscented cleaning products. Use a vacuum  with a HEPA filter if possible. If vacuuming or cleaning triggers your asthma, try to find someone else to do these chores..  Use allergy-proof pillows, mattress covers, and box spring covers.  Wash bedsheets and blankets every week in hot water and dry in a dryer.  Use a blanket that is made of polyester or cotton with a tight nap.  Clean bathrooms and alec with bleach and repaint with mold-resistant paint.   Wash hands frequently.  Talk to your caregiver about an action plan for managing asthma attacks. This includes the use of a peak flow meter which measures the severity of the attack and medicines that can help stop the attack. An action plan can help minimize or stop the attack without having to seek medical care.  Always have a plan prepared for seeking medical attention. This should include contacting your caregiver and in the case of a severe attack, calling your local emergency services   SEEK MEDICAL CARE IF:  You have wheezing, shortness of breath, or a cough even if taking medicine to prevent attacks.   You have thickening of sputum.   Your sputum changes from clear or white to yellow, green, gray, or bloody.   You have any problems that may be related to the medicines you are taking (such as a rash, itching, swelling, or trouble breathing).  You are using a reliever medicine more than 2–3 times per week.  Your peak flow is still at 50–79% of personal best after following your action plan for 1 hour.  SEEK IMMEDIATE MEDICAL CARE IF:  You are short of breath even at rest.  You get short of    Diagnosis: HTN (hypertension)  Assessment and Plan of Treatment: You have a history of high blood pressure, your medications have been changed. Take them as directed and follow up with your primary care doctor. Low salt diet Activity as tolerated. Take all medication as prescribed. Follow up with your medical doctor for routine blood pressure monitoring at your next visit. Notify your doctor if you have any of the following symptoms: Dizziness, Lightheadedness, Blurry vision, Headache, Chest pain, Shortness of breath    Diagnosis: Afib  Assessment and Plan of Treatment: Atrial fibrillation is the most common heart rhythm problem.  The condition puts you at risk for has stroke and heart attack  It helps if you control your blood pressure, not drink more than 1-2 alcohol drinks per day, cut down on caffeine, getting treatment for over active thyroid gland, and get regular exercise  Call your doctor if you feel your heart racing or beating unusually, chest tightness or pain, lightheaded, faint, shortness of breath especially with exercise  It is important to take your heart medication as prescribed  You may be on anticoagulation which is very important to take as directed - you may need blood work to monitor drug levels      Diagnosis: SRIDHAR (acute kidney injury)  Assessment and Plan of Treatment: You have been diagnosed with acute kidney injury. This means that your kidneys are not working properly. When both kidneys are healthy, they help filter out fluid and waste from the blood and body. Acute kidney injury has many causes. These include urinary blockages, infection, lack of enough blood supply, and medicines that can injure kidneys. In some cases, acute kidney injury is short-term (temporary), lasting several days to a few months. This is because the kidney can repair itself. But acute kidney injury can also result in chronic kidney disease or end stage renal failure.   It is important that you stay hydrated, and keep a record of how much you urinate. Avoid kidney toxic medications like NSAIDs (Motrin ibuprofen and IV contrast).       PRINCIPAL DISCHARGE DIAGNOSIS  Diagnosis: Hyponatremia  Assessment and Plan of Treatment: You presented with los sodium levels, initially thought to be secondary to dehydration and was given IV fluids but did not improve significantly. 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 You were seen by nephrologist, sodium tablets were given and toresemide and Hydrochlorothizide was held. Your sodium levels are improved now, plesae continue taking 2g salt tablets thrice daily as prescribed. Follow 1L water restriction/ day. Continue to hold toresemide. Continue taking glucerna 1 can twice daily to help with nourishment (osmolarity). Please follow with Dr. Montesinos( kidney doctor) to follow up in one weeks time for repeat blood work.      SECONDARY DISCHARGE DIAGNOSES  Diagnosis: SRIDHAR (acute kidney injury)  Assessment and Plan of Treatment: You have been diagnosed with acute kidney injury. This means that your kidneys are not working properly.  You were given some mild IV hydration and kidney function improved to normal.   It is important that you stay hydrated, and keep a record of how much you urinate. Avoid kidney toxic medications like NSAIDs (Motrin ibuprofen and IV contrast).      Diagnosis: HTN (hypertension)  Assessment and Plan of Treatment: You have a history of high blood pressure, your medications have been changed. Take them as directed and follow up with your primary care doctor. Low salt diet Activity as tolerated. Take all medication as prescribed. Follow up with your medical doctor for routine blood pressure monitoring at your next visit. Notify your doctor if you have any of the following symptoms: Dizziness, Lightheadedness, Blurry vision, Headache, Chest pain, Shortness of breath    Diagnosis: Chronic atrial fibrillation  Assessment and Plan of Treatment: Atrial fibrillation is the most common heart rhythm problem.  The condition puts you at risk for has stroke and heart attack  It helps if you control your blood pressure, not drink more than 1-2 alcohol drinks per day, cut down on caffeine, getting treatment for over active thyroid gland, and get regular exercise  Call your doctor if you feel your heart racing or beating unusually, chest tightness or pain, lightheaded, faint, shortness of breath especially with exercise  It is important to take your heart medication as prescribed  You may be on anticoagulation which is very important to take as directed - you may need blood work to monitor drug levels      Diagnosis: Asthma  Assessment and Plan of Treatment: You continues to have shortness of breath while in hospital, oxygen levels were normal. Chest X-Ray showed no active infection or fluid in the lungs. You were seen by pulmonologist (Lung doctor) who advised that symptoms could be multifactorial from obesity/ possible Obstructive sleep apnea overall decontidioning. You had recent cardiac work up inluding ECHO and stress test few months ago which were unremarakable. You were given nebuliser treatments, c/w inhalers as prescribed. Follow up with your out-patient pulmonologist.    Diagnosis: Chronic venous stasis  Assessment and Plan of Treatment: You were noted to have swollen legs which is chronic (old) which is secondary to venous stasis, please keep your legs elevated. Can also try ACE wraps and compression stockings to help. Follow up with PCP     PRINCIPAL DISCHARGE DIAGNOSIS  Diagnosis: Hyponatremia  Assessment and Plan of Treatment: You presented with los sodium levels, initially thought to be secondary to dehydration and was given IV fluids but did not improve significantly. 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 You were seen by nephrologist, sodium tablets were given and toresemide and Hydrochlorothizide was held. Your sodium levels are improved now, plesae continue taking 2g salt tablets thrice daily as prescribed. Follow 1L water restriction/ day. Continue to hold toresemide. Continue taking glucerna 1 can twice daily to help with nourishment (osmolarity). Please follow with Dr. Montesinos( kidney doctor) to follow up in one weeks time for repeat blood work.      SECONDARY DISCHARGE DIAGNOSES  Diagnosis: SRIDHAR (acute kidney injury)  Assessment and Plan of Treatment: You have been diagnosed with acute kidney injury. This means that your kidneys are not working properly.  You were given some mild IV hydration and kidney function improved to normal.   It is important that you stay hydrated, and keep a record of how much you urinate. Avoid kidney toxic medications like NSAIDs (Motrin ibuprofen and IV contrast).      Diagnosis: HTN (hypertension)  Assessment and Plan of Treatment: You have a history of high blood pressure, your medications have been changed. Take them as directed and follow up with your primary care doctor. Low salt diet Activity as tolerated. Take all medication as prescribed. Follow up with your medical doctor for routine blood pressure monitoring at your next visit. Notify your doctor if you have any of the following symptoms: Dizziness, Lightheadedness, Blurry vision, Headache, Chest pain, Shortness of breath    Diagnosis: Chronic atrial fibrillation  Assessment and Plan of Treatment: Atrial fibrillation is the most common heart rhythm problem.  The condition puts you at risk for has stroke and heart attack  It helps if you control your blood pressure, not drink more than 1-2 alcohol drinks per day, cut down on caffeine, getting treatment for over active thyroid gland, and get regular exercise  Call your doctor if you feel your heart racing or beating unusually, chest tightness or pain, lightheaded, faint, shortness of breath especially with exercise  It is important to take your heart medication as prescribed  You may be on anticoagulation which is very important to take as directed - you may need blood work to monitor drug levels      Diagnosis: Asthma  Assessment and Plan of Treatment: You continues to have shortness of breath while in hospital, oxygen levels were normal. Chest X-Ray showed no active infection or fluid in the lungs. You were seen by pulmonologist (Lung doctor) who advised that symptoms could be multifactorial from obesity/ possible Obstructive sleep apnea overall decontidioning. You had recent cardiac work up inluding ECHO and stress test few months ago which were unremarakable. You were given nebuliser treatments, c/w inhalers as prescribed. Follow up with your out-patient pulmonologist.    Diagnosis: Chronic venous stasis  Assessment and Plan of Treatment: You were noted to have swollen legs which is chronic (old) which is secondary to venous stasis, please keep your legs elevated. Can also try ACE wraps and compression stockings to help. Follow up with PCP. Amlodpine also causes leg swelling, will discontinue. Follow up with PCP in one weeks time.     PRINCIPAL DISCHARGE DIAGNOSIS  Diagnosis: Hyponatremia  Assessment and Plan of Treatment: You presented with los sodium levels, initially thought to be secondary to dehydration and was given IV fluids but did not improve significantly. 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 You were seen by nephrologist, sodium tablets were given and toresemide and Hydrochlorothizide was held. Your sodium levels are improved now, plesae continue taking 2g salt tablets thrice daily as prescribed. Follow 1L water restriction/ day. Continue to hold toresemide. Continue taking glucerna 1 can twice daily to help with nourishment (osmolarity). Please follow with Dr. Montesinos( kidney doctor) to follow up in one weeks time for repeat blood work.      SECONDARY DISCHARGE DIAGNOSES  Diagnosis: SRIDHAR (acute kidney injury)  Assessment and Plan of Treatment: You have been diagnosed with acute kidney injury. This means that your kidneys are not working properly.  You were given some mild IV hydration and kidney function improved to normal.   It is important that you stay hydrated, and keep a record of how much you urinate. Avoid kidney toxic medications like NSAIDs (Motrin ibuprofen and IV contrast).      Diagnosis: HTN (hypertension)  Assessment and Plan of Treatment: You have a history of high blood pressure, your medications have been changed. Take them as directed and follow up with your primary care doctor. Low salt diet Activity as tolerated. Take all medication as prescribed. Follow up with your medical doctor for routine blood pressure monitoring at your next visit. Notify your doctor if you have any of the following symptoms: Dizziness, Lightheadedness, Blurry vision, Headache, Chest pain, Shortness of breath    Diagnosis: Chronic atrial fibrillation  Assessment and Plan of Treatment: Atrial fibrillation is the most common heart rhythm problem.  The condition puts you at risk for has stroke and heart attack  It helps if you control your blood pressure, not drink more than 1-2 alcohol drinks per day, cut down on caffeine, getting treatment for over active thyroid gland, and get regular exercise  Call your doctor if you feel your heart racing or beating unusually, chest tightness or pain, lightheaded, faint, shortness of breath especially with exercise  It is important to take your heart medication as prescribed  You may be on anticoagulation which is very important to take as directed - you may need blood work to monitor drug levels      Diagnosis: Asthma  Assessment and Plan of Treatment: You continues to have shortness of breath while in hospital, oxygen levels were normal. Chest X-Ray showed no active infection or fluid in the lungs. You were seen by pulmonologist (Lung doctor) who advised that symptoms could be multifactorial from obesity/ possible Obstructive sleep apnea overall decontidioning. You had recent cardiac work up inluding ECHO and stress test few months ago which were unremarakable. You were given nebuliser treatments, c/w inhalers as prescribed. Follow up with your out-patient pulmonologist.    Diagnosis: Chronic venous stasis  Assessment and Plan of Treatment: You were noted to have swollen legs which is chronic (old) which is secondary to venous stasis, please keep your legs elevated. Can also try ACE wraps and compression stockings to help.

## 2022-04-11 NOTE — PROGRESS NOTE ADULT - PROVIDER SPECIALTY LIST ADULT
Internal Medicine
Internal Medicine
Nephrology
Internal Medicine
Nephrology
Internal Medicine
Internal Medicine
Nephrology
Nephrology
Pulmonology
Nephrology
Pulmonology
Internal Medicine

## 2022-04-11 NOTE — DISCHARGE NOTE PROVIDER - CARE PROVIDER_API CALL
Mihai Madrid S  MEDICINE  [address] [facility], [address]  [place] [address]  Phone: [phone]  Fax: [phone]  Follow Up Time: 1 week    Karla Montesinos)  Internal Medicine  [address] [id number]  Phone: [phone]  Fax: [phone]  Follow Up Time: 1 week   Mihai Madrid S  MEDICINE  [address] [facility], [address]  [place] [address]  Phone: [phone]  Fax: [phone]  Established Patient  Scheduled Appointment: 04/14/2022 03:45 PM    Karla Montesinos)  Internal Medicine  [address] [place] [address]  Phone: [phone]  Fax: [phone]  Follow Up Time: 1 week

## 2022-04-11 NOTE — DISCHARGE NOTE PROVIDER - ATTENDING DISCHARGE PHYSICAL EXAMINATION:
Vital Signs Last 24 Hrs  T(C): 36.7 (11 Apr 2022 05:14), Max: 36.7 (11 Apr 2022 05:14)  T(F): 98.1 (11 Apr 2022 05:14), Max: 98.1 (11 Apr 2022 05:14)  HR: 62 (11 Apr 2022 05:14) (61 - 66)  BP: 157/54 (11 Apr 2022 05:14) (132/76 - 157/54)  RR: 17 (11 Apr 2022 05:14) (16 - 17)  SpO2: 97% (11 Apr 2022 05:14) (96% - 97%)  GENERAL: NAD  HEENT: Normocephalic;  conjunctivae and sclerae clear; moist mucous membranes;   CHEST/LUNG: Clear to auscultation bilaterally, no wheezing   HEART: S1 S2  regular; VINH +   ABDOMEN: Soft, Nontender, Nondistended; Bowel sounds present  EXTREMITIES: swollen but no pitting edema,  chronic venous stasis changes , no erythema (chronic changes)    SKIN: no rash   NERVOUS SYSTEM:  Awake and alert; Oriented  to place, person and time ; no new deficits

## 2022-04-11 NOTE — PROGRESS NOTE ADULT - SUBJECTIVE AND OBJECTIVE BOX
PULMONARY  progress note    KWAME MOSQUERA  [id number]    Patient is a 82y old  Female who presents with a chief complaint of hyponatremia (10 Apr 2022 19:18)  feels good, no sob or chest pain      MEDICATIONS  (STANDING):  albuterol/ipratropium for Nebulization 3 milliLiter(s) Nebulizer every 6 hours  amLODIPine   Tablet 5 milliGRAM(s) Oral daily  budesonide 160 MICROgram(s)/formoterol 4.5 MICROgram(s) Inhaler 2 Puff(s) Inhalation two times a day  carvedilol 12.5 milliGRAM(s) Oral every 12 hours  dronedarone 400 milliGRAM(s) Oral two times a day  insulin glargine Injectable (LANTUS) 8 Unit(s) SubCutaneous at bedtime  insulin lispro (ADMELOG) corrective regimen sliding scale   SubCutaneous Before meals and at bedtime  insulin lispro Injectable (ADMELOG) 4 Unit(s) SubCutaneous three times a day before meals  levothyroxine 175 MICROGram(s) Oral daily  pantoprazole    Tablet 40 milliGRAM(s) Oral before breakfast  rivaroxaban 15 milliGRAM(s) Oral with dinner  sodium chloride 2 Gram(s) Oral three times a day    Allergies    No Known Allergies        PAST MEDICAL & SURGICAL HISTORY:  Asthma    CAD (Coronary Atherosclerotic Disease)    Myocardial Infarction    Diabetes    HTN (Hypertension)    HLD (Hyperlipidemia)    CHF (congestive heart failure), NYHA class I    IBS (irritable bowel syndrome)    Venous stasis    Hypothyroidism    Atrial fibrillation    Pacemaker    Obesity    S/P coronary angiogram             REVIEW OF SYSTEMS:  CONSTITUTIONAL: No fever, weight loss, or fatigue   EYES: No eye pain, visual disturbances, or discharge  ENT:  No difficulty hearing, tinnitus, vertigo; No sinus or throat pain  NECK: No pain or stiffness or nodes  RESPIRATORY:  cough--   wheezing--   chills--   hemoptysis--  Shortness of Breath--  CARDIOVASCULAR: No chest pain, palpitations, passing out, dizziness, or leg swelling  GASTROINTESTINAL: No abdominal or epigastric pain. No nausea, vomiting,  NEUROLOGICAL: No headaches, memory loss, loss of strength, numbness, or tremors  SKIN: No itching, burning, rashes, or lesions   LYMPH Nodes: No enlarged glands  PSYCHIATRIC: No depression, anxiety, mood swings, or difficulty sleeping  HEME/LYMPH: No easy bruising, or bleeding gums  ALLERGY AND IMMUNOLOGIC: No hives or eczema        Vital Signs Last 24 Hrs  T(C): 36.7 (11 Apr 2022 05:14), Max: 36.7 (11 Apr 2022 05:14)  T(F): 98.1 (11 Apr 2022 05:14), Max: 98.1 (11 Apr 2022 05:14)  HR: 62 (11 Apr 2022 05:14) (61 - 66)  BP: 157/54 (11 Apr 2022 05:14) (132/76 - 157/54)  BP(mean): --  RR: 17 (11 Apr 2022 05:14) (16 - 17)  SpO2: 97% (11 Apr 2022 05:14) (96% - 97%)  I&O's Detail      PHYSICAL EXAMINATION:    GENERAL: The patient is obese female in no apparent distress.   SKIN no rash ecchymoses or bruises  HEENT: Head is normocephalic and atraumatic  CECELIA , Extraocular muscles are intact. Mucous membranes  moist.   Neck supple ,No LN felt JVP not increased  Thyroid not enlarged  Cardiovascular:  S1 S2 heard, RSR, No JVD , systolic  murmur at apex, No gallop or rub, PPM lt side  Respiratory: Chest wall symmetrical with good air entry ,Percussion note normal,   Lungs vesicular breathing with no   rales or   wheeze	  ABDOMEN:  Soft, Non-tender, obese  no hepatomegaly or splenomegaly BS positive	  Extremities: Normal range of motion, No clubbing, cyanosis or edema  Vascular: Peripheral pulses palpable 2+ bilaterally  CNS:  Alert and oriented x 3   Cranial nerves intact  sensory intact  motor power 5/5  dtr 2+   Babinski neg    LABS:                        10.6   8.00  )-----------( 289      ( 11 Apr 2022 07:14 )             30.7     04-11    132<L>  |  101  |  20<H>  ----------------------------<  161<H>  4.4   |  23  |  1.04    Ca    9.4      11 Apr 2022 07:14

## 2022-04-11 NOTE — PROGRESS NOTE ADULT - TIME BILLING
I have examined pt personally Hx chart lab and xrays reviewed and pt discussed with staff and PMD
I have examined pt personally Hx chart lab and xrays reviewed and pt discussed with staff and PMD

## 2022-04-11 NOTE — DISCHARGE NOTE PROVIDER - NSDCPNSUBOBJ_GEN_ALL_CORE
83yoF, w/ PMH of HTN, DM, hypothyroidism, asthma, afib, CAD, CHF (s/p pacemaker), colon cancer s/p R hemicolectomy presents with vomiting x2 weeks. Admitted for Hyponatremia     Patient felt well, no new complaints.         A/P:  #Hypo-osmolar Hyponatremia- SIADH   #Uncontrolled Diabetes Mellitus   #SRIDHAR- pre-renal- resolved   #Shortness of breath 2/2 deconditioning, possible JOSE DANIEL?   #Anxiety  #Chronic Atrial fibrillation   #Hypothyroidism   #Chronic Diastolic Heart Failure   #Hypertension  #H/O Asthma     Plan:  - Hyponatremia 2/2 SIADH, sodium 132 this am. DC home with salt tablets to 2g TID . Water restriction 1L/day. Hold toresmide and hCTz, out-patient f/u with Dr Montesinos in one week. Also discussed current hospitalization with Dr Washington - PCP, updated about change in medications. Patient given appt in one weeks time to f/u with him.    -Pt reports shortness of breath, likely 2/2 deconditioning. Chest X-Ray negative, no s/s of volume overload.  She had recent ischemic eval earlier this year which was negative. Will c/w Duoneb and Symbicort. Pulm recs appreciated.   -C/w Multaq, carvedilol and Xarelto for Afib. Rate controlled   -BP stable, c/w amlodipine and carvedilol    -Scr improved   -Blood sugars stable, DC with home regimen       Out-patient provider: PCP- Dr. Mihai Washington, Pulmonologist  Nicole Morales.

## 2022-04-11 NOTE — DISCHARGE NOTE PROVIDER - NSFOLLOWUPCLINICS_GEN_ALL_ED_FT
[facility]  Pulmonary Medicine  [address] [place] [address]  Phone: [phone]  Fax: [phone]  Follow Up Time: 1 week

## 2022-04-11 NOTE — DISCHARGE NOTE NURSING/CASE MANAGEMENT/SOCIAL WORK - PATIENT PORTAL LINK FT
You can access the FollowMyHealth Patient Portal offered by [facility] by registering at the following website: [email or web address]. By joining [facility]’s FollowMyHealth portal, you will also be able to view your health information using other applications (apps) compatible with our system.

## 2022-04-13 NOTE — PHYSICAL THERAPY INITIAL EVALUATION ADULT - BALANCE DISTURBANCE, IDENTIFIED IMPAIRMENT CONTRIBUTE, REHAB EVAL
decreased strength/decreased ROM

## 2022-04-21 ENCOUNTER — INPATIENT (INPATIENT)
Facility: HOSPITAL | Age: 82
LOS: 4 days | Discharge: HOME | DRG: 291 | End: 2022-04-26
Attending: INTERNAL MEDICINE | Admitting: INTERNAL MEDICINE
Payer: MEDICARE

## 2022-04-21 VITALS
HEIGHT: 60 IN | HEART RATE: 60 BPM | SYSTOLIC BLOOD PRESSURE: 158 MMHG | RESPIRATION RATE: 28 BRPM | WEIGHT: 265 LBS | OXYGEN SATURATION: 99 % | DIASTOLIC BLOOD PRESSURE: 80 MMHG | TEMPERATURE: 98 F

## 2022-04-21 DIAGNOSIS — E78.5 HYPERLIPIDEMIA, UNSPECIFIED: ICD-10-CM

## 2022-04-21 DIAGNOSIS — I48.91 UNSPECIFIED ATRIAL FIBRILLATION: ICD-10-CM

## 2022-04-21 DIAGNOSIS — E11.9 TYPE 2 DIABETES MELLITUS WITHOUT COMPLICATIONS: ICD-10-CM

## 2022-04-21 DIAGNOSIS — I25.10 ATHEROSCLEROTIC HEART DISEASE OF NATIVE CORONARY ARTERY WITHOUT ANGINA PECTORIS: ICD-10-CM

## 2022-04-21 DIAGNOSIS — I50.33 ACUTE ON CHRONIC DIASTOLIC (CONGESTIVE) HEART FAILURE: ICD-10-CM

## 2022-04-21 DIAGNOSIS — Z98.89 OTHER SPECIFIED POSTPROCEDURAL STATES: Chronic | ICD-10-CM

## 2022-04-21 DIAGNOSIS — E03.9 HYPOTHYROIDISM, UNSPECIFIED: ICD-10-CM

## 2022-04-21 DIAGNOSIS — I50.9 HEART FAILURE, UNSPECIFIED: ICD-10-CM

## 2022-04-21 LAB
ALBUMIN SERPL ELPH-MCNC: 3.9 G/DL — SIGNIFICANT CHANGE UP (ref 3.3–5)
ALP SERPL-CCNC: 58 U/L — SIGNIFICANT CHANGE UP (ref 40–120)
ALT FLD-CCNC: 28 U/L — SIGNIFICANT CHANGE UP (ref 10–45)
ANION GAP SERPL CALC-SCNC: 14 MMOL/L — SIGNIFICANT CHANGE UP (ref 5–17)
APPEARANCE UR: CLEAR — SIGNIFICANT CHANGE UP
AST SERPL-CCNC: 26 U/L — SIGNIFICANT CHANGE UP (ref 10–40)
BACTERIA # UR AUTO: NEGATIVE — SIGNIFICANT CHANGE UP
BASE EXCESS BLDV CALC-SCNC: -1.4 MMOL/L — SIGNIFICANT CHANGE UP (ref -2–2)
BASOPHILS # BLD AUTO: 0.05 K/UL — SIGNIFICANT CHANGE UP (ref 0–0.2)
BASOPHILS NFR BLD AUTO: 0.5 % — SIGNIFICANT CHANGE UP (ref 0–2)
BILIRUB SERPL-MCNC: 0.3 MG/DL — SIGNIFICANT CHANGE UP (ref 0.2–1.2)
BILIRUB UR-MCNC: NEGATIVE — SIGNIFICANT CHANGE UP
BUN SERPL-MCNC: 17 MG/DL — SIGNIFICANT CHANGE UP (ref 7–23)
CALCIUM SERPL-MCNC: 9.5 MG/DL — SIGNIFICANT CHANGE UP (ref 8.4–10.5)
CHLORIDE SERPL-SCNC: 99 MMOL/L — SIGNIFICANT CHANGE UP (ref 96–108)
CO2 BLDV-SCNC: 28 MMOL/L — HIGH (ref 22–26)
CO2 SERPL-SCNC: 21 MMOL/L — LOW (ref 22–31)
COLOR SPEC: YELLOW — SIGNIFICANT CHANGE UP
CREAT SERPL-MCNC: 1.23 MG/DL — SIGNIFICANT CHANGE UP (ref 0.5–1.3)
DIFF PNL FLD: NEGATIVE — SIGNIFICANT CHANGE UP
EGFR: 44 ML/MIN/1.73M2 — LOW
EOSINOPHIL # BLD AUTO: 0.24 K/UL — SIGNIFICANT CHANGE UP (ref 0–0.5)
EOSINOPHIL NFR BLD AUTO: 2.6 % — SIGNIFICANT CHANGE UP (ref 0–6)
EPI CELLS # UR: 4 /HPF — SIGNIFICANT CHANGE UP
GAS PNL BLDV: SIGNIFICANT CHANGE UP
GLUCOSE BLDC GLUCOMTR-MCNC: 186 MG/DL — HIGH (ref 70–99)
GLUCOSE SERPL-MCNC: 216 MG/DL — HIGH (ref 70–99)
GLUCOSE UR QL: NEGATIVE — SIGNIFICANT CHANGE UP
HCO3 BLDV-SCNC: 26 MMOL/L — SIGNIFICANT CHANGE UP (ref 22–29)
HCT VFR BLD CALC: 32.1 % — LOW (ref 34.5–45)
HGB BLD-MCNC: 10.3 G/DL — LOW (ref 11.5–15.5)
HYALINE CASTS # UR AUTO: 0 /LPF — SIGNIFICANT CHANGE UP (ref 0–2)
IMM GRANULOCYTES NFR BLD AUTO: 0.5 % — SIGNIFICANT CHANGE UP (ref 0–1.5)
KETONES UR-MCNC: NEGATIVE — SIGNIFICANT CHANGE UP
LEUKOCYTE ESTERASE UR-ACNC: ABNORMAL
LYMPHOCYTES # BLD AUTO: 1.45 K/UL — SIGNIFICANT CHANGE UP (ref 1–3.3)
LYMPHOCYTES # BLD AUTO: 15.9 % — SIGNIFICANT CHANGE UP (ref 13–44)
MCHC RBC-ENTMCNC: 28.7 PG — SIGNIFICANT CHANGE UP (ref 27–34)
MCHC RBC-ENTMCNC: 32.1 GM/DL — SIGNIFICANT CHANGE UP (ref 32–36)
MCV RBC AUTO: 89.4 FL — SIGNIFICANT CHANGE UP (ref 80–100)
MONOCYTES # BLD AUTO: 0.59 K/UL — SIGNIFICANT CHANGE UP (ref 0–0.9)
MONOCYTES NFR BLD AUTO: 6.5 % — SIGNIFICANT CHANGE UP (ref 2–14)
NEUTROPHILS # BLD AUTO: 6.74 K/UL — SIGNIFICANT CHANGE UP (ref 1.8–7.4)
NEUTROPHILS NFR BLD AUTO: 74 % — SIGNIFICANT CHANGE UP (ref 43–77)
NITRITE UR-MCNC: NEGATIVE — SIGNIFICANT CHANGE UP
NRBC # BLD: 0 /100 WBCS — SIGNIFICANT CHANGE UP (ref 0–0)
NT-PROBNP SERPL-SCNC: 1204 PG/ML — HIGH (ref 0–300)
PCO2 BLDV: 56 MMHG — HIGH (ref 39–42)
PH BLDV: 7.28 — LOW (ref 7.32–7.43)
PH UR: 6 — SIGNIFICANT CHANGE UP (ref 5–8)
PLATELET # BLD AUTO: 341 K/UL — SIGNIFICANT CHANGE UP (ref 150–400)
PO2 BLDV: 46 MMHG — HIGH (ref 25–45)
POTASSIUM SERPL-MCNC: 5.2 MMOL/L — SIGNIFICANT CHANGE UP (ref 3.5–5.3)
POTASSIUM SERPL-MCNC: 5.7 MMOL/L — HIGH (ref 3.5–5.3)
POTASSIUM SERPL-SCNC: 5.2 MMOL/L — SIGNIFICANT CHANGE UP (ref 3.5–5.3)
POTASSIUM SERPL-SCNC: 5.7 MMOL/L — HIGH (ref 3.5–5.3)
PROT SERPL-MCNC: 7.4 G/DL — SIGNIFICANT CHANGE UP (ref 6–8.3)
PROT UR-MCNC: ABNORMAL
RBC # BLD: 3.59 M/UL — LOW (ref 3.8–5.2)
RBC # FLD: 13.2 % — SIGNIFICANT CHANGE UP (ref 10.3–14.5)
RBC CASTS # UR COMP ASSIST: 16 /HPF — HIGH (ref 0–4)
SAO2 % BLDV: 76.3 % — SIGNIFICANT CHANGE UP (ref 67–88)
SARS-COV-2 RNA SPEC QL NAA+PROBE: SIGNIFICANT CHANGE UP
SODIUM SERPL-SCNC: 134 MMOL/L — LOW (ref 135–145)
SP GR SPEC: 1.03 — HIGH (ref 1.01–1.02)
TROPONIN T, HIGH SENSITIVITY RESULT: 15 NG/L — SIGNIFICANT CHANGE UP (ref 0–51)
UROBILINOGEN FLD QL: NEGATIVE — SIGNIFICANT CHANGE UP
WBC # BLD: 9.12 K/UL — SIGNIFICANT CHANGE UP (ref 3.8–10.5)
WBC # FLD AUTO: 9.12 K/UL — SIGNIFICANT CHANGE UP (ref 3.8–10.5)
WBC UR QL: 5 /HPF — SIGNIFICANT CHANGE UP (ref 0–5)

## 2022-04-21 PROCEDURE — 99291 CRITICAL CARE FIRST HOUR: CPT

## 2022-04-21 PROCEDURE — 93010 ELECTROCARDIOGRAM REPORT: CPT

## 2022-04-21 PROCEDURE — 71045 X-RAY EXAM CHEST 1 VIEW: CPT | Mod: 26

## 2022-04-21 RX ORDER — RIVAROXABAN 15 MG-20MG
15 KIT ORAL
Refills: 0 | Status: DISCONTINUED | OUTPATIENT
Start: 2022-04-21 | End: 2022-04-26

## 2022-04-21 RX ORDER — CALCIUM GLUCONATE 100 MG/ML
1 VIAL (ML) INTRAVENOUS ONCE
Refills: 0 | Status: DISCONTINUED | OUTPATIENT
Start: 2022-04-21 | End: 2022-04-21

## 2022-04-21 RX ORDER — FUROSEMIDE 40 MG
40 TABLET ORAL
Refills: 0 | Status: DISCONTINUED | OUTPATIENT
Start: 2022-04-22 | End: 2022-04-25

## 2022-04-21 RX ORDER — ALBUTEROL 90 UG/1
10 AEROSOL, METERED ORAL ONCE
Refills: 0 | Status: DISCONTINUED | OUTPATIENT
Start: 2022-04-21 | End: 2022-04-21

## 2022-04-21 RX ORDER — FUROSEMIDE 40 MG
40 TABLET ORAL ONCE
Refills: 0 | Status: DISCONTINUED | OUTPATIENT
Start: 2022-04-21 | End: 2022-04-21

## 2022-04-21 RX ORDER — LEVOTHYROXINE SODIUM 125 MCG
175 TABLET ORAL DAILY
Refills: 0 | Status: DISCONTINUED | OUTPATIENT
Start: 2022-04-21 | End: 2022-04-26

## 2022-04-21 RX ORDER — INSULIN LISPRO 100/ML
VIAL (ML) SUBCUTANEOUS AT BEDTIME
Refills: 0 | Status: DISCONTINUED | OUTPATIENT
Start: 2022-04-21 | End: 2022-04-26

## 2022-04-21 RX ORDER — CARVEDILOL PHOSPHATE 80 MG/1
12.5 CAPSULE, EXTENDED RELEASE ORAL EVERY 12 HOURS
Refills: 0 | Status: DISCONTINUED | OUTPATIENT
Start: 2022-04-21 | End: 2022-04-26

## 2022-04-21 RX ORDER — AMLODIPINE BESYLATE 2.5 MG/1
5 TABLET ORAL DAILY
Refills: 0 | Status: DISCONTINUED | OUTPATIENT
Start: 2022-04-21 | End: 2022-04-26

## 2022-04-21 RX ORDER — SODIUM BICARBONATE 1 MEQ/ML
0.31 SYRINGE (ML) INTRAVENOUS
Qty: 150 | Refills: 0 | Status: DISCONTINUED | OUTPATIENT
Start: 2022-04-21 | End: 2022-04-21

## 2022-04-21 RX ORDER — DEXTROSE 50 % IN WATER 50 %
25 SYRINGE (ML) INTRAVENOUS ONCE
Refills: 0 | Status: DISCONTINUED | OUTPATIENT
Start: 2022-04-21 | End: 2022-04-21

## 2022-04-21 RX ORDER — DEXTROSE 50 % IN WATER 50 %
12.5 SYRINGE (ML) INTRAVENOUS ONCE
Refills: 0 | Status: DISCONTINUED | OUTPATIENT
Start: 2022-04-21 | End: 2022-04-26

## 2022-04-21 RX ORDER — SODIUM CHLORIDE 9 MG/ML
1000 INJECTION, SOLUTION INTRAVENOUS
Refills: 0 | Status: DISCONTINUED | OUTPATIENT
Start: 2022-04-21 | End: 2022-04-26

## 2022-04-21 RX ORDER — LOSARTAN POTASSIUM 100 MG/1
50 TABLET, FILM COATED ORAL DAILY
Refills: 0 | Status: DISCONTINUED | OUTPATIENT
Start: 2022-04-21 | End: 2022-04-26

## 2022-04-21 RX ORDER — FUROSEMIDE 40 MG
80 TABLET ORAL ONCE
Refills: 0 | Status: DISCONTINUED | OUTPATIENT
Start: 2022-04-21 | End: 2022-04-21

## 2022-04-21 RX ORDER — DEXTROSE 50 % IN WATER 50 %
15 SYRINGE (ML) INTRAVENOUS ONCE
Refills: 0 | Status: DISCONTINUED | OUTPATIENT
Start: 2022-04-21 | End: 2022-04-26

## 2022-04-21 RX ORDER — ESCITALOPRAM OXALATE 10 MG/1
10 TABLET, FILM COATED ORAL DAILY
Refills: 0 | Status: DISCONTINUED | OUTPATIENT
Start: 2022-04-21 | End: 2022-04-26

## 2022-04-21 RX ORDER — BUDESONIDE AND FORMOTEROL FUMARATE DIHYDRATE 160; 4.5 UG/1; UG/1
2 AEROSOL RESPIRATORY (INHALATION)
Refills: 0 | Status: DISCONTINUED | OUTPATIENT
Start: 2022-04-21 | End: 2022-04-26

## 2022-04-21 RX ORDER — DEXTROSE 50 % IN WATER 50 %
25 SYRINGE (ML) INTRAVENOUS ONCE
Refills: 0 | Status: DISCONTINUED | OUTPATIENT
Start: 2022-04-21 | End: 2022-04-26

## 2022-04-21 RX ORDER — DRONEDARONE 400 MG/1
400 TABLET, FILM COATED ORAL
Refills: 0 | Status: DISCONTINUED | OUTPATIENT
Start: 2022-04-21 | End: 2022-04-26

## 2022-04-21 RX ORDER — FUROSEMIDE 40 MG
40 TABLET ORAL ONCE
Refills: 0 | Status: COMPLETED | OUTPATIENT
Start: 2022-04-21 | End: 2022-04-21

## 2022-04-21 RX ORDER — ALBUTEROL 90 UG/1
2 AEROSOL, METERED ORAL EVERY 6 HOURS
Refills: 0 | Status: DISCONTINUED | OUTPATIENT
Start: 2022-04-21 | End: 2022-04-26

## 2022-04-21 RX ORDER — INSULIN HUMAN 100 [IU]/ML
5 INJECTION, SOLUTION SUBCUTANEOUS ONCE
Refills: 0 | Status: DISCONTINUED | OUTPATIENT
Start: 2022-04-21 | End: 2022-04-21

## 2022-04-21 RX ORDER — GLUCAGON INJECTION, SOLUTION 0.5 MG/.1ML
1 INJECTION, SOLUTION SUBCUTANEOUS ONCE
Refills: 0 | Status: DISCONTINUED | OUTPATIENT
Start: 2022-04-21 | End: 2022-04-26

## 2022-04-21 RX ORDER — SODIUM ZIRCONIUM CYCLOSILICATE 10 G/10G
10 POWDER, FOR SUSPENSION ORAL ONCE
Refills: 0 | Status: DISCONTINUED | OUTPATIENT
Start: 2022-04-21 | End: 2022-04-21

## 2022-04-21 RX ORDER — INSULIN LISPRO 100/ML
VIAL (ML) SUBCUTANEOUS
Refills: 0 | Status: DISCONTINUED | OUTPATIENT
Start: 2022-04-21 | End: 2022-04-25

## 2022-04-21 RX ADMIN — CARVEDILOL PHOSPHATE 12.5 MILLIGRAM(S): 80 CAPSULE, EXTENDED RELEASE ORAL at 21:56

## 2022-04-21 RX ADMIN — Medication 0.5 MILLIGRAM(S): at 21:56

## 2022-04-21 RX ADMIN — RIVAROXABAN 15 MILLIGRAM(S): KIT at 22:07

## 2022-04-21 RX ADMIN — Medication 40 MILLIGRAM(S): at 20:30

## 2022-04-21 RX ADMIN — DRONEDARONE 400 MILLIGRAM(S): 400 TABLET, FILM COATED ORAL at 22:30

## 2022-04-21 NOTE — H&P ADULT - NSHPREVIEWOFSYSTEMS_GEN_ALL_CORE
Gen: no loss of wt no loss of appetite  ENT: no dizziness no hearing loss  Ophth: no blurring of vision no loss of vision  Resp: No cough no sputum production  CVS: No chest pain no palpitations no orthopnea  GI: no nausea, vomiting or diarrhea   : no dysuria, hematuria  Endo: no polyuria no excessive sweating  Neuro: no weakness no paresthesias  Heme: No petechiae no easy bruising  Msk: No joint pain no swelling  Skin: No rash no itching

## 2022-04-21 NOTE — H&P ADULT - PROBLEM SELECTOR PLAN 1
likely secondary to discontinuation of furosemide as outpatient  will restart  received 40 in Emergency Department  will continue 40 BID

## 2022-04-21 NOTE — ASU PATIENT PROFILE, ADULT - FALL HARM RISK - HARM RISK INTERVENTIONS
Assistance OOB with selected safe patient handling equipment/Communicate Risk of Fall with Harm to all staff/Discuss with provider need for PT consult/Monitor gait and stability/Provide patient with walking aids - walker, cane, crutches/Reinforce activity limits and safety measures with patient and family/Tailored Fall Risk Interventions/Visual Cue: Yellow wristband and red socks/Bed in lowest position, wheels locked, appropriate side rails in place/Call bell, personal items and telephone in reach/Instruct patient to call for assistance before getting out of bed or chair/Non-slip footwear when patient is out of bed/[place] to call system/Physically safe environment - no spills, clutter or unnecessary equipment/Purposeful Proactive Rounding/Room/bathroom lighting operational, light cord in reach Assistance with ambulation/Assistance OOB with selected safe patient handling equipment/Communicate Risk of Fall with Harm to all staff/Discuss with provider need for PT consult/Monitor gait and stability/Provide patient with walking aids - walker, cane, crutches/Reinforce activity limits and safety measures with patient and family/Tailored Fall Risk Interventions/Visual Cue: Yellow wristband and red socks/Bed in lowest position, wheels locked, appropriate side rails in place/Call bell, personal items and telephone in reach/Instruct patient to call for assistance before getting out of bed or chair/Non-slip footwear when patient is out of bed/[place] to call system/Physically safe environment - no spills, clutter or unnecessary equipment/Purposeful Proactive Rounding/Room/bathroom lighting operational, light cord in reach

## 2022-04-21 NOTE — ED PROVIDER NOTE - CLINICAL SUMMARY MEDICAL DECISION MAKING FREE TEXT BOX
83yoF, w/ PMH of HTN, DM, hypothyroidism, asthma, afib, CAD, CHF (s/p pacemaker), presents with shortness of breath and vomiting. This is concerning for CHF exacerbation. PT was stopped on her diuretics likely due to her hyponatremia which probably led to this exacerbation. pt TBA, will likely need monitored diuresis.

## 2022-04-21 NOTE — H&P ADULT - ASSESSMENT
83yoF, w/ PMH of HTN, DM, hypothyroidism, asthma, Afib CAD, CHF (s/p pacemaker), presents with shortness of breath and vomiting likely secondary to acute on chronic diastolic heart failure

## 2022-04-21 NOTE — ED PROVIDER NOTE - PHYSICAL EXAMINATION
General: WN/WD NAD  Head: Atraumatic, normocephalic  Eyes: EOM grossly in tact, no scleral icterus, no discharge  ENT: moist mucous membranes  Neurology: A&Ox 3, nonfocal, EVANS x 4  Respiratory: + coarse crackles throughout both lung fields  CV: RRR, good s1/s2, no S3, Extremities warm and well perfused  Abdominal: Soft, distended, no masses, non tender  Extremities: 2+ pitting edema, no deformities  Skin: warm and dry. No rashes

## 2022-04-21 NOTE — ED ADULT NURSE NOTE - OBJECTIVE STATEMENT
82y female PMH pacemaker placement, HTN, HLD, CHF, a-fib on Eliquis, hypothyroidism, asthma DM, to the ED via c/o of SOB. EMS reports that pt has been increasingly SOB x a few days. Pt daughter called ambulance today because pt seemed to be having increased difficulty with breathing. Pt daughter also reports that pt has been coughing x a few weeks on and off. Pt daughter reports pt is tiring more easily and SOB is worse with exertion. Pt denies any chest pain, n/v/d, changes in bowel or bladder habits, fevers, chills, body aches. Lungs sounds revealed crackles. Pt placed on cardiac monitor- in a paced rhythm Daughter Vaishali at the bedside refusing translation services and translating for pt. Stretcher in lowest position and locked, appropriate side rails in place, room cleared of clutter and safety hazards, call bell in reach- pt oriented to use, blankets given for comfort.

## 2022-04-21 NOTE — ED PROVIDER NOTE - INTERPRETATION
----- Message from Venecia Randall sent at 9/29/2020 12:22 PM CDT -----  Regarding: HEAVEN Lacey need Pt evaluate  Contact: HEAVEN [phone]  Needs Advice    Reason for call:Mom want to have Pt evaluate        Communication Preference:Mom requesting a call back     Additional Information:Mom states Pt have a speech w/autism            abnormal

## 2022-04-21 NOTE — ED PROVIDER NOTE - ATTENDING CONTRIBUTION TO CARE
Jesus Magdaleno MD:   I personally saw the patient and performed a substantive portion of the visit including all aspects of the medical decision making.    MDM: Patient with SOB and hypoxia, requiring supplemental O2 via NC. Likely fluid overload/pulm edema from CHF as pt has been off diuretics for recent hyponatremia. Will evaluate for cardiopulmonary pathology including pneumonia, bronchospasm, pulmonary edema.   Will obtain EKG and keep patient on cardiac monitor and evaluate for ACS.   Will keep patient on pulse oximeter and provide supplemental O2.     Critical Care Billing: Acute Hypoxic Respiratory Failure, CHF Exacerbation, Hyperkalemia  Upon my evaluation, this patient had a high probability of imminent or life-threatening deterioration, which required my direct attention, intervention, and personal management.  The patient has a  medical condition that impairs one or more vital organ systems.  Frequent personal assessment and adjustment of medical interventions was performed.      I have personally provided 35 minutes of critical care time exclusive of time spent on separately billable procedures. Time includes review of laboratory data, radiology results, discussion with consultants, patient and family; monitoring for potential decompensation, as well as time spent retrieving data and reviewing the chart and documenting the visit. Interventions were performed as documented above.

## 2022-04-21 NOTE — H&P ADULT - NSHPPHYSICALEXAM_GEN_ALL_CORE
PHYSICAL EXAM: vital signs noted on [place]  in no apparent distress  HEENT: CECELIA EOMI  Neck: Supple, no JVD, no thyromegaly  Lungs: no wheeze, no crackles  CVS: S1 S2 no M/R/G  Abdomen: no tenderness, no organomegaly, BS present  Neuro: AO x 3 no focal weakness, no sensory abnormalities  Psych: appropriate affect  Skin: warm, dry  Ext: no cyanosis or clubbing, no edema  Msk: no joint swelling or deformities  Back: no CVA tenderness, no kyphosis/scoliosis

## 2022-04-21 NOTE — ED PROVIDER NOTE - CONSTITUTIONAL NEGATIVE STATEMENT, MLM
Patient progressed with PT. Per PT notes from 7/12/19, Patient ambulating 60ftx2 with RW.    Patient voiced wanting to go home with OP therapy.  ADARSH Shay informed.   no fever and no chills.

## 2022-04-21 NOTE — ED ADULT NURSE NOTE - NSIMPLEMENTINTERV_GEN_ALL_ED
Implemented All Fall with Harm Risk Interventions:  [place] to call system. Call bell, personal items and telephone within reach. Instruct patient to call for assistance. Room bathroom lighting operational. Non-slip footwear when patient is off stretcher. Physically safe environment: no spills, clutter or unnecessary equipment. Stretcher in lowest position, wheels locked, appropriate side rails in place. Provide visual cue, wrist band, yellow gown, etc. Monitor gait and stability. Monitor for mental status changes and reorient to person, place, and time. Review medications for side effects contributing to fall risk. Reinforce activity limits and safety measures with patient and family. Provide visual clues: red socks.

## 2022-04-21 NOTE — ED PROVIDER NOTE - CADM POA PRESS ULCER
Received from OR per stretcher. Family at bedside. See flow sheets. Sleepy rouses to voice. Warm blanket applied.   No

## 2022-04-21 NOTE — ED ADULT NURSE REASSESSMENT NOTE - NS ED NURSE REASSESS COMMENT FT1
met with pt/dtr at bedside with AYAZ Humphries re: lower bed rails as pt c/o "feeling claustrophobic", pt noted to be Fall Risk, as witnessed by ED Jonah Milner, pt had near-fall while in stretcher, pt/dtr insisting on lowering rails - even though repeatedly educated on safety measures as pt fall risk

## 2022-04-21 NOTE — ED PROVIDER NOTE - OBJECTIVE STATEMENT
83yoF, w/ PMH of HTN, DM, hypothyroidism, asthma, afib, CAD, CHF (s/p pacemaker), presents with shortness of breath and vomiting. PT was recently discharged from  after experiencing similar symptoms. PT's daughter states she was discharged 10d ago. She was admitted for fluid overload and found to be hyponatremic. Pt had her diuretics discontinued. Over the last 5-6 days symptoms returned. PT on home O2 PRN for exertion, normally walks around on her own. Daughter denies fever, chills, diarrhea.

## 2022-04-22 DIAGNOSIS — J96.21 ACUTE AND CHRONIC RESPIRATORY FAILURE WITH HYPOXIA: ICD-10-CM

## 2022-04-22 LAB
ANION GAP SERPL CALC-SCNC: 12 MMOL/L — SIGNIFICANT CHANGE UP (ref 5–17)
APPEARANCE UR: CLEAR — SIGNIFICANT CHANGE UP
BACTERIA # UR AUTO: NEGATIVE — SIGNIFICANT CHANGE UP
BILIRUB UR-MCNC: NEGATIVE — SIGNIFICANT CHANGE UP
BUN SERPL-MCNC: 15 MG/DL — SIGNIFICANT CHANGE UP (ref 7–23)
CALCIUM SERPL-MCNC: 9.3 MG/DL — SIGNIFICANT CHANGE UP (ref 8.4–10.5)
CHLORIDE SERPL-SCNC: 96 MMOL/L — SIGNIFICANT CHANGE UP (ref 96–108)
CO2 SERPL-SCNC: 28 MMOL/L — SIGNIFICANT CHANGE UP (ref 22–31)
COLOR SPEC: SIGNIFICANT CHANGE UP
CREAT ?TM UR-MCNC: 54 MG/DL — SIGNIFICANT CHANGE UP
CREAT SERPL-MCNC: 1.19 MG/DL — SIGNIFICANT CHANGE UP (ref 0.5–1.3)
CULTURE RESULTS: SIGNIFICANT CHANGE UP
DIFF PNL FLD: NEGATIVE — SIGNIFICANT CHANGE UP
EGFR: 45 ML/MIN/1.73M2 — LOW
EPI CELLS # UR: 1 /HPF — SIGNIFICANT CHANGE UP
GLUCOSE BLDC GLUCOMTR-MCNC: 170 MG/DL — HIGH (ref 70–99)
GLUCOSE BLDC GLUCOMTR-MCNC: 174 MG/DL — HIGH (ref 70–99)
GLUCOSE BLDC GLUCOMTR-MCNC: 237 MG/DL — HIGH (ref 70–99)
GLUCOSE BLDC GLUCOMTR-MCNC: 336 MG/DL — HIGH (ref 70–99)
GLUCOSE SERPL-MCNC: 215 MG/DL — HIGH (ref 70–99)
GLUCOSE UR QL: NEGATIVE — SIGNIFICANT CHANGE UP
KETONES UR-MCNC: NEGATIVE — SIGNIFICANT CHANGE UP
LEUKOCYTE ESTERASE UR-ACNC: ABNORMAL
MAGNESIUM SERPL-MCNC: 1.7 MG/DL — SIGNIFICANT CHANGE UP (ref 1.6–2.6)
NITRITE UR-MCNC: NEGATIVE — SIGNIFICANT CHANGE UP
OSMOLALITY UR: 366 MOS/KG — SIGNIFICANT CHANGE UP (ref 300–900)
PH UR: 6 — SIGNIFICANT CHANGE UP (ref 5–8)
POTASSIUM SERPL-MCNC: 4.5 MMOL/L — SIGNIFICANT CHANGE UP (ref 3.5–5.3)
POTASSIUM SERPL-SCNC: 4.5 MMOL/L — SIGNIFICANT CHANGE UP (ref 3.5–5.3)
PROT ?TM UR-MCNC: 5 MG/DL — SIGNIFICANT CHANGE UP (ref 0–12)
PROT UR-MCNC: NEGATIVE — SIGNIFICANT CHANGE UP
PROT/CREAT UR-RTO: 0.1 RATIO — SIGNIFICANT CHANGE UP (ref 0–0.2)
RBC CASTS # UR COMP ASSIST: 1 /HPF — SIGNIFICANT CHANGE UP (ref 0–4)
SODIUM SERPL-SCNC: 136 MMOL/L — SIGNIFICANT CHANGE UP (ref 135–145)
SODIUM UR-SCNC: 115 MMOL/L — SIGNIFICANT CHANGE UP
SP GR SPEC: 1.01 — SIGNIFICANT CHANGE UP (ref 1.01–1.02)
SPECIMEN SOURCE: SIGNIFICANT CHANGE UP
TSH SERPL-MCNC: 0.86 UIU/ML — SIGNIFICANT CHANGE UP (ref 0.27–4.2)
UROBILINOGEN FLD QL: NEGATIVE — SIGNIFICANT CHANGE UP
WBC UR QL: 2 /HPF — SIGNIFICANT CHANGE UP (ref 0–5)

## 2022-04-22 PROCEDURE — 93306 TTE W/DOPPLER COMPLETE: CPT | Mod: 26

## 2022-04-22 PROCEDURE — 76770 US EXAM ABDO BACK WALL COMP: CPT | Mod: 26

## 2022-04-22 RX ORDER — MAGNESIUM SULFATE 500 MG/ML
2 VIAL (ML) INJECTION ONCE
Refills: 0 | Status: COMPLETED | OUTPATIENT
Start: 2022-04-22 | End: 2022-04-22

## 2022-04-22 RX ADMIN — DRONEDARONE 400 MILLIGRAM(S): 400 TABLET, FILM COATED ORAL at 18:43

## 2022-04-22 RX ADMIN — Medication 40 MILLIGRAM(S): at 18:43

## 2022-04-22 RX ADMIN — RIVAROXABAN 15 MILLIGRAM(S): KIT at 18:43

## 2022-04-22 RX ADMIN — ESCITALOPRAM OXALATE 10 MILLIGRAM(S): 10 TABLET, FILM COATED ORAL at 11:36

## 2022-04-22 RX ADMIN — Medication 2: at 21:30

## 2022-04-22 RX ADMIN — CARVEDILOL PHOSPHATE 12.5 MILLIGRAM(S): 80 CAPSULE, EXTENDED RELEASE ORAL at 09:38

## 2022-04-22 RX ADMIN — Medication 2: at 16:28

## 2022-04-22 RX ADMIN — AMLODIPINE BESYLATE 5 MILLIGRAM(S): 2.5 TABLET ORAL at 05:36

## 2022-04-22 RX ADMIN — DRONEDARONE 400 MILLIGRAM(S): 400 TABLET, FILM COATED ORAL at 09:38

## 2022-04-22 RX ADMIN — CARVEDILOL PHOSPHATE 12.5 MILLIGRAM(S): 80 CAPSULE, EXTENDED RELEASE ORAL at 18:43

## 2022-04-22 RX ADMIN — Medication 1: at 11:26

## 2022-04-22 RX ADMIN — Medication 1: at 07:38

## 2022-04-22 RX ADMIN — Medication 175 MICROGRAM(S): at 05:37

## 2022-04-22 RX ADMIN — BUDESONIDE AND FORMOTEROL FUMARATE DIHYDRATE 2 PUFF(S): 160; 4.5 AEROSOL RESPIRATORY (INHALATION) at 18:53

## 2022-04-22 RX ADMIN — Medication 25 GRAM(S): at 11:23

## 2022-04-22 RX ADMIN — LOSARTAN POTASSIUM 50 MILLIGRAM(S): 100 TABLET, FILM COATED ORAL at 05:37

## 2022-04-22 RX ADMIN — Medication 40 MILLIGRAM(S): at 05:36

## 2022-04-22 RX ADMIN — BUDESONIDE AND FORMOTEROL FUMARATE DIHYDRATE 2 PUFF(S): 160; 4.5 AEROSOL RESPIRATORY (INHALATION) at 05:36

## 2022-04-22 NOTE — CONSULT NOTE ADULT - SUBJECTIVE AND OBJECTIVE BOX
-22 @ 12:05    Patient is a 82y old  Female who presents with a chief complaint of shortness of breath and vomiting (2022 19:29)      HPI:  83yoF, w/ PMH of HTN, DM, hypothyroidism, asthma, afib, CAD, CHF (s/p pacemaker), presents with shortness of breath and vomiting. PT was recently discharged from  after experiencing similar symptoms. PT's daughter states she was discharged 10d ago. She was admitted for fluid overload and found to be hyponatremic. Pt had her diuretics discontinued. Over the last 5-6 days symptoms returned. PT on home O2 PRN for exertion, normally walks around on her own. Daughter denies fever, chills, diarrhea. (2022 19:29)    dw her daughter and obtained hisotry from her too:  she has asthma and has been using breztri at home:  she has had multiple admissions for chf exacerbation:  she had covid in  but was mild and was managed at home:  she has JOSE DANIEL too and is in the process of getting new machine:  she never had tb before:   She is on oxygen at home:         ?FOLLOWING PRESENT  [ x] Hx of PE/DVT, [x ] Hx COPD, [ y] Hx of Asthma, [ ]y Hx of Hospitalization, [ ]y  Hx of BiPAP/CPAP use, [ y] Hx of JOSE DANIEL    Allergies    No Known Allergies    Intolerances        PAST MEDICAL & SURGICAL HISTORY:  Asthma    CAD (Coronary Atherosclerotic Disease)    Myocardial Infarction    Diabetes    HTN (Hypertension)    HLD (Hyperlipidemia)    CHF (congestive heart failure), NYHA class I    IBS (irritable bowel syndrome)    Venous stasis    Hypothyroidism    Atrial fibrillation    Pacemaker    Obesity    Afib    S/P coronary angiogram        FAMILY HISTORY:  Family history of heart disease        Social History: [x  ] TOBACCO                  [ x ] ETOH                                 [ x ] IVDA/DRUGS    REVIEW OF SYSTEMS      General:	weak    Skin/Breast:x  	  Ophthalmologic:x  	  ENMT:	x    Respiratory and Thorax: sob, jimenez   	  Cardiovascular:	x    Gastrointestinal:	x    Genitourinary:	x    Musculoskeletal:	 increasing pedal edema    Neurological:	x    Psychiatric:	x    Hematology/Lymphatics:	x    Endocrine:x	    Allergic/Immunologic:	x    MEDICATIONS  (STANDING):  amLODIPine   Tablet 5 milliGRAM(s) Oral daily  budesonide 160 MICROgram(s)/formoterol 4.5 MICROgram(s) Inhaler 2 Puff(s) Inhalation two times a day  carvedilol 12.5 milliGRAM(s) Oral every 12 hours  dextrose 5%. 1000 milliLiter(s) (50 mL/Hr) IV Continuous <Continuous>  dextrose 5%. 1000 milliLiter(s) (100 mL/Hr) IV Continuous <Continuous>  dextrose 50% Injectable 25 Gram(s) IV Push once  dextrose 50% Injectable 12.5 Gram(s) IV Push once  dextrose 50% Injectable 25 Gram(s) IV Push once  dronedarone 400 milliGRAM(s) Oral two times a day  escitalopram 10 milliGRAM(s) Oral daily  furosemide   Injectable 40 milliGRAM(s) IV Push two times a day  glucagon  Injectable 1 milliGRAM(s) IntraMuscular once  insulin lispro (ADMELOG) corrective regimen sliding scale   SubCutaneous at bedtime  insulin lispro (ADMELOG) corrective regimen sliding scale   SubCutaneous three times a day before meals  levothyroxine 175 MICROGram(s) Oral daily  losartan 50 milliGRAM(s) Oral daily  rivaroxaban 15 milliGRAM(s) Oral with dinner    MEDICATIONS  (PRN):  ALBUTerol    90 MICROgram(s) HFA Inhaler 2 Puff(s) Inhalation every 6 hours PRN Shortness of Breath and/or Wheezing  dextrose Oral Gel 15 Gram(s) Oral once PRN Blood Glucose LESS THAN 70 milliGRAM(s)/deciliter  guaiFENesin Oral Liquid (Sugar-Free) 200 milliGRAM(s) Oral every 6 hours PRN Cough  LORazepam     Tablet 0.5 milliGRAM(s) Oral daily PRN Anxiety       Vital Signs Last 24 Hrs  T(C): 36.5 (2022 09:55), Max: 36.9 (2022 14:20)  T(F): 97.7 (2022 09:55), Max: 98.4 (2022 14:20)  HR: 64 (2022 09:55) (60 - 81)  BP: 151/69 (2022 09:55) (140/56 - 175/65)  BP(mean): 80 (2022 04:26) (77 - 94)  RR: 17 (2022 09:55) (17 - 28)  SpO2: 100% (2022 09:55) (93% - 100%)Orthostatic VS          I&O's Summary    2022 07:01  -  2022 07:00  --------------------------------------------------------  IN: 0 mL / OUT: 1200 mL / NET: -1200 mL    2022 07:01  -  2022 12:05  --------------------------------------------------------  IN: 480 mL / OUT: 1700 mL / NET: -1220 mL        Physical Exam:   GENERAL: Obese+  HEENT: CECELIA/   Atraumatic, Normocephalic  ENMT: No tonsillar erythema, exudates, or enlargement; Moist mucous membranes, Good dentition, No lesions  NECK: Supple, No JVD, Normal thyroid  CHEST/LUNG: no wheezing decreased air entry bilaterally   CVS: Regular rate and rhythm; No murmurs, rubs, or gallops  GI: : Soft, Nontender, Nondistended; Bowel sounds present  NERVOUS SYSTEM:  Alert & Oriented X3  EXTREMITIES: ++ edema  LYMPH: No lymphadenopathy noted  SKIN: No rashes or lesions  ENDOCRINOLOGY: No Thyromegaly  PSYCH: Appropriate    Labs:  Venous<56<4>>46<<7.285>>Venous<<3><<4><<5<<469>>COVID-19 PCR: NotDetec (2022 15:50)  COVID-19 PCR: NotDetec (2022 06:25)  COVID-19 PCR: NotDetec (2022 00:21)                              10.3   9.12  )-----------( 341      ( 2022 15:50 )             32.1     04-22    136  |  96  |  15  ----------------------------<  215<H>  4.5   |  28  |  1.19  04-21    x   |  x   |  x   ----------------------------<  x   5.2   |  x   |  x       134<L>  |  99  |  17  ----------------------------<  216<H>  5.7<H>   |  21<L>  |  1.23    Ca    9.3      2022 08:17  Ca    9.5      2022 15:50  Mg     1.7         TPro  7.4  /  Alb  3.9  /  TBili  0.3  /  DBili  x   /  AST  26  /  ALT  28  /  AlkPhos  58      CAPILLARY BLOOD GLUCOSE      POCT Blood Glucose.: 170 mg/dL (2022 11:06)  POCT Blood Glucose.: 174 mg/dL (2022 07:03)  POCT Blood Glucose.: 186 mg/dL (2022 21:20)    LIVER FUNCTIONS - ( 2022 15:50 )  Alb: 3.9 g/dL / Pro: 7.4 g/dL / ALK PHOS: 58 U/L / ALT: 28 U/L / AST: 26 U/L / GGT: x             Urinalysis Basic - ( 2022 16:16 )    Color: Yellow / Appearance: Clear / S.027 / pH: x  Gluc: x / Ketone: Negative  / Bili: Negative / Urobili: Negative   Blood: x / Protein: 30 mg/dL / Nitrite: Negative   Leuk Esterase: Small / RBC: 16 /hpf / WBC 5 /HPF   Sq Epi: x / Non Sq Epi: 4 /hpf / Bacteria: Negative      D DImer  Serum Pro-Brain Natriuretic Peptide: 1204 pg/mL ( @ 15:50)      Studies  Chest X-RAY  CT SCAN Chest   CT Abdomen  Venous Dopplers: LE:   Others  rad< from: Xray Chest 1 View- PORTABLE-Urgent (22 @ 15:43) >    ACC: [id number] EXAM:  XR CHEST PORTABLE URGENT 1V                          PROCEDURE DATE:  2022          INTERPRETATION:  INDICATION: Chest pain.    COMPARISON: Multiple prior chest x-rays with most recent dated 4/10/2022.    FINDINGS:  Heart/Vascular: Heart size is enlarged. Left chest wall dual-chamber   pacemaker with leads intact.  Pulmonary: No focal consolidation. No pleural effusion. No pneumothorax.  Bones: No acute bony pathology.    IMPRESSION: Clear lungs.          --- End of Report ---           PING OLIVARES MD; Resident Radiology  This document has been electronically signed.  TOM GERONIMO MD; Attending Radiologist  This document has been electronically signed. 2022  9:30AM    < end of copied text >  < from: CT Chest No Cont (19 @ 21:43) >    PROCEDURE:   CT of the Chest was performed without intravenous contrast.  Sagittal and coronal reformats were performed.  MIP reformats generated    FINDINGS:    LUNGS AND AIRWAYS: Patent central airways.  Lungs are clear.    PLEURA: No pleural effusion.    MEDIASTINUM AND TASHA: No lymphadenopathy.    VESSELS: Within normal limits.    HEART: Heart size is normal. No pericardial effusion.    CHEST WALL AND LOWER NECK: Left chest wall cardiac device.    VISUALIZED UPPER ABDOMEN: Within normal limits.    BONES: Within normal limits.    IMPRESSION:     No acute pathology. Clear lungs                    SHARRI WAGGONER M.D., RADIOLOGY RESIDENT  This document has been electronically signed.  DM BURR M.D., ATTENDING RADIOLOGIST  This document has been electronically signed. 2019 11:05PM    < end of copied text >          < from: Transthoracic Echocardiogram (21 @ 07:01) >  Pericardium/PleuraNo pericardial effusion. A prominent  epicardial fat pad is noted.  Hemodynamic: RA Pressure is 8 mm Hg. RV systolic pressure  is mildly increased at  37 mm Hg.  ------------------------------------------------------------------------  CONCLUSIONS:  1. Moderate mitral annular calcification. Trace mitral  regurgitation. Mild mitral stenosis.  2. Calcified, probably trileaflet aortic valve with  decreased opening. Mild to moderate aortic stenosis. No  aortic valveregurgitation seen.  3. Normal aortic root.  4. Normal left atrium.  5. Mild left ventricular enlargement.  6. Normal Left Ventricular Systolic Function,  (EF = 66% by  biplane)  7. Grade I diastolic dysfunction (Impaired relaxation).  Diastolic function may not be accurately assessed in the  setting of mitral stenosis.  8. Normal right atrium.  9. Normal right ventricular size and systolic function  (TAPSE 2.8 cm). A device lead is visualized in the right  heart.  10. RV systolic pressure is mildly increased at  37 mm Hg.  11. Tricuspid valve not well seen. Mild tricuspid  regurgitation.  12. No pericardial effusion.    *** Compared with echocardiogram report of 2019, no  significant changes noted.    < end of copied text >

## 2022-04-22 NOTE — PROGRESS NOTE ADULT - SUBJECTIVE AND OBJECTIVE BOX
Patient is a 82y old  Female who presents with a chief complaint of shortness of breath and vomiting (2022 12:04)      DATE OF SERVICE: 22 @ 14:39    SUBJECTIVE / OVERNIGHT EVENTS: overnight events noted  son Seven at bedside   patient declined use of  services, preferred her son translate instead  "I feel much better"     ROS:  Resp: No cough no sputum production  CVS: No chest pain no palpitations no orthopnea  GI: no N/V/D  : no dysuria, no hematuria  Neuro: no weakness no paresthesias  Heme: No petechiae no easy bruising  Msk: No joint pain no swelling  Skin: No rash no itching        MEDICATIONS  (STANDING):  amLODIPine   Tablet 5 milliGRAM(s) Oral daily  budesonide 160 MICROgram(s)/formoterol 4.5 MICROgram(s) Inhaler 2 Puff(s) Inhalation two times a day  carvedilol 12.5 milliGRAM(s) Oral every 12 hours  dextrose 5%. 1000 milliLiter(s) (50 mL/Hr) IV Continuous <Continuous>  dextrose 5%. 1000 milliLiter(s) (100 mL/Hr) IV Continuous <Continuous>  dextrose 50% Injectable 25 Gram(s) IV Push once  dextrose 50% Injectable 12.5 Gram(s) IV Push once  dextrose 50% Injectable 25 Gram(s) IV Push once  dronedarone 400 milliGRAM(s) Oral two times a day  escitalopram 10 milliGRAM(s) Oral daily  furosemide   Injectable 40 milliGRAM(s) IV Push two times a day  glucagon  Injectable 1 milliGRAM(s) IntraMuscular once  insulin lispro (ADMELOG) corrective regimen sliding scale   SubCutaneous at bedtime  insulin lispro (ADMELOG) corrective regimen sliding scale   SubCutaneous three times a day before meals  levothyroxine 175 MICROGram(s) Oral daily  losartan 50 milliGRAM(s) Oral daily  rivaroxaban 15 milliGRAM(s) Oral with dinner    MEDICATIONS  (PRN):  ALBUTerol    90 MICROgram(s) HFA Inhaler 2 Puff(s) Inhalation every 6 hours PRN Shortness of Breath and/or Wheezing  dextrose Oral Gel 15 Gram(s) Oral once PRN Blood Glucose LESS THAN 70 milliGRAM(s)/deciliter  guaiFENesin Oral Liquid (Sugar-Free) 200 milliGRAM(s) Oral every 6 hours PRN Cough  LORazepam     Tablet 0.5 milliGRAM(s) Oral daily PRN Anxiety        CAPILLARY BLOOD GLUCOSE      POCT Blood Glucose.: 170 mg/dL (2022 11:06)  POCT Blood Glucose.: 174 mg/dL (2022 07:03)  POCT Blood Glucose.: 186 mg/dL (2022 21:20)    I&O's Summary    2022 07:01  -  2022 07:00  --------------------------------------------------------  IN: 0 mL / OUT: 1200 mL / NET: -1200 mL    2022 07:01  -  2022 14:39  --------------------------------------------------------  IN: 530 mL / OUT: 2300 mL / NET: -1770 mL        Vital Signs Last 24 Hrs  T(C): 36.5 (2022 09:55), Max: 36.9 (2022 15:20)  T(F): 97.7 (2022 09:55), Max: 98.4 (2022 15:20)  HR: 64 (2022 09:55) (61 - 81)  BP: 151/69 (2022 09:55) (140/56 - 175/65)  BP(mean): 80 (2022 04:26) (77 - 94)  RR: 17 (2022 09:55) (17 - 25)  SpO2: 100% (2022 09:55) (93% - 100%)    PHYSICAL EXAM:   HEENT: CECELIA EOMI  Neck: Supple, no JVD  Lungs: no wheeze, no crackles  CVS: S1 S2 no M/R/G  Abdomen: no tenderness, no organomegaly  Neuro: AO x 3 nonfocal  Psych: appropriate affect  Skin: warm, dry  Ext: + edema  Msk: no joint swelling   Back: no CVA tenderness,    LABS:                        10.3   9.12  )-----------( 341      ( 2022 15:50 )             32.1         136  |  96  |  15  ----------------------------<  215<H>  4.5   |  28  |  1.19    Ca    9.3      2022 08:17  Mg     1.7         TPro  7.4  /  Alb  3.9  /  TBili  0.3  /  DBili  x   /  AST  26  /  ALT  28  /  AlkPhos  58            Urinalysis Basic - ( 2022 16:16 )    Color: Yellow / Appearance: Clear / S.027 / pH: x  Gluc: x / Ketone: Negative  / Bili: Negative / Urobili: Negative   Blood: x / Protein: 30 mg/dL / Nitrite: Negative   Leuk Esterase: Small / RBC: 16 /hpf / WBC 5 /HPF   Sq Epi: x / Non Sq Epi: 4 /hpf / Bacteria: Negative          All consultant(s) notes reviewed and care discussed with other providers        Contact Number, Dr Yates [phone]

## 2022-04-22 NOTE — PHYSICAL THERAPY INITIAL EVALUATION ADULT - DISCHARGE DISPOSITION, PT EVAL
Subacute Rehab; Pt with limitations in self-care & home management, will benefit from Sub acute rehab prior to D/C home to increase strength, balance and endurance to improve functional mobility to level necessary for safe return home.  If pt. to d/c home, would recommend home with PT and assist for all functional mobility. DME- transport chair, 3:1 commode.Pt. will need assist with transportation into house ,as pt. with limited ambulation./rehabilitation facility Subacute Rehab; Pt with limitations in self-care & home management, will benefit from Sub acute rehab prior to D/C home to increase strength, balance and endurance to improve functional mobility to level necessary for safe return home.  If pt. to d/c home, would recommend home with PT and assist for all functional mobility. DME- transport chair, 3:1 commode.Pt. will need assist with transportation into house ,as pt. with limited mobility./rehabilitation facility

## 2022-04-22 NOTE — CONSULT NOTE ADULT - SUBJECTIVE AND OBJECTIVE BOX
Norman Yates MD (Nephrology)  [address], [facility],  [address],  [id number]  TEl: [phone]  Cell: [phone]    Patient is a 82y old  Female who presents with a chief complaint of shortness of breath and vomiting (2022 15:16)      HPI:  83yoF, w/ PMH of HTN, DM, hypothyroidism, asthma, afib, CAD, CHF (s/p pacemaker), presents with shortness of breath and vomiting. PT was recently discharged from  after experiencing similar symptoms. PT's daughter states she was discharged 10d ago. She was admitted for fluid overload and found to be hyponatremic. Pt had her diuretics discontinued. Over the last 5-6 days symptoms returned. PT on home O2 PRN for exertion, normally walks around on her own. Daughter denies fever, chills, diarrhea. (2022 19:29)      PAST MEDICAL & SURGICAL HISTORY:  Asthma    CAD (Coronary Atherosclerotic Disease)    Myocardial Infarction    Diabetes    HTN (Hypertension)    HLD (Hyperlipidemia)    CHF (congestive heart failure), NYHA class I    IBS (irritable bowel syndrome)    Venous stasis    Hypothyroidism    Atrial fibrillation    Pacemaker    Obesity    Afib    S/P coronary angiogram          Allergies:  No Known Allergies      Home Medications:   ALBUTerol    90 MICROgram(s) HFA Inhaler 2 Puff(s) Inhalation every 6 hours PRN  amLODIPine   Tablet 5 milliGRAM(s) Oral daily  budesonide 160 MICROgram(s)/formoterol 4.5 MICROgram(s) Inhaler 2 Puff(s) Inhalation two times a day  carvedilol 12.5 milliGRAM(s) Oral every 12 hours  dextrose 5%. 1000 milliLiter(s) IV Continuous <Continuous>  dextrose 5%. 1000 milliLiter(s) IV Continuous <Continuous>  dextrose 50% Injectable 25 Gram(s) IV Push once  dextrose 50% Injectable 12.5 Gram(s) IV Push once  dextrose 50% Injectable 25 Gram(s) IV Push once  dextrose Oral Gel 15 Gram(s) Oral once PRN  dronedarone 400 milliGRAM(s) Oral two times a day  escitalopram 10 milliGRAM(s) Oral daily  furosemide   Injectable 40 milliGRAM(s) IV Push two times a day  glucagon  Injectable 1 milliGRAM(s) IntraMuscular once  guaiFENesin Oral Liquid (Sugar-Free) 200 milliGRAM(s) Oral every 6 hours PRN  insulin lispro (ADMELOG) corrective regimen sliding scale   SubCutaneous at bedtime  insulin lispro (ADMELOG) corrective regimen sliding scale   SubCutaneous three times a day before meals  levothyroxine 175 MICROGram(s) Oral daily  LORazepam     Tablet 0.5 milliGRAM(s) Oral daily PRN  losartan 50 milliGRAM(s) Oral daily  rivaroxaban 15 milliGRAM(s) Oral with dinner      Hospital Medications:   MEDICATIONS  (STANDING):  amLODIPine   Tablet 5 milliGRAM(s) Oral daily  budesonide 160 MICROgram(s)/formoterol 4.5 MICROgram(s) Inhaler 2 Puff(s) Inhalation two times a day  carvedilol 12.5 milliGRAM(s) Oral every 12 hours  dextrose 5%. 1000 milliLiter(s) (50 mL/Hr) IV Continuous <Continuous>  dextrose 5%. 1000 milliLiter(s) (100 mL/Hr) IV Continuous <Continuous>  dextrose 50% Injectable 25 Gram(s) IV Push once  dextrose 50% Injectable 12.5 Gram(s) IV Push once  dextrose 50% Injectable 25 Gram(s) IV Push once  dronedarone 400 milliGRAM(s) Oral two times a day  escitalopram 10 milliGRAM(s) Oral daily  furosemide   Injectable 40 milliGRAM(s) IV Push two times a day  glucagon  Injectable 1 milliGRAM(s) IntraMuscular once  insulin lispro (ADMELOG) corrective regimen sliding scale   SubCutaneous at bedtime  insulin lispro (ADMELOG) corrective regimen sliding scale   SubCutaneous three times a day before meals  levothyroxine 175 MICROGram(s) Oral daily  losartan 50 milliGRAM(s) Oral daily  rivaroxaban 15 milliGRAM(s) Oral with dinner      SOCIAL HISTORY:  Denies ETOh, Smoking,     Family History:  FAMILY HISTORY:  Family history of heart disease        ROS:  CONSTITUTIONAL: No fever or chills.  HEENT: No headche, visual disturbances, hearing impairment.  RESPIRATORY: SOB but denies cough, wheezing or hemoptysis  CARDIOVASCULAR: CHAMBERLAIN, Orthopnea, PND, weight gain but denies chest pain, palpitations  GASTROINTESTINAL: No abdominal pain, nausea, vomiting, diarrhea, hematemesis or blood per rectum.  GENITOURINARY: No flank or supra pubic pain, no difficulty passing urine  NEUROLOGICAL: No headache,  focal limb weakness, tingling or numbness sensation   SKIN: No rash or skin lesion  MUSCULOSKELETAL: Bilateral chronic leg edema  Psych: Denies suicidal or homicidal ideation    VITALS:  T(F): 97.7 (22 @ 09:55), Max: 98.2 (22 @ 21:24)  HR: 62 (22 @ 15:04)  BP: 119/46 (22 @ 15:04)  RR: 18 (22 @ 15:04)  SpO2: 96% (22 @ 15:04)  Wt(kg): --     @ 07:01  -   @ 07:00  --------------------------------------------------------  IN: 0 mL / OUT: 1200 mL / NET: -1200 mL     @ 07:01  -   @ 16:09  --------------------------------------------------------  IN: 530 mL / OUT: 2300 mL / NET: -1770 mL        CAPILLARY BLOOD GLUCOSE      POCT Blood Glucose.: 237 mg/dL (2022 16:02)  POCT Blood Glucose.: 170 mg/dL (2022 11:06)  POCT Blood Glucose.: 174 mg/dL (2022 07:03)  POCT Blood Glucose.: 186 mg/dL (2022 21:20)        PHYSICAL EXAM:  GENERAL: Alert, awake, oriented x3   HEENT: CECELIA, EOMI, neck supple, no JVP,   CHEST/LUNG: Bilateral clear breath sounds, no rales, no crepitations, no wheezing  HEART: Regular rate and rhythm, VINH II/VI at LPSB, no gallops, no rub   ABDOMEN: Soft, nontender, non distended, bowel sounds present, no palpable organomegaly, no guarding, no rigidity, no rebound tenderness.  : No flank or supra pubic tenderness.  EXTREMITIES: Peripheral pulses are palpable, no pedal edema, no calf tenderness  Musculoskeletal: Bilateral lower extremity pitting pedal edema noted  Neurology: AAOx2-3, no focal neurological deficit  SKIN: No rash or skin lesion    LABS:      136  |  96  |  15  ----------------------------<  215<H>  4.5   |  28  |  1.19    Ca    9.3      2022 08:17  Mg     1.7         TPro  7.4  /  Alb  3.9  /  TBili  0.3  /  DBili      /  AST  26  /  ALT  28  /  AlkPhos  58      Creatinine Trend: 1.19 <--, 1.23 <--                        10.3   9.12  )-----------( 341      ( 2022 15:50 )             32.1     Urine Studies:  Urinalysis Basic - ( 2022 16:16 )    Color: Yellow / Appearance: Clear / S.027 / pH:   Gluc:  / Ketone: Negative  / Bili: Negative / Urobili: Negative   Blood:  / Protein: 30 mg/dL / Nitrite: Negative   Leuk Esterase: Small / RBC: 16 /hpf / WBC 5 /HPF   Sq Epi:  / Non Sq Epi: 4 /hpf / Bacteria: Negative          RADIOLOGY & ADDITIONAL STUDIES:  rad< from: Xray Chest 1 View- PORTABLE-Urgent (22 @ 15:43) >    ACC: [id number] EXAM:  XR CHEST PORTABLE URGENT 1V                          PROCEDURE DATE:  2022          INTERPRETATION:  INDICATION: Chest pain.    COMPARISON: Multiple prior chest x-rays with most recent dated 4/10/2022.    FINDINGS:  Heart/Vascular: Heart size is enlarged. Left chest wall dual-chamber   pacemaker with leads intact.  Pulmonary: No focal consolidation. No pleural effusion. No pneumothorax.  Bones: No acute bony pathology.    IMPRESSION: Clear lungs.          --- End of Report ---           PING OLIVARES MD; Resident Radiology  This document has been electronically signed.  TOM GERONIMO MD; Attending Radiologist  This document has been electronically signed. 2022  9:30AM    < end of copied text >    EKG findings reviewed.    Imaging Personally Reviewed:  [x] YES  [ ] NO    Consultant(s) and primary physician Notes Reviewed:  [x] YES  [ ] NO    Care Discussed with Primary team/ Consultants/Other Providers [x] YES  [ ] NO

## 2022-04-22 NOTE — CONSULT NOTE ADULT - ASSESSMENT
83yoF, w/ PMH of HTN, DM, hypothyroidism, asthma, afib, CAD, CHF (s/p pacemaker), presents with shortness of breath and vomiting. PT was recently discharged from  after experiencing similar symptoms. PT's daughter states she was discharged 10d ago. Nephrology consult called for SRIDHAR/CKD/Fluid overload      Assessment:  1) Non oliguric CKD stage III likely chronic hypertensive/diabetic nephropathy with e GFR 40-50 ml/min  2) Acute fluid overload due to acute on chronic diastolic CHF with valvular heart disease with AS/MR  3) Anemia of chronic illness   4) Renal osteodystrophy  5) Hypothyroidism  6) Morbid obesity  7) Electrolytes disorder with hyponatremia/Hyperkalemia with improvement    Plan of care:  Strict I/o  Avoid Nephrotoxic agents  Urinalysis with 1+protein, RBCs  Agree with IV Lasix with goal fluid balance net negative  Bilateral renal and bladder ultrasound  Anemia work up and renal/CKD work as per ordered  Optimal BP control with po medications  Replete electrolytes with goal K>4 and <5, Mag>2, Phos 2.5 to 3.5  Monitor BMP and electrolytes every 12 hrly  Echocardiogram to assess LVEF/valvular heart disease  Further plan per primary/cardiology team

## 2022-04-22 NOTE — PHYSICAL THERAPY INITIAL EVALUATION ADULT - RANGE OF MOTION EXAMINATION, REHAB EVAL
R shoulder limited ROM./Left UE ROM was WFL (within functional limits)/bilateral lower extremity ROM was WFL (within functional limits)

## 2022-04-22 NOTE — CONSULT NOTE ADULT - ASSESSMENT
Echo 3/21/21: EF 66%, mild ms, nl lv sys fx, Grade I diastolic dysfx    a/p  83yoF, w/ PMH of HTN, DM, hypothyroidism, asthma, afib (s.p PPM) , CAD s/p PCI, CHF, , presents with shortness of breath and vomiting    #Acute on Chronic HFpEF  -mildly overloaded on exam  -likely 2/2 recent dc of diuretic   -CXR clear, +LE edema   -c/w iv lasix  -c/w GDMT - bb, arb   -f/u echo   -strict IO/DW   -pulm f/u for hypercapnia, asthma     #Afib s/p PPM  -NSR, paced on tele  -c/w bb, dronedarone, AC  -EP eval for PPM interrogation     #CAD s/p PCI  -cv stable  -would start ASA if no CI   -cw bb    #Nausea/Vomitting  -improved  -med f/u     d/w family at bedside and over the phone

## 2022-04-22 NOTE — PHYSICAL THERAPY INITIAL EVALUATION ADULT - ADDITIONAL COMMENTS
Pt. lives in apt. with son, no steps to get in.  [facility] services 10 hrs x 7 days. Patient ambulated with rolling walker independent.  pt. owns no DME's other than [employer].

## 2022-04-22 NOTE — CONSULT NOTE ADULT - SUBJECTIVE AND OBJECTIVE BOX
CARDIOLOGY CONSULT - Dr. Reed         HPI:  83yoF, w/ PMH of HTN, DM, hypothyroidism, asthma, afib s/p PPM , CAD, CHF,  Sinus Jensen, presents with shortness of breath and vomiting. PT was recently discharged from  after experiencing similar symptoms. PT's daughter states she was discharged 10d ago. She was admitted for fluid overload and found to be hyponatremic. Pt had her diuretics discontinued. Over the last 5-6 days symptoms returned. PT on home O2 PRN for exertion, normally walks around on her own. Daughter denies fever, chills, diarrhea. (21 Apr 2022 19:29)  Spoke with daughter over the phone for translation - patient reports inc on sob for past few days, also with inc le edema. She notes after recent hospitalized she lasix was stopped 2/2 hyponatremia.  She denies any recent cv testing.      PAST MEDICAL & SURGICAL HISTORY:  Asthma    CAD (Coronary Atherosclerotic Disease)    Myocardial Infarction    Diabetes    HTN (Hypertension)    HLD (Hyperlipidemia)    CHF (congestive heart failure), NYHA class I    IBS (irritable bowel syndrome)    Venous stasis    Hypothyroidism    Atrial fibrillation    Pacemaker    Obesity    Afib    S/P coronary angiogram            PREVIOUS DIAGNOSTIC TESTING:     [ ] Echocardiogram: < from: Transthoracic Echocardiogram (03.21.21 @ 07:01) >  DIMENSIONS:  Dimensions:     Normal Values:  LA:     4.4 cm    2.0 - 4.0 cm  Ao:     3.1 cm    2.0 - 3.8 cm  SEPTUM: 1.1 cm    0.6 - 1.2 cm  PWT:    1.1 cm    0.6 - 1.1 cm  LVIDd:  5.5 cm    3.0 - 5.6 cm  LVIDs:  3.8 cm    1.8 - 4.0 cm      Derived Variables:  LVMI: 111 g/m2  RWT: 0.40  Ejection Fraction [place]: 66 %  Peak Velocity (m/sec): AoV=3.1  ------------------------------------------------------------------------  OBSERVATIONS:  Mitral Valve: Moderate mitral annular calcification. Trace  mitral regurgitation. Mean transmitral valve gradient  equals 4 mm Hg, estimated mitral valve area equals 1.7 sqcm  (by pressure half time equation), consistent with mild  mitral stenosis.  Aortic Root: Normal aortic root.  Aortic Valve: Calcified, probably trileaflet aortic valve  with decreased opening. Peak transaortic valve gradient  equals 39.6 mm Hg, mean transaortic valve gradient equals  23 mm Hg, dimensionless index 0.49, consistent with mild to  moderate aortic stenosis. No aortic valve regurgitation  seen.  Left Atrium: Normal left atrium.  LA volume index = 27  cc/m2.  Left Ventricle: Normal Left Ventricular Systolic Function,  (EF = 66% by biplane) Not all LV wall segments were seen.  Mild left ventricular enlargement. Grade I diastolic  dysfunction (Impaired relaxation). Diastolic function may  not be accurately assessed in the setting of mitral  stenosis.  Right Heart: Normal right atrium. Normal right ventricular  size and systolic function (TAPSE 2.8 cm). A device lead is  visualized in the right heart. Tricuspid valve not well  seen. Mild tricuspid regurgitation. Pulmonic valve not well  seen. No pulmonic insufficiency is noted.  Pericardium/PleuraNo pericardial effusion. A prominent  epicardial fat pad is noted.  Hemodynamic: RA Pressure is 8 mm Hg. RV systolic pressure  is mildly increased at  37 mm Hg.  ------------------------------------------------------------------------  CONCLUSIONS:  1. Moderate mitral annular calcification. Trace mitral  regurgitation. Mild mitral stenosis.  2. Calcified, probably trileaflet aortic valve with  decreased opening. Mild to moderate aortic stenosis. No  aortic valveregurgitation seen.  3. Normal aortic root.  4. Normal left atrium.  5. Mild left ventricular enlargement.  6. Normal Left Ventricular Systolic Function,  (EF = 66% by  biplane)  7. Grade I diastolic dysfunction (Impaired relaxation).  Diastolic function may not be accurately assessed in the  setting of mitral stenosis.  8. Normal right atrium.  9. Normal right ventricular size and systolic function  (TAPSE 2.8 cm). A device lead is visualized in the right  heart.  10. RV systolic pressure is mildly increased at  37 mm Hg.  11. Tricuspid valve not well seen. Mild tricuspid  regurgitation.  12. No pericardial effusion.    *** Compared with echocardiogram report of 11/26/2019, no  significant changes noted.    < end of copied text >    [ ]  Catheterization: < from: Cardiac Cath Lab - Adult (10.10.17 @ 13:04) >  TECHNIQUE: The risks and alternatives of the procedures and conscious  sedation were explained to the patient and informed consent was obtained.  Cardiac catheterization performed electively.  Local anesthetic given. Right internal jugular vein access. A 7FR SHEATH  PINNACLE was inserted in the vessel. Right heart catheterization. The  procedure was performed utilizing a 7FR SWAN ELIANA catheter under  fluoroscopic guidance. Measurements of pressures were obtained. [facility] -  Diagnostic. RADIATION EXPOSURE: 1.3 min.  MEDICATIONS GIVEN: Fentanyl, 25 mcg, IV.  COMPLICATIONS: There were no complications.  DIAGNOSTIC IMPRESSIONS: Mildly elevated PCWP and PA pressure  DIAGNOSTIC RECOMMENDATIONS: Further diuresis may help only marginally - CHAMBERLAIN  likely also pulmonary in etiology as well.  Prepared and signed by    < end of copied text >    [ ] Stress Test:  	    MEDICATIONS:  Home Medications:  amLODIPine 5 mg oral tablet: 1 tab(s) orally once a day (21 Apr 2022 17:11)  carvedilol 12.5 mg oral tablet: 1 tab(s) orally 2 times a day (21 Apr 2022 17:11)  escitalopram 10 mg oral tablet: 1 tab(s) orally once a day (21 Apr 2022 17:11)  guaiFENesin 100 mg/5 mL oral liquid: 10 milliliter(s) orally every 6 hours, As needed, Cough (21 Apr 2022 17:11)  Janumet 50 mg-500 mg oral tablet: 1 tab(s) orally 2 times a day (21 Apr 2022 17:11)  levothyroxine 175 mcg (0.175 mg) oral tablet: 1 tab(s) orally once a day (21 Apr 2022 17:11)  LORazepam 0.5 mg oral tablet: 1 tab(s) orally once a day, As Needed (21 Apr 2022 17:11)  Multaq 400 mg oral tablet: 1 tab(s) orally 2 times a day (21 Apr 2022 17:11)  repaglinide 1 mg oral tablet: 1 tab(s) orally 3 times a day (before meals) (21 Apr 2022 17:11)  Xarelto 20 mg oral tablet: 1 tab(s) orally once a day (in the evening) (21 Apr 2022 17:11)      MEDICATIONS  (STANDING):  amLODIPine   Tablet 5 milliGRAM(s) Oral daily  budesonide 160 MICROgram(s)/formoterol 4.5 MICROgram(s) Inhaler 2 Puff(s) Inhalation two times a day  carvedilol 12.5 milliGRAM(s) Oral every 12 hours  dextrose 5%. 1000 milliLiter(s) (50 mL/Hr) IV Continuous <Continuous>  dextrose 5%. 1000 milliLiter(s) (100 mL/Hr) IV Continuous <Continuous>  dextrose 50% Injectable 25 Gram(s) IV Push once  dextrose 50% Injectable 12.5 Gram(s) IV Push once  dextrose 50% Injectable 25 Gram(s) IV Push once  dronedarone 400 milliGRAM(s) Oral two times a day  escitalopram 10 milliGRAM(s) Oral daily  furosemide   Injectable 40 milliGRAM(s) IV Push two times a day  glucagon  Injectable 1 milliGRAM(s) IntraMuscular once  insulin lispro (ADMELOG) corrective regimen sliding scale   SubCutaneous at bedtime  insulin lispro (ADMELOG) corrective regimen sliding scale   SubCutaneous three times a day before meals  levothyroxine 175 MICROGram(s) Oral daily  losartan 50 milliGRAM(s) Oral daily  rivaroxaban 15 milliGRAM(s) Oral with dinner      FAMILY HISTORY:  Family history of heart disease        SOCIAL HISTORY:    [ ] Non-smoker  [ ] Smoker  [ ] Alcohol    Allergies    No Known Allergies    Intolerances    	    REVIEW OF SYSTEMS:  CONSTITUTIONAL: No fever, weight loss, or fatigue  EYES: No eye pain, visual disturbances, or discharge  ENMT:  No difficulty hearing, tinnitus, vertigo; No sinus or throat pain  NECK: No pain or stiffness  RESPIRATORY: No cough, wheezing, chills or hemoptysis; + Shortness of Breath  CARDIOVASCULAR: No chest pain, palpitations, passing out, dizziness, +leg swelling  GASTROINTESTINAL: No abdominal or epigastric pain. No nausea, vomiting, or hematemesis; No diarrhea or constipation. No melena or hematochezia.  GENITOURINARY: No dysuria, frequency, hematuria, or incontinence  NEUROLOGICAL: No headaches, memory loss, loss of strength, numbness, or tremors  SKIN: No itching, burning, rashes, or lesions   	    [ x] All others negative	see hpi   [ ] Unable to obtain    PHYSICAL EXAM:  T(C): 36.5 (04-22-22 @ 09:55), Max: 36.9 (04-21-22 @ 15:20)  HR: 62 (04-22-22 @ 15:04) (61 - 81)  BP: 119/46 (04-22-22 @ 15:04) (119/46 - 175/65)  RR: 18 (04-22-22 @ 15:04) (17 - 25)  SpO2: 96% (04-22-22 @ 15:04) (93% - 100%)  Wt(kg): --  I&O's Summary    21 Apr 2022 07:01  -  22 Apr 2022 07:00  --------------------------------------------------------  IN: 0 mL / OUT: 1200 mL / NET: -1200 mL    22 Apr 2022 07:01  -  22 Apr 2022 15:17  --------------------------------------------------------  IN: 530 mL / OUT: 2300 mL / NET: -1770 mL        Appearance: Normal	  Psychiatry: A & O x 3, Mood & affect appropriate  HEENT:   Normal oral mucosa, PERRL, EOMI	  Lymphatic: No lymphadenopathy  Cardiovascular: Normal S1 S2,RRR, No JVD, No murmurs  Respiratory: Lungs clear to auscultation	  Gastrointestinal:  Soft, Non-tender, + BS	  Skin: No rashes, No ecchymoses, No cyanosis	  Neurologic: Non-focal  Extremities: Normal range of motion, No clubbing, cyanosis or edema  Vascular: Peripheral pulses palpable 2+ bilaterally    TELEMETRY: Paced 	    ECG:  	  RADIOLOGY:  < from: Xray Chest 1 View- PORTABLE-Urgent (04.21.22 @ 15:43) >  FINDINGS:  Heart/Vascular: Heart size is enlarged. Left chest wall dual-chamber   pacemaker with leads intact.  Pulmonary: No focal consolidation. No pleural effusion. No pneumothorax.  Bones: No acute bony pathology.    IMPRESSION: Clear lungs.    < end of copied text >    OTHER: 	  	  LABS:	 	    CARDIAC MARKERS:  Troponin T, High Sensitivity Result: 15 ng/L (04-21 @ 15:50)                                  10.3   9.12  )-----------( 341      ( 21 Apr 2022 15:50 )             32.1     04-22    136  |  96  |  15  ----------------------------<  215<H>  4.5   |  28  |  1.19    Ca    9.3      22 Apr 2022 08:17  Mg     1.7     04-22    TPro  7.4  /  Alb  3.9  /  TBili  0.3  /  DBili  x   /  AST  26  /  ALT  28  /  AlkPhos  58  04-21      proBNP: Serum Pro-Brain Natriuretic Peptide: 1204 pg/mL (04-21 @ 15:50)    Lipid Profile:   HgA1c:   TSH: Thyroid Stimulating Hormone, Serum: 0.86 uIU/mL (04-22 @ 08:57)

## 2022-04-22 NOTE — PHYSICAL THERAPY INITIAL EVALUATION ADULT - PRECAUTIONS/LIMITATIONS, REHAB EVAL
PT on home O2 PRN for exertion, normally walks around on her own. PT on home O2 PRN for exertion, normally walks around on her own./no known precautions/limitations PT was recently discharged from  after experiencing similar symptoms. PT's daughter states she was discharged 10d ago. Pt on home O2 PRN for exertion, normally walks around on her own./no known precautions/limitations

## 2022-04-22 NOTE — PHYSICAL THERAPY INITIAL EVALUATION ADULT - PATIENT/FAMILY AGREES WITH PLAN
Subacute Rehab; Pt with limitations in self-care & home management, will benefit from Sub acute rehab prior to D/C home to increase strength, balance and endurance to improve functional mobility to level necessary for safe return home.  If pt. to d/c home, would recommend home with PT and assist for all functional mobility. DME- transport chair, 3:1 commode.  Pt. will need assist with transportation into house ,as pt. with limited ambulation./yes Subacute Rehab; Pt with limitations in self-care & home management, will benefit from Sub acute rehab prior to D/C home to increase strength, balance and endurance to improve functional mobility to level necessary for safe return home.  If pt. to d/c home, would recommend home with PT and assist for all functional mobility. DME- transport chair, 3:1 commode.  Pt. will need assist with transportation into house ,as pt. with limited mobility./yes

## 2022-04-22 NOTE — CONSULT NOTE ADULT - PROBLEM SELECTOR RECOMMENDATION 9
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 needs repeat echo : never had tb:  hER chest xray is clear:  SHE HAS ASTHMA TO: WOULD CONT SYMBICORT:

## 2022-04-22 NOTE — CONSULT NOTE ADULT - TIME BILLING
Patient/primary team/Family
Patient seen and examined.  Agree with above NP note.  a/p  83yoF, w/ PMH of HTN, DM, hypothyroidism, asthma, afib (s.p PPM), CAD s/p PCI, CHF, , presents with shortness of breath and vomiting    #Acute on Chronic HFpEF  -mildly overloaded on exam  -c/w iv lasix  -c/w bb, arb   -f/u echo   -pulm f/u for hypercapnia, asthma     #Afib s/p PPM  -stable, c/w bb, dronedarone, AC  -EP eval for PPM interrogation     #CAD s/p PCI  -cv stable  -ASA if no CI   -c/w bb    #Nausea/Vomiting  -improved, -med f/u

## 2022-04-22 NOTE — PHYSICAL THERAPY INITIAL EVALUATION ADULT - PERTINENT HX OF CURRENT PROBLEM, REHAB EVAL
83yoF, w/ PMH of HTN, DM, hypothyroidism, asthma, afib, CAD, CHF (s/p pacemaker), presents with shortness of breath and vomiting likely secondary to acute on chronic diastolic heart failure, recently discharged from OSH experiencing similar symptoms. CXR: clear lungs

## 2022-04-23 LAB
ANION GAP SERPL CALC-SCNC: 10 MMOL/L — SIGNIFICANT CHANGE UP (ref 5–17)
BUN SERPL-MCNC: 18 MG/DL — SIGNIFICANT CHANGE UP (ref 7–23)
CALCIUM SERPL-MCNC: 8.9 MG/DL — SIGNIFICANT CHANGE UP (ref 8.4–10.5)
CALCIUM SERPL-MCNC: 9.1 MG/DL — SIGNIFICANT CHANGE UP (ref 8.4–10.5)
CHLORIDE SERPL-SCNC: 95 MMOL/L — LOW (ref 96–108)
CO2 SERPL-SCNC: 31 MMOL/L — SIGNIFICANT CHANGE UP (ref 22–31)
CREAT SERPL-MCNC: 1.33 MG/DL — HIGH (ref 0.5–1.3)
EGFR: 40 ML/MIN/1.73M2 — LOW
FERRITIN SERPL-MCNC: 164 NG/ML — HIGH (ref 15–150)
GLUCOSE BLDC GLUCOMTR-MCNC: 181 MG/DL — HIGH (ref 70–99)
GLUCOSE BLDC GLUCOMTR-MCNC: 272 MG/DL — HIGH (ref 70–99)
GLUCOSE BLDC GLUCOMTR-MCNC: 292 MG/DL — HIGH (ref 70–99)
GLUCOSE BLDC GLUCOMTR-MCNC: 309 MG/DL — HIGH (ref 70–99)
GLUCOSE SERPL-MCNC: 175 MG/DL — HIGH (ref 70–99)
HAV IGM SER-ACNC: SIGNIFICANT CHANGE UP
HBV CORE IGM SER-ACNC: SIGNIFICANT CHANGE UP
HBV SURFACE AG SER-ACNC: SIGNIFICANT CHANGE UP
HCT VFR BLD CALC: 28.5 % — LOW (ref 34.5–45)
HCV AB S/CO SERPL IA: 0.21 S/CO — SIGNIFICANT CHANGE UP (ref 0–0.99)
HCV AB SERPL-IMP: SIGNIFICANT CHANGE UP
HGB BLD-MCNC: 9.4 G/DL — LOW (ref 11.5–15.5)
HIV 1+2 AB+HIV1 P24 AG SERPL QL IA: SIGNIFICANT CHANGE UP
IRON SATN MFR SERPL: 15 % — SIGNIFICANT CHANGE UP (ref 14–50)
IRON SATN MFR SERPL: 41 UG/DL — SIGNIFICANT CHANGE UP (ref 30–160)
MAGNESIUM SERPL-MCNC: 1.9 MG/DL — SIGNIFICANT CHANGE UP (ref 1.6–2.6)
MCHC RBC-ENTMCNC: 28.9 PG — SIGNIFICANT CHANGE UP (ref 27–34)
MCHC RBC-ENTMCNC: 33 GM/DL — SIGNIFICANT CHANGE UP (ref 32–36)
MCV RBC AUTO: 87.7 FL — SIGNIFICANT CHANGE UP (ref 80–100)
NRBC # BLD: 0 /100 WBCS — SIGNIFICANT CHANGE UP (ref 0–0)
PHOSPHATE SERPL-MCNC: 5.2 MG/DL — HIGH (ref 2.5–4.5)
PLATELET # BLD AUTO: 331 K/UL — SIGNIFICANT CHANGE UP (ref 150–400)
POTASSIUM SERPL-MCNC: 4.4 MMOL/L — SIGNIFICANT CHANGE UP (ref 3.5–5.3)
POTASSIUM SERPL-SCNC: 4.4 MMOL/L — SIGNIFICANT CHANGE UP (ref 3.5–5.3)
PTH-INTACT FLD-MCNC: 60 PG/ML — SIGNIFICANT CHANGE UP (ref 15–65)
RBC # BLD: 3.25 M/UL — LOW (ref 3.8–5.2)
RBC # FLD: 13.2 % — SIGNIFICANT CHANGE UP (ref 10.3–14.5)
SODIUM SERPL-SCNC: 136 MMOL/L — SIGNIFICANT CHANGE UP (ref 135–145)
T PALLIDUM AB TITR SER: NEGATIVE — SIGNIFICANT CHANGE UP
TIBC SERPL-MCNC: 279 UG/DL — SIGNIFICANT CHANGE UP (ref 220–430)
UIBC SERPL-MCNC: 238 UG/DL — SIGNIFICANT CHANGE UP (ref 110–370)
UUN UR-MCNC: 220 MG/DL — SIGNIFICANT CHANGE UP
VIT D25+D1,25 OH+D1,25 PNL SERPL-MCNC: 26.2 PG/ML — SIGNIFICANT CHANGE UP (ref 19.9–79.3)
WBC # BLD: 5.27 K/UL — SIGNIFICANT CHANGE UP (ref 3.8–10.5)
WBC # FLD AUTO: 5.27 K/UL — SIGNIFICANT CHANGE UP (ref 3.8–10.5)

## 2022-04-23 RX ADMIN — BUDESONIDE AND FORMOTEROL FUMARATE DIHYDRATE 2 PUFF(S): 160; 4.5 AEROSOL RESPIRATORY (INHALATION) at 20:14

## 2022-04-23 RX ADMIN — LOSARTAN POTASSIUM 50 MILLIGRAM(S): 100 TABLET, FILM COATED ORAL at 06:38

## 2022-04-23 RX ADMIN — ESCITALOPRAM OXALATE 10 MILLIGRAM(S): 10 TABLET, FILM COATED ORAL at 13:38

## 2022-04-23 RX ADMIN — Medication 40 MILLIGRAM(S): at 06:39

## 2022-04-23 RX ADMIN — CARVEDILOL PHOSPHATE 12.5 MILLIGRAM(S): 80 CAPSULE, EXTENDED RELEASE ORAL at 20:12

## 2022-04-23 RX ADMIN — Medication 1: at 22:33

## 2022-04-23 RX ADMIN — Medication 3: at 13:19

## 2022-04-23 RX ADMIN — Medication 1: at 07:17

## 2022-04-23 RX ADMIN — AMLODIPINE BESYLATE 5 MILLIGRAM(S): 2.5 TABLET ORAL at 06:38

## 2022-04-23 RX ADMIN — CARVEDILOL PHOSPHATE 12.5 MILLIGRAM(S): 80 CAPSULE, EXTENDED RELEASE ORAL at 06:38

## 2022-04-23 RX ADMIN — Medication 4: at 18:29

## 2022-04-23 RX ADMIN — Medication 40 MILLIGRAM(S): at 20:13

## 2022-04-23 RX ADMIN — RIVAROXABAN 15 MILLIGRAM(S): KIT at 22:53

## 2022-04-23 RX ADMIN — Medication 175 MICROGRAM(S): at 06:38

## 2022-04-23 RX ADMIN — DRONEDARONE 400 MILLIGRAM(S): 400 TABLET, FILM COATED ORAL at 20:12

## 2022-04-23 RX ADMIN — DRONEDARONE 400 MILLIGRAM(S): 400 TABLET, FILM COATED ORAL at 06:38

## 2022-04-23 RX ADMIN — BUDESONIDE AND FORMOTEROL FUMARATE DIHYDRATE 2 PUFF(S): 160; 4.5 AEROSOL RESPIRATORY (INHALATION) at 06:46

## 2022-04-23 NOTE — PROGRESS NOTE ADULT - SUBJECTIVE AND OBJECTIVE BOX
Norman Yates MD (Nephrology progress note)  [address], [facility], [place], operated by [facility],  [address],  [id number]  TEl: [phone]  Cell: [phone]    Patient is a 82y Female seen and evaluated at bedside. Vital signs, laboratory data, imaging studies, consult notes reviewed done within past 24 hours. Overnight events noted. Patient lying in bed in no distress with son at bedside who states that she feels better. Denies any chest pain, SOB. Interval elevated S cr to 1.3 with non oliguria. Last 24 hr I/o with net negative 3L fluid balance with 3.6 L urine output.     Allergies    No Known Allergies    Intolerances        ROS:  Limited  Patient alert and awake in no distress feels better  Mild SOB  Denies any chest pain    VITALS:    T(C): 36.4 (22 @ 11:48), Max: 36.6 (22 @ 18:40)  HR: 62 (22 @ 14:20) (60 - 69)  BP: 111/60 (22 @ 14:20) (111/60 - 148/58)  RR: 18 (22 @ 14:20) (18 - 18)  SpO2: 100% (22 @ 14:20) (99% - 100%)  CAPILLARY BLOOD GLUCOSE      POCT Blood Glucose.: 272 mg/dL (2022 12:40)  POCT Blood Glucose.: 181 mg/dL (2022 07:04)  POCT Blood Glucose.: 336 mg/dL (2022 21:21)  POCT Blood Glucose.: 237 mg/dL (2022 16:02)      22 @ 07:01  -  22 @ 07:00  --------------------------------------------------------  IN: 530 mL / OUT: 3600 mL / NET: -3070 mL    22 @ 07:01  -  22 @ 15:33  --------------------------------------------------------  IN: 180 mL / OUT: 650 mL / NET: -470 mL      MEDICATIONS  (STANDING):  amLODIPine   Tablet 5 milliGRAM(s) Oral daily  budesonide 160 MICROgram(s)/formoterol 4.5 MICROgram(s) Inhaler 2 Puff(s) Inhalation two times a day  carvedilol 12.5 milliGRAM(s) Oral every 12 hours  dextrose 5%. 1000 milliLiter(s) (50 mL/Hr) IV Continuous <Continuous>  dextrose 5%. 1000 milliLiter(s) (100 mL/Hr) IV Continuous <Continuous>  dextrose 50% Injectable 25 Gram(s) IV Push once  dextrose 50% Injectable 12.5 Gram(s) IV Push once  dextrose 50% Injectable 25 Gram(s) IV Push once  dronedarone 400 milliGRAM(s) Oral two times a day  escitalopram 10 milliGRAM(s) Oral daily  furosemide   Injectable 40 milliGRAM(s) IV Push two times a day  glucagon  Injectable 1 milliGRAM(s) IntraMuscular once  insulin lispro (ADMELOG) corrective regimen sliding scale   SubCutaneous three times a day before meals  insulin lispro (ADMELOG) corrective regimen sliding scale   SubCutaneous at bedtime  levothyroxine 175 MICROGram(s) Oral daily  losartan 50 milliGRAM(s) Oral daily  rivaroxaban 15 milliGRAM(s) Oral with dinner    MEDICATIONS  (PRN):  ALBUTerol    90 MICROgram(s) HFA Inhaler 2 Puff(s) Inhalation every 6 hours PRN Shortness of Breath and/or Wheezing  dextrose Oral Gel 15 Gram(s) Oral once PRN Blood Glucose LESS THAN 70 milliGRAM(s)/deciliter  guaiFENesin Oral Liquid (Sugar-Free) 200 milliGRAM(s) Oral every 6 hours PRN Cough  LORazepam     Tablet 0.5 milliGRAM(s) Oral daily PRN Anxiety      PHYSICAL EXAM:  GENERAL: Alert, awake and oriented x2-3 in no distress  HEENT: CECELIA, EOMI, neck supple, no JVP  CHEST/LUNG: Bilateral clear breath sounds, no rales, no crepitations, no rhonchi, no wheezing  HEART: Regular rate and rhythm, VINH II/VI at LPSB, no gallops, no rub   ABDOMEN: Soft, nontender, non distended, bowel sounds present, no palpable organomegaly  : No flank or supra pubic tenderness.  EXTREMITIES: Bilateral lower extremity edema with improvement and mild erythema  Neurology: AAOx2-3, no focal neurological deficit  SKIN: No rash or skin lesion  Musculoskeletal: No joint deformity    Vascular ACCESS: None    LABS:                        9.4    5.27  )-----------( 331      ( 2022 05:00 )             28.5         136  |  95<L>  |  18  ----------------------------<  175<H>  4.4   |  31  |  1.33<H>    Ca    9.1      2022 05:00  Phos  5.2       Mg     1.9         TPro  7.4  /  Alb  3.9  /  TBili  0.3  /  DBili  x   /  AST  26  /  ALT  28  /  AlkPhos  58      Hepatitis C Virus S/CO Ratio: 0.21 S/CO [0.00 - 0.99] ( @ 09:39)  Hepatitis C Virus Interpretation: Nonreact ( @ 09:39)      Urinalysis Basic - ( 2022 17:18 )    Color: Light Yellow / Appearance: Clear / S.010 / pH: x  Gluc: x / Ketone: Negative  / Bili: Negative / Urobili: Negative   Blood: x / Protein: Negative / Nitrite: Negative   Leuk Esterase: Moderate / RBC: 1 /hpf / WBC 2 /HPF   Sq Epi: x / Non Sq Epi: 1 /hpf / Bacteria: Negative      Sodium, Random Urine: 115 mmol/L ( @ 17:20)  Osmolality, Random Urine: 366 mos/kg ( @ 17:20)  Creatinine, Random Urine: 54 mg/dL ( @ 17:20)  Protein/Creatinine Ratio Calculation: 0.1 Ratio ( @ 17:20)        RADIOLOGY & ADDITIONAL STUDIES:  rad< from: US Kidney and Bladder (22 @ 18:49) >    ACC: [id number] EXAM:  US KIDNEYS AND BLADDER                          PROCEDURE DATE:  2022          INTERPRETATION:  CLINICAL INFORMATION: Chronic kidney disease.    COMPARISON: Prior CT abdomen and pelvis dated 3/20/2021 and prior renal   ultrasound dated 2018.    TECHNIQUE: Sonography of the kidneys and bladder.    FINDINGS:  Right kidney: 10.7 cm. No hydronephrosis. Small cysts, the largest of   which measures 1.6 x 1.1 x 1.6 cm.    Left kidney: 9.8 cm. No hydronephrosis.    Urinary bladder: Minimally distended.    IMPRESSION:  No hydronephrosis.        --- End of Report ---            LION RAMOS MD; Attending Radiologist  This document has been electronically signed. 2022  1:05PM    < end of copied text >    Imaging Personally Reviewed:  [x] YES  [ ] NO    Consultant(s) Notes Reviewed:  [x] YES  [ ] NO    Care Discussed with Primary team/ Other Providers [x] YES  [ ] NO

## 2022-04-23 NOTE — PROGRESS NOTE ADULT - ASSESSMENT
Echo 3/21/21: EF 66%, mild ms, nl lv sys fx, Grade I diastolic dysfx    a/p  83yoF, w/ PMH of HTN, DM, hypothyroidism, asthma, afib (s.p PPM) , CAD s/p PCI, CHF, , presents with shortness of breath and vomiting    #Acute on Chronic HFpEF  -clinically improved  -c/w iv lasix today, change to po bid yrn   -c/w GDMT - bb, arb   -f/u echo   -pulm f/u for hypercapnia, asthma     #Afib s/p PPM  -NSR, paced on tele  -c/w bb, dronedarone, AC  -EP eval for PPM interrogation     #CAD s/p PCI  -cv stable  -rec ASA 81 QD if no CI   -cw bb    #Nausea/Vomitting  -improved  -med f/u     d/w family at bedside     35 minutes spent on total encounter; more than 50% of the visit was spent counseling and/or coordinating care by the attending physician.

## 2022-04-23 NOTE — PROGRESS NOTE ADULT - SUBJECTIVE AND OBJECTIVE BOX
Date of Service: 22 @ 11:04    Patient is a 82y old  Female who presents with a chief complaint of shortness of breath and vomiting (2022 16:09)      Any change in ROS: he is alert and awake: no S OB at rest ow!    MEDICATIONS  (STANDING):  amLODIPine   Tablet 5 milliGRAM(s) Oral daily  budesonide 160 MICROgram(s)/formoterol 4.5 MICROgram(s) Inhaler 2 Puff(s) Inhalation two times a day  carvedilol 12.5 milliGRAM(s) Oral every 12 hours  dextrose 5%. 1000 milliLiter(s) (50 mL/Hr) IV Continuous <Continuous>  dextrose 5%. 1000 milliLiter(s) (100 mL/Hr) IV Continuous <Continuous>  dextrose 50% Injectable 25 Gram(s) IV Push once  dextrose 50% Injectable 12.5 Gram(s) IV Push once  dextrose 50% Injectable 25 Gram(s) IV Push once  dronedarone 400 milliGRAM(s) Oral two times a day  escitalopram 10 milliGRAM(s) Oral daily  furosemide   Injectable 40 milliGRAM(s) IV Push two times a day  glucagon  Injectable 1 milliGRAM(s) IntraMuscular once  insulin lispro (ADMELOG) corrective regimen sliding scale   SubCutaneous at bedtime  insulin lispro (ADMELOG) corrective regimen sliding scale   SubCutaneous three times a day before meals  levothyroxine 175 MICROGram(s) Oral daily  losartan 50 milliGRAM(s) Oral daily  rivaroxaban 15 milliGRAM(s) Oral with dinner    MEDICATIONS  (PRN):  ALBUTerol    90 MICROgram(s) HFA Inhaler 2 Puff(s) Inhalation every 6 hours PRN Shortness of Breath and/or Wheezing  dextrose Oral Gel 15 Gram(s) Oral once PRN Blood Glucose LESS THAN 70 milliGRAM(s)/deciliter  guaiFENesin Oral Liquid (Sugar-Free) 200 milliGRAM(s) Oral every 6 hours PRN Cough  LORazepam     Tablet 0.5 milliGRAM(s) Oral daily PRN Anxiety    Vital Signs Last 24 Hrs  T(C): 36.4 (2022 04:32), Max: 36.6 (2022 18:40)  T(F): 97.6 (2022 04:32), Max: 97.9 (2022 18:40)  HR: 60 (2022 09:01) (60 - 69)  BP: 148/58 (2022 07:10) (112/50 - 148/58)  BP(mean): 82 (2022 07:10) (61 - 82)  RR: 18 (2022 07:10) (18 - 18)  SpO2: 100% (2022 07:10) (96% - 100%)    I&O's Summary    2022 07:01  -  2022 07:00  --------------------------------------------------------  IN: 530 mL / OUT: 3600 mL / NET: -3070 mL          Physical Exam:   GENERAL: Obese++  HEENT: CECELIA/   Atraumatic, Normocephalic  ENMT: No tonsillar erythema, exudates, or enlargement; Moist mucous membranes, Good dentition, No lesions  NECK: Supple, No JVD, Normal thyroid  CHEST/LUNG: Clear to auscultaion,- limited exam   CVS: Regular rate and rhythm; No murmurs, rubs, or gallops  GI: : Soft, Nontender, Nondistended; Bowel sounds present  NERVOUS SYSTEM:  Alert & Oriented X3  EXTREMITIES:  ++edema  LYMPH: No lymphadenopathy noted  SKIN: No rashes or lesions  ENDOCRINOLOGY: No Thyromegaly  PSYCH: Appropriate    Labs:  26                            9.4    5.27  )-----------( 331      ( 2022 05:00 )             28.5                         10.3   9.12  )-----------( 341      ( 2022 15:50 )             32.1         136  |  95<L>  |  18  ----------------------------<  175<H>  4.4   |  31  |  1.33<H>      136  |  96  |  15  ----------------------------<  215<H>  4.5   |  28  |  1.19  -    x   |  x   |  x   ----------------------------<  x   5.2   |  x   |  x   04-21    134<L>  |  99  |  17  ----------------------------<  216<H>  5.7<H>   |  21<L>  |  1.23    Ca    9.1      2022 05:00  Ca    9.3      2022 08:17  Ca    9.5      2022 15:50  Phos  5.2     -  Mg     1.9       Mg     1.7         TPro  7.4  /  Alb  3.9  /  TBili  0.3  /  DBili  x   /  AST  26  /  ALT  28  /  AlkPhos  58      CAPILLARY BLOOD GLUCOSE      POCT Blood Glucose.: 181 mg/dL (2022 07:04)  POCT Blood Glucose.: 336 mg/dL (2022 21:21)  POCT Blood Glucose.: 237 mg/dL (2022 16:02)  POCT Blood Glucose.: 170 mg/dL (2022 11:06)      LIVER FUNCTIONS - ( 2022 15:50 )  Alb: 3.9 g/dL / Pro: 7.4 g/dL / ALK PHOS: 58 U/L / ALT: 28 U/L / AST: 26 U/L / GGT: x             Urinalysis Basic - ( 2022 17:18 )    Color: Light Yellow / Appearance: Clear / S.010 / pH: x  Gluc: x / Ketone: Negative  / Bili: Negative / Urobili: Negative   Blood: x / Protein: Negative / Nitrite: Negative   Leuk Esterase: Moderate / RBC: 1 /hpf / WBC 2 /HPF   Sq Epi: x / Non Sq Epi: 1 /hpf / Bacteria: Negative      Serum Pro-Brain Natriuretic Peptide: 1204 pg/mL ( @ 15:50)        RECENT CULTURES:   @ 19:06 Clean Catch Clean Catch (Midstream)         rad?rad< from: Xray Chest 1 View- PORTABLE-Urgent (22 @ 15:43) >  INTERPRETATION:  INDICATION: Chest pain.    COMPARISON: Multiple prior chest x-rays with most recent dated 4/10/2022.    FINDINGS:  Heart/Vascular: Heart size is enlarged. Left chest wall dual-chamber   pacemaker with leads intact.  Pulmonary: No focal consolidation. No pleural effusion. No pneumothorax.  Bones: No acute bony pathology.    IMPRESSION: Clear lungs.          --- End of Report ---           PING OLIVARES MD; Resident Radiology  This document has been electronically signed.  TOM GERONIMO MD; Attending Radiologist  This document has been electronically signed. 2022  9:30AM    < end of copied text >         <10,000 CFU/mL Normal Urogenital Ema          RESPIRATORY CULTURES:          Studies  Chest X-RAY  CT SCAN Chest   Venous Dopplers: LE:   CT Abdomen  Others

## 2022-04-23 NOTE — PROGRESS NOTE ADULT - SUBJECTIVE AND OBJECTIVE BOX
Patient is a 82y old  Female who presents with a chief complaint of shortness of breath and vomiting (2022 14:01)  daughter at bedside  patient declined use of  services, preferred her daughter translate instead    DATE OF SERVICE: 22 @ 14:41    SUBJECTIVE / OVERNIGHT EVENTS: overnight events noted    ROS:  Resp: No cough no sputum production  CVS: No chest pain no palpitations no orthopnea  GI: no N/V/D  : no dysuria, no hematuria  Neuro: no weakness no paresthesias        MEDICATIONS  (STANDING):  amLODIPine   Tablet 5 milliGRAM(s) Oral daily  budesonide 160 MICROgram(s)/formoterol 4.5 MICROgram(s) Inhaler 2 Puff(s) Inhalation two times a day  carvedilol 12.5 milliGRAM(s) Oral every 12 hours  dextrose 5%. 1000 milliLiter(s) (50 mL/Hr) IV Continuous <Continuous>  dextrose 5%. 1000 milliLiter(s) (100 mL/Hr) IV Continuous <Continuous>  dextrose 50% Injectable 25 Gram(s) IV Push once  dextrose 50% Injectable 12.5 Gram(s) IV Push once  dextrose 50% Injectable 25 Gram(s) IV Push once  dronedarone 400 milliGRAM(s) Oral two times a day  escitalopram 10 milliGRAM(s) Oral daily  furosemide   Injectable 40 milliGRAM(s) IV Push two times a day  glucagon  Injectable 1 milliGRAM(s) IntraMuscular once  insulin lispro (ADMELOG) corrective regimen sliding scale   SubCutaneous at bedtime  insulin lispro (ADMELOG) corrective regimen sliding scale   SubCutaneous three times a day before meals  levothyroxine 175 MICROGram(s) Oral daily  losartan 50 milliGRAM(s) Oral daily  rivaroxaban 15 milliGRAM(s) Oral with dinner    MEDICATIONS  (PRN):  ALBUTerol    90 MICROgram(s) HFA Inhaler 2 Puff(s) Inhalation every 6 hours PRN Shortness of Breath and/or Wheezing  dextrose Oral Gel 15 Gram(s) Oral once PRN Blood Glucose LESS THAN 70 milliGRAM(s)/deciliter  guaiFENesin Oral Liquid (Sugar-Free) 200 milliGRAM(s) Oral every 6 hours PRN Cough  LORazepam     Tablet 0.5 milliGRAM(s) Oral daily PRN Anxiety        CAPILLARY BLOOD GLUCOSE      POCT Blood Glucose.: 272 mg/dL (2022 12:40)  POCT Blood Glucose.: 181 mg/dL (2022 07:04)  POCT Blood Glucose.: 336 mg/dL (2022 21:21)  POCT Blood Glucose.: 237 mg/dL (2022 16:02)    I&O's Summary    2022 07:01  -  2022 07:00  --------------------------------------------------------  IN: 530 mL / OUT: 3600 mL / NET: -3070 mL        Vital Signs Last 24 Hrs  T(C): 36.4 (2022 11:48), Max: 36.6 (2022 18:40)  T(F): 97.6 (2022 11:48), Max: 97.9 (2022 18:40)  HR: 62 (2022 14:20) (60 - 69)  BP: 111/50 (2022 14:20) (111/50 - 148/58)  BP(mean): 82 (2022 07:10) (61 - 82)  RR: 18 (2022 14:20) (18 - 18)  SpO2: 100% (2022 14:20) (96% - 100%)      PHYSICAL EXAM:   HEENT: CECELIA EOMI  Neck: Supple, no JVD  Lungs: no wheeze, no crackles  CVS: S1 S2 no M/R/G  Abdomen: no tenderness, no organomegaly  Neuro: AO x 3 nonfocal  Psych: appropriate affect  Skin: warm, dry  Ext: + edema  Msk: no joint swelling     LABS:                        9.4    5.27  )-----------( 331      ( 2022 05:00 )             28.5     04-23    136  |  95<L>  |  18  ----------------------------<  175<H>  4.4   |  31  |  1.33<H>    Ca    9.1      2022 05:00  Phos  5.2     04-23  Mg     1.9     04-23    TPro  7.4  /  Alb  3.9  /  TBili  0.3  /  DBili  x   /  AST  26  /  ALT  28  /  AlkPhos  58            Urinalysis Basic - ( 2022 17:18 )    Color: Light Yellow / Appearance: Clear / S.010 / pH: x  Gluc: x / Ketone: Negative  / Bili: Negative / Urobili: Negative   Blood: x / Protein: Negative / Nitrite: Negative   Leuk Esterase: Moderate / RBC: 1 /hpf / WBC 2 /HPF   Sq Epi: x / Non Sq Epi: 1 /hpf / Bacteria: Negative          All consultant(s) notes reviewed and care discussed with other providers        Contact Number, Dr Yates [phone]

## 2022-04-23 NOTE — PROGRESS NOTE ADULT - ASSESSMENT
83yoF, w/ PMH of HTN, DM, hypothyroidism, asthma, afib, CAD, CHF (s/p pacemaker), presents with shortness of breath and vomiting. PT was recently discharged from  after experiencing similar symptoms. PT's daughter states she was discharged 10d ago. Nephrology consult called for SRIDHAR/CKD/Fluid overload      Assessment:  1) Non oliguric CKD stage III likely chronic hypertensive/diabetic nephropathy with e GFR 40-50 ml/min  2) Acute fluid overload due to acute on chronic diastolic CHF with valvular heart disease with AS/MR  3) Anemia of chronic illness   4) Renal osteodystrophy  5) Hypothyroidism  6) Morbid obesity  7) Electrolytes disorder with hyponatremia/Hyperkalemia with improvement  8) Right renal cyst    Plan of care:  Strict I/o  Avoid Nephrotoxic agents  Urinalysis with 1+protein, RBCs  Urine PCR 0.1 gm/gm  Continue IV Lasix with goal fluid balance net negative in next 24 hours with possible switch to po diuretic therapy over next 24 to 48 hours.  Bilateral renal and bladder ultrasound results reviewed with right renal cyst  Anemia work up and renal/CKD work up reviewed  (HIV, Hepatitis, RPR negative, Further workup in progress)  Intact PTH, Vitamin D 1,25 level, phos, mag level reviewed.  Optimal BP control with po medications  Replete electrolytes with goal K>4 and <5, Mag>2, Phos 2.5 to 3.5  Monitor BMP and electrolytes daily  Echocardiogram to assess LVEF/valvular heart disease  Further plan per primary/cardiology team

## 2022-04-24 LAB
ANION GAP SERPL CALC-SCNC: 14 MMOL/L — SIGNIFICANT CHANGE UP (ref 5–17)
BUN SERPL-MCNC: 23 MG/DL — SIGNIFICANT CHANGE UP (ref 7–23)
CALCIUM SERPL-MCNC: 9.3 MG/DL — SIGNIFICANT CHANGE UP (ref 8.4–10.5)
CHLORIDE SERPL-SCNC: 92 MMOL/L — LOW (ref 96–108)
CO2 SERPL-SCNC: 30 MMOL/L — SIGNIFICANT CHANGE UP (ref 22–31)
CREAT SERPL-MCNC: 1.3 MG/DL — SIGNIFICANT CHANGE UP (ref 0.5–1.3)
EGFR: 41 ML/MIN/1.73M2 — LOW
GLUCOSE BLDC GLUCOMTR-MCNC: 226 MG/DL — HIGH (ref 70–99)
GLUCOSE BLDC GLUCOMTR-MCNC: 309 MG/DL — HIGH (ref 70–99)
GLUCOSE BLDC GLUCOMTR-MCNC: 379 MG/DL — HIGH (ref 70–99)
GLUCOSE BLDC GLUCOMTR-MCNC: 470 MG/DL — CRITICAL HIGH (ref 70–99)
GLUCOSE BLDC GLUCOMTR-MCNC: 473 MG/DL — CRITICAL HIGH (ref 70–99)
GLUCOSE BLDC GLUCOMTR-MCNC: 491 MG/DL — CRITICAL HIGH (ref 70–99)
GLUCOSE BLDC GLUCOMTR-MCNC: 507 MG/DL — CRITICAL HIGH (ref 70–99)
GLUCOSE SERPL-MCNC: 175 MG/DL — HIGH (ref 70–99)
HCT VFR BLD CALC: 30.8 % — LOW (ref 34.5–45)
HGB BLD-MCNC: 10.1 G/DL — LOW (ref 11.5–15.5)
MAGNESIUM SERPL-MCNC: 2 MG/DL — SIGNIFICANT CHANGE UP (ref 1.6–2.6)
MCHC RBC-ENTMCNC: 28.9 PG — SIGNIFICANT CHANGE UP (ref 27–34)
MCHC RBC-ENTMCNC: 32.8 GM/DL — SIGNIFICANT CHANGE UP (ref 32–36)
MCV RBC AUTO: 88 FL — SIGNIFICANT CHANGE UP (ref 80–100)
NRBC # BLD: 0 /100 WBCS — SIGNIFICANT CHANGE UP (ref 0–0)
PHOSPHATE SERPL-MCNC: 4.1 MG/DL — SIGNIFICANT CHANGE UP (ref 2.5–4.5)
PLATELET # BLD AUTO: 366 K/UL — SIGNIFICANT CHANGE UP (ref 150–400)
POTASSIUM SERPL-MCNC: 4.2 MMOL/L — SIGNIFICANT CHANGE UP (ref 3.5–5.3)
POTASSIUM SERPL-SCNC: 4.2 MMOL/L — SIGNIFICANT CHANGE UP (ref 3.5–5.3)
RBC # BLD: 3.5 M/UL — LOW (ref 3.8–5.2)
RBC # FLD: 13.2 % — SIGNIFICANT CHANGE UP (ref 10.3–14.5)
SODIUM SERPL-SCNC: 136 MMOL/L — SIGNIFICANT CHANGE UP (ref 135–145)
WBC # BLD: 5.45 K/UL — SIGNIFICANT CHANGE UP (ref 3.8–10.5)
WBC # FLD AUTO: 5.45 K/UL — SIGNIFICANT CHANGE UP (ref 3.8–10.5)

## 2022-04-24 RX ORDER — IPRATROPIUM/ALBUTEROL SULFATE 18-103MCG
3 AEROSOL WITH ADAPTER (GRAM) INHALATION EVERY 6 HOURS
Refills: 0 | Status: DISCONTINUED | OUTPATIENT
Start: 2022-04-24 | End: 2022-04-26

## 2022-04-24 RX ADMIN — AMLODIPINE BESYLATE 5 MILLIGRAM(S): 2.5 TABLET ORAL at 06:20

## 2022-04-24 RX ADMIN — CARVEDILOL PHOSPHATE 12.5 MILLIGRAM(S): 80 CAPSULE, EXTENDED RELEASE ORAL at 18:14

## 2022-04-24 RX ADMIN — Medication 4: at 22:31

## 2022-04-24 RX ADMIN — Medication 40 MILLIGRAM(S): at 18:16

## 2022-04-24 RX ADMIN — Medication 5: at 18:13

## 2022-04-24 RX ADMIN — Medication 3 MILLILITER(S): at 18:27

## 2022-04-24 RX ADMIN — Medication 20 MILLIGRAM(S): at 12:31

## 2022-04-24 RX ADMIN — BUDESONIDE AND FORMOTEROL FUMARATE DIHYDRATE 2 PUFF(S): 160; 4.5 AEROSOL RESPIRATORY (INHALATION) at 18:15

## 2022-04-24 RX ADMIN — Medication 3 MILLILITER(S): at 23:59

## 2022-04-24 RX ADMIN — RIVAROXABAN 15 MILLIGRAM(S): KIT at 18:14

## 2022-04-24 RX ADMIN — ESCITALOPRAM OXALATE 10 MILLIGRAM(S): 10 TABLET, FILM COATED ORAL at 12:23

## 2022-04-24 RX ADMIN — Medication 20 MILLIGRAM(S): at 23:59

## 2022-04-24 RX ADMIN — BUDESONIDE AND FORMOTEROL FUMARATE DIHYDRATE 2 PUFF(S): 160; 4.5 AEROSOL RESPIRATORY (INHALATION) at 06:19

## 2022-04-24 RX ADMIN — Medication 20 MILLIGRAM(S): at 18:21

## 2022-04-24 RX ADMIN — Medication 3 MILLILITER(S): at 12:32

## 2022-04-24 RX ADMIN — Medication 2: at 09:43

## 2022-04-24 RX ADMIN — CARVEDILOL PHOSPHATE 12.5 MILLIGRAM(S): 80 CAPSULE, EXTENDED RELEASE ORAL at 06:20

## 2022-04-24 RX ADMIN — LOSARTAN POTASSIUM 50 MILLIGRAM(S): 100 TABLET, FILM COATED ORAL at 06:20

## 2022-04-24 RX ADMIN — Medication 4: at 13:28

## 2022-04-24 RX ADMIN — Medication 40 MILLIGRAM(S): at 06:19

## 2022-04-24 RX ADMIN — DRONEDARONE 400 MILLIGRAM(S): 400 TABLET, FILM COATED ORAL at 18:15

## 2022-04-24 RX ADMIN — Medication 175 MICROGRAM(S): at 06:21

## 2022-04-24 RX ADMIN — DRONEDARONE 400 MILLIGRAM(S): 400 TABLET, FILM COATED ORAL at 06:19

## 2022-04-24 NOTE — PROGRESS NOTE ADULT - SUBJECTIVE AND OBJECTIVE BOX
Norman Yates MD (Nephrology progress note)  [id number], [facility],  [id number],  [id number]  TEl: [phone]  Cell: [phone]    Patient is a 82y Female seen and evaluated at bedside. Vital signs, laboratory data, imaging studies, consult notes reviewed done within past 24 hours. Overnight events noted. Patient lying in bed with daughter at bedside Feels well and offers no new complains. No Chest pain, SOB. Interval stable renal function with non oliguria.     Allergies    No Known Allergies    Intolerances        ROS:  CONSTITUTIONAL: No fever or chills.  HEENT: No headache or visual disturbances.  RESPIRATORY: No shortness of breath, cough, hemoptysis or wheezing  CARDIOVASCULAR: No chest pain or SOB  GASTROINTESTINAL: No abdominal pain, nausea, vomiting, diarrhea, hematemesis or blood per rectum.  GENITOURINARY: No flank or supra pubic pain  NEUROLOGICAL: No headache, focal limb weakness, tingling or numbness   SKIN: No skin rash or lesion  MUSCULOSKELETAL: LEg edema with improvement    VITALS:    T(C): 36.7 (22 @ 12:13), Max: 36.7 (22 @ 12:13)  HR: 61 (22 @ 12:13) (61 - 65)  BP: 149/61 (22 @ 12:13) (130/74 - 149/61)  RR: 18 (22 @ 12:13) (18 - 18)  SpO2: 97% (22 @ 12:13) (91% - 99%)  CAPILLARY BLOOD GLUCOSE      POCT Blood Glucose.: 309 mg/dL (2022 12:55)  POCT Blood Glucose.: 226 mg/dL (2022 09:09)  POCT Blood Glucose.: 292 mg/dL (2022 21:38)  POCT Blood Glucose.: 309 mg/dL (2022 17:49)      22 @ 07:01  -  22 @ 07:00  --------------------------------------------------------  IN: 530 mL / OUT: 750 mL / NET: -220 mL    22 @ 07:01  -  22 @ 15:39  --------------------------------------------------------  IN: 480 mL / OUT: 200 mL / NET: 280 mL      MEDICATIONS  (STANDING):  albuterol/ipratropium for Nebulization 3 milliLiter(s) Nebulizer every 6 hours  amLODIPine   Tablet 5 milliGRAM(s) Oral daily  budesonide 160 MICROgram(s)/formoterol 4.5 MICROgram(s) Inhaler 2 Puff(s) Inhalation two times a day  carvedilol 12.5 milliGRAM(s) Oral every 12 hours  dextrose 5%. 1000 milliLiter(s) (50 mL/Hr) IV Continuous <Continuous>  dextrose 5%. 1000 milliLiter(s) (100 mL/Hr) IV Continuous <Continuous>  dextrose 50% Injectable 25 Gram(s) IV Push once  dextrose 50% Injectable 12.5 Gram(s) IV Push once  dextrose 50% Injectable 25 Gram(s) IV Push once  dronedarone 400 milliGRAM(s) Oral two times a day  escitalopram 10 milliGRAM(s) Oral daily  furosemide   Injectable 40 milliGRAM(s) IV Push two times a day  glucagon  Injectable 1 milliGRAM(s) IntraMuscular once  insulin lispro (ADMELOG) corrective regimen sliding scale   SubCutaneous at bedtime  insulin lispro (ADMELOG) corrective regimen sliding scale   SubCutaneous three times a day before meals  levothyroxine 175 MICROGram(s) Oral daily  losartan 50 milliGRAM(s) Oral daily  methylPREDNISolone sodium succinate Injectable 20 milliGRAM(s) IV Push every 6 hours  rivaroxaban 15 milliGRAM(s) Oral with dinner    MEDICATIONS  (PRN):  ALBUTerol    90 MICROgram(s) HFA Inhaler 2 Puff(s) Inhalation every 6 hours PRN Shortness of Breath and/or Wheezing  dextrose Oral Gel 15 Gram(s) Oral once PRN Blood Glucose LESS THAN 70 milliGRAM(s)/deciliter  guaiFENesin Oral Liquid (Sugar-Free) 200 milliGRAM(s) Oral every 6 hours PRN Cough  LORazepam     Tablet 0.5 milliGRAM(s) Oral daily PRN Anxiety      PHYSICAL EXAM:  GENERAL: Alert, awake and oriented x3 in no distress  HEENT: CECELIA, EOMI, neck supple, no JVP, no carotid bruit, oral mucosa moist and pink.  CHEST/LUNG: Bilateral clear breath sounds, no rales, no crepitations, no rhonchi, no wheezing  HEART: Regular rate and rhythm, VINH II/VI at LPSB, no gallops, no rub   ABDOMEN: Soft, nontender, non distended, bowel sounds present, no palpable organomegaly  : No flank or supra pubic tenderness.  EXTREMITIES:Bilatearl pitting pedal edema with chronic venous stasis dermatitis  Neurology: AAOx3, no focal neurological deficit  SKIN: No rash or skin lesion  Musculoskeletal: No joint deformity or spinal tenderness.      Vascular ACCESS:     LABS:                        10.1   5.45  )-----------( 366      ( 2022 07:36 )             30.8     04-24    136  |  92<L>  |  23  ----------------------------<  175<H>  4.2   |  30  |  1.30    Ca    9.3      2022 07:28  Phos  4.1       Mg     2.0               Urinalysis Basic - ( 2022 17:18 )    Color: Light Yellow / Appearance: Clear / S.010 / pH: x  Gluc: x / Ketone: Negative  / Bili: Negative / Urobili: Negative   Blood: x / Protein: Negative / Nitrite: Negative   Leuk Esterase: Moderate / RBC: 1 /hpf / WBC 2 /HPF   Sq Epi: x / Non Sq Epi: 1 /hpf / Bacteria: Negative      Sodium, Random Urine: 115 mmol/L ( @ 17:20)  Osmolality, Random Urine: 366 mos/kg ( @ 17:20)  Creatinine, Random Urine: 54 mg/dL ( @ 17:20)  Protein/Creatinine Ratio Calculation: 0.1 Ratio ( @ 17:20)        RADIOLOGY & ADDITIONAL STUDIES:  < from: US Kidney and Bladder (22 @ 18:49) >    ACC: [id number] EXAM:  US KIDNEYS AND BLADDER                          PROCEDURE DATE:  2022          INTERPRETATION:  CLINICAL INFORMATION: Chronic kidney disease.    COMPARISON: Prior CT abdomen and pelvis dated 3/20/2021 and prior renal   ultrasound dated 2018.    TECHNIQUE: Sonography of the kidneys and bladder.    FINDINGS:  Right kidney: 10.7 cm. No hydronephrosis. Small cysts, the largest of   which measures 1.6 x 1.1 x 1.6 cm.    Left kidney: 9.8 cm. No hydronephrosis.    Urinary bladder: Minimally distended.    IMPRESSION:  No hydronephrosis.        --- End of Report ---            LOIN RAMOS MD; Attending Radiologist  This document has been electronically signed. 2022  1:05PM    < end of copied text >    Imaging Personally Reviewed:  [x] YES  [ ] NO    Consultant(s) Notes Reviewed:  [x] YES  [ ] NO    Care Discussed with Primary team/ Other Providers [x] YES  [ ] NO

## 2022-04-24 NOTE — PROGRESS NOTE ADULT - ASSESSMENT
83yoF, w/ PMH of HTN, DM, hypothyroidism, asthma, afib, CAD, CHF (s/p pacemaker), presents with shortness of breath and vomiting. PT was recently discharged from  after experiencing similar symptoms. PT's daughter states she was discharged 10d ago. Nephrology consult called for SRIDHAR/CKD/Fluid overload      Assessment:  1) Non oliguric CKD stage III likely chronic hypertensive/diabetic nephropathy with e GFR 40-50 ml/min  2) Acute fluid overload due to acute on chronic diastolic CHF with valvular heart disease with AS/MR/Cardio-renal disease  3) Anemia of chronic illness   4) Renal osteodystrophy  5) Hypothyroidism  6) Morbid obesity  7) Electrolytes disorder with hyponatremia/Hyperkalemia now resolved  8) Right renal cyst    Plan of care:  Strict I/o  Avoid Nephrotoxic agents  Urinalysis with 1+protein, RBCs  Urine PCR 0.1 gm/gm  Continue IV Lasix with goal fluid balance net negative  Bilateral renal and bladder ultrasound results reviewed with right renal cyst  Anemia work up and renal/CKD work up reviewed  (HIV, Hepatitis, RPR negative, Further workup in progress)  Intact PTH, Vitamin D 1,25 level, phos, mag level reviewed.  Optimal BP control with po medications  Replete electrolytes with goal K>4 and <5, Mag>2, Phos 2.5 to 3.5  Monitor BMP and electrolytes daily  Await Echocardiogram to assess LVEF/valvular heart disease  Further plan per primary/cardiology team

## 2022-04-24 NOTE — PROGRESS NOTE ADULT - NSPROGADDITIONALINFOA_GEN_ALL_CORE
discussed with patient in detail, expresses understanding of treatment plans.  discussed with daughter Vaishali at bedside

## 2022-04-24 NOTE — PROGRESS NOTE ADULT - SUBJECTIVE AND OBJECTIVE BOX
Date of Service: 22 @ 11:04    Patient is a 82y old  Female who presents with a chief complaint of shortness of breath and vomiting (2022 15:33)      Any change in ROS: daughter is sitting at bedside: complaining of wheezing: is wheezing today:     MEDICATIONS  (STANDING):  albuterol/ipratropium for Nebulization 3 milliLiter(s) Nebulizer every 6 hours  amLODIPine   Tablet 5 milliGRAM(s) Oral daily  budesonide 160 MICROgram(s)/formoterol 4.5 MICROgram(s) Inhaler 2 Puff(s) Inhalation two times a day  carvedilol 12.5 milliGRAM(s) Oral every 12 hours  dextrose 5%. 1000 milliLiter(s) (50 mL/Hr) IV Continuous <Continuous>  dextrose 5%. 1000 milliLiter(s) (100 mL/Hr) IV Continuous <Continuous>  dextrose 50% Injectable 25 Gram(s) IV Push once  dextrose 50% Injectable 12.5 Gram(s) IV Push once  dextrose 50% Injectable 25 Gram(s) IV Push once  dronedarone 400 milliGRAM(s) Oral two times a day  escitalopram 10 milliGRAM(s) Oral daily  furosemide   Injectable 40 milliGRAM(s) IV Push two times a day  glucagon  Injectable 1 milliGRAM(s) IntraMuscular once  insulin lispro (ADMELOG) corrective regimen sliding scale   SubCutaneous at bedtime  insulin lispro (ADMELOG) corrective regimen sliding scale   SubCutaneous three times a day before meals  levothyroxine 175 MICROGram(s) Oral daily  losartan 50 milliGRAM(s) Oral daily  methylPREDNISolone sodium succinate Injectable 20 milliGRAM(s) IV Push every 6 hours  rivaroxaban 15 milliGRAM(s) Oral with dinner    MEDICATIONS  (PRN):  ALBUTerol    90 MICROgram(s) HFA Inhaler 2 Puff(s) Inhalation every 6 hours PRN Shortness of Breath and/or Wheezing  dextrose Oral Gel 15 Gram(s) Oral once PRN Blood Glucose LESS THAN 70 milliGRAM(s)/deciliter  guaiFENesin Oral Liquid (Sugar-Free) 200 milliGRAM(s) Oral every 6 hours PRN Cough  LORazepam     Tablet 0.5 milliGRAM(s) Oral daily PRN Anxiety    Vital Signs Last 24 Hrs  T(C): 36.3 (2022 04:49), Max: 36.5 (2022 20:23)  T(F): 97.4 (2022 04:49), Max: 97.7 (2022 20:23)  HR: 62 (2022 09:55) (61 - 65)  BP: 142/71 (2022 06:15) (111/60 - 143/63)  BP(mean): --  RR: 18 (2022 06:15) (18 - 18)  SpO2: 91% (2022 09:55) (91% - 100%)    I&O's Summary    2022 07:01  -  2022 07:00  --------------------------------------------------------  IN: 530 mL / OUT: 750 mL / NET: -220 mL          Physical Exam:   GENERAL: obese+  HEENT: CECELIA/   Atraumatic, Normocephalic  ENMT: No tonsillar erythema, exudates, or enlargement; Moist mucous membranes, Good dentition, No lesions  NECK: Supple, No JVD, Normal thyroid  CHEST/LUNG: wheezing++  CVS: Regular rate and rhythm; No murmurs, rubs, or gallops  GI: : Soft, Nontender, Nondistended; Bowel sounds present  NERVOUS SYSTEM:  Alert & Oriented X3  EXTREMITIES:++edema  LYMPH: No lymphadenopathy noted  SKIN: No rashes or lesions  ENDOCRINOLOGY: No Thyromegaly  PSYCH: Appropriate    Labs:  26                            10.1   5.45  )-----------( 366      ( 2022 07:36 )             30.8                         9.4    5.27  )-----------( 331      ( 2022 05:00 )             28.5                         10.3   9.12  )-----------( 341      ( 2022 15:50 )             32.1         136  |  92<L>  |  23  ----------------------------<  175<H>  4.2   |  30  |  1.30  04    136  |  95<L>  |  18  ----------------------------<  175<H>  4.4   |  31  |  1.33<H>      136  |  96  |  15  ----------------------------<  215<H>  4.5   |  28  |  1.19      x   |  x   |  x   ----------------------------<  x   5.2   |  x   |  x       134<L>  |  99  |  17  ----------------------------<  216<H>  5.7<H>   |  21<L>  |  1.23    Ca    9.3      2022 07:28  Ca    9.1      2022 05:00  Phos  4.1       Phos  5.2       Mg     2.0       Mg     1.9         TPro  7.4  /  Alb  3.9  /  TBili  0.3  /  DBili  x   /  AST  26  /  ALT  28  /  AlkPhos  58      CAPILLARY BLOOD GLUCOSE      POCT Blood Glucose.: 226 mg/dL (2022 09:09)  POCT Blood Glucose.: 292 mg/dL (2022 21:38)  POCT Blood Glucose.: 309 mg/dL (2022 17:49)  POCT Blood Glucose.: 272 mg/dL (2022 12:40)          Urinalysis Basic - ( 2022 17:18 )    Color: Light Yellow / Appearance: Clear / S.010 / pH: x  Gluc: x / Ketone: Negative  / Bili: Negative / Urobili: Negative   Blood: x / Protein: Negative / Nitrite: Negative   Leuk Esterase: Moderate / RBC: 1 /hpf / WBC 2 /HPF   Sq Epi: x / Non Sq Epi: 1 /hpf / Bacteria: Negative      Serum Pro-Brain Natriuretic Peptide: 1204 pg/mL ( @ 15:50)        RECENT CULTURES:   @ 19:06 Clean Catch Clean Catch (Midstream)         rad< from: Xray Chest 1 View- PORTABLE-Urgent (22 @ 15:43) >    ACC: [id number] EXAM:  XR CHEST PORTABLE URGENT 1V                          PROCEDURE DATE:  2022          INTERPRETATION:  INDICATION: Chest pain.    COMPARISON: Multiple prior chest x-rays with most recent dated 4/10/2022.    FINDINGS:  Heart/Vascular: Heart size is enlarged. Left chest wall dual-chamber   pacemaker with leads intact.  Pulmonary: No focal consolidation. No pleural effusion. No pneumothorax.  Bones: No acute bony pathology.    IMPRESSION: Clear lungs.          --- End of Report ---           PING OLIVARES MD; Resident Radiology  This document has been electronically signed.  TOM GERONIMO MD; Attending Radiologist  This document has been electronically signed. 2022  9:30AM    < end of copied text >         <10,000 CFU/mL Normal Urogenital Ema          RESPIRATORY CULTURES:          Studies  Chest X-RAY  CT SCAN Chest   Venous Dopplers: LE:   CT Abdomen  Others

## 2022-04-24 NOTE — PROGRESS NOTE ADULT - SUBJECTIVE AND OBJECTIVE BOX
Patient is a 82y old  Female who presents with a chief complaint of shortness of breath and vomiting (2022 12:25)  patient declined use of  services, preferred daughter at bedside translate instead     DATE OF SERVICE: 22 @ 12:35    SUBJECTIVE / OVERNIGHT EVENTS: overnight events noted    ROS:  Resp: No cough no sputum production  CVS: No chest pain no palpitations no orthopnea  GI: no N/V/D  : no dysuria, no hematuria          MEDICATIONS  (STANDING):  albuterol/ipratropium for Nebulization 3 milliLiter(s) Nebulizer every 6 hours  amLODIPine   Tablet 5 milliGRAM(s) Oral daily  budesonide 160 MICROgram(s)/formoterol 4.5 MICROgram(s) Inhaler 2 Puff(s) Inhalation two times a day  carvedilol 12.5 milliGRAM(s) Oral every 12 hours  dextrose 5%. 1000 milliLiter(s) (50 mL/Hr) IV Continuous <Continuous>  dextrose 5%. 1000 milliLiter(s) (100 mL/Hr) IV Continuous <Continuous>  dextrose 50% Injectable 25 Gram(s) IV Push once  dextrose 50% Injectable 12.5 Gram(s) IV Push once  dextrose 50% Injectable 25 Gram(s) IV Push once  dronedarone 400 milliGRAM(s) Oral two times a day  escitalopram 10 milliGRAM(s) Oral daily  furosemide   Injectable 40 milliGRAM(s) IV Push two times a day  glucagon  Injectable 1 milliGRAM(s) IntraMuscular once  insulin lispro (ADMELOG) corrective regimen sliding scale   SubCutaneous at bedtime  insulin lispro (ADMELOG) corrective regimen sliding scale   SubCutaneous three times a day before meals  levothyroxine 175 MICROGram(s) Oral daily  losartan 50 milliGRAM(s) Oral daily  methylPREDNISolone sodium succinate Injectable 20 milliGRAM(s) IV Push every 6 hours  rivaroxaban 15 milliGRAM(s) Oral with dinner    MEDICATIONS  (PRN):  ALBUTerol    90 MICROgram(s) HFA Inhaler 2 Puff(s) Inhalation every 6 hours PRN Shortness of Breath and/or Wheezing  dextrose Oral Gel 15 Gram(s) Oral once PRN Blood Glucose LESS THAN 70 milliGRAM(s)/deciliter  guaiFENesin Oral Liquid (Sugar-Free) 200 milliGRAM(s) Oral every 6 hours PRN Cough  LORazepam     Tablet 0.5 milliGRAM(s) Oral daily PRN Anxiety        CAPILLARY BLOOD GLUCOSE      POCT Blood Glucose.: 226 mg/dL (2022 09:09)  POCT Blood Glucose.: 292 mg/dL (2022 21:38)  POCT Blood Glucose.: 309 mg/dL (2022 17:49)  POCT Blood Glucose.: 272 mg/dL (2022 12:40)    I&O's Summary    2022 07:01  -  2022 07:00  --------------------------------------------------------  IN: 530 mL / OUT: 750 mL / NET: -220 mL    2022 07:01  -  2022 12:35  --------------------------------------------------------  IN: 360 mL / OUT: 0 mL / NET: 360 mL        Vital Signs Last 24 Hrs  T(C): 36.7 (2022 12:13), Max: 36.7 (2022 12:13)  T(F): 98 (2022 12:13), Max: 98 (2022 12:13)  HR: 61 (2022 12:13) (61 - 65)  BP: 149/61 (2022 12:13) (111/60 - 149/61)  BP(mean): --  RR: 18 (2022 12:13) (18 - 18)  SpO2: 97% (2022 12:13) (91% - 100%)    PHYSICAL EXAM:   HEENT: CECELIA EOMI  Neck: Supple, no JVD  Lungs: no wheeze, no crackles  CVS: S1 S2 no M/R/G  Abdomen: no tenderness  Neuro: AO x 3 nonfocal  Psych: appropriate affect  Skin: warm, dry  Ext: improved  edema    LABS:                        10.1   5.45  )-----------( 366      ( 2022 07:36 )             30.8     04-24    136  |  92<L>  |  23  ----------------------------<  175<H>  4.2   |  30  |  1.30    Ca    9.3      2022 07:28  Phos  4.1     04-24  Mg     2.0     04-24            Urinalysis Basic - ( 2022 17:18 )    Color: Light Yellow / Appearance: Clear / S.010 / pH: x  Gluc: x / Ketone: Negative  / Bili: Negative / Urobili: Negative   Blood: x / Protein: Negative / Nitrite: Negative   Leuk Esterase: Moderate / RBC: 1 /hpf / WBC 2 /HPF   Sq Epi: x / Non Sq Epi: 1 /hpf / Bacteria: Negative          All consultant(s) notes reviewed and care discussed with other providers        Contact Number, Dr Yates [phone]

## 2022-04-24 NOTE — PATIENT PROFILE ADULT - FALL HARM RISK - HARM RISK INTERVENTIONS

## 2022-04-24 NOTE — PROGRESS NOTE ADULT - SUBJECTIVE AND OBJECTIVE BOX
CARDIOLOGY FOLLOW UP NOTE - DR. PINO    Patient Name: KWAME MOSQUERA  Date of Service: 22 @ 12:25    Patient seen and examined  feels better    Subjective:    cv: denies chest pain, dyspnea, palpitations, dizziness  pulmonary: denies cough  GI: denies abdominal pain, nausea, vomiting  vascular/legs: no edema   skin: no rash  ROS: otherwise negative   overnight events:      PHYSICAL EXAM:  T(C): 36.7 (22 @ 12:13), Max: 36.7 (22 @ 12:13)  HR: 61 (22 @ 12:13) (61 - 65)  BP: 149/61 (22 @ 12:13) (111/60 - 149/61)  RR: 18 (22 @ 12:13) (18 - 18)  SpO2: 97% (22 @ 12:13) (91% - 100%)  Wt(kg): --  I&O's Summary    2022 07:01  -  2022 07:00  --------------------------------------------------------  IN: 530 mL / OUT: 750 mL / NET: -220 mL      Daily     Daily Weight in k.9 (2022 04:49)    Appearance: Normal	  Cardiovascular: Normal S1 S2,RRR, No JVD, No murmurs  Respiratory: Lungs clear to auscultation	  Gastrointestinal:  Soft, Non-tender, + BS	  Extremities: Normal range of motion, less edema       Home Medications:  amLODIPine 5 mg oral tablet: 1 tab(s) orally once a day (2022 17:11)  carvedilol 12.5 mg oral tablet: 1 tab(s) orally 2 times a day (2022 17:11)  escitalopram 10 mg oral tablet: 1 tab(s) orally once a day (2022 17:11)  guaiFENesin 100 mg/5 mL oral liquid: 10 milliliter(s) orally every 6 hours, As needed, Cough (2022 17:11)  Janumet 50 mg-500 mg oral tablet: 1 tab(s) orally 2 times a day (2022 17:11)  levothyroxine 175 mcg (0.175 mg) oral tablet: 1 tab(s) orally once a day (2022 17:11)  LORazepam 0.5 mg oral tablet: 1 tab(s) orally once a day, As Needed (2022 17:11)  Multaq 400 mg oral tablet: 1 tab(s) orally 2 times a day (2022 17:11)  repaglinide 1 mg oral tablet: 1 tab(s) orally 3 times a day (before meals) (2022 17:11)  Xarelto 20 mg oral tablet: 1 tab(s) orally once a day (in the evening) (2022 17:11)      MEDICATIONS  (STANDING):  albuterol/ipratropium for Nebulization 3 milliLiter(s) Nebulizer every 6 hours  amLODIPine   Tablet 5 milliGRAM(s) Oral daily  budesonide 160 MICROgram(s)/formoterol 4.5 MICROgram(s) Inhaler 2 Puff(s) Inhalation two times a day  carvedilol 12.5 milliGRAM(s) Oral every 12 hours  dextrose 5%. 1000 milliLiter(s) (50 mL/Hr) IV Continuous <Continuous>  dextrose 5%. 1000 milliLiter(s) (100 mL/Hr) IV Continuous <Continuous>  dextrose 50% Injectable 25 Gram(s) IV Push once  dextrose 50% Injectable 12.5 Gram(s) IV Push once  dextrose 50% Injectable 25 Gram(s) IV Push once  dronedarone 400 milliGRAM(s) Oral two times a day  escitalopram 10 milliGRAM(s) Oral daily  furosemide   Injectable 40 milliGRAM(s) IV Push two times a day  glucagon  Injectable 1 milliGRAM(s) IntraMuscular once  insulin lispro (ADMELOG) corrective regimen sliding scale   SubCutaneous at bedtime  insulin lispro (ADMELOG) corrective regimen sliding scale   SubCutaneous three times a day before meals  levothyroxine 175 MICROGram(s) Oral daily  losartan 50 milliGRAM(s) Oral daily  methylPREDNISolone sodium succinate Injectable 20 milliGRAM(s) IV Push every 6 hours  rivaroxaban 15 milliGRAM(s) Oral with dinner      TELEMETRY: 	    ECG:  	  RADIOLOGY:   DIAGNOSTIC TESTING:  [ ] Echocardiogram:  [ ] Catheterization:  [ ] Stress Test:    OTHER: 	    LABS:	 	    CARDIAC MARKERS:        Troponin T, High Sensitivity Result: 15 ng/L ( @ 15:50)                                10.1   5.45  )-----------( 366      ( 2022 07:36 )             30.8         136  |  92<L>  |  23  ----------------------------<  175<H>  4.2   |  30  |  1.30    Ca    9.3      2022 07:28  Phos  4.1       Mg     2.0           proBNP:     Lipid Profile:   HgA1c:     Creatinine, Serum: 1.30 mg/dL (22 @ 07:28)  Creatinine, Serum: 1.33 mg/dL (22 @ 05:00)  Creatinine, Serum: 1.19 mg/dL (22 @ 08:17)  Creatinine, Serum: 1.23 mg/dL (22 @ 15:50)

## 2022-04-24 NOTE — PROGRESS NOTE ADULT - ASSESSMENT
Echo 3/21/21: EF 66%, mild ms, nl lv sys fx, Grade I diastolic dysfx    a/p  83yoF, w/ PMH of HTN, DM, hypothyroidism, asthma, afib (s.p PPM) , CAD s/p PCI, CHF, , presents with shortness of breath and vomiting    #Acute on Chronic HFpEF  -clinically improved  -c/w iv lasix today, change to po diuretics on d/c  -likely will d/c home on torsemide 20mg daily   -c/w GDMT - bb, arb   -f/u echo   -pulm f/u for hypercapnia, asthma     #Afib s/p PPM  -NSR, paced on tele  -c/w bb, dronedarone, AC  -EP eval for PPM interrogation     #CAD s/p PCI  -cv stable  -rec ASA 81 QD if no CI   -cw bb    #Nausea/Vomitting  -improved  -med f/u     d/w family at bedside     35 minutes spent on total encounter; more than 50% of the visit was spent counseling and/or coordinating care by the attending physician.

## 2022-04-25 LAB
ANION GAP SERPL CALC-SCNC: 16 MMOL/L — SIGNIFICANT CHANGE UP (ref 5–17)
BUN SERPL-MCNC: 32 MG/DL — HIGH (ref 7–23)
C3 SERPL-MCNC: 144 MG/DL — SIGNIFICANT CHANGE UP (ref 81–157)
C4 SERPL-MCNC: 32 MG/DL — SIGNIFICANT CHANGE UP (ref 13–39)
CALCIUM SERPL-MCNC: 9.5 MG/DL — SIGNIFICANT CHANGE UP (ref 8.4–10.5)
CHLORIDE SERPL-SCNC: 90 MMOL/L — LOW (ref 96–108)
CO2 SERPL-SCNC: 28 MMOL/L — SIGNIFICANT CHANGE UP (ref 22–31)
CREAT SERPL-MCNC: 1.27 MG/DL — SIGNIFICANT CHANGE UP (ref 0.5–1.3)
EGFR: 42 ML/MIN/1.73M2 — LOW
GLUCOSE BLDC GLUCOMTR-MCNC: 335 MG/DL — HIGH (ref 70–99)
GLUCOSE BLDC GLUCOMTR-MCNC: 395 MG/DL — HIGH (ref 70–99)
GLUCOSE BLDC GLUCOMTR-MCNC: 404 MG/DL — HIGH (ref 70–99)
GLUCOSE BLDC GLUCOMTR-MCNC: 410 MG/DL — HIGH (ref 70–99)
GLUCOSE BLDC GLUCOMTR-MCNC: 492 MG/DL — CRITICAL HIGH (ref 70–99)
GLUCOSE BLDC GLUCOMTR-MCNC: 500 MG/DL — CRITICAL HIGH (ref 70–99)
GLUCOSE BLDC GLUCOMTR-MCNC: 581 MG/DL — CRITICAL HIGH (ref 70–99)
GLUCOSE BLDC GLUCOMTR-MCNC: >600 MG/DL — CRITICAL HIGH (ref 70–99)
GLUCOSE SERPL-MCNC: 390 MG/DL — HIGH (ref 70–99)
HCT VFR BLD CALC: 31.9 % — LOW (ref 34.5–45)
HGB BLD-MCNC: 10.6 G/DL — LOW (ref 11.5–15.5)
KAPPA LC SER QL IFE: 4.2 MG/DL — HIGH (ref 0.33–1.94)
KAPPA/LAMBDA FREE LIGHT CHAIN RATIO, SERUM: 1.47 RATIO — SIGNIFICANT CHANGE UP (ref 0.26–1.65)
LAMBDA LC SER QL IFE: 2.86 MG/DL — HIGH (ref 0.57–2.63)
MAGNESIUM SERPL-MCNC: 2.1 MG/DL — SIGNIFICANT CHANGE UP (ref 1.6–2.6)
MCHC RBC-ENTMCNC: 28.8 PG — SIGNIFICANT CHANGE UP (ref 27–34)
MCHC RBC-ENTMCNC: 33.2 GM/DL — SIGNIFICANT CHANGE UP (ref 32–36)
MCV RBC AUTO: 86.7 FL — SIGNIFICANT CHANGE UP (ref 80–100)
NRBC # BLD: 0 /100 WBCS — SIGNIFICANT CHANGE UP (ref 0–0)
PLATELET # BLD AUTO: 390 K/UL — SIGNIFICANT CHANGE UP (ref 150–400)
POTASSIUM SERPL-MCNC: 4.7 MMOL/L — SIGNIFICANT CHANGE UP (ref 3.5–5.3)
POTASSIUM SERPL-SCNC: 4.7 MMOL/L — SIGNIFICANT CHANGE UP (ref 3.5–5.3)
PROT SERPL-MCNC: 6 G/DL — SIGNIFICANT CHANGE UP (ref 6–8.3)
PROT SERPL-MCNC: 6 G/DL — SIGNIFICANT CHANGE UP (ref 6–8.3)
RBC # BLD: 3.68 M/UL — LOW (ref 3.8–5.2)
RBC # FLD: 13 % — SIGNIFICANT CHANGE UP (ref 10.3–14.5)
SODIUM SERPL-SCNC: 134 MMOL/L — LOW (ref 135–145)
WBC # BLD: 3.74 K/UL — LOW (ref 3.8–10.5)
WBC # FLD AUTO: 3.74 K/UL — LOW (ref 3.8–10.5)

## 2022-04-25 RX ORDER — INSULIN LISPRO 100/ML
VIAL (ML) SUBCUTANEOUS
Refills: 0 | Status: DISCONTINUED | OUTPATIENT
Start: 2022-04-25 | End: 2022-04-26

## 2022-04-25 RX ORDER — INSULIN LISPRO 100/ML
6 VIAL (ML) SUBCUTANEOUS ONCE
Refills: 0 | Status: COMPLETED | OUTPATIENT
Start: 2022-04-25 | End: 2022-04-25

## 2022-04-25 RX ORDER — INSULIN GLARGINE 100 [IU]/ML
5 INJECTION, SOLUTION SUBCUTANEOUS EVERY MORNING
Refills: 0 | Status: DISCONTINUED | OUTPATIENT
Start: 2022-04-25 | End: 2022-04-26

## 2022-04-25 RX ORDER — INSULIN LISPRO 100/ML
5 VIAL (ML) SUBCUTANEOUS
Refills: 0 | Status: DISCONTINUED | OUTPATIENT
Start: 2022-04-25 | End: 2022-04-26

## 2022-04-25 RX ADMIN — BUDESONIDE AND FORMOTEROL FUMARATE DIHYDRATE 2 PUFF(S): 160; 4.5 AEROSOL RESPIRATORY (INHALATION) at 18:17

## 2022-04-25 RX ADMIN — Medication 2: at 21:50

## 2022-04-25 RX ADMIN — Medication 5 UNIT(S): at 18:18

## 2022-04-25 RX ADMIN — CARVEDILOL PHOSPHATE 12.5 MILLIGRAM(S): 80 CAPSULE, EXTENDED RELEASE ORAL at 18:16

## 2022-04-25 RX ADMIN — Medication 12: at 13:13

## 2022-04-25 RX ADMIN — LOSARTAN POTASSIUM 50 MILLIGRAM(S): 100 TABLET, FILM COATED ORAL at 06:16

## 2022-04-25 RX ADMIN — Medication 20 MILLIGRAM(S): at 06:22

## 2022-04-25 RX ADMIN — DRONEDARONE 400 MILLIGRAM(S): 400 TABLET, FILM COATED ORAL at 18:17

## 2022-04-25 RX ADMIN — Medication 3 MILLILITER(S): at 18:17

## 2022-04-25 RX ADMIN — AMLODIPINE BESYLATE 5 MILLIGRAM(S): 2.5 TABLET ORAL at 06:16

## 2022-04-25 RX ADMIN — CARVEDILOL PHOSPHATE 12.5 MILLIGRAM(S): 80 CAPSULE, EXTENDED RELEASE ORAL at 06:17

## 2022-04-25 RX ADMIN — DRONEDARONE 400 MILLIGRAM(S): 400 TABLET, FILM COATED ORAL at 06:17

## 2022-04-25 RX ADMIN — Medication 175 MICROGRAM(S): at 06:17

## 2022-04-25 RX ADMIN — Medication 6 UNIT(S): at 00:21

## 2022-04-25 RX ADMIN — Medication 3 MILLILITER(S): at 12:45

## 2022-04-25 RX ADMIN — ESCITALOPRAM OXALATE 10 MILLIGRAM(S): 10 TABLET, FILM COATED ORAL at 12:45

## 2022-04-25 RX ADMIN — Medication 40 MILLIGRAM(S): at 06:17

## 2022-04-25 RX ADMIN — Medication 12: at 18:18

## 2022-04-25 RX ADMIN — BUDESONIDE AND FORMOTEROL FUMARATE DIHYDRATE 2 PUFF(S): 160; 4.5 AEROSOL RESPIRATORY (INHALATION) at 06:17

## 2022-04-25 RX ADMIN — Medication 3 MILLILITER(S): at 06:17

## 2022-04-25 RX ADMIN — RIVAROXABAN 15 MILLIGRAM(S): KIT at 18:17

## 2022-04-25 RX ADMIN — INSULIN GLARGINE 5 UNIT(S): 100 INJECTION, SOLUTION SUBCUTANEOUS at 09:54

## 2022-04-25 NOTE — PROGRESS NOTE ADULT - SUBJECTIVE AND OBJECTIVE BOX
CARDIOLOGY FOLLOW UP - Dr. Reed  DATE OF SERVICE: 4/25/22     CC no cp or sob   reports to feel better       REVIEW OF SYSTEMS:  CONSTITUTIONAL: No fever, weight loss, or fatigue  RESPIRATORY: No cough, wheezing, chills or hemoptysis; No Shortness of Breath  CARDIOVASCULAR: No chest pain, palpitations, passing out, dizziness, or leg swelling  GASTROINTESTINAL: No abdominal or epigastric pain. No nausea, vomiting, or hematemesis; No diarrhea or constipation. No melena or hematochezia.  VASCULAR: No edema     PHYSICAL EXAM:  T(C): 36.4 (04-25-22 @ 05:07), Max: 36.7 (04-24-22 @ 12:13)  HR: 67 (04-25-22 @ 09:15) (60 - 68)  BP: 164/68 (04-25-22 @ 06:12) (138/60 - 164/68)  RR: 18 (04-25-22 @ 06:12) (18 - 18)  SpO2: 94% (04-25-22 @ 09:15) (91% - 98%)  Wt(kg): --  I&O's Summary    24 Apr 2022 07:01  -  25 Apr 2022 07:00  --------------------------------------------------------  IN: 730 mL / OUT: 1400 mL / NET: -670 mL        Appearance: Normal	  Cardiovascular: Normal S1 S2,RRR,   Respiratory: Lungs clear to auscultation	  Gastrointestinal:  Soft, Non-tender, + BS	  Extremities: Normal range of motion, No clubbing, cyanosis or edema      Home Medications:  amLODIPine 5 mg oral tablet: 1 tab(s) orally once a day (21 Apr 2022 17:11)  carvedilol 12.5 mg oral tablet: 1 tab(s) orally 2 times a day (21 Apr 2022 17:11)  escitalopram 10 mg oral tablet: 1 tab(s) orally once a day (21 Apr 2022 17:11)  guaiFENesin 100 mg/5 mL oral liquid: 10 milliliter(s) orally every 6 hours, As needed, Cough (21 Apr 2022 17:11)  Janumet 50 mg-500 mg oral tablet: 1 tab(s) orally 2 times a day (21 Apr 2022 17:11)  levothyroxine 175 mcg (0.175 mg) oral tablet: 1 tab(s) orally once a day (21 Apr 2022 17:11)  LORazepam 0.5 mg oral tablet: 1 tab(s) orally once a day, As Needed (21 Apr 2022 17:11)  Multaq 400 mg oral tablet: 1 tab(s) orally 2 times a day (21 Apr 2022 17:11)  repaglinide 1 mg oral tablet: 1 tab(s) orally 3 times a day (before meals) (21 Apr 2022 17:11)  Xarelto 20 mg oral tablet: 1 tab(s) orally once a day (in the evening) (21 Apr 2022 17:11)      MEDICATIONS  (STANDING):  albuterol/ipratropium for Nebulization 3 milliLiter(s) Nebulizer every 6 hours  amLODIPine   Tablet 5 milliGRAM(s) Oral daily  budesonide 160 MICROgram(s)/formoterol 4.5 MICROgram(s) Inhaler 2 Puff(s) Inhalation two times a day  carvedilol 12.5 milliGRAM(s) Oral every 12 hours  dextrose 5%. 1000 milliLiter(s) (100 mL/Hr) IV Continuous <Continuous>  dextrose 5%. 1000 milliLiter(s) (50 mL/Hr) IV Continuous <Continuous>  dextrose 50% Injectable 25 Gram(s) IV Push once  dextrose 50% Injectable 12.5 Gram(s) IV Push once  dextrose 50% Injectable 25 Gram(s) IV Push once  dronedarone 400 milliGRAM(s) Oral two times a day  escitalopram 10 milliGRAM(s) Oral daily  furosemide   Injectable 40 milliGRAM(s) IV Push two times a day  glucagon  Injectable 1 milliGRAM(s) IntraMuscular once  insulin glargine Injectable (LANTUS) 5 Unit(s) SubCutaneous every morning  insulin lispro (ADMELOG) corrective regimen sliding scale   SubCutaneous three times a day before meals  insulin lispro (ADMELOG) corrective regimen sliding scale   SubCutaneous at bedtime  levothyroxine 175 MICROGram(s) Oral daily  losartan 50 milliGRAM(s) Oral daily  methylPREDNISolone sodium succinate Injectable 20 milliGRAM(s) IV Push every 6 hours  rivaroxaban 15 milliGRAM(s) Oral with dinner      TELEMETRY: 	    ECG:  	  RADIOLOGY:   DIAGNOSTIC TESTING:  [ ] Echocardiogram:  < from: TTE with Doppler (w/Cont) (04.22.22 @ 17:45) >  Dimensions:    Normal Values:  LA:     4.0    2.0 - 4.0 cm  Ao:     2.8    2.0 - 3.8 cm  SEPTUM: 1.0    0.6 - 1.2 cm  PWT:    0.9    0.6 - 1.1 cm  LVIDd:  5.5    3.0 - 5.6 cm  LVIDs:  2.9    1.8 - 4.0 cm  Derived variables:  LVMI: 100 g/m2  RWT: 0.35  EF (Visual Estimate): 70 %  Doppler Peak Velocity (m/sec): MV=1.6 AoV=3.2 TV=2.9  ------------------------------------------------------------------------  Observations:  Mitral Valve: Mitral stenosis due to annular calcification.  Mean gradient 5.0 mmHg at  64/min.  Mild mitral regurgitation.  Aortic Valve/Aorta: Mild aortic stenosis:  ---peak velocities davidson the LVOT and aorta 1.4 m/s and 3.2  m/s, respectively. DVT = .47. LVOTd 2.2 cm.  --Aortic valve area by continuity 1.5 cm2.  Normal aortic root size.  Left Atrium: Normal left atrium.  Left Ventricle: Endocardial visualization enhanced with  intravenous injection of Ultrasonic Enhancing Agent  (Lumason).  Normal left ventricular internal dimensions. Severe  discrete basal septal hypertrophy.  Normal left ventricular systolic function. No segmental  wall motion abnormalities.  Right Heart: Normal rightatrium. Normal right ventricular  size and function.  Normal tricuspid valve.  Normal pulmonic valve. Minimal pulmonic regurgitation.  Pericardium/Pleura: Normal pericardium with no pericardial  effusion.  Hemodynamic: Mild pulmonary hypertension.  ------------------------------------------------------------------------  Conclusions:  Endocardial visualization enhanced with intravenous  injection of Ultrasonic Enhancing Agent (Lumason).  Normal left ventricular systolic function. No segmental  wall motion abnormalities.  Mitral stenosis due to annular calcification.  Mild aortic stenosis.  Mild pulmonary hypertension.  ------------------------------------------------------------------------  Confirmed on  4/25/2022 - 09:25:10 by Cordell Young MD, FASE  ------------------------------------------------------------------------    < end of copied text >    [ ]  Catheterization:  [ ] Stress Test:    OTHER: 	    LABS:	 	    Troponin T, High Sensitivity Result: 15 ng/L [0 - 51] (04-21 @ 15:50)                          10.6   3.74  )-----------( 390      ( 25 Apr 2022 07:15 )             31.9     04-25    134<L>  |  90<L>  |  32<H>  ----------------------------<  390<H>  4.7   |  28  |  1.27    Ca    9.5      25 Apr 2022 07:17  Phos  4.1     04-24  Mg     2.1     04-25

## 2022-04-25 NOTE — PROVIDER CONTACT NOTE (OTHER) - ACTION/TREATMENT ORDERED:
ACP Rosa Isela Ching made aware. correction dose 12 units insulin given per order. ACP to review insulin orders. Continue to monitor pt.
KIERSTEN Chavez made aware. Insulin (4 units) given as per sliding scale. Fingerstick to be repeated at [facility]. Will continue to monitor.

## 2022-04-25 NOTE — PROGRESS NOTE ADULT - SUBJECTIVE AND OBJECTIVE BOX
Patient is a 82y old  Female who presents with a chief complaint of shortness of breath and vomiting (25 Apr 2022 11:34)      DATE OF SERVICE: 04-25-22 @ 15:19    SUBJECTIVE / OVERNIGHT EVENTS: overnight events noted    ROS:  Resp: No cough no sputum production  CVS: No chest pain no palpitations no orthopnea  GI: no N/V/D  : no dysuria, no hematuria          MEDICATIONS  (STANDING):  albuterol/ipratropium for Nebulization 3 milliLiter(s) Nebulizer every 6 hours  amLODIPine   Tablet 5 milliGRAM(s) Oral daily  budesonide 160 MICROgram(s)/formoterol 4.5 MICROgram(s) Inhaler 2 Puff(s) Inhalation two times a day  carvedilol 12.5 milliGRAM(s) Oral every 12 hours  dextrose 5%. 1000 milliLiter(s) (50 mL/Hr) IV Continuous <Continuous>  dextrose 5%. 1000 milliLiter(s) (100 mL/Hr) IV Continuous <Continuous>  dextrose 50% Injectable 25 Gram(s) IV Push once  dextrose 50% Injectable 12.5 Gram(s) IV Push once  dextrose 50% Injectable 25 Gram(s) IV Push once  dronedarone 400 milliGRAM(s) Oral two times a day  escitalopram 10 milliGRAM(s) Oral daily  glucagon  Injectable 1 milliGRAM(s) IntraMuscular once  insulin glargine Injectable (LANTUS) 5 Unit(s) SubCutaneous every morning  insulin lispro (ADMELOG) corrective regimen sliding scale   SubCutaneous three times a day before meals  insulin lispro (ADMELOG) corrective regimen sliding scale   SubCutaneous at bedtime  insulin lispro Injectable (ADMELOG) 5 Unit(s) SubCutaneous three times a day before meals  levothyroxine 175 MICROGram(s) Oral daily  losartan 50 milliGRAM(s) Oral daily  rivaroxaban 15 milliGRAM(s) Oral with dinner    MEDICATIONS  (PRN):  ALBUTerol    90 MICROgram(s) HFA Inhaler 2 Puff(s) Inhalation every 6 hours PRN Shortness of Breath and/or Wheezing  dextrose Oral Gel 15 Gram(s) Oral once PRN Blood Glucose LESS THAN 70 milliGRAM(s)/deciliter  guaiFENesin Oral Liquid (Sugar-Free) 200 milliGRAM(s) Oral every 6 hours PRN Cough  LORazepam     Tablet 0.5 milliGRAM(s) Oral daily PRN Anxiety        CAPILLARY BLOOD GLUCOSE      POCT Blood Glucose.: 581 mg/dL (25 Apr 2022 13:08)  POCT Blood Glucose.: >600 mg/dL (25 Apr 2022 13:07)  POCT Blood Glucose.: 395 mg/dL (25 Apr 2022 08:59)  POCT Blood Glucose.: 410 mg/dL (25 Apr 2022 08:58)  POCT Blood Glucose.: 404 mg/dL (25 Apr 2022 03:32)  POCT Blood Glucose.: 473 mg/dL (24 Apr 2022 23:57)  POCT Blood Glucose.: 470 mg/dL (24 Apr 2022 23:56)  POCT Blood Glucose.: 507 mg/dL (24 Apr 2022 22:09)  POCT Blood Glucose.: 491 mg/dL (24 Apr 2022 22:05)  POCT Blood Glucose.: 379 mg/dL (24 Apr 2022 17:31)    I&O's Summary    24 Apr 2022 07:01  -  25 Apr 2022 07:00  --------------------------------------------------------  IN: 730 mL / OUT: 1400 mL / NET: -670 mL    25 Apr 2022 07:01  -  25 Apr 2022 15:19  --------------------------------------------------------  IN: 180 mL / OUT: 250 mL / NET: -70 mL        Vital Signs Last 24 Hrs  T(C): 36.6 (25 Apr 2022 11:48), Max: 36.6 (25 Apr 2022 11:48)  T(F): 97.8 (25 Apr 2022 11:48), Max: 97.8 (25 Apr 2022 11:48)  HR: 63 (25 Apr 2022 11:48) (60 - 68)  BP: 118/69 (25 Apr 2022 11:48) (118/69 - 164/68)  BP(mean): --  RR: 18 (25 Apr 2022 11:48) (18 - 18)  SpO2: 95% (25 Apr 2022 11:48) (91% - 98%)      PHYSICAL EXAM:   HEENT: CECELIA EOMI  Neck: Supple, no JVD  Lungs: no wheeze, no crackles  CVS: S1 S2 no M/R/G  Abdomen: no tenderness  Neuro: AO x 3 nonfocal  Psych: appropriate affect  Skin: warm, dry  Ext: improved  edema    LABS:                        10.6   3.74  )-----------( 390      ( 25 Apr 2022 07:15 )             31.9     04-25    134<L>  |  90<L>  |  32<H>  ----------------------------<  390<H>  4.7   |  28  |  1.27    Ca    9.5      25 Apr 2022 07:17  Phos  4.1     04-24  Mg     2.1     04-25                  All consultant(s) notes reviewed and care discussed with other providers        Contact Number, Dr Yates [phone]

## 2022-04-25 NOTE — PROGRESS NOTE ADULT - ASSESSMENT
Echo 3/21/21: EF 66%, mild ms, nl lv sys fx, Grade I diastolic dysfx  Echo 4/22/22: ef 70%;  mild AS, Severe  discrete basal septal hypertrophy. Normal left ventricular systolic function. No segmental wall motion abnormalities. mild MS, Mild pulm HTN     a/p  83yoF, w/ PMH of HTN, DM, hypothyroidism, asthma, afib (s.p PPM) , CAD s/p PCI, CHF, , presents with shortness of breath and vomiting    #Acute on Chronic HFpEF  -clinically improved  -on ivp BID  lasix , change to po diuretics on d/c  -likely will d/c home on torsemide 20mg daily   -c/w GDMT - bb, arb   -echo with ef 70%;  mild AS, Severe  discrete basal septal hypertrophy. Normal left ventricular systolic function. No segmental wall motion abnormalities.  -pulm f/u for hypercapnia, asthma     #Afib s/p PPM  -NSR, paced on tele cv stable   -c/w bb, dronedarone, AC  -EP eval for PPM interrogation     #CAD s/p PCI  -cv stable  -rec ASA 81 QD if no CI   -cw bb    #Nausea/Vomitting  -improved  -med f/u     d/w family at bedside

## 2022-04-25 NOTE — PROVIDER CONTACT NOTE (OTHER) - BACKGROUND
Patient admitted for heart failure, pmh diabetes, htn
Patient with admitting diagnosis of heart failure on IV lasix. History of CAD, HTN, DM, Afib.

## 2022-04-25 NOTE — PROGRESS NOTE ADULT - SUBJECTIVE AND OBJECTIVE BOX
Norman Yates MD (Nephrology progress note)  [address], [facility],  [address],  [place] [id number]  TEl: [phone]  Cell: [phone]    Patient is a 82y Female seen and evaluated at bedside. Vital signs, laboratory data, imaging studies, consult notes reviewed done within past 24 hours. Overnight events noted. Patient sitting in chair feels better. Denies any chest pain, SOB. Interval stable renal function with non oliguria.     Allergies    No Known Allergies    Intolerances        ROS:  CONSTITUTIONAL: No fever or chills.  HEENT: No headache or visual disturbances.  RESPIRATORY: No shortness of breath, cough, hemoptysis or wheezing  CARDIOVASCULAR: No Chest pain or SOB  GASTROINTESTINAL: No abdominal pain, nausea, vomiting, diarrhea, hematemesis or blood per rectum.  GENITOURINARY: No flank or supra pubic pain  NEUROLOGICAL: No headache, focal limb weakness, tingling or numbness   SKIN: No skin rash or lesion  MUSCULOSKELETAL: Bilateral leg edema    VITALS:    T(C): 36.4 (04-25-22 @ 05:07), Max: 36.7 (04-24-22 @ 12:13)  HR: 66 (04-25-22 @ 06:56) (60 - 68)  BP: 164/68 (04-25-22 @ 06:12) (138/60 - 164/68)  RR: 18 (04-25-22 @ 06:12) (18 - 18)  SpO2: 95% (04-25-22 @ 06:56) (91% - 98%)  CAPILLARY BLOOD GLUCOSE      POCT Blood Glucose.: 395 mg/dL (25 Apr 2022 08:59)  POCT Blood Glucose.: 410 mg/dL (25 Apr 2022 08:58)  POCT Blood Glucose.: 404 mg/dL (25 Apr 2022 03:32)  POCT Blood Glucose.: 473 mg/dL (24 Apr 2022 23:57)  POCT Blood Glucose.: 470 mg/dL (24 Apr 2022 23:56)  POCT Blood Glucose.: 507 mg/dL (24 Apr 2022 22:09)  POCT Blood Glucose.: 491 mg/dL (24 Apr 2022 22:05)  POCT Blood Glucose.: 379 mg/dL (24 Apr 2022 17:31)  POCT Blood Glucose.: 309 mg/dL (24 Apr 2022 12:55)      04-24-22 @ 07:01  -  04-25-22 @ 07:00  --------------------------------------------------------  IN: 730 mL / OUT: 1400 mL / NET: -670 mL      MEDICATIONS  (STANDING):  albuterol/ipratropium for Nebulization 3 milliLiter(s) Nebulizer every 6 hours  amLODIPine   Tablet 5 milliGRAM(s) Oral daily  budesonide 160 MICROgram(s)/formoterol 4.5 MICROgram(s) Inhaler 2 Puff(s) Inhalation two times a day  carvedilol 12.5 milliGRAM(s) Oral every 12 hours  dextrose 5%. 1000 milliLiter(s) (100 mL/Hr) IV Continuous <Continuous>  dextrose 5%. 1000 milliLiter(s) (50 mL/Hr) IV Continuous <Continuous>  dextrose 50% Injectable 25 Gram(s) IV Push once  dextrose 50% Injectable 12.5 Gram(s) IV Push once  dextrose 50% Injectable 25 Gram(s) IV Push once  dronedarone 400 milliGRAM(s) Oral two times a day  escitalopram 10 milliGRAM(s) Oral daily  furosemide   Injectable 40 milliGRAM(s) IV Push two times a day  glucagon  Injectable 1 milliGRAM(s) IntraMuscular once  insulin glargine Injectable (LANTUS) 5 Unit(s) SubCutaneous every morning  insulin lispro (ADMELOG) corrective regimen sliding scale   SubCutaneous three times a day before meals  insulin lispro (ADMELOG) corrective regimen sliding scale   SubCutaneous at bedtime  levothyroxine 175 MICROGram(s) Oral daily  losartan 50 milliGRAM(s) Oral daily  methylPREDNISolone sodium succinate Injectable 20 milliGRAM(s) IV Push every 6 hours  rivaroxaban 15 milliGRAM(s) Oral with dinner    MEDICATIONS  (PRN):  ALBUTerol    90 MICROgram(s) HFA Inhaler 2 Puff(s) Inhalation every 6 hours PRN Shortness of Breath and/or Wheezing  dextrose Oral Gel 15 Gram(s) Oral once PRN Blood Glucose LESS THAN 70 milliGRAM(s)/deciliter  guaiFENesin Oral Liquid (Sugar-Free) 200 milliGRAM(s) Oral every 6 hours PRN Cough  LORazepam     Tablet 0.5 milliGRAM(s) Oral daily PRN Anxiety      PHYSICAL EXAM:  GENERAL: Alert, awake and oriented x3 in no distress  HEENT: CECELIA, EOMI, neck supple, no JVP, no carotid bruit, oral mucosa moist and pink.  CHEST/LUNG: Bilateral clear breath sounds, no rales, no crepitations, no rhonchi, no wheezing  HEART: Regular rate and rhythm, VINH II/VI at LPSB, no gallops, no rub   ABDOMEN: Soft, nontender, non distended, bowel sounds present, no palpable organomegaly  : No flank or supra pubic tenderness.  EXTREMITIES: Bilateral pitting pedal edema noted  Neurology: AAOx3, no focal neurological deficit  SKIN: No rash or skin lesion  Musculoskeletal: No joint deformity    Vascular ACCESS:     LABS:                        10.6   3.74  )-----------( 390      ( 25 Apr 2022 07:15 )             31.9     04-25    134<L>  |  90<L>  |  32<H>  ----------------------------<  390<H>  4.7   |  28  |  1.27    Ca    9.5      25 Apr 2022 07:17  Phos  4.1     04-24  Mg     2.1     04-25                  RADIOLOGY & ADDITIONAL STUDIES:  None  Imaging Personally Reviewed:  [x] YES  [ ] NO    Consultant(s) Notes Reviewed:  [x] YES  [ ] NO    Care Discussed with Primary team/ Other Providers [x] YES  [ ] NO       Norman Yates MD (Nephrology progress note)  [address], [facility],  [address],  [facility]  TEl: [phone]  Cell: [phone]    Patient is a 82y Female seen and evaluated at bedside. Vital signs, laboratory data, imaging studies, consult notes reviewed done within past 24 hours. Overnight events noted. Patient sitting in chair feels better. Denies any chest pain, SOB. Interval stable renal function with non oliguria. Patient with elevated blood glucose today morning ranging from 490    Allergies    No Known Allergies    Intolerances        ROS:  CONSTITUTIONAL: No fever or chills.  HEENT: No headache or visual disturbances.  RESPIRATORY: No shortness of breath, cough, hemoptysis or wheezing  CARDIOVASCULAR: No Chest pain or SOB  GASTROINTESTINAL: No abdominal pain, nausea, vomiting, diarrhea, hematemesis or blood per rectum.  GENITOURINARY: No flank or supra pubic pain  NEUROLOGICAL: No headache, focal limb weakness, tingling or numbness   SKIN: No skin rash or lesion  MUSCULOSKELETAL: Bilateral leg edema    VITALS:    T(C): 36.4 (04-25-22 @ 05:07), Max: 36.7 (04-24-22 @ 12:13)  HR: 66 (04-25-22 @ 06:56) (60 - 68)  BP: 164/68 (04-25-22 @ 06:12) (138/60 - 164/68)  RR: 18 (04-25-22 @ 06:12) (18 - 18)  SpO2: 95% (04-25-22 @ 06:56) (91% - 98%)  CAPILLARY BLOOD GLUCOSE      POCT Blood Glucose.: 395 mg/dL (25 Apr 2022 08:59)  POCT Blood Glucose.: 410 mg/dL (25 Apr 2022 08:58)  POCT Blood Glucose.: 404 mg/dL (25 Apr 2022 03:32)  POCT Blood Glucose.: 473 mg/dL (24 Apr 2022 23:57)  POCT Blood Glucose.: 470 mg/dL (24 Apr 2022 23:56)  POCT Blood Glucose.: 507 mg/dL (24 Apr 2022 22:09)  POCT Blood Glucose.: 491 mg/dL (24 Apr 2022 22:05)  POCT Blood Glucose.: 379 mg/dL (24 Apr 2022 17:31)  POCT Blood Glucose.: 309 mg/dL (24 Apr 2022 12:55)      04-24-22 @ 07:01  -  04-25-22 @ 07:00  --------------------------------------------------------  IN: 730 mL / OUT: 1400 mL / NET: -670 mL      MEDICATIONS  (STANDING):  albuterol/ipratropium for Nebulization 3 milliLiter(s) Nebulizer every 6 hours  amLODIPine   Tablet 5 milliGRAM(s) Oral daily  budesonide 160 MICROgram(s)/formoterol 4.5 MICROgram(s) Inhaler 2 Puff(s) Inhalation two times a day  carvedilol 12.5 milliGRAM(s) Oral every 12 hours  dextrose 5%. 1000 milliLiter(s) (100 mL/Hr) IV Continuous <Continuous>  dextrose 5%. 1000 milliLiter(s) (50 mL/Hr) IV Continuous <Continuous>  dextrose 50% Injectable 25 Gram(s) IV Push once  dextrose 50% Injectable 12.5 Gram(s) IV Push once  dextrose 50% Injectable 25 Gram(s) IV Push once  dronedarone 400 milliGRAM(s) Oral two times a day  escitalopram 10 milliGRAM(s) Oral daily  furosemide   Injectable 40 milliGRAM(s) IV Push two times a day  glucagon  Injectable 1 milliGRAM(s) IntraMuscular once  insulin glargine Injectable (LANTUS) 5 Unit(s) SubCutaneous every morning  insulin lispro (ADMELOG) corrective regimen sliding scale   SubCutaneous three times a day before meals  insulin lispro (ADMELOG) corrective regimen sliding scale   SubCutaneous at bedtime  levothyroxine 175 MICROGram(s) Oral daily  losartan 50 milliGRAM(s) Oral daily  methylPREDNISolone sodium succinate Injectable 20 milliGRAM(s) IV Push every 6 hours  rivaroxaban 15 milliGRAM(s) Oral with dinner    MEDICATIONS  (PRN):  ALBUTerol    90 MICROgram(s) HFA Inhaler 2 Puff(s) Inhalation every 6 hours PRN Shortness of Breath and/or Wheezing  dextrose Oral Gel 15 Gram(s) Oral once PRN Blood Glucose LESS THAN 70 milliGRAM(s)/deciliter  guaiFENesin Oral Liquid (Sugar-Free) 200 milliGRAM(s) Oral every 6 hours PRN Cough  LORazepam     Tablet 0.5 milliGRAM(s) Oral daily PRN Anxiety      PHYSICAL EXAM:  GENERAL: Alert, awake and oriented x3 in no distress  HEENT: CECELIA, EOMI, neck supple, no JVP, no carotid bruit, oral mucosa moist and pink.  CHEST/LUNG: Bilateral clear breath sounds, no rales, no crepitations, no rhonchi, no wheezing  HEART: Regular rate and rhythm, VINH II/VI at LPSB, no gallops, no rub   ABDOMEN: Soft, nontender, non distended, bowel sounds present, no palpable organomegaly  : No flank or supra pubic tenderness.  EXTREMITIES: Bilateral pitting pedal edema noted  Neurology: AAOx3, no focal neurological deficit  SKIN: No rash or skin lesion  Musculoskeletal: No joint deformity    Vascular ACCESS:     LABS:                        10.6   3.74  )-----------( 390      ( 25 Apr 2022 07:15 )             31.9     04-25    134<L>  |  90<L>  |  32<H>  ----------------------------<  390<H>  4.7   |  28  |  1.27    Ca    9.5      25 Apr 2022 07:17  Phos  4.1     04-24  Mg     2.1     04-25                  RADIOLOGY & ADDITIONAL STUDIES:  None  Imaging Personally Reviewed:  [x] YES  [ ] NO    Consultant(s) Notes Reviewed:  [x] YES  [ ] NO    Care Discussed with Primary team/ Other Providers [x] YES  [ ] NO

## 2022-04-25 NOTE — PROGRESS NOTE ADULT - ASSESSMENT
83yoF, w/ PMH of HTN, DM, hypothyroidism, asthma, afib, CAD, CHF (s/p pacemaker), presents with shortness of breath and vomiting. PT was recently discharged from  after experiencing similar symptoms. PT's daughter states she was discharged 10d ago. Nephrology consult called for SRIDHAR/CKD/Fluid overload      Assessment:  1) Non oliguric stable CKD stage III likely chronic hypertensive/diabetic nephropathy with e GFR 40-50 ml/min  2) Acute fluid overload due to acute on chronic diastolic CHF with valvular heart disease with AS/MR/Cardio-renal disease  3) Anemia of chronic illness   4) Renal osteodystrophy  5) Hypothyroidism  6) Morbid obesity  7) Electrolytes disorder with hyponatremia/Hyperkalemia now resolved  8) Right renal cyst    Plan of care:  Strict I/o  Avoid Nephrotoxic agents  Urinalysis with 1+protein, RBCs  Urine PCR 0.1 gm/gm  Continue IV Lasix with goal fluid balance net negative  Bilateral renal and bladder ultrasound results reviewed with right renal cyst  Anemia work up and renal/CKD work up reviewed  (HIV, Hepatitis, RPR negative, Further workup in progress)  Intact PTH, Vitamin D 1,25 level, phos, mag level reviewed.  Optimal BP control with po medications  Replete electrolytes with goal K>4 and <5, Mag>2, Phos 2.5 to 3.5  Monitor BMP and electrolytes daily  Await Echocardiogram to assess LVEF/valvular heart disease  Further plan per primary/cardiology team  Will follow   83yoF, w/ PMH of HTN, DM, hypothyroidism, asthma, afib, CAD, CHF (s/p pacemaker), presents with shortness of breath and vomiting. PT was recently discharged from  after experiencing similar symptoms. PT's daughter states she was discharged 10d ago. Nephrology consult called for SRIDHAR/CKD/Fluid overload      Assessment:  1) Non oliguric stable CKD stage III likely chronic hypertensive/diabetic nephropathy with e GFR 40-50 ml/min  2) Acute fluid overload due to acute on chronic diastolic CHF with valvular heart disease with AS/MR/Cardio-renal disease  3) Anemia of chronic illness   4) Renal osteodystrophy  5) Hypothyroidism  6) Morbid obesity  7) Electrolytes disorder with hyponatremia/Hyperkalemia now resolved  8) Right renal cyst    Plan of care:  Strict I/o  Avoid Nephrotoxic agents  Urinalysis with 1+protein, RBCs  Urine PCR 0.1 gm/gm  Continue IV Lasix with goal fluid balance net negative  Bilateral renal and bladder ultrasound results reviewed with right renal cyst  Anemia work up and renal/CKD work up reviewed  (HIV, Hepatitis, RPR negative, Further workup in progress)  Intact PTH, Vitamin D 1,25 level, phos, mag level reviewed.  Optimal BP control with po medications  Optimal DM control with insulin therapy  Replete electrolytes with goal K>4 and <5, Mag>2, Phos 2.5 to 3.5  Monitor BMP and electrolytes daily  Await Echocardiogram to assess LVEF/valvular heart disease  Further plan per primary/cardiology team  Will follow

## 2022-04-26 ENCOUNTER — TRANSCRIPTION ENCOUNTER (OUTPATIENT)
Age: 82
End: 2022-04-26

## 2022-04-26 VITALS — OXYGEN SATURATION: 96 % | RESPIRATION RATE: 20 BRPM | HEART RATE: 72 BPM

## 2022-04-26 DIAGNOSIS — I10 ESSENTIAL (PRIMARY) HYPERTENSION: ICD-10-CM

## 2022-04-26 DIAGNOSIS — E66.9 OBESITY, UNSPECIFIED: ICD-10-CM

## 2022-04-26 LAB
ANION GAP SERPL CALC-SCNC: 12 MMOL/L — SIGNIFICANT CHANGE UP (ref 5–17)
BUN SERPL-MCNC: 39 MG/DL — HIGH (ref 7–23)
CALCIUM SERPL-MCNC: 9.3 MG/DL — SIGNIFICANT CHANGE UP (ref 8.4–10.5)
CHLORIDE SERPL-SCNC: 90 MMOL/L — LOW (ref 96–108)
CO2 SERPL-SCNC: 31 MMOL/L — SIGNIFICANT CHANGE UP (ref 22–31)
CREAT SERPL-MCNC: 1.33 MG/DL — HIGH (ref 0.5–1.3)
EGFR: 40 ML/MIN/1.73M2 — LOW
GLUCOSE BLDC GLUCOMTR-MCNC: 169 MG/DL — HIGH (ref 70–99)
GLUCOSE BLDC GLUCOMTR-MCNC: 241 MG/DL — HIGH (ref 70–99)
GLUCOSE BLDC GLUCOMTR-MCNC: 282 MG/DL — HIGH (ref 70–99)
GLUCOSE BLDC GLUCOMTR-MCNC: 305 MG/DL — HIGH (ref 70–99)
GLUCOSE SERPL-MCNC: 253 MG/DL — HIGH (ref 70–99)
MAGNESIUM SERPL-MCNC: 2.2 MG/DL — SIGNIFICANT CHANGE UP (ref 1.6–2.6)
POTASSIUM SERPL-MCNC: 4.4 MMOL/L — SIGNIFICANT CHANGE UP (ref 3.5–5.3)
POTASSIUM SERPL-SCNC: 4.4 MMOL/L — SIGNIFICANT CHANGE UP (ref 3.5–5.3)
SODIUM SERPL-SCNC: 133 MMOL/L — LOW (ref 135–145)

## 2022-04-26 PROCEDURE — 82570 ASSAY OF URINE CREATININE: CPT

## 2022-04-26 PROCEDURE — 81001 URINALYSIS AUTO W/SCOPE: CPT

## 2022-04-26 PROCEDURE — 85025 COMPLETE CBC W/AUTO DIFF WBC: CPT

## 2022-04-26 PROCEDURE — 82310 ASSAY OF CALCIUM: CPT

## 2022-04-26 PROCEDURE — 96374 THER/PROPH/DIAG INJ IV PUSH: CPT

## 2022-04-26 PROCEDURE — U0003: CPT

## 2022-04-26 PROCEDURE — 93005 ELECTROCARDIOGRAM TRACING: CPT

## 2022-04-26 PROCEDURE — 83935 ASSAY OF URINE OSMOLALITY: CPT

## 2022-04-26 PROCEDURE — 84156 ASSAY OF PROTEIN URINE: CPT

## 2022-04-26 PROCEDURE — 86160 COMPLEMENT ANTIGEN: CPT

## 2022-04-26 PROCEDURE — 93306 TTE W/DOPPLER COMPLETE: CPT

## 2022-04-26 PROCEDURE — 83735 ASSAY OF MAGNESIUM: CPT

## 2022-04-26 PROCEDURE — 84100 ASSAY OF PHOSPHORUS: CPT

## 2022-04-26 PROCEDURE — 82652 VIT D 1 25-DIHYDROXY: CPT

## 2022-04-26 PROCEDURE — 83521 IG LIGHT CHAINS FREE EACH: CPT

## 2022-04-26 PROCEDURE — 83550 IRON BINDING TEST: CPT

## 2022-04-26 PROCEDURE — 87086 URINE CULTURE/COLONY COUNT: CPT

## 2022-04-26 PROCEDURE — 85027 COMPLETE CBC AUTOMATED: CPT

## 2022-04-26 PROCEDURE — 36415 COLL VENOUS BLD VENIPUNCTURE: CPT

## 2022-04-26 PROCEDURE — U0005: CPT

## 2022-04-26 PROCEDURE — 83970 ASSAY OF PARATHORMONE: CPT

## 2022-04-26 PROCEDURE — 84132 ASSAY OF SERUM POTASSIUM: CPT

## 2022-04-26 PROCEDURE — 86036 ANCA SCREEN EACH ANTIBODY: CPT

## 2022-04-26 PROCEDURE — 76770 US EXAM ABDO BACK WALL COMP: CPT

## 2022-04-26 PROCEDURE — 87389 HIV-1 AG W/HIV-1&-2 AB AG IA: CPT

## 2022-04-26 PROCEDURE — 86334 IMMUNOFIX E-PHORESIS SERUM: CPT

## 2022-04-26 PROCEDURE — 84484 ASSAY OF TROPONIN QUANT: CPT

## 2022-04-26 PROCEDURE — 83540 ASSAY OF IRON: CPT

## 2022-04-26 PROCEDURE — 86038 ANTINUCLEAR ANTIBODIES: CPT

## 2022-04-26 PROCEDURE — 84443 ASSAY THYROID STIM HORMONE: CPT

## 2022-04-26 PROCEDURE — 80053 COMPREHEN METABOLIC PANEL: CPT

## 2022-04-26 PROCEDURE — 82728 ASSAY OF FERRITIN: CPT

## 2022-04-26 PROCEDURE — 82962 GLUCOSE BLOOD TEST: CPT

## 2022-04-26 PROCEDURE — 94640 AIRWAY INHALATION TREATMENT: CPT

## 2022-04-26 PROCEDURE — 97162 PT EVAL MOD COMPLEX 30 MIN: CPT

## 2022-04-26 PROCEDURE — 94660 CPAP INITIATION&MGMT: CPT

## 2022-04-26 PROCEDURE — 84300 ASSAY OF URINE SODIUM: CPT

## 2022-04-26 PROCEDURE — 93280 PM DEVICE PROGR EVAL DUAL: CPT | Mod: 26

## 2022-04-26 PROCEDURE — 83880 ASSAY OF NATRIURETIC PEPTIDE: CPT

## 2022-04-26 PROCEDURE — 80074 ACUTE HEPATITIS PANEL: CPT

## 2022-04-26 PROCEDURE — 99291 CRITICAL CARE FIRST HOUR: CPT | Mod: 25

## 2022-04-26 PROCEDURE — 84155 ASSAY OF PROTEIN SERUM: CPT

## 2022-04-26 PROCEDURE — 86780 TREPONEMA PALLIDUM: CPT

## 2022-04-26 PROCEDURE — 84540 ASSAY OF URINE/UREA-N: CPT

## 2022-04-26 PROCEDURE — 83516 IMMUNOASSAY NONANTIBODY: CPT

## 2022-04-26 PROCEDURE — 80048 BASIC METABOLIC PNL TOTAL CA: CPT

## 2022-04-26 PROCEDURE — 82803 BLOOD GASES ANY COMBINATION: CPT

## 2022-04-26 PROCEDURE — 71045 X-RAY EXAM CHEST 1 VIEW: CPT

## 2022-04-26 PROCEDURE — 84165 PROTEIN E-PHORESIS SERUM: CPT

## 2022-04-26 RX ORDER — ASPIRIN/CALCIUM CARB/MAGNESIUM 324 MG
1 TABLET ORAL
Qty: 0 | Refills: 0 | DISCHARGE
Start: 2022-04-26

## 2022-04-26 RX ORDER — INSULIN GLARGINE 100 [IU]/ML
18 INJECTION, SOLUTION SUBCUTANEOUS EVERY MORNING
Refills: 0 | Status: DISCONTINUED | OUTPATIENT
Start: 2022-04-26 | End: 2022-04-26

## 2022-04-26 RX ORDER — INSULIN LISPRO 100/ML
7 VIAL (ML) SUBCUTANEOUS
Refills: 0 | Status: DISCONTINUED | OUTPATIENT
Start: 2022-04-26 | End: 2022-04-26

## 2022-04-26 RX ORDER — ASPIRIN/CALCIUM CARB/MAGNESIUM 324 MG
81 TABLET ORAL DAILY
Refills: 0 | Status: DISCONTINUED | OUTPATIENT
Start: 2022-04-26 | End: 2022-04-26

## 2022-04-26 RX ORDER — LOSARTAN POTASSIUM 100 MG/1
1 TABLET, FILM COATED ORAL
Qty: 0 | Refills: 0 | DISCHARGE
Start: 2022-04-26

## 2022-04-26 RX ADMIN — BUDESONIDE AND FORMOTEROL FUMARATE DIHYDRATE 2 PUFF(S): 160; 4.5 AEROSOL RESPIRATORY (INHALATION) at 05:45

## 2022-04-26 RX ADMIN — BUDESONIDE AND FORMOTEROL FUMARATE DIHYDRATE 2 PUFF(S): 160; 4.5 AEROSOL RESPIRATORY (INHALATION) at 17:45

## 2022-04-26 RX ADMIN — Medication 20 MILLIGRAM(S): at 05:41

## 2022-04-26 RX ADMIN — CARVEDILOL PHOSPHATE 12.5 MILLIGRAM(S): 80 CAPSULE, EXTENDED RELEASE ORAL at 17:45

## 2022-04-26 RX ADMIN — LOSARTAN POTASSIUM 50 MILLIGRAM(S): 100 TABLET, FILM COATED ORAL at 05:41

## 2022-04-26 RX ADMIN — INSULIN GLARGINE 18 UNIT(S): 100 INJECTION, SOLUTION SUBCUTANEOUS at 10:11

## 2022-04-26 RX ADMIN — DRONEDARONE 400 MILLIGRAM(S): 400 TABLET, FILM COATED ORAL at 17:45

## 2022-04-26 RX ADMIN — Medication 2: at 17:44

## 2022-04-26 RX ADMIN — Medication 7 UNIT(S): at 17:46

## 2022-04-26 RX ADMIN — DRONEDARONE 400 MILLIGRAM(S): 400 TABLET, FILM COATED ORAL at 05:41

## 2022-04-26 RX ADMIN — CARVEDILOL PHOSPHATE 12.5 MILLIGRAM(S): 80 CAPSULE, EXTENDED RELEASE ORAL at 05:41

## 2022-04-26 RX ADMIN — Medication 3 MILLILITER(S): at 12:28

## 2022-04-26 RX ADMIN — Medication 7 UNIT(S): at 14:07

## 2022-04-26 RX ADMIN — Medication 81 MILLIGRAM(S): at 12:29

## 2022-04-26 RX ADMIN — Medication 6: at 09:37

## 2022-04-26 RX ADMIN — Medication 3 MILLILITER(S): at 00:56

## 2022-04-26 RX ADMIN — Medication 3 MILLILITER(S): at 05:38

## 2022-04-26 RX ADMIN — RIVAROXABAN 15 MILLIGRAM(S): KIT at 17:45

## 2022-04-26 RX ADMIN — Medication 8: at 13:03

## 2022-04-26 RX ADMIN — Medication 3 MILLILITER(S): at 17:44

## 2022-04-26 RX ADMIN — AMLODIPINE BESYLATE 5 MILLIGRAM(S): 2.5 TABLET ORAL at 05:41

## 2022-04-26 RX ADMIN — Medication 175 MICROGRAM(S): at 05:41

## 2022-04-26 RX ADMIN — ESCITALOPRAM OXALATE 10 MILLIGRAM(S): 10 TABLET, FILM COATED ORAL at 12:27

## 2022-04-26 NOTE — PROGRESS NOTE ADULT - SUBJECTIVE AND OBJECTIVE BOX
Norman Yates MD (Nephrology progress note)  [address], [facility],  [address],  [facility]  TEl: [phone]  Cell: [phone]    Patient is a 82y Female seen and evaluated at bedside. Vital signs, laboratory data, imaging studies, consult notes reviewed done within past 24 hours. Overnight events noted. Patient lying in bed in no distress feels better. Interval elevated blood glucose ranging from 300 to 600 mg/dl in past 24 hours. S cr 1.3 with non oliguria.     Allergies    No Known Allergies    Intolerances        ROS:  Limited  Denies any chest pain, SOB    VITALS:    T(C): 36.5 (04-26-22 @ 11:44), Max: 37.2 (04-25-22 @ 21:23)  HR: 69 (04-26-22 @ 11:44) (57 - 71)  BP: 102/61 (04-26-22 @ 11:44) (102/61 - 157/72)  RR: 19 (04-26-22 @ 11:44) (16 - 20)  SpO2: 97% (04-26-22 @ 11:44) (94% - 99%)  CAPILLARY BLOOD GLUCOSE      POCT Blood Glucose.: 305 mg/dL (26 Apr 2022 12:55)  POCT Blood Glucose.: 282 mg/dL (26 Apr 2022 09:00)  POCT Blood Glucose.: 335 mg/dL (25 Apr 2022 21:44)  POCT Blood Glucose.: 492 mg/dL (25 Apr 2022 17:48)  POCT Blood Glucose.: 500 mg/dL (25 Apr 2022 17:45)      04-25-22 @ 07:01  -  04-26-22 @ 07:00  --------------------------------------------------------  IN: 950 mL / OUT: 750 mL / NET: 200 mL    04-26-22 @ 07:01  -  04-26-22 @ 14:23  --------------------------------------------------------  IN: 240 mL / OUT: 600 mL / NET: -360 mL      MEDICATIONS  (STANDING):  albuterol/ipratropium for Nebulization 3 milliLiter(s) Nebulizer every 6 hours  amLODIPine   Tablet 5 milliGRAM(s) Oral daily  aspirin enteric coated 81 milliGRAM(s) Oral daily  budesonide 160 MICROgram(s)/formoterol 4.5 MICROgram(s) Inhaler 2 Puff(s) Inhalation two times a day  carvedilol 12.5 milliGRAM(s) Oral every 12 hours  dextrose 5%. 1000 milliLiter(s) (50 mL/Hr) IV Continuous <Continuous>  dextrose 5%. 1000 milliLiter(s) (100 mL/Hr) IV Continuous <Continuous>  dextrose 50% Injectable 25 Gram(s) IV Push once  dextrose 50% Injectable 12.5 Gram(s) IV Push once  dextrose 50% Injectable 25 Gram(s) IV Push once  dronedarone 400 milliGRAM(s) Oral two times a day  escitalopram 10 milliGRAM(s) Oral daily  glucagon  Injectable 1 milliGRAM(s) IntraMuscular once  insulin glargine Injectable (LANTUS) 18 Unit(s) SubCutaneous every morning  insulin lispro (ADMELOG) corrective regimen sliding scale   SubCutaneous three times a day before meals  insulin lispro (ADMELOG) corrective regimen sliding scale   SubCutaneous at bedtime  insulin lispro Injectable (ADMELOG) 7 Unit(s) SubCutaneous three times a day before meals  levothyroxine 175 MICROGram(s) Oral daily  losartan 50 milliGRAM(s) Oral daily  rivaroxaban 15 milliGRAM(s) Oral with dinner  torsemide 20 milliGRAM(s) Oral daily    MEDICATIONS  (PRN):  ALBUTerol    90 MICROgram(s) HFA Inhaler 2 Puff(s) Inhalation every 6 hours PRN Shortness of Breath and/or Wheezing  dextrose Oral Gel 15 Gram(s) Oral once PRN Blood Glucose LESS THAN 70 milliGRAM(s)/deciliter  guaiFENesin Oral Liquid (Sugar-Free) 200 milliGRAM(s) Oral every 6 hours PRN Cough  LORazepam     Tablet 0.5 milliGRAM(s) Oral daily PRN Anxiety      PHYSICAL EXAM:  GENERAL: Alert, awake and oriented x2-3 in no distress  HEENT: CECELIA, EOMI, neck supple, no JVP.  CHEST/LUNG: Bilateral clear breath sounds, no rales, no crepitations, no rhonchi, no wheezing  HEART: Regular rate and rhythm, VINH II/VI at LPSB, no gallops, no rub   ABDOMEN: Soft, nontender, non distended, bowel sounds present, no palpable organomegaly  : No flank or supra pubic tenderness.  EXTREMITIES: Bilateral chronic leg edema with chronic venous stasis dermatitis  Neurology: AAOx2-3, no focal neurological deficit  SKIN: No rash or skin lesion  Musculoskeletal: No joint deformity     Vascular ACCESS: None    LABS:                        10.6   3.74  )-----------( 390      ( 25 Apr 2022 07:15 )             31.9     04-26    133<L>  |  90<L>  |  39<H>  ----------------------------<  253<H>  4.4   |  31  |  1.33<H>    Ca    9.3      26 Apr 2022 07:17  Mg     2.2     04-26                  RADIOLOGY & ADDITIONAL STUDIES:  < from: TTE with Doppler (w/Cont) (04.22.22 @ 17:45) >    Patient name: KWAME MOSQUERA  YOB: 1940   Age: 82 (F)   MR#: [id number]  Study Date: 4/22/2022  [facility]  Study quality: Difficult; Patient scanned sup  Referring Physician: Rony Yates MD  Blood Pressure: 119/46 mmHg  Height: 152 cm  Weight: 120 kg  BSA: 2.1 m2  ------------------------------------------------------------------------  PROCEDURE: Transthoracic echocardiogram with 2-D, M-Mode  and complete spectral and color flow Doppler.  INDICATION: Cardiomyopathy, unspecified (I42.9)  ------------------------------------------------------------------------  Dimensions:    Normal Values:  LA:     4.0    2.0 - 4.0 cm  Ao:     2.8    2.0 - 3.8 cm  SEPTUM: 1.0    0.6 - 1.2 cm  PWT:    0.9    0.6 - 1.1 cm  LVIDd:  5.5    3.0 - 5.6 cm  LVIDs:  2.9    1.8 - 4.0 cm  Derived variables:  LVMI: 100 g/m2  RWT: 0.35  EF (Visual Estimate): 70 %  Doppler Peak Velocity (m/sec): MV=1.6 AoV=3.2 TV=2.9  ------------------------------------------------------------------------  Observations:  Mitral Valve: Mitral stenosis due to annular calcification.  Mean gradient 5.0 mmHg at  64/min.  Mild mitral regurgitation.  Aortic Valve/Aorta: Mild aortic stenosis:  ---peak velocities davidson the LVOT and aorta 1.4 m/s and 3.2  m/s, respectively. DVT = .47. LVOTd 2.2 cm.  --Aortic valve area by continuity 1.5 cm2.  Normal aortic root size.  Left Atrium: Normal left atrium.  Left Ventricle: Endocardial visualization enhanced with  intravenous injection of Ultrasonic Enhancing Agent  (Lumason).  Normal left ventricular internal dimensions. Severe  discrete basal septal hypertrophy.  Normal left ventricular systolic function. No segmental  wall motion abnormalities.  Right Heart: Normal rightatrium. Normal right ventricular  size and function.  Normal tricuspid valve.  Normal pulmonic valve. Minimal pulmonic regurgitation.  Pericardium/Pleura: Normal pericardium with no pericardial  effusion.  Hemodynamic: Mild pulmonary hypertension.  ------------------------------------------------------------------------  Conclusions:  Endocardial visualization enhanced with intravenous  injection of Ultrasonic Enhancing Agent (Lumason).  Normal left ventricular systolic function. No segmental  wall motion abnormalities.  Mitral stenosis due to annular calcification.  Mild aortic stenosis.  Mild pulmonary hypertension.  ------------------------------------------------------------------------  Confirmed on  4/25/2022 - 09:25:10 by Cordell Young MD, FASE  ------------------------------------------------------------------------    < end of copied text >    Imaging Personally Reviewed:  [x] YES  [ ] NO    Consultant(s) Notes Reviewed:  [x] YES  [ ] NO    Care Discussed with Primary team/ Other Providers [x] YES  [ ] NO

## 2022-04-26 NOTE — PROGRESS NOTE ADULT - PROBLEM SELECTOR PROBLEM 1
Acute on chronic heart failure with preserved ejection fraction (HFpEF)
Acute on chronic respiratory failure with hypoxia and hypercapnia
Acute on chronic heart failure with preserved ejection fraction (HFpEF)
Acute on chronic heart failure with preserved ejection fraction (HFpEF)
Acute on chronic respiratory failure with hypoxia and hypercapnia
Acute on chronic heart failure with preserved ejection fraction (HFpEF)
Acute on chronic heart failure with preserved ejection fraction (HFpEF)
Acute on chronic respiratory failure with hypoxia and hypercapnia
Acute on chronic respiratory failure with hypoxia and hypercapnia

## 2022-04-26 NOTE — PROGRESS NOTE ADULT - PROBLEM SELECTOR PROBLEM 6
CAD (coronary artery disease)
Hypothyroidism
CAD (coronary artery disease)
Hypothyroidism
Hypothyroidism
CAD (coronary artery disease)
Hypothyroidism

## 2022-04-26 NOTE — CONSULT NOTE ADULT - REASON FOR ADMISSION
shortness of breath and vomiting

## 2022-04-26 NOTE — PROCEDURE NOTE - ADDITIONAL PROCEDURE DETAILS
Indication: Arrhythmia    AP 68%;  17%    Presenting rhythm: AF w/ controlled ventricular response, intermittent   Underlying rhythm: AF    Events: Multiple AMS episodes. Longest lasting 11 hours 59 mins, onset this AM at 437. Available EGMs reviewed, consistent with AF/AFL and brief AT. Generally well controlled ventricular response, rarely tachy up to ~120s. Intermittent atrial pacing d/t undersensing of fib waves.   1 episode of NSVT ~16 beats from 1/20/22.     atrial sensing ranges 0.6 - 4.0mV. Atrial sensitivity changed to 0.3mV (from 0.5mV)  Ventricular autocapture with inappropriate high thresholds (during AF episodes). Manual thresholds checked multiple times, confirmed at 0.5V at 0.5ms. Autocapture turned off and output set at 2.5V at 0.5ms.     Per chart, patient is on AC with Xarelto.   F/U with regular EP clinic for routine device interrogations.     - CARRI Grant  [id number]

## 2022-04-26 NOTE — PROGRESS NOTE ADULT - PROBLEM SELECTOR PROBLEM 5
HLD (hyperlipidemia)
Hypothyroidism
Hypothyroidism
HLD (hyperlipidemia)
HLD (hyperlipidemia)
Hypothyroidism
Hypothyroidism
HLD (hyperlipidemia)
Hypothyroidism

## 2022-04-26 NOTE — DISCHARGE NOTE NURSING/CASE MANAGEMENT/SOCIAL WORK - PATIENT PORTAL LINK FT
You can access the FollowMyHealth Patient Portal offered by [facility] by registering at the following website: [email or web address]. By joining [employer]’s FollowMyHealth portal, you will also be able to view your health information using other applications (apps) compatible with our system.

## 2022-04-26 NOTE — CONSULT NOTE ADULT - PROBLEM SELECTOR RECOMMENDATION 3
On medications,  no chest pain, stable, monitored and followed up by primary team/cardiology team.
hr controlled: on ac

## 2022-04-26 NOTE — PROGRESS NOTE ADULT - PROBLEM SELECTOR PLAN 3
HR controlled: on A C
uncontrolled DM Type 2 with hyperglycemia  secondary to solumedrol IV initiated by pulmonary for wheezing'  discontinue solumedrol IV  endo consultation requested   mild decreased in Na spurious secondary to hyperglycemia
finger sticks with short acting insulin sliding scale  diabetic diet  resume home meds on discharge
HR controlled: on A C
finger sticks with short acting insulin sliding scale  HbA1C 8.4 recently   diabetic diet
HR controlled: on A C
HR controlled: on A C
uncontrolled DM Type 2 with hyperglycemia  slowly improving  patient and daughter adamantly opposed to insulin  states that glucose ~ 160 range at home on po meds
finger sticks with short acting insulin sliding scale  HbA1C 8.4 recent  diabetic diet

## 2022-04-26 NOTE — PROGRESS NOTE ADULT - SUBJECTIVE AND OBJECTIVE BOX
Patient is a 82y old  Female who presents with a chief complaint of shortness of breath and vomiting (26 Apr 2022 13:42)  patient declined use of  services, preferred her family translate instead over the phone (daughter Vaishali)    DATE OF SERVICE: 04-26-22 @ 14:05    SUBJECTIVE / OVERNIGHT EVENTS: overnight events noted    ROS:  Resp: No cough no sputum production  CVS: No chest pain no palpitations no orthopnea  GI: no N/V/D  : no dysuria, no hematuria  Neuro: no weakness no paresthesias        MEDICATIONS  (STANDING):  albuterol/ipratropium for Nebulization 3 milliLiter(s) Nebulizer every 6 hours  amLODIPine   Tablet 5 milliGRAM(s) Oral daily  aspirin enteric coated 81 milliGRAM(s) Oral daily  budesonide 160 MICROgram(s)/formoterol 4.5 MICROgram(s) Inhaler 2 Puff(s) Inhalation two times a day  carvedilol 12.5 milliGRAM(s) Oral every 12 hours  dextrose 5%. 1000 milliLiter(s) (50 mL/Hr) IV Continuous <Continuous>  dextrose 5%. 1000 milliLiter(s) (100 mL/Hr) IV Continuous <Continuous>  dextrose 50% Injectable 25 Gram(s) IV Push once  dextrose 50% Injectable 12.5 Gram(s) IV Push once  dextrose 50% Injectable 25 Gram(s) IV Push once  dronedarone 400 milliGRAM(s) Oral two times a day  escitalopram 10 milliGRAM(s) Oral daily  glucagon  Injectable 1 milliGRAM(s) IntraMuscular once  insulin glargine Injectable (LANTUS) 18 Unit(s) SubCutaneous every morning  insulin lispro (ADMELOG) corrective regimen sliding scale   SubCutaneous three times a day before meals  insulin lispro (ADMELOG) corrective regimen sliding scale   SubCutaneous at bedtime  insulin lispro Injectable (ADMELOG) 7 Unit(s) SubCutaneous three times a day before meals  levothyroxine 175 MICROGram(s) Oral daily  losartan 50 milliGRAM(s) Oral daily  rivaroxaban 15 milliGRAM(s) Oral with dinner  torsemide 20 milliGRAM(s) Oral daily    MEDICATIONS  (PRN):  ALBUTerol    90 MICROgram(s) HFA Inhaler 2 Puff(s) Inhalation every 6 hours PRN Shortness of Breath and/or Wheezing  dextrose Oral Gel 15 Gram(s) Oral once PRN Blood Glucose LESS THAN 70 milliGRAM(s)/deciliter  guaiFENesin Oral Liquid (Sugar-Free) 200 milliGRAM(s) Oral every 6 hours PRN Cough  LORazepam     Tablet 0.5 milliGRAM(s) Oral daily PRN Anxiety        CAPILLARY BLOOD GLUCOSE      POCT Blood Glucose.: 305 mg/dL (26 Apr 2022 12:55)  POCT Blood Glucose.: 282 mg/dL (26 Apr 2022 09:00)  POCT Blood Glucose.: 335 mg/dL (25 Apr 2022 21:44)  POCT Blood Glucose.: 492 mg/dL (25 Apr 2022 17:48)  POCT Blood Glucose.: 500 mg/dL (25 Apr 2022 17:45)    I&O's Summary    25 Apr 2022 07:01  -  26 Apr 2022 07:00  --------------------------------------------------------  IN: 950 mL / OUT: 750 mL / NET: 200 mL    26 Apr 2022 07:01  -  26 Apr 2022 14:05  --------------------------------------------------------  IN: 240 mL / OUT: 600 mL / NET: -360 mL        Vital Signs Last 24 Hrs  T(C): 36.5 (26 Apr 2022 11:44), Max: 37.2 (25 Apr 2022 21:23)  T(F): 97.7 (26 Apr 2022 11:44), Max: 98.9 (25 Apr 2022 21:23)  HR: 69 (26 Apr 2022 11:44) (57 - 71)  BP: 102/61 (26 Apr 2022 11:44) (102/61 - 157/72)  BP(mean): --  RR: 19 (26 Apr 2022 11:44) (16 - 20)  SpO2: 97% (26 Apr 2022 11:44) (94% - 99%)      PHYSICAL EXAM:   HEENT: CECELIA EOMI  Neck: Supple, no JVD  Lungs: no wheeze, no crackles  CVS: S1 S2 no M/R/G  Abdomen: no tenderness  Neuro: AO x 3 nonfocal  Psych: appropriate affect  Skin: warm, dry  Ext: improved  edema    LABS:                        10.6   3.74  )-----------( 390      ( 25 Apr 2022 07:15 )             31.9     04-26    133<L>  |  90<L>  |  39<H>  ----------------------------<  253<H>  4.4   |  31  |  1.33<H>    Ca    9.3      26 Apr 2022 07:17  Mg     2.2     04-26                  All consultant(s) notes reviewed and care discussed with other providers        Contact Number, Dr Yates [phone]

## 2022-04-26 NOTE — PROGRESS NOTE ADULT - PROBLEM SELECTOR PLAN 2
heart rate controlled   will continue to monitor   continue rivaroxaban
Cont lasix
Cont lasix  4/24: still with edema: cot lasix
Cont lasix  4/24: still with edema: cot lasix  4/25: cont diuretics  4/26: cont torsemide:
heart rate controlled   will continue to monitor   continue rivaroxaban
Cont lasix  4/24: still with edema: cot lasix  4/15: cont diuretics

## 2022-04-26 NOTE — PROGRESS NOTE ADULT - PROBLEM SELECTOR PROBLEM 3
Diabetes mellitus
Afib
Diabetes mellitus
Afib
Diabetes mellitus

## 2022-04-26 NOTE — PROGRESS NOTE ADULT - PROBLEM SELECTOR PLAN 4
continue statin
continue statin
monitor and control especially no w on steroids
monitor and control
monitor and control especially no w on steroids    4/25: dc steroids now;    4/6: she remains without wheezing without steroids: blood glucose better:
continue statin
monitor and control especially no w on steroids    4/25: dc steroids now;

## 2022-04-26 NOTE — PROGRESS NOTE ADULT - NUTRITIONAL ASSESSMENT
Able to tolerate po intake

## 2022-04-26 NOTE — PROGRESS NOTE ADULT - PROBLEM SELECTOR PLAN 1
she is alert and awake: on bipap at night time: she seems to be doing ok: cot diuretics for chf: still with leg edema    4/254: sheis wheezing today a lot: start steroids : and cont BD q 6 hours: ont symbicort:
she is alert and awake: on bipap at night time: she seems to be doing ok: cot diuretics for chf: still with leg edema
will continue 40 BID IV today  likely change to po torsemide 20 po qd   cardiology help appreciated  echocardiogram pending
will continue 40 BID IV today  likely change to po tomorrow  cardiology help appreciated  echocardiogram pending
she is alert and awake: on bipap at night time: she seems to be doing ok: cot diuretics for chf: still with leg edema    4/254: sheis wheezing today a lot: start steroids : and cont BD q 6 hours: cont symbicort:    4/25: wheezing resolved: dc steroids as blood glucose very high:  cont BD: off oxygen at this time:   cont bipap
likely secondary to discontinuation of furosemide as outpatient  will continue 40 BID IV   clinically improving   subjectively much improved as well
she is alert and awake: on bipap at night time: she seems to be doing ok: cot diuretics for chf: still with leg edema    4/254: she is wheezing today a lot: start steroids : and cont BD q 6 hours: cont symbicort:    4/25: wheezing resolved: dc steroids as blood glucose very high:  cont BD: off oxygen at this time:   cont bipap    4/26: no wheezing: cont BD: Symbicort: on torsemide!~no wheezing
now on po torsemide 20 qd   cardiology help appreciated  echocardiogram normal LV function
will continue 40 BID IV today  likely change to po torsemide 20 po qd   cardiology help appreciated  echocardiogram normal LV function

## 2022-04-26 NOTE — PROGRESS NOTE ADULT - SUBJECTIVE AND OBJECTIVE BOX
Date of Service: 04-26-22 @ 12:07    Patient is a 82y old  Female who presents with a chief complaint of shortness of breath and vomiting (26 Apr 2022 10:59)      Any change in ROS:  doingok: no SOB: no cough :     MEDICATIONS  (STANDING):  albuterol/ipratropium for Nebulization 3 milliLiter(s) Nebulizer every 6 hours  amLODIPine   Tablet 5 milliGRAM(s) Oral daily  aspirin enteric coated 81 milliGRAM(s) Oral daily  budesonide 160 MICROgram(s)/formoterol 4.5 MICROgram(s) Inhaler 2 Puff(s) Inhalation two times a day  carvedilol 12.5 milliGRAM(s) Oral every 12 hours  dextrose 5%. 1000 milliLiter(s) (50 mL/Hr) IV Continuous <Continuous>  dextrose 5%. 1000 milliLiter(s) (100 mL/Hr) IV Continuous <Continuous>  dextrose 50% Injectable 25 Gram(s) IV Push once  dextrose 50% Injectable 12.5 Gram(s) IV Push once  dextrose 50% Injectable 25 Gram(s) IV Push once  dronedarone 400 milliGRAM(s) Oral two times a day  escitalopram 10 milliGRAM(s) Oral daily  glucagon  Injectable 1 milliGRAM(s) IntraMuscular once  insulin glargine Injectable (LANTUS) 18 Unit(s) SubCutaneous every morning  insulin lispro (ADMELOG) corrective regimen sliding scale   SubCutaneous three times a day before meals  insulin lispro (ADMELOG) corrective regimen sliding scale   SubCutaneous at bedtime  insulin lispro Injectable (ADMELOG) 7 Unit(s) SubCutaneous three times a day before meals  levothyroxine 175 MICROGram(s) Oral daily  losartan 50 milliGRAM(s) Oral daily  rivaroxaban 15 milliGRAM(s) Oral with dinner  torsemide 20 milliGRAM(s) Oral daily    MEDICATIONS  (PRN):  ALBUTerol    90 MICROgram(s) HFA Inhaler 2 Puff(s) Inhalation every 6 hours PRN Shortness of Breath and/or Wheezing  dextrose Oral Gel 15 Gram(s) Oral once PRN Blood Glucose LESS THAN 70 milliGRAM(s)/deciliter  guaiFENesin Oral Liquid (Sugar-Free) 200 milliGRAM(s) Oral every 6 hours PRN Cough  LORazepam     Tablet 0.5 milliGRAM(s) Oral daily PRN Anxiety    Vital Signs Last 24 Hrs  T(C): 36.5 (26 Apr 2022 04:14), Max: 37.2 (25 Apr 2022 21:23)  T(F): 97.7 (26 Apr 2022 04:14), Max: 98.9 (25 Apr 2022 21:23)  HR: 71 (26 Apr 2022 09:30) (57 - 71)  BP: 157/72 (26 Apr 2022 04:14) (117/63 - 157/72)  BP(mean): --  RR: 20 (26 Apr 2022 09:30) (16 - 20)  SpO2: 96% (26 Apr 2022 09:30) (94% - 99%)    I&O's Summary    25 Apr 2022 07:01  -  26 Apr 2022 07:00  --------------------------------------------------------  IN: 950 mL / OUT: 750 mL / NET: 200 mL    26 Apr 2022 07:01  -  26 Apr 2022 12:07  --------------------------------------------------------  IN: 240 mL / OUT: 600 mL / NET: -360 mL          Physical Exam:   GENERAL: NAD, well-groomed, well-developed  HEENT: CECELIA/   Atraumatic, Normocephalic  ENMT: No tonsillar erythema, exudates, or enlargement; Moist mucous membranes, Good dentition, No lesions  NECK: Supple, No JVD, Normal thyroid  CHEST/LUNG: Clear to auscultaion  CVS: Regular rate and rhythm; No murmurs, rubs, or gallops  GI: : Soft, Nontender, Nondistended; Bowel sounds present  NERVOUS SYSTEM:  Alert & Oriented X3  EXTREMITIES: - edema  LYMPH: No lymphadenopathy noted  SKIN: No rashes or lesions  ENDOCRINOLOGY: No Thyromegaly  PSYCH: Appropriate    Labs:  26                            10.6   3.74  )-----------( 390      ( 25 Apr 2022 07:15 )             31.9                         10.1   5.45  )-----------( 366      ( 24 Apr 2022 07:36 )             30.8                         9.4    5.27  )-----------( 331      ( 23 Apr 2022 05:00 )             28.5     04-26    133<L>  |  90<L>  |  39<H>  ----------------------------<  253<H>  4.4   |  31  |  1.33<H>  04-25    134<L>  |  90<L>  |  32<H>  ----------------------------<  390<H>  4.7   |  28  |  1.27  04-24    136  |  92<L>  |  23  ----------------------------<  175<H>  4.2   |  30  |  1.30  04-23    136  |  95<L>  |  18  ----------------------------<  175<H>  4.4   |  31  |  1.33<H>    Ca    9.3      26 Apr 2022 07:17  Ca    9.5      25 Apr 2022 07:17  Mg     2.2     04-26  Mg     2.1     04-25    TPro  6.0  /  Alb  x   /  TBili  x   /  DBili  x   /  AST  x   /  ALT  x   /  AlkPhos  x   04-23    CAPILLARY BLOOD GLUCOSE      POCT Blood Glucose.: 282 mg/dL (26 Apr 2022 09:00)  POCT Blood Glucose.: 335 mg/dL (25 Apr 2022 21:44)  POCT Blood Glucose.: 492 mg/dL (25 Apr 2022 17:48)  POCT Blood Glucose.: 500 mg/dL (25 Apr 2022 17:45)  POCT Blood Glucose.: 581 mg/dL (25 Apr 2022 13:08)  POCT Blood Glucose.: >600 mg/dL (25 Apr 2022 13:07)          rad< from: Xray Chest 1 View- PORTABLE-Urgent (04.21.22 @ 15:43) >    ACC: [id number] EXAM:  XR CHEST PORTABLE URGENT 1V                          PROCEDURE DATE:  04/21/2022          INTERPRETATION:  INDICATION: Chest pain.    COMPARISON: Multiple prior chest x-rays with most recent dated 4/10/2022.    FINDINGS:  Heart/Vascular: Heart size is enlarged. Left chest wall dual-chamber   pacemaker with leads intact.  Pulmonary: No focal consolidation. No pleural effusion. No pneumothorax.  Bones: No acute bony pathology.    IMPRESSION: Clear lungs.          --- End of Report ---           PING OLIVARES MD; Resident Radiology  This document has been electronically signed.  TOM GERONIMO MD; Attending Radiologist  This document has been electronically signed. Apr 22 2022  9:30AM    < end of copied text >          RECENT CULTURES:  04-21 @ 19:06 Clean Catch Clean Catch (Midstream)                <10,000 CFU/mL Normal Urogenital Ema          RESPIRATORY CULTURES:          Studies  Chest X-RAY  CT SCAN Chest   Venous Dopplers: LE:   CT Abdomen  Others

## 2022-04-26 NOTE — PROGRESS NOTE ADULT - PROVIDER SPECIALTY LIST ADULT
Internal Medicine
Cardiology
Nephrology
Cardiology
Cardiology
Internal Medicine
Nephrology
Cardiology
Internal Medicine
Nephrology
Nephrology
Pulmonology
Pulmonology
Internal Medicine
Pulmonology
Pulmonology
Internal Medicine

## 2022-04-26 NOTE — DISCHARGE NOTE NURSING/CASE MANAGEMENT/SOCIAL WORK - NSDCPEFALRISK_GEN_ALL_CORE
For information on Fall & Injury Prevention, visit: [email or web address] OR  [email or web address] OR  https://www.cdc.gov/steadi/patient.html

## 2022-04-26 NOTE — PROGRESS NOTE ADULT - TIME BILLING
Patient/primary team/Family
Agree with above NP note.  cv stable  cont current tx  cont diuretics as ordered
Agree with above NP note.  cv stable  cont current tx  cont diuretics as ordered  change to po torsemide 20 qd on d/c
Patient/primary team/Family

## 2022-04-26 NOTE — CONSULT NOTE ADULT - SUBJECTIVE AND OBJECTIVE BOX
HPI:  83yoF, w/ PMH of HTN, DM, hypothyroidism, asthma, afib, CAD, CHF (s/p pacemaker), presents with shortness of breath and vomiting. PT was recently discharged from  after experiencing similar symptoms. PT's daughter states she was discharged 10d ago. She was admitted for fluid overload and found to be hyponatremic. Pt had her diuretics discontinued. Over the last 5-6 days symptoms returned. PT on home O2 PRN for exertion, normally walks around on her own. Daughter denies fever, chills, diarrhea. (21 Apr 2022 19:29)  Patient has history of diabetes, A1C 8.4%, on oral meds at home, no recent hypoglycemic episodes, no polyuria polydipsia. Patient follows up with PCP for diabetes management.  Endo was consulted for glycemic control.    PAST MEDICAL & SURGICAL HISTORY:  Asthma    CAD (Coronary Atherosclerotic Disease)    Myocardial Infarction    Diabetes    HTN (Hypertension)    HLD (Hyperlipidemia)    CHF (congestive heart failure), NYHA class I    IBS (irritable bowel syndrome)    Venous stasis    Hypothyroidism    Atrial fibrillation    Pacemaker    Obesity    Afib    S/P coronary angiogram        FAMILY HISTORY:  Family history of heart disease        Social History:    Outpatient Medications:    MEDICATIONS  (STANDING):  albuterol/ipratropium for Nebulization 3 milliLiter(s) Nebulizer every 6 hours  amLODIPine   Tablet 5 milliGRAM(s) Oral daily  aspirin enteric coated 81 milliGRAM(s) Oral daily  budesonide 160 MICROgram(s)/formoterol 4.5 MICROgram(s) Inhaler 2 Puff(s) Inhalation two times a day  carvedilol 12.5 milliGRAM(s) Oral every 12 hours  dextrose 5%. 1000 milliLiter(s) (50 mL/Hr) IV Continuous <Continuous>  dextrose 5%. 1000 milliLiter(s) (100 mL/Hr) IV Continuous <Continuous>  dextrose 50% Injectable 25 Gram(s) IV Push once  dextrose 50% Injectable 12.5 Gram(s) IV Push once  dextrose 50% Injectable 25 Gram(s) IV Push once  dronedarone 400 milliGRAM(s) Oral two times a day  escitalopram 10 milliGRAM(s) Oral daily  glucagon  Injectable 1 milliGRAM(s) IntraMuscular once  insulin glargine Injectable (LANTUS) 18 Unit(s) SubCutaneous every morning  insulin lispro (ADMELOG) corrective regimen sliding scale   SubCutaneous three times a day before meals  insulin lispro (ADMELOG) corrective regimen sliding scale   SubCutaneous at bedtime  insulin lispro Injectable (ADMELOG) 7 Unit(s) SubCutaneous three times a day before meals  levothyroxine 175 MICROGram(s) Oral daily  losartan 50 milliGRAM(s) Oral daily  rivaroxaban 15 milliGRAM(s) Oral with dinner  torsemide 20 milliGRAM(s) Oral daily    MEDICATIONS  (PRN):  ALBUTerol    90 MICROgram(s) HFA Inhaler 2 Puff(s) Inhalation every 6 hours PRN Shortness of Breath and/or Wheezing  dextrose Oral Gel 15 Gram(s) Oral once PRN Blood Glucose LESS THAN 70 milliGRAM(s)/deciliter  guaiFENesin Oral Liquid (Sugar-Free) 200 milliGRAM(s) Oral every 6 hours PRN Cough  LORazepam     Tablet 0.5 milliGRAM(s) Oral daily PRN Anxiety      Allergies    No Known Allergies    Intolerances      Review of Systems:  Constitutional: No fever, no chills  Eyes: No blurry vision  Neuro: No tremors  HEENT: No pain, no neck swelling  Cardiovascular: No chest pain, no palpitations  Respiratory: Has SOB, no cough  GI: No nausea, vomiting, abdominal pain  : No dysuria  Skin: no rash  MSK: Has leg swelling.  Psych: no depression  Endocrine: no polyuria, polydipsia    ALL OTHER SYSTEMS REVIEWED AND NEGATIVE    UNABLE TO OBTAIN    PHYSICAL EXAM:  VITALS: T(C): 36.5 (04-26-22 @ 11:44)  T(F): 97.7 (04-26-22 @ 11:44), Max: 98.9 (04-25-22 @ 21:23)  HR: 69 (04-26-22 @ 11:44) (57 - 71)  BP: 102/61 (04-26-22 @ 11:44) (102/61 - 157/72)  RR:  (16 - 20)  SpO2:  (94% - 99%)  Wt(kg): --  GENERAL: NAD, well-groomed, well-developed  EYES: No proptosis, no lid lag  HEENT:  Atraumatic, Normocephalic  THYROID: Normal size, no palpable nodules  RESPIRATORY: Clear to auscultation bilaterally; No rales, rhonchi, wheezing  CARDIOVASCULAR: Si S2, No murmurs;  GI: Soft, non distended, normal bowel sounds  SKIN: Dry, intact, No rashes or lesions  MUSCULOSKELETAL: Has BL lower extremity edema.  NEURO:  no tremor, sensation decreased in feet BL,    POCT Blood Glucose.: 305 mg/dL (04-26-22 @ 12:55)  POCT Blood Glucose.: 282 mg/dL (04-26-22 @ 09:00)  POCT Blood Glucose.: 335 mg/dL (04-25-22 @ 21:44)  POCT Blood Glucose.: 492 mg/dL (04-25-22 @ 17:48)  POCT Blood Glucose.: 500 mg/dL (04-25-22 @ 17:45)  POCT Blood Glucose.: 581 mg/dL (04-25-22 @ 13:08)  POCT Blood Glucose.: >600 mg/dL (04-25-22 @ 13:07)  POCT Blood Glucose.: 395 mg/dL (04-25-22 @ 08:59)  POCT Blood Glucose.: 410 mg/dL (04-25-22 @ 08:58)  POCT Blood Glucose.: 404 mg/dL (04-25-22 @ 03:32)  POCT Blood Glucose.: 473 mg/dL (04-24-22 @ 23:57)  POCT Blood Glucose.: 470 mg/dL (04-24-22 @ 23:56)  POCT Blood Glucose.: 507 mg/dL (04-24-22 @ 22:09)  POCT Blood Glucose.: 491 mg/dL (04-24-22 @ 22:05)  POCT Blood Glucose.: 379 mg/dL (04-24-22 @ 17:31)  POCT Blood Glucose.: 309 mg/dL (04-24-22 @ 12:55)  POCT Blood Glucose.: 226 mg/dL (04-24-22 @ 09:09)  POCT Blood Glucose.: 292 mg/dL (04-23-22 @ 21:38)  POCT Blood Glucose.: 309 mg/dL (04-23-22 @ 17:49)                            10.6   3.74  )-----------( 390      ( 25 Apr 2022 07:15 )             31.9       04-26    133<L>  |  90<L>  |  39<H>  ----------------------------<  253<H>  4.4   |  31  |  1.33<H>    EGFR if : x   EGFR if non : x     Ca    9.3      04-26  Mg     2.2     04-26  Phos  4.1     04-24        Thyroid Function Tests:  04-22 @ 08:57 TSH 0.86 FreeT4 -- T3 -- Anti TPO -- Anti Thyroglobulin Ab -- TSI --  04-06 @ 06:41 TSH 0.54 FreeT4 -- T3 -- Anti TPO -- Anti Thyroglobulin Ab -- TSI --              Radiology:                HPI:  83yoF, w/ PMH of HTN, DM, hypothyroidism, asthma, afib, CAD, CHF (s/p pacemaker), presents with shortness of breath and vomiting. PT was recently discharged from  after experiencing similar symptoms. PT's daughter states she was discharged 10d ago. She was admitted for fluid overload and found to be hyponatremic. Pt had her diuretics discontinued. Over the last 5-6 days symptoms returned. PT on home O2 PRN for exertion, normally walks around on her own. Daughter denies fever, chills, diarrhea. (21 Apr 2022 19:29)  Patient has history of diabetes, A1C 8.4%, on oral meds at home, no recent hypoglycemic episodes, no polyuria polydipsia. Patient follows up with PCP for diabetes management.  Endo was consulted for glycemic control.    PAST MEDICAL & SURGICAL HISTORY:  Asthma    CAD (Coronary Atherosclerotic Disease)    Myocardial Infarction    Diabetes    HTN (Hypertension)    HLD (Hyperlipidemia)    CHF (congestive heart failure), NYHA class I    IBS (irritable bowel syndrome)    Venous stasis    Hypothyroidism    Atrial fibrillation    Pacemaker    Obesity    Afib    S/P coronary angiogram        FAMILY HISTORY:  Family history of heart disease        Social History:    Outpatient Medications:    MEDICATIONS  (STANDING):  albuterol/ipratropium for Nebulization 3 milliLiter(s) Nebulizer every 6 hours  amLODIPine   Tablet 5 milliGRAM(s) Oral daily  aspirin enteric coated 81 milliGRAM(s) Oral daily  budesonide 160 MICROgram(s)/formoterol 4.5 MICROgram(s) Inhaler 2 Puff(s) Inhalation two times a day  carvedilol 12.5 milliGRAM(s) Oral every 12 hours  dextrose 5%. 1000 milliLiter(s) (50 mL/Hr) IV Continuous <Continuous>  dextrose 5%. 1000 milliLiter(s) (100 mL/Hr) IV Continuous <Continuous>  dextrose 50% Injectable 25 Gram(s) IV Push once  dextrose 50% Injectable 12.5 Gram(s) IV Push once  dextrose 50% Injectable 25 Gram(s) IV Push once  dronedarone 400 milliGRAM(s) Oral two times a day  escitalopram 10 milliGRAM(s) Oral daily  glucagon  Injectable 1 milliGRAM(s) IntraMuscular once  insulin glargine Injectable (LANTUS) 18 Unit(s) SubCutaneous every morning  insulin lispro (ADMELOG) corrective regimen sliding scale   SubCutaneous three times a day before meals  insulin lispro (ADMELOG) corrective regimen sliding scale   SubCutaneous at bedtime  insulin lispro Injectable (ADMELOG) 7 Unit(s) SubCutaneous three times a day before meals  levothyroxine 175 MICROGram(s) Oral daily  losartan 50 milliGRAM(s) Oral daily  rivaroxaban 15 milliGRAM(s) Oral with dinner  torsemide 20 milliGRAM(s) Oral daily    MEDICATIONS  (PRN):  ALBUTerol    90 MICROgram(s) HFA Inhaler 2 Puff(s) Inhalation every 6 hours PRN Shortness of Breath and/or Wheezing  dextrose Oral Gel 15 Gram(s) Oral once PRN Blood Glucose LESS THAN 70 milliGRAM(s)/deciliter  guaiFENesin Oral Liquid (Sugar-Free) 200 milliGRAM(s) Oral every 6 hours PRN Cough  LORazepam     Tablet 0.5 milliGRAM(s) Oral daily PRN Anxiety      Allergies    No Known Allergies    Intolerances      Review of Systems:  Constitutional: No fever, no chills  Eyes: No blurry vision  Neuro: No tremors  HEENT: No pain, no neck swelling  Cardiovascular: No chest pain, no palpitations  Respiratory: Has SOB, no cough  GI: No nausea, vomiting, abdominal pain  : No dysuria  Skin: no rash  MSK: Has leg swelling.  Psych: no depression  Endocrine: no polyuria, polydipsia    ALL OTHER SYSTEMS REVIEWED AND NEGATIVE    UNABLE TO OBTAIN    PHYSICAL EXAM:  VITALS: T(C): 36.5 (04-26-22 @ 11:44)  T(F): 97.7 (04-26-22 @ 11:44), Max: 98.9 (04-25-22 @ 21:23)  HR: 69 (04-26-22 @ 11:44) (57 - 71)  BP: 102/61 (04-26-22 @ 11:44) (102/61 - 157/72)  RR:  (16 - 20)  SpO2:  (94% - 99%)  Wt(kg): --  GENERAL: NAD, well-groomed, well-developed  EYES: No proptosis, no lid lag  HEENT:  Atraumatic, Normocephalic  THYROID: Normal size, no palpable nodules  RESPIRATORY: Clear to auscultation bilaterally; No rales, rhonchi, wheezing  CARDIOVASCULAR: Si S2, No murmurs;  GI: Soft, non distended, normal bowel sounds  SKIN: Dry, intact, No rashes or lesions  MUSCULOSKELETAL: Has BL lower extremity edema.  NEURO:  no tremor, sensation decreased in feet BL,    POCT Blood Glucose.: 305 mg/dL (04-26-22 @ 12:55)  POCT Blood Glucose.: 282 mg/dL (04-26-22 @ 09:00)  POCT Blood Glucose.: 335 mg/dL (04-25-22 @ 21:44)  POCT Blood Glucose.: 492 mg/dL (04-25-22 @ 17:48)  POCT Blood Glucose.: 500 mg/dL (04-25-22 @ 17:45)  POCT Blood Glucose.: 581 mg/dL (04-25-22 @ 13:08)  POCT Blood Glucose.: >600 mg/dL (04-25-22 @ 13:07)  POCT Blood Glucose.: 395 mg/dL (04-25-22 @ 08:59)  POCT Blood Glucose.: 410 mg/dL (04-25-22 @ 08:58)  POCT Blood Glucose.: 404 mg/dL (04-25-22 @ 03:32)  POCT Blood Glucose.: 473 mg/dL (04-24-22 @ 23:57)  POCT Blood Glucose.: 470 mg/dL (04-24-22 @ 23:56)  POCT Blood Glucose.: 507 mg/dL (04-24-22 @ 22:09)  POCT Blood Glucose.: 491 mg/dL (04-24-22 @ 22:05)  POCT Blood Glucose.: 379 mg/dL (04-24-22 @ 17:31)  POCT Blood Glucose.: 309 mg/dL (04-24-22 @ 12:55)  POCT Blood Glucose.: 226 mg/dL (04-24-22 @ 09:09)  POCT Blood Glucose.: 292 mg/dL (04-23-22 @ 21:38)  POCT Blood Glucose.: 309 mg/dL (04-23-22 @ 17:49)                            10.6   3.74  )-----------( 390      ( 25 Apr 2022 07:15 )             31.9       04-26    133<L>  |  90<L>  |  39<H>  ----------------------------<  253<H>  4.4   |  31  |  1.33<H>    EGFR if : x   EGFR if non : x     Ca    9.3      04-26  Mg     2.2     04-26  Phos  4.1     04-24        Thyroid Function Tests:  04-22 @ 08:57 TSH 0.86 FreeT4 -- T3 -- Anti TPO -- Anti Thyroglobulin Ab -- TSI --  04-06 @ 06:41 TSH 0.54 FreeT4 -- T3 -- Anti TPO -- Anti Thyroglobulin Ab -- TSI --              Radiology:

## 2022-04-26 NOTE — CONSULT NOTE ADULT - NSCONSULTADDITIONALINFOA_GEN_ALL_CORE
dw acp
Attestation:   Patient seen and examined today at bedside. Chart, labs, vitals, radiology reviewed. Above H&P reviewed and edited where appropriate. Agree with history and physical exam. Agree with assessment and plan. I reviewed the overnight course of events and discussed the care with the patient and their family  35 minutes spent on total encounter of which more than fifty percent of the encounter was spent counseling and/or coordinating care by the attending physician.

## 2022-04-26 NOTE — PROGRESS NOTE ADULT - PROBLEM SELECTOR PLAN 5
continue synthroid  TSH  normal
continue synthroid  TSH in AM normal
continue synthroid  TSH  normal

## 2022-04-26 NOTE — PROGRESS NOTE ADULT - NSPROGADDITIONALINFOA_GEN_ALL_CORE
discussed with patient in detail, expresses understanding of treatment plans.  discussed with daughter over the phone in detail   stable for discharge   patient and daughter very anxious for discharge discussed with patient in detail, expresses understanding of treatment plans.  discussed with daughter over the phone in detail   stable for discharge   patient and daughter very anxious for discharge  encounter 45 min

## 2022-04-26 NOTE — CONSULT NOTE ADULT - PROBLEM SELECTOR PROBLEM 3
This call should of been sent to us under the patients chart if she is even a patient.  I will be awaiting call back from Miguel Jiménez to get the correct information needed
Afib
Acute on chronic heart failure with preserved ejection fraction (HFpEF)

## 2022-04-26 NOTE — PROGRESS NOTE ADULT - PROBLEM SELECTOR PROBLEM 4
Diabetes mellitus
Diabetes mellitus
HLD (hyperlipidemia)
HLD (hyperlipidemia)
Diabetes mellitus
HLD (hyperlipidemia)
Diabetes mellitus
HLD (hyperlipidemia)
HLD (hyperlipidemia)

## 2022-04-26 NOTE — PROGRESS NOTE ADULT - REASON FOR ADMISSION
shortness of breath and vomiting

## 2022-04-26 NOTE — PROGRESS NOTE ADULT - ASSESSMENT
83yoF, w/ PMH of HTN, DM, hypothyroidism, asthma, afib, CAD, CHF (s/p pacemaker), presents with shortness of breath and vomiting. PT was recently discharged from  after experiencing similar symptoms. PT's daughter states she was discharged 10d ago. Nephrology consult called for SRIDHAR/CKD/Fluid overload      Assessment:  1) Non oliguric stable CKD stage III likely chronic hypertensive/diabetic nephropathy with e GFR 40-50 ml/min  2) Acute fluid overload with improving volume status likely acute on chronic diastolic CHF with valvular heart disease with MS/AS/Cardio-renal disease  3) Anemia of chronic illness   4) Renal osteodystrophy  5) Hypothyroidism  6) Morbid obesity  7) Electrolytes disorder with hyponatremia/Hyperkalemia now resolved  8) Right renal cyst    Plan of care:  Strict I/o  Avoid Nephrotoxic agents  Urinalysis with 1+protein, RBCs  Urine PCR 0.1 gm/gm  Switch IV Lasix to po on discharge  Bilateral renal and bladder ultrasound results reviewed with right renal cyst  Anemia work up and renal/CKD work up reviewed  (HIV, Hepatitis, RPR negative, Further workup in progress)  Intact PTH, Vitamin D 1,25 level, phos, mag level reviewed.  Optimal BP control with po medications  Optimal DM control with insulin therapy  Replete electrolytes with goal K>4 and <5, Mag>2, Phos 2.5 to 3.5  Monitor BMP and electrolytes daily  Echocardiogram to assess LVEF/valvular heart disease results reviewed with MS/AS  Further plan per primary/cardiology team  Discharge planning per primary team  Will follow

## 2022-04-26 NOTE — PROGRESS NOTE ADULT - PROBLEM SELECTOR PLAN 6
chest pain free  continue B Blocker
chest pain free  continue B Blocker
on levo
chest pain free  continue B Blocker
chest pain free  continue B Blocker
on levo
chest pain free  continue B Blocker

## 2022-04-26 NOTE — PROGRESS NOTE ADULT - ASSESSMENT
Echo 3/21/21: EF 66%, mild ms, nl lv sys fx, Grade I diastolic dysfx  Echo 4/22/22: ef 70%;  mild AS, Severe  discrete basal septal hypertrophy. Normal left ventricular systolic function. No segmental wall motion abnormalities. mild MS, Mild pulm HTN     a/p  83yoF, w/ PMH of HTN, DM, hypothyroidism, asthma, afib (s.p PPM) , CAD s/p PCI, CHF, , presents with shortness of breath and vomiting    #Acute on Chronic HFpEF  -clinically improved  -SP ivp BID  lasix ,creat noted-  now started on torsemide 20mg daily   -c/w GDMT - bb, arb   -echo with ef 70%;  mild AS, Severe  discrete basal septal hypertrophy. Normal left ventricular systolic function. No segmental wall motion abnormalities.  -pulm f/u for hypercapnia, asthma     #Afib s/p PPM  -NSR, paced on tele cv stable   -c/w bb, dronedarone, AC    #CAD s/p PCI  -cv stable  -rec ASA 81 QD if no CI   -cw bb    #Nausea/Vomitting  -improved  -med f/u

## 2022-04-26 NOTE — DISCHARGE NOTE PROVIDER - CARE PROVIDER_API CALL
Hill Reed (MD)  Cardiovascular Disease; Interventional Cardiology; Nuclear Cardiology  [address], [address]  [place] [address]  Phone: [phone]  Fax: [phone]  Follow Up Time:

## 2022-04-26 NOTE — CONSULT NOTE ADULT - PROBLEM SELECTOR RECOMMENDATION 9
Will increase Lantus to 18u daily  Will increase Admelog to 7u before each meal and continue Admelog correction scale coverage. Will continue monitoring FS and FU.  Patient counseled for compliance with consistent low carb diet and exercise as tolerated outpatient. Will increase Lantus to 18u daily  Will increase Admelog to 7u before each meal and continue Admelog correction scale coverage. Will continue monitoring FS and FU.  Patient and family refusing insulin as outpatient, want to continue her prior home meds and FU with her Dr.  Patient counseled for compliance with consistent low carb diet and exercise as tolerated outpatient.

## 2022-04-26 NOTE — DISCHARGE NOTE PROVIDER - NSDCMRMEDTOKEN_GEN_ALL_CORE_FT
amLODIPine 5 mg oral tablet: 1 tab(s) orally once a day  aspirin 81 mg oral delayed release tablet: 1 tab(s) orally once a day  budesonide-formoterol 160 mcg-4.5 mcg/inh inhalation aerosol: 2  inhaled 2 times a day   carvedilol 12.5 mg oral tablet: 1 tab(s) orally 2 times a day  escitalopram 10 mg oral tablet: 1 tab(s) orally once a day  guaiFENesin 100 mg/5 mL oral liquid: 10 milliliter(s) orally every 6 hours, As needed, Cough  Janumet 50 mg-500 mg oral tablet: 1 tab(s) orally 2 times a day  levothyroxine 175 mcg (0.175 mg) oral tablet: 1 tab(s) orally once a day  LORazepam 0.5 mg oral tablet: 1 tab(s) orally once a day, As Needed  losartan 50 mg oral tablet: 1 tab(s) orally once a day  Multaq 400 mg oral tablet: 1 tab(s) orally 2 times a day  ProAir HFA 90 mcg/inh inhalation aerosol: 2 puff(s) inhaled every 4 hours, As Needed -for bronchospasm   repaglinide 1 mg oral tablet: 1 tab(s) orally 3 times a day (before meals)  torsemide 20 mg oral tablet: 1 tab(s) orally once a day  Xarelto 20 mg oral tablet: 1 tab(s) orally once a day (in the evening)

## 2022-04-26 NOTE — DISCHARGE NOTE PROVIDER - HOSPITAL COURSE
83yoF, w/ PMH of HTN, DM, hypothyroidism, asthma, Afib CAD, CHF (s/p pacemaker), presents with shortness of breath and vomiting likely secondary to acute on chronic diastolic heart failure      Problem/Plan - 1:  ·  Problem: Acute on chronic heart failure with preserved ejection fraction (HFpEF).   ·  Plan: now on po torsemide 20 qd   cardiology help appreciated  echocardiogram normal LV function.     Problem/Plan - 2:  ·  Problem: Afib.   ·  Plan: heart rate controlled   will continue to monitor   continue rivaroxaban.     Problem/Plan - 3:  ·  Problem: Diabetes mellitus.   ·  Plan: uncontrolled DM Type 2 with hyperglycemia  slowly improving  patient and daughter adamantly opposed to insulin  states that glucose ~ 160 range at home on po meds.     Problem/Plan - 4:  ·  Problem: HLD (hyperlipidemia).   ·  Plan: continue statin.     Problem/Plan - 5:  ·  Problem: Hypothyroidism.   ·  Plan: continue synthroid  TSH  normal.     Problem/Plan - 6:  ·  Problem: CAD (coronary artery disease).   ·  Plan: chest pain free  continue B Blocker.       Additional Information:  Additional Information: discussed with patient in detail, expresses understanding of treatment plans.  discussed with daughter over the phone in detail   stable for discharge   patient and daughter very anxious for discharge

## 2022-04-26 NOTE — CONSULT NOTE ADULT - ASSESSMENT
Assessment  DMT2: 82y Female with DM T2 with hyperglycemia, A1C 8.4%, was on oral meds at home, now on insulin, blood sugars running high and not at target s/p IV Steroids, no hypoglycemic episode, eating meals, s/p steroid dose.  CHF: on medications, stable, monitored.  Hypothyroidism: On Synthroid 175 mcg po daily, compliant with Synthroid intake, euthyroid.  HTN: Controlled,  on antihypertensive medications.  Obesity: No strict exercise routines, not on any weight loss program, neither on low calorie diet.        Marina Winston MD  Cell: [phone]  Office: [phone]     Assessment  DMT2: 82y Female with DM T2 with hyperglycemia, A1C 8.4%, was on oral meds at home, now on insulin, blood sugars running high and not at target s/p IV Steroids,  no hypoglycemic episode, eating meals, s/p steroid dose.  CHF: on medications, stable, monitored.  Hypothyroidism: On Synthroid 175 mcg po daily, compliant with Synthroid intake, euthyroid.  HTN: Controlled,  on antihypertensive medications.  Obesity: No strict exercise routines, not on any weight loss program, neither on low calorie diet.        Marina Winston MD  Cell: [phone]  Office: [phone]

## 2022-04-26 NOTE — PROGRESS NOTE ADULT - PROBLEM SELECTOR PROBLEM 2
Acute on chronic heart failure with preserved ejection fraction (HFpEF)
Afib
Afib
Acute on chronic heart failure with preserved ejection fraction (HFpEF)
Afib
Acute on chronic heart failure with preserved ejection fraction (HFpEF)
Acute on chronic heart failure with preserved ejection fraction (HFpEF)

## 2022-04-26 NOTE — PROGRESS NOTE ADULT - SUBJECTIVE AND OBJECTIVE BOX
CARDIOLOGY FOLLOW UP - Dr. Reed  DATE OF SERVICE: 4/26/22    CC no cp or sob        REVIEW OF SYSTEMS:  CONSTITUTIONAL: No fever, weight loss, or fatigue  RESPIRATORY: No cough, wheezing, chills or hemoptysis; No Shortness of Breath  CARDIOVASCULAR: No chest pain, palpitations, passing out, dizziness, or leg swelling  GASTROINTESTINAL: No abdominal or epigastric pain. No nausea, vomiting, or hematemesis; No diarrhea or constipation. No melena or hematochezia.  VASCULAR: No edema     PHYSICAL EXAM:  T(C): 36.5 (04-26-22 @ 04:14), Max: 37.2 (04-25-22 @ 21:23)  HR: 71 (04-26-22 @ 09:30) (57 - 71)  BP: 157/72 (04-26-22 @ 04:14) (117/63 - 157/72)  RR: 20 (04-26-22 @ 09:30) (16 - 20)  SpO2: 96% (04-26-22 @ 09:30) (94% - 99%)  Wt(kg): --  I&O's Summary    25 Apr 2022 07:01  -  26 Apr 2022 07:00  --------------------------------------------------------  IN: 950 mL / OUT: 750 mL / NET: 200 mL    26 Apr 2022 07:01  -  26 Apr 2022 10:59  --------------------------------------------------------  IN: 240 mL / OUT: 600 mL / NET: -360 mL        Appearance: Normal	  Cardiovascular: Normal S1 S2,RRR, No JVD, No murmurs  Respiratory: diminished   Gastrointestinal:  Soft, Non-tender, + BS	  Extremities: Normal range of motion, No clubbing, cyanosis or edema      Home Medications:  amLODIPine 5 mg oral tablet: 1 tab(s) orally once a day (21 Apr 2022 17:11)  carvedilol 12.5 mg oral tablet: 1 tab(s) orally 2 times a day (21 Apr 2022 17:11)  escitalopram 10 mg oral tablet: 1 tab(s) orally once a day (21 Apr 2022 17:11)  guaiFENesin 100 mg/5 mL oral liquid: 10 milliliter(s) orally every 6 hours, As needed, Cough (21 Apr 2022 17:11)  Janumet 50 mg-500 mg oral tablet: 1 tab(s) orally 2 times a day (21 Apr 2022 17:11)  levothyroxine 175 mcg (0.175 mg) oral tablet: 1 tab(s) orally once a day (21 Apr 2022 17:11)  LORazepam 0.5 mg oral tablet: 1 tab(s) orally once a day, As Needed (21 Apr 2022 17:11)  Multaq 400 mg oral tablet: 1 tab(s) orally 2 times a day (21 Apr 2022 17:11)  repaglinide 1 mg oral tablet: 1 tab(s) orally 3 times a day (before meals) (21 Apr 2022 17:11)  Xarelto 20 mg oral tablet: 1 tab(s) orally once a day (in the evening) (21 Apr 2022 17:11)      MEDICATIONS  (STANDING):  albuterol/ipratropium for Nebulization 3 milliLiter(s) Nebulizer every 6 hours  amLODIPine   Tablet 5 milliGRAM(s) Oral daily  budesonide 160 MICROgram(s)/formoterol 4.5 MICROgram(s) Inhaler 2 Puff(s) Inhalation two times a day  carvedilol 12.5 milliGRAM(s) Oral every 12 hours  dextrose 5%. 1000 milliLiter(s) (50 mL/Hr) IV Continuous <Continuous>  dextrose 5%. 1000 milliLiter(s) (100 mL/Hr) IV Continuous <Continuous>  dextrose 50% Injectable 25 Gram(s) IV Push once  dextrose 50% Injectable 12.5 Gram(s) IV Push once  dextrose 50% Injectable 25 Gram(s) IV Push once  dronedarone 400 milliGRAM(s) Oral two times a day  escitalopram 10 milliGRAM(s) Oral daily  glucagon  Injectable 1 milliGRAM(s) IntraMuscular once  insulin glargine Injectable (LANTUS) 18 Unit(s) SubCutaneous every morning  insulin lispro (ADMELOG) corrective regimen sliding scale   SubCutaneous three times a day before meals  insulin lispro (ADMELOG) corrective regimen sliding scale   SubCutaneous at bedtime  insulin lispro Injectable (ADMELOG) 7 Unit(s) SubCutaneous three times a day before meals  levothyroxine 175 MICROGram(s) Oral daily  losartan 50 milliGRAM(s) Oral daily  rivaroxaban 15 milliGRAM(s) Oral with dinner  torsemide 20 milliGRAM(s) Oral daily      TELEMETRY: 	    ECG:  	  RADIOLOGY:   DIAGNOSTIC TESTING:  [ ] Echocardiogram:  [ ]  Catheterization:  [ ] Stress Test:    OTHER: 	    LABS:	 	    Troponin T, High Sensitivity Result: 15 ng/L [0 - 51] (04-21 @ 15:50)                          10.6   3.74  )-----------( 390      ( 25 Apr 2022 07:15 )             31.9     04-26    133<L>  |  90<L>  |  39<H>  ----------------------------<  253<H>  4.4   |  31  |  1.33<H>    Ca    9.3      26 Apr 2022 07:17  Mg     2.2     04-26

## 2022-04-26 NOTE — DISCHARGE NOTE PROVIDER - NSDCCPCAREPLAN_GEN_ALL_CORE_FT
PRINCIPAL DISCHARGE DIAGNOSIS  Diagnosis: Acute on chronic heart failure with preserved ejection fraction (HFpEF)  Assessment and Plan of Treatment:       SECONDARY DISCHARGE DIAGNOSES  Diagnosis: Afib  Assessment and Plan of Treatment:     Diagnosis: Obesity  Assessment and Plan of Treatment:     Diagnosis: HLD (hyperlipidemia)  Assessment and Plan of Treatment:     Diagnosis: Acute on chronic respiratory failure with hypoxia and hypercapnia  Assessment and Plan of Treatment:     Diagnosis: Hypothyroidism  Assessment and Plan of Treatment:     Diagnosis: CAD (coronary artery disease)  Assessment and Plan of Treatment:     Diagnosis: Diabetes mellitus  Assessment and Plan of Treatment:     Diagnosis: Hyponatremia  Assessment and Plan of Treatment:      PRINCIPAL DISCHARGE DIAGNOSIS  Diagnosis: Acute on chronic heart failure with preserved ejection fraction (HFpEF)  Assessment and Plan of Treatment: stable      SECONDARY DISCHARGE DIAGNOSES  Diagnosis: Diabetes mellitus  Assessment and Plan of Treatment:   Make sure you get your HgA1c checked every three months.  If you take oral diabetes medications, check your blood glucose two times a day.  If you take insulin, check your blood glucose before meals and at bedtime.  It's important not to skip any meals.  Keep a log of your blood glucose results and always take it with you to your doctor appointments.  Keep a list of your current medications including injectables and over the counter medications and bring this medication list with you to all your doctor appointments.  If you have not seen your opthalmologist this year call for appointment.  Check your feet daily for redness, sores, or openings. Do not self treat. If no improvement in two days call your primary care physician for an appointment.  Low blood sugar (hypoglycemia) is a blood sugar below 70mg/dl. Check your blood sugar if you feel signs/symptoms of hypoglycemia. If your blood sugar is below 70 take 15 grams of carbohydrates (ex 4 oz of apple juice, 3-4 glucosr tablets, or 4-6 oz of regular soda) wait 15 minutes and repeat blood sugar to make sure it comes up above 70.  If your blood sugar is above 70 and you are due for a meal, have a meal.  If you are not due for a meal have a snack.  This snack helps keeps your blood sugar at a safe range.    Diagnosis: Afib  Assessment and Plan of Treatment: Atrial fibrillation is the most common heart rhythm problem & has the risk of stroke & heart attack  It helps if you control your blood pressure, not drink more than 1-2 alcohol drinks per day, cut down on caffeine, getting treatment for over active thyroid gland, & getting exercise  Call your doctor if you feel your heart racing or beating unusually, chest tightness or pain, lightheaded, faint, shortness of breath especially with exercise  It is important to take your heart medication as prescribed  You may be on anticoagulation which is very important to take as directed - you may need blood work to monitor drug levels    Diagnosis: HLD (hyperlipidemia)  Assessment and Plan of Treatment: c/w statin    Diagnosis: Hypothyroidism  Assessment and Plan of Treatment: c/w synthroid    Diagnosis: CAD (coronary artery disease)  Assessment and Plan of Treatment: Coronary artery disease is a condition where the arteries the supply the heart muscle get clogges with fatty deposits & puts you at risk for a heart attack  Call your doctor if you have any new pain, pressure, or discomfort in the center of your chest, pain, tingling or discomfort in arms, back, neck, jaw, or stomach, shortness of breath, nausea, vomiting, burping or heartburn, sweating, cold and clammy skin, racing or abnormal heartbeat for more than 10 minutes or if they keep coming & going.  Call 911 and do not tr to get to hospital by care  You can help yourself with lefestyle changes (quitting smoking if you smoke), eat lots of fruits & vegetables & low fat dairy products, not a lot of meat & fatty foods, walk or some form of physical activity most days of the week, lose weight if you are overweight  Take your cardiac medication as prescribed to lower cholesterol, to lower blood pressure, aspirin to prevent blood clots, and diabetes control  Make sure to keep appointments with doctor for cardiac follow up care    Diagnosis: Acute on chronic respiratory failure with hypoxia and hypercapnia  Assessment and Plan of Treatment:     Diagnosis: Obesity  Assessment and Plan of Treatment:     Diagnosis: Hyponatremia  Assessment and Plan of Treatment:

## 2022-04-27 LAB
% ALBUMIN: 49.7 % — SIGNIFICANT CHANGE UP
% ALPHA 1: 5.8 % — SIGNIFICANT CHANGE UP
% ALPHA 2: 15 % — SIGNIFICANT CHANGE UP
% BETA: 13.6 % — SIGNIFICANT CHANGE UP
% GAMMA: 15.9 % — SIGNIFICANT CHANGE UP
ALBUMIN SERPL ELPH-MCNC: 3 G/DL — LOW (ref 3.6–5.5)
ALBUMIN/GLOB SERPL ELPH: 1 RATIO — SIGNIFICANT CHANGE UP
ALPHA1 GLOB SERPL ELPH-MCNC: 0.3 G/DL — SIGNIFICANT CHANGE UP (ref 0.1–0.4)
ALPHA2 GLOB SERPL ELPH-MCNC: 0.9 G/DL — SIGNIFICANT CHANGE UP (ref 0.5–1)
ANA TITR SER: NEGATIVE — SIGNIFICANT CHANGE UP
AUTO DIFF PNL BLD: ABNORMAL
B-GLOBULIN SERPL ELPH-MCNC: 0.8 G/DL — SIGNIFICANT CHANGE UP (ref 0.5–1)
C-ANCA SER-ACNC: NEGATIVE — SIGNIFICANT CHANGE UP
GAMMA GLOBULIN: 1 G/DL — SIGNIFICANT CHANGE UP (ref 0.6–1.6)
GBM IGG SER-ACNC: 3 — SIGNIFICANT CHANGE UP (ref 0–20)
INTERPRETATION SERPL IFE-IMP: SIGNIFICANT CHANGE UP
MPO AB + PR3 PNL SER: SIGNIFICANT CHANGE UP
P-ANCA SER-ACNC: NEGATIVE — SIGNIFICANT CHANGE UP
PROT PATTERN SERPL ELPH-IMP: SIGNIFICANT CHANGE UP

## 2022-06-09 ENCOUNTER — APPOINTMENT (OUTPATIENT)
Dept: CARDIOLOGY | Facility: CLINIC | Age: 82
End: 2022-06-09

## 2022-08-03 NOTE — ED ADULT NURSE NOTE - NS PRO PASSIVE SMOKE EXP
Reason for visit: Symptom check     Relevant information: pelvic pain, PFPT    Records/imaging/labs/orders: records available    Pt called: no need for a call    At Rooming: eliezer Degroot CMA  8/3/2022  2:16 PM  
No

## 2022-08-29 NOTE — ED ADULT NURSE NOTE - NS ED NOTE ABUSE RESPONSE YN
Yes Staged Advancement Flap Text: The defect edges were debeveled with a #15 scalpel blade.  Given the location of the defect, shape of the defect and the proximity to free margins a staged advancement flap was deemed most appropriate.  Using a sterile surgical marker, an appropriate advancement flap was drawn incorporating the defect and placing the expected incisions within the relaxed skin tension lines where possible. The area thus outlined was incised deep to adipose tissue with a #15 scalpel blade.  The skin margins were undermined to an appropriate distance in all directions utilizing iris scissors.

## 2022-09-06 NOTE — ED ADULT TRIAGE NOTE - RESPIRATORY RATE (BREATHS/MIN)
Called and left message x 1    Per [facility] they are not in network with his insurance  [facility] is in network but not [facility]  If he is going to proceed with cpap we need to send a new order to [facility]    Sleep Study Interp-  The patient was contacted with his study results.  He preferred to review his results before agreeing to CPAP treatment.  I did put in a provisional order for CPAP given the current delay in obtaining CPAP machines.  He will contact me if he wishes to proceed with treatment in the future.    16

## 2022-10-19 PROBLEM — I48.91 UNSPECIFIED ATRIAL FIBRILLATION: Chronic | Status: ACTIVE | Noted: 2022-04-21

## 2022-10-19 NOTE — ED PROVIDER NOTE - MEDICAL DECISION MAKING DETAILS
77 yo F with CHF afib ppm asthma presents with weakness and SOB    Diff dx - CHF exacerbation, ACS, arrhythmia, asthma/COPD, pneumonia    Plan - FSBG, EKG, labs, CXR, nebs, lasix, observe and reassess Vaccine status unknown

## 2022-11-07 NOTE — H&P ADULT - BIRTH SEX
Flu vaccine - today.   COVID 19 vaccine - received primary series, new bivalent vaccine recommended.   PCV 20 - today.   Tdap - received 1/2018.   Smoker - cessation d/w pt.   Fhx: No CAD.  No malignancy.     Female

## 2022-11-29 ENCOUNTER — APPOINTMENT (OUTPATIENT)
Dept: CARDIOLOGY | Facility: CLINIC | Age: 82
End: 2022-11-29

## 2022-11-29 ENCOUNTER — APPOINTMENT (OUTPATIENT)
Dept: ELECTROPHYSIOLOGY | Facility: CLINIC | Age: 82
End: 2022-11-29

## 2022-11-29 ENCOUNTER — NON-APPOINTMENT (OUTPATIENT)
Age: 82
End: 2022-11-29

## 2022-11-29 VITALS
HEIGHT: 62 IN | BODY MASS INDEX: 46.01 KG/M2 | HEART RATE: 61 BPM | SYSTOLIC BLOOD PRESSURE: 134 MMHG | DIASTOLIC BLOOD PRESSURE: 70 MMHG | OXYGEN SATURATION: 97 % | WEIGHT: 250 LBS

## 2022-11-29 DIAGNOSIS — R06.09 OTHER FORMS OF DYSPNEA: ICD-10-CM

## 2022-11-29 DIAGNOSIS — I34.2 NONRHEUMATIC MITRAL (VALVE) STENOSIS: ICD-10-CM

## 2022-11-29 PROCEDURE — 93280 PM DEVICE PROGR EVAL DUAL: CPT

## 2022-11-29 PROCEDURE — 93000 ELECTROCARDIOGRAM COMPLETE: CPT | Mod: 59

## 2022-11-29 PROCEDURE — 99214 OFFICE O/P EST MOD 30 MIN: CPT

## 2022-11-30 PROBLEM — I34.2 NONRHEUMATIC MITRAL VALVE STENOSIS: Status: ACTIVE | Noted: 2022-11-30

## 2022-11-30 NOTE — HISTORY OF PRESENT ILLNESS
[FreeTextEntry1] : Following up.\par Notes no major change in symptoms\par No LE edema\par No CP\par No palpitations\par \par \par \par

## 2022-11-30 NOTE — CARDIOLOGY SUMMARY
[de-identified] : 11/29/22\par Electronic atrial pacemaker  -possibly demand type \par -Right bundle branch block with left axis -bifascicular block. \par \par ABNORMAL \par

## 2022-11-30 NOTE — PHYSICAL EXAM
[Well Developed] : well developed [Well Nourished] : well nourished [No Acute Distress] : no acute distress [Normal Conjunctiva] : normal conjunctiva [Normal Venous Pressure] : normal venous pressure [No Carotid Bruit] : no carotid bruit [Normal S1, S2] : normal S1, S2 [No Rub] : no rub [No Gallop] : no gallop [Murmur] : murmur [Clear Lung Fields] : clear lung fields [Good Air Entry] : good air entry [No Respiratory Distress] : no respiratory distress  [Soft] : abdomen soft [Non Tender] : non-tender [No Masses/organomegaly] : no masses/organomegaly [Normal Bowel Sounds] : normal bowel sounds [Normal Gait] : normal gait [No Edema] : no edema [No Cyanosis] : no cyanosis [No Clubbing] : no clubbing [No Varicosities] : no varicosities [No Rash] : no rash [No Skin Lesions] : no skin lesions [Moves all extremities] : moves all extremities [No Focal Deficits] : no focal deficits [Normal Speech] : normal speech [Alert and Oriented] : alert and oriented [Normal memory] : normal memory [de-identified] : 2/6

## 2022-11-30 NOTE — DISCUSSION/SUMMARY
[FreeTextEntry1] : No changes to med Rx\par Check PPM today\par Plan on reassessing MV stenosis degree next year with Echo\par  [EKG obtained to assist in diagnosis and management of assessed problem(s)] : EKG obtained to assist in diagnosis and management of assessed problem(s)

## 2023-01-10 NOTE — ED PROVIDER NOTE - CONTEXT
[facility]  OTOLARYNGOLOGY  [address], [address]  [place] [address]  Phone: [phone]  Fax: [phone]  Follow Up Time: 4-6 Days   unknown

## 2023-01-14 NOTE — ED PROVIDER NOTE - PRINCIPAL DIAGNOSIS
Addended by: CLARA LALA on: 1/13/2023 12:39 PM     Modules accepted: Orders    
Addended by: KAVEH RUBIO on: 1/14/2023 10:25 AM     Modules accepted: Orders    
Rectal bleeding

## 2023-01-27 NOTE — DIETITIAN INITIAL EVALUATION ADULT - OTHER INFO
Physical Therapy Evaluation    Visit Type: Initial Evaluation  Visit: 1  Referring Provider: Laci Araiza MD  Medical Diagnosis (from order): Diagnosis Information    Diagnosis  959.2 (ICD-9-CM) - S49.91XA (ICD-10-CM) - Right shoulder injury       Treatment Diagnosis: right shoulder with increased pain/symptoms, impaired posture, impaired strength, impaired motor function/performance/coordination, impaired mobility, impaired joint play/mobility, impaired body mechanics and impaired range of motion.  Chart reviewed at time of initial evaluation (relevant co-morbidities, allergies, tests and medications listed):   No past medical history on file.       SUBJECTIVE                                                                                                               Patient states jimmie CELAYA shoulder has a bump in it.  It has affected too much how he moves. The pain started about a month ago.  He thinks he got the bump around the 4th of July when he fell into the wall.      Pain / Symptoms  - Pain rating (out of 10): Current: 1 ; Best: 0; Worst: 1  - Location: R shoulder   - Quality / Description:      - Small   Just knows its there  - Alleviating Factors:      - Pain pill     Function:   Limitations / Exacerbation Factors:   - No limitations   Prior Level of Function: pain free ADLs and IADLs,    Patient Goals: . Figure out why it was bothering him.     Prior treatment  - no therapies  - Discharged from hospital, home health, or skilled nursing facility in last 30 days: no  Home Environment   - Patient lives with: alone  - Assistance available: as needed  - Denies 2 or more falls or an unexplained fall with injury in the last year.  - Feel safe at home / work / school: yes      OBJECTIVE                                                                                                                    Vitals:  Vitals (performed in sitting) -   Blood pressure:  137/85  mmHg  Heart Rate:   71 bpm  SPO2:   97%      Posture:  Elevated and upwardly rotated R clavicle       Range of Motion (ROM)   (degrees unless noted; active unless noted; norms in ( ); negative=lacking to 0, positive=beyond 0)  Shoulder:    - Flexion (180):        • Left:120         • Right: 124    - Abduction (180):        • Left: 120        • Right: 128    - Internal Rotation:        - Behind Back:           • Left: L1           • Right: L1    - External Rotation:       - Behind the Head:           • Left: c7            • Right: c7     Strength  (out of 5 unless noted, standard test position unless noted)   Shoulder:     - Flexion:         • Left: 4+         • Right: 4+     - Abduction:        • Left: 4+        • Right: 4+    - Internal Rotation:          • Left (at 0°): 5        • Right (at 0°): 5    - External Rotation:         • Left (at 0°): 5        • Right (at 0°) :pain, 4+          Palpation  Patient had a bump on the superior aspect of his R shoulder               Outcome/Assessments  Outcome Measures:   Quick Disabilities of the Arm, Shoulder and Hand: QuickDash Total Score (Score will not calculate if more then 2 questions are left blank): 6.82  (scored 0-100; a higher score indicates greater disability) see flowsheet for additional documentation       Treatment     Therapeutic Exercise  Shoulder supine flexion with stick 15x  Side-lying shoulder abduction 15x  Shoulder B external rotation red theraband 10x  Yellow putty squeezes 10x  Scap squeeze 10x    Home Exercise Program  Access Code: [email or web address]  URL: [email or web address]  Date: 01/27/2023  Prepared by: Jacek Tariq    Exercises  ? Supine Shoulder Flexion Extension AAROM with Dowel - 1 x daily - 7 x weekly - 2 sets - 10 reps  ? Sidelying Shoulder Abduction Palm Forward - 1 x daily - 7 x weekly - 2 sets - 10 reps  ? Shoulder External Rotation and Scapular Retraction with Resistance - 1 x daily - 7 x weekly - 2 sets - 10 reps  ? Seated Shoulder Flexion AAROM with  Dowel - 1 x daily - 7 x weekly - 2 sets - 10 reps  ? Seated Scapular Retraction - 1 x daily - 7 x weekly - 2 sets - 10 reps  ? Putty Squeezes - 1 x daily - 7 x weekly - 3 sets - 10 reps           ASSESSMENT                                                                                                          80 year old patient has reported functional limitations listed above impacted by signs and symptoms consistent with treatment diagnosis below.  Treatment Diagnosis:   - Involved: right shoulder.  - Symptoms/impairments: increased pain/symptoms, impaired posture, impaired strength, impaired motor function/performance/coordination, impaired mobility, impaired joint play/mobility, impaired body mechanics and impaired range of motion.    Patient presents to physical therapy with a chief complaint of R shoulder pain. Patient demonstrates comparable B shoulder mobility.  He has symmetrical strength side to side with some discomfort in the R.  He has no limitations throughout the day.  He just has 1/10 pain that he notices while performing activites of daily living.  He was educated about his x-ray results and why that protrusion on his shoulder is occurring. He was educated that his clavicle is more superior leading to this protrusion.  A home exercise program was established to improve shoulder mobility and strength.  At this time physical therapy is not medically necessary.  He agreed to the plan of discharging to a home exercise program. He was instructed to contact the clinic with any questions or concerns.     Predicted patient presentation: Low (stable) - Patient comorbidities and complexities, as defined above, will have little effect on progress for prescribed plan of care.    Education:   - Present and ready to learn: patient  - Results of above outlined education: Verbalizes understanding    PLAN                                                                                                                            Suggestions for next session as indicated: Discharge to a home exercise program        Therapy procedure time and total treatment time can be found documented on the Time Entry flowsheet     Patient from home admitted for n/v with coughing x2weeks at home. Visited pt. 'asleep", d/w PCA, pt. tolerating meals, intake ~40-50%, also receiving meals from home from daughter noted, presently with no GI distress, possible Rehab placement? Pulmonary following, encourage po intake with fluid restriction as per MD/team.   Patient from home admitted for n/v with coughing x2weeks at home. Visited pt. 'asleep", d/w PCA, pt. tolerating meals, intake ~40-50%, also receiving meals from home when daughter visits at dinner meal noted, presently with no GI distress, possible Rehab placement? Pulmonary following, encourage po intake with fluid restriction as per MD/team.

## 2023-02-01 NOTE — PROGRESS NOTE ADULT - PROBLEM SELECTOR PLAN 2
Tetracycline Counseling: Patient counseled regarding possible photosensitivity and increased risk for sunburn.  Patient instructed to avoid sunlight, if possible.  When exposed to sunlight, patients should wear protective clothing, sunglasses, and sunscreen.  The patient was instructed to call the office immediately if the following severe adverse effects occur:  hearing changes, easy bruising/bleeding, severe headache, or vision changes.  The patient verbalized understanding of the proper use and possible adverse effects of tetracycline.  All of the patient's questions and concerns were addressed. Patient understands to avoid pregnancy while on therapy due to potential birth defects. Detail Level: Detailed Topical Retinoid counseling:  Patient advised to apply a pea-sized amount only at bedtime and wait 30 minutes after washing their face before applying.  If too drying, patient may add a non-comedogenic moisturizer. The patient verbalized understanding of the proper use and possible adverse effects of retinoids.  All of the patient's questions and concerns were addressed. High Dose Vitamin A Counseling: Side effects reviewed, pt to contact office should one occur. Pt takes Carvedilol and Xarelto at home  Continue with home regimen Carvedilol   Dr. Sandoval (Cardiology) following Topical Sulfur Applications Counseling: Topical Sulfur Counseling: Patient counseled that this medication may cause skin irritation or allergic reactions.  In the event of skin irritation, the patient was advised to reduce the amount of the drug applied or use it less frequently.   The patient verbalized understanding of the proper use and possible adverse effects of topical sulfur application.  All of the patient's questions and concerns were addressed. Topical Retinoid Pregnancy And Lactation Text: This medication is Pregnancy Category C. It is unknown if this medication is excreted in breast milk. Tazorac Counseling:  Patient advised that medication is irritating and drying.  Patient may need to apply sparingly and wash off after an hour before eventually leaving it on overnight.  The patient verbalized understanding of the proper use and possible adverse effects of tazorac.  All of the patient's questions and concerns were addressed. Sarecycline Counseling: Patient advised regarding possible photosensitivity and discoloration of the teeth, skin, lips, tongue and gums.  Patient instructed to avoid sunlight, if possible.  When exposed to sunlight, patients should wear protective clothing, sunglasses, and sunscreen.  The patient was instructed to call the office immediately if the following severe adverse effects occur:  hearing changes, easy bruising/bleeding, severe headache, or vision changes.  The patient verbalized understanding of the proper use and possible adverse effects of sarecycline.  All of the patient's questions and concerns were addressed. Bactrim Counseling:  I discussed with the patient the risks of sulfa antibiotics including but not limited to GI upset, allergic reaction, drug rash, diarrhea, dizziness, photosensitivity, and yeast infections.  Rarely, more serious reactions can occur including but not limited to aplastic anemia, agranulocytosis, methemoglobinemia, blood dyscrasias, liver or kidney failure, lung infiltrates or desquamative/blistering drug rashes. Azithromycin Counseling:  I discussed with the patient the risks of azithromycin including but not limited to GI upset, allergic reaction, drug rash, diarrhea, and yeast infections. Spironolactone Counseling: Patient advised regarding risks of diarrhea, abdominal pain, hyperkalemia, birth defects (for female patients), liver toxicity and renal toxicity. The patient may need blood work to monitor liver and kidney function and potassium levels while on therapy. The patient verbalized understanding of the proper use and possible adverse effects of spironolactone.  All of the patient's questions and concerns were addressed. Minocycline Pregnancy And Lactation Text: This medication is Pregnancy Category D and not consider safe during pregnancy. It is also excreted in breast milk. Azelaic Acid Counseling: Patient counseled that medicine may cause skin irritation and to avoid applying near the eyes.  In the event of skin irritation, the patient was advised to reduce the amount of the drug applied or use it less frequently.   The patient verbalized understanding of the proper use and possible adverse effects of azelaic acid.  All of the patient's questions and concerns were addressed. Dapsone Counseling: I discussed with the patient the risks of dapsone including but not limited to hemolytic anemia, agranulocytosis, rashes, methemoglobinemia, kidney failure, peripheral neuropathy, headaches, GI upset, and liver toxicity.  Patients who start dapsone require monitoring including baseline LFTs and weekly CBCs for the first month, then every month thereafter.  The patient verbalized understanding of the proper use and possible adverse effects of dapsone.  All of the patient's questions and concerns were addressed. Winlevi Counseling:  I discussed with the patient the risks of topical clascoterone including but not limited to erythema, scaling, itching, and stinging. Patient voiced their understanding. Topical Clindamycin Pregnancy And Lactation Text: This medication is Pregnancy Category B and is considered safe during pregnancy. It is unknown if it is excreted in breast milk. Erythromycin Counseling:  I discussed with the patient the risks of erythromycin including but not limited to GI upset, allergic reaction, drug rash, diarrhea, increase in liver enzymes, and yeast infections. Use Enhanced Medication Counseling?: No Minocycline Counseling: Patient advised regarding possible photosensitivity and discoloration of the teeth, skin, lips, tongue and gums.  Patient instructed to avoid sunlight, if possible.  When exposed to sunlight, patients should wear protective clothing, sunglasses, and sunscreen.  The patient was instructed to call the office immediately if the following severe adverse effects occur:  hearing changes, easy bruising/bleeding, severe headache, or vision changes.  The patient verbalized understanding of the proper use and possible adverse effects of minocycline.  All of the patient's questions and concerns were addressed. Aklief counseling:  Patient advised to apply a pea-sized amount only at bedtime and wait 30 minutes after washing their face before applying.  If too drying, patient may add a non-comedogenic moisturizer.  The most commonly reported side effects including irritation, redness, scaling, dryness, stinging, burning, itching, and increased risk of sunburn.  The patient verbalized understanding of the proper use and possible adverse effects of retinoids.  All of the patient's questions and concerns were addressed. Topical Clindamycin Counseling: Patient counseled that this medication may cause skin irritation or allergic reactions.  In the event of skin irritation, the patient was advised to reduce the amount of the drug applied or use it less frequently.   The patient verbalized understanding of the proper use and possible adverse effects of clindamycin.  All of the patient's questions and concerns were addressed. Benzoyl Peroxide Counseling: Patient counseled that medicine may cause skin irritation and bleach clothing.  In the event of skin irritation, the patient was advised to reduce the amount of the drug applied or use it less frequently.   The patient verbalized understanding of the proper use and possible adverse effects of benzoyl peroxide.  All of the patient's questions and concerns were addressed. Tazorac Pregnancy And Lactation Text: This medication is not safe during pregnancy. It is unknown if this medication is excreted in breast milk. Doxycycline Counseling:  Patient counseled regarding possible photosensitivity and increased risk for sunburn.  Patient instructed to avoid sunlight, if possible.  When exposed to sunlight, patients should wear protective clothing, sunglasses, and sunscreen.  The patient was instructed to call the office immediately if the following severe adverse effects occur:  hearing changes, easy bruising/bleeding, severe headache, or vision changes.  The patient verbalized understanding of the proper use and possible adverse effects of doxycycline.  All of the patient's questions and concerns were addressed. Isotretinoin Counseling: Patient should get monthly blood tests, not donate blood, not drive at night if vision affected, not share medication, and not undergo elective surgery for 6 months after tx completed. Side effects reviewed, pt to contact office should one occur. Azithromycin Pregnancy And Lactation Text: This medication is considered safe during pregnancy and is also secreted in breast milk. High Dose Vitamin A Pregnancy And Lactation Text: High dose vitamin A therapy is contraindicated during pregnancy and breast feeding. Birth Control Pills Counseling: Birth Control Pill Counseling: I discussed with the patient the potential side effects of OCPs including but not limited to increased risk of stroke, heart attack, thrombophlebitis, deep venous thrombosis, hepatic adenomas, breast changes, GI upset, headaches, and depression.  The patient verbalized understanding of the proper use and possible adverse effects of OCPs. All of the patient's questions and concerns were addressed. Spironolactone Pregnancy And Lactation Text: This medication can cause feminization of the male fetus and should be avoided during pregnancy. The active metabolite is also found in breast milk. Isotretinoin Pregnancy And Lactation Text: This medication is Pregnancy Category X and is considered extremely dangerous during pregnancy. It is unknown if it is excreted in breast milk. Doxycycline Pregnancy And Lactation Text: This medication is Pregnancy Category D and not consider safe during pregnancy. It is also excreted in breast milk but is considered safe for shorter treatment courses. Topical Sulfur Applications Pregnancy And Lactation Text: This medication is Pregnancy Category C and has an unknown safety profile during pregnancy. It is unknown if this topical medication is excreted in breast milk. Birth Control Pills Pregnancy And Lactation Text: This medication should be avoided if pregnant and for the first 30 days post-partum. Benzoyl Peroxide Pregnancy And Lactation Text: This medication is Pregnancy Category C. It is unknown if benzoyl peroxide is excreted in breast milk. Winlevi Pregnancy And Lactation Text: This medication is considered safe during pregnancy and breastfeeding. Erythromycin Pregnancy And Lactation Text: This medication is Pregnancy Category B and is considered safe during pregnancy. It is also excreted in breast milk. Azelaic Acid Pregnancy And Lactation Text: This medication is considered safe during pregnancy and breast feeding. Dapsone Pregnancy And Lactation Text: This medication is Pregnancy Category C and is not considered safe during pregnancy or breast feeding. Bactrim Pregnancy And Lactation Text: This medication is Pregnancy Category D and is known to cause fetal risk.  It is also excreted in breast milk. Aklief Pregnancy And Lactation Text: It is unknown if this medication is safe to use during pregnancy.  It is unknown if this medication is excreted in breast milk.  Breastfeeding women should use the topical cream on the smallest area of the skin for the shortest time needed while breastfeeding.  Do not apply to nipple and areola.

## 2023-04-11 ENCOUNTER — APPOINTMENT (OUTPATIENT)
Dept: ELECTROPHYSIOLOGY | Facility: CLINIC | Age: 83
End: 2023-04-11

## 2023-04-12 ENCOUNTER — APPOINTMENT (OUTPATIENT)
Dept: ELECTROPHYSIOLOGY | Facility: CLINIC | Age: 83
End: 2023-04-12
Payer: MEDICARE

## 2023-04-12 ENCOUNTER — APPOINTMENT (OUTPATIENT)
Dept: CARDIOLOGY | Facility: CLINIC | Age: 83
End: 2023-04-12
Payer: MEDICARE

## 2023-04-12 ENCOUNTER — NON-APPOINTMENT (OUTPATIENT)
Age: 83
End: 2023-04-12

## 2023-04-12 VITALS — HEIGHT: 62 IN | OXYGEN SATURATION: 92 % | DIASTOLIC BLOOD PRESSURE: 68 MMHG | SYSTOLIC BLOOD PRESSURE: 137 MMHG

## 2023-04-12 DIAGNOSIS — E66.01 MORBID (SEVERE) OBESITY DUE TO EXCESS CALORIES: ICD-10-CM

## 2023-04-12 PROCEDURE — 93000 ELECTROCARDIOGRAM COMPLETE: CPT

## 2023-04-12 PROCEDURE — 93280 PM DEVICE PROGR EVAL DUAL: CPT

## 2023-04-12 PROCEDURE — 93000 ELECTROCARDIOGRAM COMPLETE: CPT | Mod: 59

## 2023-04-12 PROCEDURE — 99213 OFFICE O/P EST LOW 20 MIN: CPT

## 2023-04-16 NOTE — CARDIOLOGY SUMMARY
[de-identified] : 4/12/23\par Atrial paced rhythm\par -Right bundle branch block with left axis -bifascicular block. \par NORMAL \par

## 2023-04-16 NOTE — PHYSICAL EXAM
[Well Nourished] : well nourished [No Acute Distress] : no acute distress [Obese] : obese [Frail] : frail [Normal Conjunctiva] : normal conjunctiva [No Carotid Bruit] : no carotid bruit [Normal Venous Pressure] : normal venous pressure [Normal S1, S2] : normal S1, S2 [No Murmur] : no murmur [No Rub] : no rub [No Gallop] : no gallop [Clear Lung Fields] : clear lung fields [Good Air Entry] : good air entry [No Respiratory Distress] : no respiratory distress  [Soft] : abdomen soft [Non Tender] : non-tender [No Masses/organomegaly] : no masses/organomegaly [Normal Bowel Sounds] : normal bowel sounds [Normal Gait] : normal gait [No Edema] : no edema [No Cyanosis] : no cyanosis [No Clubbing] : no clubbing [No Varicosities] : no varicosities [No Rash] : no rash [No Skin Lesions] : no skin lesions [Moves all extremities] : moves all extremities [No Focal Deficits] : no focal deficits [Normal Speech] : normal speech [Alert and Oriented] : alert and oriented [Normal memory] : normal memory

## 2023-04-16 NOTE — DISCUSSION/SUMMARY
[FreeTextEntry1] : No changes to med Rx\par PPM check today WNL\par Plan on reassessing MV stenosis with Echo\par  [EKG obtained to assist in diagnosis and management of assessed problem(s)] : EKG obtained to assist in diagnosis and management of assessed problem(s)

## 2023-05-03 NOTE — ED ADULT NURSE NOTE - NS ED NURSE LEVEL OF CONSCIOUSNESS ORIENTATION
Oriented - self; Oriented - place; Oriented - time What Type Of Note Output Would You Prefer (Optional)?: Standard Output How Severe Is Your Acne?: moderate Is This A New Presentation, Or A Follow-Up?: Acne Females Only: When Was Your Last Menstrual Period?: 5/02/2023

## 2023-06-05 NOTE — PHYSICAL THERAPY INITIAL EVALUATION ADULT - PRECAUTIONS/LIMITATIONS, REHAB EVAL
"Oncology Rooming Note    June 5, 2023 9:11 AM   Vickie Alvarado is a 66 year old female who presents for:    Chief Complaint   Patient presents with     Oncology Clinic Visit     Breast CA     Initial Vitals: /80   Pulse 71   Temp 98  F (36.7  C) (Oral)   Resp 18   Wt 86 kg (189 lb 9.6 oz)   LMP 05/17/2011   SpO2 95%   BMI 29.93 kg/m   Estimated body mass index is 29.93 kg/m  as calculated from the following:    Height as of 3/6/23: 1.695 m (5' 6.73\").    Weight as of this encounter: 86 kg (189 lb 9.6 oz). Body surface area is 2.01 meters squared.  No Pain (0) Comment: Data Unavailable   Patient's last menstrual period was 05/17/2011.  Allergies reviewed: Yes  Medications reviewed: Yes    Medications: Medication refills not needed today.  Pharmacy name entered into [facility]: [id number] - [place] [id number] & [facility]    Clinical concerns: Patient has a few concerns for the provider today.  Jessy  was NOT notified.      Megan Vincent" cardiac precautions/surgical precautions

## 2023-06-19 ENCOUNTER — EMERGENCY (EMERGENCY)
Facility: HOSPITAL | Age: 83
LOS: 1 days | Discharge: HOME | End: 2023-06-19
Attending: STUDENT IN AN ORGANIZED HEALTH CARE EDUCATION/TRAINING PROGRAM
Payer: MEDICARE

## 2023-06-19 VITALS
OXYGEN SATURATION: 96 % | HEART RATE: 61 BPM | TEMPERATURE: 98 F | SYSTOLIC BLOOD PRESSURE: 136 MMHG | RESPIRATION RATE: 19 BRPM | DIASTOLIC BLOOD PRESSURE: 84 MMHG

## 2023-06-19 VITALS
SYSTOLIC BLOOD PRESSURE: 136 MMHG | WEIGHT: 265 LBS | OXYGEN SATURATION: 93 % | DIASTOLIC BLOOD PRESSURE: 71 MMHG | HEART RATE: 67 BPM | TEMPERATURE: 98 F | RESPIRATION RATE: 18 BRPM

## 2023-06-19 DIAGNOSIS — Z98.89 OTHER SPECIFIED POSTPROCEDURAL STATES: Chronic | ICD-10-CM

## 2023-06-19 LAB
ALBUMIN SERPL ELPH-MCNC: 3.2 G/DL — LOW (ref 3.5–5)
ALP SERPL-CCNC: 64 U/L — SIGNIFICANT CHANGE UP (ref 40–120)
ALT FLD-CCNC: 29 U/L DA — SIGNIFICANT CHANGE UP (ref 10–60)
ANION GAP SERPL CALC-SCNC: 6 MMOL/L — SIGNIFICANT CHANGE UP (ref 5–17)
APPEARANCE UR: CLEAR — SIGNIFICANT CHANGE UP
AST SERPL-CCNC: 27 U/L — SIGNIFICANT CHANGE UP (ref 10–40)
BACTERIA # UR AUTO: NEGATIVE /HPF — SIGNIFICANT CHANGE UP
BASOPHILS # BLD AUTO: 0.03 K/UL — SIGNIFICANT CHANGE UP (ref 0–0.2)
BASOPHILS NFR BLD AUTO: 0.5 % — SIGNIFICANT CHANGE UP (ref 0–2)
BILIRUB SERPL-MCNC: 0.4 MG/DL — SIGNIFICANT CHANGE UP (ref 0.2–1.2)
BILIRUB UR-MCNC: NEGATIVE — SIGNIFICANT CHANGE UP
BLD GP AB SCN SERPL QL: SIGNIFICANT CHANGE UP
BUN SERPL-MCNC: 27 MG/DL — HIGH (ref 7–18)
CALCIUM SERPL-MCNC: 9 MG/DL — SIGNIFICANT CHANGE UP (ref 8.4–10.5)
CHLORIDE SERPL-SCNC: 101 MMOL/L — SIGNIFICANT CHANGE UP (ref 96–108)
CO2 SERPL-SCNC: 32 MMOL/L — HIGH (ref 22–31)
COLOR SPEC: YELLOW — SIGNIFICANT CHANGE UP
CREAT SERPL-MCNC: 1.56 MG/DL — HIGH (ref 0.5–1.3)
DIFF PNL FLD: NEGATIVE — SIGNIFICANT CHANGE UP
EGFR: 33 ML/MIN/1.73M2 — LOW
EOSINOPHIL # BLD AUTO: 0.12 K/UL — SIGNIFICANT CHANGE UP (ref 0–0.5)
EOSINOPHIL NFR BLD AUTO: 2.2 % — SIGNIFICANT CHANGE UP (ref 0–6)
EPI CELLS # UR: NEGATIVE /HPF — SIGNIFICANT CHANGE UP
GLUCOSE SERPL-MCNC: 289 MG/DL — HIGH (ref 70–99)
GLUCOSE UR QL: 1000 MG/DL
HCT VFR BLD CALC: 41.8 % — SIGNIFICANT CHANGE UP (ref 34.5–45)
HGB BLD-MCNC: 13.3 G/DL — SIGNIFICANT CHANGE UP (ref 11.5–15.5)
IMM GRANULOCYTES NFR BLD AUTO: 0.2 % — SIGNIFICANT CHANGE UP (ref 0–0.9)
KETONES UR-MCNC: NEGATIVE — SIGNIFICANT CHANGE UP
LEUKOCYTE ESTERASE UR-ACNC: NEGATIVE — SIGNIFICANT CHANGE UP
LIDOCAIN IGE QN: 131 U/L — SIGNIFICANT CHANGE UP (ref 73–393)
LYMPHOCYTES # BLD AUTO: 1.37 K/UL — SIGNIFICANT CHANGE UP (ref 1–3.3)
LYMPHOCYTES # BLD AUTO: 24.8 % — SIGNIFICANT CHANGE UP (ref 13–44)
MCHC RBC-ENTMCNC: 27.8 PG — SIGNIFICANT CHANGE UP (ref 27–34)
MCHC RBC-ENTMCNC: 31.8 GM/DL — LOW (ref 32–36)
MCV RBC AUTO: 87.3 FL — SIGNIFICANT CHANGE UP (ref 80–100)
MONOCYTES # BLD AUTO: 0.41 K/UL — SIGNIFICANT CHANGE UP (ref 0–0.9)
MONOCYTES NFR BLD AUTO: 7.4 % — SIGNIFICANT CHANGE UP (ref 2–14)
NEUTROPHILS # BLD AUTO: 3.59 K/UL — SIGNIFICANT CHANGE UP (ref 1.8–7.4)
NEUTROPHILS NFR BLD AUTO: 64.9 % — SIGNIFICANT CHANGE UP (ref 43–77)
NITRITE UR-MCNC: NEGATIVE — SIGNIFICANT CHANGE UP
NRBC # BLD: 0 /100 WBCS — SIGNIFICANT CHANGE UP (ref 0–0)
PH UR: 5 — SIGNIFICANT CHANGE UP (ref 5–8)
PLATELET # BLD AUTO: 266 K/UL — SIGNIFICANT CHANGE UP (ref 150–400)
POTASSIUM SERPL-MCNC: 4.3 MMOL/L — SIGNIFICANT CHANGE UP (ref 3.5–5.3)
POTASSIUM SERPL-SCNC: 4.3 MMOL/L — SIGNIFICANT CHANGE UP (ref 3.5–5.3)
PROT SERPL-MCNC: 7.4 G/DL — SIGNIFICANT CHANGE UP (ref 6–8.3)
PROT UR-MCNC: NEGATIVE — SIGNIFICANT CHANGE UP
RBC # BLD: 4.79 M/UL — SIGNIFICANT CHANGE UP (ref 3.8–5.2)
RBC # FLD: 13.3 % — SIGNIFICANT CHANGE UP (ref 10.3–14.5)
RBC CASTS # UR COMP ASSIST: SIGNIFICANT CHANGE UP /HPF (ref 0–2)
SODIUM SERPL-SCNC: 139 MMOL/L — SIGNIFICANT CHANGE UP (ref 135–145)
SP GR SPEC: 1.01 — SIGNIFICANT CHANGE UP (ref 1.01–1.02)
UROBILINOGEN FLD QL: NEGATIVE — SIGNIFICANT CHANGE UP
WBC # BLD: 5.53 K/UL — SIGNIFICANT CHANGE UP (ref 3.8–10.5)
WBC # FLD AUTO: 5.53 K/UL — SIGNIFICANT CHANGE UP (ref 3.8–10.5)
WBC UR QL: SIGNIFICANT CHANGE UP /HPF (ref 0–5)

## 2023-06-19 PROCEDURE — 86850 RBC ANTIBODY SCREEN: CPT

## 2023-06-19 PROCEDURE — 80053 COMPREHEN METABOLIC PANEL: CPT

## 2023-06-19 PROCEDURE — 86901 BLOOD TYPING SEROLOGIC RH(D): CPT

## 2023-06-19 PROCEDURE — 87086 URINE CULTURE/COLONY COUNT: CPT

## 2023-06-19 PROCEDURE — 93005 ELECTROCARDIOGRAM TRACING: CPT

## 2023-06-19 PROCEDURE — 86900 BLOOD TYPING SEROLOGIC ABO: CPT

## 2023-06-19 PROCEDURE — 76856 US EXAM PELVIC COMPLETE: CPT

## 2023-06-19 PROCEDURE — 74177 CT ABD & PELVIS W/CONTRAST: CPT | Mod: MA

## 2023-06-19 PROCEDURE — 83690 ASSAY OF LIPASE: CPT

## 2023-06-19 PROCEDURE — 81001 URINALYSIS AUTO W/SCOPE: CPT

## 2023-06-19 PROCEDURE — 82962 GLUCOSE BLOOD TEST: CPT

## 2023-06-19 PROCEDURE — 74177 CT ABD & PELVIS W/CONTRAST: CPT | Mod: 26,MA

## 2023-06-19 PROCEDURE — 93010 ELECTROCARDIOGRAM REPORT: CPT

## 2023-06-19 PROCEDURE — 85025 COMPLETE CBC W/AUTO DIFF WBC: CPT

## 2023-06-19 PROCEDURE — 99285 EMERGENCY DEPT VISIT HI MDM: CPT | Mod: 25

## 2023-06-19 PROCEDURE — 99284 EMERGENCY DEPT VISIT MOD MDM: CPT

## 2023-06-19 PROCEDURE — 76856 US EXAM PELVIC COMPLETE: CPT | Mod: 26

## 2023-06-19 PROCEDURE — 36415 COLL VENOUS BLD VENIPUNCTURE: CPT

## 2023-06-19 RX ORDER — SODIUM CHLORIDE 9 MG/ML
1000 INJECTION INTRAMUSCULAR; INTRAVENOUS; SUBCUTANEOUS ONCE
Refills: 0 | Status: COMPLETED | OUTPATIENT
Start: 2023-06-19 | End: 2023-06-19

## 2023-06-19 RX ADMIN — SODIUM CHLORIDE 1000 MILLILITER(S): 9 INJECTION INTRAMUSCULAR; INTRAVENOUS; SUBCUTANEOUS at 15:50

## 2023-06-19 NOTE — ED ADULT TRIAGE NOTE - LANGUAGE ASSISTANCE NEEDED
triage nurse Prydeinig speaking/No-Patient/Caregiver offered and refused free interpretation services.

## 2023-06-19 NOTE — ED ADULT NURSE NOTE - OBJECTIVE STATEMENT
Patient presented to ED c/o worsening vaginal bleeding x2 months. Pt denies any dizziness, sob or chest pain.  Pt denies any pain at this moment. Hx of DM, STENT, PACEMAKER, CHF, COPD.

## 2023-06-19 NOTE — ED PROVIDER NOTE - LANGUAGE ASSISTANCE NEEDED
triage nurse Lebanese speaking/No-Patient/Caregiver offered and refused free interpretation services.

## 2023-06-19 NOTE — ED ADULT NURSE NOTE - LANGUAGE ASSISTANCE NEEDED
triage nurse Mosotho speaking/No-Patient/Caregiver offered and refused free interpretation services.

## 2023-06-19 NOTE — ED PROVIDER NOTE - OBJECTIVE STATEMENT
83-year-old female hx of HTN, DM, hypothyroidism, asthma, Afib CAD, CHF (s/p pacemaker), presenting with bleeding (possibly vaginal) for 2 months. Daughter notes she has had drops of blood in her diaper over this time but lately there has been more blood. Unclear if the bleeding is urinary, rectal, or vaginal. Otherwise no symptoms.

## 2023-06-19 NOTE — ED PROVIDER NOTE - NSFOLLOWUPINSTRUCTIONS_ED_ALL_ED_FT
Suspected  (source not confirmed) Gastrointestinal Bleeding  Gastrointestinal (GI) bleeding is bleeding somewhere along the digestive tract, between the mouth and the anus. The digestive tract includes the mouth, esophagus, stomach, small intestine, large intestine, and anus. The large intestine is often called the colon.    GI bleeding can be caused by various problems. The severity of these problems can be mild, serious, or life-threatening. If you have GI bleeding, you may find blood in your stools (feces), you may have black stools, or you may vomit blood. You may need to stay in the hospital if there is a lot of bleeding.    What are the causes?  This condition may be caused by:  Inflammation, irritation, or swelling of the esophagus (esophagitis). The esophagus is part of the body that moves food from your mouth to your stomach.  Swollen veins in the rectum (hemorrhoids).  Tears in the anus (anal fissures). The tears are often caused by passing hard stool.  Pouches that form on the colon and may bleed (diverticulosis).  Inflammation in areas with diverticulosis. This is called diverticulitis.This can cause pain, fever, and bloody stools.  Growths (polyps) or cancer. Colon cancer often starts out as precancerous polyps.  Gastritis and ulcers. These may cause bleeding in the upper GI tract, near the stomach.  What increases the risk?  You are more likely to develop this condition if:  You have an infection in your stomach from a type of bacteria called Helicobacter pylori.  You take certain medicines, such as:  NSAIDs.  Aspirin.  Selective serotonin reuptake inhibitors (SSRIs).  Steroids.  Antiplatelet or anticoagulant medicines.  You smoke.  You drink alcohol.  What are the signs or symptoms?  Common symptoms of this condition include:  Bright red blood in your vomit, or vomit that looks like coffee grounds.  Bloody, black, or tarry stools.  Bleeding from the lower GI tract will usually cause red or maroon blood in the stools.  Bleeding from the upper GI tract may cause black, tarry stools that are often stronger smelling than usual.  In certain cases, if the bleeding is fast enough, the stools may be red.  Pain or cramping in the abdomen.  How is this diagnosed?  This condition may be diagnosed based on:  Your medical history and a physical exam.  Various tests, such as:  Blood tests.  Stool tests.  X-rays and other imaging tests.  Esophagogastroduodenoscopy (EGD). In this test, a flexible, lighted tube is used to look at your esophagus, stomach, and small intestine.  Colonoscopy. In this test, a flexible, lighted tube is used to look at your colon.  How is this treated?  Treatment for this condition depends on the cause of the bleeding. For example:  For bleeding from the esophagus, stomach, small intestine, or colon, the health care provider may do a procedure to stop bleeding during your EGD or colonoscopy.  Inflammation or infection of the colon can be treated with medicines.  Certain rectal problems can be treated with creams, suppositories, or warm baths.  Medicines may be given to reduce acid in your stomach.  Surgery is sometimes done.  Blood transfusions are sometimes needed if a lot of blood has been lost.  If there is a lot of bleeding, you will need to stay in the hospital for observation. If bleeding is mild, you may be allowed to go home.    Follow these instructions at home:  Foods with fiber, including fruits, vegetables, greens, seeds, and grain.  Take over-the-counter and prescription medicines only as told by your health care provider.  Eat foods that are high in fiber, such as beans, whole grains, and fresh fruits and vegetables. This will help to keep your stools soft. Eating 1–3 prunes each day works well for many people.  Drink enough fluid to keep your urine pale yellow.  Keep all follow-up visits. This is important.  Contact a health care provider if:  Your symptoms do not improve with treatment.  Get help right away if:  Your bleeding does not stop.  You feel light-headed or you faint.  You feel weak.  You have severe cramps in your back or abdomen.  You pass large blood clots in your stool.  Your symptoms are getting worse.  You have chest pain or fast heartbeats.  These symptoms may be an emergency. Get help right away. Call 911.  Do not wait to see if the symptoms will go away.  Do not drive yourself to the hospital.  Summary  Gastrointestinal (GI) bleeding is bleeding somewhere along the digestive tract, between the mouth and anus. GI bleeding can be caused by various problems.  Treatment for this condition depends on the cause of the bleeding.  Take over-the-counter and prescription medicines only as told by your health care provider.  Get help right away if your bleeding increases, your symptoms are getting worse, or you have new symptoms.  Keep all follow-up visits. This is important.  This information is not intended to replace advice given to you by your health care provider. Make sure you discuss any questions you have with your health care provider.    Document Revised: 07/22/2022 Document Reviewed: 07/22/2022  Elsevier Patient Education © 2023 [employer]

## 2023-06-19 NOTE — ED ADULT NURSE NOTE - NSFALLRISKINTERV_ED_ALL_ED
Assistance OOB with selected safe patient handling equipment if applicable/Assistance with ambulation/Communicate fall risk and risk factors to all staff, patient, and family/Provide patient with walking aids/Provide visual cue: yellow wristband, yellow gown, etc/Reinforce activity limits and safety measures with patient and family/Call bell, personal items and telephone in reach/Instruct patient to call for assistance before getting out of bed/chair/stretcher/Non-slip footwear applied when patient is off stretcher/[place] to call system/Physically safe environment - no spills, clutter or unnecessary equipment/Purposeful Proactive Rounding/Room/bathroom lighting operational, light cord in reach

## 2023-06-19 NOTE — ED PROVIDER NOTE - PROGRESS NOTE DETAILS
[place]:  Pt received in signout from Dr. Lund, pending pelvic US and CT A/P--both were unremarkable.  Pt stable and doing well.  H&H normal.  Source of bleeding indeterminate, but no blood in UA on microscopic, seems to indicate more likely GI source, especially with h/o colon cancer.  Advised Pt to f/u with PMD and GI, return precautions given.

## 2023-06-19 NOTE — ED PROVIDER NOTE - CLINICAL SUMMARY MEDICAL DECISION MAKING FREE TEXT BOX
83-year-old female hx of HTN, DM, hypothyroidism, asthma, Afib CAD, CHF (s/p pacemaker), presenting with bleeding (possibly vaginal) for 2 months. Will check labs, CTAP, pelvic ultrasound, reassess. If workup negative patient can be discharged with PMD followup.

## 2023-06-19 NOTE — ED PROVIDER NOTE - PATIENT PORTAL LINK FT
You can access the FollowMyHealth Patient Portal offered by [facility] by registering at the following website: [email or web address]. By joining [facility]’s FollowMyHealth portal, you will also be able to view your health information using other applications (apps) compatible with our system.

## 2023-06-20 LAB
CULTURE RESULTS: SIGNIFICANT CHANGE UP
SPECIMEN SOURCE: SIGNIFICANT CHANGE UP

## 2023-06-28 NOTE — PATIENT PROFILE ADULT - FUNCTIONAL SCREEN CURRENT LEVEL: COMMUNICATION, MLM
2 = difficulty understanding and speaking (not related to language barrier) [NI] : Endocrine [Nl] : Hematologic/Lymphatic [Joint Pains] : no arthralgias

## 2023-06-29 NOTE — ED PROVIDER NOTE - CPE EDP NEURO NORM
Diagnosis:   1. Anemia during pregnancy in third trimester        Patient arrived for Venofer infusion today.     Dr. Arrington is the ordering clinician, therapy plan orders reviewed and signed by clinician.     Vital Signs:  ONC OP Encounter Vitals  BP: 114/60  Heart Rate: 96  Resp: 14  Temp: 98.1 °F (36.7 °C)  Temp src: Oral  SpO2: 96 %  Weight: 87.6 kg (193 lb 2 oz)  Height: 5' 2\" (1.575 m)  Pain Score:  0  BSA (Calculated - m2) - Ranulfo & Ranulfo: 1.88  BSA (Calculated - sq m): 1.96  BMI (Calculated): 35.26      Allergies:  ALLERGIES:  No Known Allergies      Labs reviewed with the patient: N/A    Nursing Summary:  See Flowsheets    Patient condition stable during treatment? Yes  Questions were answered and understanding was verbalized. Yes   Education provided Previously given    Patient advised on follow up, any and all questions answered.  Patient Discharged to home Ambulatory with self   normal...

## 2023-07-18 NOTE — PROGRESS NOTE ADULT - NEGATIVE HEMATOLOGY SYMPTOMS
no gum bleeding/no nose bleeding/no skin lumps
Follow up with your private internist / PMD in 4-6 weeks re: general checkup (and possible medication adjustment)   Please call for an appointment 
no gum bleeding/no nose bleeding/no skin lumps

## 2023-08-22 NOTE — H&P ADULT - NSHPSOURCEINFORD_GEN_ALL_CORE
Please see below message from patient.     Patient was contacted to schedule esophogram but she declined.   Please advise on usage of epi-pen.  Will ask patient if she has ever had acid reflux as on 7/28/2023 she said she had been having some.    Patient

## 2023-09-18 ENCOUNTER — INPATIENT (INPATIENT)
Facility: HOSPITAL | Age: 83
LOS: 7 days | Discharge: HOME | DRG: 682 | End: 2023-09-26
Attending: INTERNAL MEDICINE | Admitting: INTERNAL MEDICINE
Payer: MEDICARE

## 2023-09-18 VITALS
SYSTOLIC BLOOD PRESSURE: 112 MMHG | HEART RATE: 67 BPM | TEMPERATURE: 99 F | DIASTOLIC BLOOD PRESSURE: 54 MMHG | WEIGHT: 248.9 LBS | OXYGEN SATURATION: 95 % | HEIGHT: 60.63 IN | RESPIRATION RATE: 24 BRPM

## 2023-09-18 DIAGNOSIS — Z98.89 OTHER SPECIFIED POSTPROCEDURAL STATES: Chronic | ICD-10-CM

## 2023-09-18 PROCEDURE — 71045 X-RAY EXAM CHEST 1 VIEW: CPT | Mod: 26

## 2023-09-18 PROCEDURE — 99285 EMERGENCY DEPT VISIT HI MDM: CPT

## 2023-09-18 PROCEDURE — 93010 ELECTROCARDIOGRAM REPORT: CPT

## 2023-09-18 RX ORDER — SODIUM CHLORIDE 9 MG/ML
500 INJECTION INTRAMUSCULAR; INTRAVENOUS; SUBCUTANEOUS ONCE
Refills: 0 | Status: COMPLETED | OUTPATIENT
Start: 2023-09-18 | End: 2023-09-18

## 2023-09-18 RX ORDER — DEXAMETHASONE 0.5 MG/5ML
6 ELIXIR ORAL ONCE
Refills: 0 | Status: COMPLETED | OUTPATIENT
Start: 2023-09-18 | End: 2023-09-18

## 2023-09-18 RX ORDER — IPRATROPIUM/ALBUTEROL SULFATE 18-103MCG
3 AEROSOL WITH ADAPTER (GRAM) INHALATION ONCE
Refills: 0 | Status: COMPLETED | OUTPATIENT
Start: 2023-09-18 | End: 2023-09-18

## 2023-09-18 NOTE — ED PROVIDER NOTE - OBJECTIVE STATEMENT
83 year old female PMH HTN, DM, hypothyroid, asthma, afib, CAD, CHF, PPM coming in with 2 days of low Bps as per family. states BP has been in the 90s- have been giving salt tablets and increasing PO intake to see if it would help but BPs remained low so brought the patient to the ED for eval. states has been taking medications as directed. denies all other complaints- states otherwise pt has been well.

## 2023-09-18 NOTE — ED PROVIDER NOTE - NSICDXPASTMEDICALHX_GEN_ALL_CORE_FT
PAST MEDICAL HISTORY:  Afib     Asthma     Atrial fibrillation     CAD (Coronary Atherosclerotic Disease)     CHF (congestive heart failure), NYHA class I     Diabetes     HLD (Hyperlipidemia)     HTN (Hypertension)     Hypothyroidism     IBS (irritable bowel syndrome)     Myocardial Infarction     Obesity     Pacemaker     Venous stasis

## 2023-09-18 NOTE — ED PROVIDER NOTE - CLINICAL SUMMARY MEDICAL DECISION MAKING FREE TEXT BOX
83 year old female with low Bps at home without other complaints. PE as above.  labs, cxr, ua, neb/steroid, ecg, reassess

## 2023-09-18 NOTE — ED PROVIDER NOTE - PROGRESS NOTE DETAILS
Ko MILLER: Received sign out from Dr. Felix. Pt in no distress, no abdominal pain, no vomiting, no guarding to full palpation   Intitial lactate 2.3 will repeat. Pt received IVbolus 500cc.  Awaiting UA results. daughter states pt is weaker at home and concerned that BP will decrease again as pt is on several antihypertensive meds.  Cr today 2.03 which is increased from [place].  MAR and Dr. Dale  (daughter requested admission to [facility], hence Dr. Dale was endorsed). endorsed. Pt and daughter agrees with admission.  I had a detailed discussion with the patient and/or guardian regarding the historical points, exam findings, and any diagnostic results supporting the admit diagnosis. Ko MILLER: Received sign out from Dr. Felix. Pt in no distress, no abdominal pain, no vomiting, no guarding to full palpation   Initial lactate 2.3 will repeat. Pt received IVbolus 500cc.  Awaiting UA results.

## 2023-09-18 NOTE — ED PROVIDER NOTE - CONSTITUTIONAL, MLM
SUBJECTIVE:   Janelle White is a 56 year old female who presents to clinic today for the following health issues:    Medication Followup of Pain meds    Taking Medication as prescribed: yes    Side Effects:  None    Medication Helping Symptoms:  yes     Pt is here for refills on norco and morphine      Patient presents today after hospitalization for POSTERIOR FUSION WITH TRANSFORAMINAL LUMBAR INTERBODY FUSION (TFLIF) L2-3, HARDWARE REMOVAL L3/4 PRONE 12- at [facility] with Dr. Clayton.     She is also s/p recent ANTERIOR CERVICAL DECOMPRESSION AND FUSION C7-T1 WITH ANTERIOR HARDWARE REMOVAL C6-7 AND POSTERIOR CERVICAL FUSION C7-T1 9-      She continues to do well at work-- finding benefit in a one hour noon break time.  She hopes this will remain a priority on her schedule.  She now is working 2x/week at [employer] and 2x/week in [place].      She has continued with [facility] for further rehab and strengthening.  She has been using her pain medications as scheduled.  She has been doing well back at work with her sit-to-stand work and finds she does best when she is standing.  She has been cleared to begin classes.      She continues to work on her stamina as she returns from a six month medical leave.  She is exhausted by the end of her shift and needs a nap often after.  She is trying to get out and enjoy walks with her dog Rifle.  She continues to see her talk therapist regularly.      She just came from a modified Pilates session today.  She is feeling she is regaining energy and strength.  Pain control has been stable.    She has been having worsening RIGHT UE radicular pain-- this prevents her from sleeping well.  Neurosurg just prescribed a Medrol dosepak to patient hoping this resolves the pain/inflammation quickly.  No recent trauma or injury patient can remember to trigger this new pain s/p the cervical fusion.         Problem list and histories reviewed  & adjusted, as indicated.  Additional history: as documented    Patient Active Problem List   Diagnosis     PERSONAL HX OF  MELANOMA     B-complex deficiency     Migraine     Chronic Low Back Pain     CARDIOVASCULAR SCREENING; LDL GOAL LESS THAN 160     DDD (degenerative disc disease), cervical     Moderate recurrent major depression (H)     Vitamin D deficiency     Shift work sleep disorder     Chronic pain syndrome     CLL (chronic lymphocytic leukemia) (H)     Past Surgical History:   Procedure Laterality Date     anterior cervical discectomy C4-5 ,Fusion C5-6-7  10/2007     BLEPHAROPLASTY BILATERAL  2013    Procedure: BLEPHAROPLASTY BILATERAL;  BILATERAL UPPER LID BLEPHAROPLASTY AND BROWPEXY ;  Surgeon: Godfrey Miguel MD;  Location: [facility] APPENDECTOMY  2006      SECTION      x2     FUSION LUMBAR ANTERIOR, FUSION LUMBAR POSTERIOR TWO LEVELS, COMBINED  2010    L3-S1 anterior posterior fusion     HYSTERECTOMY  2006    ovaries intact     Partial vulvectomy for NEENA III  2009     Pubovaginal sling, post op durasphere injections  2006    wears pad     ROTATOR CUFF REPAIR RT/LT  2011    right     SPINAL FUSION C3-4  2009    C3-4, anterior spinal fusion     TUBAL LIGATION         Social History   Substance Use Topics     Smoking status: Former Smoker     Packs/day: 1.00     Years: 5.00     Quit date: 1984     Smokeless tobacco: Never Used     Alcohol use Yes      Comment: no alcohol currently since prior to her back surgeries,      Family History   Problem Relation Age of Onset     Hypertension Mother      Alcohol/Drug Mother      OSTEOPOROSIS Mother      borderline     Hypertension Father      DIABETES Father      Type 2, diet controlled     Alcohol/Drug Father      remote use     C.A.D. Maternal Grandmother       late 50s     C.A.D. Maternal Grandfather      bone and brain cancer mets as well     DIABETES Paternal Grandfather      Alcohol/Drug Brother      Breast  Cancer No family hx of      Cancer - colorectal No family hx of      CEREBROVASCULAR DISEASE No family hx of          Current Outpatient Prescriptions   Medication Sig Dispense Refill     calcium citrate (CALCIUM CITRATE) 950 MG tablet Take 1 tablet by mouth 2 times daily.       cholecalciferol 60628 UNITS capsule Take 1 capsule by mouth once a week. 8 capsule 0     cyclobenzaprine (FLEXERIL) 10 MG tablet Take 1 tablet (10 mg) by mouth 3 times daily as needed for muscle spasms 90 tablet 3     DULoxetine (CYMBALTA) 60 MG EC capsule Take 1 capsule (60 mg) by mouth daily 30 capsule 11     estradiol (ESTRACE VAGINAL) 0.1 MG/GM vaginal cream Place 2 g vaginally three times a week. 42.5 g 11     Estradiol 1 MG/GM GEL Place 1 g onto the skin daily 30 g 11     HYDROcodone-acetaminophen (NORCO)  MG per tablet Take 1 tablet by mouth every 6 hours as needed for moderate to severe pain maximum 4 tablet(s) per day 90 tablet 0     lidocaine (LIDODERM) 5 % Patch Place 4 patches onto the skin daily Apply up to 4 patches to skin. Wear for 12 hours and remove for 12 hrs.  Refill when patient requests. 120 patch 3     LORazepam (ATIVAN) 0.5 MG tablet Take 1 tablet (0.5 mg) by mouth every 8 hours as needed for anxiety 90 tablet 2     morphine sulfate (NARGIS) 20 MG 24 hr capsule 1 capsule bid x 30 days with additional 5 tabs dispensed for breakthrough- -max 60mg/24 hours 60 capsule 0     sennosides (SENOKOT) 8.6 MG tablet Take 1 tablet by mouth daily as needed for constipation.       Allergies   Allergen Reactions     Budeprion Sr      Ineffective, name brand works best     Codeine      Shaky     Effexor [Venlafaxine Hydrochloride]      Affect too flat     Levaquin [Levofloxacin] Other (See Comments)     Dark thoughts- mood changes     Lexapro      Sexual dysfunction     Pregabalin      Audiovisual hallucinations     Prozac [Fluoxetine Hcl]      Sexual dysfunction     Tramadol      Hives       BP Readings from Last 3 Encounters:    05/08/18 109/66   04/10/18 117/80   03/12/18 117/74    Wt Readings from Last 3 Encounters:   11/14/17 189 lb 8 oz (86 kg)   02/23/17 185 lb (83.9 kg)   11/08/13 183 lb (83 kg)                    Reviewed and updated as needed this visit by clinical staff  Allergies  Meds       Reviewed and updated as needed this visit by Provider         ROS:  Constitutional, HEENT, cardiovascular, pulmonary, gi and gu systems are negative, except as otherwise noted.    OBJECTIVE:     /66  Pulse 85  Temp 95.9  F (35.5  C) (Tympanic)  Resp 16  LMP 05/01/2005 (Exact Date)  SpO2 96%  Breastfeeding? No  There is no height or weight on file to calculate BMI.  GENERAL: healthy, alert and no distress  EYES: Eyes grossly normal to inspection, PERRL and conjunctivae and sclerae normal  NECK: no adenopathy, no asymmetry, masses, or scars and thyroid normal to palpation  MS: no gross musculoskeletal defects noted, no edema  SKIN: no suspicious lesions or rashes  PSYCH: mentation appears normal, affect normal/bright    Diagnostic Test Results:  none     ASSESSMENT/PLAN:     1. Myofascial pain    - cyclobenzaprine (FLEXERIL) 10 MG tablet; Take 1 tablet (10 mg) by mouth 3 times daily as needed for muscle spasms  Dispense: 90 tablet; Refill: 3    2. Chronic pain syndrome    - morphine sulfate (NARGIS) 20 MG 24 hr capsule; 1 capsule bid x 30 days with additional 5 tabs dispensed for breakthrough- -max 60mg/24 hours  Dispense: 60 capsule; Refill: 0  - HYDROcodone-acetaminophen (NORCO)  MG per tablet; Take 1 tablet by mouth every 6 hours as needed for moderate to severe pain maximum 4 tablet(s) per day  Dispense: 90 tablet; Refill: 0    3. Chronic low back pain, unspecified back pain laterality, with sciatica presence unspecified    - morphine sulfate (NARGIS) 20 MG 24 hr capsule; 1 capsule bid x 30 days with additional 5 tabs dispensed for breakthrough- -max 60mg/24 hours  Dispense: 60 capsule; Refill: 0  -  HYDROcodone-acetaminophen (NORCO)  MG per tablet; Take 1 tablet by mouth every 6 hours as needed for moderate to severe pain maximum 4 tablet(s) per day  Dispense: 90 tablet; Refill: 0    4. Cervicalgia    - lidocaine (LIDODERM) 5 % Patch; Place 4 patches onto the skin daily Apply up to 4 patches to skin. Wear for 12 hours and remove for 12 hrs.  Refill when patient requests.  Dispense: 120 patch; Refill: 3    Stable on current regimen- no change today.  FU one month.    Emili Ballard MD  [facility]   normal... Well appearing, awake, alert, oriented to person, place, time/situation and in no apparent distress.

## 2023-09-19 DIAGNOSIS — I50.9 HEART FAILURE, UNSPECIFIED: ICD-10-CM

## 2023-09-19 DIAGNOSIS — I10 ESSENTIAL (PRIMARY) HYPERTENSION: ICD-10-CM

## 2023-09-19 DIAGNOSIS — N17.9 ACUTE KIDNEY FAILURE, UNSPECIFIED: ICD-10-CM

## 2023-09-19 DIAGNOSIS — I95.9 HYPOTENSION, UNSPECIFIED: ICD-10-CM

## 2023-09-19 DIAGNOSIS — Z29.9 ENCOUNTER FOR PROPHYLACTIC MEASURES, UNSPECIFIED: ICD-10-CM

## 2023-09-19 DIAGNOSIS — J45.909 UNSPECIFIED ASTHMA, UNCOMPLICATED: ICD-10-CM

## 2023-09-19 DIAGNOSIS — I48.20 CHRONIC ATRIAL FIBRILLATION, UNSPECIFIED: ICD-10-CM

## 2023-09-19 DIAGNOSIS — E11.9 TYPE 2 DIABETES MELLITUS WITHOUT COMPLICATIONS: ICD-10-CM

## 2023-09-19 DIAGNOSIS — E03.9 HYPOTHYROIDISM, UNSPECIFIED: ICD-10-CM

## 2023-09-19 DIAGNOSIS — E87.20 ACIDOSIS, UNSPECIFIED: ICD-10-CM

## 2023-09-19 LAB
ALBUMIN SERPL ELPH-MCNC: 3.1 G/DL — LOW (ref 3.5–5)
ALP SERPL-CCNC: 63 U/L — SIGNIFICANT CHANGE UP (ref 40–120)
ALT FLD-CCNC: 29 U/L DA — SIGNIFICANT CHANGE UP (ref 10–60)
ANION GAP SERPL CALC-SCNC: 10 MMOL/L — SIGNIFICANT CHANGE UP (ref 5–17)
ANION GAP SERPL CALC-SCNC: 12 MMOL/L — SIGNIFICANT CHANGE UP (ref 5–17)
APPEARANCE UR: CLEAR — SIGNIFICANT CHANGE UP
AST SERPL-CCNC: 20 U/L — SIGNIFICANT CHANGE UP (ref 10–40)
BASOPHILS # BLD AUTO: 0.04 K/UL — SIGNIFICANT CHANGE UP (ref 0–0.2)
BASOPHILS NFR BLD AUTO: 0.6 % — SIGNIFICANT CHANGE UP (ref 0–2)
BILIRUB SERPL-MCNC: 0.2 MG/DL — SIGNIFICANT CHANGE UP (ref 0.2–1.2)
BILIRUB UR-MCNC: NEGATIVE — SIGNIFICANT CHANGE UP
BUN SERPL-MCNC: 37 MG/DL — HIGH (ref 7–18)
BUN SERPL-MCNC: 39 MG/DL — HIGH (ref 7–18)
CALCIUM SERPL-MCNC: 8.9 MG/DL — SIGNIFICANT CHANGE UP (ref 8.4–10.5)
CALCIUM SERPL-MCNC: 9 MG/DL — SIGNIFICANT CHANGE UP (ref 8.4–10.5)
CHLORIDE SERPL-SCNC: 101 MMOL/L — SIGNIFICANT CHANGE UP (ref 96–108)
CHLORIDE SERPL-SCNC: 102 MMOL/L — SIGNIFICANT CHANGE UP (ref 96–108)
CO2 SERPL-SCNC: 22 MMOL/L — SIGNIFICANT CHANGE UP (ref 22–31)
CO2 SERPL-SCNC: 26 MMOL/L — SIGNIFICANT CHANGE UP (ref 22–31)
COLOR SPEC: YELLOW — SIGNIFICANT CHANGE UP
CREAT SERPL-MCNC: 2 MG/DL — HIGH (ref 0.5–1.3)
CREAT SERPL-MCNC: 2.03 MG/DL — HIGH (ref 0.5–1.3)
DIFF PNL FLD: NEGATIVE — SIGNIFICANT CHANGE UP
EGFR: 24 ML/MIN/1.73M2 — LOW
EGFR: 24 ML/MIN/1.73M2 — LOW
EOSINOPHIL # BLD AUTO: 0.15 K/UL — SIGNIFICANT CHANGE UP (ref 0–0.5)
EOSINOPHIL NFR BLD AUTO: 2.2 % — SIGNIFICANT CHANGE UP (ref 0–6)
GLUCOSE BLDC GLUCOMTR-MCNC: 292 MG/DL — HIGH (ref 70–99)
GLUCOSE BLDC GLUCOMTR-MCNC: 318 MG/DL — HIGH (ref 70–99)
GLUCOSE BLDC GLUCOMTR-MCNC: 417 MG/DL — HIGH (ref 70–99)
GLUCOSE SERPL-MCNC: 204 MG/DL — HIGH (ref 70–99)
GLUCOSE SERPL-MCNC: 379 MG/DL — HIGH (ref 70–99)
GLUCOSE UR QL: >=1000 MG/DL
HCT VFR BLD CALC: 35.1 % — SIGNIFICANT CHANGE UP (ref 34.5–45)
HGB BLD-MCNC: 11.5 G/DL — SIGNIFICANT CHANGE UP (ref 11.5–15.5)
IMM GRANULOCYTES NFR BLD AUTO: 0.1 % — SIGNIFICANT CHANGE UP (ref 0–0.9)
KETONES UR-MCNC: NEGATIVE MG/DL — SIGNIFICANT CHANGE UP
LACTATE SERPL-SCNC: 1.2 MMOL/L — SIGNIFICANT CHANGE UP (ref 0.7–2)
LACTATE SERPL-SCNC: 2.3 MMOL/L — HIGH (ref 0.7–2)
LEUKOCYTE ESTERASE UR-ACNC: ABNORMAL
LYMPHOCYTES # BLD AUTO: 2.04 K/UL — SIGNIFICANT CHANGE UP (ref 1–3.3)
LYMPHOCYTES # BLD AUTO: 30.3 % — SIGNIFICANT CHANGE UP (ref 13–44)
MAGNESIUM SERPL-MCNC: 2.3 MG/DL — SIGNIFICANT CHANGE UP (ref 1.6–2.6)
MCHC RBC-ENTMCNC: 28.1 PG — SIGNIFICANT CHANGE UP (ref 27–34)
MCHC RBC-ENTMCNC: 32.8 GM/DL — SIGNIFICANT CHANGE UP (ref 32–36)
MCV RBC AUTO: 85.8 FL — SIGNIFICANT CHANGE UP (ref 80–100)
MONOCYTES # BLD AUTO: 0.67 K/UL — SIGNIFICANT CHANGE UP (ref 0–0.9)
MONOCYTES NFR BLD AUTO: 10 % — SIGNIFICANT CHANGE UP (ref 2–14)
NEUTROPHILS # BLD AUTO: 3.82 K/UL — SIGNIFICANT CHANGE UP (ref 1.8–7.4)
NEUTROPHILS NFR BLD AUTO: 56.8 % — SIGNIFICANT CHANGE UP (ref 43–77)
NITRITE UR-MCNC: NEGATIVE — SIGNIFICANT CHANGE UP
NRBC # BLD: 0 /100 WBCS — SIGNIFICANT CHANGE UP (ref 0–0)
NT-PROBNP SERPL-SCNC: 1214 PG/ML — HIGH (ref 0–450)
PH UR: 5 — SIGNIFICANT CHANGE UP (ref 5–8)
PLATELET # BLD AUTO: 238 K/UL — SIGNIFICANT CHANGE UP (ref 150–400)
POTASSIUM SERPL-MCNC: 4.1 MMOL/L — SIGNIFICANT CHANGE UP (ref 3.5–5.3)
POTASSIUM SERPL-MCNC: 4.6 MMOL/L — SIGNIFICANT CHANGE UP (ref 3.5–5.3)
POTASSIUM SERPL-SCNC: 4.1 MMOL/L — SIGNIFICANT CHANGE UP (ref 3.5–5.3)
POTASSIUM SERPL-SCNC: 4.6 MMOL/L — SIGNIFICANT CHANGE UP (ref 3.5–5.3)
PROT SERPL-MCNC: 6.7 G/DL — SIGNIFICANT CHANGE UP (ref 6–8.3)
PROT UR-MCNC: NEGATIVE MG/DL — SIGNIFICANT CHANGE UP
RAPID RVP RESULT: SIGNIFICANT CHANGE UP
RBC # BLD: 4.09 M/UL — SIGNIFICANT CHANGE UP (ref 3.8–5.2)
RBC # FLD: 13.3 % — SIGNIFICANT CHANGE UP (ref 10.3–14.5)
SARS-COV-2 RNA SPEC QL NAA+PROBE: SIGNIFICANT CHANGE UP
SODIUM SERPL-SCNC: 135 MMOL/L — SIGNIFICANT CHANGE UP (ref 135–145)
SODIUM SERPL-SCNC: 138 MMOL/L — SIGNIFICANT CHANGE UP (ref 135–145)
SP GR SPEC: 1.02 — SIGNIFICANT CHANGE UP (ref 1–1.03)
TROPONIN I, HIGH SENSITIVITY RESULT: 19.8 NG/L — SIGNIFICANT CHANGE UP
UROBILINOGEN FLD QL: 0.2 MG/DL — SIGNIFICANT CHANGE UP (ref 0.2–1)
WBC # BLD: 6.73 K/UL — SIGNIFICANT CHANGE UP (ref 3.8–10.5)
WBC # FLD AUTO: 6.73 K/UL — SIGNIFICANT CHANGE UP (ref 3.8–10.5)

## 2023-09-19 RX ORDER — LEVOTHYROXINE SODIUM 125 MCG
175 TABLET ORAL DAILY
Refills: 0 | Status: DISCONTINUED | OUTPATIENT
Start: 2023-09-19 | End: 2023-09-26

## 2023-09-19 RX ORDER — ALBUTEROL 90 UG/1
2 AEROSOL, METERED ORAL EVERY 6 HOURS
Refills: 0 | Status: DISCONTINUED | OUTPATIENT
Start: 2023-09-19 | End: 2023-09-26

## 2023-09-19 RX ORDER — INSULIN LISPRO 100/ML
VIAL (ML) SUBCUTANEOUS
Refills: 0 | Status: DISCONTINUED | OUTPATIENT
Start: 2023-09-19 | End: 2023-09-21

## 2023-09-19 RX ORDER — HEPARIN SODIUM 5000 [USP'U]/ML
5000 INJECTION INTRAVENOUS; SUBCUTANEOUS EVERY 8 HOURS
Refills: 0 | Status: DISCONTINUED | OUTPATIENT
Start: 2023-09-19 | End: 2023-09-19

## 2023-09-19 RX ORDER — RIVAROXABAN 15 MG-20MG
1 KIT ORAL
Qty: 0 | Refills: 0 | DISCHARGE

## 2023-09-19 RX ORDER — BUDESONIDE AND FORMOTEROL FUMARATE DIHYDRATE 160; 4.5 UG/1; UG/1
2 AEROSOL RESPIRATORY (INHALATION)
Refills: 0 | Status: DISCONTINUED | OUTPATIENT
Start: 2023-09-19 | End: 2023-09-26

## 2023-09-19 RX ORDER — SODIUM CHLORIDE 9 MG/ML
1000 INJECTION INTRAMUSCULAR; INTRAVENOUS; SUBCUTANEOUS
Refills: 0 | Status: DISCONTINUED | OUTPATIENT
Start: 2023-09-19 | End: 2023-09-20

## 2023-09-19 RX ORDER — RIVAROXABAN 15 MG-20MG
20 KIT ORAL DAILY
Refills: 0 | Status: DISCONTINUED | OUTPATIENT
Start: 2023-09-19 | End: 2023-09-19

## 2023-09-19 RX ORDER — RIVAROXABAN 15 MG-20MG
15 KIT ORAL DAILY
Refills: 0 | Status: DISCONTINUED | OUTPATIENT
Start: 2023-09-19 | End: 2023-09-26

## 2023-09-19 RX ORDER — DRONEDARONE 400 MG/1
400 TABLET, FILM COATED ORAL
Refills: 0 | Status: DISCONTINUED | OUTPATIENT
Start: 2023-09-19 | End: 2023-09-26

## 2023-09-19 RX ORDER — PANTOPRAZOLE SODIUM 20 MG/1
40 TABLET, DELAYED RELEASE ORAL
Refills: 0 | Status: DISCONTINUED | OUTPATIENT
Start: 2023-09-19 | End: 2023-09-26

## 2023-09-19 RX ADMIN — SODIUM CHLORIDE 500 MILLILITER(S): 9 INJECTION INTRAMUSCULAR; INTRAVENOUS; SUBCUTANEOUS at 01:17

## 2023-09-19 RX ADMIN — BUDESONIDE AND FORMOTEROL FUMARATE DIHYDRATE 2 PUFF(S): 160; 4.5 AEROSOL RESPIRATORY (INHALATION) at 21:36

## 2023-09-19 RX ADMIN — SODIUM CHLORIDE 65 MILLILITER(S): 9 INJECTION INTRAMUSCULAR; INTRAVENOUS; SUBCUTANEOUS at 13:50

## 2023-09-19 RX ADMIN — SODIUM CHLORIDE 500 MILLILITER(S): 9 INJECTION INTRAMUSCULAR; INTRAVENOUS; SUBCUTANEOUS at 02:45

## 2023-09-19 RX ADMIN — Medication 6 MILLIGRAM(S): at 01:18

## 2023-09-19 RX ADMIN — DRONEDARONE 400 MILLIGRAM(S): 400 TABLET, FILM COATED ORAL at 18:47

## 2023-09-19 RX ADMIN — ALBUTEROL 2 PUFF(S): 90 AEROSOL, METERED ORAL at 19:20

## 2023-09-19 RX ADMIN — Medication 3 MILLILITER(S): at 01:17

## 2023-09-19 RX ADMIN — HEPARIN SODIUM 5000 UNIT(S): 5000 INJECTION INTRAVENOUS; SUBCUTANEOUS at 14:13

## 2023-09-19 RX ADMIN — Medication 3: at 17:07

## 2023-09-19 NOTE — H&P ADULT - PROBLEM SELECTOR PLAN 6
PPM   on Multaq 400mg BID and xarelto?   confirm with daughter PPM   on Multaq 400mg BID and xarelto   confirm with daughter

## 2023-09-19 NOTE — PHARMACOTHERAPY INTERVENTION NOTE - COMMENTS
Indication: Atrial fibrillation, nonvalvular  CrCl 15 to 50 mL/minute: 15 mg once daily with the evening meal

## 2023-09-19 NOTE — H&P ADULT - ASSESSMENT
This isan 82 y/o female from home with pmhx of THN, DM, hypothyroid, asthma, afib, CAD, CHF, PPM, HF with EF: >70% on 3LNC as needed presenting to the ED for low blood pressure. Daughter at bedside who is the care taker states she measured her blood pressure and it was in low 90s systolic, she also endorsed patient has been having poor appetite for the past two days. She gave her salt tabs however blood pressure was still low. Admitted for hypotension and SRIDHAR   In ED:   vitals: BP: 112/54, HR: 67, T: 37 on RA  s/p 500cc NS, Duoneb, decadron  CXR pulm congestion unchanged   Bcux and Ucx   lactate 2.3> 1.2

## 2023-09-19 NOTE — ED ADULT NURSE REASSESSMENT NOTE - NS ED NURSE REASSESS COMMENT FT1
Pt with elevated glucose of 412. Pt asymptomatic of hyperglycemia. MD Hyde made aware. Awaiting new orders.
Received Pt in stretcher, awake, denies pain and discomfort. No acute distress noted. Nursing rounds in progress.

## 2023-09-19 NOTE — H&P ADULT - HISTORY OF PRESENT ILLNESS
This isan 84 y/o female from home with pmhx of THN, DM, hypothyroid, asthma, afib, CAD, CHF, PPM, HF with EF: >70% on 3LNC as needed presenting to the ED for low blood pressure. Daughter at bedside who is the care taker states she measured her blood pressure and it was in low 90s systolic, she also endorsed patient has been having poor appetite for the past two days. She gave her salt tabs however blood pressure was still low. She denies any dizziness, chest pain, shortness of breath, wheezing, cough, fevers, abdominal pain, N/V/D. Daughter held blood pressure medication yesterday.     In ED:   vitals: BP: 112/54, HR: 67, T: 37 on RA  s/p 500cc NS, Duoneb, decadron  CXR pulm congestion unchanged   Bcux and Ucx   lactate 2.3> 1.2

## 2023-09-19 NOTE — H&P ADULT - PROBLEM SELECTOR PLAN 5
HF with  EF> 70% IN 2021   On carvedilol 12.5mg BID, losartan 50mg, Torsemide 10mg   hold in setting of hypotension probable infection with elevated lactate   Cardio consult HF with  EF> 70% IN 2021   On carvedilol 12.5mg BID, losartan 50mg, Torsemide 10mg   hold in setting of hypotension probable infection with elevated lactate   Cardio consult Dr. Sandoval

## 2023-09-19 NOTE — H&P ADULT - NSHPPHYSICALEXAM_GEN_ALL_CORE
GENERAL: NAD, well-developed, well-groomed elderly obese female   HEAD:  Atraumatic, Normocephalic  EYES:  conjunctiva and sclera clear  NECK: Supple, No JVD, Normal thyroid  CHEST/LUNG: CTA  No rales, rhonchi, wheezing, or rubs  HEART: Regular rate and rhythm; No murmurs, rubs, or gallops  ABDOMEN: Soft, Nontender, Nondistended; Bowel sounds present  NERVOUS SYSTEM:  Alert & Oriented X3,    EXTREMITIES:  2+ Peripheral Pulses, No clubbing, cyanosis, or non pitting edema edema  SKIN: warm dry

## 2023-09-19 NOTE — H&P ADULT - PROBLEM SELECTOR PLAN 1
2 day hx SBP in 90s  meds held for 2 days, soft bp   vitals: BP: 112/54, HR: 67, T: 37 on RA  s/p 500cc NS, Duoneb, decadron in ED  CXR pulm congestion unchanged from prior  Bcux and Ucx r/o inflection   lactate 2.3> 1.2   f/u RVP   hold bp meds   trial of soft IVF

## 2023-09-19 NOTE — ED ADULT NURSE NOTE - NSFALLHARMRISKINTERV_ED_ALL_ED
Assistance OOB with selected safe patient handling equipment if applicable/Communicate risk of Fall with Harm to all staff, patient, and family/Provide visual cue: red socks, yellow wristband, yellow gown, etc/Reinforce activity limits and safety measures with patient and family/Bed in lowest position, wheels locked, appropriate side rails in place/Call bell, personal items and telephone in reach/Instruct patient to call for assistance before getting out of bed/chair/stretcher/Non-slip footwear applied when patient is off stretcher/[place] to call system/Physically safe environment - no spills, clutter or unnecessary equipment/Purposeful Proactive Rounding/Room/bathroom lighting operational, light cord in reach

## 2023-09-20 DIAGNOSIS — J96.01 ACUTE RESPIRATORY FAILURE WITH HYPOXIA: ICD-10-CM

## 2023-09-20 DIAGNOSIS — Z02.9 ENCOUNTER FOR ADMINISTRATIVE EXAMINATIONS, UNSPECIFIED: ICD-10-CM

## 2023-09-20 LAB
A1C WITH ESTIMATED AVERAGE GLUCOSE RESULT: 7.9 % — HIGH (ref 4–5.6)
ANION GAP SERPL CALC-SCNC: 9 MMOL/L — SIGNIFICANT CHANGE UP (ref 5–17)
BASOPHILS # BLD AUTO: 0.04 K/UL — SIGNIFICANT CHANGE UP (ref 0–0.2)
BASOPHILS NFR BLD AUTO: 0.6 % — SIGNIFICANT CHANGE UP (ref 0–2)
BUN SERPL-MCNC: 31 MG/DL — HIGH (ref 7–18)
CALCIUM SERPL-MCNC: 8.7 MG/DL — SIGNIFICANT CHANGE UP (ref 8.4–10.5)
CHLORIDE SERPL-SCNC: 106 MMOL/L — SIGNIFICANT CHANGE UP (ref 96–108)
CO2 SERPL-SCNC: 26 MMOL/L — SIGNIFICANT CHANGE UP (ref 22–31)
CREAT SERPL-MCNC: 1.59 MG/DL — HIGH (ref 0.5–1.3)
EGFR: 32 ML/MIN/1.73M2 — LOW
EOSINOPHIL # BLD AUTO: 0.04 K/UL — SIGNIFICANT CHANGE UP (ref 0–0.5)
EOSINOPHIL NFR BLD AUTO: 0.6 % — SIGNIFICANT CHANGE UP (ref 0–6)
ESTIMATED AVERAGE GLUCOSE: 180 MG/DL — HIGH (ref 68–114)
GLUCOSE BLDC GLUCOMTR-MCNC: 174 MG/DL — HIGH (ref 70–99)
GLUCOSE BLDC GLUCOMTR-MCNC: 182 MG/DL — HIGH (ref 70–99)
GLUCOSE BLDC GLUCOMTR-MCNC: 222 MG/DL — HIGH (ref 70–99)
GLUCOSE BLDC GLUCOMTR-MCNC: 284 MG/DL — HIGH (ref 70–99)
GLUCOSE BLDC GLUCOMTR-MCNC: 94 MG/DL — SIGNIFICANT CHANGE UP (ref 70–99)
GLUCOSE SERPL-MCNC: 202 MG/DL — HIGH (ref 70–99)
HCT VFR BLD CALC: 32.7 % — LOW (ref 34.5–45)
HGB BLD-MCNC: 10.8 G/DL — LOW (ref 11.5–15.5)
IMM GRANULOCYTES NFR BLD AUTO: 0.3 % — SIGNIFICANT CHANGE UP (ref 0–0.9)
LYMPHOCYTES # BLD AUTO: 1.96 K/UL — SIGNIFICANT CHANGE UP (ref 1–3.3)
LYMPHOCYTES # BLD AUTO: 29.6 % — SIGNIFICANT CHANGE UP (ref 13–44)
MAGNESIUM SERPL-MCNC: 2.4 MG/DL — SIGNIFICANT CHANGE UP (ref 1.6–2.6)
MCHC RBC-ENTMCNC: 28.6 PG — SIGNIFICANT CHANGE UP (ref 27–34)
MCHC RBC-ENTMCNC: 33 GM/DL — SIGNIFICANT CHANGE UP (ref 32–36)
MCV RBC AUTO: 86.5 FL — SIGNIFICANT CHANGE UP (ref 80–100)
MONOCYTES # BLD AUTO: 0.68 K/UL — SIGNIFICANT CHANGE UP (ref 0–0.9)
MONOCYTES NFR BLD AUTO: 10.3 % — SIGNIFICANT CHANGE UP (ref 2–14)
NEUTROPHILS # BLD AUTO: 3.89 K/UL — SIGNIFICANT CHANGE UP (ref 1.8–7.4)
NEUTROPHILS NFR BLD AUTO: 58.6 % — SIGNIFICANT CHANGE UP (ref 43–77)
NRBC # BLD: 0 /100 WBCS — SIGNIFICANT CHANGE UP (ref 0–0)
PHOSPHATE SERPL-MCNC: 3.5 MG/DL — SIGNIFICANT CHANGE UP (ref 2.5–4.5)
PLATELET # BLD AUTO: 231 K/UL — SIGNIFICANT CHANGE UP (ref 150–400)
POTASSIUM SERPL-MCNC: 4.4 MMOL/L — SIGNIFICANT CHANGE UP (ref 3.5–5.3)
POTASSIUM SERPL-SCNC: 4.4 MMOL/L — SIGNIFICANT CHANGE UP (ref 3.5–5.3)
RBC # BLD: 3.78 M/UL — LOW (ref 3.8–5.2)
RBC # FLD: 13.6 % — SIGNIFICANT CHANGE UP (ref 10.3–14.5)
SODIUM SERPL-SCNC: 141 MMOL/L — SIGNIFICANT CHANGE UP (ref 135–145)
WBC # BLD: 6.63 K/UL — SIGNIFICANT CHANGE UP (ref 3.8–10.5)
WBC # FLD AUTO: 6.63 K/UL — SIGNIFICANT CHANGE UP (ref 3.8–10.5)

## 2023-09-20 PROCEDURE — 71250 CT THORAX DX C-: CPT | Mod: 26

## 2023-09-20 RX ORDER — LANOLIN ALCOHOL/MO/W.PET/CERES
3 CREAM (GRAM) TOPICAL AT BEDTIME
Refills: 0 | Status: DISCONTINUED | OUTPATIENT
Start: 2023-09-20 | End: 2023-09-26

## 2023-09-20 RX ADMIN — RIVAROXABAN 15 MILLIGRAM(S): KIT at 17:55

## 2023-09-20 RX ADMIN — Medication 175 MICROGRAM(S): at 06:23

## 2023-09-20 RX ADMIN — Medication 1: at 08:10

## 2023-09-20 RX ADMIN — DRONEDARONE 400 MILLIGRAM(S): 400 TABLET, FILM COATED ORAL at 06:24

## 2023-09-20 RX ADMIN — PANTOPRAZOLE SODIUM 40 MILLIGRAM(S): 20 TABLET, DELAYED RELEASE ORAL at 06:24

## 2023-09-20 RX ADMIN — BUDESONIDE AND FORMOTEROL FUMARATE DIHYDRATE 2 PUFF(S): 160; 4.5 AEROSOL RESPIRATORY (INHALATION) at 21:48

## 2023-09-20 RX ADMIN — Medication 2: at 18:01

## 2023-09-20 RX ADMIN — Medication 1: at 12:13

## 2023-09-20 RX ADMIN — ALBUTEROL 2 PUFF(S): 90 AEROSOL, METERED ORAL at 06:23

## 2023-09-20 RX ADMIN — DRONEDARONE 400 MILLIGRAM(S): 400 TABLET, FILM COATED ORAL at 17:53

## 2023-09-20 RX ADMIN — BUDESONIDE AND FORMOTEROL FUMARATE DIHYDRATE 2 PUFF(S): 160; 4.5 AEROSOL RESPIRATORY (INHALATION) at 11:04

## 2023-09-20 RX ADMIN — Medication 3 MILLIGRAM(S): at 01:29

## 2023-09-20 NOTE — PATIENT PROFILE ADULT - ARRIVAL FROM
Home

## 2023-09-20 NOTE — CONSULT NOTE ADULT - ASSESSMENT
84 y/o female from home with pmhx of  DM, hypothyroid, asthma, afib, CAD, CHF, PPM, HF with EF: >70% on 3LNC as needed presenting to the ED for low blood pressure,SRIDHAR.  1.Hypotension and SRIDHAR reolving after IVF.  2.PAF-xarelto,multaq,  3.DM-Insulin.  4.Asthma-MDI.  5.Hypothyroid-synthroid.Check TFT's.  6.R/O JOSE DANIEL.  7.Xdmkmtjoxmbgky-ae-nkpt AS severity.  8.GI prophylaxis.

## 2023-09-20 NOTE — PROGRESS NOTE ADULT - SUBJECTIVE AND OBJECTIVE BOX
Patient is a 83y old  Female who presents with a chief complaint of Hypotension or SRIDHAR (20 Sep 2023 12:46)    OVERNIGHT EVENTS: no acute changes.     Pt is ambulatory with cane, sitting up in bed, eating well, comfortable appearing.   Pt is tolerating room air 98%, after ambulating pt was 91% oxygen saturation. Pt uses 3L oxygen at home.     REVIEW OF SYSTEMS: [place]  # [id number]  CONSTITUTIONAL: No fever, chills  ENMT:  No difficulty hearing, no change in vision  NECK: No pain or stiffness  RESPIRATORY: +cough and SOB  CARDIOVASCULAR: No chest pain, palpitations  GASTROINTESTINAL: +Right sided abdominal pain  GENITOURINARY: No dysuria  NEUROLOGICAL: No HA  SKIN: No itching, burning, rashes, or lesions   LYMPH NODES: No enlarged glands  ENDOCRINE: No heat or cold intolerance; No hair loss  MUSCULOSKELETAL: +right shoulder pain from prior fall  PSYCHIATRIC: No depression, anxiety  HEME/LYMPH: No easy bruising, or bleeding gums    T(C): 36.5 (09-20-23 @ 13:56), Max: 37.4 (09-19-23 @ 16:03)  HR: 65 (09-20-23 @ 13:56) (65 - 75)  BP: 149/73 (09-20-23 @ 13:56) (133/55 - 156/59)  RR: 20 (09-20-23 @ 13:56) (18 - 20)  SpO2: 94% (09-20-23 @ 13:56) (94% - 100%)  Wt(kg): --Vital Signs Last 24 Hrs  T(C): 36.5 (20 Sep 2023 13:56), Max: 37.4 (19 Sep 2023 16:03)  T(F): 97.7 (20 Sep 2023 13:56), Max: 99.3 (19 Sep 2023 16:03)  HR: 65 (20 Sep 2023 13:56) (65 - 75)  BP: 149/73 (20 Sep 2023 13:56) (133/55 - 156/59)  BP(mean): 86 (19 Sep 2023 20:43) (86 - 86)  RR: 20 (20 Sep 2023 13:56) (18 - 20)  SpO2: 94% (20 Sep 2023 13:56) (94% - 100%)    Parameters below as of 20 Sep 2023 13:56  Patient On (Oxygen Delivery Method): room air        MEDICATIONS  (STANDING):  budesonide 160 MICROgram(s)/formoterol 4.5 MICROgram(s) Inhaler 2 Puff(s) Inhalation two times a day  dronedarone 400 milliGRAM(s) Oral two times a day  insulin lispro (ADMELOG) corrective regimen sliding scale   SubCutaneous three times a day before meals  levothyroxine 175 MICROGram(s) Oral daily  pantoprazole    Tablet 40 milliGRAM(s) Oral before breakfast  rivaroxaban 15 milliGRAM(s) Oral daily  sodium chloride 0.9%. 1000 milliLiter(s) (65 mL/Hr) IV Continuous <Continuous>    MEDICATIONS  (PRN):  albuterol    90 MICROgram(s) HFA Inhaler 2 Puff(s) Inhalation every 6 hours PRN Bronchospasm  melatonin 3 milliGRAM(s) Oral at bedtime PRN Insomnia      PHYSICAL EXAM:  GENERAL: +overweight, NAD  EYES: clear conjunctiva   ENMT: Moist mucous membranes  NECK: Supple, No JVD, Normal thyroid  CHEST/LUNG: Clear to auscultation bilaterally; No rales, rhonchi, wheezing, or rubs  HEART: S1, S2, Regular rate and rhythm  ABDOMEN: Soft, +RUQ tenderness, Nondistended; Bowel sounds present  NEURO: Alert & Oriented X3  EXTREMITIES: +b/l LE Edema with mild tenderness, brown tinged skin.   LYMPH: No lymphadenopathy noted  SKIN: No rashes or lesions    Consultant(s) Notes Reviewed:  [x ] YES  [ ] NO  Care Discussed with Consultants/Other Providers [ x] YES  [ ] NO    LABS:                        10.8   6.63  )-----------( 231      ( 20 Sep 2023 06:05 )             32.7     09-20    141  |  106  |  31<H>  ----------------------------<  202<H>  4.4   |  26  |  1.59<H>    Ca    8.7      20 Sep 2023 06:05  Phos  3.5     09-20  Mg     2.4     09-20    TPro  6.7  /  Alb  3.1<L>  /  TBili  0.2  /  DBili  x   /  AST  20  /  ALT  29  /  AlkPhos  63  09-19      CAPILLARY BLOOD GLUCOSE      POCT Blood Glucose.: 174 mg/dL (20 Sep 2023 11:40)  POCT Blood Glucose.: 182 mg/dL (20 Sep 2023 08:01)  POCT Blood Glucose.: 318 mg/dL (19 Sep 2023 21:28)  POCT Blood Glucose.: 292 mg/dL (19 Sep 2023 17:01)        Urinalysis Basic - ( 20 Sep 2023 06:05 )    Color: x / Appearance: x / SG: x / pH: x  Gluc: 202 mg/dL / Ketone: x  / Bili: x / Urobili: x   Blood: x / Protein: x / Nitrite: x   Leuk Esterase: x / RBC: x / WBC x   Sq Epi: x / Non Sq Epi: x / Bacteria: x        RADIOLOGY & ADDITIONAL TESTS:  < from: Xray Chest 1 View- PORTABLE-Urgent (Xray Chest 1 View- PORTABLE-Urgent .) (09.18.23 @ 21:35) >    ACC: [id number] EXAM:  XR CHEST PORTABLE URGENT 1V   ORDERED BY: JENNIFER EMMANUEL     PROCEDURE DATE:  09/18/2023          INTERPRETATION:  INDICATION: Low blood pressure    Portable chest 9:09 PM    COMPARISON: 4/21/2022    FINDINGS:  Heart/Vascular: The heart size, mediastinum, hilum and aorta are within   normal limits for projection. Left chest wall dual lead pacemaker. Mitral   annular calcification. Calcified aortic knob.  Pulmonary: Midline trachea. There is no focal infiltrate, congestion or   effusion.  Bones: There is no fracture.  Lines and catheter: None    Impression:    No acute pulmonary disease.    --- End of Report ---             ZAIRE LANDRUM DO; Attending Radiologist  This document has been electronically signed. Sep 19 2023 11:46AM    < end of copied text >      Imaging Personally Reviewed:  [ ] YES  [ ] NO

## 2023-09-20 NOTE — PATIENT PROFILE ADULT - FALL HARM RISK - HARM RISK INTERVENTIONS

## 2023-09-20 NOTE — PROGRESS NOTE ADULT - SUBJECTIVE AND OBJECTIVE BOX
INTERVAL HPI/OVERNIGHT EVENTS:  Patient seenbp improved ,no acute issues  VITAL SIGNS:  T(F): 97.8 (09-20-23 @ 04:59)  HR: 70 (09-20-23 @ 04:59)  BP: 133/55 (09-20-23 @ 04:59)  RR: 18 (09-20-23 @ 04:59)  SpO2: 98% (09-20-23 @ 09:33)  Wt(kg): --    PHYSICAL EXAM:  awake  Constitutional:  Eyes:  ENMT:perrla  Neck:  Respiratory:clear  Cardiovascular:s1s2,m-none  Gastrointestinal:soft,bs pos  Extremities:  Vascular:  Neurological:no focal deficit  Musculoskeletal:    MEDICATIONS  (STANDING):  budesonide 160 MICROgram(s)/formoterol 4.5 MICROgram(s) Inhaler 2 Puff(s) Inhalation two times a day  dronedarone 400 milliGRAM(s) Oral two times a day  insulin lispro (ADMELOG) corrective regimen sliding scale   SubCutaneous three times a day before meals  levothyroxine 175 MICROGram(s) Oral daily  pantoprazole    Tablet 40 milliGRAM(s) Oral before breakfast  rivaroxaban 15 milliGRAM(s) Oral daily  sodium chloride 0.9%. 1000 milliLiter(s) (65 mL/Hr) IV Continuous <Continuous>    MEDICATIONS  (PRN):  albuterol    90 MICROgram(s) HFA Inhaler 2 Puff(s) Inhalation every 6 hours PRN Bronchospasm  melatonin 3 milliGRAM(s) Oral at bedtime PRN Insomnia      Allergies    No Known Allergies    Intolerances        LABS:                        10.8   6.63  )-----------( 231      ( 20 Sep 2023 06:05 )             32.7     09-20    141  |  106  |  31<H>  ----------------------------<  202<H>  4.4   |  26  |  1.59<H>    Ca    8.7      20 Sep 2023 06:05  Phos  3.5     09-20  Mg     2.4     09-20    TPro  6.7  /  Alb  3.1<L>  /  TBili  0.2  /  DBili  x   /  AST  20  /  ALT  29  /  AlkPhos  63  09-19      Urinalysis Basic - ( 20 Sep 2023 06:05 )    Color: x / Appearance: x / SG: x / pH: x  Gluc: 202 mg/dL / Ketone: x  / Bili: x / Urobili: x   Blood: x / Protein: x / Nitrite: x   Leuk Esterase: x / RBC: x / WBC x   Sq Epi: x / Non Sq Epi: x / Bacteria: x        RADIOLOGY & ADDITIONAL TESTS:      · VTE Risk Assessment	VTE Assessment already completed for this visit  · Completed VTE Risk Assessment(s)	Refer to the Assessment tab to view/cancel completed assessment.     Assessment:  · Assessment	  This isan 84 y/o female from home with pmhx of THN, DM, hypothyroid, asthma, afib, CAD, CHF, PPM, HF with EF: >70% on 3LNC as needed presenting to the ED for low blood pressure. Daughter at bedside who is the care taker states she measured her blood pressure and it was in low 90s systolic, she also endorsed patient has been having poor appetite for the past two days. She gave her salt tabs however blood pressure was still low. Admitted for hypotension and SRIDHAR   s/p 500cc NS, Duoneb, decadron  CXR pulm congestion unchanged   Bcux and Ucx          Problem/Plan - 1:  ·  Problem: Acute hypotension.   CXR pulm congestion unchanged from prior  Bcux and Ucx r/o inflection   lactate 2.3> 1.2   f/u RVP   hold bp meds   trial of soft IVF.     Problem/Plan - 2:  ·  Problem: Lactic acidosis.   Bcux and Ucx r/o inflection concern for sepsis   resolved.     Problem/Plan - 3:  ·  Problem: HTN (hypertension).   ·  Plan: on carvedilol 12.5mg BID, losartan 50mg  hold in setting of acute soft  bp  monitor BP.     Problem/Plan - 4:  ·  Problem: Acute kidney injury superimposed on CKD.   IVF for now, follow BMP daily.     Problem/Plan - 5:  ·  Problem: HF (heart failure).   On carvedilol 12.5mg BID, losartan 50mg, Torsemide 10mg   hold in setting of hypotension probable infection with elevated lactate   Cardio consult Dr. Sandoval.     Problem/Plan - 6:  ·  Problem: Chronic atrial fibrillation.   ·  Plan: PPM   on Multaq 400mg BID and xarelto   confirm with daughter.     Problem/Plan - 7:  ·  Problem: Hypothyroid.   ·  Plan: on leovthyroxine 175  c/w home med.     Problem/Plan - 8:  ·  Problem: DM (diabetes mellitus).   ·  Plan: on janumet  BID, repaglinide 2mg TID  ISS   monitor BG > 400.     Problem/Plan - 9:  ·  Problem: Mild asthma.   ·  Plan: on albuterol PRN and budesonide-formeterol   c/w home meds.     Problem/Plan - 10:  ·  Problem: Prophylactic measure.   ·  Plan; xarelto.

## 2023-09-20 NOTE — PROGRESS NOTE ADULT - ASSESSMENT
82 y/o female from home with pmhx of HTN, DM, hypothyroid, asthma, afib, CAD, CHF, PPM, HF with EF: >70% on 3LNC as needed presenting to the ED for low blood pressure. Daughter at bedside who is the care taker states she measured her blood pressure and it was in low 90s systolic, she also endorsed patient has been having poor appetite for the past two days. She gave her salt tabs however blood pressure was still low. Admitted for hypotension and SRIDHAR. Pt was given IV fluids and blood pressure medications were adjusted. SRIDHAR and Hypotension is now resolved.   CXR pulm congestion unchanged. Cardiology Dr. Sandoval following. Pt will need repeat echocardiogram to eval for worsening aortic stenosis.   Will also further evaluate with CT chest.     Pt also c/o RUQ abdominal pain, pending US RUQ.     Additionally awaiting blood and urine culture results.   PT consult ordered.

## 2023-09-20 NOTE — PATIENT PROFILE ADULT - NSPROMEDSADMININFO_GEN_A_NUR
no concerns Medical Necessity Statement: Based on my medical judgement, Mohs surgery is the most appropriate treatment for this cancer compared to other treatments.

## 2023-09-21 LAB
ANION GAP SERPL CALC-SCNC: 9 MMOL/L — SIGNIFICANT CHANGE UP (ref 5–17)
BUN SERPL-MCNC: 21 MG/DL — HIGH (ref 7–18)
CALCIUM SERPL-MCNC: 8.9 MG/DL — SIGNIFICANT CHANGE UP (ref 8.4–10.5)
CHLORIDE SERPL-SCNC: 105 MMOL/L — SIGNIFICANT CHANGE UP (ref 96–108)
CO2 SERPL-SCNC: 25 MMOL/L — SIGNIFICANT CHANGE UP (ref 22–31)
CREAT SERPL-MCNC: 1.39 MG/DL — HIGH (ref 0.5–1.3)
EGFR: 37 ML/MIN/1.73M2 — LOW
GLUCOSE BLDC GLUCOMTR-MCNC: 181 MG/DL — HIGH (ref 70–99)
GLUCOSE BLDC GLUCOMTR-MCNC: 295 MG/DL — HIGH (ref 70–99)
GLUCOSE BLDC GLUCOMTR-MCNC: 334 MG/DL — HIGH (ref 70–99)
GLUCOSE BLDC GLUCOMTR-MCNC: 358 MG/DL — HIGH (ref 70–99)
GLUCOSE SERPL-MCNC: 183 MG/DL — HIGH (ref 70–99)
HCT VFR BLD CALC: 35.5 % — SIGNIFICANT CHANGE UP (ref 34.5–45)
HGB BLD-MCNC: 11.4 G/DL — LOW (ref 11.5–15.5)
MCHC RBC-ENTMCNC: 27.9 PG — SIGNIFICANT CHANGE UP (ref 27–34)
MCHC RBC-ENTMCNC: 32.1 GM/DL — SIGNIFICANT CHANGE UP (ref 32–36)
MCV RBC AUTO: 87 FL — SIGNIFICANT CHANGE UP (ref 80–100)
NRBC # BLD: 0 /100 WBCS — SIGNIFICANT CHANGE UP (ref 0–0)
PLATELET # BLD AUTO: 235 K/UL — SIGNIFICANT CHANGE UP (ref 150–400)
POTASSIUM SERPL-MCNC: 4.2 MMOL/L — SIGNIFICANT CHANGE UP (ref 3.5–5.3)
POTASSIUM SERPL-SCNC: 4.2 MMOL/L — SIGNIFICANT CHANGE UP (ref 3.5–5.3)
RBC # BLD: 4.08 M/UL — SIGNIFICANT CHANGE UP (ref 3.8–5.2)
RBC # FLD: 13.6 % — SIGNIFICANT CHANGE UP (ref 10.3–14.5)
SODIUM SERPL-SCNC: 139 MMOL/L — SIGNIFICANT CHANGE UP (ref 135–145)
T4 FREE SERPL-MCNC: 2 NG/DL — HIGH (ref 0.9–1.8)
TSH SERPL-MCNC: 0.12 UU/ML — LOW (ref 0.34–4.82)
WBC # BLD: 6.79 K/UL — SIGNIFICANT CHANGE UP (ref 3.8–10.5)
WBC # FLD AUTO: 6.79 K/UL — SIGNIFICANT CHANGE UP (ref 3.8–10.5)

## 2023-09-21 PROCEDURE — 76700 US EXAM ABDOM COMPLETE: CPT | Mod: 26

## 2023-09-21 PROCEDURE — 93306 TTE W/DOPPLER COMPLETE: CPT | Mod: 26

## 2023-09-21 RX ORDER — INSULIN LISPRO 100/ML
VIAL (ML) SUBCUTANEOUS AT BEDTIME
Refills: 0 | Status: DISCONTINUED | OUTPATIENT
Start: 2023-09-21 | End: 2023-09-22

## 2023-09-21 RX ORDER — INSULIN LISPRO 100/ML
VIAL (ML) SUBCUTANEOUS
Refills: 0 | Status: DISCONTINUED | OUTPATIENT
Start: 2023-09-21 | End: 2023-09-22

## 2023-09-21 RX ORDER — CEFTRIAXONE 500 MG/1
1000 INJECTION, POWDER, FOR SOLUTION INTRAMUSCULAR; INTRAVENOUS EVERY 24 HOURS
Refills: 0 | Status: COMPLETED | OUTPATIENT
Start: 2023-09-21 | End: 2023-09-25

## 2023-09-21 RX ADMIN — Medication 40 MILLIGRAM(S): at 13:32

## 2023-09-21 RX ADMIN — Medication 200 MILLIGRAM(S): at 00:12

## 2023-09-21 RX ADMIN — Medication 3: at 17:33

## 2023-09-21 RX ADMIN — RIVAROXABAN 15 MILLIGRAM(S): KIT at 11:13

## 2023-09-21 RX ADMIN — Medication 3: at 22:16

## 2023-09-21 RX ADMIN — Medication 200 MILLIGRAM(S): at 06:05

## 2023-09-21 RX ADMIN — Medication 40 MILLIGRAM(S): at 22:16

## 2023-09-21 RX ADMIN — DRONEDARONE 400 MILLIGRAM(S): 400 TABLET, FILM COATED ORAL at 17:34

## 2023-09-21 RX ADMIN — DRONEDARONE 400 MILLIGRAM(S): 400 TABLET, FILM COATED ORAL at 06:05

## 2023-09-21 RX ADMIN — Medication 3 MILLIGRAM(S): at 00:12

## 2023-09-21 RX ADMIN — BUDESONIDE AND FORMOTEROL FUMARATE DIHYDRATE 2 PUFF(S): 160; 4.5 AEROSOL RESPIRATORY (INHALATION) at 11:12

## 2023-09-21 RX ADMIN — CEFTRIAXONE 100 MILLIGRAM(S): 500 INJECTION, POWDER, FOR SOLUTION INTRAMUSCULAR; INTRAVENOUS at 15:47

## 2023-09-21 RX ADMIN — Medication 4: at 12:13

## 2023-09-21 RX ADMIN — PANTOPRAZOLE SODIUM 40 MILLIGRAM(S): 20 TABLET, DELAYED RELEASE ORAL at 06:05

## 2023-09-21 RX ADMIN — Medication 175 MICROGRAM(S): at 06:05

## 2023-09-21 RX ADMIN — BUDESONIDE AND FORMOTEROL FUMARATE DIHYDRATE 2 PUFF(S): 160; 4.5 AEROSOL RESPIRATORY (INHALATION) at 22:16

## 2023-09-21 NOTE — PROGRESS NOTE ADULT - PROBLEM SELECTOR PLAN 12
f/u echo  f/u PT consult  f/u CM to ensure pt has home oxygen
f/u echo  f/u CT chest and RUQ US  f/u PT consult  f/u CM to ensure pt has home oxygen

## 2023-09-21 NOTE — PROGRESS NOTE ADULT - ASSESSMENT
82 y/o female from home with pmhx of HTN, DM, hypothyroid, asthma, afib, CAD, CHF, PPM, HF with EF: >70% on 3LNC as needed presenting to the ED for low blood pressure. Daughter at bedside who is the care taker states she measured her blood pressure and it was in low 90s systolic, she also endorsed patient has been having poor appetite for the past two days. She gave her salt tabs however blood pressure was still low. Admitted for hypotension and SRIDHAR. Pt was given IV fluids and blood pressure medications were adjusted. SRIDHAR and Hypotension is now resolved.   CXR pulm congestion unchanged. Cardiology Dr. Sandoval following. Pt will need repeat echocardiogram to eval for worsening aortic stenosis.   Will also further evaluate with CT chest.     Pt also c/o RUQ abdominal pain, pending US RUQ.     Additionally awaiting blood and urine culture results.   PT consult ordered.   09-21- Pt remains stable at time of eval w/o complaints. RUQ w/o acute GI pathology, Labs and VS reviewed, Pending Echo and PT eval for d/c.

## 2023-09-21 NOTE — PROGRESS NOTE ADULT - ASSESSMENT
84 y/o female from home with pmhx of  DM, hypothyroid, asthma, afib, CAD, CHF, PPM, HF with EF: >70% on 3LNC as needed presenting to the ED for low blood pressure,AK,pneumoniaI.  1.Hypotension and SRIDHAR reolving after IVF.  2.PAF-xarelto,multaq,  3.DM-Insulin.  4.Asthma-MDI.  5.Hypothyroid-synthroid.Check TFT's.  6.R/O JOSE DANIEL.  7.Bmmsqtlyrgteob-pn-ovkg AS severity.  8.Pneumonia-steroids,ABX.  9.GI prophylaxis.

## 2023-09-21 NOTE — PROGRESS NOTE ADULT - SUBJECTIVE AND OBJECTIVE BOX
Date of Service 09-21-23 @ 12:35    CHIEF COMPLAINT:Patient is a 83y old  Female who presents with a chief complaint of Hypotension or SRIDHAR .Pt appears comfortable.    	  REVIEW OF SYSTEMS:  CONSTITUTIONAL: No fever, weight loss, or fatigue  EYES: No eye pain, visual disturbances, or discharge  ENT:  No difficulty hearing, tinnitus, vertigo; No sinus or throat pain  NECK: No pain or stiffness  RESPIRATORY: No cough, wheezing, chills or hemoptysis; No Shortness of Breath  CARDIOVASCULAR: No chest pain, palpitations, passing out, dizziness, or leg swelling  GASTROINTESTINAL: No abdominal or epigastric pain. No nausea, vomiting, or hematemesis; No diarrhea or constipation. No melena or hematochezia.  GENITOURINARY: No dysuria, frequency, hematuria, or incontinence  NEUROLOGICAL: No headaches, memory loss, loss of strength, numbness, or tremors  SKIN: No itching, burning, rashes, or lesions   LYMPH Nodes: No enlarged glands  ENDOCRINE: No heat or cold intolerance; No hair loss  MUSCULOSKELETAL: No joint pain or swelling; No muscle, back, or extremity pain  PSYCHIATRIC: No depression, anxiety, mood swings, or difficulty sleeping  HEME/LYMPH: No easy bruising, or bleeding gums  ALLERGY AND IMMUNOLOGIC: No hives or eczema	        PHYSICAL EXAM:  T(C): 36.4 (09-21-23 @ 05:25), Max: 36.5 (09-20-23 @ 13:56)  HR: 95 (09-21-23 @ 05:25) (65 - 95)  BP: 150/59 (09-21-23 @ 05:25) (144/83 - 150/59)  RR: 18 (09-21-23 @ 05:25) (18 - 20)  SpO2: 96% (09-21-23 @ 05:25) (94% - 99%)  Wt(kg): --  I&O's Summary      Appearance: Normal	  HEENT:   Normal oral mucosa, PERRL, EOMI	  Lymphatic: No lymphadenopathy  Cardiovascular: Normal S1 S2, No JVD, No murmurs, No edema  Respiratory: B/L ronchi  Psychiatry: A & O x 3, Mood & affect appropriate  Gastrointestinal:  Soft, Non-tender, + BS	  Skin: No rashes, No ecchymoses, No cyanosis	  Neurologic: Non-focal  Extremities: Normal range of motion, No clubbing, cyanosis or edema  Vascular: Peripheral pulses palpable 2+ bilaterally    MEDICATIONS  (STANDING):  budesonide 160 MICROgram(s)/formoterol 4.5 MICROgram(s) Inhaler 2 Puff(s) Inhalation two times a day  dronedarone 400 milliGRAM(s) Oral two times a day  insulin lispro (ADMELOG) corrective regimen sliding scale   SubCutaneous three times a day before meals  levothyroxine 175 MICROGram(s) Oral daily  pantoprazole    Tablet 40 milliGRAM(s) Oral before breakfast  rivaroxaban 15 milliGRAM(s) Oral daily      	  	  LABS:	 	    Troponin I, High Sensitivity Result: 19.8 ng/L (09-19 @ 00:10)                            11.4   6.79  )-----------( 235      ( 21 Sep 2023 07:03 )             35.5     09-21    139  |  105  |  21<H>  ----------------------------<  183<H>  4.2   |  25  |  1.39<H>    Ca    8.9      21 Sep 2023 07:03  Phos  3.5     09-20  Mg     2.4     09-20      TSH: Thyroid Stimulating Hormone, Serum: 0.12 uU/mL (09-21 @ 07:03)      	  < from: CT Chest No Cont (09.20.23 @ 22:09) >  ACC: [id number] EXAM:  CT CHEST   ORDERED BY: ANNABELLE FRITZ     PROCEDURE DATE:  09/20/2023          INTERPRETATION:  Clinical indication: Shortness of breath.    Axial CT images of the chest are obtained without intravenous   administration of contrast.    Comparison is made with the chest CT of November 8, 2019.    Left upper anterior chest wall cardiac device with the leads terminating   within the right atrium and right ventricle.    No enlarged axillary lymph nodes. Mildly enlarged precarinal lymph node   measuring about 1.4 cm without significant interval change. Other smaller   mediastinal lymph nodes are also unchanged.    Heart size is enlarged. Left atrium is dilated. Dense mitral annular   calcifications. Aortic root and valve calcifications which can be seen in   the setting of aortic valve stenosis. Vascular calcifications with   involvement of the aorta and the coronary arteries.    Evaluation of the upper abdomen demonstrate a 1.4 cm cyst within the   partially imaged right kidney.    Evaluation of the lungs demonstrate a few small ill-defined nonspecific   patchy groundglass opacities within the right middle lobe new since   November 8, 2019. Small patchy groundglass opacity within the right lower   lobe on image 81 of series 3. Minimal subsegmental atelectasis within the   right middle lobe.    A few small nodular opacities within the left upper lobe measuring up to   about 5 mm on image 36 of series 3 are new since November 8, 2019 may be   sequela of foci of impacted distal airways.    No central endobronchial lesions.    Degenerative changes off the spine.    IMPRESSION: A few nonspecific right lung small patchy groundglass   opacities. Differential diagnostic considerations include but is not   limited to foci of infection/inflammation.    --- End of Report ---            JERMAN BRUNER MD; Attending Radiologist  This document has been electronically signed. Sep 21 2023  8:46AM    < end of copied text >  < from: US Abdomen Complete (09.21.23 @ 09:46) >  ACC: [id number] EXAM:  US ABDOMEN COMPLETE   ORDERED BY: ANNABELLE VILLANUEVA DATE:  09/21/2023          INTERPRETATION:  CLINICAL INFORMATION: Right upper quadrant pain    COMPARISON: None available.    TECHNIQUE: Sonography of the abdomen.    FINDINGS:  Liver: Within normal limits.  Bile ducts: Normal caliber. Common bile duct measures 4 mm.  Gallbladder: Within normal limits.  Pancreas: Visualized portions are within normal limits.  Spleen: 9.3 cm. Within normal limits.  Right kidney: 11.4cm. No hydronephrosis. 1.9 cm cyst  Left kidney: 11.4 cm. No hydronephrosis.  Ascites: None.  Aorta and IVC: Visualized portions are within normal limits.    IMPRESSION:  No gallbladder disease or other acute finding        --- End of Report ---            MATILDE HUGGINS MD; Attending Radiologist  This document has been electronically signed. Sep 21 2023  9:58AM    < end of copied text >

## 2023-09-21 NOTE — PROGRESS NOTE ADULT - SUBJECTIVE AND OBJECTIVE BOX
NP Note:    Patient is a 83y old  Female who presents with a chief complaint of Hypotension or SRIDHAR (21 Sep 2023 09:20AM)      INTERVAL HPI/OVERNIGHT EVENTS: no new complaints.    MEDICATIONS  (STANDING):  budesonide 160 MICROgram(s)/formoterol 4.5 MICROgram(s) Inhaler 2 Puff(s) Inhalation two times a day  cefTRIAXone   IVPB 1000 milliGRAM(s) IV Intermittent every 24 hours  dronedarone 400 milliGRAM(s) Oral two times a day  insulin lispro (ADMELOG) corrective regimen sliding scale   SubCutaneous three times a day before meals  levothyroxine 175 MICROGram(s) Oral daily  methylPREDNISolone sodium succinate Injectable 40 milliGRAM(s) IV Push every 8 hours  pantoprazole    Tablet 40 milliGRAM(s) Oral before breakfast  rivaroxaban 15 milliGRAM(s) Oral daily    MEDICATIONS  (PRN):  albuterol    90 MICROgram(s) HFA Inhaler 2 Puff(s) Inhalation every 6 hours PRN Bronchospasm  guaiFENesin Oral Liquid (Sugar-Free) 200 milliGRAM(s) Oral every 6 hours PRN Cough  melatonin 3 milliGRAM(s) Oral at bedtime PRN Insomnia      __________________________________________________  REVIEW OF SYSTEMS:    CONSTITUTIONAL: No fever,   EYES: no acute visual disturbances  NECK: No pain or stiffness  RESPIRATORY: No cough; No shortness of breath  CARDIOVASCULAR: No chest pain, no palpitations  GASTROINTESTINAL: No pain. No nausea or vomiting; No diarrhea   NEUROLOGICAL: No headache or numbness, no tremors  MUSCULOSKELETAL: No joint pain, no muscle pain  GENITOURINARY: no dysuria, no frequency, no hesitancy  PSYCHIATRY: no depression , no anxiety  ALL OTHER  ROS negative        Vital Signs Last 24 Hrs  T(C): 36.8 (21 Sep 2023 13:17), Max: 36.8 (21 Sep 2023 13:17)  T(F): 98.3 (21 Sep 2023 13:17), Max: 98.3 (21 Sep 2023 13:17)  HR: 74 (21 Sep 2023 13:17) (74 - 95)  BP: 150/58 (21 Sep 2023 13:17) (144/83 - 150/59)  BP(mean): --  RR: 18 (21 Sep 2023 13:17) (18 - 19)  SpO2: 98% (21 Sep 2023 13:17) (96% - 99%)    Parameters below as of 21 Sep 2023 13:17  Patient On (Oxygen Delivery Method): nasal cannula  O2 Flow (L/min): 2      ________________________________________________  PHYSICAL EXAM:  GENERAL: NAD  HEENT: Normocephalic;  conjunctivae and sclerae clear; moist mucous membranes;   NECK : supple  CHEST/LUNG: Clear to auscultation bilaterally with good air entry   HEART: S1 S2  regular; no murmurs, gallops or rubs  ABDOMEN: Soft, Nontender, Nondistended; Bowel sounds present  EXTREMITIES: no cyanosis; no edema; no calf tenderness  SKIN: warm and dry; no rash  NERVOUS SYSTEM:  Awake and alert; Oriented  to place, person and time ; no new deficits    _________________________________________________  LABS:                        11.4   6.79  )-----------( 235      ( 21 Sep 2023 07:03 )             35.5     09-21    139  |  105  |  21<H>  ----------------------------<  183<H>  4.2   |  25  |  1.39<H>    Ca    8.9      21 Sep 2023 07:03  Phos  3.5     09-20  Mg     2.4     09-20        Urinalysis Basic - ( 21 Sep 2023 07:03 )    Color: x / Appearance: x / SG: x / pH: x  Gluc: 183 mg/dL / Ketone: x  / Bili: x / Urobili: x   Blood: x / Protein: x / Nitrite: x   Leuk Esterase: x / RBC: x / WBC x   Sq Epi: x / Non Sq Epi: x / Bacteria: x      CAPILLARY BLOOD GLUCOSE      POCT Blood Glucose.: 334 mg/dL (21 Sep 2023 11:42)  POCT Blood Glucose.: 181 mg/dL (21 Sep 2023 07:54)  POCT Blood Glucose.: 94 mg/dL (20 Sep 2023 21:36)  POCT Blood Glucose.: 222 mg/dL (20 Sep 2023 17:59)  POCT Blood Glucose.: 284 mg/dL (20 Sep 2023 16:51)        RADIOLOGY & ADDITIONAL TESTS:    Imaging  Reviewed:  YES/NO    Consultant(s) Notes Reviewed:   YES/ No      Plan of care was discussed with patient and /or primary care giver; all questions and concerns were addressed

## 2023-09-22 ENCOUNTER — TRANSCRIPTION ENCOUNTER (OUTPATIENT)
Age: 83
End: 2023-09-22

## 2023-09-22 LAB
-  AMIKACIN: SIGNIFICANT CHANGE UP
-  AMOXICILLIN/CLAVULANIC ACID: SIGNIFICANT CHANGE UP
-  AMPICILLIN/SULBACTAM: SIGNIFICANT CHANGE UP
-  AMPICILLIN: SIGNIFICANT CHANGE UP
-  AZTREONAM: SIGNIFICANT CHANGE UP
-  CEFAZOLIN: SIGNIFICANT CHANGE UP
-  CEFEPIME: SIGNIFICANT CHANGE UP
-  CEFOXITIN: SIGNIFICANT CHANGE UP
-  CEFTRIAXONE: SIGNIFICANT CHANGE UP
-  CEFUROXIME: SIGNIFICANT CHANGE UP
-  CIPROFLOXACIN: SIGNIFICANT CHANGE UP
-  ERTAPENEM: SIGNIFICANT CHANGE UP
-  GENTAMICIN: SIGNIFICANT CHANGE UP
-  IMIPENEM: SIGNIFICANT CHANGE UP
-  LEVOFLOXACIN: SIGNIFICANT CHANGE UP
-  MEROPENEM: SIGNIFICANT CHANGE UP
-  NITROFURANTOIN: SIGNIFICANT CHANGE UP
-  PIPERACILLIN/TAZOBACTAM: SIGNIFICANT CHANGE UP
-  TOBRAMYCIN: SIGNIFICANT CHANGE UP
-  TRIMETHOPRIM/SULFAMETHOXAZOLE: SIGNIFICANT CHANGE UP
CULTURE RESULTS: SIGNIFICANT CHANGE UP
GLUCOSE BLDC GLUCOMTR-MCNC: 241 MG/DL — HIGH (ref 70–99)
GLUCOSE BLDC GLUCOMTR-MCNC: 308 MG/DL — HIGH (ref 70–99)
GLUCOSE BLDC GLUCOMTR-MCNC: 497 MG/DL — CRITICAL HIGH (ref 70–99)
GLUCOSE BLDC GLUCOMTR-MCNC: 581 MG/DL — CRITICAL HIGH (ref 70–99)
GLUCOSE BLDC GLUCOMTR-MCNC: >600 MG/DL — CRITICAL HIGH (ref 70–99)
METHOD TYPE: SIGNIFICANT CHANGE UP
MRSA PCR RESULT.: SIGNIFICANT CHANGE UP
ORGANISM # SPEC MICROSCOPIC CNT: SIGNIFICANT CHANGE UP
ORGANISM # SPEC MICROSCOPIC CNT: SIGNIFICANT CHANGE UP
S AUREUS DNA NOSE QL NAA+PROBE: SIGNIFICANT CHANGE UP
SPECIMEN SOURCE: SIGNIFICANT CHANGE UP

## 2023-09-22 RX ORDER — DEXTROSE 50 % IN WATER 50 %
15 SYRINGE (ML) INTRAVENOUS ONCE
Refills: 0 | Status: DISCONTINUED | OUTPATIENT
Start: 2023-09-22 | End: 2023-09-26

## 2023-09-22 RX ORDER — INSULIN LISPRO 100/ML
5 VIAL (ML) SUBCUTANEOUS
Refills: 0 | Status: DISCONTINUED | OUTPATIENT
Start: 2023-09-22 | End: 2023-09-23

## 2023-09-22 RX ORDER — DEXTROSE 50 % IN WATER 50 %
25 SYRINGE (ML) INTRAVENOUS ONCE
Refills: 0 | Status: DISCONTINUED | OUTPATIENT
Start: 2023-09-22 | End: 2023-09-26

## 2023-09-22 RX ORDER — INSULIN LISPRO 100/ML
VIAL (ML) SUBCUTANEOUS AT BEDTIME
Refills: 0 | Status: DISCONTINUED | OUTPATIENT
Start: 2023-09-22 | End: 2023-09-26

## 2023-09-22 RX ORDER — SODIUM CHLORIDE 9 MG/ML
1000 INJECTION, SOLUTION INTRAVENOUS
Refills: 0 | Status: DISCONTINUED | OUTPATIENT
Start: 2023-09-22 | End: 2023-09-26

## 2023-09-22 RX ORDER — INSULIN LISPRO 100/ML
VIAL (ML) SUBCUTANEOUS
Refills: 0 | Status: DISCONTINUED | OUTPATIENT
Start: 2023-09-22 | End: 2023-09-26

## 2023-09-22 RX ORDER — INSULIN GLARGINE 100 [IU]/ML
10 INJECTION, SOLUTION SUBCUTANEOUS AT BEDTIME
Refills: 0 | Status: DISCONTINUED | OUTPATIENT
Start: 2023-09-22 | End: 2023-09-22

## 2023-09-22 RX ORDER — INSULIN GLARGINE 100 [IU]/ML
5 INJECTION, SOLUTION SUBCUTANEOUS AT BEDTIME
Refills: 0 | Status: DISCONTINUED | OUTPATIENT
Start: 2023-09-22 | End: 2023-09-23

## 2023-09-22 RX ORDER — DEXTROSE 50 % IN WATER 50 %
12.5 SYRINGE (ML) INTRAVENOUS ONCE
Refills: 0 | Status: DISCONTINUED | OUTPATIENT
Start: 2023-09-22 | End: 2023-09-26

## 2023-09-22 RX ORDER — CARVEDILOL PHOSPHATE 80 MG/1
12.5 CAPSULE, EXTENDED RELEASE ORAL EVERY 12 HOURS
Refills: 0 | Status: DISCONTINUED | OUTPATIENT
Start: 2023-09-22 | End: 2023-09-26

## 2023-09-22 RX ORDER — GLUCAGON INJECTION, SOLUTION 0.5 MG/.1ML
1 INJECTION, SOLUTION SUBCUTANEOUS ONCE
Refills: 0 | Status: DISCONTINUED | OUTPATIENT
Start: 2023-09-22 | End: 2023-09-26

## 2023-09-22 RX ADMIN — Medication 40 MILLIGRAM(S): at 05:58

## 2023-09-22 RX ADMIN — PANTOPRAZOLE SODIUM 40 MILLIGRAM(S): 20 TABLET, DELAYED RELEASE ORAL at 05:58

## 2023-09-22 RX ADMIN — Medication 40 MILLIGRAM(S): at 17:14

## 2023-09-22 RX ADMIN — Medication 5 UNIT(S): at 17:37

## 2023-09-22 RX ADMIN — DRONEDARONE 400 MILLIGRAM(S): 400 TABLET, FILM COATED ORAL at 17:14

## 2023-09-22 RX ADMIN — DRONEDARONE 400 MILLIGRAM(S): 400 TABLET, FILM COATED ORAL at 05:58

## 2023-09-22 RX ADMIN — Medication 175 MICROGRAM(S): at 05:58

## 2023-09-22 RX ADMIN — RIVAROXABAN 15 MILLIGRAM(S): KIT at 12:08

## 2023-09-22 RX ADMIN — BUDESONIDE AND FORMOTEROL FUMARATE DIHYDRATE 2 PUFF(S): 160; 4.5 AEROSOL RESPIRATORY (INHALATION) at 09:28

## 2023-09-22 RX ADMIN — INSULIN GLARGINE 5 UNIT(S): 100 INJECTION, SOLUTION SUBCUTANEOUS at 21:13

## 2023-09-22 RX ADMIN — Medication 6: at 12:07

## 2023-09-22 RX ADMIN — BUDESONIDE AND FORMOTEROL FUMARATE DIHYDRATE 2 PUFF(S): 160; 4.5 AEROSOL RESPIRATORY (INHALATION) at 21:14

## 2023-09-22 RX ADMIN — Medication 4: at 08:11

## 2023-09-22 RX ADMIN — CEFTRIAXONE 100 MILLIGRAM(S): 500 INJECTION, POWDER, FOR SOLUTION INTRAMUSCULAR; INTRAVENOUS at 17:14

## 2023-09-22 RX ADMIN — Medication 12: at 17:14

## 2023-09-22 RX ADMIN — CARVEDILOL PHOSPHATE 12.5 MILLIGRAM(S): 80 CAPSULE, EXTENDED RELEASE ORAL at 17:14

## 2023-09-22 NOTE — DISCHARGE NOTE PROVIDER - CARE PROVIDER_API CALL
Mihai Madrid.  Internal Medicine  [address] [facility], [address]  [place] [address]  Phone: [phone]  Fax: [phone]  Follow Up Time: 1 month

## 2023-09-22 NOTE — DISCHARGE NOTE PROVIDER - HOSPITAL COURSE
82 y/o female from home with pmhx of HTN, DM, hypothyroid, asthma, afib, CAD, CHF, PPM, HF with EF: >70% on 3LNC as needed presenting to the ED for low blood pressure. Daughter at bedside who is the care taker states she measured her blood pressure and it was in low 90s systolic, she also endorsed patient has been having poor appetite for the past two days. She gave her salt tabs however blood pressure was still low. Admitted for hypotension and SRIDHAR. Pt was given IV fluids and blood pressure medications were adjusted. SRIDHAR and Hypotension is now resolved.   CXR pulm congestion unchanged. Cardiology Dr. Sandoval following. Pt will need repeat echocardiogram to eval for worsening aortic stenosis.   ct chest notable for A few nonspecific right lung small patchy groundglass opacities. Differential diagnostic considerations include but is not limited to foci of infection/inflammation. started on rocephin and steroids for  PNA   f/u MRSA PCR and Legionella Urine Ag and Procalcitonin level results     Pt also c/o RUQ abdominal pain, US with no acute pathology     hospital complicated by hyperglycemia, likely 2/2 steroid use. started on lantus at hs and premeal admelog.     PT consult ordered.     ****INCOMPLETE 9/22/23**************   82 y/o female from home with pmhx of HTN, DM, hypothyroid, asthma, afib, CAD, CHF, PPM, HF with EF: >70% on 3LNC as needed presenting to the ED for low blood pressure. Admitted for hypotension and SRIDHAR. Pt was given IV fluids and blood pressure medications were adjusted. SRIDHAR and Hypotension is now resolved.     CXR pulm congestion unchanged. Cardiology Dr. Sandoval following. Pt will need repeat echocardiogram to eval for worsening aortic stenosis. echo showed-Normal Left Ventricular Systolic Function,(EF = 60 to 65%),  Grade I diastolic dysfunction (Impaired relaxation, mild); moderate as and severely increased rv pressure.    ct chest notable for A few nonspecific right lung small patchy ground-glass opacities.  started on rocephin for PNa, ID Dr. Butcher consulted.    Pt also c/o RUQ abdominal pain, US with no acute pathology     hospital complicated by hyperglycemia, likely 2/2 steroid use. started on lantus at hs and premeal admelog.     PT consult ordered recc: HOME PT.    ****INCOMPLETE 9/23/23**************   84 y/o female from home with pmhx of HTN, DM, hypothyroid, asthma, afib, CAD, CHF, PPM, HF with EF: >70% on 3LNC as needed presenting to the ED for low blood pressure. Admitted for hypotension and SRIDHAR. Pt was given IV fluids and blood pressure medications were adjusted. SRIDHAR and Hypotension is now resolved.     CXR pulm congestion unchanged. Cardiology Dr. Sandoval following. Pt will need repeat echocardiogram to eval for worsening aortic stenosis. echo showed-Normal Left Ventricular Systolic Function,(EF = 60 to 65%),  Grade I diastolic dysfunction (Impaired relaxation, mild); moderate as and severely increased rv pressure.    ct chest notable for A few nonspecific right lung small patchy ground-glass opacities.  started on rocephin for PNa, ID Dr. Butcher consulted. on d/c pt to go with oral Augmentin 875mg q 12hours until 9/27/23.      Pt also c/o RUQ abdominal pain, US with no acute pathology     hospital complicated by hyperglycemia, likely 2/2 steroid use. started on lantus at hs and premeal admelog.     PT consult ordered recc: HOME PT.  Please note that this a brief summary of hospital course please refer to daily progress notes and consult notes for full course and events

## 2023-09-22 NOTE — DISCHARGE NOTE PROVIDER - NSDCMRMEDTOKEN_GEN_ALL_CORE_FT
budesonide-formoterol 160 mcg-4.5 mcg/inh inhalation aerosol: 2  inhaled 2 times a day   carvedilol 12.5 mg oral tablet: 1 tab(s) orally 2 times a day  Dexilant 60 mg oral delayed release capsule: 1 cap(s) orally once a day  escitalopram 10 mg oral tablet: 1 tab(s) orally once a day  Janumet 50 mg-500 mg oral tablet: 1 tab(s) orally 2 times a day  levothyroxine 175 mcg (0.175 mg) oral tablet: 1 tab(s) orally once a day  losartan 50 mg oral tablet: 1 tab(s) orally once a day  Multaq 400 mg oral tablet: 1 tab(s) orally 2 times a day  ProAir HFA 90 mcg/inh inhalation aerosol: 2 puff(s) inhaled every 4 hours, As Needed -for bronchospasm   repaglinide 1 mg oral tablet: 1 tab(s) orally 3 times a day (before meals)  torsemide 10 mg oral tablet: 1 tab(s) orally once a day  Xarelto 20 mg oral tablet: 1 tab(s) orally once a day   budesonide-formoterol 160 mcg-4.5 mcg/inh inhalation aerosol: 2  inhaled 2 times a day   carvedilol 12.5 mg oral tablet: 1 tab(s) orally 2 times a day  Dexilant 60 mg oral delayed release capsule: 1 cap(s) orally once a day  escitalopram 10 mg oral tablet: 1 tab(s) orally once a day  Janumet 50 mg-500 mg oral tablet: 1 tab(s) orally 2 times a day  levothyroxine 175 mcg (0.175 mg) oral tablet: 1 tab(s) orally once a day  losartan 50 mg oral tablet: 1 tab(s) orally once a day  Multaq 400 mg oral tablet: 1 tab(s) orally 2 times a day  predniSONE 10 mg oral tablet: 4 tab(s) orally every 12 hours Then 4 tabs once per day for 1 days;  then 3 tabs once per day for 1 day;  then 2 tabs once per day for 1 day;  then 1 tab once per day for 1 day  ProAir HFA 90 mcg/inh inhalation aerosol: 2 puff(s) inhaled every 4 hours, As Needed -for bronchospasm   repaglinide 1 mg oral tablet: 1 tab(s) orally 3 times a day (before meals)  torsemide 10 mg oral tablet: 1 tab(s) orally once a day  Xarelto 20 mg oral tablet: 1 tab(s) orally once a day   budesonide-formoterol 160 mcg-4.5 mcg/inh inhalation aerosol: 2  inhaled 2 times a day   carvedilol 12.5 mg oral tablet: 1 tab(s) orally 2 times a day  Cozaar 50 mg oral tablet: 1 tab(s) orally 2 times a day  Dexilant 60 mg oral delayed release capsule: 1 cap(s) orally once a day  escitalopram 10 mg oral tablet: 1 tab(s) orally once a day  Janumet 50 mg-500 mg oral tablet: 1 tab(s) orally 2 times a day  levothyroxine 175 mcg (0.175 mg) oral tablet: 1 tab(s) orally once a day  Multaq 400 mg oral tablet: 1 tab(s) orally 2 times a day  predniSONE 10 mg oral tablet: 4 tab(s) orally every 12 hours Then 4 tabs once per day for 1 days;  then 3 tabs once per day for 1 day;  then 2 tabs once per day for 1 day;  then 1 tab once per day for 1 day  ProAir HFA 90 mcg/inh inhalation aerosol: 2 puff(s) inhaled every 4 hours, As Needed -for bronchospasm   repaglinide 1 mg oral tablet: 1 tab(s) orally 3 times a day (before meals)  torsemide 10 mg oral tablet: 1 tab(s) orally once a day  Xarelto 20 mg oral tablet: 1 tab(s) orally once a day

## 2023-09-22 NOTE — CONSULT NOTE ADULT - ASSESSMENT
Patient is a 83y old  Female who is from home with pmhx of HTN, DM, hypothyroid, Asthma, afib, CAD, CHF, PPM, HF with EF: >70% on 3LNC as needed, now presents to the ER for evaluation of low blood pressure, in low 90s systolic, and  poor appetite for the past two days. She gave her salt tabs however blood pressure was still low. She denies any dizziness. On admission, she has no fever but tachypnea and hypoxia to 91 %. She has started on Steroid, And on Ceftriaxone after CT chest shows Right Lung opacities. The ID consult requested today, 9/22/23, to assist with further evaluation and antibiotic management.    # Pneumonia - Hypoxia requiring supplemental oxygenation    would recommend:    1. Please obtain MRSA PCR and Legionella Urine Ag  2. Procalcitonin level  3. Supplemental oxygenation and Bronchodilator as needed  4. Aspiration precaution  5. Continue Ceftriaxone for now    will follow the patient with you and make further recommendation based on the clinical course and Lab results  Thank you for the opportunity to participate in Ms. MOSQUERA's care    Attending Attestation:    Spent more than 65 minutes on total encounter, more than 50 % of the visit was spent counseling and/or coordinating care by the Attending physician.     Patient is a 83y old  Female who is from home with pmhx of HTN, DM, hypothyroid, Asthma, afib, CAD, CHF, PPM, HF with EF: >70% on 3LNC as needed, now presents to the ER for evaluation of low blood pressure, in low 90s systolic, and  poor appetite for the past two days. She gave her salt tabs however blood pressure was still low. She denies any dizziness. On admission, she has no fever but tachypnea and hypoxia to 91 %. She has started on Steroid, And on Ceftriaxone after CT chest shows Right Lung opacities. The ID consult requested today, 9/22/23, to assist with further evaluation and antibiotic management.    # Pneumonia - Hypoxia requiring supplemental oxygenation    would recommend:    1. Please obtain MRSA PCR and Legionella Urine Ag  2. Procalcitonin level  3. Supplemental oxygenation and Bronchodilator as needed  4. Aspiration precaution  5. Continue Ceftriaxone for now    d/w covering NP, Zacharyey and patient's daughter at the bed side    will follow the patient with you and make further recommendation based on the clinical course and Lab results  Thank you for the opportunity to participate in Ms. MOSQUERA's care    Attending Attestation:    Spent more than 65 minutes on total encounter, more than 50 % of the visit was spent counseling and/or coordinating care by the Attending physician.

## 2023-09-22 NOTE — PROGRESS NOTE ADULT - ASSESSMENT
84 y/o female from home with pmhx of HTN, DM, hypothyroid, asthma, afib, CAD, CHF, PPM, HF with EF: >70% on 3LNC as needed presenting to the ED for low blood pressure. Daughter at bedside who is the care taker states she measured her blood pressure and it was in low 90s systolic, she also endorsed patient has been having poor appetite for the past two days. She gave her salt tabs however blood pressure was still low. Admitted for hypotension and SRIDHAR. Pt was given IV fluids and blood pressure medications were adjusted. SRIDHAR and Hypotension is now resolved.   CXR pulm congestion unchanged.    Pt remains stable at time of eval w/o complaints. RUQ w/o acute GI pathology,   Echo noted, moderate aortic stenosis. Ct chest with A few nonspecific right lung small patchy ground-glass opacities. Differential diagnostic considerations include but is not limited to foci of infection/inflammation. on rocephin, and methylprednisolone. ID consulted for further abx management.            82 y/o female from home with pmhx of HTN, DM, hypothyroid, asthma, afib, CAD, CHF, PPM, HF with EF: >70% on 3LNC as needed presenting to the ED for low blood pressure. Daughter at bedside who is the care taker states she measured her blood pressure and it was in low 90s systolic, she also endorsed patient has been having poor appetite for the past two days. She gave her salt tabs however blood pressure was still low. Admitted for hypotension and SRIDHAR. Pt was given IV fluids and blood pressure medications were adjusted. SRIDHAR and Hypotension is now resolved.   CXR pulm congestion unchanged.    Pt remains stable at time of eval w/o complaints. RUQ w/o acute GI pathology,   Echo noted, moderate aortic stenosis. Ct chest with A few nonspecific right lung small patchy ground-glass opacities. Differential diagnostic considerations include but is not limited to foci of infection/inflammation. on rocephin, and methylprednisolone. ID consulted for further abx management. uptrending FS, uncontrolled hypergylcemia, likely 2/2 steroid use. will start lantus at hs and premeal admelog 5units.

## 2023-09-22 NOTE — PROGRESS NOTE ADULT - PROBLEM SELECTOR PLAN 11
f/u PT  f/u ID reccs   f/u CM to ensure pt has home oxygen f/u PT  f/u ID reccs   f/u FS   f/u CM to ensure pt has home oxygen

## 2023-09-22 NOTE — CONSULT NOTE ADULT - SUBJECTIVE AND OBJECTIVE BOX
Patient is a 83y old  Female who is from home with pmhx of HTN, DM, hypothyroid, Asthma, afib, CAD, CHF, PPM, HF with EF: >70% on 3LNC as needed, now presents to the ER for evaluation of low blood pressure, in low 90s systolic, and  poor appetite for the past two days. She gave her salt tabs however blood pressure was still low. She denies any dizziness. On admission, she has no fever but tachypnea and hypoxia to 91 %. She has started on Steroid, And on Ceftriaxone after CT chest shows Right Lung opacities. The ID consult requested today, 9/22/23, to assist with further evaluation and antibiotic management.      REVIEW OF SYSTEMS: Total of twelve systems have been reviewed with patient and found to be negative unless mentioned in HPI        PAST MEDICAL & SURGICAL HISTORY:  Asthma  CAD (Coronary Atherosclerotic Disease  Myocardial Infarction  Diabetes  HTN (Hypertension)  HLD (Hyperlipidemia)  CHF (congestive heart failure), NYHA class I  IBS (irritable bowel syndrome)  Venous stasis  Hypothyroidism  Atrial fibrillation, s/p Pacemaker  Obesity  Afib  S/P coronary angiogram      SOCIAL HISTORY  Alcohol: Does not drink  Tobacco: Does not smoke  Illicit substance use: None      FAMILY HISTORY: Non contributory to the present illness      ALLERGIES: No Known Allergies      Vital Signs Last 24 Hrs  T(C): 36.7 (22 Sep 2023 05:22), Max: 37 (21 Sep 2023 20:57)  T(F): 98 (22 Sep 2023 05:22), Max: 98.6 (21 Sep 2023 20:57)  HR: 66 (22 Sep 2023 05:22) (66 - 74)  BP: 162/61 (22 Sep 2023 05:22) (150/58 - 162/61)  BP(mean): --  RR: 18 (22 Sep 2023 05:22) (17 - 18)  SpO2: 99% (22 Sep 2023 05:22) (95% - 99%)    Parameters below as of 22 Sep 2023 05:22  Patient On (Oxygen Delivery Method): nasal cannula  O2 Flow (L/min): 2      PHYSICAL EXAM:  GENERAL: Not in distress  CHEST/LUNG:  Not using accessory muscles   HEART: s1 and s2 present  ABDOMEN:  Nontender and  Nondistended  EXTREMITIES: No pedal  edema  CNS: Awake and Alert      LABS:                        11.4   6.79  )-----------( 235      ( 21 Sep 2023 07:03 )             35.5       09-21    139  |  105  |  21<H>  ----------------------------<  183<H>  4.2   |  25  |  1.39<H>    Ca    8.9      21 Sep 2023 07:03        CAPILLARY BLOOD GLUCOSE  POCT Blood Glucose.: 308 mg/dL (22 Sep 2023 07:40)  POCT Blood Glucose.: 358 mg/dL (21 Sep 2023 21:25)  POCT Blood Glucose.: 295 mg/dL (21 Sep 2023 16:51)  POCT Blood Glucose.: 334 mg/dL (21 Sep 2023 11:42)        Urinalysis Basic - ( 21 Sep 2023 07:03 )  Color: x / Appearance: x / SG: x / pH: x  Gluc: 183 mg/dL / Ketone: x  / Bili: x / Urobili: x   Blood: x / Protein: x / Nitrite: x   Leuk Esterase: x / RBC: x / WBC x   Sq Epi: x / Non Sq Epi: x / Bacteria: x        MEDICATIONS  (STANDING):  budesonide 160 MICROgram(s)/formoterol 4.5 MICROgram(s) Inhaler 2 Puff(s) Inhalation two times a day  cefTRIAXone   IVPB 1000 milliGRAM(s) IV Intermittent every 24 hours  dronedarone 400 milliGRAM(s) Oral two times a day  insulin lispro (ADMELOG) corrective regimen sliding scale   SubCutaneous three times a day before meals  insulin lispro (ADMELOG) corrective regimen sliding scale   SubCutaneous at bedtime  levothyroxine 175 MICROGram(s) Oral daily  methylPREDNISolone sodium succinate Injectable 40 milliGRAM(s) IV Push every 8 hours  pantoprazole    Tablet 40 milliGRAM(s) Oral before breakfast  rivaroxaban 15 milliGRAM(s) Oral daily    MEDICATIONS  (PRN):  albuterol    90 MICROgram(s) HFA Inhaler 2 Puff(s) Inhalation every 6 hours PRN Bronchospasm  guaiFENesin Oral Liquid (Sugar-Free) 200 milliGRAM(s) Oral every 6 hours PRN Cough  melatonin 3 milliGRAM(s) Oral at bedtime PRN Insomnia        RADIOLOGY & ADDITIONAL TESTS:      9/21/23 : US Abdomen Complete (09.21.23 @ 09:46) No gallbladder disease or other acute finding      9/20/23: CT Chest No Cont (09.20.23 @ 22:09) IMPRESSION: A few nonspecific right lung small patchy ground glass   opacities. Differential diagnostic considerations include but is not  limited to foci of infection/inflammation.      MICROBIOLOGY  DATA:    Respiratory Viral Panel with COVID-19 by [facility] (09.19.23 @ 14:00)   Rapid RVP Result: NotDetec  SARS-CoV-2: NotDetec:Culture - Blood (09.19.23 @ 01:20)   Specimen Source: .Blood Blood-Peripheral  Culture Results:   No growth at 72 Hours  Culture - Blood (09.19.23 @ 01:10)   Specimen Source: .Blood Blood-Peripheral  Culture Results:   No growth at 72 HoursUrine Microscopic-Add On (NC) (09.19.23 @ 04:00)   Comment - Urine: Few Mucus Strands  Squamous Epithelial Cells: Present  Red Blood Cell - Urine: 1 /HPF  White Blood Cell - Urine: 3 /HPF  Hyaline Casts: Present  Bacteria: Few /HPF          
Date of Service  09-20-23 @ 12:33    CHIEF COMPLAINT:Patient is a 83y old  Female who presents with a chief complaint of Hypotension or SRIDHAR (19 Sep 2023 13:13)      HPI:  This is an 82 y/o female from home with pmhx of THN, DM, hypothyroid, asthma, afib, CAD, CHF, PPM, HF with EF: >70% on 3LNC as needed presenting to the ED for low blood pressure. Daughter at bedside who is the care taker states she measured her blood pressure and it was in low 90s systolic, she also endorsed patient has been having poor appetite for the past two days. She gave her salt tabs however blood pressure was still low. She denies any dizziness, chest pain, shortness of breath, wheezing, cough, fevers, abdominal pain, N/V/D. Daughter held blood pressure medication yesterday.     In ED:   vitals: BP: 112/54, HR: 67, T: 37 on RA  s/p 500cc NS, Duoneb, decadron  CXR pulm congestion unchanged   Bcux and Ucx   lactate 2.3> 1.2  (19 Sep 2023 13:13)      PAST MEDICAL & SURGICAL HISTORY:  Asthma      CAD (Coronary Atherosclerotic Disease)      Myocardial Infarction      Diabetes      HTN (Hypertension)      HLD (Hyperlipidemia)      CHF (congestive heart failure), NYHA class I      IBS (irritable bowel syndrome)      Venous stasis      Hypothyroidism      Atrial fibrillation      Pacemaker      Obesity      Afib      S/P coronary angiogram          MEDICATIONS  (STANDING):  budesonide 160 MICROgram(s)/formoterol 4.5 MICROgram(s) Inhaler 2 Puff(s) Inhalation two times a day  dronedarone 400 milliGRAM(s) Oral two times a day  insulin lispro (ADMELOG) corrective regimen sliding scale   SubCutaneous three times a day before meals  levothyroxine 175 MICROGram(s) Oral daily  pantoprazole    Tablet 40 milliGRAM(s) Oral before breakfast  rivaroxaban 15 milliGRAM(s) Oral daily  sodium chloride 0.9%. 1000 milliLiter(s) (65 mL/Hr) IV Continuous <Continuous>    MEDICATIONS  (PRN):  albuterol    90 MICROgram(s) HFA Inhaler 2 Puff(s) Inhalation every 6 hours PRN Bronchospasm  melatonin 3 milliGRAM(s) Oral at bedtime PRN Insomnia      FAMILY HISTORY:  Family history of heart disease        SOCIAL HISTORY:    [x ] Non-smoker    [x ] Alcohol-denies    Allergies    No Known Allergies    Intolerances    	    REVIEW OF SYSTEMS:  CONSTITUTIONAL: No fever, weight loss, or fatigue  EYES: No eye pain, visual disturbances, or discharge  ENT:  No difficulty hearing, tinnitus, vertigo; No sinus or throat pain  NECK: No pain or stiffness  RESPIRATORY: No cough, wheezing, chills or hemoptysis; No Shortness of Breath  CARDIOVASCULAR: No chest pain, palpitations, passing out, dizziness, or leg swelling  GASTROINTESTINAL: No abdominal or epigastric pain. No nausea, vomiting, or hematemesis; No diarrhea or constipation. No melena or hematochezia.  GENITOURINARY: No dysuria, frequency, hematuria, or incontinence  NEUROLOGICAL: No headaches, memory loss, loss of strength, numbness, or tremors  SKIN: No itching, burning, rashes, or lesions   LYMPH Nodes: No enlarged glands  ENDOCRINE: No heat or cold intolerance; No hair loss  MUSCULOSKELETAL: No joint pain or swelling; No muscle, back, or extremity pain  PSYCHIATRIC: No depression, anxiety, mood swings, or difficulty sleeping  HEME/LYMPH: No easy bruising, or bleeding gums  ALLERGY AND IMMUNOLOGIC: No hives or eczema	        PHYSICAL EXAM:  T(C): 36.6 (09-20-23 @ 04:59), Max: 37.4 (09-19-23 @ 16:03)  HR: 70 (09-20-23 @ 04:59) (69 - 75)  BP: 133/55 (09-20-23 @ 04:59) (133/55 - 156/59)  RR: 18 (09-20-23 @ 04:59) (18 - 20)  SpO2: 98% (09-20-23 @ 09:33) (97% - 100%)  Wt(kg): --  I&O's Summary      Appearance: Normal	  HEENT:   Normal oral mucosa, PERRL, EOMI	  Lymphatic: No lymphadenopathy  Cardiovascular: Normal S1 S2, No JVD, No murmurs, No edema  Respiratory: Lungs clear to auscultation	  Psychiatry: A & O x 3, Mood & affect appropriate  Gastrointestinal:  Soft, Non-tender, + BS	  Skin: No rashes, No ecchymoses, No cyanosis	  Neurologic: Non-focal  Extremities: Normal range of motion, No clubbing, cyanosis or edema  Vascular: Peripheral pulses palpable 2+ bilaterally      ECG:  	nsr, intermittent a paced,rbbb,lafb    LABS:	 	  Troponin I, High Sensitivity Result: 19.8 ng/L (09-19-23 @ 00:10)                          10.8   6.63  )-----------( 231      ( 20 Sep 2023 06:05 )             32.7     09-20    141  |  106  |  31<H>  ----------------------------<  202<H>  4.4   |  26  |  1.59<H>    Ca    8.7      20 Sep 2023 06:05  Phos  3.5     09-20  Mg     2.4     09-20    TPro  6.7  /  Alb  3.1<L>  /  TBili  0.2  /  DBili  x   /  AST  20  /  ALT  29  /  AlkPhos  63  09-19    < from: Xray Chest 1 View- PORTABLE-Urgent (Xray Chest 1 View- PORTABLE-Urgent .) (09.18.23 @ 21:35) >  ACC: [id number] EXAM:  XR CHEST PORTABLE URGENT 1V   ORDERED BY: JENNIFER EMMANUEL     PROCEDURE DATE:  09/18/2023          INTERPRETATION:  INDICATION: Low blood pressure    Portable chest 9:09 PM    COMPARISON: 4/21/2022    FINDINGS:  Heart/Vascular: The heart size, mediastinum, hilum and aorta are within   normal limits for projection. Left chest wall dual lead pacemaker. Mitral   annular calcification. Calcified aortic knob.  Pulmonary: Midline trachea. There is no focal infiltrate, congestion or   effusion.  Bones: There is no fracture.  Lines and catheter: None    Impression:    No acute pulmonary disease.    --- End of Report ---             ZAIRE LANDRUM DO; Attending Radiologist  This document has been electronically signed. Sep 19 2023 11:46AM    < end of copied text >  < from: TTE with Doppler (w/Cont) (04.22.22 @ 17:45) >  Observations:  Mitral Valve: Mitral stenosis due to annular calcification.  Mean gradient 5.0 mmHg at  64/min.  Mild mitral regurgitation.  Aortic Valve/Aorta: Mild aortic stenosis:  ---peak velocities davidson the LVOT and aorta 1.4 m/s and 3.2  m/s, respectively. DVT = .47. LVOTd 2.2 cm.  --Aortic valve area by continuity 1.5 cm2.  Normal aortic root size.  Left Atrium: Normal left atrium.  Left Ventricle: Endocardial visualization enhanced with  intravenous injection of Ultrasonic Enhancing Agent  (Lumason).  Normal left ventricular internal dimensions. Severe  discrete basal septal hypertrophy.  Normal left ventricular systolic function. No segmental  wall motion abnormalities.  Right Heart: Normal rightatrium. Normal right ventricular  size and function.  Normal tricuspid valve.  Normal pulmonic valve. Minimal pulmonic regurgitation.  Pericardium/Pleura: Normal pericardium with no pericardial  effusion.  Hemodynamic: Mild pulmonary hypertension.  ------------------------------------------------------------------------  Conclusions:  Endocardial visualization enhanced with intravenous  injection of Ultrasonic Enhancing Agent (Lumason).  Normal left ventricular systolic function. No segmental  wall motion abnormalities.  Mitral stenosis due to annular calcification.  Mild aortic stenosis.  Mild pulmonary hypertension.  ------------------------------------------------------------------------  Confirmed on  4/25/2022 - 09:25:10 by Cordell Young MD, FASE  ------------------------------------------------------------------------    < end of copied text >

## 2023-09-22 NOTE — PROGRESS NOTE ADULT - SUBJECTIVE AND OBJECTIVE BOX
INTERVAL HPI/OVERNIGHT EVENTS:  Patient seen,events noticed for overnight,no distress  VITAL SIGNS:  T(F): 98 (09-22-23 @ 05:22)  HR: 66 (09-22-23 @ 05:22)  BP: 162/61 (09-22-23 @ 05:22)  RR: 18 (09-22-23 @ 05:22)  SpO2: 99% (09-22-23 @ 05:22)  Wt(kg): --    PHYSICAL EXAM:  awake  Constitutional:  Eyes:  ENMT:perrla  Neck:  Respiratory:clear  Cardiovascular:s1s2,m-none  Gastrointestinal:soft,bs pos   Extremities:  Vascular:  Neurological:no focal deficit  Musculoskeletal:    MEDICATIONS  (STANDING):  budesonide 160 MICROgram(s)/formoterol 4.5 MICROgram(s) Inhaler 2 Puff(s) Inhalation two times a day  cefTRIAXone   IVPB 1000 milliGRAM(s) IV Intermittent every 24 hours  dronedarone 400 milliGRAM(s) Oral two times a day  insulin lispro (ADMELOG) corrective regimen sliding scale   SubCutaneous three times a day before meals  insulin lispro (ADMELOG) corrective regimen sliding scale   SubCutaneous at bedtime  levothyroxine 175 MICROGram(s) Oral daily  methylPREDNISolone sodium succinate Injectable 40 milliGRAM(s) IV Push every 8 hours  pantoprazole    Tablet 40 milliGRAM(s) Oral before breakfast  rivaroxaban 15 milliGRAM(s) Oral daily    MEDICATIONS  (PRN):  albuterol    90 MICROgram(s) HFA Inhaler 2 Puff(s) Inhalation every 6 hours PRN Bronchospasm  guaiFENesin Oral Liquid (Sugar-Free) 200 milliGRAM(s) Oral every 6 hours PRN Cough  melatonin 3 milliGRAM(s) Oral at bedtime PRN Insomnia      Allergies    No Known Allergies    Intolerances        LABS:                        11.4   6.79  )-----------( 235      ( 21 Sep 2023 07:03 )             35.5     09-21    139  |  105  |  21<H>  ----------------------------<  183<H>  4.2   |  25  |  1.39<H>    Ca    8.9      21 Sep 2023 07:03        Urinalysis Basic - ( 21 Sep 2023 07:03 )    Color: x / Appearance: x / SG: x / pH: x  Gluc: 183 mg/dL / Ketone: x  / Bili: x / Urobili: x   Blood: x / Protein: x / Nitrite: x   Leuk Esterase: x / RBC: x / WBC x   Sq Epi: x / Non Sq Epi: x / Bacteria: x        RADIOLOGY & ADDITIONAL TESTS:      Assessment and Plan:   · Assessment	  82 y/o female from home with pmhx of HTN, DM, hypothyroid, asthma, afib, CAD, CHF, PPM, HF with EF: >70% on 3LNC as needed presenting to the ED for low blood pressure. Daughter at bedside who is the care taker states she measured her blood pressure and it was in low 90s systolic, she also endorsed patient has been having poor appetite for the past two days. She gave her salt tabs however blood pressure was still low. Admitted for hypotension and SRIDHAR. Pt was given IV fluids and blood pressure medications were adjusted. SRIDHAR and Hypotension is now resolved.   CXR pulm congestion unchanged. Cardiology Dr. Sandoval following. Pt will need repeat echocardiogram to eval for worsening aortic stenosis.   Will also further evaluate with CT chest.     Pt also c/o RUQ abdominal pain, pending US RUQ.     Additionally awaiting blood and urine culture results.   PT consult ordered.   09-21- Pt remains stable at time of eval w/o complaints. RUQ w/o acute GI pathology, Labs and VS reviewed, Pending Echo and PT eval for d/c.               Problem/Plan - 1:  ·  Problem: Acute respiratory failure with hypoxia.   ·  Plan: pt p/w dyspnea on exertion   in setting of CHF and asthma  pt found to be 91% after ambulation, and 98% at rest  pt uses 3L oxygen nasal cannula at home  CXR shows pulm congestion  diuretics on hold due to sridhar   f/u CT chest to r/o PNA, Pulm. Edema  maintain oxygen sat >92%  CM following.     Problem/Plan - 2:  ·  Problem: HF (heart failure).   ·  Plan: HF with EF> 70% IN 2021   On carvedilol 12.5mg BID, losartan 50mg, Torsemide 10mg   hold in setting of hypotension  f/u pending repeat echocardiogram to eval aortic stenosis  Cardio Dr. Sandoval following.     Problem/Plan - 3:  ·  Problem: Mild asthma.   ·  Plan: c/w albuterol PRN and budesonide-formeterol   add guaifenesin 200 mg PRN for coughing.     Problem/Plan - 4:  ·  Problem: Acute kidney injury superimposed on CKD.   ·  Plan: Pt w/ SCr 2.3 on admission  Baseline SCr -1.5  s/p IV fluids  resolved now at baseline  monitor BMP  Avoid Nephrotoxic Meds/ Agents such as (NSAIDs, IV contrast, Aminoglycosides such as gentamicin, Gadolinium contrast, Phosphate containing enemas, etc..).     Problem/Plan - 5:  ·  Problem: HTN (hypertension).   ·  Plan: on carvedilol 12.5mg BID, losartan 50mg  hold in setting of acute soft  bp  monitor BP  BP goal <140/90.     Problem/Plan - 6:  ·  Problem: Acute hypotension.   ·  Plan: 2 day hx SBP in 90s  meds held for 2 days, soft bp   vitals: BP: 112/54, HR: 67, T: 37 on RA  s/p 500cc NS, Duoneb, decadron in ED  CXR pulm congestion unchanged from prior  RVP was negative  hypotension is now resolved  Bcux NGTD  Ucx growing E-coli and Beta strep- Already on Ceftriaxone, sensitivity pending.     Problem/Plan - 7:  ·  Problem: Lactic acidosis.   ·  Plan: lactate 2.3> 1.2  resolved.     Problem/Plan - 8:  ·  Problem: Chronic atrial fibrillation.   ·  Plan: PPM   on Multaq 400mg BID and xarelto  HR controlled.     Problem/Plan - 9:  ·  Problem: Hypothyroid.   ·  Plan: on leovthyroxine 175 mcg daily  c/w home med.     Problem/Plan - 10:  ·  Problem: DM (diabetes mellitus).   ·  Plan; a1c 7.9  on janumet  BID, repaglinide 2mg TID  ISS tid and at bedtime  glucose controlled.     Problem/Plan - 11:  ·  Problem: Prophylactic measure.   ·  Plan: dvt - xarelto  gi - tolerating po.     Problem/Plan - 12:  ·  Problem: Discharge planning issues.   ·  Plan: f/u echo  f/u PT consult  f/u CM to ensure pt has home oxygen.

## 2023-09-22 NOTE — PROGRESS NOTE ADULT - PROBLEM SELECTOR PLAN 9
a1c 7.9  on Janumet  BID, repaglinide 2mg TID  ISS tid and at bedtime  FS elevated, likely 2/2 steroid use   insulin increased to moderate sliding scale a1c 7.9  on Janumet  BID, repaglinide 2mg TID  ISS tid and at bedtime  FS elevated, likely 2/2 steroid use   insulin increased to moderate sliding scale  admelog 5units premeal   lantus 5 unit at hs added

## 2023-09-22 NOTE — DISCHARGE NOTE PROVIDER - NSDCCPCAREPLAN_GEN_ALL_CORE_FT
PRINCIPAL DISCHARGE DIAGNOSIS  Diagnosis: Pneumonia  Assessment and Plan of Treatment: Pneumonia is a lung infection that can cause a fever, cough, and trouble breathing.  Continue all antibiotics as ordered until complete.  Nutrition is important, eat small frequent meals.  Get lots of rest and drink fluids.  Call your health care provider upon arrival home from hospital and make a follow up appointment for one week.  If your cough worsens, you develop fever greater than 101', you have shaking chills, a fast heartbeat, trouble breathing and/or feel your are breathing much faster than usual, call your healthcare provider.  Make sure you wash your hands frequently.        SECONDARY DISCHARGE DIAGNOSES  Diagnosis: E-coli UTI  Assessment and Plan of Treatment: HOME CARE INSTRUCTIONS  If you were prescribed antibiotics, take them exactly as your caregiver instructs you. Finish the medication even if you feel better after you have only taken some of the medication.  Drink enough water and fluids to keep your urine clear or pale yellow.  Avoid caffeine, tea, and carbonated beverages. They tend to irritate your bladder.  Empty your bladder often. Avoid holding urine for long periods of time.  Empty your bladder before and after sexual intercourse.  After a bowel movement, women should cleanse from front to back. Use each tissue only once.  SEEK MEDICAL CARE IF:  You have back pain.  You develop a fever.  Your symptoms do not begin to resolve within 3 days.  SEEK IMMEDIATE MEDICAL CARE IF:  You have severe back pain or lower abdominal pain.  You develop chills.  You have nausea or vomiting.  You have continued burning or discomfort with urination.      Diagnosis: Chronic atrial fibrillation  Assessment and Plan of Treatment: Atrial fibrillation is the most common heart rhythm problem & has the risk of stroke & heart attack  It helps if you control your blood pressure, not drink more than 1-2 alcohol drinks per day, cut down on caffeine, getting treatment for over active thyroid gland, & getting exercise  Call your doctor if you feel your heart racing or beating unusually, chest tightness or pain, lightheaded, faint, shortness of breath especially with exercise  It is important to take your heart medication as prescribed  You may be on anticoagulation which is very important to take as directed - you may need blood work to monitor drug levels      Diagnosis: DM (diabetes mellitus)  Assessment and Plan of Treatment: Continue to follow with your primary care MD or your endocrinologist. Discuss what the goal hemoglobin A1C level is for you.  Follow a heart healthy diabetic diet. If you check your fingerstick glucose at home, call your MD if it is greater than 250mg/dL on 2 occasions or less than 100mg/dL on 2 occasions. Know signs of low blood sugar, such as: dizziness, shakiness, sweating, confusion, hunger, nervousness- drink 4 ounces apple juice if occurs and call your doctor. Know early signs of high blood sugar, such as: frequent urination, increased thirst, blurry vision, fatigue, headache - call your doctor if this occurs.      Diagnosis: HTN (hypertension)  Assessment and Plan of Treatment: You have a history of high blood pressure. High blood pressure is a condition that puts you at risk for heart attack, stroke and kidney disease. Please continue to take your medications as prescribed. You can also help control your blood pressure by maintaining a healthy weight, eating a diet low in fat and rich in fruits and vegetables, reduce the amount of salt in your diet. Also, reduce alcohol and try to include some form of physical activity daily for at least 30 mins. Follow up with your medical doctor to establish long term blood pressure treatment goals.  Notify your doctor if you have any of the following symptoms:   Dizziness, Lightheadedness, Blurry vision, Headache, Chest pain, Shortness of breath      Diagnosis: Acute kidney injury superimposed on CKD  Assessment and Plan of Treatment:     Diagnosis: Hypothyroid  Assessment and Plan of Treatment: you do not make enough thyroid hormone  signs & symptoms of low levels of thyroid hormone - tired, getting cold easily, coarse or thin hair, constipation, shortness of breath, swelling, irregular periods  your doctor will do thyroid hormone blood tests at least once a year to monitor if medication dose is adequate  take your thyroid medicine as directed by your doctor & on empty stomach      Diagnosis: CAD (coronary artery disease)  Assessment and Plan of Treatment: Coronary artery disease is a condition where the arteries the supply the heart muscle gets clogged with fatty deposits & puts you at risk for a heart attack. Continue with medications as prescribed.   Call your doctor if you have any new pain, pressure, or discomfort in the center of your chest, pain, tingling or discomfort in arms, back, neck, jaw, or stomach, shortness of breath, nausea, vomiting, burping or heartburn, sweating, cold and clammy skin, racing or abnormal heartbeat for more than 10 minutes or if they keep coming & going.  Call 911 and do not tr to get to hospital by care  You can help yourself with lifestyle changes (quitting smoking if you smoke), eat lots of fruits & vegetables & low fat dairy products, not a lot of meat & fatty foods, walk or some form of physical activity most days of the week, lose weight if you are overweight  Take your cardiac medication as prescribed to lower cholesterol, to lower blood pressure, aspirin to prevent blood clots, and diabetes control  Make sure to keep appointments with doctor for cardiac follow up care       PRINCIPAL DISCHARGE DIAGNOSIS  Diagnosis: Pneumonia  Assessment and Plan of Treatment: [facility] Please continue your antibiotics until 9/27/23.   Pneumonia is a lung infection that can cause a fever, cough, and trouble breathing.  Continue all antibiotics as ordered until complete, even if you feel better.  Nutrition is important, eat small frequent meals.  Get lots of rest and drink fluids.  Call your health care provider upon arrival home from hospital and make a follow up appointment for one week.  If your cough worsens, you develop fever greater than 101', you have shaking chills, a fast heartbeat, trouble breathing and/or feel your are breathing much faster than usual, call your healthcare provider.  Make sure you wash your hands frequently.        SECONDARY DISCHARGE DIAGNOSES  Diagnosis: E-coli UTI  Assessment and Plan of Treatment: You were found to have e.coli in your urine, your were treated for a urinary tract infection.  If you were prescribed antibiotics, take them exactly as your caregiver instructs you. Finish the medication even if you feel better after you have only taken some of the medication.  Drink enough water and fluids to keep your urine clear or pale yellow.  Avoid caffeine, tea, and carbonated beverages. They tend to irritate your bladder.  Empty your bladder often. Avoid holding urine for long periods of time.  After a bowel movement, women should cleanse from front to back. Use each tissue only once.  SEEK MEDICAL CARE IF:  You have back pain.  You develop a fever.  Your symptoms do not begin to resolve within 3 days.  SEEK IMMEDIATE MEDICAL CARE IF:  You have severe back pain or lower abdominal pain.  You develop chills.  You have nausea or vomiting.  You have continued burning or discomfort with urination.      Diagnosis: Chronic atrial fibrillation  Assessment and Plan of Treatment: Atrial fibrillation is the most common heart rhythm problem & has the risk of stroke & heart attack  It helps if you control your blood pressure, not drink more than 1-2 alcohol drinks per day, cut down on caffeine, getting treatment for over active thyroid gland, & getting exercise  Call your doctor if you feel your heart racing or beating unusually, chest tightness or pain, lightheaded, faint, shortness of breath especially with exercise  It is important to take your heart medication as prescribed  You may be on anticoagulation which is very important to take as directed - you may need blood work to monitor drug levels      Diagnosis: DM (diabetes mellitus)  Assessment and Plan of Treatment: Continue to follow with your primary care MD or your endocrinologist. Discuss what the goal hemoglobin A1C level is for you.  Follow a heart healthy diabetic diet. If you check your fingerstick glucose at home, call your MD if it is greater than 250mg/dL on 2 occasions or less than 100mg/dL on 2 occasions. Know signs of low blood sugar, such as: dizziness, shakiness, sweating, confusion, hunger, nervousness- drink 4 ounces apple juice if occurs and call your doctor. Know early signs of high blood sugar, such as: frequent urination, increased thirst, blurry vision, fatigue, headache - call your doctor if this occurs.      Diagnosis: HTN (hypertension)  Assessment and Plan of Treatment: You have a history of high blood pressure. High blood pressure is a condition that puts you at risk for heart attack, stroke and kidney disease. Please continue to take your medications as prescribed. You can also help control your blood pressure by maintaining a healthy weight, eating a diet low in fat and rich in fruits and vegetables, reduce the amount of salt in your diet. Also, reduce alcohol and try to include some form of physical activity daily for at least 30 mins. Follow up with your medical doctor to establish long term blood pressure treatment goals.  Notify your doctor if you have any of the following symptoms:   Dizziness, Lightheadedness, Blurry vision, Headache, Chest pain, Shortness of breath      Diagnosis: Acute kidney injury superimposed on CKD  Assessment and Plan of Treatment: SRIDHAR happens when your kidneys suddenly stop working correctly. You were found to have an Elevated Creatinine. You were treated for an acute kidney injury with IV fluids. You kidney functions have improved.  Please continue oral hydration as indicated by your provider.  Avoid Nephrotoxic Meds/ Agents such as (NSAIDs, IV contrast, Aminoglycosides such as gentamicin, Gadolinium contrast, Phosphate containing enemas, etc..).  Please follow-up with your Primary Care Physician    Diagnosis: Hypothyroid  Assessment and Plan of Treatment: you do not make enough thyroid hormone  signs & symptoms of low levels of thyroid hormone - tired, getting cold easily, coarse or thin hair, constipation, shortness of breath, swelling, irregular periods  your doctor will do thyroid hormone blood tests at least once a year to monitor if medication dose is adequate  take your thyroid medicine as directed by your doctor & on empty stomach      Diagnosis: CAD (coronary artery disease)  Assessment and Plan of Treatment: Coronary artery disease is a condition where the arteries the supply the heart muscle gets clogged with fatty deposits & puts you at risk for a heart attack. Continue with medications as prescribed.   Call your doctor if you have any new pain, pressure, or discomfort in the center of your chest, pain, tingling or discomfort in arms, back, neck, jaw, or stomach, shortness of breath, nausea, vomiting, burping or heartburn, sweating, cold and clammy skin, racing or abnormal heartbeat for more than 10 minutes or if they keep coming & going.  Call 911 and do not tr to get to hospital by care  You can help yourself with lifestyle changes (quitting smoking if you smoke), eat lots of fruits & vegetables & low fat dairy products, not a lot of meat & fatty foods, walk or some form of physical activity most days of the week, lose weight if you are overweight  Take your cardiac medication as prescribed to lower cholesterol, to lower blood pressure, aspirin to prevent blood clots, and diabetes control  Make sure to keep appointments with doctor for cardiac follow up care      Diagnosis: Asthma, mild  Assessment and Plan of Treatment: Take medicines as directed by your caregiver.  Control your home environment in the following ways to help prevent asthma attacks:  Do not smoke. Do not stay in places where others are smoking.  Wash hands frequently.  Talk to your caregiver about an action plan for managing asthma attacks. This includes the use of a peak flow meter which measures the severity of the attack and medicines that can help stop the attack. An action plan can help minimize or stop the attack without having to seek medical care.  SEEK IMMEDIATE MEDICAL CARE IF:  You are short of breath even at rest.  You get short of breath when doing very little physical activity.  You have difficulty eating, drinking, or talking due to asthma symptoms.  You have chest pain or you feel that your heart is beating fast.   You have a bluish color to your lips or fingernails.  You are lightheaded, dizzy, or faint.  You have a fever or persistent symptoms for more than 2–3 days.   You have a fever and symptoms suddenly get worse.  You seem to be getting worse and are unresponsive to treatment during an asthma attack.   Your peak flow is less than 50% of personal best.      Diagnosis: HF (heart failure)  Assessment and Plan of Treatment: You had an echocardiogram which showed a grade 1 diastolic dysfunction. you were followed by a cardiologist for your heart failure.   Please Weigh yourself daily.  If you gain 3lbs in 3 days, or 5lbs in a week call your Health Care Provider.  Do not eat or drink foods containing more than 2000mg of salt (sodium) in your diet every day.  Call your Health Care Provider if you have any swelling or increased swelling in your feet, ankles, and/or stomach.  Take all of your medication as directed.  If you become dizzy call your Health Care Provider.

## 2023-09-22 NOTE — PROGRESS NOTE ADULT - SUBJECTIVE AND OBJECTIVE BOX
NP Note discussed with  Primary Attending    Patient is a 83y old  Female who presents with a chief complaint of Hypotension or SRIDHAR (22 Sep 2023 10:54)      INTERVAL HPI/OVERNIGHT EVENTS: no new complaints    MEDICATIONS  (STANDING):  budesonide 160 MICROgram(s)/formoterol 4.5 MICROgram(s) Inhaler 2 Puff(s) Inhalation two times a day  cefTRIAXone   IVPB 1000 milliGRAM(s) IV Intermittent every 24 hours  dronedarone 400 milliGRAM(s) Oral two times a day  insulin lispro (ADMELOG) corrective regimen sliding scale   SubCutaneous at bedtime  insulin lispro (ADMELOG) corrective regimen sliding scale   SubCutaneous three times a day before meals  levothyroxine 175 MICROGram(s) Oral daily  methylPREDNISolone sodium succinate Injectable 40 milliGRAM(s) IV Push every 12 hours  pantoprazole    Tablet 40 milliGRAM(s) Oral before breakfast  rivaroxaban 15 milliGRAM(s) Oral daily    MEDICATIONS  (PRN):  albuterol    90 MICROgram(s) HFA Inhaler 2 Puff(s) Inhalation every 6 hours PRN Bronchospasm  guaiFENesin Oral Liquid (Sugar-Free) 200 milliGRAM(s) Oral every 6 hours PRN Cough  melatonin 3 milliGRAM(s) Oral at bedtime PRN Insomnia      __________________________________________________  REVIEW OF SYSTEMS:    CONSTITUTIONAL: No fever,   EYES: no acute visual disturbances  NECK: No pain or stiffness  RESPIRATORY: No cough; No shortness of breath  CARDIOVASCULAR: No chest pain, no palpitations  GASTROINTESTINAL: No pain. No nausea or vomiting; No diarrhea   NEUROLOGICAL: No headache or numbness, no tremors  MUSCULOSKELETAL: No joint pain, no muscle pain  GENITOURINARY: no dysuria, no frequency, no hesitancy  PSYCHIATRY: no depression , no anxiety  ALL OTHER  ROS negative        Vital Signs Last 24 Hrs  T(C): 36.7 (22 Sep 2023 05:22), Max: 37 (21 Sep 2023 20:57)  T(F): 98 (22 Sep 2023 05:22), Max: 98.6 (21 Sep 2023 20:57)  HR: 66 (22 Sep 2023 05:22) (66 - 74)  BP: 162/61 (22 Sep 2023 05:22) (150/58 - 162/61)  BP(mean): --  RR: 18 (22 Sep 2023 05:22) (17 - 18)  SpO2: 99% (22 Sep 2023 05:22) (95% - 99%)    Parameters below as of 22 Sep 2023 05:22  Patient On (Oxygen Delivery Method): nasal cannula  O2 Flow (L/min): 2      ________________________________________________  PHYSICAL EXAM:  GENERAL: NAD  HEENT: Normocephalic;  conjunctivae and sclerae clear; moist mucous membranes;   NECK : supple  CHEST/LUNG: Clear to auscultation bilaterally with good air entry   HEART: S1 S2  regular; no murmurs, gallops or rubs  ABDOMEN: Soft, Nontender, Nondistended; Bowel sounds present  EXTREMITIES: no cyanosis; no edema; no calf tenderness  SKIN: warm and dry; no rash  NERVOUS SYSTEM:  Awake and alert; Oriented  to place, person and time ; no new deficits    _________________________________________________  LABS:                        11.4   6.79  )-----------( 235      ( 21 Sep 2023 07:03 )             35.5     09-21    139  |  105  |  21<H>  ----------------------------<  183<H>  4.2   |  25  |  1.39<H>    Ca    8.9      21 Sep 2023 07:03        Urinalysis Basic - ( 21 Sep 2023 07:03 )    Color: x / Appearance: x / SG: x / pH: x  Gluc: 183 mg/dL / Ketone: x  / Bili: x / Urobili: x   Blood: x / Protein: x / Nitrite: x   Leuk Esterase: x / RBC: x / WBC x   Sq Epi: x / Non Sq Epi: x / Bacteria: x      CAPILLARY BLOOD GLUCOSE      POCT Blood Glucose.: 497 mg/dL (22 Sep 2023 11:36)  POCT Blood Glucose.: 308 mg/dL (22 Sep 2023 07:40)  POCT Blood Glucose.: 358 mg/dL (21 Sep 2023 21:25)  POCT Blood Glucose.: 295 mg/dL (21 Sep 2023 16:51)        RADIOLOGY & ADDITIONAL TESTS:    Imaging  Reviewed:  YES/NO    Consultant(s) Notes Reviewed:   YES/ No      Plan of care was discussed with patient and /or primary care giver; all questions and concerns were addressed  NP Note discussed with  Primary Attending    Patient is a 83y old  Female who presents with a chief complaint of Hypotension or SRIDHAR (22 Sep 2023 10:54)      INTERVAL HPI/OVERNIGHT EVENTS: no new complaints    MEDICATIONS  (STANDING):  budesonide 160 MICROgram(s)/formoterol 4.5 MICROgram(s) Inhaler 2 Puff(s) Inhalation two times a day  cefTRIAXone   IVPB 1000 milliGRAM(s) IV Intermittent every 24 hours  dronedarone 400 milliGRAM(s) Oral two times a day  insulin lispro (ADMELOG) corrective regimen sliding scale   SubCutaneous at bedtime  insulin lispro (ADMELOG) corrective regimen sliding scale   SubCutaneous three times a day before meals  levothyroxine 175 MICROGram(s) Oral daily  methylPREDNISolone sodium succinate Injectable 40 milliGRAM(s) IV Push every 12 hours  pantoprazole    Tablet 40 milliGRAM(s) Oral before breakfast  rivaroxaban 15 milliGRAM(s) Oral daily    MEDICATIONS  (PRN):  albuterol    90 MICROgram(s) HFA Inhaler 2 Puff(s) Inhalation every 6 hours PRN Bronchospasm  guaiFENesin Oral Liquid (Sugar-Free) 200 milliGRAM(s) Oral every 6 hours PRN Cough  melatonin 3 milliGRAM(s) Oral at bedtime PRN Insomnia      __________________________________________________  REVIEW OF SYSTEMS:     CONSTITUTIONAL: No fever,   EYES: no acute visual disturbances  NECK: No pain or stiffness  RESPIRATORY: No cough; No shortness of breath  CARDIOVASCULAR: No chest pain, no palpitations  GASTROINTESTINAL: No pain. No nausea or vomiting; No diarrhea   NEUROLOGICAL: No headache or numbness, no tremors  MUSCULOSKELETAL: No joint pain, no muscle pain  GENITOURINARY: no dysuria, no frequency, no hesitancy  PSYCHIATRY: no depression , no anxiety  ALL OTHER  ROS negative        Vital Signs Last 24 Hrs  T(C): 36.7 (22 Sep 2023 05:22), Max: 37 (21 Sep 2023 20:57)  T(F): 98 (22 Sep 2023 05:22), Max: 98.6 (21 Sep 2023 20:57)  HR: 66 (22 Sep 2023 05:22) (66 - 74)  BP: 162/61 (22 Sep 2023 05:22) (150/58 - 162/61)  BP(mean): --  RR: 18 (22 Sep 2023 05:22) (17 - 18)  SpO2: 99% (22 Sep 2023 05:22) (95% - 99%)    Parameters below as of 22 Sep 2023 05:22  Patient On (Oxygen Delivery Method): nasal cannula  O2 Flow (L/min): 2      ________________________________________________  PHYSICAL EXAM:  GENERAL: NAD. laying supine in bed   HEENT: Normocephalic;  conjunctivae and sclerae clear; moist mucous membranes;   NECK : supple  CHEST/LUNG: Clear to auscultation bilaterally with good air entry. on 02 via nc   HEART: S1 S2  regular; no murmurs, gallops or rubs  ABDOMEN: Soft, Nontender, Nondistended; Bowel sounds present  EXTREMITIES: no cyanosis; no edema; no calf tenderness  SKIN: warm and dry; no rash  NERVOUS SYSTEM:  Awake and alert; no new deficits    _________________________________________________  LABS:                        11.4   6.79  )-----------( 235      ( 21 Sep 2023 07:03 )             35.5     09-21    139  |  105  |  21<H>  ----------------------------<  183<H>  4.2   |  25  |  1.39<H>    Ca    8.9      21 Sep 2023 07:03        Urinalysis Basic - ( 21 Sep 2023 07:03 )    Color: x / Appearance: x / SG: x / pH: x  Gluc: 183 mg/dL / Ketone: x  / Bili: x / Urobili: x   Blood: x / Protein: x / Nitrite: x   Leuk Esterase: x / RBC: x / WBC x   Sq Epi: x / Non Sq Epi: x / Bacteria: x      CAPILLARY BLOOD GLUCOSE      POCT Blood Glucose.: 497 mg/dL (22 Sep 2023 11:36)  POCT Blood Glucose.: 308 mg/dL (22 Sep 2023 07:40)  POCT Blood Glucose.: 358 mg/dL (21 Sep 2023 21:25)  POCT Blood Glucose.: 295 mg/dL (21 Sep 2023 16:51)        RADIOLOGY & ADDITIONAL TESTS:  < from: US Abdomen Complete (09.21.23 @ 09:46) >    ACC: [id number] EXAM:  US ABDOMEN COMPLETE   ORDERED BY: ANNABELLE FRITZ     PROCEDURE DATE:  09/21/2023          INTERPRETATION:  CLINICAL INFORMATION: Right upper quadrant pain    COMPARISON: None available.    TECHNIQUE: Sonography of the abdomen.    FINDINGS:  Liver: Within normal limits.  Bile ducts: Normal caliber. Common bile duct measures 4 mm.  Gallbladder: Within normal limits.  Pancreas: Visualized portions are within normal limits.  Spleen: 9.3 cm. Within normal limits.  Right kidney: 11.4cm. No hydronephrosis. 1.9 cm cyst  Left kidney: 11.4 cm. No hydronephrosis.  Ascites: None.  Aorta and IVC: Visualized portions are within normal limits.    IMPRESSION:  No gallbladder disease or other acute finding        --- End of Report ---    MATILDE HUGGINS MD; Attending Radiologist  This document has been electronically signed. Sep 21 2023  9:58AM    < end of copied text >    < from: CT Chest No Cont (09.20.23 @ 22:09) >    ACC: [id number] EXAM:  CT CHEST   ORDERED BY: ANNABELLE FRTIZ     PROCEDURE DATE:  09/20/2023          INTERPRETATION:  Clinical indication: Shortness of breath.    Axial CT images of the chest are obtained without intravenous   administration of contrast.    Comparison is made with the chest CT of November 8, 2019.    Left upper anterior chest wall cardiac device with the leads terminating   within the right atrium and right ventricle.    No enlarged axillary lymph nodes. Mildly enlarged precarinal lymph node   measuring about 1.4 cm without significant interval change. Other smaller   mediastinal lymph nodes are also unchanged.    Heart size is enlarged. Left atrium is dilated. Dense mitral annular   calcifications. Aortic root and valve calcifications which can be seen in   the setting of aortic valve stenosis. Vascular calcifications with   involvement of the aorta and the coronary arteries.    Evaluation of the upper abdomen demonstrate a 1.4 cm cyst within the   partially imaged right kidney.    Evaluation of the lungs demonstrate a few small ill-defined nonspecific   patchy groundglass opacities within the right middle lobe new since   November 8, 2019. Small patchy groundglass opacity within the right lower   lobe on image 81 of series 3. Minimal subsegmental atelectasis within the   right middle lobe.    A few small nodular opacities within the left upper lobe measuring up to   about 5 mm on image 36 of series 3 are new since November 8, 2019 may be   sequela of foci of impacted distal airways.    No central endobronchial lesions.    Degenerative changes off the spine.    IMPRESSION: A few nonspecific right lung small patchy groundglass   opacities. Differential diagnostic considerations include but is not   limited to foci of infection/inflammation.    --- End of Report ---      JERMAN BRUNER MD; Attending Radiologist  This document has been electronically signed. Sep 21 2023  8:46AM    < end of copied text >      Imaging  Reviewed:  YES  Consultant(s) Notes Reviewed:   YES       Plan of care was discussed with patient and primary caregiver; all questions and concerns were addressed

## 2023-09-22 NOTE — DISCHARGE NOTE PROVIDER - NSDCFUSCHEDAPPT_GEN_ALL_CORE_FT
Demetrice Velazquez  [facility]  [facility]S  Scheduled Appointment: 09/26/2023    [facility]  [address]  Scheduled Appointment: 09/26/2023    [facility]  ELECTROPH [address]  Scheduled Appointment: 10/25/2023    Demetrice Velazquez  [facility]  CARDIOLOGY [address]  Scheduled Appointment: 10/25/2023     [facility]  [address]  Scheduled Appointment: 09/26/2023    [facility]  ELECTROPH [address]  Scheduled Appointment: 10/25/2023    Demetrice Velazquez  [facility]  CARDIOLOGY [address]  Scheduled Appointment: 10/25/2023

## 2023-09-22 NOTE — PROGRESS NOTE ADULT - SUBJECTIVE AND OBJECTIVE BOX
Date of Service 09-22-23 @ 13:40    CHIEF COMPLAINT:Patient is a 83y old  Female who presents with a chief complaint of Hypotension or SRIDHAR.Pt appears comfortable.    	  REVIEW OF SYSTEMS:  CONSTITUTIONAL: No fever, weight loss, or fatigue  EYES: No eye pain, visual disturbances, or discharge  ENT:  No difficulty hearing, tinnitus, vertigo; No sinus or throat pain  NECK: No pain or stiffness  RESPIRATORY: No cough, wheezing, chills or hemoptysis; No Shortness of Breath  CARDIOVASCULAR: No chest pain, palpitations, passing out, dizziness, or leg swelling  GASTROINTESTINAL: No abdominal or epigastric pain. No nausea, vomiting, or hematemesis; No diarrhea or constipation. No melena or hematochezia.  GENITOURINARY: No dysuria, frequency, hematuria, or incontinence  NEUROLOGICAL: No headaches, memory loss, loss of strength, numbness, or tremors  SKIN: No itching, burning, rashes, or lesions   LYMPH Nodes: No enlarged glands  ENDOCRINE: No heat or cold intolerance; No hair loss  MUSCULOSKELETAL: No joint pain or swelling; No muscle, back, or extremity pain  PSYCHIATRIC: No depression, anxiety, mood swings, or difficulty sleeping  HEME/LYMPH: No easy bruising, or bleeding gums  ALLERGY AND IMMUNOLOGIC: No hives or eczema	        PHYSICAL EXAM:  T(C): 36.8 (09-22-23 @ 13:00), Max: 37 (09-21-23 @ 20:57)  HR: 74 (09-22-23 @ 13:00) (66 - 74)  BP: 153/77 (09-22-23 @ 13:00) (153/77 - 162/61)  RR: 18 (09-22-23 @ 13:00) (17 - 18)  SpO2: 99% (09-22-23 @ 13:00) (95% - 99%)  Wt(kg): --  I&O's Summary      Appearance: Normal	  HEENT:   Normal oral mucosa, PERRL, EOMI	  Lymphatic: No lymphadenopathy  Cardiovascular: Normal S1 S2, No JVD, No murmurs, No edema  Respiratory: B/L ronchi	  Psychiatry: A & O x 3, Mood & affect appropriate  Gastrointestinal:  Soft, Non-tender, + BS	  Skin: No rashes, No ecchymoses, No cyanosis	  Neurologic: Non-focal  Extremities: Normal range of motion, No clubbing, cyanosis or edema  Vascular: Peripheral pulses palpable 2+ bilaterally    MEDICATIONS  (STANDING):  budesonide 160 MICROgram(s)/formoterol 4.5 MICROgram(s) Inhaler 2 Puff(s) Inhalation two times a day  carvedilol 12.5 milliGRAM(s) Oral every 12 hours  cefTRIAXone   IVPB 1000 milliGRAM(s) IV Intermittent every 24 hours  dronedarone 400 milliGRAM(s) Oral two times a day  insulin lispro (ADMELOG) corrective regimen sliding scale   SubCutaneous at bedtime  insulin lispro (ADMELOG) corrective regimen sliding scale   SubCutaneous three times a day before meals  levothyroxine 175 MICROGram(s) Oral daily  methylPREDNISolone sodium succinate Injectable 40 milliGRAM(s) IV Push every 12 hours  pantoprazole    Tablet 40 milliGRAM(s) Oral before breakfast  rivaroxaban 15 milliGRAM(s) Oral daily      	  	  LABS:	 	                          11.4   6.79  )-----------( 235      ( 21 Sep 2023 07:03 )             35.5     09-21    139  |  105  |  21<H>  ----------------------------<  183<H>  4.2   |  25  |  1.39<H>    Ca    8.9      21 Sep 2023 07:03      TSH: Thyroid Stimulating Hormone, Serum: 0.12 uU/mL (09-21 @ 07:03)      	         Date of Service 09-22-23 @ 13:40    CHIEF COMPLAINT:Patient is a 83y old  Female who presents with a chief complaint of Hypotension or SRIDHAR.Pt appears comfortable.    	  REVIEW OF SYSTEMS:  CONSTITUTIONAL: No fever, weight loss, or fatigue  EYES: No eye pain, visual disturbances, or discharge  ENT:  No difficulty hearing, tinnitus, vertigo; No sinus or throat pain  NECK: No pain or stiffness  RESPIRATORY: No cough, wheezing, chills or hemoptysis; No Shortness of Breath  CARDIOVASCULAR: No chest pain, palpitations, passing out, dizziness, or leg swelling  GASTROINTESTINAL: No abdominal or epigastric pain. No nausea, vomiting, or hematemesis; No diarrhea or constipation. No melena or hematochezia.  GENITOURINARY: No dysuria, frequency, hematuria, or incontinence  NEUROLOGICAL: No headaches, memory loss, loss of strength, numbness, or tremors  SKIN: No itching, burning, rashes, or lesions   LYMPH Nodes: No enlarged glands  ENDOCRINE: No heat or cold intolerance; No hair loss  MUSCULOSKELETAL: No joint pain or swelling; No muscle, back, or extremity pain  PSYCHIATRIC: No depression, anxiety, mood swings, or difficulty sleeping  HEME/LYMPH: No easy bruising, or bleeding gums  ALLERGY AND IMMUNOLOGIC: No hives or eczema	        PHYSICAL EXAM:  T(C): 36.8 (09-22-23 @ 13:00), Max: 37 (09-21-23 @ 20:57)  HR: 74 (09-22-23 @ 13:00) (66 - 74)  BP: 153/77 (09-22-23 @ 13:00) (153/77 - 162/61)  RR: 18 (09-22-23 @ 13:00) (17 - 18)  SpO2: 99% (09-22-23 @ 13:00) (95% - 99%)  Wt(kg): --  I&O's Summary      Appearance: Normal	  HEENT:   Normal oral mucosa, PERRL, EOMI	  Lymphatic: No lymphadenopathy  Cardiovascular: Normal S1 S2, No JVD, No murmurs, No edema  Respiratory: B/L ronchi	  Psychiatry: A & O x 3, Mood & affect appropriate  Gastrointestinal:  Soft, Non-tender, + BS	  Skin: No rashes, No ecchymoses, No cyanosis	  Neurologic: Non-focal  Extremities: Normal range of motion, No clubbing, cyanosis or edema  Vascular: Peripheral pulses palpable 2+ bilaterally    MEDICATIONS  (STANDING):  budesonide 160 MICROgram(s)/formoterol 4.5 MICROgram(s) Inhaler 2 Puff(s) Inhalation two times a day  carvedilol 12.5 milliGRAM(s) Oral every 12 hours  cefTRIAXone   IVPB 1000 milliGRAM(s) IV Intermittent every 24 hours  dronedarone 400 milliGRAM(s) Oral two times a day  insulin lispro (ADMELOG) corrective regimen sliding scale   SubCutaneous at bedtime  insulin lispro (ADMELOG) corrective regimen sliding scale   SubCutaneous three times a day before meals  levothyroxine 175 MICROGram(s) Oral daily  methylPREDNISolone sodium succinate Injectable 40 milliGRAM(s) IV Push every 12 hours  pantoprazole    Tablet 40 milliGRAM(s) Oral before breakfast  rivaroxaban 15 milliGRAM(s) Oral daily      	  	  LABS:	 	                          11.4   6.79  )-----------( 235      ( 21 Sep 2023 07:03 )             35.5     09-21    139  |  105  |  21<H>  ----------------------------<  183<H>  4.2   |  25  |  1.39<H>    Ca    8.9      21 Sep 2023 07:03      TSH: Thyroid Stimulating Hormone, Serum: 0.12 uU/mL (09-21 @ 07:03)      	    < from: Transthoracic Echocardiogram (09.21.23 @ 07:11) >  OBSERVATIONS:  Mitral Valve: Marked posterior mitral annular  calcification. No mitral regurgitation is noted. Mean  transmitral valve gradient equals 4.5 mm Hg, estimated  mitral valve area equals 2.1 sqcm (by pressure half time  equation), consistent with mild mitral stenosis.  Aortic Root: Normal aortic root.  Aortic Valve: Calcified aortic valve not well visualized.  Peak transaortic valve gradient equals 54.1 mm Hg, mean  transaortic valve gradient equals 27 mm Hg, estimated  aortic valve area equals 1.1 sqcm (by continuity equation),  dimensionless index is 0.41, consistent with moderate  aortic stenosis. No aortic valve regurgitation seen. Peak  left ventricular outflow tract gradient equals 9 mm Hg.  Left Atrium: Mildly dilated left atrium.  LA volume index =  35 cc/m2.  Left Ventricle: Normal Left Ventricular Systolic Function,  (EF = 60 to 65% by visual estimation) Not all LV wall  segments were seen. Basal septal hypertrophy is noted.  Grade I diastolic dysfunction (Impaired relaxation, mild).  Right Heart: Normal right atrium. Right ventricle not well  visualized. A device lead is visualized in the right heart.  Normal RV systolic function (TAPSE 2.6 cm). Tricuspid valve  not well seen. Mild tricuspid regurgitation. Pulmonic valve  not well seen. No pulmonic insufficiency is noted.  Pericardium/PleuraTrivial pericardial effusion is seen.  Hemodynamic: RA Pressure is 8 mm Hg. RV systolic pressure  is severely increased at  63 mm Hg.    < end of copied text >

## 2023-09-22 NOTE — PROGRESS NOTE ADULT - ASSESSMENT
84 y/o female from home with pmhx of  DM, hypothyroid, asthma, afib, CAD, CHF, PPM, HF with EF: >70% on 3LNC as needed presenting to the ED for low blood pressure,AK,pneumoniaI.  1.Hypotension and SRIDHAR resolving after IVF.  2.PAF-xarelto,multaq,  3.DM-Insulin.Add Lantus 10 units sq qhs.  4.Asthma-MDI.  5.Hypothyroid-synthroid.  6.R/O JOSE DANIEL.  7.Odytpvevcqnvsi-fj-kngh AS severity.  8.Pneumonia-steroids,ABX.  9.GI prophylaxis.  84 y/o female from home with pmhx of  DM, hypothyroid, asthma, afib, CAD, CHF, PPM, HF with EF: >70% on 3LNC as needed presenting to the ED for low blood pressure,AK,pneumonia.  1.Hypotension and SRIDHAR resolving after IVF.  2.PAF-xarelto,multaq,  3.DM-Insulin.Add Lantus 10 units sq qhs.  4.Asthma-MDI.  5.Hypothyroid-synthroid.  6.R/O JOSE DANIEL.  7.Echocardiogram as above, severe pulm HTN-most likely due to JOSE DANIEL.  8.Pneumonia-steroids,ABX.  9.GI prophylaxis.  10.Above plan d/w daughter at bedside.

## 2023-09-23 LAB
ANION GAP SERPL CALC-SCNC: 5 MMOL/L — SIGNIFICANT CHANGE UP (ref 5–17)
BUN SERPL-MCNC: 28 MG/DL — HIGH (ref 7–18)
CALCIUM SERPL-MCNC: 8.7 MG/DL — SIGNIFICANT CHANGE UP (ref 8.4–10.5)
CHLORIDE SERPL-SCNC: 103 MMOL/L — SIGNIFICANT CHANGE UP (ref 96–108)
CO2 SERPL-SCNC: 28 MMOL/L — SIGNIFICANT CHANGE UP (ref 22–31)
CREAT SERPL-MCNC: 1.53 MG/DL — HIGH (ref 0.5–1.3)
EGFR: 33 ML/MIN/1.73M2 — LOW
GLUCOSE BLDC GLUCOMTR-MCNC: 264 MG/DL — HIGH (ref 70–99)
GLUCOSE BLDC GLUCOMTR-MCNC: 291 MG/DL — HIGH (ref 70–99)
GLUCOSE BLDC GLUCOMTR-MCNC: 298 MG/DL — HIGH (ref 70–99)
GLUCOSE BLDC GLUCOMTR-MCNC: 313 MG/DL — HIGH (ref 70–99)
GLUCOSE SERPL-MCNC: 326 MG/DL — HIGH (ref 70–99)
HCT VFR BLD CALC: 34.4 % — LOW (ref 34.5–45)
HGB BLD-MCNC: 11.1 G/DL — LOW (ref 11.5–15.5)
MCHC RBC-ENTMCNC: 28.1 PG — SIGNIFICANT CHANGE UP (ref 27–34)
MCHC RBC-ENTMCNC: 32.3 GM/DL — SIGNIFICANT CHANGE UP (ref 32–36)
MCV RBC AUTO: 87.1 FL — SIGNIFICANT CHANGE UP (ref 80–100)
NRBC # BLD: 0 /100 WBCS — SIGNIFICANT CHANGE UP (ref 0–0)
PLATELET # BLD AUTO: 213 K/UL — SIGNIFICANT CHANGE UP (ref 150–400)
POTASSIUM SERPL-MCNC: 5.3 MMOL/L — SIGNIFICANT CHANGE UP (ref 3.5–5.3)
POTASSIUM SERPL-SCNC: 5.3 MMOL/L — SIGNIFICANT CHANGE UP (ref 3.5–5.3)
PROCALCITONIN SERPL-MCNC: 0.06 NG/ML — SIGNIFICANT CHANGE UP (ref 0.02–0.1)
RBC # BLD: 3.95 M/UL — SIGNIFICANT CHANGE UP (ref 3.8–5.2)
RBC # FLD: 13.4 % — SIGNIFICANT CHANGE UP (ref 10.3–14.5)
SODIUM SERPL-SCNC: 136 MMOL/L — SIGNIFICANT CHANGE UP (ref 135–145)
WBC # BLD: 9.74 K/UL — SIGNIFICANT CHANGE UP (ref 3.8–10.5)
WBC # FLD AUTO: 9.74 K/UL — SIGNIFICANT CHANGE UP (ref 3.8–10.5)

## 2023-09-23 RX ORDER — INSULIN GLARGINE 100 [IU]/ML
10 INJECTION, SOLUTION SUBCUTANEOUS AT BEDTIME
Refills: 0 | Status: DISCONTINUED | OUTPATIENT
Start: 2023-09-23 | End: 2023-09-24

## 2023-09-23 RX ORDER — SODIUM ZIRCONIUM CYCLOSILICATE 10 G/10G
5 POWDER, FOR SUSPENSION ORAL ONCE
Refills: 0 | Status: COMPLETED | OUTPATIENT
Start: 2023-09-23 | End: 2023-09-23

## 2023-09-23 RX ORDER — INSULIN LISPRO 100/ML
8 VIAL (ML) SUBCUTANEOUS
Refills: 0 | Status: DISCONTINUED | OUTPATIENT
Start: 2023-09-23 | End: 2023-09-24

## 2023-09-23 RX ADMIN — DRONEDARONE 400 MILLIGRAM(S): 400 TABLET, FILM COATED ORAL at 18:35

## 2023-09-23 RX ADMIN — CARVEDILOL PHOSPHATE 12.5 MILLIGRAM(S): 80 CAPSULE, EXTENDED RELEASE ORAL at 18:35

## 2023-09-23 RX ADMIN — DRONEDARONE 400 MILLIGRAM(S): 400 TABLET, FILM COATED ORAL at 05:31

## 2023-09-23 RX ADMIN — Medication 40 MILLIGRAM(S): at 05:31

## 2023-09-23 RX ADMIN — Medication 6: at 08:30

## 2023-09-23 RX ADMIN — INSULIN GLARGINE 10 UNIT(S): 100 INJECTION, SOLUTION SUBCUTANEOUS at 21:39

## 2023-09-23 RX ADMIN — Medication 8: at 12:37

## 2023-09-23 RX ADMIN — BUDESONIDE AND FORMOTEROL FUMARATE DIHYDRATE 2 PUFF(S): 160; 4.5 AEROSOL RESPIRATORY (INHALATION) at 11:34

## 2023-09-23 RX ADMIN — BUDESONIDE AND FORMOTEROL FUMARATE DIHYDRATE 2 PUFF(S): 160; 4.5 AEROSOL RESPIRATORY (INHALATION) at 23:32

## 2023-09-23 RX ADMIN — PANTOPRAZOLE SODIUM 40 MILLIGRAM(S): 20 TABLET, DELAYED RELEASE ORAL at 08:30

## 2023-09-23 RX ADMIN — Medication 8 UNIT(S): at 17:18

## 2023-09-23 RX ADMIN — CEFTRIAXONE 100 MILLIGRAM(S): 500 INJECTION, POWDER, FOR SOLUTION INTRAMUSCULAR; INTRAVENOUS at 15:06

## 2023-09-23 RX ADMIN — CARVEDILOL PHOSPHATE 12.5 MILLIGRAM(S): 80 CAPSULE, EXTENDED RELEASE ORAL at 05:31

## 2023-09-23 RX ADMIN — Medication 8 UNIT(S): at 12:38

## 2023-09-23 RX ADMIN — SODIUM ZIRCONIUM CYCLOSILICATE 5 GRAM(S): 10 POWDER, FOR SUSPENSION ORAL at 09:16

## 2023-09-23 RX ADMIN — Medication 6: at 17:15

## 2023-09-23 RX ADMIN — Medication 40 MILLIGRAM(S): at 18:35

## 2023-09-23 RX ADMIN — Medication 2: at 21:40

## 2023-09-23 RX ADMIN — RIVAROXABAN 15 MILLIGRAM(S): KIT at 11:34

## 2023-09-23 RX ADMIN — Medication 175 MICROGRAM(S): at 05:31

## 2023-09-23 NOTE — PROGRESS NOTE ADULT - ASSESSMENT
Patient is a 83y old  Female who is from home with pmhx of HTN, DM, hypothyroid, Asthma, afib, CAD, CHF, PPM, HF with EF: >70% on 3LNC as needed, now presents to the ER for evaluation of low blood pressure, in low 90s systolic, and  poor appetite for the past two days. She gave her salt tabs however blood pressure was still low. She denies any dizziness. On admission, she has no fever but tachypnea and hypoxia to 91 %. She has started on Steroid, And on Ceftriaxone after CT chest shows Right Lung opacities. The ID consult requested today, 9/22/23, to assist with further evaluation and antibiotic management.    # Pneumonia - Hypoxia requiring supplemental oxygenation- MRSA PCR is negative    would recommend:    1. Follow up Legionella Urine Ag  2. Procalcitonin level  3. Supplemental oxygenation and Bronchodilator as needed  4. Aspiration precaution  5. Continue Ceftriaxone inpatient and may change to oral Augmentin 875mg q 12hours until 9/27/23    d/w Covering Sadie BURCH and patient's daughter at the bed side      Attending Attestation:    Spent more than 45 minutes on total encounter, more than 50 % of the visit was spent counseling and/or coordinating care by the Attending physician.

## 2023-09-23 NOTE — PHYSICAL THERAPY INITIAL EVALUATION ADULT - PERTINENT HX OF CURRENT PROBLEM, REHAB EVAL
84 y/o female from home with pmhx of THN, DM, hypothyroid, asthma, afib, CAD, CHF, PPM, HF with EF: >70% on 3LNC as needed presenting to the ED for low blood pressure.

## 2023-09-23 NOTE — PROGRESS NOTE ADULT - SUBJECTIVE AND OBJECTIVE BOX
INTERVAL HPI/OVERNIGHT EVENTS:  Patient seen,stable ,no acute issues  VITAL SIGNS:  T(F): 98 (09-23-23 @ 05:30)  HR: 82 (09-23-23 @ 10:10)  BP: 106/55 (09-23-23 @ 05:30)  RR: 18 (09-23-23 @ 05:30)  SpO2: 97% (09-23-23 @ 10:10)  Wt(kg): --    PHYSICAL EXAM:  awake  Constitutional:  Eyes:  ENMT:perrla  Neck:  Respiratory:clear  Cardiovascular:s1s2,m-none  Gastrointestinal:soft,bs pos  Extremities:  Vascular:  Neurological:no focal deficit  Musculoskeletal:    MEDICATIONS  (STANDING):  budesonide 160 MICROgram(s)/formoterol 4.5 MICROgram(s) Inhaler 2 Puff(s) Inhalation two times a day  carvedilol 12.5 milliGRAM(s) Oral every 12 hours  cefTRIAXone   IVPB 1000 milliGRAM(s) IV Intermittent every 24 hours  dextrose 5%. 1000 milliLiter(s) (50 mL/Hr) IV Continuous <Continuous>  dextrose 5%. 1000 milliLiter(s) (100 mL/Hr) IV Continuous <Continuous>  dextrose 50% Injectable 12.5 Gram(s) IV Push once  dextrose 50% Injectable 25 Gram(s) IV Push once  dextrose 50% Injectable 25 Gram(s) IV Push once  dronedarone 400 milliGRAM(s) Oral two times a day  glucagon  Injectable 1 milliGRAM(s) IntraMuscular once  insulin glargine Injectable (LANTUS) 10 Unit(s) SubCutaneous at bedtime  insulin lispro (ADMELOG) corrective regimen sliding scale   SubCutaneous at bedtime  insulin lispro (ADMELOG) corrective regimen sliding scale   SubCutaneous three times a day before meals  insulin lispro Injectable (ADMELOG) 8 Unit(s) SubCutaneous three times a day before meals  levothyroxine 175 MICROGram(s) Oral daily  methylPREDNISolone sodium succinate Injectable 40 milliGRAM(s) IV Push every 12 hours  pantoprazole    Tablet 40 milliGRAM(s) Oral before breakfast  rivaroxaban 15 milliGRAM(s) Oral daily    MEDICATIONS  (PRN):  albuterol    90 MICROgram(s) HFA Inhaler 2 Puff(s) Inhalation every 6 hours PRN Bronchospasm  dextrose Oral Gel 15 Gram(s) Oral once PRN Blood Glucose LESS THAN 70 milliGRAM(s)/deciliter  guaiFENesin Oral Liquid (Sugar-Free) 200 milliGRAM(s) Oral every 6 hours PRN Cough  melatonin 3 milliGRAM(s) Oral at bedtime PRN Insomnia      Allergies    No Known Allergies    Intolerances        LABS:                        11.1   9.74  )-----------( 213      ( 23 Sep 2023 06:31 )             34.4     09-23    136  |  103  |  28<H>  ----------------------------<  326<H>  5.3   |  28  |  1.53<H>    Ca    8.7      23 Sep 2023 06:31        Urinalysis Basic - ( 23 Sep 2023 06:31 )    Color: x / Appearance: x / SG: x / pH: x  Gluc: 326 mg/dL / Ketone: x  / Bili: x / Urobili: x   Blood: x / Protein: x / Nitrite: x   Leuk Esterase: x / RBC: x / WBC x   Sq Epi: x / Non Sq Epi: x / Bacteria: x        RADIOLOGY & ADDITIONAL TESTS:      Assessment and Plan:   · Assessment	  82 y/o female from home with pmhx of HTN, DM, hypothyroid, asthma, afib, CAD, CHF, PPM, HF with EF: >70% on 3LNC as needed presenting to the ED for low blood pressure. Daughter at bedside who is the care taker states she measured her blood pressure and it was in low 90s systolic, she also endorsed patient has been having poor appetite for the past two days. She gave her salt tabs however blood pressure was still low. Admitted for hypotension and SRIDHAR. Pt was given IV fluids and blood pressure medications were adjusted. SRIDHAR and Hypotension is now resolved.   CXR pulm congestion unchanged. Cardiology Dr. Sandoval following. Pt will need repeat echocardiogram to eval for worsening aortic stenosis.   Will also further evaluate with CT chest.     Pt also c/o RUQ abdominal pain, pending US RUQ.     Additionally awaiting blood and urine culture results.   PT consult ordered.   09-21- Pt remains stable at time of eval w/o complaints. RUQ w/o acute GI pathology, Labs and VS reviewed, Pending Echo and PT eval for d/c.               Problem/Plan - 1:  ·  Problem: Acute respiratory failure with hypoxia.   in setting of CHF and asthma  pt uses 3L oxygen nasal cannula at home  diuretics on hold due to sridhar   f/u CT chest to r/o PNA, Pulm. Edema  maintain oxygen sat >92%  CM following.     Problem/Plan - 2:  ·  Problem: HF (heart failure).   ·  Plan: HF with EF> 70% IN 2021   On carvedilol 12.5mg BID, losartan 50mg, Torsemide 10mg   hold in setting of hypotension  f/u pending repeat echocardiogram to eval aortic stenosis  Cardio Dr. Sandoavl following.     Problem/Plan - 3:  ·  Problem: Mild asthma.   ·  Plan: c/w albuterol PRN and budesonide-formeterol   add guaifenesin 200 mg PRN for coughing.     Problem/Plan - 4:  ·  Problem: Acute kidney injury superimposed on CKD.   s/p IV fluids  resolved now at baseline  monitor BMP  Avoid Nephrotoxic Meds/ Agents such as (NSAIDs, IV contrast, Aminoglycosides such as gentamicin, Gadolinium contrast, Phosphate containing enemas, etc..).     Problem/Plan - 5:  ·  Problem: HTN (hypertension).   ·  Plan: on carvedilol 12.5mg BID, losartan 50mg  hold in setting of acute soft  bp  monitor BP  BP goal <140/90.     Problem/Plan - 6:  ·  Problem: Acute hypotension.   ·  Plan: 2 day hx SBP in 90s  meds held for 2 days, soft bp   vitals: BP: 112/54, HR: 67, T: 37 on RA  s/p 500cc NS, Duoneb, decadron in ED  CXR pulm congestion unchanged from prior  RVP was negative  hypotension is now resolved  Bcux NGTD  Ucx growing E-coli and Beta strep- Already on Ceftriaxone, sensitivity pending.     Problem/Plan - 7:  ·  Problem: Lactic acidosis.   ·  Plan: lactate 2.3> 1.2  resolved.     Problem/Plan - 8:  ·  Problem: Chronic atrial fibrillation.   ·  Plan: PPM   on Multaq 400mg BID and xarelto  HR controlled.     Problem/Plan - 9:  ·  Problem: Hypothyroid.   ·  Plan: on leovthyroxine 175 mcg daily  c/w home med.     Problem/Plan - 10:  ·  Problem: DM (diabetes mellitus).   ·  Plan; a1c 7.9  on janumet  BID, repaglinide 2mg TID  ISS tid and at bedtime  glucose controlled.     Problem/Plan - 11:  ·  Problem: Prophylactic measure.   ·  Plan: dvt - xarelto  gi - tolerating po.     Problem/Plan - 12:  ·  Problem: Discharge planning issues.   ·  Plan: f/u echo  f/u PT consult  f/u CM to ensure pt has home oxygen.

## 2023-09-23 NOTE — PROGRESS NOTE ADULT - SUBJECTIVE AND OBJECTIVE BOX
Date of Service 09-23-23 @ 11:29    CHIEF COMPLAINT:Patient is a 83y old  Female who presents with a chief complaint of Hypotension ,SRIDHAR. Pt appears comfortable.    	  REVIEW OF SYSTEMS:  CONSTITUTIONAL: No fever, weight loss, or fatigue  EYES: No eye pain, visual disturbances, or discharge  ENT:  No difficulty hearing, tinnitus, vertigo; No sinus or throat pain  NECK: No pain or stiffness  RESPIRATORY: No cough, wheezing, chills or hemoptysis; No Shortness of Breath  CARDIOVASCULAR: No chest pain, palpitations, passing out, dizziness, or leg swelling  GASTROINTESTINAL: No abdominal or epigastric pain. No nausea, vomiting, or hematemesis; No diarrhea or constipation. No melena or hematochezia.  GENITOURINARY: No dysuria, frequency, hematuria, or incontinence  NEUROLOGICAL: No headaches, memory loss, loss of strength, numbness, or tremors  SKIN: No itching, burning, rashes, or lesions   LYMPH Nodes: No enlarged glands  ENDOCRINE: No heat or cold intolerance; No hair loss  MUSCULOSKELETAL: No joint pain or swelling; No muscle, back, or extremity pain  PSYCHIATRIC: No depression, anxiety, mood swings, or difficulty sleeping  HEME/LYMPH: No easy bruising, or bleeding gums  ALLERGY AND IMMUNOLOGIC: No hives or eczema	        PHYSICAL EXAM:  T(C): 36.7 (09-23-23 @ 05:30), Max: 36.8 (09-22-23 @ 13:00)  HR: 82 (09-23-23 @ 10:10) (61 - 82)  BP: 106/55 (09-23-23 @ 05:30) (102/61 - 153/77)  RR: 18 (09-23-23 @ 05:30) (18 - 18)  SpO2: 97% (09-23-23 @ 10:10) (95% - 99%)  Wt(kg): --  I&O's Summary      Appearance: Normal	  HEENT:   Normal oral mucosa, PERRL, EOMI	  Lymphatic: No lymphadenopathy  Cardiovascular: Normal S1 S2, No JVD, No murmurs, No edema  Respiratory: Lungs clear to auscultation	  Psychiatry: A & O x 3, Mood & affect appropriate  Gastrointestinal:  Soft, Non-tender, + BS	  Skin: No rashes, No ecchymoses, No cyanosis	  Neurologic: Non-focal  Extremities: Normal range of motion, No clubbing, cyanosis or edema  Vascular: Peripheral pulses palpable 2+ bilaterally    MEDICATIONS  (STANDING):  budesonide 160 MICROgram(s)/formoterol 4.5 MICROgram(s) Inhaler 2 Puff(s) Inhalation two times a day  carvedilol 12.5 milliGRAM(s) Oral every 12 hours  cefTRIAXone   IVPB 1000 milliGRAM(s) IV Intermittent every 24 hours  dextrose 5%. 1000 milliLiter(s) (50 mL/Hr) IV Continuous <Continuous>  dextrose 5%. 1000 milliLiter(s) (100 mL/Hr) IV Continuous <Continuous>  dextrose 50% Injectable 12.5 Gram(s) IV Push once  dextrose 50% Injectable 25 Gram(s) IV Push once  dextrose 50% Injectable 25 Gram(s) IV Push once  dronedarone 400 milliGRAM(s) Oral two times a day  glucagon  Injectable 1 milliGRAM(s) IntraMuscular once  insulin glargine Injectable (LANTUS) 10 Unit(s) SubCutaneous at bedtime  insulin lispro (ADMELOG) corrective regimen sliding scale   SubCutaneous three times a day before meals  insulin lispro (ADMELOG) corrective regimen sliding scale   SubCutaneous at bedtime  insulin lispro Injectable (ADMELOG) 8 Unit(s) SubCutaneous three times a day before meals  levothyroxine 175 MICROGram(s) Oral daily  methylPREDNISolone sodium succinate Injectable 40 milliGRAM(s) IV Push every 12 hours  pantoprazole    Tablet 40 milliGRAM(s) Oral before breakfast  rivaroxaban 15 milliGRAM(s) Oral daily      	  	  LABS:	 	                          11.1   9.74  )-----------( 213      ( 23 Sep 2023 06:31 )             34.4     09-23    136  |  103  |  28<H>  ----------------------------<  326<H>  5.3   |  28  |  1.53<H>    Ca    8.7      23 Sep 2023 06:31        TSH: Thyroid Stimulating Hormone, Serum: 0.12 uU/mL (09-21 @ 07:03)

## 2023-09-23 NOTE — PROGRESS NOTE ADULT - ASSESSMENT
82 y/o female from home with pmhx of  DM, hypothyroid, asthma, afib, CAD, CHF, PPM, HF with EF: >70% on 3LNC as needed presenting to the ED for low blood pressure,AK,pneumonia.  1.Hypotension and SRIDHAR resolving after IVF.  2.PAF-xarelto,multaq,  3.DM-Insulin.Inc Lantus 10 units sq qhs.  4.Asthma-MDI.  5.Hypothyroid-synthroid.  6.R/O JOSE DANIEL.  7.Echocardiogram as above, severe pulm HTN-most likely due to JOSE DANIEL.  8.Pneumonia-steroids,ABX.  9.GI prophylaxis.  10.Above plan d/w daughter at bedside.

## 2023-09-23 NOTE — PROGRESS NOTE ADULT - SUBJECTIVE AND OBJECTIVE BOX
Patient is seen and examined at the bed side, is afebrile. She is feelling better, cough is improving. The MRSA PCR is negative. The Family at the bed side.       REVIEW OF SYSTEMS: All other review systems are negative      ALLERGIES: No Known Allergies      Vital Signs Last 24 Hrs  T(C): 36.7 (23 Sep 2023 14:10), Max: 36.7 (23 Sep 2023 05:30)  T(F): 98.1 (23 Sep 2023 14:10), Max: 98.1 (23 Sep 2023 14:10)  HR: 66 (23 Sep 2023 14:10) (61 - 82)  BP: 152/73 (23 Sep 2023 14:10) (102/61 - 152/73)  BP(mean): --  RR: 16 (23 Sep 2023 14:10) (16 - 18)  SpO2: 95% (23 Sep 2023 14:10) (95% - 97%)    Parameters below as of 23 Sep 2023 14:10  Patient On (Oxygen Delivery Method): nasal cannula  O2 Flow (L/min): 2      PHYSICAL EXAM:  GENERAL: Not in distress  CHEST/LUNG:  Not using accessory muscles   HEART: s1 and s2 present  ABDOMEN:  Nontender and  Nondistended  EXTREMITIES:  Trace pedal  edema  CNS: Awake and Alert      LABS:                        11.1   9.74  )-----------( 213      ( 23 Sep 2023 06:31 )             34.4                           11.4   6.79  )-----------( 235      ( 21 Sep 2023 07:03 )             35.5         09-23    136  |  103  |  28<H>  ----------------------------<  326<H>  5.3   |  28  |  1.53<H>    Ca    8.7      23 Sep 2023 06:31      09-21    139  |  105  |  21<H>  ----------------------------<  183<H>  4.2   |  25  |  1.39<H>    Ca    8.9      21 Sep 2023 07:03      CAPILLARY BLOOD GLUCOSE  POCT Blood Glucose.: 308 mg/dL (22 Sep 2023 07:40)  POCT Blood Glucose.: 358 mg/dL (21 Sep 2023 21:25)  POCT Blood Glucose.: 295 mg/dL (21 Sep 2023 16:51)  POCT Blood Glucose.: 334 mg/dL (21 Sep 2023 11:42)      Urinalysis Basic - ( 21 Sep 2023 07:03 )  Color: x / Appearance: x / SG: x / pH: x  Gluc: 183 mg/dL / Ketone: x  / Bili: x / Urobili: x   Blood: x / Protein: x / Nitrite: x   Leuk Esterase: x / RBC: x / WBC x   Sq Epi: x / Non Sq Epi: x / Bacteria: x        MEDICATIONS  (STANDING):    budesonide 160 MICROgram(s)/formoterol 4.5 MICROgram(s) Inhaler 2 Puff(s) Inhalation two times a day  carvedilol 12.5 milliGRAM(s) Oral every 12 hours  cefTRIAXone   IVPB 1000 milliGRAM(s) IV Intermittent every 24 hours  dronedarone 400 milliGRAM(s) Oral two times a day  glucagon  Injectable 1 milliGRAM(s) IntraMuscular once  insulin glargine Injectable (LANTUS) 10 Unit(s) SubCutaneous at bedtime  insulin lispro (ADMELOG) corrective regimen sliding scale   SubCutaneous at bedtime  insulin lispro (ADMELOG) corrective regimen sliding scale   SubCutaneous three times a day before meals  insulin lispro Injectable (ADMELOG) 8 Unit(s) SubCutaneous three times a day before meals  levothyroxine 175 MICROGram(s) Oral daily  methylPREDNISolone sodium succinate Injectable 40 milliGRAM(s) IV Push every 12 hours  pantoprazole    Tablet 40 milliGRAM(s) Oral before breakfast  rivaroxaban 15 milliGRAM(s) Oral daily        RADIOLOGY & ADDITIONAL TESTS:      9/21/23 : US Abdomen Complete (09.21.23 @ 09:46) No gallbladder disease or other acute finding      9/20/23: CT Chest No Cont (09.20.23 @ 22:09) IMPRESSION: A few nonspecific right lung small patchy ground glass   opacities. Differential diagnostic considerations include but is not  limited to foci of infection/inflammation.      MICROBIOLOGY  DATA:    Respiratory Viral Panel with COVID-19 by [facility] (09.19.23 @ 14:00)   [facility]  SARS-CoV-2: NotDetec:    Culture - Blood (09.19.23 @ 01:20)   Specimen Source: .Blood Blood-Peripheral  Culture Results: No growth at 4 days    Culture - Blood (09.19.23 @ 01:10)   Specimen Source: .Blood Blood-Peripheral  Culture Results:   No growth at 4 days       Urine Microscopic-Add On (NC) (09.19.23 @ 04:00)   Comment - Urine: Few Mucus Strands  Squamous Epithelial Cells: Present  Red Blood Cell - Urine: 1 /HPF  White Blood Cell - Urine: 3 /HPF  Hyaline Casts: Present  Bacteria: Few /HPF

## 2023-09-24 LAB
ANION GAP SERPL CALC-SCNC: 8 MMOL/L — SIGNIFICANT CHANGE UP (ref 5–17)
BUN SERPL-MCNC: 29 MG/DL — HIGH (ref 7–18)
CALCIUM SERPL-MCNC: 8.9 MG/DL — SIGNIFICANT CHANGE UP (ref 8.4–10.5)
CHLORIDE SERPL-SCNC: 103 MMOL/L — SIGNIFICANT CHANGE UP (ref 96–108)
CO2 SERPL-SCNC: 27 MMOL/L — SIGNIFICANT CHANGE UP (ref 22–31)
CREAT SERPL-MCNC: 1.2 MG/DL — SIGNIFICANT CHANGE UP (ref 0.5–1.3)
CULTURE RESULTS: SIGNIFICANT CHANGE UP
CULTURE RESULTS: SIGNIFICANT CHANGE UP
EGFR: 45 ML/MIN/1.73M2 — LOW
GLUCOSE BLDC GLUCOMTR-MCNC: 262 MG/DL — HIGH (ref 70–99)
GLUCOSE BLDC GLUCOMTR-MCNC: 277 MG/DL — HIGH (ref 70–99)
GLUCOSE BLDC GLUCOMTR-MCNC: 384 MG/DL — HIGH (ref 70–99)
GLUCOSE BLDC GLUCOMTR-MCNC: 448 MG/DL — HIGH (ref 70–99)
GLUCOSE SERPL-MCNC: 282 MG/DL — HIGH (ref 70–99)
MAGNESIUM SERPL-MCNC: 2.4 MG/DL — SIGNIFICANT CHANGE UP (ref 1.6–2.6)
POTASSIUM SERPL-MCNC: 5 MMOL/L — SIGNIFICANT CHANGE UP (ref 3.5–5.3)
POTASSIUM SERPL-SCNC: 5 MMOL/L — SIGNIFICANT CHANGE UP (ref 3.5–5.3)
SODIUM SERPL-SCNC: 138 MMOL/L — SIGNIFICANT CHANGE UP (ref 135–145)
SPECIMEN SOURCE: SIGNIFICANT CHANGE UP
SPECIMEN SOURCE: SIGNIFICANT CHANGE UP

## 2023-09-24 RX ORDER — INSULIN GLARGINE 100 [IU]/ML
12 INJECTION, SOLUTION SUBCUTANEOUS AT BEDTIME
Refills: 0 | Status: DISCONTINUED | OUTPATIENT
Start: 2023-09-24 | End: 2023-09-25

## 2023-09-24 RX ORDER — INSULIN LISPRO 100/ML
10 VIAL (ML) SUBCUTANEOUS
Refills: 0 | Status: DISCONTINUED | OUTPATIENT
Start: 2023-09-24 | End: 2023-09-26

## 2023-09-24 RX ADMIN — BUDESONIDE AND FORMOTEROL FUMARATE DIHYDRATE 2 PUFF(S): 160; 4.5 AEROSOL RESPIRATORY (INHALATION) at 10:37

## 2023-09-24 RX ADMIN — Medication 6: at 08:29

## 2023-09-24 RX ADMIN — Medication 2: at 22:34

## 2023-09-24 RX ADMIN — Medication 40 MILLIGRAM(S): at 17:29

## 2023-09-24 RX ADMIN — Medication 175 MICROGRAM(S): at 05:36

## 2023-09-24 RX ADMIN — Medication 8 UNIT(S): at 17:29

## 2023-09-24 RX ADMIN — CARVEDILOL PHOSPHATE 12.5 MILLIGRAM(S): 80 CAPSULE, EXTENDED RELEASE ORAL at 05:36

## 2023-09-24 RX ADMIN — BUDESONIDE AND FORMOTEROL FUMARATE DIHYDRATE 2 PUFF(S): 160; 4.5 AEROSOL RESPIRATORY (INHALATION) at 22:33

## 2023-09-24 RX ADMIN — Medication 10: at 17:28

## 2023-09-24 RX ADMIN — CEFTRIAXONE 100 MILLIGRAM(S): 500 INJECTION, POWDER, FOR SOLUTION INTRAMUSCULAR; INTRAVENOUS at 16:33

## 2023-09-24 RX ADMIN — CARVEDILOL PHOSPHATE 12.5 MILLIGRAM(S): 80 CAPSULE, EXTENDED RELEASE ORAL at 17:32

## 2023-09-24 RX ADMIN — Medication 40 MILLIGRAM(S): at 05:37

## 2023-09-24 RX ADMIN — RIVAROXABAN 15 MILLIGRAM(S): KIT at 11:55

## 2023-09-24 RX ADMIN — DRONEDARONE 400 MILLIGRAM(S): 400 TABLET, FILM COATED ORAL at 17:32

## 2023-09-24 RX ADMIN — Medication 12: at 11:56

## 2023-09-24 RX ADMIN — INSULIN GLARGINE 12 UNIT(S): 100 INJECTION, SOLUTION SUBCUTANEOUS at 22:32

## 2023-09-24 RX ADMIN — DRONEDARONE 400 MILLIGRAM(S): 400 TABLET, FILM COATED ORAL at 05:37

## 2023-09-24 RX ADMIN — PANTOPRAZOLE SODIUM 40 MILLIGRAM(S): 20 TABLET, DELAYED RELEASE ORAL at 06:45

## 2023-09-24 RX ADMIN — Medication 8 UNIT(S): at 08:29

## 2023-09-24 RX ADMIN — Medication 8 UNIT(S): at 11:57

## 2023-09-24 NOTE — PROGRESS NOTE ADULT - SUBJECTIVE AND OBJECTIVE BOX
Date of Service 09-24-23 @ 12:49    CHIEF COMPLAINT:Patient is a 83y old  Female who presents with a chief complaint of Hypotension or SRIDHAR.Pt cough while eating.    	  REVIEW OF SYSTEMS:  CONSTITUTIONAL: No fever, weight loss, or fatigue  EYES: No eye pain, visual disturbances, or discharge  ENT:  No difficulty hearing, tinnitus, vertigo; No sinus or throat pain  NECK: No pain or stiffness  RESPIRATORY: + cough, No wheezing, chills or hemoptysis; No Shortness of Breath  CARDIOVASCULAR: No chest pain, palpitations, passing out, dizziness, or leg swelling  GASTROINTESTINAL: No abdominal or epigastric pain. No nausea, vomiting, or hematemesis; No diarrhea or constipation. No melena or hematochezia.  GENITOURINARY: No dysuria, frequency, hematuria, or incontinence  NEUROLOGICAL: No headaches, memory loss, loss of strength, numbness, or tremors  SKIN: No itching, burning, rashes, or lesions   LYMPH Nodes: No enlarged glands  ENDOCRINE: No heat or cold intolerance; No hair loss  MUSCULOSKELETAL: No joint pain or swelling; No muscle, back, or extremity pain  PSYCHIATRIC: No depression, anxiety, mood swings, or difficulty sleeping  HEME/LYMPH: No easy bruising, or bleeding gums  ALLERGY AND IMMUNOLOGIC: No hives or eczema	      PHYSICAL EXAM:  T(C): 36.8 (09-24-23 @ 05:19), Max: 36.9 (09-23-23 @ 21:10)  HR: 66 (09-24-23 @ 05:19) (63 - 68)  BP: 140/62 (09-24-23 @ 05:19) (125/63 - 152/73)  RR: 20 (09-24-23 @ 12:15) (16 - 20)  SpO2: 91% (09-24-23 @ 12:15) (91% - 97%)  Wt(kg): --  I&O's Summary      Appearance: Normal	  HEENT:   Normal oral mucosa, PERRL, EOMI	  Lymphatic: No lymphadenopathy  Cardiovascular: Normal S1 S2, No JVD, No murmurs, No edema  Respiratory:B/L ronchi  Psychiatry: A & O x 3, Mood & affect appropriate  Gastrointestinal:  Soft, Non-tender, + BS	  Skin: No rashes, No ecchymoses, No cyanosis	  Neurologic: Non-focal  Extremities: Normal range of motion, No clubbing, cyanosis or edema  Vascular: Peripheral pulses palpable 2+ bilaterally    MEDICATIONS  (STANDING):  budesonide 160 MICROgram(s)/formoterol 4.5 MICROgram(s) Inhaler 2 Puff(s) Inhalation two times a day  carvedilol 12.5 milliGRAM(s) Oral every 12 hours  cefTRIAXone   IVPB 1000 milliGRAM(s) IV Intermittent every 24 hours  dextrose 5%. 1000 milliLiter(s) (100 mL/Hr) IV Continuous <Continuous>  dextrose 5%. 1000 milliLiter(s) (50 mL/Hr) IV Continuous <Continuous>  dextrose 50% Injectable 12.5 Gram(s) IV Push once  dextrose 50% Injectable 25 Gram(s) IV Push once  dextrose 50% Injectable 25 Gram(s) IV Push once  dronedarone 400 milliGRAM(s) Oral two times a day  glucagon  Injectable 1 milliGRAM(s) IntraMuscular once  insulin glargine Injectable (LANTUS) 10 Unit(s) SubCutaneous at bedtime  insulin lispro (ADMELOG) corrective regimen sliding scale   SubCutaneous at bedtime  insulin lispro (ADMELOG) corrective regimen sliding scale   SubCutaneous three times a day before meals  insulin lispro Injectable (ADMELOG) 8 Unit(s) SubCutaneous three times a day before meals  levothyroxine 175 MICROGram(s) Oral daily  methylPREDNISolone sodium succinate Injectable 40 milliGRAM(s) IV Push every 12 hours  pantoprazole    Tablet 40 milliGRAM(s) Oral before breakfast  rivaroxaban 15 milliGRAM(s) Oral daily      LABS:	 	                                    11.1   9.74  )-----------( 213      ( 23 Sep 2023 06:31 )             34.4     09-24    138  |  103  |  29<H>  ----------------------------<  282<H>  5.0   |  27  |  1.20    Ca    8.9      24 Sep 2023 06:30  Mg     2.4     09-24        TSH: Thyroid Stimulating Hormone, Serum: 0.12 uU/mL (09-21 @ 07:03)

## 2023-09-24 NOTE — PROGRESS NOTE ADULT - ASSESSMENT
82 y/o female from home with pmhx of  DM, hypothyroid, asthma, afib, CAD, CHF, PPM, HF with EF: >70% on 3LNC as needed presenting to the ED for low blood pressure,AK,pneumonia.  1.Hypotension and SRIDHAR resolving after IVF.  2.PAF-xarelto,multaq,  3.DM-Insulin.Inc Lantus 10 units sq qhs.  4.Asthma-MDI.  5.Hypothyroid-synthroid.  6.R/O JOSE DANIEL.  7.Echocardiogram as above, severe pulm HTN-most likely due to JOSE DANIEL.  8.Pneumonia-steroids,ABX.  9.GI prophylaxis.  10.Barium swallow-r/o aspirartion.  11.Above plan d/w daughter at bedside.

## 2023-09-24 NOTE — PROGRESS NOTE ADULT - SUBJECTIVE AND OBJECTIVE BOX
Patient is seen and examined at the bed side, is afebrile. The events noted regarding patient was wheezing and was dyspneic  earlier today.      REVIEW OF SYSTEMS: All other review systems are negative      ALLERGIES: No Known Allergies      Vital Signs Last 24 Hrs  T(C): 36.7 (23 Sep 2023 14:10), Max: 36.7 (23 Sep 2023 05:30)  T(F): 98.1 (23 Sep 2023 14:10), Max: 98.1 (23 Sep 2023 14:10)  HR: 66 (23 Sep 2023 14:10) (61 - 82)  BP: 152/73 (23 Sep 2023 14:10) (102/61 - 152/73)  BP(mean): --  RR: 16 (23 Sep 2023 14:10) (16 - 18)  SpO2: 95% (23 Sep 2023 14:10) (95% - 97%)    Parameters below as of 23 Sep 2023 14:10  Patient On (Oxygen Delivery Method): nasal cannula  O2 Flow (L/min): 2      PHYSICAL EXAM:  GENERAL: Not in distress, on oxygen via NC  CHEST/LUNG:  Not using accessory muscles   HEART: s1 and s2 present  ABDOMEN:  Nontender and  Nondistended  EXTREMITIES:  Trace pedal  edema  CNS: Awake and Alert      LABS: No new labs                        11.1   9.74  )-----------( 213      ( 23 Sep 2023 06:31 )             34.4                           11.4   6.79  )-----------( 235      ( 21 Sep 2023 07:03 )             35.5         09-23    136  |  103  |  28<H>  ----------------------------<  326<H>  5.3   |  28  |  1.53<H>    Ca    8.7      23 Sep 2023 06:31      09-21    139  |  105  |  21<H>  ----------------------------<  183<H>  4.2   |  25  |  1.39<H>    Ca    8.9      21 Sep 2023 07:03      CAPILLARY BLOOD GLUCOSE  POCT Blood Glucose.: 308 mg/dL (22 Sep 2023 07:40)  POCT Blood Glucose.: 358 mg/dL (21 Sep 2023 21:25)  POCT Blood Glucose.: 295 mg/dL (21 Sep 2023 16:51)  POCT Blood Glucose.: 334 mg/dL (21 Sep 2023 11:42)      Urinalysis Basic - ( 21 Sep 2023 07:03 )  Color: x / Appearance: x / SG: x / pH: x  Gluc: 183 mg/dL / Ketone: x  / Bili: x / Urobili: x   Blood: x / Protein: x / Nitrite: x   Leuk Esterase: x / RBC: x / WBC x   Sq Epi: x / Non Sq Epi: x / Bacteria: x      Procalcitonin, Serum (09.23.23 @ 06:31)  0.06:    MEDICATIONS  (STANDING):    budesonide 160 MICROgram(s)/formoterol 4.5 MICROgram(s) Inhaler 2 Puff(s) Inhalation two times a day  carvedilol 12.5 milliGRAM(s) Oral every 12 hours  cefTRIAXone   IVPB 1000 milliGRAM(s) IV Intermittent every 24 hours  dronedarone 400 milliGRAM(s) Oral two times a day  glucagon  Injectable 1 milliGRAM(s) IntraMuscular once  insulin glargine Injectable (LANTUS) 10 Unit(s) SubCutaneous at bedtime  insulin lispro (ADMELOG) corrective regimen sliding scale   SubCutaneous at bedtime  insulin lispro (ADMELOG) corrective regimen sliding scale   SubCutaneous three times a day before meals  insulin lispro Injectable (ADMELOG) 8 Unit(s) SubCutaneous three times a day before meals  levothyroxine 175 MICROGram(s) Oral daily  methylPREDNISolone sodium succinate Injectable 40 milliGRAM(s) IV Push every 12 hours  pantoprazole    Tablet 40 milliGRAM(s) Oral before breakfast  rivaroxaban 15 milliGRAM(s) Oral daily      RADIOLOGY & ADDITIONAL TESTS:    9/21/23 : US Abdomen Complete (09.21.23 @ 09:46) No gallbladder disease or other acute finding      9/20/23: CT Chest No Cont (09.20.23 @ 22:09) IMPRESSION: A few nonspecific right lung small patchy ground glass   opacities. Differential diagnostic considerations include but is not  limited to foci of infection/inflammation.      MICROBIOLOGY  DATA:    MRSA/MSSA PCR (09.22.23 @ 12:23)   MRSA PCR Result.: NotDetec:      Respiratory Viral Panel with COVID-19 by [facility] (09.19.23 @ 14:00)   Rapid RVP Result: [facility]  SARS-CoV-2: NotDetec:    Culture - Blood (09.19.23 @ 01:20)   Specimen Source: .Blood Blood-Peripheral  Culture Results: No growth at 5 days    Culture - Blood (09.19.23 @ 01:10)   Specimen Source: .Blood Blood-Peripheral  Culture Results: No growth at 5 days       Urine Microscopic-Add On (NC) (09.19.23 @ 04:00)   Comment - Urine: Few Mucus Strands  Squamous Epithelial Cells: Present  Red Blood Cell - Urine: 1 /HPF  White Blood Cell - Urine: 3 /HPF  Hyaline Casts: Present  Bacteria: Few /HPF

## 2023-09-24 NOTE — PROGRESS NOTE ADULT - SUBJECTIVE AND OBJECTIVE BOX
INTERVAL HPI/OVERNIGHT EVENTS:  Patient seen,c/o some difficulty of swallowing  VITAL SIGNS:  T(F): 98.8 (09-24-23 @ 13:19)  HR: 60 (09-24-23 @ 13:19)  BP: 181/72 (09-24-23 @ 13:19)  RR: 18 (09-24-23 @ 13:19)  SpO2: 96% (09-24-23 @ 13:19)  Wt(kg): --    PHYSICAL EXAM:  awake  Constitutional:  Eyes:  ENMT:perrla  Neck:  Respiratory:clear  Cardiovascular:s1s2,m-none  Gastrointestinal:soft,bs pos  Extremities:  Vascular:  Neurological:no focal deficit  Musculoskeletal:    MEDICATIONS  (STANDING):  budesonide 160 MICROgram(s)/formoterol 4.5 MICROgram(s) Inhaler 2 Puff(s) Inhalation two times a day  carvedilol 12.5 milliGRAM(s) Oral every 12 hours  cefTRIAXone   IVPB 1000 milliGRAM(s) IV Intermittent every 24 hours  dextrose 5%. 1000 milliLiter(s) (100 mL/Hr) IV Continuous <Continuous>  dextrose 5%. 1000 milliLiter(s) (50 mL/Hr) IV Continuous <Continuous>  dextrose 50% Injectable 12.5 Gram(s) IV Push once  dextrose 50% Injectable 25 Gram(s) IV Push once  dextrose 50% Injectable 25 Gram(s) IV Push once  dronedarone 400 milliGRAM(s) Oral two times a day  glucagon  Injectable 1 milliGRAM(s) IntraMuscular once  insulin glargine Injectable (LANTUS) 10 Unit(s) SubCutaneous at bedtime  insulin lispro (ADMELOG) corrective regimen sliding scale   SubCutaneous at bedtime  insulin lispro (ADMELOG) corrective regimen sliding scale   SubCutaneous three times a day before meals  insulin lispro Injectable (ADMELOG) 8 Unit(s) SubCutaneous three times a day before meals  levothyroxine 175 MICROGram(s) Oral daily  methylPREDNISolone sodium succinate Injectable 40 milliGRAM(s) IV Push every 12 hours  pantoprazole    Tablet 40 milliGRAM(s) Oral before breakfast  rivaroxaban 15 milliGRAM(s) Oral daily    MEDICATIONS  (PRN):  albuterol    90 MICROgram(s) HFA Inhaler 2 Puff(s) Inhalation every 6 hours PRN Bronchospasm  dextrose Oral Gel 15 Gram(s) Oral once PRN Blood Glucose LESS THAN 70 milliGRAM(s)/deciliter  guaiFENesin Oral Liquid (Sugar-Free) 200 milliGRAM(s) Oral every 6 hours PRN Cough  melatonin 3 milliGRAM(s) Oral at bedtime PRN Insomnia      Allergies    No Known Allergies    Intolerances        LABS:                        11.1   9.74  )-----------( 213      ( 23 Sep 2023 06:31 )             34.4     09-24    138  |  103  |  29<H>  ----------------------------<  282<H>  5.0   |  27  |  1.20    Ca    8.9      24 Sep 2023 06:30  Mg     2.4     09-24        Urinalysis Basic - ( 24 Sep 2023 06:30 )    Color: x / Appearance: x / SG: x / pH: x  Gluc: 282 mg/dL / Ketone: x  / Bili: x / Urobili: x   Blood: x / Protein: x / Nitrite: x   Leuk Esterase: x / RBC: x / WBC x   Sq Epi: x / Non Sq Epi: x / Bacteria: x        RADIOLOGY & ADDITIONAL TESTS:      · Assessment	  84 y/o female from home with pmhx of HTN, DM, hypothyroid, asthma, afib, CAD, CHF, PPM, HF with EF: >70% on 3LNC as needed presenting to the ED for low blood pressure. Daughter at bedside who is the care taker states she measured her blood pressure and it was in low 90s systolic, she also endorsed patient has been having poor appetite for the past two days. She gave her salt tabs however blood pressure was still low. Admitted for hypotension and SRIDHAR. Pt was given IV fluids and blood pressure medications were adjusted. SRIDHAR and Hypotension is now resolved.   CXR pulm congestion unchanged. Cardiology Dr. Sandoval following. Pt will need repeat echocardiogram to eval for worsening aortic stenosis.   Will also further evaluate with CT chest.     Pt also c/o RUQ abdominal pain, pending US RUQ.     Additionally awaiting blood and urine culture results.   PT consult ordered.   09-21- Pt remains stable at time of eval w/o complaints. RUQ w/o acute GI pathology, Labs and VS reviewed, Pending Echo and PT eval for d/c.               Problem/Plan - 1:  ·  Problem: Acute respiratory failure with hypoxia-stable clinically  pt uses 3L oxygen nasal cannula at home  diuretics on hold due to sridhar   f/u CT chest to r/o PNA, Pulm. Edema  CM following.     Problem/Plan - 2:  ·  Problem: HF (heart failure).   ·  Plan: HF with EF> 70% IN 2021   On carvedilol 12.5mg BID, losartan 50mg, Torsemide 10mg   hold in setting of hypotension  f/u pending repeat echocardiogram to eval aortic stenosis  Cardio Dr. Sandoval following.     Problem/Plan - 3:  ·  Problem: Mild asthma.   ·  Plan: c/w albuterol PRN and budesonide-formeterol   add guaifenesin 200 mg PRN for coughing.     Problem/Plan - 4:  ·  Problem: Acute kidney injury superimposed on CKD.   s/p IV fluids  resolved now at baseline  monitor BMP  Avoid Nephrotoxic Meds/ Agents such as (NSAIDs, IV contrast, Aminoglycosides such as gentamicin, Gadolinium contrast, Phosphate containing enemas, etc..).     Problem/Plan - 5:  ·  Problem: HTN (hypertension).   ·  Plan: on carvedilol 12.5mg BID, losartan 50mg  hold in setting of acute soft  bp  monitor BP  BP goal <140/90.     Problem/Plan - 6:  ·  Problem: Acute hypotension-improved clinically  ·echo- results noticed  CXR pulm congestion unchanged from prior  RVP was negative  Ucx growing E-coli and Beta strep- Already on Ceftriaxone, sensitivity pending.     Problem/Plan - 7:  ·  Problem: Lactic acidosis.   ·  Plan: lactate 2.3> 1.2  resolved.     Problem/Plan - 8:  ·  Problem: Chronic atrial fibrillation.   ·  Plan: PPM   on Multaq 400mg BID and xarelto  HR controlled.     Problem/Plan - 9:  ·  Problem: Hypothyroid.   ·  Plan: on leovthyroxine 175 mcg daily  c/w home med.     Problem/Plan - 10:  ·  Problem: DM (diabetes mellitus).   ·  Plan; a1c 7.9  on janumet  BID, repaglinide 2mg TID  ISS tid and at bedtime  glucose controlled.     Problem/Plan - 11:  ·  Problem: Prophylactic measure.   ·  Plan: dvt - xarelto  gi - tolerating po.     Problem/Plan - 12:  ·  Problem: Discharge planning issues.   f/u PT consult  f/u CM to ensure pt has home oxygen.

## 2023-09-24 NOTE — PROGRESS NOTE ADULT - ASSESSMENT
Patient is a 83y old  Female who is from home with pmhx of HTN, DM, hypothyroid, Asthma, afib, CAD, CHF, PPM, HF with EF: >70% on 3LNC as needed, now presents to the ER for evaluation of low blood pressure, in low 90s systolic, and  poor appetite for the past two days. She gave her salt tabs however blood pressure was still low. She denies any dizziness. On admission, she has no fever but tachypnea and hypoxia to 91 %. She has started on Steroid, And on Ceftriaxone after CT chest shows Right Lung opacities. The ID consult requested today, 9/22/23, to assist with further evaluation and antibiotic management.    # Pneumonia - Hypoxia requiring supplemental oxygenation- MRSA PCR is negative    would recommend:    1. OOB to chair  2. Follow up Legionella Urine Ag, still in process  3. Supplemental oxygenation and Bronchodilator as needed  4. Aspiration precaution  5. Continue Ceftriaxone inpatient and may change to oral Augmentin 875mg q 12hours until 9/27/23    d/w Covering Cristobal BURCH     Attending Attestation:    Spent more than 35 minutes on total encounter, more than 50 % of the visit was spent counseling and/or coordinating care by the Attending physician.

## 2023-09-25 ENCOUNTER — TRANSCRIPTION ENCOUNTER (OUTPATIENT)
Age: 83
End: 2023-09-25

## 2023-09-25 LAB
ANION GAP SERPL CALC-SCNC: 6 MMOL/L — SIGNIFICANT CHANGE UP (ref 5–17)
BUN SERPL-MCNC: 29 MG/DL — HIGH (ref 7–18)
CALCIUM SERPL-MCNC: 8.9 MG/DL — SIGNIFICANT CHANGE UP (ref 8.4–10.5)
CHLORIDE SERPL-SCNC: 103 MMOL/L — SIGNIFICANT CHANGE UP (ref 96–108)
CO2 SERPL-SCNC: 29 MMOL/L — SIGNIFICANT CHANGE UP (ref 22–31)
CREAT SERPL-MCNC: 1.2 MG/DL — SIGNIFICANT CHANGE UP (ref 0.5–1.3)
EGFR: 45 ML/MIN/1.73M2 — LOW
GLUCOSE BLDC GLUCOMTR-MCNC: 195 MG/DL — HIGH (ref 70–99)
GLUCOSE BLDC GLUCOMTR-MCNC: 314 MG/DL — HIGH (ref 70–99)
GLUCOSE BLDC GLUCOMTR-MCNC: 331 MG/DL — HIGH (ref 70–99)
GLUCOSE BLDC GLUCOMTR-MCNC: 397 MG/DL — HIGH (ref 70–99)
GLUCOSE SERPL-MCNC: 193 MG/DL — HIGH (ref 70–99)
HCT VFR BLD CALC: 36 % — SIGNIFICANT CHANGE UP (ref 34.5–45)
HGB BLD-MCNC: 12.1 G/DL — SIGNIFICANT CHANGE UP (ref 11.5–15.5)
MCHC RBC-ENTMCNC: 28 PG — SIGNIFICANT CHANGE UP (ref 27–34)
MCHC RBC-ENTMCNC: 33.6 GM/DL — SIGNIFICANT CHANGE UP (ref 32–36)
MCV RBC AUTO: 83.3 FL — SIGNIFICANT CHANGE UP (ref 80–100)
NRBC # BLD: 0 /100 WBCS — SIGNIFICANT CHANGE UP (ref 0–0)
PLATELET # BLD AUTO: 252 K/UL — SIGNIFICANT CHANGE UP (ref 150–400)
POTASSIUM SERPL-MCNC: 4.3 MMOL/L — SIGNIFICANT CHANGE UP (ref 3.5–5.3)
POTASSIUM SERPL-SCNC: 4.3 MMOL/L — SIGNIFICANT CHANGE UP (ref 3.5–5.3)
RBC # BLD: 4.32 M/UL — SIGNIFICANT CHANGE UP (ref 3.8–5.2)
RBC # FLD: 13.4 % — SIGNIFICANT CHANGE UP (ref 10.3–14.5)
SODIUM SERPL-SCNC: 138 MMOL/L — SIGNIFICANT CHANGE UP (ref 135–145)
WBC # BLD: 6.88 K/UL — SIGNIFICANT CHANGE UP (ref 3.8–10.5)
WBC # FLD AUTO: 6.88 K/UL — SIGNIFICANT CHANGE UP (ref 3.8–10.5)

## 2023-09-25 RX ORDER — INSULIN GLARGINE 100 [IU]/ML
22 INJECTION, SOLUTION SUBCUTANEOUS AT BEDTIME
Refills: 0 | Status: DISCONTINUED | OUTPATIENT
Start: 2023-09-25 | End: 2023-09-26

## 2023-09-25 RX ORDER — FUROSEMIDE 40 MG
20 TABLET ORAL ONCE
Refills: 0 | Status: COMPLETED | OUTPATIENT
Start: 2023-09-25 | End: 2023-09-25

## 2023-09-25 RX ORDER — LOSARTAN POTASSIUM 100 MG/1
50 TABLET, FILM COATED ORAL DAILY
Refills: 0 | Status: DISCONTINUED | OUTPATIENT
Start: 2023-09-25 | End: 2023-09-26

## 2023-09-25 RX ADMIN — Medication 10 UNIT(S): at 08:25

## 2023-09-25 RX ADMIN — INSULIN GLARGINE 22 UNIT(S): 100 INJECTION, SOLUTION SUBCUTANEOUS at 22:00

## 2023-09-25 RX ADMIN — Medication 10: at 17:26

## 2023-09-25 RX ADMIN — CARVEDILOL PHOSPHATE 12.5 MILLIGRAM(S): 80 CAPSULE, EXTENDED RELEASE ORAL at 05:58

## 2023-09-25 RX ADMIN — BUDESONIDE AND FORMOTEROL FUMARATE DIHYDRATE 2 PUFF(S): 160; 4.5 AEROSOL RESPIRATORY (INHALATION) at 22:00

## 2023-09-25 RX ADMIN — Medication 40 MILLIGRAM(S): at 05:58

## 2023-09-25 RX ADMIN — Medication 2: at 08:24

## 2023-09-25 RX ADMIN — BUDESONIDE AND FORMOTEROL FUMARATE DIHYDRATE 2 PUFF(S): 160; 4.5 AEROSOL RESPIRATORY (INHALATION) at 10:34

## 2023-09-25 RX ADMIN — Medication 40 MILLIGRAM(S): at 18:04

## 2023-09-25 RX ADMIN — Medication 10 UNIT(S): at 17:26

## 2023-09-25 RX ADMIN — Medication 10 UNIT(S): at 12:19

## 2023-09-25 RX ADMIN — Medication 200 MILLIGRAM(S): at 01:35

## 2023-09-25 RX ADMIN — RIVAROXABAN 15 MILLIGRAM(S): KIT at 11:41

## 2023-09-25 RX ADMIN — Medication 20 MILLIGRAM(S): at 01:35

## 2023-09-25 RX ADMIN — DRONEDARONE 400 MILLIGRAM(S): 400 TABLET, FILM COATED ORAL at 05:58

## 2023-09-25 RX ADMIN — PANTOPRAZOLE SODIUM 40 MILLIGRAM(S): 20 TABLET, DELAYED RELEASE ORAL at 05:58

## 2023-09-25 RX ADMIN — Medication 8: at 12:19

## 2023-09-25 RX ADMIN — DRONEDARONE 400 MILLIGRAM(S): 400 TABLET, FILM COATED ORAL at 17:25

## 2023-09-25 RX ADMIN — LOSARTAN POTASSIUM 50 MILLIGRAM(S): 100 TABLET, FILM COATED ORAL at 17:25

## 2023-09-25 RX ADMIN — Medication 175 MICROGRAM(S): at 05:58

## 2023-09-25 RX ADMIN — CARVEDILOL PHOSPHATE 12.5 MILLIGRAM(S): 80 CAPSULE, EXTENDED RELEASE ORAL at 17:25

## 2023-09-25 RX ADMIN — Medication 4: at 21:24

## 2023-09-25 RX ADMIN — CEFTRIAXONE 100 MILLIGRAM(S): 500 INJECTION, POWDER, FOR SOLUTION INTRAMUSCULAR; INTRAVENOUS at 17:02

## 2023-09-25 NOTE — PROGRESS NOTE ADULT - PROBLEM SELECTOR PLAN 2
HF with EF> 70% IN 2021   On carvedilol 12.5mg BID, losartan 50mg, Torsemide 10mg   hold in setting of hypotension  f/u pending repeat echocardiogram to eval aortic stenosis  Cardio Dr. Sandoval following
Not in exacerbation   Takes Carvedilol, Losartan and Torsemide at home  Continue BB  Resume home Losartan  Restart Torsemide when SBP > 140 x2 consecutive readings  Monitor BP   Dr. Sandoval, cardiology, following
HF with EF> 70% IN 2021   On carvedilol 12.5mg BID, losartan 50mg, Torsemide 10mg   hold in setting of hypotension  f/u repeat echocardiogram to eval aortic stenosis  Cardio Dr. Sandoval following
HF with EF 60-65%, GIDD  On carvedilol 12.5mg BID, losartan 50mg, Torsemide 10mg   hold in setting of hypotension  echo: moderate aortic stenosis   Cardio Dr. Sandoval following

## 2023-09-25 NOTE — PROGRESS NOTE ADULT - PROBLEM SELECTOR PLAN 8
on leovthyroxine 175 mcg daily  c/w home med
Uncontrolled  A1c 7.9  Takes Janumet, and repaglinide at home  Increase LANTUS  Continue standing dose and sliding scale insulin coverage  Continue glucose monitoring  Continue low carb diet
PPM   on Multaq 400mg BID and xarelto  HR controlled
PPM   on Multaq 400mg BID and xarelto  HR controlled

## 2023-09-25 NOTE — PROGRESS NOTE ADULT - PROBLEM SELECTOR PLAN 3
Not in exacerbation  Continue albuterol PRN and budesonide-formeterol   Continue guaifenesin 200 mg PRN for coughing
c/w albuterol PRN and budesonide-formeterol   add guaifenesin 200 mg PRN for coughing

## 2023-09-25 NOTE — PROGRESS NOTE ADULT - SUBJECTIVE AND OBJECTIVE BOX
Patient is seen and examined at the bed side, is afebrile. She mentioned feeling better. ON chest examination no wheezing noted.       REVIEW OF SYSTEMS: All other review systems are negative      ALLERGIES: No Known Allergies      Vital Signs Last 24 Hrs  T(C): 36.8 (25 Sep 2023 05:15), Max: 36.8 (25 Sep 2023 05:15)  T(F): 98.2 (25 Sep 2023 05:15), Max: 98.2 (25 Sep 2023 05:15)  HR: 65 (25 Sep 2023 05:15) (60 - 72)  BP: 177/80 (25 Sep 2023 05:15) (169/83 - 200/84)  BP(mean): --  RR: 18 (25 Sep 2023 05:15) (18 - 18)  SpO2: 95% (25 Sep 2023 05:15) (95% - 95%)    Parameters below as of 25 Sep 2023 05:15  Patient On (Oxygen Delivery Method): room air      PHYSICAL EXAM:  GENERAL: Not in distress  CHEST/LUNG:  Not using accessory muscles, no wheezing noted  HEART: s1 and s2 present  ABDOMEN:  Nontender and  Nondistended  EXTREMITIES:  pedal  edema  CNS: Awake and Alert        LABS:                           12.1   6.88  )-----------( 252      ( 25 Sep 2023 06:37 )             36.0                           11.1   9.74  )-----------( 213      ( 23 Sep 2023 06:31 )             34.4       09-25    138  |  103  |  29<H>  ----------------------------<  193<H>  4.3   |  29  |  1.20    Ca    8.9      25 Sep 2023 06:37  Mg     2.4     09-24      09-23    136  |  103  |  28<H>  ----------------------------<  326<H>  5.3   |  28  |  1.53<H>    Ca    8.7      23 Sep 2023 06:31      CAPILLARY BLOOD GLUCOSE  POCT Blood Glucose.: 308 mg/dL (22 Sep 2023 07:40)  POCT Blood Glucose.: 358 mg/dL (21 Sep 2023 21:25)  POCT Blood Glucose.: 295 mg/dL (21 Sep 2023 16:51)  POCT Blood Glucose.: 334 mg/dL (21 Sep 2023 11:42)      Urinalysis Basic - ( 21 Sep 2023 07:03 )  Color: x / Appearance: x / SG: x / pH: x  Gluc: 183 mg/dL / Ketone: x  / Bili: x / Urobili: x   Blood: x / Protein: x / Nitrite: x   Leuk Esterase: x / RBC: x / WBC x   Sq Epi: x / Non Sq Epi: x / Bacteria: x      Procalcitonin, Serum (09.23.23 @ 06:31)  0.06:      MEDICATIONS  (STANDING):    budesonide 160 MICROgram(s)/formoterol 4.5 MICROgram(s) Inhaler 2 Puff(s) Inhalation two times a day  carvedilol 12.5 milliGRAM(s) Oral every 12 hours  cefTRIAXone   IVPB 1000 milliGRAM(s) IV Intermittent every 24 hours  dronedarone 400 milliGRAM(s) Oral two times a day  glucagon  Injectable 1 milliGRAM(s) IntraMuscular once  insulin glargine Injectable (LANTUS) 22 Unit(s) SubCutaneous at bedtime  insulin lispro (ADMELOG) corrective regimen sliding scale   SubCutaneous at bedtime  insulin lispro (ADMELOG) corrective regimen sliding scale   SubCutaneous three times a day before meals  insulin lispro Injectable (ADMELOG) 10 Unit(s) SubCutaneous three times a day before meals  levothyroxine 175 MICROGram(s) Oral daily  losartan 50 milliGRAM(s) Oral daily  methylPREDNISolone sodium succinate Injectable 40 milliGRAM(s) IV Push every 12 hours  pantoprazole    Tablet 40 milliGRAM(s) Oral before breakfast  rivaroxaban 15 milliGRAM(s) Oral daily      RADIOLOGY & ADDITIONAL TESTS:    9/21/23 : US Abdomen Complete (09.21.23 @ 09:46) No gallbladder disease or other acute finding      9/20/23: CT Chest No Cont (09.20.23 @ 22:09) IMPRESSION: A few nonspecific right lung small patchy ground glass   opacities. Differential diagnostic considerations include but is not  limited to foci of infection/inflammation.      MICROBIOLOGY  DATA:    MRSA/MSSA PCR (09.22.23 @ 12:23)   MRSA PCR Result.: NotDetec:      Respiratory Viral Panel with COVID-19 by [facility] (09.19.23 @ 14:00)   Rapid RVP Result: NotDetec  SARS-CoV-2: NotDetec:    Culture - Blood (09.19.23 @ 01:20)   Specimen Source: .Blood Blood-Peripheral  Culture Results: No growth at 5 days    Culture - Blood (09.19.23 @ 01:10)   Specimen Source: .Blood Blood-Peripheral  Culture Results: No growth at 5 days       Urine Microscopic-Add On (NC) (09.19.23 @ 04:00)   Comment - Urine: Few Mucus Strands  Squamous Epithelial Cells: Present  Red Blood Cell - Urine: 1 /HPF  White Blood Cell - Urine: 3 /HPF  Hyaline Casts: Present  Bacteria: Few /HPF

## 2023-09-25 NOTE — CHART NOTE - NSCHARTNOTEFT_GEN_A_CORE
EVENT: Notified by Nurse re: patient  /84    BRIEF HPI: 84 y/o female from home with pmhx of HTN, DM, hypothyroid, asthma, afib, CAD, CHF, PPM, HF with EF: >70% on 3LNC as needed presenting to the ED for low blood pressure. Daughter at bedside who is the care taker states she measured her blood pressure and it was in low 90s systolic, she also endorsed patient has been having poor appetite for the past two days. She gave her salt tabs however blood pressure was still low. Admitted for hypotension and SRIDHAR.    Patient with c/o SOB and elevated blood pressure. Hx of CHF w/ EF >70% on 2-3 liters 02. Patient home meds on home due to ED presentation hypotension and SRIDHAR. SRIDHAR resolved.       T(C): 36.7 (09-24-23 @ 21:38), Max: 37.1 (09-24-23 @ 13:19)  HR: 71 (09-25-23 @ 00:48) (60 - 72)  BP: 190/83 (09-25-23 @ 00:48) (140/62 - 200/84)  RR: 18 (09-24-23 @ 21:38) (18 - 20)  SpO2: 95% (09-24-23 @ 21:38) (91% - 96%)    FOCUSED EXAM:   General: pt alert and orientated x3, NAD  Cardio: S1S2, regular rate and rythym   Respiratory:  diminished breath sound b/l, no wheezing/rales      ASSESSMENT/PLAN:    PROBLEM: Elevated B/P Hypertension- hx of CHF last EF 70%.    - Lasix 20 mg IVP x 1 dose ( SRIDHAR resolved)   - Cont Coreg     - cont close monitor    FOLLOW-UP: Reassess B/P for effectiveness of above intervention

## 2023-09-25 NOTE — PROGRESS NOTE ADULT - SUBJECTIVE AND OBJECTIVE BOX
HPI:  This isan 84 y/o female from home with pmhx of THN, DM, hypothyroid, asthma, afib, CAD, CHF, PPM, HF with EF: >70% on 3LNC as needed presenting to the ED for low blood pressure. Daughter at bedside who is the care taker states she measured her blood pressure and it was in low 90s systolic, she also endorsed patient has been having poor appetite for the past two days. She gave her salt tabs however blood pressure was still low. She denies any dizziness, chest pain, shortness of breath, wheezing, cough, fevers, abdominal pain, N/V/D. Daughter held blood pressure medication yesterday.     In ED:   vitals: BP: 112/54, HR: 67, T: 37 on RA  s/p 500cc NS, Duoneb, decadron  CXR pulm congestion unchanged   Bcux and Ucx   lactate 2.3> 1.2  (19 Sep 2023 13:13)      OVERNIGHT EVENTS:    Hypertensive overnight.  Coreg given.    REVIEW OF SYSTEMS:      CONSTITUTIONAL: No fever  EYES: no acute visual disturbances  NECK: No pain or stiffness  RESPIRATORY: No cough; No shortness of breath  CARDIOVASCULAR: No chest pain, no palpitations  GASTROINTESTINAL: No pain. No nausea, vomiting or diarrhea   NEUROLOGICAL: No headache or numbness, no tremors  MUSCULOSKELETAL: No joint pain, no muscle pain  GENITOURINARY: no dysuria, no frequency, no hesitancy  PSYCHIATRY: no depression, no anxiety  ALL OTHER  ROS negative        Vital Signs Last 24 Hrs  T(C): 36.8 (25 Sep 2023 05:15), Max: 36.8 (25 Sep 2023 05:15)  T(F): 98.2 (25 Sep 2023 05:15), Max: 98.2 (25 Sep 2023 05:15)  HR: 65 (25 Sep 2023 05:15) (60 - 72)  BP: 177/80 (25 Sep 2023 05:15) (169/83 - 200/84)  BP(mean): --  RR: 18 (25 Sep 2023 05:15) (18 - 18)  SpO2: 95% (25 Sep 2023 05:15) (95% - 95%)    Parameters below as of 25 Sep 2023 05:15  Patient On (Oxygen Delivery Method): room air        ________________________________________________  PHYSICAL EXAM:    GENERAL: NAD  HEENT: Normocephalic; conjunctivae and sclerae clear;  NECK : supple, no JVD  CHEST/LUNG: Clear to auscultation; Nonlabored  HEART: S1 S2  regular  ABDOMEN: Soft, Nontender, Nondistended; Bowel sounds present  EXTREMITIES: no cyanosis; no LE edema; no calf tenderness  NERVOUS SYSTEM:  Alert; no new deficits  SKIN: warm and dry; No new rashes or lesions    _________________________________________________  CURRENT MEDICATIONS:    MEDICATIONS  (STANDING):  budesonide 160 MICROgram(s)/formoterol 4.5 MICROgram(s) Inhaler 2 Puff(s) Inhalation two times a day  carvedilol 12.5 milliGRAM(s) Oral every 12 hours  cefTRIAXone   IVPB 1000 milliGRAM(s) IV Intermittent every 24 hours  dextrose 5%. 1000 milliLiter(s) (100 mL/Hr) IV Continuous <Continuous>  dextrose 5%. 1000 milliLiter(s) (50 mL/Hr) IV Continuous <Continuous>  dextrose 50% Injectable 25 Gram(s) IV Push once  dextrose 50% Injectable 25 Gram(s) IV Push once  dextrose 50% Injectable 12.5 Gram(s) IV Push once  dronedarone 400 milliGRAM(s) Oral two times a day  glucagon  Injectable 1 milliGRAM(s) IntraMuscular once  insulin glargine Injectable (LANTUS) 22 Unit(s) SubCutaneous at bedtime  insulin lispro (ADMELOG) corrective regimen sliding scale   SubCutaneous three times a day before meals  insulin lispro (ADMELOG) corrective regimen sliding scale   SubCutaneous at bedtime  insulin lispro Injectable (ADMELOG) 10 Unit(s) SubCutaneous three times a day before meals  levothyroxine 175 MICROGram(s) Oral daily  methylPREDNISolone sodium succinate Injectable 40 milliGRAM(s) IV Push every 12 hours  pantoprazole    Tablet 40 milliGRAM(s) Oral before breakfast  rivaroxaban 15 milliGRAM(s) Oral daily    MEDICATIONS  (PRN):  albuterol    90 MICROgram(s) HFA Inhaler 2 Puff(s) Inhalation every 6 hours PRN Bronchospasm  dextrose Oral Gel 15 Gram(s) Oral once PRN Blood Glucose LESS THAN 70 milliGRAM(s)/deciliter  guaiFENesin Oral Liquid (Sugar-Free) 200 milliGRAM(s) Oral every 6 hours PRN Cough  melatonin 3 milliGRAM(s) Oral at bedtime PRN Insomnia      __________________________________________________  LABS:                          12.1   6.88  )-----------( 252      ( 25 Sep 2023 06:37 )             36.0     09-25    138  |  103  |  29<H>  ----------------------------<  193<H>  4.3   |  29  |  1.20    Ca    8.9      25 Sep 2023 06:37  Mg     2.4     09-24        Urinalysis Basic - ( 25 Sep 2023 06:37 )    Color: x / Appearance: x / SG: x / pH: x  Gluc: 193 mg/dL / Ketone: x  / Bili: x / Urobili: x   Blood: x / Protein: x / Nitrite: x   Leuk Esterase: x / RBC: x / WBC x   Sq Epi: x / Non Sq Epi: x / Bacteria: x      CAPILLARY BLOOD GLUCOSE      POCT Blood Glucose.: 331 mg/dL (25 Sep 2023 12:13)  POCT Blood Glucose.: 195 mg/dL (25 Sep 2023 08:19)  POCT Blood Glucose.: 277 mg/dL (24 Sep 2023 21:48)  POCT Blood Glucose.: 384 mg/dL (24 Sep 2023 17:15)      __________________________________________________  RADIOLOGY & ADDITIONAL TESTS:    Imaging Personally Reviewed:  YES    < from: US Abdomen Complete (09.21.23 @ 09:46) >  IMPRESSION:  No gallbladder disease or other acute finding    < end of copied text >    Consultant(s) Notes Reviewed:   YES     Plan of care was discussed with patient and /or primary care giver; all questions and concerns were addressed and care was aligned with patient's wishes.    Plan discussed with attending and consulting physicians.

## 2023-09-25 NOTE — PROGRESS NOTE ADULT - SUBJECTIVE AND OBJECTIVE BOX
INTERVAL HPI/OVERNIGHT EVENTS:  Patient seen,denies any dysphagia,no acute issues  VITAL SIGNS:  T(F): 98.2 (09-25-23 @ 05:15)  HR: 65 (09-25-23 @ 05:15)  BP: 177/80 (09-25-23 @ 05:15)  RR: 18 (09-25-23 @ 05:15)  SpO2: 95% (09-25-23 @ 05:15)  Wt(kg): --    PHYSICAL EXAM:  awake,comfortable  Constitutional:  Eyes:  ENMT:perrla  Neck:  Respiratory:clear  Cardiovascular:s1s2,m-none  Gastrointestinal:soft,bs pos  Extremities:mild edema,varicosity  Vascular:  Neurological:no focal deficit  Musculoskeletal:    MEDICATIONS  (STANDING):  budesonide 160 MICROgram(s)/formoterol 4.5 MICROgram(s) Inhaler 2 Puff(s) Inhalation two times a day  carvedilol 12.5 milliGRAM(s) Oral every 12 hours  cefTRIAXone   IVPB 1000 milliGRAM(s) IV Intermittent every 24 hours  dextrose 5%. 1000 milliLiter(s) (50 mL/Hr) IV Continuous <Continuous>  dextrose 5%. 1000 milliLiter(s) (100 mL/Hr) IV Continuous <Continuous>  dextrose 50% Injectable 25 Gram(s) IV Push once  dextrose 50% Injectable 25 Gram(s) IV Push once  dextrose 50% Injectable 12.5 Gram(s) IV Push once  dronedarone 400 milliGRAM(s) Oral two times a day  glucagon  Injectable 1 milliGRAM(s) IntraMuscular once  insulin glargine Injectable (LANTUS) 22 Unit(s) SubCutaneous at bedtime  insulin lispro (ADMELOG) corrective regimen sliding scale   SubCutaneous three times a day before meals  insulin lispro (ADMELOG) corrective regimen sliding scale   SubCutaneous at bedtime  insulin lispro Injectable (ADMELOG) 10 Unit(s) SubCutaneous three times a day before meals  levothyroxine 175 MICROGram(s) Oral daily  methylPREDNISolone sodium succinate Injectable 40 milliGRAM(s) IV Push every 12 hours  pantoprazole    Tablet 40 milliGRAM(s) Oral before breakfast  rivaroxaban 15 milliGRAM(s) Oral daily    MEDICATIONS  (PRN):  albuterol    90 MICROgram(s) HFA Inhaler 2 Puff(s) Inhalation every 6 hours PRN Bronchospasm  dextrose Oral Gel 15 Gram(s) Oral once PRN Blood Glucose LESS THAN 70 milliGRAM(s)/deciliter  guaiFENesin Oral Liquid (Sugar-Free) 200 milliGRAM(s) Oral every 6 hours PRN Cough  melatonin 3 milliGRAM(s) Oral at bedtime PRN Insomnia      Allergies    No Known Allergies    Intolerances        LABS:                        12.1   6.88  )-----------( 252      ( 25 Sep 2023 06:37 )             36.0     09-25    138  |  103  |  29<H>  ----------------------------<  193<H>  4.3   |  29  |  1.20    Ca    8.9      25 Sep 2023 06:37  Mg     2.4     09-24        Urinalysis Basic - ( 25 Sep 2023 06:37 )    Color: x / Appearance: x / SG: x / pH: x  Gluc: 193 mg/dL / Ketone: x  / Bili: x / Urobili: x   Blood: x / Protein: x / Nitrite: x   Leuk Esterase: x / RBC: x / WBC x   Sq Epi: x / Non Sq Epi: x / Bacteria: x        RADIOLOGY & ADDITIONAL TESTS:      · Assessment	  82 y/o female from home with pmhx of HTN, DM, hypothyroid, asthma, afib, CAD, CHF, PPM, HF with EF: >70% on 3LNC as needed presenting to the ED for low blood pressure. Daughter at bedside who is the care taker states she measured her blood pressure and it was in low 90s systolic, she also endorsed patient has been having poor appetite for the past two days. She gave her salt tabs however blood pressure was still low. Admitted for hypotension and SRIDHAR. Pt was given IV fluids and blood pressure medications were adjusted. SRIDHAR and Hypotension is now resolved.   CXR pulm congestion unchanged. Cardiology Dr. Sandoval following. Pt will need repeat echocardiogram to eval for worsening aortic stenosis.   Will also further evaluate with CT chest.     Pt also c/o RUQ abdominal pain, pending US RUQ.     Additionally awaiting blood and urine culture results.   PT consult ordered.   09-21- Pt remains stable at time of eval w/o complaints. RUQ w/o acute GI pathology, Labs and VS reviewed, Pending Echo and PT eval for d/c.               Problem/Plan - 1:  ·  Problem: Acute respiratory failure with hypoxia-stable clinically  pt uses 3L oxygen nasal cannula at home  diuretics on hold due to sridhar   CM following.     Problem/Plan - 2:  ·  Problem: HF (heart failure)-stable clinically  ·  Plan: HF with EF> 70% IN 2021   On carvedilol 12.5mg BID, losartan 50mg, Torsemide 10mg   Cardio Dr. Sandoval following.     Problem/Plan - 3:  ·  Problem: Mild asthma.   ·  Plan: c/w albuterol PRN and budesonide-formeterol   add guaifenesin 200 mg PRN for coughing.     Problem/Plan - 4:  ·  Problem: Acute kidney injury superimposed on CKD.   s/p IV fluids  resolved now at baseline  monitor BMP  Avoid Nephrotoxic Meds/ Agents such as (NSAIDs, IV contrast, Aminoglycosides such as gentamicin, Gadolinium contrast, Phosphate containing enemas, etc..).     Problem/Plan - 5:  ·  Problem: HTN (hypertension).   ·  Plan: on carvedilol 12.5mg BID, losartan 50mg  hold in setting of acute soft  bp  monitor BP  BP goal <140/90.     Problem/Plan - 6:  ·  Problem: Acute hypotension-improved clinically  ·echo- results noticed  CXR pulm congestion unchanged from prior  RVP was negative  Ucx growing E-coli and Beta strep- Already on Ceftriaxone, sensitivity pending.     Problem/Plan - 7:  ·  Problem: Lactic acidosis.   ·  Plan: lactate 2.3> 1.2  resolved.     Problem/Plan - 8:  ·  Problem: Chronic atrial fibrillation.   ·  Plan: PPM   on Multaq 400mg BID and xarelto  HR controlled.     Problem/Plan - 9:  ·  Problem: Hypothyroid.   ·  Plan: on leovthyroxine 175 mcg daily  c/w home med.     Problem/Plan - 10:  ·  Problem: DM (diabetes mellitus).   ·  Plan; a1c 7.9  on janumet  BID, repaglinide 2mg TID  ISS tid and at bedtime  glucose controlled.     Problem/Plan - 11:  ·  Problem: Prophylactic measure.   ·  Plan: dvt - xarelto  gi - tolerating po.     Problem/Plan - 12:  ·  Problem: Discharge planning issues.   f/u PT consult  f/u CM to ensure pt has home oxygen.  d/c planning back home and complete oral abx

## 2023-09-25 NOTE — PROGRESS NOTE ADULT - PROBLEM SELECTOR PLAN 5
on carvedilol 12.5mg BID, losartan 50mg  held 2/2 hypotension on admission   carvedilol restarted   monitor BP  BP goal <140/90
on carvedilol 12.5mg BID, losartan 50mg  hold in setting of acute soft  bp  monitor BP  BP goal <140/90
Elevated  Takes Coreg, Losartan and Torsemide at home  Continue home regimen Coreg  Resume home Losartan  Restart home Torsemide when SBP > 140 x2 consecutive readings  Monitor BP   DASH diet
on carvedilol 12.5mg BID, losartan 50mg  hold in setting of acute soft  bp  monitor BP  BP goal <140/90

## 2023-09-25 NOTE — PROGRESS NOTE ADULT - ASSESSMENT
82 y/o female from home with pmhx of HTN, DM, hypothyroid, asthma, afib, CAD, CHF, PPM, HF with EF: >70% on 3LNC as needed presenting to the ED for low blood pressure. Daughter at bedside who is the care taker states she measured her blood pressure and it was in low 90s systolic, she also endorsed patient has been having poor appetite for the past two days. She gave her salt tabs however blood pressure was still low. Admitted for hypotension and SRIDHAR. Pt was given IV fluids and blood pressure medications were adjusted. SRIDHAR and Hypotension is now resolved.   CXR pulm congestion unchanged.    Pt remains stable at time of eval w/o complaints. RUQ w/o acute GI pathology,   Echo noted, moderate aortic stenosis. Ct chest with A few nonspecific right lung small patchy ground-glass opacities. Differential diagnostic considerations include but is not limited to foci of infection/inflammation. on rocephin, and methylprednisolone. ID consulted for further abx management. uptrending FS, uncontrolled hypergylcemia, likely 2/2 steroid use. will start lantus at hs and premeal admelog 5units.

## 2023-09-25 NOTE — PROGRESS NOTE ADULT - PROBLEM SELECTOR PLAN 6
2 day hx SBP in 90s  meds held for 2 days, soft bp   vitals: BP: 112/54, HR: 67, T: 37 on RA  s/p 500cc NS, Duoneb, decadron in ED  CXR pulm congestion unchanged from prior  RVP was negative  hypotension is now resolved  Bcux NGTD  Ucx growing E-coli and Beta strep- Already on Ceftriaxone, sensitivity pending  ---resolved---
Rate controlled  PPM   Takes BB, Multaq and Xarelto at home  Continue home regimen  Monitor HR
2 day hx SBP in 90s  meds held for 2 days, soft bp   vitals: BP: 112/54, HR: 67, T: 37 on RA  s/p 500cc NS, Duoneb, decadron in ED  CXR pulm congestion unchanged from prior  RVP was negative  hypotension is now resolved  Bcux NGTD  Ucx growing E-coli and Beta strep- Already on Ceftriaxone, sensitivity pending
2 day hx SBP in 90s  meds held for 2 days, soft bp   vitals: BP: 112/54, HR: 67, T: 37 on RA  s/p 500cc NS, Duoneb, decadron in ED  CXR pulm congestion unchanged from prior  RVP was negative  hypotension is now resolved  Bcux and Ucx r/o infection

## 2023-09-25 NOTE — PROGRESS NOTE ADULT - PROBLEM SELECTOR PLAN 7
Continue home dose synthroid
lactate 2.3> 1.2  resolved
lactate 2.3> 1.2  resolved
PPM   on Multaq 400mg BID and xarelto  HR controlled

## 2023-09-25 NOTE — PROGRESS NOTE ADULT - ASSESSMENT
Patient is a 83y old  Female who is from home with pmhx of HTN, DM, hypothyroid, Asthma, afib, CAD, CHF, PPM, HF with EF: >70% on 3LNC as needed, now presents to the ER for evaluation of low blood pressure, in low 90s systolic, and  poor appetite for the past two days. She gave her salt tabs however blood pressure was still low. She denies any dizziness. On admission, she has no fever but tachypnea and hypoxia to 91 %. She has started on Steroid, And on Ceftriaxone after CT chest shows Right Lung opacities. The ID consult requested today, 9/22/23, to assist with further evaluation and antibiotic management.    # Pneumonia - Hypoxia requiring supplemental oxygenation- MRSA PCR is negative    would recommend:    1. Continue Ceftriaxone inpatient and may change to oral Augmentin 875mg q 12hours until 9/27/23 X 2 more days  2. Supplemental oxygenation and Bronchodilator as needed  3. Aspiration precaution  4.  OOB to chair      Attending Attestation:    Spent more than 35 minutes on total encounter, more than 50 % of the visit was spent counseling and/or coordinating care by the Attending physician.

## 2023-09-25 NOTE — DISCHARGE NOTE NURSING/CASE MANAGEMENT/SOCIAL WORK - PATIENT PORTAL LINK FT
You can access the FollowMyHealth Patient Portal offered by [facility] by registering at the following website: [email or web address]. By joining [facility]’s FollowMyHealth portal, you will also be able to view your health information using other applications (apps) compatible with our system.

## 2023-09-25 NOTE — PROGRESS NOTE ADULT - PROBLEM SELECTOR PLAN 10
Medically stable for discharge  Pending resumption of home services  Care management following
a1c 7.9  on janumet  BID, repaglinide 2mg TID  ISS tid and at bedtime  glucose controlled
a1c 7.9  on janumet  BID, repaglinide 2mg TID  ISS tid and at bedtime  glucose controlled
dvt - Xarelto  gi - tolerating po

## 2023-09-25 NOTE — PROGRESS NOTE ADULT - PROBLEM SELECTOR PLAN 1
Resovled  Uses 3L oxygen nasal cannula at baseline  SpO2 95% on RA  Monitor oxygen status
pt p/w dyspnea on exertion   in setting of CHF and asthma  pt found to be 91% after ambulation, and 98% at rest  pt uses 3L oxygen nasal cannula at home  CXR shows pulm congestion  diuretics on hold due to flower   f/u CT chest to r/o PNA, Pulm. Edema  maintain oxygen sat >92%  CM following
pt p/w dyspnea on exertion   in setting of CHF and asthma  pt found to be 91% after ambulation, and 98% at rest  pt uses 3L oxygen nasal cannula at home  CXR shows pulm congestion  diuretics on hold due to flower   f/u CT chest to r/o PNA, Pulm. Edema  maintain oxygen sat >92%  CM following
pt p/w dyspnea on exertion   in setting of CHF and asthma  pt found to be 91% after ambulation, and 98% at rest  pt uses 3L oxygen nasal cannula at home  CXR shows pulm congestion  ct chest:  A few nonspecific right lung small patchy ground-glass opacities.  diuretics on hold due to flower   started on rocephin for pna  f/u MRSA PCR and Legionella Urine Ag  f/u: Procalcitonin level  ID consulted for abx management, given pt on multaq   maintain oxygen sat >92%  CM following

## 2023-09-25 NOTE — PROGRESS NOTE ADULT - PROBLEM SELECTOR PLAN 4
Pt w/ SCr 2.3 on admission  Baseline SCr -1.5  s/p IV fluids  resolved now at baseline  monitor BMP  Avoid Nephrotoxic Meds/ Agents such as (NSAIDs, IV contrast, Aminoglycosides such as gentamicin, Gadolinium contrast, Phosphate containing enemas, etc..)
Resolved  SCr 1.2  Trend BMP   Avoid Nephrotoxic Meds/ Agents such as (NSAIDs, IV contrast, Aminoglycosides such as gentamicin, Gadolinium contrast, Phosphate containing enemas, etc..)
Pt w/ SCr 2.3 on admission  Baseline SCr -1.5  s/p IV fluids  resolved now at baseline  monitor BMP  Avoid Nephrotoxic Meds/ Agents such as (NSAIDs, IV contrast, Aminoglycosides such as gentamicin, Gadolinium contrast, Phosphate containing enemas, etc..)
Pt w/ SCr 2.3 on admission  Baseline SCr -1.5  s/p IV fluids  resolved now at baseline  monitor BMP  Avoid Nephrotoxic Meds/ Agents such as (NSAIDs, IV contrast, Aminoglycosides such as gentamicin, Gadolinium contrast, Phosphate containing enemas, etc..)

## 2023-09-26 ENCOUNTER — APPOINTMENT (OUTPATIENT)
Dept: CV DIAGNOSITCS | Facility: HOSPITAL | Age: 83
End: 2023-09-26

## 2023-09-26 VITALS
RESPIRATION RATE: 20 BRPM | OXYGEN SATURATION: 97 % | TEMPERATURE: 98 F | DIASTOLIC BLOOD PRESSURE: 67 MMHG | HEART RATE: 60 BPM | SYSTOLIC BLOOD PRESSURE: 130 MMHG

## 2023-09-26 LAB
GLUCOSE BLDC GLUCOMTR-MCNC: 162 MG/DL — HIGH (ref 70–99)
GLUCOSE BLDC GLUCOMTR-MCNC: 290 MG/DL — HIGH (ref 70–99)
LEGIONELLA AG UR QL: NEGATIVE — SIGNIFICANT CHANGE UP

## 2023-09-26 PROCEDURE — 99285 EMERGENCY DEPT VISIT HI MDM: CPT | Mod: 25

## 2023-09-26 PROCEDURE — 83605 ASSAY OF LACTIC ACID: CPT

## 2023-09-26 PROCEDURE — 87449 NOS EACH ORGANISM AG IA: CPT

## 2023-09-26 PROCEDURE — 82962 GLUCOSE BLOOD TEST: CPT

## 2023-09-26 PROCEDURE — 83880 ASSAY OF NATRIURETIC PEPTIDE: CPT

## 2023-09-26 PROCEDURE — 84484 ASSAY OF TROPONIN QUANT: CPT

## 2023-09-26 PROCEDURE — 80048 BASIC METABOLIC PNL TOTAL CA: CPT

## 2023-09-26 PROCEDURE — 87040 BLOOD CULTURE FOR BACTERIA: CPT

## 2023-09-26 PROCEDURE — 93005 ELECTROCARDIOGRAM TRACING: CPT

## 2023-09-26 PROCEDURE — 36415 COLL VENOUS BLD VENIPUNCTURE: CPT

## 2023-09-26 PROCEDURE — 81001 URINALYSIS AUTO W/SCOPE: CPT

## 2023-09-26 PROCEDURE — 97162 PT EVAL MOD COMPLEX 30 MIN: CPT

## 2023-09-26 PROCEDURE — 83735 ASSAY OF MAGNESIUM: CPT

## 2023-09-26 PROCEDURE — 85025 COMPLETE CBC W/AUTO DIFF WBC: CPT

## 2023-09-26 PROCEDURE — 94640 AIRWAY INHALATION TREATMENT: CPT

## 2023-09-26 PROCEDURE — 83036 HEMOGLOBIN GLYCOSYLATED A1C: CPT

## 2023-09-26 PROCEDURE — 93306 TTE W/DOPPLER COMPLETE: CPT

## 2023-09-26 PROCEDURE — 85027 COMPLETE CBC AUTOMATED: CPT

## 2023-09-26 PROCEDURE — 84145 PROCALCITONIN (PCT): CPT

## 2023-09-26 PROCEDURE — 87186 SC STD MICRODIL/AGAR DIL: CPT

## 2023-09-26 PROCEDURE — 84443 ASSAY THYROID STIM HORMONE: CPT

## 2023-09-26 PROCEDURE — 84439 ASSAY OF FREE THYROXINE: CPT

## 2023-09-26 PROCEDURE — 71045 X-RAY EXAM CHEST 1 VIEW: CPT

## 2023-09-26 PROCEDURE — 87641 MR-STAPH DNA AMP PROBE: CPT

## 2023-09-26 PROCEDURE — 87640 STAPH A DNA AMP PROBE: CPT

## 2023-09-26 PROCEDURE — 96372 THER/PROPH/DIAG INJ SC/IM: CPT

## 2023-09-26 PROCEDURE — 87086 URINE CULTURE/COLONY COUNT: CPT

## 2023-09-26 PROCEDURE — 76700 US EXAM ABDOM COMPLETE: CPT

## 2023-09-26 PROCEDURE — 0225U NFCT DS DNA&RNA 21 SARSCOV2: CPT

## 2023-09-26 PROCEDURE — 71250 CT THORAX DX C-: CPT

## 2023-09-26 PROCEDURE — 84100 ASSAY OF PHOSPHORUS: CPT

## 2023-09-26 PROCEDURE — 80053 COMPREHEN METABOLIC PANEL: CPT

## 2023-09-26 RX ORDER — LOSARTAN POTASSIUM 100 MG/1
1 TABLET, FILM COATED ORAL
Qty: 60 | Refills: 0
Start: 2023-09-26 | End: 2023-10-25

## 2023-09-26 RX ORDER — LOSARTAN POTASSIUM 100 MG/1
50 TABLET, FILM COATED ORAL
Refills: 0 | Status: DISCONTINUED | OUTPATIENT
Start: 2023-09-26 | End: 2023-09-26

## 2023-09-26 RX ORDER — INSULIN LISPRO 100/ML
7 VIAL (ML) SUBCUTANEOUS
Refills: 0 | Status: DISCONTINUED | OUTPATIENT
Start: 2023-09-26 | End: 2023-09-26

## 2023-09-26 RX ADMIN — Medication 6: at 11:11

## 2023-09-26 RX ADMIN — Medication 40 MILLIGRAM(S): at 05:43

## 2023-09-26 RX ADMIN — DRONEDARONE 400 MILLIGRAM(S): 400 TABLET, FILM COATED ORAL at 05:44

## 2023-09-26 RX ADMIN — Medication 175 MICROGRAM(S): at 05:43

## 2023-09-26 RX ADMIN — CARVEDILOL PHOSPHATE 12.5 MILLIGRAM(S): 80 CAPSULE, EXTENDED RELEASE ORAL at 05:43

## 2023-09-26 RX ADMIN — LOSARTAN POTASSIUM 50 MILLIGRAM(S): 100 TABLET, FILM COATED ORAL at 05:43

## 2023-09-26 RX ADMIN — PANTOPRAZOLE SODIUM 40 MILLIGRAM(S): 20 TABLET, DELAYED RELEASE ORAL at 05:44

## 2023-09-26 RX ADMIN — Medication 2: at 08:19

## 2023-09-26 RX ADMIN — BUDESONIDE AND FORMOTEROL FUMARATE DIHYDRATE 2 PUFF(S): 160; 4.5 AEROSOL RESPIRATORY (INHALATION) at 11:06

## 2023-09-26 RX ADMIN — Medication 7 UNIT(S): at 11:12

## 2023-09-26 RX ADMIN — RIVAROXABAN 15 MILLIGRAM(S): KIT at 11:13

## 2023-09-26 NOTE — PROGRESS NOTE ADULT - ASSESSMENT
82 y/o female from home with pmhx of  DM, hypothyroid, asthma, afib, CAD, CHF, PPM, HF with EF: >70% on 3LNC as needed presenting to the ED for low blood pressure,AK,pneumonia.  1.HTN-inc cozaar 50mg bid.  2.PAF-xarelto,multaq,  3.DM-Insulin.  4.Asthma-MDI.  5.Hypothyroid-synthroid.  6.R/O JOSE DANIEL.  7.Echocardiogram as above, severe pulm HTN-most likely due to JOSE DANIEL.  8.Pneumonia-steroids,ABX.  9.GI prophylaxis.

## 2023-09-26 NOTE — PROGRESS NOTE ADULT - ASSESSMENT
Patient is a 83y old  Female who is from home with pmhx of HTN, DM, hypothyroid, Asthma, afib, CAD, CHF, PPM, HF with EF: >70% on 3LNC as needed, now presents to the ER for evaluation of low blood pressure, in low 90s systolic, and  poor appetite for the past two days. She gave her salt tabs however blood pressure was still low. She denies any dizziness. On admission, she has no fever but tachypnea and hypoxia to 91 %. She has started on Steroid, And on Ceftriaxone after CT chest shows Right Lung opacities. The ID consult requested today, 9/22/23, to assist with further evaluation and antibiotic management.    # Pneumonia - Hypoxia requiring supplemental oxygenation- MRSA PCR is negative    would recommend:    1. OOB to chair   2. Continue Ceftriaxone inpatient and may change to oral Augmentin 875mg q 12hours until 9/27/23 X 1 more days  3. Supplemental oxygenation and Bronchodilator as needed      d/w Covering  Dana BURCH    Attending Attestation:    Spent more than 35 minutes on total encounter, more than 50 % of the visit was spent counseling and/or coordinating care by the Attending physician.

## 2023-09-26 NOTE — PROGRESS NOTE ADULT - SUBJECTIVE AND OBJECTIVE BOX
Patient is afebrile , doing better, no new complaints. The discharge plan noted.       REVIEW OF SYSTEMS: All other review systems are negative      ALLERGIES: No Known Allergies      Vital Signs Last 24 Hrs  T(C): 36.7 (26 Sep 2023 13:24), Max: 36.7 (26 Sep 2023 13:24)  T(F): 98 (26 Sep 2023 13:24), Max: 98 (26 Sep 2023 13:24)  HR: 60 (26 Sep 2023 13:24) (55 - 64)  BP: 130/67 (26 Sep 2023 13:24) (130/67 - 184/78)  BP(mean): --  RR: 20 (26 Sep 2023 13:24) (17 - 20)  SpO2: 97% (26 Sep 2023 13:24) (93% - 98%)    Parameters below as of 26 Sep 2023 13:24  Patient On (Oxygen Delivery Method): room air        PHYSICAL EXAM:  GENERAL: Not in distress  CHEST/LUNG:  Not using accessory muscles, no wheezing noted  HEART: s1 and s2 present  ABDOMEN:  Nontender and  Nondistended  EXTREMITIES: No pedal  edema  CNS: Awake and Alert        LABS: No new Labs                        12.1   6.88  )-----------( 252      ( 25 Sep 2023 06:37 )             36.0                           11.1   9.74  )-----------( 213      ( 23 Sep 2023 06:31 )             34.4         09-25    138  |  103  |  29<H>  ----------------------------<  193<H>  4.3   |  29  |  1.20    Ca    8.9      25 Sep 2023 06:37  Mg     2.4     09-24      09-23    136  |  103  |  28<H>  ----------------------------<  326<H>  5.3   |  28  |  1.53<H>    Ca    8.7      23 Sep 2023 06:31      CAPILLARY BLOOD GLUCOSE  POCT Blood Glucose.: 308 mg/dL (22 Sep 2023 07:40)  POCT Blood Glucose.: 358 mg/dL (21 Sep 2023 21:25)  POCT Blood Glucose.: 295 mg/dL (21 Sep 2023 16:51)  POCT Blood Glucose.: 334 mg/dL (21 Sep 2023 11:42)      Urinalysis Basic - ( 21 Sep 2023 07:03 )  Color: x / Appearance: x / SG: x / pH: x  Gluc: 183 mg/dL / Ketone: x  / Bili: x / Urobili: x   Blood: x / Protein: x / Nitrite: x   Leuk Esterase: x / RBC: x / WBC x   Sq Epi: x / Non Sq Epi: x / Bacteria: x        MEDICATIONS  (STANDING):  budesonide 160 MICROgram(s)/formoterol 4.5 MICROgram(s) Inhaler 2 Puff(s) Inhalation two times a day  carvedilol 12.5 milliGRAM(s) Oral every 12 hours  dronedarone 400 milliGRAM(s) Oral two times a day  glucagon  Injectable 1 milliGRAM(s) IntraMuscular once  insulin glargine Injectable (LANTUS) 22 Unit(s) SubCutaneous at bedtime  insulin lispro (ADMELOG) corrective regimen sliding scale   SubCutaneous three times a day before meals  insulin lispro (ADMELOG) corrective regimen sliding scale   SubCutaneous at bedtime  insulin lispro Injectable (ADMELOG) 7 Unit(s) SubCutaneous three times a day before meals  levothyroxine 175 MICROGram(s) Oral daily  losartan 50 milliGRAM(s) Oral two times a day  methylPREDNISolone sodium succinate Injectable 40 milliGRAM(s) IV Push every 12 hours  pantoprazole    Tablet 40 milliGRAM(s) Oral before breakfast  rivaroxaban 15 milliGRAM(s) Oral daily      RADIOLOGY & ADDITIONAL TESTS:    9/21/23 : US Abdomen Complete (09.21.23 @ 09:46) No gallbladder disease or other acute finding      9/20/23: CT Chest No Cont (09.20.23 @ 22:09) IMPRESSION: A few nonspecific right lung small patchy ground glass   opacities. Differential diagnostic considerations include but is not  limited to foci of infection/inflammation.      MICROBIOLOGY  DATA:    MRSA/MSSA PCR (09.22.23 @ 12:23)   MRSA PCR Result.: NotDetec:      Respiratory Viral Panel with COVID-19 by [facility] (09.19.23 @ 14:00)   Rapid RVP Result: NotDete  SARS-CoV-2: NotDetec:    Culture - Blood (09.19.23 @ 01:20)   Specimen Source: .Blood Blood-Peripheral  Culture Results: No growth at 5 days    Culture - Blood (09.19.23 @ 01:10)   Specimen Source: .Blood Blood-Peripheral  Culture Results: No growth at 5 days       Urine Microscopic-Add On (NC) (09.19.23 @ 04:00)   Comment - Urine: Few Mucus Strands  Squamous Epithelial Cells: Present  Red Blood Cell - Urine: 1 /HPF  White Blood Cell - Urine: 3 /HPF  Hyaline Casts: Present  Bacteria: Few /HPF

## 2023-09-26 NOTE — PROGRESS NOTE ADULT - SUBJECTIVE AND OBJECTIVE BOX
Date of Service 09-26-23 @ 12:08    CHIEF COMPLAINT:Patient is a 83y old  Female who presents with a chief complaint of Hypotension or SRIDHAR.Pt appears comfortable.    	  REVIEW OF SYSTEMS:  CONSTITUTIONAL: No fever, weight loss, or fatigue  EYES: No eye pain, visual disturbances, or discharge  ENT:  No difficulty hearing, tinnitus, vertigo; No sinus or throat pain  NECK: No pain or stiffness  RESPIRATORY: No cough, wheezing, chills or hemoptysis; No Shortness of Breath  CARDIOVASCULAR: No chest pain, palpitations, passing out, dizziness, or leg swelling  GASTROINTESTINAL: No abdominal or epigastric pain. No nausea, vomiting, or hematemesis; No diarrhea or constipation. No melena or hematochezia.  GENITOURINARY: No dysuria, frequency, hematuria, or incontinence  NEUROLOGICAL: No headaches, memory loss, loss of strength, numbness, or tremors  SKIN: No itching, burning, rashes, or lesions   LYMPH Nodes: No enlarged glands  ENDOCRINE: No heat or cold intolerance; No hair loss  MUSCULOSKELETAL: No joint pain or swelling; No muscle, back, or extremity pain  PSYCHIATRIC: No depression, anxiety, mood swings, or difficulty sleeping  HEME/LYMPH: No easy bruising, or bleeding gums  ALLERGY AND IMMUNOLOGIC: No hives or eczema	        PHYSICAL EXAM:  T(C): 36.4 (09-26-23 @ 05:09), Max: 36.6 (09-25-23 @ 21:30)  HR: 55 (09-26-23 @ 07:50) (55 - 62)  BP: 171/86 (09-26-23 @ 07:50) (144/70 - 184/78)  RR: 17 (09-26-23 @ 05:09) (17 - 18)  SpO2: 93% (09-26-23 @ 05:09) (93% - 96%)  Wt(kg): --  I&O's Summary      Appearance: Normal	  HEENT:   Normal oral mucosa, PERRL, EOMI	  Lymphatic: No lymphadenopathy  Cardiovascular: Normal S1 S2, No JVD, No murmurs, No edema  Respiratory: B/L ronchi	  Psychiatry: A & O x 3, Mood & affect appropriate  Gastrointestinal:  Soft, Non-tender, + BS	  Skin: No rashes, No ecchymoses, No cyanosis	  Neurologic: Non-focal  Extremities: Normal range of motion, No clubbing, cyanosis or edema  Vascular: Peripheral pulses palpable 2+ bilaterally    MEDICATIONS  (STANDING):  budesonide 160 MICROgram(s)/formoterol 4.5 MICROgram(s) Inhaler 2 Puff(s) Inhalation two times a day  carvedilol 12.5 milliGRAM(s) Oral every 12 hours  dextrose 5%. 1000 milliLiter(s) (50 mL/Hr) IV Continuous <Continuous>  dextrose 5%. 1000 milliLiter(s) (100 mL/Hr) IV Continuous <Continuous>  dextrose 50% Injectable 12.5 Gram(s) IV Push once  dextrose 50% Injectable 25 Gram(s) IV Push once  dextrose 50% Injectable 25 Gram(s) IV Push once  dronedarone 400 milliGRAM(s) Oral two times a day  glucagon  Injectable 1 milliGRAM(s) IntraMuscular once  insulin glargine Injectable (LANTUS) 22 Unit(s) SubCutaneous at bedtime  insulin lispro (ADMELOG) corrective regimen sliding scale   SubCutaneous three times a day before meals  insulin lispro (ADMELOG) corrective regimen sliding scale   SubCutaneous at bedtime  insulin lispro Injectable (ADMELOG) 7 Unit(s) SubCutaneous three times a day before meals  levothyroxine 175 MICROGram(s) Oral daily  losartan 50 milliGRAM(s) Oral daily  methylPREDNISolone sodium succinate Injectable 40 milliGRAM(s) IV Push every 12 hours  pantoprazole    Tablet 40 milliGRAM(s) Oral before breakfast  rivaroxaban 15 milliGRAM(s) Oral daily      	  	  LABS:	 	                         12.1   6.88  )-----------( 252      ( 25 Sep 2023 06:37 )             36.0     09-25    138  |  103  |  29<H>  ----------------------------<  193<H>  4.3   |  29  |  1.20    Ca    8.9      25 Sep 2023 06:37        TSH: Thyroid Stimulating Hormone, Serum: 0.12 uU/mL (09-21 @ 07:03)

## 2023-09-26 NOTE — PROGRESS NOTE ADULT - PROVIDER SPECIALTY LIST ADULT
Infectious Disease
Infectious Disease
Internal Medicine
Internal Medicine
Cardiology
Infectious Disease
Infectious Disease
Internal Medicine
Internal Medicine
Intervent Radiology
Cardiology
Cardiology
Internal Medicine
Cardiology
Cardiology
Internal Medicine

## 2023-09-26 NOTE — PROGRESS NOTE ADULT - REASON FOR ADMISSION
Hypotension or SRIDHAR

## 2023-09-27 ENCOUNTER — INPATIENT (INPATIENT)
Facility: HOSPITAL | Age: 83
LOS: 3 days | Discharge: HOME | DRG: 638 | End: 2023-10-01
Attending: INTERNAL MEDICINE | Admitting: INTERNAL MEDICINE
Payer: MEDICARE

## 2023-09-27 VITALS
OXYGEN SATURATION: 98 % | TEMPERATURE: 98 F | RESPIRATION RATE: 20 BRPM | SYSTOLIC BLOOD PRESSURE: 122 MMHG | HEART RATE: 62 BPM | HEIGHT: 60.63 IN | DIASTOLIC BLOOD PRESSURE: 75 MMHG

## 2023-09-27 DIAGNOSIS — Z98.89 OTHER SPECIFIED POSTPROCEDURAL STATES: Chronic | ICD-10-CM

## 2023-09-27 LAB
ACETONE SERPL-MCNC: NEGATIVE — SIGNIFICANT CHANGE UP
ALBUMIN SERPL ELPH-MCNC: 3.1 G/DL — LOW (ref 3.5–5)
ALP SERPL-CCNC: 59 U/L — SIGNIFICANT CHANGE UP (ref 40–120)
ALT FLD-CCNC: 26 U/L DA — SIGNIFICANT CHANGE UP (ref 10–60)
ANION GAP SERPL CALC-SCNC: 7 MMOL/L — SIGNIFICANT CHANGE UP (ref 5–17)
APTT BLD: 27.8 SEC — SIGNIFICANT CHANGE UP (ref 24.5–35.6)
AST SERPL-CCNC: 11 U/L — SIGNIFICANT CHANGE UP (ref 10–40)
BASE EXCESS BLDV CALC-SCNC: 5.3 MMOL/L — SIGNIFICANT CHANGE UP
BASOPHILS # BLD AUTO: 0.02 K/UL — SIGNIFICANT CHANGE UP (ref 0–0.2)
BASOPHILS NFR BLD AUTO: 0.2 % — SIGNIFICANT CHANGE UP (ref 0–2)
BILIRUB SERPL-MCNC: 0.3 MG/DL — SIGNIFICANT CHANGE UP (ref 0.2–1.2)
BLOOD GAS COMMENTS, VENOUS: SIGNIFICANT CHANGE UP
BUN SERPL-MCNC: 32 MG/DL — HIGH (ref 7–18)
CALCIUM SERPL-MCNC: 8.7 MG/DL — SIGNIFICANT CHANGE UP (ref 8.4–10.5)
CHLORIDE SERPL-SCNC: 98 MMOL/L — SIGNIFICANT CHANGE UP (ref 96–108)
CO2 SERPL-SCNC: 32 MMOL/L — HIGH (ref 22–31)
CREAT SERPL-MCNC: 1.46 MG/DL — HIGH (ref 0.5–1.3)
EGFR: 35 ML/MIN/1.73M2 — LOW
EOSINOPHIL # BLD AUTO: 0.2 K/UL — SIGNIFICANT CHANGE UP (ref 0–0.5)
EOSINOPHIL NFR BLD AUTO: 1.9 % — SIGNIFICANT CHANGE UP (ref 0–6)
FLUAV AG NPH QL: SIGNIFICANT CHANGE UP
FLUBV AG NPH QL: SIGNIFICANT CHANGE UP
GLUCOSE SERPL-MCNC: 348 MG/DL — HIGH (ref 70–99)
HCO3 BLDV-SCNC: 32 MMOL/L — HIGH (ref 22–29)
HCT VFR BLD CALC: 40.6 % — SIGNIFICANT CHANGE UP (ref 34.5–45)
HGB BLD-MCNC: 13.3 G/DL — SIGNIFICANT CHANGE UP (ref 11.5–15.5)
HOROWITZ INDEX BLDV+IHG-RTO: 21 — SIGNIFICANT CHANGE UP
IMM GRANULOCYTES NFR BLD AUTO: 0.6 % — SIGNIFICANT CHANGE UP (ref 0–0.9)
INR BLD: 0.94 RATIO — SIGNIFICANT CHANGE UP (ref 0.85–1.18)
LYMPHOCYTES # BLD AUTO: 1.55 K/UL — SIGNIFICANT CHANGE UP (ref 1–3.3)
LYMPHOCYTES # BLD AUTO: 14.5 % — SIGNIFICANT CHANGE UP (ref 13–44)
MAGNESIUM SERPL-MCNC: 2.1 MG/DL — SIGNIFICANT CHANGE UP (ref 1.6–2.6)
MCHC RBC-ENTMCNC: 28.4 PG — SIGNIFICANT CHANGE UP (ref 27–34)
MCHC RBC-ENTMCNC: 32.8 GM/DL — SIGNIFICANT CHANGE UP (ref 32–36)
MCV RBC AUTO: 86.8 FL — SIGNIFICANT CHANGE UP (ref 80–100)
MONOCYTES # BLD AUTO: 0.92 K/UL — HIGH (ref 0–0.9)
MONOCYTES NFR BLD AUTO: 8.6 % — SIGNIFICANT CHANGE UP (ref 2–14)
NEUTROPHILS # BLD AUTO: 7.92 K/UL — HIGH (ref 1.8–7.4)
NEUTROPHILS NFR BLD AUTO: 74.2 % — SIGNIFICANT CHANGE UP (ref 43–77)
NRBC # BLD: 0 /100 WBCS — SIGNIFICANT CHANGE UP (ref 0–0)
PCO2 BLDV: 54 MMHG — HIGH (ref 39–42)
PH BLDV: 7.38 — SIGNIFICANT CHANGE UP (ref 7.32–7.43)
PHOSPHATE SERPL-MCNC: 3.6 MG/DL — SIGNIFICANT CHANGE UP (ref 2.5–4.5)
PLATELET # BLD AUTO: 259 K/UL — SIGNIFICANT CHANGE UP (ref 150–400)
PO2 BLDV: 36 MMHG — SIGNIFICANT CHANGE UP
POTASSIUM SERPL-MCNC: 4 MMOL/L — SIGNIFICANT CHANGE UP (ref 3.5–5.3)
POTASSIUM SERPL-SCNC: 4 MMOL/L — SIGNIFICANT CHANGE UP (ref 3.5–5.3)
PROT SERPL-MCNC: 6.8 G/DL — SIGNIFICANT CHANGE UP (ref 6–8.3)
PROTHROM AB SERPL-ACNC: 10.7 SEC — SIGNIFICANT CHANGE UP (ref 9.5–13)
RBC # BLD: 4.68 M/UL — SIGNIFICANT CHANGE UP (ref 3.8–5.2)
RBC # FLD: 13.6 % — SIGNIFICANT CHANGE UP (ref 10.3–14.5)
SAO2 % BLDV: 70.8 % — SIGNIFICANT CHANGE UP
SARS-COV-2 RNA SPEC QL NAA+PROBE: SIGNIFICANT CHANGE UP
SODIUM SERPL-SCNC: 137 MMOL/L — SIGNIFICANT CHANGE UP (ref 135–145)
WBC # BLD: 10.67 K/UL — HIGH (ref 3.8–10.5)
WBC # FLD AUTO: 10.67 K/UL — HIGH (ref 3.8–10.5)

## 2023-09-27 PROCEDURE — 99285 EMERGENCY DEPT VISIT HI MDM: CPT

## 2023-09-27 PROCEDURE — 71045 X-RAY EXAM CHEST 1 VIEW: CPT | Mod: 26

## 2023-09-27 RX ORDER — SODIUM CHLORIDE 9 MG/ML
500 INJECTION INTRAMUSCULAR; INTRAVENOUS; SUBCUTANEOUS ONCE
Refills: 0 | Status: COMPLETED | OUTPATIENT
Start: 2023-09-27 | End: 2023-09-27

## 2023-09-27 RX ADMIN — SODIUM CHLORIDE 1000 MILLILITER(S): 9 INJECTION INTRAMUSCULAR; INTRAVENOUS; SUBCUTANEOUS at 23:48

## 2023-09-27 NOTE — ED PROVIDER NOTE - PHYSICAL EXAMINATION
CONSTITUTIONAL: non-toxic, well appearing  SKIN: no rash, no petechiae.  EYES: PERRL, EOMI, pink conjunctiva, anicteric  ENT: tongue and uvular midline, no exudates, mildly dry mucous membranes  NECK: Supple; no meningismus, no JVD  CARD: RRR, no murmurs, equal radial pulses bilaterally 2+  RESP: CTAB, no respiratory distress  ABD: Soft, non-tender, non-distended, no peritoneal signs, no CVA tenderness  EXT: Normal ROM x4. No edema.   NEURO: Alert, oriented. Neuro exam nonfocal  PSYCH: Cooperative, appropriate.

## 2023-09-27 NOTE — ED PROVIDER NOTE - OBJECTIVE STATEMENT
83-year-old female with past medical history hypertension, diabetes, hypothyroidism, asthma, A-fib, CAD, CHF, heart failure with preserved EF who presents with hyperglycemia.  Patient readmitted for hypotension and SRIDHAR, found to have pneumonia and treated with ceftriaxone, discharged on prednisone.  Patient's noted to have elevated glucose to 468 today.  Patient reports mild shortness of breath.  Denies any fevers, headache, vision change, chest pain, abdominal pain, vomiting, diarrhea, numbness, focal weakness or rash.  Patient on oral hypoglycemics at home, denies any insulin use.  Denies any additional complaints.

## 2023-09-27 NOTE — ED PROVIDER NOTE - PROGRESS NOTE DETAILS
Agata: no evidence of DKA on labs, discussed with family, uncomfortable with discharge as pt not on insulin and with elevated glucose in setting of steroids. Discussed with Dr. Dale, agreeable with readmission, signout given to MAR.

## 2023-09-27 NOTE — ED PROVIDER NOTE - CLINICAL SUMMARY MEDICAL DECISION MAKING FREE TEXT BOX
Agata: 83-year-old female with past medical history hypertension, diabetes, hypothyroidism, asthma, A-fib, CAD, CHF, heart failure with preserved EF who presents with hyperglycemia.  Patient readmitted for hypotension and SRIDHAR, found to have pneumonia and treated with ceftriaxone, discharged on prednisone.  Patient's noted to have elevated glucose to 468 today.  Patient reports mild shortness of breath.  Denies any fevers, headache, vision change, chest pain, abdominal pain, vomiting, diarrhea, numbness, focal weakness or rash.  Patient on oral hypoglycemics at home, denies any insulin use.  Physical exam per above. Hyperglycemia likely in setting of insulin use. Will obtain labs r/o DKA, imaging r/o infiltrate with dispo pending workup.

## 2023-09-28 ENCOUNTER — TRANSCRIPTION ENCOUNTER (OUTPATIENT)
Age: 83
End: 2023-09-28

## 2023-09-28 DIAGNOSIS — I48.20 CHRONIC ATRIAL FIBRILLATION, UNSPECIFIED: ICD-10-CM

## 2023-09-28 DIAGNOSIS — R73.9 HYPERGLYCEMIA, UNSPECIFIED: ICD-10-CM

## 2023-09-28 DIAGNOSIS — N17.9 ACUTE KIDNEY FAILURE, UNSPECIFIED: ICD-10-CM

## 2023-09-28 DIAGNOSIS — E11.9 TYPE 2 DIABETES MELLITUS WITHOUT COMPLICATIONS: ICD-10-CM

## 2023-09-28 DIAGNOSIS — I50.9 HEART FAILURE, UNSPECIFIED: ICD-10-CM

## 2023-09-28 DIAGNOSIS — J45.909 UNSPECIFIED ASTHMA, UNCOMPLICATED: ICD-10-CM

## 2023-09-28 DIAGNOSIS — E03.9 HYPOTHYROIDISM, UNSPECIFIED: ICD-10-CM

## 2023-09-28 DIAGNOSIS — Z29.9 ENCOUNTER FOR PROPHYLACTIC MEASURES, UNSPECIFIED: ICD-10-CM

## 2023-09-28 DIAGNOSIS — N18.9 CHRONIC KIDNEY DISEASE, UNSPECIFIED: ICD-10-CM

## 2023-09-28 LAB
ANION GAP SERPL CALC-SCNC: 8 MMOL/L — SIGNIFICANT CHANGE UP (ref 5–17)
APPEARANCE UR: CLEAR — SIGNIFICANT CHANGE UP
BACTERIA # UR AUTO: ABNORMAL /HPF
BILIRUB UR-MCNC: NEGATIVE — SIGNIFICANT CHANGE UP
BUN SERPL-MCNC: 30 MG/DL — HIGH (ref 7–18)
CALCIUM SERPL-MCNC: 8.4 MG/DL — SIGNIFICANT CHANGE UP (ref 8.4–10.5)
CHLORIDE SERPL-SCNC: 102 MMOL/L — SIGNIFICANT CHANGE UP (ref 96–108)
CO2 SERPL-SCNC: 29 MMOL/L — SIGNIFICANT CHANGE UP (ref 22–31)
COLOR SPEC: YELLOW — SIGNIFICANT CHANGE UP
CREAT SERPL-MCNC: 1.38 MG/DL — HIGH (ref 0.5–1.3)
DIFF PNL FLD: NEGATIVE — SIGNIFICANT CHANGE UP
EGFR: 38 ML/MIN/1.73M2 — LOW
EPI CELLS # UR: PRESENT
GLUCOSE BLDC GLUCOMTR-MCNC: 200 MG/DL — HIGH (ref 70–99)
GLUCOSE BLDC GLUCOMTR-MCNC: 297 MG/DL — HIGH (ref 70–99)
GLUCOSE BLDC GLUCOMTR-MCNC: 303 MG/DL — HIGH (ref 70–99)
GLUCOSE BLDC GLUCOMTR-MCNC: 329 MG/DL — HIGH (ref 70–99)
GLUCOSE SERPL-MCNC: 290 MG/DL — HIGH (ref 70–99)
GLUCOSE UR QL: >=1000 MG/DL
HCT VFR BLD CALC: 37.8 % — SIGNIFICANT CHANGE UP (ref 34.5–45)
HGB BLD-MCNC: 12.2 G/DL — SIGNIFICANT CHANGE UP (ref 11.5–15.5)
KETONES UR-MCNC: NEGATIVE MG/DL — SIGNIFICANT CHANGE UP
LEUKOCYTE ESTERASE UR-ACNC: ABNORMAL
MAGNESIUM SERPL-MCNC: 2.2 MG/DL — SIGNIFICANT CHANGE UP (ref 1.6–2.6)
MCHC RBC-ENTMCNC: 27.9 PG — SIGNIFICANT CHANGE UP (ref 27–34)
MCHC RBC-ENTMCNC: 32.3 GM/DL — SIGNIFICANT CHANGE UP (ref 32–36)
MCV RBC AUTO: 86.3 FL — SIGNIFICANT CHANGE UP (ref 80–100)
NITRITE UR-MCNC: NEGATIVE — SIGNIFICANT CHANGE UP
NRBC # BLD: 0 /100 WBCS — SIGNIFICANT CHANGE UP (ref 0–0)
PH UR: 5 — SIGNIFICANT CHANGE UP (ref 5–8)
PHOSPHATE SERPL-MCNC: 4.1 MG/DL — SIGNIFICANT CHANGE UP (ref 2.5–4.5)
PLATELET # BLD AUTO: 229 K/UL — SIGNIFICANT CHANGE UP (ref 150–400)
POTASSIUM SERPL-MCNC: 4.2 MMOL/L — SIGNIFICANT CHANGE UP (ref 3.5–5.3)
POTASSIUM SERPL-SCNC: 4.2 MMOL/L — SIGNIFICANT CHANGE UP (ref 3.5–5.3)
PROT UR-MCNC: NEGATIVE MG/DL — SIGNIFICANT CHANGE UP
RBC # BLD: 4.38 M/UL — SIGNIFICANT CHANGE UP (ref 3.8–5.2)
RBC # FLD: 13.6 % — SIGNIFICANT CHANGE UP (ref 10.3–14.5)
RBC CASTS # UR COMP ASSIST: 1 /HPF — SIGNIFICANT CHANGE UP (ref 0–4)
SODIUM SERPL-SCNC: 139 MMOL/L — SIGNIFICANT CHANGE UP (ref 135–145)
SP GR SPEC: 1.01 — SIGNIFICANT CHANGE UP (ref 1–1.03)
UROBILINOGEN FLD QL: 0.2 MG/DL — SIGNIFICANT CHANGE UP (ref 0.2–1)
WBC # BLD: 9.44 K/UL — SIGNIFICANT CHANGE UP (ref 3.8–10.5)
WBC # FLD AUTO: 9.44 K/UL — SIGNIFICANT CHANGE UP (ref 3.8–10.5)
WBC UR QL: 1 /HPF — SIGNIFICANT CHANGE UP (ref 0–5)

## 2023-09-28 RX ORDER — ALBUTEROL 90 UG/1
2 AEROSOL, METERED ORAL EVERY 6 HOURS
Refills: 0 | Status: DISCONTINUED | OUTPATIENT
Start: 2023-09-28 | End: 2023-10-01

## 2023-09-28 RX ORDER — BUDESONIDE AND FORMOTEROL FUMARATE DIHYDRATE 160; 4.5 UG/1; UG/1
2 AEROSOL RESPIRATORY (INHALATION)
Refills: 0 | Status: DISCONTINUED | OUTPATIENT
Start: 2023-09-28 | End: 2023-10-01

## 2023-09-28 RX ORDER — INSULIN LISPRO 100/ML
4 VIAL (ML) SUBCUTANEOUS ONCE
Refills: 0 | Status: COMPLETED | OUTPATIENT
Start: 2023-09-28 | End: 2023-09-28

## 2023-09-28 RX ORDER — DRONEDARONE 400 MG/1
400 TABLET, FILM COATED ORAL
Refills: 0 | Status: DISCONTINUED | OUTPATIENT
Start: 2023-09-28 | End: 2023-10-01

## 2023-09-28 RX ORDER — RIVAROXABAN 15 MG-20MG
15 KIT ORAL
Refills: 0 | Status: DISCONTINUED | OUTPATIENT
Start: 2023-09-28 | End: 2023-10-01

## 2023-09-28 RX ORDER — RIVAROXABAN 15 MG-20MG
20 KIT ORAL
Refills: 0 | Status: DISCONTINUED | OUTPATIENT
Start: 2023-09-28 | End: 2023-09-28

## 2023-09-28 RX ORDER — ESCITALOPRAM OXALATE 10 MG/1
10 TABLET, FILM COATED ORAL DAILY
Refills: 0 | Status: DISCONTINUED | OUTPATIENT
Start: 2023-09-28 | End: 2023-10-01

## 2023-09-28 RX ORDER — CARVEDILOL PHOSPHATE 80 MG/1
12.5 CAPSULE, EXTENDED RELEASE ORAL EVERY 12 HOURS
Refills: 0 | Status: DISCONTINUED | OUTPATIENT
Start: 2023-09-28 | End: 2023-10-01

## 2023-09-28 RX ORDER — PANTOPRAZOLE SODIUM 20 MG/1
40 TABLET, DELAYED RELEASE ORAL
Refills: 0 | Status: DISCONTINUED | OUTPATIENT
Start: 2023-09-28 | End: 2023-10-01

## 2023-09-28 RX ORDER — LEVOTHYROXINE SODIUM 125 MCG
175 TABLET ORAL DAILY
Refills: 0 | Status: DISCONTINUED | OUTPATIENT
Start: 2023-09-28 | End: 2023-10-01

## 2023-09-28 RX ORDER — LOSARTAN POTASSIUM 100 MG/1
50 TABLET, FILM COATED ORAL DAILY
Refills: 0 | Status: DISCONTINUED | OUTPATIENT
Start: 2023-09-28 | End: 2023-10-01

## 2023-09-28 RX ORDER — INSULIN LISPRO 100/ML
VIAL (ML) SUBCUTANEOUS
Refills: 0 | Status: DISCONTINUED | OUTPATIENT
Start: 2023-09-28 | End: 2023-09-29

## 2023-09-28 RX ORDER — INSULIN LISPRO 100/ML
VIAL (ML) SUBCUTANEOUS AT BEDTIME
Refills: 0 | Status: DISCONTINUED | OUTPATIENT
Start: 2023-09-28 | End: 2023-09-29

## 2023-09-28 RX ADMIN — DRONEDARONE 400 MILLIGRAM(S): 400 TABLET, FILM COATED ORAL at 06:29

## 2023-09-28 RX ADMIN — RIVAROXABAN 15 MILLIGRAM(S): KIT at 18:13

## 2023-09-28 RX ADMIN — SODIUM CHLORIDE 500 MILLILITER(S): 9 INJECTION INTRAMUSCULAR; INTRAVENOUS; SUBCUTANEOUS at 00:18

## 2023-09-28 RX ADMIN — Medication 4 UNIT(S): at 01:06

## 2023-09-28 RX ADMIN — ESCITALOPRAM OXALATE 10 MILLIGRAM(S): 10 TABLET, FILM COATED ORAL at 11:24

## 2023-09-28 RX ADMIN — Medication 10 MILLIGRAM(S): at 09:50

## 2023-09-28 RX ADMIN — Medication 4: at 18:17

## 2023-09-28 RX ADMIN — Medication 4: at 12:07

## 2023-09-28 RX ADMIN — CARVEDILOL PHOSPHATE 12.5 MILLIGRAM(S): 80 CAPSULE, EXTENDED RELEASE ORAL at 06:28

## 2023-09-28 RX ADMIN — BUDESONIDE AND FORMOTEROL FUMARATE DIHYDRATE 2 PUFF(S): 160; 4.5 AEROSOL RESPIRATORY (INHALATION) at 22:01

## 2023-09-28 RX ADMIN — Medication 10 MILLIGRAM(S): at 06:28

## 2023-09-28 RX ADMIN — DRONEDARONE 400 MILLIGRAM(S): 400 TABLET, FILM COATED ORAL at 18:33

## 2023-09-28 RX ADMIN — Medication 1: at 08:28

## 2023-09-28 RX ADMIN — Medication 1: at 21:48

## 2023-09-28 RX ADMIN — PANTOPRAZOLE SODIUM 40 MILLIGRAM(S): 20 TABLET, DELAYED RELEASE ORAL at 06:30

## 2023-09-28 RX ADMIN — Medication 175 MICROGRAM(S): at 06:28

## 2023-09-28 RX ADMIN — Medication 10 MILLIGRAM(S): at 18:13

## 2023-09-28 RX ADMIN — CARVEDILOL PHOSPHATE 12.5 MILLIGRAM(S): 80 CAPSULE, EXTENDED RELEASE ORAL at 18:13

## 2023-09-28 NOTE — H&P ADULT - ASSESSMENT
Patient is an 83 year old female from home (HHA 10h/7d), ambulates independently, A&OX3, with PMH of HTN, DM, Hypothyroidism, CHF, CAD, A. 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  Patient was found to have Blood glucose of 397 in the ED and was treated with admelog.  Patient is being admitted to medicine for hyperglycemia likely 2/2 steroid use.

## 2023-09-28 NOTE — DISCHARGE NOTE PROVIDER - HOSPITAL COURSE
Patient is an 83 year old female from home (HHA 10h/7d), ambulates independently, A&OX3, with PMH of HTN, DM, Hypothyroidism, CHF, CAD, A. 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  Patient was discharged from the hospital 9/27 after being admitted for Acute hypoxic respiratory failure and hypotension in the setting of a CHF exacerbation.  Patient was discharged home on a course of Prednisone renny.  At home FS was noted~400.  patient denied symptoms at that time however patients family was concerned and brought her to the ED. Patient is an 83 year old female from home (HHA 10h/7d), ambulates independently, A&OX3, with PMH of HTN, DM, Hypothyroidism, CHF, CAD, A. 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  Patient was discharged from the hospital 9/27 after being admitted for Acute hypoxic respiratory failure and hypotension in the setting of a CHF exacerbation.  Patient was discharged home on a course of Prednisone renny.  At home FS was noted~400.  Now being admitted for hyperglycemia likely due to steroid taper.   Patient was seen by endocrinologist, treated with insulin adjustment. Blood sugar improved with insulin dose adjustment. A1c from previous   admission was 7.9.  Given patient's improved clinical status and current hemodynamic stability, decision was made to discharge.  Please refer to patient's complete medical chart with documents for a full hospital course, for this is only a brief summary.

## 2023-09-28 NOTE — H&P ADULT - PROBLEM SELECTOR PLAN 2
- Patient with history of CHF  - Patient with echo in 2021 with EF> 70%   - Patient on carvedilol 12.5mg BID, losartan 50mg, Torsemide 10mg at home  - Continue home meds

## 2023-09-28 NOTE — CONSULT NOTE ADULT - SUBJECTIVE AND OBJECTIVE BOX
Patient is a 83y old  Female who presents with a chief complaint of Steroid induced Hyperglycemia (28 Sep 2023 04:38)      HPI:  Patient is an 83 year old female from home (HHA 10h/7d), ambulates independently, A&OX3, with PMH of HTN, DM, Hypothyroidism, CHF, CAD, A. 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  Patient was discharged from the hospital yesterday after being admitted for Acute hypoxic respiratory failure and hypotension in the setting of a CHF exacerbation.  Patient was discharged home on a course of Prednisone renny.  Patient was with her HHA and family when they decided to check her blood sugar at home and noted ~400.  patient denied symptoms at that time however patients family was concerned and brought her to the ED.  patient currently denies all acute complains including headache, nasuea, vomiting, chest pain, abodmianl pain, constipation, diarrhea, back pain, blurry vision. (28 Sep 2023 04:38)  Found to have uncont dm- Pt takes oral dm meds as out pt- janumet/prandin 1 ac tid. D/c home from here on prednisone, found to have hyperglycemia. Eating well.     PAST MEDICAL & SURGICAL HISTORY:  Asthma      CAD (Coronary Atherosclerotic Disease)      Myocardial Infarction      Diabetes      HTN (Hypertension)      HLD (Hyperlipidemia)      CHF (congestive heart failure), NYHA class I      IBS (irritable bowel syndrome)      Venous stasis      Hypothyroidism      Atrial fibrillation      Pacemaker      Obesity      Afib      S/P coronary angiogram             MEDICATIONS  (STANDING):  budesonide 160 MICROgram(s)/formoterol 4.5 MICROgram(s) Inhaler 2 Puff(s) Inhalation two times a day  carvedilol 12.5 milliGRAM(s) Oral every 12 hours  dronedarone 400 milliGRAM(s) Oral two times a day  escitalopram 10 milliGRAM(s) Oral daily  insulin lispro (ADMELOG) corrective regimen sliding scale   SubCutaneous three times a day before meals  insulin lispro (ADMELOG) corrective regimen sliding scale   SubCutaneous at bedtime  levothyroxine 175 MICROGram(s) Oral daily  losartan 50 milliGRAM(s) Oral daily  pantoprazole    Tablet 40 milliGRAM(s) Oral before breakfast  predniSONE   Tablet 10 milliGRAM(s) Oral every 12 hours  rivaroxaban 20 milliGRAM(s) Oral with dinner  torsemide 10 milliGRAM(s) Oral daily    MEDICATIONS  (PRN):  albuterol    90 MICROgram(s) HFA Inhaler 2 Puff(s) Inhalation every 6 hours PRN Shortness of Breath and/or Wheezing      FAMILY HISTORY:  Family history of heart disease        SOCIAL HISTORY:      REVIEW OF SYSTEMS: NAD	        Vital Signs Last 24 Hrs  T(C): 36.8 (28 Sep 2023 07:10), Max: 36.8 (28 Sep 2023 07:10)  T(F): 98.2 (28 Sep 2023 07:10), Max: 98.2 (28 Sep 2023 07:10)  HR: 71 (28 Sep 2023 07:10) (62 - 71)  BP: 112/70 (28 Sep 2023 07:10) (112/70 - 122/75)  BP(mean): --  RR: 18 (28 Sep 2023 07:10) (18 - 20)  SpO2: 96% (28 Sep 2023 07:10) (96% - 100%)    Parameters below as of 28 Sep 2023 07:10  Patient On (Oxygen Delivery Method): room air          Constitutional:    HEENT: nad    Neck:  No JVD, bruits or thyromegaly    Respiratory:  Clear without rales or rhonchi    Cardiovascular:  RR without murmur, rub or gallop.    Gastrointestinal: Soft without hepatosplenomegaly.    Extremities: without cyanosis, clubbing or edema.    Neurological:  Oriented   x  1    . No gross sensory or motor defects.        LABS:                        13.3   10.67 )-----------( 259      ( 27 Sep 2023 22:45 )             40.6         137  |  98  |  32<H>  ----------------------------<  348<H>  4.0   |  32<H>  |  1.46<H>    Ca    8.7      27 Sep 2023 22:45  Phos  3.6       Mg     2.1         TPro  6.8  /  Alb  3.1<L>  /  TBili  0.3  /  DBili  x   /  AST  11  /  ALT  26  /  AlkPhos  59          PT/INR - ( 27 Sep 2023 22:45 )   PT: 10.7 sec;   INR: 0.94 ratio         PTT - ( 27 Sep 2023 22:45 )  PTT:27.8 sec  Urinalysis Basic - ( 28 Sep 2023 00:02 )    Color: Yellow / Appearance: Clear / S.011 / pH: x  Gluc: x / Ketone: Negative mg/dL  / Bili: Negative / Urobili: 0.2 mg/dL   Blood: x / Protein: Negative mg/dL / Nitrite: Negative   Leuk Esterase: Moderate / RBC: 1 /HPF / WBC 1 /HPF   Sq Epi: x / Non Sq Epi: x / Bacteria: Few /HPF      CAPILLARY BLOOD GLUCOSE      POCT Blood Glucose.: 200 mg/dL (28 Sep 2023 08:07)  POCT Blood Glucose.: 293 mg/dL (28 Sep 2023 00:51)  POCT Blood Glucose.: 397 mg/dL (27 Sep 2023 20:01)      RADIOLOGY & ADDITIONAL STUDIES:

## 2023-09-28 NOTE — H&P ADULT - HISTORY OF PRESENT ILLNESS
Patient is an 83 year old female from home (HHA 10h/7d), ambulates independently, A&OX3, with PMH of HTN, DM, Hypothyroidism, CHF, CAD, A. 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  Patient was discharged from the hospital yesterday after being admitted for Acute hypoxic respiratory failure and hypotension in the setting of a CHF exacerbation.  Patient was discharged home on a course of Prednisone renny.  Patient was with her HHA and family when they decided to check her blood sugar at home and noted ~400.  patient denied symptoms at that time however patients family was concerned and brought her to the ED.  patient currently denies all acute complains including headache, nasuea, vomiting, chest pain, abodmianl pain, constipation, diarrhea, back pain, blurry vision.

## 2023-09-28 NOTE — DISCHARGE NOTE PROVIDER - NSDCMRMEDTOKEN_GEN_ALL_CORE_FT
budesonide-formoterol 160 mcg-4.5 mcg/inh inhalation aerosol: 2  inhaled 2 times a day   carvedilol 12.5 mg oral tablet: 1 tab(s) orally 2 times a day  Cozaar 50 mg oral tablet: 1 tab(s) orally 2 times a day  Dexilant 60 mg oral delayed release capsule: 1 cap(s) orally once a day  escitalopram 10 mg oral tablet: 1 tab(s) orally once a day  Janumet 50 mg-500 mg oral tablet: 1 tab(s) orally 2 times a day  levothyroxine 175 mcg (0.175 mg) oral tablet: 1 tab(s) orally once a day  Multaq 400 mg oral tablet: 1 tab(s) orally 2 times a day  predniSONE 10 mg oral tablet: 4 tab(s) orally every 12 hours Then 4 tabs once per day for 1 days;  then 3 tabs once per day for 1 day;  then 2 tabs once per day for 1 day;  then 1 tab once per day for 1 day  ProAir HFA 90 mcg/inh inhalation aerosol: 2 puff(s) inhaled every 4 hours, As Needed -for bronchospasm   repaglinide 1 mg oral tablet: 1 tab(s) orally 3 times a day (before meals)  torsemide 10 mg oral tablet: 1 tab(s) orally once a day  Xarelto 20 mg oral tablet: 1 tab(s) orally once a day   albuterol 90 mcg/inh inhalation aerosol: 2 puff(s) inhaled every 6 hours as needed for Shortness of Breath and/or Wheezing  budesonide-formoterol 160 mcg-4.5 mcg/inh inhalation aerosol: 2  inhaled 2 times a day   carvedilol 12.5 mg oral tablet: 1 tab(s) orally every 12 hours  Cozaar 50 mg oral tablet: 1 tab(s) orally 2 times a day  dronedarone 400 mg oral tablet: 1 tab(s) orally 2 times a day  escitalopram 10 mg oral tablet: 1 tab(s) orally once a day  Jardiance 10 mg oral tablet: 1 tab(s) orally once a day  levothyroxine 175 mcg (0.175 mg) oral tablet: 1 tab(s) orally once a day  metFORMIN 500 mg oral tablet: 1 tab(s) orally once a day (in the evening)  nystatin 100,000 units/g topical powder: 1 Apply topically to affected area 3 times a day As needed skin care  pantoprazole 40 mg oral delayed release tablet: 1 tab(s) orally once a day (before a meal)  repaglinide 2 mg oral tablet: 1 tab(s) orally 3 times a day (before meals) repanglinide 2 mg premeals   decrease to 1 mg if blood sugars &lt; 200  rivaroxaban 15 mg oral tablet: 1 tab(s) orally once a day (before a meal)  torsemide 10 mg oral tablet: 1 tab(s) orally once a day

## 2023-09-28 NOTE — CHART NOTE - NSCHARTNOTEFT_GEN_A_CORE
EVENT:   admitted for hyperglycemia     SUBJECTIVE:    OBJECTIVE:  Vital Signs Last 24 Hrs  T(C): 36.7 (28 Sep 2023 11:10), Max: 36.8 (28 Sep 2023 07:10)  T(F): 98 (28 Sep 2023 11:10), Max: 98.2 (28 Sep 2023 07:10)  HR: 64 (28 Sep 2023 11:10) (62 - 71)  BP: 110/64 (28 Sep 2023 11:10) (110/64 - 122/75)  BP(mean): --  RR: 18 (28 Sep 2023 11:10) (18 - 20)  SpO2: 95% (28 Sep 2023 11:10) (95% - 100%)    Parameters below as of 28 Sep 2023 11:10  Patient On (Oxygen Delivery Method): room air        LABS:                        12.2   9.44  )-----------( 229      ( 28 Sep 2023 09:55 )             37.8     09-28    139  |  102  |  30<H>  ----------------------------<  290<H>  4.2   |  29  |  1.38<H>    Ca    8.4      28 Sep 2023 09:55  Phos  4.1     09-28  Mg     2.2     09-28    TPro  6.8  /  Alb  3.1<L>  /  TBili  0.3  /  DBili  x   /  AST  11  /  ALT  26  /  AlkPhos  59  09-27      EKG:   IMAGING:    ASSESSMENT:  HPI:  Patient is an 83 year old female from home (HHA 10h/7d), ambulates independently, A&OX3, with PMH of HTN, DM, Hypothyroidism, CHF, CAD, A. 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  Patient was discharged from the hospital yesterday after being admitted for Acute hypoxic respiratory failure and hypotension in the setting of a CHF exacerbation.  Patient was discharged home on a course of Prednisone renny.  Patient was with her HHA and family when they decided to check her blood sugar at home and noted ~400.  patient denied symptoms at that time however patients family was concerned and brought her to the ED.  patient currently denies all acute complains including headache, nasuea, vomiting, chest pain, abodmianl pain, constipation, diarrhea, back pain, blurry vision. (28 Sep 2023 04:38)    patient oob to chair a/ox4, grand daughter at bedside.     PROBLEM: uncontrolled hyperglycemia likely 2/2 steroid use     PLAN:   steroid to be completed today   evaluated by endocrinology and d/c reccs:    restarting janumet  inc prandin to 2mg ac meals if fsg >250 premeals  change to 1mg when off of steroids  start jardinace 10mg   fsg ac and hs  goal- 140-<200

## 2023-09-28 NOTE — CONSULT NOTE ADULT - ASSESSMENT
Patient is an 83 year old female from home (HHA 10h/7d), ambulates independently, A&OX3, with PMH of HTN, DM, Hypothyroidism, CHF, CAD, A. 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  Patient was discharged from the hospital yesterday after being admitted for Acute hypoxic respiratory failure and hypotension in the setting of a CHF exacerbation.   Found to have uncont dm- Pt takes oral dm meds as out pt- janumet/prandin 1 ac tid. D/c home from here on prednisone, found to have hyperglycemia. Eating well. Hx from daughter

## 2023-09-28 NOTE — H&P ADULT - NSHPPHYSICALEXAM_GEN_ALL_CORE
GENERAL: NAD, well-developed, elderly obese female   HEAD:  Atraumatic, Normocephalic  EYES:  conjunctiva and sclera clear  NECK: Supple, No JVD, Normal thyroid  CHEST/LUNG: CTA  No rales, rhonchi, wheezing, or rubs  HEART: Regular rate and rhythm; No murmurs, rubs, or gallops  ABDOMEN: Soft, Nontender, Nondistended; Bowel sounds present  NERVOUS SYSTEM:  Alert & Oriented X3,    EXTREMITIES:  2+ Peripheral Pulses, No clubbing, cyanosis, or non pitting edema edema  SKIN: warm dry

## 2023-09-28 NOTE — PATIENT PROFILE ADULT - FALL HARM RISK - HARM RISK INTERVENTIONS

## 2023-09-28 NOTE — CONSULT NOTE ADULT - PROBLEM SELECTOR RECOMMENDATION 9
steroid induced  decent out pt control with a1c- 7.9  rec restarting janumet  inc prandin to 2mg ac meals if fsg >250 premeals  change to 1mg when off of steroids  start jardinace 10mg   fsg ac and hs  goal- 140-<200  d/w prim team and family

## 2023-09-28 NOTE — H&P ADULT - PROBLEM SELECTOR PLAN 7
- Patient with history of Mild Asthma  - Patient on Albuterol PRN and BUdesonide-Formeterol at home  - Continue home inhalers

## 2023-09-28 NOTE — DISCHARGE NOTE PROVIDER - NSDCFUSCHEDAPPT_GEN_ALL_CORE_FT
[facility]  ELECTROPH [address]  Scheduled Appointment: 10/25/2023    Demetrice Velazquez  [facility]  CARDIOLOGY [address]  Scheduled Appointment: 10/25/2023

## 2023-09-28 NOTE — ED ADULT NURSE NOTE - NSFALLRISKINTERV_ED_ALL_ED
Assistance OOB with selected safe patient handling equipment if applicable/Assistance with ambulation/Communicate fall risk and risk factors to all staff, patient, and family/Monitor gait and stability/Provide visual cue: yellow wristband, yellow gown, etc/Reinforce activity limits and safety measures with patient and family/Call bell, personal items and telephone in reach/Instruct patient to call for assistance before getting out of bed/chair/stretcher/Non-slip footwear applied when patient is off stretcher/[place] to call system/Physically safe environment - no spills, clutter or unnecessary equipment/Purposeful Proactive Rounding/Room/bathroom lighting operational, light cord in reach

## 2023-09-28 NOTE — H&P ADULT - PROBLEM SELECTOR PLAN 4
- Patient with history of Chronic A. Fib  - Patient with PPM in place  - Patient on Multaq 400mg BID and Xarelto 20mg  - Continue home meds

## 2023-09-28 NOTE — H&P ADULT - PROBLEM SELECTOR PLAN 1
- Patient with elevated glucose readings at home to ~400  - Patient on steroid renny with prednisone due to recent hospitalization at [facility], discharged yesterday  - Likley secondary to steroid renny  - Blood glucose on presentation to ED of ~400  - Blood glucose improved while in ED, treated with admelog 4U  - Patient with history of DM, not on insulin at home  - Start diabetic diet  - Continue Steroid renny  - Monitor Blood Glucose closely  - Start Insulin sliding scale  - A1c from prior admission of 7.9

## 2023-09-28 NOTE — H&P ADULT - PROBLEM SELECTOR PLAN 6
- Patient with history of DM  - Recent A1c from prior admission of 7.9 (9/23)  - Patient on Janumet 50-500BID, Repaglinide 2mg TID at home  - Start insulin sliding scale  - Finger sticks ACHS  - Monitor blood glucose closely as patient is being admitted for hyperglycemia - Patient with history of DM  - Recent A1c from prior admission of 7.9 (9/23)  - Patient on Janumet 50-500BID, Repaglinide 2mg TID at home  - Start insulin sliding scale  - Finger sticks ACHS  - Monitor blood glucose closely as patient is being admitted for hyperglycemia  - Endocrinology Dr. Garcia Consulted

## 2023-09-28 NOTE — H&P ADULT - PROBLEM SELECTOR PLAN 5
- Patient with history of hypothyroidism  - Patient on Levothyroxine 175mcg daily  - Continue home meds

## 2023-09-28 NOTE — DISCHARGE NOTE PROVIDER - NSDCCPCAREPLAN_GEN_ALL_CORE_FT
PRINCIPAL DISCHARGE DIAGNOSIS  Diagnosis: Hyperglycemia  Assessment and Plan of Treatment: You were discharged from this hospital few days ago, & returned with back in the emergency room for elevated blood sugar level (Hyperglycemia) likely due to steriod use. You were discharged home last time with oral steroids for your respiratory issues.  - Your A1C from last admission was 7.9  - You were seen by a diabetic doctor, & your insulin regimen was adjusted.  - You blood glucose levels were monitored closely   -Your blood glucose levels have improved  - Follow up with your PCP after discharge.  HgA1C this admission was 7.9  Make sure you get your HgA1c checked every three months.  If you take oral diabetes medications, check your blood glucose two times a day.  If you take insulin, check your blood glucose before meals and at bedtime.  It's important not to skip any meals.  Keep a log of your blood glucose results and always take it with you to your doctor appointments.  Keep a list of your current medications including injectables and over the counter medications and bring this medication list with you to all your doctor appointments.  If you have not seen your ophthalmologist this year call for appointment.  Check your feet daily for redness, sores, or openings. Do not self treat. If no improvement in two days call your primary care physician for an appointment.  Low blood sugar (hypoglycemia) is a blood sugar below 70mg/dl. Check your blood sugar if you feel signs/symptoms of hypoglycemia. If your blood sugar is below 70 take 15 grams of carbohydrates (ex 4 oz of apple juice, 3-4 glucose tablets, or 4-6 oz of regular soda) wait 15 minutes and repeat blood sugar to make sure it comes up above 70.  If your blood sugar is above 70 and you are due for a meal, have a meal.  If you are not due for a meal have a snack.  This snack helps keeps your blood sugar at a safe range.        SECONDARY DISCHARGE DIAGNOSES  Diagnosis: Chronic CHF (congestive heart failure)  Assessment and Plan of Treatment: - You have a history of heart failure  - Your Echo of 2021shows EF > 70%  - Continue to take your Carvedilol, Losartan, & Torsemide  - Weigh yourself daily.  If you gain 3lbs in 3 days, or 5lbs in a week call your Health Care Provider.  Do not eat or drink foods containing more than 2000mg of salt (sodium) in your diet every day.  Call your Health Care Provider if you have any swelling or increased swelling in your feet, ankles, and/or stomach.  Take all of your medication as directed.  If you become dizzy call your Health Care Provider.      Diagnosis: Chronic atrial fibrillation  Assessment and Plan of Treatment: You have a history of Afib, with pacemaker in place.  - Continue to take Xarelta as prescribed  Atrial fibrillation is the most common heart rhythm problem & has the risk of stroke & heart attack  It helps if you control your blood pressure, not drink more than 1-2 alcohol drinks per day, cut down on caffeine, getting treatment for over active thyroid gland, & getting exercise  Call your doctor if you feel your heart racing or beating unusually, chest tightness or pain, lightheaded, faint, shortness of breath especially with exercise  It is important to take your heart medication as prescribed  You may be on anticoagulation which is very important to take as directed - you may need blood work to monitor drug levels      Diagnosis: Chronic kidney disease, unspecified CKD stage  Assessment and Plan of Treatment: - You have a history of Chronic kidney disease  - On admission, your serum creatinine was 1.4  -b You were treated with light IV fluids  - Continue to follow up with your PCP after discharge.    Diagnosis: Hypothyroidism  Assessment and Plan of Treatment: - Continue to take Synthroid as prescibed       PRINCIPAL DISCHARGE DIAGNOSIS  Diagnosis: Hyperglycemia  Assessment and Plan of Treatment: You were discharged from this hospital few days ago, & returned with back in the emergency room for elevated blood sugar level (Hyperglycemia) likely due to steriod use. You were discharged home last time with oral steroids for your respiratory issues.  - Your A1C from last admission was 7.9  - You were seen by a diabetic doctor, & your insulin regimen was adjusted.  - You blood glucose levels were monitored closely   -Your blood glucose levels have improved  - Follow up with your PCP after discharge.  HgA1C this admission was 7.9  Make sure you get your HgA1c checked every three months.  If you take oral diabetes medications, check your blood glucose two times a day.  If you take insulin, check your blood glucose before meals and at bedtime.  It's important not to skip any meals.  Keep a log of your blood glucose results and always take it with you to your doctor appointments.  Keep a list of your current medications including injectables and over the counter medications and bring this medication list with you to all your doctor appointments.  If you have not seen your ophthalmologist this year call for appointment.  Check your feet daily for redness, sores, or openings. Do not self treat. If no improvement in two days call your primary care physician for an appointment.  Low blood sugar (hypoglycemia) is a blood sugar below 70mg/dl. Check your blood sugar if you feel signs/symptoms of hypoglycemia. If your blood sugar is below 70 take 15 grams of carbohydrates (ex 4 oz of apple juice, 3-4 glucose tablets, or 4-6 oz of regular soda) wait 15 minutes and repeat blood sugar to make sure it comes up above 70.  If your blood sugar is above 70 and you are due for a meal, have a meal.  If you are not due for a meal have a snack.  This snack helps keeps your blood sugar at a safe range.        SECONDARY DISCHARGE DIAGNOSES  Diagnosis: Chronic CHF (congestive heart failure)  Assessment and Plan of Treatment: - You have a history of heart failure  - Your Echo of 2021shows EF > 70%  - Continue to take your Carvedilol, Losartan, & Torsemide  - Weigh yourself daily.  If you gain 3lbs in 3 days, or 5lbs in a week call your Health Care Provider.  Do not eat or drink foods containing more than 2000mg of salt (sodium) in your diet every day.  Call your Health Care Provider if you have any swelling or increased swelling in your feet, ankles, and/or stomach.  Take all of your medication as directed.  If you become dizzy call your Health Care Provider. Theres an interacttion with torsemide and ecitaalopram. Please follow up with your primary if you're continuing with the medications      Diagnosis: Chronic atrial fibrillation  Assessment and Plan of Treatment: You have a history of Afib, with pacemaker in place.  - Continue to take Xarelta as prescribed  Atrial fibrillation is the most common heart rhythm problem & has the risk of stroke & heart attack  It helps if you control your blood pressure, not drink more than 1-2 alcohol drinks per day, cut down on caffeine, getting treatment for over active thyroid gland, & getting exercise  Call your doctor if you feel your heart racing or beating unusually, chest tightness or pain, lightheaded, faint, shortness of breath especially with exercise  It is important to take your heart medication as prescribed  You may be on anticoagulation which is very important to take as directed - you may need blood work to monitor drug levels      Diagnosis: Hypothyroidism  Assessment and Plan of Treatment: - Continue to take Synthroid as prescibed      Diagnosis: Chronic kidney disease, unspecified CKD stage  Assessment and Plan of Treatment: - You have a history of Chronic kidney disease  - On admission, your serum creatinine was 1.4  -b You were treated with light IV fluids  - Continue to follow up with your PCP after discharge.

## 2023-09-28 NOTE — DISCHARGE NOTE PROVIDER - CARE PROVIDER_API CALL
Mihai Madrid.  Internal Medicine  [address], [address]  [place] [address]  Phone: [phone]  Fax: [phone]  Established Patient  Follow Up Time: 1 week

## 2023-09-29 ENCOUNTER — TRANSCRIPTION ENCOUNTER (OUTPATIENT)
Age: 83
End: 2023-09-29

## 2023-09-29 LAB
ANION GAP SERPL CALC-SCNC: 8 MMOL/L — SIGNIFICANT CHANGE UP (ref 5–17)
BUN SERPL-MCNC: 34 MG/DL — HIGH (ref 7–18)
CALCIUM SERPL-MCNC: 8.3 MG/DL — LOW (ref 8.4–10.5)
CHLORIDE SERPL-SCNC: 102 MMOL/L — SIGNIFICANT CHANGE UP (ref 96–108)
CO2 SERPL-SCNC: 28 MMOL/L — SIGNIFICANT CHANGE UP (ref 22–31)
CREAT SERPL-MCNC: 1.31 MG/DL — HIGH (ref 0.5–1.3)
EGFR: 40 ML/MIN/1.73M2 — LOW
GLUCOSE BLDC GLUCOMTR-MCNC: 239 MG/DL — HIGH (ref 70–99)
GLUCOSE BLDC GLUCOMTR-MCNC: 245 MG/DL — HIGH (ref 70–99)
GLUCOSE BLDC GLUCOMTR-MCNC: 296 MG/DL — HIGH (ref 70–99)
GLUCOSE BLDC GLUCOMTR-MCNC: 307 MG/DL — HIGH (ref 70–99)
GLUCOSE SERPL-MCNC: 254 MG/DL — HIGH (ref 70–99)
HCT VFR BLD CALC: 34.9 % — SIGNIFICANT CHANGE UP (ref 34.5–45)
HGB BLD-MCNC: 11.3 G/DL — LOW (ref 11.5–15.5)
MCHC RBC-ENTMCNC: 28.3 PG — SIGNIFICANT CHANGE UP (ref 27–34)
MCHC RBC-ENTMCNC: 32.4 GM/DL — SIGNIFICANT CHANGE UP (ref 32–36)
MCV RBC AUTO: 87.5 FL — SIGNIFICANT CHANGE UP (ref 80–100)
NRBC # BLD: 0 /100 WBCS — SIGNIFICANT CHANGE UP (ref 0–0)
PLATELET # BLD AUTO: 212 K/UL — SIGNIFICANT CHANGE UP (ref 150–400)
POTASSIUM SERPL-MCNC: 4.3 MMOL/L — SIGNIFICANT CHANGE UP (ref 3.5–5.3)
POTASSIUM SERPL-SCNC: 4.3 MMOL/L — SIGNIFICANT CHANGE UP (ref 3.5–5.3)
RBC # BLD: 3.99 M/UL — SIGNIFICANT CHANGE UP (ref 3.8–5.2)
RBC # FLD: 13.4 % — SIGNIFICANT CHANGE UP (ref 10.3–14.5)
SODIUM SERPL-SCNC: 138 MMOL/L — SIGNIFICANT CHANGE UP (ref 135–145)
WBC # BLD: 7.96 K/UL — SIGNIFICANT CHANGE UP (ref 3.8–10.5)
WBC # FLD AUTO: 7.96 K/UL — SIGNIFICANT CHANGE UP (ref 3.8–10.5)

## 2023-09-29 RX ORDER — GLUCAGON INJECTION, SOLUTION 0.5 MG/.1ML
1 INJECTION, SOLUTION SUBCUTANEOUS ONCE
Refills: 0 | Status: DISCONTINUED | OUTPATIENT
Start: 2023-09-29 | End: 2023-10-01

## 2023-09-29 RX ORDER — SODIUM CHLORIDE 9 MG/ML
1000 INJECTION, SOLUTION INTRAVENOUS
Refills: 0 | Status: DISCONTINUED | OUTPATIENT
Start: 2023-09-29 | End: 2023-10-01

## 2023-09-29 RX ORDER — INSULIN LISPRO 100/ML
VIAL (ML) SUBCUTANEOUS
Refills: 0 | Status: DISCONTINUED | OUTPATIENT
Start: 2023-09-29 | End: 2023-10-01

## 2023-09-29 RX ADMIN — RIVAROXABAN 15 MILLIGRAM(S): KIT at 17:39

## 2023-09-29 RX ADMIN — ESCITALOPRAM OXALATE 10 MILLIGRAM(S): 10 TABLET, FILM COATED ORAL at 13:29

## 2023-09-29 RX ADMIN — PANTOPRAZOLE SODIUM 40 MILLIGRAM(S): 20 TABLET, DELAYED RELEASE ORAL at 06:10

## 2023-09-29 RX ADMIN — Medication 6: at 17:00

## 2023-09-29 RX ADMIN — BUDESONIDE AND FORMOTEROL FUMARATE DIHYDRATE 2 PUFF(S): 160; 4.5 AEROSOL RESPIRATORY (INHALATION) at 20:01

## 2023-09-29 RX ADMIN — Medication 2: at 11:54

## 2023-09-29 RX ADMIN — BUDESONIDE AND FORMOTEROL FUMARATE DIHYDRATE 2 PUFF(S): 160; 4.5 AEROSOL RESPIRATORY (INHALATION) at 10:56

## 2023-09-29 RX ADMIN — LOSARTAN POTASSIUM 50 MILLIGRAM(S): 100 TABLET, FILM COATED ORAL at 06:12

## 2023-09-29 RX ADMIN — Medication 175 MICROGRAM(S): at 06:11

## 2023-09-29 RX ADMIN — CARVEDILOL PHOSPHATE 12.5 MILLIGRAM(S): 80 CAPSULE, EXTENDED RELEASE ORAL at 06:10

## 2023-09-29 RX ADMIN — Medication 2: at 08:07

## 2023-09-29 RX ADMIN — CARVEDILOL PHOSPHATE 12.5 MILLIGRAM(S): 80 CAPSULE, EXTENDED RELEASE ORAL at 17:39

## 2023-09-29 RX ADMIN — Medication 10 MILLIGRAM(S): at 06:10

## 2023-09-29 RX ADMIN — DRONEDARONE 400 MILLIGRAM(S): 400 TABLET, FILM COATED ORAL at 17:39

## 2023-09-29 RX ADMIN — DRONEDARONE 400 MILLIGRAM(S): 400 TABLET, FILM COATED ORAL at 06:11

## 2023-09-29 NOTE — PROGRESS NOTE ADULT - SUBJECTIVE AND OBJECTIVE BOX
NP Note discussed with  Primary Attending    Patient is a 83y old  Female who presents with a chief complaint of Steroid induced Hyperglycemia (29 Sep 2023 09:54).  Comfortable with good appetite, medically stable for discharge however daughter requested to keep pt. admitted as there is no one to care for her until Sunday.       INTERVAL HPI/OVERNIGHT EVENTS: no new complaints    MEDICATIONS  (STANDING):  budesonide 160 MICROgram(s)/formoterol 4.5 MICROgram(s) Inhaler 2 Puff(s) Inhalation two times a day  carvedilol 12.5 milliGRAM(s) Oral every 12 hours  dronedarone 400 milliGRAM(s) Oral two times a day  escitalopram 10 milliGRAM(s) Oral daily  insulin lispro (ADMELOG) corrective regimen sliding scale   SubCutaneous at bedtime  insulin lispro (ADMELOG) corrective regimen sliding scale   SubCutaneous three times a day before meals  levothyroxine 175 MICROGram(s) Oral daily  losartan 50 milliGRAM(s) Oral daily  pantoprazole    Tablet 40 milliGRAM(s) Oral before breakfast  rivaroxaban 15 milliGRAM(s) Oral with dinner  torsemide 10 milliGRAM(s) Oral daily    MEDICATIONS  (PRN):  albuterol    90 MICROgram(s) HFA Inhaler 2 Puff(s) Inhalation every 6 hours PRN Shortness of Breath and/or Wheezing      __________________________________________________  REVIEW OF SYSTEMS:    CONSTITUTIONAL: No fever,   EYES: no acute visual disturbances  NECK: No pain or stiffness  RESPIRATORY: No cough; No shortness of breath  CARDIOVASCULAR: No chest pain, no palpitations  GASTROINTESTINAL: No pain. No nausea or vomiting; No diarrhea   NEUROLOGICAL: No headache or numbness, no tremors  MUSCULOSKELETAL: No joint pain, no muscle pain  GENITOURINARY: no dysuria, no frequency, no hesitancy  PSYCHIATRY: no depression , no anxiety  ALL OTHER  ROS negative        Vital Signs Last 24 Hrs  T(C): 36.8 (29 Sep 2023 04:58), Max: 36.8 (28 Sep 2023 17:11)  T(F): 98.3 (29 Sep 2023 04:58), Max: 98.3 (29 Sep 2023 04:58)  HR: 70 (29 Sep 2023 05:15) (63 - 70)  BP: 148/67 (29 Sep 2023 05:15) (130/63 - 157/70)  BP(mean): 86 (28 Sep 2023 20:51) (86 - 86)  RR: 20 (29 Sep 2023 05:15) (18 - 23)  SpO2: 95% (29 Sep 2023 05:15) (95% - 99%)    Parameters below as of 29 Sep 2023 05:15  Patient On (Oxygen Delivery Method): nasal cannula  O2 Flow (L/min): 2      ________________________________________________  PHYSICAL EXAM:  Well developed, well groomed  GENERAL: NAD  HEENT: Normocephalic;  conjunctivae and sclerae clear; moist mucous membranes;   NECK : supple  CHEST/LUNG: Clear to auscultation bilaterally with good air entry   HEART: S1 S2  regular; no murmurs, gallops or rubs  ABDOMEN: Soft, Nontender, Nondistended; Bowel sounds present  EXTREMITIES: no cyanosis; no edema; no calf tenderness  SKIN: warm and dry; no rash  NERVOUS SYSTEM:  Awake and alert; Oriented  to place, person and time ; no new deficits    _________________________________________________  LABS:                        11.3   7.96  )-----------( 212      ( 29 Sep 2023 07:19 )             34.9     09-29    138  |  102  |  34<H>  ----------------------------<  254<H>  4.3   |  28  |  1.31<H>    Ca    8.3<L>      29 Sep 2023 07:19  Phos  4.1     09-28  Mg     2.2     09-28    TPro  6.8  /  Alb  3.1<L>  /  TBili  0.3  /  DBili  x   /  AST  11  /  ALT  26  /  AlkPhos  59  09-27    PT/INR - ( 27 Sep 2023 22:45 )   PT: 10.7 sec;   INR: 0.94 ratio         PTT - ( 27 Sep 2023 22:45 )  PTT:27.8 sec  Urinalysis Basic - ( 29 Sep 2023 07:19 )    Color: x / Appearance: x / SG: x / pH: x  Gluc: 254 mg/dL / Ketone: x  / Bili: x / Urobili: x   Blood: x / Protein: x / Nitrite: x   Leuk Esterase: x / RBC: x / WBC x   Sq Epi: x / Non Sq Epi: x / Bacteria: x      CAPILLARY BLOOD GLUCOSE      POCT Blood Glucose.: 239 mg/dL (29 Sep 2023 07:56)  POCT Blood Glucose.: 297 mg/dL (28 Sep 2023 21:42)  POCT Blood Glucose.: 329 mg/dL (28 Sep 2023 18:12)  POCT Blood Glucose.: 303 mg/dL (28 Sep 2023 12:00)        RADIOLOGY & ADDITIONAL TESTS:    Imaging Personally Reviewed:  YES/NO    Consultant(s) Notes Reviewed:   YES/ No    Care Discussed with Consultants :     Plan of care was discussed with patient and /or primary care giver; all questions and concerns were addressed and care was aligned with patient's wishes.

## 2023-09-29 NOTE — DISCHARGE NOTE NURSING/CASE MANAGEMENT/SOCIAL WORK - NSDCPEFALRISK_GEN_ALL_CORE
For information on Fall & Injury Prevention, visit: [email or web address] OR  [email or web address] OR  https://www.cdc.gov/steadi/patient.html

## 2023-09-29 NOTE — DISCHARGE NOTE NURSING/CASE MANAGEMENT/SOCIAL WORK - PATIENT PORTAL LINK FT
You can access the FollowMyHealth Patient Portal offered by [facility] by registering at the following website: [email or web address]. By joining [employer]’s FollowMyHealth portal, you will also be able to view your health information using other applications (apps) compatible with our system.

## 2023-09-29 NOTE — PROGRESS NOTE ADULT - PROBLEM SELECTOR PLAN 1
- Patient with elevated glucose readings at home to ~400  - Patient completed steroids  - Endo Dr. Garcia following      - Blood glucose improved while in ED, treated with admelog 4U  - Patient with history of DM, not on insulin at home  - Start diabetic diet  - Continue Steroid renny  - Monitor Blood Glucose closely  - Start Insulin sliding scale  - A1c from prior admission of 7.9 - Patient with elevated glucose readings at home to ~400  - Patient completed steroids  - Endo Dr. Garcia following  - Note and recs appreciated  - Insulin S/S increased to moderate  - Cont pre meal Admelog 4 units  - A1c from prior admission of 7.9  - Discharge regimen per endo reccs - Patient with elevated glucose readings at home to ~400  - Patient completed steroids  - Endo Dr. Garcia following  - Note and recs appreciated  - Insulin S/S increased to moderate  - A1c from prior admission of 7.9  - Discharge regimen per endo reccs

## 2023-09-29 NOTE — PROGRESS NOTE ADULT - ASSESSMENT
Patient is an 83 year old female from home (HHA 10h/7d), ambulates independently, A&OX3, with PMH of HTN, DM, Hypothyroidism, CHF, CAD, A. 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  Patient was found to have Blood glucose of 397 in the ED and was treated with admelog.  Patient is being admitted to medicine for hyperglycemia likely 2/2 steroid use.  Pt. followed by mahin Garcia, rec restarting janumet, inc prandin to 2mg ac meals if fsg >250 premeals, change to 1mg when off of steroids  start jardinace 10mg.  Pt. with improved glucose levels at this time.  Pt. is medically stable for discharge to home however her daughter reported not being able to accept her until Sunday as daughter has COVID and son is out of town.  CM following to reinstate HHA.

## 2023-09-29 NOTE — PROGRESS NOTE ADULT - PROBLEM SELECTOR PLAN 6
- Patient with history of DM  - Recent A1c from prior admission of 7.9 (9/23)  - Patient on Janumet 50-500BID, Repaglinide 2mg TID at home  - Start insulin sliding scale  - Finger sticks ACHS  - Monitor blood glucose closely as patient is being admitted for hyperglycemia  - Endocrinology Dr. Garcia Consulted

## 2023-09-29 NOTE — PROGRESS NOTE ADULT - SUBJECTIVE AND OBJECTIVE BOX
Interval Events:      Allergies    No Known Allergies    Intolerances      Endocrine/Metabolic Medications:  insulin lispro (ADMELOG) corrective regimen sliding scale   SubCutaneous at bedtime  insulin lispro (ADMELOG) corrective regimen sliding scale   SubCutaneous three times a day before meals  levothyroxine 175 MICROGram(s) Oral daily      Vital Signs Last 24 Hrs  T(C): 36.8 (29 Sep 2023 04:58), Max: 36.8 (28 Sep 2023 17:11)  T(F): 98.3 (29 Sep 2023 04:58), Max: 98.3 (29 Sep 2023 04:58)  HR: 70 (29 Sep 2023 05:15) (63 - 70)  BP: 148/67 (29 Sep 2023 05:15) (110/64 - 157/70)  BP(mean): 86 (28 Sep 2023 20:51) (86 - 86)  RR: 20 (29 Sep 2023 05:15) (18 - 23)  SpO2: 95% (29 Sep 2023 05:15) (95% - 99%)    Parameters below as of 29 Sep 2023 05:15  Patient On (Oxygen Delivery Method): nasal cannula  O2 Flow (L/min): 2        PHYSICAL EXAM  All physical exam findings normal, except those marked:  General:	Alert, active, cooperative, NAD, well hydrated  .		[] Abnormal:  Neck		Normal: supple, no cervical adenopathy, no palpable thyroid  .		[] Abnormal:  Cardiovascular	Normal: regular rate, normal S1, S2, no murmurs  .		[] Abnormal:  Respiratory	Normal: no chest wall deformity, normal respiratory pattern, CTA B/L  .		[] Abnormal:  Abdominal	Normal: soft, ND, NT, bowel sounds present, no masses, no organomegaly  .		[] Abnormal:  		Normal normal genitalia, testes descended, circumcised/uncircumcised  .		Romel stage:			Breast romel:  .		Menstrual history:  .		[] Abnormal:  Extremities	Normal: FROM x4  .		[] Abnormal:  Skin		Normal: intact and not indurated, no rash, no acanthosis nigricans  .		[] Abnormal:  Neurologic	Normal: grossly intact  .		[] Abnormal:    LABS                        11.3   7.96  )-----------( 212      ( 29 Sep 2023 07:19 )             34.9                               138    |  102    |  34                  Calcium: 8.3   / iCa: x      (09-29 @ 07:19)    ----------------------------<  254       Magnesium: x                                4.3     |  28     |  1.31             Phosphorous: x          CAPILLARY BLOOD GLUCOSE      POCT Blood Glucose.: 239 mg/dL (29 Sep 2023 07:56)  POCT Blood Glucose.: 297 mg/dL (28 Sep 2023 21:42)  POCT Blood Glucose.: 329 mg/dL (28 Sep 2023 18:12)  POCT Blood Glucose.: 303 mg/dL (28 Sep 2023 12:00)        Assesment/plan       Interval Events:  pt in nad    Allergies    No Known Allergies    Intolerances      Endocrine/Metabolic Medications:  insulin lispro (ADMELOG) corrective regimen sliding scale   SubCutaneous at bedtime  insulin lispro (ADMELOG) corrective regimen sliding scale   SubCutaneous three times a day before meals  levothyroxine 175 MICROGram(s) Oral daily      Vital Signs Last 24 Hrs  T(C): 36.8 (29 Sep 2023 04:58), Max: 36.8 (28 Sep 2023 17:11)  T(F): 98.3 (29 Sep 2023 04:58), Max: 98.3 (29 Sep 2023 04:58)  HR: 70 (29 Sep 2023 05:15) (63 - 70)  BP: 148/67 (29 Sep 2023 05:15) (110/64 - 157/70)  BP(mean): 86 (28 Sep 2023 20:51) (86 - 86)  RR: 20 (29 Sep 2023 05:15) (18 - 23)  SpO2: 95% (29 Sep 2023 05:15) (95% - 99%)    Parameters below as of 29 Sep 2023 05:15  Patient On (Oxygen Delivery Method): nasal cannula  O2 Flow (L/min): 2        PHYSICAL EXAM  All physical exam findings normal, except those marked:  General:	Alert, active, cooperative, NAD, well hydrated  .		[] Abnormal:  Neck		Normal: supple, no cervical adenopathy, no palpable thyroid  .		[] Abnormal:  Cardiovascular	Normal: regular rate, normal S1, S2, no murmurs  .		[] Abnormal:  Respiratory	Normal: no chest wall deformity, normal respiratory pattern, CTA B/L  .		[] Abnormal:  Abdominal	Normal: soft, ND, NT, bowel sounds present, no masses, no organomegaly  .		[] Abnormal:  		Normal normal genitalia, testes descended, circumcised/uncircumcised  .		Romel stage:			Breast romel:  .		Menstrual history:  .		[] Abnormal:  Extremities	Normal: FROM x4  .		[] Abnormal:  Skin		Normal: intact and not indurated, no rash, no acanthosis nigricans  .		[] Abnormal:  Neurologic	Normal: grossly intact  .		[] Abnormal:    LABS                        11.3   7.96  )-----------( 212      ( 29 Sep 2023 07:19 )             34.9                               138    |  102    |  34                  Calcium: 8.3   / iCa: x      (09-29 @ 07:19)    ----------------------------<  254       Magnesium: x                                4.3     |  28     |  1.31             Phosphorous: x          CAPILLARY BLOOD GLUCOSE      POCT Blood Glucose.: 239 mg/dL (29 Sep 2023 07:56)  POCT Blood Glucose.: 297 mg/dL (28 Sep 2023 21:42)  POCT Blood Glucose.: 329 mg/dL (28 Sep 2023 18:12)  POCT Blood Glucose.: 303 mg/dL (28 Sep 2023 12:00)        Assesment/plan    Patient is an 83 year old female from home (HHA 10h/7d), ambulates independently, A&OX3, with PMH of HTN, DM, Hypothyroidism, CHF, CAD, A. 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  Patient was discharged from the hospital yesterday after being admitted for Acute hypoxic respiratory failure and hypotension in the setting of a CHF exacerbation.   Found to have uncont dm- Pt takes oral dm meds as out pt- janumet/prandin 1 ac tid. D/c home from here on prednisone, found to have hyperglycemia. Eating well. Hx from daughter        Problem/Recommendation - 1:  ·  Problem: Hyperglycemia.   ·  Recommendation: steroid induced  decent out pt control with a1c- 7.9  rec restarting janumet  inc prandin to 2mg ac meals if fsg >250 premeals  change to 1mg when off of steroids  start jardinace 10mg   fsg ac and hs  change to moderate correction doses for now   goal- 140-<200  d/w prim team and family.     Problem/Recommendation - 2:  ·  Problem: Hypothyroidism.    cont lt4 175  f/u [facility]fts in 4-6 wks     Problem/Recommendation - 3:  ·  Problem: Asthma.   off of steroids

## 2023-09-30 LAB
ANION GAP SERPL CALC-SCNC: 8 MMOL/L — SIGNIFICANT CHANGE UP (ref 5–17)
BUN SERPL-MCNC: 34 MG/DL — HIGH (ref 7–18)
CALCIUM SERPL-MCNC: 8.4 MG/DL — SIGNIFICANT CHANGE UP (ref 8.4–10.5)
CHLORIDE SERPL-SCNC: 100 MMOL/L — SIGNIFICANT CHANGE UP (ref 96–108)
CO2 SERPL-SCNC: 32 MMOL/L — HIGH (ref 22–31)
CREAT SERPL-MCNC: 1.4 MG/DL — HIGH (ref 0.5–1.3)
EGFR: 37 ML/MIN/1.73M2 — LOW
GLUCOSE BLDC GLUCOMTR-MCNC: 181 MG/DL — HIGH (ref 70–99)
GLUCOSE BLDC GLUCOMTR-MCNC: 251 MG/DL — HIGH (ref 70–99)
GLUCOSE BLDC GLUCOMTR-MCNC: 260 MG/DL — HIGH (ref 70–99)
GLUCOSE BLDC GLUCOMTR-MCNC: 317 MG/DL — HIGH (ref 70–99)
GLUCOSE SERPL-MCNC: 243 MG/DL — HIGH (ref 70–99)
HCT VFR BLD CALC: 34.1 % — LOW (ref 34.5–45)
HGB BLD-MCNC: 10.9 G/DL — LOW (ref 11.5–15.5)
MCHC RBC-ENTMCNC: 28 PG — SIGNIFICANT CHANGE UP (ref 27–34)
MCHC RBC-ENTMCNC: 32 GM/DL — SIGNIFICANT CHANGE UP (ref 32–36)
MCV RBC AUTO: 87.7 FL — SIGNIFICANT CHANGE UP (ref 80–100)
NRBC # BLD: 0 /100 WBCS — SIGNIFICANT CHANGE UP (ref 0–0)
PLATELET # BLD AUTO: 207 K/UL — SIGNIFICANT CHANGE UP (ref 150–400)
POTASSIUM SERPL-MCNC: 4 MMOL/L — SIGNIFICANT CHANGE UP (ref 3.5–5.3)
POTASSIUM SERPL-SCNC: 4 MMOL/L — SIGNIFICANT CHANGE UP (ref 3.5–5.3)
RBC # BLD: 3.89 M/UL — SIGNIFICANT CHANGE UP (ref 3.8–5.2)
RBC # FLD: 13.5 % — SIGNIFICANT CHANGE UP (ref 10.3–14.5)
SODIUM SERPL-SCNC: 140 MMOL/L — SIGNIFICANT CHANGE UP (ref 135–145)
WBC # BLD: 7.05 K/UL — SIGNIFICANT CHANGE UP (ref 3.8–10.5)
WBC # FLD AUTO: 7.05 K/UL — SIGNIFICANT CHANGE UP (ref 3.8–10.5)

## 2023-09-30 RX ORDER — NYSTATIN CREAM 100000 [USP'U]/G
1 CREAM TOPICAL THREE TIMES A DAY
Refills: 0 | Status: DISCONTINUED | OUTPATIENT
Start: 2023-09-30 | End: 2023-10-01

## 2023-09-30 RX ADMIN — RIVAROXABAN 15 MILLIGRAM(S): KIT at 17:24

## 2023-09-30 RX ADMIN — Medication 10 MILLIGRAM(S): at 05:50

## 2023-09-30 RX ADMIN — CARVEDILOL PHOSPHATE 12.5 MILLIGRAM(S): 80 CAPSULE, EXTENDED RELEASE ORAL at 05:51

## 2023-09-30 RX ADMIN — PANTOPRAZOLE SODIUM 40 MILLIGRAM(S): 20 TABLET, DELAYED RELEASE ORAL at 05:51

## 2023-09-30 RX ADMIN — DRONEDARONE 400 MILLIGRAM(S): 400 TABLET, FILM COATED ORAL at 18:24

## 2023-09-30 RX ADMIN — DRONEDARONE 400 MILLIGRAM(S): 400 TABLET, FILM COATED ORAL at 05:50

## 2023-09-30 RX ADMIN — ESCITALOPRAM OXALATE 10 MILLIGRAM(S): 10 TABLET, FILM COATED ORAL at 15:59

## 2023-09-30 RX ADMIN — BUDESONIDE AND FORMOTEROL FUMARATE DIHYDRATE 2 PUFF(S): 160; 4.5 AEROSOL RESPIRATORY (INHALATION) at 15:59

## 2023-09-30 RX ADMIN — NYSTATIN CREAM 1 APPLICATION(S): 100000 CREAM TOPICAL at 22:11

## 2023-09-30 RX ADMIN — BUDESONIDE AND FORMOTEROL FUMARATE DIHYDRATE 2 PUFF(S): 160; 4.5 AEROSOL RESPIRATORY (INHALATION) at 22:01

## 2023-09-30 RX ADMIN — Medication 6: at 08:18

## 2023-09-30 RX ADMIN — LOSARTAN POTASSIUM 50 MILLIGRAM(S): 100 TABLET, FILM COATED ORAL at 05:50

## 2023-09-30 RX ADMIN — Medication 175 MICROGRAM(S): at 05:50

## 2023-09-30 RX ADMIN — Medication 8: at 17:23

## 2023-09-30 NOTE — PROGRESS NOTE ADULT - SUBJECTIVE AND OBJECTIVE BOX
INTERVAL HPI/OVERNIGHT EVENTS:  Patient seen,stable ,no acute issues  VITAL SIGNS:  T(F): 98.8 (09-30-23 @ 05:30)  HR: 60 (09-30-23 @ 05:30)  BP: 115/51 (09-30-23 @ 05:30)  RR: 20 (09-30-23 @ 05:30)  SpO2: 98% (09-30-23 @ 05:30)  Wt(kg): --    PHYSICAL EXAM:  awake  Constitutional:  Eyes:  ENMT:perrla  Neck:  Respiratory:clear  Cardiovascular:s1s2,m-none  Gastrointestinal:soft,bs pos  Extremities:  Vascular:  Neurological:no focal deficit  Musculoskeletal:    MEDICATIONS  (STANDING):  budesonide 160 MICROgram(s)/formoterol 4.5 MICROgram(s) Inhaler 2 Puff(s) Inhalation two times a day  carvedilol 12.5 milliGRAM(s) Oral every 12 hours  dextrose 5%. 1000 milliLiter(s) (100 mL/Hr) IV Continuous <Continuous>  dronedarone 400 milliGRAM(s) Oral two times a day  escitalopram 10 milliGRAM(s) Oral daily  glucagon  Injectable 1 milliGRAM(s) IntraMuscular once  insulin lispro (ADMELOG) corrective regimen sliding scale   SubCutaneous three times a day before meals  levothyroxine 175 MICROGram(s) Oral daily  losartan 50 milliGRAM(s) Oral daily  pantoprazole    Tablet 40 milliGRAM(s) Oral before breakfast  rivaroxaban 15 milliGRAM(s) Oral with dinner  torsemide 10 milliGRAM(s) Oral daily    MEDICATIONS  (PRN):  albuterol    90 MICROgram(s) HFA Inhaler 2 Puff(s) Inhalation every 6 hours PRN Shortness of Breath and/or Wheezing      Allergies    No Known Allergies    Intolerances        LABS:                        10.9   7.05  )-----------( 207      ( 30 Sep 2023 05:45 )             34.1     09-30    140  |  100  |  34<H>  ----------------------------<  243<H>  4.0   |  32<H>  |  1.40<H>    Ca    8.4      30 Sep 2023 05:45  Phos  4.1     09-28  Mg     2.2     09-28        Urinalysis Basic - ( 30 Sep 2023 05:45 )    Color: x / Appearance: x / SG: x / pH: x  Gluc: 243 mg/dL / Ketone: x  / Bili: x / Urobili: x   Blood: x / Protein: x / Nitrite: x   Leuk Esterase: x / RBC: x / WBC x   Sq Epi: x / Non Sq Epi: x / Bacteria: x        RADIOLOGY & ADDITIONAL TESTS:      Assessment and Plan:   · Assessment	  Patient is an 83 year old female from home (HHA 10h/7d), ambulates independently, A&OX3, with PMH of HTN, DM, Hypothyroidism, CHF, CAD, A. 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  Patient was found to have Blood glucose of 397 in the ED and was treated with admelog.  Patient is being admitted to medicine for hyperglycemia likely 2/2 steroid use.  Pt. followed by clemencia Garcia, rec restarting janumet, inc prandin to 2mg ac meals if fsg >250 premeals, change to 1mg when off of steroids  start jardinace 10mg.  Pt. with improved glucose levels at this time.  Pt. is medically stable for discharge to home however her daughter reported not being able to accept her until Sunday as daughter has COVID and son is out of town.  CM following to reinstate [facility].         Problem/Plan - 1:  ·  Problem: Hyperglycemia-improved clinically  ·- Clemencia Garcia following  - Note and recs appreciated  - Insulin S/S increased to moderate  - A1c from prior admission of 7.9  - Discharge regimen per endo reccs.  -d/c planning home     Problem/Plan - 2:  ·  Problem: Chronic CHF (congestive heart failure).   ·  Plan: - Patient with history of CHF  - Patient with echo in 2021 with EF> 70%   - Patient on carvedilol 12.5mg BID, losartan 50mg, Torsemide 10mg at home  - Continue home meds.     Problem/Plan - 3:  ·  Problem: Chronic kidney disease, unspecified CKD stage.   ·  Plan: - SCr: 1.4 on admission  - baseline 1.5  - IV Fluids.     Problem/Plan - 4:  ·  Problem: Chronic atrial fibrillation.   ·  Plan: - Patient with history of Chronic A. 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     Problem/Plan - 5:  ·  Problem: Hypothyroidism.   ·  Plan: - Patient with history of hypothyroidism  - Patient on Levothyroxine 175mcg daily  - Continue home meds.     Problem/Plan - 6:  ·  Problem: DM (diabetes mellitus).   ·  Plan: - Patient with history of DM  - Recent A1c from prior admission of 7.9 (9/23)  - Patient on Janumet 50-500BID, Repaglinide 2mg TID at home  - Start insulin sliding scale  - Finger sticks ACHS  - Monitor blood glucose closely as patient is being admitted for hyperglycemia  - Endocrinology Dr. Garcia Consulted.     Problem/Plan - 7:  ·  Problem: Asthma.   ·  Plan: - Patient with history of Mild Asthma  - Patient on Albuterol PRN and BUdesonide-Formeterol at home  - Continue home inhalers.     Problem/Plan - 8:  ·  Problem: Prophylactic measure.   ·  Plan: - Xarelto for DVT PPX.

## 2023-10-01 VITALS
HEART RATE: 65 BPM | TEMPERATURE: 99 F | SYSTOLIC BLOOD PRESSURE: 157 MMHG | OXYGEN SATURATION: 95 % | RESPIRATION RATE: 18 BRPM | DIASTOLIC BLOOD PRESSURE: 69 MMHG

## 2023-10-01 LAB
-  AMPICILLIN: SIGNIFICANT CHANGE UP
-  CIPROFLOXACIN: SIGNIFICANT CHANGE UP
-  LEVOFLOXACIN: SIGNIFICANT CHANGE UP
-  NITROFURANTOIN: SIGNIFICANT CHANGE UP
-  TETRACYCLINE: SIGNIFICANT CHANGE UP
-  VANCOMYCIN: SIGNIFICANT CHANGE UP
ANION GAP SERPL CALC-SCNC: 7 MMOL/L — SIGNIFICANT CHANGE UP (ref 5–17)
BUN SERPL-MCNC: 31 MG/DL — HIGH (ref 7–18)
CALCIUM SERPL-MCNC: 8.4 MG/DL — SIGNIFICANT CHANGE UP (ref 8.4–10.5)
CHLORIDE SERPL-SCNC: 102 MMOL/L — SIGNIFICANT CHANGE UP (ref 96–108)
CO2 SERPL-SCNC: 31 MMOL/L — SIGNIFICANT CHANGE UP (ref 22–31)
CREAT SERPL-MCNC: 1.44 MG/DL — HIGH (ref 0.5–1.3)
CULTURE RESULTS: SIGNIFICANT CHANGE UP
EGFR: 36 ML/MIN/1.73M2 — LOW
GLUCOSE BLDC GLUCOMTR-MCNC: 257 MG/DL — HIGH (ref 70–99)
GLUCOSE BLDC GLUCOMTR-MCNC: 306 MG/DL — HIGH (ref 70–99)
GLUCOSE SERPL-MCNC: 235 MG/DL — HIGH (ref 70–99)
HCT VFR BLD CALC: 35.2 % — SIGNIFICANT CHANGE UP (ref 34.5–45)
HGB BLD-MCNC: 11.1 G/DL — LOW (ref 11.5–15.5)
MCHC RBC-ENTMCNC: 28 PG — SIGNIFICANT CHANGE UP (ref 27–34)
MCHC RBC-ENTMCNC: 31.5 GM/DL — LOW (ref 32–36)
MCV RBC AUTO: 88.9 FL — SIGNIFICANT CHANGE UP (ref 80–100)
METHOD TYPE: SIGNIFICANT CHANGE UP
NRBC # BLD: 0 /100 WBCS — SIGNIFICANT CHANGE UP (ref 0–0)
ORGANISM # SPEC MICROSCOPIC CNT: SIGNIFICANT CHANGE UP
ORGANISM # SPEC MICROSCOPIC CNT: SIGNIFICANT CHANGE UP
PLATELET # BLD AUTO: 209 K/UL — SIGNIFICANT CHANGE UP (ref 150–400)
POTASSIUM SERPL-MCNC: 4 MMOL/L — SIGNIFICANT CHANGE UP (ref 3.5–5.3)
POTASSIUM SERPL-SCNC: 4 MMOL/L — SIGNIFICANT CHANGE UP (ref 3.5–5.3)
RBC # BLD: 3.96 M/UL — SIGNIFICANT CHANGE UP (ref 3.8–5.2)
RBC # FLD: 13.2 % — SIGNIFICANT CHANGE UP (ref 10.3–14.5)
SODIUM SERPL-SCNC: 140 MMOL/L — SIGNIFICANT CHANGE UP (ref 135–145)
SPECIMEN SOURCE: SIGNIFICANT CHANGE UP
WBC # BLD: 7.15 K/UL — SIGNIFICANT CHANGE UP (ref 3.8–10.5)
WBC # FLD AUTO: 7.15 K/UL — SIGNIFICANT CHANGE UP (ref 3.8–10.5)

## 2023-10-01 PROCEDURE — 85730 THROMBOPLASTIN TIME PARTIAL: CPT

## 2023-10-01 PROCEDURE — 87077 CULTURE AEROBIC IDENTIFY: CPT

## 2023-10-01 PROCEDURE — 83735 ASSAY OF MAGNESIUM: CPT

## 2023-10-01 PROCEDURE — 82803 BLOOD GASES ANY COMBINATION: CPT

## 2023-10-01 PROCEDURE — 71045 X-RAY EXAM CHEST 1 VIEW: CPT

## 2023-10-01 PROCEDURE — 85610 PROTHROMBIN TIME: CPT

## 2023-10-01 PROCEDURE — 87637 SARSCOV2&INF A&B&RSV AMP PRB: CPT

## 2023-10-01 PROCEDURE — 99285 EMERGENCY DEPT VISIT HI MDM: CPT

## 2023-10-01 PROCEDURE — 94640 AIRWAY INHALATION TREATMENT: CPT

## 2023-10-01 PROCEDURE — 82009 KETONE BODYS QUAL: CPT

## 2023-10-01 PROCEDURE — 82962 GLUCOSE BLOOD TEST: CPT

## 2023-10-01 PROCEDURE — 80053 COMPREHEN METABOLIC PANEL: CPT

## 2023-10-01 PROCEDURE — 87086 URINE CULTURE/COLONY COUNT: CPT

## 2023-10-01 PROCEDURE — 85025 COMPLETE CBC W/AUTO DIFF WBC: CPT

## 2023-10-01 PROCEDURE — 80048 BASIC METABOLIC PNL TOTAL CA: CPT

## 2023-10-01 PROCEDURE — 85027 COMPLETE CBC AUTOMATED: CPT

## 2023-10-01 PROCEDURE — 81001 URINALYSIS AUTO W/SCOPE: CPT

## 2023-10-01 PROCEDURE — 87186 SC STD MICRODIL/AGAR DIL: CPT

## 2023-10-01 PROCEDURE — 93005 ELECTROCARDIOGRAM TRACING: CPT

## 2023-10-01 PROCEDURE — 84100 ASSAY OF PHOSPHORUS: CPT

## 2023-10-01 PROCEDURE — 36415 COLL VENOUS BLD VENIPUNCTURE: CPT

## 2023-10-01 RX ORDER — NYSTATIN CREAM 100000 [USP'U]/G
1 CREAM TOPICAL
Qty: 0 | Refills: 0 | DISCHARGE
Start: 2023-10-01

## 2023-10-01 RX ORDER — METFORMIN HYDROCHLORIDE 850 MG/1
1 TABLET ORAL
Qty: 30 | Refills: 0
Start: 2023-10-01 | End: 2023-10-30

## 2023-10-01 RX ORDER — DRONEDARONE 400 MG/1
1 TABLET, FILM COATED ORAL
Qty: 0 | Refills: 0 | DISCHARGE
Start: 2023-10-01

## 2023-10-01 RX ORDER — REPAGLINIDE 1 MG/1
1 TABLET ORAL
Qty: 0 | Refills: 0 | DISCHARGE

## 2023-10-01 RX ORDER — ALBUTEROL 90 UG/1
2 AEROSOL, METERED ORAL
Qty: 0 | Refills: 0 | DISCHARGE
Start: 2023-10-01

## 2023-10-01 RX ORDER — DRONEDARONE 400 MG/1
1 TABLET, FILM COATED ORAL
Qty: 0 | Refills: 0 | DISCHARGE

## 2023-10-01 RX ORDER — RIVAROXABAN 15 MG-20MG
1 KIT ORAL
Qty: 0 | Refills: 0 | DISCHARGE
Start: 2023-10-01

## 2023-10-01 RX ORDER — CARVEDILOL PHOSPHATE 80 MG/1
1 CAPSULE, EXTENDED RELEASE ORAL
Qty: 0 | Refills: 0 | DISCHARGE

## 2023-10-01 RX ORDER — LEVOTHYROXINE SODIUM 125 MCG
1 TABLET ORAL
Qty: 0 | Refills: 0 | DISCHARGE

## 2023-10-01 RX ORDER — RIVAROXABAN 15 MG-20MG
1 KIT ORAL
Refills: 0 | DISCHARGE

## 2023-10-01 RX ORDER — EMPAGLIFLOZIN 10 MG/1
1 TABLET, FILM COATED ORAL
Qty: 30 | Refills: 0
Start: 2023-10-01 | End: 2023-10-30

## 2023-10-01 RX ORDER — LEVOTHYROXINE SODIUM 125 MCG
1 TABLET ORAL
Qty: 0 | Refills: 0 | DISCHARGE
Start: 2023-10-01

## 2023-10-01 RX ORDER — REPAGLINIDE 1 MG/1
1 TABLET ORAL
Qty: 90 | Refills: 0 | DISCHARGE
Start: 2023-10-01 | End: 2023-10-30

## 2023-10-01 RX ORDER — REPAGLINIDE 1 MG/1
1 TABLET ORAL
Qty: 90 | Refills: 0
Start: 2023-10-01 | End: 2023-10-30

## 2023-10-01 RX ORDER — PANTOPRAZOLE SODIUM 20 MG/1
1 TABLET, DELAYED RELEASE ORAL
Qty: 30 | Refills: 0
Start: 2023-10-01 | End: 2023-10-30

## 2023-10-01 RX ORDER — SITAGLIPTIN AND METFORMIN HYDROCHLORIDE 500; 50 MG/1; MG/1
1 TABLET, FILM COATED ORAL
Qty: 0 | Refills: 0 | DISCHARGE

## 2023-10-01 RX ORDER — ESCITALOPRAM OXALATE 10 MG/1
1 TABLET, FILM COATED ORAL
Qty: 0 | Refills: 0 | DISCHARGE
Start: 2023-10-01

## 2023-10-01 RX ORDER — CARVEDILOL PHOSPHATE 80 MG/1
1 CAPSULE, EXTENDED RELEASE ORAL
Qty: 0 | Refills: 0 | DISCHARGE
Start: 2023-10-01

## 2023-10-01 RX ORDER — ESCITALOPRAM OXALATE 10 MG/1
1 TABLET, FILM COATED ORAL
Qty: 0 | Refills: 0 | DISCHARGE

## 2023-10-01 RX ORDER — DEXLANSOPRAZOLE 30 MG/1
1 CAPSULE, DELAYED RELEASE ORAL
Refills: 0 | DISCHARGE

## 2023-10-01 RX ADMIN — PANTOPRAZOLE SODIUM 40 MILLIGRAM(S): 20 TABLET, DELAYED RELEASE ORAL at 05:54

## 2023-10-01 RX ADMIN — ESCITALOPRAM OXALATE 10 MILLIGRAM(S): 10 TABLET, FILM COATED ORAL at 12:20

## 2023-10-01 RX ADMIN — CARVEDILOL PHOSPHATE 12.5 MILLIGRAM(S): 80 CAPSULE, EXTENDED RELEASE ORAL at 05:51

## 2023-10-01 RX ADMIN — DRONEDARONE 400 MILLIGRAM(S): 400 TABLET, FILM COATED ORAL at 05:51

## 2023-10-01 RX ADMIN — Medication 175 MICROGRAM(S): at 05:51

## 2023-10-01 RX ADMIN — Medication 8: at 12:18

## 2023-10-01 RX ADMIN — BUDESONIDE AND FORMOTEROL FUMARATE DIHYDRATE 2 PUFF(S): 160; 4.5 AEROSOL RESPIRATORY (INHALATION) at 12:21

## 2023-10-01 RX ADMIN — LOSARTAN POTASSIUM 50 MILLIGRAM(S): 100 TABLET, FILM COATED ORAL at 05:52

## 2023-10-01 RX ADMIN — NYSTATIN CREAM 1 APPLICATION(S): 100000 CREAM TOPICAL at 05:52

## 2023-10-01 RX ADMIN — Medication 6: at 08:04

## 2023-10-01 RX ADMIN — Medication 10 MILLIGRAM(S): at 05:52

## 2023-10-01 NOTE — PROGRESS NOTE ADULT - REASON FOR ADMISSION
Steroid induced Hyperglycemia

## 2023-10-01 NOTE — PROGRESS NOTE ADULT - SUBJECTIVE AND OBJECTIVE BOX
Interval Events:  pt in nad    Allergies    No Known Allergies    Intolerances      Endocrine/Metabolic Medications:  glucagon  Injectable 1 milliGRAM(s) IntraMuscular once  insulin lispro (ADMELOG) corrective regimen sliding scale   SubCutaneous three times a day before meals  levothyroxine 175 MICROGram(s) Oral daily      Vital Signs Last 24 Hrs  T(C): 36.9 (01 Oct 2023 05:15), Max: 36.9 (30 Sep 2023 18:21)  T(F): 98.4 (01 Oct 2023 05:15), Max: 98.5 (30 Sep 2023 18:21)  HR: 68 (01 Oct 2023 05:15) (59 - 68)  BP: 154/70 (01 Oct 2023 05:15) (113/65 - 154/70)  BP(mean): 81 (30 Sep 2023 14:00) (81 - 81)  RR: 20 (01 Oct 2023 05:15) (18 - 20)  SpO2: 97% (01 Oct 2023 05:15) (96% - 98%)    Parameters below as of 01 Oct 2023 05:15  Patient On (Oxygen Delivery Method): nasal cannula  O2 Flow (L/min): 2        PHYSICAL EXAM  All physical exam findings normal, except those marked:  General:	Alert, active, cooperative, NAD, well hydrated  .		[] Abnormal:  Neck		Normal: supple, no cervical adenopathy, no palpable thyroid  .		[] Abnormal:  Cardiovascular	Normal: regular rate, normal S1, S2, no murmurs  .		[] Abnormal:  Respiratory	Normal: no chest wall deformity, normal respiratory pattern, CTA B/L  .		[] Abnormal:  Abdominal	Normal: soft, ND, NT, bowel sounds present, no masses, no organomegaly  .		[] Abnormal:  		Normal normal genitalia, testes descended, circumcised/uncircumcised  .		Romel stage:			Breast romel:  .		Menstrual history:  .		[] Abnormal:  Extremities	Normal: FROM x4  .		[] Abnormal:  Skin		Normal: intact and not indurated, no rash, no acanthosis nigricans  .		[] Abnormal:  Neurologic	Normal: grossly intact  .		[] Abnormal:    LABS                        11.1   7.15  )-----------( 209      ( 01 Oct 2023 05:50 )             35.2                               140    |  102    |  31                  Calcium: 8.4   / iCa: x      (10-01 @ 05:50)    ----------------------------<  235       Magnesium: x                                4.0     |  31     |  1.44             Phosphorous: x          CAPILLARY BLOOD GLUCOSE      POCT Blood Glucose.: 257 mg/dL (01 Oct 2023 07:55)  POCT Blood Glucose.: 260 mg/dL (30 Sep 2023 21:13)  POCT Blood Glucose.: 317 mg/dL (30 Sep 2023 16:34)  POCT Blood Glucose.: 181 mg/dL (30 Sep 2023 11:38)        Assesment/plan      Patient is an 83 year old female from home (HHA 10h/7d), ambulates independently, A&OX3, with PMH of HTN, DM, Hypothyroidism, CHF, CAD, A. 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  Patient was discharged from the hospital yesterday after being admitted for Acute hypoxic respiratory failure and hypotension in the setting of a CHF exacerbation.   Found to have uncont dm- Pt takes oral dm meds as out pt- janumet/prandin 1 ac tid. D/c home from here on prednisone, found to have hyperglycemia. Eating well. Hx from daughter        Problem/Recommendation - 1:  ·  Problem: Hyperglycemia.   ·  Recommendation: steroid induced  decent out pt control with a1c- 7.9  rec restarting metformin 500mg qpm- would not give higher doses due to gfr  prandin to 2mg ac meals - dec to 1mg if fsg <200  start jardinace 10mg as out pt  fsg ac and hs  change to moderate correction doses for now   goal- 140-<200  d/w prim team and family.     Problem/Recommendation - 2:  ·  Problem: Hypothyroidism.    cont lt4 175  f/u tfts in 4-6 wks

## 2023-10-25 ENCOUNTER — NON-APPOINTMENT (OUTPATIENT)
Age: 83
End: 2023-10-25

## 2023-10-25 ENCOUNTER — APPOINTMENT (OUTPATIENT)
Dept: ELECTROPHYSIOLOGY | Facility: CLINIC | Age: 83
End: 2023-10-25
Payer: MEDICARE

## 2023-10-25 VITALS — OXYGEN SATURATION: 93 %

## 2023-10-25 PROCEDURE — 93280 PM DEVICE PROGR EVAL DUAL: CPT

## 2023-10-25 PROCEDURE — 93000 ELECTROCARDIOGRAM COMPLETE: CPT | Mod: 59

## 2023-10-25 RX ORDER — CARVEDILOL 12.5 MG/1
12.5 TABLET, FILM COATED ORAL TWICE DAILY
Refills: 0 | Status: ACTIVE | COMMUNITY
Start: 2020-11-25

## 2023-10-25 RX ORDER — OLMESARTAN MEDOXOMIL 40 MG/1
40 TABLET, FILM COATED ORAL
Qty: 90 | Refills: 1 | Status: DISCONTINUED | COMMUNITY
Start: 2019-03-14 | End: 2023-10-25

## 2023-10-25 RX ORDER — RIVAROXABAN 15 MG/1
15 TABLET, FILM COATED ORAL
Refills: 0 | Status: ACTIVE | COMMUNITY
Start: 2023-10-25

## 2023-10-25 RX ORDER — AMLODIPINE BESYLATE 5 MG/1
5 TABLET ORAL DAILY
Qty: 90 | Refills: 3 | Status: DISCONTINUED | COMMUNITY
Start: 2019-03-14 | End: 2023-10-25

## 2023-10-25 RX ORDER — TORSEMIDE 10 MG/1
10 TABLET ORAL
Refills: 0 | Status: ACTIVE | COMMUNITY
Start: 2023-10-25

## 2023-12-22 NOTE — ED PROVIDER NOTE - INTERNATIONAL TRAVEL
-- DO NOT REPLY / DO NOT REPLY ALL --  -- Message is from Engagement Center Operations (ECO) --    General Patient Message: Dian calling from [facility] regarding Heraclio getting discharged from nursing home and they would like to know if Joan will follow Heraclio for home care orders        Alternative phone number: n/a    Can a detailed message be left? Yes    Message Turnaround: [place]:    Refer to site's KB page for routing instructions    Please give this turnaround time to the caller:   \"You can expect to receive a response 2-3 business days after your provider's clinical team reviews the message\"               No

## 2024-01-15 NOTE — PATIENT PROFILE ADULT. - NS MD HP INPLANTS MED DEV
Called patient, provided DX codes to verify with insurance to see if will be considered screening or diagnostic.  States insurance notified her they will cover test if it's screening.  States she will call ins back verify and if covered she will schedule.        Lab Facility: [id number] Lab Facility: [id number] Pacemaker

## 2024-01-23 ENCOUNTER — APPOINTMENT (OUTPATIENT)
Dept: CARDIOLOGY | Facility: CLINIC | Age: 84
End: 2024-01-23

## 2024-01-30 ENCOUNTER — NON-APPOINTMENT (OUTPATIENT)
Age: 84
End: 2024-01-30

## 2024-01-30 ENCOUNTER — APPOINTMENT (OUTPATIENT)
Dept: CARDIOLOGY | Facility: CLINIC | Age: 84
End: 2024-01-30
Payer: MEDICARE

## 2024-01-30 ENCOUNTER — APPOINTMENT (OUTPATIENT)
Dept: ELECTROPHYSIOLOGY | Facility: CLINIC | Age: 84
End: 2024-01-30
Payer: MEDICARE

## 2024-01-30 VITALS
BODY MASS INDEX: 46.01 KG/M2 | DIASTOLIC BLOOD PRESSURE: 92 MMHG | HEIGHT: 62 IN | WEIGHT: 250 LBS | OXYGEN SATURATION: 97 % | SYSTOLIC BLOOD PRESSURE: 123 MMHG | HEART RATE: 71 BPM

## 2024-01-30 DIAGNOSIS — E78.00 PURE HYPERCHOLESTEROLEMIA, UNSPECIFIED: ICD-10-CM

## 2024-01-30 DIAGNOSIS — I10 ESSENTIAL (PRIMARY) HYPERTENSION: ICD-10-CM

## 2024-01-30 PROCEDURE — 93000 ELECTROCARDIOGRAM COMPLETE: CPT

## 2024-01-30 PROCEDURE — 93280 PM DEVICE PROGR EVAL DUAL: CPT

## 2024-01-30 PROCEDURE — 99214 OFFICE O/P EST MOD 30 MIN: CPT

## 2024-02-05 NOTE — HISTORY OF PRESENT ILLNESS
[FreeTextEntry1] : Following up. Notes no major change in symptoms No LE edema No CP No palpitations Dyspnea about the same

## 2024-02-05 NOTE — DISCUSSION/SUMMARY
[EKG obtained to assist in diagnosis and management of assessed problem(s)] : EKG obtained to assist in diagnosis and management of assessed problem(s) [FreeTextEntry1] : No changes to med Rx Check PPM today  Echo from [place] 9/23 reviewed

## 2024-02-05 NOTE — PHYSICAL EXAM
[Well Developed] : well developed [Well Nourished] : well nourished [No Acute Distress] : no acute distress [Normal Conjunctiva] : normal conjunctiva [Normal Venous Pressure] : normal venous pressure [No Carotid Bruit] : no carotid bruit [Normal S1, S2] : normal S1, S2 [No Murmur] : no murmur [No Rub] : no rub [No Gallop] : no gallop [Clear Lung Fields] : clear lung fields [Good Air Entry] : good air entry [No Respiratory Distress] : no respiratory distress  [Non Tender] : non-tender [Soft] : abdomen soft [No Masses/organomegaly] : no masses/organomegaly [Normal Bowel Sounds] : normal bowel sounds [Normal Gait] : normal gait [No Edema] : no edema [No Cyanosis] : no cyanosis [No Clubbing] : no clubbing [No Varicosities] : no varicosities [No Rash] : no rash [Moves all extremities] : moves all extremities [No Skin Lesions] : no skin lesions [No Focal Deficits] : no focal deficits [Normal Speech] : normal speech [Alert and Oriented] : alert and oriented [Normal memory] : normal memory

## 2024-02-05 NOTE — CARDIOLOGY SUMMARY
[de-identified] : 1/30/24 Sinus Rhythm -First degree A-V block -occasional PAC   Gwendolyn = 250  # PACs = 1. -Right bundle branch block with left axis -bifascicular block.

## 2024-02-26 ENCOUNTER — NON-APPOINTMENT (OUTPATIENT)
Age: 84
End: 2024-02-26

## 2024-02-26 ENCOUNTER — APPOINTMENT (OUTPATIENT)
Dept: ELECTROPHYSIOLOGY | Facility: CLINIC | Age: 84
End: 2024-02-26
Payer: MEDICARE

## 2024-02-26 VITALS
WEIGHT: 250 LBS | SYSTOLIC BLOOD PRESSURE: 126 MMHG | BODY MASS INDEX: 45.73 KG/M2 | HEART RATE: 62 BPM | OXYGEN SATURATION: 98 % | DIASTOLIC BLOOD PRESSURE: 69 MMHG

## 2024-02-26 PROCEDURE — 93280 PM DEVICE PROGR EVAL DUAL: CPT

## 2024-02-26 PROCEDURE — 99213 OFFICE O/P EST LOW 20 MIN: CPT

## 2024-02-26 PROCEDURE — 93000 ELECTROCARDIOGRAM COMPLETE: CPT | Mod: 59

## 2024-02-26 RX ORDER — LOSARTAN POTASSIUM 50 MG/1
50 TABLET, FILM COATED ORAL TWICE DAILY
Refills: 0 | Status: ACTIVE | COMMUNITY

## 2024-02-26 RX ORDER — METFORMIN HYDROCHLORIDE 500 MG/1
500 TABLET, COATED ORAL
Refills: 0 | Status: DISCONTINUED | COMMUNITY
Start: 2023-10-25 | End: 2024-02-26

## 2024-02-26 RX ORDER — SITAGLIPTIN AND METFORMIN HYDROCHLORIDE 50; 500 MG/1; MG/1
50-500 TABLET, FILM COATED ORAL DAILY
Refills: 0 | Status: ACTIVE | COMMUNITY

## 2024-02-26 NOTE — PHYSICAL EXAM
[Well Developed] : well developed [Well Nourished] : well nourished [No Acute Distress] : no acute distress [Normal Conjunctiva] : normal conjunctiva [Normal Venous Pressure] : normal venous pressure [No Carotid Bruit] : no carotid bruit [Normal S1, S2] : normal S1, S2 [No Murmur] : no murmur [No Rub] : no rub [No Gallop] : no gallop [Clear Lung Fields] : clear lung fields [Good Air Entry] : good air entry [No Respiratory Distress] : no respiratory distress  [Soft] : abdomen soft [Non Tender] : non-tender [No Masses/organomegaly] : no masses/organomegaly [Normal Bowel Sounds] : normal bowel sounds [Normal Gait] : normal gait [No Edema] : no edema [No Cyanosis] : no cyanosis [No Varicosities] : no varicosities [No Clubbing] : no clubbing [Moves all extremities] : moves all extremities [No Rash] : no rash [No Skin Lesions] : no skin lesions [No Focal Deficits] : no focal deficits [Normal Speech] : normal speech [Alert and Oriented] : alert and oriented [Normal memory] : normal memory

## 2024-02-27 NOTE — HISTORY OF PRESENT ILLNESS
[FreeTextEntry1] : I had the pleasure of seeing your patient Blanca Gil in the arrhythmia clinic of [facility].  As you well know, she is a pleasant 84-year-old female with a past medical history of hypertension, diabetes, asthma, paroxysmal atrial fibrillation, obesity, and sinus node dysfunction s/p dual chamber pacemaker implantation in 2013 in which both the atrial and ventricular leads have developed noise who presents initial evaluation.  Regarding her pacemaker history, she underwent dual chamber pacemaker implantation in 2013 for sinus node dysfunction.  She had paroxysmal atrial fibrillation at this time and treated that was initially treated with Multaq however is only currently taking Xarelto for stroke prevention.  A transthoracic echocardiogram performed in November 2013 prior to device implantation showed an EF 68% with normal right ventricular and valvular function.  She has been doing well however on a recent interrogation done on January 20th, 2024, there were 6 mode-switch episodes in which 4 of them were due to noise on both the atrial and ventricular leads.  There was no ventricular pacing inhibition noted.  According to the daughter at bedside, she is unaware if the patient was doing anything out of the ordinary at the time.  The atrial lead sensitivity was reprogrammed from 0.25 to 0.75mA.  The patient denies chest pain, palpitations, near syncope or syncope.  Her most recent echocardiogram April 2022 showed normal LV function with an EF 70%, normal RV function, mild aortic stenosis, mild mitral regurgitation and normal left atrial size.  In clinic today, the patient is feeling well.  At baseline per the daughter, she sleeps most of the day and ambulates short distances with a rollator.  She deals with short term memory issues but can recall distant past events.  In the office, her blood pressure is 126/69 with a pulse of 62 and regular.  Her ECG shows an atrial paced rhythm with intact AV conduction with a NM of 254ms.  Interrogation of her dual-chamber [employer] pacemaker reveals it is functioning well. The battery voltage is less than 3 months from ARNIE.  The P wave was 2.9mV with impedance of 360 ohms and a threshold 0.75V@0.5ms  The R wave was 5.9mV with an impedance of 510 ohms and a threshold of 0.75V@0.5ms.  There were 4 appropriate mode switches for paroxysmal atrial fibrillation. She is atrial paced 55% of the time and  34% of the time. There have been no epsiodes of atrial noise recorded since reprogramming of atrial sensitivity at the last device clinic visit.   Device:   Taina MERLOS 2210 #[id number] A Lead - Tendril STS 2088TC / 52 CM #[id number] Lead - Tendril STS 2088TC / 58 CM #[id number]

## 2024-02-27 NOTE — DISCUSSION/SUMMARY
[FreeTextEntry1] : In summary, this is an 84-year-old female with past medical history of paroxysmal atrial fibrillation and sinus node dysfunction s/p dual chamber pacemaker implantation in 2013 in which both the atrial and ventricular lead have developed noise.  Despite both leads being effected, there is no ventricular pacing inhibition and after decreasing the atrial sensitivity (now 0.75mV from 0.25mV), there has been no additional mode switches due to noise over the last 30 days.  Her device will reach ARNIE in less than 3 months.  All options were discussed with the patient, including generator change alone versus complete transvenous system extraction and reimplantation.  Given the acute success of reprogramming the device in the setting of her advanced age and comorbid conditions, as well as the fact she is not pacemaker dependent, we will reassess in 1 month to see if any additional mode switches occur due to noise.  We will proceed with a generator change if everything remains stable, however if lead noise remains problematic, we will rediscuss the role of extraction.  All questions were answered to the patient and daughter's satisfaction.  I, Dr. Rehman, personally performed the evaluation and management (E/M) services for this new patient. That E/M includes conducting the initial examination, assessing all conditions, and establishing the plan of care. Today, my fellow, Roman Mike MD was here to observe my evaluation and management services for this patient to be followed going forward.  [EKG obtained to assist in diagnosis and management of assessed problem(s)] : EKG obtained to assist in diagnosis and management of assessed problem(s)

## 2024-04-08 ENCOUNTER — NON-APPOINTMENT (OUTPATIENT)
Age: 84
End: 2024-04-08

## 2024-04-08 ENCOUNTER — APPOINTMENT (OUTPATIENT)
Dept: ELECTROPHYSIOLOGY | Facility: CLINIC | Age: 84
End: 2024-04-08
Payer: MEDICARE

## 2024-04-08 VITALS — SYSTOLIC BLOOD PRESSURE: 117 MMHG | DIASTOLIC BLOOD PRESSURE: 70 MMHG | HEART RATE: 74 BPM | OXYGEN SATURATION: 96 %

## 2024-04-08 DIAGNOSIS — I48.0 PAROXYSMAL ATRIAL FIBRILLATION: ICD-10-CM

## 2024-04-08 DIAGNOSIS — J45.909 UNSPECIFIED ASTHMA, UNCOMPLICATED: ICD-10-CM

## 2024-04-08 DIAGNOSIS — I49.5 SICK SINUS SYNDROME: ICD-10-CM

## 2024-04-08 DIAGNOSIS — Z95.0 PRESENCE OF CARDIAC PACEMAKER: ICD-10-CM

## 2024-04-08 PROCEDURE — 93000 ELECTROCARDIOGRAM COMPLETE: CPT | Mod: 59

## 2024-04-08 PROCEDURE — 99213 OFFICE O/P EST LOW 20 MIN: CPT

## 2024-04-08 PROCEDURE — 93280 PM DEVICE PROGR EVAL DUAL: CPT

## 2024-04-08 RX ORDER — TIOTROPIUM BROMIDE 18 UG/1
18 CAPSULE ORAL; RESPIRATORY (INHALATION)
Refills: 0 | Status: DISCONTINUED | COMMUNITY
End: 2024-04-08

## 2024-04-08 RX ORDER — EMPAGLIFLOZIN 10 MG/1
10 TABLET, FILM COATED ORAL
Refills: 0 | Status: ACTIVE | COMMUNITY
Start: 2023-10-25

## 2024-04-08 RX ORDER — REPAGLINIDE 2 MG/1
2 TABLET ORAL
Refills: 0 | Status: ACTIVE | COMMUNITY
Start: 2023-10-25

## 2024-04-08 RX ORDER — BUDESONIDE, GLYCOPYRROLATE, AND FORMOTEROL FUMARATE 160; 9; 4.8 UG/1; UG/1; UG/1
160-9-4.8 AEROSOL, METERED RESPIRATORY (INHALATION)
Refills: 0 | Status: ACTIVE | COMMUNITY

## 2024-04-08 RX ORDER — ESCITALOPRAM OXALATE 10 MG/1
10 TABLET ORAL DAILY
Refills: 0 | Status: DISCONTINUED | COMMUNITY
Start: 2023-10-25 | End: 2024-04-08

## 2024-04-08 NOTE — HISTORY OF PRESENT ILLNESS
[FreeTextEntry1] : I had the pleasure of seeing your patient Blanca Gil in the arrhythmia clinic of [facility].  As you well know, she is a pleasant 84-year-old female with a past medical history of hypertension, diabetes, asthma, paroxysmal atrial fibrillation, obesity, and sinus node dysfunction s/p dual chamber pacemaker implantation in 2013 in which both the atrial and ventricular leads have developed noise who presents initial evaluation.  Regarding her pacemaker history, she underwent dual chamber pacemaker implantation in 2013 for sinus node dysfunction.  She had paroxysmal atrial fibrillation at this time and treated that was initially treated with Multaq however is only currently taking Xarelto for stroke prevention.  A transthoracic echocardiogram performed in November 2013 prior to device implantation showed an EF 68% with normal right ventricular and valvular function.  She has been doing well however on an interrogation on January 20th, 2024, there were 6 mode-switch episodes in which 4 of them were due to noise on both the atrial and ventricular leads.  There was no ventricular pacing inhibition noted.  According to the daughter at bedside, she is unaware if the patient was doing anything out of the ordinary at the time.  The atrial lead sensitivity was reprogrammed from 0.25 to 0.75mA.  The patient denies chest pain, palpitations, near syncope or syncope.  Her most recent echocardiogram April 2022 showed normal LV function with an EF 70%, normal RV function, mild aortic stenosis, mild mitral regurgitation and normal left atrial size.  Given the patient's age and comorbidities a decision was made for her to return in follow-up to see if there were any inappropriate mode switches or ventricular pacing inhibition with the programming changes.  The patient returns to clinic today and is overall continuing to do well.  She denies any palpitations, lightheadedness, presyncope or syncope.  Her blood pressure was 117/70 and her pulse 74.  Her EKG revealed sinus rhythm with right bundle branch block and LVH at 75 bpm.  Interrogation of her dual-chamber [employer] pacemaker reveals that her ARNIE is in less than 3 months.  The P wave was 2.7 mV with impedance of 360 ohms and a threshold of 1 V at 0.5 ms.  The R wave was 8.1 mV with impedance of 430 ohms and a threshold 0.75 V at 0.5 ms.  She is a pacing 57% of the time and V pacing 40% of the time.  There were 3 mode switch episodes all of which were appropriate for atrial arrhythmias.  There were no episodes of noise that resulted in either mode switches or ventricular inhibition.   Device:   Taina MATA RF 2210 #[id number] A Lead - Tendril STS 2088TC / 52 CM #[id number] V Lead - Tendril STS 2088TC / 58 CM #[id number]

## 2024-04-08 NOTE — DISCUSSION/SUMMARY
[FreeTextEntry1] : In summary, this is an 84-year-old female with past medical history of paroxysmal atrial fibrillation and sinus node dysfunction s/p dual chamber pacemaker implantation in 2013 in which both the atrial and ventricular lead have developed noise.  Despite both leads being effected, there is no ventricular pacing inhibition and after decreasing the atrial sensitivity (now 0.75mV from 0.25mV), there has been no additional mode switches due to noise over the last few months.  Her device will reach ARNIE in less than 3 months.  Given her age and comorbidities and the fact that her current leads are functioning well enough to meet her pacing requirements the plan is to proceed with a simple generator change which will be scheduled. [EKG obtained to assist in diagnosis and management of assessed problem(s)] : EKG obtained to assist in diagnosis and management of assessed problem(s)

## 2024-04-25 NOTE — ED ADULT NURSE NOTE - EXTENSIONS OF SELF_ADULT
decision- making, bedside attention and documentation. Time is exclusive of EKG interpretation, imaging interpretation and separately billed procedures.  During this entire length of time I was immediately available to the patient.  Marko Santos MD      EMERGENCY DEPARTMENT COURSE and DIFFERENTIAL DIAGNOSIS/MDM   Vitals:    Vitals:    04/25/24 0016 04/25/24 0031 04/25/24 0132   BP:  103/58 (!) 124/94   Pulse: (!) 196 (!) 179 (!) 141   Resp:   24   Temp:  97.5 °F (36.4 °C)    TempSrc:  Temporal    SpO2:  100%    Weight:  13.8 kg (30 lb 8 oz)         Patient was given the following medications:  Medications   sodium chloride 0.9 % bolus 276 mL (250 mLs IntraVENous New Bag 4/25/24 0116)   atropine injection 0.276 mg (0.276 mg IntraVENous Given 4/25/24 0112)   naloxone (NARCAN) injection 1.38 mg (1.38 mg IntraVENous Given 4/25/24 0115)       Medical Decision Making  15-month-old male presents after an unintentional ingestion.  Vitals are currently unremarkable.  Patient is awake.  Appreciate assistance of nursing consulting poison control.  Recommends 24 hours of observation as well as routine toxicology labs and cardiac monitor.  Symptomatic treatment with naloxone and atropine as needed.    Patient will need to be transferred for higher level of care.    Amount and/or Complexity of Data Reviewed  Independent Historian: guardian  Labs: ordered. Decision-making details documented in ED Course.    Risk  Prescription drug management.          CLINICAL MANAGEMENT TOOLS:        ED Course as of 04/25/24 0141   Thu Apr 25, 2024   0105 Patient noted to have transient episode of bradycardia, given atropine x1 per poison control recommendations. [MB]   0114 Discussed initially with [facility], per [facility], Dr. Maricel Patel, recommends [facility]. Patient discussed with pediatric ED, [facility], recommends PICU. [MB]   0119 D/w Dr. Gerson Baldwin, intensivist, will accept PICU [facility]. [MB]   0123 Reassessed patient, now awake, HR improved, tachycardic on 
Eyeglasses

## 2024-05-01 NOTE — DISCHARGE NOTE ADULT - CAREGIVER PHONE NUMBER
Constitutional: (-) fever  Eyes/ENT: (-) blurry vision, (-) epistaxis  Cardiovascular: (-) chest pain, (-) syncope  Respiratory: (-) cough, (-) shortness of breath  Gastrointestinal: (-) vomiting, (-) diarrhea  Musculoskeletal: (-) neck pain, (+) back pain, (-) joint pain  Integumentary: (-) rash, (-) edema  Neurological: (-) headache, (-) altered mental status  Psychiatric: (-) hallucinations  Allergic/Immunologic: (-) pruritus [phone]

## 2024-05-21 DIAGNOSIS — Z01.818 ENCOUNTER FOR OTHER PREPROCEDURAL EXAMINATION: ICD-10-CM

## 2024-05-22 LAB
ALBUMIN SERPL ELPH-MCNC: 3.8 G/DL
ALP BLD-CCNC: 68 U/L
ALT SERPL-CCNC: 11 U/L
ANION GAP SERPL CALC-SCNC: 12 MMOL/L
AST SERPL-CCNC: 14 U/L
BILIRUB SERPL-MCNC: 0.4 MG/DL
BUN SERPL-MCNC: 27 MG/DL
CALCIUM SERPL-MCNC: 8.9 MG/DL
CHLORIDE SERPL-SCNC: 98 MMOL/L
CO2 SERPL-SCNC: 23 MMOL/L
CREAT SERPL-MCNC: 1.47 MG/DL
EGFR: 35 ML/MIN/1.73M2
GLUCOSE SERPL-MCNC: 360 MG/DL
HCT VFR BLD CALC: 37.7 %
HGB BLD-MCNC: 12.4 G/DL
INR PPP: 0.91 RATIO
MCHC RBC-ENTMCNC: 28 PG
MCHC RBC-ENTMCNC: 32.9 GM/DL
MCV RBC AUTO: 85.1 FL
PLATELET # BLD AUTO: 264 K/UL
POTASSIUM SERPL-SCNC: 4.7 MMOL/L
PROT SERPL-MCNC: 6.6 G/DL
PT BLD: 10.3 SEC
RBC # BLD: 4.43 M/UL
RBC # FLD: 13.5 %
SODIUM SERPL-SCNC: 133 MMOL/L
WBC # FLD AUTO: 6.2 K/UL

## 2024-05-29 NOTE — DISCHARGE NOTE ADULT - CLICK TO LAUNCH ORM
Anesthesia Evaluation     Patient summary reviewed and Nursing notes reviewed   no history of anesthetic complications:   NPO Solid Status: > 6 hours  NPO Liquid Status: > 2 hours           Airway   Dental      Pulmonary - negative pulmonary ROS   Cardiovascular - negative cardio ROS  Exercise tolerance: good (4-7 METS)        Neuro/Psych- negative ROS  GI/Hepatic/Renal/Endo - negative ROS     Musculoskeletal (-) negative ROS    Abdominal    Substance History - negative use     OB/GYN negative ob/gyn ROS         Other - negative ROS       ROS/Med Hx Other: PAT Nursing Notes unavailable.                     Anesthesia Plan    ASA 1     general     inhalational induction     Anesthetic plan, risks, benefits, and alternatives have been provided, discussed and informed consent has been obtained with: mother.        CODE STATUS:          .

## 2024-05-31 ENCOUNTER — TRANSCRIPTION ENCOUNTER (OUTPATIENT)
Age: 84
End: 2024-05-31

## 2024-05-31 ENCOUNTER — OUTPATIENT (OUTPATIENT)
Dept: INPATIENT UNIT | Facility: HOSPITAL | Age: 84
LOS: 1 days | End: 2024-05-31
Payer: MEDICARE

## 2024-05-31 VITALS
TEMPERATURE: 98 F | HEIGHT: 61 IN | WEIGHT: 265 LBS | DIASTOLIC BLOOD PRESSURE: 61 MMHG | HEART RATE: 62 BPM | SYSTOLIC BLOOD PRESSURE: 142 MMHG | RESPIRATION RATE: 16 BRPM | OXYGEN SATURATION: 97 %

## 2024-05-31 VITALS
TEMPERATURE: 98 F | SYSTOLIC BLOOD PRESSURE: 108 MMHG | OXYGEN SATURATION: 98 % | HEART RATE: 62 BPM | RESPIRATION RATE: 17 BRPM | DIASTOLIC BLOOD PRESSURE: 54 MMHG

## 2024-05-31 DIAGNOSIS — Z98.89 OTHER SPECIFIED POSTPROCEDURAL STATES: Chronic | ICD-10-CM

## 2024-05-31 DIAGNOSIS — I49.5 SICK SINUS SYNDROME: ICD-10-CM

## 2024-05-31 DIAGNOSIS — Z95.0 PRESENCE OF CARDIAC PACEMAKER: Chronic | ICD-10-CM

## 2024-05-31 LAB
ANION GAP SERPL CALC-SCNC: 13 MMOL/L — SIGNIFICANT CHANGE UP (ref 5–17)
BUN SERPL-MCNC: 26 MG/DL — HIGH (ref 7–23)
CALCIUM SERPL-MCNC: 9.1 MG/DL — SIGNIFICANT CHANGE UP (ref 8.4–10.5)
CHLORIDE SERPL-SCNC: 98 MMOL/L — SIGNIFICANT CHANGE UP (ref 96–108)
CO2 SERPL-SCNC: 24 MMOL/L — SIGNIFICANT CHANGE UP (ref 22–31)
CREAT SERPL-MCNC: 1.65 MG/DL — HIGH (ref 0.5–1.3)
EGFR: 30 ML/MIN/1.73M2 — LOW
GLUCOSE BLDC GLUCOMTR-MCNC: 240 MG/DL — HIGH (ref 70–99)
GLUCOSE BLDC GLUCOMTR-MCNC: 250 MG/DL — HIGH (ref 70–99)
GLUCOSE SERPL-MCNC: 260 MG/DL — HIGH (ref 70–99)
HCT VFR BLD CALC: 37.4 % — SIGNIFICANT CHANGE UP (ref 34.5–45)
HGB BLD-MCNC: 12.5 G/DL — SIGNIFICANT CHANGE UP (ref 11.5–15.5)
MCHC RBC-ENTMCNC: 28.3 PG — SIGNIFICANT CHANGE UP (ref 27–34)
MCHC RBC-ENTMCNC: 33.4 GM/DL — SIGNIFICANT CHANGE UP (ref 32–36)
MCV RBC AUTO: 84.8 FL — SIGNIFICANT CHANGE UP (ref 80–100)
NRBC # BLD: 0 /100 WBCS — SIGNIFICANT CHANGE UP (ref 0–0)
PLATELET # BLD AUTO: 231 K/UL — SIGNIFICANT CHANGE UP (ref 150–400)
POTASSIUM SERPL-MCNC: 4.2 MMOL/L — SIGNIFICANT CHANGE UP (ref 3.5–5.3)
POTASSIUM SERPL-SCNC: 4.2 MMOL/L — SIGNIFICANT CHANGE UP (ref 3.5–5.3)
RBC # BLD: 4.41 M/UL — SIGNIFICANT CHANGE UP (ref 3.8–5.2)
RBC # FLD: 13.3 % — SIGNIFICANT CHANGE UP (ref 10.3–14.5)
SODIUM SERPL-SCNC: 135 MMOL/L — SIGNIFICANT CHANGE UP (ref 135–145)
WBC # BLD: 6.04 K/UL — SIGNIFICANT CHANGE UP (ref 3.8–10.5)
WBC # FLD AUTO: 6.04 K/UL — SIGNIFICANT CHANGE UP (ref 3.8–10.5)

## 2024-05-31 PROCEDURE — 93005 ELECTROCARDIOGRAM TRACING: CPT

## 2024-05-31 PROCEDURE — 80048 BASIC METABOLIC PNL TOTAL CA: CPT

## 2024-05-31 PROCEDURE — 82962 GLUCOSE BLOOD TEST: CPT

## 2024-05-31 PROCEDURE — 93010 ELECTROCARDIOGRAM REPORT: CPT

## 2024-05-31 PROCEDURE — 93286 PERI-PX EVAL PM/LDLS PM IP: CPT | Mod: 26,59

## 2024-05-31 PROCEDURE — 33228 REMV&REPLC PM GEN DUAL LEAD: CPT

## 2024-05-31 PROCEDURE — 85027 COMPLETE CBC AUTOMATED: CPT

## 2024-05-31 PROCEDURE — 99223 1ST HOSP IP/OBS HIGH 75: CPT | Mod: 25

## 2024-05-31 PROCEDURE — C1785: CPT

## 2024-05-31 PROCEDURE — C1889: CPT

## 2024-05-31 RX ORDER — SITAGLIPTIN AND METFORMIN HYDROCHLORIDE 500; 50 MG/1; MG/1
1 TABLET, FILM COATED ORAL
Refills: 0 | DISCHARGE

## 2024-05-31 RX ORDER — CEFAZOLIN SODIUM 1 G
2000 VIAL (EA) INJECTION ONCE
Refills: 0 | Status: DISCONTINUED | OUTPATIENT
Start: 2024-05-31 | End: 2024-06-14

## 2024-05-31 RX ORDER — OXYCODONE AND ACETAMINOPHEN 5; 325 MG/1; MG/1
1 TABLET ORAL ONCE
Refills: 0 | Status: DISCONTINUED | OUTPATIENT
Start: 2024-05-31 | End: 2024-05-31

## 2024-05-31 RX ORDER — SODIUM CHLORIDE 9 MG/ML
1000 INJECTION, SOLUTION INTRAVENOUS
Refills: 0 | Status: DISCONTINUED | OUTPATIENT
Start: 2024-05-31 | End: 2024-06-14

## 2024-05-31 RX ORDER — LOSARTAN POTASSIUM 100 MG/1
1 TABLET, FILM COATED ORAL
Refills: 0 | DISCHARGE

## 2024-05-31 RX ORDER — CARVEDILOL PHOSPHATE 80 MG/1
1 CAPSULE, EXTENDED RELEASE ORAL
Refills: 0 | DISCHARGE

## 2024-05-31 RX ORDER — LIPASE/PROTEASE/AMYLASE 16-48-48K
1 CAPSULE,DELAYED RELEASE (ENTERIC COATED) ORAL
Refills: 0 | DISCHARGE

## 2024-05-31 RX ORDER — EMPAGLIFLOZIN 10 MG/1
1 TABLET, FILM COATED ORAL
Refills: 0 | DISCHARGE

## 2024-05-31 RX ORDER — RIVAROXABAN 15 MG-20MG
1 KIT ORAL
Qty: 0 | Refills: 0 | DISCHARGE

## 2024-05-31 RX ORDER — ONDANSETRON 8 MG/1
4 TABLET, FILM COATED ORAL ONCE
Refills: 0 | Status: DISCONTINUED | OUTPATIENT
Start: 2024-05-31 | End: 2024-06-14

## 2024-05-31 NOTE — ASU DISCHARGE PLAN (ADULT/PEDIATRIC) - PROVIDER TOKENS
FREE:[LAST:[[facility]ound Check],PHONE:[(   )    -],FAX:[(   )    -],ADDRESS:[[address] [place] [address] [place] [address]],SCHEDULEDAPPT:[06/12/2024],SCHEDULEDAPPTTIME:[11:40 AM]]

## 2024-05-31 NOTE — ASU DISCHARGE PLAN (ADULT/PEDIATRIC) - NS MD DC FALL RISK RISK
For information on Fall & Injury Prevention, visit: [email or web address] OR  [email or web address] OR  https://www.cdc.gov/steadi/patient.html

## 2024-05-31 NOTE — H&P CARDIOLOGY - NSICDXPASTMEDICALHX_GEN_ALL_CORE_FT
PAST MEDICAL HISTORY:  Afib     Asthma     Atrial fibrillation     CAD (Coronary Atherosclerotic Disease)     CHF (congestive heart failure), NYHA class I     Diabetes     HLD (Hyperlipidemia)     HTN (Hypertension)     Hypothyroidism     IBS (irritable bowel syndrome)     Myocardial Infarction     Obesity     Pacemaker     SSS (sick sinus syndrome)     Venous stasis

## 2024-05-31 NOTE — H&P CARDIOLOGY - HISTORY OF PRESENT ILLNESS
84-year-old primarily Burkinan speaking female with past medical history of paroxysmal atrial fibrillation and sinus node dysfunction s/p dual chamber pacemaker implantation in 2013 in which both the atrial and ventricular lead have developed noise. Despite both leads being effected, there is no ventricular pacing inhibition and after decreasing the atrial sensitivity (now 0.75mV from 0.25mV), there has been no additional mode switches due to noise over the last few months. Her device will reach ARNIE in less than 3 months. Given her age and comorbidities and the fact that her current leads are functioning well enough to meet her pacing requirements the plan is to proceed with a generator change. 84-year-old primarily Icelandic speaking female, accompanied by her daughter, with past medical history of paroxysmal atrial fibrillation and sinus node dysfunction s/p dual chamber pacemaker implantation in 2013 in which both the atrial and ventricular lead have developed noise. Despite both leads being effected, there is no ventricular pacing inhibition and after decreasing the atrial sensitivity (now 0.75mV from 0.25mV), there has been no additional mode switches due to noise over the last few months. Her device will reach ARNIE in less than 3 months. Given her age and comorbidities and the fact that her current leads are functioning well enough to meet her pacing requirements the plan is to proceed with a generator change.

## 2024-05-31 NOTE — ASU DISCHARGE PLAN (ADULT/PEDIATRIC) - CARE PROVIDER_API CALL
[facility]ound [facility],   [address] [place] [address]  Phone: (   )    -  Fax: (   )    -  Scheduled Appointment: 06/12/2024 11:40 AM

## 2024-05-31 NOTE — PATIENT PROFILE ADULT - FALL HARM RISK - HARM RISK INTERVENTIONS

## 2024-05-31 NOTE — PATIENT PROFILE ADULT - NSPROHMDIABETMGMTSTRAT_GEN_A_NUR
[facility], [place], operated by [facility]  Brief Operative Note    Surgery Date: 11/24/2020     Surgeon(s) and Role:     * Hans Hutson MD - Primary    Assisting Surgeon: None    Pre-op Diagnosis:  NS (nuclear sclerosis), left [H25.12]    Post-op Diagnosis:  Post-Op Diagnosis Codes:     * NS (nuclear sclerosis), left [H25.12]    Procedure(s) (LRB):  EXTRACTION, CATARACT, WITH IOL INSERTION (Left)    Anesthesia: Local MAC    Description of the findings of the procedure(s):     Estimated Blood Loss: * No values recorded between 11/24/2020  2:58 PM and 11/24/2020  3:51 PM *         Specimens:   Specimen (12h ago, onward)    None            Discharge Note    OUTCOME: Patient tolerated treatment/procedure well without complication and is now ready for discharge.    DISPOSITION: Home or Self Care    FINAL DIAGNOSIS:  Nuclear sclerotic cataract of left eye    FOLLOWUP: In clinic    DISCHARGE INSTRUCTIONS:    Discharge Procedure Orders   Other restrictions (specify):   Order Comments: No heavy lifting or bending for 1 week.     
blood glucose testing

## 2024-06-10 NOTE — DISCHARGE NOTE ADULT - FUNCTIONAL SCREEN CURRENT LEVEL: DRESSING, MLM
Subjective     Patient ID: Huma Arriaga 2) assistive person

## 2024-06-20 ENCOUNTER — NON-APPOINTMENT (OUTPATIENT)
Age: 84
End: 2024-06-20

## 2024-06-20 ENCOUNTER — APPOINTMENT (OUTPATIENT)
Dept: ELECTROPHYSIOLOGY | Facility: CLINIC | Age: 84
End: 2024-06-20
Payer: MEDICARE

## 2024-06-20 VITALS
HEART RATE: 60 BPM | SYSTOLIC BLOOD PRESSURE: 112 MMHG | DIASTOLIC BLOOD PRESSURE: 68 MMHG | OXYGEN SATURATION: 95 % | BODY MASS INDEX: 40.98 KG/M2 | WEIGHT: 222.66 LBS | HEIGHT: 62 IN

## 2024-06-20 PROBLEM — I49.5 SICK SINUS SYNDROME: Chronic | Status: ACTIVE | Noted: 2024-05-31

## 2024-06-20 PROCEDURE — 93000 ELECTROCARDIOGRAM COMPLETE: CPT | Mod: 59

## 2024-06-20 PROCEDURE — 99024 POSTOP FOLLOW-UP VISIT: CPT

## 2024-06-20 PROCEDURE — 93280 PM DEVICE PROGR EVAL DUAL: CPT

## 2024-08-12 NOTE — CONSULT NOTE ADULT - NSPROBSELRECBLANK_5_GEN
DISPLAY PLAN FREE TEXT
You can access the FollowMyHealth Patient Portal offered by [facility] by registering at the following website: [email or web address]. By joining [employer]’s FollowMyHealth portal, you will also be able to view your health information using other applications (apps) compatible with our system.

## 2024-09-05 NOTE — PHYSICAL THERAPY INITIAL EVALUATION ADULT - LEVEL OF INDEPENDENCE, REHAB EVAL
Recent Visits  Date Type Provider Dept   08/19/24 Office Visit Kiran Sharma, DO [facility]   04/16/24 Office Visit Kiran Sharma, DO [facility]   02/19/24 Office Visit Kiran Sharma, DO [facility]   10/17/23 Office Visit Kiran Sharma, DO [facility]   09/12/23 Office Visit Kiran Sharma, DO [facility]   08/22/23 Office Visit Kiran Sharma, DO [facility]   06/20/23 Office Visit Kiran Sharma, DO [facility]   05/02/23 Office Visit Kiran Sharma, DO [facility]   04/20/23 Office Visit Kiran Sharma, DO [facility]   Showing recent visits within past 540 days with a meds authorizing provider and meeting all other requirements  Future Appointments  No visits were found meeting these conditions.  Showing future appointments within next 150 days with a meds authorizing provider and meeting all other requirements      independent

## 2024-09-19 ENCOUNTER — NON-APPOINTMENT (OUTPATIENT)
Age: 84
End: 2024-09-19

## 2024-09-20 ENCOUNTER — APPOINTMENT (OUTPATIENT)
Dept: ELECTROPHYSIOLOGY | Facility: CLINIC | Age: 84
End: 2024-09-20
Payer: MEDICARE

## 2024-09-20 PROCEDURE — 93294 REM INTERROG EVL PM/LDLS PM: CPT

## 2024-09-20 PROCEDURE — 93296 REM INTERROG EVL PM/IDS: CPT

## 2024-11-07 NOTE — CHART NOTE - NSCHARTNOTEFT_GEN_A_CORE
Patient is in the lateral left side position.   Procedure performed on a bed/cart.The patient was positioned by Sherine oGnzalez RN Reassessment:   79yFemalePatient is a 79y old  Female who presents with a chief complaint of lower extremity cellulitis (2019 13:06)      Factors impacting intake: [ ] none [ ] nausea  [ ] vomiting [ ] diarrhea [ ] constipation  [ ]chewing problems [ ] swallowing issues  [X ] other:     Diet Presciption: Diet, DASH/TLC:   Sodium & Cholesterol Restricted  Consistent Carbohydrate {Evening Snacks}  Kosher (18 @ 02:39)    Intake: Visited pt. cynthia, RN at bedside, report pt. eating well, 50% of meals & also receiving meals from home, no reports of GI distress, awaiting placement, rec. c/w  DASH/TLC, Consistent Carbohydrate {Evening Snacks} Kosher as medically feasible. RD available.    Daily Weight in k (2019 04:21)  Weight in k.1 (2019 05:35)    % Weight Change    Pertinent Medications: MEDICATIONS  (STANDING):  ampicillin/sulbactam  IVPB 3 Gram(s) IV Intermittent every 6 hours  amylase/lipase/protease  (CREON  6,000 Units) 2 Capsule(s) Oral three times a day with meals  atorvastatin 40 milliGRAM(s) Oral at bedtime  buDESOnide 160 MICROgram(s)/formoterol 4.5 MICROgram(s) Inhaler 2 Puff(s) Inhalation two times a day  dronedarone 400 milliGRAM(s) Oral two times a day  furosemide    Tablet 40 milliGRAM(s) Oral daily  insulin glargine Injectable (LANTUS) 5 Unit(s) SubCutaneous at bedtime  insulin lispro (HumaLOG) corrective regimen sliding scale   SubCutaneous Before meals and at bedtime  lactobacillus acidophilus 1 Tablet(s) Oral daily  levothyroxine 175 MICROGram(s) Oral daily  magnesium oxide 400 milliGRAM(s) Oral two times a day with meals  montelukast 10 milliGRAM(s) Oral daily  mupirocin 2% Ointment 1 Application(s) Topical two times a day  nystatin Cream 1 Application(s) Topical two times a day  rivaroxaban 20 milliGRAM(s) Oral every 24 hours  tiotropium 18 MICROgram(s) Capsule 1 Capsule(s) Inhalation daily    MEDICATIONS  (PRN):  acetaminophen   Tablet .. 650 milliGRAM(s) Oral every 6 hours PRN Mild Pain (1 - 3), Moderate Pain (4 - 6)  ALBUTerol    90 MICROgram(s) HFA Inhaler 2 Puff(s) Inhalation every 6 hours PRN Shortness of Breath and/or Wheezing  melatonin 5 milliGRAM(s) Oral at bedtime PRN Insomnia    Pertinent Labs:  Na130 mmol/L<L> Glu 199 mg/dL<H> K+ 3.3 mmol/L<L> Cr  1.34 mg/dL<H> BUN 23 mg/dL<H>  Phos 2.7 mg/dL  [id number] 7.0 %<H>  Chol 93 mg/dL LDL 24 mg/dL HDL 56 mg/dL Trig 66 mg/dL     CAPILLARY BLOOD GLUCOSE      POCT Blood Glucose.: 288 mg/dL (2019 16:32)  POCT Blood Glucose.: 247 mg/dL (2019 11:22)  POCT Blood Glucose.: 207 mg/dL (2019 07:45)  POCT Blood Glucose.: 299 mg/dL (2019 21:38)    Skin: intact     Estimated Needs:   [X ] no change since previous assessment  [ ] recalculated:     Previous Nutrition Diagnosis:   [ ] Inadequate Energy Intake [ ]Inadequate Oral Intake [ ] Excessive Energy Intake   [ ] Underweight [ ] Increased Nutrient Needs [ ] Overweight/Obesity   [ ] Altered GI Function [ ] Unintended Weight Loss [ X] Food & Nutrition Related Knowledge Deficit [ ] Malnutrition     Nutrition Diagnosis is [X] ongoing  [ ] resolved [ ] not applicable     New Nutrition Diagnosis: [ ] not applicable       Interventions: To meet nutrition needs   Recommend  [ ] Change Diet To:  [ ] Nutrition Supplement  [ ] Nutrition Support  [ ] Other:     Monitoring and Evaluation:   [X ] PO intake [ x ] Tolerance to diet prescription [ x ] weights [ x ] labs[ x ] follow up per protocol  [ ] other:

## 2024-12-05 ENCOUNTER — APPOINTMENT (OUTPATIENT)
Dept: ELECTROPHYSIOLOGY | Facility: CLINIC | Age: 84
End: 2024-12-05

## 2024-12-15 NOTE — CONSULT NOTE ADULT - CONSULT REQUESTED DATE/TIME
[facility]  PROGRESS NOTE      IMAGING:     Left foot MRI:     IMPRESSION:  1.  No evidence for osteomyelitis with attention to the the great toe.     2.  Soft tissue stranding great toe can be seen with cellulitis. No soft tissue abscess.    ASSESSMENT:  Left hallux pain, small eschars to plantar and distal toe   Peripheral Arterial Disease s/p left SFA stent, ongoing tibial disease      MEDICAL DECISION MAKING:   -Patient in the OR during time of evaluation. MRI resulted without osteomyelitis. Recommend continued wound care. Will follow up with patient tomorrow to discuss further.    Shabnam Daivs DPM   [facility]le [facility]  Available via [employer] Text               28-Sep-2023 09:59

## 2025-02-19 NOTE — PROGRESS NOTE ADULT - GASTROINTESTINAL DETAILS
soft/nontender/no distention/no masses palpable/bowel sounds normal/no bruit/no rebound tenderness/no guarding/no rigidity
soft/nontender/no distention/bowel sounds normal/no bruit/no rebound tenderness/no guarding/no rigidity
soft/nontender/no distention/no masses palpable/bowel sounds normal/no bruit/no rebound tenderness/no guarding/no rigidity
soft/nontender/no distention/no masses palpable/bowel sounds normal/no bruit/no rebound tenderness/no guarding/no rigidity
Yes
soft/nontender/no distention/no masses palpable/bowel sounds normal/no bruit/no rebound tenderness/no guarding/no rigidity
soft/nontender/no distention/no masses palpable/no bruit/no rebound tenderness/no guarding/no rigidity
soft/nontender/no distention/no rebound tenderness/no guarding
soft/nontender/no distention/no masses palpable/bowel sounds normal/no bruit/no rebound tenderness/no guarding/no rigidity
soft/nontender/no distention/no masses palpable/bowel sounds normal/no bruit/no rebound tenderness/no guarding/no rigidity
soft/nontender/no distention/no masses palpable/bowel sounds normal/no rebound tenderness/no rigidity
soft/nontender/no distention/no masses palpable/bowel sounds normal/no bruit/no rebound tenderness/no guarding/no rigidity
soft/nontender/no distention/bowel sounds normal/no bruit/no rebound tenderness/no guarding/no rigidity/no organomegaly

## 2025-03-06 ENCOUNTER — APPOINTMENT (OUTPATIENT)
Dept: ELECTROPHYSIOLOGY | Facility: CLINIC | Age: 85
End: 2025-03-06
Payer: MEDICARE

## 2025-03-06 ENCOUNTER — NON-APPOINTMENT (OUTPATIENT)
Age: 85
End: 2025-03-06

## 2025-03-06 PROCEDURE — 93296 REM INTERROG EVL PM/IDS: CPT

## 2025-03-06 PROCEDURE — 93294 REM INTERROG EVL PM/LDLS PM: CPT

## 2025-03-17 NOTE — PROGRESS NOTE ADULT - SUBJECTIVE AND OBJECTIVE BOX
Provider at bedside.     Ivette Brooks, MICHEL  03/17/25 [id number]     Date of Service: 04-25-22 @ 11:35    Patient is a 82y old  Female who presents with a chief complaint of shortness of breath and vomiting (25 Apr 2022 10:57)      Any change in ROS: She looks pretty good: no wheezing today : blood glucose high: on mayela dudley:       MEDICATIONS  (STANDING):  albuterol/ipratropium for Nebulization 3 milliLiter(s) Nebulizer every 6 hours  amLODIPine   Tablet 5 milliGRAM(s) Oral daily  budesonide 160 MICROgram(s)/formoterol 4.5 MICROgram(s) Inhaler 2 Puff(s) Inhalation two times a day  carvedilol 12.5 milliGRAM(s) Oral every 12 hours  dextrose 5%. 1000 milliLiter(s) (100 mL/Hr) IV Continuous <Continuous>  dextrose 5%. 1000 milliLiter(s) (50 mL/Hr) IV Continuous <Continuous>  dextrose 50% Injectable 25 Gram(s) IV Push once  dextrose 50% Injectable 12.5 Gram(s) IV Push once  dextrose 50% Injectable 25 Gram(s) IV Push once  dronedarone 400 milliGRAM(s) Oral two times a day  escitalopram 10 milliGRAM(s) Oral daily  glucagon  Injectable 1 milliGRAM(s) IntraMuscular once  insulin glargine Injectable (LANTUS) 5 Unit(s) SubCutaneous every morning  insulin lispro (ADMELOG) corrective regimen sliding scale   SubCutaneous three times a day before meals  insulin lispro (ADMELOG) corrective regimen sliding scale   SubCutaneous at bedtime  levothyroxine 175 MICROGram(s) Oral daily  losartan 50 milliGRAM(s) Oral daily  rivaroxaban 15 milliGRAM(s) Oral with dinner    MEDICATIONS  (PRN):  ALBUTerol    90 MICROgram(s) HFA Inhaler 2 Puff(s) Inhalation every 6 hours PRN Shortness of Breath and/or Wheezing  dextrose Oral Gel 15 Gram(s) Oral once PRN Blood Glucose LESS THAN 70 milliGRAM(s)/deciliter  guaiFENesin Oral Liquid (Sugar-Free) 200 milliGRAM(s) Oral every 6 hours PRN Cough  LORazepam     Tablet 0.5 milliGRAM(s) Oral daily PRN Anxiety    Vital Signs Last 24 Hrs  T(C): 36.4 (25 Apr 2022 05:07), Max: 36.7 (24 Apr 2022 12:13)  T(F): 97.5 (25 Apr 2022 05:07), Max: 98 (24 Apr 2022 12:13)  HR: 67 (25 Apr 2022 09:15) (60 - 68)  BP: 164/68 (25 Apr 2022 06:12) (138/60 - 164/68)  BP(mean): --  RR: 18 (25 Apr 2022 06:12) (18 - 18)  SpO2: 94% (25 Apr 2022 09:15) (91% - 98%)    I&O's Summary    24 Apr 2022 07:01  -  25 Apr 2022 07:00  --------------------------------------------------------  IN: 730 mL / OUT: 1400 mL / NET: -670 mL          Physical Exam:   GENERAL: Obese  HEENT: CECELIA/   Atraumatic, Normocephalic  ENMT: No tonsillar erythema, exudates, or enlargement; Moist mucous membranes, Good dentition, No lesions  NECK: Supple, No JVD, Normal thyroid  CHEST/LUNG: Clear to auscultaion- no wheezing  CVS: Regular rate and rhythm; No murmurs, rubs, or gallops  GI: : Soft, Nontender, Nondistended; Bowel sounds present  NERVOUS SYSTEM:  Alert & Oriented X3ic  EXTREMITIES: + edema  LYMPH: No lymphadenopathy noted  SKIN: No rashes or lesions  ENDOCRINOLOGY: No Thyromegaly  PSYCH: Appropriate    Labs:  26                            10.6   3.74  )-----------( 390      ( 25 Apr 2022 07:15 )             31.9                         10.1   5.45  )-----------( 366      ( 24 Apr 2022 07:36 )             30.8                         9.4    5.27  )-----------( 331      ( 23 Apr 2022 05:00 )             28.5                         10.3   9.12  )-----------( 341      ( 21 Apr 2022 15:50 )             32.1     04-25    134<L>  |  90<L>  |  32<H>  ----------------------------<  390<H>  4.7   |  28  |  1.27  04-24    136  |  92<L>  |  23  ----------------------------<  175<H>  4.2   |  30  |  1.30  04-23    136  |  95<L>  |  18  ----------------------------<  175<H>  4.4   |  31  |  1.33<H>  04-22    136  |  96  |  15  ----------------------------<  215<H>  4.5   |  28  |  1.19  04-21    x   |  x   |  x   ----------------------------<  x   5.2   |  x   |  x   04-21    134<L>  |  99  |  17  ----------------------------<  216<H>  5.7<H>   |  21<L>  |  1.23    Ca    9.5      25 Apr 2022 07:17  Ca    9.3      24 Apr 2022 07:28  Phos  4.1     04-24  Mg     2.1     04-25  Mg     2.0     04-24    TPro  7.4  /  Alb  3.9  /  TBili  0.3  /  DBili  x   /  AST  26  /  ALT  28  /  AlkPhos  58  04-21    CAPILLARY BLOOD GLUCOSE      POCT Blood Glucose.: 395 mg/dL (25 Apr 2022 08:59)  POCT Blood Glucose.: 410 mg/dL (25 Apr 2022 08:58)  POCT Blood Glucose.: 404 mg/dL (25 Apr 2022 03:32)  POCT Blood Glucose.: 473 mg/dL (24 Apr 2022 23:57)  POCT Blood Glucose.: 470 mg/dL (24 Apr 2022 23:56)  POCT Blood Glucose.: 507 mg/dL (24 Apr 2022 22:09)  POCT Blood Glucose.: 491 mg/dL (24 Apr 2022 22:05)  POCT Blood Glucose.: 379 mg/dL (24 Apr 2022 17:31)  POCT Blood Glucose.: 309 mg/dL (24 Apr 2022 12:55)      rad            RECENT CULTURES:  04-21 @ 19:06 Clean Catch Clean Catch (Midstream)                <10,000 CFU/mL Normal Urogenital Ema          RESPIRATORY CULTURES:          Studies  Chest X-RAY  CT SCAN Chest   Venous Dopplers: LE:   CT Abdomen  Others

## 2025-03-27 NOTE — ED ADULT NURSE NOTE - NS ED NOTE ABUSE SUSPICION NEGLECT YN
Medication: Entresto passed protocol.   Last office visit date: 10/10/2024  Next appointment scheduled?: Yes   Number of refills given: 11   
No

## 2025-06-05 ENCOUNTER — APPOINTMENT (OUTPATIENT)
Dept: ELECTROPHYSIOLOGY | Facility: CLINIC | Age: 85
End: 2025-06-05

## 2025-06-05 ENCOUNTER — NON-APPOINTMENT (OUTPATIENT)
Age: 85
End: 2025-06-05

## 2025-06-05 PROCEDURE — 93296 REM INTERROG EVL PM/IDS: CPT

## 2025-06-05 PROCEDURE — 93294 REM INTERROG EVL PM/LDLS PM: CPT

## 2025-07-02 NOTE — PROGRESS NOTE ADULT - SUBJECTIVE AND OBJECTIVE BOX
Norman Ytaes MD (Nephrology progress note)  [address] [facility],  [address],  [facility]  TEl: [phone]  Cell: [phone]    Patient is a 79y Female seen and evaluated at bedside. Patient sitting in chair complains of mild dyspnea and urinary leak and incontinence after starting lasix yesterday. Na level still remains 130. Urine Na 36 with urine osmolality pending. Patient still remains with extravascular volume with leg edema. Patient also complains of constipation and mild abdominal discomfort.    Allergies    No Known Allergies    Intolerances        ROS:  CONSTITUTIONAL: No fever or chills.  HEENT: No headache, nausea/vomiting, blurred vision  RESPIRATORY: Mild shortness of breath but denies cough, hemoptysis or wheezing  CARDIOVASCULAR: Mild dyspnea, orthopnea and leg edema, denies chest pain, palpitations, dizziness, syncope  GASTROINTESTINAL: Constipation and mild abdominal discomfort, denies nausea,vomiting, diarrhea, hematemesis or blood per rectum.  GENITOURINARY: Urinary incontinence and leak with urinary frequency and urgency, No gross hematuria, dysuria, foamy urine, flank or supra pubic pain.  NEUROLOGICAL: No headache, focal limb weakness, tingling or numbness or seizure like activity  SKIN: No skin rash or lesion  MUSCULOSKELETAL: Bilateral chronic leg edema and pain      VITALS:    T(C): 37 (19 @ 04:23), Max: 37.1 (11-10-19 @ 20:59)  HR: 74 (19 @ 04:23) (62 - 74)  BP: 148/76 (19 @ 04:23) (131/62 - 152/69)  RR: 17 (19 @ 04:23) (17 - 18)  SpO2: 95% (19 @ 04:23) (95% - 96%)  CAPILLARY BLOOD GLUCOSE      POCT Blood Glucose.: 245 mg/dL (2019 08:30)  POCT Blood Glucose.: 172 mg/dL (10 Nov 2019 22:13)  POCT Blood Glucose.: 240 mg/dL (10 Nov 2019 17:14)  POCT Blood Glucose.: 176 mg/dL (10 Nov 2019 12:20)      11-10-19 @ 07:01  -  19 @ 07:00  --------------------------------------------------------  IN: 960 mL / OUT: 0 mL / NET: 960 mL      MEDICATIONS  (STANDING):  ALBUTerol    90 MICROgram(s) HFA Inhaler 2 Puff(s) Inhalation every 8 hours  atorvastatin 40 milliGRAM(s) Oral at bedtime  dextrose 5%. 1000 milliLiter(s) (50 mL/Hr) IV Continuous <Continuous>  dextrose 50% Injectable 12.5 Gram(s) IV Push once  dextrose 50% Injectable 25 Gram(s) IV Push once  dextrose 50% Injectable 25 Gram(s) IV Push once  dronedarone 400 milliGRAM(s) Oral two times a day  furosemide    Tablet 40 milliGRAM(s) Oral daily  insulin lispro (HumaLOG) corrective regimen sliding scale   SubCutaneous three times a day before meals  levothyroxine 175 MICROGram(s) Oral daily  montelukast 10 milliGRAM(s) Oral daily  nystatin Powder 1 Application(s) Topical three times a day  pancrelipase  (CREON  6,000 Lipase Units) 2 Capsule(s) Oral three times a day with meals  pantoprazole    Tablet 40 milliGRAM(s) Oral before breakfast  rivaroxaban 20 milliGRAM(s) Oral with dinner  tiotropium 18 MICROgram(s) Capsule 1 Capsule(s) Inhalation daily    MEDICATIONS  (PRN):  dextrose 40% Gel 15 Gram(s) Oral once PRN Blood Glucose LESS THAN 70 milliGRAM(s)/deciliter  glucagon  Injectable 1 milliGRAM(s) IntraMuscular once PRN Glucose LESS THAN 70 milligrams/deciliter  simethicone 80 milliGRAM(s) Chew four times a day PRN Gas      PHYSICAL EXAM:  GENERAL: Well-developed, well nourished, alert, awake, oriented x3 in no distress  HEENT: CECELIA, EOMI, neck supple, no JVP, no carotid bruit, no palpable thyromegaly or lymphadenopathy, no carotid bruits  CHEST/LUNG: Bilateral clear breath sounds, no rales, no crepitations, no rhonchi, no wheezing  HEART: Regular rate and rhythm. VINH II/VI at LPSB, no gallops, no rub   ABDOMEN: Soft, obese, nontender, non distended, bowel sounds present, no palpable organomegaly, no guarding, no rigidity, no rebound  : No flank or supra pubic tenderness.  EXTREMITIES: Bilateral chronic pitting pedal edema with chronic venous stasis dermatitis  Neurology: AAOx3, no focal neurological deficit  SKIN: No rash or skin lesion      LABS:                        9.7    8.11  )-----------( 309      ( 2019 09:26 )             30.3         130<L>  |  96  |  17  ----------------------------<  178<H>  4.8   |  22  |  1.08    Ca    9.0      2019 07:05          Urinalysis Basic - ( 10 Nov 2019 21:43 )    Color: Light Yellow / Appearance: Clear / S.013 / pH: x  Gluc: x / Ketone: Negative  / Bili: Negative / Urobili: <2 mg/dL   Blood: x / Protein: Trace / Nitrite: Negative   Leuk Esterase: Negative / RBC: x / WBC x   Sq Epi: x / Non Sq Epi: x / Bacteria: x      Sodium, Random Urine: 36 mmol/L (11-10 @ 14:53)        RADIOLOGY & ADDITIONAL STUDIES:  None      Imaging Personally Reviewed:  [x] YES  [ ] NO    Consultant(s) Notes Reviewed:  [x] YES  [ ] NO    Care Discussed with Primary team/ Other Providers [x] YES  [ ] NO Followed protocol

## 2025-09-04 ENCOUNTER — APPOINTMENT (OUTPATIENT)
Dept: ELECTROPHYSIOLOGY | Facility: CLINIC | Age: 85
End: 2025-09-04
Payer: MEDICARE

## 2025-09-04 PROCEDURE — 93296 REM INTERROG EVL PM/IDS: CPT

## 2025-09-04 PROCEDURE — 93294 REM INTERROG EVL PM/LDLS PM: CPT
